# Patient Record
Sex: MALE | Race: BLACK OR AFRICAN AMERICAN | Employment: OTHER | URBAN - METROPOLITAN AREA
[De-identification: names, ages, dates, MRNs, and addresses within clinical notes are randomized per-mention and may not be internally consistent; named-entity substitution may affect disease eponyms.]

---

## 2017-10-30 PROBLEM — R33.9 URINARY RETENTION: Status: ACTIVE | Noted: 2017-10-30

## 2017-12-03 ENCOUNTER — HOSPITAL ENCOUNTER (EMERGENCY)
Age: 57
Discharge: OTHER INSTITUTION | End: 2017-12-03
Attending: EMERGENCY MEDICINE
Payer: MEDICAID

## 2017-12-03 VITALS
HEIGHT: 72 IN | HEART RATE: 60 BPM | SYSTOLIC BLOOD PRESSURE: 136 MMHG | BODY MASS INDEX: 19.37 KG/M2 | DIASTOLIC BLOOD PRESSURE: 72 MMHG | RESPIRATION RATE: 16 BRPM | OXYGEN SATURATION: 100 % | TEMPERATURE: 98.3 F | WEIGHT: 143 LBS

## 2017-12-03 DIAGNOSIS — R33.9 URINARY RETENTION: Primary | ICD-10-CM

## 2017-12-03 LAB
ANION GAP BLD CALC-SCNC: 14 MMOL/L (ref 10–20)
BUN BLD-MCNC: 35 MG/DL (ref 7–18)
CA-I BLD-MCNC: 1.23 MMOL/L (ref 1.12–1.32)
CHLORIDE BLD-SCNC: 105 MMOL/L (ref 100–108)
CO2 BLD-SCNC: 26 MMOL/L (ref 19–24)
CREAT UR-MCNC: 1 MG/DL (ref 0.6–1.3)
GLUCOSE BLD STRIP.AUTO-MCNC: 284 MG/DL (ref 74–106)
HCT VFR BLD CALC: 29 % (ref 36–49)
HGB BLD-MCNC: 9.9 G/DL (ref 12–16)
POTASSIUM BLD-SCNC: 4.2 MMOL/L (ref 3.5–5.5)
SODIUM BLD-SCNC: 140 MMOL/L (ref 136–145)

## 2017-12-03 PROCEDURE — 87077 CULTURE AEROBIC IDENTIFY: CPT | Performed by: EMERGENCY MEDICINE

## 2017-12-03 PROCEDURE — 80047 BASIC METABLC PNL IONIZED CA: CPT

## 2017-12-03 PROCEDURE — 99285 EMERGENCY DEPT VISIT HI MDM: CPT

## 2017-12-03 PROCEDURE — 77030005530 HC CATH URETH FOL40 BARD -B

## 2017-12-03 PROCEDURE — 87086 URINE CULTURE/COLONY COUNT: CPT | Performed by: EMERGENCY MEDICINE

## 2017-12-03 PROCEDURE — 51702 INSERT TEMP BLADDER CATH: CPT

## 2017-12-03 PROCEDURE — 87186 SC STD MICRODIL/AGAR DIL: CPT | Performed by: EMERGENCY MEDICINE

## 2017-12-03 NOTE — ED PROVIDER NOTES
HPI Comments: Pt presents with c/o urinary retention since this morning. Only put out <50cc since this morning. Last week his Willis was replaced improperly. Today, has had abd distension secondary to >1000 retention per nursing home facility. Denies fever, flank pain. Followed by [facility]. Smokes; drinks; in methadone clinic. Patient is a 59 y.o. male presenting with inability to urinate. The history is provided by the patient. Urinary Retention    This is a recurrent problem. The current episode started 6 to 12 hours ago. The problem occurs every urination. The problem has been gradually worsening. The quality of the pain is described as aching. The pain is at a severity of 6/10. The pain is moderate. There has been no fever. Pertinent negatives include no frequency, no hematuria and no flank pain. The patient is not pregnant. His past medical history is significant for urinary catheter problem. Past Medical History:   Diagnosis Date    Chronic drug abuse 1974    Diabetes ([facility] 01/1990    Hypertension     Renal cell carcinoma of left kidney ([facility] 12/17/13    Pathological Stage [id number] cc RCC FG3 s/p left open partial nephrectomy in 12/13      Renal mass        Past Surgical History:   Procedure Laterality Date    HX CIRCUMCISION  12/17/13    Dr. Tari Carpio, [facility]    HX NEPHRECTOMY Left 12/17/13    Open Partial, Dr. Tari Carpio, [facility]    HX OTHER SURGICAL Right 2/27/2016    removed right ft  2nd meta-tarsal         Family History:   Problem Relation Age of Onset    Diabetes Mother     Sickle Cell Anemia Father     Diabetes Sister     Hypertension Sister        Social History     Social History    Marital status:      Spouse name: N/A    Number of children: N/A    Years of education: N/A     Occupational History    Not on file.      Social History Main Topics    Smoking status: Current Every Day Smoker     Packs/day: 0.50     Years: 30.00     Types: Cigarettes    Smokeless tobacco: Never Used    Alcohol use 8.4 oz/week     14 Cans of beer per week      Comment: watching sports    Drug use: No      Comment: methadone    Sexual activity: Not on file     Other Topics Concern    Not on file     Social History Narrative     since 2012;  for 12 yrs; [employer]; [address] [employer]  just recently furloughed; No  service         ALLERGIES: Iodine    Review of Systems   Constitutional: Negative. HENT: Negative. Eyes: Negative. Respiratory: Negative. Negative for shortness of breath. Cardiovascular: Negative for chest pain and palpitations. Gastrointestinal: Positive for abdominal distention. Negative for rectal pain. Endocrine: Negative. Genitourinary: Negative. Negative for discharge, flank pain, frequency, hematuria and testicular pain. Musculoskeletal: Negative. Skin: Negative. Negative for wound. Allergic/Immunologic: Negative. Neurological: Negative. Hematological: Negative. Psychiatric/Behavioral: Negative. All other systems reviewed and are negative. Vitals:    12/03/17 1702   BP: 141/88   Pulse: 60   Resp: 16   Temp: 98.3 °F (36.8 °C)   SpO2: 100%   Weight: 64.9 kg (143 lb)   Height: 6' (1.829 m)            Physical Exam   Constitutional: Vital signs are normal. He appears well-developed and well-nourished. He is active. Non-toxic appearance. He does not appear ill. No distress. HENT:   Head: Normocephalic and atraumatic. Neck: Normal range of motion. Neck supple. Carotid bruit is not present. No tracheal deviation present. No thyromegaly present. Cardiovascular: Normal rate, regular rhythm and normal heart sounds. Exam reveals no gallop and no friction rub. No murmur heard. Pulmonary/Chest: Effort normal and breath sounds normal. No stridor. No respiratory distress. He has no wheezes. He has no rales. He exhibits no tenderness. Abdominal: Soft. He exhibits distension. He exhibits no mass. There is tenderness. There is no rebound, no guarding and no CVA tenderness. Diffuse lower abd distention and TTP. Musculoskeletal: Normal range of motion. Neurological: He is alert. Skin: Skin is warm, dry and intact. He is not diaphoretic. No pallor. Psychiatric: He has a normal mood and affect. His speech is normal and behavior is normal. Judgment and thought content normal.   Nursing note and vitals reviewed. MDM  Number of Diagnoses or Management Options  Urinary retention:   Diagnosis management comments: Differential: UTI; retention; prostatitis    Urine sent for cultre. Pending chemistry results, pt to be d/c'd home. Amount and/or Complexity of Data Reviewed  Clinical lab tests: ordered      ED Course       Procedures    6:33 PM  Diagnosis:   1. Urinary retention          Disposition: TBD;care turned over to oncoming provider at end of shift. Follow-up Information     Follow up With Details Comments Contact Info    Rose Mary Rm MD Schedule an appointment as soon as possible for a visit in 1 day  [place] [phone] [facility]  If symptoms worsen return immediately [address] [place]  [phone]          Patient's Medications   Start Taking    No medications on file   Continue Taking    ACETAMINOPHEN (TYLENOL) 500 MG TABLET    1,000 mg. ALBUTEROL-IPRATROPIUM (DUO-NEB) 2.5 MG-0.5 MG/3 ML NEBU    3 mL. AMLODIPINE (NORVASC) 10 MG TABLET    10 mg. ATENOLOL (TENORMIN) 25 MG TABLET    25 mg. ATORVASTATIN (LIPITOR) 40 MG TABLET    40 mg. BISACODYL (DULCOLAX) 5 MG EC TABLET    10 mg. GABAPENTIN (NEURONTIN) 100 MG CAPSULE    100 mg. HYDROCODONE-ACETAMINOPHEN (NORCO) 5-325 MG PER TABLET    Take 1 Tab by mouth every six (6) hours as needed. Max Daily Amount: 4 Tabs. HYDROMORPHONE (DILAUDID) 4 MG TABLET    4 mg. IBUPROFEN (MOTRIN) 400 MG TABLET    400 mg.     INSULIN GLARGINE (LANTUS SOLOSTAR) 100 UNIT/ML (3 ML) PEN    50 Units by SubCUTAneous route daily. INSULIN GLARGINE (LANTUS) 100 UNIT/ML INJECTION    4 Units. INSULIN LISPRO (HUMALOG KWIKPEN) 100 UNIT/ML KWIKPEN    Blood Sugar <150 =0 units; 150 -199 =2 units; 200 -249 =4 units; 250 -299 =6 units; 300 -349 =8 units; 350 and above =10 units. INSULIN LISPRO (HUMALOG) 100 UNIT/ML INJECTION    2-10 Units. LISINOPRIL (PRINIVIL, ZESTRIL) 40 MG TABLET    Take 1 Tab by mouth daily. MENTHOL-ZINC OXIDE (CALMOSEPTINE) 0.44-20.6 % OINT    Use 1 Application to affected area. MULTIVITAMIN, TX-IRON-CA-MIN (THERAPY M) 9 MG IRON-400 MCG TAB TABLET    Take 1 Tab by Mouth Once a Day. NICOTINE (NICODERM CQ) 14 MG/24 HR PATCH    1 Patch. POLYETHYLENE GLYCOL (MIRALAX) 17 GRAM PACKET    17 g. PRAVASTATIN (PRAVACHOL) 20 MG TABLET    Take 1 Tab by mouth nightly. RIFAMPIN (RIFADIN) 300 MG CAPSULE    600 mg. SILVER SULFADIAZINE (SILVADENE) 1 % TOPICAL CREAM    Use 1 Application to affected area Twice Daily. knees    ZOLPIDEM 10 MG SUBL    10 mg.    These Medications have changed    No medications on file   Stop Taking    No medications on file

## 2017-12-03 NOTE — DISCHARGE INSTRUCTIONS
Urinary Retention: Care Instructions  Your Care Instructions    Urinary retention means that you aren't able to urinate. In men, it is often caused by a blockage of the urinary tract from an enlarged prostate gland. In men and women, it can also be caused by an infection or nerve damage. Or it may be a side effect of a medicine. The doctor may have drained the urine from your bladder. If you still have problems passing urine, you may need to use a catheter at home. This is used to empty your bladder until the problem can be fixed. Your doctor may put a catheter in your bladder before you go home. If so, he or she will tell you when to come back to have the catheter removed. The doctor has checked you closely. But problems can develop later. If you notice any problems or new symptoms, get medical treatment right away. Follow-up care is a key part of your treatment and safety. Be sure to make and go to all appointments, and call your doctor if you are having problems. It's also a good idea to know your test results and keep a list of the medicines you take. How can you care for yourself at home? · Take your medicines exactly as prescribed. Call your doctor if you think you are having a problem with your medicine. You will get more details on the specific medicines your doctor prescribes. · Check with your doctor before you use any over-the-counter medicines. Many cold and allergy medicines, for example, can make this problem worse. Make sure your doctor knows all of the medicines, vitamins, supplements, and herbal remedies you take. · Spread out through the day the amount of fluid you drink. Do not drink a lot at bedtime. · Avoid alcohol and caffeine. · If you have been given a catheter, or if one is already in place, follow the instructions you were given. Always wash your hands before and after you handle the catheter. When should you call for help?   Call your doctor now or seek immediate medical care if:  ? · You cannot urinate at all, or it is getting harder to urinate. ? · You have symptoms of a urinary tract infection. These may include:  ¨ Pain or burning when you urinate. ¨ A frequent need to urinate without being able to pass much urine. ¨ Pain in the flank, which is just below the rib cage and above the waist on either side of the back. ¨ Blood in your urine. ¨ A fever. ? Watch closely for changes in your health, and be sure to contact your doctor if:  ? · You have any problems with your catheter. ? · You do not get better as expected. Where can you learn more? Go to http://gabo-curly.info/. Enter M244 in the search box to learn more about \"Urinary Retention: Care Instructions. \"  Current as of: May 12, 2017  Content Version: 11.4  © 9447-6673 [employer]. Care instructions adapted under license by [employer] (which disclaims liability or warranty for this information). If you have questions about a medical condition or this instruction, always ask your healthcare professional. [address] any warranty or liability for your use of this information.

## 2017-12-04 NOTE — ED TRIAGE NOTES
Pt brought to ED via EMS from nursing home with c/o abdominal pain and distention. Pt reports that he has a varner permanently that is not draining. EMS report that staff did a bladder scan showing 1 L of fluid. Pt arrives with varner in place.

## 2017-12-04 NOTE — ED NOTES
TRANSFER - OUT REPORT:    Attempted to give a verbal report on Cinthia Tripathi  being transferred to [facility]) for routine progression of care   Opportunity for questions and clarification was provided.       Patient transported with medical transportation

## 2017-12-04 NOTE — ED NOTES
Bedside shift change report given to Noy Booker RN (oncoming nurse) by Katie Montalvo RN (offgoing nurse). Report included the following information SBAR, ED Summary, Intake/Output, MAR and Recent Results.

## 2017-12-04 NOTE — ED NOTES
0600 PM :Pt care assumed from Kusum Yanez, ED provider. Pt complaint(s), current treatment plan, progression and available diagnostic results have been discussed thoroughly. Rounding occurred: no  Intended Disposition: TBD   Pending diagnostic reports and/or labs (please list): pending POC chem 8 and urine culture. Then discharge. Remedios Montenegro PA-C       4695 PM:  Unremarkable POC Chem 8. Will DC home with PCP and urology follow-up.  Remedios Montenegro PA-C

## 2017-12-05 LAB
BACTERIA SPEC CULT: ABNORMAL
SERVICE CMNT-IMP: ABNORMAL

## 2017-12-06 RX ORDER — SULFAMETHOXAZOLE AND TRIMETHOPRIM 800; 160 MG/1; MG/1
1 TABLET ORAL 2 TIMES DAILY
Qty: 6 TAB | Refills: 0 | Status: SHIPPED | OUTPATIENT
Start: 2017-12-06 | End: 2017-12-09

## 2017-12-06 NOTE — PROGRESS NOTES
Spoke with nursing facility -- pt released to another home and don't know where or name. Spoke with urologist.  Will still defer ABX for asymptomatic pt.  - angelia, nicole

## 2017-12-07 NOTE — PROGRESS NOTES
Pt prescribed bactrim at the time of the ED visit which is susceptible.  April FLY Levin PA-C 5:44 PM

## 2017-12-08 PROBLEM — G82.50 QUADRIPARESIS (HCC): Status: ACTIVE | Noted: 2017-05-31

## 2017-12-08 PROBLEM — I10 ESSENTIAL HYPERTENSION: Status: ACTIVE | Noted: 2017-04-28

## 2017-12-08 PROBLEM — R50.9 FEVER: Status: ACTIVE | Noted: 2017-05-20

## 2017-12-08 PROBLEM — J96.01 ACUTE RESPIRATORY FAILURE WITH HYPOXIA (HCC): Status: ACTIVE | Noted: 2017-05-31

## 2017-12-08 PROBLEM — R19.7 DIARRHEA: Status: ACTIVE | Noted: 2017-05-31

## 2017-12-08 PROBLEM — E87.1 HYPONATREMIA: Status: ACTIVE | Noted: 2017-04-28

## 2017-12-08 PROBLEM — B18.2 CHRONIC HEPATITIS C WITHOUT HEPATIC COMA (HCC): Status: ACTIVE | Noted: 2017-05-31

## 2017-12-08 PROBLEM — D63.8 ANEMIA OF CHRONIC DISEASE: Status: ACTIVE | Noted: 2017-05-20

## 2017-12-08 PROBLEM — F19.10 IV DRUG ABUSE (HCC): Status: ACTIVE | Noted: 2017-04-28

## 2017-12-08 PROBLEM — Z85.528 HISTORY OF RENAL CELL CANCER: Status: ACTIVE | Noted: 2017-05-31

## 2017-12-08 PROBLEM — R78.81 MRSA BACTEREMIA: Status: ACTIVE | Noted: 2017-05-31

## 2017-12-08 PROBLEM — B95.62 MRSA BACTEREMIA: Status: ACTIVE | Noted: 2017-05-31

## 2018-03-20 ENCOUNTER — HOSPITAL ENCOUNTER (EMERGENCY)
Age: 58
Discharge: NURSING, REHAB OR LONG-TERM CARE | End: 2018-03-20
Attending: EMERGENCY MEDICINE
Payer: MEDICAID

## 2018-03-20 VITALS
OXYGEN SATURATION: 99 % | DIASTOLIC BLOOD PRESSURE: 66 MMHG | BODY MASS INDEX: 22.72 KG/M2 | WEIGHT: 167.55 LBS | RESPIRATION RATE: 11 BRPM | TEMPERATURE: 98.2 F | SYSTOLIC BLOOD PRESSURE: 115 MMHG | HEART RATE: 53 BPM

## 2018-03-20 DIAGNOSIS — Z91.89: Primary | ICD-10-CM

## 2018-03-20 PROCEDURE — 99284 EMERGENCY DEPT VISIT MOD MDM: CPT

## 2018-03-20 NOTE — ED NOTES
Pt did not want to be transported, as he has no complaints.  He states Yobani Bourgeois can never get my  Temperature in my mouth\"

## 2018-03-20 NOTE — ED TRIAGE NOTES
From [facility] for hypothermia per family request just finished tx for flu, current treatment for cdiff. . Had low oral temp  At nursing home of 95, had temporal reading of 97.2. Pt has no complaints.

## 2018-03-20 NOTE — ED PROVIDER NOTES
EMERGENCY DEPARTMENT HISTORY AND PHYSICAL EXAM    7:11 PM      Date: 3/20/2018  Patient Name: Odalys Vanessa    History of Presenting Illness     Chief Complaint   Patient presents with    Other     seny by family for low temp reading          History Provided By: Patient    Chief Complaint: low temperature reading  Duration:  Minutes  Timing:  Acute  Location: n/a  Quality: n/a  Severity: N/A  Modifying Factors: Denies any modifying factor. Associated Symptoms: denies any other associated signs or symptoms      Additional History (Context): Odalys Vanessa is a 59 y.o. male with renal cell carcinoma, diabetes, chronic drug abuse, HTN, who presents from a nursing home after he got a low oral temperature reading at his nursing home. The pt recently had C. Diff., and influenza, and was concerned when the nursing home got an oral temperature of 95 degrees farenheit. The pt has no complaints, and feels well. Denies any modifying factors. No further complaints at this time. PCP: Georgia Marinelli MD    Current Outpatient Prescriptions   Medication Sig Dispense Refill    NUT. TX.GLUCOSE INTOLERANCE,SOY (GLUCERNA PO) Take  by mouth.  magnesium hydroxide (NUR MILK OF MAGNESIA) 400 mg/5 mL suspension Take 30 mL by mouth daily as needed for Constipation.  LACTOBACILLUS RHAMNOSUS GG (CULTURELLE PO) Take  by mouth.  doxycycline (MONODOX) 100 mg capsule Take 100 mg by mouth two (2) times a day.  mineral oil-hydrophil petrolat (AQUAPHOR) ointment Apply  to affected area as needed for Dry Skin.  mineral oil-hydrophil petrolat (AQUAPHOR) ointment Apply  to affected area as needed for Dry Skin.  PREPARATION H, WITCH HAZEL, EX by Apply Externally route.  linagliptin (TRADJENTA) 5 mg tablet Take 5 mg by mouth daily.  tamsulosin (FLOMAX) 0.4 mg capsule Take 0.4 mg by mouth daily.  acetaminophen (TYLENOL) 500 mg tablet 1,000 mg.      bisacodyl (DULCOLAX) 5 mg EC tablet 10 mg.  gabapentin (NEURONTIN) 100 mg capsule 100 mg.      atorvastatin (LIPITOR) 40 mg tablet 40 mg.      amLODIPine (NORVASC) 10 mg tablet 10 mg.      atenolol (TENORMIN) 25 mg tablet 25 mg.      ibuprofen (MOTRIN) 400 mg tablet 400 mg.      HYDROmorphone (DILAUDID) 4 mg tablet 4 mg.  albuterol-ipratropium (DUO-NEB) 2.5 mg-0.5 mg/3 ml nebu 3 mL.  Menthol-Zinc Oxide (CALMOSEPTINE) 0.44-20.6 % oint Use 1 Application to affected area.  nicotine (NICODERM CQ) 14 mg/24 hr patch 1 Patch.  rifAMPin (RIFADIN) 300 mg capsule 600 mg.  polyethylene glycol (MIRALAX) 17 gram packet 17 g.      silver sulfADIAZINE (SILVADENE) 1 % topical cream Use 1 Application to affected area Twice Daily. knees      multivitamin, tx-iron-ca-min (THERAPY M) 9 mg iron-400 mcg tab tablet Take 1 Tab by Mouth Once a Day.  Zolpidem 10 mg subl 10 mg.      insulin glargine (LANTUS) 100 unit/mL injection 4 Units.  insulin lispro (HUMALOG) 100 unit/mL injection 2-10 Units.  lisinopril (PRINIVIL, ZESTRIL) 40 mg tablet Take 1 Tab by mouth daily. 30 Tab 2    pravastatin (PRAVACHOL) 20 mg tablet Take 1 Tab by mouth nightly. 30 Tab 2    insulin lispro (HUMALOG KWIKPEN) 100 unit/mL kwikpen Blood Sugar <150 =0 units; 150 -199 =2 units; 200 -249 =4 units; 250 -299 =6 units; 300 -349 =8 units; 350 and above =10 units. 1 Package 2    insulin glargine (LANTUS SOLOSTAR) 100 unit/mL (3 mL) pen 50 Units by SubCUTAneous route daily.  1 Each 2       Past History     Past Medical History:  Past Medical History:   Diagnosis Date    Chronic drug abuse 1974    Diabetes ([facility] [address] 01/1990    Hypertension     Renal cell carcinoma of left kidney ([facility] [address] 12/17/13    Pathological Stage [id number] cc RCC FG3 s/p left open partial nephrectomy in 12/13      Renal mass        Past Surgical History:  Past Surgical History:   Procedure Laterality Date    HX CIRCUMCISION  12/17/13    Dr. Mesfin Kwok, [facility]    HX NEPHRECTOMY Left 12/17/13 Open Partial, [facility] Jade [facility], [facility]    HX OTHER SURGICAL Right 2/27/2016    removed right ft  2nd meta-tarsal       Family History:  Family History   Problem Relation Age of Onset    Diabetes Mother     Sickle Cell Anemia Father     Diabetes Sister     Hypertension Sister        Social History:  Social History   Substance Use Topics    Smoking status: Former Smoker     Packs/day: 0.50     Years: 30.00     Types: Cigarettes    Smokeless tobacco: Never Used    Alcohol use 8.4 oz/week     14 Cans of beer per week      Comment: watching sports       Allergies: Allergies   Allergen Reactions    Iodine Hives         Review of Systems       Review of Systems   Constitutional: Negative for chills, fatigue and fever. HENT: Negative. Negative for sore throat. Eyes: Negative. Respiratory: Negative for cough and shortness of breath. Cardiovascular: Negative for chest pain and palpitations. Gastrointestinal: Negative for abdominal pain, nausea and vomiting. Genitourinary: Negative for dysuria. Musculoskeletal: Negative. Skin: Negative. Neurological: Negative for dizziness, weakness, light-headedness and headaches. Psychiatric/Behavioral: Negative. All other systems reviewed and are negative. Physical Exam     Visit Vitals    /70 (BP 1 Location: Left arm, BP Patient Position: At rest)    Pulse (!) 55    Temp 98.2 °F (36.8 °C)    Resp 12    Wt 76 kg (167 lb 8.8 oz)    SpO2 98%    BMI 22.72 kg/m2         Physical Exam   Constitutional: He appears well-developed and well-nourished. Non-toxic appearance. He does not have a sickly appearance. He does not appear ill. No distress. HENT:   Head: Normocephalic and atraumatic. Mouth/Throat: Oropharynx is clear and moist. No oropharyngeal exudate. Eyes: Conjunctivae and EOM are normal. Pupils are equal, round, and reactive to light. No scleral icterus. Neck: Trachea normal and normal range of motion. Neck supple.  No hepatojugular reflux and no JVD present. No tracheal deviation present. No thyromegaly present. Cardiovascular: Regular rhythm, S1 normal, S2 normal, normal heart sounds, intact distal pulses and normal pulses. Bradycardia present. Exam reveals no gallop, no S3 and no S4. No murmur heard. Pulses:       Radial pulses are 2+ on the right side, and 2+ on the left side. Dorsalis pedis pulses are 2+ on the right side, and 2+ on the left side. Pulmonary/Chest: Effort normal and breath sounds normal. No accessory muscle usage. No tachypnea. No respiratory distress. He has no decreased breath sounds. He has no wheezes. He has no rhonchi. He has no rales. Abdominal: Soft. Normal appearance and bowel sounds are normal. He exhibits no distension and no mass. There is no hepatosplenomegaly. There is no tenderness. There is no rigidity, no rebound, no guarding, no CVA tenderness, no tenderness at McBurney's point and negative Carty's sign. Musculoskeletal: Normal range of motion. Upper extremities 1/5 throughout   Lower extremities 4/5 throughout    Lymphadenopathy:        Head (right side): No submental, no submandibular, no preauricular and no occipital adenopathy present. Head (left side): No submental, no submandibular, no preauricular and no occipital adenopathy present. He has no cervical adenopathy. Right: No supraclavicular adenopathy present. Left: No supraclavicular adenopathy present. Neurological: He is alert. He has normal strength and normal reflexes. He is not disoriented. No cranial nerve deficit or sensory deficit. Coordination and gait normal. GCS eye subscore is 4. GCS verbal subscore is 5. GCS motor subscore is 6. Grossly intact    Skin: Skin is warm, dry and intact. No rash noted. He is not diaphoretic. Psychiatric: He has a normal mood and affect.  His speech is normal and behavior is normal. Judgment and thought content normal. Cognition and memory are normal.   Nursing note and vitals reviewed. Medical Decision Making   I am the first provider for this patient. I reviewed the vital signs, available nursing notes, past medical history, past surgical history, family history and social history. Vital Signs-Reviewed the patient's vital signs. Records Reviewed: Nursing Notes and Old Medical Records (Time of Review: 7:11 PM)    ED Course: Progress Notes, Reevaluation, and Consults:    Provider Notes (Medical Decision Making):  MDM  Number of Diagnoses or Management Options  At high risk for hypothermia:   Diagnosis management comments: Hypothermia       Diagnosis       I have reassessed the patient. Patient is feeling well. The pt's temperature was normal. I feel he is ok to be discharged home. Patient was discharged in stable condition. Patient is to return to emergency department if any new or worsening condition. Clinical Impression:  Possible hypothermia, now resolved. Disposition: Discharged home     Follow-up Information     Follow up With Details Comments [address], [place] In 2 days For follow up [facility] [place] [address] [facility]      [facility] EMERGENCY DEPT  As needed, If symptoms worsen [address] [place]  [phone]           _______________________________    Attestations:  Scribe [address] acting as a scribe for and in the presence of Maranda Holman DO      March 20, 2018 at 7:11 PM       Provider Attestation:      I personally performed the services described in the documentation, reviewed the documentation, as recorded by the scribe in my presence, and it accurately and completely records my words and actions.  March 20, 2018 at 7:11 PM - Maranda Holman DO    _______________________________

## 2018-03-21 NOTE — ED NOTES
I have reviewed discharge instructions with the patient. The patient verbalized understanding. Pt is AxOx4, NAD. Pt taken out of ED by wheelchair, accompanied by family members, to take him back to [address] [facility].

## 2018-03-31 ENCOUNTER — APPOINTMENT (OUTPATIENT)
Dept: CT IMAGING | Age: 58
DRG: 460 | End: 2018-03-31
Attending: FAMILY MEDICINE
Payer: MEDICAID

## 2018-03-31 ENCOUNTER — HOSPITAL ENCOUNTER (INPATIENT)
Age: 58
LOS: 10 days | Discharge: NURSING, REHAB OR LONG-TERM CARE | DRG: 460 | End: 2018-04-10
Attending: EMERGENCY MEDICINE | Admitting: FAMILY MEDICINE
Payer: MEDICAID

## 2018-03-31 DIAGNOSIS — C64.2 CANCER OF LEFT KIDNEY (HCC): ICD-10-CM

## 2018-03-31 DIAGNOSIS — N18.30 CHRONIC KIDNEY DISEASE, STAGE III (MODERATE) (HCC): ICD-10-CM

## 2018-03-31 DIAGNOSIS — D64.9 ANEMIA, UNSPECIFIED TYPE: ICD-10-CM

## 2018-03-31 DIAGNOSIS — M54.9 CHRONIC BACK PAIN GREATER THAN 3 MONTHS DURATION: ICD-10-CM

## 2018-03-31 DIAGNOSIS — D75.89 LIGHT CHAIN DEPOSITION DISEASE: ICD-10-CM

## 2018-03-31 DIAGNOSIS — R60.9 PERIPHERAL EDEMA: Primary | ICD-10-CM

## 2018-03-31 DIAGNOSIS — G89.29 CHRONIC BACK PAIN GREATER THAN 3 MONTHS DURATION: ICD-10-CM

## 2018-03-31 DIAGNOSIS — N28.9 RENAL INSUFFICIENCY: ICD-10-CM

## 2018-03-31 DIAGNOSIS — R00.1 BRADYCARDIA: ICD-10-CM

## 2018-03-31 PROBLEM — Z86.19 H/O CLOSTRIDIUM DIFFICILE INFECTION: Status: ACTIVE | Noted: 2018-03-31

## 2018-03-31 LAB
ALBUMIN SERPL-MCNC: 3.4 G/DL (ref 3.4–5)
ALBUMIN/GLOB SERPL: 0.8 {RATIO} (ref 0.8–1.7)
ALP SERPL-CCNC: 131 U/L (ref 45–117)
ALT SERPL-CCNC: 94 U/L (ref 16–61)
ANION GAP SERPL CALC-SCNC: 8 MMOL/L (ref 3–18)
AST SERPL-CCNC: 66 U/L (ref 15–37)
BASOPHILS # BLD: 0 K/UL (ref 0–0.06)
BASOPHILS NFR BLD: 0 % (ref 0–3)
BILIRUB SERPL-MCNC: 0.7 MG/DL (ref 0.2–1)
BNP SERPL-MCNC: 562 PG/ML (ref 0–900)
BUN SERPL-MCNC: 68 MG/DL (ref 7–18)
BUN/CREAT SERPL: 38 (ref 12–20)
CALCIUM SERPL-MCNC: 9.4 MG/DL (ref 8.5–10.1)
CHLORIDE SERPL-SCNC: 114 MMOL/L (ref 100–108)
CK MB CFR SERPL CALC: 5.2 % (ref 0–4)
CK MB SERPL-MCNC: 6.1 NG/ML (ref 5–25)
CK SERPL-CCNC: 117 U/L (ref 39–308)
CO2 SERPL-SCNC: 20 MMOL/L (ref 21–32)
CREAT SERPL-MCNC: 1.78 MG/DL (ref 0.6–1.3)
CREAT UR-MCNC: 102 MG/DL (ref 30–125)
DIFFERENTIAL METHOD BLD: ABNORMAL
EOSINOPHIL # BLD: 0.2 K/UL (ref 0–0.4)
EOSINOPHIL NFR BLD: 3 % (ref 0–5)
ERYTHROCYTE [DISTWIDTH] IN BLOOD BY AUTOMATED COUNT: 15.8 % (ref 11.6–14.5)
GLOBULIN SER CALC-MCNC: 4.4 G/DL (ref 2–4)
GLUCOSE BLD STRIP.AUTO-MCNC: 168 MG/DL (ref 70–110)
GLUCOSE SERPL-MCNC: 171 MG/DL (ref 74–99)
HCT VFR BLD AUTO: 22.9 % (ref 36–48)
HGB BLD-MCNC: 7.7 G/DL (ref 13–16)
LYMPHOCYTES # BLD: 1.9 K/UL (ref 0.8–3.5)
LYMPHOCYTES NFR BLD: 33 % (ref 20–51)
MCH RBC QN AUTO: 29.5 PG (ref 24–34)
MCHC RBC AUTO-ENTMCNC: 33.6 G/DL (ref 31–37)
MCV RBC AUTO: 87.7 FL (ref 74–97)
MICROALBUMIN UR-MCNC: 5.54 MG/DL (ref 0–3)
MICROALBUMIN/CREAT UR-RTO: 54 MG/G (ref 0–30)
MONOCYTES # BLD: 0.6 K/UL (ref 0–1)
MONOCYTES NFR BLD: 10 % (ref 2–9)
NEUTS SEG # BLD: 3 K/UL (ref 1.8–8)
NEUTS SEG NFR BLD: 54 % (ref 42–75)
PLATELET # BLD AUTO: 51 K/UL (ref 135–420)
PLATELET COMMENTS,PCOM: ABNORMAL
POTASSIUM SERPL-SCNC: 5.4 MMOL/L (ref 3.5–5.5)
PROT SERPL-MCNC: 7.8 G/DL (ref 6.4–8.2)
RBC # BLD AUTO: 2.61 M/UL (ref 4.7–5.5)
RBC MORPH BLD: ABNORMAL
RBC MORPH BLD: ABNORMAL
SODIUM SERPL-SCNC: 142 MMOL/L (ref 136–145)
TROPONIN I SERPL-MCNC: 0.07 NG/ML (ref 0–0.04)
WBC # BLD AUTO: 5.7 K/UL (ref 4.6–13.2)

## 2018-03-31 PROCEDURE — 82043 UR ALBUMIN QUANTITATIVE: CPT | Performed by: FAMILY MEDICINE

## 2018-03-31 PROCEDURE — 74011636637 HC RX REV CODE- 636/637: Performed by: FAMILY MEDICINE

## 2018-03-31 PROCEDURE — 80053 COMPREHEN METABOLIC PANEL: CPT | Performed by: EMERGENCY MEDICINE

## 2018-03-31 PROCEDURE — 82962 GLUCOSE BLOOD TEST: CPT

## 2018-03-31 PROCEDURE — 65660000004 HC RM CVT STEPDOWN

## 2018-03-31 PROCEDURE — 82550 ASSAY OF CK (CPK): CPT | Performed by: FAMILY MEDICINE

## 2018-03-31 PROCEDURE — 83880 ASSAY OF NATRIURETIC PEPTIDE: CPT | Performed by: EMERGENCY MEDICINE

## 2018-03-31 PROCEDURE — 85025 COMPLETE CBC W/AUTO DIFF WBC: CPT | Performed by: EMERGENCY MEDICINE

## 2018-03-31 PROCEDURE — 87522 HEPATITIS C REVRS TRNSCRPJ: CPT | Performed by: FAMILY MEDICINE

## 2018-03-31 PROCEDURE — 87902 NFCT AGT GNTYP ALYS HEP C: CPT | Performed by: FAMILY MEDICINE

## 2018-03-31 PROCEDURE — 74176 CT ABD & PELVIS W/O CONTRAST: CPT

## 2018-03-31 PROCEDURE — 99284 EMERGENCY DEPT VISIT MOD MDM: CPT

## 2018-03-31 PROCEDURE — 93005 ELECTROCARDIOGRAM TRACING: CPT

## 2018-03-31 PROCEDURE — 74011250636 HC RX REV CODE- 250/636: Performed by: EMERGENCY MEDICINE

## 2018-03-31 PROCEDURE — 74011250637 HC RX REV CODE- 250/637: Performed by: FAMILY MEDICINE

## 2018-03-31 PROCEDURE — 74011250636 HC RX REV CODE- 250/636: Performed by: FAMILY MEDICINE

## 2018-03-31 RX ORDER — DEXTROSE 50 % IN WATER (D50W) INTRAVENOUS SYRINGE
25-50 AS NEEDED
Status: DISCONTINUED | OUTPATIENT
Start: 2018-03-31 | End: 2018-04-10 | Stop reason: HOSPADM

## 2018-03-31 RX ORDER — MAGNESIUM SULFATE 100 %
4 CRYSTALS MISCELLANEOUS AS NEEDED
Status: DISCONTINUED | OUTPATIENT
Start: 2018-03-31 | End: 2018-04-10 | Stop reason: HOSPADM

## 2018-03-31 RX ORDER — PANTOPRAZOLE SODIUM 40 MG/1
40 TABLET, DELAYED RELEASE ORAL
Status: DISCONTINUED | OUTPATIENT
Start: 2018-04-01 | End: 2018-04-10 | Stop reason: HOSPADM

## 2018-03-31 RX ORDER — SODIUM CHLORIDE 9 MG/ML
75 INJECTION, SOLUTION INTRAVENOUS CONTINUOUS
Status: DISPENSED | OUTPATIENT
Start: 2018-03-31 | End: 2018-04-01

## 2018-03-31 RX ORDER — HEPARIN SODIUM 5000 [USP'U]/ML
5000 INJECTION, SOLUTION INTRAVENOUS; SUBCUTANEOUS EVERY 8 HOURS
Status: DISCONTINUED | OUTPATIENT
Start: 2018-03-31 | End: 2018-03-31

## 2018-03-31 RX ORDER — SODIUM CHLORIDE 9 MG/ML
1000 INJECTION, SOLUTION INTRAVENOUS ONCE
Status: COMPLETED | OUTPATIENT
Start: 2018-03-31 | End: 2018-03-31

## 2018-03-31 RX ORDER — INSULIN LISPRO 100 [IU]/ML
INJECTION, SOLUTION INTRAVENOUS; SUBCUTANEOUS
Status: DISCONTINUED | OUTPATIENT
Start: 2018-03-31 | End: 2018-04-10 | Stop reason: HOSPADM

## 2018-03-31 RX ORDER — UREA 10 %
2 LOTION (ML) TOPICAL 2 TIMES DAILY
Status: DISCONTINUED | OUTPATIENT
Start: 2018-03-31 | End: 2018-04-10 | Stop reason: HOSPADM

## 2018-03-31 RX ORDER — HYDRALAZINE HYDROCHLORIDE 25 MG/1
25 TABLET, FILM COATED ORAL
Status: DISCONTINUED | OUTPATIENT
Start: 2018-03-31 | End: 2018-04-10 | Stop reason: HOSPADM

## 2018-03-31 RX ADMIN — SODIUM CHLORIDE 75 ML/HR: 900 INJECTION, SOLUTION INTRAVENOUS at 20:35

## 2018-03-31 RX ADMIN — SODIUM CHLORIDE 1000 ML: 900 INJECTION, SOLUTION INTRAVENOUS at 16:09

## 2018-03-31 RX ADMIN — LACTOBACILLUS TAB 2 TABLET: TAB at 20:07

## 2018-03-31 RX ADMIN — INSULIN LISPRO 2 UNITS: 100 INJECTION, SOLUTION INTRAVENOUS; SUBCUTANEOUS at 21:35

## 2018-03-31 NOTE — ED NOTES
Patient ambulatory to the restroom to obtain urine specimen patient denies pain urine sent to the lab at this time.

## 2018-03-31 NOTE — H&P
History & Physical    Patient: Waldo Tinoco MRN: [id number]  CSN: [id number]    YOB: 1960  Age: 59 y.o. Sex: male      DOA: 3/31/2018    Chief Complaint: swelling lower extremities  . HPI:     Waldo Tinoco is a 59 y.o.  male who   Has  history of renal mass, renal cell carcinoma of left kidney, DM, chronic drug abuse, and HTN who presented with  c/o bilateral lower extremity swelling onset 3 days ago. He states that he is from [address] and has been in the nursing home for the past 1 year. Heddie Katy He notedf that his ankles first started sweling first.  He states that he had an ultra sound of his leg yesterday. He notes that he usually is able to ambulate without a walker but with increase swelling can not ambulate like before . Patient denies history of CHF, chest pain, abdominal pain, and any othe rassociated symptoms or complaints. Pt has H/o hepatitis c and s/p partial nephrectomy Lt kidney due to renal cell carcinoma by dr Ira Main urology    Pt has h/o cervical disc disease s/p surgery and fusion .  Pt had  H/o diskitis and osteomyelitis c 3-7 and quadriplegia after surgery     Pt was treated last week for c- diff and and flu     Past Medical History:   Diagnosis Date    Chronic drug abuse 1974    Diabetes ([facility] [address] 01/1990    Hypertension     Renal cell carcinoma of left kidney ([facility] [address] 12/17/13    Pathological Stage [id number] cc RCC FG3 s/p left open partial nephrectomy in 12/13      Renal mass        Past Surgical History:   Procedure Laterality Date    HX CIRCUMCISION  12/17/13    Dr. Ricardo Quinones, [facility]    HX NEPHRECTOMY Left 12/17/13    Open Partial, Dr. Ricardo Quinones, [facility]    HX OTHER SURGICAL Right 2/27/2016    removed right ft  2nd meta-tarsal       Family History   Problem Relation Age of Onset    Diabetes Mother     Sickle Cell Anemia Father     Diabetes Sister     Hypertension Sister        Social History     Social History    Marital status:      Spouse name: N/A    Number of children: N/A    Years of education: N/A     Social History Main Topics    Smoking status: Former Smoker     Packs/day: 0.50     Years: 30.00     Types: Cigarettes    Smokeless tobacco: Never Used    Alcohol use 8.4 oz/week     14 Cans of beer per week      Comment: watching sports    Drug use: No      Comment: methadone    Sexual activity: Not on file     Other Topics Concern    Not on file     Social History Narrative     since 2012;  for 12 yrs; 2K; [address] [place]  just recently furloughed; No  service       Prior to Admission medications    Medication Sig Start Date End Date Taking? Authorizing Provider   NUT. TX.GLUCOSE INTOLERANCE,SOY (GLUCERNA PO) Take  by mouth. Historical Provider   magnesium hydroxide (NUR MILK OF MAGNESIA) 400 mg/5 mL suspension Take 30 mL by mouth daily as needed for Constipation. Historical Provider   LACTOBACILLUS RHAMNOSUS GG (CULTURELLE PO) Take  by mouth. Historical Provider   doxycycline (MONODOX) 100 mg capsule Take 100 mg by mouth two (2) times a day. Historical Provider   mineral oil-hydrophil petrolat (AQUAPHOR) ointment Apply  to affected area as needed for Dry Skin. Historical Provider   mineral oil-hydrophil petrolat (AQUAPHOR) ointment Apply  to affected area as needed for Dry Skin. Historical Provider   PREPARATION H, WITCH HAZEL, EX by Apply Externally route. Historical Provider   linagliptin (TRADJENTA) 5 mg tablet Take 5 mg by mouth daily. Historical Provider   tamsulosin (FLOMAX) 0.4 mg capsule Take 0.4 mg by mouth daily. Historical Provider   acetaminophen (TYLENOL) 500 mg tablet 1,000 mg. 6/7/17   Historical Provider   bisacodyl (DULCOLAX) 5 mg EC tablet 10 mg. 6/7/17   Historical Provider   gabapentin (NEURONTIN) 100 mg capsule 100 mg.     Historical Provider   atorvastatin (LIPITOR) 40 mg tablet 40 mg. 6/7/17   Historical Provider   amLODIPine (NORVASC) 10 mg tablet 10 mg. 5/28/15   Historical Provider   atenolol (TENORMIN) 25 mg tablet 25 mg. 5/28/15   Historical Provider   ibuprofen (MOTRIN) 400 mg tablet 400 mg. 6/7/17   Historical Provider   HYDROmorphone (DILAUDID) 4 mg tablet 4 mg. 6/7/17   Historical Provider   albuterol-ipratropium (DUO-NEB) 2.5 mg-0.5 mg/3 ml nebu 3 mL. 6/7/17   Historical Provider   Menthol-Zinc Oxide (CALMOSEPTINE) 0.44-20.6 % oint Use 1 Application to affected area. Historical Provider   nicotine (NICODERM CQ) 14 mg/24 hr patch 1 Patch. 6/7/17   Historical Provider   rifAMPin (RIFADIN) 300 mg capsule 600 mg. 6/7/17   Historical Provider   polyethylene glycol (MIRALAX) 17 gram packet 17 g. Historical Provider   silver sulfADIAZINE (SILVADENE) 1 % topical cream Use 1 Application to affected area Twice Daily. knees 6/7/17   Historical Provider   multivitamin, tx-iron-ca-min (THERAPY M) 9 mg iron-400 mcg tab tablet Take 1 Tab by Mouth Once a Day. 6/7/17   Historical Provider   Zolpidem 10 mg subl 10 mg. Historical Provider   insulin glargine (LANTUS) 100 unit/mL injection 4 Units. 6/7/17   Historical Provider   insulin lispro (HUMALOG) 100 unit/mL injection 2-10 Units. 6/7/17   Historical Provider   lisinopril (PRINIVIL, ZESTRIL) 40 mg tablet Take 1 Tab by mouth daily. 3/2/16   Moreno Perez MD   pravastatin (PRAVACHOL) 20 mg tablet Take 1 Tab by mouth nightly. 2/29/16   Annelise Quinones MD   insulin lispro (HUMALOG KWIKPEN) 100 unit/mL kwikpen Blood Sugar <150 =0 units; 150 -199 =2 units; 200 -249 =4 units; 250 -299 =6 units; 300 -349 =8 units; 350 and above =10 units. 2/29/16   Annelise Quinones MD   insulin glargine (LANTUS SOLOSTAR) 100 unit/mL (3 mL) pen 50 Units by SubCUTAneous route daily. 2/29/16   Annelise Quinones MD       Allergies   Allergen Reactions    Iodine Hives       Review of Systems  GENERAL: Patient alert, awake and oriented times 3, able to communicate full sentences and not in distress.    HEENT: No change in vision, no earache, tinnitus, sore throat or sinus congestion. NECK: No pain or stiffness. CARDIOVASCULAR: No chest pain or pressure. No palpitations. PULMONARY: No shortness of breath, cough or wheeze. GASTROINTESTINAL: No abdominal pain, nausea, vomiting or diarrhea, melena or       bright red blood per rectum. Positive constipation  GENITOURINARY: No urinary frequency, urgency, hesitancy or dysuria. MUSCULOSKELETAL: No joint or muscle pain, no back pain, no recent trauma. DERMATOLOGIC: No rash, no itching, no lesions. ENDOCRINE: No polyuria, polydipsia, no heat or cold intolerance. No recent change in    weight. HEMATOLOGICAL: No anemia or easy bruising or bleeding. NEUROLOGIC: No headache, seizures, numbness, tingling . Positive weakness upper extremities more than lower extremities  PSYCHIATRIC: No depression, anxiety, mood disorder, no loss of interest in normal       activity or change in sleep pattern. Physical Exam:     Physical Exam:  Visit Vitals    /71    Pulse (!) 44    Temp 97.8 °F (36.6 °C)    Resp 20    Ht 6' (1.829 m)    Wt 75.8 kg (167 lb)    SpO2 100%    BMI 22.65 kg/m2           Temp (24hrs), Av.8 °F (36.6 °C), Min:97.8 °F (36.6 °C), Max:97.8 °F (36.6 °C)             General:  Alert, cooperative, no distress, appears stated age. Head:  Normocephalic, without obvious abnormality, atraumatic. Eyes:  Conjunctivae/corneas clear. PERRL, EOMs intact. Nose: Nares normal. No drainage or sinus tenderness. Throat: Lips, mucosa, and tongue normal.poor dentition    Neck: Supple, symmetrical, trachea midline, no adenopathy, thyroid: no enlargement/tenderness/nodules, no carotid bruit and no JVD. Back:   ROM normal. No CVA tenderness. Lungs:   Clear to auscultation bilaterally. Chest wall:  No tenderness or deformity. Heart:  Regular rate and rhythm, S1, S2 normal, no murmur, click, rub or gallop. Abdomen: Soft, non-tender.  Bowel sounds normal. No masses,  No organomegaly. Extremities: Extremities normal, atraumatic, no cyanosis or edema. Pulses: 2+ and symmetric lower extremities ! + upper extremities   Skin: Skin color, texture, turgor normal. No rashes or lesions   Neurologic: CNII-XII intact. No  New focal motor or sensory deficit. functional quadriplegia       Labs Reviewed: All lab results for the last 24 hours reviewed.   EKG    Procedures/imaging: see electronic medical records for all procedures/Xrays and details which were not copied into this note but were reviewed prior to creation of Plan        Assessment/Plan     Active Problems:    Renal cell cancer ([facility] [address] (11/5/2014)      Type II diabetes mellitus, uncontrolled ([facility] [address] (12/16/2015)      Urinary retention (10/30/2017)      Cancer of left kidney ([facility] [address] (12/23/2013)      Quadriparesis ([facility] [address] (5/31/2017)      Bradycardia (3/31/2018)      Acute renal insufficiency (3/31/2018)      H/O Clostridium difficile infection (3/31/2018)         Plan  Acute Renal failure/ H/O Lt renal cell carcinoma S/p Lt nephrectomy/ swelling lower extremities   Pt received IV fluid in the ER  Will get venous duplex ultrasound B/L leg  Ct scan abdomenand pelvis no contrast  Kristyn hydration  Urine micro albumin  Nephrology consult  Check echo, cardiac enzymes  Hold lisnopril  Hydralazine prn SBp above 160    Diabetes Mellitus   Check HG A1c, lipid profile  Sliding scale coverage    H/O c-diff / h/o flu  Treated with flagyl at nursing home  Finished course of Tamiflu    Thrombocytopenia  Continue to monitor CBC      H/o hepatitis c  Check viral load and genotype  Pt has been f/u GI and had ultrasound liver recently waiting for treatment  Monitor liver function      Bradycardia  Hold atenolol  Pt asymptomatic      DVT/GI Prophylaxis: SCD's and H2B/PPI      Oli Meadows MD  3/31/2018  5:13 PM

## 2018-03-31 NOTE — IP AVS SNAPSHOT
[address] Patient: Rebecca Otoole MRN: [id number] :1960 A check rene indicates which time of day the medication should be taken. My Medications START taking these medications Instructions Each Dose to Equal  
 Morning Noon Evening Bedtime  
 amoxicillin-clavulanate 875-125 mg per tablet Commonly known as:  AUGMENTIN Your last dose was: Your next dose is: Take 1 Tab by mouth two (2) times a day for 1 day. 1 Tab  
    
   
   
   
  
 furosemide 40 mg tablet Commonly known as:  LASIX Your last dose was: Your next dose is: Take 1 Tab by mouth daily. 40 mg  
    
   
   
   
  
 hydrALAZINE 25 mg tablet Commonly known as:  APRESOLINE Your last dose was: Your next dose is: Take 1 Tab by mouth three (3) times daily as needed for Other (SBP above 160). 25 mg Lactobacillus Acidoph & Bulgar 1 million cell Tab tablet Commonly known as:  Rigo Booty Your last dose was: Your next dose is: Take 2 Tabs by mouth two (2) times a day. 2 Tab  
    
   
   
   
  
 levothyroxine 50 mcg tablet Commonly known as:  SYNTHROID Start taking on:  2018 Your last dose was: Your next dose is: Take 1 Tab by mouth Daily (before breakfast). 50 mcg  
    
   
   
   
  
 naloxone 2 mg/actuation Spry Your last dose was: Your next dose is:    
   
   
 Use 1 spray intranasally into 1 nostril. Use a new Narcan nasal spray for subsequent doses and administer into alternating nostrils. May repeat every 2 to 3 minutes as needed. pantoprazole 40 mg tablet Commonly known as:  PROTONIX Start taking on:  2018 Your last dose was: Your next dose is: Take 1 Tab by mouth Daily (before breakfast). 40 mg  
    
   
   
   
  
 sodium bicarbonate 650 mg tablet Your last dose was: Your next dose is: Take 1 Tab by mouth three (3) times daily. 650 mg CHANGE how you take these medications Instructions Each Dose to Equal  
 Morning Noon Evening Bedtime  
 amLODIPine 10 mg tablet Commonly known as:  Teodoro Hernandez What changed:   
- how to take this - when to take this Your last dose was: Your next dose is: Take 1 Tab by mouth daily. 10 mg HYDROmorphone 4 mg tablet Commonly known as:  DILAUDID What changed:   
- how to take this - when to take this 
- reasons to take this Your last dose was: Your next dose is: Take 1 Tab by mouth every six (6) hours as needed. Max Daily Amount: 16 mg.  
 4 mg  
    
   
   
   
  
 insulin lispro 100 unit/mL injection Commonly known as:  HUMALOG What changed:   
- how much to take 
- additional instructions - Another medication with the same name was removed. Continue taking this medication, and follow the directions you see here. Your last dose was: Your next dose is:    
   
   
 Normal Insulin Sensitivity  For Blood Sugar (mg/dL) of:    Less than 150 =   0 units          150 -199 =   2 units 200 -249 =   4 units 250 -299 =   6 units 300 -349 =   8 units 350 and above =   10 units If 2 glucose readings are above 200 mg/dL within a 24 hr period, proceed to \"Very Insul  
     
   
   
   
  
 linagliptin 5 mg tablet Commonly known as:  Loren Mendez Start taking on:  4/11/2018 What changed:  when to take this Your last dose was: Your next dose is: Take 1 Tab by mouth Daily (before breakfast). 5 mg  
    
   
   
   
  
 mineral oil-hydrophil petrolat ointment Commonly known as:  AQUAPHOR  
 What changed:  Another medication with the same name was removed. Continue taking this medication, and follow the directions you see here. Your last dose was: Your next dose is:    
   
   
 Apply  to affected area as needed for Dry Skin. CONTINUE taking these medications Instructions Each Dose to Equal  
 Morning Noon Evening Bedtime  
 acetaminophen 500 mg tablet Commonly known as:  TYLENOL Your last dose was: Your next dose is:    
   
   
 1,000 mg.  
 1000 mg  
    
   
   
   
  
 albuterol-ipratropium 2.5 mg-0.5 mg/3 ml Nebu Commonly known as:  Donzella Rude Your last dose was: Your next dose is:    
   
   
 3 mL. 3 mL  
    
   
   
   
  
 atorvastatin 40 mg tablet Commonly known as:  LIPITOR Your last dose was: Your next dose is:    
   
   
 40 mg.  
 40 mg  
    
   
   
   
  
 bisacodyl 5 mg EC tablet Commonly known as:  DULCOLAX Your last dose was: Your next dose is:    
   
   
 10 mg.  
 10 mg  
    
   
   
   
  
 nicotine 14 mg/24 hr patch Commonly known as:  Zahar Pinta Your last dose was: Your next dose is:    
   
   
 1 Patch. 1 Patch NUR MILK OF MAGNESIA 400 mg/5 mL suspension Generic drug:  magnesium hydroxide Your last dose was: Your next dose is: Take 30 mL by mouth daily as needed for Constipation. 30 mL  
    
   
   
   
  
 polyethylene glycol 17 gram packet Commonly known as:  Lai Arceo Your last dose was: Your next dose is:    
   
   
 17 g.  
 17 g  
    
   
   
   
  
 tamsulosin 0.4 mg capsule Commonly known as:  FLOMAX Your last dose was: Your next dose is: Take 1 Cap by mouth daily. 0.4 mg Therapy M 9 mg iron-400 mcg Tab tablet Generic drug:  multivitamin, tx-iron-ca-min Your last dose was: Your next dose is: Take 1 Tab by Mouth Once a Day. STOP taking these medications   
 atenolol 25 mg tablet Commonly known as:  TENORMIN  
   
  
 CALMOSEPTINE 0.44-20.6 % Oint Generic drug:  Menthol-Zinc Oxide CULTURELLE PO  
   
  
 doxycycline 100 mg capsule Commonly known as:  MONODOX  
   
  
 gabapentin 100 mg capsule Commonly known as:  NEURONTIN  
   
  
 GLUCERNA PO  
   
  
 ibuprofen 400 mg tablet Commonly known as:  MOTRIN  
   
  
 insulin glargine 100 unit/mL (3 mL) Inpn Commonly known as:  LANTUS SOLOSTAR U-100 INSULIN  
   
  
 insulin glargine 100 unit/mL injection Commonly known as:  LANTUS  
   
  
 lisinopril 40 mg tablet Commonly known as:  PRINIVIL, ZESTRIL  
   
  
 pravastatin 20 mg tablet Commonly known as:  PRAVACHOL  
   
  
 PREPARATION H (WITCH HAZEL) EX  
   
  
 rifAMPin 300 mg capsule Commonly known as:  RIFADIN  
   
  
 silver sulfADIAZINE 1 % topical cream  
Commonly known as:  SILVADENE Zolpidem 10 mg Subl Where to Get Your Medications Information on where to get these meds will be given to you by the nurse or doctor. ! Ask your nurse or doctor about these medications  
  amLODIPine 10 mg tablet  
 amoxicillin-clavulanate 875-125 mg per tablet  
 furosemide 40 mg tablet  
 hydrALAZINE 25 mg tablet HYDROmorphone 4 mg tablet  
 insulin lispro 100 unit/mL injection Lactobacillus Acidoph & Bulgar 1 million cell Tab tablet  
 levothyroxine 50 mcg tablet  
 linagliptin 5 mg tablet  
 naloxone 2 mg/actuation Spry  
 pantoprazole 40 mg tablet  
 sodium bicarbonate 650 mg tablet  
 tamsulosin 0.4 mg capsule

## 2018-03-31 NOTE — ED PROVIDER NOTES
EMERGENCY DEPARTMENT HISTORY AND PHYSICAL EXAM    2:03 PM      Date: 3/31/2018  Patient Name: Rolf Falcon    History of Presenting Illness     No chief complaint on file. History Provided By: Patient    Chief Complaint: Edema  Duration: 3 Days  Timing:  Progressive and Worsening  Location: Bilateral lower extremities  Associated Symptoms: Patient denies history of CHF, chest pain, abdominal pain, and any othe rassociated symptoms ror complaints. Additional History (Context): Rolf Falcon is a 59 y.o. male with a past medical history of renal mass, renal cell carcinoma of left kidney, DM, chronic drug abuse, and HTN who presents with  c/o bilateral lower extremity swelling onset 3 days ago. He states that he is from a [address] [place] [address] and has been in the nursing home for the past 1 year. Sarai Peaks He notes that his ankles first started sweling first.  He states that he had an ultra sound of his leg yesterday. He notes that he usually is able to ambulate without a walker. Patient denies history of CHF, chest pain, abdominal pain, and any othe rassociated symptoms or complaints. PCP: Cece Lopez MD    Current Facility-Administered Medications   Medication Dose Route Frequency Provider Last Rate Last Dose    0.9% sodium chloride infusion 1,000 mL  1,000 mL IntraVENous ONCE Scott Monique,          Current Outpatient Prescriptions   Medication Sig Dispense Refill    NUT. TX.GLUCOSE INTOLERANCE,SOY (GLUCERNA PO) Take  by mouth.  magnesium hydroxide (NUR MILK OF MAGNESIA) 400 mg/5 mL suspension Take 30 mL by mouth daily as needed for Constipation.  LACTOBACILLUS RHAMNOSUS GG (CULTURELLE PO) Take  by mouth.  doxycycline (MONODOX) 100 mg capsule Take 100 mg by mouth two (2) times a day.  mineral oil-hydrophil petrolat (AQUAPHOR) ointment Apply  to affected area as needed for Dry Skin.       mineral oil-hydrophil petrolat (AQUAPHOR) ointment Apply  to affected area as needed for Dry Skin.  PREPARATION H, WITCH HAZEL, DANIEL by Apply Externally route.  linagliptin (TRADJENTA) 5 mg tablet Take 5 mg by mouth daily.  tamsulosin (FLOMAX) 0.4 mg capsule Take 0.4 mg by mouth daily.  acetaminophen (TYLENOL) 500 mg tablet 1,000 mg.      bisacodyl (DULCOLAX) 5 mg EC tablet 10 mg.      gabapentin (NEURONTIN) 100 mg capsule 100 mg.      atorvastatin (LIPITOR) 40 mg tablet 40 mg.      amLODIPine (NORVASC) 10 mg tablet 10 mg.      atenolol (TENORMIN) 25 mg tablet 25 mg.      ibuprofen (MOTRIN) 400 mg tablet 400 mg.      HYDROmorphone (DILAUDID) 4 mg tablet 4 mg.  albuterol-ipratropium (DUO-NEB) 2.5 mg-0.5 mg/3 ml nebu 3 mL.  Menthol-Zinc Oxide (CALMOSEPTINE) 0.44-20.6 % oint Use 1 Application to affected area.  nicotine (NICODERM CQ) 14 mg/24 hr patch 1 Patch.  rifAMPin (RIFADIN) 300 mg capsule 600 mg.  polyethylene glycol (MIRALAX) 17 gram packet 17 g.      silver sulfADIAZINE (SILVADENE) 1 % topical cream Use 1 Application to affected area Twice Daily. knees      multivitamin, tx-iron-ca-min (THERAPY M) 9 mg iron-400 mcg tab tablet Take 1 Tab by Mouth Once a Day.  Zolpidem 10 mg subl 10 mg.      insulin glargine (LANTUS) 100 unit/mL injection 4 Units.  insulin lispro (HUMALOG) 100 unit/mL injection 2-10 Units.  lisinopril (PRINIVIL, ZESTRIL) 40 mg tablet Take 1 Tab by mouth daily. 30 Tab 2    pravastatin (PRAVACHOL) 20 mg tablet Take 1 Tab by mouth nightly. 30 Tab 2    insulin lispro (HUMALOG KWIKPEN) 100 unit/mL kwikpen Blood Sugar <150 =0 units; 150 -199 =2 units; 200 -249 =4 units; 250 -299 =6 units; 300 -349 =8 units; 350 and above =10 units. 1 Package 2    insulin glargine (LANTUS SOLOSTAR) 100 unit/mL (3 mL) pen 50 Units by SubCUTAneous route daily.  1 Each 2       Past History     Past Medical History:  Past Medical History:   Diagnosis Date    Chronic drug abuse 1974    Diabetes ([facility] [address] 01/1990    Hypertension     Renal cell carcinoma of left kidney (HCC) 12/17/13    Pathological Stage [id number] cc RCC FG3 s/p left open partial nephrectomy in 12/13      Renal mass        Past Surgical History:  Past Surgical History:   Procedure Laterality Date    HX CIRCUMCISION  12/17/13    Dr. Cassy Gonzalez, [facility]    HX NEPHRECTOMY Left 12/17/13    Open Partial, Dr. Cassy Gonzalez, [facility]    HX OTHER SURGICAL Right 2/27/2016    removed right ft  2nd meta-tarsal       Family History:  Family History   Problem Relation Age of Onset    Diabetes Mother     Sickle Cell Anemia Father     Diabetes Sister     Hypertension Sister        Social History:  Social History   Substance Use Topics    Smoking status: Former Smoker     Packs/day: 0.50     Years: 30.00     Types: Cigarettes    Smokeless tobacco: Never Used    Alcohol use 8.4 oz/week     14 Cans of beer per week      Comment: watching sports       Allergies: Allergies   Allergen Reactions    Iodine Hives         Review of Systems       Review of Systems   Cardiovascular: Negative for chest pain. Gastrointestinal: Negative for abdominal pain. Musculoskeletal:        Bilateral lower extremity swelling. All other systems reviewed and are negative. Physical Exam     Visit Vitals    /71    Pulse (!) 44    Temp 97.8 °F (36.6 °C)    Resp 20    Ht 6' (1.829 m)    Wt 75.8 kg (167 lb)    SpO2 100%    BMI 22.65 kg/m2         Physical Exam   Constitutional: He is oriented to person, place, and time. He appears well-developed and well-nourished. HENT:   Head: Normocephalic and atraumatic. Neck: Neck supple. No JVD present. Cardiovascular: Normal rate and regular rhythm. Pulmonary/Chest: Effort normal and breath sounds normal. No respiratory distress. Abdominal: Soft. He exhibits no distension. There is no tenderness. There is no rebound and no guarding. Musculoskeletal: He exhibits edema. He exhibits no tenderness. 2+ lower extremity edema. No calf tenderness. Neurological: He is alert and oriented to person, place, and time. Skin: Skin is warm and dry. No erythema. Psychiatric: Judgment normal.         Diagnostic Study Results     Labs -  Recent Results (from the past 12 hour(s))   CBC WITH AUTOMATED DIFF    Collection Time: 03/31/18  2:09 PM   Result Value Ref Range    WBC 5.7 4.6 - 13.2 K/uL    RBC 2.61 (L) 4.70 - 5.50 M/uL    HGB 7.7 (L) 13.0 - 16.0 g/dL    HCT 22.9 (L) 36.0 - 48.0 %    MCV 87.7 74.0 - 97.0 FL    MCH 29.5 24.0 - 34.0 PG    MCHC 33.6 31.0 - 37.0 g/dL    RDW 15.8 (H) 11.6 - 14.5 %    PLATELET 51 (L) 676 - 420 K/uL    NEUTROPHILS 54 42 - 75 %    LYMPHOCYTES 33 20 - 51 %    MONOCYTES 10 (H) 2 - 9 %    EOSINOPHILS 3 0 - 5 %    BASOPHILS 0 0 - 3 %    ABS. NEUTROPHILS 3.0 1.8 - 8.0 K/UL    ABS. LYMPHOCYTES 1.9 0.8 - 3.5 K/UL    ABS. MONOCYTES 0.6 0 - 1.0 K/UL    ABS. EOSINOPHILS 0.2 0.0 - 0.4 K/UL    ABS. BASOPHILS 0.0 0.0 - 0.06 K/UL    DF MANUAL      PLATELET COMMENTS DECREASED PLATELETS      RBC COMMENTS ANISOCYTOSIS  1+        RBC COMMENTS ISAURO CELLS  1+       METABOLIC PANEL, COMPREHENSIVE    Collection Time: 03/31/18  2:09 PM   Result Value Ref Range    Sodium 142 136 - 145 mmol/L    Potassium 5.4 3.5 - 5.5 mmol/L    Chloride 114 (H) 100 - 108 mmol/L    CO2 20 (L) 21 - 32 mmol/L    Anion gap 8 3.0 - 18 mmol/L    Glucose 171 (H) 74 - 99 mg/dL    BUN 68 (H) 7.0 - 18 MG/DL    Creatinine 1.78 (H) 0.6 - 1.3 MG/DL    BUN/Creatinine ratio 38 (H) 12 - 20      GFR est AA 48 (L) >60 ml/min/1.73m2    GFR est non-AA 39 (L) >60 ml/min/1.73m2    Calcium 9.4 8.5 - 10.1 MG/DL    Bilirubin, total 0.7 0.2 - 1.0 MG/DL    ALT (SGPT) 94 (H) 16 - 61 U/L    AST (SGOT) 66 (H) 15 - 37 U/L    Alk.  phosphatase 131 (H) 45 - 117 U/L    Protein, total 7.8 6.4 - 8.2 g/dL    Albumin 3.4 3.4 - 5.0 g/dL    Globulin 4.4 (H) 2.0 - 4.0 g/dL    A-G Ratio 0.8 0.8 - 1.7     NT-PRO BNP    Collection Time: 03/31/18  2:09 PM   Result Value Ref Range    NT pro- 0 - 900 PG/ML Radiologic Studies -   No orders to display   No results found. Medical Decision Making   I am the first provider for this patient. I reviewed the vital signs, available nursing notes, past medical history, past surgical history, family history and social history. Vital Signs-Reviewed the patient's vital signs. Pulse Oximetry Analysis -  100% on room air (normal)    Records Reviewed: Nursing Notes (Time of Review: 2:03 PM)    EK, rate 46, nromal axis, sinus melecio with 1st degree AV block, no ST changes    ED Course: Progress Notes, Reevaluation, and Consults:  3:34 PM Consult: I discussed care with  Dr. Johan Osorio (hospitalist). It was a standard discussion including patient history, chief complaint, available diagnostic results, and predicted treatment course. Dr. Johan Osorio accepts patient for admission. Provider Notes (Medical Decision Making): 58 y/o male presents from NH with edema. Per pt had duplex yesterday, edema symetric, doubt need for repeat. No hx of chf, check basic labs. No hypoxia or crackles on exam.     hgb 7.7 today, was 9.3 on 12/4  Cr 1.78, was 0.8 on 12/4  Mild LFT elevation, stable from prior  Mildly elevated BNP    Suspect dehydration, will give NS bolus. Diagnosis     Clinical Impression:   1. Peripheral edema    2. Anemia, unspecified type    3. Renal insufficiency        Disposition: admitted      Attestations:  [email or web address] Daniel Hutton acting as a scribe for and in the presence of Lucille Jacobo DO      2018 at 2:03 PM       Provider Attestation:      I personally performed the services described in the documentation, reviewed the documentation, as recorded by the scribe in my presence, and it accurately and completely records my words and actions.  2018 at 2:03 PM - Lucille Jacobo DO    _______________________________

## 2018-03-31 NOTE — ED NOTES
Patient arrived via medic with complaint of generalized edema +1. Patient had a HR of 40s asymptomatic. Patient denies pain. Patient has no additional complaints. Other VSS.

## 2018-03-31 NOTE — ED NOTES
TRANSFER - OUT REPORT:    Verbal report given to [facility] (name) on Reanna Briggs  being transferred to 2309(unit) for routine progression of care       Report consisted of patients Situation, Background, Assessment and   Recommendations(SBAR). Information from the following report(s) ED Summary and Recent Results was reviewed with the receiving nurse. Lines:       Opportunity for questions and clarification was provided.       Patient transported with:   Registered Nurse

## 2018-03-31 NOTE — IP AVS SNAPSHOT
Mayi Vasquez 
 
 
 920 [facility] 45 [address] aRmos Patient: Preston Edwards MRN: [id number] :1960 About your hospitalization You were admitted on:  2018 You last received care in the:  [address] [place] [address] You were discharged on:  April 10, 2018 Why you were hospitalized Your primary diagnosis was:  Bradycardia Your diagnoses also included:  Acute Renal Insufficiency, Renal Cell Cancer (Hcc), Urinary Retention, Type Ii Diabetes Mellitus, Uncontrolled (Hcc), Cancer Of Left Kidney (Hcc), Quadriparesis (Hcc), H/O Clostridium Difficile Infection, Light Chain Deposition Disease, Metabolic Acidosis, Hyperkalemia, Hypercalcemia, Chronic Kidney Disease, Stage Iii (Moderate), Hepatitis C Antibody Positive In Blood, Acute Kidney Injury (Hcc), Thrombocytopenia (Hcc) Follow-up Information Follow up With Details Comments Contact Info Nick Fam MD On 2018 @1:30pm, arrival @1:00pm, bring discharge papers, id, and meds list.  [address] [facility]. [facility] [address] 
[phone] Ildefonso Andrade MD On 2018 @8:50am [address] [facility] [address] 200 [facility] 
[phone] Jo Mchugh MD On 2018 @0940am, w/ [address] 200 [facility] 
[phone] Ange Escamilla MD On 2018 @2:00pm 95 [facility] [address] 
[phone] Shayla Calhoun MD On 5/3/2018 @2:00pm, bring id, insurance card, list of meds, and meds. [facility] [address] [facility] [address] 
[phone] Rico Leyden, MD On 2018 @1000am, arrival 30 mins. early, bring list of meds, and insurance card. 23[address] [facility] [address] [facility] 
[phone] Rebecca Leong MD On 2018 @9:45am,bring id, insurance card, meds, and bottle meds. [address] [place], [place] [address] [address] [phone] Your Scheduled Appointments Wednesday April 25, 2018  8:50 AM EDT  
ESTABLISHED PATIENT with PADMAJA [facility] ([facility]) [address] [facility], [facility] 300 2201 [facility] [address] [phone] Monday April 30, 2018 10:00 AM EDT  
POST HOSPITAL with Amna Ward NP [facility] ([facility] [place] [address] [address] [address] [phone] Thursday May 03, 2018  2:00 PM EDT HOSPITAL FOLLOW-UP with Rose Mary Grubbs MD  
[facility] ([facility]) [address] [facility] [address] [address] [phone] Wednesday May 09, 2018  9:45 AM EDT Follow Up with Cynthia Salcedo MD  
[address] ([facility]) 235 [facility], [address] [address] [phone] Discharge Orders None A check rene indicates which time of day the medication should be taken. My Medications START taking these medications Instructions Each Dose to Equal  
 Morning Noon Evening Bedtime  
 amoxicillin-clavulanate 875-125 mg per tablet Commonly known as:  AUGMENTIN Your last dose was: Your next dose is: Take 1 Tab by mouth two (2) times a day for 1 day. 1 Tab  
    
   
   
   
  
 furosemide 40 mg tablet Commonly known as:  LASIX Your last dose was: Your next dose is: Take 1 Tab by mouth daily. 40 mg  
    
   
   
   
  
 hydrALAZINE 25 mg tablet Commonly known as:  APRESOLINE Your last dose was: Your next dose is: Take 1 Tab by mouth three (3) times daily as needed for Other (SBP above 160). 25 mg Lactobacillus Acidoph & Bulgar 1 million cell Tab tablet Commonly known as:  Trice Trujillo Your last dose was: Your next dose is: Take 2 Tabs by mouth two (2) times a day. 2 Tab  
    
   
   
   
  
 levothyroxine 50 mcg tablet Commonly known as:  SYNTHROID Start taking on:  4/11/2018 Your last dose was: Your next dose is: Take 1 Tab by mouth Daily (before breakfast). 50 mcg  
    
   
   
   
  
 naloxone 2 mg/actuation Spry Your last dose was: Your next dose is:    
   
   
 Use 1 spray intranasally into 1 nostril. Use a new Narcan nasal spray for subsequent doses and administer into alternating nostrils. May repeat every 2 to 3 minutes as needed. pantoprazole 40 mg tablet Commonly known as:  PROTONIX Start taking on:  4/11/2018 Your last dose was: Your next dose is: Take 1 Tab by mouth Daily (before breakfast). 40 mg  
    
   
   
   
  
 sodium bicarbonate 650 mg tablet Your last dose was: Your next dose is: Take 1 Tab by mouth three (3) times daily. 650 mg CHANGE how you take these medications Instructions Each Dose to Equal  
 Morning Noon Evening Bedtime  
 amLODIPine 10 mg tablet Commonly known as:  Rosario Ivy What changed:   
- how to take this - when to take this Your last dose was: Your next dose is: Take 1 Tab by mouth daily. 10 mg HYDROmorphone 4 mg tablet Commonly known as:  DILAUDID What changed:   
- how to take this - when to take this 
- reasons to take this Your last dose was: Your next dose is: Take 1 Tab by mouth every six (6) hours as needed. Max Daily Amount: 16 mg.  
 4 mg  
    
   
   
   
  
 insulin lispro 100 unit/mL injection Commonly known as:  HUMALOG What changed:   
- how much to take 
- additional instructions - Another medication with the same name was removed.  Continue taking this medication, and follow the directions you see here. Your last dose was: Your next dose is:    
   
   
 Normal Insulin Sensitivity  For Blood Sugar (mg/dL) of:    Less than 150 =   0 units          150 -199 =   2 units 200 -249 =   4 units 250 -299 =   6 units 300 -349 =   8 units 350 and above =   10 units If 2 glucose readings are above 200 mg/dL within a 24 hr period, proceed to \"Very Insul  
     
   
   
   
  
 linagliptin 5 mg tablet Commonly known as:  Luly Alfaro Start taking on:  4/11/2018 What changed:  when to take this Your last dose was: Your next dose is: Take 1 Tab by mouth Daily (before breakfast). 5 mg  
    
   
   
   
  
 mineral oil-hydrophil petrolat ointment Commonly known as:  AQUAPHOR What changed:  Another medication with the same name was removed. Continue taking this medication, and follow the directions you see here. Your last dose was: Your next dose is:    
   
   
 Apply  to affected area as needed for Dry Skin. CONTINUE taking these medications Instructions Each Dose to Equal  
 Morning Noon Evening Bedtime  
 acetaminophen 500 mg tablet Commonly known as:  TYLENOL Your last dose was: Your next dose is:    
   
   
 1,000 mg.  
 1000 mg  
    
   
   
   
  
 albuterol-ipratropium 2.5 mg-0.5 mg/3 ml Nebu Commonly known as:  Mariee Balding Your last dose was: Your next dose is:    
   
   
 3 mL. 3 mL  
    
   
   
   
  
 atorvastatin 40 mg tablet Commonly known as:  LIPITOR Your last dose was: Your next dose is:    
   
   
 40 mg.  
 40 mg  
    
   
   
   
  
 bisacodyl 5 mg EC tablet Commonly known as:  DULCOLAX Your last dose was: Your next dose is:    
   
   
 10 mg.  
 10 mg  
    
   
   
   
  
 nicotine 14 mg/24 hr patch Commonly known as:  Clovis Salter Your last dose was: Your next dose is: 1 Patch. 1 Patch NUR MILK OF MAGNESIA 400 mg/5 mL suspension Generic drug:  magnesium hydroxide Your last dose was: Your next dose is: Take 30 mL by mouth daily as needed for Constipation. 30 mL  
    
   
   
   
  
 polyethylene glycol 17 gram packet Commonly known as:  Jabier [place] Your last dose was: Your next dose is:    
   
   
 17 g.  
 17 g  
    
   
   
   
  
 tamsulosin 0.4 mg capsule Commonly known as:  FLOMAX Your last dose was: Your next dose is: Take 1 Cap by mouth daily. 0.4 mg Therapy M 9 mg iron-400 mcg Tab tablet Generic drug:  multivitamin, tx-iron-ca-min Your last dose was: Your next dose is: Take 1 Tab by Mouth Once a Day. STOP taking these medications   
 atenolol 25 mg tablet Commonly known as:  TENORMIN  
   
  
 CALMOSEPTINE 0.44-20.6 % Oint Generic drug:  Menthol-Zinc Oxide CULTURELLE PO  
   
  
 doxycycline 100 mg capsule Commonly known as:  MONODOX  
   
  
 gabapentin 100 mg capsule Commonly known as:  NEURONTIN  
   
  
 GLUCERNA PO  
   
  
 ibuprofen 400 mg tablet Commonly known as:  MOTRIN  
   
  
 insulin glargine 100 unit/mL (3 mL) Inpn Commonly known as:  LANTUS SOLOSTAR U-100 INSULIN  
   
  
 insulin glargine 100 unit/mL injection Commonly known as:  LANTUS  
   
  
 lisinopril 40 mg tablet Commonly known as:  PRINIVIL, ZESTRIL  
   
  
 pravastatin 20 mg tablet Commonly known as:  PRAVACHOL  
   
  
 PREPARATION H (WITCH HAZEL) EX  
   
  
 rifAMPin 300 mg capsule Commonly known as:  RIFADIN  
   
  
 silver sulfADIAZINE 1 % topical cream  
Commonly known as:  SILVADENE Zolpidem 10 mg Subl Where to Get Your Medications Information on where to get these meds will be given to you by the nurse or doctor. ! Ask your nurse or doctor about these medications  
  amLODIPine 10 mg tablet  
 amoxicillin-clavulanate 875-125 mg per tablet  
 furosemide 40 mg tablet  
 hydrALAZINE 25 mg tablet HYDROmorphone 4 mg tablet  
 insulin lispro 100 unit/mL injection Lactobacillus Acidoph & Bulgar 1 million cell Tab tablet  
 levothyroxine 50 mcg tablet  
 linagliptin 5 mg tablet  
 naloxone 2 mg/actuation Spry  
 pantoprazole 40 mg tablet  
 sodium bicarbonate 650 mg tablet  
 tamsulosin 0.4 mg capsule Opioid Education Prescription Opioids: What You Need to Know: 
 
 
After general anesthesia or intravenous sedation, for 24 hours or while taking prescription Narcotics: · Limit your activities · Do not drive and operate hazardous machinery · Do not make important personal or business decisions · Do  not drink alcoholic beverages · If you have not urinated within 8 hours after discharge, please contact your surgeon on call. Report the following to your surgeon: 
· Excessive pain, swelling, redness or odor of or around the surgical area · Temperature over 100.5 · Nausea and vomiting lasting longer than 4 hours or if unable to take medications · Any signs of decreased circulation or nerve impairment to extremity: change in color, persistent  numbness, tingling, coldness or increase pain · Any questions What to do at Home: 
Recommended activity: Activity as tolerated, If you experience any of the following symptoms bleeding, trouble urinating, chest pain, or fever greater than 100.4, please follow up with primary care provider. *  Please give a list of your current medications to your Primary Care Provider.  
 
*  Please update this list whenever your medications are discontinued, doses are 
    changed, or new medications (including over-the-counter products) are added. *  Please carry medication information at all times in case of emergency situations. These are general instructions for a healthy lifestyle: No smoking/ No tobacco products/ Avoid exposure to second hand smoke Surgeon General's Warning:  Quitting smoking now greatly reduces serious risk to your health. Obesity, smoking, and sedentary lifestyle greatly increases your risk for illness A healthy diet, regular physical exercise & weight monitoring are important for maintaining a healthy lifestyle You may be retaining fluid if you have a history of heart failure or if you experience any of the following symptoms:  Weight gain of 3 pounds or more overnight or 5 pounds in a week, increased swelling in our hands or feet or shortness of breath while lying flat in bed. Please call your doctor as soon as you notice any of these symptoms; do not wait until your next office visit. Recognize signs and symptoms of STROKE: 
 
F-face looks uneven A-arms unable to move or move unevenly S-speech slurred or non-existent T-time-call 911 as soon as signs and symptoms begin-DO NOT go Back to bed or wait to see if you get better-TIME IS BRAIN. Warning Signs of HEART ATTACK Call 911 if you have these symptoms: 
? Chest discomfort. Most heart attacks involve discomfort in the center of the chest that lasts more than a few minutes, or that goes away and comes back. It can feel like uncomfortable pressure, squeezing, fullness, or pain. ? Discomfort in other areas of the upper body. Symptoms can include pain or discomfort in one or both arms, the back, neck, jaw, or stomach. ? Shortness of breath with or without chest discomfort. ? Other signs may include breaking out in a cold sweat, nausea, or lightheadedness. Don't wait more than five minutes to call [address]!  Fast action can save your life. Calling 911 is almost always the fastest way to get lifesaving treatment. Emergency Medical Services staff can begin treatment when they arrive  up to an hour sooner than if someone gets to the hospital by car. The discharge information has been reviewed with the patient. The patient verbalized understanding. Discharge medications reviewed with the patient and appropriate educational materials and side effects teaching were provided. ___________________________________________________________________________________________________________________________________ Patient Discharge Instructions Annalise Lefty / [id number] : 1960 Admitted 3/31/2018 Discharged: 4/10/2018 · It is important that you take the medication exactly as they are prescribed. · Keep your medication in the bottles provided by the pharmacist and keep a list of the medication names, dosages, and times to be taken with you at all times. · Do not take other medications without consulting your doctor. Recommended Diet: Diabetic Diet and Renal Diet Recommended Activity: PT/OT Eval and Treat If you experience any of the following symptoms shortness of breath, chest pain, fever, please follow up with ER. Signed By: Marlin Rodriguez MD   
 April 10, 2018 Oree Activation Thank you for requesting access to Oree. Please follow the instructions below to securely access and download your online medical record. Oree allows you to send messages to your doctor, view your test results, renew your prescriptions, schedule appointments, and more. How Do I Sign Up? 1. In your internet browser, go to [email or web address] 
2. Click on the First Time User? Click Here link in the Sign In box. You will be redirect to the New Member Sign Up page. 3. Enter your Oree Access Code exactly as it appears below.  You will not need to use this code after youve completed the sign-up process. If you do not sign up before the expiration date, you must request a new code. Spredfast Access Code: [email or web address] Expires: 2018 10:12 AM (This is the date your Spredfast access code will ) 4. Enter the last four digits of your Social Security Number (xxxx) and Date of Birth (mm/dd/yyyy) as indicated and click Submit. You will be taken to the next sign-up page. 5. Create a Spredfast ID. This will be your Spredfast login ID and cannot be changed, so think of one that is secure and easy to remember. 6. Create a Spredfast password. You can change your password at any time. 7. Enter your Password Reset Question and Answer. This can be used at a later time if you forget your password. 8. Enter your e-mail address. You will receive e-mail notification when new information is available in [address] 9. Click Sign Up. You can now view and download portions of your medical record. 10. Click the Download Summary menu link to download a portable copy of your medical information. Additional Information If you have questions, please visit the Frequently Asked Questions section of the Spredfast website at [email or web address] Remember, Spredfast is NOT to be used for urgent needs. For medical emergencies, dial 911. Patient armband removed and shredded Spredfast Announcement We are excited to announce that we are making your provider's discharge notes available to you in Spredfast. You will see these notes when they are completed and signed by the physician that discharged you from your recent hospital stay. If you have any questions or concerns about any information you see in Spredfast, please call the Health Information Department where you were seen or reach out to your Primary Care Provider for more information about your plan of care. Introducing [facility]! [address] [place] [address] introduces [employer] patient portal. Now you can access parts of your medical record, email your doctor's office, and request medication refills online. 1. In your internet browser, go to [email or web address] 2. Click on the First Time User? Click Here link in the Sign In box. You will see the New Member Sign Up page. 3. Enter your [employer] Access Code exactly as it appears below. You will not need to use this code after youve completed the sign-up process. If you do not sign up before the expiration date, you must request a new code. · [employer] Access Code: [email or web address] Expires: 5/1/2018 10:12 AM 
 
4. Enter the last four digits of your Social Security Number (xxxx) and Date of Birth (mm/dd/yyyy) as indicated and click Submit. You will be taken to the next sign-up page. 5. Create a [employer] ID. This will be your [employer] login ID and cannot be changed, so think of one that is secure and easy to remember. 6. Create a [employer] password. You can change your password at any time. 7. Enter your Password Reset Question and Answer. This can be used at a later time if you forget your password. 8. Enter your e-mail address. You will receive e-mail notification when new information is available in [address] 9. Click Sign Up. You can now view and download portions of your medical record. 10. Click the Download Summary menu link to download a portable copy of your medical information. If you have questions, please visit the Frequently Asked Questions section of the [employer] website. Remember, [employer] is NOT to be used for urgent needs. For medical emergencies, dial 911. Now available from your iPhone and Android! Introducing Massimo Car As a [address] [place] [address] patient, I wanted to make you aware of our electronic visit tool called Massimo Car. [address] [place] [address] 24/7 allows you to connect within minutes with a medical provider 24 hours a day, seven days a week via a mobile device or tablet or logging into a secure website from your computer. You can access NextDocs from anywhere in the [place]. A virtual visit might be right for you when you have a simple condition and feel like you just dont want to get out of bed, or cant get away from work for an appointment, when your regular [facility] provider is not available (evenings, weekends or holidays), or when youre out of town and need minor care. Electronic visits cost only $49 and if the PierreGoYoDeo 24/Dynamics Expert provider determines a prescription is needed to treat your condition, one can be electronically transmitted to a nearby pharmacy*. Please take a moment to enroll today if you have not already done so. The enrollment process is free and takes just a few minutes. To enroll, please download the Zyga shana to your tablet or phone, or visit [email or web address] to enroll on your computer. And, as an [address] [place] [address] patient with a Homejoy account, the results of your visits will be scanned into your electronic medical record and your primary care provider will be able to view the scanned results. We urge you to continue to see your regular [facility] provider for your ongoing medical care. And while your primary care provider may not be the one available when you seek a Massimo Car virtual visit, the peace of mind you get from getting a real diagnosis real time can be priceless. For more information on Massimo Hornet Networksmeghanafin, view our Frequently Asked Questions (FAQs) at [email or web address]. Sincerely, 
 
Jewel Gutierrez MD 
Chief Medical Officer [employer] *:  certain medications cannot be prescribed via NextDocs Unresulted Labs-Please follow up with your PCP about these lab tests Order Current Status AFP, TUMOR MARKER In process BETA-2 MICROGLOBULIN In process PAGE FIBROSURE In process SPEP AND ANITHA, SERUM In process Providers Seen During Your Hospitalization Provider Specialty Primary office phone Scott Monique DO Emergency Medicine [phone] Cece Lopez MD Family Practice [phone] Your Primary Care Physician (PCP) Primary Care Physician Office Phone Office Fax [address] [facility] [address] [phone] You are allergic to the following Allergen Reactions Iodine Hives Recent Documentation Height Weight BMI Smoking Status 1.829 m 70 kg 20.93 kg/m2 Former Smoker Emergency Contacts Name Discharge Info Relation Home Work Mobile CecilioAnn Marie DISCHARGE CAREGIVER [3] Sister [23] [phone] Patient Belongings The following personal items are in your possession at time of discharge: 
  Dental Appliances: None  Visual Aid: None      Home Medications: None   Jewelry: None  Clothing: At bedside, Footwear, Pants, Shirt, Socks, Undergarments    Other Valuables: [employer] Please provide this summary of care documentation to your next provider. Signatures-by signing, you are acknowledging that this After Visit Summary has been reviewed with you and you have received a copy. Patient Signature:  ____________________________________________________________ Date:  ____________________________________________________________  
  
[facility] Provider Signature:  ____________________________________________________________ Date:  ____________________________________________________________

## 2018-04-01 LAB
ALBUMIN SERPL-MCNC: 3 G/DL (ref 3.4–5)
ALBUMIN/GLOB SERPL: 0.8 {RATIO} (ref 0.8–1.7)
ALP SERPL-CCNC: 116 U/L (ref 45–117)
ALT SERPL-CCNC: 80 U/L (ref 16–61)
AMORPH CRY URNS QL MICRO: ABNORMAL
ANION GAP SERPL CALC-SCNC: 7 MMOL/L (ref 3–18)
APPEARANCE UR: ABNORMAL
AST SERPL-CCNC: 51 U/L (ref 15–37)
ATRIAL RATE: 46 BPM
BACTERIA URNS QL MICRO: ABNORMAL /HPF
BASOPHILS # BLD: 0 K/UL (ref 0–0.1)
BASOPHILS NFR BLD: 0 % (ref 0–2)
BILIRUB SERPL-MCNC: 0.5 MG/DL (ref 0.2–1)
BILIRUB UR QL: NEGATIVE
BUN SERPL-MCNC: 70 MG/DL (ref 7–18)
BUN/CREAT SERPL: 38 (ref 12–20)
CALCIUM SERPL-MCNC: 8.5 MG/DL (ref 8.5–10.1)
CALCULATED P AXIS, ECG09: 40 DEGREES
CALCULATED R AXIS, ECG10: -6 DEGREES
CALCULATED T AXIS, ECG11: 20 DEGREES
CHLORIDE SERPL-SCNC: 114 MMOL/L (ref 100–108)
CHOLEST SERPL-MCNC: 68 MG/DL
CK MB CFR SERPL CALC: 5.9 % (ref 0–4)
CK MB CFR SERPL CALC: 5.9 % (ref 0–4)
CK MB SERPL-MCNC: 5.5 NG/ML (ref 5–25)
CK MB SERPL-MCNC: 5.7 NG/ML (ref 5–25)
CK SERPL-CCNC: 93 U/L (ref 39–308)
CK SERPL-CCNC: 97 U/L (ref 39–308)
CO2 SERPL-SCNC: 20 MMOL/L (ref 21–32)
COLOR UR: YELLOW
CREAT SERPL-MCNC: 1.82 MG/DL (ref 0.6–1.3)
CREAT UR-MCNC: 92.9 MG/DL (ref 30–125)
DIAGNOSIS, 93000: NORMAL
DIFFERENTIAL METHOD BLD: ABNORMAL
EOSINOPHIL # BLD: 0.1 K/UL (ref 0–0.4)
EOSINOPHIL NFR BLD: 2 % (ref 0–5)
EPITH CASTS URNS QL MICRO: ABNORMAL /LPF (ref 0–5)
ERYTHROCYTE [DISTWIDTH] IN BLOOD BY AUTOMATED COUNT: 15.9 % (ref 11.6–14.5)
GLOBULIN SER CALC-MCNC: 3.8 G/DL (ref 2–4)
GLUCOSE BLD STRIP.AUTO-MCNC: 106 MG/DL (ref 70–110)
GLUCOSE BLD STRIP.AUTO-MCNC: 128 MG/DL (ref 70–110)
GLUCOSE BLD STRIP.AUTO-MCNC: 150 MG/DL (ref 70–110)
GLUCOSE BLD STRIP.AUTO-MCNC: 87 MG/DL (ref 70–110)
GLUCOSE SERPL-MCNC: 166 MG/DL (ref 74–99)
GLUCOSE UR STRIP.AUTO-MCNC: NEGATIVE MG/DL
HCT VFR BLD AUTO: 22.1 % (ref 36–48)
HDLC SERPL-MCNC: 46 MG/DL (ref 40–60)
HDLC SERPL: 1.5 {RATIO} (ref 0–5)
HGB BLD-MCNC: 7.1 G/DL (ref 13–16)
HGB UR QL STRIP: NEGATIVE
KETONES UR QL STRIP.AUTO: NEGATIVE MG/DL
LDLC SERPL CALC-MCNC: 12.2 MG/DL (ref 0–100)
LEUKOCYTE ESTERASE UR QL STRIP.AUTO: ABNORMAL
LIPID PROFILE,FLP: NORMAL
LYMPHOCYTES # BLD: 1.5 K/UL (ref 0.9–3.6)
LYMPHOCYTES NFR BLD: 33 % (ref 21–52)
MAGNESIUM SERPL-MCNC: 2.5 MG/DL (ref 1.6–2.6)
MCH RBC QN AUTO: 28.6 PG (ref 24–34)
MCHC RBC AUTO-ENTMCNC: 32.1 G/DL (ref 31–37)
MCV RBC AUTO: 89.1 FL (ref 74–97)
MONOCYTES # BLD: 0.4 K/UL (ref 0.05–1.2)
MONOCYTES NFR BLD: 8 % (ref 3–10)
NEUTS SEG # BLD: 2.6 K/UL (ref 1.8–8)
NEUTS SEG NFR BLD: 57 % (ref 40–73)
NITRITE UR QL STRIP.AUTO: NEGATIVE
P-R INTERVAL, ECG05: 216 MS
PH UR STRIP: 5 [PH] (ref 5–8)
PLATELET # BLD AUTO: 40 K/UL (ref 135–420)
PLATELET COMMENTS,PCOM: ABNORMAL
POTASSIUM SERPL-SCNC: 5.2 MMOL/L (ref 3.5–5.5)
POTASSIUM UR-SCNC: 23 MMOL/L (ref 12–62)
PROT SERPL-MCNC: 6.8 G/DL (ref 6.4–8.2)
PROT UR STRIP-MCNC: NEGATIVE MG/DL
Q-T INTERVAL, ECG07: 452 MS
QRS DURATION, ECG06: 90 MS
QTC CALCULATION (BEZET), ECG08: 395 MS
RBC # BLD AUTO: 2.48 M/UL (ref 4.7–5.5)
RBC #/AREA URNS HPF: ABNORMAL /HPF (ref 0–5)
RBC MORPH BLD: ABNORMAL
SODIUM SERPL-SCNC: 141 MMOL/L (ref 136–145)
SODIUM UR-SCNC: 30 MMOL/L (ref 20–110)
SP GR UR REFRACTOMETRY: 1.02 (ref 1–1.03)
TRIGL SERPL-MCNC: 49 MG/DL (ref ?–150)
TROPONIN I SERPL-MCNC: 0.06 NG/ML (ref 0–0.04)
TROPONIN I SERPL-MCNC: 0.07 NG/ML (ref 0–0.04)
TSH SERPL DL<=0.05 MIU/L-ACNC: 5.42 UIU/ML (ref 0.36–3.74)
UROBILINOGEN UR QL STRIP.AUTO: 0.2 EU/DL (ref 0.2–1)
VENTRICULAR RATE, ECG03: 46 BPM
VLDLC SERPL CALC-MCNC: 9.8 MG/DL
WBC # BLD AUTO: 4.6 K/UL (ref 4.6–13.2)
WBC URNS QL MICRO: ABNORMAL /HPF (ref 0–4)

## 2018-04-01 PROCEDURE — 82550 ASSAY OF CK (CPK): CPT | Performed by: FAMILY MEDICINE

## 2018-04-01 PROCEDURE — 84443 ASSAY THYROID STIM HORMONE: CPT | Performed by: FAMILY MEDICINE

## 2018-04-01 PROCEDURE — 80061 LIPID PANEL: CPT | Performed by: FAMILY MEDICINE

## 2018-04-01 PROCEDURE — 87086 URINE CULTURE/COLONY COUNT: CPT | Performed by: FAMILY MEDICINE

## 2018-04-01 PROCEDURE — 84300 ASSAY OF URINE SODIUM: CPT | Performed by: INTERNAL MEDICINE

## 2018-04-01 PROCEDURE — 83935 ASSAY OF URINE OSMOLALITY: CPT | Performed by: INTERNAL MEDICINE

## 2018-04-01 PROCEDURE — 82570 ASSAY OF URINE CREATININE: CPT | Performed by: INTERNAL MEDICINE

## 2018-04-01 PROCEDURE — 80053 COMPREHEN METABOLIC PANEL: CPT | Performed by: FAMILY MEDICINE

## 2018-04-01 PROCEDURE — 74011250637 HC RX REV CODE- 250/637: Performed by: FAMILY MEDICINE

## 2018-04-01 PROCEDURE — 65660000004 HC RM CVT STEPDOWN

## 2018-04-01 PROCEDURE — 74011636637 HC RX REV CODE- 636/637: Performed by: FAMILY MEDICINE

## 2018-04-01 PROCEDURE — 82962 GLUCOSE BLOOD TEST: CPT

## 2018-04-01 PROCEDURE — 74011250636 HC RX REV CODE- 250/636: Performed by: FAMILY MEDICINE

## 2018-04-01 PROCEDURE — 84133 ASSAY OF URINE POTASSIUM: CPT | Performed by: INTERNAL MEDICINE

## 2018-04-01 PROCEDURE — 36415 COLL VENOUS BLD VENIPUNCTURE: CPT | Performed by: FAMILY MEDICINE

## 2018-04-01 PROCEDURE — 83735 ASSAY OF MAGNESIUM: CPT | Performed by: FAMILY MEDICINE

## 2018-04-01 PROCEDURE — 81001 URINALYSIS AUTO W/SCOPE: CPT | Performed by: FAMILY MEDICINE

## 2018-04-01 PROCEDURE — 85025 COMPLETE CBC W/AUTO DIFF WBC: CPT | Performed by: FAMILY MEDICINE

## 2018-04-01 RX ORDER — LEVOTHYROXINE SODIUM 50 UG/1
50 TABLET ORAL
Status: DISCONTINUED | OUTPATIENT
Start: 2018-04-01 | End: 2018-04-10 | Stop reason: HOSPADM

## 2018-04-01 RX ADMIN — LACTOBACILLUS TAB 2 TABLET: TAB at 17:21

## 2018-04-01 RX ADMIN — PANTOPRAZOLE SODIUM 40 MG: 40 TABLET, DELAYED RELEASE ORAL at 08:05

## 2018-04-01 RX ADMIN — INSULIN LISPRO 2 UNITS: 100 INJECTION, SOLUTION INTRAVENOUS; SUBCUTANEOUS at 17:19

## 2018-04-01 RX ADMIN — SODIUM CHLORIDE 75 ML/HR: 900 INJECTION, SOLUTION INTRAVENOUS at 08:11

## 2018-04-01 RX ADMIN — LEVOTHYROXINE SODIUM 50 MCG: 50 TABLET ORAL at 09:35

## 2018-04-01 RX ADMIN — LINAGLIPTIN 5 MG: 5 TABLET, FILM COATED ORAL at 08:05

## 2018-04-01 RX ADMIN — LACTOBACILLUS TAB 2 TABLET: TAB at 08:05

## 2018-04-01 NOTE — CONSULTS
[facility] - Consult Note    Consultation request by Scout Lott MD for advice/opinion related to evaluating Acute renal insufficiency;Bradycardia    Date of  Admission: 3/31/2018  1:37 PM   Primary Care Physician:  Scout Lott MD     Assessment:     Patient Active Problem List   Diagnosis Code    Renal cell cancer ([facility] C64.9    Type II diabetes mellitus, uncontrolled ([address] E11.65    Urinary retention R33.9    Acute respiratory failure with hypoxia ([address] J96.01    Anemia of chronic disease D63.8    Cancer of left kidney ([address] C64.2    Cellulitis of foot L03.119    Chronic hepatitis C without hepatic coma ([address] B18.2    Diabetic foot ulcer ([address] E11.621, L97.509    Essential hypertension I10    Diarrhea R19.7    Fever R50.9    History of renal cell cancer Z85.528    IV drug abuse F19.10    MRSA bacteremia R78.81    Quadriparesis ([address] G82.50    Renal mass N28.89    Renal mass, left C38.28    Umbilical hernia H54.1    Hyponatremia E87.1    Bradycardia R00.1    Acute renal insufficiency N28.9    H/O Clostridium difficile infection Z86.19     > Lower extremity edema -new since treatment for c. diff    > Marked sinus bradycardia on Tenormin      Plan:     1. Hold Tenormin  2. Check echocardiogram.  3. Check BNP which may be difficult to access if high in view of renal failure issues, but will be helpful if low. 4. CXR in AM. Seated AP     History of Present Illness: This is a 59 y.o. -American male with past medical history remarkable for a renal mass, renal cell carcinoma of the left kidney, stage III chronic kidney disease, hepatitis C, diabetes mellitus, hypertension, and chronic drug abuse who was admitted to the hospital for bilateral lower extremity edema developing over the 3 days prior to admission is seen in consultation due to some persistent bradycardia noted since admission.   The patient lives in a nursing home and reportedly has been on Tenormin 25 mg daily presumably for his blood pressure and since admission to the hospital he has had a persistent sinus bradycardia running from the mid 30s to the high 40s in the setting of low normal blood pressure. It should be noted that this gentleman has had history of cervical disc disease and status post surgery and fusion with development of discitis and osteomyelitis after surgery from C3 through C7 and subsequent quadriplegia. He also has history of partial nephrectomy of the left kidney due to renal cell carcinoma in the past treated should be noted that within the last week he was treated for C. difficile and influenza.          Cardiac risk factors: diabetes mellitus, sedentary life style, male gender, hypertension      Review of Symptoms:  Except as stated above include:  Constitutional:  negative  Respiratory:  negative  Cardiovascular:  negative  Gastrointestinal: negative  Genitourinary:  negative  Musculoskeletal:  Negative  Neurological:  Negative, except per functional quadriplegia  Dermatological:  Negative  Endocrinological: Negative  Psychological:  Negative         Past Medical History:     Past Medical History:   Diagnosis Date    Chronic drug abuse 1974    Diabetes ([facility] 01/1990    Hypertension     Renal cell carcinoma of left kidney ([facility] 12/17/13    Pathological Stage [id number] cc RCC FG3 s/p left open partial nephrectomy in 12/13      Renal mass          Social History:     Social History     Social History    Marital status:      Spouse name: N/A    Number of children: N/A    Years of education: N/A     Social History Main Topics    Smoking status: Former Smoker     Packs/day: 0.50     Years: 30.00     Types: Cigarettes    Smokeless tobacco: Never Used    Alcohol use 8.4 oz/week     14 Cans of beer per week      Comment: watching sports    Drug use: No      Comment: methadone    Sexual activity: Not on file     Other Topics Concern    Not on file     Social History Narrative     since 2012;  for 12 yrs; [employer]; [address] [place]  just recently furloughed; No  service        Family History:     Family History   Problem Relation Age of Onset    Diabetes Mother     Sickle Cell Anemia Father     Diabetes Sister     Hypertension Sister         Medications:      Allergies   Allergen Reactions    Iodine Hives        Current Facility-Administered Medications   Medication Dose Route Frequency    levothyroxine (SYNTHROID) tablet 50 mcg  50 mcg Oral ACB    glucose chewable tablet 16 g  4 Tab Oral PRN    glucagon (GLUCAGEN) injection 1 mg  1 mg IntraMUSCular PRN    dextrose (D50W) injection syrg 12.5-25 g  25-50 mL IntraVENous PRN    insulin lispro (HUMALOG) injection   SubCUTAneous AC&HS    hydrALAZINE (APRESOLINE) tablet 25 mg  25 mg Oral TID PRN    Lactobacillus Acidoph & Bulgar (FLORANEX) tablet 2 Tab  2 Tab Oral BID    linagliptin (TRADJENTA) tablet 5 mg  5 mg Oral ACB    0.9% sodium chloride infusion  75 mL/hr IntraVENous CONTINUOUS    pantoprazole (PROTONIX) tablet 40 mg  40 mg Oral ACB         Physical Exam:     Visit Vitals    /66 (BP 1 Location: Left arm, BP Patient Position: At rest)    Pulse (!) 42    Temp 95 °F (35 °C)    Resp 16    Ht 6' (1.829 m)    Wt 81 kg (178 lb 9.2 oz)    SpO2 96%    BMI 24.22 kg/m2     BP Readings from Last 3 Encounters:   04/01/18 113/66   03/20/18 115/66   12/08/17 116/58     Pulse Readings from Last 3 Encounters:   04/01/18 (!) 42   03/20/18 (!) 53   12/03/17 60     Wt Readings from Last 3 Encounters:   03/31/18 81 kg (178 lb 9.2 oz)   03/20/18 76 kg (167 lb 8.8 oz)   12/08/17 64.9 kg (143 lb)       General:  alert, cooperative, no distress, appears stated age  Neck:  no JVD  Lungs:  clear to auscultation bilaterally  Heart:  regular rate and rhythm, S1, S2 normal, no murmur, click, rub or gallop  Abdomen:  abdomen is soft without significant tenderness, masses, organomegaly or guarding  Extremities:  extremities normal, atraumatic, no cyanosis or edema  Skin: Warm and dry.  no hyperpigmentation, vitiligo, or suspicious lesions  Neuro: alert, oriented x3, affect appropriate, functional quadriplegia    Psych: non focal     Data Review:     Recent Labs      04/01/18   0055 03/31/18   1409   WBC  4.6  5.7   HGB  7.1*  7.7*   HCT  22.1*  22.9*   PLT  40*  51*     Recent Labs      04/01/18 0055 03/31/18   1409   NA  141  142   K  5.2  5.4   CL  114*  114*   CO2  20*  20*   GLU  166*  171*   BUN  70*  68*   CREA  1.82*  1.78*   CA  8.5  9.4   MG  2.5   --    ALB  3.0*  3.4   SGOT  51*  66*   ALT  80*  94*       Results for orders placed or performed during the hospital encounter of 03/31/18   EKG, 12 LEAD, INITIAL   Result Value Ref Range    Ventricular Rate 46 BPM    Atrial Rate 46 BPM    P-R Interval 216 ms    QRS Duration 90 ms    Q-T Interval 452 ms    QTC Calculation (Bezet) 395 ms    Calculated P Axis 40 degrees    Calculated R Axis -6 degrees    Calculated T Axis 20 degrees    Diagnosis       Sinus bradycardia with 1st degree AV block  Otherwise normal ECG  No previous ECGs available         All Cardiac Markers in the last 24 hours:    Lab Results   Component Value Date/Time    CPK 93 04/01/2018 09:48 AM    CPK 97 04/01/2018 12:55 AM    CKMB 5.5 (H) 04/01/2018 09:48 AM    CKMB 5.7 (H) 04/01/2018 12:55 AM    CKND1 5.9 (H) 04/01/2018 09:48 AM    CKND1 5.9 (H) 04/01/2018 12:55 AM    TROIQ 0.07 (H) 04/01/2018 09:48 AM    TROIQ 0.06 (H) 04/01/2018 12:55 AM       Last Lipid:    Lab Results   Component Value Date/Time    Cholesterol, total 68 04/01/2018 12:55 AM    HDL Cholesterol 46 04/01/2018 12:55 AM    LDL, calculated 12.2 04/01/2018 12:55 AM    Triglyceride 49 04/01/2018 12:55 AM    CHOL/HDL Ratio 1.5 04/01/2018 12:55 AM       Signed By: Octavia Locke DO     April 1, 2018

## 2018-04-01 NOTE — ROUTINE PROCESS
Unable to void after 2 attempts. MD ordered varner catheter insertion dt bladder distention. Varner inserted per protocol. Procedure tolerated well.  1000ml of clear yellow urine residual noted. MD aware of low H&H and bradycardia below 40. States he will order cardiology consult. Patient alert and stable at this time. No cardiac sxs noted.     1116 Lab notified to add culture to specimen submitted per new order

## 2018-04-01 NOTE — PROGRESS NOTES
Progress Note    Patient: Jerman Mccray MRN: [id number]  CSN: [id number]    YOB: 1960  Age: 59 y.o. Sex: male    DOA: 3/31/2018 LOS:  LOS: 1 day                    Subjective:   Pt still has swelling lower extremities and Lt hand  Pt had ct scan abdomen and pelvis no contrast showed  1. Moderate to large right and small left pleural effusions.     2. Distended bladder with mild wall thickening, possibly due to chronic outlet  obstruction. Mildly enlarged heterogeneous prostate. Recommend correlation with urinalysis. -No hydronephrosis. -Bilobed right renal cyst similar to prior.  -No suspicious renal mass identified on this nonenhanced CT.     3. Low-attenuation of blood in keeping with anemia.     -Atherosclerosis. Osseous degenerative changes as above. Small fat-containing  inguinal hernias. Left gluteus minimus heterotopic ossification. Slightly prominent left periaortic nodes, none clearly abnormal by size criteria.     -Evaluation limited as above.          HPI  Jerman Mccray is a 59 y.o.  male who   Has  history of renal mass, renal cell carcinoma of left kidney, DM, chronic drug abuse, and HTN who presented with  c/o bilateral lower extremity swelling onset 3 days ago. He states that he is from a [address] [place] [address] and has been in the nursing home for the past 1 year. Artist Izabela He noted that his ankles first started sweling first. Klaudia Gallardo states that he had an ultrasound of his leg yesterday. He notes that he usually is able to ambulate without a walker but with increase swelling can not ambulate like he used to  . Patient denies history of CHF, chest pain, abdominal pain, and any othe rassociated symptoms or complaints.      Pt has H/o hepatitis c and seen by GI as out patinet  and  Pt also s/p partial nephrectomy Lt kidney due to renal cell carcinoma by dr Mala Singh urology     Pt has h/o cervical disc disease s/p surgery and fusion .  Pt had  H/o diskitis and osteomyelitis c 3-7 and quadriplegia after surgery      Pt was treated last week for c- diff and and flu last week has no upper respiratory sx or diarrhea at this time     Chief Complaint:   Chief Complaint   Patient presents with    Swelling    Slow Heart Rate       Review of systems    GENERAL: Patient alert, awake and oriented times 3, able to communicate full sentences and not in distress. HEENT: No change in vision, no earache, tinnitus, sore throat or sinus congestion. NECK: No pain or stiffness. CARDIOVASCULAR: No chest pain or pressure. No palpitations. PULMONARY: No shortness of breath, cough or wheeze. GASTROINTESTINAL: No abdominal pain, nausea, vomiting or diarrhea, melena or       bright red blood per rectum. Positive constipation h/o c-diff was treated  GENITOURINARY: No urinary frequency, urgency, hesitancy or dysuria. MUSCULOSKELETAL:  weakness upper extremities more than lower extremities   DERMATOLOGIC: No rash, no itching, no lesions. ENDOCRINE: No polyuria, polydipsia, no heat or cold intolerance. No recent change in    weight. HEMATOLOGICAL: H/o anemia or easy bruising or bleeding. NEUROLOGIC: No headache, seizures, numbness, tingling . Positive weakness upper extremities more than lower extremities  PSYCHIATRIC: No depression, anxiety, mood disorder, no loss of interest in normal       activity or change in sleep pattern.         Objective:     Physical Exam:  Visit Vitals    BP 99/61 (BP 1 Location: Left arm, BP Patient Position: At rest)    Pulse (!) 35    Temp 95 °F (35 °C)    Resp 12    Ht 6' (1.829 m)    Wt 81 kg (178 lb 9.2 oz)    SpO2 96%    BMI 24.22 kg/m2      General:  Alert, cooperative, no distress, appears stated age. Head:  Normocephalic, without obvious abnormality, atraumatic. Eyes:  Conjunctivae/corneas clear. PERRL, EOMs intact. Nose: Nares normal. No drainage or sinus tenderness.    Throat: Lips, mucosa, and tongue normal.poor dentition    Neck: Supple, symmetrical, trachea midline, no adenopathy, thyroid: no enlargement/tenderness/nodules, no carotid bruit and no JVD. Back:   ROM normal. No CVA tenderness. Lungs:   Clear to auscultation bilaterally. Chest wall:  No tenderness or deformity. Heart:  Regular rate and rhythm, S1, S2 normal, systolic murmer murmur, click, rub or gallop. Abdomen: Soft, non-tender. Bowel sounds normal. No masses,  No organomegaly. Extremities: Extremities normal, atraumatic, positive lower limb edema   Pulses: 2+ and symmetric lower extremities    Skin: Skin color, texture, turgor normal. No rashes or lesions   Neurologic: CNII-XII intact. No  New focal motor or sensory deficit. functional quadriplegia          Intake and Output:  Current Shift:  04/01 0701 - 04/01 1900  In: 400 [P.O.:400]  Out: 1000 [Urine:1000]  Last three shifts:  03/30 1901 - 04/01 0700  In: 1021.3 [P.O.:400; I.V.:621.3]  Out: -     Labs: Results:       Chemistry Recent Labs      04/01/18 0055 03/31/18   1409   GLU  166*  171*   NA  141  142   K  5.2  5.4   CL  114*  114*   CO2  20*  20*   BUN  70*  68*   CREA  1.82*  1.78*   CA  8.5  9.4   AGAP  7  8   BUCR  38*  38*   AP  116  131*   TP  6.8  7.8   ALB  3.0*  3.4   GLOB  3.8  4.4*   AGRAT  0.8  0.8      CBC w/Diff Recent Labs      04/01/18 0055 03/31/18   1409   WBC  4.6  5.7   RBC  2.48*  2.61*   HGB  7.1*  7.7*   HCT  22.1*  22.9*   PLT  40*  51*   GRANS  57  54   LYMPH  33  33   EOS  2  3      Cardiac Enzymes Recent Labs      04/01/18 0055 03/31/18   1409   CPK  97  117   CKND1  5.9*  5.2*      Coagulation No results for input(s): PTP, INR, APTT in the last 72 hours.     No lab exists for component: INREXT    Lipid Panel Lab Results   Component Value Date/Time    Cholesterol, total 68 04/01/2018 12:55 AM    HDL Cholesterol 46 04/01/2018 12:55 AM    LDL, calculated 12.2 04/01/2018 12:55 AM    VLDL, calculated 9.8 04/01/2018 12:55 AM    Triglyceride 49 04/01/2018 12:55 AM    CHOL/HDL Ratio 1.5 04/01/2018 12:55 AM      BNP No results for input(s): BNPP in the last 72 hours.    Liver Enzymes Recent Labs      04/01/18   0055   TP  6.8   ALB  3.0*   AP  116   SGOT  51*      Thyroid Studies Lab Results   Component Value Date/Time    TSH 5.42 (H) 04/01/2018 12:55 AM          Procedures/imaging: see electronic medical records for all procedures/Xrays and details which were not copied into this note but were reviewed prior to creation of Plan    Medications:   Current Facility-Administered Medications   Medication Dose Route Frequency    levothyroxine (SYNTHROID) tablet 50 mcg  50 mcg Oral ACB    glucose chewable tablet 16 g  4 Tab Oral PRN    glucagon (GLUCAGEN) injection 1 mg  1 mg IntraMUSCular PRN    dextrose (D50W) injection syrg 12.5-25 g  25-50 mL IntraVENous PRN    insulin lispro (HUMALOG) injection   SubCUTAneous AC&HS    hydrALAZINE (APRESOLINE) tablet 25 mg  25 mg Oral TID PRN    Lactobacillus Acidoph & Bulgar (FLORANEX) tablet 2 Tab  2 Tab Oral BID    linagliptin (TRADJENTA) tablet 5 mg  5 mg Oral ACB    0.9% sodium chloride infusion  75 mL/hr IntraVENous CONTINUOUS    pantoprazole (PROTONIX) tablet 40 mg  40 mg Oral ACB       Assessment/Plan     Active Problems:    Renal cell cancer ([facility] (11/5/2014)      Type II diabetes mellitus, uncontrolled ([facility] (12/16/2015)      Urinary retention (10/30/2017)      Cancer of left kidney ([facility] [address] (12/23/2013)      Quadriparesis ([facility] [address] (5/31/2017)      Bradycardia (3/31/2018)      Acute renal insufficiency (3/31/2018)      H/O Clostridium difficile infection (3/31/2018)    Acute Renal failure/ H/O Lt renal cell carcinoma S/p Lt nephrectomy/ swelling lower extremities   Pt received IV fluid in the ER  Will get venous duplex ultrasound B/L leg  Ct scan abdomen and pelvis no contrast  showed  Distended bladder possible neck obstruction will insert varner's catheter   Kristyn hydration cr today 1.82  Urine micro albumin  Nephrology consult discussed with  Carleen  Check echo, cardiac enzymes  Hold lisnopril hold atenolol  Hydralazine prn SBp above 160    Hypothyroid  Will start synthroid 50 mcg daily  Check TSH  and free T4  Monitor bradycardia    Bradycardia   Hold atenolol  Troponin I slightly up   Echo pending will get cardiology consult    Diabetes Mellitus   Check HG A1c, lipid profile  Sliding scale coverage     H/O c-diff / h/o flu  Treated with flagyl at nursing home  Finished course of Tamiflu     Thrombocytopenia  PLt is down to 40   Will get hematology consult Dr Nimisha Padilla  H/o hepatitis c  Check viral load and genotype  Pt has been f/u GI and had ultrasound liver recently waiting for treatment  Monitor liver function     Pleural effusion    Check CXR  F/u echo result       DVT/GI Prophylaxis: SCD's and H2B/PPI       Oli Meadows MD  4/1/2018 10:30 AM

## 2018-04-01 NOTE — ROUTINE PROCESS
Bedside and Verbal shift change report given to YEIMY rn (oncoming nurse) by Davis Hays RN (offgoing nurse). Report included the following information SBAR, Recent Results and Cardiac Rhythm Bradycardia with 1st degree block.

## 2018-04-01 NOTE — PROGRESS NOTES
ARF with ankle  & Arm swelling, Proteinuria, Low platelets ,H/O Hep C  , recent use of NSAIDS. Has extensive D/ including Microangiopathic Hemolytic anemia/TTP/HUS etc . Needs work up, work up outlined . But my feeling is that it is  NSAID induced problem then any thing else. Will follow .

## 2018-04-02 ENCOUNTER — APPOINTMENT (OUTPATIENT)
Dept: GENERAL RADIOLOGY | Age: 58
DRG: 460 | End: 2018-04-02
Attending: FAMILY MEDICINE
Payer: MEDICAID

## 2018-04-02 ENCOUNTER — APPOINTMENT (OUTPATIENT)
Dept: ULTRASOUND IMAGING | Age: 58
DRG: 460 | End: 2018-04-02
Attending: INTERNAL MEDICINE
Payer: MEDICAID

## 2018-04-02 PROBLEM — D75.89 LIGHT CHAIN DEPOSITION DISEASE: Status: ACTIVE | Noted: 2018-04-02

## 2018-04-02 PROBLEM — E87.20 METABOLIC ACIDOSIS: Status: ACTIVE | Noted: 2018-04-02

## 2018-04-02 PROBLEM — E83.52 HYPERCALCEMIA: Status: ACTIVE | Noted: 2018-04-02

## 2018-04-02 PROBLEM — E87.5 HYPERKALEMIA: Status: ACTIVE | Noted: 2018-04-02

## 2018-04-02 LAB
ALBUMIN SERPL-MCNC: 3.2 G/DL (ref 3.4–5)
ALBUMIN/GLOB SERPL: 0.8 {RATIO} (ref 0.8–1.7)
ALP SERPL-CCNC: 102 U/L (ref 45–117)
ALT SERPL-CCNC: 73 U/L (ref 16–61)
ANION GAP SERPL CALC-SCNC: 7 MMOL/L (ref 3–18)
APTT PPP: 41.2 SEC (ref 23–36.4)
AST SERPL-CCNC: 36 U/L (ref 15–37)
BACTERIA SPEC CULT: NORMAL
BASOPHILS # BLD: 0 K/UL (ref 0–0.1)
BASOPHILS NFR BLD: 0 % (ref 0–2)
BILIRUB SERPL-MCNC: 0.7 MG/DL (ref 0.2–1)
BNP SERPL-MCNC: 577 PG/ML (ref 0–900)
BUN SERPL-MCNC: 66 MG/DL (ref 7–18)
BUN/CREAT SERPL: 42 (ref 12–20)
CALCIUM SERPL-MCNC: 8.8 MG/DL (ref 8.5–10.1)
CHLORIDE SERPL-SCNC: 114 MMOL/L (ref 100–108)
CO2 SERPL-SCNC: 20 MMOL/L (ref 21–32)
CREAT SERPL-MCNC: 1.56 MG/DL (ref 0.6–1.3)
DIFFERENTIAL METHOD BLD: ABNORMAL
EOSINOPHIL # BLD: 0.1 K/UL (ref 0–0.4)
EOSINOPHIL #/AREA URNS HPF: NORMAL /[HPF]
EOSINOPHIL NFR BLD: 1 % (ref 0–5)
ERYTHROCYTE [DISTWIDTH] IN BLOOD BY AUTOMATED COUNT: 15.9 % (ref 11.6–14.5)
FERRITIN SERPL-MCNC: 117 NG/ML (ref 8–388)
FOLATE SERPL-MCNC: >20 NG/ML (ref 3.1–17.5)
GLOBULIN SER CALC-MCNC: 3.9 G/DL (ref 2–4)
GLUCOSE BLD STRIP.AUTO-MCNC: 117 MG/DL (ref 70–110)
GLUCOSE BLD STRIP.AUTO-MCNC: 131 MG/DL (ref 70–110)
GLUCOSE BLD STRIP.AUTO-MCNC: 145 MG/DL (ref 70–110)
GLUCOSE BLD STRIP.AUTO-MCNC: 162 MG/DL (ref 70–110)
GLUCOSE BLD STRIP.AUTO-MCNC: 62 MG/DL (ref 70–110)
GLUCOSE SERPL-MCNC: 58 MG/DL (ref 74–99)
HCT VFR BLD AUTO: 23.7 % (ref 36–48)
HGB BLD-MCNC: 7.8 G/DL (ref 13–16)
INR PPP: 1.1 (ref 0.8–1.2)
IRON SATN MFR SERPL: 50 %
IRON SERPL-MCNC: 53 UG/DL (ref 50–175)
LDH SERPL L TO P-CCNC: 189 U/L (ref 81–234)
LYMPHOCYTES # BLD: 1.1 K/UL (ref 0.9–3.6)
LYMPHOCYTES NFR BLD: 17 % (ref 21–52)
MAGNESIUM SERPL-MCNC: 2.5 MG/DL (ref 1.6–2.6)
MCH RBC QN AUTO: 29.1 PG (ref 24–34)
MCHC RBC AUTO-ENTMCNC: 32.9 G/DL (ref 31–37)
MCV RBC AUTO: 88.4 FL (ref 74–97)
MONOCYTES # BLD: 0.4 K/UL (ref 0.05–1.2)
MONOCYTES NFR BLD: 6 % (ref 3–10)
NEUTS SEG # BLD: 4.9 K/UL (ref 1.8–8)
NEUTS SEG NFR BLD: 76 % (ref 40–73)
OSMOLALITY UR: 431 MOSM/KG H2O (ref 300–900)
PERIPHERAL SMEAR,PSM: NORMAL
PHOSPHATE SERPL-MCNC: 4.4 MG/DL (ref 2.5–4.9)
PLATELET # BLD AUTO: 47 K/UL (ref 135–420)
POTASSIUM SERPL-SCNC: 5.6 MMOL/L (ref 3.5–5.5)
PROT SERPL-MCNC: 7.1 G/DL (ref 6.4–8.2)
PROTHROMBIN TIME: 13.9 SEC (ref 11.5–15.2)
RBC # BLD AUTO: 2.68 M/UL (ref 4.7–5.5)
RETICS/RBC NFR AUTO: 3 % (ref 0.5–2.3)
SERVICE CMNT-IMP: NORMAL
SODIUM SERPL-SCNC: 141 MMOL/L (ref 136–145)
T4 FREE SERPL-MCNC: 1.2 NG/DL (ref 0.7–1.5)
TIBC SERPL-MCNC: 290 UG/DL (ref 250–450)
TSH SERPL DL<=0.05 MIU/L-ACNC: 4.02 UIU/ML (ref 0.36–3.74)
UUN UR-MCNC: 785 MG/DL
VIT B12 SERPL-MCNC: 561 PG/ML (ref 211–911)
WBC # BLD AUTO: 6.5 K/UL (ref 4.6–13.2)

## 2018-04-02 PROCEDURE — 83880 ASSAY OF NATRIURETIC PEPTIDE: CPT | Performed by: FAMILY MEDICINE

## 2018-04-02 PROCEDURE — 87205 SMEAR GRAM STAIN: CPT | Performed by: FAMILY MEDICINE

## 2018-04-02 PROCEDURE — 86022 PLATELET ANTIBODIES: CPT | Performed by: FAMILY MEDICINE

## 2018-04-02 PROCEDURE — 85397 CLOTTING FUNCT ACTIVITY: CPT | Performed by: FAMILY MEDICINE

## 2018-04-02 PROCEDURE — 74011250637 HC RX REV CODE- 250/637: Performed by: INTERNAL MEDICINE

## 2018-04-02 PROCEDURE — 93970 EXTREMITY STUDY: CPT

## 2018-04-02 PROCEDURE — 83540 ASSAY OF IRON: CPT | Performed by: FAMILY MEDICINE

## 2018-04-02 PROCEDURE — 86706 HEP B SURFACE ANTIBODY: CPT | Performed by: INTERNAL MEDICINE

## 2018-04-02 PROCEDURE — 83615 LACTATE (LD) (LDH) ENZYME: CPT | Performed by: FAMILY MEDICINE

## 2018-04-02 PROCEDURE — 82607 VITAMIN B-12: CPT | Performed by: FAMILY MEDICINE

## 2018-04-02 PROCEDURE — 86160 COMPLEMENT ANTIGEN: CPT | Performed by: FAMILY MEDICINE

## 2018-04-02 PROCEDURE — 82728 ASSAY OF FERRITIN: CPT | Performed by: FAMILY MEDICINE

## 2018-04-02 PROCEDURE — 83735 ASSAY OF MAGNESIUM: CPT | Performed by: FAMILY MEDICINE

## 2018-04-02 PROCEDURE — 85025 COMPLETE CBC W/AUTO DIFF WBC: CPT | Performed by: FAMILY MEDICINE

## 2018-04-02 PROCEDURE — 74011250637 HC RX REV CODE- 250/637: Performed by: FAMILY MEDICINE

## 2018-04-02 PROCEDURE — 83970 ASSAY OF PARATHORMONE: CPT | Performed by: INTERNAL MEDICINE

## 2018-04-02 PROCEDURE — 82668 ASSAY OF ERYTHROPOIETIN: CPT | Performed by: FAMILY MEDICINE

## 2018-04-02 PROCEDURE — 76770 US EXAM ABDO BACK WALL COMP: CPT

## 2018-04-02 PROCEDURE — 77030010545

## 2018-04-02 PROCEDURE — 65660000004 HC RM CVT STEPDOWN

## 2018-04-02 PROCEDURE — 36415 COLL VENOUS BLD VENIPUNCTURE: CPT | Performed by: FAMILY MEDICINE

## 2018-04-02 PROCEDURE — 84439 ASSAY OF FREE THYROXINE: CPT | Performed by: FAMILY MEDICINE

## 2018-04-02 PROCEDURE — 86803 HEPATITIS C AB TEST: CPT | Performed by: INTERNAL MEDICINE

## 2018-04-02 PROCEDURE — 71045 X-RAY EXAM CHEST 1 VIEW: CPT

## 2018-04-02 PROCEDURE — 82962 GLUCOSE BLOOD TEST: CPT

## 2018-04-02 PROCEDURE — 93306 TTE W/DOPPLER COMPLETE: CPT

## 2018-04-02 PROCEDURE — 85045 AUTOMATED RETICULOCYTE COUNT: CPT | Performed by: FAMILY MEDICINE

## 2018-04-02 PROCEDURE — 86038 ANTINUCLEAR ANTIBODIES: CPT | Performed by: FAMILY MEDICINE

## 2018-04-02 PROCEDURE — 84100 ASSAY OF PHOSPHORUS: CPT | Performed by: FAMILY MEDICINE

## 2018-04-02 PROCEDURE — 82784 ASSAY IGA/IGD/IGG/IGM EACH: CPT | Performed by: FAMILY MEDICINE

## 2018-04-02 PROCEDURE — 84540 ASSAY OF URINE/UREA-N: CPT | Performed by: INTERNAL MEDICINE

## 2018-04-02 PROCEDURE — 85610 PROTHROMBIN TIME: CPT | Performed by: FAMILY MEDICINE

## 2018-04-02 PROCEDURE — 85730 THROMBOPLASTIN TIME PARTIAL: CPT | Performed by: FAMILY MEDICINE

## 2018-04-02 PROCEDURE — 84443 ASSAY THYROID STIM HORMONE: CPT | Performed by: FAMILY MEDICINE

## 2018-04-02 PROCEDURE — 87340 HEPATITIS B SURFACE AG IA: CPT | Performed by: INTERNAL MEDICINE

## 2018-04-02 PROCEDURE — 80053 COMPREHEN METABOLIC PANEL: CPT | Performed by: FAMILY MEDICINE

## 2018-04-02 PROCEDURE — 74011636637 HC RX REV CODE- 636/637: Performed by: FAMILY MEDICINE

## 2018-04-02 PROCEDURE — 85335 FACTOR INHIBITOR TEST: CPT | Performed by: FAMILY MEDICINE

## 2018-04-02 RX ORDER — SODIUM BICARBONATE 650 MG/1
650 TABLET ORAL 3 TIMES DAILY
Status: DISCONTINUED | OUTPATIENT
Start: 2018-04-02 | End: 2018-04-10 | Stop reason: HOSPADM

## 2018-04-02 RX ORDER — FUROSEMIDE 40 MG/1
40 TABLET ORAL DAILY
Status: DISCONTINUED | OUTPATIENT
Start: 2018-04-03 | End: 2018-04-10 | Stop reason: HOSPADM

## 2018-04-02 RX ORDER — SODIUM POLYSTYRENE SULFONATE 15 G/60ML
45 SUSPENSION ORAL; RECTAL
Status: COMPLETED | OUTPATIENT
Start: 2018-04-02 | End: 2018-04-02

## 2018-04-02 RX ORDER — HYDROMORPHONE HYDROCHLORIDE 2 MG/1
4 TABLET ORAL
Status: DISCONTINUED | OUTPATIENT
Start: 2018-04-02 | End: 2018-04-10 | Stop reason: HOSPADM

## 2018-04-02 RX ORDER — DOXYCYCLINE 100 MG/1
100 CAPSULE ORAL DAILY
Status: DISCONTINUED | OUTPATIENT
Start: 2018-04-02 | End: 2018-04-04

## 2018-04-02 RX ADMIN — SODIUM POLYSTYRENE SULFONATE 45 G: 15 SUSPENSION ORAL; RECTAL at 13:49

## 2018-04-02 RX ADMIN — LEVOTHYROXINE SODIUM 50 MCG: 50 TABLET ORAL at 08:09

## 2018-04-02 RX ADMIN — LINAGLIPTIN 5 MG: 5 TABLET, FILM COATED ORAL at 08:09

## 2018-04-02 RX ADMIN — HYDROMORPHONE HYDROCHLORIDE 4 MG: 2 TABLET ORAL at 05:24

## 2018-04-02 RX ADMIN — SODIUM BICARBONATE 650 MG TABLET 650 MG: at 22:28

## 2018-04-02 RX ADMIN — DOXYCYCLINE HYCLATE 100 MG: 100 CAPSULE ORAL at 13:51

## 2018-04-02 RX ADMIN — HYDROMORPHONE HYDROCHLORIDE 4 MG: 2 TABLET ORAL at 18:22

## 2018-04-02 RX ADMIN — LACTOBACILLUS TAB 2 TABLET: TAB at 18:19

## 2018-04-02 RX ADMIN — SODIUM BICARBONATE 650 MG TABLET 650 MG: at 18:19

## 2018-04-02 RX ADMIN — INSULIN LISPRO 2 UNITS: 100 INJECTION, SOLUTION INTRAVENOUS; SUBCUTANEOUS at 18:22

## 2018-04-02 RX ADMIN — PANTOPRAZOLE SODIUM 40 MG: 40 TABLET, DELAYED RELEASE ORAL at 08:09

## 2018-04-02 NOTE — PROGRESS NOTES
conducted an initial consultation and Spiritual Assessment for Cristina Castorena, who is a 59 y.o.,male. Patients Primary Language is: [place]. According to the patients EMR Anabaptism Affiliation is: Perico Edward. 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    Metabolic acidosis 69/53/8430    Hyperkalemia 04/02/2018    Hypercalcemia 04/02/2018    Bradycardia 03/31/2018    Acute renal insufficiency 03/31/2018    H/O Clostridium difficile infection 03/31/2018    Urinary retention 10/30/2017    Acute respiratory failure with hypoxia (HCC) 05/31/2017    Chronic hepatitis C without hepatic coma ([facility] 05/31/2017    Diarrhea 05/31/2017    History of renal cell cancer 05/31/2017    MRSA bacteremia 05/31/2017    Quadriparesis ([facility] 05/31/2017    Anemia of chronic disease 05/20/2017    Fever 05/20/2017    Essential hypertension 04/28/2017    IV drug abuse 04/28/2017    Hyponatremia 04/28/2017    Diabetic foot ulcer ([facility] 12/30/2016    Type II diabetes mellitus, uncontrolled ([facility] 12/16/2015    Cellulitis of foot 05/01/2015    Renal cell cancer ([facility] 11/05/2014    Cancer of left kidney ([facility] 12/23/2013    Renal mass, left 12/17/2013    Renal mass 88/86/5724    Umbilical hernia 52/73/8463        The  provided the following Interventions:  Initiated a relationship of care and support. Explored issues of sunil, belief, spirituality and Anglican/ritual needs while hospitalized. Listened empathically to patient share stories of his life, challenges with trying to get his disability, his excitement at being able to move and use his arms and legs and he new health challenges. Provided information about Spiritual Care Services. Scripture readings, Proverbs 4, 20-24, offered prayer and assurance of continued prayers on patient's behalf.      The following outcomes where achieved:  Patient shared limited information about both their medical narrative and spiritual journey/beliefs.  confirmed Patient's Mandaeism Affiliation: Farheen Cortes. Patient processed feeling about current hospitalization. Patient expressed gratitude for 's visit. Assessment:  Patient does not have any Restoration/cultural needs that will affect patients preferences in health care. There are no spiritual or Restoration issues which require intervention at this time. Plan:  Chaplains will continue to follow and will provide pastoral care on an as needed/requested basis.  recommends bedside caregivers page  on duty if patient shows signs of acute spiritual or emotional distress.       Pretty Sarkar, [address] [place] [address]  Spiritual Care  [phone]

## 2018-04-02 NOTE — DIABETES MGMT
NUTRITIONAL ASSESSMENT GLYCEMIC CONTROL/ PLAN OF CARE     Annalise Olea           59 y.o.           3/31/2018                 1. Peripheral edema    2. Anemia, unspecified type    3. Renal insufficiency    4. Bradycardia       INTERVENTIONS/PLAN:   Consider discontinuing Linagliptin while pt hospitalized   Continue inpatient monitoring and intervention  ASSESSMENT:   Pt is a 59year old male with a past medical history significant for renal cell carcinoma, diabetes, chronic drug abuse, and hypertension. Pt resides at [facility]. Pt is receiving a cardiac diabetic diet with fluctuating intake. Pt had episode of hypoglycemia this morning. Pt receives correctional Lispro insulin and Linagliptin. Diabetes Management:   Recent blood glucose:   4/1/2018 16:13 4/1/2018 22:23 4/2/2018 07:46 4/2/2018 08:15 4/2/2018 11:18   150 (H) 106 62 (L) 117 (H) 145 (H)     Within target range (non-ICU: <140; ICU<180): [x] Yes   []  No    Current Insulin regimen:  Correctional Lispro insulin 4 times daily ACHS (normal insulin sensitivity)  Linagliptin 5 mg daily before breakfast   Home medication/insulin regimen:   Lantus insulin 8 units daily. Humalog sliding scale insulin. Also noted Tradjenta 5 mg daily.   HbA1c: 12.6% (estimated average glucose of 315 mg/dL)  Adequate glycemic control PTA:  [] Yes  [x] No     SUBJECTIVE/OBJECTIVE:   Diet: Cardiac Consistent Carbohydrate 1800 Kcal    Patient Vitals for the past 100 hrs:   % Diet Eaten   04/02/18 0811 50 %   04/01/18 1700 10 %   04/01/18 1200 20 %   04/01/18 0926 75 %     Medications: [x]  Reviewed     Most Recent POC Glucose:   Recent Labs      04/02/18   0525  04/01/18   0055  03/31/18   1409   GLU  58*  166*  171*      Labs:   Lab Results   Component Value Date/Time    Hemoglobin A1c 12.6 (H) 02/26/2016 06:15 AM     Lab Results   Component Value Date/Time    Sodium 141 04/02/2018 05:25 AM    Potassium 5.6 (H) 04/02/2018 05:25 AM    Chloride 114 (H) 04/02/2018 05:25 AM    CO2 20 (L) 04/02/2018 05:25 AM    Anion gap 7 04/02/2018 05:25 AM    Glucose 58 (L) 04/02/2018 05:25 AM    BUN 66 (H) 04/02/2018 05:25 AM    Creatinine 1.56 (H) 04/02/2018 05:25 AM    Calcium 8.8 04/02/2018 05:25 AM    Calcium 9.0 04/02/2018 05:25 AM    Magnesium 2.5 04/02/2018 05:25 AM    Phosphorus 4.4 04/02/2018 05:25 AM    Albumin 3.2 (L) 04/02/2018 05:25 AM     Anthropometrics:  BMI (calculated): 24.6  Wt Readings from Last 1 Encounters:   04/02/18 82.3 kg (181 lb 7 oz)      Ht Readings from Last 1 Encounters:   03/31/18 6' (1.829 m)     Estimated Nutrition Needs: 4673-3089 Kcal/day, 66-82 grams protein/day  Based on:   [x] Actual BW    []   IBW   [] Adjusted BW      Nutrition Diagnoses:    Altered nutrition related lab value related to diabetes as evidenced by Hemoglobin A1c of 12.6%  Nutrition Interventions: coordination of care   Goal: Blood glucose will be within target range of  mg/dL by 4/05/18    Nutrition Monitoring and Evaluation    []     Monitor po intake on meal rounds  [x]     Continue inpatient monitoring and intervention  []     Other:    Lakisha Turpin RD, CDE  pgr [phone]

## 2018-04-02 NOTE — DIABETES MGMT
GLYCEMIC CONTROL:    Noted BG of 62 this morning at 7:46 AM. [facility] patient was given orange juice and then ate his breakfast and then taken for testing. Follow-up BG of 117 at 8:15 AM upon patient's return to room. Noted patient on correctional lispro insulin ACHS, but it is not the approved HR order set. Changed correctional lispro insulin to HR order set per insulin protocol.     Sravan Dean RN CCM

## 2018-04-02 NOTE — PROGRESS NOTES
OT orders received and chart reviewed. Pt not seen for skilled OT due to:  []  Pending duplex review    Will f/u later as patients' schedule allows. Thank you.   Dimas Brown OTR/L

## 2018-04-02 NOTE — PROGRESS NOTES
Progress Note    Patient: Preston Edwards MRN: [id number]  CSN: [id number]    YOB: 1960  Age: 59 y.o. Sex: male    DOA: 3/31/2018 LOS:  LOS: 2 days                    Subjective:   Patient feels well this am, denies cough, sob, congestion. No diarrhea. Pt still has swelling lower extremities and Lt hand  Pt had ct scan abdomen and pelvis no contrast showed  1. Moderate to large right and small left pleural effusions.     2. Distended bladder with mild wall thickening, possibly due to chronic outlet  obstruction. Mildly enlarged heterogeneous prostate. Recommend correlation with urinalysis. -No hydronephrosis. -Bilobed right renal cyst similar to prior.  -No suspicious renal mass identified on this nonenhanced CT.     3. Low-attenuation of blood in keeping with anemia.     -Atherosclerosis. Osseous degenerative changes as above. Small fat-containing  inguinal hernias. Left gluteus minimus heterotopic ossification. Slightly prominent left periaortic nodes, none clearly abnormal by size criteria.     -Evaluation limited as above.          HPI  Preston Edwards is a 59 y.o.  male who   Has  history of renal mass, renal cell carcinoma of left kidney, DM, chronic drug abuse, and HTN who presented with  c/o bilateral lower extremity swelling onset 3 days ago. He states that he is from a [address] [place] [address] and has been in the nursing home for the past 1 year. Adelita Brooks He noted that his ankles first started sweling first. Mala Blankenship states that he had an ultrasound of his leg yesterday. He notes that he usually is able to ambulate without a walker but with increase swelling can not ambulate like he used to  .  Patient denies history of CHF, chest pain, abdominal pain, and any othe rassociated symptoms or complaints.      Pt has H/o hepatitis c and seen by GI as out patinet  and  Pt also s/p partial nephrectomy Lt kidney due to renal cell carcinoma by dr Geraldine Hernandez urology     Pt has h/o cervical disc disease s/p surgery and fusion . Pt had  H/o diskitis and osteomyelitis c 3-7 and quadriplegia after surgery      Pt was treated last week for c- diff and and flu last week has no upper respiratory sx or diarrhea at this time     Chief Complaint:   Chief Complaint   Patient presents with    Swelling    Slow Heart Rate       Review of systems    GENERAL: Patient alert, awake and oriented times 3, able to communicate full sentences and not in distress. HEENT: No change in vision, no earache, tinnitus, sore throat or sinus congestion. NECK: No pain or stiffness. CARDIOVASCULAR: No chest pain or pressure. No palpitations. PULMONARY: No shortness of breath, cough or wheeze. GASTROINTESTINAL: No abdominal pain, nausea, vomiting or diarrhea, melena or       bright red blood per rectum. Positive constipation h/o c-diff was treated  GENITOURINARY: No urinary frequency, urgency, hesitancy or dysuria. MUSCULOSKELETAL:  weakness upper extremities more than lower extremities   DERMATOLOGIC: No rash, no itching, no lesions. ENDOCRINE: No polyuria, polydipsia, no heat or cold intolerance. No recent change in    weight. HEMATOLOGICAL: H/o anemia or easy bruising or bleeding. NEUROLOGIC: No headache, seizures, numbness, tingling . Positive weakness upper extremities more than lower extremities  PSYCHIATRIC: No depression, anxiety, mood disorder, no loss of interest in normal       activity or change in sleep pattern.         Objective:     Physical Exam:  Visit Vitals    /70 (BP 1 Location: Left arm, BP Patient Position: At rest)    Pulse (!) 56    Temp 97.1 °F (36.2 °C)    Resp 18    Ht 6' (1.829 m)    Wt 82.3 kg (181 lb 7 oz)    SpO2 94%    BMI 24.61 kg/m2      General:  Alert, cooperative, no distress, appears stated age. Head:  Normocephalic, without obvious abnormality, atraumatic. Eyes:  Conjunctivae/corneas clear. PERRL, EOMs intact. Nose: Nares normal. No drainage or sinus tenderness. Throat: Lips, mucosa, and tongue normal.poor dentition    Neck: Supple, symmetrical, trachea midline, no adenopathy, thyroid: no enlargement/tenderness/nodules, no carotid bruit and no JVD. Back:   ROM normal. No CVA tenderness. Lungs:   Clear to auscultation bilaterally. Poor inspiratory effort. Chest wall:  No tenderness or deformity. Heart:  Regular rate and rhythm, S1, S2 normal, systolic murmer murmur, click, rub or gallop. Abdomen: Soft, non-tender. Bowel sounds normal. No masses,  No organomegaly. Extremities: Extremities normal, atraumatic, positive 1+ bilateral lower limb edema to mid calf, no calf tenderness. Pulses: 2+ and symmetric lower extremities    Skin: Skin color, texture, turgor normal. No rashes or lesions   Neurologic: CNII-XII intact. No  New focal motor or sensory deficit. - patient functional quadriplegia, baseline able to ambulate, unable ot move arms aside from some movement in hands          Intake and Output:  Current Shift:  04/02 0701 - 04/02 1900  In: 240 [P.O.:240]  Out: -   Last three shifts:  03/31 1901 - 04/02 0700  In: 2673.8 [P.O.:1160;  I.V.:1513.8]  Out: 1925 [[id number]]    Labs: Results:       Chemistry Recent Labs      04/02/18   0525 04/01/18 0055 03/31/18   1409   GLU  58*  166*  171*   NA  141  141  142   K  5.6*  5.2  5.4   CL  114*  114*  114*   CO2  20*  20*  20*   BUN  66*  70*  68*   CREA  1.56*  1.82*  1.78*   CA  9.0  8.8  8.5  9.4   AGAP  7  7  8   BUCR  42*  38*  38*   AP  102  116  131*   TP  7.1  6.8  7.8   ALB  3.2*  3.0*  3.4   GLOB  3.9  3.8  4.4*   AGRAT  0.8  0.8  0.8      CBC w/Diff Recent Labs      04/02/18 0525 04/01/18 0055  03/31/18   1409   WBC  6.5  4.6  5.7   RBC  2.68*  2.48*  2.61*   HGB  7.8*  7.1*  7.7*   HCT  23.7*  22.1*  22.9*   PLT  47*  40*  51*   GRANS  76*  57  54   LYMPH  17*  33  33   EOS  1  2  3      Cardiac Enzymes Recent Labs      04/01/18   0948  04/01/18   0055   CPK  93  97   CKND1  5.9*  5.9* Coagulation Recent Labs      04/02/18   0525   PTP  13.9   INR  1.1   APTT  41.2*       Lipid Panel Lab Results   Component Value Date/Time    Cholesterol, total 68 04/01/2018 12:55 AM    HDL Cholesterol 46 04/01/2018 12:55 AM    LDL, calculated 12.2 04/01/2018 12:55 AM    VLDL, calculated 9.8 04/01/2018 12:55 AM    Triglyceride 49 04/01/2018 12:55 AM    CHOL/HDL Ratio 1.5 04/01/2018 12:55 AM      BNP No results for input(s): BNPP in the last 72 hours.    Liver Enzymes Recent Labs      04/02/18   0525   TP  7.1   ALB  3.2*   AP  102   SGOT  36      Thyroid Studies Lab Results   Component Value Date/Time    TSH 4.02 (H) 04/02/2018 05:25 AM          Procedures/imaging: see electronic medical records for all procedures/Xrays and details which were not copied into this note but were reviewed prior to creation of Plan    Medications:   Current Facility-Administered Medications   Medication Dose Route Frequency    HYDROmorphone (DILAUDID) tablet 4 mg  4 mg Oral Q6H PRN    levothyroxine (SYNTHROID) tablet 50 mcg  50 mcg Oral ACB    glucose chewable tablet 16 g  4 Tab Oral PRN    glucagon (GLUCAGEN) injection 1 mg  1 mg IntraMUSCular PRN    dextrose (D50W) injection syrg 12.5-25 g  25-50 mL IntraVENous PRN    insulin lispro (HUMALOG) injection   SubCUTAneous AC&HS    hydrALAZINE (APRESOLINE) tablet 25 mg  25 mg Oral TID PRN    Lactobacillus Acidoph & Bulgar (FLORANEX) tablet 2 Tab  2 Tab Oral BID    linagliptin (TRADJENTA) tablet 5 mg  5 mg Oral ACB    pantoprazole (PROTONIX) tablet 40 mg  40 mg Oral ACB       Assessment/Plan     Active Problems:    [facility] (11/5/2014)      Type II diabetes mellitus, uncontrolled ([facility] (12/16/2015)      Urinary retention (10/30/2017)      Cancer of left kidney ([facility] (12/23/2013)      Quadriparesis ([facility] (5/31/2017)      Bradycardia (3/31/2018)      Acute renal insufficiency (3/31/2018)      H/O Clostridium difficile infection (3/31/2018)    Acute Renal failure/ H/O Lt renal cell carcinoma S/p Lt nephrectomy/ swelling lower extremities   Pt received IV fluid in the ER  Will get venous duplex ultrasound B/L leg - pending  Ct scan abdomen and pelvis no contrast  showed  Distended bladder possible neck obstruction pt now s/p varner's catheter   Kristyn hydration cr improving  Urine micro albumin  Nephrology consult, Dr Thi Aguilera following  Hyperkalemia starting kayexalate, lasix, bicarb  Follow up echo  cardiac enzymes minimally elevated will follow up cardiology recs. Hold lisnopril hold atenolol  Hydralazine prn SBp above 160    Hypothyroid  Will start synthroid 50 mcg daily  TSH mildly elevated, normal free t4  Monitor bradycardia    Bradycardia   Hold atenolol, blood pressure within goal  Troponin I slightly up   Echo pending will get cardiology consult    Diabetes Mellitus   Check HG A1c, lipid profile  Sliding scale coverage     H/O c-diff / h/o flu/history of PNA and UTI  Treated with flagyl at nursing home  Finished course of Tamiflu  Finished levaquin at nursing home for PNA (3/13/-3/19) and UTI, Urine culture sensitive     Thrombocytopenia  PLt is up slightly this am  Follow up hematology consult Dr Melita Paget  H/o hepatitis c  Check viral load and genotype  Pt has been f/u GI and had ultrasound liver recently waiting for treatment  Monitor liver function     Pleural effusion  CXR with:  Bilateral mixed airspace opacities, right greater than left. Suggestive of multifocal infiltrate, i.e. infectious process, vs. combination of pleural effusion and atelectasis. Patient without fever, white count, cough, does have pleural effusions on CT abdomen, recently completed levaquin for PNA. F/u echo result    History of C3-7 discitis with osteomyelitis, L4-5 phlegmon 4/2017 s/p C3-7 laminectomy with posteriorlateral fusion and hardware on 4/29/17 for rapid onset quadriplegia from discitis/osteomyelitis, hsitory of MRSA baceremia and abscess cultures from spine.     Followed as outpatient by Dr. Eneida Luna infectious disease, has recommended continue doxycycline 100mg daily for MRSA history/prophylaxis        DVT/GI Prophylaxis: SCD's and H2B/PPI       Lora Rojas MD  4/2/2018 10:02 AM

## 2018-04-02 NOTE — PROGRESS NOTES
Progress Note    Lucy Fordt  59 y.o. Admit Date: 3/31/2018  Active Problems:    Renal cell cancer ([facility] (11/5/2014) POA: Yes      Type II diabetes mellitus, uncontrolled ([facility] (12/16/2015) POA: Yes      Urinary retention (10/30/2017) POA: Yes      Cancer of left kidney ([facility] (12/23/2013) POA: Yes      Quadriparesis ([facility] (5/31/2017) POA: Yes      Bradycardia (3/31/2018) POA: Yes      Acute renal insufficiency (3/31/2018) POA: Yes      H/O Clostridium difficile infection (3/31/2018) POA: Yes      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 A comprehensive review of systems was negative except for that written in the History of Present Illness.     Objective:     Visit Vitals    /66 (BP 1 Location: Left arm, BP Patient Position: At rest)    Pulse (!) 56    Temp 97.2 °F (36.2 °C)    Resp 20    Ht 6' (1.829 m)    Wt 82.3 kg (181 lb 7 oz)    SpO2 96%    BMI 24.61 kg/m2         Intake/Output Summary (Last 24 hours) at 04/02/18 1240  Last data filed at 04/02/18 1229   Gross per 24 hour   Intake           1372.5 ml   Output             1425 ml   Net            -52.5 ml       Current Facility-Administered Medications   Medication Dose Route Frequency Provider Last Rate Last Dose    HYDROmorphone (DILAUDID) tablet 4 mg  4 mg Oral Q6H PRN Scout Lott MD   4 mg at 04/02/18 0524    doxycycline (VIBRAMYCIN) capsule 100 mg  100 mg Oral DAILY Mila Aquino MD        levothyroxine (SYNTHROID) tablet 50 mcg  50 mcg Oral ACB Scout Lott MD   50 mcg at 04/02/18 0809    glucose chewable tablet 16 g  4 Tab Oral PRN Scout Lott MD        glucagon (GLUCAGEN) injection 1 mg  1 mg IntraMUSCular ANDRÉS Lott MD       [facility] dextrose (D50W) injection syrg 12.5-25 g  25-50 mL IntraVENous PRN Mitch Courtney MD        insulin lispro (HUMALOG) injection   SubCUTAneous AC&HS Levon Koyanagi, MD   Stopped at 04/01/18 2200    hydrALAZINE (APRESOLINE) tablet 25 mg  25 mg Oral TID PRN Mitch Courtney MD        Lactobacillus Acidoph & Bulgar [facility]) tablet 2 Tab  2 Tab Oral BID Mitch Courtney MD   Stopped at 04/02/18 0900    linagliptin (TRADJENTA) tablet 5 mg  5 mg Oral ACB Mitch Courtney MD   5 mg at 04/02/18 0809    pantoprazole (PROTONIX) tablet 40 mg  40 mg Oral DANIE Courtney MD   40 mg at 04/02/18 0809        Physical Exam:     Physical Exam:   General:  Alert, cooperative, no distress, appears stated age. Lungs:   Clear to auscultation bilaterally. Heart:  Regular rate and rhythm, S1, S2 normal, no murmur, click, rub or gallop. Abdomen:   Soft, non-tender. Bowel sounds normal. No masses,  No organomegaly.    Extremities: Extremities normal, atraumatic, no cyanosis , has edema   Skin: Skin color, texture, turgor normal. No rashes or lesions         Data Review:    CBC w/Diff    Recent Labs      04/02/18 0525 04/01/18 0055 03/31/18   1409   WBC  6.5  4.6  5.7   RBC  2.68*  2.48*  2.61*   HGB  7.8*  7.1*  7.7*   HCT  23.7*  22.1*  22.9*   MCV  88.4  89.1  87.7   MCH  29.1  28.6  29.5   MCHC  32.9  32.1  33.6   RDW  15.9*  15.9*  15.8*    Recent Labs      04/02/18   0525 04/01/18 0055 03/31/18   1409   MONOS  6  8  10*   EOS  1  2  3   BASOS  0  0  0   RDW  15.9*  15.9*  15.8*        Comprehensive Metabolic Profile    Recent Labs      04/02/18   0525 04/01/18 0055 03/31/18   1409   NA  141  141  142   K  5.6*  5.2  5.4   CL  114*  114*  114*   CO2  20*  20*  20*   BUN  66*  70*  68*   CREA  1.56*  1.82*  1.78*    Recent Labs      04/02/18   0525  04/01/18   0055  03/31/18   1409   CA  9.0  8.8  8.5  9.4   PHOS  4.4   --    --    ALB  3.2*  3.0*  3.4   TP  7.1  6.8  7.8   SGOT  36 46*  77*   TBILI  0.7  0.5  0.7                        Impression:       Active Hospital Problems    Diagnosis Date Noted    Light chain deposition disease 02/23/1384    Metabolic acidosis 02/90/8918    Hyperkalemia 04/02/2018    Hypercalcemia 04/02/2018    Bradycardia 03/31/2018    Acute renal insufficiency 03/31/2018    H/O Clostridium difficile infection 03/31/2018    Urinary retention 10/30/2017    Quadriparesis ([facility] 05/31/2017    Type II diabetes mellitus, uncontrolled ([facility] 12/16/2015    Renal cell cancer ([facility] 11/05/2014    Cancer of left kidney ([facility] 12/23/2013      Renal work up in progress,high suspicion of Light chain deposition disease.       Plan:     Add Lasix, Bicarb, kayexalate, check Ionized calcium       Roseline Ray MD

## 2018-04-02 NOTE — PROGRESS NOTES
Continuing to await results of LE Duplex. Will follow up as patient schedule allows. Thank you for this referral. Maria C Mann, PT, DPT.

## 2018-04-02 NOTE — PROGRESS NOTES
PT eval order received and chart reviewed. Patient is awaiting results of LE Duplex study. Will follow up as results available and patient schedule allows. Thank you for this referral. William Tinsley, PT, DPT.

## 2018-04-02 NOTE — PROGRESS NOTES
[facility] - Progress Note  Admit Date: 3/31/2018    Assessment:     Hospital Problems  Date Reviewed: 3/31/2018          Codes Class Noted POA    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  ICD-9-CM: 583.9  4/2/2018 Clinically Undetermined        Metabolic acidosis SHF-26-QH: E87.2  ICD-9-CM: 276.2  4/2/2018 Unknown        Hyperkalemia ICD-10-CM: E87.5  ICD-9-CM: 276.7  4/2/2018 Unknown        Hypercalcemia ICD-10-CM: E83.52  ICD-9-CM: 275.42  4/2/2018 Unknown        Bradycardia ICD-10-CM: R00.1  ICD-9-CM: 427.89  3/31/2018 Yes        Acute renal insufficiency ICD-10-CM: N28.9  ICD-9-CM: 593.9  3/31/2018 Yes        H/O Clostridium difficile infection ICD-10-CM: Z86.19  ICD-9-CM: V12.09  3/31/2018 Yes        Urinary retention ICD-10-CM: R33.9  ICD-9-CM: 788.20  10/30/2017 Yes        Quadriparesis ([facility] ICD-10-CM: G82.50  ICD-9-CM: 344.00  5/31/2017 Yes        Type II diabetes mellitus, uncontrolled ([facility] ICD-10-CM: E11.65  ICD-9-CM: 250.02  12/16/2015 Yes        Renal cell cancer ([facility] ICD-10-CM: C64.9  ICD-9-CM: 189.0  11/5/2014 Yes        Cancer of left kidney ([facility] ICD-10-CM: C64.2  ICD-9-CM: 189.0  12/23/2013 Yes              Plan:     Ltanya Ba improved, echo reviewed with normal EF. Continue to monitor on telemetry. Subjective:     No new complaints.      Objective:      Patient Vitals for the past 8 hrs:   Temp Pulse Resp BP SpO2   04/02/18 1613 97.4 °F (36.3 °C) (!) 56 22 124/56 94 %   04/02/18 1119 97.2 °F (36.2 °C) (!) 56 20 111/66 96 %         Patient Vitals for the past 96 hrs:   Weight   04/02/18 0627 82.3 kg (181 lb 7 oz)   03/31/18 2051 81 kg (178 lb 9.2 oz)   03/31/18 2049 80.1 kg (176 lb 9.4 oz)   03/31/18 1420 75.8 kg (167 lb)                    Intake/Output Summary (Last 24 hours) at 04/02/18 1647  Last data filed at 04/02/18 1537   Gross per 24 hour   Intake              480 ml   Output             1475 ml   Net             -995 ml       Physical Exam:  General:  alert, cooperative, no distress, appears stated age  Neck:  nontender  Lungs:  clear to auscultation bilaterally  Heart:  regular rate and rhythm, S1, S2 normal, no murmur, click, rub or gallop  Abdomen:  abdomen is soft without significant tenderness, masses, organomegaly or guarding  Extremities:  extremities normal, atraumatic, no cyanosis or edema    Data Review:     Labs: Results:       Chemistry Recent Labs      04/02/18   0525  04/01/18 0055  03/31/18   1409   GLU  58*  166*  171*   NA  141  141  142   K  5.6*  5.2  5.4   CL  114*  114*  114*   CO2  20*  20*  20*   BUN  66*  70*  68*   CREA  1.56*  1.82*  1.78*   CA  9.0  8.8  8.5  9.4   MG  2.5  2.5   --    PHOS  4.4   --    --    AGAP  7  7  8   BUCR  42*  38*  38*   AP  102  116  131*   TP  7.1  6.8  7.8   ALB  3.2*  3.0*  3.4   GLOB  3.9  3.8  4.4*   AGRAT  0.8  0.8  0.8      CBC w/Diff Recent Labs      04/02/18   0525  04/01/18 0055  03/31/18   1409   WBC  6.5  4.6  5.7   RBC  2.68*  2.48*  2.61*   HGB  7.8*  7.1*  7.7*   HCT  23.7*  22.1*  22.9*   PLT  47*  40*  51*   GRANS  76*  57  54   LYMPH  17*  33  33   EOS  1  2  3      Cardiac Enzymes No results found for: CPK, CK, CKMMB, CKMB, RCK3, CKMBT, CKNDX, CKND1, LINDA, TROPT, TROIQ, MIGUEL, TROPT, TNIPOC, BNP, BNPP   Coagulation Recent Labs      04/02/18   0525   PTP  13.9   INR  1.1   APTT  41.2*       Lipid Panel Lab Results   Component Value Date/Time    Cholesterol, total 68 04/01/2018 12:55 AM    HDL Cholesterol 46 04/01/2018 12:55 AM    LDL, calculated 12.2 04/01/2018 12:55 AM    VLDL, calculated 9.8 04/01/2018 12:55 AM    Triglyceride 49 04/01/2018 12:55 AM    CHOL/HDL Ratio 1.5 04/01/2018 12:55 AM      BNP No results found for: BNP, BNPP, XBNPT   Liver Enzymes Recent Labs      04/02/18   0525   TP  7.1   ALB  3.2*   AP  102   SGOT  36      Digoxin    Thyroid Studies Lab Results   Component Value Date/Time    TSH 4.02 (H) 04/02/2018 05:25 AM          Signed By: Remedios Higuera MD     April 2, 2018

## 2018-04-02 NOTE — PROGRESS NOTES
Hematology/Medical Oncology Progress Note      NAME: Reanna Briggs   :  1960  MRM:  [id number]    Date/Time: 2018  7:40 AM         Subjective:     Mr Michaela Bowie is a 59 y.o. Man with renal failure and progressive thrombocytopenia. Currently he reports that he is feeling better. There are no new complaints. Past Medical History reviewed and unchanged from Admission History and Physical    Review of Systems   Constitutional: Negative for chills, fatigue, fever and unexpected weight change. HENT: Negative for ear discharge, ear pain, facial swelling, mouth sores, nosebleeds, sneezing, sore throat and tinnitus. Eyes: Negative for photophobia, pain, discharge, redness, itching and visual disturbance. Respiratory: Negative for apnea, cough, choking, chest tightness, shortness of breath, wheezing and stridor. Cardiovascular: Negative for chest pain, palpitations and leg swelling. Gastrointestinal: Negative for abdominal distention, abdominal pain, anal bleeding, blood in stool, constipation, diarrhea, nausea, rectal pain and vomiting. Genitourinary: Negative for decreased urine volume, difficulty urinating, discharge, dysuria, enuresis, flank pain, frequency, genital sores, hematuria, penile pain, penile swelling, scrotal swelling, testicular pain and urgency. Musculoskeletal: Negative for arthralgias, back pain, gait problem, joint swelling, myalgias, neck pain and neck stiffness. Skin: Negative for color change, pallor and rash. Neurological: Negative for dizziness, tremors, seizures, syncope, facial asymmetry, speech difficulty, weakness, light-headedness, numbness and headaches. Hematological: Negative for adenopathy. Does not bruise/bleed easily. Psychiatric/Behavioral: Negative for agitation, behavioral problems, confusion, decreased concentration, dysphoric mood, hallucinations, self-injury, sleep disturbance and suicidal ideas.  The patient is not nervous/anxious and is not hyperactive. Objective:       Vitals:      Last 24hrs VS reviewed since prior progress note. Most recent are:    Visit Vitals    /66    Pulse (!) 50    Temp 95 °F (35 °C)    Resp 18    Ht 6' (1.829 m)    Wt 82.3 kg (181 lb 7 oz)    SpO2 94%    BMI 24.61 kg/m2     SpO2 Readings from Last 6 Encounters:   04/02/18 94%   03/20/18 99%   12/03/17 100%   03/02/16 99%   02/29/16 95%   02/23/16 96%          Intake/Output Summary (Last 24 hours) at 04/02/18 0740  Last data filed at 04/02/18 5819   Gross per 24 hour   Intake           1652.5 ml   Output             1925 ml   Net           -272.5 ml          Exam:      Physical Exam   Nursing note and vitals reviewed. Constitutional: He is oriented to person, place, and time. He appears well-developed and well-nourished. No distress. HENT:   Head: Normocephalic and atraumatic. Mouth/Throat: No oropharyngeal exudate. Eyes: Conjunctivae and EOM are normal. Pupils are equal, round, and reactive to light. Right eye exhibits no discharge. Left eye exhibits no discharge. No scleral icterus. Neck: Normal range of motion. Neck supple. No tracheal deviation present. No thyromegaly present. Cardiovascular: Normal rate and regular rhythm. Exam reveals no gallop and no friction rub. No murmur heard. Pulmonary/Chest: Effort normal and breath sounds normal. No apnea. No respiratory distress. He has no wheezes. He has no rales. Chest wall is not dull to percussion. He exhibits no mass, no tenderness, no bony tenderness, no laceration, no crepitus, no edema, no deformity, no swelling and no retraction. Right breast exhibits no inverted nipple, no mass, no nipple discharge, no skin change and no tenderness. Left breast exhibits no inverted nipple, no mass, no nipple discharge, no skin change and no tenderness. Breasts are symmetrical.   Abdominal: Soft. Bowel sounds are normal. He exhibits no distension. There is no tenderness.  There is no rebound and no guarding. Musculoskeletal: Normal range of motion. He exhibits no edema or tenderness. Lymphadenopathy:     He has no cervical adenopathy. Neurological: He is alert and oriented to person, place, and time. Coordination normal.   Skin: Skin is warm and dry. No rash noted. He is not diaphoretic. No erythema. No pallor. Psychiatric: He has a normal mood and affect.  His behavior is normal. Thought content normal.       Telemetry reviewed:   normal sinus rhythm    Lab Data Reviewed: (see below)      Medications:  Current Facility-Administered Medications   Medication Dose Route Frequency    HYDROmorphone (DILAUDID) tablet 4 mg  4 mg Oral Q6H PRN    levothyroxine (SYNTHROID) tablet 50 mcg  50 mcg Oral ACB    glucose chewable tablet 16 g  4 Tab Oral PRN    glucagon (GLUCAGEN) injection 1 mg  1 mg IntraMUSCular PRN    dextrose (D50W) injection syrg 12.5-25 g  25-50 mL IntraVENous PRN    insulin lispro (HUMALOG) injection   SubCUTAneous AC&HS    hydrALAZINE (APRESOLINE) tablet 25 mg  25 mg Oral TID PRN    Lactobacillus Acidoph & Bulgar (FLORANEX) tablet 2 Tab  2 Tab Oral BID    linagliptin (TRADJENTA) tablet 5 mg  5 mg Oral ACB    pantoprazole (PROTONIX) tablet 40 mg  40 mg Oral ACB       ______________________________________________________________________      Lab Review:     Recent Labs      04/02/18   0525  04/01/18   0055  03/31/18   1409   WBC  6.5  4.6  5.7   HGB  7.8*  7.1*  7.7*   HCT  23.7*  22.1*  22.9*   PLT  47*  40*  51*     Recent Labs      04/02/18   0525  04/01/18   0055  03/31/18   1409   NA  141  141  142   K  5.6*  5.2  5.4   CL  114*  114*  114*   CO2  20*  20*  20*   GLU  58*  166*  171*   BUN  66*  70*  68*   CREA  1.56*  1.82*  1.78*   CA  9.0  8.8  8.5  9.4   MG  2.5  2.5   --    PHOS  4.4   --    --    ALB  3.2*  3.0*  3.4   SGOT  36  51*  66*   ALT  73*  80*  94*   INR  1.1   --    --      No components found for: GLPOC  No results for input(s): PH, PCO2, PO2, HCO3, FIO2 in the last 72 hours. Recent Labs      04/02/18   0525   INR  1.1       Other pertinent lab:          Assessment:     Active Problems:    Renal cell cancer ([facility] [address] (11/5/2014)      Type II diabetes mellitus, uncontrolled ([facility] [address] (12/16/2015)      Urinary retention (10/30/2017)      Cancer of left kidney ([facility] [address] (12/23/2013)      Quadriparesis ([facility] [address] (5/31/2017)      Bradycardia (3/31/2018)      Acute renal insufficiency (3/31/2018)      H/O Clostridium difficile infection (3/31/2018)           Plan:     Risk of deterioration: medium             1. Thrombocytopenia: I have explained to the patient that his platelets are now increasing. No therapeutic intervention is necessary at this point. The likely causes of his thrombocytopenia are iatrogenic or infection related. The peripheral reveals no schistocytes to suggest TTP/HUS. The LDH and SPEP are pending. 2. Anemia in CKD: pending results or iron studies the patient may benefit from procrit.          Total time spent with patient:25 minutes                  Care Plan discussed with: Patient, Nursing Staff and Consultant/Specialist    Discussed:  Care Plan    Prophylaxis:  SCD's    Disposition:  Home w/Family           ___________________________________________________    Attending Physician: Pepper Seymour MD

## 2018-04-02 NOTE — CONSULTS
[facility]    Name:Yann GUAJARDO  MR#: [id number]  : 1960  ACCOUNT #: [de-identified]   DATE OF SERVICE: 2018    REFERRING PHYSICIAN:  Dr. Jazmin Mccallum. REASON FOR CONSULTATION:  Bilateral leg swelling with abnormal renal function test.     HISTORY OF PRESENT ILLNESS:  This is a pleasant 29-year-old -American man, a nursing home resident, who was brought to the hospital as this patient developed bilateral lower extremity swelling three to four days back. He lives in the [address] [place] [address] and has been in the nursing home for last one year or so. As per the chart, he noted that his ankles had started swelling. He had an ultrasound of the left leg yesterday, which did not show any deep vein thrombosis. He notes that he was able to ambulate without a walker but with increasing swelling could not ambulate like before. The patient, at this time denies any chest pain, any shortness of breath, or any other acute problem. He has a Willis catheter through which he makes urine. By history, he has a history of hepatitis C. When it was diagnosed and if and when it has been treated is not clear. Also, he is status post partial nephrectomy of the left kidney due to renal cell carcinoma by Dr. Roland Fontaine of Urology in the past.  Also, he has a history of cervical disk disease status post surgery and fusion and the patient also has a history of diskitis and osteomyelitis of C3-C7, and quadriplegia after surgery. Also, the patient was treated for C. diff and the flu in the past, but his list of medicine found that the patient was taking Motrin before this admission. On admission, it was noted at this time, that his serum creatinine is 1.78 and BUN is 68  compared to his normal BUN of 13 and a creatinine of 0.90. Today, the BUN and creatinine has increased up to 70 and 1.82 and the potassium is on the high side and is also mildly acidotic with a glucose of 166.   The patient in the past also had some workup and had some protein in the urine, but nonsignificant, it looks like. PAST MEDICAL HISTORY:  Including history of chronic drug abuse, diabetes, hypertension, status post partial left nephrectomy of the left kidney from renal cell carcinoma, stage [id number] and it was left open partial nephrectomy in December. PAST SURGICAL HISTORY:  Circumcision, open partial nephrectomy at [facility] in year 2013, and also has a history of removal of the second metatarsal bone on the right foot. FAMILY HISTORY:  Significant for diabetes in mother, sickle cell anemia in father, diabetes in sister, hypertension in sister. SOCIAL HISTORY:  . Former smoker, half pack per day for 30 years. Alcohol use 8.4 grams per week, fourteen cans of beer per week. Drug abuse, no. MEDICATIONS:  Before the admissions are as follows:  Tylenol, DuoNeb nebulizer, Norvasc, Tenormin, Lipitor, Dulcolax, doxycycline, gabapentin, hydromorphone, ibuprofen 400 mg 1 tablet by mouth every six hours as needed for pain, insulin glargine, Lantus, Lactobacillus, magnesium hydroxide, menthol-zinc ointment, multivitamin, Nicoderm patch, Preparation H, rifampin 300 mg capsule, Ambien 10 mg. PHYSICAL EXAMINATION  VITAL SIGNS:  Temperature 95.5, T-max is 95.8, heart rate varying between 42 and 48 and sometimes 35 also, blood pressure 113/66,  Heart rate  is 52, respiration of 16, oxygen saturation of 96%. Weight is 81 kilograms. On admission weight reading is 75.8 kilograms. HEENT:  Oral mucosa pale. Sclerae are nonicteric. NECK:  No jugular venous distention. LUNGS:  Good air entry. HEART:  S1, S2, without gallop or murmur. ABDOMEN:  Soft. No palpable mass. EXTREMITIES:  1-2+ edema. Also, there is swelling of the right arm. No history of any blood loss in the rectum area and no blood in the stool.      LABORATORY DATA:  Limited labs as of today, WBC count 4.6, hemoglobin of 7.1, hematocrit of 22.1, platelets of only 76,598, RDW 15.9. On admission, WBC 5.7 with hemoglobin of 7.7, hematocrit 22.9, platelets of only 39,203, compared to platelets of 892,102 in 2016. We do not have any labs from 2016, until this admission, in this computer. Differential today, neutrophils 57, lymphocytes 33, monocytes 8. Urinalysis done today looks cloudy, specific gravity of 1.016, pH of 5, glucose negative, ketones negative, bilirubin negative, epithelial cells, few and WBC 1-4, RBC 0-3, and bacteria 1+, amorphous crystals 1. Urine chemistry, microalbumin creatinine ratio is 54, which is slightly on the higher side. TSH is 5.42. Chemistry on admission:  Sodium 142, potassium 5.4, chloride 114, CO2 20, anion gap of 8. Glucose 171, blood urea nitrogen of 68 with a creatinine of 1.78, compared to the creatinine of 0.90 in 2016. Calcium of 9.4, total protein of 7.8, albumin of 3.4, globulin of 4.4. Today, sodium 141, potassium 5.2, chloride 114, CO2 of 20, anion gap of 7, glucose 166, blood urea nitrogen of 70 with a creatinine of 1.82, calcium 8.5, magnesium 2.5, albumin 3.0.  CPK 97 and 93. CT of the abdomen and pelvis without contrast was done on admission day, which shows moderate to large right and small left pleural effusion. Distended bladder with mild wall thickening, possibly due to chronic outlet obstruction, mildly enlarged heterogeneous prostate, the common collection with urinalysis. No hydronephrosis. Bilobed right renal cyst similar to the prior examination. No suspicious renal mass identified on the nonenhanced CT, low attenuation of blood in keeping with anemia, atherosclerosis osseous degenerative changes as well. Small fat containing inguinal hernia. EKG that was done yesterday, sinus bradycardia with first degree AV block, otherwise normal EKG. ASSESSMENT AND PLAN  1.   Acute renal failure with this extensive history as I mentioned, has numerous differential diagnoses. How much workup should be done, this is the question, but I have already outlined  the workup for that given the history of his NSAID use, mild high potassium, mild acidosis, renal failure, leg swelling, proteinuria. These all could be explained by use of NSAIDS, which can cause acute renal failure by affecting or constricting the efferent arterial.  But given the history of anemia, thrombocytopenia and acute renal failure, we must not miss any microangiopathic hemolytic anemia or any TTP or any hemolytic uremic syndrome given the history of cancer also. 2.  Anemia, low albumin, mild protein in the urine with the renal failure, mild back pain, high normal calcium. We must not miss multiple myeloma or any light chain deposition disease that needs to be worked up. Also, given the history of hepatitis C and thrombocytopenia, we do not like to miss any glomerulonephritis particularly MPGN type 1 glomerulonephritis and also given this history of NSAIDS, along with the other medications, and the urine findings, this is very much suggestive of interstitial tubular damage that also needs to workup. Last, but not the least, we must rule out obstruction and also will  have a better picture of the  of the kidney. by renal Us.       Bianca Gagnon MD       [facility] [place] / [place]  D: 04/01/2018 19:45     T: 04/02/2018 06:29  JOB #: [id number]

## 2018-04-02 NOTE — DIABETES MGMT
Diabetes Patient/Family Education Record    Factors That  May Influence Patients Ability  to Learn or  Comply with Recommendations   []   Language barrier    []   Cultural needs   []   Motivation    []   Cognitive limitation    []   Physical   [x]   Education    []   Physiological factors   []   Hearing/vision/speaking impairment   []   Anglican beliefs    []   Financial factors   [x]  Other: h/o quadriplegia after cervical surgery. []  No factors identified at this time. Person Instructed:   [x]   Patient   []   Family   []  Other     Preference for Learning:   [x]   Verbal   []   Written   []  Demonstration     Level of Comprehension & Competence:    [x]  Good                                      [] Fair                                     []  Poor                             []  Needs Reinforcement   [x]  Teachback completed    Education Component:   [x]  Medication management, including how to administer insulin (if appropriate) and potential medication interactions: Patient stated that he is a nursing home resident and on the following diabetes meds for diabetes:  Lantus insulin 8 units daily. Humalog sliding scale insulin. Also noted Tradjenta 5 mg daily. Patient stated that nursing home staff is administering his meds including insulin. [x]  Nutritional management: Patient reported that he is a nursing home resident [facility]) and getting 3 meals daily. He knows about the importance of limiting intake of concentrated sweets. []  Exercise:   [x]  Signs, symptoms, and treatment of hyperglycemia and hypoglycemia   [] Prevention, recognition and treatment of hyperglycemia and hypoglycemia   [x]  Importance of blood glucose monitoring and how to obtain a blood glucose meter: Patient stated that his blood sugar is checked by the nursing home staff because he is on sliding scale insulin before meals (breakfast, lunch, dinner).     []  Instruction on use of the blood glucose meter   []  Discuss the importance of HbA1C monitoring    []  Sick day guidelines   []  Proper use and disposal of lancets, needles, syringes or insulin pens (if appropriate)   []  Potential long-term complications (retinopathy, kidney disease, neuropathy, foot care)   [] Information about whom to contact in case of emergency or for more information    [x]  Goal:  Patient/family will demonstrate understanding of Diabetes Self Management Skills by: 04/09/2018  Plan for post-discharge education or self-management support:    [] Outpatient class schedule provided            [] Patient Declined    [] Scheduled for outpatient classes (date) _______    Patient is a nursing home resident, [facility] nursing home.      Calvin Chew, RN  pgr [phone]

## 2018-04-02 NOTE — CONSULTS
[facility]    Name:Klaudia GUAJARDO  MR#: [id number]  : 1960  ACCOUNT #: [de-identified]   DATE OF SERVICE: 2018    REASON FOR CONSULTATION:  Progressive thrombocytopenia in a patient who was admitted with acute on chronic renal failure, leg edema, and progressive difficulty in ambulating. HISTORY OF PRESENT ILLNESS:  The patient is a 57-year-old -American man who has myriad medical problems including a history of stage III chronic kidney disease, hepatitis C, diabetes mellitus, chronic drug abuse, hypertension, and he has a history of renal carcinoma of the left kidney. In addition, the patient has undergone surgery for cervical disk disease in the past and has undergone cervical fusion. He also has a history of diskitis and osteomyelitis following his surgery from a C3 through C7 surgical procedure for this disease. The patient more recently has been treated for C. difficile and influenza. He came in with complaints of progressive bilateral lower extremity edema, which he stated that it was developing over a 3 day period prior to his admission. The patient also was found to have bradycardia and what appeared to be progressive renal failure on admission. His most recent creatinine was 1.82 today, and on the day of admission it was 1.78. His a.m. BUN was 70. More importantly, the patient has progressive thrombocytopenia. On 2018, his platelet count was 01,522 and this a.m. the platelet count was 80,815. He does have chronic kidney disease and his hemoglobin was 7.1 with hematocrit of 22.1, consistent with anemia associated with chronic kidney disease. I was asked to see the patient primarily because of his slowly progressive thrombocytopenia. On reviewing lab data from 2016, he had a platelet count of 642,861. ALLERGIES:  THE PATIENT IS ALLERGIC TO IODINE.     MEDICATIONS:  At the time of admission, the patient had been taking multiple medications including doxycycline twice daily, linagliptin 5 mg daily, Flomax 0.4 mg daily, Tylenol 500 mg 2 tablets every 4 hours as needed, Dulcolax 5 mg tablets p.r.n., Neurontin 100 mg 3 times daily, Lipitor 40 mg daily, Norvasc 10 mg daily, Motrin 400 mg 3 times daily, Dilaudid 4 mg tablets every four to six hours as needed, albuterol inhaler 2 puffs p.r.n., multivitamin supplements, Zolpidem 10 mg sublingual, he uses Lantus insulin and Humalog insulin as needed per the treatment schedule for diabetes, Zestril one tablet daily, Pravachol 20 mg nightly. PAST MEDICAL HISTORY:  History of hepatitis C, renal cell carcinoma of the left kidney, renal mass, hypertension, diabetes mellitus, chronic drug abuse, stage III chronic kidney disease, and bradycardia on admission. PAST SURGICAL HISTORY:  Partial nephrectomy on the left in 2013, circumcision, and surgical removal of the right foot 2nd metatarsal.    FAMILY HISTORY:  His mother had diabetes, father sickle cell anemia. A sister has diabetes, and another sister has hypertension. SOCIAL HISTORY:  The patient is a . He is a former tobacco user, smoking at least 1/2 pack of cigarettes per day for 30 years. He never used smokeless tobacco.  There is a history of alcohol use and history of prior drug use. REVIEW OF SYSTEMS:  The primary items on the multiorgan system review are outlined in the above history. Otherwise, the patient denied all other complaints on the multiple organ system review. PHYSICAL EXAMINATION:  GENERAL:  The patient is lying quietly in bed and does not appear to be in any acute distress. VITAL SIGNS:  His most recent vital signs showed a blood pressure of 95/60, pulse 45, respiratory rate 16, temperature 94.3 degrees Fahrenheit. SKIN:  Examination of the skin shows no bruise or rash. HEENT:  Head is normocephalic and atraumatic. Conjunctivae and sclerae are clear. Pupils are reactive to light and accommodation.   EOMs are intact. ENT reveals no oral mucosal lesions or ulceration. NECK:  Supple. There is no lymphadenopathy or thyromegaly. LUNGS:  There is symmetric chest wall expansion. The lungs are without wheeze or rhonchi. HEART:  S1 and S2 heart sounds are normal.  There are no murmurs or gallops. ABDOMEN:  Bowel sounds are present and normal.  There is no guarding, tenderness or hepatosplenomegaly. GENITOURINARY:  Deferred. RECTAL:  Deferred. EXTREMITIES:  There is no clubbing or cyanosis. He does have 1+ edema in the lower extremities. NEUROLOGIC:  Cranial nerves are grossly intact. The patient is a functional quadriplegic. LABORATORY DATA:  The CBC from today shows WBC count of 4.6, hemoglobin 7.1, hematocrit 22.1, and the platelet count was 20,129. From 03/31/2018, the WBC was 5.7, hemoglobin 7.7, hematocrit 22.9, and the platelet count was 99,623. Comprehensive metabolic panel from 65/33/3398 showed sodium of 141, potassium 5.2, chloride 114, CO2 20, BUN 70, creatinine 1.82, calcium 8.5, magnesium 2.5, total bilirubin 0.85, total protein 6.8, albumin 3, globulin 3.8, ALT 80, AST 51, alkaline phosphatase 116. A general diagnostic chest x-ray was not done on this admission. He did have a CT scan of the abdomen and pelvis without contrast on 03/31/2018, and this does show moderate to large right and small left pleural effusions. He has a distended bladder with mild wall thickening, possibly due to chronic outlet obstruction, mildly enlarged heterogeneous prostate. There was no hydronephrosis. There is a bilobed right renal cyst and no suspicious renal mass identified on the nonenhanced CT. He did have low attenuation of blood. This is in keeping with his anemia. ASSESSMENT AND PLAN:  The patient is a 64-year-old man who has chronic kidney disease who presented with what he thought was progressive edema in the lower extremities.   He was found to have progressive thrombocytopenia and anemia in the setting of his renal failure. I have explained to the patient that additional tests are now being ordered to help define a possible etiology for his thrombocytopenia. I suspect that it is multifactorial and in part iatrogenic and potentially related to his chronic kidney disease and recent infections. Nonetheless, the peripheral smear did not show any abnormal cell types. There were no schistocytes on the peripheral smear. We will recheck the peripheral smear in the a.m., and we will order an LDH level in addition to the already ordered SPEP and free light chain assays. Additionally, because of his chronic kidney disease and thrombocytopenia we will order an [id number] level, and the LDH level will be ordered as well. Platelets will be reserved for platelets below 99,381. The patient has chronic kidney disease and may benefit from Procrit dosing pending the results of his iron studies, which will be ordered as well, and these will include an iron profile and ferritin level. Thank you, Dr. Kalli Jade, for requesting my assessment in the management of this patient. MD COY Lee / Edel Marte  D: 04/01/2018 22:08     T: 04/02/2018 07:54  JOB #: [id number]

## 2018-04-03 ENCOUNTER — APPOINTMENT (OUTPATIENT)
Dept: GENERAL RADIOLOGY | Age: 58
DRG: 460 | End: 2018-04-03
Attending: FAMILY MEDICINE
Payer: MEDICAID

## 2018-04-03 LAB
ALBUMIN SERPL-MCNC: 3.1 G/DL (ref 3.4–5)
ALBUMIN/GLOB SERPL: 0.7 {RATIO} (ref 0.8–1.7)
ALP SERPL-CCNC: 101 U/L (ref 45–117)
ALT SERPL-CCNC: 60 U/L (ref 16–61)
ANA SER QL: POSITIVE
ANION GAP SERPL CALC-SCNC: 5 MMOL/L (ref 3–18)
AST SERPL-CCNC: 28 U/L (ref 15–37)
BASOPHILS # BLD: 0 K/UL (ref 0–0.1)
BASOPHILS # BLD: 0 K/UL (ref 0–0.1)
BASOPHILS NFR BLD: 0 % (ref 0–2)
BASOPHILS NFR BLD: 0 % (ref 0–2)
BILIRUB SERPL-MCNC: 0.8 MG/DL (ref 0.2–1)
BUN SERPL-MCNC: 55 MG/DL (ref 7–18)
BUN/CREAT SERPL: 37 (ref 12–20)
C3 SERPL-MCNC: 97 MG/DL (ref 82–167)
C4 SERPL-MCNC: 18 MG/DL (ref 14–44)
CA-I SERPL-SCNC: 1.26 MMOL/L (ref 1.12–1.32)
CALCIUM SERPL-MCNC: 8.9 MG/DL (ref 8.5–10.1)
CALCIUM SERPL-MCNC: 9 MG/DL (ref 8.5–10.1)
CHLORIDE SERPL-SCNC: 118 MMOL/L (ref 100–108)
CO2 SERPL-SCNC: 21 MMOL/L (ref 21–32)
CREAT SERPL-MCNC: 1.5 MG/DL (ref 0.6–1.3)
DIFFERENTIAL METHOD BLD: ABNORMAL
DIFFERENTIAL METHOD BLD: ABNORMAL
EOSINOPHIL # BLD: 0.1 K/UL (ref 0–0.4)
EOSINOPHIL # BLD: 0.1 K/UL (ref 0–0.4)
EOSINOPHIL NFR BLD: 2 % (ref 0–5)
EOSINOPHIL NFR BLD: 2 % (ref 0–5)
EPO SERPL-ACNC: 34.2 MIU/ML (ref 2.6–18.5)
ERYTHROCYTE [DISTWIDTH] IN BLOOD BY AUTOMATED COUNT: 16.2 % (ref 11.6–14.5)
ERYTHROCYTE [DISTWIDTH] IN BLOOD BY AUTOMATED COUNT: 16.3 % (ref 11.6–14.5)
EST. AVERAGE GLUCOSE BLD GHB EST-MCNC: 140 MG/DL
GLOBULIN SER CALC-MCNC: 4.3 G/DL (ref 2–4)
GLUCOSE BLD STRIP.AUTO-MCNC: 102 MG/DL (ref 70–110)
GLUCOSE BLD STRIP.AUTO-MCNC: 117 MG/DL (ref 70–110)
GLUCOSE BLD STRIP.AUTO-MCNC: 121 MG/DL (ref 70–110)
GLUCOSE BLD STRIP.AUTO-MCNC: 94 MG/DL (ref 70–110)
GLUCOSE SERPL-MCNC: 110 MG/DL (ref 74–99)
HBA1C MFR BLD: 6.5 % (ref 4.2–5.6)
HCT VFR BLD AUTO: 23.7 % (ref 36–48)
HCT VFR BLD AUTO: 24.3 % (ref 36–48)
HGB BLD-MCNC: 7.6 G/DL (ref 13–16)
HGB BLD-MCNC: 7.9 G/DL (ref 13–16)
LYMPHOCYTES # BLD: 1 K/UL (ref 0.9–3.6)
LYMPHOCYTES # BLD: 1.3 K/UL (ref 0.9–3.6)
LYMPHOCYTES NFR BLD: 18 % (ref 21–52)
LYMPHOCYTES NFR BLD: 19 % (ref 21–52)
MCH RBC QN AUTO: 28.5 PG (ref 24–34)
MCH RBC QN AUTO: 28.8 PG (ref 24–34)
MCHC RBC AUTO-ENTMCNC: 32.1 G/DL (ref 31–37)
MCHC RBC AUTO-ENTMCNC: 32.5 G/DL (ref 31–37)
MCV RBC AUTO: 88.7 FL (ref 74–97)
MCV RBC AUTO: 88.8 FL (ref 74–97)
MONOCYTES # BLD: 0.4 K/UL (ref 0.05–1.2)
MONOCYTES # BLD: 0.6 K/UL (ref 0.05–1.2)
MONOCYTES NFR BLD: 8 % (ref 3–10)
MONOCYTES NFR BLD: 8 % (ref 3–10)
NEUTS SEG # BLD: 3.8 K/UL (ref 1.8–8)
NEUTS SEG # BLD: 4.9 K/UL (ref 1.8–8)
NEUTS SEG NFR BLD: 71 % (ref 40–73)
NEUTS SEG NFR BLD: 72 % (ref 40–73)
PF4 HEPARIN CMPLX AB SER-ACNC: 0.33 OD (ref 0–0.4)
PLATELET # BLD AUTO: 49 K/UL (ref 135–420)
PLATELET # BLD AUTO: 59 K/UL (ref 135–420)
POTASSIUM SERPL-SCNC: 5.1 MMOL/L (ref 3.5–5.5)
PROT SERPL-MCNC: 7.4 G/DL (ref 6.4–8.2)
PTH-INTACT SERPL-MCNC: 94.6 PG/ML (ref 18.4–88)
RBC # BLD AUTO: 2.67 M/UL (ref 4.7–5.5)
RBC # BLD AUTO: 2.74 M/UL (ref 4.7–5.5)
SODIUM SERPL-SCNC: 144 MMOL/L (ref 136–145)
WBC # BLD AUTO: 5.3 K/UL (ref 4.6–13.2)
WBC # BLD AUTO: 6.8 K/UL (ref 4.6–13.2)

## 2018-04-03 PROCEDURE — 74011250637 HC RX REV CODE- 250/637: Performed by: FAMILY MEDICINE

## 2018-04-03 PROCEDURE — 97530 THERAPEUTIC ACTIVITIES: CPT

## 2018-04-03 PROCEDURE — 85025 COMPLETE CBC W/AUTO DIFF WBC: CPT | Performed by: FAMILY MEDICINE

## 2018-04-03 PROCEDURE — 82962 GLUCOSE BLOOD TEST: CPT

## 2018-04-03 PROCEDURE — 82330 ASSAY OF CALCIUM: CPT | Performed by: INTERNAL MEDICINE

## 2018-04-03 PROCEDURE — 83883 ASSAY NEPHELOMETRY NOT SPEC: CPT | Performed by: INTERNAL MEDICINE

## 2018-04-03 PROCEDURE — 92610 EVALUATE SWALLOWING FUNCTION: CPT

## 2018-04-03 PROCEDURE — 80053 COMPREHEN METABOLIC PANEL: CPT | Performed by: FAMILY MEDICINE

## 2018-04-03 PROCEDURE — 97161 PT EVAL LOW COMPLEX 20 MIN: CPT

## 2018-04-03 PROCEDURE — 36415 COLL VENOUS BLD VENIPUNCTURE: CPT | Performed by: FAMILY MEDICINE

## 2018-04-03 PROCEDURE — 71046 X-RAY EXAM CHEST 2 VIEWS: CPT

## 2018-04-03 PROCEDURE — 74011250637 HC RX REV CODE- 250/637: Performed by: INTERNAL MEDICINE

## 2018-04-03 PROCEDURE — 65660000000 HC RM CCU STEPDOWN

## 2018-04-03 PROCEDURE — 83036 HEMOGLOBIN GLYCOSYLATED A1C: CPT | Performed by: FAMILY MEDICINE

## 2018-04-03 PROCEDURE — 74011250636 HC RX REV CODE- 250/636: Performed by: INTERNAL MEDICINE

## 2018-04-03 RX ADMIN — HYDROMORPHONE HYDROCHLORIDE 4 MG: 2 TABLET ORAL at 21:48

## 2018-04-03 RX ADMIN — LACTOBACILLUS TAB 2 TABLET: TAB at 09:17

## 2018-04-03 RX ADMIN — DOXYCYCLINE HYCLATE 100 MG: 100 CAPSULE ORAL at 09:18

## 2018-04-03 RX ADMIN — SODIUM BICARBONATE 650 MG TABLET 650 MG: at 21:48

## 2018-04-03 RX ADMIN — LACTOBACILLUS TAB 2 TABLET: TAB at 17:32

## 2018-04-03 RX ADMIN — LINAGLIPTIN 5 MG: 5 TABLET, FILM COATED ORAL at 09:18

## 2018-04-03 RX ADMIN — FUROSEMIDE 40 MG: 40 TABLET ORAL at 09:18

## 2018-04-03 RX ADMIN — SODIUM BICARBONATE 650 MG TABLET 650 MG: at 09:00

## 2018-04-03 RX ADMIN — HYDROMORPHONE HYDROCHLORIDE 4 MG: 2 TABLET ORAL at 00:50

## 2018-04-03 RX ADMIN — SODIUM BICARBONATE 650 MG TABLET 650 MG: at 17:32

## 2018-04-03 RX ADMIN — HYDROMORPHONE HYDROCHLORIDE 4 MG: 2 TABLET ORAL at 09:32

## 2018-04-03 RX ADMIN — ERYTHROPOIETIN 40000 UNITS: 40000 INJECTION, SOLUTION INTRAVENOUS; SUBCUTANEOUS at 17:37

## 2018-04-03 RX ADMIN — LEVOTHYROXINE SODIUM 50 MCG: 50 TABLET ORAL at 09:18

## 2018-04-03 RX ADMIN — PANTOPRAZOLE SODIUM 40 MG: 40 TABLET, DELAYED RELEASE ORAL at 09:14

## 2018-04-03 NOTE — PROGRESS NOTES
Problem: Dysphagia (Adult)  Goal: *Acute Goals and Plan of Care (Insert Text)  Patient will:  1. Tolerate PO trials with 0 s/s overt distress in 4/5 trials-met    Recommend:   Regular diet with thin liquids  Meds as tolerated   Assist with meals  General safe swallow precautions-Sit upright with HOB >45 degrees during all intake and for at least 30 min after po  Outcome: Resolved/Met Date Met: 04/03/18  Speech LAnguage Pathology bedside swallow evaluation AND DISCHARGE    Patient: Reanna Briggs (34 y.o. male)  Date: 4/3/2018  Primary Diagnosis: Acute renal insufficiency  Bradycardia  Precautions: Aspiration       ASSESSMENT :  Clinical beside swallow eval completed per MD orders. Pt A&Ox4, denying dysphagia. Speech/voice within functional limits. Oral mech examination revealed structures functional for speech and deglutition. Pt observed with thin liquids +/- straw via single sips and successive swallows with timely swallow initiation, adequate laryngeal elevation to palpation and no overt s/sx aspiration. Pt demo positive rotary chew and thorough oral clearance with regular solids with no overt s/sx aspiration. Pt safe for regular diet with thin liquids, meds as tolerated with general safe swallow precautions. Pt educated with regard to s/sx aspiration, aspiration risk, diet recs and role of SLP. Pt able to verbalize understanding. If silent aspiration a concern, please order MBS to rule out. Will sign off. Please re-consult as indicated. D/w RN. PLAN :  Recommendations and Planned Interventions:  No formal ST needs ID'd for dysphagia. Eval only. Discharge Recommendations: Do not anticipate further SLP needs upon discharge      SUBJECTIVE:   Patient stated Teodoro Falcon just have to feed me but I'm fine.     OBJECTIVE:     Past Medical History:   Diagnosis Date    Chronic drug abuse 1974    Diabetes ([facility] [address] 01/1990    Hypertension     Renal cell carcinoma of left kidney ([facility] [address] 12/17/13    Pathological Stage [id number] cc RCC FG3 s/p left open partial nephrectomy in 12/13      Renal mass      Past Surgical History:   Procedure Laterality Date    HX CIRCUMCISION  12/17/13    Dr. Latoya Vincent, [facility]    HX NEPHRECTOMY Left 12/17/13    Open Partial, Dr. Latoya Vincent, [facility]    HX OTHER SURGICAL Right 2/27/2016    removed right ft  2nd meta-tarsal     Prior Level of Function/Home Situation:  Home Situation  Home Environment: [address] [place] [address] Road Name: billy [facility]  # Steps to Enter: 3  One/Two Story Residence: One story  Living Alone: No  Support Systems: Assisted living  Patient Expects to be Discharged to[de-identified] Assisted living  Current DME Used/Available at Home: Adaptive bathing aides  Diet prior to admission: Regular, thin liquids   Current Diet:  Regular, thin liquids    Cognitive and Communication Status:  Neurologic State: Alert  Orientation Level: Oriented X4  Cognition: Follows commands  Oral Assessment:  Oral Assessment  Labial: No impairment  Dentition: Natural  Oral Hygiene: Good  Lingual: No impairment  Velum: No impairment  Mandible: No impairment  P.O. Trials:  Patient Position: [facility] at [facility]  Vocal quality prior to P.O.: No impairment  Consistency Presented: Thin liquid; Solid  How Presented: SLP-fed/presented;Straw;Successive swallows  Bolus Acceptance: No impairment  Bolus Formation/Control: No impairment  Propulsion: No impairment  Oral Residue: None  Initiation of Swallow: No impairment  Laryngeal Elevation: Functional  Aspiration Signs/Symptoms: None  Pharyngeal Phase Characteristics: No impairment, issues, or problems   Oral Phase Severity: No impairment  Pharyngeal Phase Severity : No impairment    GCODESwallowing:  Swallow Current Status CH= 0%   Swallow Goal Status CH= 0%   Swallow D/C Status CH= 0%    The severity rating is based on the following outcomes:    Clinical judgment    Pain:  Pt reports 0/10 pain or discomfort prior to eval.   Pt reports 0/10 pain or discomfort post eval. After treatment:   []            Patient left in no apparent distress sitting up in chair  [x]            Patient left in no apparent distress in bed  [x]            Call bell left within reach  [x]            Nursing notified  []            Caregiver present  []            Bed alarm activated    COMMUNICATION/EDUCATION:   [x]            SLP educated pt with regard to diet recs, safe swallow precautions, role of SLP, s/sx aspiration and to alert MD/RN if symptoms arise. Pt able to verbalize understanding.       Thank you for this referral.  Erin Rubalcava M.S., [address] [facility]  Speech-Language Pathologist

## 2018-04-03 NOTE — PROGRESS NOTES
Hematology/Medical Oncology Progress Note      NAME: Dajuan Jefferson   :  1960  MRM:  [id number]    Date/Time: 4/3/2018  8:49 AM         Subjective:     Mr Eder Laek is a 59 y.o. Man with renal failure and progressive thrombocytopenia. Currently he reports that he is feeling better. There are no new complaints. Past Medical History reviewed and unchanged from Admission History and Physical    Review of Systems   Constitutional: Negative for chills, fatigue, fever and unexpected weight change. HENT: Negative for ear discharge, ear pain, facial swelling, mouth sores, nosebleeds, sneezing, sore throat and tinnitus. Eyes: Negative for photophobia, pain, discharge, redness, itching and visual disturbance. Respiratory: Negative for apnea, cough, choking, chest tightness, shortness of breath, wheezing and stridor. Cardiovascular: Negative for chest pain, palpitations and leg swelling. Gastrointestinal: Negative for abdominal distention, abdominal pain, anal bleeding, blood in stool, constipation, diarrhea, nausea, rectal pain and vomiting. Genitourinary: Negative for decreased urine volume, difficulty urinating, discharge, dysuria, enuresis, flank pain, frequency, genital sores, hematuria, penile pain, penile swelling, scrotal swelling, testicular pain and urgency. Musculoskeletal: Negative for arthralgias, back pain, gait problem, joint swelling, myalgias, neck pain and neck stiffness. Skin: Negative for color change, pallor and rash. Neurological: Negative for dizziness, tremors, seizures, syncope, facial asymmetry, speech difficulty, weakness, light-headedness, numbness and headaches. Hematological: Negative for adenopathy. Does not bruise/bleed easily. Psychiatric/Behavioral: Negative for agitation, behavioral problems, confusion, decreased concentration, dysphoric mood, hallucinations, self-injury, sleep disturbance and suicidal ideas.  The patient is not nervous/anxious and is not hyperactive. Objective:       Vitals:      Last 24hrs VS reviewed since prior progress note. Most recent are:    Visit Vitals    /77 (BP 1 Location: Left arm, BP Patient Position: At rest)    Pulse 61    Temp 97.7 °F (36.5 °C)    Resp 18    Ht 6' (1.829 m)    Wt 82.3 kg (181 lb 7 oz)    SpO2 90%    BMI 24.61 kg/m2     SpO2 Readings from Last 6 Encounters:   04/03/18 90%   03/20/18 99%   12/03/17 100%   03/02/16 99%   02/29/16 95%   02/23/16 96%            Intake/Output Summary (Last 24 hours) at 04/03/18 1317  Last data filed at 04/03/18 1252   Gross per 24 hour   Intake              860 ml   Output             1925 ml   Net            -1065 ml          Exam:      Physical Exam   Nursing note and vitals reviewed. Constitutional: He is oriented to person, place, and time. He appears well-developed and well-nourished. No distress. HENT:   Head: Normocephalic and atraumatic. Mouth/Throat: No oropharyngeal exudate. Eyes: Conjunctivae and EOM are normal. Pupils are equal, round, and reactive to light. Right eye exhibits no discharge. Left eye exhibits no discharge. No scleral icterus. Neck: Normal range of motion. Neck supple. No tracheal deviation present. No thyromegaly present. Cardiovascular: Normal rate and regular rhythm. Exam reveals no gallop and no friction rub. No murmur heard. Pulmonary/Chest: Effort normal and breath sounds normal. No apnea. No respiratory distress. He has no wheezes. He has no rales. Chest wall is not dull to percussion. He exhibits no mass, no tenderness, no bony tenderness, no laceration, no crepitus, no edema, no deformity, no swelling and no retraction. Right breast exhibits no inverted nipple, no mass, no nipple discharge, no skin change and no tenderness. Left breast exhibits no inverted nipple, no mass, no nipple discharge, no skin change and no tenderness. Breasts are symmetrical.   Abdominal: Soft.  Bowel sounds are normal. He exhibits no distension. There is no tenderness. There is no rebound and no guarding. Musculoskeletal: Normal range of motion. He exhibits no edema or tenderness. Lymphadenopathy:     He has no cervical adenopathy. Neurological: He is alert and oriented to person, place, and time. Coordination normal.   Skin: Skin is warm and dry. No rash noted. He is not diaphoretic. No erythema. No pallor. Psychiatric: He has a normal mood and affect.  His behavior is normal. Thought content normal.       Telemetry reviewed:   normal sinus rhythm    Lab Data Reviewed: (see below)      Medications:  Current Facility-Administered Medications   Medication Dose Route Frequency    epoetin cristy (EPOGEN;PROCRIT) injection 40,000 Units  40,000 Units SubCUTAneous ONCE    HYDROmorphone (DILAUDID) tablet 4 mg  4 mg Oral Q6H PRN    doxycycline (VIBRAMYCIN) capsule 100 mg  100 mg Oral DAILY    furosemide (LASIX) tablet 40 mg  40 mg Oral DAILY    sodium bicarbonate tablet 650 mg  650 mg Oral TID    levothyroxine (SYNTHROID) tablet 50 mcg  50 mcg Oral ACB    glucose chewable tablet 16 g  4 Tab Oral PRN    glucagon (GLUCAGEN) injection 1 mg  1 mg IntraMUSCular PRN    dextrose (D50W) injection syrg 12.5-25 g  25-50 mL IntraVENous PRN    insulin lispro (HUMALOG) injection   SubCUTAneous AC&HS    hydrALAZINE (APRESOLINE) tablet 25 mg  25 mg Oral TID PRN    Lactobacillus Acidoph & Bulgar (FLORANEX) tablet 2 Tab  2 Tab Oral BID    linagliptin (TRADJENTA) tablet 5 mg  5 mg Oral ACB    pantoprazole (PROTONIX) tablet 40 mg  40 mg Oral ACB       ______________________________________________________________________      Lab Review:     Recent Labs      04/03/18 0333 04/02/18 0525  04/01/18   0055   WBC  5.3  6.5  4.6   HGB  7.6*  7.8*  7.1*   HCT  23.7*  23.7*  22.1*   PLT  49*  47*  40*     Recent Labs      04/03/18 0333 04/02/18   0525  04/01/18   0055   NA  144  141  141   K  5.1  5.6*  5.2   CL  118*  114*  114*   CO2  21  20*  20* GLU  110*  58*  166*   BUN  55*  66*  70*   CREA  1.50*  1.56*  1.82*   CA  8.9  9.0  8.8  8.5   MG   --   2.5  2.5   PHOS   --   4.4   --    ALB  3.1*  3.2*  3.0*   SGOT  28  36  51*   ALT  60  73*  80*   INR   --   1.1   --      No components found for: GLPOC  No results for input(s): PH, PCO2, PO2, HCO3, FIO2 in the last 72 hours. 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 Thrombocytopenia: I have explained to the patient that his platelets are now increasing. The a.m. CBC shows that his platelet count is now 49,000 with a hemoglobin of 7.6 g/dL hematocrit of 23.7%. No therapeutic intervention is necessary at this point. The likely causes of his thrombocytopenia are iatrogenic or infection related. The peripheral reveals no schistocytes to suggest TTP/HUS. The LDH level is normal at 189 U/L and the SPEP is still pending. 2. Anemia in CKD: pending results or iron studies the patient may benefit from procrit.          Total time spent with patient:25 minutes                  Care Plan discussed with: Patient, Nursing Staff and Consultant/Specialist    Discussed:  Care Plan    Prophylaxis:  SCD's    Disposition:  Home w/Family           ___________________________________________________    Attending Physician: Renee Villeda MD

## 2018-04-03 NOTE — PROGRESS NOTES
Problem: Mobility Impaired (Adult and Pediatric)  Goal: *Acute Goals and Plan of Care (Insert Text)  Physical Therapy Goals  Initiated 4/3/2018 and to be accomplished within 7 day(s)  1. Patient will move from supine to sit and sit to supine, scoot up and down and roll side to side in bed with minimal assistance/contact guard assist.     2.  Patient will transfer from bed to chair and chair to bed with minimal assistance/contact guard assist using the least restrictive device. 3.  Patient will perform sit to stand with supervision/set-up. 4.  Patient will ambulate with minimal assistance/contact guard assist for 25 feet with the bilateral platform walker. 5.  Patient will negotiate 150ft in wheelchair using BLE's to propel with supervision assist.  physical Therapy EVALUATION    Patient: Jeremy Mcarthur (79 y.o. male)  Date: 4/3/2018  Primary Diagnosis: Acute renal insufficiency  Bradycardia        Precautions: Fall     ASSESSMENT :  Patient is 61yo M admitted to hospital for ARF and presents today alert and agreeable to therapy. PMH includes C3-C7 fusion with \"functional quadriplegia\"; patient reports he regained enough LE strength to ambulate though continues to demonstrate BUE weakness. Patient uses B platform walker for short distance ambulation and WC for longer distance mobility with BLE propulsion. Patient transferred to EOB with ModA and scooted with Paula to EOB where he performed objective assessment. Patient then transferred to standing for 30sec with deep breathing training. Patient then took several side steps towards [facility] and endorsed being able to continue standing. Patient then ambulated several steps forward and back to bed. Patient performed deep breathing training as patient minimally SOB with mobility. Patient transferred back to sitting and performed TE (see below) with rest breaks for SOB. Patient then transferred to standing to ambulate additional 5ft to stretcher.  Patient then transferred back to supine with ModA and was left with transport on stretcher for CXR; handoff performed at this time. Patient demonstrates decreased strength, mobility, and endurance and will benefit from skilled intervention to address the above impairments. Patients rehabilitation potential is considered to be Good  Factors which may influence rehabilitation potential include:   []         None noted  []         Mental ability/status  [x]         Medical condition  [x]         Home/family situation and support systems  [x]         Safety awareness  [x]         Pain tolerance/management  []         Other:      PLAN :  Recommendations and Planned Interventions:  [x]           Bed Mobility Training             [x]    Neuromuscular Re-Education  [x]           Transfer Training                   []    Orthotic/Prosthetic Training  [x]           Gait Training                          []    Modalities  [x]           Therapeutic Exercises          []    Edema Management/Control  [x]           Therapeutic Activities            [x]    Patient and Family Training/Education  []           Other (comment):    Frequency/Duration: Patient will be followed by physical therapy 1-2 times per day/4-7 days per week to address goals. Discharge Recommendations: Fabián Hart  Further Equipment Recommendations for Discharge: N/A     G-CODES     Mobility  Current  CK= 40-59%   Goal  CI= 1-19%.   The severity rating is based on the Level of Assistance required for Functional Mobility and ADLs.        G-CODES     Eval Complexity: History: MEDIUM  Complexity : 1-2 comorbidities / personal factors will impact the outcome/ POC Exam:LOW Complexity : 1-2 Standardized tests and measures addressing body structure, function, activity limitation and / or participation in recreation  Presentation: LOW Complexity : Stable, uncomplicated  Clinical Decision Making:Low Complexity   Overall Complexity:LOW     SUBJECTIVE:   Patient stated I normally move all around during the day.     OBJECTIVE DATA SUMMARY:     Past Medical History:   Diagnosis Date    Chronic drug abuse 1974    Diabetes ([facility] 01/1990    Hypertension     Renal cell carcinoma of left kidney ([facility] 12/17/13    Pathological Stage [id number] cc RCC FG3 s/p left open partial nephrectomy in 12/13      Renal mass      Past Surgical History:   Procedure Laterality Date    HX CIRCUMCISION  12/17/13    Juliana Jean [address]    HX NEPHRECTOMY Left 12/17/13    Open Partial, Juliana Jean 92    HX OTHER SURGICAL Right 2/27/2016    removed right ft  2nd meta-tarsal     Barriers to Learning/Limitations: None  Compensate with: N/A  Prior Level of Function/Home Situation: Patient has been at [facility] for past year and reports he was in process of looking for a place to live. Patient reports that he was ambulating with bilateral platform walker for short distances with assist and was able to propel himself in [address] with BLE's for longer distances. Patient required assist to get up to EOB and reports that he was able to stand without assist.   Home Situation  [facility] [address] [facility]  # Steps to Enter: 3  One/Two Story Residence: One story  Living Alone: No  Support Systems: Assisted living  Patient Expects to be Discharged to[de-identified] Assisted living  Current DME Used/Available at Home: Adaptive bathing aides  Critical Behavior:  Neurologic State: Alert  Orientation Level: Oriented X4  Cognition: Follows commands   Strength:    Strength: Generally decreased, functional (BLE 4/5)   Tone & Sensation:   Tone: Normal (BLE)   Sensation: Intact (BLE)   Range Of Motion:  AROM: Within functional limits (BLE)   Functional Mobility:  Bed Mobility:   Supine to Sit: Moderate assistance  Sit to Supine:  Moderate assistance  Scooting: Minimum assistance  Transfers:  Sit to Stand: Contact guard assistance;Minimum assistance (x2 transfers)  Stand to Sit: Contact guard assistance Balance:   Sitting: Impaired; With support  Sitting - Static: Good (unsupported)  Sitting - Dynamic: Fair (occasional)  Standing: Impaired; With support  Standing - Static: Fair  Standing - Dynamic : Fair  Ambulation/Gait Training:  Distance (ft): 10 Feet (ft)  Assistive Device:  (HHA on Left)  Ambulation - Level of Assistance: Contact guard assistance   Base of Support: Narrowed  Speed/Stacia: Slow  Therapeutic Exercises:   Seated:  AP's x20, LAQ 2x10, glute sets x5 (with 3 sec hold)  Pain:  Pt reports 0/10 pain or discomfort prior to treatment.    Pt reports 0/10 pain or discomfort post treatment. Activity Tolerance:   Patient tolerated activity well and was motivated to participate in therapy. Please refer to the flowsheet for vital signs taken during this treatment. After treatment:   []         Patient left in no apparent distress sitting up in chair  []         Patient left in no apparent distress in bed  [x]         Call bell left within reach  []         Nursing notified  []         Caregiver present  []         Bed alarm activated  []         SCDs in place to B LE     COMMUNICATION/EDUCATION:   [x]         Fall prevention education was provided and the patient/caregiver indicated understanding. [x]         Patient/family have participated as able in goal setting and plan of care. [x]         Patient/family agree to work toward stated goals and plan of care. []         Patient understands intent and goals of therapy, but is neutral about his/her participation. []         Patient is unable to participate in goal setting and plan of care.     Thank you for this referral.  Teena Madsen, PT   Time Calculation: 25 mins

## 2018-04-03 NOTE — PROGRESS NOTES
Progress Note    Patient: Jerman Mccray MRN: [id number]  CSN: [id number]    YOB: 1960  Age: 59 y.o. Sex: male    DOA: 3/31/2018 LOS:  LOS: 3 days                    Subjective:   Patient feels well this am, denies cough, sob, congestion. No diarrhea. Was placed on 1L NC O2 last night while asleep. Pt still has swelling lower extremities and Lt hand    Venous Duplex US 4/2/18:  INTERPRETATION/FINDINGS  Duplex images were obtained using 2-D gray scale, color flow, and  spectral Doppler analysis. Right leg :  1. Deep veins visualized include the common femoral, femoral,  popliteal, posterior tibial and peroneal veins. 2. No evidence of deep venous thrombosis detected in the veins  visualized. 3. Superficial veins visualized include the great saphenous vein. 4. No evidence of superficial thrombosis detected. 5. Normal multiphasic flow in the posterior tibial artery. Left leg :  1. Deep veins visualized include the common femoral, femoral,  popliteal, posterior tibial and peroneal veins. 2. No evidence of deep venous thrombosis detected in the veins  visualized. 3. Superficial veins visualized include the great saphenous vein. 4. No evidence of superficial thrombosis detected. 5. Normal multiphasic flow in the posterior tibial artery. Renal US 4/2/18  IMPRESSION:  1. Normal-sized kidneys without hydronephrosis. 2. Right renal cyst present since 2013 and appear relatively unchanged. 3. Bladder is empty with Willis catheter. Transthoracic Echo 4/2/18:  SUMMARY:  Left ventricle: Systolic function was normal by EF (biplane method of disks).  Ejection fraction was estimated to be 60 %. No obvious wall motion abnormalities identified in the views obtained. Wall thickness was at the upper limits of normal.    Right ventricle: The size was at the upper limits of normal. Systolic pressure was mildly increased. Estimated peak pressure was at least 42 mmHg.     Left atrium: The atrium was severely dilated. Mitral valve: There was mild regurgitation. Tricuspid valve: There was mild regurgitation. ct scan abdomen and pelvis no contrast 3/31/18  1. Moderate to large right and small left pleural effusions.     2. Distended bladder with mild wall thickening, possibly due to chronic outlet  obstruction. Mildly enlarged heterogeneous prostate. Recommend correlation with urinalysis. -No hydronephrosis. -Bilobed right renal cyst similar to prior.  -No suspicious renal mass identified on this nonenhanced CT.     3. Low-attenuation of blood in keeping with anemia.     -Atherosclerosis. Osseous degenerative changes as above. Small fat-containing  inguinal hernias. Left gluteus minimus heterotopic ossification. Slightly prominent left periaortic nodes, none clearly abnormal by size criteria.     -Evaluation limited as above.          LUIS ENRIQUE Mccray is a 59 y.o.  male who   Has  history of renal mass, renal cell carcinoma of left kidney, DM, chronic drug abuse, and HTN who presented with  c/o bilateral lower extremity swelling onset 3 days ago. He states that he is from a [address] [place] [address] and has been in the nursing home for the past 1 year. Artist Izabela He noted that his ankles first started sweling first. Klaudia Gallardo states that he had an ultrasound of his leg yesterday. He notes that he usually is able to ambulate without a walker but with increase swelling can not ambulate like he used to  . Patient denies history of CHF, chest pain, abdominal pain, and any othe rassociated symptoms or complaints.      Pt has H/o hepatitis c and seen by GI as out patinet  and  Pt also s/p partial nephrectomy Lt kidney due to renal cell carcinoma by dr Mala Singh urology     Pt has h/o cervical disc disease s/p surgery and fusion .  Pt had  H/o diskitis and osteomyelitis c 3-7 and quadriplegia after surgery      Pt was treated last week for c- diff and and flu last week has no upper respiratory sx or diarrhea at this time     Chief Complaint:   Chief Complaint   Patient presents with    Swelling    Slow Heart Rate       Review of systems    GENERAL: Patient alert, awake and oriented times 3, able to communicate full sentences and not in distress. HEENT: No change in vision, no earache, tinnitus, sore throat or sinus congestion. NECK: No pain or stiffness. CARDIOVASCULAR: No chest pain or pressure. No palpitations. PULMONARY: No shortness of breath, cough or wheeze. GASTROINTESTINAL: No abdominal pain, nausea, vomiting or diarrhea, melena or       bright red blood per rectum. Positive constipation h/o c-diff was treated  GENITOURINARY: No urinary frequency, urgency, hesitancy or dysuria. MUSCULOSKELETAL:  weakness upper extremities more than lower extremities   DERMATOLOGIC: No rash, no itching, no lesions. ENDOCRINE: No polyuria, polydipsia, no heat or cold intolerance. No recent change in    weight. HEMATOLOGICAL: H/o anemia or easy bruising or bleeding. NEUROLOGIC: No headache, seizures, numbness, tingling . Positive weakness upper extremities more than lower extremities  PSYCHIATRIC: No depression, anxiety, mood disorder, no loss of interest in normal       activity or change in sleep pattern.         Objective:     Physical Exam:  Visit Vitals    /75 (BP 1 Location: Left arm, BP Patient Position: At rest)    Pulse 61    Temp 97.5 °F (36.4 °C)    Resp 18    Ht 6' (1.829 m)    Wt 82.3 kg (181 lb 7 oz)    SpO2 93%    BMI 24.61 kg/m2      General:  Alert, cooperative, no distress, appears stated age. Head:  Normocephalic, without obvious abnormality, atraumatic. Eyes:  Conjunctivae/corneas clear. PERRL, EOMs intact. Nose: Nares normal. No drainage or sinus tenderness. Throat: Lips, mucosa, and tongue normal.poor dentition    Neck: Supple, symmetrical, trachea midline, no adenopathy, thyroid: no enlargement/tenderness/nodules, no carotid bruit and no JVD.    Back:   ROM normal. No CVA tenderness. Lungs:   Clear to auscultation bilaterally. Poor inspiratory effort. Chest wall:  No tenderness or deformity. Heart:  Regular rate and rhythm, S1, S2 normal, systolic murmer murmur, click, rub or gallop. Abdomen: Soft, non-tender. Bowel sounds normal. No masses,  No organomegaly. Extremities: Extremities normal, atraumatic, positive 1+ bilateral lower limb edema to mid calf slightly improved today, no calf tenderness. Pulses: 2+ and symmetric lower extremities    Skin: Skin color, texture, turgor normal. No rashes or lesions   Neurologic: CNII-XII intact. No  New focal motor or sensory deficit. - patient functional quadriplegia, baseline able to ambulate, unable ot move arms aside from some movement in hands.   Left 3-5th fingers flexion contracture          Intake and Output:  Current Shift:     Last three shifts:  04/01 1901 - 04/03 0700  In: 600 [P.O.:600]  Out: 2525 [Urine:2525]    Labs: Results:       Chemistry Recent Labs      04/03/18 0333 04/02/18   0525  04/01/18   0055   GLU  110*  58*  166*   NA  144  141  141   K  5.1  5.6*  5.2   CL  118*  114*  114*   CO2  21  20*  20*   BUN  55*  66*  70*   CREA  1.50*  1.56*  1.82*   CA  8.9  9.0  8.8  8.5   AGAP  5  7  7   BUCR  37*  42*  38*   AP  101  102  116   TP  7.4  7.1  6.8   ALB  3.1*  3.2*  3.0*   GLOB  4.3*  3.9  3.8   AGRAT  0.7*  0.8  0.8      CBC w/Diff Recent Labs      04/03/18   0333  04/02/18   0525  04/01/18   0055   WBC  5.3  6.5  4.6   RBC  2.67*  2.68*  2.48*   HGB  7.6*  7.8*  7.1*   HCT  23.7*  23.7*  22.1*   PLT  49*  47*  40*   GRANS  72  76*  57   LYMPH  18*  17*  33   EOS  2  1  2      Cardiac Enzymes Recent Labs      04/01/18   0948  04/01/18   0055   CPK  93  97   CKND1  5.9*  5.9*      Coagulation Recent Labs      04/02/18   0525   PTP  13.9   INR  1.1   APTT  41.2*       Lipid Panel Lab Results   Component Value Date/Time    Cholesterol, total 68 04/01/2018 12:55 AM    HDL Cholesterol 46 04/01/2018 12:55 AM    LDL, calculated 12.2 04/01/2018 12:55 AM    VLDL, calculated 9.8 04/01/2018 12:55 AM    Triglyceride 49 04/01/2018 12:55 AM    CHOL/HDL Ratio 1.5 04/01/2018 12:55 AM      BNP No results for input(s): BNPP in the last 72 hours.    Liver Enzymes Recent Labs      04/03/18   0333   TP  7.4   ALB  3.1*   AP  101   SGOT  28      Thyroid Studies Lab Results   Component Value Date/Time    TSH 4.02 (H) 04/02/2018 05:25 AM          Procedures/imaging: see electronic medical records for all procedures/Xrays and details which were not copied into this note but were reviewed prior to creation of Plan    Medications:   Current Facility-Administered Medications   Medication Dose Route Frequency    HYDROmorphone (DILAUDID) tablet 4 mg  4 mg Oral Q6H PRN    doxycycline (VIBRAMYCIN) capsule 100 mg  100 mg Oral DAILY    furosemide (LASIX) tablet 40 mg  40 mg Oral DAILY    sodium bicarbonate tablet 650 mg  650 mg Oral TID    levothyroxine (SYNTHROID) tablet 50 mcg  50 mcg Oral ACB    glucose chewable tablet 16 g  4 Tab Oral PRN    glucagon (GLUCAGEN) injection 1 mg  1 mg IntraMUSCular PRN    dextrose (D50W) injection syrg 12.5-25 g  25-50 mL IntraVENous PRN    insulin lispro (HUMALOG) injection   SubCUTAneous AC&HS    hydrALAZINE (APRESOLINE) tablet 25 mg  25 mg Oral TID PRN    Lactobacillus Acidoph & Bulgar (FLORANEX) tablet 2 Tab  2 Tab Oral BID    linagliptin (TRADJENTA) tablet 5 mg  5 mg Oral ACB    pantoprazole (PROTONIX) tablet 40 mg  40 mg Oral ACB       Assessment/Plan     Active Problems:    [facility] (11/5/2014)      Type II diabetes mellitus, uncontrolled ([facility] (12/16/2015)      Urinary retention (10/30/2017)      Cancer of left kidney ([facility] (12/23/2013)      Quadriparesis ([facility] (5/31/2017)      Bradycardia (3/31/2018)      Acute renal insufficiency (3/31/2018)      H/O Clostridium difficile infection (3/31/2018)      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)      Metabolic acidosis (4/2/2018)      Hyperkalemia (4/2/2018)      Hypercalcemia (4/2/2018)    Acute Renal failure/ H/O Lt renal cell carcinoma S/p Partial Lt nephrectomy/ now with swelling lower extremities   Pt received IV fluid in the ER  Will get venous duplex ultrasound B/L leg - pending  Ct scan abdomen and pelvis no contrast  showed  Distended bladder possible neck obstruction pt now s/p varner's catheter   Kristyn hydration cr improving  Urine micro albumin  Nephrology consult, Dr Kal Quiñonez following  Hyperkalemia resolved, monitor      Urinary retention requiring varner catheter  patient strongly desires to discontinue varner catheter, had been voiding independently post catheter removal until recent illness  will consult urology. Dilated cardiomyopathy with preserved EF 60%; Bradycardia  Cardiac enzymes minimally elevated  Follow up cardiology recs. Hold lisnopril hold atenolol, blood pressure has been appropriate range  Hydralazine prn SBp above 160    Hypothyroid  continue synthroid 50 mcg daily  TSH mildly elevated, normal free t4, recheck TFTs in 4 weeks  Monitor bradycardia    Diabetes Mellitus   Check HG A1c  4/1/18 lipids: TC 68 HDL 46 LDL 12.2 TG 49  Sliding scale coverage, tradjenta 5mg daily; poc glucose has been at goal continue to monitor     H/O c-diff / h/o flu/history of PNA and UTI  Treated with flagyl at nursing home  Finished course of Tamiflu  Finished levaquin at nursing home for PNA (3/13/-3/19) and UTI, Urine culture sensitive  CXR yesterday with multifocal infiltrate vers combination of pleural effusion and atelectasis and CT abd/pelvis showed pleural effusions. No cough, sob, fever, or elevated WBC count. Patient s/p lasix yesterday with >1200ml net negative. Will recheck CXR.   ST consult to evaluate swallowing/aspiration risk.       Thrombocytopenia  PLt is up slightly this am  Follow up hematology consult Dr Markell Norman     H/o hepatitis c  Check viral load and genotype  Pt has been f/u GI and had ultrasound liver recently waiting for treatment  Monitor liver function stable       History of C3-7 discitis with osteomyelitis, L4-5 phlegmon 4/2017 s/p C3-7 laminectomy with posteriorlateral fusion and hardware on 4/29/17 for rapid onset quadriplegia from discitis/osteomyelitis, hsitory of MRSA baceremia and abscess cultures from spine.     Followed as outpatient by Dr. Shalom Urena infectious disease, has recommended continue doxycycline 100mg daily for MRSA history/prophylaxis      DVT/GI Prophylaxis: SCD's and H2B/PPI       Momo MD Adriel  4/3/2018 0916 AM

## 2018-04-03 NOTE — PROGRESS NOTES
JERMAINE NOTE: SW met with the pt to confirm information. Pt reported being in a \"nursing home\" for almost a year, following his surgery the end of April 2017. Pt has been at [facility] for the last 4 months and intends to return. SW will provide updates to the facility. Pt's sister, Mila Zamora is his emergency contact [phone]. Based on the pt's level of ability/mobility at discharge may provide discharge transportation. Care Management Interventions  PCP Verified by CM: Yes Juliana Caputo MD provider at the facility)  Palliative Care Criteria Met (RRAT>21 & CHF Dx)?: No  Mode of Transport at Discharge:  (TBD)  Transition of Care Consult (CM Consult): SNF  Partner SNF: No  Reason Why Partner SNF Not Chosen: Positive previous encounter (Pt is already a pt at [facility])  Physical Therapy Consult: Yes  Occupational Therapy Consult: Yes  Speech Therapy Consult: Yes  Current Support Network: Nursing Facility  Confirm Follow Up Transport:  (TBD based on pt's level of function/strength)  Discharge Location  Discharge Placement: Skilled nursing facility    SW will monitor and assist with d/c planning.     BLANE Michel LS  [phone] (pager)

## 2018-04-03 NOTE — PROGRESS NOTES
[facility]  -  Progress Note      Patient: Jeremy Mcarthur MRN: [id number]  SSN: [id number]    YOB: 1960  Age: 59 y.o. Sex: male      Admit Date: 3/31/2018    Hospital Problem List:     Hospital Problems  Date Reviewed: 3/31/2018          Codes Class Noted POA    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  ICD-9-CM: 583.9  4/2/2018 Clinically Undetermined        Metabolic acidosis G-39-GQ: E87.2  ICD-9-CM: 276.2  4/2/2018 Unknown        Hyperkalemia ICD-10-CM: E87.5  ICD-9-CM: 276.7  4/2/2018 Unknown        Hypercalcemia ICD-10-CM: E83.52  ICD-9-CM: 275.42  4/2/2018 Unknown        Bradycardia ICD-10-CM: R00.1  ICD-9-CM: 427.89  3/31/2018 Yes        Acute renal insufficiency ICD-10-CM: N28.9  ICD-9-CM: 593.9  3/31/2018 Yes        H/O Clostridium difficile infection ICD-10-CM: Z86.19  ICD-9-CM: V12.09  3/31/2018 Yes        Urinary retention ICD-10-CM: R33.9  ICD-9-CM: 788.20  10/30/2017 Yes        Quadriparesis ([facility] ICD-10-CM: G82.50  ICD-9-CM: 344.00  5/31/2017 Yes        Type II diabetes mellitus, uncontrolled ([facility] ICD-10-CM: E11.65  ICD-9-CM: 250.02  12/16/2015 Yes        Renal cell cancer ([facility] ICD-10-CM: C64.9  ICD-9-CM: 189.0  11/5/2014 Yes        Cancer of left kidney ([facility] ICD-10-CM: C64.2  ICD-9-CM: 189.0  12/23/2013 Yes            > Lower extremity edema -new since treatment for c. Diff   > Marked sinus bradycardia on Tenormin      Assessment & Plan:     -HR stable in 40-50bpm range with no significant pauses. Off BB. Would not resume. -BP stable off lisinopril. Renal function slowly improving. Subjective:     No complaints.      Objective:      Patient Vitals for the past 8 hrs:   Temp Pulse Resp BP SpO2   04/03/18 0742 97.5 °F (36.4 °C) 61 18 134/75 93 %   04/03/18 0414 97.4 °F (36.3 °C) (!) 59 19 123/71 94 %         Patient Vitals for the past 96 hrs:   Weight   04/02/18 0627 82.3 kg (181 lb 7 oz)   03/31/18 2051 81 kg (178 lb 9.2 oz)   03/31/18 2049 80.1 kg (176 lb 9.4 oz)   03/31/18 1420 75.8 kg (167 lb)         Intake/Output Summary (Last 24 hours) at 04/03/18 1143  Last data filed at 04/03/18 0640   Gross per 24 hour   Intake              360 ml   Output             1550 ml   Net            -1190 ml       Physical Exam:  General:  Awake, alert, oriented x 3  Neck:  Supple, no jvd  Lungs:  Clear to auscultation bilaterally   Heart:  Reg rate and rhythm  Abdomen:  Soft and without tenderness  Extremities: no LE edema, atraumatic    Data Review:     Labs: Results:       Chemistry Recent Labs      04/03/18   0333  04/02/18   0525  04/01/18   0055   GLU  110*  58*  166*   NA  144  141  141   K  5.1  5.6*  5.2   CL  118*  114*  114*   CO2  21  20*  20*   BUN  55*  66*  70*   CREA  1.50*  1.56*  1.82*   CA  8.9  9.0  8.8  8.5   MG   --   2.5  2.5   PHOS   --   4.4   --    AGAP  5  7  7   BUCR  37*  42*  38*   AP  101  102  116   TP  7.4  7.1  6.8   ALB  3.1*  3.2*  3.0*   GLOB  4.3*  3.9  3.8   AGRAT  0.7*  0.8  0.8      CBC w/Diff Recent Labs      04/03/18 0333 04/02/18   0525  04/01/18   0055   WBC  5.3  6.5  4.6   RBC  2.67*  2.68*  2.48*   HGB  7.6*  7.8*  7.1*   HCT  23.7*  23.7*  22.1*   PLT  49*  47*  40*   GRANS  72  76*  57   LYMPH  18*  17*  33   EOS  2  1  2      Cardiac Enzymes No results found for: CPK, CK, CKMMB, CKMB, RCK3, CKMBT, CKNDX, CKND1, LINDA, TROPT, TROIQ, MIGUEL, TROPT, TNIPOC, BNP, BNPP   Coagulation Recent Labs      04/02/18   0525   PTP  13.9   INR  1.1   APTT  41.2*       Lipid Panel Lab Results   Component Value Date/Time    Cholesterol, total 68 04/01/2018 12:55 AM    HDL Cholesterol 46 04/01/2018 12:55 AM    LDL, calculated 12.2 04/01/2018 12:55 AM    VLDL, calculated 9.8 04/01/2018 12:55 AM    Triglyceride 49 04/01/2018 12:55 AM    CHOL/HDL Ratio 1.5 04/01/2018 12:55 AM      BNP No results found for: BNP, BNPP, XBNPT   Liver Enzymes Recent Labs      04/03/18   0333   TP  7.4   ALB  3.1*   AP  101   SGOT  28      Digoxin    Thyroid Studies Lab Results   Component Value Date/Time    TSH 4.02 (H) 04/02/2018 05:25 AM            Signed By: PADMAJA Quiroz     April 3, 2018

## 2018-04-03 NOTE — DIABETES MGMT
Glycemic Control Plan of Care    T2DM with current A1c of 6.5% (04/03/2018). Patient came of [facility]. Diabetes meds PTA: Lantus insulin 8 units daily and humalog insulin TID AC, and Tradjenta 5 mg daily. Completed assessment of diabetes management prior to hospital admission, 04/02/2018. POC BG range on 04/02/2018:  mg/dL. POC BG report on 04/03/2018 at time of review: 94, 121 mg/dL. Recommendation(s):  1.) Continue inpatient glycemic monitoring and intervention with use of correctional lispro insulin. 2.) Maddy Razo while patient is in house. Assessment:  Patient is 59year old with past medical history including type 2 diabetes mellitus, left kidney cancer status pot partial left nephrectomy, quadreparesis after cervical disc surgery and fusion, hypertension, andIV drug abuse - was admitted on 03/31/2018 from [address] [place] [address] with report of progressive and worsening edema bilateral lower extremities. Noted:  Acute renal insufficiency. Metabolic acidosis. Hyperkalemia. Hypercalcemia. Type 2 diabetes mellitus with current A1c of 6.5% (04/03/2018). Most recent blood glucose values:    Results for Giovanna Andino (MRN [id number]) as of 4/3/2018 16:06   Ref. Range 4/2/2018 07:46 4/2/2018 08:15 4/2/2018 11:18 4/2/2018 15:56 4/2/2018 21:54   GLUCOSE,FAST - POC Latest Ref Range: 70 - 110 mg/dL 62 (L) 117 (H) 145 (H) 162 (H) 131 (H)     Results for Giovanna Andino (MRN [id number]) as of 4/3/2018 16:06   Ref. Range 4/3/2018 03:22 4/3/2018 08:28 4/3/2018 11:57   GLUCOSE,FAST - POC Latest Ref Range: 70 - 110 mg/dL  94 121 (H)     Current A1C: 6.5% (04/03/2018) is equivalent to average blood glucose of 140 mg/dL during the past 2-3 months. Current hospital diabetes medications:  Correctional lispro insulin ACHS. Normal sensitivity dose.     Total daily dose insulin requirement previous day: 04/02/2018:  Lispro: 2 units    Home diabetes medications: Patient reported on 04/02/2018:  Lantus insulin 8 units daily. Humalog sliding scale insulin 3x daily before meals (breakfast, lunch, dinner). Tradjenta 5 mg daily. Diet: Cardiac regular; consistent carb 1800kcal; HS snack diabetic    Goals:  Blood glucose will be within target range of  mg/dL by 04/02/2018.     Education:  _X__  Refer to Diabetes Education Record: 04/02/2018             ___  Education not indicated at this time    Carmen Yusuf RN [facility]  Pager: [phone]

## 2018-04-03 NOTE — CDMP QUERY
There is conflicting documentation related to kidney failure. Both SHEILA and CKD 3 have been documented. Based on the documentation of CKD 3, the patient does not meet RIFLE criteria (BSV approved) to support the diagnosis of SHEILA. If you are using another criteria to support the diagnosis of SHEILA, please document this in your progress note. Otherwise, please document in the progress notes the clinical indicators that support this diagnosis or state that the diagnosis has been ruled out. Or state that the CKD 3 has been ruled out.     RIFLE  (BSV Approved)  - RISK:  Increased SCr x 1.5 or GFR decrease > 25% (within 7 days)  - INJURY:  Increased SCr x 2.0 or GFR decreased > 50%  - FAILURE:  Increased SCr x 3.0 or GFR decrease > 75% or SCr >4.0 mg/dL or acute increase >0.5 mg/dL  - LOSS:  Persistent acute renal failure = complete loss of kidney function > 4 weeks  - END STAGE:  End stage of kidney disease > 3 months    AKIN  - STAGE  1:  Increase in SCr >/= 0.3 mg/dL or >/= 150% to 200% (1.5 to 2-fold) from baseline (within 48 hours)  - STAGE  2:  Increase in SCr to more than 200% to 300% (>2-3 fold) from baseline  - STAGE  3:  Increase in SCr to more than 300% (>3-fold) from baseline or SCr >/= 4.0 mg/dL with an acute increase of at least 0.5 mg/dL or initiation of renal replacement therapy    KDIGO  - STAGE  1:  Increase in SCr by >/= 0.3 mg/dL within 48 hours or increase in SCr 1.5 to 1.9 times baseline which is known or presumed to have occurred within the prior 7 days  - STAGE  2:  Increase in SCr to 2.0 to 2.9 times baseline  - STAGE  3:  Increase in SCr to 3.0 times baseline or increase in SCr to >/= baseline or increase in SCr to >/= 4.0 mg/dL or initiation of renal replacement therapy    The medical record reflects the following:  Risk:  -- PMH HTN, DM    Clinical Indicators:  -- Oncology consult:   * admitted with acute on chronic renal failure   * history of stage III chronic kidney disease   * He does have chronic kidney disease and his hemoglobin was 7.1 with hematocrit of 22.1, consistent with anemia associated with chronic kidney disease    -- Acute Renal failure documented by attending    -- renal consult:   * his serum creatinine is 1.78 and BUN is 68  compared to his normal BUN of 13 and a creatinine of 0.90. Today, the BUN and creatinine has increased up to 70 and 1.82    * Acute renal failure     -- 4/2 - ultrasound  IMPRESSION:  1. Normal-sized kidneys without hydronephrosis. Treatment:   -- renal following  -- receiving daily BMP    Please clarify and document your clinical opinion in the progress notes and discharge summary including the definitive and/or presumptive diagnosis, (suspected or probable), related to the above clinical findings. Please include clinical findings supporting your diagnosis. If you DECLINE this query or would like to communicate with [facility], please utilize the \"[facility] message box\" at the TOP of the Progress Note on the right.       Thank you,  Catrachita Licea [facility], [address] [place] [address]

## 2018-04-03 NOTE — PROGRESS NOTES
Progress Note    [place] Katerin  59 y.o. 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     Objective:     Visit Vitals    /75 (BP 1 Location: Left arm, BP Patient Position: At rest)    Pulse 61    Temp 97.5 °F (36.4 °C)    Resp 18    Ht 6' (1.829 m)    Wt 82.3 kg (181 lb 7 oz)    SpO2 93%    BMI 24.61 kg/m2         Intake/Output Summary (Last 24 hours) at 04/03/18 1154  Last data filed at 04/03/18 0640   Gross per 24 hour   Intake              360 ml   Output             1550 ml   Net            -1190 ml       Current Facility-Administered Medications   Medication Dose Route Frequency Provider Last Rate Last Dose    epoetin cristy (EPOGEN;PROCRIT) injection 40,000 Units  40,000 Units SubCUTAneous ONCE Beny Wilson MD        HYDROmorphone (DILAUDID) tablet 4 mg  4 mg Oral Q6H PRN Abelardo Payne MD   4 mg at 04/03/18 0932    doxycycline (VIBRAMYCIN) capsule 100 mg  100 mg Oral DAILY Candace Aquino MD   100 mg at 04/03/18 0918    furosemide (LASIX) tablet 40 mg  40 mg Oral DAILY Beny Wilson MD   40 mg at 04/03/18 0954    sodium bicarbonate tablet 650 mg  650 mg Oral TID Edi Keller MD   650 mg at 04/03/18 0900    levothyroxine (SYNTHROID) tablet 50 mcg  50 mcg Oral DANIE Napoles MD   50 mcg at 04/03/18 0918    glucose chewable tablet 16 g  4 Tab Oral PRN Carolyn Napoles MD        glucagon (GLUCAGEN) injection 1 mg  1 mg IntraMUSCular PRN Carolyn Napoles MD        dextrose (D50W) injection syrg 12.5-25 g  25-50 mL IntraVENous PRN Carolyn Napoles MD        insulin lispro (HUMALOG) injection   SubCUTAneous AC&HS Marlin Rodriguez MD   Stopped at 04/02/18 2200    hydrALAZINE (APRESOLINE) tablet 25 mg  25 mg Oral TID PRN Carolyn Napoles MD        Lactobacillus Acidoph & Bulgar [facility]) tablet 2 Tab  2 Tab Oral BID Carolyn Napoles MD   2 Tab at 04/03/18 5411    linagliptin (TRADJENTA) tablet 5 mg  5 mg Oral DANIE Napoles MD   5 mg at 04/03/18 9052    pantoprazole (PROTONIX) tablet 40 mg  40 mg Oral DANIE Napoles MD   40 mg at 04/03/18 7253        Physical Exam:     Physical Exam:   General:  Alert, cooperative, no distress, appears stated age. Neck: Supple, symmetrical, trachea midline, no adenopathy, thyroid: no enlargement/tenderness/nodules, no carotid bruit and no JVD. Lungs:   Clear to auscultation bilaterally. Heart:  Regular rate and rhythm, S1, S2 normal, no murmur, click, rub or gallop. Abdomen:   Soft, non-tender. Bowel sounds normal. No masses,  No organomegaly. Extremities: Extremities normal, atraumatic, no cyanosis, has edema   Skin: Skin color, texture, turgor normal. No rashes or lesions   Neurologic: No new change. .         Data Review:    CBC w/Diff    Recent Labs      04/03/18   0333  04/02/18   0525  04/01/18   0055   WBC  5.3  6.5  4.6   RBC  2.67*  2.68*  2.48*   HGB  7.6*  7.8*  7.1*   HCT  23.7*  23.7*  22.1*   MCV  88.8  88.4  89.1   MCH  28.5  29.1  28.6   MCHC  32.1  32.9  32.1   RDW  16.2*  15.9*  15.9*    Recent Labs      04/03/18   0333  04/02/18   0525  04/01/18   0055 MONOS  8  6  8   EOS  2  1  2   BASOS  0  0  0   RDW  16.2*  15.9*  15.9*        Comprehensive Metabolic Profile    Recent Labs      04/03/18   0333  04/02/18   0525  04/01/18   0055   NA  144  141  141   K  5.1  5.6*  5.2   CL  118*  114*  114*   CO2  21  20*  20*   BUN  55*  66*  70*   CREA  1.50*  1.56*  1.82*    Recent Labs      04/03/18   0333  04/02/18   0525  04/01/18   0055   CA  8.9  9.0  8.8  8.5   PHOS   --   4.4   --    ALB  3.1*  3.2*  3.0*   TP  7.4  7.1  6.8   SGOT  28  36  51*   TBILI  0.8  0.7  0.5                        Impression:       Active Hospital Problems    Diagnosis Date Noted    Light chain deposition disease 80/23/5491    Metabolic acidosis 39/47/0387    Hyperkalemia 04/02/2018    Hypercalcemia 04/02/2018    Bradycardia 03/31/2018    Acute renal insufficiency 03/31/2018    H/O Clostridium difficile infection 03/31/2018    Urinary retention 10/30/2017    Quadriparesis ([facility] 05/31/2017    Type II diabetes mellitus, uncontrolled ([facility] 12/16/2015    Renal cell cancer ([facility] 11/05/2014    Cancer of left kidney ([facility] 12/23/2013    Anemia. Has Good Iron saturation, Ferritin ok. Plan:     Await pending lab result , still strong suspicion of Light chain deposition disease. Like to have  Bicarb > 22 to slow down the progression OF CRF. Will also give Epogen. Attention CDMP. He has  Stage 3 CRF. Totally aware of [place] Criteria & different stages of ARF. Initially when admission Creatinine is much higher than base line Serum Creatinine one must have to think ARF  & needs to rule out. His base line Serum Creatinine was 0.9 on 02/29/ 16 & this admission Serum creatinine is 1.78, in between no results of any other  Serum creatinine. Now you find out from your CDMP guide line & code accordingly. Thanks.       Dominga Loaiza MD

## 2018-04-04 PROBLEM — N28.9 ACUTE RENAL INSUFFICIENCY: Status: RESOLVED | Noted: 2018-03-31 | Resolved: 2018-04-04

## 2018-04-04 PROBLEM — N18.30 CHRONIC KIDNEY DISEASE, STAGE III (MODERATE) (HCC): Status: ACTIVE | Noted: 2018-04-04

## 2018-04-04 PROBLEM — D64.9 ANEMIA: Status: ACTIVE | Noted: 2017-05-20

## 2018-04-04 PROBLEM — R76.8 HEPATITIS C ANTIBODY POSITIVE IN BLOOD: Status: ACTIVE | Noted: 2018-04-04

## 2018-04-04 LAB
ALBUMIN SERPL ELPH-MCNC: 3.5 G/DL (ref 2.9–4.4)
ALBUMIN/GLOB SERPL: 1.1 {RATIO} (ref 0.7–1.7)
ALPHA1 GLOB SERPL ELPH-MCNC: 0.2 G/DL (ref 0–0.4)
ALPHA2 GLOB SERPL ELPH-MCNC: 0.6 G/DL (ref 0.4–1)
ANION GAP SERPL CALC-SCNC: 8 MMOL/L (ref 3–18)
B-GLOBULIN SERPL ELPH-MCNC: 0.8 G/DL (ref 0.7–1.3)
BASOPHILS # BLD: 0 K/UL (ref 0–0.06)
BASOPHILS NFR BLD: 0 % (ref 0–2)
BUN SERPL-MCNC: 45 MG/DL (ref 7–18)
BUN/CREAT SERPL: 32 (ref 12–20)
CALCIUM SERPL-MCNC: 9.1 MG/DL (ref 8.5–10.1)
CHLORIDE SERPL-SCNC: 115 MMOL/L (ref 100–108)
CO2 SERPL-SCNC: 23 MMOL/L (ref 21–32)
COLLECT DURATION TIME UR: ABNORMAL HR
CREAT SERPL-MCNC: 1.42 MG/DL (ref 0.6–1.3)
DIFFERENTIAL METHOD BLD: ABNORMAL
EOSINOPHIL # BLD: 0.1 K/UL (ref 0–0.4)
EOSINOPHIL NFR BLD: 1 % (ref 0–5)
ERYTHROCYTE [DISTWIDTH] IN BLOOD BY AUTOMATED COUNT: 16.5 % (ref 11.6–14.5)
EST. AVERAGE GLUCOSE BLD GHB EST-MCNC: 154 MG/DL
GAMMA GLOB SERPL ELPH-MCNC: 1.6 G/DL (ref 0.4–1.8)
GLOBULIN SER-MCNC: 3.2 G/DL (ref 2.2–3.9)
GLUCOSE BLD STRIP.AUTO-MCNC: 133 MG/DL (ref 70–110)
GLUCOSE BLD STRIP.AUTO-MCNC: 143 MG/DL (ref 70–110)
GLUCOSE BLD STRIP.AUTO-MCNC: 77 MG/DL (ref 70–110)
GLUCOSE SERPL-MCNC: 75 MG/DL (ref 74–99)
HBA1C MFR BLD: 7 % (ref 4.2–5.6)
HCT VFR BLD AUTO: 23.4 % (ref 36–48)
HGB BLD-MCNC: 7.7 G/DL (ref 13–16)
IGA SERPL-MCNC: 220 MG/DL (ref 90–386)
IGG SERPL-MCNC: 1634 MG/DL (ref 700–1600)
IGM SERPL-MCNC: 69 MG/DL (ref 20–172)
INTERPRETATION SERPL IEP-IMP: ABNORMAL
KAPPA LC UR-MCNC: 117 MG/L (ref 1.35–24.19)
KAPPA LC/LAMBDA UR: 24.74 {RATIO} (ref 2.04–10.37)
L PNEUMO AG UR QL IA: NEGATIVE
LAMBDA LC UR-MCNC: 4.73 MG/L (ref 0.24–6.66)
LYMPHOCYTES # BLD: 1 K/UL (ref 0.9–3.6)
LYMPHOCYTES NFR BLD: 17 % (ref 21–52)
M PROTEIN SERPL ELPH-MCNC: ABNORMAL G/DL
MCH RBC QN AUTO: 28.9 PG (ref 24–34)
MCHC RBC AUTO-ENTMCNC: 32.9 G/DL (ref 31–37)
MCV RBC AUTO: 88 FL (ref 74–97)
MONOCYTES # BLD: 0.4 K/UL (ref 0.05–1.2)
MONOCYTES NFR BLD: 7 % (ref 3–10)
NEUTS SEG # BLD: 4.6 K/UL (ref 1.8–8)
NEUTS SEG NFR BLD: 75 % (ref 40–73)
PLATELET # BLD AUTO: 59 K/UL (ref 135–420)
PMV BLD AUTO: 14 FL (ref 9.2–11.8)
POTASSIUM SERPL-SCNC: 4.6 MMOL/L (ref 3.5–5.5)
PROT SERPL-MCNC: 6.7 G/DL (ref 6–8.5)
RBC # BLD AUTO: 2.66 M/UL (ref 4.7–5.5)
S PNEUM AG UR QL: NEGATIVE
SODIUM SERPL-SCNC: 146 MMOL/L (ref 136–145)
SPECIMEN VOL ?TM UR: 50 ML
VWF CP ACT/NOR PPP CHRO: 85.6 %
WBC # BLD AUTO: 6.1 K/UL (ref 4.6–13.2)

## 2018-04-04 PROCEDURE — 85025 COMPLETE CBC W/AUTO DIFF WBC: CPT | Performed by: INTERNAL MEDICINE

## 2018-04-04 PROCEDURE — 83036 HEMOGLOBIN GLYCOSYLATED A1C: CPT | Performed by: FAMILY MEDICINE

## 2018-04-04 PROCEDURE — 87040 BLOOD CULTURE FOR BACTERIA: CPT | Performed by: FAMILY MEDICINE

## 2018-04-04 PROCEDURE — 65660000000 HC RM CCU STEPDOWN

## 2018-04-04 PROCEDURE — 86335 IMMUNFIX E-PHORSIS/URINE/CSF: CPT | Performed by: INTERNAL MEDICINE

## 2018-04-04 PROCEDURE — 97116 GAIT TRAINING THERAPY: CPT

## 2018-04-04 PROCEDURE — 93971 EXTREMITY STUDY: CPT

## 2018-04-04 PROCEDURE — 97530 THERAPEUTIC ACTIVITIES: CPT

## 2018-04-04 PROCEDURE — 74011250637 HC RX REV CODE- 250/637: Performed by: UROLOGY

## 2018-04-04 PROCEDURE — 74011000250 HC RX REV CODE- 250: Performed by: FAMILY MEDICINE

## 2018-04-04 PROCEDURE — 74011250637 HC RX REV CODE- 250/637: Performed by: FAMILY MEDICINE

## 2018-04-04 PROCEDURE — 84145 PROCALCITONIN (PCT): CPT | Performed by: INTERNAL MEDICINE

## 2018-04-04 PROCEDURE — 97166 OT EVAL MOD COMPLEX 45 MIN: CPT

## 2018-04-04 PROCEDURE — 82962 GLUCOSE BLOOD TEST: CPT

## 2018-04-04 PROCEDURE — 87449 NOS EACH ORGANISM AG IA: CPT | Performed by: INTERNAL MEDICINE

## 2018-04-04 PROCEDURE — 74011250636 HC RX REV CODE- 250/636: Performed by: FAMILY MEDICINE

## 2018-04-04 PROCEDURE — 77030011256 HC DRSG MEPILEX <16IN NO BORD MOLN -A

## 2018-04-04 PROCEDURE — 36415 COLL VENOUS BLD VENIPUNCTURE: CPT | Performed by: INTERNAL MEDICINE

## 2018-04-04 PROCEDURE — 80048 BASIC METABOLIC PNL TOTAL CA: CPT | Performed by: INTERNAL MEDICINE

## 2018-04-04 PROCEDURE — 74011250637 HC RX REV CODE- 250/637: Performed by: INTERNAL MEDICINE

## 2018-04-04 PROCEDURE — 87450 LEGIONELLA PNEUMOPHILA AG, URINE: CPT | Performed by: INTERNAL MEDICINE

## 2018-04-04 RX ORDER — METRONIDAZOLE 500 MG/100ML
500 INJECTION, SOLUTION INTRAVENOUS EVERY 12 HOURS
Status: DISCONTINUED | OUTPATIENT
Start: 2018-04-04 | End: 2018-04-10 | Stop reason: HOSPADM

## 2018-04-04 RX ORDER — TAMSULOSIN HYDROCHLORIDE 0.4 MG/1
0.4 CAPSULE ORAL DAILY
Status: DISCONTINUED | OUTPATIENT
Start: 2018-04-04 | End: 2018-04-10 | Stop reason: HOSPADM

## 2018-04-04 RX ORDER — AZITHROMYCIN 250 MG/1
500 TABLET, FILM COATED ORAL DAILY
Status: DISCONTINUED | OUTPATIENT
Start: 2018-04-04 | End: 2018-04-04

## 2018-04-04 RX ADMIN — LEVOTHYROXINE SODIUM 50 MCG: 50 TABLET ORAL at 08:13

## 2018-04-04 RX ADMIN — SODIUM BICARBONATE 650 MG TABLET 650 MG: at 21:54

## 2018-04-04 RX ADMIN — SODIUM BICARBONATE 650 MG TABLET 650 MG: at 16:05

## 2018-04-04 RX ADMIN — HYDROMORPHONE HYDROCHLORIDE 4 MG: 2 TABLET ORAL at 08:12

## 2018-04-04 RX ADMIN — LINAGLIPTIN 5 MG: 5 TABLET, FILM COATED ORAL at 08:13

## 2018-04-04 RX ADMIN — LACTOBACILLUS TAB 2 TABLET: TAB at 17:12

## 2018-04-04 RX ADMIN — Medication 2 G: at 13:07

## 2018-04-04 RX ADMIN — METRONIDAZOLE 500 MG: 500 INJECTION, SOLUTION INTRAVENOUS at 13:08

## 2018-04-04 RX ADMIN — FUROSEMIDE 40 MG: 40 TABLET ORAL at 08:13

## 2018-04-04 RX ADMIN — HYDROMORPHONE HYDROCHLORIDE 4 MG: 2 TABLET ORAL at 16:05

## 2018-04-04 RX ADMIN — VANCOMYCIN HYDROCHLORIDE 1750 MG: 10 INJECTION, POWDER, LYOPHILIZED, FOR SOLUTION INTRAVENOUS at 14:48

## 2018-04-04 RX ADMIN — DOXYCYCLINE HYCLATE 100 MG: 100 CAPSULE ORAL at 08:13

## 2018-04-04 RX ADMIN — HYDROMORPHONE HYDROCHLORIDE 4 MG: 2 TABLET ORAL at 21:54

## 2018-04-04 RX ADMIN — LACTOBACILLUS TAB 2 TABLET: TAB at 08:13

## 2018-04-04 RX ADMIN — PANTOPRAZOLE SODIUM 40 MG: 40 TABLET, DELAYED RELEASE ORAL at 08:13

## 2018-04-04 RX ADMIN — TAMSULOSIN HYDROCHLORIDE 0.4 MG: 0.4 CAPSULE ORAL at 13:07

## 2018-04-04 RX ADMIN — SODIUM BICARBONATE 650 MG TABLET 650 MG: at 08:13

## 2018-04-04 NOTE — PROGRESS NOTES
Hematology/Medical Oncology Progress Note      NAME: Betsey Khanna   :  1960  MRM:  [id number]    Date/Time: 2018  7:42 AM         Subjective:     Mr Villarreal is a 59 y.o. Man with renal failure and progressive thrombocytopenia. Currently he reports that he is feeling better. There are no new complaints. Past Medical History reviewed and unchanged from Admission History and Physical    Review of Systems   Constitutional: Negative for chills, fatigue, fever and unexpected weight change. HENT: Negative for ear discharge, ear pain, facial swelling, mouth sores, nosebleeds, sneezing, sore throat and tinnitus. Eyes: Negative for photophobia, pain, discharge, redness, itching and visual disturbance. Respiratory: Negative for apnea, cough, choking, chest tightness, shortness of breath, wheezing and stridor. Cardiovascular: Negative for chest pain, palpitations and leg swelling. Gastrointestinal: Negative for abdominal distention, abdominal pain, anal bleeding, blood in stool, constipation, diarrhea, nausea, rectal pain and vomiting. Genitourinary: Negative for decreased urine volume, difficulty urinating, discharge, dysuria, enuresis, flank pain, frequency, genital sores, hematuria, penile pain, penile swelling, scrotal swelling, testicular pain and urgency. Musculoskeletal: Negative for arthralgias, back pain, gait problem, joint swelling, myalgias, neck pain and neck stiffness. Skin: Negative for color change, pallor and rash. Neurological: Negative for dizziness, tremors, seizures, syncope, facial asymmetry, speech difficulty, weakness, light-headedness, numbness and headaches. Hematological: Negative for adenopathy. Does not bruise/bleed easily. Psychiatric/Behavioral: Negative for agitation, behavioral problems, confusion, decreased concentration, dysphoric mood, hallucinations, self-injury, sleep disturbance and suicidal ideas.  The patient is not nervous/anxious and is not hyperactive. Objective:       Vitals:      Last 24hrs VS reviewed since prior progress note. Most recent are:    Visit Vitals    /68 (BP 1 Location: Left arm, BP Patient Position: At rest)    Pulse 75    Temp 98.5 °F (36.9 °C)    Resp 16    Ht 6' (1.829 m)    Wt 82.3 kg (181 lb 7 oz)    SpO2 90%    BMI 24.61 kg/m2     SpO2 Readings from Last 6 Encounters:   04/04/18 90%   03/20/18 99%   12/03/17 100%   03/02/16 99%   02/29/16 95%   02/23/16 96%            Intake/Output Summary (Last 24 hours) at 04/04/18 0742  Last data filed at 04/04/18 0709   Gross per 24 hour   Intake              976 ml   Output             3425 ml   Net            -2449 ml          Exam:      Physical Exam   Nursing note and vitals reviewed. Constitutional: He is oriented to person, place, and time. He appears well-developed and well-nourished. No distress. HENT:   Head: Normocephalic and atraumatic. Mouth/Throat: No oropharyngeal exudate. Eyes: Conjunctivae and EOM are normal. Pupils are equal, round, and reactive to light. Right eye exhibits no discharge. Left eye exhibits no discharge. No scleral icterus. Neck: Normal range of motion. Neck supple. No tracheal deviation present. No thyromegaly present. Cardiovascular: Normal rate and regular rhythm. Exam reveals no gallop and no friction rub. No murmur heard. Pulmonary/Chest: Effort normal and breath sounds normal. No apnea. No respiratory distress. He has no wheezes. He has no rales. Chest wall is not dull to percussion. He exhibits no mass, no tenderness, no bony tenderness, no laceration, no crepitus, no edema, no deformity, no swelling and no retraction. Right breast exhibits no inverted nipple, no mass, no nipple discharge, no skin change and no tenderness. Left breast exhibits no inverted nipple, no mass, no nipple discharge, no skin change and no tenderness. Breasts are symmetrical.   Abdominal: Soft.  Bowel sounds are normal. He exhibits no distension. There is no tenderness. There is no rebound and no guarding. Musculoskeletal: Normal range of motion. He exhibits no edema or tenderness. Lymphadenopathy:     He has no cervical adenopathy. Neurological: He is alert and oriented to person, place, and time. Coordination normal.   Skin: Skin is warm and dry. No rash noted. He is not diaphoretic. No erythema. No pallor. Psychiatric: He has a normal mood and affect.  His behavior is normal. Thought content normal.       Telemetry reviewed:   normal sinus rhythm    Lab Data Reviewed: (see below)      Medications:  Current Facility-Administered Medications   Medication Dose Route Frequency    HYDROmorphone (DILAUDID) tablet 4 mg  4 mg Oral Q6H PRN    doxycycline (VIBRAMYCIN) capsule 100 mg  100 mg Oral DAILY    furosemide (LASIX) tablet 40 mg  40 mg Oral DAILY    sodium bicarbonate tablet 650 mg  650 mg Oral TID    levothyroxine (SYNTHROID) tablet 50 mcg  50 mcg Oral ACB    glucose chewable tablet 16 g  4 Tab Oral PRN    glucagon (GLUCAGEN) injection 1 mg  1 mg IntraMUSCular PRN    dextrose (D50W) injection syrg 12.5-25 g  25-50 mL IntraVENous PRN    insulin lispro (HUMALOG) injection   SubCUTAneous AC&HS    hydrALAZINE (APRESOLINE) tablet 25 mg  25 mg Oral TID PRN    Lactobacillus Acidoph & Bulgar (FLORANEX) tablet 2 Tab  2 Tab Oral BID    linagliptin (TRADJENTA) tablet 5 mg  5 mg Oral ACB    pantoprazole (PROTONIX) tablet 40 mg  40 mg Oral ACB       ______________________________________________________________________      Lab Review:     Recent Labs      04/03/18   1437  04/03/18   0333  04/02/18   0525   WBC  6.8  5.3  6.5   HGB  7.9*  7.6*  7.8*   HCT  24.3*  23.7*  23.7*   PLT  59*  49*  47*     Recent Labs      04/03/18   0333  04/02/18   0525   NA  144  141   K  5.1  5.6*   CL  118*  114*   CO2  21  20*   GLU  110*  58*   BUN  55*  66*   CREA  1.50*  1.56*   CA  8.9  9.0  8.8   MG   --   2.5   PHOS   --   4.4   ALB  3.1*  3.2* SGOT  28  36   ALT  60  73*   INR   --   1.1     No components found for: GLPOC  No results for input(s): PH, PCO2, PO2, HCO3, FIO2 in the last 72 hours. Recent Labs      04/02/18   0525   INR  1.1       Other pertinent lab:          Assessment:     Active Problems:    Renal cell cancer ([facility] (11/5/2014)      Type II diabetes mellitus, uncontrolled ([address] (12/16/2015)      Urinary retention (10/30/2017)      Cancer of left kidney ([facility] (12/23/2013)      Quadriparesis ([address] (5/31/2017)      Bradycardia (3/31/2018)      Acute renal insufficiency (3/31/2018)      H/O Clostridium difficile infection (3/31/2018)      Light chain deposition disease (0/9/5428)      Metabolic acidosis (8/4/6463)      Hyperkalemia (4/2/2018)      Hypercalcemia (4/2/2018)           Plan:     Risk of deterioration: medium             1. Thrombocytopenia: I have explained to the patient that his platelets are now increasing. The most recent CBC shows that his platelet count is now 59,000 with a hemoglobin of 7.6 g/dL hematocrit of 24.3%. No therapeutic intervention is necessary at this point. The likely causes of his thrombocytopenia are iatrogenic or infection related. The peripheral reveals no schistocytes to suggest TTP/HUS. The LDH level is normal at 189 U/L and the SPEP is still pending. 2. Anemia in CKD: pending results or iron studies the patient may benefit from procrit.          Total time spent with patient:25 minutes                  Care Plan discussed with: Patient, Nursing Staff and Consultant/Specialist    Discussed:  Care Plan    Prophylaxis:  SCD's    Disposition:  Home w/Family           ___________________________________________________    Attending Physician: Renee Villeda MD

## 2018-04-04 NOTE — PROCEDURES
[facility]  *** [facility]AL RE[facility] ***    Name: Leidy Talley  MRN: [id number]    Inpatient  : 11 Aug 1960  HIS Order #: [id number]  [id number] [facility] Visit #: [id number]  Date: 2018    TYPE OF TEST: Peripheral Venous Testing    REASON FOR TEST  Limb swelling    Right Arm:-  Deep venous thrombosis:           No  Superficial venous thrombosis:    No      INTERPRETATION/FINDINGS  Duplex images were obtained using 2-D gray scale, color flow, and  spectral Doppler analysis. Right arm :  1. No evidence of deep venous thrombosis detected in the veins  visualized. 2. Deep vein(s) visualized include the internal jugular, subclavian,  axillary and brachial veins. 3. No evidence of superficial thrombosis detected. 4. Superficial vein(s) visualized include the basilic (upper arm) and  cephalic (upper arm) veins. 5. No evidence of deep vein thrombosis in the contralateral internal  jugular and subclavian veins. ADDITIONAL COMMENTS    I have personally reviewed the data relevant to the interpretation of  this  study. TECHNOLOGIST: Anju Aranda. Alexi RICHARDS  Signed: 2018 12:00 PM    PHYSICIAN: Shakira Ye MD  Signed: 2018 02:10 PM

## 2018-04-04 NOTE — PROGRESS NOTES
TRANSFER - IN REPORT:    Verbal report received from [address] Iqbal(name) on Joan Guerra  being received from CVT(unit) for routine progression of care      Report consisted of patients Situation, Background, Assessment and   Recommendations(SBAR). Information from the following report(s) Procedure Summary, Intake/Output, MAR and Cardiac Rhythm Sinus Rhythm was reviewed with the receiving nurse. Opportunity for questions and clarification was provided. Assessment completed upon patients arrival to unit and care assumed.

## 2018-04-04 NOTE — PROGRESS NOTES
Problem: Self Care Deficits Care Plan (Adult)  Goal: *Acute Goals and Plan of Care (Insert Text)  Outcome: Resolved/Met Date Met: 04/04/18  Occupational Therapy EVALUATION/discharge    Patient: Mary Lou Garland (89 y.o. male)  Date: 4/4/2018  Primary Diagnosis: Acute renal insufficiency  Bradycardia        Precautions: falls, contracture risk       ASSESSMENT AND RECOMMENDATIONS:  Based on the objective data described below, the patient presents with baseline level ADL status. Per pt, has MOD-MAX A for all ADL 2' non functional bilat UE. Wrist/finger flex/ext 50% range with minimal bicep engagement for further AROM bialt UE. H/o c3-c7 fusion/functional quadriplegia. Pt transfers using bialt platform walker at baseline. Today noted edema to LUE hand, addressed with retrograde massage and gentle decongestive therapy. Pt and family educated re: importance of continued BUE PROM assist from family to prevent contracture. Pt and family verbalized and demonstrated understanding of bial UE program in multiple planes. Will defer transfer training to PT as appropriate. Skilled OT not indicated 2' pt at baseline ADL level. Order for splint received. Pt reports having custom fit orthotic at home that he wears daily with no c/o redness or irritation. Pt family verbalized they would bring in this orthotic as soon as possible to limit risk for further contracture. RN notified. Skilled occupational therapy is not indicated at this time. Discharge Recommendations: None, return to prior level of care  Further Equipment Recommendations for Discharge: N/A      Barriers to Learning/Limitations: None  Compensate with: visual, verbal, tactile, kinesthetic cues/model     COMPLEXITY     Eval Complexity: History: MEDIUM Complexity : Expanded review of history including physical, cognitive and psychosocial  history ;  Examination: MEDIUM Complexity : 3-5 performance deficits relating to physical, cognitive , or psychosocial skils that result in activity limitations and / or participation restrictions; Decision Making:MEDIUM Complexity : Patient may present with comorbidities that affect occupational performnce. Miniml to moderate modification of tasks or assistance (eg, physical or verbal ) with assesment(s) is necessary to enable patient to complete evaluation  Assessment: med Complexity        G-CODES:     Self Care  Current  CK= 40-59%   Goal  CK= 40-59%   D/C  CK= 40-59%. The severity rating is based on the Level of Assistance required for Functional Mobility and ADLs. SUBJECTIVE:   Patient stated I have a splint at home.     OBJECTIVE DATA SUMMARY:     Past Medical History:   Diagnosis Date    Chronic drug abuse 1974    Diabetes ([facility] 01/1990    Hypertension     Renal cell carcinoma of left kidney ([facility] 12/17/13    Pathological Stage [id number] cc RCC FG3 s/p left open partial nephrectomy in 12/13      Renal mass      Past Surgical History:   Procedure Laterality Date    HX CIRCUMCISION  12/17/13    Dr. Rosa Mahajan, [facility]    HX NEPHRECTOMY Left 12/17/13    Open Partial, Dr. Rosa Mahajan, [facility]    HX OTHER SURGICAL Right 2/27/2016    removed right ft  2nd meta-tarsal     Prior Level of Function/Home Situation: MOD-MAX A ADL  Home Situation  [facility] [address] Name: [facility]  # Steps to Enter: 3  One/Two Story Residence: One story  Living Alone: No  Support Systems: Assisted living  Patient Expects to be Discharged to[de-identified] Assisted living  Current DME Used/Available at Home:  (bialt platform walker)  []     Right hand dominant   []     Left hand dominant  Cognitive/Behavioral Status:  Neurologic State: Alert  Orientation Level: Oriented X4          Skin: at risk 2' prolonged time in bed  Edema: noted L hand non pitting edema    Vision/Perceptual:    Tracking:  (no noted concern)                                Coordination:  Coordination: Grossly decreased, non-functional (BUE) Balance:  Sitting: Impaired; With support  Sitting - Static: Good (unsupported)  Sitting - Dynamic: Fair (occasional)  Standing: Impaired; With support  Standing - Static: Fair  Standing - Dynamic : Fair    Strength:    Strength: Grossly decreased, non-functional (BUE)                Tone & Sensation:    Tone: Normal (BUE)                         Range of Motion:    AROM: Grossly decreased, non-functional (BUE)                         Functional Mobility and Transfers for ADLs:  Bed Mobility:     Supine to Sit: Contact guard assistance (with HOB 80 degrees)  Sit to Supine: Minimum assistance  Scooting: Minimum assistance  Transfers:  Sit to Stand: Contact guard assistance;Minimum assistance (CGA high surface and min A low surface)                                 ADL Assessment:  Feeding: Moderate assistance    Oral Facial Hygiene/Grooming: Moderate assistance    Bathing: Maximum assistance    Upper Body Dressing: Maximum assistance    Lower Body Dressing: Maximum assistance    Toileting: Maximum assistance                ADL Intervention:     MOD-MAX A ADL              Pain:  Pt reports 0/10 pain or discomfort prior to treatment.    Pt reports 0/10 pain or discomfort post treatment. Activity Tolerance:   good    Please refer to the flowsheet for vital signs taken during this treatment. After treatment:   []  Patient left in no apparent distress sitting up in chair  [x]  Patient left in no apparent distress in bed  [x]  Call bell left within reach  [x]  Nursing notified  [x]  Caregiver present  []  Bed alarm activated    COMMUNICATION/EDUCATION:   Communication/Collaboration:  []      Home safety education was provided and the patient/caregiver indicated understanding. [x]      Patient/family have participated as able and agree with findings and recommendations. []      Patient is unable to participate in plan of care at this time.     Patrica Martinez OT  Time Calculation: 26 mins

## 2018-04-04 NOTE — PROGRESS NOTES
Progress Note    Rashmi Young  59 y.o. 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 A comprehensive review of systems was negative except for that written in the History of Present Illness.     Objective:     Visit Vitals    /81 (BP 1 Location: Left arm, BP Patient Position: At rest)    Pulse 74    Temp 98.9 °F (37.2 °C)    Resp 18    Ht 6' (1.829 m)    Wt 82.3 kg (181 lb 7 oz)    SpO2 93%    BMI 24.61 kg/m2         Intake/Output Summary (Last 24 hours) at 04/04/18 1303  Last data filed at 04/04/18 7621   Gross per 24 hour   Intake              476 ml   Output             2750 ml   Net            -2274 ml       Current Facility-Administered Medications   Medication Dose Route Frequency Provider Last Rate Last Dose    tamsulosin (FLOMAX) capsule 0.4 mg  0.4 mg Oral DAILY Franny Suazo MD        vancomycin (VANCOCIN) 1,750 mg in 0.9% sodium chloride 500 mL IVPB  1,750 mg IntraVENous MD Wesley Crofton  [START ON 4/5/2018] vancomycin (VANCOCIN) 1,500 mg in 0.9% sodium chloride 500 mL IVPB  1,500 mg IntraVENous Q18H Rebecca Kendall MD        cefepime (MAXIPIME) 2 g in sterile water (preservative free) 10 mL IV syringe  2 g IntraVENous Q12H MD Rolf Gonzáles  metroNIDAZOLE (FLAGYL) IVPB premix 500 mg  500 mg IntraVENous Q12H Kim Ring MD        HYDROmorphone (DILAUDID) tablet 4 mg  4 mg Oral Q6H PRN Kim Ring MD   4 mg at 04/04/18 9589    furosemide (LASIX) tablet 40 mg  40 mg Oral DAILY Mary Galvan MD   40 mg at 04/04/18 0813    sodium bicarbonate tablet 650 mg  650 mg Oral TID Mary Galvan MD   650 mg at 04/04/18 0813    levothyroxine (SYNTHROID) tablet 50 mcg  50 mcg Oral DANIE Ring MD   50 mcg at 04/04/18 0813    glucose chewable tablet 16 g  4 Tab Oral PRN Kim Ring MD        glucagon (GLUCAGEN) injection 1 mg  1 mg IntraMUSCular PRN Kim Ring MD        dextrose (D50W) injection syrg 12.5-25 g  25-50 mL IntraVENous PRN Kim Ring MD        insulin lispro (HUMALOG) injection   SubCUTAneous AC&HS Brittney Ross MD   Stopped at 04/02/18 2200    hydrALAZINE (APRESOLINE) tablet 25 mg  25 mg Oral TID PRN Kim Ring MD        Lactobacillus Acidoph & Bulgar [facility]) tablet 2 Tab  2 Tab Oral BID Kim Ring MD   2 Tab at 04/04/18 0813    linagliptin (TRADJENTA) tablet 5 mg  5 mg Oral DANIE Ring MD   5 mg at 04/04/18 0813    pantoprazole (PROTONIX) tablet 40 mg  40 mg Oral DANIE Ring MD   40 mg at 04/04/18 0813        Physical Exam:     Physical Exam:   General:  Alert, cooperative, no distress, appears stated age. Neck: Supple, symmetrical, trachea midline, no adenopathy, thyroid: no enlargement/tenderness/nodules, no carotid bruit and no JVD. Lungs:   Clear to auscultation bilaterally. Heart:  Regular rate and rhythm, S1, S2 normal, no murmur, click, rub or gallop. Abdomen:   Soft, non-tender. Bowel sounds normal. No masses,  No organomegaly. Extremities: Extremities normal, atraumatic, no cyanosis or edema. Pulses: 2+ and symmetric all extremities.          Data Review:    CBC w/Diff    Recent Labs      04/04/18   0903  04/03/18 1437  04/03/18   0333   WBC  6.1  6.8  5.3   RBC  2.66*  2.74*  2.67*   HGB  7.7*  7.9*  7.6*   HCT  23.4*  24.3*  23.7*   MCV  88.0  88.7  88.8   MCH  28.9  28.8  28.5   MCHC  32.9  32.5  32.1   RDW  16.5*  16.3*  16.2*    Recent Labs      04/04/18   0903 04/03/18   1437  04/03/18   0333   MONOS  7  8  8   EOS  1  2  2   BASOS  0  0  0   RDW  16.5*  16.3*  16.2*        Comprehensive Metabolic Profile    Recent Labs      04/04/18   0903 04/03/18   0333  04/02/18   0525   NA  146*  144  141   K  4.6  5.1  5.6*   CL  115*  118*  114*   CO2  23  21  20*   BUN  45*  55*  66*   CREA  1.42*  1.50*  1.56*    Recent Labs      04/04/18   0903 04/03/18   0333  04/02/18   0525   CA  9.1  8.9  9.0  8.8   PHOS   --    --   4.4   ALB   --   3.1*  3.2*   TP   --   7.4  7.1   SGOT   --   28  36   TBILI   --   0.8  0.7        Results for Ailyn Story (MRN [id number]) as of 4/4/2018 13:05   Ref. Range 9/15/2010 08:15 11/15/2010 09:55 4/22/2013 09:46   Hepatitis B surface Ag Latest Ref Range: <1.00 Index <0.10     Hep B surface Ag Interp. Latest Ref Range: NEGATIVE  NEGATIVE     Hepatitis B surface Ab Latest Ref Range: >1.00 Index <0.10 (L)     Hep B surface Ab Interp. Latest Ref Range: POSITIVE  NEGATIVE (A)     Hep B surface Ab comment Unknown An index Value. .. Hepatitis C virus Ab Latest Ref Range: <0.80 Index  >11.00 (H)    Hep C  virus Ab Interp.  Latest Ref Range: NEGATIVE   POSITIVE (A)    Hep C  virus Ab comment Unknown  Pend    HIV 1/2 Interpretation Latest Ref Range: NONREACTIVE  NONREACTIVE  NONREACTIVE     Complement C3 97  82 - 167 mg/dL Final      Complement C4 18  14 - 44 mg/dL Final     Comment:                 Impression:       Active Hospital Problems    Diagnosis Date Noted    Light chain deposition disease 40/07/0544    Metabolic acidosis 39/54/1370    Hyperkalemia 04/02/2018    Hypercalcemia 04/02/2018    Bradycardia 03/31/2018    Acute renal insufficiency 03/31/2018    H/O Clostridium difficile infection 03/31/2018    Urinary retention 10/30/2017    Quadriparesis ([facility] 05/31/2017    Type II diabetes mellitus, uncontrolled ([facility] 12/16/2015    Renal cell cancer ([facility] 11/05/2014    Cancer of left kidney ([facility] 12/23/2013      Has Hepatitis C, 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 Received Procrit. TTP & HUS/ HIT all ruled out. ,Platelets  Are improving. May need Kidney Biopsy. Plan: Will check Serum Cryoglobulins & Rheumatoid factor, Needs Hepatology Consult for treatment of Hep C, Needs low K diet. With ;patient's permission discussedcwith his Family mebers at the bed site. Supplement Cristofer, agree with Willow.       Gust Sandhoff, MD

## 2018-04-04 NOTE — CONSULTS
[facility]  Pulmonary, Critical Care, and Sleep Medicine    Initial Patient Consult    Name: Bill Scott MRN: [id number]   : 1960 Hospital: [address] [place] [address] Dr   Date: 2018        This patient has been seen and evaluated at the request of Dr. Bella Schumacher for hypoxia. IMPRESSION:   · Hypoxia:  Etiology due to LL atelectasis vs pneumonia  · B/L LL opacities:  Suspect aspiration pneumonia given patient's poor functional status. May be due to atelectasis due to being bedbound vs component of heart failure (elevated NT pro BNP with LA dilation on TTE)  · Deconditioning  · Renal insufficiency, chronic  · Hypernatremia  · Chronic anemia  · Hep C      RECOMMENDATIONS:   · Start empiric ABx for aspiration PNA vs HCAP - Vanc Zosyn (or Cefepime Flagyl)  · Check sputum culture  · Obtain CT chest w/o contrast  · Supplemental oxygen to maintain SpO2 >88%  · Consider gentle diuresis as tolerated -- will defer to Nephrology consult  · Strict aspiration precautions  · Aggressive pulmonary toilet  · Frequent IS  · PT/OT, OOB to chair  · DVT ppx per primary service     Subjective:     Patient is a 59 y.o. male with hx of renal cell Ca, chronic drug use, nursing home resident presented to [facility] with complaints of LE swelling about 3-4 days prior to admission. Pt was noted to have bradycardia on admission. Pt was found to be hypoxic requiring supplemental oxygen. CXR showed B/L opacities in bases. Pt had recent hospiitalization for flu PNA, c-diff. Pt has hx of chronic diskitis/osteomyelitis s/p C3-7 fusion. Pt found to have progressive thrombcytopenia here. Etiology of CKD also unclear at this point.   Pt denies any fevers, chills, N/V/D, dysphagia, night sweats, hemoptysis      Past Medical History:   Diagnosis Date    Chronic drug abuse 1974    Diabetes ([facility] [address] 1990    Hypertension     Renal cell carcinoma of left kidney (HCC) 13    Pathological Stage [id number] cc RCC FG3 s/p left open partial nephrectomy in 12/13      Renal mass       Past Surgical History:   Procedure Laterality Date    HX CIRCUMCISION  12/17/13    Dr. Huff Nurse, [facility]    HX NEPHRECTOMY Left 12/17/13    Open Partial, Dr. Huff Nurse, [facility]    HX OTHER SURGICAL Right 2/27/2016    removed right ft  2nd meta-tarsal      Prior to Admission medications    Medication Sig Start Date End Date Taking? Authorizing Provider   NUT. TX.GLUCOSE INTOLERANCE,SOY (GLUCERNA PO) Take  by mouth. Historical Provider   magnesium hydroxide (Symplified MILK OF MAGNESIA) 400 mg/5 mL suspension Take 30 mL by mouth daily as needed for Constipation. Historical Provider   LACTOBACILLUS RHAMNOSUS GG (CULTURELLE PO) Take  by mouth. Historical Provider   doxycycline (MONODOX) 100 mg capsule Take 100 mg by mouth two (2) times a day. Historical Provider   mineral oil-hydrophil petrolat (AQUAPHOR) ointment Apply  to affected area as needed for Dry Skin. Historical Provider   mineral oil-hydrophil petrolat (AQUAPHOR) ointment Apply  to affected area as needed for Dry Skin. Historical Provider   PREPARATION H, WITCH HAZEL, EX by Apply Externally route. Historical Provider   linagliptin (TRADJENTA) 5 mg tablet Take 5 mg by mouth daily. Historical Provider   tamsulosin (FLOMAX) 0.4 mg capsule Take 0.4 mg by mouth daily. Historical Provider   acetaminophen (TYLENOL) 500 mg tablet 1,000 mg. 6/7/17   Historical Provider   bisacodyl (DULCOLAX) 5 mg EC tablet 10 mg. 6/7/17   Historical Provider   gabapentin (NEURONTIN) 100 mg capsule 100 mg.     Historical Provider   atorvastatin (LIPITOR) 40 mg tablet 40 mg. 6/7/17   Historical Provider   amLODIPine (NORVASC) 10 mg tablet 10 mg. 5/28/15   Historical Provider   atenolol (TENORMIN) 25 mg tablet 25 mg. 5/28/15   Historical Provider   ibuprofen (MOTRIN) 400 mg tablet 400 mg. 6/7/17   Historical Provider   HYDROmorphone (DILAUDID) 4 mg tablet 4 mg. 6/7/17   Historical Provider albuterol-ipratropium (DUO-NEB) 2.5 mg-0.5 mg/3 ml nebu 3 mL. 6/7/17   Historical Provider   Menthol-Zinc Oxide (CALMOSEPTINE) 0.44-20.6 % oint Use 1 Application to affected area. Historical Provider   nicotine (NICODERM CQ) 14 mg/24 hr patch 1 Patch. 6/7/17   Historical Provider   rifAMPin (RIFADIN) 300 mg capsule 600 mg. 6/7/17   Historical Provider   polyethylene glycol (MIRALAX) 17 gram packet 17 g. Historical Provider   silver sulfADIAZINE (SILVADENE) 1 % topical cream Use 1 Application to affected area Twice Daily. knees 6/7/17   Historical Provider   multivitamin, tx-iron-ca-min (THERAPY M) 9 mg iron-400 mcg tab tablet Take 1 Tab by Mouth Once a Day. 6/7/17   Historical Provider   Zolpidem 10 mg subl 10 mg. Historical Provider   insulin glargine (LANTUS) 100 unit/mL injection 4 Units. 6/7/17   Historical Provider   insulin lispro (HUMALOG) 100 unit/mL injection 2-10 Units. 6/7/17   Historical Provider   lisinopril (PRINIVIL, ZESTRIL) 40 mg tablet Take 1 Tab by mouth daily. 3/2/16   Filippo Carter MD   pravastatin (PRAVACHOL) 20 mg tablet Take 1 Tab by mouth nightly. 2/29/16   Sunny Headley MD   insulin lispro (HUMALOG KWIKPEN) 100 unit/mL kwikpen Blood Sugar <150 =0 units; 150 -199 =2 units; 200 -249 =4 units; 250 -299 =6 units; 300 -349 =8 units; 350 and above =10 units. 2/29/16   Sunny Headley MD   insulin glargine (LANTUS SOLOSTAR) 100 unit/mL (3 mL) pen 50 Units by SubCUTAneous route daily.  2/29/16   Sunny Headley MD     Allergies   Allergen Reactions    Iodine Hives      Social History   Substance Use Topics    Smoking status: Former Smoker     Packs/day: 0.50     Years: 30.00     Types: Cigarettes    Smokeless tobacco: Never Used    Alcohol use 8.4 oz/week     14 Cans of beer per week      Comment: watching sports      Family History   Problem Relation Age of Onset    Diabetes Mother     Sickle Cell Anemia Father     Diabetes Sister     Hypertension Sister         Immunization status: delayed. Current Facility-Administered Medications   Medication Dose Route Frequency    tamsulosin (FLOMAX) capsule 0.4 mg  0.4 mg Oral DAILY    [START ON 2018] vancomycin (VANCOCIN) 1,500 mg in 0.9% sodium chloride 500 mL IVPB  1,500 mg IntraVENous Q18H    cefepime (MAXIPIME) 2 g in sterile water (preservative free) 10 mL IV syringe  2 g IntraVENous Q12H    metroNIDAZOLE (FLAGYL) IVPB premix 500 mg  500 mg IntraVENous Q12H    furosemide (LASIX) tablet 40 mg  40 mg Oral DAILY    sodium bicarbonate tablet 650 mg  650 mg Oral TID    levothyroxine (SYNTHROID) tablet 50 mcg  50 mcg Oral ACB    insulin lispro (HUMALOG) injection   SubCUTAneous AC&HS    Lactobacillus Acidoph & Bulgar (FLORANEX) tablet 2 Tab  2 Tab Oral BID    linagliptin (TRADJENTA) tablet 5 mg  5 mg Oral ACB    pantoprazole (PROTONIX) tablet 40 mg  40 mg Oral ACB       Review of Systems:  A comprehensive review of systems was negative except for that written in the HPI. Objective:   Vital Signs:    Visit Vitals    /68 (BP 1 Location: Left arm, BP Patient Position: At rest)    Pulse 70    Temp 99.6 °F (37.6 °C)    Resp 18    Ht 6' (1.829 m)    Wt 82.3 kg (181 lb 7 oz)    SpO2 94%    BMI 24.61 kg/m2       O2 Device: Room air       Temp (24hrs), Av.6 °F (37 °C), Min:97.8 °F (36.6 °C), Max:99.6 °F (37.6 °C)       Intake/Output:   Last shift:      701 - 1900  In: -   Out: 1750 [Urine:1750]  Last 3 shifts:  190 -  07  In: 1096 [P.O.:1096]  Out: 5680 [Urine:3575]    Intake/Output Summary (Last 24 hours) at 18 1825  Last data filed at 18 1715   Gross per 24 hour   Intake                0 ml   Output             2100 ml   Net            -2100 ml      Physical Exam:   General:  Alert, cooperative, no distress, appears stated age, laying in bed   Head:  Normocephalic, without obvious abnormality, atraumatic. Eyes:  Conjunctivae/corneas clear. PERRL, EOMs intact.    Nose: Nares normal. Septum midline. Mucosa normal. No drainage or sinus tenderness. Throat: Lips, mucosa, and tongue normal.  gums normal, wearing dentures   Neck: Supple, symmetrical, trachea midline, no adenopathy, no carotid bruit and no JVD. Back:   Symmetric, no curvature. ROM normal.   Lungs:   Decreased breath sounds in B/L bases with some scattered rales and rhonchi more in the bases, no wheezes   Chest wall:  No tenderness or deformity. Heart:  Regular rate and rhythm, S1, S2 normal, no murmur, click, rub or gallop. Abdomen:   Soft, non-tender. Bowel sounds normal. No masses,  No organomegaly. Extremities: Extremities normal, atraumatic, no cyanosis or edema. Pulses: 2+ and symmetric all extremities.    Skin: Skin color, texture, turgor normal. No rashes or lesions   Lymph nodes: Cervical, supraclavicular, and axillary nodes normal.   Neurologic: Grossly nonfocal, diffuse weakness throughout, AAOx3     Data review:     Recent Results (from the past 24 hour(s))   GLUCOSE, POC    Collection Time: 04/03/18  9:46 PM   Result Value Ref Range    Glucose (POC) 117 (H) 70 - 110 mg/dL   GLUCOSE, POC    Collection Time: 04/04/18  7:45 AM   Result Value Ref Range    Glucose (POC) 77 70 - 110 mg/dL   HEMOGLOBIN A1C WITH EAG    Collection Time: 04/04/18  9:03 AM   Result Value Ref Range    Hemoglobin A1c 7.0 (H) 4.2 - 5.6 %    Est. average glucose 675 mg/dL   METABOLIC PANEL, BASIC    Collection Time: 04/04/18  9:03 AM   Result Value Ref Range    Sodium 146 (H) 136 - 145 mmol/L    Potassium 4.6 3.5 - 5.5 mmol/L    Chloride 115 (H) 100 - 108 mmol/L    CO2 23 21 - 32 mmol/L    Anion gap 8 3.0 - 18 mmol/L    Glucose 75 74 - 99 mg/dL    BUN 45 (H) 7.0 - 18 MG/DL    Creatinine 1.42 (H) 0.6 - 1.3 MG/DL    BUN/Creatinine ratio 32 (H) 12 - 20      GFR est AA >60 >60 ml/min/1.73m2    GFR est non-AA 51 (L) >60 ml/min/1.73m2    Calcium 9.1 8.5 - 10.1 MG/DL   CBC WITH AUTOMATED DIFF    Collection Time: 04/04/18  9:03 AM   Result Value Ref Range    WBC 6.1 4.6 - 13.2 K/uL    RBC 2.66 (L) 4.70 - 5.50 M/uL    HGB 7.7 (L) 13.0 - 16.0 g/dL    HCT 23.4 (L) 36.0 - 48.0 %    MCV 88.0 74.0 - 97.0 FL    MCH 28.9 24.0 - 34.0 PG    MCHC 32.9 31.0 - 37.0 g/dL    RDW 16.5 (H) 11.6 - 14.5 %    PLATELET 59 (L) 049 - 420 K/uL    MPV 14.0 (H) 9.2 - 11.8 FL    NEUTROPHILS 75 (H) 40 - 73 %    LYMPHOCYTES 17 (L) 21 - 52 %    MONOCYTES 7 3 - 10 %    EOSINOPHILS 1 0 - 5 %    BASOPHILS 0 0 - 2 %    ABS. NEUTROPHILS 4.6 1.8 - 8.0 K/UL    ABS. LYMPHOCYTES 1.0 0.9 - 3.6 K/UL    ABS. MONOCYTES 0.4 0.05 - 1.2 K/UL    ABS. EOSINOPHILS 0.1 0.0 - 0.4 K/UL    ABS. BASOPHILS 0.0 0.0 - 0.06 K/UL    DF AUTOMATED     GLUCOSE, POC    Collection Time: 04/04/18  4:00 PM   Result Value Ref Range    Glucose (POC) 143 (H) 70 - 110 mg/dL   LEGIONELLA PNEUMOPHILA AG, URINE    Collection Time: 04/04/18  5:21 PM   Result Value Ref Range    Legionella Ag, urine NEGATIVE  NEG     STREP PNEUMO AG, URINE    Collection Time: 04/04/18  5:21 PM   Result Value Ref Range    Strep pneumo Ag, urine NEGATIVE  NEG         Imaging:  I have personally reviewed the patients radiographs and have reviewed the reports:  XR Results (most recent):    Results from Hospital Encounter encounter on 03/31/18   XR CHEST PA LAT   Narrative Chest    Indication: follow up atelectasis/pleural effusions     Comparison: April 2, 2018    Findings: Two views. No pneumothorax. No pleural effusion. Bilateral lower lung  zone atelectasis with patchy bilateral medial lower lung zone opacities as well  as moderate pulmonary vascular congestion. Cardiomediastinal silhouette and  osseous structures grossly unchanged.          Impression Impression: No significant change as detailed                   Nathen Richards MD/MPH     Pulmonary, Critical Care Medicine  [facility]

## 2018-04-04 NOTE — PROGRESS NOTES
Problem: Mobility Impaired (Adult and Pediatric)  Goal: *Acute Goals and Plan of Care (Insert Text)  Physical Therapy Goals  Initiated 4/3/2018 and to be accomplished within 7 day(s)  1. Patient will move from supine to sit and sit to supine, scoot up and down and roll side to side in bed with minimal assistance/contact guard assist.     2.  Patient will transfer from bed to chair and chair to bed with minimal assistance/contact guard assist using the least restrictive device. 3.  Patient will perform sit to stand with supervision/set-up. 4.  Patient will ambulate with minimal assistance/contact guard assist for 25 feet with the bilateral platform walker. 5.  Patient will negotiate 150ft in wheelchair using BLE's to propel with supervision assist.   Outcome: Progressing Towards Goal  physical Therapy TREATMENT    Patient: Kathleen Eddy (23 y.o. male)  Date: 4/4/2018  Diagnosis: Acute renal insufficiency  Bradycardia <principal problem not specified>       Precautions:   universal  Chart, physical therapy assessment, plan of care and goals were reviewed. ASSESSMENT:  Patient is cleared by nursing for PT, and patient consents to therapy. Pt performed supine to sit with HOB raised to 80 degrees with CGA. Sitting EOB balance fair. Scooting min A. Sit to stands from higher bed surface CGA and from lower chair min A with rocking technique for increased momentum. Ambulated in room 20 feet with CGA. Pt has narrow MARY BETH and decreased step length. Pt would benefit from using his B platform walker to increase gait distance and decrease gait abnormalities. Pt initially sitting in chair and transport needed pt for a study. Assisted pt back to bed. Sit to supine min A. Pt ended therapy supine in bed with transport getting pt ready to leave for a test. Pt continues to have significant weakness of B UE. Reviewed and educated pt on ankle pumps, LAQ, and marching.  Also educated pt on OOB activities with nursing 3-5 times a day especially for the bathroom and sitting up in chair. Progression toward goals:  [x]      Improving appropriately and progressing toward goals  []      Improving slowly and progressing toward goals  []      Not making progress toward goals and plan of care will be adjusted     PLAN:  Patient continues to benefit from skilled intervention to address the above impairments to increase functional independence. Continue treatment per established plan of care. Discharge Recommendations:  Skilled Nursing Facility  Further Equipment Recommendations for Discharge:  N/A     SUBJECTIVE:   Patient stated I can walk.     OBJECTIVE DATA SUMMARY:   Critical Behavior:  Neurologic State: Alert  Orientation Level: Oriented X4  Cognition: Appropriate for age attention/concentration     Functional Mobility Training:  Bed Mobility:  Supine to Sit: Contact guard assistance (with HOB 80 degrees)  Sit to Supine: Minimum assistance  Scooting: Minimum assistance  Transfers:  Sit to Stand: Contact guard assistance;Minimum assistance (CGA high surface and min A low surface)  Stand to Sit: Contact guard assistance  Balance:  Sitting: Impaired; With support  Sitting - Static: Good (unsupported)  Sitting - Dynamic: Fair (occasional)  Standing: Impaired; With support  Standing - Static: Fair  Standing - Dynamic : Fair  Ambulation/Gait Training:  Distance (ft): 20 Feet (ft)  Assistive Device:  (CGA, pt will benefit from his B platform walker)  Ambulation - Level of Assistance: Contact guard assistance  Gait Abnormalities: Decreased step clearance; Path deviations  Base of Support: Narrowed  Speed/Stacia: Slow  Step Length: Right shortened;Left shortened  Therapeutic Exercises:   Educated on ankle pumps, LAQ, and marching. Pain:  Pre: 0/10    Post: 0/10    Activity Tolerance:   fair  Please refer to the flowsheet for vital signs taken during this treatment.   After treatment:   [] Patient left in no apparent distress sitting up in chair  [x] Patient left in no apparent distress in bed  [x] Call bell left within reach  [x] Nursing notified Erma Gill  [] Caregiver present  [] Bed alarm activated  [x] Personal items in reach      Rob Mortimer, PT, DPT   Time Calculation: 24 mins    Mobility  Current  CJ= 20-39%   Goal  CI= 1-19%. The severity rating is based on the Level of Assistance required for Functional Mobility and ADLs.

## 2018-04-04 NOTE — ROUTINE PROCESS
TRANSFER - OUT REPORT:    Verbal report given to Eliseo Riley RN on Gissell Stout  being transferred to [facility] room 205 for  Transfer of care. Report consisted of patients Situation, Background, Assessment and   Recommendations(SBAR). Information from the following report(s) SBAR, labs, plan of care was reviewed with the receiving nurse. Lines:   Peripheral IV 03/31/18 (Active)   Site Assessment Clean, dry, & intact 4/3/2018  7:36 PM   Phlebitis Assessment 0 4/3/2018  7:36 PM   Infiltration Assessment 0 4/3/2018  7:36 PM   Dressing Status Clean, dry, & intact 4/3/2018  7:36 PM   Dressing Type Tape;Transparent 4/3/2018  4:00 PM   Hub Color/Line Status Pink;Flushed 4/3/2018  4:00 PM   Alcohol Cap Used Yes 4/3/2018  4:00 PM        Opportunity for questions and clarification was provided.       Patient transported with:  24 hr urine collection, personal belongings, going by bed

## 2018-04-04 NOTE — ROUTINE PROCESS
Bedside and Verbal shift change report given to [address] [place] [address] (oncoming nurse) by Chioma Elizalde   (offgoing nurse). Report included the following information SBAR, MAR and Cardiac Rhythm Sinus Rhythm.

## 2018-04-04 NOTE — PROGRESS NOTES
Urology Progress Note        Assessment/Plan:     Patient Active Problem List   Diagnosis Code    Renal cell cancer ([facility] [address] C64.9    Type II diabetes mellitus, uncontrolled ([facility] [address] E11.65    Urinary retention R33.9    Acute respiratory failure with hypoxia (HCC) J96.01    Anemia of chronic disease D63.8    Cancer of left kidney (HCC) C64.2    Cellulitis of foot L03.119    Chronic hepatitis C without hepatic coma (HCC) B18.2    Diabetic foot ulcer (HCC) E11.621, L97.509    Essential hypertension I10    Diarrhea R19.7    Fever R50.9    History of renal cell cancer Z85.528    IV drug abuse F19.10    MRSA bacteremia R78.81    Quadriparesis ([facility] [address] G82.50    Renal mass N28.89    Renal mass, left I86.56    Umbilical hernia I39.8    Hyponatremia E87.1    Bradycardia R00.1    Acute renal insufficiency N28.9    H/O Clostridium difficile infection Z86.19    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 He reports pain is absent. He has complaints of  poor urine flow. He is tolerating a solid diet and ambulating with assistance. Indwelling catheter is draining well.     Objective:     Visit Vitals    /73 (BP 1 Location: Left arm, BP Patient Position: At rest)    Pulse 78    Temp 99 °F (37.2 °C)    Resp 18    Ht 6' (1.829 m)    Wt 181 lb 7 oz (82.3 kg)    SpO2 (!) 88%    BMI 24.61 kg/m2        Temp (24hrs), Av.9 °F (36.6 °C), Min:96.7 °F (35.9 °C), Max:99 °F (37.2 °C)      Intake and Output:   1901 -  0700  In: 4048 [P.O.:1096]  Out: 5749 [Urine:3575]  04/701 -  1900  In: -   Out: 750 [Urine:750]    PHYSICAL EXAMINATION:   Visit Vitals    /73 (BP 1 Location: Left arm, BP Patient Position: At rest)    Pulse 78    Temp 99 °F (37.2 °C)    Resp 18    Ht 6' (1.829 m)    Wt 181 lb 7 oz (82.3 kg)    SpO2 (!) 88%    BMI 24.61 kg/m2     Constitutional: Well developed, well nourished. No acute distress. HEENT: Normocephalic, Atraumatic, EOM's intact   CV:  RRR  Respiratory: No respiratory distress or difficulties breathing   Abdomen:  Soft, non-tender, non-distended   Male:   CVA tenderness: none            PENIS: meatus is open ventrally for1  fm  Willis: clear  Skin: No evidence of jaundice. Normal color  Neuro/Psych:  Alert and oriented. Affect appropriate. Lab/Data Review: All lab results for the last 24 hours reviewed.     Labs:     Labs: Results:   Chemistry    Recent Labs      04/03/18   0333  04/02/18   0525   GLU  110*  58*   NA  144  141   K  5.1  5.6*   CL  118*  114*   CO2  21  20*   BUN  55*  66*   CREA  1.50*  1.56*   CA  8.9  9.0  8.8   AGAP  5  7   BUCR  37*  42*   AP  101  102   TP  7.4  7.1   ALB  3.1*  3.2*   GLOB  4.3*  3.9   AGRAT  0.7*  0.8      CBC w/Diff Recent Labs      04/03/18   1437  04/03/18   0333  04/02/18   0525   WBC  6.8  5.3  6.5   RBC  2.74*  2.67*  2.68*   HGB  7.9*  7.6*  7.8*   HCT  24.3*  23.7*  23.7*   PLT  59*  49*  47*   GRANS  71  72  76*   LYMPH  19*  18*  17*   EOS  2  2  1      Cultures Recent Labs      04/01/18   0945   CULT  NO GROWTH 1 DAY     All Micro Results     Procedure Component Value Units Date/Time    CULTURE, URINE [[id number]] Collected:  04/01/18 0945    Order Status:  Completed Specimen:  Clean catch Updated:  04/02/18 1053     Special Requests: NO SPECIAL REQUESTS        Culture result: NO GROWTH 1 DAY               Urinalysis Color   Date Value Ref Range Status   04/01/2018 YELLOW   Final     Appearance   Date Value Ref Range Status   04/01/2018 CLOUDY   Final     Specific gravity   Date Value Ref Range Status   04/01/2018 1.016 1.005 - 1.030   Final     pH (UA)   Date Value Ref Range Status 04/01/2018 5.0 5.0 - 8.0   Final     Protein   Date Value Ref Range Status   04/01/2018 NEGATIVE  NEG mg/dL Final     Ketone   Date Value Ref Range Status   04/01/2018 NEGATIVE  NEG mg/dL Final     Bilirubin   Date Value Ref Range Status   04/01/2018 NEGATIVE  NEG   Final     Blood   Date Value Ref Range Status   04/01/2018 NEGATIVE  NEG   Final     Urobilinogen   Date Value Ref Range Status   04/01/2018 0.2 0.2 - 1.0 EU/dL Final     Nitrites   Date Value Ref Range Status   04/01/2018 NEGATIVE  NEG   Final     Leukocyte Esterase   Date Value Ref Range Status   04/01/2018 SMALL (A) NEG   Final     Potassium   Date Value Ref Range Status   04/03/2018 5.1 3.5 - 5.5 mmol/L Final     Creatinine   Date Value Ref Range Status   04/03/2018 1.50 (H) 0.6 - 1.3 MG/DL Final     BUN   Date Value Ref Range Status   04/03/2018 55 (H) 7.0 - 18 MG/DL Final      PSA No results for input(s): PSA in the last 72 hours.    Coagulation Lab Results   Component Value Date/Time    Prothrombin time 13.9 04/02/2018 05:25 AM    INR 1.1 04/02/2018 05:25 AM    aPTT 41.2 (H) 04/02/2018 05:25 AM

## 2018-04-05 ENCOUNTER — APPOINTMENT (OUTPATIENT)
Dept: CT IMAGING | Age: 58
DRG: 460 | End: 2018-04-05
Attending: INTERNAL MEDICINE
Payer: MEDICAID

## 2018-04-05 LAB
ADAMTS13 AB, A13ALT: 4 U/ML
ANION GAP SERPL CALC-SCNC: 6 MMOL/L (ref 3–18)
BASOPHILS # BLD: 0 K/UL (ref 0–0.1)
BASOPHILS NFR BLD: 0 % (ref 0–2)
BUN SERPL-MCNC: 35 MG/DL (ref 7–18)
BUN/CREAT SERPL: 26 (ref 12–20)
CALCIUM SERPL-MCNC: 8.8 MG/DL (ref 8.5–10.1)
CHLORIDE SERPL-SCNC: 116 MMOL/L (ref 100–108)
CO2 SERPL-SCNC: 23 MMOL/L (ref 21–32)
CREAT SERPL-MCNC: 1.35 MG/DL (ref 0.6–1.3)
DIFFERENTIAL METHOD BLD: ABNORMAL
EOSINOPHIL # BLD: 0.1 K/UL (ref 0–0.4)
EOSINOPHIL NFR BLD: 1 % (ref 0–5)
ERYTHROCYTE [DISTWIDTH] IN BLOOD BY AUTOMATED COUNT: 16.3 % (ref 11.6–14.5)
GLUCOSE BLD STRIP.AUTO-MCNC: 107 MG/DL (ref 70–110)
GLUCOSE BLD STRIP.AUTO-MCNC: 122 MG/DL (ref 70–110)
GLUCOSE BLD STRIP.AUTO-MCNC: 156 MG/DL (ref 70–110)
GLUCOSE BLD STRIP.AUTO-MCNC: 169 MG/DL (ref 70–110)
GLUCOSE BLD STRIP.AUTO-MCNC: 178 MG/DL (ref 70–110)
GLUCOSE SERPL-MCNC: 111 MG/DL (ref 74–99)
HBV SURFACE AB SER QL IA: NEGATIVE
HBV SURFACE AB SERPL IA-ACNC: <3.1 MIU/ML
HBV SURFACE AG SER QL: <0.1 INDEX
HBV SURFACE AG SER QL: NEGATIVE
HCT VFR BLD AUTO: 22.4 % (ref 36–48)
HCV AB SER IA-ACNC: >11 INDEX
HCV AB SERPL QL IA: POSITIVE
HCV COMMENT,HCGAC: ABNORMAL
HCV GENOTYPE: NORMAL
HCV GENTYP SERPL NAA+PROBE: NORMAL
HCV GENTYP SERPL NAA+PROBE: NORMAL
HCV RNA SERPL NAA+PROBE-ACNC: NORMAL IU/ML
HCV RNA SERPL NAA+PROBE-LOG IU: 6.18 LOG10 IU/ML
HEP BS AB COMMENT,HBSAC: ABNORMAL
HGB BLD-MCNC: 7.2 G/DL (ref 13–16)
LYMPHOCYTES # BLD: 1.7 K/UL (ref 0.9–3.6)
LYMPHOCYTES NFR BLD: 27 % (ref 21–52)
MCH RBC QN AUTO: 28.8 PG (ref 24–34)
MCHC RBC AUTO-ENTMCNC: 32.1 G/DL (ref 31–37)
MCV RBC AUTO: 89.6 FL (ref 74–97)
MONOCYTES # BLD: 0.8 K/UL (ref 0.05–1.2)
MONOCYTES NFR BLD: 13 % (ref 3–10)
NEUTS SEG # BLD: 3.7 K/UL (ref 1.8–8)
NEUTS SEG NFR BLD: 59 % (ref 40–73)
PLATELET # BLD AUTO: 63 K/UL (ref 135–420)
PLEASE NOTE, 550474: NORMAL
PLEASE NOTE, 550474: NORMAL
PMV BLD AUTO: 13.4 FL (ref 9.2–11.8)
POTASSIUM SERPL-SCNC: 4.4 MMOL/L (ref 3.5–5.5)
RBC # BLD AUTO: 2.5 M/UL (ref 4.7–5.5)
SODIUM SERPL-SCNC: 145 MMOL/L (ref 136–145)
TEST INFORMATION, 550045: NORMAL
WBC # BLD AUTO: 6.2 K/UL (ref 4.6–13.2)

## 2018-04-05 PROCEDURE — 82962 GLUCOSE BLOOD TEST: CPT

## 2018-04-05 PROCEDURE — 97530 THERAPEUTIC ACTIVITIES: CPT

## 2018-04-05 PROCEDURE — 85025 COMPLETE CBC W/AUTO DIFF WBC: CPT | Performed by: INTERNAL MEDICINE

## 2018-04-05 PROCEDURE — 86431 RHEUMATOID FACTOR QUANT: CPT | Performed by: INTERNAL MEDICINE

## 2018-04-05 PROCEDURE — 74011000250 HC RX REV CODE- 250: Performed by: FAMILY MEDICINE

## 2018-04-05 PROCEDURE — 82595 ASSAY OF CRYOGLOBULIN: CPT | Performed by: INTERNAL MEDICINE

## 2018-04-05 PROCEDURE — 36415 COLL VENOUS BLD VENIPUNCTURE: CPT | Performed by: INTERNAL MEDICINE

## 2018-04-05 PROCEDURE — 74011636637 HC RX REV CODE- 636/637: Performed by: FAMILY MEDICINE

## 2018-04-05 PROCEDURE — 74011250637 HC RX REV CODE- 250/637: Performed by: UROLOGY

## 2018-04-05 PROCEDURE — 80048 BASIC METABOLIC PNL TOTAL CA: CPT | Performed by: INTERNAL MEDICINE

## 2018-04-05 PROCEDURE — 97110 THERAPEUTIC EXERCISES: CPT

## 2018-04-05 PROCEDURE — 71250 CT THORAX DX C-: CPT

## 2018-04-05 PROCEDURE — 77030010545

## 2018-04-05 PROCEDURE — 74011250637 HC RX REV CODE- 250/637: Performed by: FAMILY MEDICINE

## 2018-04-05 PROCEDURE — 97116 GAIT TRAINING THERAPY: CPT

## 2018-04-05 PROCEDURE — 65660000000 HC RM CCU STEPDOWN

## 2018-04-05 PROCEDURE — 74011250637 HC RX REV CODE- 250/637: Performed by: INTERNAL MEDICINE

## 2018-04-05 PROCEDURE — 74011250636 HC RX REV CODE- 250/636: Performed by: FAMILY MEDICINE

## 2018-04-05 RX ADMIN — INSULIN LISPRO 2 UNITS: 100 INJECTION, SOLUTION INTRAVENOUS; SUBCUTANEOUS at 22:40

## 2018-04-05 RX ADMIN — SODIUM BICARBONATE 650 MG TABLET 650 MG: at 22:39

## 2018-04-05 RX ADMIN — LEVOTHYROXINE SODIUM 50 MCG: 50 TABLET ORAL at 09:50

## 2018-04-05 RX ADMIN — SODIUM BICARBONATE 650 MG TABLET 650 MG: at 18:18

## 2018-04-05 RX ADMIN — PANTOPRAZOLE SODIUM 40 MG: 40 TABLET, DELAYED RELEASE ORAL at 09:50

## 2018-04-05 RX ADMIN — LACTOBACILLUS TAB 2 TABLET: TAB at 18:18

## 2018-04-05 RX ADMIN — FUROSEMIDE 40 MG: 40 TABLET ORAL at 09:49

## 2018-04-05 RX ADMIN — VANCOMYCIN HYDROCHLORIDE 1500 MG: 10 INJECTION, POWDER, LYOPHILIZED, FOR SOLUTION INTRAVENOUS at 06:58

## 2018-04-05 RX ADMIN — HYDROMORPHONE HYDROCHLORIDE 4 MG: 2 TABLET ORAL at 03:11

## 2018-04-05 RX ADMIN — LINAGLIPTIN 5 MG: 5 TABLET, FILM COATED ORAL at 09:49

## 2018-04-05 RX ADMIN — METRONIDAZOLE 500 MG: 500 INJECTION, SOLUTION INTRAVENOUS at 00:38

## 2018-04-05 RX ADMIN — Medication 2 G: at 12:13

## 2018-04-05 RX ADMIN — Medication 2 G: at 00:38

## 2018-04-05 RX ADMIN — METRONIDAZOLE 500 MG: 500 INJECTION, SOLUTION INTRAVENOUS at 12:13

## 2018-04-05 RX ADMIN — TAMSULOSIN HYDROCHLORIDE 0.4 MG: 0.4 CAPSULE ORAL at 09:50

## 2018-04-05 RX ADMIN — LACTOBACILLUS TAB 2 TABLET: TAB at 09:49

## 2018-04-05 RX ADMIN — SODIUM BICARBONATE 650 MG TABLET 650 MG: at 09:49

## 2018-04-05 RX ADMIN — HYDROMORPHONE HYDROCHLORIDE 4 MG: 2 TABLET ORAL at 09:50

## 2018-04-05 NOTE — PROGRESS NOTES
Urology Progress Note        Assessment/Plan:     Patient Active Problem List   Diagnosis Code    Renal cell cancer ([facility] C64.9    Type II diabetes mellitus, uncontrolled ([facility] E11.65    Urinary retention R33.9    Acute respiratory failure with hypoxia (HCC) J96.01    Anemia D64.9    Cancer of left kidney (HCC) C64.2    Cellulitis of foot L03.119    Chronic hepatitis C without hepatic coma (HCC) B18.2    Diabetic foot ulcer (HCC) E11.621, L97.509    Essential hypertension I10    Diarrhea R19.7    Fever R50.9    History of renal cell cancer Z85.528    IV drug abuse F19.10    MRSA bacteremia R78.81    Quadriparesis ([facility] G82.50    Renal mass N28.89    Renal mass, left F32.06    Umbilical hernia T99.3    Hyponatremia E87.1    Bradycardia R00.1    H/O Clostridium difficile infection Z86.19    Light chain deposition disease X75.64    Metabolic acidosis W21.4    Hyperkalemia E87.5    Hypercalcemia E83.52    Chronic kidney disease, stage III (moderate) N18.3    Hepatitis C antibody positive in blood R76.8         Plan:  will d/c  varner for  VT. ..  to check pvr    Ildefonso Andrade MD    [phone]      Subjective:     Daily Progress Note: 2018 7:54 AM    Preston Edwards is doing good. He reports pain is well controlled. He has no complaints. He is tolerating a solid diet and ambulating with assistance. Indwelling catheter is draining well.     Objective:     Visit Vitals    /73 (BP 1 Location: Left arm, BP Patient Position: At rest)    Pulse 60    Temp 98.4 °F (36.9 °C)    Resp 18    Ht 6' (1.829 m)    Wt 181 lb 7 oz (82.3 kg)    SpO2 94%    BMI 24.61 kg/m2        Temp (24hrs), Av.9 °F (37.2 °C), Min:97.9 °F (36.6 °C), Max:99.6 °F (37.6 °C)      Intake and Output:   1901 -  0700  In: -   Out: 4373 [Urine:3275]       PHYSICAL EXAMINATION:   Visit Vitals    /73 (BP 1 Location: Left arm, BP Patient Position: At rest)    Pulse 60    Temp 98.4 °F (36.9 °C)    Resp 18    Ht 6' (1.829 m)    Wt 181 lb 7 oz (82.3 kg)    SpO2 94%    BMI 24.61 kg/m2     Constitutional: Well developed, well nourished. No acute distress. HEENT: Normocephalic, Atraumatic, EOM's intact   CV:  RRR  Respiratory: No respiratory distress or difficulties breathing   Abdomen:  Soft, non-tender, non-distended   Male:   CVA tenderness: nl          Prostate: nl         SCROTUM:  nl         PENIS: nl  Willis: nl  Skin: No evidence of jaundice. Normal color  Neuro/Psych:  Alert and oriented. Affect appropriate. InLab/Data Review: All lab results for the last 24 hours reviewed.     Labs:     Labs: Results:   Chemistry    Recent Labs      04/05/18   0234  04/04/18   0903  04/03/18   0333   GLU  111*  75  110*   NA  145  146*  144   K  4.4  4.6  5.1   CL  116*  115*  118*   CO2  23  23  21   BUN  35*  45*  55*   CREA  1.35*  1.42*  1.50*   CA  8.8  9.1  8.9   AGAP  6  8  5   BUCR  26*  32*  37*   AP   --    --   101   TP   --    --   7.4   ALB   --    --   3.1*   GLOB   --    --   4.3*   AGRAT   --    --   0.7*      CBC w/Diff Recent Labs      04/05/18   0234  04/04/18   0903  04/03/18   1437   WBC  6.2  6.1  6.8   RBC  2.50*  2.66*  2.74*   HGB  7.2*  7.7*  7.9*   HCT  22.4*  23.4*  24.3*   PLT  63*  59*  59*   GRANS  59  75*  71   LYMPH  27  17*  19*   EOS  1  1  2      Cultures Recent Labs      04/04/18   1330   CULT  NO GROWTH AFTER 17 HOURS     All Micro Results     Procedure Component Value Units Date/Time    CULTURE, BLOOD [[id number]] Collected:  04/04/18 1330    Order Status:  Completed Specimen:  Whole Blood from Blood Updated:  04/05/18 0703     Special Requests: NO SPECIAL REQUESTS        Culture result: NO GROWTH AFTER 17 HOURS       LEGIONELLA PNEUMOPHILA AG, URINE [[id number]] Collected:  04/04/18 1721    Order Status:  Completed Specimen:  Urine from Urine, random Updated:  04/04/18 1802     Legionella Ag, urine NEGATIVE        STREP PNEUMO AG, URINE [[id number]] Collected:  04/04/18 1721    Order Status:  Completed Specimen:  Urine, random Updated:  04/04/18 1802     Strep pneumo Ag, urine NEGATIVE        CULTURE, RESPIRATORY/SPUTUM/BRONCH Eric Peruvian STAIN [[id number]]     Order Status:  Sent Specimen:  Sputum from Sputum     CULTURE, URINE [[id number]] Collected:  04/01/18 0945    Order Status:  Completed Specimen:  Clean catch Updated:  04/02/18 1053     Special Requests: NO SPECIAL REQUESTS        Culture result: NO GROWTH 1 DAY               Urinalysis Color   Date Value Ref Range Status   04/01/2018 YELLOW   Final     Appearance   Date Value Ref Range Status   04/01/2018 CLOUDY   Final     Specific gravity   Date Value Ref Range Status   04/01/2018 1.016 1.005 - 1.030   Final     pH (UA)   Date Value Ref Range Status   04/01/2018 5.0 5.0 - 8.0   Final     Protein   Date Value Ref Range Status   04/01/2018 NEGATIVE  NEG mg/dL Final     Ketone   Date Value Ref Range Status   04/01/2018 NEGATIVE  NEG mg/dL Final     Bilirubin   Date Value Ref Range Status   04/01/2018 NEGATIVE  NEG   Final     Blood   Date Value Ref Range Status   04/01/2018 NEGATIVE  NEG   Final     Urobilinogen   Date Value Ref Range Status   04/01/2018 0.2 0.2 - 1.0 EU/dL Final     Nitrites   Date Value Ref Range Status   04/01/2018 NEGATIVE  NEG   Final     Leukocyte Esterase   Date Value Ref Range Status   04/01/2018 SMALL (A) NEG   Final     Potassium   Date Value Ref Range Status   04/05/2018 4.4 3.5 - 5.5 mmol/L Final     Creatinine   Date Value Ref Range Status   04/05/2018 1.35 (H) 0.6 - 1.3 MG/DL Final     BUN   Date Value Ref Range Status   04/05/2018 35 (H) 7.0 - 18 MG/DL Final      PSA No results for input(s): PSA in the last 72 hours.    Coagulation Lab Results   Component Value Date/Time    Prothrombin time 13.9 04/02/2018 05:25 AM    INR 1.1 04/02/2018 05:25 AM    aPTT 41.2 (H) 04/02/2018 05:25 AM

## 2018-04-05 NOTE — PROGRESS NOTES
Progress Note    Yordan Kuhn  59 y.o. Admit Date: 3/31/2018  Active Problems:    Renal cell cancer ([facility] (11/5/2014) POA: Yes      Type II diabetes mellitus, uncontrolled ([facility] (12/16/2015) POA: Yes      Urinary retention (10/30/2017) POA: Yes      Cancer of left kidney ([facility] (12/23/2013) POA: Yes      Quadriparesis ([facility] (5/31/2017) POA: Yes      Bradycardia (3/31/2018) POA: Yes      H/O Clostridium difficile infection (3/31/2018) POA: Yes      Light chain deposition disease (4/2/2018) POA: Clinically Undetermined      Metabolic acidosis (1/1/5214) POA: Unknown      Hyperkalemia (4/2/2018) POA: Unknown      Hypercalcemia (4/2/2018) POA: Unknown      Chronic kidney disease, stage III (moderate) (4/4/2018) POA: Clinically Undetermined      Hepatitis C antibody positive in blood (4/4/2018) POA: Clinically Undetermined            Subjective:     Patient is out of bed to chair. A comprehensive review of systems was negative except for that written in the History of Present Illness.     Objective:     Visit Vitals    /79 (BP 1 Location: Left arm, BP Patient Position: Sitting)    Pulse 61    Temp 97.4 °F (36.3 °C)    Resp 20    Ht 6' (1.829 m)    Wt 82.3 kg (181 lb 7 oz)    SpO2 95%    BMI 24.61 kg/m2         Intake/Output Summary (Last 24 hours) at 04/05/18 1435  Last data filed at 04/05/18 0947   Gross per 24 hour   Intake              480 ml   Output             2175 ml   Net            -1695 ml       Current Facility-Administered Medications   Medication Dose Route Frequency Provider Last Rate Last Dose    [START ON 4/6/2018] Vancomycin Trough Level Due 4/6/18 @ 1730  1 Each Other DAILY Naomi Swift MD        tamsulosin (FLOMAX) capsule 0.4 mg  0.4 mg Oral DAILY Renetta Yee MD   0.4 mg at 04/05/18 0950    vancomycin (VANCOCIN) 1,500 mg in 0.9% sodium chloride 500 mL IVPB  1,500 mg IntraVENous Q18H Ivin Engman, .3 mL/hr at 04/05/18 0658 1,500 mg at 04/05/18 0658    cefepime (MAXIPIME) 2 g in sterile water (preservative free) 10 mL IV syringe  2 g IntraVENous Q12H Td Trujillo MD   2 g at 04/05/18 1213    metroNIDAZOLE (FLAGYL) IVPB premix 500 mg  500 mg IntraVENous Q12H Td Trujillo  mL/hr at 04/05/18 1213 500 mg at 04/05/18 1213    HYDROmorphone (DILAUDID) tablet 4 mg  4 mg Oral Q6H PRN Td Trujillo MD   4 mg at 04/05/18 0950    furosemide (LASIX) tablet 40 mg  40 mg Oral DAILY Javy Herring MD   40 mg at 04/05/18 3068    sodium bicarbonate tablet 650 mg  650 mg Oral TID Javy Herring MD   650 mg at 04/05/18 0949    levothyroxine (SYNTHROID) tablet 50 mcg  50 mcg Oral PARKERB Td Trujillo MD   50 mcg at 04/05/18 0950    glucose chewable tablet 16 g  4 Tab Oral PRN Td Trujillo MD        glucagon (GLUCAGEN) injection 1 mg  1 mg IntraMUSCular PRN Td Trujillo MD        dextrose (D50W) injection syrg 12.5-25 g  25-50 mL IntraVENous PRN Td Trujillo MD        insulin lispro (HUMALOG) injection   SubCUTAneous AC&HS Lisa Flores MD   Stopped at 04/02/18 2200    hydrALAZINE (APRESOLINE) tablet 25 mg  25 mg Oral TID PRN Td Trujillo MD        Lactobacillus Acidoph & Bulgar [facility]) tablet 2 Tab  2 Tab Oral BID Td Trujillo MD   2 Tab at 04/05/18 0949    linagliptin (TRADJENTA) tablet 5 mg  5 mg Oral PARKERB Td Trujillo MD   5 mg at 04/05/18 0949    pantoprazole (PROTONIX) tablet 40 mg  40 mg Oral DANIE Trujillo MD   40 mg at 04/05/18 5201        Physical Exam:     Physical Exam:   General:  Alert, cooperative, no distress, appears stated age. Neck: Supple, symmetrical, trachea midline, no adenopathy, thyroid: no enlargement/tenderness/nodules, no carotid bruit and no JVD. Lungs:   Clear to auscultation bilaterally. Heart:  Regular rate and rhythm, S1, S2 normal, no murmur, click, rub or gallop. Abdomen:   Soft, non-tender. Bowel sounds normal. No masses,  No organomegaly. Extremities: Extremities normal, atraumatic, no cyanosis or edema. Data Review:    CBC w/Diff    Recent Labs      04/05/18 0234 04/04/18   0903 04/03/18   1437   WBC  6.2  6.1  6.8   RBC  2.50*  2.66*  2.74*   HGB  7.2*  7.7*  7.9*   HCT  22.4*  23.4*  24.3*   MCV  89.6  88.0  88.7   MCH  28.8  28.9  28.8   MCHC  32.1  32.9  32.5   RDW  16.3*  16.5*  16.3*    Recent Labs      04/05/18 0234 04/04/18   0903  04/03/18   1437   MONOS  13*  7  8   EOS  1  1  2   BASOS  0  0  0   RDW  16.3*  16.5*  16.3*        Comprehensive Metabolic Profile    Recent Labs      04/05/18 0234 04/04/18   0903 04/03/18   0333   NA  145  146*  144   K  4.4  4.6  5.1   CL  116*  115*  118*   CO2  23  23  21   BUN  35*  45*  55*   CREA  1.35*  1.42*  1.50*    Recent Labs      04/05/18 0234 04/04/18   0903 04/03/18   0333   CA  8.8  9.1  8.9   ALB   --    --   3.1*   TP   --    --   7.4   SGOT   --    --   28   TBILI   --    --   0.8        Results for Ruth Hernandez (MRN [id number]) as of 4/5/2018 14:37   Ref. Range 4/2/2018 05:25   Hepatitis B surface Ag Latest Ref Range: <1.00 Index <0.10   Hep B surface Ag Interp. Latest Ref Range: NEG   NEGATIVE   Hepatitis B surface Ab Latest Ref Range: >10.0 mIU/mL <3.10 (L)   Hep B surface Ab Interp. Latest Ref Range: POS   NEGATIVE (A)   Hep B surface Ab comment Latest Units:   Samples with a  v... Hepatitis C virus Ab Latest Ref Range: <0.80 Index >11.00 (H)   Hep C  virus Ab Interp.  Latest Ref Range: NEG   POSITIVE (A)   Hep C  virus Ab comment Latest Units:   Pend   Abnormal SPEP, High Free lamda/Kappa Light cjhain ratio                Impression:       Active Hospital Problems    Diagnosis Date Noted    Chronic kidney disease, stage III (moderate) 04/04/2018    Hepatitis C antibody positive in blood 04/04/2018    Light chain deposition disease 67/32/8753    Metabolic acidosis 09/02/5010    Hyperkalemia 04/02/2018    Hypercalcemia 04/02/2018    Bradycardia 03/31/2018    H/O Clostridium difficile infection 03/31/2018    Urinary retention 10/30/2017    Quadriparesis ([facility] 05/31/2017    Type II diabetes mellitus, uncontrolled ([facility] 12/16/2015    Renal cell cancer ([facility] 11/05/2014    Cancer of left kidney ([facility] 12/23/2013            Plan:     Needs Bone marrow & Kidney Biopsy.  Will Schedule with  Interventional Radiologist.      Serafin Freeman MD

## 2018-04-05 NOTE — PROGRESS NOTES
Hematology/Medical Oncology Progress Note      NAME: Jeff Conklin   :  1960  MRM:  [id number]    Date/Time: 2018 8:42 AM         Subjective:     Mr Vishal Arias is a 59 y.o. Man with renal failure and progressive thrombocytopenia. Currently he reports that he is feeling better. There are no new complaints. Past Medical History reviewed and unchanged from Admission History and Physical    Review of Systems   Constitutional: Negative for chills, fatigue, fever and unexpected weight change. HENT: Negative for ear discharge, ear pain, facial swelling, mouth sores, nosebleeds, sneezing, sore throat and tinnitus. Eyes: Negative for photophobia, pain, discharge, redness, itching and visual disturbance. Respiratory: Negative for apnea, cough, choking, chest tightness, shortness of breath, wheezing and stridor. Cardiovascular: Negative for chest pain, palpitations and leg swelling. Gastrointestinal: Negative for abdominal distention, abdominal pain, anal bleeding, blood in stool, constipation, diarrhea, nausea, rectal pain and vomiting. Genitourinary: Negative for decreased urine volume, difficulty urinating, discharge, dysuria, enuresis, flank pain, frequency, genital sores, hematuria, penile pain, penile swelling, scrotal swelling, testicular pain and urgency. Musculoskeletal: Negative for arthralgias, back pain, gait problem, joint swelling, myalgias, neck pain and neck stiffness. Skin: Negative for color change, pallor and rash. Neurological: Negative for dizziness, tremors, seizures, syncope, facial asymmetry, speech difficulty, weakness, light-headedness, numbness and headaches. Hematological: Negative for adenopathy. Does not bruise/bleed easily. Psychiatric/Behavioral: Negative for agitation, behavioral problems, confusion, decreased concentration, dysphoric mood, hallucinations, self-injury, sleep disturbance and suicidal ideas.  The patient is not nervous/anxious and is not hyperactive. Objective:       Vitals:      Last 24hrs VS reviewed since prior progress note. Most recent are:    Visit Vitals    /73 (BP 1 Location: Left arm, BP Patient Position: At rest)    Pulse 60    Temp 98.4 °F (36.9 °C)    Resp 18    Ht 6' (1.829 m)    Wt 82.3 kg (181 lb 7 oz)    SpO2 94%    BMI 24.61 kg/m2     SpO2 Readings from Last 6 Encounters:   04/05/18 94%   03/20/18 99%   12/03/17 100%   03/02/16 99%   02/29/16 95%   02/23/16 96%    O2 Flow Rate (L/min): 2 l/min       Intake/Output Summary (Last 24 hours) at 04/05/18 0842  Last data filed at 04/05/18 0656   Gross per 24 hour   Intake                0 ml   Output             2175 ml   Net            -2175 ml          Exam:      Physical Exam   Nursing note and vitals reviewed. Constitutional: He is oriented to person, place, and time. He appears well-developed and well-nourished. No distress. HENT:   Head: Normocephalic and atraumatic. Mouth/Throat: No oropharyngeal exudate. Eyes: Conjunctivae and EOM are normal. Pupils are equal, round, and reactive to light. Right eye exhibits no discharge. Left eye exhibits no discharge. No scleral icterus. Neck: Normal range of motion. Neck supple. No tracheal deviation present. No thyromegaly present. Cardiovascular: Normal rate and regular rhythm. Exam reveals no gallop and no friction rub. No murmur heard. Pulmonary/Chest: Effort normal and breath sounds normal. No apnea. No respiratory distress. He has no wheezes. He has no rales. Chest wall is not dull to percussion. He exhibits no mass, no tenderness, no bony tenderness, no laceration, no crepitus, no edema, no deformity, no swelling and no retraction. Right breast exhibits no inverted nipple, no mass, no nipple discharge, no skin change and no tenderness. Left breast exhibits no inverted nipple, no mass, no nipple discharge, no skin change and no tenderness. Breasts are symmetrical.   Abdominal: Soft.  Bowel sounds are normal. He exhibits no distension. There is no tenderness. There is no rebound and no guarding. Musculoskeletal: Normal range of motion. He exhibits no edema or tenderness. Lymphadenopathy:     He has no cervical adenopathy. Neurological: He is alert and oriented to person, place, and time. Coordination normal.   Skin: Skin is warm and dry. No rash noted. He is not diaphoretic. No erythema. No pallor. Psychiatric: He has a normal mood and affect.  His behavior is normal. Thought content normal.       Telemetry reviewed:   normal sinus rhythm    Lab Data Reviewed: (see below)      Medications:  Current Facility-Administered Medications   Medication Dose Route Frequency    tamsulosin (FLOMAX) capsule 0.4 mg  0.4 mg Oral DAILY    vancomycin (VANCOCIN) 1,500 mg in 0.9% sodium chloride 500 mL IVPB  1,500 mg IntraVENous Q18H    cefepime (MAXIPIME) 2 g in sterile water (preservative free) 10 mL IV syringe  2 g IntraVENous Q12H    metroNIDAZOLE (FLAGYL) IVPB premix 500 mg  500 mg IntraVENous Q12H    HYDROmorphone (DILAUDID) tablet 4 mg  4 mg Oral Q6H PRN    furosemide (LASIX) tablet 40 mg  40 mg Oral DAILY    sodium bicarbonate tablet 650 mg  650 mg Oral TID    levothyroxine (SYNTHROID) tablet 50 mcg  50 mcg Oral ACB    glucose chewable tablet 16 g  4 Tab Oral PRN    glucagon (GLUCAGEN) injection 1 mg  1 mg IntraMUSCular PRN    dextrose (D50W) injection syrg 12.5-25 g  25-50 mL IntraVENous PRN    insulin lispro (HUMALOG) injection   SubCUTAneous AC&HS    hydrALAZINE (APRESOLINE) tablet 25 mg  25 mg Oral TID PRN    Lactobacillus Acidoph & Bulgar (FLORANEX) tablet 2 Tab  2 Tab Oral BID    linagliptin (TRADJENTA) tablet 5 mg  5 mg Oral ACB    pantoprazole (PROTONIX) tablet 40 mg  40 mg Oral ACB       ______________________________________________________________________      Lab Review:     Recent Labs      04/05/18   0234  04/04/18   0903  04/03/18   1437   WBC  6.2  6.1  6.8   HGB  7.2*  7.7*  7.9* HCT  22.4*  23.4*  24.3*   PLT  63*  59*  59*     Recent Labs      04/05/18   0234  04/04/18   0903  04/03/18   0333   NA  145  146*  144   K  4.4  4.6  5.1   CL  116*  115*  118*   CO2  23  23  21   GLU  111*  75  110*   BUN  35*  45*  55*   CREA  1.35*  1.42*  1.50*   CA  8.8  9.1  8.9   ALB   --    --   3.1*   SGOT   --    --   28   ALT   --    --   60     No components found for: GLPOC  No results for input(s): PH, PCO2, PO2, HCO3, FIO2 in the last 72 hours. No results for input(s): INR in the last 72 hours. 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 Thrombocytopenia: I have explained to the patient that his platelets are now increasing. The most recent CBC shows that his platelet count is now 63,000 with a hemoglobin of 7.2g/dL hematocrit of 22.4%. No therapeutic intervention is necessary at this point. The likely causes of his thrombocytopenia are iatrogenic or infection related. The peripheral reveals no schistocytes to suggest TTP/HUS. The LDH level is normal at 189 U/L and the SPEP is still pending. 2. Anemia in CKD: pending results or iron studies the patient may benefit from procrit.          Total time spent with patient:25 minutes                  Care Plan discussed with: Patient, Nursing Staff and Consultant/Specialist    Discussed:  Care Plan    Prophylaxis:  SCD's    Disposition:  Home w/Family           ___________________________________________________    Attending Physician: Karen Simmons MD

## 2018-04-05 NOTE — PROGRESS NOTES
Problem: Mobility Impaired (Adult and Pediatric)  Goal: *Acute Goals and Plan of Care (Insert Text)  Physical Therapy Goals  Initiated 4/3/2018 and to be accomplished within 7 day(s)  1. Patient will move from supine to sit and sit to supine, scoot up and down and roll side to side in bed with minimal assistance/contact guard assist.     2.  Patient will transfer from bed to chair and chair to bed with minimal assistance/contact guard assist using the least restrictive device. 3.  Patient will perform sit to stand with supervision/set-up. 4.  Patient will ambulate with minimal assistance/contact guard assist for 25 feet with the bilateral platform walker. 5.  Patient will negotiate 150ft in wheelchair using BLE's to propel with supervision assist.   physical Therapy TREATMENT    Patient: Wero Ty (77 y.o. male)  Date: 4/5/2018  Diagnosis: Acute renal insufficiency  Bradycardia <principal problem not specified>       Precautions:     Chart, physical therapy assessment, plan of care and goals were reviewed. ASSESSMENT:  Pt found sitting up in bedside chair. Pt pleasant and agreed to participate in therapy. Pt performed sit to stand with min-a to RW. Once standing pt incontinent of bladder. PTA cleaned floor and perform pericare for pt, before progressing with therapy. Pt then performed second sit to stand to RW with min-a. Pt then amb in room with CGA for 30 ft before returning to seated in bedside chair. Pt left with all needs met and call bell in reach. Progression toward goals:  []      Improving appropriately and progressing toward goals  [x]      Improving slowly and progressing toward goals  []      Not making progress toward goals and plan of care will be adjusted     PLAN:  Patient continues to benefit from skilled intervention to address the above impairments. Continue treatment per established plan of care.   Discharge Recommendations:  [address] Recommendations for Discharge:  N/A     SUBJECTIVE:   Patient stated I can stand up ok, I just don't have a lot of strength in my arms, especially my Left.     OBJECTIVE DATA SUMMARY:   Critical Behavior:  Neurologic State: Alert  Orientation Level: Oriented X4  Cognition: Follows commands  Functional Mobility Training:  Bed Mobility:  Transfers:  Sit to Stand: Contact guard assistance;Minimum assistance  Stand to Sit: Contact guard assistance;Minimum assistance  Balance:  Sitting: Intact; With support  Sitting - Static: Good (unsupported)  Sitting - Dynamic: Fair (occasional)  Standing: Intact; With support  Standing - Static: Fair;Poor  Standing - Dynamic : Fair  Ambulation/Gait Training:  Distance (ft): 30 Feet (ft)  Assistive Device: Walker, rolling  Ambulation - Level of Assistance: Contact guard assistance  Gait Description (WDL): Exceptions to WDL  Gait Abnormalities: Decreased step clearance; Path deviations   Base of Support: Narrowed  Speed/Stacia: Slow  Step Length: Left shortened;Right shortened   Distance (ft): 30 Feet (ft)Pain:  Pain Scale 1: Numeric (0 - 10)  Pain Intensity 1: 7  Pain Location 1: Hand;Neck  Pain Orientation 1: Anterior;Posterior  Pain Description 1: Aching  Pain Intervention(s) 1: Medication (see MAR); Repositioned  Activity Tolerance:   Fair+  Please refer to the flowsheet for vital signs taken during this treatment. After treatment:   [x] Patient left in no apparent distress sitting up in chair  [] Patient left in no apparent distress in bed  [x] Call bell left within reach  [x] Nursing notified  [] Caregiver present  [] Bed alarm activated      Ashly Gutierrez   Time Calculation: 23 mins    Mobility  Current  CJ= 20-39%   Goal  CI= 1-19%  D/C  CJ= 20-39%. The severity rating is based on the Level of Assistance required for Functional Mobility and ADLs.

## 2018-04-05 NOTE — PROGRESS NOTES
[facility]  Pulmonary, Critical Care, and Sleep Medicine    Patient Consult    Name: Mickey Mason MRN: [id number]   : 1960 Hospital: [address] [place] [address] Dr   Date: 2018        This patient has been seen and evaluated at the request of Dr. Brooke Tapia for hypoxia. IMPRESSION:   · Hypoxia:  Etiology due to LL atelectasis vs pneumonia  · B/L LL opacities:  Suspect aspiration pneumonia given patient's poor functional status. May be due to atelectasis due to being bedbound vs component of heart failure (elevated NT pro BNP with LA dilation on TTE)  · Deconditioning  · Renal insufficiency, chronic  · Hypernatremia  · Chronic anemia  · Hep C      RECOMMENDATIONS:   · CT chest done today reviewed with Dr Nathanael Fink- continue antibiotic  · Await sputum culture  · Continue O2/NC, titrate to keep goal SpO2 >88%  · Continue gentle diuresis as tolerated -- will defer to Nephrology consult  · Strict aspiration precautions, HOB elevated  · Aggressive pulmonary toilet  · Frequent IS  · PT/OT, OOB to chair  · DVT ppx per primary service     HPI:  Patient is a 59 y.o. male with hx of renal cell Ca, chronic drug use, nursing home resident presented to [facility] with complaints of LE swelling about 3-4 days prior to admission. Pt was noted to have bradycardia on admission. Pt was found to be hypoxic requiring supplemental oxygen. CXR showed B/L opacities in bases. Pt had recent hospiitalization for flu PNA, c-diff. Pt has hx of chronic diskitis/osteomyelitis s/p C3-7 fusion. Pt found to have progressive thrombcytopenia here. Etiology of CKD also unclear at this point. Pt denies any fevers, chills, N/V/D, dysphagia, night sweats, hemoptysis     Subjective:     Pt up in the chair, PT in- report pt able to stand up with assistance/sit up in the chair without problem.   He report breathing is okay, on O2/NC @ 2 Lpm 95%, ranges 88-95%, he denies cough with mucus or hemoptysis, no chest pain or any other complaint verbalized. Pt currently afebrile, VS stable. Past Medical History:   Diagnosis Date    Chronic drug abuse 1974    Diabetes ([facility] [address] 01/1990    Hypertension     Renal cell carcinoma of left kidney ([facility] [address] 12/17/13    Pathological Stage [id number] cc RCC FG3 s/p left open partial nephrectomy in 12/13      Renal mass       Past Surgical History:   Procedure Laterality Date    HX CIRCUMCISION  12/17/13    Dr. Catalino Balbuena, [facility] HX NEPHRECTOMY Left 12/17/13    Open Partial, Dr. Catalino Balbuena, [facility]    HX OTHER SURGICAL Right 2/27/2016    removed right ft  2nd meta-tarsal      Prior to Admission medications    Medication Sig Start Date End Date Taking? Authorizing Provider   NUT. TX.GLUCOSE INTOLERANCE,SOY (GLUCERNA PO) Take  by mouth. Historical Provider   magnesium hydroxide ([employer] MILK OF MAGNESIA) 400 mg/5 mL suspension Take 30 mL by mouth daily as needed for Constipation. Historical Provider   LACTOBACILLUS RHAMNOSUS GG (CULTURELLE PO) Take  by mouth. Historical Provider   doxycycline (MONODOX) 100 mg capsule Take 100 mg by mouth two (2) times a day. Historical Provider   mineral oil-hydrophil petrolat (AQUAPHOR) ointment Apply  to affected area as needed for Dry Skin. Historical Provider   mineral oil-hydrophil petrolat (AQUAPHOR) ointment Apply  to affected area as needed for Dry Skin. Historical Provider   PREPARATION H, WITCH HAZEL, EX by Apply Externally route. Historical Provider   linagliptin (TRADJENTA) 5 mg tablet Take 5 mg by mouth daily. Historical Provider   tamsulosin (FLOMAX) 0.4 mg capsule Take 0.4 mg by mouth daily. Historical Provider   acetaminophen (TYLENOL) 500 mg tablet 1,000 mg. 6/7/17   Historical Provider   bisacodyl (DULCOLAX) 5 mg EC tablet 10 mg. 6/7/17   Historical Provider   gabapentin (NEURONTIN) 100 mg capsule 100 mg.     Historical Provider   atorvastatin (LIPITOR) 40 mg tablet 40 mg. 6/7/17   Historical Provider   amLODIPine (NORVASC) 10 mg tablet 10 mg. 5/28/15   Historical Provider   atenolol (TENORMIN) 25 mg tablet 25 mg. 5/28/15   Historical Provider   ibuprofen (MOTRIN) 400 mg tablet 400 mg. 6/7/17   Historical Provider   HYDROmorphone (DILAUDID) 4 mg tablet 4 mg. 6/7/17   Historical Provider   albuterol-ipratropium (DUO-NEB) 2.5 mg-0.5 mg/3 ml nebu 3 mL. 6/7/17   Historical Provider   Menthol-Zinc Oxide (CALMOSEPTINE) 0.44-20.6 % oint Use 1 Application to affected area. Historical Provider   nicotine (NICODERM CQ) 14 mg/24 hr patch 1 Patch. 6/7/17   Historical Provider   rifAMPin (RIFADIN) 300 mg capsule 600 mg. 6/7/17   Historical Provider   polyethylene glycol (MIRALAX) 17 gram packet 17 g. Historical Provider   silver sulfADIAZINE (SILVADENE) 1 % topical cream Use 1 Application to affected area Twice Daily. knees 6/7/17   Historical Provider   multivitamin, tx-iron-ca-min (THERAPY M) 9 mg iron-400 mcg tab tablet Take 1 Tab by Mouth Once a Day. 6/7/17   Historical Provider   Zolpidem 10 mg subl 10 mg. Historical Provider   insulin glargine (LANTUS) 100 unit/mL injection 4 Units. 6/7/17   Historical Provider   insulin lispro (HUMALOG) 100 unit/mL injection 2-10 Units. 6/7/17   Historical Provider   lisinopril (PRINIVIL, ZESTRIL) 40 mg tablet Take 1 Tab by mouth daily. 3/2/16   Jose Joyner MD   pravastatin (PRAVACHOL) 20 mg tablet Take 1 Tab by mouth nightly. 2/29/16   Akihl Perez MD   insulin lispro (HUMALOG KWIKPEN) 100 unit/mL kwikpen Blood Sugar <150 =0 units; 150 -199 =2 units; 200 -249 =4 units; 250 -299 =6 units; 300 -349 =8 units; 350 and above =10 units. 2/29/16   Akhil Perez MD   insulin glargine (LANTUS SOLOSTAR) 100 unit/mL (3 mL) pen 50 Units by SubCUTAneous route daily.  2/29/16   Akhil Perez MD     Allergies   Allergen Reactions    Iodine Hives      Social History   Substance Use Topics    Smoking status: Former Smoker     Packs/day: 0.50     Years: 30.00     Types: Cigarettes    Smokeless tobacco: Never Used    Alcohol use 8.4 oz/week     14 Cans of beer per week      Comment: watching sports      Family History   Problem Relation Age of Onset    Diabetes Mother     Sickle Cell Anemia Father     Diabetes Sister     Hypertension Sister         Immunization status: delayed. Current Facility-Administered Medications   Medication Dose Route Frequency    [START ON 2018] Vancomycin Trough Level Due 18 @ 1730  1 Each Other DAILY    tamsulosin (FLOMAX) capsule 0.4 mg  0.4 mg Oral DAILY    vancomycin (VANCOCIN) 1,500 mg in 0.9% sodium chloride 500 mL IVPB  1,500 mg IntraVENous Q18H    cefepime (MAXIPIME) 2 g in sterile water (preservative free) 10 mL IV syringe  2 g IntraVENous Q12H    metroNIDAZOLE (FLAGYL) IVPB premix 500 mg  500 mg IntraVENous Q12H    furosemide (LASIX) tablet 40 mg  40 mg Oral DAILY    sodium bicarbonate tablet 650 mg  650 mg Oral TID    levothyroxine (SYNTHROID) tablet 50 mcg  50 mcg Oral ACB    insulin lispro (HUMALOG) injection   SubCUTAneous AC&HS    Lactobacillus Acidoph & Bulgar (FLORANEX) tablet 2 Tab  2 Tab Oral BID    linagliptin (TRADJENTA) tablet 5 mg  5 mg Oral ACB    pantoprazole (PROTONIX) tablet 40 mg  40 mg Oral ACB       Review of Systems:  A comprehensive review of systems was negative except for that written in the HPI.     Objective:   Vital Signs:    Visit Vitals    /79 (BP 1 Location: Left arm, BP Patient Position: Sitting)    Pulse 61    Temp 97.4 °F (36.3 °C)    Resp 20    Ht 6' (1.829 m)    Wt 82.3 kg (181 lb 7 oz)    SpO2 95%    BMI 24.61 kg/m2       O2 Device: Nasal cannula   O2 Flow Rate (L/min): 2 l/min   Temp (24hrs), Av.7 °F (37.1 °C), Min:97.4 °F (36.3 °C), Max:99.6 °F (37.6 °C)       Intake/Output:   Last shift:      701 - 1900  In: 480 [P.O.:480]  Out: -   Last 3 shifts:  1901 -  0700  In: -   Out: 1593 [Urine:3275]    Intake/Output Summary (Last 24 hours) at 18 1406  Last data filed at 18 7699   Gross per 24 hour   Intake              480 ml   Output             2175 ml   Net            -1695 ml      Physical Exam:   General:  Alert, cooperative, no distress, appears stated age. Head:  Normocephalic, without obvious abnormality, atraumatic. Eyes:  Conjunctivae/corneas clear. PERRL, EOMs intact. Nose: Nares normal. Septum midline. Mucosa normal. No drainage or sinus tenderness. Throat: Lips, mucosa, and tongue normal.  gums normal, wearing dentures   Neck: Supple, symmetrical, trachea midline, no adenopathy, no carotid bruit and no JVD. Back:   Symmetric, no curvature. ROM normal.   Lungs:   Decreased breath sounds in B/L bases with some scattered rales and rhonchi more in the bases, no wheezes   Chest wall:  No tenderness or deformity. Heart:  Regular rate and rhythm, S1, S2 normal, no murmur, click, rub or gallop. Abdomen:   Soft, non-tender. Bowel sounds normal. No masses,  No organomegaly. Extremities: Extremities normal, atraumatic, no cyanosis or edema. Pulses: 2+ and symmetric all extremities.    Skin: Skin color, texture, turgor normal. No rashes or lesions   Lymph nodes: Cervical, supraclavicular, and axillary nodes normal.   Neurologic: Grossly nonfocal, diffuse weakness throughout, AAOx3     Data review:     Recent Results (from the past 24 hour(s))   GLUCOSE, POC    Collection Time: 04/04/18  4:00 PM   Result Value Ref Range    Glucose (POC) 143 (H) 70 - 110 mg/dL   LEGIONELLA PNEUMOPHILA AG, URINE    Collection Time: 04/04/18  5:21 PM   Result Value Ref Range    Legionella Ag, urine NEGATIVE  NEG     STREP PNEUMO AG, URINE    Collection Time: 04/04/18  5:21 PM   Result Value Ref Range    Strep pneumo Ag, urine NEGATIVE  NEG     GLUCOSE, POC    Collection Time: 04/04/18  9:51 PM   Result Value Ref Range    Glucose (POC) 133 (H) 70 - 110 mg/dL   CBC WITH AUTOMATED DIFF    Collection Time: 04/05/18  2:34 AM   Result Value Ref Range    WBC 6.2 4.6 - 13.2 K/uL    RBC 2.50 (L) 4.70 - 5.50 M/uL    HGB 7.2 (L) 13.0 - 16.0 g/dL    HCT 22.4 (L) 36.0 - 48.0 %    MCV 89.6 74.0 - 97.0 FL    MCH 28.8 24.0 - 34.0 PG    MCHC 32.1 31.0 - 37.0 g/dL    RDW 16.3 (H) 11.6 - 14.5 %    PLATELET 63 (L) 628 - 420 K/uL    MPV 13.4 (H) 9.2 - 11.8 FL    NEUTROPHILS 59 40 - 73 %    LYMPHOCYTES 27 21 - 52 %    MONOCYTES 13 (H) 3 - 10 %    EOSINOPHILS 1 0 - 5 %    BASOPHILS 0 0 - 2 %    ABS. NEUTROPHILS 3.7 1.8 - 8.0 K/UL    ABS. LYMPHOCYTES 1.7 0.9 - 3.6 K/UL    ABS. MONOCYTES 0.8 0.05 - 1.2 K/UL    ABS. EOSINOPHILS 0.1 0.0 - 0.4 K/UL    ABS. BASOPHILS 0.0 0.0 - 0.1 K/UL    DF AUTOMATED     METABOLIC PANEL, BASIC    Collection Time: 04/05/18  2:34 AM   Result Value Ref Range    Sodium 145 136 - 145 mmol/L    Potassium 4.4 3.5 - 5.5 mmol/L    Chloride 116 (H) 100 - 108 mmol/L    CO2 23 21 - 32 mmol/L    Anion gap 6 3.0 - 18 mmol/L    Glucose 111 (H) 74 - 99 mg/dL    BUN 35 (H) 7.0 - 18 MG/DL    Creatinine 1.35 (H) 0.6 - 1.3 MG/DL    BUN/Creatinine ratio 26 (H) 12 - 20      GFR est AA >60 >60 ml/min/1.73m2    GFR est non-AA 54 (L) >60 ml/min/1.73m2    Calcium 8.8 8.5 - 10.1 MG/DL   GLUCOSE, POC    Collection Time: 04/05/18  3:16 AM   Result Value Ref Range    Glucose (POC) 122 (H) 70 - 110 mg/dL   GLUCOSE, POC    Collection Time: 04/05/18  8:22 AM   Result Value Ref Range    Glucose (POC) 107 70 - 110 mg/dL   GLUCOSE, POC    Collection Time: 04/05/18 11:19 AM   Result Value Ref Range    Glucose (POC) 169 (H) 70 - 110 mg/dL       Imaging:  I have personally reviewed the patients radiographs and have reviewed the reports:  XR Results (most recent):    Results from Hospital Encounter encounter on 03/31/18   XR CHEST PA LAT   Narrative Chest    Indication: follow up atelectasis/pleural effusions     Comparison: April 2, 2018    Findings: Two views. No pneumothorax. No pleural effusion.  Bilateral lower lung  zone atelectasis with patchy bilateral medial lower lung zone opacities as well  as moderate pulmonary vascular congestion. Cardiomediastinal silhouette and  osseous structures grossly unchanged.          Impression Impression: No significant change as detailed      Luis Elmore NP    Pulmonary, [facility]

## 2018-04-05 NOTE — ROUTINE PROCESS
Bedside and Verbal shift change report given to [address] [place] [address] (oncoming nurse) by Nella Solis   (offgoing nurse). Report included the following information SBAR, MAR and Cardiac Rhythm Sinus Rhythm.

## 2018-04-05 NOTE — PROGRESS NOTES
Progress Note    Patient: Mickey Mason MRN: [id number]  CSN: [id number]    YOB: 1960  Age: 59 y.o. Sex: male    DOA: 3/31/2018 LOS:  LOS: 5 days                    Subjective:   Patient feels well this am, denies cough, congestion or SOB. Urology planning on voiding trial today, will follow    Venous Duplex US 4/2/18:  INTERPRETATION/FINDINGS  Duplex images were obtained using 2-D gray scale, color flow, and  spectral Doppler analysis. Right leg :  1. Deep veins visualized include the common femoral, femoral,  popliteal, posterior tibial and peroneal veins. 2. No evidence of deep venous thrombosis detected in the veins  visualized. 3. Superficial veins visualized include the great saphenous vein. 4. No evidence of superficial thrombosis detected. 5. Normal multiphasic flow in the posterior tibial artery. Left leg :  1. Deep veins visualized include the common femoral, femoral,  popliteal, posterior tibial and peroneal veins. 2. No evidence of deep venous thrombosis detected in the veins  visualized. 3. Superficial veins visualized include the great saphenous vein. 4. No evidence of superficial thrombosis detected. 5. Normal multiphasic flow in the posterior tibial artery. Renal US 4/2/18  IMPRESSION:  1. Normal-sized kidneys without hydronephrosis. 2. Right renal cyst present since 2013 and appear relatively unchanged. 3. Bladder is empty with Willis catheter. Transthoracic Echo 4/2/18:  SUMMARY:  Left ventricle: Systolic function was normal by EF (biplane method of disks).  Ejection fraction was estimated to be 60 %. No obvious wall motion abnormalities identified in the views obtained. Wall thickness was at the upper limits of normal.    Right ventricle: The size was at the upper limits of normal. Systolic pressure was mildly increased. Estimated peak pressure was at least 42 mmHg. Left atrium: The atrium was severely dilated. Mitral valve:  There was mild regurgitation. Tricuspid valve: There was mild regurgitation. ct scan abdomen and pelvis no contrast 3/31/18  1. Moderate to large right and small left pleural effusions.     2. Distended bladder with mild wall thickening, possibly due to chronic outlet  obstruction. Mildly enlarged heterogeneous prostate. Recommend correlation with urinalysis. -No hydronephrosis. -Bilobed right renal cyst similar to prior.  -No suspicious renal mass identified on this nonenhanced CT.     3. Low-attenuation of blood in keeping with anemia.     -Atherosclerosis. Osseous degenerative changes as above. Small fat-containing  inguinal hernias. Left gluteus minimus heterotopic ossification. Slightly prominent left periaortic nodes, none clearly abnormal by size criteria.     -Evaluation limited as above.          RUE Duplex venous US 4/4/18  INTERPRETATION/FINDINGS  Duplex images were obtained using 2-D gray scale, color flow, and  spectral Doppler analysis.     Right arm :  1. No evidence of deep venous thrombosis detected in the veins  visualized. 2. Deep vein(s) visualized include the internal jugular, subclavian,  axillary and brachial veins. 3. No evidence of superficial thrombosis detected. 4. Superficial vein(s) visualized include the basilic (upper arm) and  cephalic (upper arm) veins. 5. No evidence of deep vein thrombosis in the contralateral internal  jugular and subclavian veins. LUIS ENRIQUE Briggs is a 59 y.o.  male who   Has  history of renal mass, renal cell carcinoma of left kidney, DM, chronic drug abuse, and HTN who presented with  c/o bilateral lower extremity swelling onset 3 days ago. He states that he is from a [address] [place] [address] and has been in the nursing home for the past 1 year. Demetrius Tafoya He noted that his ankles first started sweling first. Isabel Nelson states that he had an ultrasound of his leg yesterday.  He notes that he usually is able to ambulate without a walker but with increase swelling can not ambulate like he used to  . Patient denies history of CHF, chest pain, abdominal pain, and any othe rassociated symptoms or complaints.      Pt has H/o hepatitis c and seen by GI as out patinet  and  Pt also s/p partial nephrectomy Lt kidney due to renal cell carcinoma by dr Sara Agee urology     Pt has h/o cervical disc disease s/p surgery and fusion . Pt had  H/o diskitis and osteomyelitis c 3-7 and quadriplegia after surgery      Pt was treated last week for c- diff and and flu last week has no upper respiratory sx or diarrhea at this time     Chief Complaint:   Chief Complaint   Patient presents with    Swelling    Slow Heart Rate       Review of systems    GENERAL: Patient alert, awake and oriented times 3, able to communicate full sentences and not in distress. HEENT: No change in vision, no earache, tinnitus, sore throat or sinus congestion. NECK: No pain or stiffness. CARDIOVASCULAR: No chest pain or pressure. No palpitations. PULMONARY: +intermittent shortness of breath now resolved. No cough or wheeze. GASTROINTESTINAL: No abdominal pain, nausea, vomiting or diarrhea, melena or       bright red blood per rectum. Positive constipation h/o c-diff was treated  GENITOURINARY: No urinary frequency, urgency, hesitancy or dysuria. MUSCULOSKELETAL:  weakness upper extremities more than lower extremities   DERMATOLOGIC: No rash, no itching, no lesions. ENDOCRINE: No polyuria, polydipsia, no heat or cold intolerance. No recent change in    weight. HEMATOLOGICAL: H/o anemia or easy bruising or bleeding. NEUROLOGIC: No headache, seizures, numbness, tingling .  Positive weakness upper extremities more than lower extremities  PSYCHIATRIC: No depression, anxiety, mood disorder, no loss of interest in normal       activity or change in sleep pattern.         Objective:     Physical Exam:  Visit Vitals    /73 (BP 1 Location: Left arm, BP Patient Position: At rest)    Pulse 60    Temp 98.4 °F (36.9 °C)    Resp 18    Ht 6' (1.829 m)    Wt 82.3 kg (181 lb 7 oz)    SpO2 94%    BMI 24.61 kg/m2      General:  Alert, cooperative, no distress, appears stated age. Head:  Normocephalic, without obvious abnormality, atraumatic. Eyes:  Conjunctivae/corneas clear. PERRL, EOMs intact. Nose: Nares normal. No drainage or sinus tenderness. Throat: Lips, mucosa, and tongue normal.poor dentition    Neck: Supple, symmetrical, trachea midline, no adenopathy. Lungs:   Clear to auscultation bilaterally. Poor inspiratory effort. Chest wall:  No tenderness or deformity. Heart:  Regular rate and rhythm, S1, S2 normal, systolic murmer      Abdomen: Soft, non-tender. Bowel sounds normal. No masses,  No organomegaly. Extremities: Extremities normal, atraumatic, RUE non-pitting edema, resolution of bilateral LE edema     Pulses: 2+ and symmetric lower extremities    Skin: Skin color, texture, turgor normal. No rashes or lesions   Neurologic: CNII-XII intact. No  New focal motor or sensory deficit. - patient functional quadriplegia, baseline able to ambulate, unable ot move arms aside from some movement in hands.   Left 3-5th fingers flexion contracture          Intake and Output:  Current Shift:     Last three shifts:  04/03 1901 - 04/05 0700  In: -   Out: 1614 [Urine:3275]    Labs: Results:       Chemistry Recent Labs      04/05/18   0234  04/04/18   0903  04/03/18   0333   GLU  111*  75  110*   NA  145  146*  144   K  4.4  4.6  5.1   CL  116*  115*  118*   CO2  23  23  21   BUN  35*  45*  55*   CREA  1.35*  1.42*  1.50*   CA  8.8  9.1  8.9   AGAP  6  8  5   BUCR  26*  32*  37*   AP   --    --   101   TP   --    --   7.4   ALB   --    --   3.1*   GLOB   --    --   4.3*   AGRAT   --    --   0.7*      CBC w/Diff Recent Labs      04/05/18   0234  04/04/18   0903  04/03/18   1437   WBC  6.2  6.1  6.8   RBC  2.50*  2.66*  2.74*   HGB  7.2*  7.7*  7.9*   HCT  22.4*  23.4*  24.3*   PLT  63*  59*  59* GRANS  59  75*  71   LYMPH  27  17*  19*   EOS  1  1  2      Cardiac Enzymes No results for input(s): CPK, CKND1, LINDA in the last 72 hours. No lab exists for component: CKRMB, TROIP   Coagulation No results for input(s): PTP, INR, APTT in the last 72 hours. No lab exists for component: INREXT, INREXT    Lipid Panel Lab Results   Component Value Date/Time    Cholesterol, total 68 04/01/2018 12:55 AM    HDL Cholesterol 46 04/01/2018 12:55 AM    LDL, calculated 12.2 04/01/2018 12:55 AM    VLDL, calculated 9.8 04/01/2018 12:55 AM    Triglyceride 49 04/01/2018 12:55 AM    CHOL/HDL Ratio 1.5 04/01/2018 12:55 AM      BNP No results for input(s): BNPP in the last 72 hours.    Liver Enzymes Recent Labs      04/03/18   0333   TP  7.4   ALB  3.1*   AP  101   SGOT  28      Thyroid Studies Lab Results   Component Value Date/Time    TSH 4.02 (H) 04/02/2018 05:25 AM          Procedures/imaging: see electronic medical records for all procedures/Xrays and details which were not copied into this note but were reviewed prior to creation of Plan    Medications:   Current Facility-Administered Medications   Medication Dose Route Frequency    tamsulosin (FLOMAX) capsule 0.4 mg  0.4 mg Oral DAILY    vancomycin (VANCOCIN) 1,500 mg in 0.9% sodium chloride 500 mL IVPB  1,500 mg IntraVENous Q18H    cefepime (MAXIPIME) 2 g in sterile water (preservative free) 10 mL IV syringe  2 g IntraVENous Q12H    metroNIDAZOLE (FLAGYL) IVPB premix 500 mg  500 mg IntraVENous Q12H    HYDROmorphone (DILAUDID) tablet 4 mg  4 mg Oral Q6H PRN    furosemide (LASIX) tablet 40 mg  40 mg Oral DAILY    sodium bicarbonate tablet 650 mg  650 mg Oral TID    levothyroxine (SYNTHROID) tablet 50 mcg  50 mcg Oral ACB    glucose chewable tablet 16 g  4 Tab Oral PRN    glucagon (GLUCAGEN) injection 1 mg  1 mg IntraMUSCular PRN    dextrose (D50W) injection syrg 12.5-25 g  25-50 mL IntraVENous PRN    insulin lispro (HUMALOG) injection   SubCUTAneous AC&HS    hydrALAZINE (APRESOLINE) tablet 25 mg  25 mg Oral TID PRN    Lactobacillus Acidoph & [facility]) tablet 2 Tab  2 Tab Oral BID    linagliptin (TRADJENTA) tablet 5 mg  5 mg Oral ACB    pantoprazole (PROTONIX) tablet 40 mg  40 mg Oral ACB       Assessment/Plan     Active Problems:    Renal cell cancer ([facility] [address] (11/5/2014)      Type II diabetes mellitus, uncontrolled ([facility] [address] (12/16/2015)      Urinary retention (10/30/2017)      Cancer of left kidney ([facility] [address] (12/23/2013)      Quadriparesis ([facility] [address] (5/31/2017)      Bradycardia (3/31/2018)      H/O Clostridium difficile infection (3/31/2018)      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)      Metabolic acidosis (0/2/3861)      Hyperkalemia (4/2/2018)      Hypercalcemia (4/2/2018)      Chronic kidney disease, stage III (moderate) (4/4/2018)      Hepatitis C antibody positive in blood (4/4/2018)    Acute Renal failure/ H/O Lt renal cell carcinoma S/p Partial Lt nephrectomy/ now with swelling lower extremities, concern for Light Chain deposition disease - Kappa lt chains elevated, DONTE positive  Pt received IV fluid in the ER  Will get venous duplex ultrasound B/L leg - neg for DVT  RUE venous duplex ultrasound - Neg for DVT  Ct scan abdomen and pelvis no contrast  showed  Distended bladder possible neck obstruction pt now s/p varner's catheter   Kristyn hydration cr improving  Urine micro albumin  Nephrology consult, Dr Emma Moreno following  Hyperkalemia resolved, monitor  Follow up serum cryoglobulins, rheumatoid factor      Urinary retention requiring varner catheter  patient strongly desires to discontinue vraner catheter, had been voiding independently post catheter removal until recent illness  will consult urology. Seen by Dr. Jameson Marx, recommends flomax and voiding trial today    Dilated cardiomyopathy with preserved EF 60%; Bradycardia  Cardiac enzymes minimally elevated  Follow up cardiology recs.   Hold lisnopril hold atenolol, blood pressure slightly increased today 140 systolic, monitor  Hydralazine prn SBp above 160  Avoid betablockers    Hypothyroid  continue synthroid 50 mcg daily  TSH mildly elevated, normal free t4, recheck TFTs in 4 weeks  Monitor bradycardia    Diabetes Mellitus   Check HG A1c  4/1/18 lipids: TC 68 HDL 46 LDL 12.2 TG 49  Sliding scale coverage, tradjenta 5mg daily; poc glucose has been at goal continue to monitor     H/O c-diff / h/o flu/history of PNA and UTI - recurrent versus incompletely treated healthcare associated PNA  Treated with flagyl at nursing home  Finished course of Tamiflu  Finished levaquin at nursing home for PNA (3/13/-3/19) and UTI, Urine culture sensitive  CXR with bibasilar opacities, now patient with intermittent sob and hypoxia. Will start on empiric treatment for healthcare associated PNA  ST consult to evaluate swallowing/aspiration risk. Cleared for regular solids thin liquids. Discussed with pulmonology, Dr. Javon Hernandez, CT chest pending  Continue on empiric antibiotics covering for MRSA as patient high risk; legionella neg, strep pneumo neg  Blood cultures 4/4/ no growth to date  Pulmonary toileting, Frequent IS, RT consulted to assist     Thrombocytopenia  PLt continue to trend up, thought to be possible iatrogenic or infection related, SPEP positive elevated IGG  Follow up hematology consult Dr Norman Kelly     H/o hepatitis c  Check viral load and genotype  Pt has been f/u GI and had ultrasound liver recently waiting for treatment  Monitor liver function stable       History of C3-7 discitis with osteomyelitis, L4-5 phlegmon 4/2017 s/p C3-7 laminectomy with posteriorlateral fusion and hardware on 4/29/17 for rapid onset quadriplegia from discitis/osteomyelitis, hsitory of MRSA baceremia and abscess cultures from spine.     Followed as outpatient by Dr. Edita Barclay infectious disease, has recommended continue doxycycline 100mg daily for MRSA history/prophylaxis  - hold doxycyline while on vancomycin, resume prophylactic dosing when off vanc      DVT/GI Prophylaxis: SCD's and H2B/PPI       Meera Mckinney MD  4/5/2018 1145 AM

## 2018-04-05 NOTE — PROGRESS NOTES
Progress Note    Patient: Turner Eisenmenger MRN: [id number]  CSN: [id number]    YOB: 1960  Age: 59 y.o. Sex: male    DOA: 3/31/2018 LOS:  LOS: 4 days                    Subjective:   Patient feels well this am, denies cough, congestion. Reports some sob overnight, resolved this am.  No diarrhea. RN reporting O2 sat 88% on RA this am, on 1L NC currently. Swelling RUE. Venous Duplex US 4/2/18:  INTERPRETATION/FINDINGS  Duplex images were obtained using 2-D gray scale, color flow, and  spectral Doppler analysis. Right leg :  1. Deep veins visualized include the common femoral, femoral,  popliteal, posterior tibial and peroneal veins. 2. No evidence of deep venous thrombosis detected in the veins  visualized. 3. Superficial veins visualized include the great saphenous vein. 4. No evidence of superficial thrombosis detected. 5. Normal multiphasic flow in the posterior tibial artery. Left leg :  1. Deep veins visualized include the common femoral, femoral,  popliteal, posterior tibial and peroneal veins. 2. No evidence of deep venous thrombosis detected in the veins  visualized. 3. Superficial veins visualized include the great saphenous vein. 4. No evidence of superficial thrombosis detected. 5. Normal multiphasic flow in the posterior tibial artery. Renal US 4/2/18  IMPRESSION:  1. Normal-sized kidneys without hydronephrosis. 2. Right renal cyst present since 2013 and appear relatively unchanged. 3. Bladder is empty with Willis catheter. Transthoracic Echo 4/2/18:  SUMMARY:  Left ventricle: Systolic function was normal by EF (biplane method of disks).  Ejection fraction was estimated to be 60 %. No obvious wall motion abnormalities identified in the views obtained. Wall thickness was at the upper limits of normal.    Right ventricle: The size was at the upper limits of normal. Systolic pressure was mildly increased. Estimated peak pressure was at least 42 mmHg.     Left atrium: The atrium was severely dilated. Mitral valve: There was mild regurgitation. Tricuspid valve: There was mild regurgitation. ct scan abdomen and pelvis no contrast 3/31/18  1. Moderate to large right and small left pleural effusions.     2. Distended bladder with mild wall thickening, possibly due to chronic outlet  obstruction. Mildly enlarged heterogeneous prostate. Recommend correlation with urinalysis. -No hydronephrosis. -Bilobed right renal cyst similar to prior.  -No suspicious renal mass identified on this nonenhanced CT.     3. Low-attenuation of blood in keeping with anemia.     -Atherosclerosis. Osseous degenerative changes as above. Small fat-containing  inguinal hernias. Left gluteus minimus heterotopic ossification. Slightly prominent left periaortic nodes, none clearly abnormal by size criteria.     -Evaluation limited as above.          LUIS ENRIQUE Briggs is a 59 y.o.  male who   Has  history of renal mass, renal cell carcinoma of left kidney, DM, chronic drug abuse, and HTN who presented with  c/o bilateral lower extremity swelling onset 3 days ago. He states that he is from a [address] [place] [address] and has been in the nursing home for the past 1 year. Eura Lottie He noted that his ankles first started sweling first. [facility] states that he had an ultrasound of his leg yesterday. He notes that he usually is able to ambulate without a walker but with increase swelling can not ambulate like he used to  . Patient denies history of CHF, chest pain, abdominal pain, and any othe rassociated symptoms or complaints.      Pt has H/o hepatitis c and seen by GI as out patinet  and  Pt also s/p partial nephrectomy Lt kidney due to renal cell carcinoma by dr Gage Gardner urology     Pt has h/o cervical disc disease s/p surgery and fusion .  Pt had  H/o diskitis and osteomyelitis c 3-7 and quadriplegia after surgery      Pt was treated last week for c- diff and and flu last week has no upper respiratory sx or diarrhea at this time     Chief Complaint:   Chief Complaint   Patient presents with    Swelling    Slow Heart Rate       Review of systems    GENERAL: Patient alert, awake and oriented times 3, able to communicate full sentences and not in distress. HEENT: No change in vision, no earache, tinnitus, sore throat or sinus congestion. NECK: No pain or stiffness. CARDIOVASCULAR: No chest pain or pressure. No palpitations. PULMONARY: +intermittent shortness of breath now resolved. No cough or wheeze. GASTROINTESTINAL: No abdominal pain, nausea, vomiting or diarrhea, melena or       bright red blood per rectum. Positive constipation h/o c-diff was treated  GENITOURINARY: No urinary frequency, urgency, hesitancy or dysuria. MUSCULOSKELETAL:  weakness upper extremities more than lower extremities   DERMATOLOGIC: No rash, no itching, no lesions. ENDOCRINE: No polyuria, polydipsia, no heat or cold intolerance. No recent change in    weight. HEMATOLOGICAL: H/o anemia or easy bruising or bleeding. NEUROLOGIC: No headache, seizures, numbness, tingling . Positive weakness upper extremities more than lower extremities  PSYCHIATRIC: No depression, anxiety, mood disorder, no loss of interest in normal       activity or change in sleep pattern.         Objective:     Physical Exam:  Visit Vitals    /74 (BP 1 Location: Left arm, BP Patient Position: At rest)    Pulse 75    Temp 99.4 °F (37.4 °C)    Resp 18    Ht 6' (1.829 m)    Wt 82.3 kg (181 lb 7 oz)    SpO2 95%    BMI 24.61 kg/m2      General:  Alert, cooperative, no distress, appears stated age. Head:  Normocephalic, without obvious abnormality, atraumatic. Eyes:  Conjunctivae/corneas clear. PERRL, EOMs intact. Nose: Nares normal. No drainage or sinus tenderness. Throat: Lips, mucosa, and tongue normal.poor dentition    Neck: Supple, symmetrical, trachea midline, no adenopathy. Lungs:   Clear to auscultation bilaterally.   Poor inspiratory effort. Chest wall:  No tenderness or deformity. Heart:  Regular rate and rhythm, S1, S2 normal, systolic murmer      Abdomen: Soft, non-tender. Bowel sounds normal. No masses,  No organomegaly. Extremities: Extremities normal, atraumatic, RUE non-pitting edema, resolution of bilateral LE edema     Pulses: 2+ and symmetric lower extremities    Skin: Skin color, texture, turgor normal. No rashes or lesions   Neurologic: CNII-XII intact. No  New focal motor or sensory deficit. - patient functional quadriplegia, baseline able to ambulate, unable ot move arms aside from some movement in hands. Left 3-5th fingers flexion contracture          Intake and Output:  Current Shift:  04/04 1901 - 04/05 0700  In: -   Out: 175 [Urine:175]  Last three shifts:  04/03 0701 - 04/04 1900  In: 976 [P.O.:976]  Out: 4425 [Urine:4425]    Labs: Results:       Chemistry Recent Labs      04/04/18   0903  04/03/18   0333  04/02/18   0525   GLU  75  110*  58*   NA  146*  144  141   K  4.6  5.1  5.6*   CL  115*  118*  114*   CO2  23  21  20*   BUN  45*  55*  66*   CREA  1.42*  1.50*  1.56*   CA  9.1  8.9  9.0  8.8   AGAP  8  5  7   BUCR  32*  37*  42*   AP   --   101  102   TP   --   7.4  6.7  7.1   ALB   --   3.1*  3.2*   GLOB   --   4.3*  3.9   AGRAT   --   0.7*  0.8      CBC w/Diff Recent Labs      04/04/18   0903 04/03/18   1437  04/03/18   0333   WBC  6.1  6.8  5.3   RBC  2.66*  2.74*  2.67*   HGB  7.7*  7.9*  7.6*   HCT  23.4*  24.3*  23.7*   PLT  59*  59*  49*   GRANS  75*  71  72   LYMPH  17*  19*  18*   EOS  1  2  2      Cardiac Enzymes No results for input(s): CPK, CKND1, LINDA in the last 72 hours.     No lab exists for component: CKRMB, TROIP   Coagulation Recent Labs      04/02/18   0525   PTP  13.9   INR  1.1   APTT  41.2*       Lipid Panel Lab Results   Component Value Date/Time    Cholesterol, total 68 04/01/2018 12:55 AM    HDL Cholesterol 46 04/01/2018 12:55 AM    LDL, calculated 12.2 04/01/2018 12:55 AM VLDL, calculated 9.8 04/01/2018 12:55 AM    Triglyceride 49 04/01/2018 12:55 AM    CHOL/HDL Ratio 1.5 04/01/2018 12:55 AM      BNP No results for input(s): BNPP in the last 72 hours.    Liver Enzymes Recent Labs      04/03/18   0333   TP  7.4   ALB  3.1*   AP  101   SGOT  28      Thyroid Studies Lab Results   Component Value Date/Time    TSH 4.02 (H) 04/02/2018 05:25 AM          Procedures/imaging: see electronic medical records for all procedures/Xrays and details which were not copied into this note but were reviewed prior to creation of Plan    Medications:   Current Facility-Administered Medications   Medication Dose Route Frequency    tamsulosin (FLOMAX) capsule 0.4 mg  0.4 mg Oral DAILY    [START ON 4/5/2018] vancomycin (VANCOCIN) 1,500 mg in 0.9% sodium chloride 500 mL IVPB  1,500 mg IntraVENous Q18H    cefepime (MAXIPIME) 2 g in sterile water (preservative free) 10 mL IV syringe  2 g IntraVENous Q12H    metroNIDAZOLE (FLAGYL) IVPB premix 500 mg  500 mg IntraVENous Q12H    HYDROmorphone (DILAUDID) tablet 4 mg  4 mg Oral Q6H PRN    furosemide (LASIX) tablet 40 mg  40 mg Oral DAILY    sodium bicarbonate tablet 650 mg  650 mg Oral TID    levothyroxine (SYNTHROID) tablet 50 mcg  50 mcg Oral ACB    glucose chewable tablet 16 g  4 Tab Oral PRN    glucagon (GLUCAGEN) injection 1 mg  1 mg IntraMUSCular PRN    dextrose (D50W) injection syrg 12.5-25 g  25-50 mL IntraVENous PRN    insulin lispro (HUMALOG) injection   SubCUTAneous AC&HS    hydrALAZINE (APRESOLINE) tablet 25 mg  25 mg Oral TID PRN    Lactobacillus Acidoph & Bulgar (FLORANEX) tablet 2 Tab  2 Tab Oral BID    linagliptin (TRADJENTA) tablet 5 mg  5 mg Oral ACB    pantoprazole (PROTONIX) tablet 40 mg  40 mg Oral ACB       Assessment/Plan     Active Problems:    Renal cell cancer (HCC) (11/5/2014)      Type II diabetes mellitus, uncontrolled ([facility] (12/16/2015)      Urinary retention (10/30/2017)      Cancer of [facility] kidney ([facility] (12/23/2013) Quadriparesis ([facility] (5/31/2017)      Bradycardia (3/31/2018)      H/O Clostridium difficile infection (3/31/2018)      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)      Metabolic acidosis (2/4/3284)      Hyperkalemia (4/2/2018)      Hypercalcemia (4/2/2018)      Chronic kidney disease, stage III (moderate) (4/4/2018)      Hepatitis C antibody positive in blood (4/4/2018)    Acute Renal failure/ H/O Lt renal cell carcinoma S/p Partial Lt nephrectomy/ now with swelling lower extremities, concern for Light Chain deposition disease  Pt received IV fluid in the ER  Will get venous duplex ultrasound B/L leg - neg for DVT  RUE venous duplex ultrasound - Neg for DVT  Ct scan abdomen and pelvis no contrast  showed  Distended bladder possible neck obstruction pt now s/p varner's catheter   Kristyn hydration cr improving  Urine micro albumin  Nephrology consult, Dr Kristin Moreno following  Hyperkalemia resolved, monitor  Follow up serum cryoglobulins, rheumatoid factor      Urinary retention requiring varner catheter  patient strongly desires to discontinue varner catheter, had been voiding independently post catheter removal until recent illness  will consult urology. Seen by Dr. Corrie Rodrigez, brandonds flomax and voiding trial in 3 days. Dilated cardiomyopathy with preserved EF 60%; Bradycardia  Cardiac enzymes minimally elevated  Follow up cardiology recs.   Hold lisnopril hold atenolol, blood pressure has been appropriate range  Hydralazine prn SBp above 160  Avoid betablockers    Hypothyroid  continue synthroid 50 mcg daily  TSH mildly elevated, normal free t4, recheck TFTs in 4 weeks  Monitor bradycardia    Diabetes Mellitus   Check HG A1c  4/1/18 lipids: TC 68 HDL 46 LDL 12.2 TG 49  Sliding scale coverage, tradjenta 5mg daily; poc glucose has been at goal continue to monitor     H/O c-diff / h/o flu/history of PNA and UTI - recurrent versus incompletely treated healthcare associated PNA  Treated with flagyl at [facility] course of Tamiflu  Finished levaquin at nursing home for PNA (3/13/-3/19) and UTI, Urine culture sensitive  CXR with bibasilar opacities, now patient with intermittent sob and hypoxia. Will start on empiric treatment for healthcare associated PNA  ST consult to evaluate swallowing/aspiration risk. Cleared for regular solids thin liquids. Discussed with pulmonology, Dr. Jae Tejeda, will check CT chest, start empiric antibiotics cover for MRSA as patient high risk,   Pulmonary toileting, Frequent IS     Thrombocytopenia  PLt is up slightly this am, thought to be possible iatrogenic or infection related, SPEP pending  Follow up hematology consult Dr Andrez Logan     H/o hepatitis c  Check viral load and genotype  Pt has been f/u GI and had ultrasound liver recently waiting for treatment  Monitor liver function stable       History of C3-7 discitis with osteomyelitis, L4-5 phlegmon 4/2017 s/p C3-7 laminectomy with posteriorlateral fusion and hardware on 4/29/17 for rapid onset quadriplegia from discitis/osteomyelitis, hsitory of MRSA baceremia and abscess cultures from spine.     Followed as outpatient by Dr. Cecilio Vidal infectious disease, has recommended continue doxycycline 100mg daily for MRSA history/prophylaxis  - hold doxycyline while on vancomycin, resume prophylactic dosing when off vanc      DVT/GI Prophylaxis: SCD's and H2B/PPI       Reshma Clemons MD  4/4/2018 0916 AM

## 2018-04-06 ENCOUNTER — APPOINTMENT (OUTPATIENT)
Dept: INTERVENTIONAL RADIOLOGY/VASCULAR | Age: 58
DRG: 460 | End: 2018-04-06
Attending: PHYSICIAN ASSISTANT
Payer: MEDICAID

## 2018-04-06 LAB
ALBUMIN 24H MFR UR ELPH: 55 %
ALBUMIN SERPL-MCNC: 3 G/DL (ref 3.4–5)
ALBUMIN/GLOB SERPL: 0.7 {RATIO} (ref 0.8–1.7)
ALP SERPL-CCNC: 87 U/L (ref 45–117)
ALPHA1 GLOB 24H MFR UR ELPH: 1.4 %
ALPHA2 GLOB 24H MFR UR ELPH: 6.2 %
ALT SERPL-CCNC: 39 U/L (ref 16–61)
ANION GAP SERPL CALC-SCNC: 6 MMOL/L (ref 3–18)
APTT PPP: 43.4 SEC (ref 23–36.4)
AST SERPL-CCNC: 21 U/L (ref 15–37)
B-GLOBULIN MFR UR ELPH: 11.7 %
BASOPHILS # BLD: 0 K/UL (ref 0–0.1)
BASOPHILS NFR BLD: 0 % (ref 0–2)
BILIRUB SERPL-MCNC: 1.1 MG/DL (ref 0.2–1)
BUN SERPL-MCNC: 25 MG/DL (ref 7–18)
BUN/CREAT SERPL: 24 (ref 12–20)
CALCIUM SERPL-MCNC: 9.3 MG/DL (ref 8.5–10.1)
CHLORIDE SERPL-SCNC: 115 MMOL/L (ref 100–108)
CO2 SERPL-SCNC: 25 MMOL/L (ref 21–32)
COLLECT DURATION TIME UR: 24 HR
CREAT SERPL-MCNC: 1.05 MG/DL (ref 0.6–1.3)
DATE LAST DOSE: ABNORMAL
DIFFERENTIAL METHOD BLD: ABNORMAL
EOSINOPHIL # BLD: 0.1 K/UL (ref 0–0.4)
EOSINOPHIL NFR BLD: 2 % (ref 0–5)
ERYTHROCYTE [DISTWIDTH] IN BLOOD BY AUTOMATED COUNT: 15.9 % (ref 11.6–14.5)
GAMMA GLOB 24H MFR UR ELPH: 25.8 %
GLOBULIN SER CALC-MCNC: 4.5 G/DL (ref 2–4)
GLUCOSE BLD STRIP.AUTO-MCNC: 103 MG/DL (ref 70–110)
GLUCOSE BLD STRIP.AUTO-MCNC: 122 MG/DL (ref 70–110)
GLUCOSE BLD STRIP.AUTO-MCNC: 142 MG/DL (ref 70–110)
GLUCOSE SERPL-MCNC: 78 MG/DL (ref 74–99)
HCT VFR BLD AUTO: 25.2 % (ref 36–48)
HGB BLD-MCNC: 8 G/DL (ref 13–16)
INR PPP: 1.3 (ref 0.8–1.2)
INTERPRETATION UR IFE-IMP: ABNORMAL
LYMPHOCYTES # BLD: 1.1 K/UL (ref 0.9–3.6)
LYMPHOCYTES NFR BLD: 22 % (ref 21–52)
M PROTEIN 24H MFR UR ELPH: ABNORMAL %
MCH RBC QN AUTO: 28.4 PG (ref 24–34)
MCHC RBC AUTO-ENTMCNC: 31.7 G/DL (ref 31–37)
MCV RBC AUTO: 89.4 FL (ref 74–97)
MONOCYTES # BLD: 0.7 K/UL (ref 0.05–1.2)
MONOCYTES NFR BLD: 14 % (ref 3–10)
NEUTS SEG # BLD: 3 K/UL (ref 1.8–8)
NEUTS SEG NFR BLD: 62 % (ref 40–73)
NOTE, 149533: ABNORMAL
PHOSPHATE SERPL-MCNC: 2.6 MG/DL (ref 2.5–4.9)
PLATELET # BLD AUTO: 73 K/UL (ref 135–420)
PMV BLD AUTO: 13.1 FL (ref 9.2–11.8)
POTASSIUM SERPL-SCNC: 4.2 MMOL/L (ref 3.5–5.5)
PROCALCITONIN SERPL-MCNC: 0.28 NG/ML (ref 0–0.08)
PROT 24H UR-MRATE: 819 MG/24 HR (ref 30–150)
PROT SERPL-MCNC: 7.5 G/DL (ref 6.4–8.2)
PROT UR-MCNC: 30.9 MG/DL
PROTHROMBIN TIME: 15.6 SEC (ref 11.5–15.2)
RBC # BLD AUTO: 2.82 M/UL (ref 4.7–5.5)
REPORTED DOSE,DOSE: ABNORMAL UNITS
REPORTED DOSE/TIME,TMG: 0
RHEUMATOID FACT SERPL-ACNC: 28.2 IU/ML (ref 0–13.9)
SODIUM SERPL-SCNC: 146 MMOL/L (ref 136–145)
SPECIMEN VOL ?TM UR: 2650 ML
VANCOMYCIN TROUGH SERPL-MCNC: 23.6 UG/ML (ref 10–20)
WBC # BLD AUTO: 4.9 K/UL (ref 4.6–13.2)

## 2018-04-06 PROCEDURE — 88264 CHROMOSOME ANALYSIS 20-25: CPT | Performed by: RADIOLOGY

## 2018-04-06 PROCEDURE — 74011250636 HC RX REV CODE- 250/636: Performed by: FAMILY MEDICINE

## 2018-04-06 PROCEDURE — 65660000000 HC RM CCU STEPDOWN

## 2018-04-06 PROCEDURE — 88237 TISSUE CULTURE BONE MARROW: CPT | Performed by: RADIOLOGY

## 2018-04-06 PROCEDURE — 85730 THROMBOPLASTIN TIME PARTIAL: CPT | Performed by: INTERNAL MEDICINE

## 2018-04-06 PROCEDURE — 88305 TISSUE EXAM BY PATHOLOGIST: CPT | Performed by: RADIOLOGY

## 2018-04-06 PROCEDURE — 80053 COMPREHEN METABOLIC PANEL: CPT | Performed by: INTERNAL MEDICINE

## 2018-04-06 PROCEDURE — 80202 ASSAY OF VANCOMYCIN: CPT | Performed by: FAMILY MEDICINE

## 2018-04-06 PROCEDURE — 74011250636 HC RX REV CODE- 250/636: Performed by: RADIOLOGY

## 2018-04-06 PROCEDURE — 77030010545

## 2018-04-06 PROCEDURE — 74011000250 HC RX REV CODE- 250: Performed by: RADIOLOGY

## 2018-04-06 PROCEDURE — 74011250637 HC RX REV CODE- 250/637: Performed by: UROLOGY

## 2018-04-06 PROCEDURE — 85025 COMPLETE CBC W/AUTO DIFF WBC: CPT | Performed by: INTERNAL MEDICINE

## 2018-04-06 PROCEDURE — 77010033678 HC OXYGEN DAILY

## 2018-04-06 PROCEDURE — 88313 SPECIAL STAINS GROUP 2: CPT | Performed by: RADIOLOGY

## 2018-04-06 PROCEDURE — 74011250637 HC RX REV CODE- 250/637: Performed by: INTERNAL MEDICINE

## 2018-04-06 PROCEDURE — 85610 PROTHROMBIN TIME: CPT | Performed by: INTERNAL MEDICINE

## 2018-04-06 PROCEDURE — 97110 THERAPEUTIC EXERCISES: CPT

## 2018-04-06 PROCEDURE — 88184 FLOWCYTOMETRY/ TC 1 MARKER: CPT | Performed by: RADIOLOGY

## 2018-04-06 PROCEDURE — 82962 GLUCOSE BLOOD TEST: CPT

## 2018-04-06 PROCEDURE — 74011000250 HC RX REV CODE- 250: Performed by: FAMILY MEDICINE

## 2018-04-06 PROCEDURE — 77030028872 HC BN BIOP NDL ON CNTRL TY TELE -C

## 2018-04-06 PROCEDURE — 97116 GAIT TRAINING THERAPY: CPT

## 2018-04-06 PROCEDURE — 77002 NEEDLE LOCALIZATION BY XRAY: CPT

## 2018-04-06 PROCEDURE — 36415 COLL VENOUS BLD VENIPUNCTURE: CPT | Performed by: INTERNAL MEDICINE

## 2018-04-06 PROCEDURE — 88311 DECALCIFY TISSUE: CPT | Performed by: RADIOLOGY

## 2018-04-06 PROCEDURE — 74011250637 HC RX REV CODE- 250/637: Performed by: FAMILY MEDICINE

## 2018-04-06 PROCEDURE — 84100 ASSAY OF PHOSPHORUS: CPT | Performed by: INTERNAL MEDICINE

## 2018-04-06 PROCEDURE — 77030022017 HC DRSG HEMO QCLOT ZMED -A

## 2018-04-06 PROCEDURE — 77030003666 HC NDL SPINAL BD -A

## 2018-04-06 PROCEDURE — 88185 FLOWCYTOMETRY/TC ADD-ON: CPT | Performed by: RADIOLOGY

## 2018-04-06 PROCEDURE — 07DR3ZX EXTRACTION OF ILIAC BONE MARROW, PERCUTANEOUS APPROACH, DIAGNOSTIC: ICD-10-PCS | Performed by: RADIOLOGY

## 2018-04-06 RX ORDER — FENTANYL CITRATE 50 UG/ML
50 INJECTION, SOLUTION INTRAMUSCULAR; INTRAVENOUS
Status: DISCONTINUED | OUTPATIENT
Start: 2018-04-06 | End: 2018-04-06 | Stop reason: ALTCHOICE

## 2018-04-06 RX ORDER — LIDOCAINE HYDROCHLORIDE 10 MG/ML
30 INJECTION, SOLUTION EPIDURAL; INFILTRATION; INTRACAUDAL; PERINEURAL ONCE
Status: COMPLETED | OUTPATIENT
Start: 2018-04-06 | End: 2018-04-06

## 2018-04-06 RX ORDER — MIDAZOLAM HYDROCHLORIDE 1 MG/ML
1 INJECTION, SOLUTION INTRAMUSCULAR; INTRAVENOUS
Status: DISCONTINUED | OUTPATIENT
Start: 2018-04-06 | End: 2018-04-06 | Stop reason: ALTCHOICE

## 2018-04-06 RX ORDER — AMLODIPINE BESYLATE 5 MG/1
5 TABLET ORAL DAILY
Status: DISCONTINUED | OUTPATIENT
Start: 2018-04-06 | End: 2018-04-09

## 2018-04-06 RX ADMIN — LEVOTHYROXINE SODIUM 50 MCG: 50 TABLET ORAL at 07:57

## 2018-04-06 RX ADMIN — FENTANYL CITRATE 50 MCG: 50 INJECTION INTRAMUSCULAR; INTRAVENOUS at 08:45

## 2018-04-06 RX ADMIN — TAMSULOSIN HYDROCHLORIDE 0.4 MG: 0.4 CAPSULE ORAL at 09:55

## 2018-04-06 RX ADMIN — PANTOPRAZOLE SODIUM 40 MG: 40 TABLET, DELAYED RELEASE ORAL at 07:57

## 2018-04-06 RX ADMIN — SODIUM BICARBONATE 650 MG TABLET 650 MG: at 17:44

## 2018-04-06 RX ADMIN — Medication 2 G: at 09:55

## 2018-04-06 RX ADMIN — MIDAZOLAM HYDROCHLORIDE 1 MG: 1 INJECTION, SOLUTION INTRAMUSCULAR; INTRAVENOUS at 08:40

## 2018-04-06 RX ADMIN — AMLODIPINE BESYLATE 5 MG: 5 TABLET ORAL at 09:55

## 2018-04-06 RX ADMIN — METRONIDAZOLE 500 MG: 500 INJECTION, SOLUTION INTRAVENOUS at 03:21

## 2018-04-06 RX ADMIN — FENTANYL CITRATE 50 MCG: 50 INJECTION INTRAMUSCULAR; INTRAVENOUS at 08:40

## 2018-04-06 RX ADMIN — LINAGLIPTIN 5 MG: 5 TABLET, FILM COATED ORAL at 07:57

## 2018-04-06 RX ADMIN — HYDROMORPHONE HYDROCHLORIDE 4 MG: 2 TABLET ORAL at 21:54

## 2018-04-06 RX ADMIN — SODIUM BICARBONATE 650 MG TABLET 650 MG: at 21:54

## 2018-04-06 RX ADMIN — Medication 2 G: at 00:34

## 2018-04-06 RX ADMIN — LACTOBACILLUS TAB 2 TABLET: TAB at 09:55

## 2018-04-06 RX ADMIN — VANCOMYCIN HYDROCHLORIDE 1500 MG: 10 INJECTION, POWDER, LYOPHILIZED, FOR SOLUTION INTRAVENOUS at 00:35

## 2018-04-06 RX ADMIN — LIDOCAINE HYDROCHLORIDE 30 ML: 10 INJECTION, SOLUTION EPIDURAL; INFILTRATION; INTRACAUDAL; PERINEURAL at 08:40

## 2018-04-06 RX ADMIN — FUROSEMIDE 40 MG: 40 TABLET ORAL at 09:55

## 2018-04-06 RX ADMIN — HYDROMORPHONE HYDROCHLORIDE 4 MG: 2 TABLET ORAL at 07:57

## 2018-04-06 RX ADMIN — LACTOBACILLUS TAB 2 TABLET: TAB at 17:43

## 2018-04-06 RX ADMIN — METRONIDAZOLE 500 MG: 500 INJECTION, SOLUTION INTRAVENOUS at 13:10

## 2018-04-06 RX ADMIN — MIDAZOLAM HYDROCHLORIDE 1 MG: 1 INJECTION, SOLUTION INTRAMUSCULAR; INTRAVENOUS at 08:45

## 2018-04-06 RX ADMIN — SODIUM BICARBONATE 650 MG TABLET 650 MG: at 09:55

## 2018-04-06 RX ADMIN — Medication 2 G: at 17:43

## 2018-04-06 NOTE — PROGRESS NOTES
Preprocedure Assessment      Today 4/6/2018     Indication/Symptoms:   Mary Lou Garland is a 59 y.o. male with a history of thrombocytopenia, abnormal labs and renal failure who presents to IR for an image-guided bone marrow biopsy with moderate sedation. Medications and labs reviewed. Patient has been NPO since midnight. Blood thinners held. The H & P and/or progress notes and any available imaging were reviewed. The risks, indications and possible alternatives to the procedure, including doing nothing, were discussed and informed consent was obtained. Physical Exam:      Heart:  Regular rate   Lungs:  Normal respiratory effort    The patient is an appropriate candidate to undergo the planned procedure and sedation.     Lokesh Valerio

## 2018-04-06 NOTE — PROGRESS NOTES
Progress Note    Bill Scott  59 y.o. Admit Date: 3/31/2018  Active Problems:    Renal cell cancer ([facility] (11/5/2014) POA: Yes      Type II diabetes mellitus, uncontrolled ([facility] (12/16/2015) POA: Yes      Urinary retention (10/30/2017) POA: Yes      Cancer of left kidney ([facility] (12/23/2013) POA: Yes      Quadriparesis ([facility] (5/31/2017) POA: Yes      Bradycardia (3/31/2018) POA: Yes      H/O Clostridium difficile infection (3/31/2018) POA: Yes      Light chain deposition disease (4/2/2018) POA: Clinically Undetermined      Metabolic acidosis (2/9/6549) POA: Unknown      Hyperkalemia (4/2/2018) POA: Unknown      Hypercalcemia (4/2/2018) POA: Unknown      Chronic kidney disease, stage III (moderate) (4/4/2018) POA: Clinically Undetermined      Hepatitis C antibody positive in blood (4/4/2018) POA: Clinically Undetermined            Subjective:     Patient feels ok, sleepy from sedation, Back from Bone Marrow Biopsy. A comprehensive review of systems was negative except for that written in the History of Present Illness.     Objective:     Visit Vitals    /73 (BP 1 Location: Left arm, BP Patient Position: At rest)    Pulse 66    Temp 96.7 °F (35.9 °C)    Resp 16    Ht 6' (1.829 m)    Wt 76.2 kg (168 lb 1.6 oz)    SpO2 96%    BMI 22.8 kg/m2         Intake/Output Summary (Last 24 hours) at 04/06/18 1245  Last data filed at 04/06/18 0815   Gross per 24 hour   Intake              660 ml   Output             1000 ml   Net             -340 ml       Current Facility-Administered Medications   Medication Dose Route Frequency Provider Last Rate Last Dose    amLODIPine (NORVASC) tablet 5 mg  5 mg Oral DAILY Mila Aquino MD   5 mg at 04/06/18 0955    cefepime (MAXIPIME) 2 g in sterile water (preservative free) 10 mL IV syringe  2 g IntraVENous Jose Lane MD   2 g at 04/06/18 0955    Vancomycin Trough Level Due 4/6/18 @ 1730  1 Each Other MD Sina Galvan tamsulosin (FLOMAX) capsule 0.4 mg  0.4 mg Oral DAILY Ila Schwarz MD   0.4 mg at 04/06/18 0955    vancomycin (VANCOCIN) 1,500 mg in 0.9% sodium chloride 500 mL IVPB  1,500 mg IntraVENous Q18H Scout Lott .3 mL/hr at 04/06/18 0035 1,500 mg at 04/06/18 0035    metroNIDAZOLE (FLAGYL) IVPB premix 500 mg  500 mg IntraVENous Q12H Scout Lott  mL/hr at 04/06/18 0321 500 mg at 04/06/18 0321    HYDROmorphone (DILAUDID) tablet 4 mg  4 mg Oral Q6H PRN Scout Lott MD   4 mg at 04/06/18 0757    furosemide (LASIX) tablet 40 mg  40 mg Oral DAILY Hope Epperson MD   40 mg at 04/06/18 0955    sodium bicarbonate tablet 650 mg  650 mg Oral TID Hope Epperson MD   650 mg at 04/06/18 0955    levothyroxine (SYNTHROID) tablet 50 mcg  50 mcg Oral DANIE Lott MD   50 mcg at 04/06/18 0757    glucose chewable tablet 16 g  4 Tab Oral PRN Scout Lott MD        glucagon (GLUCAGEN) injection 1 mg  1 mg IntraMUSCular PRN Scout Lott MD        dextrose (D50W) injection syrg 12.5-25 g  25-50 mL IntraVENous PRN Scout Lott MD        insulin lispro (HUMALOG) injection   SubCUTAneous AC&HS Shanda Campos MD   Stopped at 04/06/18 0730    hydrALAZINE (APRESOLINE) tablet 25 mg  25 mg Oral TID PRN Scout Lott MD        Lactobacillus Acidoph & Bulgar [facility]) tablet 2 Tab  2 Tab Oral BID Scout Lott MD   2 Tab at 04/06/18 0955    linagliptin (TRADJENTA) tablet 5 mg  5 mg Oral DANIE Lott MD   5 mg at 04/06/18 0757    pantoprazole (PROTONIX) tablet 40 mg  40 mg Oral DANIE Lott MD   40 mg at 04/06/18 0757        Physical Exam:     Physical Exam:   General:  Alert, cooperative, no distress, appears stated age. Neck: Supple, symmetrical, trachea midline, no adenopathy, thyroid: no enlargement/tenderness/nodules, no carotid bruit and no JVD. Lungs:   Clear to auscultation bilaterally.    Heart:  Regular rate and rhythm, S1, S2 normal, no murmur, click, rub or gallop. Abdomen:   Soft, non-tender. Bowel sounds normal. No masses,  No organomegaly. Extremities: Extremities normal, atraumatic, no cyanosis or edema. Skin: Skin color, texture, turgor normal. No rashes or lesions         Data Review:    CBC w/Diff    Recent Labs      04/06/18   0355  04/05/18   0234  04/04/18   0903   WBC  4.9  6.2  6.1   RBC  2.82*  2.50*  2.66*   HGB  8.0*  7.2*  7.7*   HCT  25.2*  22.4*  23.4*   MCV  89.4  89.6  88.0   MCH  28.4  28.8  28.9   MCHC  31.7  32.1  32.9   RDW  15.9*  16.3*  16.5*    Recent Labs      04/06/18   0355  04/05/18   0234  04/04/18   0903   MONOS  14*  13*  7   EOS  2  1  1   BASOS  0  0  0   RDW  15.9*  16.3*  16.5*        Comprehensive Metabolic Profile    Recent Labs      04/06/18   0355  04/05/18   0234  04/04/18   0903   NA  146*  145  146*   K  4.2  4.4  4.6   CL  115*  116*  115*   CO2  25  23  23   BUN  25*  35*  45*   CREA  1.05  1.35*  1.42*    Recent Labs      04/06/18   0355  04/05/18   0234  04/04/18   0903   CA  9.3  8.8  9.1   PHOS  2.6   --    --    ALB  3.0*   --    --    TP  7.5   --    --    SGOT  21   --    --    TBILI  1.1*   --    --                         Impression:       Active Hospital Problems    Diagnosis Date Noted    Chronic kidney disease, stage III (moderate) 04/04/2018    Hepatitis C antibody positive in blood 04/04/2018    Light chain deposition disease 19/89/7688    Metabolic acidosis 70/46/1902    Hyperkalemia 04/02/2018    Hypercalcemia 04/02/2018    Bradycardia 03/31/2018    H/O Clostridium difficile infection 03/31/2018    Urinary retention 10/30/2017    Quadriparesis ([facility] 05/31/2017    Type II diabetes mellitus, uncontrolled ([facility] 12/16/2015    Renal cell cancer ([facility] 11/05/2014    Cancer of left kidney ([facility] 12/23/2013        Renal function has improved, high suspicion of Multiple myeloma with light chain deposition dsease, Hep C , possible Type MPGN.   Spontaneous improvement of Platelets. Plan:     Await pending Serum cryoglobulin, Bone Marrow result, possble Kidney Biopsy on 04/09/18.  Discussed with Dr. Declan Oakes MD

## 2018-04-06 NOTE — PROGRESS NOTES
Progress Note    Patient: Kathleen Eddy MRN: [id number]  CSN: [id number]    YOB: 1960  Age: 59 y.o. Sex: male    DOA: 3/31/2018 LOS:  LOS: 6 days                    Subjective:   Patient underwent bone marrow biopsy this am due to positive SPEP. Has kidney biopsy pending for Monday due to kappa light chain positive and DONTE positive. He is still sleepy from sedation. He reports having an important appointment with a physician who he believes may be able to help him regain some arm function on Tuesday 4/10/18 and he strongly desires discharge to make his 1300 appointment. No other complaints today. Venous Duplex US 4/2/18:  INTERPRETATION/FINDINGS  Duplex images were obtained using 2-D gray scale, color flow, and  spectral Doppler analysis. Right leg :  1. Deep veins visualized include the common femoral, femoral,  popliteal, posterior tibial and peroneal veins. 2. No evidence of deep venous thrombosis detected in the veins  visualized. 3. Superficial veins visualized include the great saphenous vein. 4. No evidence of superficial thrombosis detected. 5. Normal multiphasic flow in the posterior tibial artery. Left leg :  1. Deep veins visualized include the common femoral, femoral,  popliteal, posterior tibial and peroneal veins. 2. No evidence of deep venous thrombosis detected in the veins  visualized. 3. Superficial veins visualized include the great saphenous vein. 4. No evidence of superficial thrombosis detected. 5. Normal multiphasic flow in the posterior tibial artery. Renal US 4/2/18  IMPRESSION:  1. Normal-sized kidneys without hydronephrosis. 2. Right renal cyst present since 2013 and appear relatively unchanged. 3. Bladder is empty with Willis catheter. Transthoracic Echo 4/2/18:  SUMMARY:  Left ventricle: Systolic function was normal by EF (biplane method of disks).  Ejection fraction was estimated to be 60 %.  No obvious wall motion abnormalities identified in the views obtained. Wall thickness was at the upper limits of normal.    Right ventricle: The size was at the upper limits of normal. Systolic pressure was mildly increased. Estimated peak pressure was at least 42 mmHg. Left atrium: The atrium was severely dilated. Mitral valve: There was mild regurgitation. Tricuspid valve: There was mild regurgitation. ct scan abdomen and pelvis no contrast 3/31/18  1. Moderate to large right and small left pleural effusions.     2. Distended bladder with mild wall thickening, possibly due to chronic outlet  obstruction. Mildly enlarged heterogeneous prostate. Recommend correlation with urinalysis. -No hydronephrosis. -Bilobed right renal cyst similar to prior.  -No suspicious renal mass identified on this nonenhanced CT.     3. Low-attenuation of blood in keeping with anemia.     -Atherosclerosis. Osseous degenerative changes as above. Small fat-containing  inguinal hernias. Left gluteus minimus heterotopic ossification. Slightly prominent left periaortic nodes, none clearly abnormal by size criteria.     -Evaluation limited as above.          RUE Duplex venous US 4/4/18  INTERPRETATION/FINDINGS  Duplex images were obtained using 2-D gray scale, color flow, and  spectral Doppler analysis.     Right arm :  1. No evidence of deep venous thrombosis detected in the veins  visualized. 2. Deep vein(s) visualized include the internal jugular, subclavian,  axillary and brachial veins. 3. No evidence of superficial thrombosis detected. 4. Superficial vein(s) visualized include the basilic (upper arm) and  cephalic (upper arm) veins. 5. No evidence of deep vein thrombosis in the contralateral internal  jugular and subclavian veins. LUIS ENRIQUE Otoole is a 59 y.o.  male who   Has  history of renal mass, renal cell carcinoma of left kidney, DM, chronic drug abuse, and HTN who presented with  c/o bilateral lower extremity swelling onset 3 days ago. He states that he is from a [address] [place] [address] and has been in the nursing home for the past 1 year. Eura Lottie He noted that his ankles first started sweling first. [facility] states that he had an ultrasound of his leg yesterday. He notes that he usually is able to ambulate without a walker but with increase swelling can not ambulate like he used to  . Patient denies history of CHF, chest pain, abdominal pain, and any othe rassociated symptoms or complaints.      Pt has H/o hepatitis c and seen by GI as out patinet  and  Pt also s/p partial nephrectomy Lt kidney due to renal cell carcinoma by dr Gage Gardner urology     Pt has h/o cervical disc disease s/p surgery and fusion . Pt had  H/o diskitis and osteomyelitis c 3-7 and quadriplegia after surgery      Pt was treated last week for c- diff and and flu last week has no upper respiratory sx or diarrhea at this time     Chief Complaint:   Chief Complaint   Patient presents with    Swelling    Slow Heart Rate       Review of systems    GENERAL: Patient sleepy, awake and oriented times 3, able to communicate full sentences and not in distress. HEENT: No change in vision, no earache, tinnitus, sore throat or sinus congestion. NECK: No pain or stiffness. CARDIOVASCULAR: No chest pain or pressure. No palpitations. PULMONARY: +intermittent shortness of breath now resolved. No cough or wheeze. GASTROINTESTINAL: No abdominal pain, nausea, vomiting or diarrhea, melena or       bright red blood per rectum. Positive constipation h/o c-diff was treated  GENITOURINARY: No urinary frequency, urgency, hesitancy or dysuria. MUSCULOSKELETAL:  weakness upper extremities more than lower extremities   DERMATOLOGIC: No rash, no itching, no lesions. ENDOCRINE: No polyuria, polydipsia, no heat or cold intolerance. No recent change in    weight. HEMATOLOGICAL: H/o anemia or easy bruising or bleeding. NEUROLOGIC: No headache, seizures, numbness, tingling .  Positive weakness upper extremities more than lower extremities  PSYCHIATRIC: No depression, anxiety, mood disorder, no loss of interest in normal       activity or change in sleep pattern.         Objective:     Physical Exam:  Visit Vitals    /75 (BP 1 Location: Left arm, BP Patient Position: At rest)    Pulse 94    Temp 97.2 °F (36.2 °C)    Resp 18    Ht 6' (1.829 m)    Wt 76.2 kg (168 lb 1.6 oz)    SpO2 95%    BMI 22.8 kg/m2      General:  sleepy, cooperative, no distress, appears stated age. Head:  Normocephalic, without obvious abnormality, atraumatic. Eyes:  Conjunctivae/corneas clear. PERRL, EOMs intact. Nose: Nares normal. No drainage or sinus tenderness. Throat: Lips, mucosa, and tongue normal.poor dentition    Neck: Supple, symmetrical, trachea midline, no adenopathy. Lungs:   Clear to auscultation bilaterally. Poor inspiratory effort. Chest wall:  No tenderness or deformity. Heart:  Regular rate and rhythm, S1, S2 normal, systolic murmer      Abdomen: Soft, non-tender. Bowel sounds normal. No masses,  No organomegaly. Extremities: Extremities normal, atraumatic, RUE non-pitting edema, resolution of bilateral LE edema     Pulses: 2+ and symmetric lower extremities    Skin: Skin color, texture, turgor normal. No rashes or lesions   Neurologic: CNII-XII intact. No  New focal motor or sensory deficit. - patient functional quadriplegia, baseline able to ambulate, unable ot move arms aside from some movement in hands.   Left 3-5th fingers flexion contracture          Intake and Output:  Current Shift:  04/06 0701 - 04/06 1900  In: 0   Out: 350 [Urine:350]  Last three shifts:  04/04 1901 - 04/06 0700  In: 1140 [P.O.:1140]  Out: 0367 [Urine:2375]    Labs: Results:       Chemistry Recent Labs      04/06/18   0355  04/05/18   0234  04/04/18   0903   GLU  78  111*  75   NA  146*  145  146*   K  4.2  4.4  4.6   CL  115*  116*  115*   CO2  25  23  23   BUN  25*  35*  45*   CREA  1.05  1.35*  1.42*   CA  9.3 8.8  9.1   AGAP  6  6  8   BUCR  24*  26*  32*   AP  87   --    --    TP  7.5   --    --    ALB  3.0*   --    --    GLOB  4.5*   --    --    AGRAT  0.7*   --    --       CBC w/Diff Recent Labs      04/06/18   0355  04/05/18   0234  04/04/18   0903   WBC  4.9  6.2  6.1   RBC  2.82*  2.50*  2.66*   HGB  8.0*  7.2*  7.7*   HCT  25.2*  22.4*  23.4*   PLT  73*  63*  59*   GRANS  62  59  75*   LYMPH  22  27  17*   EOS  2  1  1      Cardiac Enzymes No results for input(s): CPK, CKND1, LINDA in the last 72 hours. No lab exists for component: CKRMB, TROIP   Coagulation Recent Labs      04/06/18   0355   PTP  15.6*   INR  1.3*   APTT  43.4*       Lipid Panel Lab Results   Component Value Date/Time    Cholesterol, total 68 04/01/2018 12:55 AM    HDL Cholesterol 46 04/01/2018 12:55 AM    LDL, calculated 12.2 04/01/2018 12:55 AM    VLDL, calculated 9.8 04/01/2018 12:55 AM    Triglyceride 49 04/01/2018 12:55 AM    CHOL/HDL Ratio 1.5 04/01/2018 12:55 AM      BNP No results for input(s): BNPP in the last 72 hours.    Liver Enzymes Recent Labs      04/06/18   0355   TP  7.5   ALB  3.0*   AP  87   SGOT  21      Thyroid Studies Lab Results   Component Value Date/Time    TSH 4.02 (H) 04/02/2018 05:25 AM          Procedures/imaging: see electronic medical records for all procedures/Xrays and details which were not copied into this note but were reviewed prior to creation of Plan    Medications:   Current Facility-Administered Medications   Medication Dose Route Frequency    amLODIPine (NORVASC) tablet 5 mg  5 mg Oral DAILY    cefepime (MAXIPIME) 2 g in sterile water (preservative free) 10 mL IV syringe  2 g IntraVENous Q8H    VANCOMYCIN INFORMATION NOTE   Other Rx Dosing/Monitoring    tamsulosin (FLOMAX) capsule 0.4 mg  0.4 mg Oral DAILY    metroNIDAZOLE (FLAGYL) IVPB premix 500 mg  500 mg IntraVENous Q12H    HYDROmorphone (DILAUDID) tablet 4 mg  4 mg Oral Q6H PRN    furosemide (LASIX) tablet 40 mg  40 mg Oral DAILY    sodium bicarbonate tablet 650 mg  650 mg Oral TID    levothyroxine (SYNTHROID) tablet 50 mcg  50 mcg Oral ACB    glucose chewable tablet 16 g  4 Tab Oral PRN    glucagon (GLUCAGEN) injection 1 mg  1 mg IntraMUSCular PRN    dextrose (D50W) injection syrg 12.5-25 g  25-50 mL IntraVENous PRN    insulin lispro (HUMALOG) injection   SubCUTAneous AC&HS    hydrALAZINE (APRESOLINE) tablet 25 mg  25 mg Oral TID PRN    Lactobacillus Acidoph & Bulgar (FLORANEX) tablet 2 Tab  2 Tab Oral BID    linagliptin (TRADJENTA) tablet 5 mg  5 mg Oral ACB    pantoprazole (PROTONIX) tablet 40 mg  40 mg Oral ACB       Assessment/Plan     Active Problems:    Renal cell cancer ([facility] (11/5/2014)      Type II diabetes mellitus, uncontrolled ([facility] (12/16/2015)      Urinary retention (10/30/2017)      Cancer of left kidney ([facility] (12/23/2013)      Quadriparesis ([facility] (5/31/2017)      Bradycardia (3/31/2018)      H/O Clostridium difficile infection (3/31/2018)      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)      Metabolic acidosis (3/3/6873)      Hyperkalemia (4/2/2018)      Hypercalcemia (4/2/2018)      Chronic kidney disease, stage III (moderate) (4/4/2018)      Hepatitis C antibody positive in blood (4/4/2018)    Acute Renal failure/ H/O Lt renal cell carcinoma S/p Partial Lt nephrectomy/ now with swelling lower extremities, concern for Light Chain deposition disease - Kappa lt chains elevated, DONTE positive  Pt received IV fluid in the ER  Will get venous duplex ultrasound B/L leg - neg for DVT  RUE venous duplex ultrasound - Neg for DVT  Ct scan abdomen and pelvis no contrast  showed  Distended bladder possible neck obstruction pt now s/p varner's catheter   Kristyn hydration cr improving  Urine micro albumin  Nephrology consult, Dr Thi Aguilera following  Hyperkalemia resolved, monitor  Follow up serum cryoglobulins, rheumatoid factor  Renal biopsy tentatively for monday      Urinary retention requiring varner catheter  Seen by Dr. Brianne Hale, recommends flomax, voiding trial performed yesterday    Dilated cardiomyopathy with preserved EF 60%; Bradycardia  Cardiac enzymes minimally elevated  Follow up cardiology recs. Hold lisnopril hold atenolol, blood pressure elevated, start norvasc 5mg  Hydralazine prn SBp above 160  Avoid betablockers    Hypothyroid  continue synthroid 50 mcg daily  TSH mildly elevated, normal free t4, recheck TFTs in 4 weeks  Monitor bradycardia    Diabetes Mellitus   Check HG A1c - 7.0 on 4/4/18 4/1/18 lipids: TC 68 HDL 46 LDL 12.2 TG 49  Sliding scale coverage, tradjenta 5mg daily; poc glucose has been at goal continue to monitor     H/O c-diff / h/o flu/history of PNA and UTI - recurrent versus incompletely treated healthcare associated PNA  Treated with flagyl at nursing home  Finished course of Tamiflu  Finished levaquin at nursing home for PNA (3/13/-3/19) and UTI, Urine culture sensitive  CXR with bibasilar opacities, now patient with intermittent sob and hypoxia. Will start on empiric treatment for healthcare associated PNA  ST consult to evaluate swallowing/aspiration risk. Cleared for regular solids thin liquids. Discussed with pulmonology, Dr. Db Harvey, CT chest pending  Continue on empiric antibiotics covering for MRSA as patient high risk; legionella neg, strep pneumo neg  Blood cultures 4/4/ no growth to date  Pulmonary toileting, Frequent IS, RT consulted to assist  Awaiting respiratory cultures  Follow up pulm recs for antibiotic duration     Thrombocytopenia  PLt continue to trend up, thought to be possible iatrogenic or infection related, SPEP positive elevated IGG  Follow up hematology consult Dr Markell Norman; patient had BM biopsy today     H/o hepatitis c 1a  Viral load 1,500,000 on 3/31/18  Pt has been f/u GI as outpatient and had ultrasound liver recently waiting to start treatment, last seen by PADMAJA Aguirre on 3/27/18.     Needs follow up as outpatient with [facility] when discharged   Monitor liver function stable       History of C3-7 discitis with osteomyelitis, L4-5 phlegmon 4/2017 s/p C3-7 laminectomy with posteriorlateral fusion and hardware on 4/29/17 for rapid onset quadriplegia from discitis/osteomyelitis, hsitory of MRSA baceremia and abscess cultures from spine. Followed as outpatient by Dr. Abi Oakes infectious disease, has recommended continue doxycycline 100mg daily for MRSA history/prophylaxis  - hold doxycyline while on vancomycin, resume prophylactic dosing when off vanc  - patient reports has appointment Tuesday for follow up on treatment of his upper extremity paralysis, recommended he re-schedule this as unlikely to be discharged at that time, he is worried that it will take a long time to get back in as it took 3months for this appointment. Discussed will depend on clinical course but strongly recommended he try to reschedule.        DVT/GI Prophylaxis: SCD's and H2B/PPI       Darrius Aguilera MD  4/6/2018 18:29pm

## 2018-04-06 NOTE — PROCEDURES
RADIOLOGY POST PROCEDURE NOTE     April 6, 2018       8:51 AM     Preoperative Diagnosis: Thrombocytopenia. Postoperative Diagnosis:  Same. :  Dr. Fuad Mayfield    Assistant:  None. Type of Anesthesia: 1% plain lidocaine and IV moderate sedation with Versed and Fentanyl    Procedure/Description:  Image guided BM Bx. Findings:   No bleeding. Estimated blood Loss:  Minimal    Specimen Removed:   yes    Blood transfusions:  None. Implants:  None.     Complications: None    Condition: Stable    Discharge Plan:  continue present therapy    Jens Schneider MD

## 2018-04-06 NOTE — CONSULTS
Consult Note    Patient: Rebecca Otoole               Sex: male          DOA: 3/31/2018         YOB: 1960      Age:  59 y.o.        LOS:  LOS: 6 days              HPI:     Rebecca Otoole is a 59 y.o. male who has been seen for bone marrow biopsy and renal biopsy at the request of Dr. Mel Friedman. Patient has a history of left kidney cancer s/p partial nephrectomy, chronic kidney disease and hepatitis C who is being managed for progressive thrombocytopenia and renal failure. Labs were significant for abnormal SPEP, +DONTE and high free lambda/kappa light chains. Past Medical History:   Diagnosis Date    Chronic drug abuse 1974    Diabetes ([facility] 01/1990    Hypertension     Renal cell carcinoma of left kidney ([facility] 12/17/13    Pathological Stage [id number] cc RCC FG3 s/p left open partial nephrectomy in 12/13      Renal mass        Past Surgical History:   Procedure Laterality Date    HX CIRCUMCISION  12/17/13    Dr. Juan Austin, [facility]    HX NEPHRECTOMY Left 12/17/13    Open Partial, Dr. Juan Austin, [facility]    HX OTHER SURGICAL Right 2/27/2016    removed right ft  2nd meta-tarsal       Family History   Problem Relation Age of Onset    Diabetes Mother     Sickle Cell Anemia Father     Diabetes Sister     Hypertension Sister        Social History     Social History    Marital status:      Spouse name: N/A    Number of children: N/A    Years of education: N/A     Social History Main Topics    Smoking status: Former Smoker     Packs/day: 0.50     Years: 30.00     Types: Cigarettes    Smokeless tobacco: Never Used    Alcohol use 8.4 oz/week     14 Cans of beer per week      Comment: watching sports    Drug use: No      Comment: methadone    Sexual activity: Not on file     Other Topics Concern    Not on file     Social History Narrative     since 2012;  for 12 yrs; [employer]; [address] [employer]  just recently furloughed;  No  service       Prior to Admission medications    Medication Sig Start Date End Date Taking? Authorizing Provider   NUT. TX.GLUCOSE INTOLERANCE,SOY (GLUCERNA PO) Take  by mouth. Historical Provider   magnesium hydroxide (NUR MILK OF MAGNESIA) 400 mg/5 mL suspension Take 30 mL by mouth daily as needed for Constipation. Historical Provider   LACTOBACILLUS RHAMNOSUS GG (CULTURELLE PO) Take  by mouth. Historical Provider   doxycycline (MONODOX) 100 mg capsule Take 100 mg by mouth two (2) times a day. Historical Provider   mineral oil-hydrophil petrolat (AQUAPHOR) ointment Apply  to affected area as needed for Dry Skin. Historical Provider   mineral oil-hydrophil petrolat (AQUAPHOR) ointment Apply  to affected area as needed for Dry Skin. Historical Provider   PREPARATION H, WITCH HAZEL, EX by Apply Externally route. Historical Provider   linagliptin (TRADJENTA) 5 mg tablet Take 5 mg by mouth daily. Historical Provider   tamsulosin (FLOMAX) 0.4 mg capsule Take 0.4 mg by mouth daily. Historical Provider   acetaminophen (TYLENOL) 500 mg tablet 1,000 mg. 6/7/17   Historical Provider   bisacodyl (DULCOLAX) 5 mg EC tablet 10 mg. 6/7/17   Historical Provider   gabapentin (NEURONTIN) 100 mg capsule 100 mg. Historical Provider   atorvastatin (LIPITOR) 40 mg tablet 40 mg. 6/7/17   Historical Provider   amLODIPine (NORVASC) 10 mg tablet 10 mg. 5/28/15   Historical Provider   atenolol (TENORMIN) 25 mg tablet 25 mg. 5/28/15   Historical Provider   ibuprofen (MOTRIN) 400 mg tablet 400 mg. 6/7/17   Historical Provider   HYDROmorphone (DILAUDID) 4 mg tablet 4 mg. 6/7/17   Historical Provider   albuterol-ipratropium (DUO-NEB) 2.5 mg-0.5 mg/3 ml nebu 3 mL. 6/7/17   Historical Provider   Menthol-Zinc Oxide (CALMOSEPTINE) 0.44-20.6 % oint Use 1 Application to affected area. Historical Provider   nicotine (NICODERM CQ) 14 mg/24 hr patch 1 Patch.  6/7/17   Historical Provider   rifAMPin (RIFADIN) 300 mg capsule 600 mg. 6/7/17   Historical Provider   polyethylene glycol (MIRALAX) 17 gram packet 17 g. Historical Provider   silver sulfADIAZINE (SILVADENE) 1 % topical cream Use 1 Application to affected area Twice Daily. knees 6/7/17   Historical Provider   multivitamin, tx-iron-ca-min (THERAPY M) 9 mg iron-400 mcg tab tablet Take 1 Tab by Mouth Once a Day. 6/7/17   Historical Provider   Zolpidem 10 mg subl 10 mg. Historical Provider   insulin glargine (LANTUS) 100 unit/mL injection 4 Units. 6/7/17   Historical Provider   insulin lispro (HUMALOG) 100 unit/mL injection 2-10 Units. 6/7/17   Historical Provider   lisinopril (PRINIVIL, ZESTRIL) 40 mg tablet Take 1 Tab by mouth daily. 3/2/16   Sivan Baptiste MD   pravastatin (PRAVACHOL) 20 mg tablet Take 1 Tab by mouth nightly. 2/29/16   Sudhakar Knox MD   insulin lispro (HUMALOG KWIKPEN) 100 unit/mL kwikpen Blood Sugar <150 =0 units; 150 -199 =2 units; 200 -249 =4 units; 250 -299 =6 units; 300 -349 =8 units; 350 and above =10 units. 2/29/16   Sudhakar Knox MD   insulin glargine (LANTUS SOLOSTAR) 100 unit/mL (3 mL) pen 50 Units by SubCUTAneous route daily. 2/29/16   Sudhakar Knox MD       Allergies   Allergen Reactions    Iodine Hives       Review of Systems  Pertinent items are noted in the History of Present Illness. Physical Exam:      Visit Vitals    /82 (BP 1 Location: Left arm, BP Patient Position: At rest)    Pulse 74    Temp 97.7 °F (36.5 °C)    Resp 20    Ht 6' (1.829 m)    Wt 76.2 kg (168 lb 1.6 oz)    SpO2 95%    BMI 22.8 kg/m2     Physical Exam:  Constitutional: Ill-appearing. NAD. A&Ox4. Respiratory: Normal respiratory effort  Cardiovascular: Regular rate.   Gastrointestinal: Soft, NT, ND    Labs Reviewed:  CMP:   Lab Results   Component Value Date/Time     (H) 04/06/2018 03:55 AM    K 4.2 04/06/2018 03:55 AM     (H) 04/06/2018 03:55 AM    CO2 25 04/06/2018 03:55 AM    AGAP 6 04/06/2018 03:55 AM    GLU 78 04/06/2018 03:55 AM    BUN 25 (H) 04/06/2018 03:55 AM    CREA 1.05 04/06/2018 03:55 AM    GFRAA >60 04/06/2018 03:55 AM    GFRNA >60 04/06/2018 03:55 AM    CA 9.3 04/06/2018 03:55 AM    PHOS 2.6 04/06/2018 03:55 AM    ALB 3.0 (L) 04/06/2018 03:55 AM    TP 7.5 04/06/2018 03:55 AM    GLOB 4.5 (H) 04/06/2018 03:55 AM    AGRAT 0.7 (L) 04/06/2018 03:55 AM    SGOT 21 04/06/2018 03:55 AM    ALT 39 04/06/2018 03:55 AM     CBC:   Lab Results   Component Value Date/Time    WBC 4.9 04/06/2018 03:55 AM    HGB 8.0 (L) 04/06/2018 03:55 AM    HCT 25.2 (L) 04/06/2018 03:55 AM    PLT 73 (L) 04/06/2018 03:55 AM     COAGS:   Lab Results   Component Value Date/Time    APTT 43.4 (H) 04/06/2018 03:55 AM    PTP 15.6 (H) 04/06/2018 03:55 AM    INR 1.3 (H) 04/06/2018 03:55 AM       Assessment/Plan     Active Problems:    Renal cell cancer ([facility] (11/5/2014)      Type II diabetes mellitus, uncontrolled ([facility] (12/16/2015)      Urinary retention (10/30/2017)      Cancer of left kidney ([facility] (12/23/2013)      Quadriparesis ([facility] (5/31/2017)      Bradycardia (3/31/2018)      H/O Clostridium difficile infection (3/31/2018)      Light chain deposition disease (5/5/0649)      Metabolic acidosis (3/4/1360)      Hyperkalemia (4/2/2018)      Hypercalcemia (4/2/2018)      Chronic kidney disease, stage III (moderate) (4/4/2018)      Hepatitis C antibody positive in blood (4/4/2018)      Case discussed with Dr. Lang Felix. Will plan for bone marrow biopsy on Friday, 4/6 and renal biopsy on Monday 4/9 as IR schedule allows. Patient to be kept NPO after midnight prior to both procedures with any blood thinners held. Consent to be obtained prior as well.

## 2018-04-06 NOTE — ROUTINE PROCESS
Bedside and Verbal shift change report given to Donnell Ordaz (oncoming nurse) by Anish Bravo   (offgoing nurse). Report included the following information SBAR, MAR, Recent Results and Cardiac Rhythm Sinus Rhythm.

## 2018-04-06 NOTE — PROGRESS NOTES
Hematology/Medical Oncology Progress Note      NAME: Cristina Castorena   :  1960  MRM:  [id number]    Date/Time: 2018 8:16 AM         Subjective:     Mr Alla Elaine is a 59 y.o. Man with renal failure and progressive thrombocytopenia. Currently he reports that he is feeling better. There are no new complaints. Past Medical History reviewed and unchanged from Admission History and Physical    Review of Systems   Constitutional: Negative for chills, fatigue, fever and unexpected weight change. HENT: Negative for ear discharge, ear pain, facial swelling, mouth sores, nosebleeds, sneezing, sore throat and tinnitus. Eyes: Negative for photophobia, pain, discharge, redness, itching and visual disturbance. Respiratory: Negative for apnea, cough, choking, chest tightness, shortness of breath, wheezing and stridor. Cardiovascular: Negative for chest pain, palpitations and leg swelling. Gastrointestinal: Negative for abdominal distention, abdominal pain, anal bleeding, blood in stool, constipation, diarrhea, nausea, rectal pain and vomiting. Genitourinary: Negative for decreased urine volume, difficulty urinating, discharge, dysuria, enuresis, flank pain, frequency, genital sores, hematuria, penile pain, penile swelling, scrotal swelling, testicular pain and urgency. Musculoskeletal: Negative for arthralgias, back pain, gait problem, joint swelling, myalgias, neck pain and neck stiffness. Skin: Negative for color change, pallor and rash. Neurological: Negative for dizziness, tremors, seizures, syncope, facial asymmetry, speech difficulty, weakness, light-headedness, numbness and headaches. Hematological: Negative for adenopathy. Does not bruise/bleed easily. Psychiatric/Behavioral: Negative for agitation, behavioral problems, confusion, decreased concentration, dysphoric mood, hallucinations, self-injury, sleep disturbance and suicidal ideas.  The patient is not nervous/anxious and is not hyperactive. Objective:       Vitals:      Last 24hrs VS reviewed since prior progress note. Most recent are:    Visit Vitals    /82 (BP 1 Location: Left arm, BP Patient Position: At rest)    Pulse 74    Temp 97.7 °F (36.5 °C)    Resp 20    Ht 6' (1.829 m)    Wt 76.2 kg (168 lb 1.6 oz)    SpO2 95%    BMI 22.8 kg/m2     SpO2 Readings from Last 6 Encounters:   04/06/18 95%   03/20/18 99%   12/03/17 100%   03/02/16 99%   02/29/16 95%   02/23/16 96%    O2 Flow Rate (L/min): 3 l/min       Intake/Output Summary (Last 24 hours) at 04/06/18 0816  Last data filed at 04/06/18 0358   Gross per 24 hour   Intake             1140 ml   Output             1000 ml   Net              140 ml          Exam:      Physical Exam   Nursing note and vitals reviewed. Constitutional: He is oriented to person, place, and time. He appears well-developed and well-nourished. No distress. HENT:   Head: Normocephalic and atraumatic. Mouth/Throat: No oropharyngeal exudate. Eyes: Conjunctivae and EOM are normal. Pupils are equal, round, and reactive to light. Right eye exhibits no discharge. Left eye exhibits no discharge. No scleral icterus. Neck: Normal range of motion. Neck supple. No tracheal deviation present. No thyromegaly present. Cardiovascular: Normal rate and regular rhythm. Exam reveals no gallop and no friction rub. No murmur heard. Pulmonary/Chest: Effort normal and breath sounds normal. No apnea. No respiratory distress. He has no wheezes. He has no rales. Chest wall is not dull to percussion. He exhibits no mass, no tenderness, no bony tenderness, no laceration, no crepitus, no edema, no deformity, no swelling and no retraction. Right breast exhibits no inverted nipple, no mass, no nipple discharge, no skin change and no tenderness. Left breast exhibits no inverted nipple, no mass, no nipple discharge, no skin change and no tenderness. Breasts are symmetrical.   Abdominal: Soft.  Bowel sounds are normal. He exhibits no distension. There is no tenderness. There is no rebound and no guarding. Musculoskeletal: Normal range of motion. He exhibits no edema or tenderness. Lymphadenopathy:     He has no cervical adenopathy. Neurological: He is alert and oriented to person, place, and time. Coordination normal.   Skin: Skin is warm and dry. No rash noted. He is not diaphoretic. No erythema. No pallor. Psychiatric: He has a normal mood and affect.  His behavior is normal. Thought content normal.       Telemetry reviewed:   normal sinus rhythm    Lab Data Reviewed: (see below)      Medications:  Current Facility-Administered Medications   Medication Dose Route Frequency    fentaNYL citrate (PF) injection 50 mcg  50 mcg IntraVENous Multiple    midazolam (VERSED) injection 1 mg  1 mg IntraVENous Multiple    Vancomycin Trough Level Due 4/6/18 @ 1730  1 Each Other DAILY    tamsulosin (FLOMAX) capsule 0.4 mg  0.4 mg Oral DAILY    vancomycin (VANCOCIN) 1,500 mg in 0.9% sodium chloride 500 mL IVPB  1,500 mg IntraVENous Q18H    cefepime (MAXIPIME) 2 g in sterile water (preservative free) 10 mL IV syringe  2 g IntraVENous Q12H    metroNIDAZOLE (FLAGYL) IVPB premix 500 mg  500 mg IntraVENous Q12H    HYDROmorphone (DILAUDID) tablet 4 mg  4 mg Oral Q6H PRN    furosemide (LASIX) tablet 40 mg  40 mg Oral DAILY    sodium bicarbonate tablet 650 mg  650 mg Oral TID    levothyroxine (SYNTHROID) tablet 50 mcg  50 mcg Oral ACB    glucose chewable tablet 16 g  4 Tab Oral PRN    glucagon (GLUCAGEN) injection 1 mg  1 mg IntraMUSCular PRN    dextrose (D50W) injection syrg 12.5-25 g  25-50 mL IntraVENous PRN    insulin lispro (HUMALOG) injection   SubCUTAneous AC&HS    hydrALAZINE (APRESOLINE) tablet 25 mg  25 mg Oral TID PRN    Lactobacillus Acidoph & Bulgar (FLORANEX) tablet 2 Tab  2 Tab Oral BID    linagliptin (TRADJENTA) tablet 5 mg  5 mg Oral ACB    pantoprazole (PROTONIX) tablet 40 mg  40 mg Oral ACB ______________________________________________________________________      Lab Review:     Recent Labs      04/06/18 0355 04/05/18 0234 04/04/18   0903   WBC  4.9  6.2  6.1   HGB  8.0*  7.2*  7.7*   HCT  25.2*  22.4*  23.4*   PLT  73*  63*  59*     Recent Labs      04/06/18 0355 04/05/18 0234 04/04/18   0903   NA  146*  145  146*   K  4.2  4.4  4.6   CL  115*  116*  115*   CO2  25  23  23   GLU  78  111*  75   BUN  25*  35*  45*   CREA  1.05  1.35*  1.42*   CA  9.3  8.8  9.1   PHOS  2.6   --    --    ALB  3.0*   --    --    SGOT  21   --    --    ALT  39   --    --    INR  1.3*   --    --      No components found for: GLPOC  No results for input(s): PH, PCO2, PO2, HCO3, FIO2 in the last 72 hours. Recent Labs      04/06/18 0355   INR  1.3*       Other pertinent lab:          Assessment:     Active Problems:    Renal cell cancer ([facility] (11/5/2014)      Type II diabetes mellitus, uncontrolled ([facility] (12/16/2015)      Urinary retention (10/30/2017)      Cancer of left kidney ([facility] (12/23/2013)      Quadriparesis ([facility] (5/31/2017)      Bradycardia (3/31/2018)      H/O Clostridium difficile infection (3/31/2018)      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 Thrombocytopenia: I have explained to the patient that his platelets are now increasing. The most recent CBC shows that his platelet count is now 73,000 with a hemoglobin of 8 g/dL hematocrit of 25.2%. No therapeutic intervention is necessary at this point. The likely causes of his thrombocytopenia are iatrogenic or infection related. The peripheral reveals no schistocytes to suggest TTP/HUS. The LDH level is normal at 189 U/L and the SPEP is still pending. Free kappa light chains are increased at 117 mg/l.  2. Anemia in CKD: pending results or iron studies the patient may benefit from procrit.          Total time spent with patient:25 minutes                  Care Plan discussed with: Patient, Nursing Staff and Consultant/Specialist    Discussed:  Care Plan    Prophylaxis:  SCD's    Disposition:  Home w/Family           ___________________________________________________    Attending Physician: Jesus Resendiz MD

## 2018-04-06 NOTE — ROUTINE PROCESS
Bedside and Verbal shift change report given to Sara Pablo (oncoming nurse) by Juan Antonio Marcus   (offgoing nurse). Report included the following information SBAR, MAR, Recent Results and Cardiac Rhythm Sinus Rhythm.

## 2018-04-06 NOTE — PROGRESS NOTES
Problem: Falls - Risk of  Goal: *Absence of Falls  Document Tomas Fall Risk and appropriate interventions in the flowsheet.    Outcome: Progressing Towards Goal  Fall Risk Interventions:  Mobility Interventions: Bed/chair exit alarm         Medication Interventions: Bed/chair exit alarm    Elimination Interventions: Bed/chair exit alarm

## 2018-04-06 NOTE — PROGRESS NOTES
[facility]  Pulmonary, Critical Care, and Sleep Medicine    Patient Consult    Name: Gissell Stout MRN: [id number]   : 1960 Hospital: [address] [place] [address] Dr   Date: 2018        This patient has been seen and evaluated at the request of Dr. Chauncey Roper for hypoxia. IMPRESSION:   · Hypoxia:  Etiology due to LL atelectasis vs pneumonia  · B/L LL opacities:  Suspect aspiration pneumonia given patient's poor functional status. May be due to atelectasis due to being bedbound vs component of heart failure (elevated NT pro BNP with LA dilation on TTE)  · Deconditioning  · Renal insufficiency, chronic  · Hypernatremia  · Chronic anemia  · Hep C      RECOMMENDATIONS:   · Wean off O2/NC, goal keep SpO2>88, pt stable and asymptomatic, monitor for hypoxia  · S/P BM and renal Bx today-tolerated well, monitor hemodynamics. · Continue antibiotic, correctly afebrile, no leukocytosis  · Await sputum culture-not done yet, nsg miscellaneous order. · Continue O2/NC, titrate to keep goal SpO2 >88%  · Continue gentle diuresis as tolerated -- will defer to Nephrology consult  · Strict aspiration precautions, HOB elevated  · Aggressive pulmonary toilet  · Instructed and encouraged IS  · PT/OT, OOB to chair  · DVT ppx per primary service     HPI:  Patient is a 59 y.o. male with hx of renal cell Ca, chronic drug use, nursing home resident presented to [facility] with complaints of LE swelling about 3-4 days prior to admission. Pt was noted to have bradycardia on admission. Pt was found to be hypoxic requiring supplemental oxygen. CXR showed B/L opacities in bases. Pt had recent hospiitalization for flu PNA, c-diff. Pt has hx of chronic diskitis/osteomyelitis s/p C3-7 fusion. Pt found to have progressive thrombcytopenia here. Etiology of CKD also unclear at this point.   Pt denies any fevers, chills, N/V/D, dysphagia, night sweats, hemoptysis     Subjective:     Pt in the bed appear sleepy,  Report had bone marrow biopsy earlier, just resting. He report breathing is fine  RN (Amy Hankins in the room), SpO2 check 97% on room air, decrease to 1 Lpm SpO2 stable 94%, instructed RN trial off O2/NC, monitor SpO2 and s/s of hypoxia and document in the progress note,  titrate to keep SpO2>88%. Pt  denies cough with mucus or hemoptysis, no chest pain or any other complaint verbalized. Pt currently afebrile, bradycardic low 50's early am, currently in the low 60'F, BP still fluctuating. Past Medical History:   Diagnosis Date    Chronic drug abuse 1974    Diabetes ([facility] 01/1990    Hypertension     Renal cell carcinoma of left kidney ([facility] 12/17/13    Pathological Stage [id number] cc RCC FG3 s/p left open partial nephrectomy in 12/13      Renal mass       Past Surgical History:   Procedure Laterality Date    HX CIRCUMCISION  12/17/13    Dr. Maryana Martinez, [address] [place] [address] HX NEPHRECTOMY Left 12/17/13    Open Partial, Dr. Maryana Martinez, [facility]    HX OTHER SURGICAL Right 2/27/2016    removed right ft  2nd meta-tarsal      Prior to Admission medications    Medication Sig Start Date End Date Taking? Authorizing Provider   NUT. TX.GLUCOSE INTOLERANCE,SOY (GLUCERNA PO) Take  by mouth. Historical Provider   magnesium hydroxide ([employer] MILK OF MAGNESIA) 400 mg/5 mL suspension Take 30 mL by mouth daily as needed for Constipation. Historical Provider   LACTOBACILLUS RHAMNOSUS GG (CULTURELLE PO) Take  by mouth. Historical Provider   doxycycline (MONODOX) 100 mg capsule Take 100 mg by mouth two (2) times a day. Historical Provider   mineral oil-hydrophil petrolat (AQUAPHOR) ointment Apply  to affected area as needed for Dry Skin. Historical Provider   mineral oil-hydrophil petrolat (AQUAPHOR) ointment Apply  to affected area as needed for Dry Skin. Historical Provider   PREPARATION H, WITCH HAZEL, EX by Apply Externally route. Historical Provider   linagliptin (TRADJENTA) 5 mg tablet Take 5 mg by mouth daily.     Historical Provider   tamsulosin (FLOMAX) 0.4 mg capsule Take 0.4 mg by mouth daily. Historical Provider   acetaminophen (TYLENOL) 500 mg tablet 1,000 mg. 6/7/17   Historical Provider   bisacodyl (DULCOLAX) 5 mg EC tablet 10 mg. 6/7/17   Historical Provider   gabapentin (NEURONTIN) 100 mg capsule 100 mg. Historical Provider   atorvastatin (LIPITOR) 40 mg tablet 40 mg. 6/7/17   Historical Provider   amLODIPine (NORVASC) 10 mg tablet 10 mg. 5/28/15   Historical Provider   atenolol (TENORMIN) 25 mg tablet 25 mg. 5/28/15   Historical Provider   ibuprofen (MOTRIN) 400 mg tablet 400 mg. 6/7/17   Historical Provider   HYDROmorphone (DILAUDID) 4 mg tablet 4 mg. 6/7/17   Historical Provider   albuterol-ipratropium (DUO-NEB) 2.5 mg-0.5 mg/3 ml nebu 3 mL. 6/7/17   Historical Provider   Menthol-Zinc Oxide (CALMOSEPTINE) 0.44-20.6 % oint Use 1 Application to affected area. Historical Provider   nicotine (NICODERM CQ) 14 mg/24 hr patch 1 Patch. 6/7/17   Historical Provider   rifAMPin (RIFADIN) 300 mg capsule 600 mg. 6/7/17   Historical Provider   polyethylene glycol (MIRALAX) 17 gram packet 17 g. Historical Provider   silver sulfADIAZINE (SILVADENE) 1 % topical cream Use 1 Application to affected area Twice Daily. knees 6/7/17   Historical Provider   multivitamin, tx-iron-ca-min (THERAPY M) 9 mg iron-400 mcg tab tablet Take 1 Tab by Mouth Once a Day. 6/7/17   Historical Provider   Zolpidem 10 mg subl 10 mg. Historical Provider   insulin glargine (LANTUS) 100 unit/mL injection 4 Units. 6/7/17   Historical Provider   insulin lispro (HUMALOG) 100 unit/mL injection 2-10 Units. 6/7/17   Historical Provider   lisinopril (PRINIVIL, ZESTRIL) 40 mg tablet Take 1 Tab by mouth daily. 3/2/16   Ariane Reyes MD   pravastatin (PRAVACHOL) 20 mg tablet Take 1 Tab by mouth nightly.  2/29/16   Adarsh Ty MD   insulin lispro (HUMALOG KWIKPEN) 100 unit/mL kwikpen Blood Sugar <150 =0 units; 150 -199 =2 units; 200 -249 =4 units; 250 -299 =6 units; 300 -349 =8 units; 350 and above =10 units. 2/29/16   Jonathan Yanez MD   insulin glargine (LANTUS SOLOSTAR) 100 unit/mL (3 mL) pen 50 Units by SubCUTAneous route daily. 2/29/16   Jonathan Yanez MD     Allergies   Allergen Reactions    Iodine Hives      Social History   Substance Use Topics    Smoking status: Former Smoker     Packs/day: 0.50     Years: 30.00     Types: Cigarettes    Smokeless tobacco: Never Used    Alcohol use 8.4 oz/week     14 Cans of beer per week      Comment: watching sports      Family History   Problem Relation Age of Onset    Diabetes Mother     Sickle Cell Anemia Father     Diabetes Sister     Hypertension Sister         Immunization status: delayed. Current Facility-Administered Medications   Medication Dose Route Frequency    amLODIPine (NORVASC) tablet 5 mg  5 mg Oral DAILY    cefepime (MAXIPIME) 2 g in sterile water (preservative free) 10 mL IV syringe  2 g IntraVENous Q8H    Vancomycin Trough Level Due 4/6/18 @ 1730  1 Each Other DAILY    tamsulosin (FLOMAX) capsule 0.4 mg  0.4 mg Oral DAILY    vancomycin (VANCOCIN) 1,500 mg in 0.9% sodium chloride 500 mL IVPB  1,500 mg IntraVENous Q18H    metroNIDAZOLE (FLAGYL) IVPB premix 500 mg  500 mg IntraVENous Q12H    furosemide (LASIX) tablet 40 mg  40 mg Oral DAILY    sodium bicarbonate tablet 650 mg  650 mg Oral TID    levothyroxine (SYNTHROID) tablet 50 mcg  50 mcg Oral ACB    insulin lispro (HUMALOG) injection   SubCUTAneous AC&HS    Lactobacillus Acidoph & Bulgar (FLORANEX) tablet 2 Tab  2 Tab Oral BID    linagliptin (TRADJENTA) tablet 5 mg  5 mg Oral ACB    pantoprazole (PROTONIX) tablet 40 mg  40 mg Oral ACB       Review of Systems:  A comprehensive review of systems was negative except for that written in the HPI.     Objective:   Vital Signs:    Visit Vitals    /73 (BP 1 Location: Left arm, BP Patient Position: At rest)    Pulse 66    Temp 96.7 °F (35.9 °C)    Resp 16    Ht 6' (1.829 m)    [place] 76.2 kg (168 lb 1.6 oz)    SpO2 96%    BMI 22.8 kg/m2       O2 Device: Nasal cannula   O2 Flow Rate (L/min): 1 l/min   Temp (24hrs), Av.4 °F (36.3 °C), Min:96.7 °F (35.9 °C), Max:97.7 °F (36.5 °C)       Intake/Output:   Last shift:         Last 3 shifts:  1901 -  0700  In: 1140 [P.O.:1140]  Out: 7927 [Urine:2375]    Intake/Output Summary (Last 24 hours) at 18 1043  Last data filed at 18 0815   Gross per 24 hour   Intake              660 ml   Output             1000 ml   Net             -340 ml      Physical Exam:   General:  Alert, cooperative, no distress, appears stated age. Head:  Normocephalic, without obvious abnormality, atraumatic. Eyes:  Conjunctivae/corneas clear. PERRL, EOMs intact. Nose: Nares normal. Septum midline. Mucosa normal. No drainage or sinus tenderness. Throat: Lips, mucosa, and tongue normal.  gums normal, wearing dentures   Neck: Supple, symmetrical, trachea midline, no adenopathy, no carotid bruit and no JVD. Back:   Symmetric, no curvature. ROM normal.   Lungs:   Decreased breath sounds in B/L bases with some scattered rales and rhonchi more in the bases, no wheezes   Chest wall:  No tenderness or deformity. Heart:  Regular rate and rhythm, S1, S2 normal, no murmur, click, rub or gallop. Abdomen:   Soft, non-tender. Bowel sounds normal. No masses,  No organomegaly. Extremities: Extremities normal, atraumatic, no cyanosis or edema. Pulses: 2+ and symmetric all extremities.    Skin: Skin color, texture, turgor normal. No rashes or lesions   Lymph nodes: Cervical, supraclavicular, and axillary nodes normal.   Neurologic: Grossly nonfocal, diffuse weakness throughout, AAOx3     Data review:     Recent Results (from the past 24 hour(s))   GLUCOSE, POC    Collection Time: 18 11:19 AM   Result Value Ref Range    Glucose (POC) 169 (H) 70 - 110 mg/dL   GLUCOSE, POC    Collection Time: 04/05/18  3:25 PM   Result Value Ref Range    Glucose (POC) 178 (H) 70 - 110 mg/dL   GLUCOSE, POC    Collection Time: 04/05/18 10:38 PM   Result Value Ref Range    Glucose (POC) 156 (H) 70 - 110 mg/dL   CBC WITH AUTOMATED DIFF    Collection Time: 04/06/18  3:55 AM   Result Value Ref Range    WBC 4.9 4.6 - 13.2 K/uL    RBC 2.82 (L) 4.70 - 5.50 M/uL    HGB 8.0 (L) 13.0 - 16.0 g/dL    HCT 25.2 (L) 36.0 - 48.0 %    MCV 89.4 74.0 - 97.0 FL    MCH 28.4 24.0 - 34.0 PG    MCHC 31.7 31.0 - 37.0 g/dL    RDW 15.9 (H) 11.6 - 14.5 %    PLATELET 73 (L) 079 - 420 K/uL    MPV 13.1 (H) 9.2 - 11.8 FL    NEUTROPHILS 62 40 - 73 %    LYMPHOCYTES 22 21 - 52 %    MONOCYTES 14 (H) 3 - 10 %    EOSINOPHILS 2 0 - 5 %    BASOPHILS 0 0 - 2 %    ABS. NEUTROPHILS 3.0 1.8 - 8.0 K/UL    ABS. LYMPHOCYTES 1.1 0.9 - 3.6 K/UL    ABS. MONOCYTES 0.7 0.05 - 1.2 K/UL    ABS. EOSINOPHILS 0.1 0.0 - 0.4 K/UL    ABS. BASOPHILS 0.0 0.0 - 0.1 K/UL    DF AUTOMATED     METABOLIC PANEL, COMPREHENSIVE    Collection Time: 04/06/18  3:55 AM   Result Value Ref Range    Sodium 146 (H) 136 - 145 mmol/L    Potassium 4.2 3.5 - 5.5 mmol/L    Chloride 115 (H) 100 - 108 mmol/L    CO2 25 21 - 32 mmol/L    Anion gap 6 3.0 - 18 mmol/L    Glucose 78 74 - 99 mg/dL    BUN 25 (H) 7.0 - 18 MG/DL    Creatinine 1.05 0.6 - 1.3 MG/DL    BUN/Creatinine ratio 24 (H) 12 - 20      GFR est AA >60 >60 ml/min/1.73m2    GFR est non-AA >60 >60 ml/min/1.73m2    Calcium 9.3 8.5 - 10.1 MG/DL    Bilirubin, total 1.1 (H) 0.2 - 1.0 MG/DL    ALT (SGPT) 39 16 - 61 U/L    AST (SGOT) 21 15 - 37 U/L    Alk.  phosphatase 87 45 - 117 U/L    Protein, total 7.5 6.4 - 8.2 g/dL    Albumin 3.0 (L) 3.4 - 5.0 g/dL    Globulin 4.5 (H) 2.0 - 4.0 g/dL    A-G Ratio 0.7 (L) 0.8 - 1.7     PHOSPHORUS    Collection Time: 04/06/18  3:55 AM   Result Value Ref Range    Phosphorus 2.6 2.5 - 4.9 MG/DL   PROTHROMBIN TIME + INR    Collection Time: 04/06/18  3:55 AM   Result Value Ref Range    Prothrombin time 15.6 (H) 11.5 - 15.2 sec    INR 1.3 (H) 0.8 - 1.2     PTT    Collection Time: 04/06/18 3:55 AM   Result Value Ref Range    aPTT 43.4 (H) 23.0 - 36.4 SEC   GLUCOSE, POC    Collection Time: 04/06/18  7:28 AM   Result Value Ref Range    Glucose (POC) 103 70 - 110 mg/dL       Imaging:  I have personally reviewed the patients radiographs and have reviewed the reports:  XR Results (most recent):    Results from Hospital Encounter encounter on 03/31/18   XR CHEST PA LAT   Narrative Chest    Indication: follow up atelectasis/pleural effusions     Comparison: April 2, 2018    Findings: Two views. No pneumothorax. No pleural effusion. Bilateral lower lung  zone atelectasis with patchy bilateral medial lower lung zone opacities as well  as moderate pulmonary vascular congestion. Cardiomediastinal silhouette and  osseous structures grossly unchanged.          Impression Impression: No significant change as detailed      Selam Olivares NP    Pulmonary, [address] [place] [address],[facility]

## 2018-04-06 NOTE — PROGRESS NOTES
TRANSFER - OUT REPORT:    Verbal report given to Vicky Cervantes RN (name) on Waldo Tinoco  being transferred to [address] [place] [address] ([facility]) for routine post - op       Report consisted of patients Situation, Background, Assessment and   Recommendations(SBAR). Information from the following report(s) SBAR, Kardex, Procedure Summary and MAR was reviewed with the receiving nurse. Lines:   Peripheral IV 03/31/18 (Active)   Site Assessment Clean, dry, & intact 4/5/2018  8:02 PM   Phlebitis Assessment 0 4/5/2018  8:02 PM   Infiltration Assessment 0 4/5/2018  8:02 PM   Dressing Status Clean, dry, & intact 4/5/2018  8:02 PM   Dressing Type Transparent 4/5/2018  8:02 PM   Hub Color/Line Status Pink;Capped 4/5/2018  8:02 PM   Action Taken Open ports on tubing capped 4/4/2018  8:00 AM   Alcohol Cap Used No 4/5/2018  8:02 PM        Opportunity for questions and clarification was provided.       Patient transported with:   O2 @ 3 liters  Tech

## 2018-04-06 NOTE — PROGRESS NOTES
Problem: Mobility Impaired (Adult and Pediatric)  Goal: *Acute Goals and Plan of Care (Insert Text)  Physical Therapy Goals  Initiated 4/3/2018 and to be accomplished within 7 day(s)  1. Patient will move from supine to sit and sit to supine, scoot up and down and roll side to side in bed with minimal assistance/contact guard assist.     2.  Patient will transfer from bed to chair and chair to bed with minimal assistance/contact guard assist using the least restrictive device. 3.  Patient will perform sit to stand with supervision/set-up. 4.  Patient will ambulate with minimal assistance/contact guard assist for 25 feet with the bilateral platform walker. 5.  Patient will negotiate 150ft in wheelchair using BLE's to propel with supervision assist.     1332 - Pt being cleaned up with nursing at this time. Will f/u.

## 2018-04-07 LAB
ALBUMIN SERPL-MCNC: 2.9 G/DL (ref 3.4–5)
ALBUMIN/GLOB SERPL: 0.7 {RATIO} (ref 0.8–1.7)
ALP SERPL-CCNC: 87 U/L (ref 45–117)
ALT SERPL-CCNC: 37 U/L (ref 16–61)
ANION GAP SERPL CALC-SCNC: 7 MMOL/L (ref 3–18)
AST SERPL-CCNC: 22 U/L (ref 15–37)
BASOPHILS # BLD: 0 K/UL (ref 0–0.1)
BASOPHILS NFR BLD: 0 % (ref 0–2)
BILIRUB SERPL-MCNC: 1.1 MG/DL (ref 0.2–1)
BUN SERPL-MCNC: 17 MG/DL (ref 7–18)
BUN/CREAT SERPL: 18 (ref 12–20)
CALCIUM SERPL-MCNC: 8.9 MG/DL (ref 8.5–10.1)
CHLORIDE SERPL-SCNC: 112 MMOL/L (ref 100–108)
CO2 SERPL-SCNC: 26 MMOL/L (ref 21–32)
CREAT SERPL-MCNC: 0.96 MG/DL (ref 0.6–1.3)
DIFFERENTIAL METHOD BLD: ABNORMAL
EOSINOPHIL # BLD: 0.2 K/UL (ref 0–0.4)
EOSINOPHIL NFR BLD: 3 % (ref 0–5)
ERYTHROCYTE [DISTWIDTH] IN BLOOD BY AUTOMATED COUNT: 15.9 % (ref 11.6–14.5)
GLOBULIN SER CALC-MCNC: 4.2 G/DL (ref 2–4)
GLUCOSE BLD STRIP.AUTO-MCNC: 119 MG/DL (ref 70–110)
GLUCOSE BLD STRIP.AUTO-MCNC: 131 MG/DL (ref 70–110)
GLUCOSE BLD STRIP.AUTO-MCNC: 141 MG/DL (ref 70–110)
GLUCOSE BLD STRIP.AUTO-MCNC: 144 MG/DL (ref 70–110)
GLUCOSE SERPL-MCNC: 127 MG/DL (ref 74–99)
HCT VFR BLD AUTO: 23.7 % (ref 36–48)
HGB BLD-MCNC: 7.6 G/DL (ref 13–16)
LYMPHOCYTES # BLD: 1.1 K/UL (ref 0.9–3.6)
LYMPHOCYTES NFR BLD: 21 % (ref 21–52)
MCH RBC QN AUTO: 28.6 PG (ref 24–34)
MCHC RBC AUTO-ENTMCNC: 32.1 G/DL (ref 31–37)
MCV RBC AUTO: 89.1 FL (ref 74–97)
MONOCYTES # BLD: 0.7 K/UL (ref 0.05–1.2)
MONOCYTES NFR BLD: 13 % (ref 3–10)
NEUTS SEG # BLD: 3.4 K/UL (ref 1.8–8)
NEUTS SEG NFR BLD: 63 % (ref 40–73)
PLATELET # BLD AUTO: 81 K/UL (ref 135–420)
PMV BLD AUTO: 12.7 FL (ref 9.2–11.8)
POTASSIUM SERPL-SCNC: 4 MMOL/L (ref 3.5–5.5)
PROT SERPL-MCNC: 7.1 G/DL (ref 6.4–8.2)
RBC # BLD AUTO: 2.66 M/UL (ref 4.7–5.5)
SODIUM SERPL-SCNC: 145 MMOL/L (ref 136–145)
VANCOMYCIN SERPL-MCNC: 18.9 UG/ML (ref 5–40)
WBC # BLD AUTO: 5.3 K/UL (ref 4.6–13.2)

## 2018-04-07 PROCEDURE — 82962 GLUCOSE BLOOD TEST: CPT

## 2018-04-07 PROCEDURE — 85025 COMPLETE CBC W/AUTO DIFF WBC: CPT | Performed by: FAMILY MEDICINE

## 2018-04-07 PROCEDURE — 80202 ASSAY OF VANCOMYCIN: CPT | Performed by: FAMILY MEDICINE

## 2018-04-07 PROCEDURE — 74011000250 HC RX REV CODE- 250: Performed by: FAMILY MEDICINE

## 2018-04-07 PROCEDURE — 74011250637 HC RX REV CODE- 250/637: Performed by: INTERNAL MEDICINE

## 2018-04-07 PROCEDURE — 74011250637 HC RX REV CODE- 250/637: Performed by: UROLOGY

## 2018-04-07 PROCEDURE — 74011250636 HC RX REV CODE- 250/636: Performed by: FAMILY MEDICINE

## 2018-04-07 PROCEDURE — 36415 COLL VENOUS BLD VENIPUNCTURE: CPT | Performed by: FAMILY MEDICINE

## 2018-04-07 PROCEDURE — 65660000000 HC RM CCU STEPDOWN

## 2018-04-07 PROCEDURE — 74011250637 HC RX REV CODE- 250/637: Performed by: FAMILY MEDICINE

## 2018-04-07 PROCEDURE — 80053 COMPREHEN METABOLIC PANEL: CPT | Performed by: FAMILY MEDICINE

## 2018-04-07 PROCEDURE — 97110 THERAPEUTIC EXERCISES: CPT

## 2018-04-07 RX ADMIN — METRONIDAZOLE 500 MG: 500 INJECTION, SOLUTION INTRAVENOUS at 03:33

## 2018-04-07 RX ADMIN — PANTOPRAZOLE SODIUM 40 MG: 40 TABLET, DELAYED RELEASE ORAL at 09:26

## 2018-04-07 RX ADMIN — Medication 2 G: at 03:29

## 2018-04-07 RX ADMIN — METRONIDAZOLE 500 MG: 500 INJECTION, SOLUTION INTRAVENOUS at 14:04

## 2018-04-07 RX ADMIN — HYDROMORPHONE HYDROCHLORIDE 4 MG: 2 TABLET ORAL at 16:06

## 2018-04-07 RX ADMIN — LINAGLIPTIN 5 MG: 5 TABLET, FILM COATED ORAL at 09:26

## 2018-04-07 RX ADMIN — HYDROMORPHONE HYDROCHLORIDE 4 MG: 2 TABLET ORAL at 22:15

## 2018-04-07 RX ADMIN — SODIUM BICARBONATE 650 MG TABLET 650 MG: at 09:26

## 2018-04-07 RX ADMIN — SODIUM BICARBONATE 650 MG TABLET 650 MG: at 15:54

## 2018-04-07 RX ADMIN — Medication 2 G: at 09:32

## 2018-04-07 RX ADMIN — LACTOBACILLUS TAB 2 TABLET: TAB at 18:21

## 2018-04-07 RX ADMIN — Medication 2 G: at 18:24

## 2018-04-07 RX ADMIN — AMLODIPINE BESYLATE 5 MG: 5 TABLET ORAL at 09:26

## 2018-04-07 RX ADMIN — LEVOTHYROXINE SODIUM 50 MCG: 50 TABLET ORAL at 09:26

## 2018-04-07 RX ADMIN — LACTOBACILLUS TAB 2 TABLET: TAB at 09:26

## 2018-04-07 RX ADMIN — FUROSEMIDE 40 MG: 40 TABLET ORAL at 09:26

## 2018-04-07 RX ADMIN — TAMSULOSIN HYDROCHLORIDE 0.4 MG: 0.4 CAPSULE ORAL at 09:27

## 2018-04-07 RX ADMIN — SODIUM BICARBONATE 650 MG TABLET 650 MG: at 22:15

## 2018-04-07 RX ADMIN — VANCOMYCIN HYDROCHLORIDE 1500 MG: 10 INJECTION, POWDER, LYOPHILIZED, FOR SOLUTION INTRAVENOUS at 15:58

## 2018-04-07 NOTE — PROGRESS NOTES
[facility]  Pulmonary, Critical Care, and Sleep Medicine    Patient Consult    Name: Mary Lou Garland MRN: [id number]   : 1960 Hospital: [facility]   Date: 2018        This patient has been seen and evaluated at the request of Dr. Merline Combe for hypoxia. IMPRESSION:   · Hypoxia:  Etiology due to LL atelectasis vs pneumonia. Much improved  · B/L LL opacities:  Suspect aspiration pneumonia given patient's poor functional status. May be due to atelectasis due to being bedbound vs component of heart failure (elevated NT pro BNP with LA dilation on TTE)  · Deconditioning  · Renal insufficiency, chronic and stable  · Hypernatremia  · Chronic anemia  · Hep C      RECOMMENDATIONS:   · Weaned off O2, saturations stable on RA  · S/P BM and renal Bx    · Complete  Antibiotic course, correctly afebrile, no leukocytosis  · Continue O2/NC, titrate to keep goal SpO2 >88%  · Continue gentle diuresis as needed -- will defer to Nephrology consult  · Strict aspiration precautions, HOB elevated  · Aggressive pulmonary toilet  · Instructed and encouraged IS  · PT/OT, OOB to chair  · DVT ppx per primary service  · No further PCCM recommendations will S/o for now but arrange for outpatient follow up     HPI:  Patient is a 59 y.o. male with hx of renal cell Ca, chronic drug use, nursing home resident presented to [facility] with complaints of LE swelling about 3-4 days prior to admission. Pt was noted to have bradycardia on admission. Pt was found to be hypoxic requiring supplemental oxygen. CXR showed B/L opacities in bases. Pt had recent hospiitalization for flu PNA, c-diff. Pt has hx of chronic diskitis/osteomyelitis s/p C3-7 fusion. Pt found to have progressive thrombcytopenia here. Etiology of CKD also unclear at this point. Pt denies any fevers, chills, N/V/D, dysphagia, night sweats, hemoptysis     Subjective:     Pt in the bed appear sleepy.        Past Medical History:   Diagnosis Date    Chronic drug abuse 1974    Diabetes ([facility] 01/1990    Hypertension     Renal cell carcinoma of left kidney ([facility] 12/17/13    Pathological Stage [id number] cc RCC FG3 s/p left open partial nephrectomy in 12/13      Renal mass       Past Surgical History:   Procedure Laterality Date    HX CIRCUMCISION  12/17/13    Dr. Twin Marcum, [facility] HX NEPHRECTOMY Left 12/17/13    Open Partial, Dr. Twin Marcum, [place] [id number]    HX OTHER SURGICAL Right 2/27/2016    removed right ft  2nd meta-tarsal      Prior to Admission medications    Medication Sig Start Date End Date Taking? Authorizing Provider   NUT. TX.GLUCOSE INTOLERANCE,SOY (GLUCERNA PO) Take  by mouth. Historical Provider   magnesium hydroxide (NUR MILK OF MAGNESIA) 400 mg/5 mL suspension Take 30 mL by mouth daily as needed for Constipation. Historical Provider   LACTOBACILLUS RHAMNOSUS GG (CULTURELLE PO) Take  by mouth. Historical Provider   doxycycline (MONODOX) 100 mg capsule Take 100 mg by mouth two (2) times a day. Historical Provider   mineral oil-hydrophil petrolat (AQUAPHOR) ointment Apply  to affected area as needed for Dry Skin. Historical Provider   mineral oil-hydrophil petrolat (AQUAPHOR) ointment Apply  to affected area as needed for Dry Skin. Historical Provider   PREPARATION H, WITCH HAZEL, EX by Apply Externally route. Historical Provider   linagliptin (TRADJENTA) 5 mg tablet Take 5 mg by mouth daily. Historical Provider   tamsulosin (FLOMAX) 0.4 mg capsule Take 0.4 mg by mouth daily. Historical Provider   acetaminophen (TYLENOL) 500 mg tablet 1,000 mg. 6/7/17   Historical Provider   bisacodyl (DULCOLAX) 5 mg EC tablet 10 mg. 6/7/17   Historical Provider   gabapentin (NEURONTIN) 100 mg capsule 100 mg.     Historical Provider   atorvastatin (LIPITOR) 40 mg tablet 40 mg. 6/7/17   Historical Provider   amLODIPine (NORVASC) 10 mg tablet 10 mg. 5/28/15   Historical Provider   atenolol (TENORMIN) 25 mg tablet 25 mg. 5/28/15 Historical Provider   ibuprofen (MOTRIN) 400 mg tablet 400 mg. 6/7/17   Historical Provider   HYDROmorphone (DILAUDID) 4 mg tablet 4 mg. 6/7/17   Historical Provider   albuterol-ipratropium (DUO-NEB) 2.5 mg-0.5 mg/3 ml nebu 3 mL. 6/7/17   Historical Provider   Menthol-Zinc Oxide (CALMOSEPTINE) 0.44-20.6 % oint Use 1 Application to affected area. Historical Provider   nicotine (NICODERM CQ) 14 mg/24 hr patch 1 Patch. 6/7/17   Historical Provider   rifAMPin (RIFADIN) 300 mg capsule 600 mg. 6/7/17   Historical Provider   polyethylene glycol (MIRALAX) 17 gram packet 17 g. Historical Provider   silver sulfADIAZINE (SILVADENE) 1 % topical cream Use 1 Application to affected area Twice Daily. knees 6/7/17   Historical Provider   multivitamin, tx-iron-ca-min (THERAPY M) 9 mg iron-400 mcg tab tablet Take 1 Tab by Mouth Once a Day. 6/7/17   Historical Provider   Zolpidem 10 mg subl 10 mg. Historical Provider   insulin glargine (LANTUS) 100 unit/mL injection 4 Units. 6/7/17   Historical Provider   insulin lispro (HUMALOG) 100 unit/mL injection 2-10 Units. 6/7/17   Historical Provider   lisinopril (PRINIVIL, ZESTRIL) 40 mg tablet Take 1 Tab by mouth daily. 3/2/16   Keya Ovalle MD   pravastatin (PRAVACHOL) 20 mg tablet Take 1 Tab by mouth nightly. 2/29/16   Odetta Brittle, MD   insulin lispro (HUMALOG KWIKPEN) 100 unit/mL kwikpen Blood Sugar <150 =0 units; 150 -199 =2 units; 200 -249 =4 units; 250 -299 =6 units; 300 -349 =8 units; 350 and above =10 units. 2/29/16   Odetta Brittle, MD   insulin glargine (LANTUS SOLOSTAR) 100 unit/mL (3 mL) pen 50 Units by SubCUTAneous route daily.  2/29/16   Odetta Brittle, MD     Allergies   Allergen Reactions    Iodine Hives      Social History   Substance Use Topics    Smoking status: Former Smoker     Packs/day: 0.50     Years: 30.00     Types: Cigarettes    Smokeless tobacco: Never Used    Alcohol use 8.4 oz/week     14 Cans of beer per week      Comment: watching sports Family History   Problem Relation Age of Onset    Diabetes Mother     Sickle Cell Anemia Father     Diabetes Sister     Hypertension Sister         Immunization status: delayed. Current Facility-Administered Medications   Medication Dose Route Frequency    amLODIPine (NORVASC) tablet 5 mg  5 mg Oral DAILY    cefepime (MAXIPIME) 2 g in sterile water (preservative free) 10 mL IV syringe  2 g IntraVENous Q8H    VANCOMYCIN INFORMATION NOTE   Other Rx Dosing/Monitoring    tamsulosin (FLOMAX) capsule 0.4 mg  0.4 mg Oral DAILY    metroNIDAZOLE (FLAGYL) IVPB premix 500 mg  500 mg IntraVENous Q12H    furosemide (LASIX) tablet 40 mg  40 mg Oral DAILY    sodium bicarbonate tablet 650 mg  650 mg Oral TID    levothyroxine (SYNTHROID) tablet 50 mcg  50 mcg Oral ACB    insulin lispro (HUMALOG) injection   SubCUTAneous AC&HS    Lactobacillus Acidoph & Bulgar (FLORANEX) tablet 2 Tab  2 Tab Oral BID    linagliptin (TRADJENTA) tablet 5 mg  5 mg Oral ACB    pantoprazole (PROTONIX) tablet 40 mg  40 mg Oral ACB       Review of Systems:  A comprehensive review of systems was negative except for that written in the HPI. Objective:   Vital Signs:    Visit Vitals    /82 (BP 1 Location: Left arm, BP Patient Position: At rest)    Pulse 72    Temp 98.1 °F (36.7 °C)    Resp 18    Ht 6' (1.829 m)    Wt 75.3 kg (165 lb 14.4 oz)    SpO2 94%    BMI 22.5 kg/m2       O2 Device: Room air   O2 Flow Rate (L/min): 1 l/min   Temp (24hrs), Av.5 °F (36.4 °C), Min:96.7 °F (35.9 °C), Max:98.1 °F (36.7 °C)       Intake/Output:   Last shift:         Last 3 shifts:  190 -  0700  In: 0   Out:  [Urine:1650]    Intake/Output Summary (Last 24 hours) at 18 0843  Last data filed at 18 0553   Gross per 24 hour   Intake                0 ml   Output             1500 ml   Net            -1500 ml      Physical Exam:   General:  Alert, cooperative, no distress, appears stated age.    Head:  Normocephalic, without obvious abnormality, atraumatic. Eyes:  Conjunctivae/corneas clear. PERRL, EOMs intact. Nose: Nares normal. Septum midline. Mucosa normal. No drainage or sinus tenderness. Throat: Lips, mucosa, and tongue normal.  gums normal, wearing dentures   Neck: Supple, symmetrical, trachea midline, no adenopathy, no carotid bruit and no JVD. Back:   Symmetric, no curvature. ROM normal.   Lungs:   Decreased breath sounds in B/L bases, faint left basilar crackles, no wheezes, no wheezes   Chest wall:  No tenderness or deformity. Heart:  Regular rate and rhythm, S1, S2 normal, no murmur, click, rub or gallop. Abdomen:   Soft, non-tender. Bowel sounds normal. No masses,  No organomegaly. Extremities: Extremities normal, atraumatic, no cyanosis, bilateral manual edema   Pulses: 2+ and symmetric all extremities.    Skin: Skin color, texture, turgor normal. No rashes or lesions   Lymph nodes: Cervical, supraclavicular, and axillary nodes normal.   Neurologic: Grossly nonfocal, diffuse weakness throughout, AAOx3     Data review:     Recent Results (from the past 24 hour(s))   GLUCOSE, POC    Collection Time: 04/06/18 12:46 PM   Result Value Ref Range    Glucose (POC) 122 (H) 70 - 110 mg/dL   VANCOMYCIN, TROUGH    Collection Time: 04/06/18  4:51 PM   Result Value Ref Range    Vancomycin,trough 23.6 (HH) 10.0 - 20.0 ug/mL    Reported dose date: 20180406      Reported dose time: 0      Reported dose: 1500MG UNITS   GLUCOSE, POC    Collection Time: 04/06/18  9:58 PM   Result Value Ref Range    Glucose (POC) 142 (H) 70 - 110 mg/dL   CBC WITH AUTOMATED DIFF    Collection Time: 04/07/18  2:03 AM   Result Value Ref Range    WBC 5.3 4.6 - 13.2 K/uL    RBC 2.66 (L) 4.70 - 5.50 M/uL    HGB 7.6 (L) 13.0 - 16.0 g/dL    HCT 23.7 (L) 36.0 - 48.0 %    MCV 89.1 74.0 - 97.0 FL    MCH 28.6 24.0 - 34.0 PG    MCHC 32.1 31.0 - 37.0 g/dL    RDW 15.9 (H) 11.6 - 14.5 %    PLATELET 81 (L) 073 - 420 K/uL    MPV 12.7 (H) 9.2 - 11.8 FL NEUTROPHILS 63 40 - 73 %    LYMPHOCYTES 21 21 - 52 %    MONOCYTES 13 (H) 3 - 10 %    EOSINOPHILS 3 0 - 5 %    BASOPHILS 0 0 - 2 %    ABS. NEUTROPHILS 3.4 1.8 - 8.0 K/UL    ABS. LYMPHOCYTES 1.1 0.9 - 3.6 K/UL    ABS. MONOCYTES 0.7 0.05 - 1.2 K/UL    ABS. EOSINOPHILS 0.2 0.0 - 0.4 K/UL    ABS. BASOPHILS 0.0 0.0 - 0.1 K/UL    DF AUTOMATED     VANCOMYCIN, RANDOM    Collection Time: 04/07/18  2:03 AM   Result Value Ref Range    Vancomycin, random 18.9 5.0 - 84.4 UG/ML   METABOLIC PANEL, COMPREHENSIVE    Collection Time: 04/07/18  2:03 AM   Result Value Ref Range    Sodium 145 136 - 145 mmol/L    Potassium 4.0 3.5 - 5.5 mmol/L    Chloride 112 (H) 100 - 108 mmol/L    CO2 26 21 - 32 mmol/L    Anion gap 7 3.0 - 18 mmol/L    Glucose 127 (H) 74 - 99 mg/dL    BUN 17 7.0 - 18 MG/DL    Creatinine 0.96 0.6 - 1.3 MG/DL    BUN/Creatinine ratio 18 12 - 20      GFR est AA >60 >60 ml/min/1.73m2    GFR est non-AA >60 >60 ml/min/1.73m2    Calcium 8.9 8.5 - 10.1 MG/DL    Bilirubin, total 1.1 (H) 0.2 - 1.0 MG/DL    ALT (SGPT) 37 16 - 61 U/L    AST (SGOT) 22 15 - 37 U/L    Alk. phosphatase 87 45 - 117 U/L    Protein, total 7.1 6.4 - 8.2 g/dL    Albumin 2.9 (L) 3.4 - 5.0 g/dL    Globulin 4.2 (H) 2.0 - 4.0 g/dL    A-G Ratio 0.7 (L) 0.8 - 1.7     GLUCOSE, POC    Collection Time: 04/07/18  7:24 AM   Result Value Ref Range    Glucose (POC) 141 (H) 70 - 110 mg/dL       Imaging:  I have personally reviewed the patients radiographs and have reviewed the reports:  XR Results (most recent):    Results from Hospital Encounter encounter on 03/31/18   XR CHEST PA LAT   Narrative Chest    Indication: follow up atelectasis/pleural effusions     Comparison: April 2, 2018    Findings: Two views. No pneumothorax. No pleural effusion. Bilateral lower lung  zone atelectasis with patchy bilateral medial lower lung zone opacities as well  as moderate pulmonary vascular congestion. Cardiomediastinal silhouette and  osseous structures grossly unchanged. Impression Impression: No significant change as detailed      Jesus Roy MD     Pulmonary, [address] [facility]

## 2018-04-07 NOTE — PROGRESS NOTES
Hematology/Medical Oncology Progress Note             Name: Waldo Tinoco   : 1960   MRN: [id number]   Date: 2018 6:45 AM     [x]I have reviewed the flowsheet and previous days notes. Events overnight reviewed and discussed with nursing staff. Vital signs and records reviewed. Mr Kailee Jean is a 59 y.o. Man with renal failure and progressive thrombocytopenia. The patient states that he feels better today. No new c/o. However, the patient expresses great concern about missing an appointment that he scheduled X 3 mos ago and feels that it may be another 3 mos to get another appoint if this one is missed. ROS:  Constitutional:  Negative for fever, chills, diaphoresis, activity change, appetite change and unexpected weight change. HENT: Negative for nosebleeds, congestion, facial swelling, mouth sores, trouble swallowing, neck pain, neck stiffness, voice change and postnasal drip. Eyes: Negative for photophobia, pain, discharge and itching. Respiratory: Negative for apnea, cough, choking, chest tightness, wheezing and stridor. Cardiovascular: Negative for chest pain, palpitations and leg swelling. Gastrointestinal: Negative for abdominal pain. Negative for nausea, diarrhea, constipation, blood in stool and rectal pain. Genitourinary: Negative for dysuria, urgency, hematuria, flank pain and difficulty urinating. Musculoskeletal: Negative for back pain, joint swelling, arthralgias and gait problem. Skin: Negative for color change, pallor, rash and wound. Neurological: Positive for weakness. Negative for dizziness, facial asymmetry, speech difficulty, light-headedness and headaches. Hematological: Negative for adenopathy. Does not bruise/bleed easily. Psychiatric/Behavioral: Negative for hallucinations, confusion, disturbed wake/sleep cycle and agitation.        Vital Signs:    Visit Vitals    /74 (BP 1 Location: Left arm, BP Patient Position: At rest)    Pulse 71    Temp 97.3 °F (36.3 °C)    Resp 18    Ht 6' (1.829 m)    Wt 75.3 kg (165 lb 14.4 oz)    SpO2 95%    BMI 22.5 kg/m2       O2 Device: Room air   O2 Flow Rate (L/min): 1 l/min   Temp (24hrs), Av.4 °F (36.3 °C), Min:96.7 °F (35.9 °C), Max:97.7 °F (36.5 °C)       Intake/Output:   Last shift:      1901 - 700  In: -   Out: 1150 [Urine:800]  Last 3 shifts: 701 - 1900  In: 6062 [P.O.:1140]  Out: 1566 [Urine:1550]    Intake/Output Summary (Last 24 hours) at 18 06  Last data filed at 18 0553   Gross per 24 hour   Intake                0 ml   Output             1500 ml   Net            -1500 ml       Physical Exam:    Constitutional:Appears comfortable, NAD. HENT: Head: Normocephalic and atraumatic. Mouth/Throat: No oropharyngeal exudate. Eyes: Conjunctivae and EOM are normal. Pupils are equal, round, and reactive to light. No scleral icterus. Neck: Normal range of motion. Neck supple. No tracheal deviation present. No thyromegaly present. Cardiovascular: Normal rate and regular rhythm. Exam reveals no gallop and no friction rub. No murmur heard. Pulmonary/Chest:Symmetrical chest expansion, no accessory muscle use; good airway entry; no rales/ wheezing/ rhonchi noted  Abdominal: Soft. Bowel sounds are normal. No distension. No tenderness. There is no rebound and no guarding. Musculoskeletal: Extremities normal, atraumatic, RUE non-pitting edema. LROM at all ext's. Lymphadenopathy:   No cervical adenopathy. Neurological:Alert and oriented to person, place, and time. Skin: Skin is warm and dry. No rash noted. No diaphoresis. No erythema. No pallor. Psychiatric: Normal mood and affect. Normal behavior.  Judgment and thought content normal.         DATA:   Current Facility-Administered Medications   Medication Dose Route Frequency    amLODIPine (NORVASC) tablet 5 mg  5 mg Oral DAILY    cefepime (MAXIPIME) 2 g in sterile water (preservative free) 10 mL IV syringe 2 g IntraVENous Q8H    VANCOMYCIN INFORMATION NOTE   Other Rx Dosing/Monitoring    tamsulosin (FLOMAX) capsule 0.4 mg  0.4 mg Oral DAILY    metroNIDAZOLE (FLAGYL) IVPB premix 500 mg  500 mg IntraVENous Q12H    HYDROmorphone (DILAUDID) tablet 4 mg  4 mg Oral Q6H PRN    furosemide (LASIX) tablet 40 mg  40 mg Oral DAILY    sodium bicarbonate tablet 650 mg  650 mg Oral TID    levothyroxine (SYNTHROID) tablet 50 mcg  50 mcg Oral ACB    glucose chewable tablet 16 g  4 Tab Oral PRN    glucagon (GLUCAGEN) injection 1 mg  1 mg IntraMUSCular PRN    dextrose (D50W) injection syrg 12.5-25 g  25-50 mL IntraVENous PRN    insulin lispro (HUMALOG) injection   SubCUTAneous AC&HS    hydrALAZINE (APRESOLINE) tablet 25 mg  25 mg Oral TID PRN    Lactobacillus Acidoph & Bulgar (FLORANEX) tablet 2 Tab  2 Tab Oral BID    linagliptin (TRADJENTA) tablet 5 mg  5 mg Oral ACB    pantoprazole (PROTONIX) tablet 40 mg  40 mg Oral ACB                    Labs:  Recent Labs      04/07/18   0203  04/06/18   0355  04/05/18   0234   WBC  5.3  4.9  6.2   HGB  7.6*  8.0*  7.2*   HCT  23.7*  25.2*  22.4*   PLT  81*  73*  63*     Recent Labs      04/07/18   0203  04/06/18   0355  04/05/18   0234   NA  145  146*  145   K  4.0  4.2  4.4   CL  112*  115*  116*   CO2  26  25  23   GLU  127*  78  111*   BUN  17  25*  35*   CREA  0.96  1.05  1.35*   CA  8.9  9.3  8.8   PHOS   --   2.6   --    ALB  2.9*  3.0*   --    SGOT  22  21   --    ALT  37  39   --    INR   --   1.3*   --      No results for input(s): PH, PCO2, PO2, HCO3, FIO2 in the last 72 hours.      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 Thrombocytopenia: Dr. William Colvin has explained to the patient that his platelets are now increasing. The most recent CBC shows that his platelet count is now  81,000 with a hemoglobin of 7.6 g/dL hematocrit of 23.7%. No therapeutic intervention is necessary at this point. The likely causes of his thrombocytopenia are iatrogenic or infection related. The peripheral reveals no schistocytes to suggest TTP/HUS. The LDH level is normal at 189 U/L and the SPEP resulted a serum ANITHA revealing the presence of monoclonal free lambda light  chains. Suggest urine ANITHA for Bence Ledesma Protein. 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 2. Anemia in CKD: Iron is 53 and the patients ferritin is 117. Creatinine is   0.96 today. Following CBC.   3. Bone Marrow Biopsy done on 4/6 results are pending ·          My assessment/plan was discussed with:  [x]nursing []PT/OT    []respiratory therapy [x]Dr.Lloyd Tushar Moreno MD      []family []       Pat Mustafa NP

## 2018-04-07 NOTE — PROGRESS NOTES
Progress Note    Patient: Rebecca Otoole MRN: [id number]  SSN: [id number]    YOB: 1960  Age: 59 y.o. Sex: male      Admit Date: 3/31/2018    LOS: 7 days     Subjective:     Patient with quadreparesis from c-spine diskitis admitted with bradycardia and diabetes in poor control. Continuing IV antibiotics. Now baseline alert. Objective:     Vitals:    04/07/18 0405 04/07/18 0426 04/07/18 0725 04/07/18 1109   BP: 145/74  146/82 159/67   Pulse: 71  72 67   Resp: 18 18 18   Temp: 97.3 °F (36.3 °C)  98.1 °F (36.7 °C) 98.1 °F (36.7 °C)   SpO2: 95%  94% 94%   Weight:  75.3 kg (165 lb 14.4 oz)     Height:            Intake and Output:  Current Shift: 04/07 0701 - 04/07 1900  In: -   Out: 250 [Urine:250]  Last three shifts: 04/05 1901 - 04/07 0700  In: 0   Out: 2000 [Urine:1650]    Physical Exam:   GENERAL: alert, cooperative, no distress, appears older than stated age  LUNG: clear to auscultation bilaterally  HEART: regular rate and rhythm    Lab/Data Review: All lab results for the last 24 hours reviewed.     hgb 7.6  Glucose 144  creationine 0.96    Assessment:     Active Problems:    Renal cell cancer ([facility] (11/5/2014)      Type II diabetes mellitus, uncontrolled ([facility] (12/16/2015)      Urinary retention (10/30/2017)      Cancer of left kidney ([facility] (12/23/2013)      Quadriparesis ([facility] (5/31/2017)      Bradycardia (3/31/2018)      H/O Clostridium difficile infection (3/31/2018)      Light chain deposition disease (4/2/6938)      Metabolic acidosis (8/2/8345)      Hyperkalemia (4/2/2018)      Hypercalcemia (4/2/2018)      Chronic kidney disease, stage III (moderate) (4/4/2018)      Hepatitis C antibody positive in blood (4/4/2018)        Plan:     Continue present management.     Signed By: Miles Devries MD     April 7, 2018

## 2018-04-07 NOTE — PROGRESS NOTES
Progress Note    Katherine Reyna  59 y.o. 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 A comprehensive review of systems was negative except for that written in the History of Present Illness.     Objective:     Visit Vitals    /67 (BP 1 Location: Left arm, BP Patient Position: At rest)    Pulse 67    Temp 98.1 °F (36.7 °C)    Resp 18    Ht 6' (1.829 m)    Wt 75.3 kg (165 lb 14.4 oz)    SpO2 94%    BMI 22.5 kg/m2         Intake/Output Summary (Last 24 hours) at 04/07/18 1205  Last data filed at 04/07/18 0911   Gross per 24 hour   Intake                0 ml   Output             1750 ml   Net            -1750 ml       Current Facility-Administered Medications   Medication Dose Route Frequency Provider Last Rate Last Dose    vancomycin (VANCOCIN) 1,500 mg in 0.9% sodium chloride 500 mL IVPB  1,500 mg IntraVENous ONCE Mila Aquino MD        amLODIPine (NORVASC) tablet 5 mg  5 mg Oral DAILY Mila Aquino MD   5 mg at 04/07/18 0926    cefepime (MAXIPIME) 2 g in sterile water (preservative free) 10 mL IV syringe  2 g IntraVENous Q8H Chalo Renner MD   2 g at 04/07/18 0932    VANCOMYCIN INFORMATION NOTE   Other Rx Dosing/Monitoring Shanda Campos MD        tamsulosin (FLOMAX) capsule 0.4 mg  0.4 mg Oral DAILY Ila Schwarz MD   0.4 mg at 04/07/18 5244    metroNIDAZOLE (FLAGYL) IVPB premix 500 mg  500 mg IntraVENous Q12H Scout Lott  mL/hr at 04/07/18 0333 500 mg at 04/07/18 0333    HYDROmorphone (DILAUDID) tablet 4 mg  4 mg Oral Q6H PRN Scout Lott MD   4 mg at 04/06/18 2154    furosemide (LASIX) tablet 40 mg  40 mg Oral DAILY Hope Epperson MD   40 mg at 04/07/18 8180    sodium bicarbonate tablet 650 mg  650 mg Oral TID Hope Epperson MD   650 mg at 04/07/18 8284    levothyroxine (SYNTHROID) tablet 50 mcg  50 mcg Oral DANIE Lott MD   50 mcg at 04/07/18 0926    glucose chewable tablet 16 g  4 Tab Oral PRN Scout Lott MD        glucagon (GLUCAGEN) injection 1 mg  1 mg IntraMUSCular PRN Scout Lott MD        dextrose (D50W) injection syrg 12.5-25 g  25-50 mL IntraVENous PRN Scout Lott MD        insulin lispro (HUMALOG) injection   SubCUTAneous AC&HS Shanda Campos MD   Stopped at 04/06/18 0730    hydrALAZINE (APRESOLINE) tablet 25 mg  25 mg Oral TID PRN Scout Lott MD        Lactobacillus Acidoph & Bulgar [facility]) tablet 2 Tab  2 Tab Oral BID Scout Lott MD   2 Tab at 04/07/18 0926    linagliptin (TRADJENTA) tablet 5 mg  5 mg Oral DANIE Lott MD   5 mg at 04/07/18 0926    pantoprazole (PROTONIX) tablet 40 mg  40 mg Oral DANIE Lott MD   40 mg at 04/07/18 2620        Physical Exam:     Physical Exam:   Mouth/Throat: Lips, mucosa, and tongue normal. Teeth and gums normal.   Neck: Supple, symmetrical, trachea midline, no adenopathy, thyroid: no enlargement/tenderness/nodules, no carotid bruit and no JVD. Lungs:   Clear to auscultation bilaterally. Heart:  Regular rate and rhythm, S1, S2 normal, no murmur, click, rub or gallop. Abdomen:   Soft, non-tender. Bowel sounds normal. No masses,  No organomegaly. Extremities: Extremities normal, atraumatic, no cyanosis or edema   Skin: Skin color, texture, turgor normal. No rashes or lesions         Data Review:    CBC w/Diff    Recent Labs      04/07/18 0203 04/06/18   0355  04/05/18   0234   WBC  5.3  4.9  6.2   RBC  2.66*  2.82*  2.50*   HGB  7.6*  8.0*  7.2*   HCT  23.7*  25.2*  22.4*   MCV  89.1  89.4  89.6   MCH  28.6  28.4  28.8   MCHC  32.1  31.7  32.1   RDW  15.9*  15.9*  16.3*    Recent Labs      04/07/18 0203 04/06/18   0355  04/05/18   0234   MONOS  13*  14*  13*   EOS  3  2  1   BASOS  0  0  0   RDW  15.9*  15.9*  16.3*        Comprehensive Metabolic Profile    Recent Labs      04/07/18 0203 04/06/18   0355  04/05/18   0234   NA  145  146*  145   K  4.0  4.2  4.4   CL  112*  115*  116*   CO2  26  25  23   BUN  17  25*  35*   CREA  0.96  1.05  1.35*    Recent Labs      04/07/18 0203 04/06/18 0355  04/05/18   0234   CA  8.9  9.3  8.8   PHOS   --   2.6   --    ALB  2.9*  3.0*   --    TP  7.1  7.5   --    SGOT  22  21   --    TBILI  1.1*  1.1*   --                         Impression:       Active Hospital Problems    Diagnosis Date Noted    Chronic kidney disease, stage III (moderate) 04/04/2018    Hepatitis C antibody positive in blood 04/04/2018    Light chain deposition disease 72/60/7813    Metabolic acidosis 24/62/3397    Hyperkalemia 04/02/2018    Hypercalcemia 04/02/2018    Bradycardia 03/31/2018    H/O Clostridium difficile infection 03/31/2018    Urinary retention 10/30/2017    [facility]rip[facility] ([facility] 05/31/2017    Type II diabetes mellitus, uncontrolled ([facility] 12/16/2015    Ren[facility] cancer ([facility] 11/05/2014    Cancer of left kidney ([facility] 12/23/2013    Possible Multiple myeloma. , MPGN, type 1  With Positive rheumatoid factor with suspicion of Cryoglobinemia.  Renal function normalized, seems he had acute Renal failure, Platelets are improving. Plan:   awaait bone marrow biopsy report, possible Kidney biopsy on 04/09/18, GI will see him, once kidney biopsy is done he can be dischraed from renal point  & he can see his neuro surgeon on 04/10/18 as scheduled.     Serafin Freeman MD

## 2018-04-07 NOTE — PROGRESS NOTES
Problem: Falls - Risk of  Goal: *Absence of Falls  Document Tomas Fall Risk and appropriate interventions in the flowsheet.    Outcome: Progressing Towards Goal  Fall Risk Interventions:  Mobility Interventions: Assess mobility with egress test, Bed/chair exit alarm, Communicate number of staff needed for ambulation/transfer, PT Consult for mobility concerns, PT Consult for assist device competence, OT consult for ADLs, Strengthening exercises (ROM-active/passive)         Medication Interventions: Bed/chair exit alarm, Patient to call before getting OOB, Assess postural VS orthostatic hypotension    Elimination Interventions: Bed/chair exit alarm, Call light in reach, Patient to call for help with toileting needs, Toilet paper/wipes in reach

## 2018-04-07 NOTE — ROUTINE PROCESS
Bedside and Verbal shift change report given to [employer] (oncoming nurse) by Andreas Bruns RN (offgoing nurse). Report included the following information SBAR, Kardex, MAR and Recent Results.     SITUATION:    Code Status: Full Code   Reason for Admission: Acute renal insufficiency   Bradycardia    [facility] day: 7   Problem List:       Hospital Problems  Date Reviewed: 4/4/2018          Codes Class Noted POA    Chronic kidney disease, stage III (moderate) ICD-10-CM: N18.3  ICD-9-CM: 585.3  4/4/2018 Clinically Undetermined        Hepatitis C antibody positive in blood ICD-10-CM: R76.8  ICD-9-CM: 795.79  4/4/2018 Clinically Undetermined        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  ICD-9-CM: 583.9  4/2/2018 Clinically Undetermined        Metabolic acidosis EAM-82-QA: E87.2  ICD-9-CM: 276.2  4/2/2018 Unknown        Hyperkalemia ICD-10-CM: E87.5  ICD-9-CM: 276.7  4/2/2018 Unknown        Hypercalcemia ICD-10-CM: E83.52  ICD-9-CM: 275.42  4/2/2018 Unknown        Bradycardia ICD-10-CM: R00.1  ICD-9-CM: 427.89  3/31/2018 Yes        H/O Clostridium difficile infection ICD-10-CM: Z86.19  ICD-9-CM: V12.09  3/31/2018 Yes        Urinary retention ICD-10-CM: R33.9  ICD-9-CM: 788.20  10/30/2017 Yes        Quadriparesis ([facility] ICD-10-CM: G82.50  ICD-9-CM: 344.00  5/31/2017 Yes        Type II diabetes mellitus, uncontrolled ([facility] ICD-10-CM: E11.65  ICD-9-CM: 250.02  12/16/2015 Yes        Renal cell cancer ([facility] ICD-10-CM: C64.9  ICD-9-CM: 189.0  11/5/2014 Yes        Cancer of left kidney ([facility] ICD-10-CM: C64.2  ICD-9-CM: 189.0  12/23/2013 Yes              BACKGROUND:    Past Medical History:   Past Medical History:   Diagnosis Date    Chronic drug abuse 1974    Diabetes ([facility] 01/1990    Hypertension     Renal cell carcinoma of left kidney ([facility] 12/17/13    Pathological Stage [id number] cc RCC FG3 s/p left open partial nephrectomy in 12/13      Renal mass          Patient taking anticoagulants no     ASSESSMENT:  Changes in Assessment Throughout Shift: no   Patient has Central Line: no Reasons    Patient has Willis Cath: no Reasons       Last Vitals:     Vitals:    04/06/18 2352 04/07/18 0405 04/07/18 0426 04/07/18 0725   BP: 156/80 145/74  146/82   Pulse: 61 71  72   Resp: 20 18 18   Temp: 97.7 °F (36.5 °C) 97.3 °F (36.3 °C)  98.1 °F (36.7 °C)   SpO2: 96% 95%  94%   Weight:   75.3 kg (165 lb 14.4 oz)    Height:            IV and DRAINS (will only show if present)   Peripheral IV 03/31/18-Site Assessment: Clean, dry, & intact     WOUND (if present)   Wound Type:  none   Dressing present Dressing Present : Yes   Wound Concerns/Notes:  none     PAIN    Pain Assessment    Pain Intensity 1: 0 (04/07/18 0400)    Pain Location 1: Hand    Pain Intervention(s) 1: Medication (see MAR)    Patient Stated Pain Goal: 0  o Interventions for Pain:  yes  o Intervention effective: yes  o Time of last intervention: see chart   o Reassessment Completed: yes      Last 3 Weights:  Last 3 Recorded Weights in this Encounter    04/02/18 0627 04/06/18 0420 04/07/18 0426   Weight: 82.3 kg (181 lb 7 oz) 76.2 kg (168 lb 1.6 oz) 75.3 kg (165 lb 14.4 oz)     Weight change: -0.998 kg (-2 lb 3.2 oz)     INTAKE/OUPUT    Current Shift:      Last three shifts: 04/05 1901 - 04/07 0700  In: 0   Out: 2000 [Urine:1650]     LAB RESULTS     Recent Labs      04/07/18   0203  04/06/18   0355  04/05/18   0234   WBC  5.3  4.9  6.2   HGB  7.6*  8.0*  7.2*   HCT  23.7*  25.2*  22.4*   PLT  81*  73*  63*        Recent Labs      04/07/18   0203  04/06/18   0355  04/05/18   0234   NA  145  146*  145   K  4.0  4.2  4.4   GLU  127*  78  111*   BUN  17  25*  35*   CREA  0.96  1.05  1.35*   CA  8.9  9.3  8.8   INR   --   1.3*   --        RECOMMENDATIONS AND DISCHARGE PLANNING     1. Pending tests/procedures/ Plan of Care or Other Needs: renal bx Mon     2. Discharge plan for patient and Needs/Barriers: unknown    3.  Estimated Discharge Date: 2-3d Posted on Whiteboard in [facility]: yes      4. The patient's care plan was reviewed with the oncoming nurse. \"HEALS\" SAFETY CHECK      Fall Risk    Total Score: 3    Safety Measures: Safety Measures: Bed/Chair alarm on, Bed/Chair-Wheels locked, Bed in low position, Call light within reach, Fall prevention (comment), Gripper socks, Side rails X 3    A safety check occurred in the patient's room between off going nurse and oncoming nurse listed above. The safety check included the below items  Area Items   H  High Alert Medications - Verify all high alert medication drips (heparin, PCA, etc.)   E  Equipment - Suction is set up for ALL patients (with magy)  - Red plugs utilized for all equipment (IV pumps, etc.)  - WOWs wiped down at end of shift.  - Room stocked with oxygen, suction, and other unit-specific supplies   A  Alarms - Bed alarm is set for fall risk patients  - Ensure chair alarm is in place and activated if patient is up in a chair   L  Lines - Check IV for any infiltration  - Willis bag is empty if patient has a Willis   - Tubing and IV bags are labeled   S  Safety   - Room is clean, patient is clean, and equipment is clean. - Hallways are clear from equipment besides carts. - Fall bracelet on for fall risk patients  - Ensure room is clear and free of clutter  - Suction is set up for ALL patients (with magy)  - Hallways are clear from equipment besides carts.    - Isolation precautions followed, supplies available outside room, sign posted     Ramona Huang RN

## 2018-04-07 NOTE — PROGRESS NOTES
Problem: Mobility Impaired (Adult and Pediatric)  Goal: *Acute Goals and Plan of Care (Insert Text)  Physical Therapy Goals  Initiated 4/3/2018 and to be accomplished within 7 day(s)  1. Patient will move from supine to sit and sit to supine, scoot up and down and roll side to side in bed with minimal assistance/contact guard assist.     2.  Patient will transfer from bed to chair and chair to bed with minimal assistance/contact guard assist using the least restrictive device. 3.  Patient will perform sit to stand with supervision/set-up. 4.  Patient will ambulate with minimal assistance/contact guard assist for 25 feet with the bilateral platform walker. 5.  Patient will negotiate 150ft in wheelchair using BLE's to propel with supervision assist.   Outcome: Progressing Towards Goal  physical Therapy TREATMENT    Patient: Bill Scott (89 y.o. male)  Date: 4/7/2018  Diagnosis: Acute renal insufficiency  Bradycardia <principal problem not specified>  Precautions:  Chart, physical therapy assessment, plan of care and goals were reviewed. OBJECTIVE/ ASSESSMENT:  Pt found supine in bed willing to work with PT. Pt declines ambulation stating that it is too early in the day. Pt sat at EOB and seated therex / stretching was performed (see therex section.) Pt is very motivated to decrease contractures in bilateral UEs. Pt left sitting at EOB with needs in reach and nursing in room. Education: transfers  Progression toward goals:  []      Improving appropriately and progressing toward goals  [x]      Improving slowly and progressing toward goals  []      Not making progress toward goals and plan of care will be adjusted     PLAN:  Patient continues to benefit from skilled intervention to address the above impairments. Continue treatment per established plan of care.   Discharge Recommendations:  Home Health  Further Equipment Recommendations for Discharge:  rolling walker     SUBJECTIVE:   Patient stated It's too early to walk.     OBJECTIVE DATA SUMMARY:   Critical Behavior:  Neurologic State: Alert  Orientation Level: Oriented X4  Cognition: Appropriate for age attention/concentration, Appropriate decision making, Appropriate safety awareness  Functional Mobility Training:  Bed Mobility:  Supine to Sit: Minimum assistance  Balance:  Sitting: Intact  Sitting - Static: Good (unsupported)  Sitting - Dynamic: Fair (occasional)  Therapeutic Exercises:   shld flexion x 10 bilaterally with AAROM. 30 second stretch to promote digit flexion on right hand x3. 30 second stretch to promote extension of digit on left hand x 3. 30 second elbow extension stretch x 2 bilaterally. LAQ x 10 bilaterally. Pain:  Pre tx pain: 0  Post tx pain: 0  Activity Tolerance:   Fair  Please refer to the flowsheet for vital signs taken during this treatment. After treatment:   [] Patient left in no apparent distress sitting up in chair  [x] Patient left in no apparent distress in bed  [x] Call bell left within reach  [] Nursing notified  [] Caregiver present  [] Bed alarm activated   [] SCDs applied  [] Ice applied      Jose Bobo PTA   Time Calculation: 27 mins    Mobility [id number] Current  CJ= 20-39%. The severity rating is based on the Level of Assistance required for Functional Mobility and ADLs. Mobility   Goal  CI= 1-19%. The severity rating is based on the Level of Assistance required for Functional Mobility and ADLs.

## 2018-04-08 LAB
ANION GAP SERPL CALC-SCNC: 8 MMOL/L (ref 3–18)
BASOPHILS # BLD: 0 K/UL (ref 0–0.1)
BASOPHILS NFR BLD: 0 % (ref 0–2)
BUN SERPL-MCNC: 15 MG/DL (ref 7–18)
BUN/CREAT SERPL: 15 (ref 12–20)
CALCIUM SERPL-MCNC: 8.9 MG/DL (ref 8.5–10.1)
CHLORIDE SERPL-SCNC: 110 MMOL/L (ref 100–108)
CO2 SERPL-SCNC: 27 MMOL/L (ref 21–32)
CREAT SERPL-MCNC: 1 MG/DL (ref 0.6–1.3)
DIFFERENTIAL METHOD BLD: ABNORMAL
EOSINOPHIL # BLD: 0.2 K/UL (ref 0–0.4)
EOSINOPHIL NFR BLD: 4 % (ref 0–5)
ERYTHROCYTE [DISTWIDTH] IN BLOOD BY AUTOMATED COUNT: 15.8 % (ref 11.6–14.5)
GLUCOSE BLD STRIP.AUTO-MCNC: 123 MG/DL (ref 70–110)
GLUCOSE BLD STRIP.AUTO-MCNC: 140 MG/DL (ref 70–110)
GLUCOSE BLD STRIP.AUTO-MCNC: 91 MG/DL (ref 70–110)
GLUCOSE SERPL-MCNC: 108 MG/DL (ref 74–99)
HCT VFR BLD AUTO: 24.5 % (ref 36–48)
HGB BLD-MCNC: 7.8 G/DL (ref 13–16)
LYMPHOCYTES # BLD: 1 K/UL (ref 0.9–3.6)
LYMPHOCYTES NFR BLD: 22 % (ref 21–52)
MCH RBC QN AUTO: 28.3 PG (ref 24–34)
MCHC RBC AUTO-ENTMCNC: 31.8 G/DL (ref 31–37)
MCV RBC AUTO: 88.8 FL (ref 74–97)
MONOCYTES # BLD: 0.6 K/UL (ref 0.05–1.2)
MONOCYTES NFR BLD: 14 % (ref 3–10)
NEUTS SEG # BLD: 2.8 K/UL (ref 1.8–8)
NEUTS SEG NFR BLD: 60 % (ref 40–73)
PLATELET # BLD AUTO: 103 K/UL (ref 135–420)
PMV BLD AUTO: 12.4 FL (ref 9.2–11.8)
POTASSIUM SERPL-SCNC: 3.6 MMOL/L (ref 3.5–5.5)
RBC # BLD AUTO: 2.76 M/UL (ref 4.7–5.5)
SODIUM SERPL-SCNC: 145 MMOL/L (ref 136–145)
VANCOMYCIN SERPL-MCNC: 19 UG/ML (ref 5–40)
VANCOMYCIN SERPL-MCNC: 25.4 UG/ML (ref 5–40)
WBC # BLD AUTO: 4.6 K/UL (ref 4.6–13.2)

## 2018-04-08 PROCEDURE — 74011250636 HC RX REV CODE- 250/636: Performed by: FAMILY MEDICINE

## 2018-04-08 PROCEDURE — 82105 ALPHA-FETOPROTEIN SERUM: CPT | Performed by: INTERNAL MEDICINE

## 2018-04-08 PROCEDURE — 65660000000 HC RM CCU STEPDOWN

## 2018-04-08 PROCEDURE — 80202 ASSAY OF VANCOMYCIN: CPT | Performed by: FAMILY MEDICINE

## 2018-04-08 PROCEDURE — 74011250637 HC RX REV CODE- 250/637: Performed by: FAMILY MEDICINE

## 2018-04-08 PROCEDURE — 36415 COLL VENOUS BLD VENIPUNCTURE: CPT | Performed by: INTERNAL MEDICINE

## 2018-04-08 PROCEDURE — 80048 BASIC METABOLIC PNL TOTAL CA: CPT | Performed by: INTERNAL MEDICINE

## 2018-04-08 PROCEDURE — 74011000250 HC RX REV CODE- 250: Performed by: FAMILY MEDICINE

## 2018-04-08 PROCEDURE — 74011250637 HC RX REV CODE- 250/637: Performed by: INTERNAL MEDICINE

## 2018-04-08 PROCEDURE — 74011250637 HC RX REV CODE- 250/637: Performed by: UROLOGY

## 2018-04-08 PROCEDURE — 82962 GLUCOSE BLOOD TEST: CPT

## 2018-04-08 PROCEDURE — 85025 COMPLETE CBC W/AUTO DIFF WBC: CPT | Performed by: INTERNAL MEDICINE

## 2018-04-08 RX ADMIN — METRONIDAZOLE 500 MG: 500 INJECTION, SOLUTION INTRAVENOUS at 01:50

## 2018-04-08 RX ADMIN — SODIUM BICARBONATE 650 MG TABLET 650 MG: at 21:11

## 2018-04-08 RX ADMIN — LACTOBACILLUS TAB 2 TABLET: TAB at 19:14

## 2018-04-08 RX ADMIN — HYDROMORPHONE HYDROCHLORIDE 4 MG: 2 TABLET ORAL at 09:42

## 2018-04-08 RX ADMIN — LACTOBACILLUS TAB 2 TABLET: TAB at 08:42

## 2018-04-08 RX ADMIN — AMLODIPINE BESYLATE 5 MG: 5 TABLET ORAL at 08:42

## 2018-04-08 RX ADMIN — VANCOMYCIN HYDROCHLORIDE 750 MG: 10 INJECTION, POWDER, LYOPHILIZED, FOR SOLUTION INTRAVENOUS at 21:05

## 2018-04-08 RX ADMIN — TAMSULOSIN HYDROCHLORIDE 0.4 MG: 0.4 CAPSULE ORAL at 08:42

## 2018-04-08 RX ADMIN — Medication 2 G: at 01:50

## 2018-04-08 RX ADMIN — Medication 2 G: at 09:42

## 2018-04-08 RX ADMIN — SODIUM BICARBONATE 650 MG TABLET 650 MG: at 18:00

## 2018-04-08 RX ADMIN — Medication 2 G: at 17:59

## 2018-04-08 RX ADMIN — PANTOPRAZOLE SODIUM 40 MG: 40 TABLET, DELAYED RELEASE ORAL at 08:42

## 2018-04-08 RX ADMIN — METRONIDAZOLE 500 MG: 500 INJECTION, SOLUTION INTRAVENOUS at 15:24

## 2018-04-08 RX ADMIN — FUROSEMIDE 40 MG: 40 TABLET ORAL at 08:43

## 2018-04-08 RX ADMIN — HYDROMORPHONE HYDROCHLORIDE 4 MG: 2 TABLET ORAL at 21:11

## 2018-04-08 RX ADMIN — LEVOTHYROXINE SODIUM 50 MCG: 50 TABLET ORAL at 08:42

## 2018-04-08 RX ADMIN — SODIUM BICARBONATE 650 MG TABLET 650 MG: at 08:43

## 2018-04-08 RX ADMIN — LINAGLIPTIN 5 MG: 5 TABLET, FILM COATED ORAL at 08:43

## 2018-04-08 NOTE — ROUTINE PROCESS
Bedside and Verbal shift change report given to [address] [place] [address] (oncoming nurse) by Aruna Snyder (offgoing nurse). Report included the following information SBAR, Kardex, MAR and Recent Results.     SITUATION:    Code Status: Full Code  Reason for Admission: Acute renal insufficiency   Bradycardia    [address] [facility] day: 7   Problem List:       Hospital Problems  Date Reviewed: 4/4/2018          Codes Class Noted POA    Chronic kidney disease, stage III (moderate) ICD-10-CM: N18.3  ICD-9-CM: 585.3  4/4/2018 Clinically Undetermined        Hepatitis C antibody positive in blood ICD-10-CM: R76.8  ICD-9-CM: 795.79  4/4/2018 Clinically Undetermined        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  ICD-9-CM: 583.9  4/2/2018 Clinically Undetermined        Metabolic acidosis HDJ-04-UW: E87.2  ICD-9-CM: 276.2  4/2/2018 Unknown        Hyperkalemia ICD-10-CM: E87.5  ICD-9-CM: 276.7  4/2/2018 Unknown        Hypercalcemia ICD-10-CM: E83.52  ICD-9-CM: 275.42  4/2/2018 Unknown        Bradycardia ICD-10-CM: R00.1  ICD-9-CM: 427.89  3/31/2018 Yes        H/O Clostridium difficile infection ICD-10-CM: Z86.19  ICD-9-CM: V12.09  3/31/2018 Yes        Urinary retention ICD-10-CM: R33.9  ICD-9-CM: 788.20  10/30/2017 Yes        Quadriparesis ([facility] ICD-10-CM: G82.50  ICD-9-CM: 344.00  5/31/2017 Yes        Type II diabetes mellitus, uncontrolled ([facility] ICD-10-CM: E11.65  ICD-9-CM: 250.02  12/16/2015 Yes        Renal cell cancer ([facility] ICD-10-CM: C64.9  ICD-9-CM: 189.0  11/5/2014 Yes        Cancer of left kidney ([facility] ICD-10-CM: C64.2  ICD-9-CM: 189.0  12/23/2013 Yes              BACKGROUND:    Past Medical History:   Past Medical History:   Diagnosis Date    Chronic drug abuse 1974    Diabetes ([facility] 01/1990    Hypertension     Renal cell carcinoma of left kidney ([facility] 12/17/13    Pathological Stage [id number] cc RCC FG3 s/p left open partial nephrectomy in 12/13      Renal mass          Patient taking anticoagulants no ASSESSMENT:    Changes in Assessment Throughout Shift: no   Patient has Central Line: no Reasons    Patient has Willis Cath: no Reasons       Last Vitals:     Vitals:    04/07/18 0426 04/07/18 0725 04/07/18 1109 04/07/18 1535   BP:  146/82 159/67 145/83   Pulse:  72 67 61   Resp:  18 18 18   Temp:  98.1 °F (36.7 °C) 98.1 °F (36.7 °C) 97.4 °F (36.3 °C)   SpO2:  94% 94% 98%   Weight: 75.3 kg (165 lb 14.4 oz)      Height:            IV and DRAINS (will only show if present)   Peripheral IV 03/31/18-Site Assessment: Clean, dry, & intact     WOUND (if present)   Wound Type:  none   Dressing present Dressing Present : Yes   Wound Concerns/Notes:  none     PAIN    Pain Assessment    Pain Intensity 1: 0 (04/07/18 1700)    Pain Location 1: Hand    Pain Intervention(s) 1: Medication (see MAR)    Patient Stated Pain Goal: 0  o Interventions for Pain:  yes  o Intervention effective: yes  o Time of last intervention: see chart   o Reassessment Completed: yes      Last 3 Weights:  Last 3 Recorded Weights in this Encounter    04/02/18 0627 04/06/18 0420 04/07/18 0426   Weight: 82.3 kg (181 lb 7 oz) 76.2 kg (168 lb 1.6 oz) 75.3 kg (165 lb 14.4 oz)     Weight change: -0.998 kg (-2 lb 3.2 oz)     INTAKE/OUPUT    Current Shift:      Last three shifts: 04/06 0701 - 04/07 1900  In: 0   Out: 2650 [Urine:2300]     LAB RESULTS     Recent Labs      04/07/18   0203  04/06/18   0355  04/05/18   0234   WBC  5.3  4.9  6.2   HGB  7.6*  8.0*  7.2*   HCT  23.7*  25.2*  22.4*   PLT  81*  73*  63*        Recent Labs      04/07/18   0203  04/06/18   0355  04/05/18   0234   NA  145  146*  145   K  4.0  4.2  4.4   GLU  127*  78  111*   BUN  17  25*  35*   CREA  0.96  1.05  1.35*   CA  8.9  9.3  8.8   INR   --   1.3*   --        RECOMMENDATIONS AND DISCHARGE PLANNING     1. Pending tests/procedures/ Plan of Care or Other Needs: renal bx Mon     2. Discharge plan for patient and Needs/Barriers: unknown    3.  Estimated Discharge Date: 2-3d Posted on Whiteboard in [address] [place] [address] Room: yes      4. The patient's care plan was reviewed with the oncoming nurse. \"HEALS\" SAFETY CHECK      Fall Risk    Total Score: 3    Safety Measures: Safety Measures: Bed/Chair-Wheels locked, Bed in low position, Call light within reach, Side rails X 3    A safety check occurred in the patient's room between off going nurse and oncoming nurse listed above. The safety check included the below items  Area Items   H  High Alert Medications - Verify all high alert medication drips (heparin, PCA, etc.)   E  Equipment - Suction is set up for ALL patients (with magy)  - Red plugs utilized for all equipment (IV pumps, etc.)  - WOWs wiped down at end of shift.  - Room stocked with oxygen, suction, and other unit-specific supplies   A  Alarms - Bed alarm is set for fall risk patients  - Ensure chair alarm is in place and activated if patient is up in a chair   L  Lines - Check IV for any infiltration  - Willis bag is empty if patient has a Willis   - Tubing and IV bags are labeled   S  Safety   - Room is clean, patient is clean, and equipment is clean. - Hallways are clear from equipment besides carts. - Fall bracelet on for fall risk patients  - Ensure room is clear and free of clutter  - Suction is set up for ALL patients (with magy)  - Hallways are clear from equipment besides carts.    - Isolation precautions followed, supplies available outside room, sign posted     Leopold Providence

## 2018-04-08 NOTE — CONSULTS
[facility]   [email or web address]      Impression:   1.quadriplegia post cervical osteomyelitis  DVT of LE  Hep C- r/o secondary cryoglobulinemia  Proteinuria   IDDM  Renal mass ,carcinoma      Plan:     1. Hep C RNA quant, and Hep C genotype ordered  Check AFP  US w/ elastography  Will see as OP and begin oral antivirals (probably Zepatier)      Chief Complaint: swelling of LEs, Hep C      HPI:  Mickey Mason is a 59 y.o. male who is being seen on consult for the above. This pt was seen in Jan 2018 and w/u begun. Lab above is pending. No ETOH or IV drugs. Pt has proteinuria and concern is raised over possible cryoglobulinemia due to chronic Hep C.   PMH:   Past Medical History:   Diagnosis Date    Chronic drug abuse 1974    Diabetes ([facility] 01/1990    Hypertension     Renal cell carcinoma of left kidney ([facility] 12/17/13    Pathological Stage [id number] cc RCC FG3 s/p left open partial nephrectomy in 12/13      Renal mass        PSH:   Past Surgical History:   Procedure Laterality Date    HX CIRCUMCISION  12/17/13    [facility]ill, [facility]    HX NEPHRECTOMY Left 12/17/13    Open Partial, Dr. Jethro Dill, [facility]    HX OTHER SURGICAL Right 2/27/2016    removed right ft  2nd meta-tarsal       Social HX:   Social History     Social History    Marital status:      Spouse name: N/A    Number of children: N/A    Years of education: N/A     Occupational History    Not on file. Social History Main Topics    Smoking status: Former Smoker     Packs/day: 0.50     Years: 30.00     Types: Cigarettes    Smokeless tobacco: Never Used    Alcohol use 8.4 oz/week     14 Cans of beer per week      Comment: watching sports    Drug use: No      Comment: methadone    Sexual activity: Not on file     Other Topics Concern    Not on file     Social History Narrative     since 2012;  for 12 yrs; 2K; [address] [employer] worker just recently furloughed;  No [employer] service       [address]:   Family History   Problem Relation Age of Onset    Diabetes Mother     Sickle Cell Anemia Father     Diabetes Sister     Hypertension Sister        Allergy:   Allergies   Allergen Reactions    Iodine Hives       Home Medications:     Prescriptions Prior to Admission   Medication Sig    NUT. TX.GLUCOSE INTOLERANCE,SOY (GLUCERNA PO) Take  by mouth.  magnesium hydroxide (NUR MILK OF MAGNESIA) 400 mg/5 mL suspension Take 30 mL by mouth daily as needed for Constipation.  LACTOBACILLUS RHAMNOSUS GG (CULTURELLE PO) Take  by mouth.  doxycycline (MONODOX) 100 mg capsule Take 100 mg by mouth two (2) times a day.  mineral oil-hydrophil petrolat (AQUAPHOR) ointment Apply  to affected area as needed for Dry Skin.  mineral oil-hydrophil petrolat (AQUAPHOR) ointment Apply  to affected area as needed for Dry Skin.  PREPARATION H, WITCH HAZEL, EX by Apply Externally route.  linagliptin (TRADJENTA) 5 mg tablet Take 5 mg by mouth daily.  tamsulosin (FLOMAX) 0.4 mg capsule Take 0.4 mg by mouth daily.  acetaminophen (TYLENOL) 500 mg tablet 1,000 mg.    bisacodyl (DULCOLAX) 5 mg EC tablet 10 mg.    gabapentin (NEURONTIN) 100 mg capsule 100 mg.    atorvastatin (LIPITOR) 40 mg tablet 40 mg.    amLODIPine (NORVASC) 10 mg tablet 10 mg.    atenolol (TENORMIN) 25 mg tablet 25 mg.    ibuprofen (MOTRIN) 400 mg tablet 400 mg.    HYDROmorphone (DILAUDID) 4 mg tablet 4 mg.  albuterol-ipratropium (DUO-NEB) 2.5 mg-0.5 mg/3 ml nebu 3 mL.  Menthol-Zinc Oxide (CALMOSEPTINE) 0.44-20.6 % oint Use 1 Application to affected area.  nicotine (NICODERM CQ) 14 mg/24 hr patch 1 Patch.  rifAMPin (RIFADIN) 300 mg capsule 600 mg.  polyethylene glycol (MIRALAX) 17 gram packet 17 g.    silver sulfADIAZINE (SILVADENE) 1 % topical cream Use 1 Application to affected area Twice Daily.  knees    multivitamin, tx-iron-ca-min (THERAPY M) 9 mg iron-400 mcg tab tablet Take 1 Tab by Mouth Once a Day.  Zolpidem 10 mg subl 10 mg.    insulin glargine (LANTUS) 100 unit/mL injection 4 Units.  insulin lispro (HUMALOG) 100 unit/mL injection 2-10 Units.  lisinopril (PRINIVIL, ZESTRIL) 40 mg tablet Take 1 Tab by mouth daily.  pravastatin (PRAVACHOL) 20 mg tablet Take 1 Tab by mouth nightly.  insulin lispro (HUMALOG KWIKPEN) 100 unit/mL kwikpen Blood Sugar <150 =0 units; 150 -199 =2 units; 200 -249 =4 units; 250 -299 =6 units; 300 -349 =8 units; 350 and above =10 units.  insulin glargine (LANTUS SOLOSTAR) 100 unit/mL (3 mL) pen 50 Units by SubCUTAneous route daily. Review of Systems:     Constitutional: No fevers, chills, weight loss, fatigue. Skin: No rashes, pruritis, jaundice, ulcerations, erythema. HENT: No headaches, nosebleeds, sinus pressure, rhinorrhea, sore throat. Eyes: No visual changes, blurred vision, eye pain, photophobia, jaundice. Cardiovascular: No chest pain, heart palpitations. Respiratory: No cough, SOB, wheezing, chest discomfort, orthopnea. Gastrointestinal: neg   Genitourinary: No dysuria, bleeding, discharge, pyuria. Musculoskeletal: Swelling of LEs    Endo: No sweats. Heme: No bruising, easy bleeding. Allergies: As noted. Neurological: Cranial nerves intact. Alert and oriented. Gait not assessed. Psychiatric:  No anxiety, depression, hallucinations. Visit Vitals    /82 (BP 1 Location: Left arm, BP Patient Position: Lying right side)    Pulse 75    Temp 97 °F (36.1 °C)    Resp 18    Ht 6' (1.829 m)    Wt 73.7 kg (162 lb 6.4 oz)    SpO2 96%    BMI 22.03 kg/m2       Physical Assessment:     constitutional: appearance: well developed, well nourished, normal habitus, no deformities, in no acute distress. skin: inspection: no rashes, ulcers, icterus or other lesions; no clubbing or telangiectasias. palpation: no induration or subcutaneos nodules.    eyes: inspection: normal conjunctivae and lids; no jaundice pupils: symmetrical, normoreactive to light, normal accommodation and size. ENMT: mouth: normal oral mucosa,lips and gums; good dentition. oropharynx: normal tongue, hard and soft palate; posterior pharynx without erithema, exudate or lesions. neck: thyroid: normal size, consistency and position; no masses or tenderness. respiratory: effort: normal chest excursion; no intercostal retraction or accessory muscle use. cardiovascular: abdominal aorta: normal size and position; no bruits. palpation: PMI of normal size and position; normal rhythm; no thrill or murmurs. abdominal: abdomen: normal consistency; no tenderness or masses. hernias: no hernias appreciated. liver: normal size and consistency. spleen: not palpable. rectal: hemoccult/guaiac: not performed. musculoskeletal: digits and nails: no clubbing, cyanosis, petechiae or other inflammatory conditions. gait: normal gait and station head and neck: normal range of motion; no pain, crepitation or contracture. spine/ribs/pelvis: normal range of motion; no pain, deformity or contracture. lymphatic: axilae: not palpable. groin: not palpable. neck: within normal limits. other: not palpable. neurologic: cranial nerves: II-XII normal.  Poor muscle strength in upper ext  psychiatric: judgement/insight: within normal limits. memory: within normal limits for recent and remote events. mood and affect: no evidence of depression, anxiety or agitation. orientation: oriented to time, space and person. Basic Metabolic Profile   Recent Labs      04/08/18   0108   04/06/18   0355   NA  145   < >  146*   K  3.6   < >  4.2   CL  110*   < >  115*   CO2  27   < >  25   BUN  15   < >  25*   GLU  108*   < >  78   CA  8.9   < >  9.3   PHOS   --    --   2.6    < > = values in this interval not displayed.          CBC w/Diff    Recent Labs      04/08/18   0108   WBC  4.6   RBC  2.76*   HGB  7.8*   HCT  24.5*   MCV  88.8   MCH  28.3   MCHC  31.8   RDW  15.8*   PLT 103*    Recent Labs      04/08/18   0108   GRANS  60   LYMPH  22   EOS  4        Hepatic Function   Recent Labs      04/07/18   0203   ALB  2.9*   TP  7.1   TBILI  1.1*   SGOT  22   AP  87          Michelle Ac MD, M.D. [facility], [address] [place] [address]  [email or web address]

## 2018-04-08 NOTE — PROGRESS NOTES
Progress Note    Lisa Mcginnis  59 y.o. 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 A comprehensive review of systems was negative except for that written in the History of Present Illness.     Objective:     Visit Vitals    /82 (BP 1 Location: Left arm, BP Patient Position: Lying right side)    Pulse 75    Temp 97 °F (36.1 °C)    Resp 18    Ht 6' (1.829 m)    Wt 73.7 kg (162 lb 6.4 oz)    SpO2 96%    BMI 22.03 kg/m2         Intake/Output Summary (Last 24 hours) at 04/08/18 1050  Last data filed at 04/08/18 0610   Gross per 24 hour   Intake                0 ml   Output             1101 ml   Net            -1101 ml       Current Facility-Administered Medications   Medication Dose Route Frequency Provider Last Rate Last Dose    amLODIPine (NORVASC) tablet 5 mg  5 mg Oral DAILY Mila Aquino MD   5 mg at 04/08/18 0842    cefepime (MAXIPIME) 2 g in sterile water (preservative free) 10 mL IV syringe  2 g IntraVENous Q8H Henrry Gaitan MD   2 g at 04/08/18 7761    VANCOMYCIN INFORMATION NOTE   Other Rx Dosing/Monitoring Sedonia Abbe Aquino MD        tamsulosin (FLOMAX) capsule 0.4 mg  0.4 mg Oral DAILY Terry Ramsey MD   0.4 mg at 04/08/18 0931    metroNIDAZOLE (FLAGYL) IVPB premix 500 mg  500 mg IntraVENous Q12H Mireya Borrego  mL/hr at 04/08/18 0150 500 mg at 04/08/18 0150    HYDROmorphone (DILAUDID) tablet 4 mg  4 mg Oral Q6H PRN Mireya Borrego MD   4 mg at 04/08/18 0942    furosemide (LASIX) tablet 40 mg  40 mg Oral DAILY Santos Masterson MD   40 mg at 04/08/18 0843    sodium bicarbonate tablet 650 mg  650 mg Oral TID Santos Masterson MD   650 mg at 04/08/18 0843    levothyroxine (SYNTHROID) tablet 50 mcg  50 mcg Oral DANIE Borrego MD   50 mcg at 04/08/18 8226    glucose chewable tablet 16 g  4 Tab Oral PRN Mireya Borrego MD        glucagon (GLUCAGEN) injection 1 mg  1 mg IntraMUSCular ROSIBELN Mireya Borrego MD        dextrose (D50W) injection syrg 12.5-25 g  25-50 mL IntraVENous PRN Mireya Borrego MD        insulin lispro (HUMALOG) injection   SubCUTAneous AC&HS Reshma Clemons MD   Stopped at 04/06/18 0730    hydrALAZINE (APRESOLINE) tablet 25 mg  25 mg Oral TID PRN Mireya Borrego MD        Lactobacillus Acidoph & Bulgar [facility]) tablet 2 Tab  2 Tab Oral BID Mireya Borrego MD   2 Tab at 04/08/18 0842    linagliptin (TRADJENTA) tablet 5 mg  5 mg Oral DANIE Borrego MD   5 mg at 04/08/18 0843    pantoprazole (PROTONIX) tablet 40 mg  40 mg Oral DANIE Borrego MD   40 mg at 04/08/18 7524        Physical Exam:     Physical Exam:   General:  Alert, cooperative, no distress, appears stated age. Neck: Supple, symmetrical, trachea midline, no adenopathy, thyroid: no enlargement/tenderness/nodules, no carotid bruit and no JVD. Lungs:   Clear to auscultation bilaterally. Heart:  Regular rate and rhythm, S1, S2 normal, no murmur, click, rub or gallop. Abdomen:   Soft, non-tender. Bowel sounds normal. No masses,  No organomegaly. Extremities: Extremities normal, atraumatic, no cyanosis or edema.          Data Review:    CBC w/Diff    Recent Labs      04/08/18 0108 04/07/18   0203  04/06/18   0355   WBC  4.6  5.3  4.9   RBC  2.76*  2.66*  2.82*   HGB  7.8*  7.6*  8.0*   HCT  24.5*  23.7*  25.2*   MCV  88.8  89.1  89.4   MCH  28.3  28.6  28.4   MCHC  31.8  32.1  31.7   RDW  15.8*  15.9*  15.9*    Recent Labs      04/08/18 0108 04/07/18 0203 04/06/18   0355   MONOS  14*  13*  14*   EOS  4  3  2   BASOS  0  0  0   RDW  15.8*  15.9*  15.9*        Comprehensive Metabolic Profile    Recent Labs      04/08/18 0108 04/07/18 0203 04/06/18   0355   NA  145  145  146*   K  3.6  4.0  4.2   CL  110*  112*  115*   CO2  27  26  25   BUN  15  17  25*   CREA  1.00  0.96  1.05    Recent Labs      04/08/18 0108 04/07/18 0203 04/06/18   0355   CA  8.9  8.9  9.3   PHOS   --    --   2.6   ALB   --   2.9*  3.0*   TP   --   7.1  7.5   SGOT   --   22  21   TBILI   --   1.1*  1.1*                        Impression:       Active Hospital Problems    Diagnosis Date Noted    Chronic kidney disease, stage III (moderate) 04/04/2018    Hepatitis C antibody positive in blood 04/04/2018    Light chain deposition disease 08/47/9002    Metabolic acidosis 54/57/9016    Hyperkalemia 04/02/2018    Hypercalcemia 04/02/2018    Bradycardia 03/31/2018    H/O Clostridium difficile infection 03/31/2018    Urinary retention 10/30/2017    Quadriparesis ([facility] 05/31/2017    Type II diabetes mellitus, uncontrolled ([facility] 12/16/2015    Renal cell cancer ([facility] 11/05/2014    Cancer of left kidney ([facility] 12/23/2013      Stable Renal function , Spontaneous improvement of Platelets. 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       Hilda Eldridge MD

## 2018-04-08 NOTE — ROUTINE PROCESS
Bedside and Verbal shift change report given to [employer] (oncoming nurse) by Wing Charlton RN (offgoing nurse). Report included the following information SBAR, Kardex, MAR and Recent Results.     SITUATION:    Code Status: Full Code  Reason for Admission: Acute renal insufficiency   Bradycardia    [facility] day: 8   Problem List:       Hospital Problems  Date Reviewed: 4/4/2018          Codes Class Noted POA    Chronic kidney disease, stage III (moderate) ICD-10-CM: N18.3  ICD-9-CM: 585.3  4/4/2018 Clinically Undetermined        Hepatitis C antibody positive in blood ICD-10-CM: R76.8  ICD-9-CM: 795.79  4/4/2018 Clinically Undetermined        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  ICD-9-CM: 583.9  4/2/2018 Clinically Undetermined        Metabolic acidosis [id number]: E87.2  ICD-9-CM: 276.2  4/2/2018 Unknown        Hyperkalemia ICD-10-CM: E87.5  ICD-9-CM: 276.7  4/2/2018 Unknown        Hypercalcemia ICD-10-CM: E83.52  ICD-9-CM: 275.42  4/2/2018 Unknown        Bradycardia ICD-10-CM: R00.1  ICD-9-CM: 427.89  3/31/2018 Yes        H/O Clostridium difficile infection ICD-10-CM: Z86.19  ICD-9-CM: V12.09  3/31/2018 Yes        Urinary retention ICD-10-CM: R33.9  ICD-9-CM: 788.20  10/30/2017 Yes        Quadriparesis ([facility] ICD-10-CM: G82.50  ICD-9-CM: 344.00  5/31/2017 Yes        Type II diabetes mellitus, uncontrolled ([facility] ICD-10-CM: E11.65  ICD-9-CM: 250.02  12/16/2015 Yes        [facility] ICD-10-CM: C64.9  ICD-9-CM: 189.0  11/5/2014 Yes        Cancer of left kidney ([facility] ICD-10-CM: C64.2  ICD-9-CM: 189.0  12/23/2013 Yes              BACKGROUND:    Past Medical History:   Past Medical History:   Diagnosis Date    Chronic drug abuse 1974    Diabetes ([facility] 01/1990    Hypertension     Renal cell carcinoma of left kidney ([facility] 12/17/13    Pathological Stage [id number] cc RCC FG3 s/p left open partial nephrectomy in 12/13      Renal mass          Patient taking anticoagulants no     ASSESSMENT:  Changes in Assessment Throughout Shift: no   Patient has Central Line: no Reasons    Patient has Willis Cath: no Reasons       Last Vitals:     Vitals:    04/07/18 1535 04/07/18 2054 04/08/18 0020 04/08/18 0355   BP: 145/83 161/80 150/77 158/85   Pulse: 61 (!) 55 66 74   Resp: 18 18 18 18   Temp: 97.4 °F (36.3 °C)  97.7 °F (36.5 °C) 98 °F (36.7 °C)   SpO2: 98% 97% 95% 95%   Weight:    73.7 kg (162 lb 6.4 oz)   Height:            IV and DRAINS (will only show if present)   Peripheral IV 03/31/18-Site Assessment: Clean, dry, & intact     WOUND (if present)   Wound Type:  none   Dressing present Dressing Present : Yes   Wound Concerns/Notes:  none     PAIN    Pain Assessment    Pain Intensity 1: 0 (04/08/18 0433)    Pain Location 1: Neck, Hand    Pain Intervention(s) 1: Medication (see MAR)    Patient Stated Pain Goal: 0  o Interventions for Pain:  yes  o Intervention effective: yes  o Time of last intervention: see chart   o Reassessment Completed: yes      Last 3 Weights:  Last 3 Recorded Weights in this Encounter    04/06/18 0420 04/07/18 0426 04/08/18 0355   Weight: 76.2 kg (168 lb 1.6 oz) 75.3 kg (165 lb 14.4 oz) 73.7 kg (162 lb 6.4 oz)     Weight change: -1.588 kg (-3 lb 8 oz)     INTAKE/OUPUT    Current Shift:      Last three shifts: 04/06 1901 - 04/08 0700  In: -   Out: 2501 [Urine:2150]     LAB RESULTS     Recent Labs      04/08/18   0108  04/07/18   0203  04/06/18   0355   WBC  4.6  5.3  4.9   HGB  7.8*  7.6*  8.0*   HCT  24.5*  23.7*  25.2*   PLT  103*  81*  73*        Recent Labs      04/08/18   0108  04/07/18   0203  04/06/18   0355   NA  145  145  146*   K  3.6  4.0  4.2   GLU  108*  127*  78   BUN  15  17  25*   CREA  1.00  0.96  1.05   CA  8.9  8.9  9.3   INR   --    --   1.3*       RECOMMENDATIONS AND DISCHARGE PLANNING     1. Pending tests/procedures/ Plan of Care or Other Needs: renal bx Mon     2. Discharge plan for patient and Needs/Barriers: unknown    3.  Estimated Discharge Date: 2-3d Posted on Whiteboard in [address] [place] [address] Room: yes      4. The patient's care plan was reviewed with the oncoming nurse. \"HEALS\" SAFETY CHECK      Fall Risk    Total Score: 3    Safety Measures: Safety Measures: Bed/Chair-Wheels locked, Bed in low position, Call light within reach, Side rails X 3    A safety check occurred in the patient's room between off going nurse and oncoming nurse listed above. The safety check included the below items  Area Items   H  High Alert Medications - Verify all high alert medication drips (heparin, PCA, etc.)   E  Equipment - Suction is set up for ALL patients (with magy)  - Red plugs utilized for all equipment (IV pumps, etc.)  - WOWs wiped down at end of shift.  - Room stocked with oxygen, suction, and other unit-specific supplies   A  Alarms - Bed alarm is set for fall risk patients  - Ensure chair alarm is in place and activated if patient is up in a chair   L  Lines - Check IV for any infiltration  - Willis bag is empty if patient has a Willis   - Tubing and IV bags are labeled   S  Safety   - Room is clean, patient is clean, and equipment is clean. - Hallways are clear from equipment besides carts. - Fall bracelet on for fall risk patients  - Ensure room is clear and free of clutter  - Suction is set up for ALL patients (with magy)  - Hallways are clear from equipment besides carts.    - Isolation precautions followed, supplies available outside room, sign posted     Katherin Vizcarra RN

## 2018-04-08 NOTE — PROGRESS NOTES
Progress Note    Patient: Jeff Conklin MRN: [id number]  SSN: [id number]    YOB: 1960  Age: 59 y.o. Sex: male      Admit Date: 3/31/2018    LOS: 8 days     Subjective:     Patient with quadripareses with diabtese. Having diarrhea last night and today. Objective:     Vitals:    04/08/18 0020 04/08/18 0355 04/08/18 0749 04/08/18 1121   BP: 150/77 158/85 154/82 160/78   Pulse: 66 74 75 68   Resp: 18 18 18 18   Temp: 97.7 °F (36.5 °C) 98 °F (36.7 °C) 97 °F (36.1 °C) 96.8 °F (36 °C)   SpO2: 95% 95% 96% 100%   Weight:  73.7 kg (162 lb 6.4 oz)     Height:            Intake and Output:  Current Shift:    Last three shifts: 04/06 1901 - 04/08 0700  In: -   Out: 2501 [Urine:2150]    Physical Exam:   GENERAL: alert, cooperative, no distress, appears older than stated age  LUNG: clear to auscultation bilaterally  HEART: regular rate and rhythm, S1, S2 normal, no murmur, click, rub or gallop  ABDOMEN: soft, non-tender. Bowel sounds normal. No masses,  no organomegaly    Lab/Data Review: All lab results for the last 24 hours reviewed.      Glucose 123    Assessment:     Active Problems:    [facility] (11/5/2014)      Type II diabetes mellitus, uncontrolled ([facility] (12/16/2015)      Urinary retention (10/30/2017)      Cancer of left kidney ([facility] (12/23/2013)      Quadriparesis ([facility] (5/31/2017)      Bradycardia (3/31/2018)      H/O Clostridium difficile infection (3/31/2018)      Light chain deposition disease (6/1/4252)      Metabolic acidosis (2/8/0494)      Hyperkalemia (4/2/2018)      Hypercalcemia (4/2/2018)      Chronic kidney disease, stage III (moderate) (4/4/2018)      Hepatitis C antibody positive in blood (4/4/2018)        Plan:     Add probiotics    Signed By: Gene Corona MD     April 8, 2018

## 2018-04-09 ENCOUNTER — APPOINTMENT (OUTPATIENT)
Dept: CT IMAGING | Age: 58
DRG: 460 | End: 2018-04-09
Attending: PHYSICIAN ASSISTANT
Payer: MEDICAID

## 2018-04-09 LAB
ANION GAP SERPL CALC-SCNC: 7 MMOL/L (ref 3–18)
APTT PPP: 36.9 SEC (ref 23–36.4)
BASOPHILS # BLD: 0 K/UL (ref 0–0.1)
BASOPHILS NFR BLD: 0 % (ref 0–2)
BUN SERPL-MCNC: 13 MG/DL (ref 7–18)
BUN/CREAT SERPL: 11 (ref 12–20)
CALCIUM SERPL-MCNC: 8.8 MG/DL (ref 8.5–10.1)
CHLORIDE SERPL-SCNC: 109 MMOL/L (ref 100–108)
CO2 SERPL-SCNC: 28 MMOL/L (ref 21–32)
CREAT SERPL-MCNC: 1.18 MG/DL (ref 0.6–1.3)
CRYOGLOB SER QL 1D COLD INC: NORMAL
DIFFERENTIAL METHOD BLD: ABNORMAL
EOSINOPHIL # BLD: 0.2 K/UL (ref 0–0.4)
EOSINOPHIL NFR BLD: 3 % (ref 0–5)
ERYTHROCYTE [DISTWIDTH] IN BLOOD BY AUTOMATED COUNT: 15.9 % (ref 11.6–14.5)
GLUCOSE BLD STRIP.AUTO-MCNC: 149 MG/DL (ref 70–110)
GLUCOSE BLD STRIP.AUTO-MCNC: 90 MG/DL (ref 70–110)
GLUCOSE BLD STRIP.AUTO-MCNC: 98 MG/DL (ref 70–110)
GLUCOSE BLD STRIP.AUTO-MCNC: 99 MG/DL (ref 70–110)
GLUCOSE SERPL-MCNC: 108 MG/DL (ref 74–99)
HCT VFR BLD AUTO: 24.2 % (ref 36–48)
HCT VFR BLD AUTO: 25.8 % (ref 36–48)
HGB BLD-MCNC: 7.8 G/DL (ref 13–16)
HGB BLD-MCNC: 8.5 G/DL (ref 13–16)
INR PPP: 1.3 (ref 0.8–1.2)
LYMPHOCYTES # BLD: 1.5 K/UL (ref 0.9–3.6)
LYMPHOCYTES NFR BLD: 27 % (ref 21–52)
MCH RBC QN AUTO: 28.5 PG (ref 24–34)
MCHC RBC AUTO-ENTMCNC: 32.2 G/DL (ref 31–37)
MCV RBC AUTO: 88.3 FL (ref 74–97)
MONOCYTES # BLD: 0.8 K/UL (ref 0.05–1.2)
MONOCYTES NFR BLD: 14 % (ref 3–10)
NEUTS SEG # BLD: 3 K/UL (ref 1.8–8)
NEUTS SEG NFR BLD: 56 % (ref 40–73)
PLATELET # BLD AUTO: 117 K/UL (ref 135–420)
PMV BLD AUTO: 12.3 FL (ref 9.2–11.8)
POTASSIUM SERPL-SCNC: 3.5 MMOL/L (ref 3.5–5.5)
PROTHROMBIN TIME: 15.3 SEC (ref 11.5–15.2)
RBC # BLD AUTO: 2.74 M/UL (ref 4.7–5.5)
SODIUM SERPL-SCNC: 144 MMOL/L (ref 136–145)
WBC # BLD AUTO: 5.4 K/UL (ref 4.6–13.2)

## 2018-04-09 PROCEDURE — 85025 COMPLETE CBC W/AUTO DIFF WBC: CPT | Performed by: INTERNAL MEDICINE

## 2018-04-09 PROCEDURE — 74011250636 HC RX REV CODE- 250/636: Performed by: FAMILY MEDICINE

## 2018-04-09 PROCEDURE — 88348 ELECTRON MICROSCOPY DX: CPT | Performed by: RADIOLOGY

## 2018-04-09 PROCEDURE — 74011250637 HC RX REV CODE- 250/637: Performed by: INTERNAL MEDICINE

## 2018-04-09 PROCEDURE — 85610 PROTHROMBIN TIME: CPT | Performed by: INTERNAL MEDICINE

## 2018-04-09 PROCEDURE — 74011000272 HC RX REV CODE- 272

## 2018-04-09 PROCEDURE — 74011250636 HC RX REV CODE- 250/636

## 2018-04-09 PROCEDURE — 85018 HEMOGLOBIN: CPT | Performed by: RADIOLOGY

## 2018-04-09 PROCEDURE — 80048 BASIC METABOLIC PNL TOTAL CA: CPT | Performed by: INTERNAL MEDICINE

## 2018-04-09 PROCEDURE — 74011000250 HC RX REV CODE- 250: Performed by: FAMILY MEDICINE

## 2018-04-09 PROCEDURE — 99153 MOD SED SAME PHYS/QHP EA: CPT

## 2018-04-09 PROCEDURE — 74011250637 HC RX REV CODE- 250/637: Performed by: FAMILY MEDICINE

## 2018-04-09 PROCEDURE — 65660000000 HC RM CCU STEPDOWN

## 2018-04-09 PROCEDURE — 88346 IMFLUOR 1ST 1ANTB STAIN PX: CPT | Performed by: RADIOLOGY

## 2018-04-09 PROCEDURE — 0TB13ZX EXCISION OF LEFT KIDNEY, PERCUTANEOUS APPROACH, DIAGNOSTIC: ICD-10-PCS | Performed by: RADIOLOGY

## 2018-04-09 PROCEDURE — 85730 THROMBOPLASTIN TIME PARTIAL: CPT | Performed by: INTERNAL MEDICINE

## 2018-04-09 PROCEDURE — 82962 GLUCOSE BLOOD TEST: CPT

## 2018-04-09 PROCEDURE — 36415 COLL VENOUS BLD VENIPUNCTURE: CPT | Performed by: INTERNAL MEDICINE

## 2018-04-09 PROCEDURE — 74011250637 HC RX REV CODE- 250/637: Performed by: UROLOGY

## 2018-04-09 PROCEDURE — 88305 TISSUE EXAM BY PATHOLOGIST: CPT | Performed by: RADIOLOGY

## 2018-04-09 PROCEDURE — 84450 TRANSFERASE (AST) (SGOT): CPT | Performed by: INTERNAL MEDICINE

## 2018-04-09 PROCEDURE — 88313 SPECIAL STAINS GROUP 2: CPT | Performed by: RADIOLOGY

## 2018-04-09 PROCEDURE — 88350 IMFLUOR EA ADDL 1ANTB STN PX: CPT | Performed by: RADIOLOGY

## 2018-04-09 RX ORDER — SODIUM CHLORIDE 0.9 % (FLUSH) 0.9 %
5-10 SYRINGE (ML) INJECTION AS NEEDED
Status: DISCONTINUED | OUTPATIENT
Start: 2018-04-09 | End: 2018-04-10 | Stop reason: HOSPADM

## 2018-04-09 RX ORDER — MIDAZOLAM HYDROCHLORIDE 1 MG/ML
INJECTION, SOLUTION INTRAMUSCULAR; INTRAVENOUS
Status: COMPLETED
Start: 2018-04-09 | End: 2018-04-09

## 2018-04-09 RX ORDER — FENTANYL CITRATE 50 UG/ML
50 INJECTION, SOLUTION INTRAMUSCULAR; INTRAVENOUS
Status: DISCONTINUED | OUTPATIENT
Start: 2018-04-09 | End: 2018-04-09

## 2018-04-09 RX ORDER — MIDAZOLAM HYDROCHLORIDE 1 MG/ML
1 INJECTION, SOLUTION INTRAMUSCULAR; INTRAVENOUS
Status: DISCONTINUED | OUTPATIENT
Start: 2018-04-09 | End: 2018-04-09

## 2018-04-09 RX ORDER — AMLODIPINE BESYLATE 10 MG/1
10 TABLET ORAL DAILY
Status: DISCONTINUED | OUTPATIENT
Start: 2018-04-10 | End: 2018-04-10 | Stop reason: HOSPADM

## 2018-04-09 RX ORDER — SODIUM CHLORIDE 0.9 % (FLUSH) 0.9 %
5-10 SYRINGE (ML) INJECTION EVERY 8 HOURS
Status: DISCONTINUED | OUTPATIENT
Start: 2018-04-09 | End: 2018-04-10 | Stop reason: HOSPADM

## 2018-04-09 RX ORDER — FLUMAZENIL 0.1 MG/ML
0.2 INJECTION INTRAVENOUS
Status: DISCONTINUED | OUTPATIENT
Start: 2018-04-09 | End: 2018-04-10 | Stop reason: HOSPADM

## 2018-04-09 RX ORDER — FENTANYL CITRATE 50 UG/ML
INJECTION, SOLUTION INTRAMUSCULAR; INTRAVENOUS
Status: COMPLETED
Start: 2018-04-09 | End: 2018-04-09

## 2018-04-09 RX ORDER — NALOXONE HYDROCHLORIDE 0.4 MG/ML
0.1 INJECTION, SOLUTION INTRAMUSCULAR; INTRAVENOUS; SUBCUTANEOUS
Status: DISCONTINUED | OUTPATIENT
Start: 2018-04-09 | End: 2018-04-10 | Stop reason: HOSPADM

## 2018-04-09 RX ADMIN — MIDAZOLAM HYDROCHLORIDE 1 MG: 1 INJECTION, SOLUTION INTRAMUSCULAR; INTRAVENOUS at 15:16

## 2018-04-09 RX ADMIN — FENTANYL CITRATE 50 MCG: 50 INJECTION, SOLUTION INTRAMUSCULAR; INTRAVENOUS at 15:01

## 2018-04-09 RX ADMIN — Medication 10 ML: at 16:42

## 2018-04-09 RX ADMIN — MIDAZOLAM HYDROCHLORIDE 1 MG: 1 INJECTION, SOLUTION INTRAMUSCULAR; INTRAVENOUS at 15:01

## 2018-04-09 RX ADMIN — MIDAZOLAM HYDROCHLORIDE 1 MG: 1 INJECTION, SOLUTION INTRAMUSCULAR; INTRAVENOUS at 15:06

## 2018-04-09 RX ADMIN — SODIUM BICARBONATE 650 MG TABLET 650 MG: at 16:40

## 2018-04-09 RX ADMIN — SODIUM BICARBONATE 650 MG TABLET 650 MG: at 21:45

## 2018-04-09 RX ADMIN — Medication 10 ML: at 21:45

## 2018-04-09 RX ADMIN — Medication 2 G: at 18:04

## 2018-04-09 RX ADMIN — Medication 2 G: at 11:49

## 2018-04-09 RX ADMIN — FENTANYL CITRATE 50 MCG: 50 INJECTION, SOLUTION INTRAMUSCULAR; INTRAVENOUS at 15:06

## 2018-04-09 RX ADMIN — LEVOTHYROXINE SODIUM 50 MCG: 50 TABLET ORAL at 16:40

## 2018-04-09 RX ADMIN — TAMSULOSIN HYDROCHLORIDE 0.4 MG: 0.4 CAPSULE ORAL at 11:50

## 2018-04-09 RX ADMIN — GELATIN ABSORBABLE SPONGE 12-7 MM 1 EACH: 12-7 MISC at 15:23

## 2018-04-09 RX ADMIN — METRONIDAZOLE 500 MG: 500 INJECTION, SOLUTION INTRAVENOUS at 16:41

## 2018-04-09 RX ADMIN — LACTOBACILLUS TAB 2 TABLET: TAB at 11:50

## 2018-04-09 RX ADMIN — METRONIDAZOLE 500 MG: 500 INJECTION, SOLUTION INTRAVENOUS at 02:09

## 2018-04-09 RX ADMIN — HYDROMORPHONE HYDROCHLORIDE 4 MG: 2 TABLET ORAL at 22:35

## 2018-04-09 RX ADMIN — FENTANYL CITRATE 50 MCG: 50 INJECTION INTRAMUSCULAR; INTRAVENOUS at 15:01

## 2018-04-09 RX ADMIN — LACTOBACILLUS TAB 2 TABLET: TAB at 16:40

## 2018-04-09 RX ADMIN — SODIUM BICARBONATE 650 MG TABLET 650 MG: at 11:50

## 2018-04-09 RX ADMIN — FUROSEMIDE 40 MG: 40 TABLET ORAL at 11:50

## 2018-04-09 RX ADMIN — Medication 2 G: at 02:09

## 2018-04-09 RX ADMIN — MIDAZOLAM HYDROCHLORIDE 1 MG: 1 INJECTION, SOLUTION INTRAMUSCULAR; INTRAVENOUS at 15:11

## 2018-04-09 RX ADMIN — PANTOPRAZOLE SODIUM 40 MG: 40 TABLET, DELAYED RELEASE ORAL at 16:41

## 2018-04-09 RX ADMIN — FENTANYL CITRATE 50 MCG: 50 INJECTION, SOLUTION INTRAMUSCULAR; INTRAVENOUS at 15:11

## 2018-04-09 RX ADMIN — FENTANYL CITRATE 50 MCG: 50 INJECTION, SOLUTION INTRAMUSCULAR; INTRAVENOUS at 15:16

## 2018-04-09 NOTE — PROGRESS NOTES
PT orders received, chart reviewed. Pt unable to participate with PT due to:  []  Nausea/vomiting  []  Eating  []  Pain  []  Pt lethargic  [x]  Off Unit  Will f/u later as patient's schedule allows. Thank you.   Devora Ramos PT, DPT

## 2018-04-09 NOTE — PROGRESS NOTES
Hematology/Medical Oncology Progress Note      NAME: Preston Edwards   :  1960  MRM:  [id number]    Date/Time: 2018 8:55 AM         Subjective:     Mr Gwen Duff is a 59 y.o. Man with renal failure and progressive thrombocytopenia. Currently he reports that he is feeling better. There are no new complaints. Past Medical History reviewed and unchanged from Admission History and Physical    Review of Systems   Constitutional: Negative for chills, fatigue, fever and unexpected weight change. HENT: Negative for ear discharge, ear pain, facial swelling, mouth sores, nosebleeds, sneezing, sore throat and tinnitus. Eyes: Negative for photophobia, pain, discharge, redness, itching and visual disturbance. Respiratory: Negative for apnea, cough, choking, chest tightness, shortness of breath, wheezing and stridor. Cardiovascular: Negative for chest pain, palpitations and leg swelling. Gastrointestinal: Negative for abdominal distention, abdominal pain, anal bleeding, blood in stool, constipation, diarrhea, nausea, rectal pain and vomiting. Genitourinary: Negative for decreased urine volume, difficulty urinating, discharge, dysuria, enuresis, flank pain, frequency, genital sores, hematuria, penile pain, penile swelling, scrotal swelling, testicular pain and urgency. Musculoskeletal: Negative for arthralgias, back pain, gait problem, joint swelling, myalgias, neck pain and neck stiffness. Skin: Negative for color change, pallor and rash. Neurological: Negative for dizziness, tremors, seizures, syncope, facial asymmetry, speech difficulty, weakness, light-headedness, numbness and headaches. Hematological: Negative for adenopathy. Does not bruise/bleed easily. Psychiatric/Behavioral: Negative for agitation, behavioral problems, confusion, decreased concentration, dysphoric mood, hallucinations, self-injury, sleep disturbance and suicidal ideas.  The patient is not nervous/anxious and is not hyperactive. Objective:       Vitals:      Last 24hrs VS reviewed since prior progress note. Most recent are:    Visit Vitals    /74 (BP 1 Location: Left arm, BP Patient Position: At rest)    Pulse 76    Temp 98.3 °F (36.8 °C)    Resp 18    Ht 6' (1.829 m)    Wt 71.3 kg (157 lb 3.2 oz)    SpO2 92%    BMI 21.32 kg/m2     SpO2 Readings from Last 6 Encounters:   04/09/18 92%   03/20/18 99%   12/03/17 100%   03/02/16 99%   02/29/16 95%   02/23/16 96%    O2 Flow Rate (L/min): 1 l/min       Intake/Output Summary (Last 24 hours) at 04/09/18 0855  Last data filed at 04/09/18 0829   Gross per 24 hour   Intake                0 ml   Output              500 ml   Net             -500 ml          Exam:      Physical Exam   Nursing note and vitals reviewed. Constitutional: He is oriented to person, place, and time. He appears well-developed and well-nourished. No distress. HENT:   Head: Normocephalic and atraumatic. Mouth/Throat: No oropharyngeal exudate. Eyes: Conjunctivae and EOM are normal. Pupils are equal, round, and reactive to light. Right eye exhibits no discharge. Left eye exhibits no discharge. No scleral icterus. Neck: Normal range of motion. Neck supple. No tracheal deviation present. No thyromegaly present. Cardiovascular: Normal rate and regular rhythm. Exam reveals no gallop and no friction rub. No murmur heard. Pulmonary/Chest: Effort normal and breath sounds normal. No apnea. No respiratory distress. He has no wheezes. He has no rales. Chest wall is not dull to percussion. He exhibits no mass, no tenderness, no bony tenderness, no laceration, no crepitus, no edema, no deformity, no swelling and no retraction. Right breast exhibits no inverted nipple, no mass, no nipple discharge, no skin change and no tenderness. Left breast exhibits no inverted nipple, no mass, no nipple discharge, no skin change and no tenderness. Breasts are symmetrical.   Abdominal: Soft.  Bowel sounds are normal. He exhibits no distension. There is no tenderness. There is no rebound and no guarding. Musculoskeletal: Normal range of motion. He exhibits no edema or tenderness. Lymphadenopathy:     He has no cervical adenopathy. Neurological: He is alert and oriented to person, place, and time. Coordination normal.   Skin: Skin is warm and dry. No rash noted. He is not diaphoretic. No erythema. No pallor. Psychiatric: He has a normal mood and affect.  His behavior is normal. Thought content normal.       Telemetry reviewed:   normal sinus rhythm    Lab Data Reviewed: (see below)      Medications:  Current Facility-Administered Medications   Medication Dose Route Frequency    amLODIPine (NORVASC) tablet 5 mg  5 mg Oral DAILY    cefepime (MAXIPIME) 2 g in sterile water (preservative free) 10 mL IV syringe  2 g IntraVENous Q8H    VANCOMYCIN INFORMATION NOTE   Other Rx Dosing/Monitoring    tamsulosin (FLOMAX) capsule 0.4 mg  0.4 mg Oral DAILY    metroNIDAZOLE (FLAGYL) IVPB premix 500 mg  500 mg IntraVENous Q12H    HYDROmorphone (DILAUDID) tablet 4 mg  4 mg Oral Q6H PRN    furosemide (LASIX) tablet 40 mg  40 mg Oral DAILY    sodium bicarbonate tablet 650 mg  650 mg Oral TID    levothyroxine (SYNTHROID) tablet 50 mcg  50 mcg Oral ACB    glucose chewable tablet 16 g  4 Tab Oral PRN    glucagon (GLUCAGEN) injection 1 mg  1 mg IntraMUSCular PRN    dextrose (D50W) injection syrg 12.5-25 g  25-50 mL IntraVENous PRN    insulin lispro (HUMALOG) injection   SubCUTAneous AC&HS    hydrALAZINE (APRESOLINE) tablet 25 mg  25 mg Oral TID PRN    Lactobacillus Acidoph & Bulgar (FLORANEX) tablet 2 Tab  2 Tab Oral BID    linagliptin (TRADJENTA) tablet 5 mg  5 mg Oral ACB    pantoprazole (PROTONIX) tablet 40 mg  40 mg Oral ACB       ______________________________________________________________________      Lab Review:     Recent Labs      04/09/18   0301  04/08/18   0108  04/07/18   0203   WBC  5.4  4.6  5.3   HGB 7. 8*  7.8*  7.6*   HCT  24.2*  24.5*  23.7*   PLT  117*  103*  81*     Recent Labs      04/09/18   0301  04/08/18   0108  04/07/18   0203   NA  144  145  145   K  3.5  3.6  4.0   CL  109*  110*  112*   CO2  28  27  26   GLU  108*  108*  127*   BUN  13  15  17   CREA  1.18  1.00  0.96   CA  8.8  8.9  8.9   ALB   --    --   2.9*   SGOT   --    --   22   ALT   --    --   37   INR  1.3*   --    --      No components found for: GLPOC  No results for input(s): PH, PCO2, PO2, HCO3, FIO2 in the last 72 hours. 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 Thrombocytopenia: I have explained to the patient that his platelets are now increasing. The most recent CBC shows that his platelet count is now 117,000 with a hemoglobin of 7.8 g/dL hematocrit of 24.2%. No therapeutic intervention is necessary at this point. The likely causes of his thrombocytopenia are iatrogenic or infection related. The peripheral reveals no schistocytes to suggest TTP/HUS. The LDH level is normal at 189 U/L and the SPEP is still pending. Free kappa light chains are increased at 117 mg/l. The bone marrow biopsy is pending. 2. Anemia in CKD: pending results or iron studies the patient may benefit from procrit. The am h/h are 7.8 and 24.2 respectfully.   3. Plasma cell dyscrasia: the bone marrow biopsy is currently pending.            Total time spent with patient:25 minutes                  Care Plan discussed with: Patient, Nursing Staff and Consultant/Specialist    Discussed:  Care Plan    Prophylaxis:  SCD's    Disposition:  Home w/Family           ___________________________________________________    Attending Physician: Jesus Resendiz MD

## 2018-04-09 NOTE — PROGRESS NOTES
Problem: Falls - Risk of  Goal: *Absence of Falls  Document Tomas Fall Risk and appropriate interventions in the flowsheet. Outcome: Progressing Towards Goal  Fall Risk Interventions:  Mobility Interventions: Bed/chair exit alarm    Mentation Interventions:  Toileting rounds    Medication Interventions: Bed/chair exit alarm    Elimination Interventions: Bed/chair exit alarm

## 2018-04-09 NOTE — PROGRESS NOTES
TRANSFER - OUT REPORT:    Verbal report given to Valdo Riley RN(name) on Rashmi Young  being transferred to (unit) for routine post - op       Report consisted of patients Situation, Background, Assessment and   Recommendations(SBAR). Information from the following report(s) SBAR, Kardex and MAR was reviewed with the receiving nurse. Lines:   Peripheral IV 03/31/18 (Active)   Site Assessment Clean, dry, & intact 4/8/2018  7:30 PM   Phlebitis Assessment 0 4/8/2018  7:30 PM   Infiltration Assessment 0 4/8/2018  7:30 PM   Dressing Status Clean, dry, & intact 4/8/2018  7:30 PM   Dressing Type Transparent 4/8/2018  7:30 PM   Hub Color/Line Status Pink 4/8/2018  7:30 PM   Action Taken Open ports on tubing capped 4/8/2018  8:15 AM   Alcohol Cap Used Yes 4/8/2018  7:30 PM       Peripheral IV 04/08/18 Right;Posterior Forearm (Active)   Site Assessment Clean, dry, & intact 4/8/2018  7:30 PM   Phlebitis Assessment 0 4/8/2018  7:30 PM   Infiltration Assessment 0 4/8/2018  7:30 PM   Dressing Status Clean, dry, & intact 4/8/2018  7:30 PM   Dressing Type Transparent 4/8/2018  7:30 PM   Hub Color/Line Status Blue 4/8/2018  7:30 PM   Action Taken Open ports on tubing capped 4/8/2018  8:15 AM   Alcohol Cap Used Yes 4/8/2018  7:30 PM        Opportunity for questions and clarification was provided.       Patient transported with:   [employer]

## 2018-04-09 NOTE — ROUTINE PROCESS
Bedside and Verbal shift change report given to Mikala Frey (oncoming nurse) by Jero Maldonado   (offgoing nurse). Report included the following information SBAR, Intake/Output, MAR and Cardiac Rhythm Sinus Rhythm.

## 2018-04-09 NOTE — ROUTINE PROCESS
Bedside and Verbal shift change report given to Yordan Adhikari RN (oncoming nurse) by Anjali Henriquez (offgoing nurse). Report included the following information SBAR, Kardex, MAR and Recent Results.     SITUATION:    Code Status: Full Code  Reason for Admission: Acute renal insufficiency   Brady[place]    [facility]   Problem List:       Hospital Problems  Date Reviewed: 4/4/2018          Codes Class Noted POA    Chronic kidney disease, stage III (moderate) ICD-10-CM: N18.3  ICD-9-CM: 585.3  4/4/2018 Clinically Undetermined        Hepatitis C antibody positive in blood ICD-10-CM: R76.8  ICD-9-CM: 795.79  4/4/2018 Clinically Undetermined        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  ICD-9-CM: 583.9  4/2/2018 Clinically Undetermined        Metabolic acidosis VFV-[id number]: E87.2  ICD-9-CM: 276.2  4/2/2018 Unknown        Hyperkalemia ICD-10-CM: E87.5  ICD-9-CM: 276.7  4/2/2018 Unknown        Hypercalcemia ICD-10-CM: E83.52  ICD-9-CM: 275.42  4/2/2018 Unknown        Bradycardia ICD-10-CM: R00.1  ICD-9-CM: 427.89  3/31/2018 Yes        H/O Clostridium difficile infection ICD-10-CM: Z86.19  ICD-9-CM: V12.09  3/31/2018 Yes        Urinary retention ICD-10-CM: R33.9  ICD-9-CM: 788.20  10/30/2017 Yes        Quadriparesis ([facility] ICD-10-CM: G82.50  ICD-9-CM: 344.00  5/31/2017 Yes        Type II diabetes mellitus, uncontrolled ([facility] ICD-10-CM: E11.65  ICD-9-CM: 250.02  12/16/2015 Yes        Renal cell cancer ([facility] ICD-10-CM: C64.9  ICD-9-CM: 189.0  11/5/2014 Yes        Cancer of left kidney ([facility] ICD-10-CM: C64.2  ICD-9-CM: 189.0  12/23/2013 Yes              BACKGROUND:    Past Medical History:   Past Medical History:   Diagnosis Date    Chronic drug abuse 1974    Diabetes ([facility] 01/1990    Hypertension     Renal cell carcinoma of left kidney ([facility] 12/17/13    Pathological Stage [id number] cc RCC FG3 s/p left open partial nephrectomy in 12/13      Renal mass          Patient taking anticoagulants no ASSESSMENT:    Changes in Assessment Throughout Shift: no   Patient has Central Line: no Reasons    Patient has Willis Cath: no Reasons       Last Vitals:     Vitals:    04/08/18 0355 04/08/18 0749 04/08/18 1121 04/08/18 1658   BP: 158/85 154/82 160/78    Pulse: 74 75 68 67   Resp: 18 18 18    Temp: 98 °F (36.7 °C) 97 °F (36.1 °C) 96.8 °F (36 °C) 97.2 °F (36.2 °C)   SpO2: 95% 96% 100%    Weight: 73.7 kg (162 lb 6.4 oz)      Height:            IV and DRAINS (will only show if present)   Peripheral IV 03/31/18-Site Assessment: Clean, dry, & intact  Peripheral IV 04/08/18 Right;Posterior Forearm-Site Assessment: Clean, dry, & intact     WOUND (if present)   Wound Type:  none   Dressing present Dressing Present : Yes   Wound Concerns/Notes:  none     PAIN    Pain Assessment    Pain Intensity 1: 0 (04/08/18 1600)    Pain Location 1: Neck, Hand    Pain Intervention(s) 1: Medication (see MAR)    Patient Stated Pain Goal: 0  o Interventions for Pain:  yes  o Intervention effective: yes  o Time of last intervention: see chart   o Reassessment Completed: yes      Last 3 Weights:  Last 3 Recorded Weights in this Encounter    04/06/18 0420 04/07/18 0426 04/08/18 0355   Weight: 76.2 kg (168 lb 1.6 oz) 75.3 kg (165 lb 14.4 oz) 73.7 kg (162 lb 6.4 oz)     Weight change: -1.588 kg (-3 lb 8 oz)     INTAKE/OUPUT    Current Shift:      Last three shifts: 04/07 0701 - 04/08 1900  In: -   Out: 0993 [Urine:1350]     LAB RESULTS     Recent Labs      04/08/18   0108  04/07/18   0203  04/06/18   0355   WBC  4.6  5.3  4.9   HGB  7.8*  7.6*  8.0*   HCT  24.5*  23.7*  25.2*   PLT  103*  81*  73*        Recent Labs      04/08/18   0108  04/07/18   0203  04/06/18   0355   NA  145  145  146*   K  3.6  4.0  4.2   GLU  108*  127*  78   BUN  15  17  25*   CREA  1.00  0.96  1.05   CA  8.9  8.9  9.3   INR   --    --   1.3*       RECOMMENDATIONS AND DISCHARGE PLANNING     1.  Pending tests/procedures/ Plan of Care or Other Needs: renal bx Mon     2. Discharge plan for patient and Needs/Barriers: unknown    3. Estimated Discharge Date: 2-3d Posted on Whiteboard in [facility]: yes      4. The patient's care plan was reviewed with the oncoming nurse. \"HEALS\" SAFETY CHECK      Fall Risk    Total Score: 3    Safety Measures: Safety Measures: Bed/Chair-Wheels locked, Bed in low position, Call light within reach, Side rails X 3, Gripper socks    A safety check occurred in the patient's room between off going nurse and oncoming nurse listed above. The safety check included the below items  Area Items   H  High Alert Medications - Verify all high alert medication drips (heparin, PCA, etc.)   E  Equipment - Suction is set up for ALL patients (with magy)  - Red plugs utilized for all equipment (IV pumps, etc.)  - WOWs wiped down at end of shift.  - Room stocked with oxygen, suction, and other unit-specific supplies   A  Alarms - Bed alarm is set for fall risk patients  - Ensure chair alarm is in place and activated if patient is up in a chair   L  Lines - Check IV for any infiltration  - Willis bag is empty if patient has a Willis   - Tubing and IV bags are labeled   S  Safety   - Room is clean, patient is clean, and equipment is clean. - Hallways are clear from equipment besides carts. - Fall bracelet on for fall risk patients  - Ensure room is clear and free of clutter  - Suction is set up for ALL patients (with magy)  - Hallways are clear from equipment besides carts.    - Isolation precautions followed, supplies available outside room, sign posted     Charls Fell

## 2018-04-09 NOTE — PROGRESS NOTES
Progress Note    Patient: Cristina Castorena MRN: [id number]  CSN: [id number]    YOB: 1960  Age: 59 y.o. Sex: male    DOA: 3/31/2018 LOS:  LOS: 9 days                    Subjective:   Patient underwent bone marrow biopsy thi due to positive SPEP. Pt still waiting for   kidney biopsy pending for Monday due to kappa light chain positive and DONTE positive. Pt is feeling better less swelling Cr back to his base line  He reports having an important appointment with a physician who he believes may be able to help him regain some arm function on Tuesday 4/10/18 and he strongly desires discharge to make his 1300 appointment. No other complaints today. Discussed with nursing staff pt will need observation after biopsy will discuss with case malikyoana if he can get transport from  The hospital to his APPT before going back to nursing home    Venous Duplex US 4/2/18:  INTERPRETATION/FINDINGS  Duplex images were obtained using 2-D gray scale, color flow, and  spectral Doppler analysis. Right leg :  1. Deep veins visualized include the common femoral, femoral,  popliteal, posterior tibial and peroneal veins. 2. No evidence of deep venous thrombosis detected in the veins  visualized. 3. Superficial veins visualized include the great saphenous vein. 4. No evidence of superficial thrombosis detected. 5. Normal multiphasic flow in the posterior tibial artery. Left leg :  1. Deep veins visualized include the common femoral, femoral,  popliteal, posterior tibial and peroneal veins. 2. No evidence of deep venous thrombosis detected in the veins  visualized. 3. Superficial veins visualized include the great saphenous vein. 4. No evidence of superficial thrombosis detected. 5. Normal multiphasic flow in the posterior tibial artery. Renal US 4/2/18  IMPRESSION:  1. Normal-sized kidneys without hydronephrosis. 2. Right renal cyst present since 2013 and appear relatively unchanged.   3. Bladder is empty with Willis catheter. Transthoracic Echo 4/2/18:  SUMMARY:  Left ventricle: Systolic function was normal by EF (biplane method of disks).  Ejection fraction was estimated to be 60 %. No obvious wall motion abnormalities identified in the views obtained. Wall thickness was at the upper limits of normal.    Right ventricle: The size was at the upper limits of normal. Systolic pressure was mildly increased. Estimated peak pressure was at least 42 mmHg. Left atrium: The atrium was severely dilated. Mitral valve: There was mild regurgitation. Tricuspid valve: There was mild regurgitation. ct scan abdomen and pelvis no contrast 3/31/18  1. Moderate to large right and small left pleural effusions.     2. Distended bladder with mild wall thickening, possibly due to chronic outlet  obstruction. Mildly enlarged heterogeneous prostate. Recommend correlation with urinalysis. -No hydronephrosis. -Bilobed right renal cyst similar to prior.  -No suspicious renal mass identified on this nonenhanced CT.     3. Low-attenuation of blood in keeping with anemia.     -Atherosclerosis. Osseous degenerative changes as above. Small fat-containing  inguinal hernias. Left gluteus minimus heterotopic ossification. Slightly prominent left periaortic nodes, none clearly abnormal by size criteria.     -Evaluation limited as above.          RUE Duplex venous US 4/4/18  INTERPRETATION/FINDINGS  Duplex images were obtained using 2-D gray scale, color flow, and  spectral Doppler analysis.     Right arm :  1. No evidence of deep venous thrombosis detected in the veins  visualized. 2. Deep vein(s) visualized include the internal jugular, subclavian,  axillary and brachial veins. 3. No evidence of superficial thrombosis detected. 4. Superficial vein(s) visualized include the basilic (upper arm) and  cephalic (upper arm) veins.   5. No evidence of deep vein thrombosis in the contralateral internal  jugular and subclavian veins.      HPI  Katherine Reyna is a 59 y.o.  male who   Has  history of renal mass, renal cell carcinoma of left kidney, DM, chronic drug abuse, and HTN who presented with  c/o bilateral lower extremity swelling onset 3 days ago. He states that he is from a [address] [place] [address] and has been in the nursing home for the past 1 year. Annamary Ripa He noted that his ankles first started sweling first. [facility] states that he had an ultrasound of his leg yesterday. He notes that he usually is able to ambulate without a walker but with increase swelling can not ambulate like he used to  . Patient denies history of CHF, chest pain, abdominal pain, and any othe rassociated symptoms or complaints.      Pt has H/o hepatitis c and seen by GI as out patinet  and  Pt also s/p partial nephrectomy Lt kidney due to renal cell carcinoma by dr Traci Vergara urology     Pt has h/o cervical disc disease s/p surgery and fusion . Pt had  H/o diskitis and osteomyelitis c 3-7 and quadriplegia after surgery      Pt was treated last week for c- diff and and flu last week has no upper respiratory sx or diarrhea at this time     Chief Complaint:   Chief Complaint   Patient presents with    Swelling    Slow Heart Rate       Review of systems    GENERAL: Patient sleepy, awake and oriented times 3, able to communicate full sentences and not in distress. HEENT: No change in vision, no earache, tinnitus, sore throat or sinus congestion. NECK: No pain or stiffness. CARDIOVASCULAR: No chest pain or pressure. No palpitations. PULMONARY: +intermittent shortness of breath now resolved. No cough or wheeze. GASTROINTESTINAL: No abdominal pain, nausea, vomiting or diarrhea, melena or       bright red blood per rectum. Positive constipation h/o c-diff was treated  GENITOURINARY: No urinary frequency, urgency, hesitancy or dysuria.    MUSCULOSKELETAL:  weakness upper extremities more than lower extremities   DERMATOLOGIC: No rash, no itching, no lesions. ENDOCRINE: No polyuria, polydipsia, no heat or cold intolerance. No recent change in    weight. HEMATOLOGICAL: H/o anemia or easy bruising or bleeding. NEUROLOGIC: No headache, seizures, numbness, tingling . Positive weakness upper extremities more than lower extremities  PSYCHIATRIC: No depression, anxiety, mood disorder, no loss of interest in normal       activity or change in sleep pattern.         Objective:     Physical Exam:  Visit Vitals    /79 (BP 1 Location: Left arm, BP Patient Position: At rest)    Pulse 77    Temp 98.6 °F (37 °C)    Resp 20    Ht 6' (1.829 m)    Wt 71.3 kg (157 lb 3.2 oz)    SpO2 95%    BMI 21.32 kg/m2      General:  sleepy, cooperative, no distress, appears stated age. Head:  Normocephalic, without obvious abnormality, atraumatic. Eyes:  Conjunctivae/corneas clear. PERRL, EOMs intact. Nose: Nares normal. No drainage or sinus tenderness. Throat: Lips, mucosa, and tongue normal.poor dentition    Neck: Supple, symmetrical, trachea midline, no adenopathy. Lungs:   Clear to auscultation bilaterally. Poor inspiratory effort. Chest wall:  No tenderness or deformity. Heart:  Regular rate and rhythm, S1, S2 normal, systolic murmer      Abdomen: Soft, non-tender. Bowel sounds normal. No masses,  No organomegaly. Extremities: Extremities normal, atraumatic, RUE non-pitting edema, resolution of bilateral LE edema     Pulses: 2+ and symmetric lower extremities    Skin: Skin color, texture, turgor normal. No rashes or lesions   Neurologic: CNII-XII intact. No  New focal motor or sensory deficit. - patient functional quadriplegia, baseline able to ambulate, unable ot move arms aside from some movement in hands.   Left 3-5th fingers flexion contracture          Intake and Output:  Current Shift:  04/09 0701 - 04/09 1900  In: 0   Out: 375 [Urine:375]  Last three shifts:  04/07 1901 - 04/09 0700  In: -   Out: 701 [Urine:700]    Labs: Results:       Chemistry Recent Labs      04/09/18   0301 04/08/18 0108 04/07/18   0203   GLU  108*  108*  127*   NA  144  145  145   K  3.5  3.6  4.0   CL  109*  110*  112*   CO2  28  27  26   BUN  13  15  17   CREA  1.18  1.00  0.96   CA  8.8  8.9  8.9   AGAP  7  8  7   BUCR  11*  15  18   AP   --    --   87   TP   --    --   7.1   ALB   --    --   2.9*   GLOB   --    --   4.2*   AGRAT   --    --   0.7*      CBC w/Diff Recent Labs      04/09/18   0301 04/08/18 0108 04/07/18   0203   WBC  5.4  4.6  5.3   RBC  2.74*  2.76*  2.66*   HGB  7.8*  7.8*  7.6*   HCT  24.2*  24.5*  23.7*   PLT  117*  103*  81*   GRANS  56  60  63   LYMPH  27  22  21   EOS  3  4  3      Cardiac Enzymes No results for input(s): CPK, CKND1, LINDA in the last 72 hours. No lab exists for component: CKRMB, TROIP   Coagulation Recent Labs      04/09/18   0301   PTP  15.3*   INR  1.3*   APTT  36.9*       Lipid Panel Lab Results   Component Value Date/Time    Cholesterol, total 68 04/01/2018 12:55 AM    HDL Cholesterol 46 04/01/2018 12:55 AM    LDL, calculated 12.2 04/01/2018 12:55 AM    VLDL, calculated 9.8 04/01/2018 12:55 AM    Triglyceride 49 04/01/2018 12:55 AM    CHOL/HDL Ratio 1.5 04/01/2018 12:55 AM      BNP No results for input(s): BNPP in the last 72 hours.    Liver Enzymes Recent Labs      04/07/18   0203   TP  7.1   ALB  2.9*   AP  87   SGOT  22      Thyroid Studies Lab Results   Component Value Date/Time    TSH 4.02 (H) 04/02/2018 05:25 AM          Procedures/imaging: see electronic medical records for all procedures/Xrays and details which were not copied into this note but were reviewed prior to creation of Plan    Medications:   Current Facility-Administered Medications   Medication Dose Route Frequency    amLODIPine (NORVASC) tablet 5 mg  5 mg Oral DAILY    cefepime (MAXIPIME) 2 g in sterile water (preservative free) 10 mL IV syringe  2 g IntraVENous Q8H    VANCOMYCIN INFORMATION NOTE   Other Rx Dosing/Monitoring    tamsulosin (FLOMAX) capsule 0.4 mg  0.4 mg Oral DAILY    metroNIDAZOLE (FLAGYL) IVPB premix 500 mg  500 mg IntraVENous Q12H    HYDROmorphone (DILAUDID) tablet 4 mg  4 mg Oral Q6H PRN    furosemide (LASIX) tablet 40 mg  40 mg Oral DAILY    sodium bicarbonate tablet 650 mg  650 mg Oral TID    levothyroxine (SYNTHROID) tablet 50 mcg  50 mcg Oral ACB    glucose chewable tablet 16 g  4 Tab Oral PRN    glucagon (GLUCAGEN) injection 1 mg  1 mg IntraMUSCular PRN    dextrose (D50W) injection syrg 12.5-25 g  25-50 mL IntraVENous PRN    insulin lispro (HUMALOG) injection   SubCUTAneous AC&HS    hydrALAZINE (APRESOLINE) tablet 25 mg  25 mg Oral TID PRN    Lactobacillus Acidoph & Bulgar (FLORANEX) tablet 2 Tab  2 Tab Oral BID    linagliptin (TRADJENTA) tablet 5 mg  5 mg Oral ACB    pantoprazole (PROTONIX) tablet 40 mg  40 mg Oral ACB       Assessment/Plan     Active Problems:    Renal cell cancer (HCC) (11/5/2014)      Type II diabetes mellitus, uncontrolled ([facility] (12/16/2015)      Urinary retention (10/30/2017)      Cancer of left kidney ([facility] (12/23/2013)      Quadriparesis ([facility] (5/31/2017)      Bradycardia (3/31/2018)      H/O Clostridium difficile infection (3/31/2018)      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)      Metabolic acidosis (4/8/3140)      Hyperkalemia (4/2/2018)      Hypercalcemia (4/2/2018)      Chronic kidney disease, stage III (moderate) (4/4/2018)      Hepatitis C antibody positive in blood (4/4/2018)    Acute Renal failure/ H/O Lt renal cell carcinoma S/p Partial Lt nephrectomy/ now with swelling lower extremities, concern for Light Chain deposition disease - Kappa lt chains elevated, DONTE positive  Pt received IV fluid in the ER  venous duplex ultrasound B/L leg - neg for DVT  RUE venous duplex ultrasound - Neg for DVT  Ct scan abdomen and pelvis no contrast  showed  Distended bladder possible neck obstruction pt now s/p varner's catheter   Kristyn hydration cr improving  Urine micro albumin  Nephrology consult,  Mariposa Self following  Hyperkalemia resolved, monitor  Follow up serum cryoglobulins, rheumatoid factor  Bence roberts protein plosive will need f/u hematology seen by Dr Lizz Crocker   Renal biopsy tentatively for monday      Urinary retention requiring varner catheter  Seen by Dr. Elder Hutton, recommends flomax, voiding trial performed pt has condom cathter    Dilated cardiomyopathy with preserved EF 60%; Bradycardia  Cardiac enzymes minimally elevated  Follow up cardiology recs. Hold lisnopril hold atenolol, blood pressure elevated,   Increase norvasc 5mg daily hold for SBP less than 110  Hydralazine prn SBp above 160  Avoid betablockers    Hypothyroid  continue synthroid 50 mcg daily  TSH mildly elevated, normal free t4, recheck TFTs in 4 weeks  Monitor bradycardia    Diabetes Mellitus   Check HG A1c - 7.0 on 4/4/18 4/1/18 lipids: TC 68 HDL 46 LDL 12.2 TG 49  Sliding scale coverage, tradjenta 5mg daily; poc glucose has been at goal continue to monitor     H/O c-diff / h/o flu/history of PNA and UTI - recurrent versus incompletely treated healthcare associated PNA  Treated with flagyl at nursing home  Finished course of Tamiflu  Finished levaquin at nursing home for PNA (3/13/-3/19) and UTI, Urine culture sensitive  CXR with bibasilar opacities, now patient with intermittent sob and hypoxia. Will start on empiric treatment for healthcare associated PNA  ST consult to evaluate swallowing/aspiration risk. Cleared for regular solids thin liquids.   Discussed with pulmonology, Dr. Donny Roberson, CT chest pending  Continue on empiric antibiotics covering for MRSA as patient high risk; legionella neg, strep pneumo neg  Blood cultures 4/4/ no growth to date  Pulmonary toileting, Frequent IS, RT consulted to assist  Awaiting respiratory cultures  Follow up pulm recs for antibiotic duration     Thrombocytopenia  PLt continue to trend up, thought to be possible iatrogenic or infection related, SPEP positive elevated IGG  Follow up hematology consult Dr Carissa London; patient had BM biopsy today     H/o hepatitis c 1a  Viral load 1,500,000 on 3/31/18  Pt has been f/u GI as outpatient and had ultrasound liver recently waiting to start treatment, last seen by APDMAJA Diaz on 3/27/18. Needs follow up as outpatient with [facility] when discharged   Monitor liver function stable       History of C3-7 discitis with osteomyelitis, L4-5 phlegmon 4/2017 s/p C3-7 laminectomy with posteriorlateral fusion and hardware on 4/29/17 for rapid onset quadriplegia from discitis/osteomyelitis, hsitory of MRSA baceremia and abscess cultures from spine.     Followed as outpatient by Dr. Iliana Watt infectious disease, has recommended continue doxycycline 100mg daily for MRSA history/prophylaxis  - hold doxycyline while on vancomycin, resume prophylactic dosing when off vanc    DVT/GI Prophylaxis: SCD's and H2B/PPI       Yolanda Guajardo MD  4/9/2018 11:48 AM

## 2018-04-09 NOTE — ROUTINE PROCESS
Bedside shift change report given to Helio Gilliam (oncoming nurse) by Lonnie Hernandez RN (offgoing nurse). Report included the following information SBAR, Kardex, Intake/Output, MAR, Recent Results and Cardiac Rhythm NSR.

## 2018-04-09 NOTE — PROGRESS NOTES
Preprocedure Assessment      Today 4/9/2018     Indication/Symptoms:   Lisa Mcginnis is a 59 y.o. male with a history of thrombocytopenia and renal failure who presents to IR for an image-guided kidney biopsy with moderate sedation. Medications and labs reviewed. Patient has been NPO since midnight. Blood thinners held. The H & P and/or progress notes and any available imaging were reviewed. The risks, indications and possible alternatives to the procedure, including doing nothing, were discussed and informed consent was obtained. Physical Exam:      Heart:  Regular rate   Lungs:  Normal respiratory effort    The patient is an appropriate candidate to undergo the planned procedure and sedation.     Lokesh Prajapati

## 2018-04-09 NOTE — PROGRESS NOTES
Progress Note    Wero Ty  59 y.o. Admit Date: 3/31/2018  Active Problems:    Renal cell cancer ([facility] (11/5/2014) POA: Yes      Type II diabetes mellitus, uncontrolled ([facility] (12/16/2015) POA: Yes      Urinary retention (10/30/2017) POA: Yes      Cancer of left kidney ([facility] (12/23/2013) POA: Yes      Quadriparesis ([facility] (5/31/2017) POA: Yes      Bradycardia (3/31/2018) POA: Yes      H/O Clostridium difficile infection (3/31/2018) POA: Yes      Light chain deposition disease (4/2/2018) POA: Clinically Undetermined      Metabolic acidosis (1/5/5220) POA: Unknown      Hyperkalemia (4/2/2018) POA: Unknown      Hypercalcemia (4/2/2018) POA: Unknown      Chronic kidney disease, stage III (moderate) (4/4/2018) POA: Clinically Undetermined      Hepatitis C antibody positive in blood (4/4/2018) POA: Clinically Undetermined            Subjective:     Patient feels good, no SOB, waiting for kidney Biopsy,      A comprehensive review of systems was negative except for that written in the History of Present Illness.     Objective:     Visit Vitals    /79 (BP 1 Location: Left arm, BP Patient Position: At rest)    Pulse 77    Temp 98.6 °F (37 °C)    Resp 20    Ht 6' (1.829 m)    Wt 71.3 kg (157 lb 3.2 oz)    SpO2 95%    BMI 21.32 kg/m2         Intake/Output Summary (Last 24 hours) at 04/09/18 1323  Last data filed at 04/09/18 1127   Gross per 24 hour   Intake                0 ml   Output              875 ml   Net             -875 ml       Current Facility-Administered Medications   Medication Dose Route Frequency Provider Last Rate Last Dose    [START ON 4/10/2018] amLODIPine (NORVASC) tablet 10 mg  10 mg Oral DAILY Francesca Hall MD        cefepime (MAXIPIME) 2 g in sterile water (preservative free) 10 mL IV syringe  2 g IntraVENous Q8H Francesca Hall MD   2 g at 04/09/18 1149    tamsulosin (FLOMAX) capsule 0.4 mg  0.4 mg Oral DAILY Celesta Jeanne, MD   0.4 mg at 04/09/18 0326    metroNIDAZOLE (FLAGYL) IVPB premix 500 mg  500 mg IntraVENous Q12H Kayode Rios  mL/hr at 04/09/18 0209 500 mg at 04/09/18 0209    HYDROmorphone (DILAUDID) tablet 4 mg  4 mg Oral Q6H PRN Kayode Rios MD   4 mg at 04/08/18 2111    furosemide (LASIX) tablet 40 mg  40 mg Oral DAILY Brianna Cruz MD   40 mg at 04/09/18 1150    sodium bicarbonate tablet 650 mg  650 mg Oral TID Brianna Cruz MD   650 mg at 04/09/18 1150    levothyroxine (SYNTHROID) tablet 50 mcg  50 mcg Oral DANIE Rios MD   50 mcg at 04/08/18 0842    glucose chewable tablet 16 g  4 Tab Oral PRN Kayode Rios MD        glucagon (GLUCAGEN) injection 1 mg  1 mg IntraMUSCular PRN Kayode Rios MD        dextrose (D50W) injection syrg 12.5-25 g  25-50 mL IntraVENous PRN Kayode Rios MD        insulin lispro (HUMALOG) injection   SubCUTAneous AC&HS Tyree Zavala MD   Stopped at 04/06/18 0730    hydrALAZINE (APRESOLINE) tablet 25 mg  25 mg Oral TID PRN Kayode Rios MD        Lactobacillus Acidoph & Bulgar [facility]) tablet 2 Tab  2 Tab Oral BID Kayode Rios MD   2 Tab at 04/09/18 1150    linagliptin (TRADJENTA) tablet 5 mg  5 mg Oral DANIE Rios MD   5 mg at 04/08/18 0843    pantoprazole (PROTONIX) tablet 40 mg  40 mg Oral DANIE Rios MD   40 mg at 04/08/18 9944        Physical Exam:     Physical Exam:   General:  Alert, cooperative, no distress, appears stated age. Neck: Supple, symmetrical, trachea midline, no adenopathy, thyroid: no enlargement/tenderness/nodules, no carotid bruit and no JVD. Lungs:   Clear to auscultation bilaterally. Heart:  Regular rate and rhythm, S1, S2 normal, no murmur, click, rub or gallop. Abdomen:   Soft, non-tender. Bowel sounds normal. No masses,  No organomegaly. Extremities: Extremities normal, atraumatic, no cyanosis or edema.    Skin: Skin color, texture, turgor normal. No rashes or lesions         Data Review:    CBC w/Diff    Recent Labs      04/09/18   0301  04/08/18   0108  04/07/18   0203   WBC  5.4  4.6  5.3   RBC  2.74*  2.76*  2.66*   HGB  7.8*  7.8*  7.6*   HCT  24.2*  24.5*  23.7*   MCV  88.3  88.8  89.1   MCH  28.5  28.3  28.6   MCHC  32.2  31.8  32.1   RDW  15.9*  15.8*  15.9*    Recent Labs      04/09/18   0301  04/08/18   0108  04/07/18   0203   MONOS  14*  14*  13*   EOS  3  4  3   BASOS  0  0  0   RDW  15.9*  15.8*  15.9*        Comprehensive Metabolic Profile    Recent Labs      04/09/18   0301 04/08/18   0108  04/07/18   0203   NA  144  145  145   K  3.5  3.6  4.0   CL  109*  110*  112*   CO2  28  27  26   BUN  13  15  17   CREA  1.18  1.00  0.96    Recent Labs      04/09/18   0301  04/08/18   0108  04/07/18   0203   CA  8.8  8.9  8.9   ALB   --    --   2.9*   TP   --    --   7.1   SGOT   --    --   22   TBILI   --    --   1.1*          Bone marrow : no myeloma. Impression:       Active Hospital Problems    Diagnosis Date Noted    Chronic kidney disease, stage III (moderate) 04/04/2018    Hepatitis C antibody positive in blood 04/04/2018    Light chain deposition disease 31/79/5465    Metabolic acidosis 62/85/3772    Hyperkalemia 04/02/2018    Hypercalcemia 04/02/2018    Bradycardia 03/31/2018    H/O Clostridium difficile infection 03/31/2018    Urinary retention 10/30/2017    Quadriparesis ([facility] 05/31/2017    Type II diabetes mellitus, uncontrolled ([facility] 12/16/2015    Renal cell cancer ([facility] 11/05/2014    Cancer of left kidney ([facility] 12/23/2013            Plan:     Kidney biopsy today, ok to DC  From renal point once Biopsy & post  Kidney Biopsy observation  Is done.     Astrid Zeng MD

## 2018-04-09 NOTE — PROCEDURES
RADIOLOGY POST PROCEDURE NOTE     April 9, 2018       6:39 PM     Preoperative Diagnosis:   SHEILA and thrombocytopenia. Postoperative Diagnosis:  Same. :  Dr. Zahida Vo    Assistant:  None. Type of Anesthesia: 1% plain lidocaine and IV moderate sedation with Versed and Fentanyl    Procedure/Description:  Ct guided random left lower renal pole core needle Bx. Findings:   No bleeding. Estimated blood Loss:  Minimal    Specimen Removed:   yes    Blood transfusions:  None. Implants:  None.     Complications: None    Condition: Stable    Discharge Plan:  continue present therapy    Rowan Conley MD

## 2018-04-10 VITALS
WEIGHT: 154.3 LBS | HEART RATE: 74 BPM | OXYGEN SATURATION: 96 % | HEIGHT: 72 IN | BODY MASS INDEX: 20.9 KG/M2 | TEMPERATURE: 99.7 F | RESPIRATION RATE: 19 BRPM | DIASTOLIC BLOOD PRESSURE: 76 MMHG | SYSTOLIC BLOOD PRESSURE: 155 MMHG

## 2018-04-10 PROBLEM — N17.9 ACUTE KIDNEY INJURY (HCC): Status: ACTIVE | Noted: 2018-04-10

## 2018-04-10 PROBLEM — D69.6 THROMBOCYTOPENIA (HCC): Status: ACTIVE | Noted: 2018-04-10

## 2018-04-10 LAB
AFP-TM SERPL-MCNC: 1.1 NG/ML (ref 0–8.3)
ALBUMIN SERPL-MCNC: 2.8 G/DL (ref 3.4–5)
ALBUMIN/GLOB SERPL: 0.7 {RATIO} (ref 0.8–1.7)
ALP SERPL-CCNC: 82 U/L (ref 45–117)
ALT SERPL-CCNC: 30 U/L (ref 16–61)
ANION GAP SERPL CALC-SCNC: 7 MMOL/L (ref 3–18)
AST SERPL-CCNC: 29 U/L (ref 15–37)
BACTERIA SPEC CULT: NORMAL
BASOPHILS # BLD: 0 K/UL (ref 0–0.1)
BASOPHILS NFR BLD: 1 % (ref 0–2)
BILIRUB SERPL-MCNC: 0.7 MG/DL (ref 0.2–1)
BUN SERPL-MCNC: 13 MG/DL (ref 7–18)
BUN/CREAT SERPL: 10 (ref 12–20)
CALCIUM SERPL-MCNC: 8.8 MG/DL (ref 8.5–10.1)
CHLORIDE SERPL-SCNC: 106 MMOL/L (ref 100–108)
CO2 SERPL-SCNC: 29 MMOL/L (ref 21–32)
CREAT SERPL-MCNC: 1.36 MG/DL (ref 0.6–1.3)
DIFFERENTIAL METHOD BLD: ABNORMAL
EOSINOPHIL # BLD: 0.1 K/UL (ref 0–0.4)
EOSINOPHIL NFR BLD: 3 % (ref 0–5)
ERYTHROCYTE [DISTWIDTH] IN BLOOD BY AUTOMATED COUNT: 16.2 % (ref 11.6–14.5)
GLOBULIN SER CALC-MCNC: 4.3 G/DL (ref 2–4)
GLUCOSE BLD STRIP.AUTO-MCNC: 119 MG/DL (ref 70–110)
GLUCOSE BLD STRIP.AUTO-MCNC: 127 MG/DL (ref 70–110)
GLUCOSE SERPL-MCNC: 162 MG/DL (ref 74–99)
HCT VFR BLD AUTO: 25.6 % (ref 36–48)
HGB BLD-MCNC: 8.2 G/DL (ref 13–16)
LYMPHOCYTES # BLD: 1.2 K/UL (ref 0.9–3.6)
LYMPHOCYTES NFR BLD: 24 % (ref 21–52)
MAGNESIUM SERPL-MCNC: 1.8 MG/DL (ref 1.6–2.6)
MCH RBC QN AUTO: 28.4 PG (ref 24–34)
MCHC RBC AUTO-ENTMCNC: 32 G/DL (ref 31–37)
MCV RBC AUTO: 88.6 FL (ref 74–97)
MONOCYTES # BLD: 0.6 K/UL (ref 0.05–1.2)
MONOCYTES NFR BLD: 13 % (ref 3–10)
NEUTS SEG # BLD: 2.9 K/UL (ref 1.8–8)
NEUTS SEG NFR BLD: 59 % (ref 40–73)
PLATELET # BLD AUTO: 123 K/UL (ref 135–420)
PMV BLD AUTO: 11.4 FL (ref 9.2–11.8)
POTASSIUM SERPL-SCNC: 3.3 MMOL/L (ref 3.5–5.5)
PROT SERPL-MCNC: 7.1 G/DL (ref 6.4–8.2)
RBC # BLD AUTO: 2.89 M/UL (ref 4.7–5.5)
SERVICE CMNT-IMP: NORMAL
SODIUM SERPL-SCNC: 142 MMOL/L (ref 136–145)
WBC # BLD AUTO: 4.9 K/UL (ref 4.6–13.2)

## 2018-04-10 PROCEDURE — 85025 COMPLETE CBC W/AUTO DIFF WBC: CPT | Performed by: FAMILY MEDICINE

## 2018-04-10 PROCEDURE — 83735 ASSAY OF MAGNESIUM: CPT | Performed by: FAMILY MEDICINE

## 2018-04-10 PROCEDURE — 80053 COMPREHEN METABOLIC PANEL: CPT | Performed by: FAMILY MEDICINE

## 2018-04-10 PROCEDURE — 82962 GLUCOSE BLOOD TEST: CPT

## 2018-04-10 PROCEDURE — 74011250637 HC RX REV CODE- 250/637: Performed by: INTERNAL MEDICINE

## 2018-04-10 PROCEDURE — 74011000250 HC RX REV CODE- 250: Performed by: FAMILY MEDICINE

## 2018-04-10 PROCEDURE — 74011250637 HC RX REV CODE- 250/637: Performed by: FAMILY MEDICINE

## 2018-04-10 PROCEDURE — 74011250636 HC RX REV CODE- 250/636: Performed by: FAMILY MEDICINE

## 2018-04-10 PROCEDURE — 82784 ASSAY IGA/IGD/IGG/IGM EACH: CPT | Performed by: INTERNAL MEDICINE

## 2018-04-10 PROCEDURE — 74011250637 HC RX REV CODE- 250/637: Performed by: UROLOGY

## 2018-04-10 PROCEDURE — 82232 ASSAY OF BETA-2 PROTEIN: CPT | Performed by: INTERNAL MEDICINE

## 2018-04-10 PROCEDURE — 36415 COLL VENOUS BLD VENIPUNCTURE: CPT | Performed by: FAMILY MEDICINE

## 2018-04-10 RX ORDER — SODIUM BICARBONATE 650 MG/1
650 TABLET ORAL 3 TIMES DAILY
Qty: 30 TAB | Refills: 0 | Status: SHIPPED
Start: 2018-04-10 | End: 2018-05-18

## 2018-04-10 RX ORDER — UREA 10 %
2 LOTION (ML) TOPICAL 2 TIMES DAILY
Qty: 30 TAB | Refills: 0 | Status: SHIPPED
Start: 2018-04-10 | End: 2018-05-11

## 2018-04-10 RX ORDER — HYDROMORPHONE HYDROCHLORIDE 4 MG/1
4 TABLET ORAL
Qty: 40 TAB | Refills: 0 | Status: SHIPPED | OUTPATIENT
Start: 2018-04-10 | End: 2018-10-02

## 2018-04-10 RX ORDER — POTASSIUM CHLORIDE 20 MEQ/1
40 TABLET, EXTENDED RELEASE ORAL
Status: COMPLETED | OUTPATIENT
Start: 2018-04-10 | End: 2018-04-10

## 2018-04-10 RX ORDER — TAMSULOSIN HYDROCHLORIDE 0.4 MG/1
0.4 CAPSULE ORAL DAILY
Qty: 30 CAP | Refills: 0 | Status: SHIPPED
Start: 2018-04-10 | End: 2020-02-25

## 2018-04-10 RX ORDER — LEVOTHYROXINE SODIUM 50 UG/1
50 TABLET ORAL
Qty: 30 TAB | Refills: 0 | Status: ON HOLD
Start: 2018-04-11 | End: 2019-03-28

## 2018-04-10 RX ORDER — PANTOPRAZOLE SODIUM 40 MG/1
40 TABLET, DELAYED RELEASE ORAL
Qty: 30 TAB | Refills: 0 | Status: SHIPPED
Start: 2018-04-11 | End: 2018-05-11

## 2018-04-10 RX ORDER — AMLODIPINE BESYLATE 10 MG/1
10 TABLET ORAL DAILY
Qty: 30 TAB | Refills: 0 | Status: SHIPPED
Start: 2018-04-10 | End: 2018-05-11 | Stop reason: SDUPTHER

## 2018-04-10 RX ORDER — HYDRALAZINE HYDROCHLORIDE 25 MG/1
25 TABLET, FILM COATED ORAL
Qty: 30 TAB | Refills: 0 | Status: SHIPPED
Start: 2018-04-10 | End: 2018-10-02

## 2018-04-10 RX ORDER — AMOXICILLIN AND CLAVULANATE POTASSIUM 875; 125 MG/1; MG/1
1 TABLET, FILM COATED ORAL 2 TIMES DAILY
Qty: 2 TAB | Refills: 0 | Status: SHIPPED
Start: 2018-04-10 | End: 2018-04-11

## 2018-04-10 RX ORDER — FUROSEMIDE 40 MG/1
40 TABLET ORAL DAILY
Qty: 30 TAB | Refills: 0 | Status: SHIPPED
Start: 2018-04-10 | End: 2018-10-02

## 2018-04-10 RX ORDER — INSULIN LISPRO 100 [IU]/ML
INJECTION, SOLUTION INTRAVENOUS; SUBCUTANEOUS
Qty: 1 VIAL | Refills: 0 | Status: SHIPPED
Start: 2018-04-10 | End: 2020-02-25

## 2018-04-10 RX ADMIN — Medication 2 G: at 01:04

## 2018-04-10 RX ADMIN — FUROSEMIDE 40 MG: 40 TABLET ORAL at 10:15

## 2018-04-10 RX ADMIN — POTASSIUM CHLORIDE 40 MEQ: 20 TABLET, EXTENDED RELEASE ORAL at 10:15

## 2018-04-10 RX ADMIN — PANTOPRAZOLE SODIUM 40 MG: 40 TABLET, DELAYED RELEASE ORAL at 07:46

## 2018-04-10 RX ADMIN — SODIUM BICARBONATE 650 MG TABLET 650 MG: at 10:15

## 2018-04-10 RX ADMIN — AMLODIPINE BESYLATE 10 MG: 10 TABLET ORAL at 10:15

## 2018-04-10 RX ADMIN — LINAGLIPTIN 5 MG: 5 TABLET, FILM COATED ORAL at 07:46

## 2018-04-10 RX ADMIN — LEVOTHYROXINE SODIUM 50 MCG: 50 TABLET ORAL at 07:46

## 2018-04-10 RX ADMIN — METRONIDAZOLE 500 MG: 500 INJECTION, SOLUTION INTRAVENOUS at 01:01

## 2018-04-10 RX ADMIN — LACTOBACILLUS TAB 2 TABLET: TAB at 10:15

## 2018-04-10 RX ADMIN — Medication 10 ML: at 07:49

## 2018-04-10 RX ADMIN — TAMSULOSIN HYDROCHLORIDE 0.4 MG: 0.4 CAPSULE ORAL at 10:15

## 2018-04-10 RX ADMIN — Medication 2 G: at 10:15

## 2018-04-10 RX ADMIN — HYDROMORPHONE HYDROCHLORIDE 4 MG: 2 TABLET ORAL at 12:06

## 2018-04-10 NOTE — PROGRESS NOTES
Progress Note      Patient: Mattie Encarnacion               Sex: male          DOA: 3/31/2018         YOB: 1960      Age:  59 y.o.        LOS:  LOS: 10 days               Subjective:     Clinically, patient doing well since biopsy. All labs stable with no complaints post-biopsy. Seen by Nephrologist and Hospitalist who cleared him for discharge. Objective:      Visit Vitals    /77 (BP 1 Location: Right arm, BP Patient Position: At rest)    Pulse 66    Temp 98.7 °F (37.1 °C)    Resp 18    Ht 6' (1.829 m)    Wt 70 kg (154 lb 4.8 oz)    SpO2 95%    BMI 20.93 kg/m2       Physical Exam:  Unable to perform due to patient factors.      Intake and Output:  Current Shift:     Last three shifts:  04/08 1901 - 04/10 0700  In: 0   Out: 1875 [Urine:1875]    Lab/Data Reviewed:  CMP:   Lab Results   Component Value Date/Time     04/10/2018 04:06 AM    K 3.3 (L) 04/10/2018 04:06 AM     04/10/2018 04:06 AM    CO2 29 04/10/2018 04:06 AM    AGAP 7 04/10/2018 04:06 AM     (H) 04/10/2018 04:06 AM    BUN 13 04/10/2018 04:06 AM    CREA 1.36 (H) 04/10/2018 04:06 AM    GFRAA >60 04/10/2018 04:06 AM    GFRNA 54 (L) 04/10/2018 04:06 AM    CA 8.8 04/10/2018 04:06 AM    MG 1.8 04/10/2018 04:06 AM    ALB 2.8 (L) 04/10/2018 04:06 AM    TP 7.1 04/10/2018 04:06 AM    GLOB 4.3 (H) 04/10/2018 04:06 AM    AGRAT 0.7 (L) 04/10/2018 04:06 AM    SGOT 29 04/10/2018 04:06 AM    ALT 30 04/10/2018 04:06 AM     CBC:   Lab Results   Component Value Date/Time    WBC 4.9 04/10/2018 04:06 AM    HGB 8.2 (L) 04/10/2018 04:06 AM    HCT 25.6 (L) 04/10/2018 04:06 AM     (L) 04/10/2018 04:06 AM     Medications Reviewed    Assessment/Plan     Principal Problem:    Bradycardia (3/31/2018)    Active Problems:    Renal cell cancer ([facility] (11/5/2014)      Type II diabetes mellitus, uncontrolled ([facility] (12/16/2015)      Urinary retention (10/30/2017)      Cancer of left kidney ([facility] (12/23/2013)      Quadriparesis ([facility] (5/31/2017)      H/O Clostridium difficile infection (3/31/2018)      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 Ashley Valle. H/H stable. No clinical evidence of post-procedural bleeding after biopsy. From an IR standpoint, patient doing well without any apparent complications. Okay for discharge.  Follow-up per nephrology/hematology team.     Dawood Puga 91.

## 2018-04-10 NOTE — PROGRESS NOTES
Progress Note    Jeremy Mcarthur  59 y.o. Admit Date: 3/31/2018  Principal Problem:    Bradycardia (3/31/2018) POA: Yes    Active Problems:    Renal cell cancer ([facility] (11/5/2014) POA: Yes      Type II diabetes mellitus, uncontrolled ([facility] (12/16/2015) POA: Yes      Urinary retention (10/30/2017) POA: Yes      Cancer of left kidney ([facility] (12/23/2013) POA: Yes      Quadriparesis ([facility] (5/31/2017) POA: Yes      H/O Clostridium difficile infection (3/31/2018) POA: Yes      Light chain deposition disease (4/2/2018) POA: Clinically Undetermined      Metabolic acidosis (0/8/7675) POA: Unknown      Hyperkalemia (4/2/2018) POA: Unknown      Hypercalcemia (4/2/2018) POA: Unknown      Chronic kidney disease, stage III (moderate) (4/4/2018) POA: Clinically Undetermined      Hepatitis C antibody positive in blood (4/4/2018) POA: Clinically Undetermined      Acute kidney injury ([facility] (4/10/2018) POA: Yes      Thrombocytopenia ([facility] (4/10/2018) POA: Yes            Subjective:     Patient feels good, no SOB, undergone (L) kidney Biopsy. A comprehensive review of systems was negative except for that written in the History of Present Illness.     Objective:     Visit Vitals    /76 (BP 1 Location: Left arm)    Pulse 74    Temp 99.7 °F (37.6 °C)    Resp 19    Ht 6' (1.829 m)    Wt 70 kg (154 lb 4.8 oz)    SpO2 96%    BMI 20.93 kg/m2         Intake/Output Summary (Last 24 hours) at 04/10/18 1205  Last data filed at 04/09/18 1858   Gross per 24 hour   Intake                0 ml   Output             1000 ml   Net            -1000 ml       Current Facility-Administered Medications   Medication Dose Route Frequency Provider Last Rate Last Dose    amLODIPine (NORVASC) tablet 10 mg  10 mg Oral DAILY Urbano Valdivia MD   10 mg at 04/10/18 1015    sodium chloride (NS) flush 5-10 mL  5-10 mL IntraVENous Thi Nieto MD   10 mL at 04/10/18 0749    sodium chloride (NS) flush 5-10 mL  5-10 mL IntraVENous PRN Michelle Bone MD        flumazenil (ROMAZICON) 0.1 mg/mL injection 0.2 mg  0.2 mg IntraVENous Multiple Michelle Bone MD        naloxone (NARCAN) injection 0.1 mg  0.1 mg IntraVENous Multiple Michelle Bone MD        gelatin adsorbable (GELFOAM) 12-7 mm sponge 1 Each  1 Each Topical PRN Michelle Bone MD   1 Each at 04/09/18 1523    cefepime (MAXIPIME) 2 g in sterile water (preservative free) 10 mL IV syringe  2 g IntraVENous Mora Forrester MD   2 g at 04/10/18 1015    tamsulosin (FLOMAX) capsule 0.4 mg  0.4 mg Oral DAILY Zay Barnes MD   0.4 mg at 04/10/18 1015    metroNIDAZOLE (FLAGYL) IVPB premix 500 mg  500 mg IntraVENous Q12H Alec Schreiber  mL/hr at 04/10/18 0101 500 mg at 04/10/18 0101    HYDROmorphone (DILAUDID) tablet 4 mg  4 mg Oral Q6H PRN Alec Schreiber MD   4 mg at 04/09/18 2235    furosemide (LASIX) tablet 40 mg  40 mg Oral DAILY Steffany Gerardo MD   40 mg at 04/10/18 1015    sodium bicarbonate tablet 650 mg  650 mg Oral TID Steffany Gerardo MD   650 mg at 04/10/18 1015    levothyroxine (SYNTHROID) tablet 50 mcg  50 mcg Oral ACB Alec Schreiber MD   50 mcg at 04/10/18 0746    glucose chewable tablet 16 g  4 Tab Oral PRN Alec Schreiber MD        glucagon (GLUCAGEN) injection 1 mg  1 mg IntraMUSCular PRN Alec Schreiber MD        dextrose (D50W) injection syrg 12.5-25 g  25-50 mL IntraVENous PRN Alec Schreiber MD        insulin lispro (HUMALOG) injection   SubCUTAneous AC&HS Franci Mayfield MD   Stopped at 04/06/18 0730    hydrALAZINE (APRESOLINE) tablet 25 mg  25 mg Oral TID PRN Alec Schreiber MD        Lactobacillus Acidoph & Bulgar CRESTWOOD [facility]) tablet 2 Tab  2 Tab Oral BID Alec Schreiber MD   2 Tab at 04/10/18 1015    linagliptin (TRADJENTA) tablet 5 mg  5 mg Oral DANIE Schreiber MD   5 mg at 04/10/18 0746    pantoprazole (PROTONIX) tablet 40 mg  40 mg Oral DANIE Schreiber MD   40 mg at 04/10/18 7373 Physical Exam:     Physical Exam:   General:  Alert, cooperative, no distress, appears stated age. Neck: Supple, symmetrical, trachea midline, no adenopathy, thyroid: no enlargement/tenderness/nodules, no carotid bruit and no JVD. Lungs:   Clear to auscultation bilaterally. Heart:  Regular rate and rhythm, S1, S2 normal, no murmur, click, rub or gallop. Abdomen:   Soft, non-tender. Bowel sounds normal. No masses,  No organomegaly. Extremities: Extremities normal, atraumatic, no cyanosis or edema.          Data Review:    CBC w/Diff    Recent Labs      04/10/18   0406  04/09/18 2005 04/09/18   0301 04/08/18   0108   WBC  4.9   --   5.4  4.6   RBC  2.89*   --   2.74*  2.76*   HGB  8.2*  8.5*  7.8*  7.8*   HCT  25.6*  25.8*  24.2*  24.5*   MCV  88.6   --   88.3  88.8   MCH  28.4   --   28.5  28.3   MCHC  32.0   --   32.2  31.8   RDW  16.2*   --   15.9*  15.8*    Recent Labs      04/10/18   0406 04/09/18   0301  04/08/18   0108   MONOS  13*  14*  14*   EOS  3  3  4   BASOS  1  0  0   RDW  16.2*  15.9*  15.8*        Comprehensive Metabolic Profile    Recent Labs      04/10/18   0406 04/09/18   0301  04/08/18   0108   NA  142  144  145   K  3.3*  3.5  3.6   CL  106  109*  110*   CO2  29  28  27   BUN  13  13  15   CREA  1.36*  1.18  1.00    Recent Labs      04/10/18   0406 04/09/18   0301  04/08/18   0108   CA  8.8  8.8  8.9   ALB  2.8*   --    --    TP  7.1   --    --    SGOT  29   --    --    TBILI  0.7   --    --                         Impression:       Active Hospital Problems    Diagnosis Date Noted    Acute kidney injury ([facility] 04/10/2018    Thrombocytopenia ([facility] 04/10/2018    Chronic kidney disease, stage III (moderate) 04/04/2018    Hepatitis C antibody positive in blood 04/04/2018    Light chain deposition disease 72/83/2411    Metabolic acidosis 35/53/0384    Hyperkalemia 04/02/2018    Hypercalcemia 04/02/2018    Bradycardia 03/31/2018    H/O Clostridium difficile infection 03/31/2018    Urinary retention 10/30/2017    Quadriparesis ([facility] 05/31/2017    Type II diabetes mellitus, uncontrolled ([facility] 12/16/2015    Renal cell cancer ([facility] 11/05/2014    Cancer of left kidney ([facility] 12/23/2013            Plan:     DC today, follow up with me in 2 to 3 weeks. In my office.       Rowan Pfeiffer MD

## 2018-04-10 NOTE — DISCHARGE INSTRUCTIONS
DISCHARGE SUMMARY from Nurse    PATIENT INSTRUCTIONS:    After general anesthesia or intravenous sedation, for 24 hours or while taking prescription Narcotics:  · Limit your activities  · Do not drive and operate hazardous machinery  · Do not make important personal or business decisions  · Do  not drink alcoholic beverages  · If you have not urinated within 8 hours after discharge, please contact your surgeon on call. Report the following to your surgeon:  · Excessive pain, swelling, redness or odor of or around the surgical area  · Temperature over 100.5  · Nausea and vomiting lasting longer than 4 hours or if unable to take medications  · Any signs of decreased circulation or nerve impairment to extremity: change in color, persistent  numbness, tingling, coldness or increase pain  · Any questions  What to do at Home:  Recommended activity: Activity as tolerated,     If you experience any of the following symptoms bleeding, trouble urinating, chest pain, or fever greater than 100.4, please follow up with primary care provider. *  Please give a list of your current medications to your Primary Care Provider. *  Please update this list whenever your medications are discontinued, doses are      changed, or new medications (including over-the-counter products) are added. *  Please carry medication information at all times in case of emergency situations. These are general instructions for a healthy lifestyle:    No smoking/ No tobacco products/ Avoid exposure to second hand smoke  Surgeon General's Warning:  Quitting smoking now greatly reduces serious risk to your health.     Obesity, smoking, and sedentary lifestyle greatly increases your risk for illness    A healthy diet, regular physical exercise & weight monitoring are important for maintaining a healthy lifestyle    You may be retaining fluid if you have a history of heart failure or if you experience any of the following symptoms:  Weight gain of 3 pounds or more overnight or 5 pounds in a week, increased swelling in our hands or feet or shortness of breath while lying flat in bed. Please call your doctor as soon as you notice any of these symptoms; do not wait until your next office visit. Recognize signs and symptoms of STROKE:    F-face looks uneven    A-arms unable to move or move unevenly    S-speech slurred or non-existent    T-time-call 911 as soon as signs and symptoms begin-DO NOT go       Back to bed or wait to see if you get better-TIME IS BRAIN. Warning Signs of HEART ATTACK     Call 911 if you have these symptoms:   Chest discomfort. Most heart attacks involve discomfort in the center of the chest that lasts more than a few minutes, or that goes away and comes back. It can feel like uncomfortable pressure, squeezing, fullness, or pain.  Discomfort in other areas of the upper body. Symptoms can include pain or discomfort in one or both arms, the back, neck, jaw, or stomach.  Shortness of breath with or without chest discomfort.  Other signs may include breaking out in a cold sweat, nausea, or lightheadedness. Don't wait more than five minutes to call 911 - MINUTES MATTER! Fast action can save your life. Calling 911 is almost always the fastest way to get lifesaving treatment. Emergency Medical Services staff can begin treatment when they arrive -- up to an hour sooner than if someone gets to the hospital by car. The discharge information has been reviewed with the patient. The patient verbalized understanding. Discharge medications reviewed with the patient and appropriate educational materials and side effects teaching were provided.   ___________________________________________________________________________________________________________________________________  Patient Discharge Instructions    Rashmi Young / [id number] : 1960    Admitted 3/31/2018 Discharged: 4/10/2018     · It is important that you take the medication exactly as they are prescribed. · Keep your medication in the bottles provided by the pharmacist and keep a list of the medication names, dosages, and times to be taken with you at all times. · Do not take other medications without consulting your doctor. Recommended Diet: Diabetic Diet and Renal Diet    Recommended Activity: PT/OT Eval and Treat    If you experience any of the following symptoms shortness of breath, chest pain, fever, please follow up with ER. Signed By: Jaden Hand MD     April 10, 2018       [employer] Activation    Thank you for requesting access to [employer]. Please follow the instructions below to securely access and download your online medical record. [employer] allows you to send messages to your doctor, view your test results, renew your prescriptions, schedule appointments, and more. How Do I Sign Up? 1. In your internet browser, go to [email or web address]  2. Click on the First Time User? Click Here link in the Sign In box. You will be redirect to the New Member Sign Up page. 3. Enter your [employer] Access Code exactly as it appears below. You will not need to use this code after youve completed the sign-up process. If you do not sign up before the expiration date, you must request a new code. [employer] Access Code: [email or web address]  Expires: 2018 10:12 AM (This is the date your [employer] access code will )    4. Enter the last four digits of your Social Security Number (xxxx) and Date of Birth (mm/dd/yyyy) as indicated and click Submit. You will be taken to the next sign-up page. 5. Create a [employer] ID. This will be your [employer] login ID and cannot be changed, so think of one that is secure and easy to remember. 6. Create a [employer] password. You can change your password at any time. 7. Enter your Password Reset Question and Answer. This can be used at a later time if you forget your password. 8. Enter your e-mail address.  You will receive e-mail notification when new information is available in [address] 9. Click Sign Up. You can now view and download portions of your medical record. 10. Click the Download Summary menu link to download a portable copy of your medical information. Additional Information    If you have questions, please visit the Frequently Asked Questions section of the [employer] website at [email or web address] Remember, [employer] is NOT to be used for urgent needs. For medical emergencies, dial 911.     Patient armband removed and shredded

## 2018-04-10 NOTE — PROGRESS NOTES
This pt has been accepted back to [facility], pt's sister will transport this pt at 12:30 p.m. To his pain appointment and to facility.

## 2018-04-10 NOTE — DISCHARGE SUMMARY
Discharge Summary        Patient ID:        Ginny Payton        [id number]        6 y.o.        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 Worsening bibasilar airspace opacities with worsened right moderate size and  left small pleural effusions without pneumothorax. Venous Duplex US 4/2/18:  INTERPRETATION/FINDINGS  Duplex images were obtained using 2-D gray scale, color flow, and  spectral Doppler analysis. Right leg :  1. Deep veins visualized include the common femoral, femoral,  popliteal, posterior tibial and peroneal veins. 2. No evidence of deep venous thrombosis detected in the veins  visualized. 3. Superficial veins visualized include the great saphenous vein. 4. No evidence of superficial thrombosis detected. 5. Normal multiphasic flow in the posterior tibial artery. Left leg :  1. Deep veins visualized include the common femoral, femoral,  popliteal, posterior tibial and peroneal veins. 2. No evidence of deep venous thrombosis detected in the veins  visualized. 3. Superficial veins visualized include the great saphenous vein.   4. No evidence of superficial thrombosis detected. 5. Normal multiphasic flow in the posterior tibial artery.     Renal US 4/2/18  IMPRESSION:  1. Normal-sized kidneys without hydronephrosis. 2. Right renal cyst present since 2013 and appear relatively unchanged. 3. Bladder is empty with Willis catheter.     Transthoracic Echo 4/2/18:  SUMMARY:  Left ventricle: Systolic function was normal by EF (biplane method of disks).  Ejection fraction was estimated to be 60 %. No obvious wall motion abnormalities identified in the views obtained. Wall thickness was at the upper limits of normal.    Right ventricle: The size was at the upper limits of normal. Systolic pressure was mildly increased. Estimated peak pressure was at least 42 mmHg. Left atrium: The atrium was severely dilated. Mitral valve: There was mild regurgitation. Tricuspid valve: There was mild regurgitation.        ct scan abdomen and pelvis no contrast 3/31/18  1. Moderate to large right and small left pleural effusions.      2. Distended bladder with mild wall thickening, possibly due to chronic outlet  obstruction. Mildly enlarged heterogeneous prostate. Recommend correlation with urinalysis. -No hydronephrosis. -Bilobed right renal cyst similar to prior.  -No suspicious renal mass identified on this nonenhanced CT.      3. Low-attenuation of blood in keeping with anemia.      -Atherosclerosis. Osseous degenerative changes as above. Small fat-containing  inguinal hernias. Left gluteus minimus heterotopic ossification. Slightly prominent left periaortic nodes, none clearly abnormal by size criteria.      -Evaluation limited as above.            RUE Duplex venous US 4/4/18  INTERPRETATION/FINDINGS  Duplex images were obtained using 2-D gray scale, color flow, and  spectral Doppler analysis.     Right arm :  1. No evidence of deep venous thrombosis detected in the veins  visualized.   2. Deep vein(s) visualized include the internal jugular, subclavian,  axillary and brachial veins. 3. No evidence of superficial thrombosis detected. 4. Superficial vein(s) visualized include the basilic (upper arm) and  cephalic (upper arm) veins. 5. No evidence of deep vein thrombosis in the contralateral internal  jugular and subclavian veins. Consults:Cardiology, Pulmonary/Intensive care, GI, Nephrology, Hematology/Oncology and Urology    Procedures:             Final diagnoses (primary and secondary): Bradycardia                                            Principal Problem:    Bradycardia (3/31/2018)    Active Problems:    Renal cell cancer ([address] (11/5/2014)      Type II diabetes mellitus, uncontrolled ([address] (12/16/2015)      Urinary retention (10/30/2017)      Cancer of left kidney ([address] (12/23/2013)      Quadriparesis ([address] (5/31/2017)      H/O Clostridium difficile infection (3/31/2018)      Light chain deposition disease (0/5/8587)      Metabolic acidosis (0/1/0821)      Hyperkalemia (4/2/2018)      Hypercalcemia (4/2/2018)      Chronic kidney disease, stage III (moderate) (4/4/2018)      Hepatitis C antibody positive in blood (4/4/2018)      Acute kidney injury ([address] (4/10/2018)      Thrombocytopenia ([address] (4/10/2018)            Brief hospital course  HPI  \"Law Hernandes is a 59 y.o.  male who   Has  history of renal mass, renal cell carcinoma of left kidney, DM, chronic drug abuse, and HTN who presented with  c/o bilateral lower extremity swelling onset 3 days ago. He states that he is from [address] and has been in the nursing home for the past 1 year. Donte Pineda He noted that his ankles first started sweling first. [facility] states that he had an ultrasound of his leg yesterday. He notes that he usually is able to ambulate without a walker but with increase swelling can not ambulate like he used to  .  Patient denies history of CHF, chest pain, abdominal pain, and any othe rassociated symptoms or complaints.       Pt has H/o hepatitis c and seen by GI as out patinet  and  Pt also s/p partial nephrectomy Lt kidney due to renal cell carcinoma by dr Jackqulyn Riedel urology      Pt has h/o cervical disc disease s/p surgery and fusion . Pt had  H/o diskitis and osteomyelitis c 3-7 and quadriplegia after surgery       Pt was treated last week for c- diff and and flu last week has no upper respiratory sx or diarrhea at this time\"    Acute Renal failure/ H/O Lt renal cell carcinoma S/p Partial Lt nephrectomy concern for Light Chain deposition disease - Kappa lt chains elevated, DONTE positive, Rheumatoid Factor Positive. Patient received IV fluid in the ER. Due to swelling had venous duplex ultrasound B/L leg - neg for DVT and RUE venous duplex ultrasound - Neg for DVT. Ct scan abdomen and pelvis no contrast  showed  distended bladder possible neck obstruction, patient had varner catheter placed on admission. Renal function improved with gentle hydration. Nephrology consulted, Dr Malu Mayfield, initial thought that SHEILA due to NSAID use however with anemia, thrombocytopenia further workup pursued. 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 Also with positive rheumatoid factor suspicion of cryoglobinemia. Patient underwent Renal biopsy 4/09/18 with results pending at discharge. He will need follow up with Nephrology to review. Thrombocytopenia. Spontaneously resolved. HIT panel negative. Heme/Onc consulted, Dr. Stefan Faith. Initially thought to be possible iatrogenic or infection related. SPEP returned elevated with elevated IGG and UPEP was positive for Bence Ledesma Protein, Lambda type. AFP tumor marker results pending. Patient underwent bone marrow biopsy 4/6/18.   Patient needs follow up with Hematology/Oncology within 7-10 days after discharge.         Urinary retention requiring varner catheter on admission. Seen by Dr. Ashley Moreno, recommended resume flomax, voiding trial performed and was successful.       Dilated cardiomyopathy with preserved EF 60%; Bradycardia. Cardiac enzymes minimally elevated on admission. Betablocker discontinued. Lisinopril held due to SHEILA. Bradycardia resolved, had hypertension, added norvasc for BP control.       Hypothyroid. Synthroid increased to 50 mcg daily due to TSH mildly elevated, normal free t4, recheck TFTs in 4 weeks (5/1/18)     Diabetes Mellitus. HG A1c - 7.0 on 4/4/18.  4/1/18 lipids: TC 68 HDL 46 LDL 12.2 TG 49. Patient on lantus with hypoglycemia, discontinued. Blood sugars controlled with tradjenta 5mg daily, sliding scale coverage and diabetic diet.        H/O c-diff / h/o flu/history of PNA and UTI - recurrent versus incompletely treated healthcare associated PNA. Patient recently Treated with flagyl, tamiflu, levaquin at nursing home completing therapy 3/13/-3/19/18. During hospitalization patient had CXR with bibasilar opacities, intermittent sob and hypoxia requiring supplemental oxygen. Started on empiric treatment for healthcare associated PNA. ST consult to evaluate swallowing/aspiration risk had cleared for regular solids thin liquids. CT Chest showed worsening bibasilar opacities and pleural effusions. Pulmonology consulted, Dr. Felisa Cheadle, agreed with initiation of antibiotics. legionella neg, strep pneumo neg. Blood cultures 4/4/ no growth to date. Respiratory cultures requested never sent. Pulmonology thought that symptoms may be due to lower lobe atelectasis versus pneumonia. Patient weaned off oxygen, remained afebrile without leukocytosis. Completed vancomycin dosing, will transition to Augmentin for 1 more day to complete antibiotic therapy upon discharge. Needs pulmonology follow up as outpatient.      H/o hepatitis c 1a. Viral load 1,500,000 on 3/31/18.  Pt has been f/u GI as outpatient and had ultrasound liver recently waiting to start treatment, last seen by PADMAJA Roy on 3/27/18. Seen by GI consult, Dr. Elder Hutton, recommended checking AFP and Ultrasound with elastography. AFP pending at discharge. Will need to schedule ultrasound with elastography. Needs follow up as outpatient with [facility] upon discharge.       History of C3-7 discitis with osteomyelitis, L4-5 phlegmon 4/2017 s/p C3-7 laminectomy with posteriorlateral fusion and hardware on 4/29/17 for rapid onset quadriplegia from discitis/osteomyelitis, hsitory of MRSA baceremia and abscess cultures from spine. Followed as outpatient by Dr. Vamshi Mcrae infectious disease, has recommended continue doxycycline 100mg daily for MRSA history/prophylaxis. Doxycycline held while patient was on vancomycin, resumed prophylactic dosing upon discharge.                       Discharge Exam:   Visit Vitals    /77 (BP 1 Location: Right arm, BP Patient Position: At rest)    Pulse 66    Temp 98.7 °F (37.1 °C)    Resp 18    Ht 6' (1.829 m)    Wt 70 kg (154 lb 4.8 oz)    SpO2 95%    BMI 20.93 kg/m2     General:  Alert, cooperative, no distress, appears stated age. Head:  Normocephalic, without obvious abnormality, atraumatic. Eyes:  Conjunctivae/corneas clear. PERRL, EOMs intact. Nose: Nares normal. Septum midline. Mucosa normal. No drainage or sinus tenderness. Throat: Lips, mucosa, and tongue normal. Teeth and gums normal.   Neck: Supple, symmetrical, trachea midline, no adenopathy, no carotid bruit and no JVD. Back:   No CVA tenderness. Lungs:   Clear to auscultation bilaterally. Chest wall:  No tenderness or deformity. Heart:  Regular rate and rhythm, S1, S2 normal, no murmur, click, rub or gallop. Abdomen:   Soft, non-tender. Bowel sounds normal. No masses,  No organomegaly. Extremities: Extremities normal, atraumatic, no cyanosis or edema. Pulses: 2+ and symmetric all extremities.    Skin: Skin color, texture, turgor normal. No rashes or lesions   Lymph nodes: Cervical, supraclavicular, and axillary nodes normal.   Neurologic: CNII-XII intact. No  New focal motor or sensory deficit. - patient functional quadriplegia, baseline able to ambulate, unable to move arms aside from some movement in hands. Left 3-5th fingers flexion contracture            Postdischarge care/patient self-management          Medications at discharge  including reasons for change and indications for new ones:   Current Discharge Medication List      START taking these medications    Details   furosemide (LASIX) 40 mg tablet Take 1 Tab by mouth daily. Qty: 30 Tab, Refills: 0      hydrALAZINE (APRESOLINE) 25 mg tablet Take 1 Tab by mouth three (3) times daily as needed for Other (SBP above 160). Qty: 30 Tab, Refills: 0      Lactobacillus Acidoph & Bulgar (FLORANEX) 1 million cell tab tablet Take 2 Tabs by mouth two (2) times a day. Qty: 30 Tab, Refills: 0      levothyroxine (SYNTHROID) 50 mcg tablet Take 1 Tab by mouth Daily (before breakfast). Qty: 30 Tab, Refills: 0      pantoprazole (PROTONIX) 40 mg tablet Take 1 Tab by mouth Daily (before breakfast). Qty: 30 Tab, Refills: 0      sodium bicarbonate 650 mg tablet Take 1 Tab by mouth three (3) times daily. Qty: 30 Tab, Refills: 0      amoxicillin-clavulanate (AUGMENTIN) 875-125 mg per tablet Take 1 Tab by mouth two (2) times a day for 1 day. Qty: 2 Tab, Refills: 0      naloxone 2 mg/actuation spry Use 1 spray intranasally into 1 nostril. Use a new Narcan nasal spray for subsequent doses and administer into alternating nostrils. May repeat every 2 to 3 minutes as needed. Qty: 1 Packet, Refills: 0         CONTINUE these medications which have CHANGED    Details   amLODIPine (NORVASC) 10 mg tablet Take 1 Tab by mouth daily. Qty: 30 Tab, Refills: 0      HYDROmorphone (DILAUDID) 4 mg tablet Take 1 Tab by mouth every six (6) hours as needed.  Max Daily Amount: 16 mg.  Qty: 40 Tab, Refills: 0 Associated Diagnoses: Chronic back pain greater than 3 months duration      insulin lispro (HUMALOG) 100 unit/mL injection Normal Insulin Sensitivity   For Blood Sugar (mg/dL) of:     Less than 150 =   0 units           150 -199 =   2 units  200 -249 =   4 units  250 -299 =   6 units  300 -349 =   8 units  350 and above =   10 units  If 2 glucose readings are above 200 mg/dL within a 24 hr period, proceed to \"Very Insul  Qty: 1 Vial, Refills: 0      linagliptin (TRADJENTA) 5 mg tablet Take 1 Tab by mouth Daily (before breakfast). Qty: 30 Tab, Refills: 0      tamsulosin (FLOMAX) 0.4 mg capsule Take 1 Cap by mouth daily. Qty: 30 Cap, Refills: 0         CONTINUE these medications which have NOT CHANGED    Details   magnesium hydroxide (NUR MILK OF MAGNESIA) 400 mg/5 mL suspension Take 30 mL by mouth daily as needed for Constipation. mineral oil-hydrophil petrolat (AQUAPHOR) ointment Apply  to affected area as needed for Dry Skin. acetaminophen (TYLENOL) 500 mg tablet 1,000 mg.      bisacodyl (DULCOLAX) 5 mg EC tablet 10 mg.      atorvastatin (LIPITOR) 40 mg tablet 40 mg.      albuterol-ipratropium (DUO-NEB) 2.5 mg-0.5 mg/3 ml nebu 3 mL.      nicotine (NICODERM CQ) 14 mg/24 hr patch 1 Patch.      polyethylene glycol (MIRALAX) 17 gram packet 17 g.      multivitamin, tx-iron-ca-min (THERAPY M) 9 mg iron-400 mcg tab tablet Take 1 Tab by Mouth Once a Day. STOP taking these medications       NUT. TX.GLUCOSE INTOLERANCE,SOY (GLUCERNA PO) Comments:   Reason for Stopping:         LACTOBACILLUS RHAMNOSUS GG (CULTURELLE PO) Comments:   Reason for Stopping:         doxycycline (MONODOX) 100 mg capsule Comments:   Reason for Stopping:         PREPARATION H, WITCH HAZEL, EX Comments:   Reason for Stopping:         gabapentin (NEURONTIN) 100 mg capsule Comments:   Reason for Stopping:         atenolol (TENORMIN) 25 mg tablet Comments:   Reason for Stopping:         ibuprofen (MOTRIN) 400 mg tablet Comments: Reason for Stopping:         Menthol-Zinc Oxide (CALMOSEPTINE) 0.44-20.6 % oint Comments:   Reason for Stopping:         rifAMPin (RIFADIN) 300 mg capsule Comments:   Reason for Stopping:         silver sulfADIAZINE (SILVADENE) 1 % topical cream Comments:   Reason for Stopping:         Zolpidem 10 mg subl Comments:   Reason for Stopping:         insulin glargine (LANTUS) 100 unit/mL injection Comments:   Reason for Stopping:         lisinopril (PRINIVIL, ZESTRIL) 40 mg tablet Comments:   Reason for Stopping:         pravastatin (PRAVACHOL) 20 mg tablet Comments:   Reason for Stopping:         insulin lispro (HUMALOG KWIKPEN) 100 unit/mL kwikpen Comments:   Reason for Stopping:         insulin glargine (LANTUS SOLOSTAR) 100 unit/mL (3 mL) pen Comments:   Reason for Stopping:                   Pending laboratory work and tests:  Renal Biopsy, Bone Marrow Biopsy, AFP tumor marker, PAGE Fibrosure,          Condition at discharge and functional status: improved and stable        Diet at discharge Diabetic Diet and Renal Diet        Activities at discharge: PT/OT eval and treat        Discharge destination: SNF      Advanced care plan    Full Code    Contact information/plan for follow up care     Follow-up with Dr. Jordan Lipscomb or Dr. Rica Diamond in SNF within 1-2 days; Dr. Kaity Vasquez within 7-10 days; Dr. Beckie Diana Nephrology within 1-2 weeks; Pulmonology within 2-3 weeks; [facility], Dr. Maritza Chen, within 1-2 weeks; [facility], Dr. Maritza Chen, within 1-2 weeks; Cardiology, Dr. Gallo Gil, within 2-3 weeks.   Follow-up tests/labs:  CBC, CMP, Mg, CXR in 4 weeks; TSH and Free T4 after 5/1/18; monitor glucose control

## 2018-04-10 NOTE — PROGRESS NOTES
Hematology/Medical Oncology Progress Note      NAME: Joan Guerra   :  1960  MRM:  [id number]    Date/Time: 4/10/2018 8:45 AM         Subjective:     Mr Kristen Henderson is a 59 y.o. Man with renal failure and progressive thrombocytopenia. Currently he reports that he is feeling better. There are no new complaints. Past Medical History reviewed and unchanged from Admission History and Physical    Review of Systems   Constitutional: Negative for chills, fatigue, fever and unexpected weight change. HENT: Negative for ear discharge, ear pain, facial swelling, mouth sores, nosebleeds, sneezing, sore throat and tinnitus. Eyes: Negative for photophobia, pain, discharge, redness, itching and visual disturbance. Respiratory: Negative for apnea, cough, choking, chest tightness, shortness of breath, wheezing and stridor. Cardiovascular: Negative for chest pain, palpitations and leg swelling. Gastrointestinal: Negative for abdominal distention, abdominal pain, anal bleeding, blood in stool, constipation, diarrhea, nausea, rectal pain and vomiting. Genitourinary: Negative for decreased urine volume, difficulty urinating, discharge, dysuria, enuresis, flank pain, frequency, genital sores, hematuria, penile pain, penile swelling, scrotal swelling, testicular pain and urgency. Musculoskeletal: Negative for arthralgias, back pain, gait problem, joint swelling, myalgias, neck pain and neck stiffness. Skin: Negative for color change, pallor and rash. Neurological: Negative for dizziness, tremors, seizures, syncope, facial asymmetry, speech difficulty, weakness, light-headedness, numbness and headaches. Hematological: Negative for adenopathy. Does not bruise/bleed easily. Psychiatric/Behavioral: Negative for agitation, behavioral problems, confusion, decreased concentration, dysphoric mood, hallucinations, self-injury, sleep disturbance and suicidal ideas.  The patient is not nervous/anxious and is not hyperactive. Objective:       Vitals:      Last 24hrs VS reviewed since prior progress note. Most recent are:    Visit Vitals    /77 (BP 1 Location: Right arm, BP Patient Position: At rest)    Pulse 66    Temp 98.7 °F (37.1 °C)    Resp 18    Ht 6' (1.829 m)    Wt 70 kg (154 lb 4.8 oz)    SpO2 95%    BMI 20.93 kg/m2     SpO2 Readings from Last 6 Encounters:   04/10/18 95%   03/20/18 99%   12/03/17 100%   03/02/16 99%   02/29/16 95%   02/23/16 96%    O2 Flow Rate (L/min): 2 l/min       Intake/Output Summary (Last 24 hours) at 04/10/18 0845  Last data filed at 04/09/18 1858   Gross per 24 hour   Intake                0 ml   Output             1375 ml   Net            -1375 ml          Exam:      Physical Exam   Nursing note and vitals reviewed. Constitutional: He is oriented to person, place, and time. He appears well-developed and well-nourished. No distress. HENT:   Head: Normocephalic and atraumatic. Mouth/Throat: No oropharyngeal exudate. Eyes: Conjunctivae and EOM are normal. Pupils are equal, round, and reactive to light. Right eye exhibits no discharge. Left eye exhibits no discharge. No scleral icterus. Neck: Normal range of motion. Neck supple. No tracheal deviation present. No thyromegaly present. Cardiovascular: Normal rate and regular rhythm. Exam reveals no gallop and no friction rub. No murmur heard. Pulmonary/Chest: Effort normal and breath sounds normal. No apnea. No respiratory distress. He has no wheezes. He has no rales. Chest wall is not dull to percussion. He exhibits no mass, no tenderness, no bony tenderness, no laceration, no crepitus, no edema, no deformity, no swelling and no retraction. Right breast exhibits no inverted nipple, no mass, no nipple discharge, no skin change and no tenderness. Left breast exhibits no inverted nipple, no mass, no nipple discharge, no skin change and no tenderness. Breasts are symmetrical.   Abdominal: Soft.  Bowel sounds are normal. He exhibits no distension. There is no tenderness. There is no rebound and no guarding. Musculoskeletal: Normal range of motion. He exhibits no edema or tenderness. Lymphadenopathy:     He has no cervical adenopathy. Neurological: He is alert and oriented to person, place, and time. Coordination normal.   Skin: Skin is warm and dry. No rash noted. He is not diaphoretic. No erythema. No pallor. Psychiatric: He has a normal mood and affect.  His behavior is normal. Thought content normal.       Telemetry reviewed:   normal sinus rhythm    Lab Data Reviewed: (see below)      Medications:  Current Facility-Administered Medications   Medication Dose Route Frequency    potassium chloride (K-DUR, KLOR-CON) SR tablet 40 mEq  40 mEq Oral NOW    amLODIPine (NORVASC) tablet 10 mg  10 mg Oral DAILY    sodium chloride (NS) flush 5-10 mL  5-10 mL IntraVENous Q8H    sodium chloride (NS) flush 5-10 mL  5-10 mL IntraVENous PRN    flumazenil (ROMAZICON) 0.1 mg/mL injection 0.2 mg  0.2 mg IntraVENous Multiple    naloxone (NARCAN) injection 0.1 mg  0.1 mg IntraVENous Multiple    gelatin adsorbable (GELFOAM) 12-7 mm sponge 1 Each  1 Each Topical PRN    cefepime (MAXIPIME) 2 g in sterile water (preservative free) 10 mL IV syringe  2 g IntraVENous Q8H    tamsulosin (FLOMAX) capsule 0.4 mg  0.4 mg Oral DAILY    metroNIDAZOLE (FLAGYL) IVPB premix 500 mg  500 mg IntraVENous Q12H    HYDROmorphone (DILAUDID) tablet 4 mg  4 mg Oral Q6H PRN    furosemide (LASIX) tablet 40 mg  40 mg Oral DAILY    sodium bicarbonate tablet 650 mg  650 mg Oral TID    levothyroxine (SYNTHROID) tablet 50 mcg  50 mcg Oral ACB    glucose chewable tablet 16 g  4 Tab Oral PRN    glucagon (GLUCAGEN) injection 1 mg  1 mg IntraMUSCular PRN    dextrose (D50W) injection syrg 12.5-25 g  25-50 mL IntraVENous PRN    insulin lispro (HUMALOG) injection   SubCUTAneous AC&HS    hydrALAZINE (APRESOLINE) tablet 25 mg  25 mg Oral TID PRN    Lactobacillus Acidoph & Barbara COLUNGA [facility]) tablet 2 Tab  2 Tab Oral BID    linagliptin (TRADJENTA) tablet 5 mg  5 mg Oral ACB    pantoprazole (PROTONIX) tablet 40 mg  40 mg Oral ACB       ______________________________________________________________________      Lab Review:     Recent Labs      04/10/18   0406  04/09/18   2005  04/09/18   0301  04/08/18   0108   WBC  4.9   --   5.4  4.6   HGB  8.2*  8.5*  7.8*  7.8*   HCT  25.6*  25.8*  24.2*  24.5*   PLT  123*   --   117*  103*     Recent Labs      04/10/18   0406  04/09/18   0301  04/08/18   0108   NA  142  144  145   K  3.3*  3.5  3.6   CL  106  109*  110*   CO2  29  28  27   GLU  162*  108*  108*   BUN  13  13  15   CREA  1.36*  1.18  1.00   CA  8.8  8.8  8.9   MG  1.8   --    --    ALB  2.8*   --    --    SGOT  29   --    --    ALT  30   --    --    INR   --   1.3*   --      No components found for: GLPOC  No results for input(s): PH, PCO2, PO2, HCO3, FIO2 in the last 72 hours. 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 Thrombocytopenia: I have explained to the patient that his platelets are now increasing.   The most recent CBC shows that his platelet count is now 123,000 with a hemoglobin of 8.2 g/dL hematocrit of 25.6%. No therapeutic intervention is necessary at this point. The likely causes of his thrombocytopenia are iatrogenic or infection related. The peripheral reveals no schistocytes to suggest TTP/HUS. The LDH level is normal at 189 U/L and the SPEP is still pending. Free kappa light chains are increased at 117 mg/l. The bone marrow biopsy is pending. 2. Anemia in CKD: pending results or iron studies the patient may benefit from procrit. The am h/h are 7.8 and 24.2 respectfully. 3. Plasma cell dyscrasia: the bone marrow biopsy shows no monoclonal plasma cells. The UPEP was positive for bence roberts protein. A kidney biopsy was completed yesterday.            Total time spent with patient:25 minutes                  Care Plan discussed with: Patient, Nursing Staff and Consultant/Specialist    Discussed:  Care Plan    Prophylaxis:  SCD's    Disposition:  Home w/Family           ___________________________________________________    Attending Physician: Godwin Sanchez MD

## 2018-04-10 NOTE — ROUTINE PROCESS
Bedside and Verbal shift change report given to Nguyen Duckworth (oncoming nurse) by Emory Heimlich   (offgoing nurse). Report included the following information SBAR, Procedure Summary, MAR, Recent Results and Cardiac Rhythm Sinus Rhythm.

## 2018-04-11 LAB
A2 MACROGLOB SERPL-MCNC: 258 MG/DL (ref 110–276)
ALT (SGPT) P5P, 001547: 31 IU/L (ref 0–55)
APO A-I SERPL-MCNC: 130 MG/DL (ref 101–178)
AST SERPL W P-5'-P-CCNC: 30 IU/L (ref 0–40)
B2 MICROGLOB SERPL-MCNC: 5.4 MG/L (ref 0.6–2.4)
BILIRUB SERPL-MCNC: 0.5 MG/DL (ref 0–1.2)
CHOLEST SERPL-MCNC: 115 MG/DL (ref 100–199)
COMMENT:: ABNORMAL
FIBROSIS SCORING:, 550107: ABNORMAL
FIBROSIS STAGE SERPL QL: ABNORMAL
GGT SERPL-CCNC: 43 IU/L (ref 0–65)
GLUCOSE SERPL-MCNC: 80 MG/DL (ref 65–99)
HAPTOGLOB SERPL-MCNC: 32 MG/DL (ref 34–200)
INTERPRETATIONS:, 550143: ABNORMAL
LIVER FIBR SCORE SERPL CALC.FIBROSURE: 0.67 (ref 0–0.21)
NASH SCORING, 550144: ABNORMAL
NECROINFLAMMATORY ACT GRADE SERPL QL: ABNORMAL
NECROINFLAMMATORY ACT SCORE SERPL: 0.25
SERVICE CMNT-IMP: ABNORMAL
STEATOSIS GRADE, 550153: ABNORMAL
STEATOSIS GRADING, 550189: ABNORMAL
STEATOSIS SCORE, 550149: 0.2 (ref 0–0.3)
TRIGL SERPL-MCNC: 105 MG/DL (ref 0–149)

## 2018-04-12 LAB
ALBUMIN SERPL ELPH-MCNC: 3.7 G/DL (ref 2.9–4.4)
ALBUMIN/GLOB SERPL: 1.1 {RATIO} (ref 0.7–1.7)
ALPHA1 GLOB SERPL ELPH-MCNC: 0.3 G/DL (ref 0–0.4)
ALPHA2 GLOB SERPL ELPH-MCNC: 0.7 G/DL (ref 0.4–1)
B-GLOBULIN SERPL ELPH-MCNC: 0.9 G/DL (ref 0.7–1.3)
GAMMA GLOB SERPL ELPH-MCNC: 1.7 G/DL (ref 0.4–1.8)
GLOBULIN SER-MCNC: 3.7 G/DL (ref 2.2–3.9)
IGA SERPL-MCNC: 253 MG/DL (ref 90–386)
IGG SERPL-MCNC: 1836 MG/DL (ref 700–1600)
IGM SERPL-MCNC: 78 MG/DL (ref 20–172)
INTERPRETATION SERPL IEP-IMP: ABNORMAL
M PROTEIN SERPL ELPH-MCNC: ABNORMAL G/DL
PROT SERPL-MCNC: 7.4 G/DL (ref 6–8.5)

## 2018-04-26 ENCOUNTER — HOSPITAL ENCOUNTER (OUTPATIENT)
Dept: LAB | Age: 58
Discharge: HOME | End: 2018-04-26

## 2018-04-26 ENCOUNTER — HOSPITAL ENCOUNTER (OUTPATIENT)
Dept: ULTRASOUND IMAGING | Age: 58
Discharge: HOME | End: 2018-04-26
Attending: PHYSICIAN ASSISTANT
Payer: MEDICAID

## 2018-04-26 DIAGNOSIS — B18.2 CHRONIC VIRAL HEPATITIS C (HCC): ICD-10-CM

## 2018-04-26 LAB — SENTARA SPECIMEN COL,SENBCF: NORMAL

## 2018-04-26 PROCEDURE — 76705 ECHO EXAM OF ABDOMEN: CPT

## 2018-04-26 PROCEDURE — 99001 SPECIMEN HANDLING PT-LAB: CPT | Performed by: PHYSICIAN ASSISTANT

## 2018-05-09 ENCOUNTER — OFFICE VISIT (OUTPATIENT)
Dept: PULMONOLOGY | Age: 58
End: 2018-05-09

## 2018-05-09 VITALS
BODY MASS INDEX: 21.26 KG/M2 | HEART RATE: 65 BPM | WEIGHT: 157 LBS | RESPIRATION RATE: 16 BRPM | HEIGHT: 72 IN | SYSTOLIC BLOOD PRESSURE: 122 MMHG | OXYGEN SATURATION: 99 % | DIASTOLIC BLOOD PRESSURE: 66 MMHG | TEMPERATURE: 97.6 F

## 2018-05-09 DIAGNOSIS — G70.9 NEUROMUSCULAR RESPIRATORY WEAKNESS (HCC): ICD-10-CM

## 2018-05-09 DIAGNOSIS — J99 NEUROMUSCULAR RESPIRATORY WEAKNESS (HCC): ICD-10-CM

## 2018-05-09 DIAGNOSIS — R53.81 PHYSICAL DECONDITIONING: ICD-10-CM

## 2018-05-09 DIAGNOSIS — R09.02 HYPOXIA: ICD-10-CM

## 2018-05-09 DIAGNOSIS — R06.09 DYSPNEA ON EXERTION: ICD-10-CM

## 2018-05-09 DIAGNOSIS — J18.9 PNEUMONIA OF BOTH LUNGS DUE TO INFECTIOUS ORGANISM, UNSPECIFIED PART OF LUNG: Primary | ICD-10-CM

## 2018-05-09 PROBLEM — E11.21 TYPE 2 DIABETES WITH NEPHROPATHY (HCC): Status: ACTIVE | Noted: 2018-05-09

## 2018-05-09 RX ORDER — DOXYCYCLINE 100 MG/1
100 TABLET ORAL DAILY
COMMUNITY
End: 2020-02-25

## 2018-05-09 RX ORDER — PHENOL/SODIUM PHENOLATE
20 AEROSOL, SPRAY (ML) MUCOUS MEMBRANE DAILY
COMMUNITY
End: 2018-10-02

## 2018-05-09 RX ORDER — POTASSIUM CHLORIDE 20 MEQ/1
20 TABLET, EXTENDED RELEASE ORAL DAILY
Status: ON HOLD | COMMUNITY
End: 2019-03-28

## 2018-05-09 NOTE — LETTER
5/10/2018 5:11 PM 
 
Patient:  Margo Garcia YOB: 1960 Date of Visit: 5/9/2018 Dear Juan Vogel MD 
[address] [place] [id number] VIA Facsimile: [phone] 
 : Thank you for referring Mr. Sandy Delgado to me for evaluation/treatment. Below are the relevant portions of my assessment and plan of care. If you have questions, please do not hesitate to call me. I look forward to following Mr. Harlan Barragan along with you. Sincerely, Kay Parkinson MD/MPH Pulmonary, Critical Care Medicine [facility]

## 2018-05-09 NOTE — MR AVS SNAPSHOT
[address], [address] [address] 
[phone] Patient: Yordan Kuhn MRN: [id number] :1960 Visit Information Date & Time Provider Department Dept. Phone Encounter #  
 2018  9:45 AM Gianna White MD [facility] Veda Comer [id number] Follow-up Instructions Return in about 2 months (around 2018). Your Appointments 2018 10:30 AM  
ESTABLISHED PATIENT with PADMAJA Guerrier [facility] ([address]) Appt Note: EP- Carmelo, 2 wk hospital f/u-[facility], notes/labs in [facility]; scheduled/confirmed appt date/time w/hospital staff 4/10/18 l[address], [address] [facility] [address]  
  
   
 [address] [facility], [address] [place] [id number] 2018 10:30 AM  
POST HOSPITAL with Cristina Jones NP [facility] ([address]) Appt Note: per 2 [place] @ [facility]  -- 2-3 week PH; unable to confirm appt on 18. Due to N/A -alb; patient rescheduled due to transport taking him to the wrong place . .. offered a 1:30pm for today (due to cancellation) . . they denied [facility] [place] [id number] [phone] [address] [place] [address] [address]  [place] [phone]  
  
    
 2018  2:30 [address] with Pepper Seymour MD  
[facility] ([address]) Appt Note: [facility] d/c 4/10/18 renal cell ca/records in [facility]; [facility] d/c 4/10/18 renal cell ca/records [address] [address]  
[address]  
  
   
 [address]  [address] [facility]  
  
    
 2018 11:30 AM  
ESTABLISHED PATIENT with King Melissa MD  
[facility] [address]) Appt Note: 6mo F/U, Rev Imaging- to be done at [facility] [address] [facility], [place] [address] [facility] [address]  
  
   
 [address] [facility], [address] [facility] [address] Upcoming Health Maintenance Date Due  
 EYE EXAM RETINAL OR DILATED Q1 8/11/1968 Pneumococcal 19-64 Highest Risk (1 of 3 - PCV13) 8/11/1977 DTaP/Tdap/Td series (1 - Tdap) 8/11/1979 FOBT Q 1 YEAR AGE 50-75 8/11/2008 FOOT EXAM Q1 2/26/2017 Influenza Age 5 to Adult 8/1/2018 HEMOGLOBIN A1C Q6M 10/4/2018 MICROALBUMIN Q1 3/31/2019 LIPID PANEL Q1 4/1/2019 Allergies as of 5/9/2018  Review Complete On: 5/9/2018 By: Whit Hart LPN Severity Noted Reaction Type Reactions Iodine High 09/28/2013    Hives Current Immunizations  Never Reviewed Name Date Influenza Vaccine 12/20/2013 12:00 AM  
  
 Not reviewed this visit You Were Diagnosed With   
  
 Codes Comments Pneumonia of both lungs due to infectious organism, unspecified part of lung    -  Primary ICD-10-CM: J18.9 ICD-9-CM: 483.8 Hypoxia     ICD-10-CM: R09.02 
ICD-9-CM: 799.02 Dyspnea on exertion     ICD-10-CM: R06.09 
ICD-9-CM: 786.09 Neuromuscular respiratory weakness     ICD-10-CM: J98.8 ICD-9-CM: 519.8 Physical deconditioning     ICD-10-CM: R53.81 ICD-9-CM: 799.3 Vitals BP Pulse Temp Resp Height(growth percentile) Weight(growth percentile) 122/66 (BP 1 Location: Right arm, BP Patient Position: Sitting) 65 97.6 °F (36.4 °C) (Oral) 16 6' (1.829 m) 157 lb (71.2 kg) SpO2 BMI Smoking Status 99% 21.29 kg/m2 Former Smoker Vitals History BMI and BSA Data Body Mass Index Body Surface Area  
 21.29 kg/m 2 1.9 m 2 Preferred Pharmacy Pharmacy Name Phone [address] [facility], [address] [facility] [phone] Your Updated Medication List  
  
   
This list is accurate as of 5/9/18 10:05 AM.  Always use your most recent med list.  
  
  
  
  
 acetaminophen 500 mg tablet Commonly known as:  TYLENOL  
1,000 mg.  
  
 albuterol-ipratropium 2.5 mg-0.5 mg/3 ml Nebu Commonly known as:  DUO-NEB  
3 mL. amLODIPine 10 mg tablet Commonly known as:  Arely Yo Take 1 Tab by mouth daily. atorvastatin 40 mg tablet Commonly known as:  LIPITOR 40 mg.  
  
 bisacodyl 5 mg EC tablet Commonly known as:  DULCOLAX 10 mg.  
  
 doxycycline 100 mg tablet Commonly known as:  ADOXA Take 100 mg by mouth daily. furosemide 40 mg tablet Commonly known as:  LASIX Take 1 Tab by mouth daily. hydrALAZINE 25 mg tablet Commonly known as:  APRESOLINE Take 1 Tab by mouth three (3) times daily as needed for Other (SBP above 160). HYDROmorphone 4 mg tablet Commonly known as:  DILAUDID Take 1 Tab by mouth every six (6) hours as needed. Max Daily Amount: 16 mg.  
  
 insulin lispro 100 unit/mL injection Commonly known as:  HUMALOG Normal Insulin Sensitivity  For Blood Sugar (mg/dL) of:    Less than 150 =   0 units          150 -199 =   2 units 200 -249 =   4 units 250 -299 =   6 units 300 -349 =   8 units 350 and above =   10 units If 2 glucose readings are above 200 mg/dL within a 24 hr period, proceed to [facility] JANUVIA 100 mg tablet Generic drug:  SITagliptin Take 100 mg by mouth daily. Lactobacillus Acidoph & Bulgar 1 million cell Tab tablet Commonly known as:  Rudine Heart Take 2 Tabs by mouth two (2) times a day. levothyroxine 50 mcg tablet Commonly known as:  SYNTHROID Take 1 Tab by mouth Daily (before breakfast). linagliptin 5 mg tablet Commonly known as:  Vista Rom Take 1 Tab by mouth Daily (before breakfast). mineral oil-hydrophil petrolat ointment Commonly known as:  AQUAPHOR Apply  to affected area as needed for Dry Skin. naloxone 2 mg/actuation Spry Use 1 spray intranasally into 1 nostril.  Use a new Narcan nasal spray for subsequent doses and administer into alternating nostrils. May repeat every 2 to 3 minutes as needed. nicotine 14 mg/24 hr patch Commonly known as:  NICODERM CQ  
1 Patch. Omeprazole delayed release 20 mg tablet Commonly known as:  PRILOSEC D/R Take 20 mg by mouth daily. pantoprazole 40 mg tablet Commonly known as:  PROTONIX Take 1 Tab by mouth Daily (before breakfast). NUR MILK OF MAGNESIA 400 mg/5 mL suspension Generic drug:  magnesium hydroxide Take 30 mL by mouth daily as needed for Constipation. polyethylene glycol 17 gram packet Commonly known as:  MIRALAX  
17 g.  
  
 potassium chloride 20 mEq tablet Commonly known as:  K-DUR, KLOR-CON Take 20 mEq by mouth daily. sodium bicarbonate 650 mg tablet Take 1 Tab by mouth three (3) times daily. tamsulosin 0.4 mg capsule Commonly known as:  FLOMAX Take 1 Cap by mouth daily. Therapy M 9 mg iron-400 mcg Tab tablet Generic drug:  multivitamin, tx-iron-ca-min Take 1 Tab by Mouth Once a Day. Follow-up Instructions Return in about 2 months (around 7/9/2018). To-Do List   
 05/10/2018 Sleep Center:  SPLIT CPAP/PSG   
  
 06/05/2018 Imaging:  CT CHEST WO CONT Introducing [facility]! [facility] introduces LiveTop patient portal. Now you can access parts of your medical record, email your doctor's office, and request medication refills online. 1. In your internet browser, go to [email or web address] 2. Click on the First Time User? Click Here link in the Sign In box. You will see the New Member Sign Up page. 3. Enter your LiveTop Access Code exactly as it appears below. You will not need to use this code after youve completed the sign-up process. If you do not sign up before the expiration date, you must request a new code. · LiveTop Access Code: [email or web address] Expires: 8/7/2018  9:29 AM 
 
 4. Enter the last four digits of your Social Security Number (xxxx) and Date of Birth (mm/dd/yyyy) as indicated and click Submit. You will be taken to the next sign-up page. 5. Create a [employer] ID. This will be your [employer] login ID and cannot be changed, so think of one that is secure and easy to remember. 6. Create a [employer] password. You can change your password at any time. 7. Enter your Password Reset Question and Answer. This can be used at a later time if you forget your password. 8. Enter your e-mail address. You will receive e-mail notification when new information is available in [address] 9. Click Sign Up. You can now view and download portions of your medical record. 10. Click the Download Summary menu link to download a portable copy of your medical information. If you have questions, please visit the Frequently Asked Questions section of the [employer] website. Remember, [employer] is NOT to be used for urgent needs. For medical emergencies, dial 911. Now available from your iPhone and Android! Please provide this summary of care documentation to your next provider. Your primary care clinician is listed as Nato Rihcmond. If you have any questions after today's visit, please call [phone].

## 2018-05-09 NOTE — PROGRESS NOTES
[address] [place] [address], [address] [facility]  Pulmonary, Critical Care, and Sleep Medicine    Pulmonary Office Followup  Name: Efrain Akins 59 y.o. male  MRN: [id number]  : 1960  Service Date: 18  Chief Complaint:   Chief Complaint   Patient presents with   [facility] Follow Up       History of Present Illness:  Efrain Akins is a 59 y.o. male, who presents to Pulmonary clinic referred for hospital discharge for hypoxia and pneumonia. Pt was recently admitted to [facility] from 3/31-4/10/18. Pt was discharged on ABx. In the interval, pt reports no issues with dyspnea, denies fevers, chills, N/V/D. Reports he still is at the nursing home but is considering transitioning to a home with assistance. Pt reports that he has regained some the strength from his quadriplegia. Pt denies any dysphagia. Reports no sputum, cough has improved, no hemoptysis. Past Medical Hx: I have personally reviewed medical hx  Past Medical History:   Diagnosis Date    Bradycardia, unspecified     Chronic drug abuse 1974    Chronic kidney disease     Diabetes ([facility] 1990    Diabetes mellitus ([facility]     Drug abuse     Hypertension     Renal cell carcinoma of left kidney ([facility] 13    Pathological Stage [id number] cc RCC FG3 s/p left open partial nephrectomy in       Renal mass     Viral hepatitis C        Past Surgical Hx: I have personally reviewed surgical hx  Past Surgical History:   Procedure Laterality Date    HX CIRCUMCISION  13    Dr. Marilia [facility]heng, [facility]    HX NEPHRECTOMY Left 13    Open Partial, Dr. Marilia Zheng, [facility]    HX OTHER SURGICAL Right 2016    removed right ft  2nd meta-tarsal       Family Hx: I have personally reviewed the family hx. No family hx of cancer or hereditary lung disease with immediate family.   Family History   Problem Relation Age of Onset    Diabetes Mother     Sickle Cell Anemia Father     Diabetes Sister     Hypertension Sister        Social Hx: I have personally reviewed the social hx. Social History     Social History    Marital status:      Spouse name: N/A    Number of children: N/A    Years of education: N/A     Occupational History    Not on file. Social History Main Topics    Smoking status: Former Smoker     Packs/day: 0.50     Years: 30.00     Types: Cigarettes     Quit date: 4/29/2017    Smokeless tobacco: Never Used    Alcohol use 8.4 oz/week     14 Cans of beer per week      Comment: watching sports    Drug use: No      Comment: methadone    Sexual activity: Not on file     Other Topics Concern    Not on file     Social History Narrative     since 2012;  for 12 yrs; [employer]K; [address] [employer]  just recently furloughed; No  service       Allergies: I have reviewed the allergy hx  Allergies   Allergen Reactions    Iodine Hives       Medications:  I have reviewed the patient's medications  Prior to Admission medications    Medication Sig Start Date End Date Taking? Authorizing Provider   Omeprazole delayed release (PRILOSEC D/R) 20 mg tablet Take 20 mg by mouth daily. Yes Historical Provider   SITagliptin (JANUVIA) 100 mg tablet Take 100 mg by mouth daily. Yes Historical Provider   potassium chloride (K-DUR, KLOR-CON) 20 mEq tablet Take 20 mEq by mouth daily. Yes Historical Provider   doxycycline (ADOXA) 100 mg tablet Take 100 mg by mouth daily. Yes Historical Provider   amLODIPine (NORVASC) 10 mg tablet Take 1 Tab by mouth daily. 4/10/18  Yes Neeru Medley MD   furosemide (LASIX) 40 mg tablet Take 1 Tab by mouth daily. 4/10/18  Yes Neeru Medley MD   hydrALAZINE (APRESOLINE) 25 mg tablet Take 1 Tab by mouth three (3) times daily as needed for Other (SBP above 160). 4/10/18  Yes Neeru Medley MD   HYDROmorphone (DILAUDID) 4 mg tablet Take 1 Tab by mouth every six (6) hours as needed. Max Daily Amount: 16 mg. 4/10/18  Yes Hussein Dale MD   insulin lispro (HUMALOG) 100 unit/mL injection Normal Insulin Sensitivity   For Blood Sugar (mg/dL) of:     Less than 150 =   0 units           150 -199 =   2 units  200 -249 =   4 units  250 -299 =   6 units  300 -349 =   8 units  350 and above =   10 units  If 2 glucose readings are above 200 mg/dL within a 24 hr period, proceed to \"Very Insul 4/10/18  Yes Hussein Dale MD   Lactobacillus Acidoph & Bulgar (FLORANEX) 1 million cell tab tablet Take 2 Tabs by mouth two (2) times a day. 4/10/18  Yes Hussein Dale MD   levothyroxine (SYNTHROID) 50 mcg tablet Take 1 Tab by mouth Daily (before breakfast). 4/11/18  Yes Hussein Dale MD   sodium bicarbonate 650 mg tablet Take 1 Tab by mouth three (3) times daily. 4/10/18  Yes Hussein Dale MD   tamsulosin (FLOMAX) 0.4 mg capsule Take 1 Cap by mouth daily. 4/10/18  Yes Hussein Dale MD   naloxone 2 mg/actuation spry Use 1 spray intranasally into 1 nostril. Use a new Narcan nasal spray for subsequent doses and administer into alternating nostrils. May repeat every 2 to 3 minutes as needed. 4/10/18  Yes Mila Aquino MD   magnesium hydroxide (UNR MILK OF MAGNESIA) 400 mg/5 mL suspension Take 30 mL by mouth daily as needed for Constipation. Yes Historical Provider   acetaminophen (TYLENOL) 500 mg tablet 1,000 mg. 6/7/17  Yes Historical Provider   bisacodyl (DULCOLAX) 5 mg EC tablet 10 mg. 6/7/17  Yes Historical Provider   atorvastatin (LIPITOR) 40 mg tablet 40 mg. 6/7/17  Yes Historical Provider   albuterol-ipratropium (DUO-NEB) 2.5 mg-0.5 mg/3 ml nebu 3 mL. 6/7/17  Yes Historical Provider   nicotine (NICODERM CQ) 14 mg/24 hr patch 1 Patch. 6/7/17  Yes Historical Provider   polyethylene glycol (MIRALAX) 17 gram packet 17 g. Yes Historical Provider   multivitamin, tx-iron-ca-min (THERAPY M) 9 mg iron-400 mcg tab tablet Take 1 Tab by Mouth Once a Day.  6/7/17  Yes Historical Provider   linagliptin (TRADJENTA) 5 mg tablet Take 1 Tab by mouth Daily (before breakfast). 4/11/18   Tori Aquino MD   pantoprazole (PROTONIX) 40 mg tablet Take 1 Tab by mouth Daily (before breakfast). 4/11/18   Tori Aquino MD   mineral oil-hydrophil petrolat (AQUAPHOR) ointment Apply  to affected area as needed for Dry Skin. Historical Provider       Immunizations:  I have reviewed the patient's immunizations  Immunization History   Administered Date(s) Administered    Influenza Vaccine 12/20/2013       Review of Systems:  A complete review of systems was performed as stated in the HPI, all others are negative. Objective:    Physical Exam:  /66 (BP 1 Location: Right arm, BP Patient Position: Sitting)  Pulse 65  Temp 97.6 °F (36.4 °C) (Oral)   Resp 16  Ht 6' (1.829 m)  Wt 71.2 kg (157 lb)  SpO2 99%  BMI 21.29 kg/m2  Vitals were personally reviewed  Gen: no acute distress, pleasant and cooperative, sitting up in wheelchair, unable to climb to exam table, frail  HEENT: normocephalic/atraumatic, PERRLA, EOMI, no scleral icterus, no oral lesions, poor dentition, Mallampati III  Neck: supple, trachea midline, no JVD, no cervical and supraclavicular adenopathy  Chest: no lesions, with even rise and fall, no pectus excavatum or flail chest  CVS: regular rate rhythm, S1/S2, no murmurs/rubs/gallops  Lungs: good air entry B/L, no wheezes/rales/rhonchi  Back: no kyphosis or scoliosis  Abdomen: soft, nontender, bowel sounds present, no hepatosplenomegaly  Ext: no pitting edema B/L, no peripheral cyanosis or clubbing  Neuro: poor muscle mass, AAOx3, weakness throughout but able to move all extremities, unable to walk, but able to move legs in wheelchair to get around  Skin: dry skin over forehead, no other rashes, erythema, lesions  Psych: normal memory, thought content, and processing    Labs:   I have reviewed the patient's available labs  Lab Results   Component Value Date/Time    WBC 4.9 04/10/2018 04:06 AM    Hemoglobin, POC 9.9 (L) 12/03/2017 07:02 PM    HGB 8.2 (L) 04/10/2018 04:06 AM    Hematocrit, POC 29 (L) 12/03/2017 07:02 PM    HCT 25.6 (L) 04/10/2018 04:06 AM    PLATELET 656 (L) 14/17/0695 04:06 AM    MCV 88.6 04/10/2018 04:06 AM     Imaging:  I have personally reviewed patient's imaging - CT chest from 4/5/18 shows B/L infiltrates in lower lobes and small effusion, no masses. Official Read per Radiology  HISTORY: Bilateral basilar infiltrates follow-up.     EXAM: CT thorax. .     TECHNIQUE: Spiral images of the chest were performed according to routine  protocol without intravenous contrast. Coronal and sagittal reconstructions were  also provided for evaluation. Correlated with 3/31/2018 exam.     All CT scans at this facility are performed using dose optimization technique as  appropriate to a performed exam, to include automated exposure control,  adjustment of the mA and/or kV according to patient size (including appropriate  matching for site-specific examinations), or use of iterative reconstruction  technique.      FINDINGS:      Limited evaluation of the pulmonary parenchyma and mediastinum given lack of  intravenous contrast.     Worsened bibasilar opacities as well as moderate size right pleural effusion and  small left pleural effusions are demonstrated.     No pneumothorax.     Heart is enlarged without change.     No pericardial effusions.     Trachea bronchial trees unremarkable.     No large mass lesion or large pathologic adenopathy is seen.     No lytic or blastic lesions.         IMPRESSION  IMPRESSION:     1.  Worsening bibasilar airspace opacities with worsened right moderate size and  left small pleural effusions without pneumothorax.         PFTs:  None available    TTE:  I have reviewed the patient's TTE results  Results for orders placed or performed during the hospital encounter of 03/31/18   2D ECHO COMPLETE ADULT (TTE) W OR WO CONTR Status: None    [facility]  [facility], [address]  [phone]    Transthoracic Echocardiogram    Patient: Antolin Garcia  MRN: [id number]  ACCT #: [de-identified]  : 1960  Age: 59 years  Gender: Male  Height: 72 in  Weight: 180.6 lb  BSA: 2.04 m-sq  BP: 112 / 66 mmHg  Study date: 2018  Status: Routine  Location: [facility] DMS [address] #: [id number]    Allergies: IODINE    Referring_Ordering Physician:  [facility] Hospitalist  Interpreting Group:  [facility]  Interpreting Physician:  Rony Colón. Abiodun Jacobs MD  Technologist:  Jin Burr, RUST, [facility]    SUMMARY:  Left ventricle: Systolic function was normal by EF (biplane method of disks). Ejection fraction was estimated to be 60  %. No obvious wall motion abnormalities identified in the views obtained. Wall thickness was at the upper limits of  normal.    Right ventricle: The size was at the upper limits of normal. Systolic   pressure was mildly increased. Estimated peak  pressure was at least 42 mmHg. Left atrium: The atrium was severely dilated. Mitral valve: There was mild regurgitation. Tricuspid valve: There was mild regurgitation. INDICATIONS: Congestive heart failure. HISTORY: Prior history: Risk factors: hypertension. PROCEDURE: This was a routine study. The study included complete 2D imaging,   M-mode, complete spectral Doppler, and  color Doppler. The heart rate was 55 bpm, at the start of the study. Systolic   blood pressure was 112 mmHg, at the start  of the study. Diastolic blood pressure was 66 mmHg, at the start of the   study. Image quality was good. LEFT VENTRICLE: Size was normal. Systolic function was normal by EF (biplane   method of disks). Ejection fraction was  estimated to be 60 %. No obvious wall motion abnormalities identified in the   views obtained. Wall thickness was at the  upper limits of normal. DOPPLER: Indeterminate diastolic function.     RIGHT VENTRICLE: The size was at the upper limits of normal. Systolic   function was normal. DOPPLER: Systolic pressure  was mildly increased. Estimated peak pressure was at least 42 mmHg. LEFT ATRIUM: The atrium was severely dilated. LA volume index was 51 ml/m-sq. RIGHT ATRIUM: Size was at the upper limits of normal.    MITRAL VALVE: Normal valve structure. There was normal leaflet separation. DOPPLER: There was no evidence for stenosis. There was mild regurgitation. AORTIC VALVE: The valve was trileaflet. Leaflets exhibited normal thickness   and normal cuspal separation. DOPPLER:  Transaortic velocity was within the normal range. There was no stenosis. There was no significant regurgitation. TRICUSPID VALVE: Normal valve structure. There was normal leaflet separation. DOPPLER: There was no evidence for  tricuspid stenosis. There was mild regurgitation. Tricuspid regurgitation   peak velocity: 3.1 m/sec. Right atrial  pressure estimate: 3 mmHg. PULMONIC VALVE: Not well visualized, but normal Doppler findings. DOPPLER:   There was trivial regurgitation. AORTA: The root exhibited upper limit of normal size. SYSTEMIC VEINS: IVC: The inferior vena cava was normal in size. The   respirophasic change in diameter was more than 50%. PERICARDIUM: There was no pericardial effusion.     SYSTEM MEASUREMENT TABLES    2D  Ao Diam: 3.46 cm  IVSd: 1.24 cm  LVIDd: 4.77 cm  LVIDs: 3.76 cm  LVPWd: 1.21 cm  LAAs A2C: 28.07 cm2  LAAs A4C: 25.69 cm2  LAESV A-L A2C: 109.96 ml  LAESV A-L A4C: 95.03 ml  LAESV Index (A-L): 50.9 ml/m2  LAESV MOD A2C: 103.41 ml  LAESV MOD A4C: 86.32 ml  LAESV(A-L): 103.83 ml  LALs A2C: 6.08 cm  LALs A4C: 5.9 cm  LVOT Diam: 1.87 cm  RA Area: 20.26 cm2  RVIDd: 4.04 cm    CW  TR Vmax: 3.13 m/s  TR maxP.16 mmHG    PW  LVOT VTI: 26.09 cm  LVOT Vmax: 1.01 m/s  LVOT Vmean: 0.64 m/s  MV A Michael: 0.64 m/s  MV Dec Cobb: 4.03 m/s2  MV DecT: 214.71 ms  MV E Michael: 0.87 m/s  MV E/A Ratio: 1.35  E': 0.06 m/s  E/E': 14.05  LVOT maxP.12 mmHG  LVOT meanP.93 mmHG  LVSI Dopp: 35.19 ml/m2  LVSV Dopp: 71.79 ml  Lateral E': 0.09 m/s  Lateral E/E': 9.68  P Vein A: 0.24 m/s  P Vein A Dur: 108.42 ms    Prepared and E-signed by    Dannielle Carrasquillo. Whitley Cantu MD  Signed 2018 69:44:59           Assessment and Plan:  59 y.o. male with:    Impression:  1. Aspiration pneumonia:  Appears to have resolved - pt admitted to [facility] from 3/31-4/10/18  2. Hypoxia:  Resolved, etiology due to pneumonia along with atelectasis  3. Deconditioning:  Etiology due to neuromuscular weakness and being bedbound  4. Neuromuscular weakness  5. CHFpEF:  Normal LVEF on last TTE, but has severely enlarged LA and elevated NTproBNP    Plan:  -Repeat CT chest in 1 month  -F/U with Cardiology with regards to cardiomyopathy  -Diagnostic PSG to evaluate for possible Bipap vs AVAPs  -Advised patient to remain active  -Continue rehab  -Frequent incentive spirometry      RTC: Follow-up Disposition:  Return in about 2 months (around 2018).       Orders Placed This Encounter    CT CHEST WO CONT    SPLIT CPAP/PSG       Lary Copeland MD/MPH     Pulmonary, Critical Care Medicine  [facility]

## 2018-05-18 ENCOUNTER — OFFICE VISIT (OUTPATIENT)
Dept: CARDIOLOGY CLINIC | Age: 58
End: 2018-05-18

## 2018-05-18 VITALS
OXYGEN SATURATION: 99 % | SYSTOLIC BLOOD PRESSURE: 128 MMHG | DIASTOLIC BLOOD PRESSURE: 72 MMHG | BODY MASS INDEX: 20.72 KG/M2 | HEART RATE: 56 BPM | WEIGHT: 153 LBS | HEIGHT: 72 IN

## 2018-05-18 DIAGNOSIS — I10 ESSENTIAL HYPERTENSION: Primary | ICD-10-CM

## 2018-05-18 DIAGNOSIS — N18.30 CHRONIC KIDNEY DISEASE, STAGE III (MODERATE) (HCC): ICD-10-CM

## 2018-05-18 DIAGNOSIS — E11.21 TYPE 2 DIABETES WITH NEPHROPATHY (HCC): ICD-10-CM

## 2018-05-18 DIAGNOSIS — J96.01 ACUTE RESPIRATORY FAILURE WITH HYPOXIA (HCC): ICD-10-CM

## 2018-05-18 DIAGNOSIS — R00.1 BRADYCARDIA: ICD-10-CM

## 2018-05-18 PROBLEM — Z89.421 STATUS POST AMPUTATION OF TOE OF RIGHT FOOT (HCC): Status: ACTIVE | Noted: 2018-05-18

## 2018-05-18 RX ORDER — POLYETHYLENE GLYCOL 3350 17 G/17G
17 POWDER, FOR SOLUTION ORAL DAILY
COMMUNITY
End: 2018-10-02

## 2018-05-18 RX ORDER — AMLODIPINE BESYLATE 10 MG/1
TABLET ORAL DAILY
COMMUNITY
End: 2018-10-02

## 2018-05-18 RX ORDER — KETOCONAZOLE 20 MG/G
CREAM TOPICAL DAILY
COMMUNITY
End: 2018-06-12

## 2018-05-18 RX ORDER — SODIUM BICARBONATE 325 MG/1
325 TABLET ORAL 4 TIMES DAILY
COMMUNITY
End: 2018-10-02

## 2018-05-18 NOTE — MR AVS SNAPSHOT
[address] [address] [address] [address] [phone] Patient: Ginny Payton MRN: [id number] :1960 Visit Information Date & Time Provider Department Dept. Phone Encounter #  
 2018 10:30 AM Joe Huertas NP [facility] [address] [id number] Your Appointments 2018  2:30 PM  
HOSPITAL FOLLOW-UP with Arnold Lucia MD  
[facility] ([facility]) Appt Note: [facility] d/c 4/10/18 renal cell ca/records in [facility]; [facility] d/c 4/10/18 renal cell ca/records in [address] [address]  
[address]  
  
   
 [address] [facility] [address] [facility]  
  
    
 2018 11:30 AM  
ESTABLISHED PATIENT with Vesna Mejia MD  
[facility]) Appt Note: 6mo F/U, Rev Imaging- to be done at [facility]  
 [address], [facility] [address] [place] [facility] [address] [facility]  
  
   
 [address], [facility] [address]  
  
    
 2018 10:45 AM  
ESTABLISHED PATIENT with Elise Lawler MD  
[facility]. [facility] ([facility]) Appt Note: Return for Appt with recon for urethral reconstruction due to erosion from catheter. [address] [place] [facility] [address]  
[phone]  
  
   
 [place] [id number] Upcoming Health Maintenance Date Due  
 EYE EXAM RETINAL OR DILATED Q1 1968 Pneumococcal 19-64 Highest Risk (1 of 3 - PCV13) 1977 DTaP/Tdap/Td series (1 - Tdap) 1979 FOBT Q 1 YEAR AGE 50-75 2008 FOOT EXAM Q1 2017 Influenza Age 5 to Adult 2018 HEMOGLOBIN A1C Q6M 10/4/2018 MICROALBUMIN Q1 3/31/2019 LIPID PANEL Q1 2019 Allergies as of 2018  Review Complete On: 2018 By: PADMAJA Connell Severity Noted Reaction Type Reactions Iodine High 09/28/2013    Hives Current Immunizations  Never Reviewed Name Date Influenza Vaccine 12/20/2013 12:00 AM  
  
 Not reviewed this visit You Were Diagnosed With   
  
 Codes Comments Essential hypertension    -  Primary ICD-10-CM: I10 
ICD-9-CM: 401.9 Type 2 diabetes with nephropathy (HCC)     ICD-10-CM: E11.21 
ICD-9-CM: 250.40, 583.81 Chronic kidney disease, stage III (moderate)     ICD-10-CM: N18.3 ICD-9-CM: 962. 3 Bradycardia     ICD-10-CM: R00.1 ICD-9-CM: 427.89 Acute respiratory failure with hypoxia (HCC)     ICD-10-CM: J96.01 
ICD-9-CM: 518.81 Vitals BP Pulse Height(growth percentile) Weight(growth percentile) SpO2 BMI  
 128/72 (!) 56 6' (1.829 m) 153 lb (69.4 kg) 99% 20.75 kg/m2 Smoking Status Former Smoker BMI and BSA Data Body Mass Index Body Surface Area 20.75 kg/m 2 1.88 m 2 Preferred Pharmacy Pharmacy Name Phone [address] [place] [phone] Your Updated Medication List  
  
   
This list is accurate as of 5/18/18 11:57 AM.  Always use your most recent med list.  
  
  
  
  
 acetaminophen 500 mg tablet Commonly known as:  TYLENOL  
1,000 mg.  
  
 albuterol-ipratropium 2.5 mg-0.5 mg/3 ml Nebu Commonly known as:  DUO-NEB  
3 mL. amLODIPine 10 mg tablet Commonly known as:  Samina Gris Take  by mouth daily. atorvastatin 40 mg tablet Commonly known as:  LIPITOR 40 mg.  
  
 bisacodyl 5 mg EC tablet Commonly known as:  DULCOLAX 10 mg.  
  
 CULTURELLE 10 billion cell capsule Generic drug:  lactobacillus rhamnosus gg 10 billion cell Take 1 Cap by mouth daily. doxycycline 100 mg tablet Commonly known as:  ADOXA Take 100 mg by mouth daily. furosemide 40 mg tablet Commonly known as:  LASIX Take 1 Tab by mouth daily. GAVILAX 17 gram packet Generic drug:  polyethylene glycol Take 17 g by mouth daily. hydrALAZINE 25 mg tablet Commonly known as:  APRESOLINE Take 1 Tab by mouth three (3) times daily as needed for Other (SBP above 160). HYDROmorphone 4 mg tablet Commonly known as:  DILAUDID Take 1 Tab by mouth every six (6) hours as needed. Max Daily Amount: 16 mg.  
  
 insulin lispro 100 unit/mL injection Commonly known as:  HUMALOG Normal Insulin Sensitivity  For Blood Sugar (mg/dL) of:    Less than 150 =   0 units          150 -199 =   2 units 200 -249 =   4 units 250 -299 =   6 units 300 -349 =   8 units 350 and above =   10 units If 2 glucose readings are above 200 mg/dL within a 24 hr period, proceed to [facility] JANUVIA 100 mg tablet Generic drug:  SITagliptin Take 100 mg by mouth daily. ketoconazole 2 % topical cream  
Commonly known as:  NIZORAL Apply  to affected area daily. levothyroxine 50 mcg tablet Commonly known as:  SYNTHROID Take 1 Tab by mouth Daily (before breakfast). naloxone 2 mg/actuation Spry Use 1 spray intranasally into 1 nostril. Use a new Narcan nasal spray for subsequent doses and administer into alternating nostrils. May repeat every 2 to 3 minutes as needed. nicotine 14 mg/24 hr patch Commonly known as:  NICODERM CQ  
1 Patch. Omeprazole delayed release 20 mg tablet Commonly known as:  PRILOSEC D/R Take 20 mg by mouth daily. NUR MILK OF MAGNESIA 400 mg/5 mL suspension Generic drug:  magnesium hydroxide Take 30 mL by mouth daily as needed for Constipation. potassium chloride 20 mEq tablet Commonly known as:  K-DUR, KLOR-CON Take 20 mEq by mouth daily. sodium bicarbonate 325 mg tablet Take 325 mg by mouth four (4) times daily. tamsulosin 0.4 mg capsule Commonly known as:  FLOMAX Take 1 Cap by mouth daily. Therapy M 9 mg iron-400 mcg Tab tablet Generic drug:  multivitamin, tx-iron-ca-min Take 1 Tab by Mouth Once a Day. We Performed the Following AMB POC EKG ROUTINE W/ 12 LEADS, INTER & REP [25866 CPT(R)] To-Do List   
 06/05/2018 9:30 AM  
  Appointment with [facility] CT RM 2 at [facility] RAD [address] ([phone]) GENERAL INSTRUCTIONS  This study does not require you to drink contrast prior to your study. RELATED STUDY INFORMATION  Bring any films, CDs, and reports related with you on the day of your exam.  This only includes studies done outside of [address] [place] [address], [facility], [facility], and [facility]. QUESTIONS  Notify the CT Department if you have any questions concerning your study. [facility] - [phone] [facility] [place] - [phone] Patient Instructions Continue present medication regimen Follow-up with Dr. Austen Austin due in September, appointment reminder in chart. Once calendar opens, our office will call you and schedule appointment Introducing [facility] [place] [facility]! Peewee Alvarado introduces [employer] patient portal. Now you can access parts of your medical record, email your doctor's office, and request medication refills online. 1. In your internet browser, go to [email or web address]. [email or web address] 2. Click on the First Time User? Click Here link in the Sign In box. You will see the New Member Sign Up page. 3. Enter your [employer] Access Code exactly as it appears below. You will not need to use this code after youve completed the sign-up process. If you do not sign up before the expiration date, you must request a new code. · [employer] Access Code: [email or web address] Expires: 8/7/2018  9:29 AM 
 
4. Enter the last four digits of your Social Security Number (xxxx) and Date of Birth (mm/dd/yyyy) as indicated and click Submit. You will be taken to the next sign-up page. 5. Create a [employer] ID. This will be your [employer] login ID and cannot be changed, so think of one that is secure and easy to remember. 6. Create a [employer] password. You can change your password at any time. 7. Enter your Password Reset Question and Answer. This can be used at a later time if you forget your password. 8. Enter your e-mail address. You will receive e-mail notification when new information is available in [address] 9. Click Sign Up. You can now view and download portions of your medical record. 10. Click the Download Summary menu link to download a portable copy of your medical information. If you have questions, please visit the Frequently Asked Questions section of the [employer] website. Remember, [employer] is NOT to be used for urgent needs. For medical emergencies, dial 911. Now available from your iPhone and Android! Please provide this summary of care documentation to your next provider. Your primary care clinician is listed as Waylon Prince. If you have any questions after today's visit, please call [phone].

## 2018-05-18 NOTE — PROGRESS NOTES
1. Have you been to the ER, urgent care clinic since your last visit? Hospitalized since your last visit? Yes, 3/31/18-4/10/18 for bradycardia    2. Have you seen or consulted any other health care providers outside of the [address] [place] [address] since your last visit? Include any pap smears or colon screening.  No

## 2018-05-18 NOTE — PATIENT INSTRUCTIONS
Continue present medication regimen  Follow-up with Dr. Ramon Smith due in September, appointment reminder in chart.   Once calendar opens, our office will call you and schedule appointment

## 2018-05-18 NOTE — PROGRESS NOTES
Jerman Mccray resents today for post hospital follow-up. He presented to the hospital on 3/31/18, with complaints of progressive and worsening lower extremity edema. A week prior to being admitted, he was treated for C. difficile. He is a resident at a skilled nursing facility ([facility] on [employer]) and states that he had reported the increasing edema to the nursing staff. Upon arrival to the emergency room, his NT proBNP was only noted to be 562 which was within the normal limits. His hemoglobin was 7.7 and hematocrit 22.9, BUN and creatinine were elevated at 68 and 1.78, respectively. It was suspected that he was dehydrated and was given IV fluids and his renal function did improve. During his hospital stay, he was also noted to be bradycardic and his beta-blocker was discontinued. With discontinuation of the beta-blocker, his blood pressure came elevated and amlodipine was added for blood pressure control. His ACE inhibitor was held due to acute kidney injury. We were consulted for that reason. He underwent an echocardiogram and it showed ejection fraction of 60% and no obvious wall motion abnormalities. His RVSP was noted to be 42 mmHg. He had noninvasive vascular studies done to rule out DVT in his lower and upper extremities and both were negative. Due to the acute kidney injury, Dr. Pat Ruiz (nephrologist) was consulted and it was first believed that the kidney injury was due to NSAID use however, because of the anemia and thrombocytopenia; further workup was ordered. 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 There was also some concern for multiple myeloma with light chain induced cast nephropathy. 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 He underwent renal biopsy on April 9, 2018 (nephrology to follow).   His thrombocytopenia spontaneously resolved and was hit panel negative. He underwent bone marrow biopsy on April 6, 2018 (hematology/oncology to follow). Upon discharge from the hospital, he was sent home on Lasix 40 mg daily, hydralazine 25 mg 3 times a day, his Synthroid was increased to 50 mcg daily due to a slightly elevated TSH, amlodipine 10 mg daily. He is a 53-year-old -American male with history of a renal mass, renal cell carcinoma of the left kidney, diabetes, chronic drug abuse, and hypertension. He is status post partial left nephrectomy. He also has history of C3 through C7 discitis with osteomyelitis, L4-5 phlegmon in April 2017 status post C3 through 77 laminectomy with posterior lateral fusion and hardware on April 29, 2017 for rapid onset quadriplegia from discitis/osteomyelitis and history of MRSA bacteremia and abscess cultures from the spine. Since being discharged back to the skilled nursing facility, he states that he is feeling much better. He denies any cardiac complaints. Denies chest pain, tightness, heaviness, and palpitations. Denies shortness of breath at rest, dyspnea on exertion, orthopnea and PND. Denies abdominal bloating. Denies lightheadedness, dizziness, and syncope. Denies lower extremity edema and claudication. Denies nausea, vomiting, diarrhea, melena, hematochezia. Denies hematuria, urgency, frequency. Denies fever, chills.       HPI:      PMH:  Past Medical History:   Diagnosis Date    Bradycardia, unspecified 2018    Chronic drug abuse 1974    Chronic kidney disease     Diabetes ([facility] [address] 01/1990    Diabetes mellitus ([facility] [address]     Drug abuse     Encephalopathy     GERD (gastroesophageal reflux disease)     Hypertension     Metabolic disorder     Muscle weakness     Renal cell carcinoma of left kidney ([facility] [address] 12/17/13    Pathological Stage [id number] cc RCC FG3 s/p left open partial nephrectomy in 12/13      Renal mass     Sepsis due to methicillin resistant Staphylococcus aureus ([facility] [address]     Viral hepatitis C     Xerosis cutis        PSH:  Past Surgical History:   Procedure Laterality Date    HX CIRCUMCISION  12/17/13    Dr. Elizabeth Adames, [facility]    HX NEPHRECTOMY Left 12/17/13    Open Partial, Dr. Elizabeth Adames, [facility]    HX OTHER SURGICAL Right 2/27/2016    removed right ft  2nd meta-tarsal       MEDS:  Current Outpatient Prescriptions   Medication Sig    ketoconazole (NIZORAL) 2 % topical cream Apply  to affected area daily.  sodium bicarbonate 325 mg tablet Take 325 mg by mouth four (4) times daily.  lactobacillus rhamnosus gg 10 billion cell (CULTURELLE) 10 billion cell capsule Take 1 Cap by mouth daily.  amLODIPine (NORVASC) 10 mg tablet Take  by mouth daily.  polyethylene glycol (GAVILAX) 17 gram packet Take 17 g by mouth daily.  Omeprazole delayed release (PRILOSEC D/R) 20 mg tablet Take 20 mg by mouth daily.  SITagliptin (JANUVIA) 100 mg tablet Take 100 mg by mouth daily.  potassium chloride (K-DUR, KLOR-CON) 20 mEq tablet Take 20 mEq by mouth daily.  doxycycline (ADOXA) 100 mg tablet Take 100 mg by mouth daily.  furosemide (LASIX) 40 mg tablet Take 1 Tab by mouth daily.  hydrALAZINE (APRESOLINE) 25 mg tablet Take 1 Tab by mouth three (3) times daily as needed for Other (SBP above 160).  HYDROmorphone (DILAUDID) 4 mg tablet Take 1 Tab by mouth every six (6) hours as needed. Max Daily Amount: 16 mg.    insulin lispro (HUMALOG) 100 unit/mL injection Normal Insulin Sensitivity   For Blood Sugar (mg/dL) of:     Less than 150 =   0 units           150 -199 =   2 units  200 -249 =   4 units  250 -299 =   6 units  300 -349 =   8 units  350 and above =   10 units  If 2 glucose readings are above 200 mg/dL within a 24 hr period, proceed to \"Very Insul    levothyroxine (SYNTHROID) 50 mcg tablet Take 1 Tab by mouth Daily (before breakfast).  tamsulosin (FLOMAX) 0.4 mg capsule Take 1 Cap by mouth daily.     naloxone 2 mg/actuation spry Use 1 spray intranasally into 1 nostril. Use a new Narcan nasal spray for subsequent doses and administer into alternating nostrils. May repeat every 2 to 3 minutes as needed.  magnesium hydroxide (NUR MILK OF MAGNESIA) 400 mg/5 mL suspension Take 30 mL by mouth daily as needed for Constipation.  acetaminophen (TYLENOL) 500 mg tablet 1,000 mg.    bisacodyl (DULCOLAX) 5 mg EC tablet 10 mg.    atorvastatin (LIPITOR) 40 mg tablet 40 mg.    albuterol-ipratropium (DUO-NEB) 2.5 mg-0.5 mg/3 ml nebu 3 mL.  nicotine (NICODERM CQ) 14 mg/24 hr patch 1 Patch.  multivitamin, tx-iron-ca-min (THERAPY M) 9 mg iron-400 mcg tab tablet Take 1 Tab by Mouth Once a Day. No current facility-administered medications for this visit. Allergies and Sensitivities:  Allergies   Allergen Reactions    Iodine Hives       Family History:  Family History   Problem Relation Age of Onset    Diabetes Mother     Sickle Cell Anemia Father     Diabetes Sister     Hypertension Sister        Social History:  He  reports that he quit smoking about 12 months ago. His smoking use included Cigarettes. He quit after 30.00 years of use. He has never used smokeless tobacco.  He  reports that he does not drink alcohol. Physical:  Visit Vitals    /72    Pulse (!) 56    Ht 6' (1.829 m)    Wt 69.4 kg (153 lb)    SpO2 99%    BMI 20.75 kg/m2         Exam:  Neck:  Supple, no JVD, no carotid bruits  CV:  Normal S1 and  S2, no murmurs, rubs, or gallops noted  Lungs:  Clear to ausculation throughout, no wheezes or rales  Abd:  Soft, non-tender, non-distended with good bowel sounds.   No hepatosplenomegaly  Extremities:  Trace lower extremity edema      Data:  EKG:   Read by Mega Brown MD - Sinus bradycardia.  Nonspecific repolarization abnormality      LABS:  Lab Results   Component Value Date/Time    Sodium 142 04/10/2018 04:06 AM    Potassium 3.3 (L) 04/10/2018 04:06 AM    Chloride 106 04/10/2018 04:06 AM    CO2 29 04/10/2018 04:06 AM    Glucose 162 (H) 04/10/2018 04:06 AM    BUN 13 04/10/2018 04:06 AM    Creatinine 1.36 (H) 04/10/2018 04:06 AM     Lab Results   Component Value Date/Time    Cholesterol, total 115 04/09/2018 11:30 AM    HDL Cholesterol 46 04/01/2018 12:55 AM    LDL, calculated 12.2 04/01/2018 12:55 AM    Triglyceride 105 04/09/2018 11:30 AM    CHOL/HDL Ratio 1.5 04/01/2018 12:55 AM     Lab Results   Component Value Date/Time    ALT (SGPT) 30 04/10/2018 04:06 AM         Impression/Plan:  1. Asymptomatic bradycardia, no longer on beta blocker  2. Hypertension, blood pressure controlled  3. Chronic kidney disease, stage III  4. Anemia  5. History of drug abuse  6. Hepatitis C  7. History of cervical disc disease  8. History of renal mass and left renal cell carcinoma, status post partial left nephrectomy  9. Diabetes mellitus, recommend Hgb A1c less than 7% from cardiac standpoint    Mr. Marylen Mulberry was seen today for post hospital follow-up. He was admitted for complaints of increasing generalized edema. He was not found to be in heart failure as his NT proBNP was 562 and well within the normal range. During his hospital stay we were asked to follow him due to bradycardia. His beta-blocker was discontinued during his hospital stay. An echocardiogram was done and it showed ejection fraction of 60%, no wall motion abnormalities, and RVSP of 42 mmHg. He was noted to have acute kidney injury as well as anemia and was followed by nephrology. Noninvasive vascular studies were done of the lower and upper extremities due to the edema and he was negative for DVT. Upon discharge from the hospital, he was sent home on Lasix 40 mg daily. His amlodipine was increased during his hospital stay due to an elevated blood pressure after his atenolol was discontinued. He states that he has been feeling much better and has not noticed too much edema and his lower or upper extremities.   Physical exam shows trace lower extremity edema. His blood pressure was stable and his EKG showed sinus bradycardia and he was asymptomatic. No changes were made to his medication regimen at this time. He will be scheduled to follow-up with Dr. Haydee Aguilar in about 4 months. He was advised to call our office if he has any cardiac problems or concerns. Camilo Valdivia MSN, FNP-BC    Please note:  Portions of this chart were created with Dragon medical speech to text program.  Unrecognized errors may be present.

## 2018-05-23 ENCOUNTER — HOSPITAL ENCOUNTER (OUTPATIENT)
Dept: ONCOLOGY | Age: 58
Discharge: HOME | End: 2018-05-23

## 2018-05-23 ENCOUNTER — OFFICE VISIT (OUTPATIENT)
Dept: ONCOLOGY | Age: 58
End: 2018-05-23

## 2018-05-23 VITALS
DIASTOLIC BLOOD PRESSURE: 77 MMHG | HEART RATE: 57 BPM | SYSTOLIC BLOOD PRESSURE: 119 MMHG | WEIGHT: 153 LBS | BODY MASS INDEX: 20.75 KG/M2 | TEMPERATURE: 96.5 F

## 2018-05-23 DIAGNOSIS — D69.6 THROMBOCYTOPENIA (HCC): Primary | ICD-10-CM

## 2018-05-23 DIAGNOSIS — D64.9 CHRONIC ANEMIA: ICD-10-CM

## 2018-05-23 DIAGNOSIS — D69.6 THROMBOCYTOPENIA (HCC): ICD-10-CM

## 2018-05-23 DIAGNOSIS — C64.2 RENAL CELL CARCINOMA OF LEFT KIDNEY (HCC): ICD-10-CM

## 2018-05-23 LAB
BASO+EOS+MONOS # BLD AUTO: 0.3 K/UL (ref 0–2.3)
BASO+EOS+MONOS # BLD AUTO: 5 % (ref 0.1–17)
DIFFERENTIAL METHOD BLD: ABNORMAL
ERYTHROCYTE [DISTWIDTH] IN BLOOD BY AUTOMATED COUNT: 15.3 % (ref 11.5–14.5)
HCT VFR BLD AUTO: 31.5 % (ref 36–48)
HGB BLD-MCNC: 10.4 G/DL (ref 12–16)
LYMPHOCYTES # BLD: 2 K/UL (ref 1.1–5.9)
LYMPHOCYTES NFR BLD: 32 % (ref 14–44)
MCH RBC QN AUTO: 28.6 PG (ref 25–35)
MCHC RBC AUTO-ENTMCNC: 33 G/DL (ref 31–37)
MCV RBC AUTO: 86.5 FL (ref 78–102)
NEUTS SEG # BLD: 3.8 K/UL (ref 1.8–9.5)
NEUTS SEG NFR BLD: 63 % (ref 40–70)
PLATELET # BLD AUTO: 91 K/UL (ref 140–440)
RBC # BLD AUTO: 3.64 M/UL (ref 4.1–5.1)
WBC # BLD AUTO: 6.1 K/UL (ref 4.5–13)

## 2018-05-23 NOTE — PROGRESS NOTES
Hematology/Oncology  Progress Note    Name: Turner Eisenmenger  Date: 2018  : 1960    PCP: Sandip Ramirez MD     Mr. Ro Mantilla is a 59 y.o.  male who was seen for follow-up of his thrombocytopenia. He was seen during his recent hospitalization. Current therapy: None    Subjective:     Mr. Ro Mantilla is a 47-year-old -American man who was seen for thrombocytopenia during [facility]. His platelet counts began to recover prior to his discharge. However a bone marrow biopsy was completed and we will discuss these findings here with him today. He is tentatively scheduled to begin a more intense physical therapy program now that he is out of the hospital.  Additionally the patient underwent a kidney biopsy to rule out any evidence of light chain deposition disease or myeloma. He remains essentially wheelchair confined at this time however he is able to walk short distances without assistance except for the use of a walker or wheelchair for ambulation support. Today he denies having any pain or discomfort. Past medical history, family history, and social history: these were reviewed and remains unchanged.     Past Medical History:   Diagnosis Date    Bradycardia, unspecified     Chronic drug abuse 1974    Chronic kidney disease     Diabetes ([facility] [address] 1990    Diabetes mellitus ([address]     Drug abuse     Encephalopathy     GERD (gastroesophageal reflux disease)     Hypertension     Metabolic disorder     Muscle weakness     Renal cell carcinoma of left kidney ([facility] [address] 13    Pathological Stage [id number] cc RCC FG3 s/p left open partial nephrectomy in       Renal mass     Sepsis due to methicillin resistant Staphylococcus aureus (HCC)     Viral hepatitis C     Xerosis cutis      Past Surgical History:   Procedure Laterality Date    HX CIRCUMCISION  13    [facility] Catal[facility], [facility]    HX NEPHRECTOMY Left 13    Open Partial,  [facility] HX OTHER SURGICAL Right 2/27/2016    removed right ft  2nd meta-tarsal     Social History     Social History    Marital status:      Spouse name: N/A    Number of children: N/A    Years of education: N/A     Occupational History    Not on file. Social History Main Topics    Smoking status: Former Smoker     Years: 30.00     Types: Cigarettes     Quit date: 4/29/2017    Smokeless tobacco: Never Used    Alcohol use No      Comment: not since April 2017    Drug use: No      Comment: methadone    Sexual activity: Not on file     Other Topics Concern    Not on file     Social History Narrative     since 2012;  for 12 yrs; [employer]; [employer] [address] [employer]  just recently furloughed; No  service     Family History   Problem Relation Age of Onset    Diabetes Mother     Sickle Cell Anemia Father     Diabetes Sister     Hypertension Sister      Current Outpatient Prescriptions   Medication Sig Dispense Refill    ketoconazole (NIZORAL) 2 % topical cream Apply  to affected area daily.  sodium bicarbonate 325 mg tablet Take 325 mg by mouth four (4) times daily.  lactobacillus rhamnosus gg 10 billion cell (CULTURELLE) 10 billion cell capsule Take 1 Cap by mouth daily.  amLODIPine (NORVASC) 10 mg tablet Take  by mouth daily.  polyethylene glycol (GAVILAX) 17 gram packet Take 17 g by mouth daily.  Omeprazole delayed release (PRILOSEC D/R) 20 mg tablet Take 20 mg by mouth daily.  SITagliptin (JANUVIA) 100 mg tablet Take 100 mg by mouth daily.  potassium chloride (K-DUR, KLOR-CON) 20 mEq tablet Take 20 mEq by mouth daily.  doxycycline (ADOXA) 100 mg tablet Take 100 mg by mouth daily.  furosemide (LASIX) 40 mg tablet Take 1 Tab by mouth daily. 30 Tab 0    hydrALAZINE (APRESOLINE) 25 mg tablet Take 1 Tab by mouth three (3) times daily as needed for Other (SBP above 160).  30 Tab 0    HYDROmorphone (DILAUDID) 4 mg tablet Take 1 Tab by mouth every six (6) hours as needed. Max Daily Amount: 16 mg. 40 Tab 0    insulin lispro (HUMALOG) 100 unit/mL injection Normal Insulin Sensitivity   For Blood Sugar (mg/dL) of:     Less than 150 =   0 units           150 -199 =   2 units  200 -249 =   4 units  250 -299 =   6 units  300 -349 =   8 units  350 and above =   10 units  If 2 glucose readings are above 200 mg/dL within a 24 hr period, proceed to \"Very Insul 1 Vial 0    levothyroxine (SYNTHROID) 50 mcg tablet Take 1 Tab by mouth Daily (before breakfast). 30 Tab 0    tamsulosin (FLOMAX) 0.4 mg capsule Take 1 Cap by mouth daily. 30 Cap 0    naloxone 2 mg/actuation spry Use 1 spray intranasally into 1 nostril. Use a new Narcan nasal spray for subsequent doses and administer into alternating nostrils. May repeat every 2 to 3 minutes as needed. 1 Packet 0    magnesium hydroxide (NUR MILK OF MAGNESIA) 400 mg/5 mL suspension Take 30 mL by mouth daily as needed for Constipation.  acetaminophen (TYLENOL) 500 mg tablet 1,000 mg.      bisacodyl (DULCOLAX) 5 mg EC tablet 10 mg.      atorvastatin (LIPITOR) 40 mg tablet 40 mg.      albuterol-ipratropium (DUO-NEB) 2.5 mg-0.5 mg/3 ml nebu 3 mL.  nicotine (NICODERM CQ) 14 mg/24 hr patch 1 Patch.  multivitamin, tx-iron-ca-min (THERAPY M) 9 mg iron-400 mcg tab tablet Take 1 Tab by Mouth Once a Day. Review of Systems  Constitutional: The patient has no acute distress or discomfort. HEENT: The patient denies recent head trauma, eye pain, blurred vision,  hearing deficit, oropharyngeal mucosal pain or lesions, and the patient denies throat pain or discomfort. Lymphatics: The patient denies palpable peripheral lymphadenopathy. Hematologic: The patient denies having bruising, bleeding, or progressive fatigue. Respiratory: Patient denies having shortness of breath, cough, sputum production, fever, or dyspnea on exertion. Cardiovascular:  The patient denies having leg pain, leg swelling, heart palpitations, chest permit, chest pain, or lightheadedness. The patient denies having dyspnea on exertion. Gastrointestinal: The patient denies having nausea, emesis, or diarrhea. The patient denies having any hematemesis or blood in the stool. Genitourinary: Patient denies having urinary urgency, frequency, or dysuria. The patient denies having blood in the urine. Psychological: The patient denies having symptoms of nervousness, anxiety, depression, or thoughts of harming self. Skin: Patient denies having skin rashes, skin, ulcerations, or unexplained itching or pruritus. Musculoskeletal: The patient denies having pain in the joints or bones. Objective:     Visit Vitals    /77    Pulse (!) 57    Temp 96.5 °F (35.8 °C) (Oral)    Wt 69.4 kg (153 lb)    BMI 20.75 kg/m2     ECOG PS=0  Physical Exam:   Gen. Appearance: The patient is in no acute distress. Skin: There is no bruise or rash. HEENT: The exam is unremarkable. Neck: Supple without lymphadenopathy or thyromegaly. Lungs: Clear to auscultation and percussion; there are no wheezes or rhonchi. Heart: Regular rate and rhythm; there are no murmurs, gallops, or rubs. Abdomen: Bowel sounds are present and normal.  There is no guarding, tenderness, or hepatosplenomegaly. Extremities: There is no clubbing, cyanosis, or edema. Neurologic: There are no focal neurologic deficits. Lymphatics: There is no palpable peripheral lymphadenopathy. Musculoskeletal: The patient has been in his arms and hands. With the use of either a wheelchair for balance or a walker. There is no evidence of joint deformity or effusions. There is no focal joint tenderness. Psychological/psychiatric: There is no clinical evidence of anxiety, depression, or melancholy.     Lab data:      Results for orders placed or performed during the hospital encounter of 05/23/18   CBC WITH 3 PART DIFF     Status: Abnormal   Result Value Ref Range Status    WBC 6.1 4.5 - 13.0 K/uL Final    RBC 3.64 (L) 4.10 - 5.10 M/uL Final    HGB 10.4 (L) 12.0 - 16.0 g/dL Final    HCT 31.5 (L) 36 - 48 % Final    MCV 86.5 78 - 102 FL Final    MCH 28.6 25.0 - 35.0 PG Final    MCHC 33.0 31 - 37 g/dL Final    RDW 15.3 (H) 11.5 - 14.5 % Final    PLATELET 91 (L) 139 - 440 K/uL Final    NEUTROPHILS 63 40 - 70 % Final    MIXED CELLS 5 0.1 - 17 % Final    LYMPHOCYTES 32 14 - 44 % Final    ABS. NEUTROPHILS 3.8 1.8 - 9.5 K/UL Final    ABS. MIXED CELLS 0.3 0.0 - 2.3 K/uL Final    ABS. LYMPHOCYTES 2.0 1.1 - 5.9 K/UL Final     Comment: Test performed at [address] Results Reviewed by Medical Director. DF AUTOMATED   Final           Assessment:     1. Thrombocytopenia ([address]    2. Renal cell carcinoma of left kidney (HCC)    3. Chronic anemia        Plan: Thrombocytopenia I have explained to the patient that his bone marrow biopsy that was done on 4/6/2018 showed that he had a normocytic anemia and thrombocytopenia with a few schistocytes. The bone marrow cellularity was normal with mild erythroid hypoplasia. Iron in the bone marrow was normal.  On the day of his bone marrow biopsy his platelet count was 37,398, hemoglobin was 8 g/dL, hematocrit was 25.2%. CBC from today shows that his platelet count is now 91,000. Iron deficiency anemia: I have advised the patient to continue taking Slow Fe 1 tablet daily. History of renal cell carcinoma, left kidney: The patient had some biopsies done of his kidney and this test showed diabetic glomerulosclerosis in association with moderate arterionephrosclerosis and severe tubulointerstitial scarring.   Orders Placed This Encounter    COMPLETE CBC & AUTO DIFF WBC    InHouse CBC ([employer])     Standing Status:   Future     Number of Occurrences:   1     Standing Expiration Date:   5/71/0519    METABOLIC PANEL, COMPREHENSIVE     Standing Status:   Future     Number of Occurrences:   1     Standing Expiration Date:   5/24/2019 Jesus Resendiz MD  5/23/2018      Please note: This document has been produced using voice recognition software. Unrecognized errors in transcription may be present.

## 2018-05-23 NOTE — MR AVS SNAPSHOT
[address] [place] [address] [address] [facility] [address] 
[phone] Patient: Dajuan Jefferson MRN: [id number] :[id number] Visit Information Date & Time Provider Department Dept. Phone Encounter #  
 2018  2:30 PM Yazmin Boone MD [facility] [phone] [id number] Follow-up Instructions Return in about 6 weeks (around 2018). Your Appointments 2018 11:30 AM  
ESTABLISHED PATIENT with Norman Simental MD  
[facility]rology of [facility] [address] [facility]) Appt Note: 6mo F/U, Rev Imaging- to be done at [facility]  
 [address], [facility] 300 2201 [facility] [address]  
  
   
 [address], [facility] [address]  
  
    
 2018 10:45 AM  
ESTABLISHED PATIENT with Nilton Hobbs MD  
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 [address] 2201 [facility] 2 [facility]  
  
   
 [facility] [address]  
  
    
 2018 11:15 AM  
Office Visit with Yazmin Boone MD  
[facility] 77 ([address] [facility]) Appt Note: 6 WEEK CHECK/PT LIVES IN [facility] [address] [place] [address] [address] [address]  
[phone]  
  
   
 [facility] [address] [place] [address] Upcoming Health Maintenance Date Due  
 EYE EXAM RETINAL OR DILATED Q1 1968 Pneumococcal 19-64 Highest Risk (1 of 3 - PCV13) 1977 DTaP/Tdap/Td series (1 - Tdap) 1979 FOBT Q 1 YEAR AGE 50-75 2008 FOOT EXAM Q1 2017 Influenza Age 5 to Adult 2018 HEMOGLOBIN A1C Q6M 10/4/2018 MICROALBUMIN Q1 3/31/2019 LIPID PANEL Q1 2019 Allergies as of 2018  Review Complete On: 2018 By: Windy Martin NP Severity Noted Reaction Type Reactions Iodine High 09/28/2013    Hives Current Immunizations  Never Reviewed Name Date Influenza Vaccine 12/20/2013 12:00 AM  
  
 Not reviewed this visit You Were Diagnosed With   
  
 Codes Comments Thrombocytopenia ([facility] [address]    -  Primary ICD-10-CM: D69.6 ICD-9-CM: 287.5 Vitals BP Pulse Temp Weight(growth percentile) BMI Smoking Status 119/77 (!) 57 96.5 °F (35.8 °C) (Oral) 153 lb (69.4 kg) 20.75 kg/m2 Former Smoker BMI and BSA Data Body Mass Index Body Surface Area 20.75 kg/m 2 1.88 m 2 Preferred Pharmacy Pharmacy Name [facility] [address] [place] [address] [phone] Your Updated Medication List  
  
   
This list is accurate as of 5/23/18  3:52 PM.  Always use your most recent med list.  
  
  
  
  
 acetaminophen 500 mg tablet Commonly known as:  TYLENOL  
1,000 mg.  
  
 albuterol-ipratropium 2.5 mg-0.5 mg/3 ml Nebu Commonly known as:  DUO-NEB  
3 mL. amLODIPine 10 mg tablet Commonly known as:  Doc Holley Take  by mouth daily. atorvastatin 40 mg tablet Commonly known as:  LIPITOR 40 mg.  
  
 bisacodyl 5 mg EC tablet Commonly known as:  DULCOLAX 10 mg.  
  
 CULTURELLE 10 billion cell capsule Generic drug:  lactobacillus rhamnosus gg 10 billion cell Take 1 Cap by mouth daily. doxycycline 100 mg tablet Commonly known as:  ADOXA Take 100 mg by mouth daily. furosemide 40 mg tablet Commonly known as:  LASIX Take 1 Tab by mouth daily. GAVILAX 17 gram packet Generic drug:  polyethylene glycol Take 17 g by mouth daily. hydrALAZINE 25 mg tablet Commonly known as:  APRESOLINE Take 1 Tab by mouth three (3) times daily as needed for Other (SBP above 160). HYDROmorphone 4 mg tablet Commonly known as:  DILAUDID Take 1 Tab by mouth every six (6) hours as needed. Max Daily Amount: 16 mg.  
  
 insulin lispro 100 unit/mL injection Commonly known as:  HUMALOG Normal Insulin Sensitivity  For Blood Sugar (mg/dL) of:    Less than 150 =   0 units          150 -199 =   2 units 200 -249 =   4 units 250 -299 =   6 units 300 -349 =   8 units 350 and above =   10 units If 2 glucose readings are above 200 mg/dL within a 24 hr period, proceed to [facility] JANUVIA 100 mg tablet Generic drug:  SITagliptin Take 100 mg by mouth daily. ketoconazole 2 % topical cream  
Commonly known as:  NIZORAL Apply  to affected area daily. levothyroxine 50 mcg tablet Commonly known as:  SYNTHROID Take 1 Tab by mouth Daily (before breakfast). naloxone 2 mg/actuation Spry Use 1 spray intranasally into 1 nostril. Use a new Narcan nasal spray for subsequent doses and administer into alternating nostrils. May repeat every 2 to 3 minutes as needed. nicotine 14 mg/24 hr patch Commonly known as:  NICODERM CQ  
1 Patch. Omeprazole delayed release 20 mg tablet Commonly known as:  PRILOSEC D/R Take 20 mg by mouth daily. NUR MILK OF MAGNESIA 400 mg/5 mL suspension Generic drug:  magnesium hydroxide Take 30 mL by mouth daily as needed for Constipation. potassium chloride 20 mEq tablet Commonly known as:  K-DUR, KLOR-CON Take 20 mEq by mouth daily. sodium bicarbonate 325 mg tablet Take 325 mg by mouth four (4) times daily. tamsulosin 0.4 mg capsule Commonly known as:  FLOMAX Take 1 Cap by mouth daily. Therapy M 9 mg iron-400 mcg Tab tablet Generic drug:  multivitamin, tx-iron-ca-min Take 1 Tab by Mouth Once a Day. We Performed the Following COMPLETE CBC & AUTO DIFF WBC [88114 CPT(R)] Follow-up Instructions Return in about 6 weeks (around 7/4/2018). To-Do List   
 05/23/2018 Lab:  CBC WITH 3 PART DIFF   
  
 05/23/2018   Lab:  METABOLIC PANEL, COMPREHENSIVE   
  
 06/05/2018 9:30 AM  
 Appointment with [facility] RM 2 at [facility] RAD CT ([phone]) GENERAL INSTRUCTIONS  This study does not require you to drink contrast prior to your study. RELATED STUDY INFORMATION  Bring any films, CDs, and reports related with you on the day of your exam.  This only includes studies done outside of [address] [place] [address], [address], [place], and [place]. QUESTIONS  Notify the CT Department if you have any questions concerning your study. [place] - [phone] [facility] [place] - [phone] Introducing [facility] & HEALTH SERVICES! Effie Huffman introduces [employer] patient portal. Now you can access parts of your medical record, email your doctor's office, and request medication refills online. 1. In your internet browser, go to [email or web address] 2. Click on the First Time User? Click Here link in the Sign In box. You will see the New Member Sign Up page. 3. Enter your [employer] Access Code exactly as it appears below. You will not need to use this code after youve completed the sign-up process. If you do not sign up before the expiration date, you must request a new code. · [employer] Access Code: [email or web address] Expires: 8/7/2018  9:29 AM 
 
4. Enter the last four digits of your Social Security Number (xxxx) and Date of Birth (mm/dd/yyyy) as indicated and click Submit. You will be taken to the next sign-up page. 5. Create a Huango.cnt ID. This will be your [employer] login ID and cannot be changed, so think of one that is secure and easy to remember. 6. Create a [employer] password. You can change your password at any time. 7. Enter your Password Reset Question and Answer. This can be used at a later time if you forget your password. 8. Enter your e-mail address. You will receive e-mail notification when new information is available in [address] 9. Click Sign Up. You can now view and download portions of your medical record. 10. Click the Download Summary menu link to download a portable copy of your medical information. If you have questions, please visit the Frequently Asked Questions section of the [employer] website. Remember, [employer] is NOT to be used for urgent needs. For medical emergencies, dial 911. Now available from your iPhone and Android! Please provide this summary of care documentation to your next provider. Your primary care clinician is listed as Kirti Castro. If you have any questions after today's visit, please call [phone].

## 2018-06-05 ENCOUNTER — HOSPITAL ENCOUNTER (OUTPATIENT)
Dept: CT IMAGING | Age: 58
Discharge: HOME | End: 2018-06-05
Attending: INTERNAL MEDICINE
Payer: MEDICAID

## 2018-06-05 DIAGNOSIS — J99 NEUROMUSCULAR RESPIRATORY WEAKNESS (HCC): ICD-10-CM

## 2018-06-05 DIAGNOSIS — G70.9 NEUROMUSCULAR RESPIRATORY WEAKNESS (HCC): ICD-10-CM

## 2018-06-05 DIAGNOSIS — R06.09 DYSPNEA ON EXERTION: ICD-10-CM

## 2018-06-05 DIAGNOSIS — R53.81 PHYSICAL DECONDITIONING: ICD-10-CM

## 2018-06-05 DIAGNOSIS — J18.9 PNEUMONIA OF BOTH LUNGS DUE TO INFECTIOUS ORGANISM, UNSPECIFIED PART OF LUNG: ICD-10-CM

## 2018-06-05 DIAGNOSIS — R09.02 HYPOXIA: ICD-10-CM

## 2018-06-05 PROCEDURE — 71250 CT THORAX DX C-: CPT

## 2018-06-12 PROBLEM — R00.1 BRADYCARDIA, UNSPECIFIED: Status: ACTIVE | Noted: 2018-01-01

## 2018-08-17 ENCOUNTER — HOSPITAL ENCOUNTER (OUTPATIENT)
Dept: GENERAL RADIOLOGY | Age: 58
Discharge: HOME | End: 2018-08-17
Payer: MEDICAID

## 2018-08-17 DIAGNOSIS — G89.29 CHRONIC NECK PAIN: ICD-10-CM

## 2018-08-17 DIAGNOSIS — M54.2 CHRONIC NECK PAIN: ICD-10-CM

## 2018-08-17 PROCEDURE — 72040 X-RAY EXAM NECK SPINE 2-3 VW: CPT

## 2018-08-21 ENCOUNTER — HOSPITAL ENCOUNTER (OUTPATIENT)
Dept: ONCOLOGY | Age: 58
Discharge: HOME | End: 2018-08-21

## 2018-08-21 ENCOUNTER — OFFICE VISIT (OUTPATIENT)
Dept: ONCOLOGY | Age: 58
End: 2018-08-21

## 2018-08-21 VITALS
SYSTOLIC BLOOD PRESSURE: 122 MMHG | RESPIRATION RATE: 16 BRPM | WEIGHT: 146 LBS | BODY MASS INDEX: 19.8 KG/M2 | TEMPERATURE: 97.7 F | DIASTOLIC BLOOD PRESSURE: 81 MMHG | HEART RATE: 105 BPM

## 2018-08-21 DIAGNOSIS — C64.2 RENAL CELL CARCINOMA OF LEFT KIDNEY (HCC): ICD-10-CM

## 2018-08-21 DIAGNOSIS — D69.6 THROMBOCYTOPENIA (HCC): ICD-10-CM

## 2018-08-21 DIAGNOSIS — N18.30 CHRONIC KIDNEY DISEASE, STAGE III (MODERATE) (HCC): ICD-10-CM

## 2018-08-21 DIAGNOSIS — D69.6 THROMBOCYTOPENIA (HCC): Primary | ICD-10-CM

## 2018-08-21 DIAGNOSIS — D64.9 CHRONIC ANEMIA: ICD-10-CM

## 2018-08-21 LAB
BASO+EOS+MONOS # BLD AUTO: 0.5 K/UL (ref 0–2.3)
BASO+EOS+MONOS # BLD AUTO: 8 % (ref 0.1–17)
DIFFERENTIAL METHOD BLD: ABNORMAL
ERYTHROCYTE [DISTWIDTH] IN BLOOD BY AUTOMATED COUNT: 18.4 % (ref 11.5–14.5)
HCT VFR BLD AUTO: 28.4 % (ref 36–48)
HGB BLD-MCNC: 9.4 G/DL (ref 12–16)
LYMPHOCYTES # BLD: 1.8 K/UL (ref 1.1–5.9)
LYMPHOCYTES NFR BLD: 31 % (ref 14–44)
MCH RBC QN AUTO: 28 PG (ref 25–35)
MCHC RBC AUTO-ENTMCNC: 33.1 G/DL (ref 31–37)
MCV RBC AUTO: 84.5 FL (ref 78–102)
NEUTS SEG # BLD: 3.6 K/UL (ref 1.8–9.5)
NEUTS SEG NFR BLD: 61 % (ref 40–70)
PLATELET # BLD AUTO: 146 K/UL (ref 140–440)
RBC # BLD AUTO: 3.36 M/UL (ref 4.1–5.1)
WBC # BLD AUTO: 5.9 K/UL (ref 4.5–13)

## 2018-08-21 NOTE — MR AVS SNAPSHOT
[address] [address] [address] [facility] [address] 
[phone] Patient: Bella Corey MRN: [id number] :[id number] Visit Information Date & Time Provider Department Dept. Phone Encounter #  
 2018  2:15 PM Ursula Shannon MD [address]  [phone] [id number] Follow-up Instructions Return in about 6 weeks (around 10/2/2018). Your Appointments 2018 10:30 AM  
New Patient with Obinna Amin MD  
[facility] [address]) Appt Note: second opinion for supra pubic catheter [address] [place] [address] [facility] [address]  
[phone] [address] [address]  
  
    
 2018  9:45 AM  
ESTABLISHED PATIENT with Lindy Paulino MD  
[facility] of [facility] ([address]) Appt Note: 6mo F/U, Rev Imaging/Labs  
 [address], [place] [address] 2201 [facility] [address] [place] [address] Rd  
  
   
 [address], [facility] [address] Upcoming Health Maintenance Date Due  
 EYE EXAM RETINAL OR DILATED Q1 1968 Pneumococcal 19-64 Highest Risk (1 of 3 - PCV13) 1977 DTaP/Tdap/Td series (1 - Tdap) 1979 FOBT Q 1 YEAR AGE 50-75 2008 FOOT EXAM Q1 2017 ZOSTER VACCINE AGE 60> 2018 Influenza Age 5 to Adult 2018 HEMOGLOBIN A1C Q6M 10/4/2018 MICROALBUMIN Q1 3/31/2019 LIPID PANEL Q1 2019 Allergies as of 2018  Review Complete On: 2018 By: Ursula Shannon MD  
  
 Severity Noted Reaction Type Reactions Iodine High 2013    Hives Current Immunizations  Never Reviewed Name Date Influenza Vaccine 2013 12:00 AM  
  
 Not reviewed this visit You Were Diagnosed With   
  
 Codes Comments Thrombocytopenia ([facility] [address]    -  Primary ICD-10-CM: D69.6 ICD-9-CM: 287.5 Renal cell carcinoma of left kidney (HCC)     ICD-10-CM: C64.2 ICD-9-CM: 189. 0 Chronic anemia     ICD-10-CM: D64.9 ICD-9-CM: 885. 9 Chronic kidney disease, stage III (moderate)     ICD-10-CM: N18.3 ICD-9-CM: 523. 3 Vitals BP Pulse Temp Resp Weight(growth percentile) BMI  
 122/81 (BP 1 Location: Left arm, BP Patient Position: Sitting) (!) 105 97.7 °F (36.5 °C) (Oral) 16 146 lb (66.2 kg) 19.8 kg/m2 Smoking Status Former Smoker BMI and BSA Data Body Mass Index Body Surface Area  
 19.8 kg/m 2 1.83 m 2 Preferred Pharmacy Pharmacy Name Phone [address], [address] [place] [address] [phone] Your Updated Medication List  
  
   
This list is accurate as of 8/21/18  3:03 PM.  Always use your most recent med list.  
  
  
  
  
 acetaminophen 500 mg tablet Commonly known as:  TYLENOL  
1,000 mg.  
  
 albuterol-ipratropium 2.5 mg-0.5 mg/3 ml Nebu Commonly known as:  DUO-NEB  
3 mL. amLODIPine 10 mg tablet Commonly known as:  Penny Pace Take  by mouth daily. atorvastatin 40 mg tablet Commonly known as:  LIPITOR 40 mg.  
  
 bisacodyl 5 mg EC tablet Commonly known as:  DULCOLAX 10 mg.  
  
 CULTURELLE 10 billion cell capsule Generic drug:  lactobacillus rhamnosus gg 10 billion cell Take 1 Cap by mouth daily. doxycycline 100 mg tablet Commonly known as:  ADOXA Take 100 mg by mouth daily. fluconazole 100 mg tablet Commonly known as:  DIFLUCAN Take 2 tabs day 1, then 1 tab every day after for 7 days. Pt needs to start today, please contact patient when ready for , thank you. furosemide 40 mg tablet Commonly known as:  LASIX Take 1 Tab by mouth daily. GAVILAX 17 gram packet Generic drug:  polyethylene glycol Take 17 g by mouth daily. glucagon 1 mg injection Commonly known as:  GLUCAGEN  
1 mg by IntraVENous route once. hydrALAZINE 25 mg tablet Commonly known as:  APRESOLINE Take 1 Tab by mouth three (3) times daily as needed for Other (SBP above 160). HYDROmorphone 4 mg tablet Commonly known as:  DILAUDID Take 1 Tab by mouth every six (6) hours as needed. Max Daily Amount: 16 mg.  
  
 insulin lispro 100 unit/mL injection Commonly known as:  HUMALOG Normal Insulin Sensitivity  For Blood Sugar (mg/dL) of:    Less than 150 =   0 units          150 -199 =   2 units 200 -249 =   4 units 250 -299 =   6 units 300 -349 =   8 units 350 and above =   10 units If 2 glucose readings are above 200 mg/dL within a 24 hr period, proceed to [facility] JANUVIA 100 mg tablet Generic drug:  SITagliptin Take 100 mg by mouth daily. levothyroxine 50 mcg tablet Commonly known as:  SYNTHROID Take 1 Tab by mouth Daily (before breakfast). naloxone 2 mg/actuation Spry Use 1 spray intranasally into 1 nostril. Use a new Narcan nasal spray for subsequent doses and administer into alternating nostrils. May repeat every 2 to 3 minutes as needed. Omeprazole delayed release 20 mg tablet Commonly known as:  PRILOSEC D/R Take 20 mg by mouth daily. oxybutynin chloride XL 10 mg CR tablet Commonly known as:  DITROPAN XL Take 1 Tab by mouth two (2) times a day. potassium chloride 20 mEq tablet Commonly known as:  K-DUR, KLOR-CON Take 20 mEq by mouth daily. sodium bicarbonate 325 mg tablet Take 325 mg by mouth four (4) times daily. tamsulosin 0.4 mg capsule Commonly known as:  FLOMAX Take 1 Cap by mouth daily. Therapy M 9 mg iron-400 mcg Tab tablet Generic drug:  multivitamin, tx-iron-ca-min Take 1 Tab by Mouth Once a Day. We Performed the Following COMPLETE CBC & AUTO DIFF WBC [25415 CPT(R)] FERRITIN [77977 CPT(R)] IRON PROFILE [id number] CPT(R)] METABOLIC PANEL, COMPREHENSIVE [74345 CPT(R)] Follow-up Instructions Return in about 6 weeks (around 10/2/2018). To-Do List   
 08/21/2018 Lab:  CBC WITH 3 PART DIFF   
  
 08/21/2018 Lab:  FERRITIN   
  
 08/21/2018 Lab:  IRON PROFILE   
  
 08/21/2018 Lab:  METABOLIC PANEL, COMPREHENSIVE Introducing \[facility]\"" & HEALTH SERVICES! Timothy Aguilarchepeyoana introduces [employer] patient portal. Now you can access parts of your medical record, email your doctor's office, and request medication refills online. 1. In your internet browser, go to [email or web address] 2. Click on the First Time User? Click Here link in the Sign In box. You will see the New Member Sign Up page. 3. Enter your [employer] Access Code exactly as it appears below. You will not need to use this code after youve completed the sign-up process. If you do not sign up before the expiration date, you must request a new code. · [employer] Access Code: [email or web address] Expires: 11/19/2018  3:03 PM 
 
4. Enter the last four digits of your Social Security Number (xxxx) and Date of Birth (mm/dd/yyyy) as indicated and click Submit. You will be taken to the next sign-up page. 5. Create a [employer] ID. This will be your [employer] login ID and cannot be changed, so think of one that is secure and easy to remember. 6. Create a [employer] password. You can change your password at any time. 7. Enter your Password Reset Question and Answer. This can be used at a later time if you forget your password. 8. Enter your e-mail address. You will receive e-mail notification when new information is available in [address] 9. Click Sign Up. You can now view and download portions of your medical record. 10. Click the Download Summary menu link to download a portable copy of your medical information. If you have questions, please visit the Frequently Asked Questions section of the [employer] website. Remember, [employer] is NOT to be used for urgent needs. For medical emergencies, dial 911. Now available from your iPhone and Android! Please provide this summary of care documentation to your next provider. Your primary care clinician is listed as Lynda Brooks. If you have any questions after today's visit, please call [phone].

## 2018-08-21 NOTE — PROGRESS NOTES
Hematology/Oncology  Progress Note    Name: Vale Romo  Date: 2018  : 1960    PCP: Jonathan Katz MD     Mr. eJff Dean is a 59 y.o.  male who was seen for follow-up of his thrombocytopenia. He was seen during his recent hospitalization. Current therapy: None    Subjective:     Mr. Jeff Dean is a 68-year-old -American man who was seen for thrombocytopenia during his [facility]. His platelet counts began to recover prior to his discharge. There are no new issues. Since his last clinic visit he has not experienced any unexplained bruising or bleeding. Past medical history, family history, and social history: these were reviewed and remains unchanged. Past Medical History:   Diagnosis Date    Bradycardia, unspecified     Cancer of left kidney (HCC)     Chronic drug abuse 1974    Chronic kidney disease     Diabetes ([facility] 1990    Diabetes mellitus ([facility]     Drug abuse     Encephalopathy     GERD (gastroesophageal reflux disease)     Hypertension     Metabolic disorder     Muscle weakness     Renal cell carcinoma of left kidney (HCC) 13    Pathological Stage [id number] cc RCC FG3 s/p left open partial nephrectomy in       Renal mass     Sepsis due to methicillin resistant Staphylococcus aureus (HCC)     Urethral erosion     Viral hepatitis C     Xerosis cutis      Past Surgical History:   Procedure Laterality Date    HX CIRCUMCISION  13    Dr. Ron Huddleston, [facility]    HX NEPHRECTOMY Left 13    Open Partial, Dr. Ron Huddleston, [facility]    HX OTHER SURGICAL Right 2016    removed right ft  2nd meta-tarsal     Social History     Social History    Marital status:      Spouse name: N/A    Number of children: N/A    Years of education: N/A     Occupational History    Not on file.      Social History Main Topics    Smoking status: Former Smoker     Years: 30.00     Types: Cigarettes     Quit date: 2017    Smokeless tobacco: Never Used    Alcohol use No      Comment: not since April 2017    Drug use: No      Comment: methadone    Sexual activity: Not on file     Other Topics Concern    Not on file     Social History Narrative     since 2012;  for 12 yrs; 2K; [address] [employer]  just recently furloughed; No  service     Family History   Problem Relation Age of Onset    Diabetes Mother     Sickle Cell Anemia Father     Diabetes Sister     Hypertension Sister      Current Outpatient Prescriptions   Medication Sig Dispense Refill    fluconazole (DIFLUCAN) 100 mg tablet Take 2 tabs day 1, then 1 tab every day after for 7 days. Pt needs to start today, please contact patient when ready for , thank you. 8 Tab 0    oxybutynin chloride XL (DITROPAN XL) 10 mg CR tablet Take 1 Tab by mouth two (2) times a day. 60 Tab 5    glucagon (GLUCAGEN) 1 mg injection 1 mg by IntraVENous route once.  sodium bicarbonate 325 mg tablet Take 325 mg by mouth four (4) times daily.  lactobacillus rhamnosus gg 10 billion cell (CULTURELLE) 10 billion cell capsule Take 1 Cap by mouth daily.  amLODIPine (NORVASC) 10 mg tablet Take  by mouth daily.  polyethylene glycol (GAVILAX) 17 gram packet Take 17 g by mouth daily.  Omeprazole delayed release (PRILOSEC D/R) 20 mg tablet Take 20 mg by mouth daily.  SITagliptin (JANUVIA) 100 mg tablet Take 100 mg by mouth daily.  potassium chloride (K-DUR, KLOR-CON) 20 mEq tablet Take 20 mEq by mouth daily.  doxycycline (ADOXA) 100 mg tablet Take 100 mg by mouth daily.  furosemide (LASIX) 40 mg tablet Take 1 Tab by mouth daily. 30 Tab 0    hydrALAZINE (APRESOLINE) 25 mg tablet Take 1 Tab by mouth three (3) times daily as needed for Other (SBP above 160). 30 Tab 0    HYDROmorphone (DILAUDID) 4 mg tablet Take 1 Tab by mouth every six (6) hours as needed.  Max Daily Amount: 16 mg. 40 Tab 0    insulin lispro (HUMALOG) 100 unit/mL injection Normal Insulin Sensitivity   For Blood Sugar (mg/dL) of:     Less than 150 =   0 units           150 -199 =   2 units  200 -249 =   4 units  250 -299 =   6 units  300 -349 =   8 units  350 and above =   10 units  If 2 glucose readings are above 200 mg/dL within a 24 hr period, proceed to \"Very Insul 1 Vial 0    levothyroxine (SYNTHROID) 50 mcg tablet Take 1 Tab by mouth Daily (before breakfast). 30 Tab 0    tamsulosin (FLOMAX) 0.4 mg capsule Take 1 Cap by mouth daily. 30 Cap 0    naloxone 2 mg/actuation spry Use 1 spray intranasally into 1 nostril. Use a new Narcan nasal spray for subsequent doses and administer into alternating nostrils. May repeat every 2 to 3 minutes as needed. 1 Packet 0    acetaminophen (TYLENOL) 500 mg tablet 1,000 mg.      bisacodyl (DULCOLAX) 5 mg EC tablet 10 mg.      atorvastatin (LIPITOR) 40 mg tablet 40 mg.      albuterol-ipratropium (DUO-NEB) 2.5 mg-0.5 mg/3 ml nebu 3 mL.  multivitamin, tx-iron-ca-min (THERAPY M) 9 mg iron-400 mcg tab tablet Take 1 Tab by Mouth Once a Day. Review of Systems  Constitutional: The patient has no acute distress or discomfort. HEENT: The patient denies recent head trauma, eye pain, blurred vision,  hearing deficit, oropharyngeal mucosal pain or lesions, and the patient denies throat pain or discomfort. Lymphatics: The patient denies palpable peripheral lymphadenopathy. Hematologic: The patient denies having bruising, bleeding, or progressive fatigue. Respiratory: Patient denies having shortness of breath, cough, sputum production, fever, or dyspnea on exertion. Cardiovascular: The patient denies having leg pain, leg swelling, heart palpitations, chest permit, chest pain, or lightheadedness. The patient denies having dyspnea on exertion. Gastrointestinal: The patient denies having nausea, emesis, or diarrhea. The patient denies having any hematemesis or blood in the stool.   Genitourinary: Patient denies having urinary urgency, frequency, or dysuria. The patient denies having blood in the urine. Psychological: The patient denies having symptoms of nervousness, anxiety, depression, or thoughts of harming self. Skin: Patient denies having skin rashes, skin, ulcerations, or unexplained itching or pruritus. Musculoskeletal: The patient denies having pain in the joints or bones. Objective:     Visit Vitals    /81 (BP 1 Location: Left arm, BP Patient Position: Sitting)    Pulse (!) 105    Temp 97.7 °F (36.5 °C) (Oral)    Resp 16    Wt 66.2 kg (146 lb)    BMI 19.8 kg/m2     ECOG PS=0  Physical Exam:   Gen. Appearance: The patient is in no acute distress. Skin: There is no bruise or rash. HEENT: The exam is unremarkable. Neck: Supple without lymphadenopathy or thyromegaly. Lungs: Clear to auscultation and percussion; there are no wheezes or rhonchi. Heart: Regular rate and rhythm; there are no murmurs, gallops, or rubs. Abdomen: Bowel sounds are present and normal.  There is no guarding, tenderness, or hepatosplenomegaly. Extremities: There is no clubbing, cyanosis, or edema. Neurologic: There are no focal neurologic deficits. Lymphatics: There is no palpable peripheral lymphadenopathy. Musculoskeletal: The patient has been in his arms and hands. With the use of either a wheelchair for balance or a walker. There is no evidence of joint deformity or effusions. There is no focal joint tenderness. Psychological/psychiatric: There is no clinical evidence of anxiety, depression, or melancholy.     Lab data:      Results for orders placed or performed during the hospital encounter of 08/21/18   CBC WITH 3 PART DIFF     Status: Abnormal   Result Value Ref Range Status    WBC 5.9 4.5 - 13.0 K/uL Final    RBC 3.36 (L) 4.10 - 5.10 M/uL Final    HGB 9.4 (L) 12.0 - 16.0 g/dL Final    HCT 28.4 (L) 36 - 48 % Final    MCV 84.5 78 - 102 FL Final    MCH 28.0 25.0 - 35.0 PG Final    MCHC 33.1 31 - 37 g/dL Final RDW 18.4 (H) 11.5 - 14.5 % Final    PLATELET 880 875 - 790 K/uL Final    NEUTROPHILS 61 40 - 70 % Final    MIXED CELLS 8 0.1 - 17 % Final    LYMPHOCYTES 31 14 - 44 % Final    ABS. NEUTROPHILS 3.6 1.8 - 9.5 K/UL Final    ABS. MIXED CELLS 0.5 0.0 - 2.3 K/uL Final    ABS. LYMPHOCYTES 1.8 1.1 - 5.9 K/UL Final     Comment: Test performed at [place] [address] Location. Results Reviewed by Medical Director. DF AUTOMATED   Final           Assessment:     1. Thrombocytopenia ([address]    2. Renal cell carcinoma of left kidney (HCC)    3. Chronic anemia    4. Chronic kidney disease, stage III (moderate)        Plan: Thrombocytopenia I have previously explained to the patient that his bone marrow biopsy that was done on 4/6/2018 showed that he had a normocytic anemia and thrombocytopenia with a few schistocytes. The bone marrow cellularity was normal with mild erythroid hypoplasia. Iron in the bone marrow was normal.  On the day of his bone marrow biopsy his platelet count was 73,795, hemoglobin was 8 g/dL, hematocrit was 25.2%. The CBC from today shows that his platelet counts have now completely normalized and is currently 146,000. We will recheck his blood counts again in about 6 weeks. Iron deficiency anemia and anemia due to chronic kidney disease: I have advised the patient to continue taking Slow Fe 1 tablet daily. History of renal cell carcinoma, left kidney: The patient had some biopsies done of his kidney and this test showed diabetic glomerulosclerosis in association with moderate arterionephrosclerosis and severe tubulointerstitial scarring.     Follow-up in 6 weeks  Orders Placed This Encounter    COMPLETE CBC & AUTO DIFF WBC    InHouse CBC ([employer])     Standing Status:   Future     Number of Occurrences:   1     Standing Expiration Date:   3/67/3173    METABOLIC PANEL, COMPREHENSIVE     Standing Status:   Future     Number of Occurrences:   1     Standing Expiration Date:   8/22/2019   [address] [place] [address] FERRITIN     Standing Status:   Future     Number of Occurrences:   1     Standing Expiration Date:   8/22/2019    IRON PROFILE     Standing Status:   Future     Number of Occurrences:   1     Standing Expiration Date:   8/22/2019       Jacqueline Corley MD  8/21/2018      Please note: This document has been produced using voice recognition software. Unrecognized errors in transcription may be present.

## 2018-08-21 NOTE — PATIENT INSTRUCTIONS
Thrombocytopenia: Care Instructions  Your Care Instructions    Thrombocytopenia is a low number of platelets in the blood. Platelets are the cells that help blood clot. If you don't have enough of them, your blood cannot clot well. So it is harder to stop bleeding. You may have low platelets because your bone marrow does not make them. Or your body's defenses (immune system) may destroy them. Having an enlarged spleen can also reduce the number of platelets in your blood. This is because they can get trapped in the enlarged spleen. Some diseases or medicines may also cause low platelets. But platelets may go back to normal levels if the disease is treated or the medicine is stopped. You may not need treatment if your problem is mild. If you do need treatment, you may have platelets added to your blood. Or you may get medicine to stop the loss of platelets or help your body make them. Follow-up care is a key part of your treatment and safety. Be sure to make and go to all appointments, and call your doctor if you are having problems. It's also a good idea to know your test results and keep a list of the medicines you take. How can you care for yourself at home? · Be safe with medicines. Take your medicines exactly as prescribed. Call your doctor if you think you are having a problem with your medicine. · Do not take aspirin or anti-inflammatory medicines unless your doctor says it is okay. Examples are ibuprofen (Advil, Motrin) and naproxen (Aleve). They may increase the risk of bleeding. · Avoid contact sports or activities that could cause you to fall. When should you call for help? Call 911 anytime you think you may need emergency care. For example, call if:    · You passed out (lost consciousness).     · You have signs of severe bleeding, which includes:  ¨ You have a severe headache that is different from past headaches. ¨ You vomit blood or what looks like coffee grounds.   ¨ Your stools are maroon or very bloody.    Call your doctor now or seek immediate medical care if:    · You are dizzy or lightheaded, or you feel like you may faint.     · You have abnormal bleeding, such as:  ¨ Your stools are black and look like tar, or they have streaks of blood. ¨ You have blood in your urine. ¨ You have joint pain. ¨ You have bruises or blood spots under your skin.    Watch closely for changes in your health, and be sure to contact your doctor if:    · You do not get better as expected. Where can you learn more? Go to http://gabo-curly.info/. Enter V316 in the search box to learn more about \"Thrombocytopenia: Care Instructions. \"  Current as of: October 9, 2017  Content Version: 11.7  © 6460-3755 [employer]. Care instructions adapted under license by [employer] (which disclaims liability or warranty for this information). If you have questions about a medical condition or this instruction, always ask your healthcare professional. [address] any warranty or liability for your use of this information.

## 2018-08-22 LAB
A-G RATIO,AGRAT: 1.1 RATIO (ref 1.1–2.6)
ALBUMIN SERPL-MCNC: 4 G/DL (ref 3.5–5)
ALP SERPL-CCNC: 103 U/L (ref 40–125)
ALT SERPL-CCNC: 37 U/L (ref 5–40)
ANION GAP SERPL CALC-SCNC: 20 MMOL/L
AST SERPL W P-5'-P-CCNC: 47 U/L (ref 10–37)
BILIRUB SERPL-MCNC: 0.8 MG/DL (ref 0.2–1.2)
BUN SERPL-MCNC: 26 MG/DL (ref 6–22)
CALCIUM SERPL-MCNC: 9.1 MG/DL (ref 8.4–10.4)
CHLORIDE SERPL-SCNC: 99 MMOL/L (ref 98–110)
CO2 SERPL-SCNC: 19 MMOL/L (ref 20–32)
CREAT SERPL-MCNC: 1.5 MG/DL (ref 0.8–1.6)
FE % SATURATION,PSAT: 35 % (ref 20–50)
FERRITIN SERPL-MCNC: 161 NG/ML (ref 22–322)
GFRAA, 66117: 56.6
GFRNA, 66118: 46.7
GLOBULIN,GLOB: 3.6 G/DL (ref 2–4)
GLUCOSE SERPL-MCNC: 219 MG/DL (ref 70–99)
IRON,IRN: 107 MCG/DL (ref 45–160)
POTASSIUM SERPL-SCNC: 4.5 MMOL/L (ref 3.5–5.5)
PROT SERPL-MCNC: 7.6 G/DL (ref 6.2–8.1)
SODIUM SERPL-SCNC: 138 MMOL/L (ref 133–145)
TIBC,TIBC: 305 MCG/DL (ref 228–428)
UIBC SERPL-MCNC: 198 MCG/DL (ref 110–370)

## 2018-08-27 ENCOUNTER — TELEPHONE (OUTPATIENT)
Dept: ONCOLOGY | Age: 58
End: 2018-08-27

## 2018-08-27 NOTE — TELEPHONE ENCOUNTER
Patient very confused, asking when his infusion appointment is to get his shots. Pt has no infusion appointments. He said he was told at his last visit he would be scheduled for shots. Pt very impatient and spoke over me, could not get more info.   Pls check if he needs any shots or not, he is at [phone]

## 2018-09-06 ENCOUNTER — OFFICE VISIT (OUTPATIENT)
Dept: UROLOGY | Age: 58
End: 2018-09-06

## 2018-09-06 VITALS
TEMPERATURE: 97.7 F | HEART RATE: 78 BPM | OXYGEN SATURATION: 98 % | SYSTOLIC BLOOD PRESSURE: 120 MMHG | DIASTOLIC BLOOD PRESSURE: 70 MMHG | WEIGHT: 142 LBS | HEIGHT: 72 IN | BODY MASS INDEX: 19.23 KG/M2

## 2018-09-06 DIAGNOSIS — N31.8 NONNEUROGENIC NEUROGENIC BLADDER DYSFUNCTION: Primary | ICD-10-CM

## 2018-09-06 DIAGNOSIS — N36.8 URETHRAL EROSION: ICD-10-CM

## 2018-09-06 NOTE — PROGRESS NOTES
Mr. Baumann Naseem has a reminder for a \"due or due soon\" health maintenance. I have asked that he contact his primary care provider for follow-up on this health maintenance.

## 2018-09-06 NOTE — PATIENT INSTRUCTIONS
Suprapubic Catheter Care: Care Instructions  Your Care Instructions  A suprapubic catheter is a thin tube placed into your bladder just above the pubic bone. The tube allows urine to drain out of your bladder. The urine collects in a bag attached to the tube. The bag is usually attached to your leg. Sometimes the catheter tube has a valve that lets you drain the urine into the toilet or other container. You may need a suprapubic catheter if you have nerve damage, a problem with your urinary tract, or a disease that weakens your muscles. Having a catheter for a long time increases the risk of getting a urinary tract infection. So catheter care focuses on preventing infection. Follow-up care is a key part of your treatment and safety. Be sure to make and go to all appointments, and call your doctor if you are having problems. It's also a good idea to know your test results and keep a list of the medicines you take. How can you care for yourself at home? · Wash your hands before you handle the catheter. · Clean the area around the catheter with soap and water at least one time every day. Wash the area with soap and water after every bowel movement. · Keep the drainage bag lower than your bladder to keep urine from backing up. · Clean the bag every day after removing it from the catheter. Use another container while you clean the bag. To clean the bag, fill it with 2 parts vinegar to 3 parts water and let it stand for 20 minutes. Then empty it out, and let it air dry. · Empty the drainage bag when it is full or at least every 8 hours. When should you call for help? Call your doctor now or seek immediate medical care if:    · Your catheter becomes blocked and urine does not collect in the drainage bag.     · Your catheter leaks.     · You have blood or pus in your urine.     · You have pain in your back just below your rib cage.  This is called flank pain.     · You have a fever, chills, or body aches.     · You have groin or belly pain.     · Your urine is cloudy or smells bad.     · You have pain, increasing redness, or bleeding around the catheter.     · You have swelling around the catheter or in your belly.    Watch closely for changes in your health, and be sure to contact your doctor if you have any problems. Where can you learn more? Go to http://gabo-curly.info/. Enter W040 in the search box to learn more about \"Suprapubic Catheter Care: Care Instructions. \"  Current as of: May 12, 2017  Content Version: 11.7  © 3584-0742 [employer]. Care instructions adapted under license by [employer] (which disclaims liability or warranty for this information). If you have questions about a medical condition or this instruction, always ask your healthcare professional. [address] any warranty or liability for your use of this information.

## 2018-09-06 NOTE — PROGRESS NOTES
Vale Romo 59 y.o. male     Mr. 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 2017-cervical spine abscess-resultant quadriplegia improving gradually to paraplegia and now patient is able to ambulate with 4 posted aluminum walker but has limited use of fine motor function of the hands-indwelling Willis catheter since 2017 recent urodynamic evaluation by [facility] flaccid neurogenic bladder dysfunction also has mild erosion of the ventral urethra--suprapubic catheter placement recommended    Medical history significant also for renal cell carcinoma status post partial left nephrectomy 2014-Dr. Ron Huddleston  Chronic renal insufficiency secondary to diabetes-Dr. Mckenzie    PSA 2.8 in July 2018    Creatinine 1.5 in August 2018    Review of Systems:   CNS: Cervical spine lesion 2017 subsequent quadriplegia transiently improving to paraplegia and now having residual function of upper extremities impairment of fine motor  Respiratory: No wheezing shortness of breath chest pain or coughing  Cardiovascular: No angina no palpitations/hypertension  Intestinal: No dyspepsia diarrhea or constipation/history of hepatitis  Urinary: Chronic renal insufficiency secondary to diabet nephropathy/ic partial left nephrectomy for renal cell carcinoma 2014  Skeletal: Upper extremity apraxia  Endocrine: Diabetes mellitus  Other:    Allergies: Allergies   Allergen Reactions    Iodine Hives      Medications:    Current Outpatient Prescriptions   Medication Sig Dispense Refill    fluconazole (DIFLUCAN) 100 mg tablet Take 2 tabs day 1, then 1 tab every day after for 7 days. Pt needs to start today, please contact patient when ready for , thank you. 8 Tab 0    sodium bicarbonate 325 mg tablet Take 325 mg by mouth four (4) times daily.       lactobacillus rhamnosus gg 10 billion cell (CULTURELLE) 10 billion cell capsule Take 1 Cap by mouth daily.  amLODIPine (NORVASC) 10 mg tablet Take  by mouth daily.  polyethylene glycol (GAVILAX) 17 gram packet Take 17 g by mouth daily.  SITagliptin (JANUVIA) 100 mg tablet Take 100 mg by mouth daily.  potassium chloride (K-DUR, KLOR-CON) 20 mEq tablet Take 20 mEq by mouth daily.  doxycycline (ADOXA) 100 mg tablet Take 100 mg by mouth daily.  furosemide (LASIX) 40 mg tablet Take 1 Tab by mouth daily. 30 Tab 0    hydrALAZINE (APRESOLINE) 25 mg tablet Take 1 Tab by mouth three (3) times daily as needed for Other (SBP above 160). 30 Tab 0    HYDROmorphone (DILAUDID) 4 mg tablet Take 1 Tab by mouth every six (6) hours as needed. Max Daily Amount: 16 mg. 40 Tab 0    insulin lispro (HUMALOG) 100 unit/mL injection Normal Insulin Sensitivity   For Blood Sugar (mg/dL) of:     Less than 150 =   0 units           150 -199 =   2 units  200 -249 =   4 units  250 -299 =   6 units  300 -349 =   8 units  350 and above =   10 units  If 2 glucose readings are above 200 mg/dL within a 24 hr period, proceed to \"Very Insul 1 Vial 0    levothyroxine (SYNTHROID) 50 mcg tablet Take 1 Tab by mouth Daily (before breakfast). 30 Tab 0    tamsulosin (FLOMAX) 0.4 mg capsule Take 1 Cap by mouth daily. 30 Cap 0    naloxone 2 mg/actuation spry Use 1 spray intranasally into 1 nostril. Use a new Narcan nasal spray for subsequent doses and administer into alternating nostrils. May repeat every 2 to 3 minutes as needed. 1 Packet 0    atorvastatin (LIPITOR) 40 mg tablet 40 mg.      multivitamin, tx-iron-ca-min (THERAPY M) 9 mg iron-400 mcg tab tablet Take 1 Tab by Mouth Once a Day.  oxybutynin chloride XL (DITROPAN XL) 10 mg CR tablet Take 1 Tab by mouth two (2) times a day. 60 Tab 5    glucagon (GLUCAGEN) 1 mg injection 1 mg by IntraVENous route once.  Omeprazole delayed release (PRILOSEC D/R) 20 mg tablet Take 20 mg by mouth daily.       acetaminophen (TYLENOL) 500 mg tablet 1,000 mg.      bisacodyl (DULCOLAX) 5 mg EC tablet 10 mg.      albuterol-ipratropium (DUO-NEB) 2.5 mg-0.5 mg/3 ml nebu 3 mL. Past Medical History:   Diagnosis Date    Bradycardia, unspecified 2018    Cancer of left kidney (HCC)     Chronic drug abuse 1974    Chronic kidney disease     Diabetes ([facility] 01/1990    Diabetes mellitus ([facility]     Drug abuse     Encephalopathy     GERD (gastroesophageal reflux disease)     Hypertension     Metabolic disorder     Muscle weakness     Renal cell carcinoma of left kidney (HCC) 12/17/13    Pathological Stage [id number] cc RCC FG3 s/p left open partial nephrectomy in 12/13      Renal mass     Sepsis due to methicillin resistant Staphylococcus aureus (HCC)     Urethral erosion     Viral hepatitis C     Xerosis cutis       Past Surgical History:   Procedure Laterality Date    HX CIRCUMCISION  12/17/13    Dr. Leeann Meier, [facility]    HX NEPHRECTOMY Left 12/17/13    Open Partial, Dr. Leeann Meier, [facility]    HX OTHER SURGICAL Right 2/27/2016    removed right ft  2nd meta-tarsal     Social History     Social History    Marital status:      Spouse name: N/A    Number of children: N/A    Years of education: N/A     Occupational History    Not on file. Social History Main Topics    Smoking status: Former Smoker     Years: 30.00     Types: Cigarettes     Quit date: 4/29/2017    Smokeless tobacco: Never Used    Alcohol use No      Comment: not since April 2017    Drug use: No      Comment: methadone    Sexual activity: Not on file     Other Topics Concern    Not on file     Social History Narrative     since 2012;  for 12 yrs; 2K; [address] [employer] worker just recently furloughed;  No  service      Family History   Problem Relation Age of Onset    Diabetes Mother     Sickle Cell Anemia Father     Diabetes Sister     Hypertension Sister         Physical Examination: Well-nourished thin and trim appearing adult male in no apparent distress-upper extremity apraxia-Willis catheter indwelling draining stiven clear urine into his leg bag    Abdomen is nontender no palpable masses no organomegaly  Back-no percussion CVA tenderness on either side  No inguinal hernia or adenopathy  Penis is normal-mild ventral meatal erosion from indwelling Willis catheter  Testes are normal in size shape and consistency bilaterally-no epididymal induration or tenderness on either side  Spermatic cords are palpably normal bilaterally  Scrotum is normal  Prostate by MIGUEL ÁNGEL is rounded, smooth, benign in consistency and nontender-no induration no nodularity  No rectal masses tenderness or induration      Urinalysis: No specimen    Impression: 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 Unrecognized errors in transcription may be present.

## 2018-09-06 NOTE — MR AVS SNAPSHOT
[address] [facility] [place] [address] [place] [place] [address] 
[phone] Patient: Domenica Bustillos MRN: [id number] :[id number] Visit Information Date & Time Provider Department Dept. Phone Encounter #  
 2018 10:30 AM [address] [facility] [facility] [phone] Your Appointments 10/1/2018  3:00 PM  
Office Visit with Titi Rivera MD  
[facility]  [facility]) Appt Note: Follow up 6 weeks; Follow up 6 weeks; Follow up 6 weeks [facility] [address], [place] [address] Jose Angel Lopez Toni Mary Ann  
  
    
 2018  9:45 AM  
ESTABLISHED PATIENT with Duran Quiñonse MD  
[facility] ([facility]) Appt Note: 6mo F/U, [facility]  
 [address] [facility], [place] [address] [facility] [address]  
  
   
 [address] [facility], [address] [place] [address] Upcoming Health Maintenance Date Due  
 EYE EXAM RETINAL OR DILATED Q1 1968 Pneumococcal 19-64 Highest Risk (1 of 3 - PCV13) 1977 DTaP/Tdap/Td series (1 - Tdap) 1979 FOBT Q 1 YEAR AGE 50-75 2008 FOOT EXAM Q1 2017 ZOSTER VACCINE AGE 60> 2018 Influenza Age 5 to Adult 2018 HEMOGLOBIN A1C Q6M 10/4/2018 MICROALBUMIN Q1 3/31/2019 LIPID PANEL Q1 2019 Allergies as of 2018  Review Complete On: 2018 By: Ofelia Barfield Severity Noted Reaction Type Reactions Iodine High 2013    Hives Current Immunizations  Never Reviewed Name Date Influenza Vaccine 2013 12:00 AM  
  
 Not reviewed this visit Vitals  BP Pulse Temp Height(growth percentile) Weight(growth percentile) SpO2  
 120/70 (BP 1 Location: Left arm, BP Patient Position: Sitting) 78 97.7 °F (36.5 °C) (Oral) 6' (1.829 m) 142 lb (64.4 kg) 98% BMI Smoking Status 19.26 kg/m2 Former Smoker BMI and BSA Data Body Mass Index Body Surface Area  
 19.26 kg/m 2 1.81 m 2 Preferred Pharmacy Pharmacy Name Phone [address] [place] [address], [address] [place] [phone] Your Updated Medication List  
  
   
This list is accurate as of 9/6/18 11:43 AM.  Always use your most recent med list.  
  
  
  
  
 acetaminophen 500 mg tablet Commonly known as:  TYLENOL  
1,000 mg.  
  
 albuterol-ipratropium 2.5 mg-0.5 mg/3 ml Nebu Commonly known as:  DUO-NEB  
3 mL. amLODIPine 10 mg tablet Commonly known as:  Penny Pace Take  by mouth daily. atorvastatin 40 mg tablet Commonly known as:  LIPITOR 40 mg.  
  
 bisacodyl 5 mg EC tablet Commonly known as:  DULCOLAX 10 mg.  
  
 CULTURELLE 10 billion cell capsule Generic drug:  lactobacillus rhamnosus gg 10 billion cell Take 1 Cap by mouth daily. doxycycline 100 mg tablet Commonly known as:  ADOXA Take 100 mg by mouth daily. fluconazole 100 mg tablet Commonly known as:  DIFLUCAN Take 2 tabs day 1, then 1 tab every day after for 7 days. Pt needs to start today, please contact patient when ready for , thank you. furosemide 40 mg tablet Commonly known as:  LASIX Take 1 Tab by mouth daily. GAVILAX 17 gram packet Generic drug:  polyethylene glycol Take 17 g by mouth daily. glucagon 1 mg injection Commonly known as:  GLUCAGEN  
1 mg by IntraVENous route once. hydrALAZINE 25 mg tablet Commonly known as:  APRESOLINE Take 1 Tab by mouth three (3) times daily as needed for Other (SBP above 160). HYDROmorphone 4 mg tablet Commonly known as:  DILAUDID Take 1 Tab by mouth every six (6) hours as needed. Max Daily Amount: 16 mg.  
  
 insulin lispro 100 unit/mL injection Commonly known as:  HUMALOG Normal Insulin Sensitivity  For Blood Sugar (mg/dL) of:    Less than 150 =   0 units          150 -199 =   2 units 200 -249 =   4 units 250 -299 =   6 units 300 -349 =   8 units 350 and above =   10 units If 2 glucose readings are above 200 mg/dL within a 24 hr period, proceed to [facility] JANUVIA 100 mg tablet Generic drug:  SITagliptin Take 100 mg by mouth daily. levothyroxine 50 mcg tablet Commonly known as:  SYNTHROID Take 1 Tab by mouth Daily (before breakfast). naloxone 2 mg/actuation Spry Use 1 spray intranasally into 1 nostril. Use a new Narcan nasal spray for subsequent doses and administer into alternating nostrils. May repeat every 2 to 3 minutes as needed. Omeprazole delayed release 20 mg tablet Commonly known as:  PRILOSEC D/R Take 20 mg by mouth daily. oxybutynin chloride XL 10 mg CR tablet Commonly known as:  DITROPAN XL Take 1 Tab by mouth two (2) times a day. potassium chloride 20 mEq tablet Commonly known as:  K-DUR, KLOR-CON Take 20 mEq by mouth daily. sodium bicarbonate 325 mg tablet Take 325 mg by mouth four (4) times daily. tamsulosin 0.4 mg capsule Commonly known as:  FLOMAX Take 1 Cap by mouth daily. Therapy M 9 mg iron-400 mcg Tab tablet Generic drug:  multivitamin, tx-iron-ca-min Take 1 Tab by Mouth Once a Day. Patient Instructions Suprapubic Catheter Care: Care Instructions Your Care Instructions A suprapubic catheter is a thin tube placed into your bladder just above the pubic bone. The tube allows urine to drain out of your bladder. The urine collects in a bag attached to the tube. The bag is usually attached to your leg. Sometimes the catheter tube has a valve that lets you drain the urine into the toilet or other container. You may need a suprapubic catheter if you have nerve damage, a problem with your urinary tract, or a disease that weakens your muscles. Having a catheter for a long time increases the risk of getting a urinary tract infection. So catheter care focuses on preventing infection. Follow-up care is a key part of your treatment and safety. Be sure to make and go to all appointments, and call your doctor if you are having problems. It's also a good idea to know your test results and keep a list of the medicines you take. How can you care for yourself at home? · Wash your hands before you handle the catheter. · Clean the area around the catheter with soap and water at least one time every day. Wash the area with soap and water after every bowel movement. · Keep the drainage bag lower than your bladder to keep urine from backing up. · Clean the bag every day after removing it from the catheter. Use another container while you clean the bag. To clean the bag, fill it with 2 parts vinegar to 3 parts water and let it stand for 20 minutes. Then empty it out, and let it air dry. · Empty the drainage bag when it is full or at least every 8 hours. When should you call for help? Call your doctor now or seek immediate medical care if: 
  · Your catheter becomes blocked and urine does not collect in the drainage bag.  
  · Your catheter leaks.  
  · You have blood or pus in your urine.  
  · You have pain in your back just below your rib cage. This is called flank pain.  
  · You have a fever, chills, or body aches.  
  · You have groin or belly pain.  
  · Your urine is cloudy or smells bad.  
  · You have pain, increasing redness, or bleeding around the catheter.  
  · You have swelling around the catheter or in your belly.  
 Watch closely for changes in your health, and be sure to contact your doctor if you have any problems. Where can you learn more? Go to http://gabo-curly.info/. Enter W553 in the search box to learn more about \"Suprapubic Catheter Care: Care Instructions. \" Current as of: May 12, 2017 Content Version: 11.7 © 7698-8340 [employer]. Care instructions adapted under license by [employer] (which disclaims liability or warranty for this information). If you have questions about a medical condition or this instruction, always ask your healthcare professional. [address] any warranty or liability for your use of this information. Introducing [address]! Timothy Wei introduces [employer] patient portal. Now you can access parts of your medical record, email your doctor's office, and request medication refills online. 1. In your internet browser, go to [email or web address] 2. Click on the First Time User? Click Here link in the Sign In box. You will see the New Member Sign Up page. 3. Enter your [employer] Access Code exactly as it appears below. You will not need to use this code after youve completed the sign-up process. If you do not sign up before the expiration date, you must request a new code. · [employer] Access Code: [email or web address] Expires: 11/19/2018  3:03 PM 
 
4. Enter the last four digits of your Social Security Number (xxxx) and Date of Birth (mm/dd/yyyy) as indicated and click Submit. You will be taken to the next sign-up page. 5. Create a [employer]ia ID. This will be your [employer] login ID and cannot be changed, so think of one that is secure and easy to remember. 6. Create a [employer] password. You can change your password at any time. 7. Enter your Password Reset Question and Answer. This can be used at a later time if you forget your password. 8. Enter your e-mail address. You will receive e-mail notification when new information is available in [address] 9. Click Sign Up. You can now view and download portions of your medical record. 10. Click the Download Summary menu link to download a portable copy of your medical information.  
 
If you have questions, please visit the Frequently Asked Questions section of the [employer]. Remember, [employer] is NOT to be used for urgent needs. For medical emergencies, dial 911. Now available from your iPhone and Android! Please provide this summary of care documentation to your next provider. Your primary care clinician is listed as Dinh Officer. If you have any questions after today's visit, please call [phone].

## 2018-10-01 ENCOUNTER — HOSPITAL ENCOUNTER (OUTPATIENT)
Dept: ONCOLOGY | Age: 58
Discharge: HOME | End: 2018-10-01

## 2018-10-01 ENCOUNTER — OFFICE VISIT (OUTPATIENT)
Dept: ONCOLOGY | Age: 58
End: 2018-10-01

## 2018-10-01 VITALS
SYSTOLIC BLOOD PRESSURE: 123 MMHG | RESPIRATION RATE: 16 BRPM | HEART RATE: 60 BPM | BODY MASS INDEX: 19.23 KG/M2 | DIASTOLIC BLOOD PRESSURE: 67 MMHG | HEIGHT: 72 IN | TEMPERATURE: 97.9 F | WEIGHT: 142 LBS

## 2018-10-01 DIAGNOSIS — C64.2 RENAL CELL CARCINOMA OF LEFT KIDNEY (HCC): ICD-10-CM

## 2018-10-01 DIAGNOSIS — D69.6 THROMBOCYTOPENIA (HCC): ICD-10-CM

## 2018-10-01 DIAGNOSIS — D64.9 CHRONIC ANEMIA: ICD-10-CM

## 2018-10-01 DIAGNOSIS — D69.6 THROMBOCYTOPENIA (HCC): Primary | ICD-10-CM

## 2018-10-01 LAB
BASO+EOS+MONOS # BLD AUTO: 0.5 K/UL (ref 0–2.3)
BASO+EOS+MONOS # BLD AUTO: 8 % (ref 0.1–17)
DIFFERENTIAL METHOD BLD: ABNORMAL
ERYTHROCYTE [DISTWIDTH] IN BLOOD BY AUTOMATED COUNT: 15.4 % (ref 11.5–14.5)
HCT VFR BLD AUTO: 27.1 % (ref 36–48)
HGB BLD-MCNC: 8.8 G/DL (ref 12–16)
LYMPHOCYTES # BLD: 1.4 K/UL (ref 1.1–5.9)
LYMPHOCYTES NFR BLD: 24 % (ref 14–44)
MCH RBC QN AUTO: 29 PG (ref 25–35)
MCHC RBC AUTO-ENTMCNC: 32.5 G/DL (ref 31–37)
MCV RBC AUTO: 89.4 FL (ref 78–102)
NEUTS SEG # BLD: 3.8 K/UL (ref 1.8–9.5)
NEUTS SEG NFR BLD: 67 % (ref 40–70)
PLATELET # BLD AUTO: 94 K/UL (ref 140–440)
RBC # BLD AUTO: 3.03 M/UL (ref 4.1–5.1)
WBC # BLD AUTO: 5.7 K/UL (ref 4.5–13)

## 2018-10-01 NOTE — MR AVS SNAPSHOT
[facility] 
 
 
 [facility], [address] 164 200 [facility] 
[phone] Patient: Martínez Vang MRN: [id number] :[id number] Visit Information Date & Time Provider Department Dept. Phone Encounter #  
 10/1/2018  3:00 PM Steven Bhatti MD [address] [phone] [id number] Your Appointments 10/3/2018  2:00 PM  
Nurse Visit with Jordan MAYA POD4 NURSE [facility]. [address] ([facility]) Appt Note: Pre-Op C&S; Percutaneous SPT Placement 10/12/18 [address]  
 [address] [place] [address] [facility] [facility]  
  
   
 [facility] [id number]  
  
    
 10/3/2018  3:00 PM  
Follow Up with Katy Macias MD  
[address] ([facility]) Appt Note: from 2018 [address] [place] [address], [address] [facility] [address]  
[phone]  
  
   
 [address] [place] [address], [address] [place] [address],[address] [place] [address]  
  
    
 10/8/2018  3:40 PM  
SURGERY CLEARANCE with Mahamed Small DO [facility] ([facility]) Appt Note: established patient needing surgical clearance for upcoming procedure for Oct 12th, upper endoscopy- see referred Dr. Willy Schultz will be resent, approve to schedule [address] [place] [id number] [phone] [facility] [address]  
  
    
 2018  3:15 PM  
POST OP with Janice Schmidt MD  
[facility]. [address] [facility]) Appt Note: 4wk PO; Percutaneous SPT Placement 10/12/18 [address]  
 [address] [facility] [id number]  
  
    
 2018 11:45 AM  
Office Visit with Steven Bhatti MD  
[address]  [facility]) Appt Note: [address] [facility] [address] [place] [address]  
[phone]  
  
   
 [facility] 9938 [facility] [address]  
  
    
 12/13/2018  9:45 AM  
ESTABLISHED PATIENT with Saritha Colon MD  
[facility]) Appt Note: 6mo F/U, [facility]  
 [address], [place] [address]  
  
   
 [address], [address] [facility] [address] Upcoming Health Maintenance Date Due  
 EYE EXAM RETINAL OR DILATED Q1 8/11/1968 Pneumococcal 19-64 Highest Risk (1 of 3 - PCV13) 8/11/1977 DTaP/Tdap/Td series (1 - Tdap) 8/11/1979 Shingrix Vaccine Age 50> (1 of 2) 8/11/2008 FOBT Q 1 YEAR AGE 50-75 8/11/2008 FOOT EXAM Q1 2/26/2017 Influenza Age 5 to Adult 8/1/2018 HEMOGLOBIN A1C Q6M 10/4/2018 MICROALBUMIN Q1 3/31/2019 LIPID PANEL Q1 4/1/2019 Allergies as of 10/1/2018  Review Complete On: 10/1/2018 By: Yanna Guerra MD  
  
 Severity Noted Reaction Type Reactions Iodine High 09/28/2013    Hives Current Immunizations  Never Reviewed Name Date Influenza Vaccine 12/20/2013 12:00 AM  
  
 Not reviewed this visit You Were Diagnosed With   
  
 Codes Comments Thrombocytopenia ([facility] [address]    -  Primary ICD-10-CM: D69.6 ICD-9-CM: 287.5 Renal cell carcinoma of left kidney (HCC)     ICD-10-CM: C64.2 ICD-9-CM: 189. 0 Chronic anemia     ICD-10-CM: D64.9 ICD-9-CM: 709. 9 Vitals BP Pulse Temp Resp Height(growth percentile) Weight(growth percentile) 123/67 60 97.9 °F (36.6 °C) (Oral) 16 6' (1.829 m) 142 lb (64.4 kg) BMI Smoking Status 19.26 kg/m2 Former Smoker BMI and BSA Data Body Mass Index Body Surface Area  
 19.26 kg/m 2 1.81 m 2 Preferred Pharmacy Pharmacy Name Phone [address], [address] [place] [address] [phone] Your Updated Medication List  
  
   
 This list is accurate as of 10/1/18  4:27 PM.  Always use your most recent med list.  
  
  
  
  
 acetaminophen 500 mg tablet Commonly known as:  TYLENOL  
1,000 mg.  
  
 albuterol-ipratropium 2.5 mg-0.5 mg/3 ml Nebu Commonly known as:  DUO-NEB  
3 mL. amLODIPine 10 mg tablet Commonly known as:  Vo Pee Take  by mouth daily. atorvastatin 40 mg tablet Commonly known as:  LIPITOR 40 mg.  
  
 bisacodyl 5 mg EC tablet Commonly known as:  DULCOLAX 10 mg.  
  
 CULTURELLE 10 billion cell capsule Generic drug:  lactobacillus rhamnosus gg 10 billion cell Take 1 Cap by mouth daily. doxycycline 100 mg tablet Commonly known as:  ADOXA Take 100 mg by mouth daily. fluconazole 100 mg tablet Commonly known as:  DIFLUCAN Take 2 tabs day 1, then 1 tab every day after for 7 days. Pt needs to start today, please contact patient when ready for , thank you. furosemide 40 mg tablet Commonly known as:  LASIX Take 1 Tab by mouth daily. GAVILAX 17 gram packet Generic drug:  polyethylene glycol Take 17 g by mouth daily. glucagon 1 mg injection Commonly known as:  GLUCAGEN  
1 mg by IntraVENous route once. hydrALAZINE 25 mg tablet Commonly known as:  APRESOLINE Take 1 Tab by mouth three (3) times daily as needed for Other (SBP above 160). HYDROmorphone 4 mg tablet Commonly known as:  DILAUDID Take 1 Tab by mouth every six (6) hours as needed. Max Daily Amount: 16 mg.  
  
 insulin lispro 100 unit/mL injection Commonly known as:  HUMALOG Normal Insulin Sensitivity  For Blood Sugar (mg/dL) of:    Less than 150 =   0 units          150 -199 =   2 units 200 -249 =   4 units 250 -299 =   6 units 300 -349 =   8 units 350 and above =   10 units If 2 glucose readings are above 200 mg/dL within a 24 hr period, proceed to [facility] JANUVIA 100 mg tablet Generic drug:  SITagliptin Take 100 mg by mouth daily. levothyroxine 50 mcg tablet Commonly known as:  SYNTHROID Take 1 Tab by mouth Daily (before breakfast). naloxone 2 mg/actuation Spry Use 1 spray intranasally into 1 nostril. Use a new Narcan nasal spray for subsequent doses and administer into alternating nostrils. May repeat every 2 to 3 minutes as needed. Omeprazole delayed release 20 mg tablet Commonly known as:  PRILOSEC D/R Take 20 mg by mouth daily. oxybutynin chloride XL 10 mg CR tablet Commonly known as:  DITROPAN XL Take 1 Tab by mouth two (2) times a day. potassium chloride 20 mEq tablet Commonly known as:  K-DUR, KLOR-CON Take 20 mEq by mouth daily. sodium bicarbonate 325 mg tablet Take 325 mg by mouth four (4) times daily. tamsulosin 0.4 mg capsule Commonly known as:  FLOMAX Take 1 Cap by mouth daily. Therapy M 9 mg iron-400 mcg Tab tablet Generic drug:  multivitamin, tx-iron-ca-min Take 1 Tab by Mouth Once a Day. We Performed the Following COMPLETE CBC & AUTO DIFF WBC [08071 CPT(R)] To-Do List   
 10/01/2018 Lab:  CBC WITH 3 PART DIFF   
  
 10/01/2018 Lab:  FERRITIN   
  
 10/01/2018 Lab:  IRON PROFILE   
  
 10/01/2018 Lab:  METABOLIC PANEL, COMPREHENSIVE Patient Instructions Anemia: Care Instructions Your Care Instructions Anemia is a low level of red blood cells, which carry oxygen throughout your body. Many things can cause anemia. Lack of iron is one of the most common causes. Your body needs iron to make hemoglobin, a substance in red blood cells that carries oxygen from the lungs to your body's cells. Without enough iron, the body produces fewer and smaller red blood cells. As a result, your body's cells do not get enough oxygen, and you feel tired and weak. And you may have trouble concentrating. Bleeding is the most common cause of a lack of iron.  You may have heavy menstrual bleeding or bleeding caused by conditions such as ulcers, hemorrhoids, or cancer. Regular use of aspirin or other anti-inflammatory medicines (such as ibuprofen) also can cause bleeding in some people. A lack of iron in your diet also can cause anemia, especially at times when the body needs more iron, such as during pregnancy, infancy, and the teen years. Your doctor may have prescribed iron pills. It may take several months of treatment for your iron levels to return to normal. Your doctor also may suggest that you eat foods that are rich in iron, such as meat and beans. There are many other causes of anemia. It is not always due to a lack of iron. Finding the specific cause of your anemia will help your doctor find the right treatment for you. Follow-up care is a key part of your treatment and safety. Be sure to make and go to all appointments, and call your doctor if you are having problems. It's also a good idea to know your test results and keep a list of the medicines you take. How can you care for yourself at home? · Take your medicines exactly as prescribed. Call your doctor if you think you are having a problem with your medicine. · If your doctor recommends iron pills, take them as directed: ¨ Try to take the pills on an empty stomach about 1 hour before or 2 hours after meals. But you may need to take iron with food to avoid an upset stomach. ¨ Do not take antacids or drink milk or caffeine drinks (such as coffee, tea, or cola) at the same time or within 2 hours of the time that you take your iron. They can make it hard for your body to absorb the iron. ¨ Vitamin C (from food or supplements) helps your body absorb iron. Try taking iron pills with a glass of orange juice or some other food that is high in vitamin C, such as citrus fruits. ¨ Iron pills may cause stomach problems, such as heartburn, nausea, diarrhea, constipation, and cramps.  Be sure to drink plenty of fluids, and include fruits, vegetables, and fiber in your diet each day. Iron pills often make your bowel movements dark or green. ¨ If you forget to take an iron pill, do not take a double dose of iron the next time you take a pill. ¨ Keep iron pills out of the reach of small children. An overdose of iron can be very dangerous. · Follow your doctor's advice about eating iron-rich foods. These include red meat, shellfish, poultry, eggs, beans, raisins, whole-grain bread, and leafy green vegetables. · Steam vegetables to help them keep their iron content. When should you call for help? Call 911 anytime you think you may need emergency care. For example, call if: 
  · You have symptoms of a heart attack. These may include: ¨ Chest pain or pressure, or a strange feeling in the chest. 
¨ Sweating. ¨ Shortness of breath. ¨ Nausea or vomiting. ¨ Pain, pressure, or a strange feeling in the back, neck, jaw, or upper belly or in one or both shoulders or arms. ¨ Lightheadedness or sudden weakness. ¨ A fast or irregular heartbeat. After you call 911, the  may tell you to chew 1 adult-strength or 2 to 4 low-dose aspirin. Wait for an ambulance. Do not try to drive yourself.  
  · You passed out (lost consciousness).  
 Call your doctor now or seek immediate medical care if: 
  · You have new or increased shortness of breath.  
  · You are dizzy or lightheaded, or you feel like you may faint.  
  · Your fatigue and weakness continue or get worse.  
  · You have any abnormal bleeding, such as: 
¨ Nosebleeds. ¨ Vaginal bleeding that is different (heavier, more frequent, at a different time of the month) than what you are used to. ¨ Bloody or black stools, or rectal bleeding. ¨ Bloody or pink urine.  
 Watch closely for changes in your health, and be sure to contact your doctor if: 
  · You do not get better as expected. Where can you learn more? Go to http://nadine.info/. Enter R301 in the search box to learn more about \"Anemia: Care Instructions. \" Current as of: October 9, 2017 Content Version: 11.7 © 1463-0276 [employer]. Care instructions adapted under license by [employer] (which disclaims liability or warranty for this information). If you have questions about a medical condition or this instruction, always ask your healthcare professional. [address] any warranty or liability for your use of this information. Thrombocytopenia: Care Instructions Your Care Instructions Thrombocytopenia is a low number of platelets in the blood. Platelets are the cells that help blood clot. If you don't have enough of them, your blood cannot clot well. So it is harder to stop bleeding. You may have low platelets because your bone marrow does not make them. Or your body's defenses (immune system) may destroy them. Having an enlarged spleen can also reduce the number of platelets in your blood. This is because they can get trapped in the enlarged spleen. Some diseases or medicines may also cause low platelets. But platelets may go back to normal levels if the disease is treated or the medicine is stopped. You may not need treatment if your problem is mild. If you do need treatment, you may have platelets added to your blood. Or you may get medicine to stop the loss of platelets or help your body make them. Follow-up care is a key part of your treatment and safety. Be sure to make and go to all appointments, and call your doctor if you are having problems. It's also a good idea to know your test results and keep a list of the medicines you take. How can you care for yourself at home? · Be safe with medicines. Take your medicines exactly as prescribed. Call your doctor if you think you are having a problem with your medicine.  
· Do not take aspirin or anti-inflammatory medicines unless your doctor says it is okay. Examples are ibuprofen (Advil, Motrin) and naproxen (Aleve). They may increase the risk of bleeding. · Avoid contact sports or activities that could cause you to fall. When should you call for help? Call 911 anytime you think you may need emergency care. For example, call if: 
  · You passed out (lost consciousness).  
  · You have signs of severe bleeding, which includes: 
¨ You have a severe headache that is different from past headaches. ¨ You vomit blood or what looks like coffee grounds. ¨ Your stools are maroon or very bloody.  
 Call your doctor now or seek immediate medical care if: 
  · You are dizzy or lightheaded, or you feel like you may faint.  
  · You have abnormal bleeding, such as: 
¨ Your stools are black and look like tar, or they have streaks of blood. ¨ You have blood in your urine. ¨ You have joint pain. ¨ You have bruises or blood spots under your skin.  
 Watch closely for changes in your health, and be sure to contact your doctor if: 
  · You do not get better as expected. Where can you learn more? Go to http://gabo-curly.info/. Enter Z245 in the search box to learn more about \"Thrombocytopenia: Care Instructions. \" Current as of: October 9, 2017 Content Version: 11.7 © 7743-4713 [employer]eter[employer]. Care instructions adapted under license by [employer] (which disclaims liability or warranty for this information). If you have questions about a medical condition or this instruction, always ask your healthcare professional. [place] [address] any warranty or liability for your use of this information. Introducing [facility] [place] [facility]! [facility] introduces [employer] patient portal. Now you can access parts of your medical record, email your doctor's office, and request medication refills online. 1. In your internet browser, go to [email or web address] 2. Click on the First Time User? Click Here link in the Sign In box. You will see the New Member Sign Up page. 3. Enter your [facility] Access Code exactly as it appears below. You will not need to use this code after youve completed the sign-up process. If you do not sign up before the expiration date, you must request a new code. · [employer] [facility] [employer] Access Code: [email or web address] Expires: 11/19/2018  3:03 PM 
 
4. Enter the last four digits of your Social Security Number (xxxx) and Date of Birth (mm/dd/yyyy) as indicated and click Submit. You will be taken to the next sign-up page. 5. Create a [facility] ID. This will be your [employer] [facility] [employer] login ID and cannot be changed, so think of one that is secure and easy to remember. 6. Create a [facility] password. You can change your password at any time. 7. Enter your Password Reset Question and Answer. This can be used at a later time if you forget your password. 8. Enter your e-mail address. You will receive e-mail notification when new information is available in [address] 9. Click Sign Up. You can now view and download portions of your medical record. 10. Click the Download Summary menu link to download a portable copy of your medical information. If you have questions, please visit the Frequently Asked Questions section of the [employer] website. Remember, [employer] [facility] [employer] is NOT to be used for urgent needs. For medical emergencies, dial 911. Now available from your iPhone and Android! Please provide this summary of care documentation to your next provider. Your primary care clinician is listed as Rey Aly. If you have any questions after today's visit, please call [phone].

## 2018-10-01 NOTE — PROGRESS NOTES
Hematology/Oncology  Progress Note    Name: Dallas Velez  Date: 10/1/2018  : 1960    PCP: Marquise Martin MD     Mr. Rolo Hurley is a 59 y.o.  male who was seen for follow-up of his thrombocytopenia. He was seen during his recent hospitalization. Current therapy: iron supplement once a day     Subjective:     Mr. Rolo Hurley is a 23-year-old -American man who was seen for thrombocytopenia. He is in the office today for 6 weeks follow up. There are no new issues. Since his last clinic visit he has not experienced any unexplained bruising or bleeding. He uses a walker for support and mobility. Past medical history, family history, and social history: these were reviewed and remains unchanged. Past Medical History:   Diagnosis Date    Bradycardia, unspecified 2018    Cancer of left kidney (HCC)     Chronic drug abuse 1974    Chronic kidney disease     Diabetes ([facility] 1990    Diabetes mellitus ([facility]     Drug abuse     Encephalopathy     GERD (gastroesophageal reflux disease)     Hypertension     Metabolic disorder     Muscle weakness     Renal cell carcinoma of left kidney (HCC) 13    Pathological Stage [id number] cc RCC FG3 s/p left open partial nephrectomy in       Renal mass     Sepsis due to methicillin resistant Staphylococcus aureus (HCC)     Urethral erosion     Viral hepatitis C     Xerosis cutis      Past Surgical History:   Procedure Laterality Date    HX CIRCUMCISION  13    Dr. Michele Mujica, [facility]    HX NEPHRECTOMY Left 13    Open Partial, Dr. Michele Mujica, [facility]    HX OTHER SURGICAL Right 2016    removed right ft  2nd meta-tarsal     Social History     Social History    Marital status:      Spouse name: N/A    Number of children: N/A    Years of education: N/A     Occupational History    Not on file.      Social History Main Topics    Smoking status: Former Smoker     Years: 30.00     Types: Cigarettes     Quit date: 2017    Smokeless tobacco: Never Used    Alcohol use No      Comment: not since April 2017    Drug use: No      Comment: methadone    Sexual activity: Not on file     Other Topics Concern    Not on file     Social History Narrative     since 2012;  for 12 yrs; [employer]K; [address] [employer]  just recently furloughed; No  service     Family History   Problem Relation Age of Onset    Diabetes Mother     Sickle Cell Anemia Father     Diabetes Sister     Hypertension Sister      Current Outpatient Prescriptions   Medication Sig Dispense Refill    fluconazole (DIFLUCAN) 100 mg tablet Take 2 tabs day 1, then 1 tab every day after for 7 days. Pt needs to start today, please contact patient when ready for , thank you. 8 Tab 0    oxybutynin chloride XL (DITROPAN XL) 10 mg CR tablet Take 1 Tab by mouth two (2) times a day. 60 Tab 5    glucagon (GLUCAGEN) 1 mg injection 1 mg by IntraVENous route once.  sodium bicarbonate 325 mg tablet Take 325 mg by mouth four (4) times daily.  lactobacillus rhamnosus gg 10 billion cell (CULTURELLE) 10 billion cell capsule Take 1 Cap by mouth daily.  amLODIPine (NORVASC) 10 mg tablet Take  by mouth daily.  polyethylene glycol (GAVILAX) 17 gram packet Take 17 g by mouth daily.  Omeprazole delayed release (PRILOSEC D/R) 20 mg tablet Take 20 mg by mouth daily.  SITagliptin (JANUVIA) 100 mg tablet Take 100 mg by mouth daily.  potassium chloride (K-DUR, KLOR-CON) 20 mEq tablet Take 20 mEq by mouth daily.  doxycycline (ADOXA) 100 mg tablet Take 100 mg by mouth daily.  furosemide (LASIX) 40 mg tablet Take 1 Tab by mouth daily. 30 Tab 0    hydrALAZINE (APRESOLINE) 25 mg tablet Take 1 Tab by mouth three (3) times daily as needed for Other (SBP above 160). 30 Tab 0    HYDROmorphone (DILAUDID) 4 mg tablet Take 1 Tab by mouth every six (6) hours as needed.  Max Daily Amount: 16 mg. 40 Tab 0    insulin lispro (HUMALOG) 100 unit/mL injection Normal Insulin Sensitivity   For Blood Sugar (mg/dL) of:     Less than 150 =   0 units           150 -199 =   2 units  200 -249 =   4 units  250 -299 =   6 units  300 -349 =   8 units  350 and above =   10 units  If 2 glucose readings are above 200 mg/dL within a 24 hr period, proceed to \"Very Insul 1 Vial 0    levothyroxine (SYNTHROID) 50 mcg tablet Take 1 Tab by mouth Daily (before breakfast). 30 Tab 0    tamsulosin (FLOMAX) 0.4 mg capsule Take 1 Cap by mouth daily. 30 Cap 0    naloxone 2 mg/actuation spry Use 1 spray intranasally into 1 nostril. Use a new Narcan nasal spray for subsequent doses and administer into alternating nostrils. May repeat every 2 to 3 minutes as needed. 1 Packet 0    acetaminophen (TYLENOL) 500 mg tablet 1,000 mg.      bisacodyl (DULCOLAX) 5 mg EC tablet 10 mg.      atorvastatin (LIPITOR) 40 mg tablet 40 mg.      albuterol-ipratropium (DUO-NEB) 2.5 mg-0.5 mg/3 ml nebu 3 mL.  multivitamin, tx-iron-ca-min (THERAPY M) 9 mg iron-400 mcg tab tablet Take 1 Tab by Mouth Once a Day. Review of Systems  Constitutional: The patient has no acute distress or discomfort. HEENT: The patient denies recent head trauma, eye pain, blurred vision,  hearing deficit, oropharyngeal mucosal pain or lesions, and the patient denies throat pain or discomfort. Lymphatics: The patient denies palpable peripheral lymphadenopathy. Hematologic: The patient denies having bruising, bleeding, or progressive fatigue. Respiratory: Patient denies having shortness of breath, cough, sputum production, fever, or dyspnea on exertion. Cardiovascular: The patient denies having leg pain, leg swelling, heart palpitations, chest permit, chest pain, or lightheadedness. The patient denies having dyspnea on exertion. Gastrointestinal: The patient denies having nausea, emesis, or diarrhea. The patient denies having any hematemesis or blood in the stool.   Genitourinary: Patient denies having urinary urgency, frequency, or dysuria. The patient denies having blood in the urine. Psychological: The patient denies having symptoms of nervousness, anxiety, depression, or thoughts of harming self. Skin: Patient denies having skin rashes, skin, ulcerations, or unexplained itching or pruritus. Musculoskeletal: The patient denies having pain in the joints or bones. Objective:     Visit Vitals    /67    Pulse 60    Temp 97.9 °F (36.6 °C) (Oral)    Resp 16    Ht 6' (1.829 m)    Wt 64.4 kg (142 lb)    BMI 19.26 kg/m2     ECOG PS=0  Physical Exam:   Gen. Appearance: The patient is in no acute distress. Skin: There is no bruise or rash. HEENT: The exam is unremarkable. Neck: Supple without lymphadenopathy or thyromegaly. Lungs: Clear to auscultation and percussion; there are no wheezes or rhonchi. Heart: Regular rate and rhythm; there are no murmurs, gallops, or rubs. Abdomen: Bowel sounds are present and normal.  There is no guarding, tenderness, or hepatosplenomegaly. Extremities: There is no clubbing, cyanosis, or edema. Neurologic: There are no focal neurologic deficits. Lymphatics: There is no palpable peripheral lymphadenopathy. Musculoskeletal: The patient has been in his arms and hands. With the use of either a wheelchair for balance or a walker. There is no evidence of joint deformity or effusions. There is no focal joint tenderness. Psychological/psychiatric: There is no clinical evidence of anxiety, depression, or melancholy.     Lab data:      Results for orders placed or performed during the hospital encounter of 10/01/18   CBC WITH 3 PART DIFF     Status: Abnormal   Result Value Ref Range Status    WBC 5.7 4.5 - 13.0 K/uL Final    RBC 3.03 (L) 4.10 - 5.10 M/uL Final    HGB 8.8 (L) 12.0 - 16 g/dL Final    HCT 27.1 (L) 36 - 48 % Final    MCV 89.4 78 - 102 FL Final    MCH 29.0 25.0 - 35.0 PG Final    MCHC 32.5 31 - 37 g/dL Final    RDW 15.4 (H) 11.5 - 14.5 % Final PLATELET 94 (L) 358 - 440 K/uL Final    NEUTROPHILS 67 40 - 70 % Final    MIXED CELLS 8 0.1 - 17 % Final    LYMPHOCYTES 24 14 - 44 % Final    ABS. NEUTROPHILS 3.8 1.8 - 9.5 K/UL Final    ABS. MIXED CELLS 0.5 0.0 - 2.3 K/uL Final    ABS. LYMPHOCYTES 1.4 1.1 - 5.9 K/UL Final     Comment: Test performed at [place] [id number] Location. Results Reviewed by Medical Director. DF AUTOMATED   Final           Assessment:     1. Thrombocytopenia ([address]    2. Renal cell carcinoma of left kidney (HCC)    3. Chronic anemia        Plan: Thrombocytopenia  I have explained to patient that his CBC from today shows that his platelet counts is stable at 94  We will recheck his blood counts again in about 8 weeks. No therapeutic intervention is warranted at this time. Iron deficiency anemia and anemia due to chronic kidney disease: I have inform patient that he should start taking iron supplement like Slow Fe 1 tablet twice or once a day. He was previously  Inform to start the iron supplement however he has not done so. Should this continue to decline we might start him on procrit 60,000 unit every 4 weeks. Follow-up in 8 weeks  Orders Placed This Encounter    COMPLETE CBC & AUTO DIFF WBC    InHouse CBC ([facility])     Standing Status:   Future     Number of Occurrences:   1     Standing Expiration Date:   10/8/2018    IRON PROFILE     Standing Status:   Future     Number of Occurrences:   1     Standing Expiration Date:   10/2/2019    FERRITIN     Standing Status:   Future     Number of Occurrences:   1     Standing Expiration Date:   40/0/4115    METABOLIC PANEL, COMPREHENSIVE     Standing Status:   Future     Number of Occurrences:   1     Standing Expiration Date:   10/2/2019       Charity Elizalde NP  10/1/2018       I have assessed the patient independently and  agree with the full assessment as outlined. Ambrocio Olguin MD, FACP      Please note:  This document has been produced using voice recognition software. Unrecognized errors in transcription may be present.

## 2018-10-01 NOTE — PATIENT INSTRUCTIONS
Anemia: Care Instructions  Your Care Instructions    Anemia is a low level of red blood cells, which carry oxygen throughout your body. Many things can cause anemia. Lack of iron is one of the most common causes. Your body needs iron to make hemoglobin, a substance in red blood cells that carries oxygen from the lungs to your body's cells. Without enough iron, the body produces fewer and smaller red blood cells. As a result, your body's cells do not get enough oxygen, and you feel tired and weak. And you may have trouble concentrating. Bleeding is the most common cause of a lack of iron. You may have heavy menstrual bleeding or bleeding caused by conditions such as ulcers, hemorrhoids, or cancer. Regular use of aspirin or other anti-inflammatory medicines (such as ibuprofen) also can cause bleeding in some people. A lack of iron in your diet also can cause anemia, especially at times when the body needs more iron, such as during pregnancy, infancy, and the teen years. Your doctor may have prescribed iron pills. It may take several months of treatment for your iron levels to return to normal. Your doctor also may suggest that you eat foods that are rich in iron, such as meat and beans. There are many other causes of anemia. It is not always due to a lack of iron. Finding the specific cause of your anemia will help your doctor find the right treatment for you. Follow-up care is a key part of your treatment and safety. Be sure to make and go to all appointments, and call your doctor if you are having problems. It's also a good idea to know your test results and keep a list of the medicines you take. How can you care for yourself at home? · Take your medicines exactly as prescribed. Call your doctor if you think you are having a problem with your medicine. · If your doctor recommends iron pills, take them as directed:  ¨ Try to take the pills on an empty stomach about 1 hour before or 2 hours after meals. But you may need to take iron with food to avoid an upset stomach. ¨ Do not take antacids or drink milk or caffeine drinks (such as coffee, tea, or cola) at the same time or within 2 hours of the time that you take your iron. They can make it hard for your body to absorb the iron. ¨ Vitamin C (from food or supplements) helps your body absorb iron. Try taking iron pills with a glass of orange juice or some other food that is high in vitamin C, such as citrus fruits. ¨ Iron pills may cause stomach problems, such as heartburn, nausea, diarrhea, constipation, and cramps. Be sure to drink plenty of fluids, and include fruits, vegetables, and fiber in your diet each day. Iron pills often make your bowel movements dark or green. ¨ If you forget to take an iron pill, do not take a double dose of iron the next time you take a pill. ¨ Keep iron pills out of the reach of small children. An overdose of iron can be very dangerous. · Follow your doctor's advice about eating iron-rich foods. These include red meat, shellfish, poultry, eggs, beans, raisins, whole-grain bread, and leafy green vegetables. · Steam vegetables to help them keep their iron content. When should you call for help? Call 911 anytime you think you may need emergency care. For example, call if:    · You have symptoms of a heart attack. These may include:  ¨ Chest pain or pressure, or a strange feeling in the chest.  ¨ Sweating. ¨ Shortness of breath. ¨ Nausea or vomiting. ¨ Pain, pressure, or a strange feeling in the back, neck, jaw, or upper belly or in one or both shoulders or arms. ¨ Lightheadedness or sudden weakness. ¨ A fast or irregular heartbeat. After you call 911, the  may tell you to chew 1 adult-strength or 2 to 4 low-dose aspirin. Wait for an ambulance.  Do not try to drive yourself.     · You passed out (lost consciousness).    Call your doctor now or seek immediate medical care if:    · You have new or increased shortness of breath.     · You are dizzy or lightheaded, or you feel like you may faint.     · Your fatigue and weakness continue or get worse.     · You have any abnormal bleeding, such as:  ¨ Nosebleeds. ¨ Vaginal bleeding that is different (heavier, more frequent, at a different time of the month) than what you are used to. ¨ Bloody or black stools, or rectal bleeding. ¨ Bloody or pink urine.    Watch closely for changes in your health, and be sure to contact your doctor if:    · You do not get better as expected. Where can you learn more? Go to http://gabo-curly.info/. Enter R301 in the search box to learn more about \"Anemia: Care Instructions. \"  Current as of: October 9, 2017  Content Version: 11.7  © 4474-2287 [employer]. Care instructions adapted under license by [employer] (which disclaims liability or warranty for this information). If you have questions about a medical condition or this instruction, always ask your healthcare professional. [place] [address] any warranty or liability for your use of this information. Thrombocytopenia: Care Instructions  Your Care Instructions    Thrombocytopenia is a low number of platelets in the blood. Platelets are the cells that help blood clot. If you don't have enough of them, your blood cannot clot well. So it is harder to stop bleeding. You may have low platelets because your bone marrow does not make them. Or your body's defenses (immune system) may destroy them. Having an enlarged spleen can also reduce the number of platelets in your blood. This is because they can get trapped in the enlarged spleen. Some diseases or medicines may also cause low platelets. But platelets may go back to normal levels if the disease is treated or the medicine is stopped. You may not need treatment if your problem is mild. If you do need treatment, you may have platelets added to your blood.  Or you may get medicine to stop the loss of platelets or help your body make them. Follow-up care is a key part of your treatment and safety. Be sure to make and go to all appointments, and call your doctor if you are having problems. It's also a good idea to know your test results and keep a list of the medicines you take. How can you care for yourself at home? · Be safe with medicines. Take your medicines exactly as prescribed. Call your doctor if you think you are having a problem with your medicine. · Do not take aspirin or anti-inflammatory medicines unless your doctor says it is okay. Examples are ibuprofen (Advil, Motrin) and naproxen (Aleve). They may increase the risk of bleeding. · Avoid contact sports or activities that could cause you to fall. When should you call for help? Call 911 anytime you think you may need emergency care. For example, call if:    · You passed out (lost consciousness).     · You have signs of severe bleeding, which includes:  ¨ You have a severe headache that is different from past headaches. ¨ You vomit blood or what looks like coffee grounds. ¨ Your stools are maroon or very bloody.    Call your doctor now or seek immediate medical care if:    · You are dizzy or lightheaded, or you feel like you may faint.     · You have abnormal bleeding, such as:  ¨ Your stools are black and look like tar, or they have streaks of blood. ¨ You have blood in your urine. ¨ You have joint pain. ¨ You have bruises or blood spots under your skin.    Watch closely for changes in your health, and be sure to contact your doctor if:    · You do not get better as expected. Where can you learn more? Go to http://gabo-curly.info/. Enter J992 in the search box to learn more about \"Thrombocytopenia: Care Instructions. \"  Current as of: October 9, 2017  Content Version: 11.7  © 7562-2819 [employer]  Care instructions adapted under license by [employer] (which disclaims liability or warranty for this information). If you have questions about a medical condition or this instruction, always ask your healthcare professional. [address] any warranty or liability for your use of this information.

## 2018-10-02 LAB
A-G RATIO,AGRAT: 1.2 RATIO (ref 1.1–2.6)
ALBUMIN SERPL-MCNC: 4.2 G/DL (ref 3.5–5)
ALP SERPL-CCNC: 111 U/L (ref 40–125)
ALT SERPL-CCNC: 12 U/L (ref 5–40)
ANION GAP SERPL CALC-SCNC: 16 MMOL/L
AST SERPL W P-5'-P-CCNC: 15 U/L (ref 10–37)
BILIRUB SERPL-MCNC: 1 MG/DL (ref 0.2–1.2)
BUN SERPL-MCNC: 42 MG/DL (ref 6–22)
CALCIUM SERPL-MCNC: 9.7 MG/DL (ref 8.4–10.4)
CHLORIDE SERPL-SCNC: 105 MMOL/L (ref 98–110)
CO2 SERPL-SCNC: 24 MMOL/L (ref 20–32)
CREAT SERPL-MCNC: 2.2 MG/DL (ref 0.8–1.6)
FE % SATURATION,PSAT: 45 % (ref 20–50)
FERRITIN SERPL-MCNC: 142 NG/ML (ref 22–322)
GFRAA, 66117: 37
GFRNA, 66118: 30.5
GLOBULIN,GLOB: 3.5 G/DL (ref 2–4)
GLUCOSE SERPL-MCNC: 100 MG/DL (ref 70–99)
IRON,IRN: 164 MCG/DL (ref 45–160)
POTASSIUM SERPL-SCNC: 6.2 MMOL/L (ref 3.5–5.5)
PROT SERPL-MCNC: 7.7 G/DL (ref 6.2–8.1)
SODIUM SERPL-SCNC: 145 MMOL/L (ref 133–145)
TIBC,TIBC: 368 MCG/DL (ref 228–428)
UIBC SERPL-MCNC: 204 MCG/DL (ref 110–370)

## 2018-10-02 RX ORDER — HYDRALAZINE HYDROCHLORIDE 25 MG/1
25 TABLET, FILM COATED ORAL 3 TIMES DAILY
COMMUNITY
End: 2018-11-05

## 2018-10-02 RX ORDER — AMLODIPINE BESYLATE 10 MG/1
TABLET ORAL DAILY
COMMUNITY
End: 2019-01-31

## 2018-10-02 RX ORDER — GLECAPREVIR AND PIBRENTASVIR 40; 100 MG/1; MG/1
TABLET, FILM COATED ORAL
COMMUNITY
End: 2019-01-31

## 2018-10-05 ENCOUNTER — TELEPHONE (OUTPATIENT)
Dept: ONCOLOGY | Age: 58
End: 2018-10-05

## 2018-10-08 ENCOUNTER — OFFICE VISIT (OUTPATIENT)
Dept: CARDIOLOGY CLINIC | Age: 58
End: 2018-10-08

## 2018-10-08 ENCOUNTER — TELEPHONE (OUTPATIENT)
Dept: CARDIOLOGY CLINIC | Age: 58
End: 2018-10-08

## 2018-10-08 VITALS
DIASTOLIC BLOOD PRESSURE: 78 MMHG | HEIGHT: 72 IN | HEART RATE: 57 BPM | SYSTOLIC BLOOD PRESSURE: 116 MMHG | OXYGEN SATURATION: 99 % | BODY MASS INDEX: 18.83 KG/M2 | WEIGHT: 139 LBS

## 2018-10-08 DIAGNOSIS — B95.62 MRSA BACTEREMIA: ICD-10-CM

## 2018-10-08 DIAGNOSIS — N18.30 STAGE III CHRONIC KIDNEY DISEASE (HCC): ICD-10-CM

## 2018-10-08 DIAGNOSIS — E08.21 DIABETIC NEPHROPATHY ASSOCIATED WITH DIABETES MELLITUS DUE TO UNDERLYING CONDITION (HCC): ICD-10-CM

## 2018-10-08 DIAGNOSIS — R00.1 BRADYCARDIA: Primary | ICD-10-CM

## 2018-10-08 DIAGNOSIS — C64.2 RENAL CELL CARCINOMA OF LEFT KIDNEY (HCC): ICD-10-CM

## 2018-10-08 DIAGNOSIS — D69.6 THROMBOCYTOPENIA (HCC): ICD-10-CM

## 2018-10-08 DIAGNOSIS — R78.81 MRSA BACTEREMIA: ICD-10-CM

## 2018-10-08 DIAGNOSIS — Z89.421 STATUS POST AMPUTATION OF TOE OF RIGHT FOOT (HCC): ICD-10-CM

## 2018-10-08 DIAGNOSIS — I73.9 PERIPHERAL VASCULAR DISEASE (HCC): ICD-10-CM

## 2018-10-08 NOTE — PROGRESS NOTES
HPI: I saw Jose Stein in my office today in cardiovascular evaluation regarding his past problems with bradycardia prior to an endoscopy procedure by Dr. Breanna Hinson to be completed on October 11, 2018 at DR[facility] and a suprapubic catheter placement by Dr. Shahid Delcid to be completed on October 12, 2018 over at [facility].    Mr. Rose Dwyer is a pleasant 68-year-old -American male who has had a rather complicated past medical history. His past medical history includes a renal mass with renal cell carcinoma of the left kidney with partial left nephrectomy, diabetes, hypertension, and chronic drug abuse. He was admitted to the hospital in April 2017 with history of C3 through C7 discitis with osteomyelitis with an L4-L5 phlegmon and is status post a C3 through C7 laminectomy with posterior lateral fusion and hardware on April 29, 2017 for rapid onset of quadriplegia from the discitis and osteomyelitis in the setting of MRSA bacteremia and abscess cultures from the spine. He had a very prolonged recovery and is still somewhat limited in his activity getting around on a walker and is on chronic doxycycline due to his neck hardware. He more recently was admitted from a skilled nursing facility that he was in in March 2018 due to increasing peripheral edema, but it was actually found to have no signs of heart failure with a normal NT proBNP and completely normal left ventricular systolic function, but he was found to be anemic and dehydrated with acute kidney injury. During that hospitalization he was very bradycardic secondary to beta-blocker therapy which was discontinued for his blood pressure and amlodipine was started with reasonably good control.    He also during that hospitalization was worked up for anemia and thrombocytopenia including a bone marrow biopsy on April 6, 2018, but his thrombocytopenia apparently spontaneously resolved and he had a platelet count of 864,886 on August 21, 2018 and on October 1, 2018 his platelet count was down to 94,000, however this was not felt to be severe enough to require any treatment by Dr. Debra Weiner. He comes into the office today and relates that he is really doing reasonably well and at this time denies any cardiovascular complaints except for little peripheral edema in his left ankle from time to time but specifically has had no problems with chest pain, shortness of breath, palpitations, or dizziness. Encounter Diagnoses   Name Primary?  Bradycardia, asymptomatic possibly reflecting early sick sinus syndrome Yes    Peripheral vascular disease ([address] status post     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)     Thrombocytopenia ([address] mild currently     Renal cell carcinoma of left kidney ([address] status post partial left nephrectomy     MRSA bacteremia     Diabetic nephropathy associated with diabetes mellitus due to underlying condition ([address]     Stage III chronic kidney disease ([address]        Discussion: This patient has had some problems with bradycardia worsened by beta-blocker therapy in the past, but although he still somewhat bradycardic off AV blocking drugs he is completely asymptomatic. He had completely normal left ventricular function on an echocardiogram completed back in April of this year and is giving no symptoms to suggest the development of coronary ischemia, so although I think that his bradycardia may potentially be from early sick sinus syndrome I do not think there is any contraindication to either his endoscopy or planned suprapubic catheter placement. He does have some mild thrombocytopenia currently, but this does not warrant any therapy at this time and is not severe enough to be a problem at the time of his planned procedures. His renal function has worsened as checked by a BMP about a week ago and is potassium was somewhat elevated at 6.2 at that time.   This actually was repeated in the emergency room and was found to be normal and his potassium supplementation was stopped. He continues to be anemic, but this problem is chronic and appears to be stable. Since he does have bradycardia issues we will follow him up every several months and certainly if he develops any symptoms of dizziness or lightheadedness he should let us know we can do further more aggressive workup. PCP: Theresa Gay DO      Past Medical History:   Diagnosis Date    Bradycardia, unspecified 2018    [facility]     Chronic drug abuse ([facility] 1974    Chronic kidney disease     Diabetes ([facility] 01/1990    Diabetes mellitus ([facility]     Drug abuse ([facility]     Encephalopathy     GERD (gastroesophageal reflux disease)     Hypertension     Metabolic disorder     Muscle weakness     Renal cell carcinoma of left kidney ([facility] 12/17/13    Pathological Stage [id number] cc RCC FG3 s/p left open partial nephrectomy in 12/13      Renal mass     Sepsis due to methicillin resistant Staphylococcus aureus (HCC)     Urethral erosion     Viral hepatitis C     Xerosis cutis        Past Surgical History:   Procedure Laterality Date    HX CIRCUMCISION  12/17/13    [facility], [facility]    HX NEPHRECTOMY Left 12/17/13    Open Partial, Dr. Sindi Pereira, [facility]    HX OTHER SURGICAL Right 2/27/2016    removed right ft  2nd meta-tarsal       Current Outpatient Prescriptions   Medication Sig    trimethoprim-sulfamethoxazole (BACTRIM DS, SEPTRA DS) 160-800 mg per tablet Take 1 Tab by mouth two (2) times a day for 10 days.  amLODIPine (NORVASC) 10 mg tablet Take  by mouth daily.  glecaprevir-pibrentasvir (MAVYRET) 100-40 mg tab Take  by mouth.  SITagliptin (JANUVIA) 100 mg tablet Take 100 mg by mouth daily.  potassium chloride (K-DUR, KLOR-CON) 20 mEq tablet Take 20 mEq by mouth daily.  doxycycline (ADOXA) 100 mg tablet Take 100 mg by mouth daily.     insulin lispro (HUMALOG) 100 unit/mL injection Normal Insulin Sensitivity   For Blood Sugar (mg/dL) of:     Less than 150 =   0 units           150 -199 =   2 units  200 -249 =   4 units  250 -299 =   6 units  300 -349 =   8 units  350 and above =   10 units  If 2 glucose readings are above 200 mg/dL within a 24 hr period, proceed to \"Very Insul    tamsulosin (FLOMAX) 0.4 mg capsule Take 1 Cap by mouth daily.  atorvastatin (LIPITOR) 40 mg tablet 40 mg daily.  hydrALAZINE (APRESOLINE) 25 mg tablet Take 25 mg by mouth three (3) times daily.  fluconazole (DIFLUCAN) 100 mg tablet Take 2 tabs day 1, then 1 tab every day after for 7 days. Pt needs to start today, please contact patient when ready for , thank you.  levothyroxine (SYNTHROID) 50 mcg tablet Take 1 Tab by mouth Daily (before breakfast).  albuterol-ipratropium (DUO-NEB) 2.5 mg-0.5 mg/3 ml nebu 3 mL. No current facility-administered medications for this visit. Allergies   Allergen Reactions    Iodine Hives       Social History :  Social History   Substance Use Topics    Smoking status: Current Every Day Smoker     Packs/day: 0.50     Years: 30.00     Types: Cigarettes    Smokeless tobacco: Never Used    Alcohol use Yes      Comment: beer occasionally        Family History: family history includes Diabetes in his mother and sister; Hypertension in his sister; Sickle Cell Anemia in his father. Review of Systems:  Constitutional: Negative for chills, fever, malaise/fatigue and weight loss. Respiratory: Negative for cough, hemoptysis, shortness of breath and wheezing. Cardiovascular: Positive for leg swelling. Negative for chest pain, palpitations and orthopnea. Gastrointestinal: Negative for abdominal pain, blood in stool, constipation, diarrhea, heartburn, melena, nausea and vomiting. Musculoskeletal: Negative for falls, joint pain and myalgias. Neurological: Negative for dizziness.    Positive for past cervical discitis status post surgery with limitation of mobility using a walker      Physical Exam:    The patient is a cooperative, alert, well developed, well nourished 59 y.o.  male who is in no acute distress at the time of the examination. Visit Vitals    /78    Pulse (!) 57    Ht 6' (1.829 m)    Wt 63 kg (139 lb)    SpO2 99%    BMI 18.85 kg/m2       HEENT: Conjuctiva white, mucosa moist, no pallor or cyanosis. NECK: Supple without masses, tenderness or thyromegaly. There was no jugular venous distention. Carotid are full bilaterally without bruits. CHEST: Symmetrical with good excursion. LUNGS: Clear to auscultation in all fields. HEART: The apex is not displaced. There were no lifts, thrills or heaves. There is a normal S1 and S2.  Grade 2-6 apical systolic murmur with poor radiation without appreciable systolic murmurs, rubs, clicks, or gallops auscultated. ABDOMEN: Soft without masses, tenderness or organomegaly. EXTREMITIES: Full peripheral pulses without peripheral edema. INTEGUMENT: Warm and dry   NEUROLOGICAL: The patient is oriented x 3 with motor function grossly intact. Review of Data: See PMH and Cardiology and Imaging sections for cardiac testing  Lab Results   Component Value Date/Time    Cholesterol, total 115 04/09/2018 11:30 AM    HDL Cholesterol 46 04/01/2018 12:55 AM    LDL, calculated 12.2 04/01/2018 12:55 AM    Triglyceride 105 04/09/2018 11:30 AM    CHOL/HDL Ratio 1.5 04/01/2018 12:55 AM       Results for orders placed or performed in visit on 10/08/18   AMB POC EKG ROUTINE W/ 12 LEADS, INTER & REP     Status: None    Narrative    Sinus bradycardia, rate 57. First-degree AV block. This EKG is otherwise within normal limits and similar to the EKG of May 18, 2018. Karol Diego D.O., F.A.C.C. [facility]  [facility]  [address].   [address]      PLEASE NOTE:  This document has been produced using voice recognition software. Unrecognized errors in transcription may be present.

## 2018-10-08 NOTE — MR AVS SNAPSHOT
[address] [address] [address] [address] [phone] Patient: Peewee Santana MRN: [id number] :1960 Visit Information Date & Time Provider Department Dept. Phone Encounter #  
 10/8/2018  3:40 PM Mireya Huerta, [address] [facility] [id number] Your Appointments 10/25/2018  3:00 PM  
Follow Up with Humberto Jimenez MD  
[address] [place] [address] Ct ([facility]) Appt Note: from 2018; PT RS FR 10/3/18  
 [address] [place] [address], [address] [address]  
[phone]  
  
   
 [address] [place] [address], [address] [place] [address] Drive [address]  
  
    
 2018  3:15 PM  
POST OP with Penny Fajardo MD  
[facility] [address]) Appt Note: 4wk PO; Percutaneous SPT Placement 10/12/18 [facility]  
 [address] [phone]  
  
    
 2018 11:45 AM  
Office Visit with Yanna Guerra MD  
[address]  [facility]) Appt Note: [address] [facility] [address] [facility] [address]  
[phone]  
  
   
 [facility] [address] [place] [address]  
  
    
 2018  9:45 AM  
ESTABLISHED PATIENT with Saritha Colon MD  
[facility]) Appt Note: 6mo F/U, Rev Imaging/Labs  
 [facility] [address] [facility] [address]  
  
   
 301 [facility] [address] [address]  
  
    
 4/15/2019  3:20 PM  
Follow Up with Mireya Huerta DO [facility] ([facility]) Appt Note: 6 month follow up [address] [address] [address] [address]  
[phone] [address] [place] [address] [address] Upcoming Health Maintenance  Date Due  
 EYE EXAM RETINAL OR DILATED Q1 8/11/1968 Pneumococcal 19-64 Highest Risk (1 of 3 - PCV13) 8/11/1977 DTaP/Tdap/Td series (1 - Tdap) 8/11/1979 Shingrix Vaccine Age 50> (1 of 2) 8/11/2008 FOBT Q 1 YEAR AGE 50-75 8/11/2008 FOOT EXAM Q1 2/26/2017 Influenza Age 5 to Adult 8/1/2018 HEMOGLOBIN A1C Q6M 10/4/2018 MICROALBUMIN Q1 3/31/2019 LIPID PANEL Q1 4/1/2019 Allergies as of 10/8/2018  Review Complete On: 10/8/2018 By: Nazanin Barcenas DO Severity Noted Reaction Type Reactions Iodine High 09/28/2013    Hives Current Immunizations  Never Reviewed Name Date Influenza Vaccine 12/20/2013 12:00 AM  
  
 Not reviewed this visit You Were Diagnosed With   
  
 Codes Comments Bradycardia    -  Primary ICD-10-CM: R00.1 ICD-9-CM: 427.89 Bradycardia, unspecified     ICD-10-CM: R00.1 ICD-9-CM: 427.89 Vitals BP Pulse Height(growth percentile) Weight(growth percentile) SpO2 BMI  
 116/78 (!) 57 6' (1.829 m) 139 lb (63 kg) 99% 18.85 kg/m2 Smoking Status Current Every Day Smoker Vitals History BMI and BSA Data Body Mass Index Body Surface Area  
 18.85 kg/m 2 1.79 m 2 Preferred Pharmacy Pharmacy Name Phone [address] [phone] Your Updated Medication List  
  
   
This list is accurate as of 10/8/18  5:12 PM.  Always use your most recent med list.  
  
  
  
  
 albuterol-ipratropium 2.5 mg-0.5 mg/3 ml Nebu Commonly known as:  DUO-NEB  
3 mL. atorvastatin 40 mg tablet Commonly known as:  LIPITOR 40 mg daily. doxycycline 100 mg tablet Commonly known as:  ADOXA Take 100 mg by mouth daily. fluconazole 100 mg tablet Commonly known as:  DIFLUCAN Take 2 tabs day 1, then 1 tab every day after for 7 days. Pt needs to start today, please contact patient when ready for , thank you.  
  
 hydrALAZINE 25 mg tablet Commonly known as:  APRESOLINE Take 25 mg by mouth three (3) times daily. insulin lispro 100 unit/mL injection Commonly known as:  HUMALOG Normal Insulin Sensitivity  For Blood Sugar (mg/dL) of:    Less than 150 =   0 units          150 -199 =   2 units 200 -249 =   4 units 250 -299 =   6 units 300 -349 =   8 units 350 and above =   10 units If 2 glucose readings are above 200 mg/dL within a 24 hr period, proceed to [facility] JANUVIA 100 mg tablet Generic drug:  SITagliptin Take 100 mg by mouth daily. levothyroxine 50 mcg tablet Commonly known as:  SYNTHROID Take 1 Tab by mouth Daily (before breakfast). MAVYRET 100-40 mg Tab Generic drug:  glecaprevir-pibrentasvir Take  by mouth. NORVASC 10 mg tablet Generic drug:  amLODIPine Take  by mouth daily. potassium chloride 20 mEq tablet Commonly known as:  K-DUR, KLOR-CON Take 20 mEq by mouth daily. tamsulosin 0.4 mg capsule Commonly known as:  FLOMAX Take 1 Cap by mouth daily. trimethoprim-sulfamethoxazole 160-800 mg per tablet Commonly known as:  BACTRIM DS, SEPTRA DS Take 1 Tab by mouth two (2) times a day for 10 days. We Performed the Following AMB POC EKG ROUTINE W/ 12 LEADS, INTER & REP [91726 CPT(R)] Introducing [facility] SERVICES! [facility] introduces [employer] patient portal. Now you can access parts of your medical record, email your doctor's office, and request medication refills online. 1. In your internet browser, go to [email or web address] 2. Click on the First Time User? Click Here link in the Sign In box. You will see the New Member Sign Up page. 3. Enter your [employer] Access Code exactly as it appears below. You will not need to use this code after youve completed the sign-up process. If you do not sign up before the expiration date, you must request a new code. · [employer] Access Code: [email or web address] Expires: 11/19/2018  3:03 PM 
 
4. Enter the last four digits of your Social Security Number (xxxx) and Date of Birth (mm/dd/yyyy) as indicated and click Submit. You will be taken to the next sign-up page. 5. Create a [employer] ID. This will be your [employer] login ID and cannot be changed, so think of one that is secure and easy to remember. 6. Create a [employer] password. You can change your password at any time. 7. Enter your Password Reset Question and Answer. This can be used at a later time if you forget your password. 8. Enter your e-mail address. You will receive e-mail notification when new information is available in [address] 9. Click Sign Up. You can now view and download portions of your medical record. 10. Click the Download Summary menu link to download a portable copy of your medical information. If you have questions, please visit the Frequently Asked Questions section of the [employer] website. Remember, [employer] is NOT to be used for urgent needs. For medical emergencies, dial 911. Now available from your iPhone and Android! Please provide this summary of care documentation to your next provider. Your primary care clinician is listed as Gerry Berrios. If you have any questions after today's visit, please call [phone].

## 2018-10-08 NOTE — PROGRESS NOTES
Huylluvia Vernon presents today for   Chief Complaint   Patient presents with    Surgical Clearance       Huy Vernon preferred language for health care discussion is english/other. Is someone accompanying this pt? No    Is the patient using any DME equipment during OV? Yes    Depression Screening:  PHQ over the last two weeks 11/18/2015   Little interest or pleasure in doing things Not at all   Feeling down, depressed, irritable, or hopeless Not at all   Total Score PHQ 2 0       Learning Assessment:  Learning Assessment 5/18/2018   PRIMARY LEARNER Patient   PRIMARY LANGUAGE ENGLISH   LEARNER PREFERENCE PRIMARY DEMONSTRATION   ANSWERED BY patient   RELATIONSHIP SELF       Pt currently taking Anticoagulant therapy? yes    Coordination of Care:  1. Have you been to the ER, urgent care clinic since your last visit? Hospitalized since your last visit? Yes    2. Have you seen or consulted any other health care providers outside of the [address] [place] [address] since your last visit? Include any pap smears or colon screening.  No    Medications verbally verified and corrected with PT.

## 2018-10-10 ENCOUNTER — ANESTHESIA EVENT (OUTPATIENT)
Dept: ENDOSCOPY | Age: 58
End: 2018-10-10
Payer: MEDICAID

## 2018-10-11 ENCOUNTER — HOSPITAL ENCOUNTER (OUTPATIENT)
Age: 58
Setting detail: OUTPATIENT SURGERY
Discharge: HOME | End: 2018-10-11
Attending: INTERNAL MEDICINE | Admitting: INTERNAL MEDICINE
Payer: MEDICAID

## 2018-10-11 ENCOUNTER — ANESTHESIA (OUTPATIENT)
Dept: ENDOSCOPY | Age: 58
End: 2018-10-11
Payer: MEDICAID

## 2018-10-11 VITALS
HEIGHT: 72 IN | SYSTOLIC BLOOD PRESSURE: 134 MMHG | DIASTOLIC BLOOD PRESSURE: 70 MMHG | TEMPERATURE: 97.3 F | HEART RATE: 52 BPM | RESPIRATION RATE: 18 BRPM | BODY MASS INDEX: 18.73 KG/M2 | OXYGEN SATURATION: 100 % | WEIGHT: 138.25 LBS

## 2018-10-11 LAB
AMPHET UR QL SCN: NEGATIVE
BARBITURATES UR QL SCN: NEGATIVE
BENZODIAZ UR QL: NEGATIVE
BUN BLD-MCNC: 30 MG/DL (ref 7–18)
CANNABINOIDS UR QL SCN: NEGATIVE
CHLORIDE BLD-SCNC: 107 MMOL/L (ref 100–108)
COCAINE UR QL SCN: NEGATIVE
GLUCOSE BLD STRIP.AUTO-MCNC: 100 MG/DL (ref 70–110)
GLUCOSE BLD STRIP.AUTO-MCNC: 77 MG/DL (ref 74–106)
HCT VFR BLD CALC: 24 % (ref 36–49)
HDSCOM,HDSCOM: ABNORMAL
HGB BLD-MCNC: 8.2 G/DL (ref 12–16)
METHADONE UR QL: NEGATIVE
OPIATES UR QL: POSITIVE
PCP UR QL: NEGATIVE
POTASSIUM BLD-SCNC: 4.3 MMOL/L (ref 3.5–5.5)
SODIUM BLD-SCNC: 142 MMOL/L (ref 136–145)

## 2018-10-11 PROCEDURE — 76040000019: Performed by: INTERNAL MEDICINE

## 2018-10-11 PROCEDURE — 74011250636 HC RX REV CODE- 250/636

## 2018-10-11 PROCEDURE — 77030019988 HC FCPS ENDOSC DISP BSC -B: Performed by: INTERNAL MEDICINE

## 2018-10-11 PROCEDURE — 74011250636 HC RX REV CODE- 250/636: Performed by: NURSE ANESTHETIST, CERTIFIED REGISTERED

## 2018-10-11 PROCEDURE — 76060000031 HC ANESTHESIA FIRST 0.5 HR: Performed by: INTERNAL MEDICINE

## 2018-10-11 PROCEDURE — 82962 GLUCOSE BLOOD TEST: CPT

## 2018-10-11 PROCEDURE — 77030018846 HC SOL IRR STRL H20 ICUM -A: Performed by: INTERNAL MEDICINE

## 2018-10-11 PROCEDURE — 80307 DRUG TEST PRSMV CHEM ANLYZR: CPT | Performed by: INTERNAL MEDICINE

## 2018-10-11 PROCEDURE — 82947 ASSAY GLUCOSE BLOOD QUANT: CPT

## 2018-10-11 PROCEDURE — 74011000258 HC RX REV CODE- 258

## 2018-10-11 PROCEDURE — 77030008565 HC TBNG SUC IRR ERBE -B: Performed by: INTERNAL MEDICINE

## 2018-10-11 PROCEDURE — 74011000258 HC RX REV CODE- 258: Performed by: NURSE ANESTHETIST, CERTIFIED REGISTERED

## 2018-10-11 PROCEDURE — 74011000250 HC RX REV CODE- 250: Performed by: NURSE ANESTHETIST, CERTIFIED REGISTERED

## 2018-10-11 RX ORDER — DEXTROSE MONOHYDRATE AND SODIUM CHLORIDE 5; .225 G/100ML; G/100ML
25 INJECTION, SOLUTION INTRAVENOUS CONTINUOUS
Status: DISCONTINUED | OUTPATIENT
Start: 2018-10-11 | End: 2018-10-11 | Stop reason: HOSPADM

## 2018-10-11 RX ORDER — LIDOCAINE HYDROCHLORIDE 20 MG/ML
INJECTION, SOLUTION EPIDURAL; INFILTRATION; INTRACAUDAL; PERINEURAL AS NEEDED
Status: DISCONTINUED | OUTPATIENT
Start: 2018-10-11 | End: 2018-10-11 | Stop reason: HOSPADM

## 2018-10-11 RX ORDER — INSULIN LISPRO 100 [IU]/ML
INJECTION, SOLUTION INTRAVENOUS; SUBCUTANEOUS ONCE
Status: DISCONTINUED | OUTPATIENT
Start: 2018-10-11 | End: 2018-10-11 | Stop reason: HOSPADM

## 2018-10-11 RX ORDER — ONDANSETRON 2 MG/ML
4 INJECTION INTRAMUSCULAR; INTRAVENOUS AS NEEDED
Status: DISCONTINUED | OUTPATIENT
Start: 2018-10-11 | End: 2018-10-11 | Stop reason: HOSPADM

## 2018-10-11 RX ORDER — DEXTROSE 50 % IN WATER (D50W) INTRAVENOUS SYRINGE
25-50 AS NEEDED
Status: DISCONTINUED | OUTPATIENT
Start: 2018-10-11 | End: 2018-10-11 | Stop reason: HOSPADM

## 2018-10-11 RX ORDER — MAGNESIUM SULFATE 100 %
4 CRYSTALS MISCELLANEOUS AS NEEDED
Status: DISCONTINUED | OUTPATIENT
Start: 2018-10-11 | End: 2018-10-11 | Stop reason: HOSPADM

## 2018-10-11 RX ORDER — SODIUM CHLORIDE, SODIUM LACTATE, POTASSIUM CHLORIDE, CALCIUM CHLORIDE 600; 310; 30; 20 MG/100ML; MG/100ML; MG/100ML; MG/100ML
75 INJECTION, SOLUTION INTRAVENOUS CONTINUOUS
Status: DISCONTINUED | OUTPATIENT
Start: 2018-10-11 | End: 2018-10-11

## 2018-10-11 RX ORDER — FENTANYL CITRATE 50 UG/ML
INJECTION, SOLUTION INTRAMUSCULAR; INTRAVENOUS AS NEEDED
Status: DISCONTINUED | OUTPATIENT
Start: 2018-10-11 | End: 2018-10-11 | Stop reason: HOSPADM

## 2018-10-11 RX ORDER — SODIUM CHLORIDE 0.9 % (FLUSH) 0.9 %
5-10 SYRINGE (ML) INJECTION AS NEEDED
Status: DISCONTINUED | OUTPATIENT
Start: 2018-10-11 | End: 2018-10-11 | Stop reason: HOSPADM

## 2018-10-11 RX ORDER — DEXTROSE 40 %
15 GEL (GRAM) ORAL AS NEEDED
Status: DISCONTINUED | OUTPATIENT
Start: 2018-10-11 | End: 2018-10-11 | Stop reason: HOSPADM

## 2018-10-11 RX ORDER — PROPOFOL 10 MG/ML
INJECTION, EMULSION INTRAVENOUS AS NEEDED
Status: DISCONTINUED | OUTPATIENT
Start: 2018-10-11 | End: 2018-10-11 | Stop reason: HOSPADM

## 2018-10-11 RX ORDER — SODIUM CHLORIDE 0.9 % (FLUSH) 0.9 %
5-10 SYRINGE (ML) INJECTION EVERY 8 HOURS
Status: DISCONTINUED | OUTPATIENT
Start: 2018-10-11 | End: 2018-10-11 | Stop reason: HOSPADM

## 2018-10-11 RX ORDER — DEXTROSE MONOHYDRATE AND SODIUM CHLORIDE 5; .225 G/100ML; G/100ML
10 INJECTION, SOLUTION INTRAVENOUS CONTINUOUS
Status: DISCONTINUED | OUTPATIENT
Start: 2018-10-11 | End: 2018-10-11 | Stop reason: HOSPADM

## 2018-10-11 RX ADMIN — Medication 10 ML: at 09:34

## 2018-10-11 RX ADMIN — DEXTROSE MONOHYDRATE AND SODIUM CHLORIDE 25 ML/HR: 5; .225 INJECTION, SOLUTION INTRAVENOUS at 09:33

## 2018-10-11 RX ADMIN — FENTANYL CITRATE 25 MCG: 50 INJECTION, SOLUTION INTRAMUSCULAR; INTRAVENOUS at 09:50

## 2018-10-11 RX ADMIN — FAMOTIDINE 20 MG: 10 INJECTION INTRAVENOUS at 09:34

## 2018-10-11 RX ADMIN — PROPOFOL 30 MG: 10 INJECTION, EMULSION INTRAVENOUS at 09:50

## 2018-10-11 RX ADMIN — LIDOCAINE HYDROCHLORIDE 40 MG: 20 INJECTION, SOLUTION EPIDURAL; INFILTRATION; INTRACAUDAL; PERINEURAL at 09:50

## 2018-10-11 NOTE — ANESTHESIA POSTPROCEDURE EVALUATION
Post-Anesthesia Evaluation and Assessment    Patient: Harish Olivares MRN: [id number]  SSN: [id number]    YOB: 1960  Age: 59 y.o. Sex: male      Data from PACU flowsheet    Cardiovascular Function/Vital Signs  Visit Vitals    /70    Pulse (!) 52    Temp 36.3 °C (97.3 °F)    Resp 18    Ht 6' (1.829 m)    Wt 62.7 kg (138 lb 4 oz)    SpO2 100%    BMI 18.75 kg/m2       Patient is status post MAC anesthesia for Procedure(s):  UPPER ENDOSCOPY. Nausea/Vomiting: controlled    Postoperative hydration reviewed and adequate. Pain:  Pain Scale 1: Numeric (0 - 10) (10/11/18 1047)  Pain Intensity 1: 0 (10/11/18 1047)   Managed      Mental Status and Level of Consciousness: Alert and oriented     Pulmonary Status:   O2 Device: Room air (10/11/18 1000)   Adequate oxygenation and airway patent    Complications related to anesthesia: None    Post-anesthesia assessment completed.  No concerns    Signed By: Lesley Melgar MD     October 11, 2018

## 2018-10-11 NOTE — H&P
[facility]    [email or web address]      Impression:   1.cirrhosis       Plan:     1. egd banding mac all risks benefits and alt discussed       Chief Complaint: r/o varices       HPI:  Josué Chang is a 59 y.o. male who is being seen on consult forr/o varices    PMH:   Past Medical History:   Diagnosis Date    Bradycardia, unspecified 2018    [facility]     Chronic drug abuse ([facility] 1974    Chronic kidney disease     Diabetes ([facility] 01/1990    Diabetes mellitus ([facility]     Drug abuse ([facility]     Encephalopathy     GERD (gastroesophageal reflux disease)     Hypertension     Metabolic disorder     Muscle weakness     Renal cell carcinoma of left kidney ([facility] 12/17/13    Pathological Stage [id number] cc RCC FG3 s/p left open partial nephrectomy in 12/13      Renal mass     Sepsis due to methicillin resistant Staphylococcus aureus (HCC)     Urethral erosion     Viral hepatitis C     Xerosis cutis        PSH:   Past Surgical History:   Procedure Laterality Date    HX CIRCUMCISION  12/17/13    Dr. José Bryant, [facility]    HX NEPHRECTOMY Left 12/17/13    Open Partial, Dr. José Bryant, [facility]    HX OTHER SURGICAL Right 2/27/2016    removed right ft  2nd meta-tarsal       Social HX:   Social History     Social History    Marital status:      Spouse name: N/A    Number of children: N/A    Years of education: N/A     Occupational History    Not on file. Social History Main Topics    Smoking status: Current Every Day Smoker     Packs/day: 0.50     Years: 30.00     Types: Cigarettes    Smokeless tobacco: Never Used    Alcohol use Yes      Comment: beer occasionally    Drug use: No    Sexual activity: Not on file     Other Topics Concern    Not on file     Social History Narrative     since 2012;  for 12 yrs; [employer]; [address] [employer] worker just recently furloughed;  No  service       FHX:   Family History Problem Relation Age of Onset    Diabetes Mother     Sickle Cell Anemia Father     Diabetes Sister     Hypertension Sister        Allergy:   Allergies   Allergen Reactions    Iodine Hives       Home Medications:     No prescriptions prior to admission. Review of Systems:     Constitutional: No fevers, chills, weight loss, fatigue. Skin: No rashes, pruritis, jaundice, ulcerations, erythema. HENT: No headaches, nosebleeds, sinus pressure, rhinorrhea, sore throat. Eyes: No visual changes, blurred vision, eye pain, photophobia, jaundice. Cardiovascular: No chest pain, heart palpitations. Respiratory: No cough, SOB, wheezing, chest discomfort, orthopnea. Gastrointestinal:    Genitourinary: No dysuria, bleeding, discharge, pyuria. Musculoskeletal: No weakness, arthralgias, wasting. Endo: No sweats. Heme: No bruising, easy bleeding. Allergies: As noted. Neurological: Cranial nerves intact. Alert and oriented. Gait not assessed. Psychiatric:  No anxiety, depression, hallucinations. Visit Vitals    Ht 6' (1.829 m)    Wt 64.9 kg (143 lb)    BMI 19.39 kg/m2       Physical Assessment:     constitutional: appearance: well developed, well nourished, normal habitus, no deformities, in no acute distress. skin: inspection: no rashes, ulcers, icterus or other lesions; no clubbing or telangiectasias. palpation: no induration or subcutaneos nodules. eyes: inspection: normal conjunctivae and lids; no jaundice pupils: symmetrical, normoreactive to light, normal accommodation and size. ENMT: mouth: normal oral mucosa,lips and gums; good dentition. oropharynx: normal tongue, hard and soft palate; posterior pharynx without erythema, exudate or lesions. neck: no masses organomegaly or tenderness. respiratory: effort: normal chest excursion; no intercostal retraction or accessory muscle use. cardiovascular: abdominal aorta: normal size and position; no bruits.  palpation: PMI of normal size and position; normal rhythm; no thrill or murmurs. abdominal: abdomen: normal consistency; no tenderness or masses. hernias: no hernias appreciated. liver: normal size and consistency. spleen: not palpable. rectal: hemoccult/guaiac: not performed. musculoskeletal: no deformities or muscle wasting   lymphatic: axilae: not palpable. groin: not palpable. neck: within normal limits. other: not palpable. neurologic: cranial nerves: II-XII normal.   psychiatric: judgement/insight: within normal limits. memory: within normal limits for recent and remote events. mood and affect: no evidence of depression, anxiety or agitation. orientation: oriented to time, space and person. Basic Metabolic Profile   No results for input(s): NA, K, CL, CO2, BUN, GLU, CA, MG, PHOS in the last 72 hours. No lab exists for component: CREAT      CBC w/Diff    No results for input(s): WBC, RBC, HGB, HCT, MCV, MCH, MCHC, RDW, PLT, HGBEXT, HCTEXT, PLTEXT in the last 72 hours. No lab exists for component: MPV No results for input(s): GRANS, LYMPH, EOS, PRO, MYELO, METAS, BLAST in the last 72 hours. No lab exists for component: MONO, BASO     Hepatic Function   No results for input(s): ALB, TP, TBILI, GPT, SGOT, AP, AML, LPSE in the last 72 hours. No lab exists for component: Marli Serra MD, M.D. [facility], [address] [place] [address]  [email or web address]

## 2018-10-11 NOTE — ANESTHESIA PREPROCEDURE EVALUATION
Anesthetic History   No history of anesthetic complications            Review of Systems / Medical History  Patient summary reviewed, nursing notes reviewed and pertinent labs reviewed    Pulmonary  Within defined limits                 Neuro/Psych   Within defined limits           Cardiovascular    Hypertension: well controlled              Exercise tolerance: >4 METS     GI/Hepatic/Renal  Within defined limits              Endo/Other    Diabetes: well controlled, type 2         Other Findings              Physical Exam    Airway  Mallampati: II  TM Distance: 4 - 6 cm  Neck ROM: normal range of motion   Mouth opening: Normal     Cardiovascular  Regular rate and rhythm,  S1 and S2 normal,  no murmur, click, rub, or gallop             Dental    Dentition: Poor dentition     Pulmonary  Breath sounds clear to auscultation               Abdominal  GI exam deferred       Other Findings            Anesthetic Plan    ASA: 3  Anesthesia type: MAC          Induction: Intravenous  Anesthetic plan and risks discussed with: Patient

## 2018-10-11 NOTE — IP AVS SNAPSHOT
[facility] 
 
 
 [address] Patient: Marco A Garcia MRN: [id number] :[id number] About your hospitalization You were admitted on:  2018 You last received care in the:  [facility] 2 RECOVERY You were discharged on:  2018 Why you were hospitalized Your primary diagnosis was:  Not on File Follow-up Information Follow up With Details Comments Contact Info Ibrahima Satish Gonzales[address] [address] 
[phone] Modesto Whitney MD  Follow up as instructed [address] [place] [address] [address] [facility] 
[phone] Your Scheduled Appointments   3:00 PM EDT Follow Up with Kandi Romero MD  
[address] ([facility]) [address] [place] [address], [address] [facility] [address]  
[phone] 2018  3:15 PM EST  
POST OP with Jacqueline Collet, MD  
[facility]. [address] ([facility]) [address] [address]  
[phone] 2018 11:45 AM EST Office Visit with Brennon Brooke MD  
[facility] Doctors [address] [facility] [id number] [phone] Discharge Orders None A check rene indicates which time of day the medication should be taken. My Medications CONTINUE taking these medications Instructions Each Dose to Equal  
 Morning Noon Evening Bedtime  
 albuterol-ipratropium 2.5 mg-0.5 mg/3 ml Nebu Commonly known as:  Orpah Case Your last dose was: Your next dose is:    
   
   
 3 mL. 3 mL  
    
   
   
   
  
 atorvastatin 40 mg tablet Commonly known as:  LIPITOR Your last dose was: Your next dose is:    
   
   
 40 mg daily. 40 mg  
    
   
   
   
  
 doxycycline 100 mg tablet Commonly known as:  ADOXA Your last dose was: Your next dose is: Take 100 mg by mouth daily. 100 mg  
    
   
   
   
  
 fluconazole 100 mg tablet Commonly known as:  DIFLUCAN Your last dose was: Your next dose is: Take 2 tabs day 1, then 1 tab every day after for 7 days. Pt needs to start today, please contact patient when ready for , thank you.  
     
   
   
   
  
 hydrALAZINE 25 mg tablet Commonly known as:  APRESOLINE Your last dose was: Your next dose is: Take 25 mg by mouth three (3) times daily. 25 mg  
    
   
   
   
  
 insulin lispro 100 unit/mL injection Commonly known as:  HUMALOG Your last dose was: Your next dose is:    
   
   
 Normal Insulin Sensitivity  For Blood Sugar (mg/dL) of:    Less than 150 =   0 units          150 -199 =   2 units 200 -249 =   4 units 250 -299 =   6 units 300 -349 =   8 units 350 and above =   10 units If 2 glucose readings are above 200 mg/dL within a 24 hr period, proceed to [facility] JANUVIA 100 mg tablet Generic drug:  SITagliptin Your last dose was: Your next dose is: Take 100 mg by mouth daily. 100 mg  
    
   
   
   
  
 levothyroxine 50 mcg tablet Commonly known as:  SYNTHROID Your last dose was: Your next dose is: Take 1 Tab by mouth Daily (before breakfast). 50 mcg MAVYRET 100-40 mg Tab Generic drug:  glecaprevir-pibrentasvir Your last dose was: Your next dose is: Take  by mouth. NORVASC 10 mg tablet Generic drug:  amLODIPine Your last dose was: Your next dose is: Take  by mouth daily. potassium chloride 20 mEq tablet Commonly known as:  K-RICARDO HARRISON-CON  
   
 Your last dose was: Your next dose is: Take 20 mEq by mouth daily. 20 mEq  
    
   
   
   
  
 tamsulosin 0.4 mg capsule Commonly known as:  FLOMAX Your last dose was: Your next dose is: Take 1 Cap by mouth daily. 0.4 mg  
    
   
   
   
  
 trimethoprim-sulfamethoxazole 160-800 mg per tablet Commonly known as:  BACTRIM DS, SEPTRA DS Your last dose was: Your next dose is: Take 1 Tab by mouth two (2) times a day for 10 days. 1 Tab Discharge Instructions Upper GI Endoscopy: What to Expect at [facility] Your Recovery After you have an endoscopy, you will stay at the hospital or clinic for 1 to 2 hours. This will allow the medicine to wear off. You will be able to go home after your doctor or nurse checks to make sure you are not having any problems. You may have to stay overnight if you had treatment during the test. You may have a sore throat for a day or two after the test. 
This care sheet gives you a general idea about what to expect after the test. 
How can you care for yourself at home? Activity · Rest as much as you need to after you go home. · You should be able to go back to your usual activities the day after the test. 
Diet · Follow your doctor's directions for eating after the test. 
· Drink plenty of fluids (unless your doctor has told you not to). Medications · If you have a sore throat the day after the test, use an over-the-counter spray to numb your throat. Follow-up care is a key part of your treatment and safety. Be sure to make and go to all appointments, and call your doctor if you are having problems. It's also a good idea to know your test results and keep a list of the medicines you take. When should you call for help? Call 911 anytime you think you may need emergency care. For example, call if: 
  · You passed out (loses consciousness).   · You have trouble breathing.  
  · You pass maroon or bloody stools.  
 Call your doctor now or seek immediate medical care if: 
  · You have pain that does not get better after your take pain medicine.  
  · You have new or worse belly pain.  
  · You have blood in your stools.  
  · You are sick to your stomach and cannot keep fluids down.  
  · You have a fever.  
  · You cannot pass stools or gas.  
 Watch closely for changes in your health, and be sure to contact your doctor if: 
  · Your throat still hurts after a day or two.  
  · You do not get better as expected. Where can you learn more? Go to http://gabo-curly.info/. Enter [phone] [phone] in the search box to learn more about \"Upper GI Endoscopy: What to Expect at Home. \" Current as of: March 28, 2018 Content Version: 11.8 © 4065-6174 [employer]. Care instructions adapted under license by [employer] (which disclaims liability or warranty for this information). If you have questions about a medical condition or this instruction, always ask your healthcare professional. [address] any warranty or liability for your use of this information. Gastroparesis: Care Instructions Your Care Instructions When you have gastroparesis, your stomach takes a lot longer to empty. This delay can cause belly pain, bloating, and belching. It also can cause hiccups, heartburn, nausea or vomiting. You may not feel like eating. These symptoms may come and go. They most often occur during and after meals. You may feel full after only a few bites of food. This condition occurs when the nerves to the stomach don't work properly. Diabetes is the most common cause of this nerve damage. Gastroparesis can make it harder to control your blood sugar levels. But keeping your blood sugar levels under control may help with your symptoms.  Parkinson's disease, stroke, and some medicines can also cause this condition. Home treatment can often help. Follow-up care is a key part of your treatment and safety. Be sure to make and go to all appointments, and call your doctor if you are having problems. It's also a good idea to know your test results and keep a list of the medicines you take. How can you care for yourself at home? · Eat several small meals each day rather than three large meals. · Eat foods that are low in fiber and fat. · If your doctor suggests it, take medicines that help the stomach empty more quickly. These are called motility agents. When should you call for help? Call your doctor now or seek immediate medical care if: 
  · You are vomiting.  
  · You have new or worse belly pain.  
  · You have a fever.  
  · You cannot pass stools or gas.  
 Watch closely for changes in your health, and be sure to contact your doctor if you have any problems. Where can you learn more? Go to http://gabo-curly.info/. Enter M106 in the search box to learn more about \"Gastroparesis: Care Instructions. \" Current as of: March 28, 2018 Content Version: 11.8 © 1519-7963 [employer]. Care instructions adapted under license by [employer] (which disclaims liability or warranty for this information). If you have questions about a medical condition or this instruction, always ask your healthcare professional. [place] [address] any warranty or liability for your use of this information. DISCHARGE SUMMARY from Nurse PATIENT INSTRUCTIONS: 
 
After general anesthesia or intravenous sedation, for 24 hours or while taking prescription Narcotics: · Limit your activities · Do not drive and operate hazardous machinery · Do not make important personal or business decisions · Do  not drink alcoholic beverages · If you have not urinated within 8 hours after discharge, please contact your surgeon on call. Report the following to your surgeon: 
· Excessive pain, swelling, redness or odor of or around the surgical area · Temperature over 100.5 · Nausea and vomiting lasting longer than 4 hours or if unable to take medications · Any signs of decreased circulation or nerve impairment to extremity: change in color, persistent  numbness, tingling, coldness or increase pain · Any questions What to do at Home: 
Recommended activity: Activity as tolerated and no driving for today These are general instructions for a healthy lifestyle: No smoking/ No tobacco products/ Avoid exposure to second hand smoke Surgeon General's Warning:  Quitting smoking now greatly reduces serious risk to your health. Obesity, smoking, and sedentary lifestyle greatly increases your risk for illness A healthy diet, regular physical exercise & weight monitoring are important for maintaining a healthy lifestyle You may be retaining fluid if you have a history of heart failure or if you experience any of the following symptoms:  Weight gain of 3 pounds or more overnight or 5 pounds in a week, increased swelling in our hands or feet or shortness of breath while lying flat in bed. Please call your doctor as soon as you notice any of these symptoms; do not wait until your next office visit. Recognize signs and symptoms of STROKE: 
 
F-face looks uneven A-arms unable to move or move unevenly S-speech slurred or non-existent T-time-call 911 as soon as signs and symptoms begin-DO NOT go Back to bed or wait to see if you get better-TIME IS BRAIN. Warning Signs of HEART ATTACK Call 911 if you have these symptoms: 
? Chest discomfort. Most heart attacks involve discomfort in the center of the chest that lasts more than a few minutes, or that goes away and comes back. It can feel like uncomfortable pressure, squeezing, fullness, or pain. ? Discomfort in other areas of the upper body. Symptoms can include pain or discomfort in one or both arms, the back, neck, jaw, or stomach. ? Shortness of breath with or without chest discomfort. ? Other signs may include breaking out in a cold sweat, nausea, or lightheadedness. Don't wait more than five minutes to call [address]! Fast action can save your life. Calling 911 is almost always the fastest way to get lifesaving treatment. Emergency Medical Services staff can begin treatment when they arrive  up to an hour sooner than if someone gets to the hospital by car. The discharge information has been reviewed with the patient. The patient verbalized understanding. Discharge medications reviewed with the patient and appropriate educational materials and side effects teaching were provided. ___________________________________________________________________________________________________________________________________ Introducing [facility]! [facility] introduces [employer] patient portal. Now you can access parts of your medical record, email your doctor's office, and request medication refills online. 1. In your internet browser, go to [email or web address] 2. Click on the First Time User? Click Here link in the Sign In box. You will see the New Member Sign Up page. 3. Enter your [employer] Access Code exactly as it appears below. You will not need to use this code after youve completed the sign-up process. If you do not sign up before the expiration date, you must request a new code. · [employer] Access Code: [email or web address] Expires: 11/19/2018  3:03 PM 
 
4. Enter the last four digits of your Social Security Number (xxxx) and Date of Birth (mm/dd/yyyy) as indicated and click Submit. You will be taken to the next sign-up page. 5. Create a MyChart ID.  This will be your [employer] login ID and cannot be changed, so think of one that is secure and easy to remember. 6. Create a Meta[employer]CS password. You can change your password at any time. 7. Enter your Password Reset Question and Answer. This can be used at a later time if you forget your password. 8. Enter your e-mail address. You will receive e-mail notification when new information is available in [facility] 9. Click Sign Up. You can now view and download portions of your medical record. 10. Click the Download Summary menu link to download a portable copy of your medical information. If you have questions, please visit the Frequently Asked Questions section of the [employer] website. Remember, [employer] is NOT to be used for urgent needs. For medical emergencies, dial 911. Now available from your iPhone and Android! Introducing [employer] As a [facility] patient, I wanted to make you aware of our electronic visit tool called [employer]. [facility] 24/7 allows you to connect within minutes with a medical provider 24 hours a day, seven days a week via a mobile device or tablet or logging into a secure website from your computer. You can access [employer] from anywhere in the [place]. A virtual visit might be right for you when you have a simple condition and feel like you just dont want to get out of bed, or cant get away from work for an appointment, when your regular [facility] provider is not available (evenings, weekends or holidays), or when youre out of town and need minor care. Electronic visits cost only $49 and if the [facility] 24/7 provider determines a prescription is needed to treat your condition, one can be electronically transmitted to a nearby pharmacy*. Please take a moment to enroll today if you have not already done so. The enrollment process is free and takes just a few minutes.   To enroll, please download the Melon #usemelon/[employer] shana to your tablet or phone, or visit [email or web address] to enroll on your computer. And, as an [address] [place] [address] patient with a [employer] account, the results of your visits will be scanned into your electronic medical record and your primary care provider will be able to view the scanned results. We urge you to continue to see your regular [facility] provider for your ongoing medical care. And while your primary care provider may not be the one available when you seek a [employer] virtual visit, the peace of mind you get from getting a real diagnosis real time can be priceless. For more information on [employer], view our Frequently Asked Questions (FAQs) at [email or web address]. Sincerely, 
 
Yumiko Horn MD 
Chief Medical Officer [facility] *:  certain medications cannot be prescribed via [employer] Providers Seen During Your Hospitalization Provider Specialty Primary office phone Sofie Whitfield MD Gastroenterology [phone] Your Primary Care Physician (PCP) Primary Care Physician Office Phone Office Fax Rose Cox [phone] You are allergic to the following Allergen Reactions Iodine Hives Recent Documentation Height Weight BMI Smoking Status 1.829 m 62.7 kg 18.75 kg/m2 Current Every Day Smoker Emergency Contacts Name Discharge Info Relation Home Work Mobile Ann Marie Hernandes DISCHARGE CAREGIVER [3] Sister [23] [phone] Moses Bar CAREGIVER [3] Friend [5] [phone] StephanieJayden DISCHARGE CAREGIVER [3] Child [2] [phone] Patient Belongings The following personal items are in your possession at time of discharge: 
  Dental Appliances: Uppers (in belonging bag)  Visual Aid: None Please provide this summary of care documentation to your next provider. Signatures-by signing, you are acknowledging that this After Visit Summary has been reviewed with you and you have received a copy. Patient Signature:  ____________________________________________________________ Date:  ____________________________________________________________  
  
Tayler Roof Provider Signature:  ____________________________________________________________ Date:  ____________________________________________________________

## 2018-10-11 NOTE — DISCHARGE INSTRUCTIONS
Upper GI Endoscopy: What to Expect at [address] [place] [address]  After you have an endoscopy, you will stay at the hospital or clinic for 1 to 2 hours. This will allow the medicine to wear off. You will be able to go home after your doctor or nurse checks to make sure you are not having any problems. You may have to stay overnight if you had treatment during the test. You may have a sore throat for a day or two after the test.  This care sheet gives you a general idea about what to expect after the test.  How can you care for yourself at home? Activity  · Rest as much as you need to after you go home. · You should be able to go back to your usual activities the day after the test.  Diet  · Follow your doctor's directions for eating after the test.  · Drink plenty of fluids (unless your doctor has told you not to). Medications  · If you have a sore throat the day after the test, use an over-the-counter spray to numb your throat. Follow-up care is a key part of your treatment and safety. Be sure to make and go to all appointments, and call your doctor if you are having problems. It's also a good idea to know your test results and keep a list of the medicines you take. When should you call for help? Call 911 anytime you think you may need emergency care. For example, call if:    · You passed out (loses consciousness).     · You have trouble breathing.     · You pass maroon or bloody stools.    Call your doctor now or seek immediate medical care if:    · You have pain that does not get better after your take pain medicine.     · You have new or worse belly pain.     · You have blood in your stools.     · You are sick to your stomach and cannot keep fluids down.     · You have a fever.     · You cannot pass stools or gas.    Watch closely for changes in your health, and be sure to contact your doctor if:    · Your throat still hurts after a day or two.     · You do not get better as expected.    Where can you learn more?  Go to http://gbao-curly.info/. Enter [phone] in the search box to learn more about \"Upper GI Endoscopy: What to Expect at Home. \"  Current as of: March 28, 2018  Content Version: 11.8  © 9733-8572 [employer]. Care instructions adapted under license by [employer] (which disclaims liability or warranty for this information). If you have questions about a medical condition or this instruction, always ask your healthcare professional. [place] [address] any warranty or liability for your use of this information. Gastroparesis: Care Instructions  Your Care Instructions    When you have gastroparesis, your stomach takes a lot longer to empty. This delay can cause belly pain, bloating, and belching. It also can cause hiccups, heartburn, nausea or vomiting. You may not feel like eating. These symptoms may come and go. They most often occur during and after meals. You may feel full after only a few bites of food. This condition occurs when the nerves to the stomach don't work properly. Diabetes is the most common cause of this nerve damage. Gastroparesis can make it harder to control your blood sugar levels. But keeping your blood sugar levels under control may help with your symptoms. Parkinson's disease, stroke, and some medicines can also cause this condition. Home treatment can often help. Follow-up care is a key part of your treatment and safety. Be sure to make and go to all appointments, and call your doctor if you are having problems. It's also a good idea to know your test results and keep a list of the medicines you take. How can you care for yourself at home? · Eat several small meals each day rather than three large meals. · Eat foods that are low in fiber and fat. · If your doctor suggests it, take medicines that help the stomach empty more quickly. These are called motility agents. When should you call for help?   Call your doctor now or seek immediate medical care if:    · You are vomiting.     · You have new or worse belly pain.     · You have a fever.     · You cannot pass stools or gas.    Watch closely for changes in your health, and be sure to contact your doctor if you have any problems. Where can you learn more? Go to http://gabo-curly.info/. Enter M106 in the search box to learn more about \"Gastroparesis: Care Instructions. \"  Current as of: March 28, 2018  Content Version: 11.8  © 1378-4702 [employer]. Care instructions adapted under license by [employer] (which disclaims liability or warranty for this information). If you have questions about a medical condition or this instruction, always ask your healthcare professional. [address] any warranty or liability for your use of this information. DISCHARGE SUMMARY from Nurse    PATIENT INSTRUCTIONS:    After general anesthesia or intravenous sedation, for 24 hours or while taking prescription Narcotics:  · Limit your activities  · Do not drive and operate hazardous machinery  · Do not make important personal or business decisions  · Do  not drink alcoholic beverages  · If you have not urinated within 8 hours after discharge, please contact your surgeon on call.     Report the following to your surgeon:  · Excessive pain, swelling, redness or odor of or around the surgical area  · Temperature over 100.5  · Nausea and vomiting lasting longer than 4 hours or if unable to take medications  · Any signs of decreased circulation or nerve impairment to extremity: change in color, persistent  numbness, tingling, coldness or increase pain  · Any questions    What to do at Home:  Recommended activity: Activity as tolerated and no driving for today      These are general instructions for a healthy lifestyle:    No smoking/ No tobacco products/ Avoid exposure to second hand smoke  Surgeon General's Warning:  Quitting smoking now greatly reduces serious risk to your health. Obesity, smoking, and sedentary lifestyle greatly increases your risk for illness    A healthy diet, regular physical exercise & weight monitoring are important for maintaining a healthy lifestyle    You may be retaining fluid if you have a history of heart failure or if you experience any of the following symptoms:  Weight gain of 3 pounds or more overnight or 5 pounds in a week, increased swelling in our hands or feet or shortness of breath while lying flat in bed. Please call your doctor as soon as you notice any of these symptoms; do not wait until your next office visit. Recognize signs and symptoms of STROKE:    F-face looks uneven    A-arms unable to move or move unevenly    S-speech slurred or non-existent    T-time-call 911 as soon as signs and symptoms begin-DO NOT go       Back to bed or wait to see if you get better-TIME IS BRAIN. Warning Signs of HEART ATTACK     Call 911 if you have these symptoms:   Chest discomfort. Most heart attacks involve discomfort in the center of the chest that lasts more than a few minutes, or that goes away and comes back. It can feel like uncomfortable pressure, squeezing, fullness, or pain.  Discomfort in other areas of the upper body. Symptoms can include pain or discomfort in one or both arms, the back, neck, jaw, or stomach.  Shortness of breath with or without chest discomfort.  Other signs may include breaking out in a cold sweat, nausea, or lightheadedness. Don't wait more than five minutes to call 911 - MINUTES MATTER! Fast action can save your life. Calling 911 is almost always the fastest way to get lifesaving treatment. Emergency Medical Services staff can begin treatment when they arrive -- up to an hour sooner than if someone gets to the hospital by car. The discharge information has been reviewed with the patient. The patient verbalized understanding.   Discharge medications reviewed with the patient and appropriate educational materials and side effects teaching were provided.   ___________________________________________________________________________________________________________________________________

## 2018-10-25 ENCOUNTER — OFFICE VISIT (OUTPATIENT)
Dept: PULMONOLOGY | Age: 58
End: 2018-10-25

## 2018-10-25 VITALS
DIASTOLIC BLOOD PRESSURE: 58 MMHG | RESPIRATION RATE: 18 BRPM | HEART RATE: 56 BPM | SYSTOLIC BLOOD PRESSURE: 118 MMHG | BODY MASS INDEX: 19.23 KG/M2 | HEIGHT: 72 IN | WEIGHT: 142 LBS | OXYGEN SATURATION: 99 %

## 2018-10-25 DIAGNOSIS — F17.210 CIGARETTE NICOTINE DEPENDENCE WITHOUT COMPLICATION: ICD-10-CM

## 2018-10-25 DIAGNOSIS — J69.0 ASPIRATION PNEUMONIA OF RIGHT LOWER LOBE DUE TO GASTRIC SECRETIONS (HCC): Primary | ICD-10-CM

## 2018-10-25 DIAGNOSIS — R91.1 PULMONARY NODULE: ICD-10-CM

## 2018-10-25 DIAGNOSIS — G70.9 NEUROMUSCULAR WEAKNESS (HCC): ICD-10-CM

## 2018-10-25 DIAGNOSIS — I50.32 CHRONIC DIASTOLIC CONGESTIVE HEART FAILURE (HCC): ICD-10-CM

## 2018-10-25 NOTE — PROGRESS NOTES
[address] [place] [address], [facility] [address] [address] [facility]  Pulmonary, Critical Care, and Sleep Medicine    Pulmonary Office Followup  Name: Iris Servin 59 y.o. male  MRN: [id number]  : 1960  Service Date: 10/25/18  Chief Complaint:   Chief Complaint   Patient presents with    Pneumonia     CT done        History of Present Illness:  Iris Servin is a 59 y.o. male, who presents to Pulmonary clinic for followup if aspiration pneumonia. Pt was last seen in our clinic on 18. In the interval,  Pt reports he has gotten a bit stronger. Pt reports he had a suprapubic catheter placed. Denies any cough, hemoptysis, sputum, dysphagia  Pt smoking 0.25-0.5PPD. Denies any issues with dyspnea on mild exertion - reports only with strenuous exertion. Pt reports he was not contacted by sleep lab regarding PSG.     Past Medical Hx: I have personally reviewed medical hx  Past Medical History:   Diagnosis Date    Bradycardia, unspecified     Cancer of left kidney ([address]     Chronic drug abuse ([address] 1974    Chronic kidney disease     Diabetes ([address] 1990    Diabetes mellitus ([address]     Drug abuse ([address]     Encephalopathy     GERD (gastroesophageal reflux disease)     Hypertension     Metabolic disorder     Muscle weakness     Renal cell carcinoma of left kidney ([address] 13    Pathological Stage [id number] cc RCC FG3 s/p left open partial nephrectomy in       Renal mass     Sepsis due to methicillin resistant Staphylococcus aureus (HCC)     Urethral erosion     Viral hepatitis C     Xerosis cutis        Past Surgical Hx: I have personally reviewed surgical hx  Past Surgical History:   Procedure Laterality Date    HX CIRCUMCISION  13    Dr. Lennox Doom, [facility]    HX NEPHRECTOMY Left 13    Open Partial, Dr. Lennox Doom, [facility]    HX OTHER SURGICAL Right 2016    removed right ft  2nd meta-tarsal       Family Hx: I have personally reviewed the family hx  Family History   Problem Relation Age of Onset    Diabetes Mother     Sickle Cell Anemia Father     Diabetes Sister     Hypertension Sister        Social Hx: I have personally reviewed the social hx. Social History     Socioeconomic History    Marital status:      Spouse name: Not on file    Number of children: Not on file    Years of education: Not on file    Highest education level: Not on file   Social Needs    Financial resource strain: Not on file    Food insecurity - worry: Not on file    Food insecurity - inability: Not on file    Transportation needs - medical: Not on file   [place] needs - non-medical: Not on file   Occupational History    Not on file   Tobacco Use    Smoking status: Current Every Day Smoker     Packs/day: 0.50     Years: 30.00     Pack years: 15.00     Types: Cigarettes    Smokeless tobacco: Never Used   Substance and Sexual Activity    Alcohol use: Yes     Comment: beer occasionally    Drug use: No    Sexual activity: Not on file   Other Topics Concern    Not on file   Social History Narrative     since 2012;  for 12 yrs; [employer]; [address] [employer]  just recently furloughed; No  service       Allergies: I have reviewed the allergy hx  Allergies   Allergen Reactions    Iodine Hives       Medications:  I have reviewed the patient's medications  Prior to Admission medications    Medication Sig Start Date End Date Taking? Authorizing Provider   oxybutynin (DITROPAN) 5 mg tablet Take 1 Tab by mouth three (3) times daily. 10/19/18   Judi Oconnor MD   amLODIPine (NORVASC) 10 mg tablet Take  by mouth daily. Provider, Historical   hydrALAZINE (APRESOLINE) 25 mg tablet Take 25 mg by mouth three (3) times daily. Provider, Historical   glecaprevir-pibrentasvir (MAVYRET) 100-40 mg tab Take  by mouth.     Provider, Historical   fluconazole (DIFLUCAN) 100 mg tablet Take 2 tabs day 1, then 1 tab every day after for 7 days. Pt needs to start today, please contact patient when ready for , thank you. 7/27/18   Zina Quintana MD   SITagliptin (JANUVIA) 100 mg tablet Take 100 mg by mouth daily. Provider, Historical   potassium chloride (K-DUR, KLOR-CON) 20 mEq tablet Take 20 mEq by mouth daily. Provider, Historical   doxycycline (ADOXA) 100 mg tablet Take 100 mg by mouth daily. Provider, Historical   insulin lispro (HUMALOG) 100 unit/mL injection Normal Insulin Sensitivity   For Blood Sugar (mg/dL) of:     Less than 150 =   0 units           150 -199 =   2 units  200 -249 =   4 units  250 -299 =   6 units  300 -349 =   8 units  350 and above =   10 units  If 2 glucose readings are above 200 mg/dL within a 24 hr period, proceed to \"Very Insul 4/10/18   Radha Aquino MD   levothyroxine (SYNTHROID) 50 mcg tablet Take 1 Tab by mouth Daily (before breakfast). 4/11/18   Radha Aquino MD   tamsulosin (FLOMAX) 0.4 mg capsule Take 1 Cap by mouth daily. 4/10/18   Radha Aquino MD   atorvastatin (LIPITOR) 40 mg tablet 40 mg daily. 6/7/17   Provider, Historical   albuterol-ipratropium (DUO-NEB) 2.5 mg-0.5 mg/3 ml nebu 3 mL. 6/7/17   Provider, Historical       Immunizations:  I have reviewed the patient's immunizations  Immunization History   Administered Date(s) Administered    Influenza Vaccine 12/20/2013       Review of Systems:  A complete review of systems was performed as stated in the HPI, all others are negative.       Objective:    Physical Exam:  /58 (BP 1 Location: Left arm, BP Patient Position: Sitting)   Pulse (!) 56   Resp 18   Ht 6' (1.829 m)   Wt 64.4 kg (142 lb)   SpO2 99%   BMI 19.26 kg/m²   Vitals were personally reviewed  Gen: no acute distress, pleasant and cooperative, sitting up in chair, looks stronger than previous visit  HEENT: normocephalic/atraumatic, PERRLA, EOMI, no scleral icterus, no oral lesions, poor dentition, Mallampati III  Neck: supple, trachea midline, no JVD, no cervical and supraclavicular adenopathy  Chest: no lesions, with even rise and fall, no pectus excavatum or flail chest  CVS: regular rate rhythm, S1/S2, no murmurs/rubs/gallops  Lungs: good air entry B/L, no wheezes/rales/rhonchi  Back: no kyphosis or scoliosis  Abdomen: soft, nontender, bowel sounds present, no hepatosplenomegaly  Ext: no pitting edema B/L, no peripheral cyanosis or clubbing  Neuro: poor muscle mass, AAOx3, only mild weakness throughout but able to move all extremities, able to walk on his own  Skin: no rashes, erythema, lesions  Psych: normal memory, thought content, and processing    Labs: I have reviewed the patient's available labs  Lab Results   Component Value Date/Time    WBC 5.7 10/01/2018 03:47 PM    Hemoglobin, POC 8.2 (L) 10/11/2018 09:34 AM    HGB 8.8 (L) 10/01/2018 03:47 PM    Hematocrit, POC 24 (L) 10/11/2018 09:34 AM    HCT 27.1 (L) 10/01/2018 03:47 PM    PLATELET 94 (L) 77/66/9488 03:47 PM    MCV 89.4 10/01/2018 03:47 PM     Lab Results   Component Value Date/Time    Sodium 145 10/01/2018 03:47 PM    Potassium 6.2 (HH) 10/01/2018 03:47 PM    Chloride 105 10/01/2018 03:47 PM    CO2 24 10/01/2018 03:47 PM    Anion gap 16.0 10/01/2018 03:47 PM    Glucose 100 (H) 10/01/2018 03:47 PM    BUN 42 (H) 10/01/2018 03:47 PM    Creatinine 2.2 (H) 10/01/2018 03:47 PM    BUN/Creatinine ratio 10 (L) 04/10/2018 04:06 AM    GFR est AA >60 04/10/2018 04:06 AM    GFR est non-AA 54 (L) 04/10/2018 04:06 AM    Calcium 9.7 10/01/2018 03:47 PM    Bilirubin, total 1.0 10/01/2018 03:47 PM    AST (SGOT) 15 10/01/2018 03:47 PM    Alk.  phosphatase 111 10/01/2018 03:47 PM    Protein, total 7.7 10/01/2018 03:47 PM    Albumin 4.2 10/01/2018 03:47 PM    Globulin 3.5 10/01/2018 03:47 PM    A-G Ratio 1.2 10/01/2018 03:47 PM    ALT (SGPT) 12 10/01/2018 03:47 PM       Imaging:  I have personally reviewed patient's imaging - CT chest from 6/5/18 shows resolution of B/L LL infiltrates from April, and just some scattered subcentimeter nodules B/L, no masses, adenopathy, effusions  Official read per Radiology:  CT Results (most recent):  Results from [place] encounter on 06/05/18   CT CHEST WO CONT    Narrative PROCEDURE:  CT Chest without Contrast.             INDICATION:  Pulmonary infiltrate serial surveillance. Pneumonia on prior scan. History of smoking. Dyspnea on exertion. COMPARISON:  4/5/18           TECHNIQUE:  Helically scanned volumetric axial sections of the chest are  obtained without IV contrast administration. Coronal and sagittal multiplanar  reconstruction images are generated for improved anatomic delineation.    - Radiation Dose:   mGy-cm.  - Note:  Radiation dose optimization techniques are utilized as appropriate to  the exam, with combination of automated exposure control, adjustment of the mA  and/or kV according to patient's size (Including appropriate matching for  site-specific examinations), or use of iterative reconstruction technique. FINDINGS:     Lungs:    - 3 mm nodule in the right upper lobe (axial #17), stable compared to 4/5/18.  - 2 mm nodule in the left upper lobe (axial #30), stable compared to 4/5/18.  - 4 mm focal fissural thickening along the left major fissure (axial #42)  - Clear lungs otherwise. No airspace opacities. Pleura:  No significant pleural effusion is detected bilaterally. Mediastinum, [place]:  No adenopathy. Base of Neck, Axillae:  No adenopathy. Esophagus:  Unremarkable. Abdomen:  No acute findings. Bones:  S/p posterior cervical interbody fusion. No acute findings in the  thoracic spine. Impression IMPRESSION:              1.  Stable small nodules. 2.  Interval resolution of the air space opacities. No significant residual  effusion.        PFTs:  None available    TTE:  I have reviewed the patient's TTE results  Results for orders placed or performed during the hospital encounter of 18   2D ECHO COMPLETE ADULT (TTE) W OR WO CONTR     Status: None    [facility]  [facility], [phone]    Transthoracic Echocardiogram    Patient: Rakan Holloway  MRN: [id number]  ACCT #: [de-identified]  : 1960  Age: 59 years  Gender: Male  Height: 72 in  Weight: 180.6 lb  BSA: 2.04 m-sq  BP: 112 / 66 mmHg  Study date: 2018  Status: Routine  Location: [facility] DMS [address] #: [id number]    Allergies: IODINE    Referring_Ordering Physician:  [facility] Hospitalist  Interpreting Group:  [address] [place] [address]  Interpreting Physician:  Zeny Treviño. Maida Swan MD  Technologist:  Sharri Wadsworth RD, [facility]    SUMMARY:  Left ventricle: Systolic function was normal by EF (biplane method of disks). Ejection fraction was estimated to be 60  %. No obvious wall motion abnormalities identified in the views obtained. Wall thickness was at the upper limits of  normal.    Right ventricle: The size was at the upper limits of normal. Systolic   pressure was mildly increased. Estimated peak  pressure was at least 42 mmHg. Left atrium: The atrium was severely dilated. Mitral valve: There was mild regurgitation. Tricuspid valve: There was mild regurgitation. INDICATIONS: Congestive heart failure. HISTORY: Prior history: Risk factors: hypertension. PROCEDURE: This was a routine study. The study included complete 2D imaging,   M-mode, complete spectral Doppler, and  color Doppler. The heart rate was 55 bpm, at the start of the study. Systolic   blood pressure was 112 mmHg, at the start  of the study. Diastolic blood pressure was 66 mmHg, at the start of the   study. Image quality was good. LEFT VENTRICLE: Size was normal. Systolic function was normal by EF (biplane   method of disks). Ejection fraction was  estimated to be 60 %. No obvious wall motion abnormalities identified in the   views obtained.  Wall thickness was at the  upper limits of normal. DOPPLER: Indeterminate diastolic function. RIGHT VENTRICLE: The size was at the upper limits of normal. Systolic   function was normal. DOPPLER: Systolic pressure  was mildly increased. Estimated peak pressure was at least 42 mmHg. LEFT ATRIUM: The atrium was severely dilated. LA volume index was 51 ml/m-sq. RIGHT ATRIUM: Size was at the upper limits of normal.    MITRAL VALVE: Normal valve structure. There was normal leaflet separation. DOPPLER: There was no evidence for stenosis. There was mild regurgitation. AORTIC VALVE: The valve was trileaflet. Leaflets exhibited normal thickness   and normal cuspal separation. DOPPLER:  Transaortic velocity was within the normal range. There was no stenosis. There was no significant regurgitation. TRICUSPID VALVE: Normal valve structure. There was normal leaflet separation. DOPPLER: There was no evidence for  tricuspid stenosis. There was mild regurgitation. Tricuspid regurgitation   peak velocity: 3.1 m/sec. Right atrial  pressure estimate: 3 mmHg. PULMONIC VALVE: Not well visualized, but normal Doppler findings. DOPPLER:   There was trivial regurgitation. AORTA: The root exhibited upper limit of normal size. SYSTEMIC VEINS: IVC: The inferior vena cava was normal in size. The   respirophasic change in diameter was more than 50%. PERICARDIUM: There was no pericardial effusion.     SYSTEM MEASUREMENT TABLES    2D  Ao Diam: 3.46 cm  IVSd: 1.24 cm  LVIDd: 4.77 cm  LVIDs: 3.76 cm  LVPWd: 1.21 cm  LAAs A2C: 28.07 cm2  LAAs A4C: 25.69 cm2  LAESV A-L A2C: 109.96 ml  LAESV A-L A4C: 95.03 ml  LAESV Index (A-L): 50.9 ml/m2  LAESV MOD A2C: 103.41 ml  LAESV MOD A4C: 86.32 ml  LAESV(A-L): 103.83 ml  LALs A2C: 6.08 cm  LALs A4C: 5.9 cm  LVOT Diam: 1.87 cm  RA Area: 20.26 cm2  RVIDd: 4.04 cm    CW  TR Vmax: 3.13 m/s  TR maxP.16 mmHG    PW  LVOT VTI: 26.09 cm  LVOT Vmax: 1.01 m/s  LVOT Vmean: 0.64 m/s  MV A Michael: 0.64 m/s  MV Dec Bertie: 4.03 m/s2  MV DecT: 214.71 ms  MV E Michael: 0.87 m/s  MV E/A Ratio: 1.35  E': 0.06 m/s  E/E': 14.05  LVOT maxP.12 mmHG  LVOT meanP.93 mmHG  LVSI Dopp: 35.19 ml/m2  LVSV Dopp: 71.79 ml  Lateral E': 0.09 m/s  Lateral E/E': 9.68  P Vein A: 0.24 m/s  P Vein A Dur: 108.42 ms    Prepared and E-signed by    Armen Shelton. Hillary Umana MD  Signed 2018 13:49:90           Assessment and Plan:  59 y.o. male with:    Impression:  1. Aspiration pneumonia:  Resolved- pt admitted to [facility] from 3/31-4/10/18 - CT chest from  shows resolution of infiltrates  2. Deconditioning with neuromuscular weakness: improving  3. B/L pulm nodules:  Subcentimeter, largest is 6mm, pt is high risk so will repeat in 1 year  4. Tobacco dependence:  Pt smoking 0.25PPD  5. CHFpEF:  No evidence of decompensation, NYHA I.  Normal LVEF on last TTE, but has severely enlarged LA and elevated NTproBNP    Plan:  -Repeat CT chest in 2019  -Full PFTs with MIP/MEP  -Will contact sleep lab and have patient undergo diagnostic PSG to evaluate for possible Bipap vs AVAPs  -Advised patient to remain active  -Immunizations reviewed: flu shot last week  -Aspiration precautions/lifestyle modifications  -I spent 4 minutes with patient regarding cessation of smoking cigarettes. I reviewed health risks of tobacco use including increased risk of MI, stroke, cancer, etc.  We reviewed various approaches to cessation including pills, patches, inhaler, gum, weaning self, \"cold turkey\", and smoking cessation classes. Pt declined cessation assistance at this time      RTC: Follow-up Disposition:  Return in about 6 months (around 2019).     Orders Placed This Encounter    CT CHEST WO CONT    PFT MIP MIF/NEP NIF       Adelfo Randolph MD/MPH     Pulmonary, Critical Care Medicine  [facility]

## 2018-10-25 NOTE — PROGRESS NOTES
Chief Complaint   Patient presents with    Pneumonia     CT done 6/5     1. Have you been to the ER, urgent care clinic since your last visit? Hospitalized since your last visit? Yes Where: Kiana Hailey, [facility]     2. Have you seen or consulted any other health care providers outside of the [address] [place] [address] since your last visit? Include any pap smears or colon screening.  Yes Where: Dr Alfredo Hart PCP

## 2018-10-25 NOTE — LETTER
10/28/2018 8:28 PM 
 
Patient:  Marco A Garcia YOB: 1960 Date of Visit: 10/25/2018 Dear Lawanda Hernandez DO 
[address] [place] [id number] VIA Facsimile: [phone] 
 : Thank you for referring Mr. Maria Del Carmen Danielson to me for evaluation/treatment. Below are the relevant portions of my assessment and plan of care. If you have questions, please do not hesitate to call me. I look forward to following Mr. Isiah Pfeiffer along with you. Sincerely, Kandi Romero MD/MPH Pulmonary, Critical Care Medicine [facility]

## 2018-12-20 PROBLEM — R19.7 DIARRHEA: Status: RESOLVED | Noted: 2017-05-31 | Resolved: 2018-12-20

## 2018-12-20 PROBLEM — N40.0 BPH (BENIGN PROSTATIC HYPERPLASIA): Status: ACTIVE | Noted: 2018-12-20

## 2018-12-20 PROBLEM — R78.81 MRSA BACTEREMIA: Status: RESOLVED | Noted: 2017-05-31 | Resolved: 2018-12-20

## 2018-12-20 PROBLEM — E87.20 METABOLIC ACIDOSIS: Status: RESOLVED | Noted: 2018-04-02 | Resolved: 2018-12-20

## 2018-12-20 PROBLEM — E11.21 TYPE 2 DIABETES WITH NEPHROPATHY (HCC): Status: RESOLVED | Noted: 2018-05-09 | Resolved: 2018-12-20

## 2018-12-20 PROBLEM — D64.9 ANEMIA: Status: RESOLVED | Noted: 2017-05-20 | Resolved: 2018-12-20

## 2018-12-20 PROBLEM — R00.1 BRADYCARDIA, UNSPECIFIED: Status: RESOLVED | Noted: 2018-01-01 | Resolved: 2018-12-20

## 2018-12-20 PROBLEM — B95.62 MRSA BACTEREMIA: Status: RESOLVED | Noted: 2017-05-31 | Resolved: 2018-12-20

## 2018-12-20 PROBLEM — J96.01 ACUTE RESPIRATORY FAILURE WITH HYPOXIA (HCC): Status: RESOLVED | Noted: 2017-05-31 | Resolved: 2018-12-20

## 2018-12-20 PROBLEM — N17.9 ACUTE KIDNEY INJURY (HCC): Status: RESOLVED | Noted: 2018-04-10 | Resolved: 2018-12-20

## 2018-12-20 PROBLEM — Z86.19 H/O CLOSTRIDIUM DIFFICILE INFECTION: Status: RESOLVED | Noted: 2018-03-31 | Resolved: 2018-12-20

## 2018-12-20 PROBLEM — E87.1 HYPONATREMIA: Status: RESOLVED | Noted: 2017-04-28 | Resolved: 2018-12-20

## 2018-12-20 PROBLEM — Z85.528 HISTORY OF RENAL CELL CANCER: Status: RESOLVED | Noted: 2017-05-31 | Resolved: 2018-12-20

## 2018-12-20 PROBLEM — R50.9 FEVER: Status: RESOLVED | Noted: 2017-05-20 | Resolved: 2018-12-20

## 2018-12-20 PROBLEM — R76.8 HEPATITIS C ANTIBODY POSITIVE IN BLOOD: Status: RESOLVED | Noted: 2018-04-04 | Resolved: 2018-12-20

## 2019-01-03 ENCOUNTER — HOSPITAL ENCOUNTER (OUTPATIENT)
Dept: ULTRASOUND IMAGING | Age: 59
Discharge: HOME | End: 2019-01-03
Attending: INTERNAL MEDICINE
Payer: MEDICAID

## 2019-01-03 DIAGNOSIS — K74.60 CIRRHOSIS (HCC): ICD-10-CM

## 2019-01-03 DIAGNOSIS — R76.8 FALSE POSITIVE SEROLOGICAL TEST FOR SYPHILIS: ICD-10-CM

## 2019-01-03 DIAGNOSIS — B18.2 CHRONIC HEPATITIS C WITH HEPATIC COMA (HCC): ICD-10-CM

## 2019-01-03 DIAGNOSIS — I10 ESSENTIAL HYPERTENSION, MALIGNANT: ICD-10-CM

## 2019-01-03 PROCEDURE — 76705 ECHO EXAM OF ABDOMEN: CPT

## 2019-01-08 ENCOUNTER — HOSPITAL ENCOUNTER (OUTPATIENT)
Dept: CT IMAGING | Age: 59
Discharge: HOME | End: 2019-01-08
Attending: UROLOGY

## 2019-01-08 DIAGNOSIS — N28.1 RENAL CYST, RIGHT: ICD-10-CM

## 2019-01-10 ENCOUNTER — HOSPITAL ENCOUNTER (OUTPATIENT)
Dept: CT IMAGING | Age: 59
Discharge: HOME | End: 2019-01-10
Attending: UROLOGY
Payer: MEDICAID

## 2019-01-10 DIAGNOSIS — C64.2 RENAL CELL CARCINOMA OF LEFT KIDNEY (HCC): ICD-10-CM

## 2019-01-10 PROCEDURE — 74176 CT ABD & PELVIS W/O CONTRAST: CPT

## 2019-01-31 PROBLEM — M54.2 CHRONIC NECK PAIN: Status: ACTIVE | Noted: 2019-01-31

## 2019-01-31 PROBLEM — I31.39 PERICARDIAL EFFUSION: Status: ACTIVE | Noted: 2019-01-31

## 2019-01-31 PROBLEM — E78.5 HYPERLIPIDEMIA: Status: ACTIVE | Noted: 2019-01-31

## 2019-01-31 PROBLEM — N31.2 BLADDER ATONY: Status: ACTIVE | Noted: 2019-01-31

## 2019-01-31 PROBLEM — R31.9 HEMATURIA: Status: ACTIVE | Noted: 2018-10-14

## 2019-01-31 PROBLEM — Z86.39 HISTORY OF DIABETES MELLITUS: Status: ACTIVE | Noted: 2019-01-31

## 2019-01-31 PROBLEM — Z01.818 PREOPERATIVE CLEARANCE: Status: ACTIVE | Noted: 2019-01-31

## 2019-01-31 PROBLEM — L85.3 DRY SKIN DERMATITIS: Status: ACTIVE | Noted: 2019-01-31

## 2019-01-31 PROBLEM — R97.20 ELEVATED PSA: Status: ACTIVE | Noted: 2019-01-31

## 2019-01-31 PROBLEM — Z87.09 HISTORY OF HAY FEVER: Status: ACTIVE | Noted: 2019-01-31

## 2019-01-31 PROBLEM — N42.9 PROSTATE DISORDER: Status: ACTIVE | Noted: 2019-01-31

## 2019-01-31 PROBLEM — R53.1 GENERALIZED WEAKNESS: Status: ACTIVE | Noted: 2018-07-03

## 2019-01-31 PROBLEM — K74.60 CIRRHOSIS OF LIVER (HCC): Status: ACTIVE | Noted: 2019-01-31

## 2019-01-31 PROBLEM — N39.0 RECURRENT UTI: Status: ACTIVE | Noted: 2019-01-31

## 2019-01-31 PROBLEM — Z79.899 MEDICATION MANAGEMENT: Status: ACTIVE | Noted: 2019-01-31

## 2019-01-31 PROBLEM — R91.8 LUNG NODULES: Status: ACTIVE | Noted: 2019-01-31

## 2019-01-31 PROBLEM — E03.9 HYPOTHYROID: Status: ACTIVE | Noted: 2019-01-31

## 2019-01-31 PROBLEM — H61.20 CERUMEN IMPACTION: Status: ACTIVE | Noted: 2019-01-31

## 2019-01-31 PROBLEM — J44.9 COPD, MODERATE (HCC): Status: ACTIVE | Noted: 2019-01-31

## 2019-01-31 PROBLEM — G89.29 CHRONIC NECK PAIN: Status: ACTIVE | Noted: 2019-01-31

## 2019-01-31 PROBLEM — Z86.39 HISTORY OF ELEVATED LIPIDS: Status: ACTIVE | Noted: 2019-01-31

## 2019-01-31 PROBLEM — R22.1 NECK MASS: Status: ACTIVE | Noted: 2019-01-31

## 2019-01-31 PROBLEM — E55.9 VITAMIN D DEFICIENCY: Status: ACTIVE | Noted: 2019-01-31

## 2019-02-26 ENCOUNTER — OFFICE VISIT (OUTPATIENT)
Dept: SURGERY | Age: 59
End: 2019-02-26

## 2019-02-26 VITALS
WEIGHT: 142 LBS | RESPIRATION RATE: 16 BRPM | HEIGHT: 72 IN | HEART RATE: 61 BPM | DIASTOLIC BLOOD PRESSURE: 72 MMHG | BODY MASS INDEX: 19.23 KG/M2 | SYSTOLIC BLOOD PRESSURE: 142 MMHG

## 2019-02-26 NOTE — PROGRESS NOTES
Patient thought that he was sent here to have an ultrasound of his neck. He stated that he had already had surgery on his neck at [facility].  He thought that the surgery performed for the cyst on his neck at [facility] that led to him being temporarily paralyzed.

## 2019-02-26 NOTE — PROGRESS NOTES
Review of Systems   Constitutional: Negative for chills, diaphoresis, fever, malaise/fatigue and weight loss. HENT: Positive for hearing loss. Negative for congestion, ear discharge, ear pain, nosebleeds, sinus pain, sore throat and tinnitus. Eyes: Negative. Respiratory: Negative. Negative for stridor. Cardiovascular: Negative. Gastrointestinal: Negative. Genitourinary: Negative. Musculoskeletal: Positive for neck pain. Negative for back pain, falls, joint pain and myalgias. Skin: Negative. Neurological: Positive for weakness. Endo/Heme/Allergies: Negative. Psychiatric/Behavioral: Negative.

## 2019-03-23 PROCEDURE — 77030037878 HC DRSG MEPILEX >48IN BORD MOLN -B

## 2019-03-27 ENCOUNTER — HOSPITAL ENCOUNTER (INPATIENT)
Age: 59
LOS: 9 days | Discharge: HOME HEALTH | DRG: 720 | End: 2019-04-06
Attending: EMERGENCY MEDICINE | Admitting: FAMILY MEDICINE
Payer: MEDICAID

## 2019-03-27 DIAGNOSIS — N17.9 ACUTE RENAL FAILURE, UNSPECIFIED ACUTE RENAL FAILURE TYPE (HCC): ICD-10-CM

## 2019-03-27 DIAGNOSIS — R34 OLIGURIA: Primary | ICD-10-CM

## 2019-03-27 LAB
ALBUMIN SERPL-MCNC: 2.8 G/DL (ref 3.4–5)
ALBUMIN/GLOB SERPL: 0.6 {RATIO} (ref 0.8–1.7)
ALP SERPL-CCNC: 98 U/L (ref 45–117)
ALT SERPL-CCNC: 16 U/L (ref 16–61)
ANION GAP SERPL CALC-SCNC: 7 MMOL/L (ref 3–18)
APPEARANCE UR: ABNORMAL
AST SERPL-CCNC: 20 U/L (ref 15–37)
BACTERIA URNS QL MICRO: ABNORMAL /HPF
BASOPHILS # BLD: 0 K/UL (ref 0–0.1)
BASOPHILS NFR BLD: 1 % (ref 0–2)
BILIRUB SERPL-MCNC: 0.3 MG/DL (ref 0.2–1)
BILIRUB UR QL: NEGATIVE
BUN SERPL-MCNC: 22 MG/DL (ref 7–18)
BUN/CREAT SERPL: 7 (ref 12–20)
CALCIUM SERPL-MCNC: 8.9 MG/DL (ref 8.5–10.1)
CHLORIDE SERPL-SCNC: 114 MMOL/L (ref 100–108)
CO2 SERPL-SCNC: 22 MMOL/L (ref 21–32)
COLOR UR: ABNORMAL
CREAT SERPL-MCNC: 3.25 MG/DL (ref 0.6–1.3)
DIFFERENTIAL METHOD BLD: ABNORMAL
EOSINOPHIL # BLD: 0 K/UL (ref 0–0.4)
EOSINOPHIL NFR BLD: 0 % (ref 0–5)
EPITH CASTS URNS QL MICRO: ABNORMAL /LPF (ref 0–5)
ERYTHROCYTE [DISTWIDTH] IN BLOOD BY AUTOMATED COUNT: 18.5 % (ref 11.6–14.5)
GLOBULIN SER CALC-MCNC: 4.5 G/DL (ref 2–4)
GLUCOSE BLD STRIP.AUTO-MCNC: 121 MG/DL (ref 70–110)
GLUCOSE BLD STRIP.AUTO-MCNC: 66 MG/DL (ref 70–110)
GLUCOSE SERPL-MCNC: 63 MG/DL (ref 74–99)
GLUCOSE UR STRIP.AUTO-MCNC: NEGATIVE MG/DL
HCT VFR BLD AUTO: 27.2 % (ref 36–48)
HGB BLD-MCNC: 8.8 G/DL (ref 13–16)
HGB UR QL STRIP: ABNORMAL
KETONES UR QL STRIP.AUTO: ABNORMAL MG/DL
LEUKOCYTE ESTERASE UR QL STRIP.AUTO: ABNORMAL
LYMPHOCYTES # BLD: 0.9 K/UL (ref 0.9–3.6)
LYMPHOCYTES NFR BLD: 26 % (ref 21–52)
MCH RBC QN AUTO: 28.1 PG (ref 24–34)
MCHC RBC AUTO-ENTMCNC: 32.4 G/DL (ref 31–37)
MCV RBC AUTO: 86.9 FL (ref 74–97)
MONOCYTES # BLD: 0.2 K/UL (ref 0.05–1.2)
MONOCYTES NFR BLD: 7 % (ref 3–10)
NEUTS SEG # BLD: 2.2 K/UL (ref 1.8–8)
NEUTS SEG NFR BLD: 66 % (ref 40–73)
NITRITE UR QL STRIP.AUTO: NEGATIVE
PH UR STRIP: 5 [PH] (ref 5–8)
PLATELET # BLD AUTO: 92 K/UL (ref 135–420)
PMV BLD AUTO: 10 FL (ref 9.2–11.8)
POTASSIUM SERPL-SCNC: 4.8 MMOL/L (ref 3.5–5.5)
PROT SERPL-MCNC: 7.3 G/DL (ref 6.4–8.2)
PROT UR STRIP-MCNC: 300 MG/DL
RBC # BLD AUTO: 3.13 M/UL (ref 4.7–5.5)
RBC #/AREA URNS HPF: ABNORMAL /HPF (ref 0–5)
SODIUM SERPL-SCNC: 143 MMOL/L (ref 136–145)
SP GR UR REFRACTOMETRY: 1.02 (ref 1–1.03)
UROBILINOGEN UR QL STRIP.AUTO: 0.2 EU/DL (ref 0.2–1)
WBC # BLD AUTO: 3.4 K/UL (ref 4.6–13.2)
WBC URNS QL MICRO: ABNORMAL /HPF (ref 0–4)
YEAST URNS QL MICRO: ABNORMAL

## 2019-03-27 PROCEDURE — 85025 COMPLETE CBC W/AUTO DIFF WBC: CPT

## 2019-03-27 PROCEDURE — 96361 HYDRATE IV INFUSION ADD-ON: CPT

## 2019-03-27 PROCEDURE — 87186 SC STD MICRODIL/AGAR DIL: CPT

## 2019-03-27 PROCEDURE — 81001 URINALYSIS AUTO W/SCOPE: CPT

## 2019-03-27 PROCEDURE — 83036 HEMOGLOBIN GLYCOSYLATED A1C: CPT

## 2019-03-27 PROCEDURE — 87106 FUNGI IDENTIFICATION YEAST: CPT

## 2019-03-27 PROCEDURE — 87077 CULTURE AEROBIC IDENTIFY: CPT

## 2019-03-27 PROCEDURE — 80053 COMPREHEN METABOLIC PANEL: CPT

## 2019-03-27 PROCEDURE — 99285 EMERGENCY DEPT VISIT HI MDM: CPT

## 2019-03-27 PROCEDURE — 82962 GLUCOSE BLOOD TEST: CPT

## 2019-03-27 PROCEDURE — 87086 URINE CULTURE/COLONY COUNT: CPT

## 2019-03-27 PROCEDURE — 74011250636 HC RX REV CODE- 250/636: Performed by: EMERGENCY MEDICINE

## 2019-03-27 RX ADMIN — SODIUM CHLORIDE 1000 ML: 900 INJECTION, SOLUTION INTRAVENOUS at 22:24

## 2019-03-28 ENCOUNTER — APPOINTMENT (OUTPATIENT)
Dept: MRI IMAGING | Age: 59
DRG: 720 | End: 2019-03-28
Attending: STUDENT IN AN ORGANIZED HEALTH CARE EDUCATION/TRAINING PROGRAM
Payer: MEDICAID

## 2019-03-28 ENCOUNTER — APPOINTMENT (OUTPATIENT)
Dept: GENERAL RADIOLOGY | Age: 59
DRG: 720 | End: 2019-03-28
Attending: FAMILY MEDICINE
Payer: MEDICAID

## 2019-03-28 ENCOUNTER — APPOINTMENT (OUTPATIENT)
Dept: CT IMAGING | Age: 59
DRG: 720 | End: 2019-03-28
Attending: EMERGENCY MEDICINE
Payer: MEDICAID

## 2019-03-28 ENCOUNTER — HOSPITAL ENCOUNTER (INPATIENT)
Dept: ULTRASOUND IMAGING | Age: 59
Discharge: HOME | DRG: 720 | End: 2019-03-28
Attending: FAMILY MEDICINE
Payer: MEDICAID

## 2019-03-28 ENCOUNTER — APPOINTMENT (OUTPATIENT)
Dept: CT IMAGING | Age: 59
DRG: 720 | End: 2019-03-28
Attending: FAMILY MEDICINE
Payer: MEDICAID

## 2019-03-28 PROBLEM — R34 OLIGURIA: Status: ACTIVE | Noted: 2019-03-28

## 2019-03-28 PROBLEM — Z93.59 SUPRAPUBIC CATHETER (HCC): Status: ACTIVE | Noted: 2019-03-28

## 2019-03-28 PROBLEM — N39.0 ACUTE UTI: Status: ACTIVE | Noted: 2019-03-28

## 2019-03-28 PROBLEM — S37.009A RENAL INJURY: Status: ACTIVE | Noted: 2019-03-28

## 2019-03-28 PROBLEM — E11.649 TYPE 2 DIABETES MELLITUS WITH HYPOGLYCEMIA (HCC): Status: ACTIVE | Noted: 2019-03-28

## 2019-03-28 PROBLEM — N17.9 ACUTE RENAL FAILURE (ARF) (HCC): Status: ACTIVE | Noted: 2019-03-28

## 2019-03-28 PROBLEM — R00.1 BRADYCARDIA, SINUS: Status: ACTIVE | Noted: 2019-03-28

## 2019-03-28 LAB
AMMONIA PLAS-SCNC: 22 UMOL/L (ref 11–32)
AMPHET UR QL SCN: NEGATIVE
ANION GAP SERPL CALC-SCNC: 4 MMOL/L (ref 3–18)
ANION GAP SERPL CALC-SCNC: 5 MMOL/L (ref 3–18)
APPEARANCE UR: ABNORMAL
ATRIAL RATE: 40 BPM
BACTERIA URNS QL MICRO: ABNORMAL /HPF
BARBITURATES UR QL SCN: NEGATIVE
BASOPHILS # BLD: 0 K/UL (ref 0–0.06)
BASOPHILS # BLD: 0 K/UL (ref 0–0.1)
BASOPHILS NFR BLD: 0 % (ref 0–2)
BASOPHILS NFR BLD: 0 % (ref 0–3)
BENZODIAZ UR QL: NEGATIVE
BILIRUB UR QL: NEGATIVE
BNP SERPL-MCNC: 1113 PG/ML (ref 0–900)
BUN SERPL-MCNC: 23 MG/DL (ref 7–18)
BUN SERPL-MCNC: 24 MG/DL (ref 7–18)
BUN/CREAT SERPL: 7 (ref 12–20)
BUN/CREAT SERPL: 7 (ref 12–20)
CALCIUM SERPL-MCNC: 7.9 MG/DL (ref 8.5–10.1)
CALCIUM SERPL-MCNC: 8.2 MG/DL (ref 8.5–10.1)
CALCULATED P AXIS, ECG09: 28 DEGREES
CALCULATED R AXIS, ECG10: 2 DEGREES
CALCULATED T AXIS, ECG11: 29 DEGREES
CANNABINOIDS UR QL SCN: NEGATIVE
CHLORIDE SERPL-SCNC: 114 MMOL/L (ref 100–108)
CHLORIDE SERPL-SCNC: 115 MMOL/L (ref 100–108)
CK MB CFR SERPL CALC: 5.4 % (ref 0–4)
CK MB CFR SERPL CALC: 6 % (ref 0–4)
CK MB CFR SERPL CALC: 7.2 % (ref 0–4)
CK MB SERPL-MCNC: 6.5 NG/ML (ref 5–25)
CK MB SERPL-MCNC: 7.7 NG/ML (ref 5–25)
CK MB SERPL-MCNC: 8.6 NG/ML (ref 5–25)
CK SERPL-CCNC: 119 U/L (ref 39–308)
CK SERPL-CCNC: 120 U/L (ref 39–308)
CK SERPL-CCNC: 129 U/L (ref 39–308)
CO2 SERPL-SCNC: 20 MMOL/L (ref 21–32)
CO2 SERPL-SCNC: 21 MMOL/L (ref 21–32)
COCAINE UR QL SCN: NEGATIVE
COLOR UR: ABNORMAL
CREAT SERPL-MCNC: 3.34 MG/DL (ref 0.6–1.3)
CREAT SERPL-MCNC: 3.5 MG/DL (ref 0.6–1.3)
CREAT UR-MCNC: 190 MG/DL (ref 30–125)
CREAT UR-MCNC: 190 MG/DL (ref 30–125)
DIAGNOSIS, 93000: NORMAL
DIFFERENTIAL METHOD BLD: ABNORMAL
DIFFERENTIAL METHOD BLD: ABNORMAL
EOSINOPHIL # BLD: 0 K/UL (ref 0–0.4)
EOSINOPHIL # BLD: 0 K/UL (ref 0–0.4)
EOSINOPHIL NFR BLD: 0 % (ref 0–5)
EOSINOPHIL NFR BLD: 0 % (ref 0–5)
EPITH CASTS URNS QL MICRO: ABNORMAL /LPF (ref 0–5)
ERYTHROCYTE [DISTWIDTH] IN BLOOD BY AUTOMATED COUNT: 18.6 % (ref 11.6–14.5)
ERYTHROCYTE [DISTWIDTH] IN BLOOD BY AUTOMATED COUNT: 18.7 % (ref 11.6–14.5)
EST. AVERAGE GLUCOSE BLD GHB EST-MCNC: 105 MG/DL
FERRITIN SERPL-MCNC: 173 NG/ML (ref 8–388)
GLUCOSE BLD STRIP.AUTO-MCNC: 102 MG/DL (ref 70–110)
GLUCOSE BLD STRIP.AUTO-MCNC: 103 MG/DL (ref 70–110)
GLUCOSE BLD STRIP.AUTO-MCNC: 123 MG/DL (ref 70–110)
GLUCOSE BLD STRIP.AUTO-MCNC: 140 MG/DL (ref 70–110)
GLUCOSE BLD STRIP.AUTO-MCNC: 173 MG/DL (ref 70–110)
GLUCOSE BLD STRIP.AUTO-MCNC: 56 MG/DL (ref 70–110)
GLUCOSE BLD STRIP.AUTO-MCNC: 57 MG/DL (ref 70–110)
GLUCOSE BLD STRIP.AUTO-MCNC: 62 MG/DL (ref 70–110)
GLUCOSE BLD STRIP.AUTO-MCNC: 63 MG/DL (ref 70–110)
GLUCOSE BLD STRIP.AUTO-MCNC: 65 MG/DL (ref 70–110)
GLUCOSE SERPL-MCNC: 64 MG/DL (ref 74–99)
GLUCOSE SERPL-MCNC: 71 MG/DL (ref 74–99)
GLUCOSE UR STRIP.AUTO-MCNC: NEGATIVE MG/DL
HAV IGM SER QL: NEGATIVE
HBA1C MFR BLD: 5.3 % (ref 4.2–5.6)
HBV CORE IGM SER QL: NEGATIVE
HBV SURFACE AG SER QL: 0.28 INDEX
HBV SURFACE AG SER QL: NEGATIVE
HCT VFR BLD AUTO: 24.1 % (ref 36–48)
HCT VFR BLD AUTO: 25.4 % (ref 36–48)
HCV AB SER IA-ACNC: >11 INDEX
HCV AB SERPL QL IA: POSITIVE
HCV COMMENT,HCGAC: ABNORMAL
HDSCOM,HDSCOM: ABNORMAL
HGB BLD-MCNC: 8 G/DL (ref 13–16)
HGB BLD-MCNC: 8.3 G/DL (ref 13–16)
HGB UR QL STRIP: ABNORMAL
INR PPP: 1 (ref 0.8–1.2)
IRON SATN MFR SERPL: 48 %
IRON SERPL-MCNC: 135 UG/DL (ref 50–175)
KETONES UR QL STRIP.AUTO: NEGATIVE MG/DL
LACTATE BLD-SCNC: <0.3 MMOL/L (ref 0.4–2)
LACTATE SERPL-SCNC: 0.5 MMOL/L (ref 0.4–2)
LEUKOCYTE ESTERASE UR QL STRIP.AUTO: ABNORMAL
LIPASE SERPL-CCNC: 57 U/L (ref 73–393)
LYMPHOCYTES # BLD: 0.6 K/UL (ref 0.9–3.6)
LYMPHOCYTES # BLD: 1 K/UL (ref 0.8–3.5)
LYMPHOCYTES NFR BLD: 13 % (ref 21–52)
LYMPHOCYTES NFR BLD: 25 % (ref 20–51)
MAGNESIUM SERPL-MCNC: 2.4 MG/DL (ref 1.6–2.6)
MCH RBC QN AUTO: 28.2 PG (ref 24–34)
MCH RBC QN AUTO: 28.9 PG (ref 24–34)
MCHC RBC AUTO-ENTMCNC: 32.7 G/DL (ref 31–37)
MCHC RBC AUTO-ENTMCNC: 33.2 G/DL (ref 31–37)
MCV RBC AUTO: 86.4 FL (ref 74–97)
MCV RBC AUTO: 87 FL (ref 74–97)
METHADONE UR QL: NEGATIVE
MONOCYTES # BLD: 0.4 K/UL (ref 0.05–1.2)
MONOCYTES # BLD: 0.4 K/UL (ref 0–1)
MONOCYTES NFR BLD: 11 % (ref 2–9)
MONOCYTES NFR BLD: 9 % (ref 3–10)
NEUTS SEG # BLD: 2.5 K/UL (ref 1.8–8)
NEUTS SEG # BLD: 3.8 K/UL (ref 1.8–8)
NEUTS SEG NFR BLD: 64 % (ref 42–75)
NEUTS SEG NFR BLD: 78 % (ref 40–73)
NITRITE UR QL STRIP.AUTO: NEGATIVE
NRBC BLD-RTO: 1 PER 100 WBC
OPIATES UR QL: POSITIVE
OSMOLALITY UR: 299 MOSM/KG H2O (ref 300–900)
P-R INTERVAL, ECG05: 208 MS
PCP UR QL: NEGATIVE
PH UR STRIP: 5 [PH] (ref 5–8)
PHOSPHATE SERPL-MCNC: 5.3 MG/DL (ref 2.5–4.9)
PLATELET # BLD AUTO: 88 K/UL (ref 135–420)
PLATELET # BLD AUTO: 94 K/UL (ref 135–420)
PLATELET COMMENTS,PCOM: ABNORMAL
PMV BLD AUTO: 11 FL (ref 9.2–11.8)
PMV BLD AUTO: 11.1 FL (ref 9.2–11.8)
POTASSIUM SERPL-SCNC: 4.6 MMOL/L (ref 3.5–5.5)
POTASSIUM SERPL-SCNC: 4.9 MMOL/L (ref 3.5–5.5)
PROT UR STRIP-MCNC: 300 MG/DL
PROT UR-MCNC: 525 MG/DL
PROT UR-MCNC: 525 MG/DL
PROT/CREAT UR-RTO: 2.8
PROTHROMBIN TIME: 12.8 SEC (ref 11.5–15.2)
Q-T INTERVAL, ECG07: 550 MS
QRS DURATION, ECG06: 94 MS
QTC CALCULATION (BEZET), ECG08: 448 MS
RBC # BLD AUTO: 2.77 M/UL (ref 4.7–5.5)
RBC # BLD AUTO: 2.94 M/UL (ref 4.7–5.5)
RBC #/AREA URNS HPF: PRESENT /HPF (ref 0–5)
RBC MORPH BLD: ABNORMAL
SODIUM SERPL-SCNC: 139 MMOL/L (ref 136–145)
SODIUM SERPL-SCNC: 140 MMOL/L (ref 136–145)
SODIUM UR-SCNC: 40 MMOL/L (ref 20–110)
SP GR UR REFRACTOMETRY: 1.02 (ref 1–1.03)
SP1: ABNORMAL
SP2: ABNORMAL
SP3: ABNORMAL
T4 FREE SERPL-MCNC: 1.2 NG/DL (ref 0.7–1.5)
TIBC SERPL-MCNC: 281 UG/DL (ref 250–450)
TROPONIN I SERPL-MCNC: 0.05 NG/ML (ref 0–0.04)
TROPONIN I SERPL-MCNC: 0.06 NG/ML (ref 0–0.04)
TROPONIN I SERPL-MCNC: 0.06 NG/ML (ref 0–0.04)
TSH SERPL DL<=0.05 MIU/L-ACNC: 2.65 UIU/ML (ref 0.36–3.74)
UROBILINOGEN UR QL STRIP.AUTO: 0.2 EU/DL (ref 0.2–1)
VENTRICULAR RATE, ECG03: 40 BPM
WBC # BLD AUTO: 3.9 K/UL (ref 4.6–13.2)
WBC # BLD AUTO: 4.8 K/UL (ref 4.6–13.2)
WBC URNS QL MICRO: ABNORMAL /HPF (ref 0–4)

## 2019-03-28 PROCEDURE — 80074 ACUTE HEPATITIS PANEL: CPT

## 2019-03-28 PROCEDURE — 74011000250 HC RX REV CODE- 250: Performed by: FAMILY MEDICINE

## 2019-03-28 PROCEDURE — 80307 DRUG TEST PRSMV CHEM ANLYZR: CPT

## 2019-03-28 PROCEDURE — 77030018846 HC SOL IRR STRL H20 ICUM -A

## 2019-03-28 PROCEDURE — 82570 ASSAY OF URINE CREATININE: CPT

## 2019-03-28 PROCEDURE — 83874 ASSAY OF MYOGLOBIN: CPT

## 2019-03-28 PROCEDURE — C9113 INJ PANTOPRAZOLE SODIUM, VIA: HCPCS | Performed by: FAMILY MEDICINE

## 2019-03-28 PROCEDURE — 83880 ASSAY OF NATRIURETIC PEPTIDE: CPT

## 2019-03-28 PROCEDURE — 84156 ASSAY OF PROTEIN URINE: CPT

## 2019-03-28 PROCEDURE — 77030020186 HC BOOT HL PROTCT SAGE -B

## 2019-03-28 PROCEDURE — 82140 ASSAY OF AMMONIA: CPT

## 2019-03-28 PROCEDURE — 84300 ASSAY OF URINE SODIUM: CPT

## 2019-03-28 PROCEDURE — 71045 X-RAY EXAM CHEST 1 VIEW: CPT

## 2019-03-28 PROCEDURE — 83935 ASSAY OF URINE OSMOLALITY: CPT

## 2019-03-28 PROCEDURE — 87389 HIV-1 AG W/HIV-1&-2 AB AG IA: CPT

## 2019-03-28 PROCEDURE — 80048 BASIC METABOLIC PNL TOTAL CA: CPT

## 2019-03-28 PROCEDURE — 87522 HEPATITIS C REVRS TRNSCRPJ: CPT

## 2019-03-28 PROCEDURE — 83735 ASSAY OF MAGNESIUM: CPT

## 2019-03-28 PROCEDURE — 85025 COMPLETE CBC W/AUTO DIFF WBC: CPT

## 2019-03-28 PROCEDURE — 87040 BLOOD CULTURE FOR BACTERIA: CPT

## 2019-03-28 PROCEDURE — 83605 ASSAY OF LACTIC ACID: CPT

## 2019-03-28 PROCEDURE — 83690 ASSAY OF LIPASE: CPT

## 2019-03-28 PROCEDURE — 84439 ASSAY OF FREE THYROXINE: CPT

## 2019-03-28 PROCEDURE — 84443 ASSAY THYROID STIM HORMONE: CPT

## 2019-03-28 PROCEDURE — 77030037875 HC DRSG MEPILEX <16IN BORD MOLN -A

## 2019-03-28 PROCEDURE — 81002 URINALYSIS NONAUTO W/O SCOPE: CPT

## 2019-03-28 PROCEDURE — 76705 ECHO EXAM OF ABDOMEN: CPT

## 2019-03-28 PROCEDURE — 74011250636 HC RX REV CODE- 250/636: Performed by: INTERNAL MEDICINE

## 2019-03-28 PROCEDURE — 36415 COLL VENOUS BLD VENIPUNCTURE: CPT

## 2019-03-28 PROCEDURE — 84100 ASSAY OF PHOSPHORUS: CPT

## 2019-03-28 PROCEDURE — 65610000006 HC RM INTENSIVE CARE

## 2019-03-28 PROCEDURE — 82550 ASSAY OF CK (CPK): CPT

## 2019-03-28 PROCEDURE — 93005 ELECTROCARDIOGRAM TRACING: CPT

## 2019-03-28 PROCEDURE — 81001 URINALYSIS AUTO W/SCOPE: CPT

## 2019-03-28 PROCEDURE — 74011000250 HC RX REV CODE- 250: Performed by: INTERNAL MEDICINE

## 2019-03-28 PROCEDURE — 83540 ASSAY OF IRON: CPT

## 2019-03-28 PROCEDURE — 65660000000 HC RM CCU STEPDOWN

## 2019-03-28 PROCEDURE — 77030010545

## 2019-03-28 PROCEDURE — 74011250636 HC RX REV CODE- 250/636: Performed by: FAMILY MEDICINE

## 2019-03-28 PROCEDURE — 96374 THER/PROPH/DIAG INJ IV PUSH: CPT

## 2019-03-28 PROCEDURE — 82728 ASSAY OF FERRITIN: CPT

## 2019-03-28 PROCEDURE — 85610 PROTHROMBIN TIME: CPT

## 2019-03-28 PROCEDURE — 74011000258 HC RX REV CODE- 258: Performed by: INTERNAL MEDICINE

## 2019-03-28 PROCEDURE — 74176 CT ABD & PELVIS W/O CONTRAST: CPT

## 2019-03-28 PROCEDURE — 82962 GLUCOSE BLOOD TEST: CPT

## 2019-03-28 PROCEDURE — 70450 CT HEAD/BRAIN W/O DYE: CPT

## 2019-03-28 RX ORDER — LEVOTHYROXINE SODIUM 100 UG/1
100 TABLET ORAL
Status: DISCONTINUED | OUTPATIENT
Start: 2019-03-29 | End: 2019-04-06 | Stop reason: HOSPADM

## 2019-03-28 RX ORDER — TROSPIUM CHLORIDE 20 MG/1
20 TABLET, FILM COATED ORAL DAILY
COMMUNITY
End: 2019-04-06

## 2019-03-28 RX ORDER — DOCUSATE SODIUM 100 MG/1
100 CAPSULE, LIQUID FILLED ORAL
Status: DISCONTINUED | OUTPATIENT
Start: 2019-03-28 | End: 2019-04-06 | Stop reason: HOSPADM

## 2019-03-28 RX ORDER — NALOXONE HYDROCHLORIDE 0.4 MG/ML
0.4 INJECTION, SOLUTION INTRAMUSCULAR; INTRAVENOUS; SUBCUTANEOUS AS NEEDED
Status: DISCONTINUED | OUTPATIENT
Start: 2019-03-28 | End: 2019-04-06 | Stop reason: HOSPADM

## 2019-03-28 RX ORDER — DOXYCYCLINE 100 MG/1
100 CAPSULE ORAL DAILY
Status: DISCONTINUED | OUTPATIENT
Start: 2019-03-29 | End: 2019-03-28

## 2019-03-28 RX ORDER — SUCRALFATE 1 G/1
1 TABLET ORAL 4 TIMES DAILY
COMMUNITY
End: 2019-07-08

## 2019-03-28 RX ORDER — TRAMADOL HYDROCHLORIDE 50 MG/1
50 TABLET ORAL
COMMUNITY
End: 2019-04-06

## 2019-03-28 RX ORDER — ATROPINE SULFATE 1 MG/ML
1 INJECTION, SOLUTION INTRAVENOUS
Status: COMPLETED | OUTPATIENT
Start: 2019-03-28 | End: 2019-03-28

## 2019-03-28 RX ORDER — MAGNESIUM SULFATE 100 %
4 CRYSTALS MISCELLANEOUS AS NEEDED
Status: DISCONTINUED | OUTPATIENT
Start: 2019-03-28 | End: 2019-04-06 | Stop reason: HOSPADM

## 2019-03-28 RX ORDER — ONDANSETRON 2 MG/ML
4 INJECTION INTRAMUSCULAR; INTRAVENOUS
Status: DISCONTINUED | OUTPATIENT
Start: 2019-03-28 | End: 2019-04-06 | Stop reason: HOSPADM

## 2019-03-28 RX ORDER — DEXTROSE 50 % IN WATER (D50W) INTRAVENOUS SYRINGE
25-50 AS NEEDED
Status: DISCONTINUED | OUTPATIENT
Start: 2019-03-28 | End: 2019-04-06 | Stop reason: HOSPADM

## 2019-03-28 RX ORDER — LEVOTHYROXINE SODIUM 50 UG/1
50 TABLET ORAL
Status: DISCONTINUED | OUTPATIENT
Start: 2019-03-28 | End: 2019-03-28

## 2019-03-28 RX ORDER — DEXTROSE MONOHYDRATE AND SODIUM CHLORIDE 5; .9 G/100ML; G/100ML
125 INJECTION, SOLUTION INTRAVENOUS CONTINUOUS
Status: DISCONTINUED | OUTPATIENT
Start: 2019-03-28 | End: 2019-03-28

## 2019-03-28 RX ORDER — SODIUM BICARBONATE 650 MG/1
650 TABLET ORAL 3 TIMES DAILY
COMMUNITY
End: 2019-04-15 | Stop reason: ALTCHOICE

## 2019-03-28 RX ORDER — OXYCODONE AND ACETAMINOPHEN 5; 325 MG/1; MG/1
1 TABLET ORAL
Status: DISCONTINUED | OUTPATIENT
Start: 2019-03-28 | End: 2019-04-06 | Stop reason: HOSPADM

## 2019-03-28 RX ORDER — OMEPRAZOLE 20 MG/1
20 CAPSULE, DELAYED RELEASE ORAL 2 TIMES DAILY
COMMUNITY
End: 2019-07-08

## 2019-03-28 RX ORDER — TAMSULOSIN HYDROCHLORIDE 0.4 MG/1
0.4 CAPSULE ORAL DAILY
Status: DISCONTINUED | OUTPATIENT
Start: 2019-03-28 | End: 2019-04-06 | Stop reason: HOSPADM

## 2019-03-28 RX ORDER — LEVOTHYROXINE SODIUM 100 UG/1
100 TABLET ORAL
COMMUNITY
End: 2019-04-06

## 2019-03-28 RX ORDER — ACETAMINOPHEN 325 MG/1
650 TABLET ORAL
Status: DISCONTINUED | OUTPATIENT
Start: 2019-03-28 | End: 2019-04-06 | Stop reason: HOSPADM

## 2019-03-28 RX ORDER — HEPARIN SODIUM 5000 [USP'U]/ML
5000 INJECTION, SOLUTION INTRAVENOUS; SUBCUTANEOUS EVERY 8 HOURS
Status: DISCONTINUED | OUTPATIENT
Start: 2019-03-28 | End: 2019-04-01

## 2019-03-28 RX ORDER — PANTOPRAZOLE SODIUM 40 MG/10ML
40 INJECTION, POWDER, LYOPHILIZED, FOR SOLUTION INTRAVENOUS EVERY 12 HOURS
Status: DISCONTINUED | OUTPATIENT
Start: 2019-03-28 | End: 2019-04-01

## 2019-03-28 RX ORDER — INSULIN LISPRO 100 [IU]/ML
INJECTION, SOLUTION INTRAVENOUS; SUBCUTANEOUS EVERY 6 HOURS
Status: DISCONTINUED | OUTPATIENT
Start: 2019-03-28 | End: 2019-04-02

## 2019-03-28 RX ORDER — SODIUM CHLORIDE 0.9 % (FLUSH) 0.9 %
5-10 SYRINGE (ML) INJECTION AS NEEDED
Status: DISCONTINUED | OUTPATIENT
Start: 2019-03-28 | End: 2019-04-06 | Stop reason: HOSPADM

## 2019-03-28 RX ORDER — THERA TABS 400 MCG
1 TAB ORAL DAILY
COMMUNITY
End: 2019-04-06

## 2019-03-28 RX ORDER — FUROSEMIDE 10 MG/ML
40 INJECTION INTRAMUSCULAR; INTRAVENOUS 2 TIMES DAILY
Status: DISCONTINUED | OUTPATIENT
Start: 2019-03-28 | End: 2019-03-29

## 2019-03-28 RX ORDER — AMLODIPINE BESYLATE 10 MG/1
10 TABLET ORAL DAILY
COMMUNITY
End: 2019-09-05

## 2019-03-28 RX ORDER — VANCOMYCIN/0.9 % SOD CHLORIDE 1.5G/250ML
1500 PLASTIC BAG, INJECTION (ML) INTRAVENOUS ONCE
Status: COMPLETED | OUTPATIENT
Start: 2019-03-28 | End: 2019-03-29

## 2019-03-28 RX ADMIN — DEXTROSE MONOHYDRATE 25 G: 500 INJECTION PARENTERAL at 23:30

## 2019-03-28 RX ADMIN — CEFEPIME 2 G: 2 INJECTION, POWDER, FOR SOLUTION INTRAVENOUS at 18:42

## 2019-03-28 RX ADMIN — FUROSEMIDE 40 MG: 10 INJECTION, SOLUTION INTRAMUSCULAR; INTRAVENOUS at 18:43

## 2019-03-28 RX ADMIN — HEPARIN SODIUM 5000 UNITS: 5000 INJECTION INTRAVENOUS; SUBCUTANEOUS at 23:30

## 2019-03-28 RX ADMIN — DEXTROSE MONOHYDRATE 25 G: 500 INJECTION PARENTERAL at 13:52

## 2019-03-28 RX ADMIN — CEFEPIME 2 G: 2 INJECTION, POWDER, FOR SOLUTION INTRAVENOUS at 03:10

## 2019-03-28 RX ADMIN — VANCOMYCIN HYDROCHLORIDE 1500 MG: 10 INJECTION, POWDER, LYOPHILIZED, FOR SOLUTION INTRAVENOUS at 19:47

## 2019-03-28 RX ADMIN — DEXTROSE MONOHYDRATE 25 G: 500 INJECTION PARENTERAL at 16:49

## 2019-03-28 RX ADMIN — CEFTRIAXONE SODIUM 1 G: 1 INJECTION, POWDER, FOR SOLUTION INTRAMUSCULAR; INTRAVENOUS at 08:34

## 2019-03-28 RX ADMIN — PANTOPRAZOLE SODIUM 40 MG: 40 INJECTION, POWDER, FOR SOLUTION INTRAVENOUS at 20:04

## 2019-03-28 RX ADMIN — HEPARIN SODIUM 5000 UNITS: 5000 INJECTION INTRAVENOUS; SUBCUTANEOUS at 18:41

## 2019-03-28 RX ADMIN — ATROPINE SULFATE 1 MG: 1 INJECTION, SOLUTION INTRAMUSCULAR; INTRAVENOUS; SUBCUTANEOUS at 08:28

## 2019-03-28 RX ADMIN — PANTOPRAZOLE SODIUM 40 MG: 40 INJECTION, POWDER, FOR SOLUTION INTRAVENOUS at 12:32

## 2019-03-28 RX ADMIN — DEXTROSE MONOHYDRATE AND SODIUM CHLORIDE 125 ML/HR: 5; .9 INJECTION, SOLUTION INTRAVENOUS at 03:58

## 2019-03-28 RX ADMIN — FUROSEMIDE 40 MG: 10 INJECTION, SOLUTION INTRAMUSCULAR; INTRAVENOUS at 12:31

## 2019-03-28 NOTE — ED NOTES
TRANSFER - OUT REPORT:    Verbal report given to [facility]) on Theresa Garcia  being transferred to (unit) for routine progression of care       Report consisted of patients Situation, Background, Assessment and   Recommendations(SBAR). Information from the following report(s) SBAR, ED Summary and MAR was reviewed with the receiving nurse. Lines:   Peripheral IV 03/27/19 Left;Upper Arm (Active)   Site Assessment Clean, dry, & intact 3/27/2019  9:38 PM   Phlebitis Assessment 0 3/27/2019  9:38 PM   Infiltration Assessment 0 3/27/2019  9:38 PM   Dressing Status Clean, dry, & intact 3/27/2019  9:38 PM   Dressing Type Transparent 3/27/2019  9:38 PM   Hub Color/Line Status Flushed;Patent 3/27/2019  9:38 PM   Action Taken Blood drawn 3/27/2019  9:38 PM   Alcohol Cap Used No 3/27/2019  9:38 PM        Opportunity for questions and clarification was provided.       Patient transported with:  [employer]

## 2019-03-28 NOTE — PROGRESS NOTES
responded to Code S for  [employer], who is a 59 y.o.,male,     The  provided the following Interventions:   Responded to RR initially but ten minutes later turned into Code S. Offered silent prayers while medical staff worked on patient after he was brought to CT scan. No family members present. Chart reviewed. The following outcomes were achieved:      Assessment:  Unable to spiritually assess patient at this time. There are no spiritual or Spiritism issues which require intervention at this time. Plan:  Chaplains will continue to follow and will provide pastoral care on an as needed/requested basis.  recommends bedside caregivers page  on duty if patient shows signs of acute spiritual or emotional distress.        Chaplain Resident [address]   [phone]

## 2019-03-28 NOTE — H&P
History & Physical    Patient: Mag Dockery MRN: [id number]  CSN: [id number]    YOB: 1960  Age: 59 y.o. Sex: male      DOA: 3/27/2019    Chief Complaint:   Chief Complaint   Patient presents with    Urinary Catheter Problem    Abdominal Pain          HPI:     Mag Dockery is a 59 y.o.  male who has significant history for chronic urinary retention with suprapubic catheter placement, history of Renal Cell carcinoma post-left partial nephrectomy 12/2013 followed by Dr. Mar Cruz urology, chronic kidney disease baseline Cr 1.6-2.2, history of c-spine laminectomy with post op paralysis, Heroin Drug Abuse, ongoing TOB use, DM2, anemia of chronic disease, thrombocytopenia, hepatitis C with cirrhosis followed by GI Dr. Jean Marie Harrison, [facility], Hypothyroidism. Patient has had mutiple hospitalizations over past month with decline in functional status but declining SNF placement. He was hospitalized at [facility] 3/7/19-3/15/19 for upper GI bleed requiring transfusion PRBCs and Platelets, had EGD 1/1/12 without obvious beeding source found, started prilosec BID, Acute Renal Failure Cr 3.0, Pneumonia completed 7 days ABT prior to discharge, candida UTI given fluconazole, acute respiratory failure requiring short intubation, acute on chronic diastolic CHF responded to lasix diuresis was off lasix prior to discharge. Within few days of return to home poor PO intake, drowsiness, EMS called and patient brought to [facility] where he was hospitalized for UTI with Sepsis admitted 3/17/19-3/20/19. He has had poor urine output, sister did call Dr. Tosha Kirk, Urology, 3/25/19 discussed poor urine output despite irrigation and was advised to go to ER, did go to [facility], was given a dose of lasix. Followed up with Urology next day 3/26/19 where he had bladder irrigation performed and cath was changed.   He was seen yesterday 3/27/19 in office with Dr. Tenisha Varghese for hospital follow up visit. Caregiver reporting that has still not had great PO intake, has been pushing PO fluids but still having decreased urine output. Reported only one ounce fluid in bag within 24hrs. Patient called back in afternoon, still no urine output was instructed to come to ER. Started on cefepime for possible UTI, given 1L IVF with minimal UOP. Patient came to ER last night, accidentally admitted to hospitalist service, called this am to assume care of patient. Cardiology, Urology and Nephrology have been consulted.       Past Medical History:   Diagnosis Date    Anemia     Bradycardia, unspecified 2018    Cervical discitis     MRSA    Chronic drug abuse ([address] 1974    Chronic kidney disease     stage III    Diabetes ([address] 01/1990    Diabetes mellitus ([address]     Drug abuse ([facility]     Encephalopathy     GERD (gastroesophageal reflux disease)     Hypertension     Muscle weakness     Renal cell carcinoma of left kidney (HCC) 12/17/13    Pathological Stage [id number] cc RCC FG3 s/p left open partial nephrectomy in 12/13      Sepsis due to methicillin resistant Staphylococcus aureus (HCC)     Thrombocytopenia (HCC)     Urethral erosion     Viral hepatitis C     Xerosis cutis        Past Surgical History:   Procedure Laterality Date    HX CIRCUMCISION  12/17/13    Dr. Mercedes Stanton, [facility]    HX NEPHRECTOMY Left 12/17/13    Open Partial, Dr. Mercedes Stanton, [facility]    HX OTHER SURGICAL Right 2/27/2016    removed right ft  2nd meta-tarsal    HX OTHER SURGICAL  04/2017    abscess removed from back of neck     CT REMOVAL WITH INSERTION OF SUPRAPUBIC CATHETER  2018       Family History   Problem Relation Age of Onset    Diabetes Mother     Sickle Cell Anemia Father     Diabetes Sister     Hypertension Sister        Social History     Socioeconomic History    Marital status:      Spouse name: Not on file    Number of children: Not on file    Years of education: Not on file    Highest education level: Not on file   Tobacco Use    Smoking status: Current Every Day Smoker     Packs/day: 0.50     Years: 30.00     Pack years: 15.00     Types: Cigarettes    Smokeless tobacco: Never Used   Substance and Sexual Activity    Alcohol use: Yes     Comment: beer occasionally    Drug use: No    Sexual activity: Never   Social History Narrative     since 2012;  for 12 yrs; [employer]; [address] [employer]  just recently furloughed; No  service       Prior to Admission medications    Medication Sig Start Date End Date Taking? Authorizing Provider   amLODIPine (NORVASC) 10 mg tablet Take 10 mg by mouth daily. Yes Provider, Historical   levothyroxine (SYNTHROID) 100 mcg tablet Take 100 mcg by mouth Daily (before breakfast). Yes Provider, Historical   omeprazole (PRILOSEC) 20 mg capsule Take 20 mg by mouth two (2) times a day. Yes Provider, Historical   sodium bicarbonate 650 mg tablet Take 650 mg by mouth three (3) times daily. Yes Provider, Historical   sucralfate (CARAFATE) 1 gram tablet Take 1 g by mouth four (4) times daily. Yes Provider, Historical   therapeutic multivitamin (THERAGRAN) tablet Take 1 Tab by mouth daily. Yes Provider, Historical   traMADol (ULTRAM) 50 mg tablet Take 50 mg by mouth every six (6) hours as needed for Pain. Yes Provider, Historical   trospium (SANCTURA) 20 mg tablet Take 20 mg by mouth daily. Yes Provider, Historical   pioglitazone (ACTOS) 15 mg tablet Take 1 Tab by mouth. 1/22/19  Yes Provider, Historical   Syringe, Disposable, (BD SYRINGE CATHETER TIP) 60 mL syrg Use 1 syringe daily with irrigations 11/7/18  Yes Saul Alonzo MD   docusate sodium (COLACE) 100 mg capsule 100 mg two (2) times daily as needed. 10/12/18  Yes Provider, Historical   ergocalciferol (DRISDOL) 50,000 unit capsule Take 50,000 Units by mouth every seven (7) days. 8/8/18  Yes Provider, Historical   SITagliptin (JANUVIA) 100 mg tablet Take 50 mg by mouth daily.    Yes Provider, Historical   insulin lispro (HUMALOG) 100 unit/mL injection Normal Insulin Sensitivity   For Blood Sugar (mg/dL) of:     Less than 150 =   0 units           150 -199 =   2 units  200 -249 =   4 units  250 -299 =   6 units  300 -349 =   8 units  350 and above =   10 units  If 2 glucose readings are above 200 mg/dL within a 24 hr period, proceed to \"Very Insul 4/10/18  Yes Germán Aquino MD   tamsulosin (FLOMAX) 0.4 mg capsule Take 1 Cap by mouth daily. 4/10/18  Yes Germán Aquino MD   doxycycline (ADOXA) 100 mg tablet Take 100 mg by mouth daily. Provider, Historical       Allergies   Allergen Reactions    Iodine Hives       Review of Systems  GENERAL: Patient sleepy, arousable to verbal for a short time falls back asleep. HEENT: No change in vision  NECK: chronic pain  CARDIOVASCULAR: No chest pain or pressure. No palpitations. PULMONARY: No shortness of breath, cough or wheeze. GASTROINTESTINAL: did have abdominal pain last night, denies currently, history recent GIB  GENITOURINARY: chronic suprapubic cath, poor outpuut  MUSCULOSKELETAL: chronic back pain   DERMATOLOGIC: No rash, no itching, no lesions. HEMATOLOGICAL: chronic anemia, thrombocytopenia  NEUROLOGIC: chronic bilateral UE weakness   PSYCHIATRIC: No depression, anxiety, mood disorder, no loss of interest in normal       activity or change in sleep pattern. Physical Exam:     Physical Exam:  Visit Vitals  /72 (BP 1 Location: Right arm, BP Patient Position: At rest)   Pulse (!) 54   Temp 97.3 °F (36.3 °C)   Resp 16   Ht 6' (1.829 m)   Wt 75.1 kg (165 lb 9.1 oz)   SpO2 95%   BMI 22.45 kg/m²      O2 Device: Room air    Temp (24hrs), Av.2 °F (35.7 °C), Min:95 °F (35 °C), Max:97.3 °F (36.3 °C)    No intake/output data recorded. No intake/output data recorded. General:  Sleepy, arousable to verbal, answers name but no other questions, gives some yes no responses.   Sister at bedside offers some history Head:  Normocephalic, without obvious abnormality, atraumatic. Eyes:  Conjunctivae/corneas clear. Nose: Nares normal.    Throat: Lips, mucosa, and tongue normal.    Neck: Supple, symmetrical, trachea midline, no adenopathy,    Lungs:   Clear to auscultation bilaterally. Heart:  bradycardia. Abdomen: Soft, no apparent tenderness. Bowel sounds normal. No obvious edema in abdominal wall. SPC in place, no drainage or erythema at insertion site. Cath bag with yellow urine, minimal.   Extremities: LE edema to hips bilaterally, no apparent calf tenderness   Pulses: 2+ and symmetric all extremities. Skin: No rashes or lesions   Neurologic: CNII-XII intact. Chronic BL UE weakness. Sensation intact.        Labs Reviewed:    CMP:   Lab Results   Component Value Date/Time     03/28/2019 11:15 AM    K 4.6 03/28/2019 11:15 AM     (H) 03/28/2019 11:15 AM    CO2 20 (L) 03/28/2019 11:15 AM    AGAP 4 03/28/2019 11:15 AM    GLU 64 (L) 03/28/2019 11:15 AM    BUN 23 (H) 03/28/2019 11:15 AM    CREA 3.34 (H) 03/28/2019 11:15 AM    GFRAA 23 (L) 03/28/2019 11:15 AM    GFRNA 19 (L) 03/28/2019 11:15 AM    CA 8.2 (L) 03/28/2019 11:15 AM    ALB 2.8 (L) 03/27/2019 09:35 PM    TP 7.3 03/27/2019 09:35 PM    GLOB 4.5 (H) 03/27/2019 09:35 PM    AGRAT 0.6 (L) 03/27/2019 09:35 PM    SGOT 20 03/27/2019 09:35 PM    ALT 16 03/27/2019 09:35 PM     CBC:   Lab Results   Component Value Date/Time    WBC 3.9 (L) 03/28/2019 11:15 AM    HGB 8.0 (L) 03/28/2019 11:15 AM    HCT 24.1 (L) 03/28/2019 11:15 AM    PLT 88 (L) 03/28/2019 11:15 AM     Recent Glucose Results:   Lab Results   Component Value Date/Time    GLU 64 (L) 03/28/2019 11:15 AM    GLU 63 (L) 03/27/2019 09:35 PM     Liver Panel:   Lab Results   Component Value Date/Time    ALB 2.8 (L) 03/27/2019 09:35 PM    TP 7.3 03/27/2019 09:35 PM    GLOB 4.5 (H) 03/27/2019 09:35 PM    AGRAT 0.6 (L) 03/27/2019 09:35 PM    SGOT 20 03/27/2019 09:35 PM    ALT 16 03/27/2019 09:35 PM    AP 98 03/27/2019 09:35 PM     CT Abd/Pelv WO 3/28/19:  FINDINGS:     Evaluation of soft tissues and vessels limited without vascular enhancement. Streak artifact from arms at side further limits evaluation.     Lower thorax: Small bilateral pleural effusions with probable underlying  compressive atelectasis. Right anterior middle lobe mild subpleural  reticulation.     Hepatobiliary: Liver with evidence of periportal edema. No suspicious hepatic  lesions. Gallbladder with wall thickening and/or pericholecystic fluid  suspected.     Pancreas: Unremarkable.     Spleen: Unremarkable.     Adrenal glands: Unremarkable.     Genitourinary: Right kidney with a probable 2.9 x 2.6 cm lobulated cyst in the  mid kidney. Left kidney unremarkable without hydronephrosis. Bladder  nondistended with suprapubic catheter and suggestion of mild wall thickening. Prostate unremarkable.     Gastrointestinal: Stomach unremarkable. Small bowel loops are nondilated. The  colon is nondilated. Appendix normal.     Mesentery/vessels/nodes: No free air. Small amount of pelvic free fluid. No  adenopathy by size criteria.     Miscellaneous: Extensive superficial body wall edema.     Bones: Advanced lower lumbar degenerative changes. Chronic appearing heterotopic  ossification in the region of the left gluteus medius.     IMPRESSION  IMPRESSION:     Bladder with suprapubic catheter and mild wall thickening. Correlate clinically  for UTI/cystitis. -Gallbladder with wall slightly thickened and/or pericholecystic fluid. Consider  sonogram if warranted. -Probable right renal cyst as above.     Liver with perhaps mild periportal edema, which may be seen with hepatocellular  pathology or volume overload.  -Small amount of pelvic free fluid. Small bilateral pleural effusions. Superficial body wall edema.     EKG 3/28/19  Marked sinus bradycardia   Abnormal ECG   When compared with ECG of 31-MAR-2018 16:04,   No significant change was found     Echo 3/18/19 at 1400 [facility] FUNCTION WITH AN EJECTION FRACTION OF 55 %. DIASTOLIC DYSFUNCTION PRESENT. PULMONARY ARTERY PRESSURE NOT ASSESSED ON THIS LIMITED STUDY. MILDLY DILATED LEFT ATRIUM. MILD TRICUSPID REGURGITATION. TRACE MITRAL AND PULMONIC REGURGITATION. NO EVIDENCE OF AORTIC REGURGITATION OR STENOSIS. NO OBVIOUS MASSES, SHUNTS OR THROMBI SEEN. COMPARED TO PREVIOUS REPORT DATED 03/07/2019, THE RIGHT HEART CHAMBERS NO LONGER APPEAR   DILATED, AND THE EJECTION FRACTION HAS IMPROVED SLIGHTLY. Procedures/imaging: see electronic medical records for all procedures/Xrays and details which were not copied into this note but were reviewed prior to creation of Plan        Assessment/Plan     Active Problems:    Oliguria (3/28/2019)      Renal injury (3/28/2019)      Suprapubic catheter ([facility] (3/28/2019)      Bradycardia, sinus (3/28/2019)      Acute UTI (3/28/2019)      Type 2 diabetes mellitus with hypoglycemia ([facility] (3/28/2019)      Acute renal failure (ARF) ([facility] (3/28/2019)       SHEILA on CKD stage 2-3, likely from acute on chronic diastolic CHF  - nephrology consulted, discussed with pankaj Perkins to give 40mg IV lasix, will dc IVF patient started on overnight  - Cardiology consulted, Dr. Raghu Grier, discussed with PADMAJA Ponce and Dr. Raghu Girer, believe hypervolemia is related to progressive renal disease, do not think this is from CHF, defer diuretics to nephrology.   - CT Abd with pelvic fluid, pleural effusion, check CXR and BNP  - check cardiac markers, monitor on telemetry  - check BMP this afternoon to monitor    HTN, HLD, bradycardia chronic HR 40-50s   - holding amlodipine for now  - check am lipids      Possible UTI sepsis given hypothermia and low WBC  - lactic acid normal  - continues cefepime  - f/u blood cultures 3/28/19, urine culture 3/27/19  - follow up Urology consulted, discussed with NP Silvia Bautista today will evaluate SPC  - hx of aspiration, recent hospitalization with PNA, ST consult for swallow eval    Metabolic possible Infectious Encephalopathy in setting of SHEILA, Sepsis  - as above, consider narcan if worsens due to hx drug abuse    Hx of recent GIB  - IV protonix until mentation improves, then may transition to po    Abdominal Pain, CT abdomen Gallbladder with wall slightly thickened and/or pericholecystic fluid  Abdominal pain currently resolved however pt altered, will follow up with RUQ US    Hep C, Cirrhosis  Discussed with pt sister, he was to treated for Hep C and reports completed treatment  check Hep C Quant to confirm  Check INR    DM2  Hypoglycemic on admission, A1c 5.3, will hold home medications  - SSI    Hypothyroidism  - pt was recently increased on synthroid to 100mcg at prior hospitalization  - check TFTs    Anemia chronic disease, Recent Acute blood loss anemia from UGIB EGD neg for source, chronic thrombocytopenia  - monitor, transfuse to keep hgb >7 if needed, monitor plt count, followed by Dr. Fritz Ramirez as outpatient    History of Drug Abuse, Reported heroin use during recent hospitalization this month, ongoing Tob use, Etoh Use  - discussed with sister, she is unaware of his drug use habits, did confirm he had admitted to prior provider in last hospitalization to heroin use, does not know if he uses IV drugs or not. - check UDS, acute hepatitis panel, HIV  - discussed risks, recommended to quit    Poor compliance with follow up appointments, Increasingly worsening functional status with mutiple recent hospitalizations, lives with sister feels cannot provide necessary level of care despite he does have home care aid. - pt would benefit from SNF, has declined in past will continue to address       CODE:  Full. Discussed with sister who reports she is MPOA for patient. Pt currently unable to make his own decisions although baseline is AOx3 and able to be decision maker.     DVT/GI Prophylaxis: Hep SQ, SCD's and H2B/PPI      Time spent reviewing prior records, evaluation of patient, counseling family members, discussion with nursing, discussed with cardiology, discussed with nephrology, discussed with urology,  and documentation >120min      Adamaris Cabrera MD  3/28/2019  12:21pm

## 2019-03-28 NOTE — PROGRESS NOTES
conducted an initial consultation and Spiritual Assessment for Bella Corey, who is a 59 y.o.,male. Patients Primary Language is: [place]. According to the patients EMR Baptism Affiliation is: [place].      The reason the Patient came to the hospital is:   Patient Active Problem List    Diagnosis Date Noted    Oliguria 03/28/2019    Renal injury 03/28/2019    Suprapubic catheter ([address] 03/28/2019    Bradycardia, sinus 03/28/2019    Acute UTI 03/28/2019    Type 2 diabetes mellitus with hypoglycemia ([address] 03/28/2019    Acute renal failure (ARF) (HCC) 03/28/2019    Bladder atony 01/31/2019    Cerumen impaction 01/31/2019    Chronic neck pain 01/31/2019    Cirrhosis of liver ([address] 01/31/2019    COPD, moderate (HCC) 01/31/2019    Dry skin dermatitis 01/31/2019    Elevated PSA 01/31/2019    History of diabetes mellitus 01/31/2019    History of elevated lipids 01/31/2019    History of hay fever 01/31/2019    Hyperlipidemia 01/31/2019    Hypothyroid 01/31/2019    Lung nodules 01/31/2019    Medication management 01/31/2019    Neck mass 01/31/2019    Pericardial effusion 01/31/2019    Preoperative clearance 01/31/2019    Prostate disorder 01/31/2019    Recurrent UTI 01/31/2019    Vitamin D deficiency 01/31/2019    BPH (benign prostatic hyperplasia) 12/20/2018    Hematuria 10/14/2018    Generalized weakness 07/03/2018    Hypertension     Urethral erosion     Chronic anemia 05/23/2018    Status post amputation of toe of right foot ([address] 05/18/2018    Thrombocytopenia ([address] 04/10/2018    Chronic kidney disease, stage III (moderate) (HCC) 04/04/2018    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    Hyperkalemia 04/02/2018    Hypercalcemia 04/02/2018    Bradycardia 03/31/2018    Urinary retention 10/30/2017    Chronic hepatitis C without hepatic coma ([address] 05/31/2017    Quadriparesis ([address] 05/31/2017    Essential hypertension 04/28/2017    IV drug abuse ([address] 04/28/2017  Type II diabetes mellitus, uncontrolled ([facility] 12/16/2015    Renal cell carcinoma of left kidney ([facility] 65/56/4068    Umbilical hernia 51/00/9148    Chronic drug abuse ([place] [address].) 01/01/1974        The  provided the following Interventions:  Initiated a relationship of care and support. Provided information about Spiritual Care Services. Chart reviewed. Assessment:  Patient was sleepy and not up for a long visit. No family at bedside. Plan:  Chaplains will continue to follow and will provide pastoral care on an as needed/requested basis.     [address]  [phone])

## 2019-03-28 NOTE — ROUTINE PROCESS
When pt transferred at 1400 to 316, pt not answering questions when asked, however, able to ask for water. At 1600 reassessment pt unable to form words. Pt trying to speak but only making noises. Pt admitted with confusion, however,  notifying MD.    1630--Spoke with Dr. Renate Hodges who ordered a stat CT of head and asked for a Code S to be called. 1645--Called a Rapid Response per protocol to have pt evaluated for Code S.  Dr. Sid Bean, intensivist and PADMAJA Claire evaluated pt and ordered for a Code S to be called. Pt then taken to CT.    1715--Pt evaluated via Neurotele by neurologist.  MRI and labs ordered. No tPA. 1800--MRI screening done via phone by pt's sister XIMENA. Spoke with both sisters over the phone for 25 min explaining pt's status and answering any questions.

## 2019-03-28 NOTE — PROGRESS NOTES
Admitted with  Acute on CRF with change of Mental status. Earlier received some fluid, now Lasix, Suprapubic catheter may be Clogged. Currently in way to transfer to ICU for Bradycardia, Hypoglycemia, Hypothermia, altered mental Status. Needs Urine study, make sure Suprapubic catheter is changed if clogged. Needs Chest x-ray. Needs to access volume status carefully, ordered Urine study & urine chemistry. Will wait results before deciding about any further fluid or not. .Will follow.

## 2019-03-28 NOTE — CONSULTS
[address], [address] [facility]                                                      Phone: [phone]  Urology Consult / Admit Note             1. Oliguria    2. Acute renal failure, unspecified acute renal failure type [facility])          Assessment & Plan   Assessment    Sepsis- Hypothermic, Confused, Being transferred to ICU    SHEILA - creat 3.34<3.25 (1.5 in 8/2018)    UTI - UA with Yeast. Last Ucx 3/17/19 + Yeast, Ucx 3/7/19 >100k Ecoli sensitive to Rocephin    Pathological Stage [id number] cc RCC FG3 s/p 12/13 Left Open Partial Nephrectomy   Current Disease Status: JULIAN  Current Treatment Plan: Surveillance  Managed by Dr. Oscar Moreno as outpt. 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 Esthela Kemp    Hx of:  DMII, Hep C, HTN    Plan:  Patient upgraded to ICU Status  Ucx and Bcx pending. Abx per primary currently on Rocephin  Urine with Yeast would recommend adding Fluconazole or other antifungal- defer to primary  Maintain SPT. SPT flushes and aspirates without difficulty and was last changed yesterday 3/27/19  Nursing to flush bladder with  cc's sterile saline every shift 2/2 issues with clogging  CT Reviewed- no evidence of obstructive uropathy as cause of patient's SHEILA  [facility]  Nephrology consulted, waiting recs  May need to consider guide wire dilators in office to upsize SPT If continues to clog vs replacing varner catheter  Follow Up arranged: NO, missed his appointment with Dr. Esthela Kemp today, will need to reschedule. Following    Zina Richardson [facility]  [facility]  Pager # [phone]    Available M-F 7:30- 5pm .  After 5pm and weekends please call answering service at [phone]         I saw and independently examined the patient. I agree with the assessment and plan as listed above. I saw and independently examined the patient. I agree with the assessment and plan as listed above and have made any changes to reflect my direct input. Candance Dotter, MD      Chief Complaint   Patient presents with    Urinary Catheter Problem    Abdominal Pain         HISTORY OF PRESENT ILLNESS:    Diana Vasquez is a 59 y.o. male who is seen in consultation as referred by Dr. Manda Polk  for UTI and decreased UOP from SPT. He has been seen by a urologist and is receiving any ongoing urologic care with Dr. Verlin Koyanagi and Dr John Monique. Previous urologic history is significant for Pathological Stage [id number] cc RCC FG3 s/p 12/13 Left Open Partial Nephrectomy. Current Disease Status: JULIAN. Current Treatment Plan: Surveillance. S/p C-spine Laminectomy with postOp Paralysis and Urinary Retention. Varner placed placed 4/29/17 after surgery due to impaired mobility. 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 John Monique. Bosniak 2 Right Mid Renal Cyst, measuring 3.8 x 2.7 cm on 5/17 CT CAP. no significant change on recent CT abd/pelvis 1/2019. Had f/up with Dr. John Monique today however missed due to hospitalization    HPI: Obtained from Chart review only. Patient confused. Patient was recently admitted at [facility] and discharted 8 days ago for UTI/ SHEILA and SPT clogging. SPT has been changed twice since admission last 3/27/19. He has noticed decreased UOP and increased BLE swelling, was last seen in our office yesterday for tech visit SPT exchange. No improvement in UOP and was told to go to ER. He is now admitted for Oliguria, SHEILA, UTI. Labs showed Leukopenia, Creat 3.34, and UA concerning for infection. He is hypothermic and confused.  CT showed no hydro and decompressed bladder. Location Unknown  Duration days   Associated signs/symptoms as above  Modifying factors as above  Severity Severe        AUA Symptom Score 11/5/2014   Over the past month how often have you had the sensation that your bladder was not completely empty after you finished urinating? 2   Over the past month, how often have had to urinate again less than 2 hours after you last finished urinating? 2   Over the past month, how often have you found you stopped and started again several times when you urinated? 1   Over the past month, how often have you found it difficult to postpone urination? 0   Over the past month, how often have you had a weak urinary stream? 1   Over the past month, how often have you had to push or strain to begin urinating? 0   Over the past month, how many times did you most typically get up to urinate from the time you went to bed at night until the time you got up in the morning?  1   AUA Score 7         Past Medical History:   Diagnosis Date    Anemia     Bradycardia, unspecified 2018    Cervical discitis     MRSA    Chronic drug abuse ([facility] 1974    Chronic kidney disease     stage III    Diabetes ([facility] [address] 01/1990    Diabetes mellitus ([address]     Drug abuse ([facility] [address] [facility]     Encephalopathy     GERD (gastroesophageal reflux disease)     Hypertension     Muscle weakness     Renal cell carcinoma of left kidney ([facility] [address] 12/17/13    Pathological Stage [id number] cc RCC FG3 s/p left open partial nephrectomy in 12/13      Sepsis due to methicillin resistant Staphylococcus aureus (HCC)     Thrombocytopenia (HCC)     Urethral erosion     Viral hepatitis C     Xerosis cutis        Past Surgical History:   Procedure Laterality Date    HX CIRCUMCISION  12/17/13    [facility], [facility]    HX NEPHRECTOMY Left 12/17/13    Open Partial, Dr. Billie Davis, [facility]    HX OTHER SURGICAL Right 2/27/2016    removed right ft  2nd meta-tarsal    HX OTHER SURGICAL  04/2017    abscess removed from back of neck     OR REMOVAL WITH INSERTION OF SUPRAPUBIC CATHETER  2018       Social History     Tobacco Use    Smoking status: Current Every Day Smoker     Packs/day: 0.50     Years: 30.00     Pack years: 15.00     Types: Cigarettes    Smokeless tobacco: Never Used   Substance Use Topics    Alcohol use: Yes     Comment: beer occasionally    Drug use: No       Allergies   Allergen Reactions    Iodine Hives       Family History   Problem Relation Age of Onset    Diabetes Mother     Sickle Cell Anemia Father     Diabetes Sister     Hypertension Sister        Current Facility-Administered Medications   Medication Dose Route Frequency Provider Last Rate Last Dose    sodium chloride (NS) flush 5-10 mL  5-10 mL IntraVENous PRN Josiah Bull MD        insulin lispro (HUMALOG) injection   SubCUTAneous Q6H Mikaela Calvillo MD        glucose chewable tablet 16 g  4 Tab Oral PRN Josiah Bull MD        glucagon (GLUCAGEN) injection 1 mg  1 mg IntraMUSCular PRN Josiah Bull MD        dextrose (D50W) injection syrg 12.5-25 g  25-50 mL IntraVENous PRN Josiah Bull MD        tamsulosin (FLOMAX) capsule 0.4 mg  0.4 mg Oral DAILY Familia NephewMikaela MD        acetaminophen (TYLENOL) tablet 650 mg  650 mg Oral Q6H PRN Josiah Bull MD        oxyCODONE-acetaminophen (PERCOCET) 5-325 mg per tablet 1 Tab  1 Tab Oral Q6H PRN Josiah Bull MD        naloxone (NARCAN) injection 0.4 mg  0.4 mg IntraVENous PRN Josiah Bull MD        ondansetron (ZOFRAN) injection 4 mg  4 mg IntraVENous Q6H PRN Josiah Bull MD        docusate sodium (COLACE) capsule 100 mg  100 mg Oral BID PRN Josiah Bull MD        heparin (porcine) injection 5,000 Units  5,000 Units SubCUTAneous Q8H Mikaela Calvillo MD        cefTRIAXone (ROCEPHIN) 1 g in sterile water (preservative free) 10 mL IV syringe  1 g IntraVENous Q24H Josiah Bull MD   1 g at 03/28/19 0834    [START ON 3/29/2019] levothyroxine (SYNTHROID) tablet 100 mcg  100 mcg Oral 6am Natalya Aquino MD        furosemide (LASIX) injection 40 mg  40 mg IntraVENous BID Natalya Aquino MD        pantoprazole (PROTONIX) injection 40 mg  40 mg IntraVENous Q12H Natalya Aquino MD           Review of Systems  ROS is:     Not obtainable due to patient factors: Confusion      PHYSICAL EXAMINATION:   Visit Vitals  /60   Pulse (!) 47   Temp 95 °F (35 °C)   Resp 16   Ht 6' (1.829 m)   Wt 165 lb 9.1 oz (75.1 kg)   SpO2 97%   BMI 22.45 kg/m²       Constitutional: Well developed, well nourished male. No acute distress. HEENT: Normocephalic, Atraumatic  CV:  Bradycardic  Pulm: No respiratory distress or difficulties breathing   GI:  No abdominal masses or tenderness. SPT site w/o drainage  :  No CVA tenderness. SPT draining clear yellow urine  SCROTUM:  No scrotal rash or lesions noticed. Normal bilateral testes and epididymis. PENIS: Urethral meatus normal in location and size. No urethral discharge. Circumcised   Skin: No evidence of jaundice. Normal color  Neuro/Psych:  Alert and Confused  Lymphatic:   No inguinal lymphadenopathy   MSK: Limited ROM      REVIEW OF LABS AND IMAGING:      CT abd/ pelvis w/o contrast 3/28/19    IMPRESSION:     Bladder with suprapubic catheter and mild wall thickening. Correlate clinically  for UTI/cystitis. -Gallbladder with wall slightly thickened and/or pericholecystic fluid. Consider  sonogram if warranted. -Probable right renal cyst as above.     Liver with perhaps mild periportal edema, which may be seen with hepatocellular  pathology or volume overload.  -Small amount of pelvic free fluid. Small bilateral pleural effusions. Superficial body wall edema.         Labs: Results:   Chemistry    Recent Labs     03/27/19  2135   GLU 63*      K 4.8   *   CO2 22   BUN 22*   CREA 3.25*   CA 8.9   AGAP 7   BUCR 7*   AP 98   TP 7.3   ALB 2.8*   GLOB 4.5* AGRAT 0.6*      CBC w/Diff Recent Labs     03/27/19 2135   WBC 3.4*   RBC 3.13*   HGB 8.8*   HCT 27.2*   PLT 92*   GRANS 66   LYMPH 26   EOS 0      Cultures No results for input(s): CULT in the last 72 hours.   All Micro Results     Procedure Component Value Units Date/Time    CULTURE, BLOOD [[id number]] Collected:  03/28/19 0315    Order Status:  Completed Specimen:  Blood Updated:  03/28/19 0738    CULTURE, BLOOD [[id number]] Collected:  03/28/19 0300    Order Status:  Completed Specimen:  Blood Updated:  03/28/19 0737    CULTURE, URINE [[id number]] Collected:  03/27/19 2345    Order Status:  Canceled Specimen:  Urine from [place] Specimen     CULTURE, URINE [[id number]] Collected:  03/27/19 2142    Order Status:  Completed Specimen:  Cath Urine Updated:  03/27/19 2153            Urinalysis Color   Date Value Ref Range Status   03/27/2019 DARK YELLOW   Final     Appearance   Date Value Ref Range Status   03/27/2019 TURBID   Final     Specific gravity   Date Value Ref Range Status   03/27/2019 1.019 1.005 - 1.030   Final     pH (UA)   Date Value Ref Range Status   03/27/2019 5.0 5.0 - 8.0   Final     Protein   Date Value Ref Range Status   03/27/2019 300 (A) NEG mg/dL Final     Ketone   Date Value Ref Range Status   03/27/2019 TRACE (A) NEG mg/dL Final     Bilirubin   Date Value Ref Range Status   03/27/2019 NEGATIVE  NEG   Final     Blood   Date Value Ref Range Status   03/27/2019 LARGE (A) NEG   Final     Urobilinogen   Date Value Ref Range Status   03/27/2019 0.2 0.2 - 1.0 EU/dL Final     Nitrites   Date Value Ref Range Status   03/27/2019 NEGATIVE  NEG   Final     Leukocyte Esterase   Date Value Ref Range Status   03/27/2019 MODERATE (A) NEG   Final     Potassium   Date Value Ref Range Status   03/27/2019 4.8 3.5 - 5.5 mmol/L Final     Creatinine   Date Value Ref Range Status   03/27/2019 3.25 (H) 0.6 - 1.3 MG/DL Final     BUN   Date Value Ref Range Status   03/27/2019 22 (H) 7.0 - 18 MG/DL Final      PSA No results for input(s): PSA in the last 72 hours.    Coagulation Lab Results   Component Value Date/Time    Prothrombin time 15.3 (H) 04/09/2018 03:01 AM    Prothrombin time 15.6 (H) 04/06/2018 03:55 AM    INR 1.3 (H) 04/09/2018 03:01 AM    INR 1.3 (H) 04/06/2018 03:55 AM    aPTT 36.9 (H) 04/09/2018 03:01 AM    aPTT 43.4 (H) 04/06/2018 03:55 AM

## 2019-03-28 NOTE — PROGRESS NOTES
Resident Rapid Response Note  [facility]    Patient: Cinthia Tripathi 59 y.o. male  [id number]  1960      Admit Date: 3/27/2019   Chief Complaint: oliguria     RAPID RESPONSE     [facility] was responding to another rapid response on the 2nd floor when the overhead announced rapid response to ICU. After patient on 2nd floor was stabilized, PFM proceeded to ICU where a code stroke had already been called. Rapid Response Called was called due to rapid decline in speech capabilities (became garbled, not responding to questions) and overall weakness. Mr. Jadon Hdz walks with a walker at home at baseline. He now has exaggerated weakness in his upper and lower extremities; arms and legs fall limp to side after being dropped from elevation. Mr. Jadon Hdz has had several episodes of hypoglycemia (57 @ [phone]); corrected to 173 @ 1655. Patient gradually became more interactive throughout course of code stroke. Would not answer questions or participate in exam previous to CT scan. [facility] patient could tell writer his name and answer simple, direct questions and would cooperate with exam.  Smiled and chuckled during neuro exam; no facial asymmetry. Medications Reviewed. Patient has not had pain medication given today. Naloxone prn ordered but has not needed.      OBJECTIVE     Patient Vitals for the past 24 hrs:   Temp Pulse Resp BP SpO2   03/28/19 1600 -- (!) 58 9 134/73 97 %   03/28/19 1500 -- (!) 58 13 129/71 98 %   03/28/19 1400 97.5 °F (36.4 °C) 60 9 123/64 98 %   03/28/19 1211 97.3 °F (36.3 °C) (!) 54 16 127/72 95 %   03/28/19 0908 95 °F (35 °C) (!) 47 16 116/60 97 %   03/28/19 0837 -- (!) 48 -- -- --   03/28/19 0830 -- (!) 40 -- -- --   03/28/19 0810 95.6 °F (35.3 °C) (!) 45 18 105/68 96 %   03/28/19 0531 95.8 °F (35.4 °C) (!) 45 18 116/69 100 %   03/28/19 3075 -- (!) 46 -- 122/69 99 %   03/28/19 0430 -- (!) 39 -- 105/61 99 %   03/28/19 4395 -- (!) 41 -- 102/62 97 %   03/28/19 0315 -- (!) 41 -- 106/63 98 %   03/28/19 0300 -- (!) 45 -- 118/75 98 %   03/28/19 0245 97.2 °F (36.2 °C) (!) 43 16 108/62 98 %   03/28/19 0200 -- (!) 45 -- 110/64 99 %   03/28/19 0145 -- (!) 44 -- 104/59 98 %   03/28/19 0130 -- (!) 43 -- 110/69 100 %   03/28/19 0115 -- (!) 43 -- 113/66 100 %   03/28/19 0100 -- (!) 46 -- 119/63 100 %   03/28/19 0045 -- (!) 44 -- 126/59 97 %   03/28/19 0015 -- (!) 43 -- 102/61 97 %   03/27/19 2345 -- (!) 43 -- 105/64 99 %   03/27/19 2230 -- (!) 46 -- 101/64 99 %   03/27/19 2215 -- (!) 44 -- 102/61 99 %   03/27/19 2200 -- (!) 44 -- 113/62 99 %   03/27/19 2145 -- (!) 48 -- 107/60 100 %   03/27/19 2118 -- (!) 48 16 113/73 100 %   03/27/19 2115 -- -- -- 113/57 99 %   03/1960 -- -- -- -- 100 %   03/27/19 1945 -- (!) 46 20 120/62 100 %       No intake or output data in the 24 hours ending 03/28/19 1801    PHYSICAL:  General:  Waxing and waning ability to converse. HEENT: Conjunctiva pink, sclera anicteric. Pharynx moist, nonerythematous. Moist mucous membranes. No other gross abnormalities present. CV:  RRR, no murmurs, rubs, or gallops. RESP:  Unlabored breathing. Lungs clear to auscultation. no wheeze, rales, or rhonchi. Equal expansion bilaterally. ABD:  Soft, nontender, nondistended. Normoactive bowel sounds. MS:  No joint deformity or instability. No atrophy. Neuro:  AOx1. Unable to do bilateral hand ; cannot lift arms up for pronator exam. Will not move lower extremities. Ext:  No edema. ASSESSMENT, PLAN & DISPOSITION   Rapid Response called for Sunny Deleon who is a 59y.o. year old male admitted for oliguria. Rapid response called for garbled speech.      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) Z89.421    Chronic anemia D64.9    Chronic drug abuse (HCC) F19.10    Hypertension I10    Renal cell carcinoma of left kidney (HCC) C64.2    Urethral erosion N36.8    BPH (benign prostatic hyperplasia) N40.0    Bladder atony N31.2    Cerumen impaction H61.20    Chronic neck pain M54.2, G89.29    Cirrhosis of liver (HCC) K74.60    COPD, moderate (HCC) J44.9    Dry skin dermatitis L85.3    Elevated PSA R97.20    Generalized weakness R53.1    Hematuria R31.9    History of diabetes mellitus Z86.39    History of elevated lipids Z86.39    History of hay fever Z87.09    Hyperlipidemia E78.5    Hypothyroid E03.9    Lung nodules R91.8    Medication management Z79.899    Neck mass R22.1    Pericardial effusion I31.3    Preoperative clearance Z01.818    Prostate disorder N42.9    Recurrent UTI N39.0    Vitamin D deficiency E55.9    Oliguria R34    Renal injury S37.009A    Suprapubic catheter (HCC) Z93.59    Bradycardia, sinus R00.1    Acute UTI N39.0    Type 2 diabetes mellitus with hypoglycemia (HCC) E11.649    Acute renal failure (ARF) ([facility] [address] N17.9     Chelsea Amin DO  03/28/19  6:08 PM

## 2019-03-28 NOTE — CONSULTS
[facility] - Consult Note    Consultation request by Adamaris Cabrera MD for advice/opinion related to evaluating Oliguria [R34]  Renal injury [S37.009A]  Acute renal failure (ARF) ([facility] [address] [N17.9]    Date of  Admission: 3/27/2019  7:41 PM   Primary Care Physician:  Margarita Mendez DO     Assessment:     Patient Active Problem List   Diagnosis Code    Type II diabetes mellitus, uncontrolled ([facility] [address] E11.65    Urinary retention R33.9    Chronic hepatitis C without hepatic coma ([facility] [address] B18.2    Essential hypertension I10    IV drug abuse ([facility] [address] F19.10    Quadriparesis ([facility] [address] L13.04    Umbilical hernia I78.7    Bradycardia R00.1    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    Hyperkalemia E87.5    Hypercalcemia E83.52    Chronic kidney disease, stage III (moderate) (HCC) N18.3    Thrombocytopenia (HCC) D69.6    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) Z89.421    Chronic anemia D64.9    Chronic drug abuse ([facility] [address] F19.10    Hypertension I10    Renal cell carcinoma of left kidney (HCC) C64.2    Urethral erosion N36.8    BPH (benign prostatic hyperplasia) N40.0    Bladder atony N31.2    Cerumen impaction H61.20    Chronic neck pain M54.2, G89.29    Cirrhosis of liver (HCC) K74.60    COPD, moderate (HCC) J44.9    Dry skin dermatitis L85.3    Elevated PSA R97.20    Generalized weakness R53.1    Hematuria R31.9    History of diabetes mellitus Z86.39    History of elevated lipids Z86.39    History of hay fever Z87.09    Hyperlipidemia E78.5    Hypothyroid E03.9    Lung nodules R91.8    Medication management Z79.899    Neck mass R22.1    Pericardial effusion I31.3    Preoperative clearance Z01.818    Prostate disorder N42.9    Recurrent UTI N39.0    Vitamin D deficiency E55.9    Oliguria R34    Renal injury S37.009A    Suprapubic catheter (HCC) Z93.59    Bradycardia, sinus R00.1    Acute UTI N39.0    Type 2 diabetes mellitus with hypoglycemia (HCC) E11.649    Acute renal failure (ARF) ([facility]) N17.9     - Patient presented to ED with complications concerning his suprapubic catheter and reduced urine output. Found to by bradycardic with HR of 40 bpm on ECG. Also with some hypothermia   - Recent hospitalization at [facility] with upper GIB, ARF, PNA, candida UTI, acute resp. failure and HFpEF on 3/7/19 - 3/15/19 and again at [place] [address] for UTI with sepsis from 3/17/19 - 3/20/19  - Acute renal failure Cr 3.25 this admission. This appears to be his primary problem.  - Hx/o RCC s/p left partial nephrectomy 2013,   - S/p suprapubic catheter placement 11/12/18 at [facility]  - Hx/o asymptomatic bradycardia worsened by use of BB. Heart rate has been between 40 and 60 this admission with stable blood pressure. - Chronic HFpEF, no medications listed. CT pelvis with small bilateral pleural effusions noted, likely in setting of ARF. He does not appear to be in extremis. He has clear lungs and is laying flat. - Echo from 3/18/19 at [place] [address]: EF of 50%, with diastolic dysfunction, mildly dilated left atria, mild TV regurg, trace MV and PV regurg.   - Hypertension. Currently well controlled. - Diabetes, Hgb A1c 5.3  - Hx/o spinal osteomyelitis with quadriplegia, s/p C3-C7 laminectomy in April 2017  - Chronic anemia, Hgb 8.8 this admission   - Chronic thrombocytopenia, 92K this admission  - Chronic hepatitis C, followed by Dr. Ameya Contreras with GI   - Hypothyroidism, synthroid   - Chronic drug abuse  - Ongoing tobacco abuse    -Hypothermia. This often contributes to bradycardia. Unclear if patient is severely hypothyroid or not. Primary cardiologist: Dr. Mason Solis:     I do not feel strongly that the patient is currently in decompensated heart failure. I will defer diuretics to the nephrology team.  Would avoid all rate slowing agents given his resting bradycardia. We will defer workup of his hypothermia to the primary team.     History of Present Illness:      This is a 59 y.o. male admitted for Oliguria [R34]  Renal injury [S37.009A]  Acute renal failure (ARF) ([facility] [address] [N17.9]. Patient complains of:  Suprapubic catheter dysfunction with poor UOP. Patient was found to be bradycardic with HR of 40 in ED. He does have a history of the same. He was drowsy on exam but his sister denies any new complaints of dizziness and also denies any syncopal episodes. He was recently discharged from [facility] on 3/20/19 for UTI with sepsis and prior to that from [facility] for GIB, acute HFpEF, PNA.      He has a pmhx of RCC, HFpEF, bradycardia, Hep C, HTN, DM II, chronic anemia and thrombocytopenia, drug use, tobacco use, osteomyelitis, hypothyroidism      Cardiac risk factors: smoking/ tobacco exposure, diabetes mellitus, male gender, hypertension      Review of Symptoms:    Unable to obtain as patient extremely drowsy        Past Medical History:     Past Medical History:   Diagnosis Date    Anemia     Bradycardia, unspecified 2018    Cervical discitis     MRSA    Chronic drug abuse ([facility] [address] 1974    Chronic kidney disease     stage III    Diabetes ([facility] [address] 01/1990    Diabetes mellitus ([address]     Drug abuse ([facility] [address]     Encephalopathy     GERD (gastroesophageal reflux disease)     Hypertension     Muscle weakness     Renal cell carcinoma of left kidney ([address] 12/17/13    Pathological Stage [id number] cc RCC FG3 s/p left open partial nephrectomy in 12/13      Sepsis due to methicillin resistant Staphylococcus aureus (HCC)     Thrombocytopenia (HCC)     Urethral erosion     Viral hepatitis C     Xerosis cutis          Social History:     Social History     Socioeconomic History    Marital status:      Spouse name: Not on file    Number of children: Not on file    Years of education: Not on file    Highest education level: Not on file   Tobacco Use    Smoking status: Current Every Day Smoker     Packs/day: 0.50     Years: 30.00     Pack years: 15.00     Types: Cigarettes    Smokeless tobacco: Never Used   Substance and Sexual Activity    Alcohol use: Yes     Comment: beer occasionally    Drug use: No    Sexual activity: Never   Social History Narrative     since 2012;  for 12 yrs; 2K; [address] [employer]  just recently furloughed; No  service        Family History:     Family History   Problem Relation Age of Onset    Diabetes Mother     Sickle Cell Anemia Father     Diabetes Sister     Hypertension Sister         Medications:      Allergies   Allergen Reactions    Iodine Hives        Current Facility-Administered Medications   Medication Dose Route Frequency    sodium chloride (NS) flush 5-10 mL  5-10 mL IntraVENous PRN    insulin lispro (HUMALOG) injection   SubCUTAneous Q6H    glucose chewable tablet 16 g  4 Tab Oral PRN    glucagon (GLUCAGEN) injection 1 mg  1 mg IntraMUSCular PRN    dextrose (D50W) injection syrg 12.5-25 g  25-50 mL IntraVENous PRN    tamsulosin (FLOMAX) capsule 0.4 mg  0.4 mg Oral DAILY    acetaminophen (TYLENOL) tablet 650 mg  650 mg Oral Q6H PRN    oxyCODONE-acetaminophen (PERCOCET) 5-325 mg per tablet 1 Tab  1 Tab Oral Q6H PRN    naloxone (NARCAN) injection 0.4 mg  0.4 mg IntraVENous PRN    ondansetron (ZOFRAN) injection 4 mg  4 mg IntraVENous Q6H PRN    docusate sodium (COLACE) capsule 100 mg  100 mg Oral BID PRN    heparin (porcine) injection 5,000 Units  5,000 Units SubCUTAneous Q8H    cefTRIAXone (ROCEPHIN) 1 g in sterile water (preservative free) 10 mL IV syringe  1 g IntraVENous Q24H    [START ON 3/29/2019] levothyroxine (SYNTHROID) tablet 100 mcg  100 mcg Oral 6am    furosemide (LASIX) injection 40 mg  40 mg IntraVENous BID         Physical Exam:     Visit Vitals  /60   Pulse (!) 47   Temp 95 °F (35 °C)   Resp 16   Ht 6' (1.829 m)   Wt 75.1 kg (165 lb 9.1 oz)   SpO2 97%   BMI 22.45 kg/m²     BP Readings from Last 3 Encounters:   03/28/19 116/60   02/26/19 142/72   01/31/19 102/60     Pulse Readings from Last 3 Encounters:   03/28/19 (!) 47   02/26/19 61   10/25/18 (!) 56     Wt Readings from Last 3 Encounters:   03/28/19 75.1 kg (165 lb 9.1 oz)   03/26/19 63.5 kg (140 lb)   02/26/19 64.4 kg (142 lb)       General:  fatigued, no distress, appears older than stated age  Neck:  nontender, no masses, no stridor, no carotid bruit  Lungs:  Coarse breath sounds bilaterally, limited due to patient mental status  Heart:  regular rhythm, bradycardic, S1, S2 normal, no murmur, click, rub or gallop  Abdomen:  Suprapubic cath in place  Extremities:  Trace lower extremity edema note   Skin: no significant lesions noted   Neuro: difficult to assess as patient extremely drowsy   Psych: difficult to assess as patient extremely drowsy     Data Review:     Recent Labs     03/27/19 2135   WBC 3.4*   HGB 8.8*   HCT 27.2*   PLT 92*     Recent Labs     03/27/19 2135      K 4.8   *   CO2 22   GLU 63*   BUN 22*   CREA 3.25*   CA 8.9   ALB 2.8*   SGOT 20   ALT 16       Results for orders placed or performed during the hospital encounter of 03/27/19   EKG, 12 LEAD, INITIAL   Result Value Ref Range    Ventricular Rate 40 BPM    Atrial Rate 40 BPM    P-R Interval 208 ms    QRS Duration 94 ms    Q-T Interval 550 ms    QTC Calculation (Bezet) 448 ms    Calculated P Axis 28 degrees    Calculated R Axis 2 degrees    Calculated T Axis 29 degrees    Diagnosis       Marked sinus bradycardia  Abnormal ECG  When compared with ECG of 31-MAR-2018 16:04,  No significant change was found     Results for orders placed or performed in visit on 10/08/18   AMB POC EKG ROUTINE W/ 12 LEADS, INTER & REP    Narrative    Sinus bradycardia, rate 57. First-degree AV block. This EKG is otherwise within normal limits and similar to the EKG of May 18, 2018.        All Cardiac Markers in the last 24 hours:  No results found for: CPK, CK, CKMMB, CKMB, RCK3, CKMBT, CKNDX, CKND1, LINDA, TROPT, TROIQ, MIGUEL, TROPT, TNIPOC, BNP, BNPP    Last Lipid:    Lab Results Component Value Date/Time    Cholesterol, total 115 04/09/2018 11:30 AM    HDL Cholesterol 46 04/01/2018 12:55 AM    LDL, calculated 12.2 04/01/2018 12:55 AM    Triglyceride 105 04/09/2018 11:30 AM    CHOL/HDL Ratio 1.5 04/01/2018 12:55 AM       Signed By: PADMAJA Eddy     March 28, 2019      Lena Lynn MD

## 2019-03-28 NOTE — PROGRESS NOTES
Speech Therapy:     Unable to complete evaluation as pt is currently NPO for abdominal ultrasound. Will attempt at a later time/date or as medical condition permits.      Thank you for this referral.    Inderjit Santiago M.S. CCC-SLP/L  Speech-Language Pathologist

## 2019-03-28 NOTE — PROGRESS NOTES
Problem: Pressure Injury - Risk of  Goal: *Prevention of pressure injury  Description  Document Blake Scale and appropriate interventions in the flowsheet. Outcome: Progressing Towards Goal  Note:   Pressure Injury Interventions:  Sensory Interventions: Assess changes in LOC, Assess need for specialty bed, Keep linens dry and wrinkle-free, Maintain/enhance activity level, Minimize linen layers, Turn and reposition approx. every two hours (pillows and wedges if needed)    Moisture Interventions: Absorbent underpads, Internal/External urinary devices, Minimize layers    Activity Interventions: Pressure redistribution bed/mattress(bed type), PT/OT evaluation    Mobility Interventions: Pressure redistribution bed/mattress (bed type), Turn and reposition approx.  every two hours(pillow and wedges)    Nutrition Interventions: Document food/fluid/supplement intake, Discuss nutritional consult with provider    Friction and Shear Interventions: Lift team/patient mobility team, Minimize layers, Foam dressings/transparent film/skin sealants

## 2019-03-28 NOTE — ED NOTES
Bladder scan post fluids 0ml. Pt catheter flushed with normal saline. Flushed easy no resistance, pulled back well.

## 2019-03-28 NOTE — PROGRESS NOTES
Bedside shift change report given to [facility] (oncoming nurse) by Yenni Downey (offgoing nurse). Report included the following information SBAR, Kardex and MAR   0800   rcvd pt in room resting. Fluids running at 125. NAD  Pt is sinus melecio this am on tele. Will give atropine per md orders. Warming blanket applied  0930  Pt to be kept NPO for abd ultrasound this am.   HR remains <50  Temp axillary 95    Poor output   Spoke to md and supervisor about moving patient off [address]  This pt needs frequent monitoring and is unstable at this time  Called report to Susan in ICU  Afternoon meds giiven and urine collected

## 2019-03-28 NOTE — ED TRIAGE NOTES
Patient arrived via EMS from home c/o catheter issues. Patient went to the doctors office today due to retention issues and they flushed the catheter. When they called later to check up on him he was advised to come to the ER due to retention still. Patient advised that he had a suprapubic catheter placed in Nov 2018 and has a hx of Diabetes. Per EMS, his BS was 64.

## 2019-03-28 NOTE — ROUTINE PROCESS
TRANSFER - IN REPORT:    Verbal report received from [place] [id number] [facility] (name) on Delvin Hearn  being received from [place] [facility] (unit) for change in patient condition(bradycardia, hypothermia)      Report consisted of patients Situation, Background, Assessment and   Recommendations(SBAR). Information from the following report(s) SBAR, Kardex, Intake/Output, MAR and Cardiac Rhythm Sinus Ashley Nyhan was reviewed with the receiving nurse. Opportunity for questions and clarification was provided. Assessment completed upon patients arrival to unit and care assumed.

## 2019-03-28 NOTE — PROGRESS NOTES
PCCM/ Initial Rapid Response Call    Rapid response was called for altered mental status in ICU for patient who is a stepdown overflow. Asked by nursing supervisor to evaluate patient given that residents were at another RRT and that hospitalist not available to respond. Primary team paged by RN who recommended to work-up for stroke. Per RN patient had a blood glucose of 57 - and currently being given D50. Patient was not as interactive upon arrival to ICU as stepdown overflow earlier during the day however his mental status had further decline. Upon examination, he is slow to respond and to follow commands, and appeared to have mild slurring of speech. Intact EOMs  Unable to do bilateral hand  nor lift arms up for pronator exam  Unable to move bilateral lower extremities on command however did so voluntarily after    Repeat blood glucose 173. Plan:  · Call Code Stroke overhead  · Stat CT head  · Primary team needs to evaluate patient as soon as able to. · Defer management to RRT team who has come up to evaluate patient.     January-Jill Lizzette Vanegas PA-C  5:11 PM

## 2019-03-28 NOTE — ROUTINE PROCESS
Bedside and Verbal shift change report given to Te Boucher RN (oncoming nurse) by Ashley Whitfield (offgoing nurse). Report included the following information SBAR, Kardex, MAR and Recent Results.     SITUATION:    Code Status: Full Code   Reason for Admission: Oliguria [R34]   Renal injury [S37.009A]   Acute renal failure (ARF) ([facility] [address] [N17.9]    [facility] day: 0   Problem List:       Hospital Problems  Date Reviewed: 2/26/2019          Codes Class Noted POA    Oliguria ICD-10-CM: R34  ICD-9-CM: 788.5  3/28/2019 Unknown        Renal injury ICD-10-CM: S37.009A  ICD-9-CM: 866.00  3/28/2019 Unknown        Suprapubic catheter ([facility] [address] ICD-10-CM: Z93.59  ICD-9-CM: V44.59  3/28/2019 Unknown        Bradycardia, sinus ICD-10-CM: R00.1  ICD-9-CM: 427.89  3/28/2019 Unknown        Acute UTI ICD-10-CM: N39.0  ICD-9-CM: 599.0  3/28/2019 Unknown        Type 2 diabetes mellitus with hypoglycemia ([facility] [address] ICD-10-CM: E11.649  ICD-9-CM: 250.80  3/28/2019 Unknown        Acute renal failure (ARF) (HCC) ICD-10-CM: N17.9  ICD-9-CM: 584.9  3/28/2019 Unknown              BACKGROUND:    Past Medical History:   Past Medical History:   Diagnosis Date    Anemia     Bradycardia, unspecified 2018    Cervical discitis     MRSA    Chronic drug abuse ([address] 1974    Chronic kidney disease     stage III    Diabetes ([address] 01/1990    Diabetes mellitus ([address]     Drug abuse ([facility] [address]     Encephalopathy     GERD (gastroesophageal reflux disease)     Hypertension     Muscle weakness     Renal cell carcinoma of left kidney ([facility] [address] 12/17/13    Pathological Stage [id number] cc RCC FG3 s/p left open partial nephrectomy in 12/13      Sepsis due to methicillin resistant Staphylococcus aureus (HCC)     Thrombocytopenia (HCC)     Urethral erosion     Viral hepatitis C     Xerosis cutis          Patient taking anticoagulants yes     ASSESSMENT:    Changes in Assessment Throughout Shift: decreased mental status, Code S initiated      Patient has Central Line: no Reasons if yes: n/a   Patient has Willis Cath: yes, suprapubic cath Reasons if yes: chronic retention     Last Vitals:     Vitals:    03/28/19 1500 03/28/19 1600 03/28/19 1800 03/28/19 1900   BP: 129/71 134/73 125/69 132/67   Pulse: (!) 58 (!) 58 (!) 57 60   Resp: 13 9 10 10   Temp:  97.7 °F (36.5 °C)     SpO2: 98% 97% 98% 97%   Weight:       Height:            IV and DRAINS (will only show if present)   Peripheral IV 03/27/19 Left;Upper Arm-Site Assessment: Clean, dry, & intact     WOUND (if present)   Wound Type:  Friction related wound on sacrum   Dressing present Dressing Present : Yes   Wound Concerns/Notes:  none     PAIN    Pain Assessment    Pain Intensity 1: 0 (03/28/19 1600)    Pain Location 1: Neck    Pain Intervention(s) 1: Repositioned    Patient Stated Pain Goal: 0  o Interventions for Pain:  none  o Intervention effective: n/a  o Time of last intervention: n/a   o Reassessment Completed: yes      Last 3 Weights:  Last 3 Recorded Weights in this Encounter    03/28/19 0659   Weight: 75.1 kg (165 lb 9.1 oz)     Weight change:      INTAKE/OUPUT    Current Shift: No intake/output data recorded. Last three shifts: 03/27 0701 - 03/28 1900  In: -   Out: 250 [Urine:250]     LAB RESULTS     Recent Labs     03/28/19  1856 03/28/19  1115 03/27/19  2135   WBC 4.8 3.9* 3.4*   HGB 8.3* 8.0* 8.8*   HCT 25.4* 24.1* 27.2*   PLT 94* 88* 92*        Recent Labs     03/28/19  1856 03/28/19  1529 03/28/19  1115 03/27/19  2135   NA  --  140 139 143   K  --  4.9 4.6 4.8   GLU  --  71* 64* 63*   BUN  --  24* 23* 22*   CREA  --  3.50* 3.34* 3.25*   CA  --  7.9* 8.2* 8.9   MG 2.4  --   --   --    INR  --   --  1.0  --        RECOMMENDATIONS AND DISCHARGE PLANNING     1. Pending tests/procedures/ Plan of Care or Other Needs: MRI     2. Discharge plan for patient and Needs/Barriers: TBD    3. Estimated Discharge Date: TBD Posted on Whiteboard in Patients Room: no      4.  The patient's care plan was reviewed with the oncoming nurse. \"HEALS\" SAFETY CHECK      Fall Risk    Total Score: 3    Safety Measures: Safety Measures: Bed/Chair-Wheels locked, Bed in low position, Call light within reach    A safety check occurred in the patient's room between off going nurse and oncoming nurse listed above. The safety check included the below items  Area Items   H  High Alert Medications - Verify all high alert medication drips (heparin, PCA, etc.)   E  Equipment - Suction is set up for ALL patients (with magy)  - Red plugs utilized for all equipment (IV pumps, etc.)  - WOWs wiped down at end of shift.  - Room stocked with oxygen, suction, and other unit-specific supplies   A  Alarms - Bed alarm is set for fall risk patients  - Ensure chair alarm is in place and activated if patient is up in a chair   L  Lines - Check IV for any infiltration  - Willis bag is empty if patient has a Willis   - Tubing and IV bags are labeled   S  Safety   - Room is clean, patient is clean, and equipment is clean. - Hallways are clear from equipment besides carts. - Fall bracelet on for fall risk patients  - Ensure room is clear and free of clutter  - Suction is set up for ALL patients (with magy)  - Hallways are clear from equipment besides carts.    - Isolation precautions followed, supplies available outside room, sign posted     Julia Galan

## 2019-03-28 NOTE — PROGRESS NOTES
[facility]- Initial Progress Note    Pharmacy Consult ordered by Dr. Celso Ruiz     Indication: HAP    Patient clinical status and labs ordered/reviewed. Pt Weight Weight: 75.1 kg (165 lb 9.1 oz)   Serum Creatinine Lab Results   Component Value Date/Time    Creatinine 3.50 (H) 03/28/2019 03:29 PM    Creatinine, POC 1.0 12/03/2017 07:02 PM         Creatinine Clearance Estimated Creatinine Clearance: 23.8 mL/min (A) (based on SCr of 3.5 mg/dL (H)). BUN Lab Results   Component Value Date/Time    BUN 24 (H) 03/28/2019 03:29 PM    BUN, POC 30 (H) 10/11/2018 09:34 AM         WBC Lab Results   Component Value Date/Time    WBC 3.9 (L) 03/28/2019 11:15 AM        Temperature Temp: 97.5 °F (36.4 °C)   HR Pulse (Heart Rate): (!) 58       BP BP: 134/73             Drug Levels:   Vancomycin    No results for input(s): VANCP, VANCT, VANCR, VANRA in the last 72 hours. Gentamicin   No results for input(s): GENP, GENT in the last 72 hours. No lab exists for component:  GENR   Tobramycin   No results for input(s): TOBP, TOBT, TOBR in the last 72 hours. Amikacin   No results for input(s): [place] in the last 72 hours.     No lab exists for component:  VINOD Ramos DAMIKR     Dose for naïve patient was initiated at: Vancomycin 1500mg IV x1, further dosing pending labs/levels     Continue to monitor    Sign: [facility] [place]  Date: 3/28/2019  Time: 4:43 PM

## 2019-03-28 NOTE — ROUTINE PROCESS
TRANSFER - IN REPORT:    Verbal report received from PRINCE Hooker(name) on Nitin Baez  being received from ER(unit) for routine progression of care      Report consisted of patients Situation, Background, Assessment and   Recommendations(SBAR). Information from the following report(s) SBAR, Kardex, ED Summary, Intake/Output, MAR, Recent Results and Cardiac Rhythm SB was reviewed with the receiving nurse. Opportunity for questions and clarification was provided. Assessment completed upon patients arrival to unit and care assumed. Primary Nurse Felicita Nazario RN and Sangeeta Jackson RN performed a dual skin assessment on this patient Impairment noted- see wound doc flow sheet  Blake score is 16    Bedside and Verbal shift change report given to Arianna Carlos (oncoming nurse) by [employer] nurse).  Report included the following information SBAR, Kardex, ED Summary, Intake/Output, MAR, Recent Results and Cardiac Rhythm SB.

## 2019-03-28 NOTE — DIABETES MGMT
Glycemic Control: Noted pt with episodes of hypoglycemia overnight. Pt is currently on a renal regular diet. Blood glucose monitoring ordered 4 times daily ACHS. Recommend treatment of hypoglycemia per hypoglycemia protocol. Continue inpatient monitoring and intervention.      William Mims RD, CDE

## 2019-03-28 NOTE — ED PROVIDER NOTES
EMERGENCY DEPARTMENT HISTORY AND PHYSICAL EXAM    8:47 PM      Date: 3/27/2019  Patient Name: Kwadwo Spain    History of Presenting Illness     Chief Complaint   Patient presents with    Urinary Catheter Problem    Abdominal Pain         History Provided By: Patient and Patient's Sister    Additional History (Context): Kwadwo Spain is a 59 y.o. male with history of chronic kidney disease, previous renal cell carcinoma, now with chronic suprapubic catheter, who presents with decreased urine production into the catheter bag, sent to the emergency department for further evaluation. Patient was seen at the [facility] office yesterday for a change of his suprapubic catheter, but he notes feeling of fullness and decreased urine output through his catheter today. He does have about 40-50 cc of dark yellow urine noted in the leg bag, which was last drained this morning. He denies any fever, flank pain, chest pain or other associated symptoms. PCP: Martha Dowd DO    Current Facility-Administered Medications   Medication Dose Route Frequency Provider Last Rate Last Dose    sodium chloride (NS) flush 5-10 mL  5-10 mL IntraVENous PRN Matheus Singleton MD         Current Outpatient Medications   Medication Sig Dispense Refill    pioglitazone (ACTOS) 15 mg tablet Take 1 Tab by mouth.  Syringe, Disposable, (BD SYRINGE CATHETER TIP) 60 mL syrg Use 1 syringe daily with irrigations 30 Syringe 11    docusate sodium (COLACE) 100 mg capsule 100 mg.  ergocalciferol (DRISDOL) 50,000 unit capsule Take  by mouth.  SITagliptin (JANUVIA) 100 mg tablet Take 100 mg by mouth daily.  potassium chloride (K-DUR, KLOR-CON) 20 mEq tablet Take 20 mEq by mouth daily.  doxycycline (ADOXA) 100 mg tablet Take 100 mg by mouth daily.       insulin lispro (HUMALOG) 100 unit/mL injection Normal Insulin Sensitivity   For Blood Sugar (mg/dL) of:     Less than 150 =   0 units           150 -199 =   2 units  200 -249 =   4 units  250 -299 =   6 units  300 -349 =   8 units  350 and above =   10 units  If 2 glucose readings are above 200 mg/dL within a 24 hr period, proceed to \"Very Insul 1 Vial 0    levothyroxine (SYNTHROID) 50 mcg tablet Take 1 Tab by mouth Daily (before breakfast). 30 Tab 0    tamsulosin (FLOMAX) 0.4 mg capsule Take 1 Cap by mouth daily.  30 Cap 0       Past History     Past Medical History:  Past Medical History:   Diagnosis Date    Anemia     Bradycardia, unspecified 2018    Cervical discitis     MRSA    Chronic drug abuse ([facility] [address] 1974    Chronic kidney disease     stage III    Diabetes ([facility] [address] 01/1990    Diabetes mellitus ([facility] [address]     Drug abuse ([facility] [address]     Encephalopathy     GERD (gastroesophageal reflux disease)     Hypertension     Muscle weakness     Renal cell carcinoma of left kidney (HCC) 12/17/13    Pathological Stage [id number] cc RCC FG3 s/p left open partial nephrectomy in 12/13      Sepsis due to methicillin resistant Staphylococcus aureus (HCC)     Thrombocytopenia (HCC)     Urethral erosion     Viral hepatitis C     Xerosis cutis        Past Surgical History:  Past Surgical History:   Procedure Laterality Date    HX CIRCUMCISION  12/17/13    Dr. Melodie Harrington, [facility]    HX NEPHRECTOMY Left 12/17/13    Open Partial, Dr. Melodie Harrington, [facility]    HX OTHER SURGICAL Right 2/27/2016    removed right ft  2nd meta-tarsal    HX OTHER SURGICAL  04/2017    abscess removed from back of neck     PA REMOVAL WITH INSERTION OF SUPRAPUBIC CATHETER  2018       Family History:  Family History   Problem Relation Age of Onset    Diabetes Mother     Sickle Cell Anemia Father     Diabetes Sister     Hypertension Sister        Social History:  Social History     Tobacco Use    Smoking status: Current Every Day Smoker     Packs/day: 0.50     Years: 30.00     Pack years: 15.00     Types: Cigarettes    Smokeless tobacco: Never Used   Substance Use Topics    Alcohol use: Yes     Comment: beer occasionally    Drug use: No       Allergies: Allergies   Allergen Reactions    Iodine Hives         Review of Systems       Review of Systems   Constitutional: Negative for activity change and appetite change. HENT: Negative for congestion. Eyes: Negative for visual disturbance. Respiratory: Negative for cough and shortness of breath. Cardiovascular: Negative for chest pain. Gastrointestinal: Negative for abdominal pain, diarrhea, nausea and vomiting. Genitourinary:        As noted in HPI   Musculoskeletal: Negative for arthralgias and myalgias. Skin: Negative for rash. Neurological: Negative for weakness and numbness. Physical Exam     Visit Vitals  /62   Pulse (!) 43   Temp 97.2 °F (36.2 °C)   Resp 16   SpO2 98%         Physical Exam   Constitutional: He is oriented to person, place, and time. He appears well-developed and well-nourished. HENT:   Head: Normocephalic and atraumatic. Mouth/Throat: Oropharynx is clear and moist.   Eyes: Conjunctivae are normal.   Neck: Normal range of motion. Neck supple. No JVD present. Cardiovascular: Normal rate, regular rhythm, normal heart sounds and intact distal pulses. No murmur heard. Pulmonary/Chest: Effort normal and breath sounds normal.   Abdominal: Soft. Bowel sounds are normal. He exhibits no distension. There is no tenderness. I do not note suprapubic distention or abdominal tenderness. 40-50 cc of dark yellow urine noted in the leg bag. Musculoskeletal: Normal range of motion. He exhibits no deformity. Lymphadenopathy:     He has no cervical adenopathy. Neurological: He is alert and oriented to person, place, and time. Coordination normal.   Skin: Skin is warm and dry. No rash noted. Psychiatric: He has a normal mood and affect. Nursing note and vitals reviewed.         Diagnostic Study Results     Labs -  Recent Results (from the past 12 hour(s))   GLUCOSE, POC    Collection Time: 03/27/19  9:09 PM   Result Value Ref Range Glucose (POC) 66 (L) 70 - 110 mg/dL   CBC WITH AUTOMATED DIFF    Collection Time: 03/27/19  9:35 PM   Result Value Ref Range    WBC 3.4 (L) 4.6 - 13.2 K/uL    RBC 3.13 (L) 4.70 - 5.50 M/uL    HGB 8.8 (L) 13.0 - 16.0 g/dL    HCT 27.2 (L) 36.0 - 48.0 %    MCV 86.9 74.0 - 97.0 FL    MCH 28.1 24.0 - 34.0 PG    MCHC 32.4 31.0 - 37.0 g/dL    RDW 18.5 (H) 11.6 - 14.5 %    PLATELET 92 (L) 351 - 420 K/uL    MPV 10.0 9.2 - 11.8 FL    NEUTROPHILS 66 40 - 73 %    LYMPHOCYTES 26 21 - 52 %    MONOCYTES 7 3 - 10 %    EOSINOPHILS 0 0 - 5 %    BASOPHILS 1 0 - 2 %    ABS. NEUTROPHILS 2.2 1.8 - 8.0 K/UL    ABS. LYMPHOCYTES 0.9 0.9 - 3.6 K/UL    ABS. MONOCYTES 0.2 0.05 - 1.2 K/UL    ABS. EOSINOPHILS 0.0 0.0 - 0.4 K/UL    ABS. BASOPHILS 0.0 0.0 - 0.1 K/UL    DF AUTOMATED     METABOLIC PANEL, COMPREHENSIVE    Collection Time: 03/27/19  9:35 PM   Result Value Ref Range    Sodium 143 136 - 145 mmol/L    Potassium 4.8 3.5 - 5.5 mmol/L    Chloride 114 (H) 100 - 108 mmol/L    CO2 22 21 - 32 mmol/L    Anion gap 7 3.0 - 18 mmol/L    Glucose 63 (L) 74 - 99 mg/dL    BUN 22 (H) 7.0 - 18 MG/DL    Creatinine 3.25 (H) 0.6 - 1.3 MG/DL    BUN/Creatinine ratio 7 (L) 12 - 20      GFR est AA 24 (L) >60 ml/min/1.73m2    GFR est non-AA 20 (L) >60 ml/min/1.73m2    Calcium 8.9 8.5 - 10.1 MG/DL    Bilirubin, total 0.3 0.2 - 1.0 MG/DL    ALT (SGPT) 16 16 - 61 U/L    AST (SGOT) 20 15 - 37 U/L    Alk.  phosphatase 98 45 - 117 U/L    Protein, total 7.3 6.4 - 8.2 g/dL    Albumin 2.8 (L) 3.4 - 5.0 g/dL    Globulin 4.5 (H) 2.0 - 4.0 g/dL    A-G Ratio 0.6 (L) 0.8 - 1.7     URINALYSIS W/ RFLX MICROSCOPIC    Collection Time: 03/27/19  9:42 PM   Result Value Ref Range    Color DARK YELLOW      Appearance TURBID      Specific gravity 1.019 1.005 - 1.030      pH (UA) 5.0 5.0 - 8.0      Protein 300 (A) NEG mg/dL    Glucose NEGATIVE  NEG mg/dL    Ketone TRACE (A) NEG mg/dL    Bilirubin NEGATIVE  NEG      Blood LARGE (A) NEG      Urobilinogen 0.2 0.2 - 1.0 EU/dL    Nitrites NEGATIVE  NEG      Leukocyte Esterase MODERATE (A) NEG     URINE MICROSCOPIC ONLY    Collection Time: 03/27/19  9:42 PM   Result Value Ref Range    WBC TOO NUMEROUS TO COUNT 0 - 4 /hpf    RBC 21 to 35 0 - 5 /hpf    Epithelial cells 1+ 0 - 5 /lpf    Bacteria 2+ (A) NEG /hpf    Yeast 4+ (A) NEG   GLUCOSE, POC    Collection Time: 03/27/19 10:23 PM   Result Value Ref Range    Glucose (POC) 121 (H) 70 - 110 mg/dL   EKG, 12 LEAD, INITIAL    Collection Time: 03/28/19  3:09 AM   Result Value Ref Range    Ventricular Rate 40 BPM    Atrial Rate 40 BPM    P-R Interval 208 ms    QRS Duration 94 ms    Q-T Interval 550 ms    QTC Calculation (Bezet) 448 ms    Calculated P Axis 28 degrees    Calculated R Axis 2 degrees    Calculated T Axis 29 degrees    Diagnosis       Marked sinus bradycardia  Abnormal ECG  When compared with ECG of 31-MAR-2018 16:04,  No significant change was found         Radiologic Studies -   CT ABD PELV WO CONT    (Results Pending)         Medical Decision Making   I am the first provider for this patient. I reviewed the vital signs, available nursing notes, past medical history, past surgical history, family history and social history. Vital Signs-Reviewed the patient's vital signs. Sinus bradycardia, rate 40,  (1st degree AV block), QTc 448. No acute ST or T wave changes, no STEMI. Records Reviewed: Nursing Notes and Old Medical Records (Time of Review: 8:47 PM)      Provider Notes (Medical Decision Making):   Juan Rain is a 59 y.o. male with history of chronic kidney disease, previous renal cell carcinoma, now with chronic suprapubic catheter, who presents with decreased urine production into the catheter bag, sent to the emergency department for further evaluation. Patient was seen at the [facility] office yesterday for a change of his suprapubic catheter, but he notes feeling of fullness and decreased urine output through his catheter today.   He does have about 40-50 cc of dark yellow urine noted in the leg bag, which was last drained this morning. Patient appears well, has dark yellow urine noted in the leg bag. No suprapubic tenderness or CVA tenderness. Differential Diagnosis: Possible urinary catheter obstruction, versus infection, versus decreased urine production due to renal insufficiency. Testing: CBC, CMP, urinalysis, urine culture, bladder scan  Treatments: Pending evaluation    Re-evaluations:  Patient's creatinine is elevated above his previous baseline. Urinalysis appears abnormal, though it is unclear if this is an acute infection or chronic colonization. He has no leukocytosis, no fever, no hypotension. Urine culture will be sent, and we will treat with cefepime pending urine culture, though I do not suspect sepsis at this time. I suspect as he states he has been on Lasix for his leg swelling, that he has renal insufficiency which is caused his oliguria. 1 L IV fluid given, still with little urine output. Abdominal CT ordered. I discussed the case over the phone with his urologist who will see him tomorrow. Patient be admitted to the hospitalist service for further management. Diagnosis     Clinical Impression:   1. Oliguria    2. Acute renal failure, unspecified acute renal failure type ([facility] [address]        Disposition: Admit    Follow-up Information    None          Patient's Medications   Start Taking    No medications on file   Continue Taking    DOCUSATE SODIUM (COLACE) 100 MG CAPSULE    100 mg. DOXYCYCLINE (ADOXA) 100 MG TABLET    Take 100 mg by mouth daily. ERGOCALCIFEROL (DRISDOL) 50,000 UNIT CAPSULE    Take  by mouth.     INSULIN LISPRO (HUMALOG) 100 UNIT/ML INJECTION    Normal Insulin Sensitivity   For Blood Sugar (mg/dL) of:     Less than 150 =   0 units           150 -199 =   2 units  200 -249 =   4 units  250 -299 =   6 units  300 -349 =   8 units  350 and above =   10 units  If 2 glucose readings are above 200 mg/dL within a 24 hr period, proceed to \"Very Insul    LEVOTHYROXINE (SYNTHROID) 50 MCG TABLET    Take 1 Tab by mouth Daily (before breakfast). PIOGLITAZONE (ACTOS) 15 MG TABLET    Take 1 Tab by mouth. POTASSIUM CHLORIDE (K-DUR, KLOR-CON) 20 MEQ TABLET    Take 20 mEq by mouth daily. SITAGLIPTIN (JANUVIA) 100 MG TABLET    Take 100 mg by mouth daily. SYRINGE, DISPOSABLE, (BD SYRINGE CATHETER TIP) 60 ML SYRG    Use 1 syringe daily with irrigations    TAMSULOSIN (FLOMAX) 0.4 MG CAPSULE    Take 1 Cap by mouth daily. These Medications have changed    No medications on file   Stop Taking    No medications on file     _______________________________    Attestations:  Stephanie Summers MD acting as a scribe for and in the presence of Alex Cohen MD      March 28, 2019 at 3:22 AM       Provider Attestation:      I personally performed the services described in the documentation, reviewed the documentation, as recorded by the scribe in my presence, and it accurately and completely records my words and actions.  March 28, 2019 at 3:22 AM - Alex Cohen MD    _______________________________

## 2019-03-28 NOTE — PROGRESS NOTES
Reason for Admission:   Oliguria [R34]  Renal injury [S37.009A]  Acute renal failure (ARF) ([facility] [address] [N17.9]               RRAT Score:   29             Resources/supports as identified by patient/family:   Patient has family supports and he is active with personal care aides. Top Challenges facing patient (as identified by patient/family and CM): Finances/Medication cost?    [employer]      Family will transport if he is able to walk and get in her car, otherwise he will be a stretcher transport. Support system or lack thereof? Very involved and supportive family. Living arrangements? Lives with his sister Fatuma Webster     Self-care/ADLs/Cognition? Alert but no oriented or responding. Current Advanced Directive/Advance Care Plan:   no                          Plan for utilizing home health:  Patient has no orders for home health at this time. He is active with a personal care aide agency ( [address] [place] [address]). This writer will continue to closely monitor for home health needs and discharge planning. Likelihood of readmission:   HIGH    Transition of Care Plan:      Patient's plan is to return home with help from his family and restart personal care aide services. However, he may need a SNF stay prior to returning home with his family. Patient will be transported by his sister or stretcher if sister cannot transport. This writer will monitor. Initial assessment completed with patient's sister Fatuma Shell). Cognitive status of patient: Alert but not responsive. Face sheet information confirmed:  yes. Patient's sisters Fatuma Shell) and Italia Shipley) will participate in his discharge plan and to receive any needed information. This patient lives in a house with his sister Fatuma Shell). Patient is able to navigate steps as needed, per sister Fatuma Shell). Prior to hospitalization, patient was considered to be independent with ADLs/IADLS : no .  If not independent,  patient needs assist with : dressing, bathing and grooming. Patient has a current ACP document on file: no     Patient's sister Oswaldo Kim will be available to transport patient home upon discharge. If sister cannot transport, patient will need a stretcher. The patient already has a rollator, a raised toilet seat and shower chair available in the home. Patient is not currently active with home health; however, he is active with a personal care aide agency ([address] [place] [address]). Patient has stayed in a skilled nursing facility or rehab ([address] [facility]). Was  stay within last 60 days : no. Freedom of choice signed: yes, for [facility]. Currently, the discharge plan is to return home with help from his family and restarting his personal care aide services. Patient's family will assist with care, as needed, post discharge. Patient's sister Oswaldo Kim will transport home if he is able to get in her car. If not, he will need stretcher. Patient's sister is Jose Enrique Cavazos, #[phone]). Patient's other sister is Viral Fitzpatrick, #[phone]). Patient's PCP is Dr. Dg Cain. Patient is insured through [employer]. The patient states that he can obtain his medications from the pharmacy, and take his medications as directed. This writer will continue to closely monitor for discharge planning to ensure a safe discharge from [facility]. Care Management Interventions  PCP Verified by CM: Yes(Shanita Jaimes)  Palliative Care Criteria Met (RRAT>21 & CHF Dx)?: No  Mode of Transport at Discharge:  Other (see comment)(Family will transport home; however, if he cannot walk stretcher will need to be arranged.)  Transition of Care Consult (CM Consult): Discharge Planning  Current Support Network: Relative's Home, Family Lives [place], Has Personal Caregivers(Is active with a personal care aide agency ([address] [place] [address]))  Confirm Follow Up Transport: Family  Plan discussed with Pt/Family/Caregiver: Yes  Freedom of Choice Offered: Yes  Discharge Location  Discharge Placement: Other:(Home with personal care aides restarted and family assistance.)        Ernesto Bedoya MSW  Care Manager  Pager#: [phone]

## 2019-03-28 NOTE — ED NOTES
Received report from Walter Ring, [address] [place] [address]. Completed VS at this time, checked patient orders.

## 2019-03-29 ENCOUNTER — APPOINTMENT (OUTPATIENT)
Dept: MRI IMAGING | Age: 59
DRG: 720 | End: 2019-03-29
Attending: STUDENT IN AN ORGANIZED HEALTH CARE EDUCATION/TRAINING PROGRAM
Payer: MEDICAID

## 2019-03-29 ENCOUNTER — APPOINTMENT (OUTPATIENT)
Dept: GENERAL RADIOLOGY | Age: 59
DRG: 720 | End: 2019-03-29
Attending: PHYSICIAN ASSISTANT
Payer: MEDICAID

## 2019-03-29 LAB
ALBUMIN SERPL-MCNC: 2.2 G/DL (ref 3.4–5)
ALBUMIN/GLOB SERPL: 0.6 {RATIO} (ref 0.8–1.7)
ALP SERPL-CCNC: 75 U/L (ref 45–117)
ALT SERPL-CCNC: 11 U/L (ref 16–61)
ANION GAP SERPL CALC-SCNC: 5 MMOL/L (ref 3–18)
AST SERPL-CCNC: 18 U/L (ref 15–37)
BASOPHILS # BLD: 0 K/UL (ref 0–0.06)
BASOPHILS NFR BLD: 0 % (ref 0–3)
BILIRUB SERPL-MCNC: 0.3 MG/DL (ref 0.2–1)
BUN SERPL-MCNC: 26 MG/DL (ref 7–18)
BUN/CREAT SERPL: 7 (ref 12–20)
CALCIUM SERPL-MCNC: 8 MG/DL (ref 8.5–10.1)
CHLORIDE SERPL-SCNC: 116 MMOL/L (ref 100–108)
CHOLEST SERPL-MCNC: 142 MG/DL
CHOLEST SERPL-MCNC: 160 MG/DL
CK SERPL-CCNC: 108 U/L (ref 39–308)
CO2 SERPL-SCNC: 20 MMOL/L (ref 21–32)
CREAT SERPL-MCNC: 3.66 MG/DL (ref 0.6–1.3)
DIFFERENTIAL METHOD BLD: ABNORMAL
EOSINOPHIL # BLD: 0 K/UL (ref 0–0.4)
EOSINOPHIL NFR BLD: 0 % (ref 0–5)
ERYTHROCYTE [DISTWIDTH] IN BLOOD BY AUTOMATED COUNT: 18.7 % (ref 11.6–14.5)
FOLATE SERPL-MCNC: 18.8 NG/ML (ref 3.1–17.5)
GLOBULIN SER CALC-MCNC: 3.8 G/DL (ref 2–4)
GLUCOSE BLD STRIP.AUTO-MCNC: 103 MG/DL (ref 70–110)
GLUCOSE BLD STRIP.AUTO-MCNC: 106 MG/DL (ref 70–110)
GLUCOSE BLD STRIP.AUTO-MCNC: 133 MG/DL (ref 70–110)
GLUCOSE BLD STRIP.AUTO-MCNC: 145 MG/DL (ref 70–110)
GLUCOSE BLD STRIP.AUTO-MCNC: 62 MG/DL (ref 70–110)
GLUCOSE BLD STRIP.AUTO-MCNC: 67 MG/DL (ref 70–110)
GLUCOSE BLD STRIP.AUTO-MCNC: 74 MG/DL (ref 70–110)
GLUCOSE BLD STRIP.AUTO-MCNC: 76 MG/DL (ref 70–110)
GLUCOSE BLD STRIP.AUTO-MCNC: 76 MG/DL (ref 70–110)
GLUCOSE BLD STRIP.AUTO-MCNC: 88 MG/DL (ref 70–110)
GLUCOSE BLD STRIP.AUTO-MCNC: 88 MG/DL (ref 70–110)
GLUCOSE BLD STRIP.AUTO-MCNC: 94 MG/DL (ref 70–110)
GLUCOSE SERPL-MCNC: 69 MG/DL (ref 74–99)
HCT VFR BLD AUTO: 24.4 % (ref 36–48)
HCV RNA SERPL NAA+PROBE-LOG IU: NOT DETECTED HCV LOG 10IU/ML
HCV RNA SERPL PROBE AMP-ACNC: NOT DETECTED HCVIU/ML
HDLC SERPL-MCNC: 58 MG/DL (ref 40–60)
HDLC SERPL-MCNC: 58 MG/DL (ref 40–60)
HDLC SERPL: 2.4 {RATIO} (ref 0–5)
HDLC SERPL: 2.8 {RATIO} (ref 0–5)
HGB BLD-MCNC: 8 G/DL (ref 13–16)
HIV 1+2 AB+HIV1 P24 AG SERPL QL IA: NONREACTIVE
HIV12 RESULT COMMENT, HHIVC: NORMAL
LDLC SERPL CALC-MCNC: 70.2 MG/DL (ref 0–100)
LDLC SERPL CALC-MCNC: 88.2 MG/DL (ref 0–100)
LIPID PROFILE,FLP: NORMAL
LIPID PROFILE,FLP: NORMAL
LYMPHOCYTES # BLD: 0.9 K/UL (ref 0.8–3.5)
LYMPHOCYTES NFR BLD: 17 % (ref 20–51)
MAGNESIUM SERPL-MCNC: 2.2 MG/DL (ref 1.6–2.6)
MCH RBC QN AUTO: 28.5 PG (ref 24–34)
MCHC RBC AUTO-ENTMCNC: 32.8 G/DL (ref 31–37)
MCV RBC AUTO: 86.8 FL (ref 74–97)
MONOCYTES # BLD: 0.3 K/UL (ref 0–1)
MONOCYTES NFR BLD: 6 % (ref 2–9)
NEUTS SEG # BLD: 4 K/UL (ref 1.8–8)
NEUTS SEG NFR BLD: 77 % (ref 42–75)
NRBC BLD-RTO: 2 PER 100 WBC
PHOSPHATE SERPL-MCNC: 5 MG/DL (ref 2.5–4.9)
PLATELET # BLD AUTO: 92 K/UL (ref 135–420)
PLATELET COMMENTS,PCOM: ABNORMAL
PMV BLD AUTO: 12.2 FL (ref 9.2–11.8)
POTASSIUM SERPL-SCNC: 4.6 MMOL/L (ref 3.5–5.5)
PROT SERPL-MCNC: 6 G/DL (ref 6.4–8.2)
RBC # BLD AUTO: 2.81 M/UL (ref 4.7–5.5)
RBC MORPH BLD: ABNORMAL
SODIUM SERPL-SCNC: 141 MMOL/L (ref 136–145)
TRIGL SERPL-MCNC: 69 MG/DL (ref ?–150)
TRIGL SERPL-MCNC: 69 MG/DL (ref ?–150)
URATE SERPL-MCNC: 7.3 MG/DL (ref 2.6–7.2)
VANCOMYCIN SERPL-MCNC: 27.3 UG/ML (ref 5–40)
VIT B12 SERPL-MCNC: 746 PG/ML (ref 211–911)
VLDLC SERPL CALC-MCNC: 13.8 MG/DL
VLDLC SERPL CALC-MCNC: 13.8 MG/DL
WBC # BLD AUTO: 5.2 K/UL (ref 4.6–13.2)

## 2019-03-29 PROCEDURE — 82550 ASSAY OF CK (CPK): CPT

## 2019-03-29 PROCEDURE — 82607 VITAMIN B-12: CPT

## 2019-03-29 PROCEDURE — 80061 LIPID PANEL: CPT

## 2019-03-29 PROCEDURE — 74011250636 HC RX REV CODE- 250/636: Performed by: INTERNAL MEDICINE

## 2019-03-29 PROCEDURE — 95816 EEG AWAKE AND DROWSY: CPT | Performed by: PSYCHIATRY & NEUROLOGY

## 2019-03-29 PROCEDURE — 84100 ASSAY OF PHOSPHORUS: CPT

## 2019-03-29 PROCEDURE — 80202 ASSAY OF VANCOMYCIN: CPT

## 2019-03-29 PROCEDURE — 84550 ASSAY OF BLOOD/URIC ACID: CPT

## 2019-03-29 PROCEDURE — 74011250636 HC RX REV CODE- 250/636: Performed by: PHYSICIAN ASSISTANT

## 2019-03-29 PROCEDURE — 70551 MRI BRAIN STEM W/O DYE: CPT

## 2019-03-29 PROCEDURE — 74011000258 HC RX REV CODE- 258: Performed by: INTERNAL MEDICINE

## 2019-03-29 PROCEDURE — 70544 MR ANGIOGRAPHY HEAD W/O DYE: CPT

## 2019-03-29 PROCEDURE — 80053 COMPREHEN METABOLIC PANEL: CPT

## 2019-03-29 PROCEDURE — 74011250636 HC RX REV CODE- 250/636: Performed by: NURSE PRACTITIONER

## 2019-03-29 PROCEDURE — 74011000250 HC RX REV CODE- 250: Performed by: FAMILY MEDICINE

## 2019-03-29 PROCEDURE — 74011000250 HC RX REV CODE- 250: Performed by: INTERNAL MEDICINE

## 2019-03-29 PROCEDURE — 36415 COLL VENOUS BLD VENIPUNCTURE: CPT

## 2019-03-29 PROCEDURE — 71045 X-RAY EXAM CHEST 1 VIEW: CPT

## 2019-03-29 PROCEDURE — 74011000250 HC RX REV CODE- 250: Performed by: PHYSICIAN ASSISTANT

## 2019-03-29 PROCEDURE — 85025 COMPLETE CBC W/AUTO DIFF WBC: CPT

## 2019-03-29 PROCEDURE — 94762 N-INVAS EAR/PLS OXIMTRY CONT: CPT

## 2019-03-29 PROCEDURE — 65610000006 HC RM INTENSIVE CARE

## 2019-03-29 PROCEDURE — 82962 GLUCOSE BLOOD TEST: CPT

## 2019-03-29 PROCEDURE — 77010033678 HC OXYGEN DAILY

## 2019-03-29 PROCEDURE — 74011000258 HC RX REV CODE- 258: Performed by: PHYSICIAN ASSISTANT

## 2019-03-29 PROCEDURE — 74011250636 HC RX REV CODE- 250/636: Performed by: FAMILY MEDICINE

## 2019-03-29 PROCEDURE — 83735 ASSAY OF MAGNESIUM: CPT

## 2019-03-29 PROCEDURE — C9113 INJ PANTOPRAZOLE SODIUM, VIA: HCPCS | Performed by: FAMILY MEDICINE

## 2019-03-29 PROCEDURE — 77030020186 HC BOOT HL PROTCT SAGE -B

## 2019-03-29 RX ORDER — FUROSEMIDE 10 MG/ML
40 INJECTION INTRAMUSCULAR; INTRAVENOUS DAILY
Status: DISCONTINUED | OUTPATIENT
Start: 2019-03-29 | End: 2019-03-29

## 2019-03-29 RX ORDER — DEXTROSE MONOHYDRATE 100 MG/ML
75 INJECTION, SOLUTION INTRAVENOUS CONTINUOUS
Status: DISCONTINUED | OUTPATIENT
Start: 2019-03-29 | End: 2019-03-30

## 2019-03-29 RX ORDER — PHENYLEPHRINE HCL IN 0.9% NACL 1 MG/10 ML
SYRINGE (ML) INTRAVENOUS
Status: DISPENSED
Start: 2019-03-29 | End: 2019-03-29

## 2019-03-29 RX ORDER — METRONIDAZOLE 500 MG/100ML
500 INJECTION, SOLUTION INTRAVENOUS EVERY 12 HOURS
Status: DISCONTINUED | OUTPATIENT
Start: 2019-03-29 | End: 2019-03-29

## 2019-03-29 RX ORDER — FOLIC ACID 5 MG/ML
1 INJECTION, SOLUTION INTRAMUSCULAR; INTRAVENOUS; SUBCUTANEOUS DAILY
Status: DISCONTINUED | OUTPATIENT
Start: 2019-03-29 | End: 2019-03-29

## 2019-03-29 RX ORDER — LORAZEPAM 2 MG/ML
0.5 INJECTION INTRAMUSCULAR AS NEEDED
Status: DISPENSED | OUTPATIENT
Start: 2019-03-29 | End: 2019-03-29

## 2019-03-29 RX ADMIN — SODIUM CHLORIDE 1500 MG: 900 INJECTION, SOLUTION INTRAVENOUS at 14:13

## 2019-03-29 RX ADMIN — DEXTROSE MONOHYDRATE 25 G: 500 INJECTION PARENTERAL at 04:44

## 2019-03-29 RX ADMIN — DEXTROSE MONOHYDRATE 25 ML/HR: 10 INJECTION, SOLUTION INTRAVENOUS at 11:07

## 2019-03-29 RX ADMIN — CEFEPIME 2 G: 2 INJECTION, POWDER, FOR SOLUTION INTRAVENOUS at 17:37

## 2019-03-29 RX ADMIN — FOLIC ACID: 5 INJECTION, SOLUTION INTRAMUSCULAR; INTRAVENOUS; SUBCUTANEOUS at 11:55

## 2019-03-29 RX ADMIN — DEXTROSE MONOHYDRATE 25 G: 500 INJECTION PARENTERAL at 11:04

## 2019-03-29 RX ADMIN — HEPARIN SODIUM 5000 UNITS: 5000 INJECTION INTRAVENOUS; SUBCUTANEOUS at 23:13

## 2019-03-29 RX ADMIN — ONDANSETRON 4 MG: 2 INJECTION INTRAMUSCULAR; INTRAVENOUS at 07:26

## 2019-03-29 RX ADMIN — PANTOPRAZOLE SODIUM 40 MG: 40 INJECTION, POWDER, FOR SOLUTION INTRAVENOUS at 20:18

## 2019-03-29 RX ADMIN — LORAZEPAM 0.5 MG: 2 INJECTION INTRAMUSCULAR; INTRAVENOUS at 21:46

## 2019-03-29 RX ADMIN — METRONIDAZOLE 500 MG: 500 INJECTION, SOLUTION INTRAVENOUS at 11:53

## 2019-03-29 RX ADMIN — HEPARIN SODIUM 5000 UNITS: 5000 INJECTION INTRAVENOUS; SUBCUTANEOUS at 06:12

## 2019-03-29 RX ADMIN — PANTOPRAZOLE SODIUM 40 MG: 40 INJECTION, POWDER, FOR SOLUTION INTRAVENOUS at 11:07

## 2019-03-29 RX ADMIN — HEPARIN SODIUM 5000 UNITS: 5000 INJECTION INTRAVENOUS; SUBCUTANEOUS at 15:45

## 2019-03-29 RX ADMIN — THIAMINE HYDROCHLORIDE 200 MG: 100 INJECTION, SOLUTION INTRAMUSCULAR; INTRAVENOUS at 23:13

## 2019-03-29 RX ADMIN — THIAMINE HYDROCHLORIDE 200 MG: 100 INJECTION, SOLUTION INTRAMUSCULAR; INTRAVENOUS at 13:45

## 2019-03-29 NOTE — PROGRESS NOTES
Speech Pathology Note      1103:  Chart reviewed, spoke with nursing. Pt vomiting this morning and unable to arouse. Requested hold off on swallow eval this date.   Will continue to follow to complete swallow eval.    Thank you for this referral,  Darshan Olivares MS, CCC/SLP  Speech- Language Pathologist

## 2019-03-29 NOTE — ROUTINE PROCESS
1925:  Rec'd Memory Radha  from Jennifer Green RN. SBAR reviewed patient-side with two-nurse check-offs done of meds, dressings, wounds, LDA's & revelant assessment findings including neuro status, vent settings, rhythm & pulses, diet. Bed in lowest position with noted SR up, wheels locked and exit alarm on. Tonight:  Rec'd report, pt with frequent low BG. Transported to MRI.    1950: Informed by MRI staff that Mr. Lynette Baron would need sedation to complete test, none currently ordered. Called hospital page  for , transferred to answering service. After severall minutes of explaining that I was trying to page the physician, the page was placed. 2107:  MRI personnel remove Mr. Lynette Baron from scanner, Dr. Carlitos Salinas returns page: \"Re-evaluate in the morning,\" returned to room without incident. 2330:  Midnight assessment:  Temp low: Forced air warming started  [facility] VIKKI @ 43),  FSBG low -> D50 as ordered. Repeat higher. Tried to give Mr. Lynette Baron morning synthroid with pudding but uncertain swallowing so PO were deferred. Leaving Shaniqua Hugger on at low setting -> T in 97's. Needed repeat D50.      0730:  Verbal Patient-side shift change report given to B.> Dk Cat RN, (oncoming nurse) by Sharifa Galvan RN  (offgoing nurse). Report included the following information SBAR, Kardex, ED Summary, Procedure Summary, Intake/Output, MAR, Recent Results and Med Rec Status. Included:  Intro, hx, two-RN eval of relevant assessment findings, LDAs, skin, diagnostics and infusions.

## 2019-03-29 NOTE — CONSULTS
[address] [place] [address] [facility]  Pulmonary, Critical Care, and Sleep Medicine    Name: Chelsey Price MRN: [id number]   : 1960 Hospital: [facility]   Date: 3/29/2019        Critical Care Consult      IMPRESSION:   · Acute encephalopathy, possibly metabolic in setting of persistent hypoglycemia, need to r/o seizures, stroke -MRI pending   · Hypoglycemia  · Yeast by urine culture, likely colonization -with recent treatment for UTI yeast with fluconazole (March   · Acute on chronic CKD, Stage 3  · Sinus bradycardia     Patient Active Problem List   Diagnosis Code    Type II diabetes mellitus, uncontrolled ([address] E11.65    Urinary retention R33.9    Chronic hepatitis C without hepatic coma (HCC) B18.2    Essential hypertension I10    IV drug abuse ([facility] F19.10    Quadriparesis ([address] S50.11    Umbilical hernia W39.9    Bradycardia R00.1    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    Hyperkalemia E87.5    Hypercalcemia E83.52    Chronic kidney disease, stage III (moderate) (HCC) N18.3    Thrombocytopenia (HCC) D69.6    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) Z89.421    Chronic anemia D64.9    Chronic drug abuse ([facility] F19.10    Hypertension I10    Renal cell carcinoma of left kidney (HCC) C64.2    Urethral erosion N36.8    BPH (benign prostatic hyperplasia) N40.0    Bladder atony 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.2    Cerumen impaction H61.20    Chronic neck pain M54.2, G89.29    Cirrhosis of liver (HCC) K74.60    COPD, moderate (HCC) J44.9    Dry skin dermatitis L85.3    Elevated PSA R97.20    Generalized weakness R53.1    Hematuria R31.9    History of diabetes mellitus Z86.39    History of elevated lipids Z86.39    History of hay fever Z87.09    Hyperlipidemia E78.5    Hypothyroid E03.9    Lung nodules R91.8    Medication management Z79.899    Neck mass R22.1    Pericardial effusion I31.3    Preoperative clearance Z01.818    Prostate disorder N42.9    Recurrent UTI N39.0    Vitamin D deficiency E55.9    Oliguria R34    Renal injury S37.009A    Suprapubic catheter ([facility]) Z93.59    Bradycardia, sinus R00.1    Acute UTI N39.0    Type 2 diabetes mellitus with hypoglycemia ([facility]) E11.649    Acute renal failure (ARF) ([facility]) N17.9        RECOMMENDATIONS:   · Resp: may continue oxygen supplementation with NC. HOB > 30 degrees. Strict aspiration precautions. Obtain CXR    · I/D: follow blood and urine c/s. Continue with vancomycin, cefepime. Add metronidazole IV. · Hem/Onc: stable H/H, platelet. Normal coags  · CVS: hemodynamically stable. No suspicion for fluid overload. D/c lasix  · Metabolic: trend electrolytes and replace cautiously given CKD  · Renal: change suprapubic cath. Trend renal indices. Nephrology consulted by primary  · Endocrine: avoid hypoglycemia -start D10. Serial glucose monitoring and adjust as needed to prevent further hypoglycemia  · GI: NPO  · Musc/Skin: stable, no concerns  · Neuro: CT head negative for acute process. MRI brain pending. Neurology consulted by primary. · Code Status: Full code       Best practice:  · Sepsis Bundle per Hospital Protocol  · Glycemic control  · IHI ICU Bundles:  ·  Varner Bundle Followed    · Mech Vent patients/ Pulmonary pts:   · VAP bundle, Aim to keep peak plateau pressure 81-70NF H2O  · Daily sedation holiday as indicated  · SBT as tolerated/appropriate  · Stress ulcer prophylaxis. protonix BID   · DVT prophylaxis. Heparin SQ  · Need for Lines, varner assessed. Subjective/History: This patient has been seen and evaluated at the request of Dr. Lieutenant Hdz for medical management. 03/29/19    Patient is a 59 y.o. male with complex medical hx significant for CKD 3, Dm 2, CHF, discitis with quadriplegia (2017), autonomic dysfunction, prior MRSA bacteremia with hx of IVDA, hypothyroidism, recent GIB, chronic suprapubic catheter, nursing home patient who was brought to the ED on 3/27 for decreased urine output.      Patient was previously admitted to [facility] from 3/7-3/15 for GIB for which he was transfused with multiple blood products. Patient was again admitted to [facility] from 3/17-3/20 for bradycardia and hypothermia. He was treated for sepsis from UTI and pneumonia and was treated with fluconazole and cefepime IV. History is mainly taken from review of chart. Patient's sister called the urology office on 3/25 for persistent decreased urine output from suprapubic catheter. They were advised to go to [facility] where they were given lasix. The patient was then seen in the Urology office on 3/26 where he underwent bladder irrigation and change in suprapubic cathter. Patient was then sent home. The following day, pt continued to have decreased urine output and decreased PO intake, hence the visit to the ED. In the ED, patient was worked up for sepsis and started on cefepime for suspicion of UTI. Pt was given 1L NS IVF; CT abd/plevis done which showed mild wall thickening of bladder; probable renal cyst. Patient was admitted to ICU as stepdown overflow on 3/28 where he was found to be hypoglycemic and given D50. He also was found to have decline in mental status requiring RRT and Code Stroke to be called (see prior note). Teleneurology was consulted and worked up for metabolic cause of encephalopathy; CT head negative; MRI ordered and pending. Overnight patient continued to be hypoglycemic requiring 2 amps of D50 however had not been started on any glucose-containing fluid. Patient also had an episode of vomiting overnight. Today 3/29 patient continued to be hypoglycemic requiring additional D50. ICU was consulted by primary for further medical management of hypoglycemia and altered mental status; no hemodynamic instability noted. The patient is critically ill and can not provide additional history due to altered mental status.        Past Medical History:   Diagnosis Date    Anemia     Bradycardia, unspecified 2018    Cervical discitis     MRSA    Chronic drug abuse ([facility] 1974    Chronic kidney disease     stage III    Diabetes ([facility] 01/1990    Diabetes mellitus ([facility]     Drug abuse ([facility]     Encephalopathy     GERD (gastroesophageal reflux disease)     Hypertension     Muscle weakness     Renal cell carcinoma of left kidney ([facility] 12/17/13    Pathological Stage [id number] cc RCC FG3 s/p left open partial nephrectomy in 12/13      Sepsis due to methicillin resistant Staphylococcus aureus (HCC)     Thrombocytopenia (HCC)     Urethral erosion     Viral hepatitis C     Xerosis cutis         Past Surgical History:   Procedure Laterality Date    HX CIRCUMCISION  12/17/13    Dr. Kwaku Celis, [facility]    HX NEPHRECTOMY Left 12/17/13    Open Partial, Dr. Kwaku Celis, [facility]    HX OTHER SURGICAL Right 2/27/2016    removed right ft  2nd meta-tarsal    HX OTHER SURGICAL  04/2017    abscess removed from back of neck     AL REMOVAL WITH INSERTION OF SUPRAPUBIC CATHETER  2018        Prior to Admission medications    Medication Sig Start Date End Date Taking? Authorizing Provider   amLODIPine (NORVASC) 10 mg tablet Take 10 mg by mouth daily. Yes Provider, Historical   levothyroxine (SYNTHROID) 100 mcg tablet Take 100 mcg by mouth Daily (before breakfast). Yes Provider, Historical   omeprazole (PRILOSEC) 20 mg capsule Take 20 mg by mouth two (2) times a day. Yes Provider, Historical   sodium bicarbonate 650 mg tablet Take 650 mg by mouth three (3) times daily. Yes Provider, Historical   sucralfate (CARAFATE) 1 gram tablet Take 1 g by mouth four (4) times daily. Yes Provider, Historical   therapeutic multivitamin (THERAGRAN) tablet Take 1 Tab by mouth daily. Yes Provider, Historical   traMADol (ULTRAM) 50 mg tablet Take 50 mg by mouth every six (6) hours as needed for Pain. Yes Provider, Historical   trospium (SANCTURA) 20 mg tablet Take 20 mg by mouth daily.    Yes Provider, Historical   pioglitazone (ACTOS) 15 mg tablet Take 1 Tab by mouth. 1/22/19  Yes Provider, Historical   Syringe, Disposable, (BD SYRINGE CATHETER TIP) 60 mL syrg Use 1 syringe daily with irrigations 11/7/18  Yes Larisa Stokes MD   docusate sodium (COLACE) 100 mg capsule 100 mg two (2) times daily as needed. 10/12/18  Yes Provider, Historical   ergocalciferol (DRISDOL) 50,000 unit capsule Take 50,000 Units by mouth every seven (7) days. 8/8/18  Yes Provider, Historical   SITagliptin (JANUVIA) 100 mg tablet Take 50 mg by mouth daily. Yes Provider, Historical   insulin lispro (HUMALOG) 100 unit/mL injection Normal Insulin Sensitivity   For Blood Sugar (mg/dL) of:     Less than 150 =   0 units           150 -199 =   2 units  200 -249 =   4 units  250 -299 =   6 units  300 -349 =   8 units  350 and above =   10 units  If 2 glucose readings are above 200 mg/dL within a 24 hr period, proceed to \"Very Insul 4/10/18  Yes Jacquie Aquion MD   tamsulosin (FLOMAX) 0.4 mg capsule Take 1 Cap by mouth daily. 4/10/18  Yes Jacquie Aquino MD   doxycycline (ADOXA) 100 mg tablet Take 100 mg by mouth daily.     Provider, Historical       Current Facility-Administered Medications   Medication Dose Route Frequency    PHENYLephrine (ANGELITA-SYNEPHRINE) 1 mg/10 mL (100 mcg/mL) 100 mcg/mL in NS syringe        metroNIDAZOLE (FLAGYL) IVPB premix 500 mg  500 mg IntraVENous Q12H    thiamine (B-1) 200 mg in 0.9% sodium chloride 50 mL IVPB  200 mg IntraVENous T0P    folic acid (FOLVITE) 1 mg in 0.9% sodium chloride 50 mL ivpb   IntraVENous Q24H    dextrose 10% infusion  25 mL/hr IntraVENous CONTINUOUS    insulin lispro (HUMALOG) injection   SubCUTAneous Q6H    tamsulosin (FLOMAX) capsule 0.4 mg  0.4 mg Oral DAILY    heparin (porcine) injection 5,000 Units  5,000 Units SubCUTAneous Q8H    levothyroxine (SYNTHROID) tablet 100 mcg  100 mcg Oral 6am    pantoprazole (PROTONIX) injection 40 mg  40 mg IntraVENous Q12H    cefepime (MAXIPIME) 2 g in sterile water (preservative free) 10 mL IV syringe  2 g IntraVENous Q24H    VANCOMYCIN INFORMATION NOTE   Other Rx Dosing/Monitoring       Allergies   Allergen Reactions    Iodine Hives        Social History     Tobacco Use    Smoking status: Current Every Day Smoker     Packs/day: 0.50     Years: 30.00     Pack years: 15.00     Types: Cigarettes    Smokeless tobacco: Never Used   Substance Use Topics    Alcohol use: Yes     Comment: beer occasionally        Family History   Problem Relation Age of Onset    Diabetes Mother     Sickle Cell Anemia Father     Diabetes Sister     Hypertension Sister           Review of Systems:  Review of systems not obtained due to patient factors. Objective:   Vital Signs:    Visit Vitals  /58 (BP 1 Location: Right arm, BP Patient Position: At rest)   Pulse (!) 56   Temp 98 °F (36.7 °C)   Resp 9   Ht 6' (1.829 m)   Wt 80.2 kg (176 lb 12.9 oz)   SpO2 100%   BMI 23.98 kg/m²       O2 Device: Nasal cannula   O2 Flow Rate (L/min): 3 l/min   Temp (24hrs), Av.3 °F (36.3 °C), Min:94.5 °F (34.7 °C), Max:98 °F (36.7 °C)       Intake/Output:   Last shift:       07 -  1900  In: 172.1 [I.V.:172.1]  Out: -   Last 3 shifts:  190 -  0700  In: 600 [I.V.:600]  Out: 775 [Urine:775]    Intake/Output Summary (Last 24 hours) at 3/29/2019 1331  Last data filed at 3/29/2019 1200  Gross per 24 hour   Intake 772.08 ml   Output 775 ml   Net -2.92 ml       Physical Exam:     General:  Awakened by name calling; NAD   Head:  Normocephalic, without obvious abnormality, atraumatic. Eyes:  Pink palpebral conjunctivae, anicteric sclerae; intact EOMs   Nose: Nares normal. No drainage    Throat: Lips, mucosa, and tongue dry   Neck: Supple, symmetrical, trachea midline, no adenopathy   Lungs:   Symmetrical chest rise; good AE bilat; CTAB; no wheezes/rhonchi/rales noted. Heart:  RRR, S1, S2 normal, no m/r/g   Abdomen:   Soft, non-tender.  Bowel sounds normal.    Extremities: Extremities normal, atraumatic, no cyanosis; trace bipedal edema. Pulses: 2+ and symmetric all extremities.    Skin: Skin color, texture, turgor normal.    Neurologic: Awakens to name calling; attempts to follow simple commands such as sticking tongue out however unable to  hands; also noted intermittent arm twitching         Data:     Recent Results (from the past 24 hour(s))   GLUCOSE, POC    Collection Time: 03/28/19  1:44 PM   Result Value Ref Range    Glucose (POC) 63 (L) 70 - 110 mg/dL   GLUCOSE, POC    Collection Time: 03/28/19  2:15 PM   Result Value Ref Range    Glucose (POC) 123 (H) 70 - 110 mg/dL   CARDIAC PANEL,(CK, CKMB & TROPONIN)    Collection Time: 03/28/19  3:29 PM   Result Value Ref Range     39 - 308 U/L    CK - MB 7.7 (H) <3.6 ng/ml    CK-MB Index 6.0 (H) 0.0 - 4.0 %    Troponin-I, QT 0.06 (H) 0.0 - 8.215 NG/ML   METABOLIC PANEL, BASIC    Collection Time: 03/28/19  3:29 PM   Result Value Ref Range    Sodium 140 136 - 145 mmol/L    Potassium 4.9 3.5 - 5.5 mmol/L    Chloride 114 (H) 100 - 108 mmol/L    CO2 21 21 - 32 mmol/L    Anion gap 5 3.0 - 18 mmol/L    Glucose 71 (L) 74 - 99 mg/dL    BUN 24 (H) 7.0 - 18 MG/DL    Creatinine 3.50 (H) 0.6 - 1.3 MG/DL    BUN/Creatinine ratio 7 (L) 12 - 20      GFR est AA 22 (L) >60 ml/min/1.73m2    GFR est non-AA 18 (L) >60 ml/min/1.73m2    Calcium 7.9 (L) 8.5 - 10.1 MG/DL   NT-PRO BNP    Collection Time: 03/28/19  3:29 PM   Result Value Ref Range    NT pro-BNP 1,113 (H) 0 - 900 PG/ML   GLUCOSE, POC    Collection Time: 03/28/19  4:44 PM   Result Value Ref Range    Glucose (POC) 57 (L) 70 - 110 mg/dL   GLUCOSE, POC    Collection Time: 03/28/19  4:55 PM   Result Value Ref Range    Glucose (POC) 173 (H) 70 - 110 mg/dL   GLUCOSE, POC    Collection Time: 03/28/19  5:17 PM   Result Value Ref Range    Glucose (POC) 103 70 - 110 mg/dL   AMMONIA    Collection Time: 03/28/19  6:56 PM   Result Value Ref Range    Ammonia 22 11 - 32 UMOL/L   LACTIC ACID    Collection Time: 03/28/19  6:56 PM   Result Value Ref Range    Lactic acid 0.5 0.4 - 2.0 MMOL/L   MAGNESIUM    Collection Time: 03/28/19  6:56 PM   Result Value Ref Range    Magnesium 2.4 1.6 - 2.6 mg/dL   PHOSPHORUS    Collection Time: 03/28/19  6:56 PM   Result Value Ref Range    Phosphorus 5.3 (H) 2.5 - 4.9 MG/DL   CBC WITH AUTOMATED DIFF    Collection Time: 03/28/19  6:56 PM   Result Value Ref Range    WBC 4.8 4.6 - 13.2 K/uL    RBC 2.94 (L) 4.70 - 5.50 M/uL    HGB 8.3 (L) 13.0 - 16.0 g/dL    HCT 25.4 (L) 36.0 - 48.0 %    MCV 86.4 74.0 - 97.0 FL    MCH 28.2 24.0 - 34.0 PG    MCHC 32.7 31.0 - 37.0 g/dL    RDW 18.6 (H) 11.6 - 14.5 %    PLATELET 94 (L) 074 - 420 K/uL    MPV 11.1 9.2 - 11.8 FL    NEUTROPHILS 78 (H) 40 - 73 %    LYMPHOCYTES 13 (L) 21 - 52 %    MONOCYTES 9 3 - 10 %    EOSINOPHILS 0 0 - 5 %    BASOPHILS 0 0 - 2 %    ABS. NEUTROPHILS 3.8 1.8 - 8.0 K/UL    ABS. LYMPHOCYTES 0.6 (L) 0.9 - 3.6 K/UL    ABS. MONOCYTES 0.4 0.05 - 1.2 K/UL    ABS. EOSINOPHILS 0.0 0.0 - 0.4 K/UL    ABS.  BASOPHILS 0.0 0.0 - 0.1 K/UL    DF AUTOMATED     CARDIAC PANEL,(CK, CKMB & TROPONIN)    Collection Time: 03/28/19 10:05 PM   Result Value Ref Range     39 - 308 U/L    CK - MB 6.5 (H) <3.6 ng/ml    CK-MB Index 5.4 (H) 0.0 - 4.0 %    Troponin-I, QT 0.05 (H) 0.0 - 0.045 NG/ML   GLUCOSE, POC    Collection Time: 03/28/19 11:23 PM   Result Value Ref Range    Glucose (POC) 56 (L) 70 - 110 mg/dL   GLUCOSE, POC    Collection Time: 03/28/19 11:46 PM   Result Value Ref Range    Glucose (POC) 140 (H) 70 - 110 mg/dL   GLUCOSE, POC    Collection Time: 03/29/19 12:27 AM   Result Value Ref Range    Glucose (POC) 106 70 - 110 mg/dL   MAGNESIUM    Collection Time: 03/29/19  2:28 AM   Result Value Ref Range    Magnesium 2.2 1.6 - 2.6 mg/dL   CBC WITH AUTOMATED DIFF    Collection Time: 03/29/19  2:28 AM   Result Value Ref Range    WBC 5.2 4.6 - 13.2 K/uL    RBC 2.81 (L) 4.70 - 5.50 M/uL    HGB 8.0 (L) 13.0 - 16.0 g/dL    HCT 24.4 (L) 36.0 - 48.0 %    MCV 86.8 74.0 - 97.0 FL    MCH 28.5 24.0 - 34.0 PG    MCHC 32.8 31.0 - 37.0 g/dL    RDW 18.7 (H) 11.6 - 14.5 %    PLATELET 92 (L) 192 - 420 K/uL    MPV 12.2 (H) 9.2 - 11.8 FL    NEUTROPHILS 77 (H) 42 - 75 %    LYMPHOCYTES 17 (L) 20 - 51 %    MONOCYTES 6 2 - 9 %    EOSINOPHILS 0 0 - 5 %    BASOPHILS 0 0 - 3 %    NRBC 2.0 (H) 0  WBC    ABS. NEUTROPHILS 4.0 1.8 - 8.0 K/UL    ABS. LYMPHOCYTES 0.9 0.8 - 3.5 K/UL    ABS. MONOCYTES 0.3 0 - 1.0 K/UL    ABS. EOSINOPHILS 0.0 0.0 - 0.4 K/UL    ABS. BASOPHILS 0.0 0.0 - 0.06 K/UL    DF MANUAL      PLATELET COMMENTS DECREASED PLATELETS      RBC COMMENTS ANISOCYTOSIS  1+        RBC COMMENTS POLYCHROMASIA  1+        RBC COMMENTS HYPOCHROMIA  1+        RBC COMMENTS TARGET CELLS  1+       LIPID PANEL    Collection Time: 03/29/19  2:28 AM   Result Value Ref Range    LIPID PROFILE          Cholesterol, total 160 <200 MG/DL    Triglyceride 69 <150 MG/DL    HDL Cholesterol 58 40 - 60 MG/DL    LDL, calculated 88.2 0 - 100 MG/DL    VLDL, calculated 13.8 MG/DL    CHOL/HDL Ratio 2.8 0 - 5.0     METABOLIC PANEL, COMPREHENSIVE    Collection Time: 03/29/19  2:28 AM   Result Value Ref Range    Sodium 141 136 - 145 mmol/L    Potassium 4.6 3.5 - 5.5 mmol/L    Chloride 116 (H) 100 - 108 mmol/L    CO2 20 (L) 21 - 32 mmol/L    Anion gap 5 3.0 - 18 mmol/L    Glucose 69 (L) 74 - 99 mg/dL    BUN 26 (H) 7.0 - 18 MG/DL    Creatinine 3.66 (H) 0.6 - 1.3 MG/DL    BUN/Creatinine ratio 7 (L) 12 - 20      GFR est AA 21 (L) >60 ml/min/1.73m2    GFR est non-AA 17 (L) >60 ml/min/1.73m2    Calcium 8.0 (L) 8.5 - 10.1 MG/DL    Bilirubin, total 0.3 0.2 - 1.0 MG/DL    ALT (SGPT) 11 (L) 16 - 61 U/L    AST (SGOT) 18 15 - 37 U/L    Alk.  phosphatase 75 45 - 117 U/L    Protein, total 6.0 (L) 6.4 - 8.2 g/dL    Albumin 2.2 (L) 3.4 - 5.0 g/dL    Globulin 3.8 2.0 - 4.0 g/dL    A-G Ratio 0.6 (L) 0.8 - 1.7     PHOSPHORUS    Collection Time: 03/29/19  2:28 AM   Result Value Ref Range    Phosphorus 5.0 (H) 2.5 - 4.9 MG/DL   URIC ACID Collection Time: 03/29/19  2:28 AM   Result Value Ref Range    Uric acid 7.3 (H) 2.6 - 7.2 MG/DL   CK    Collection Time: 03/29/19  2:28 AM   Result Value Ref Range     39 - 308 U/L   VANCOMYCIN, RANDOM    Collection Time: 03/29/19  2:28 AM   Result Value Ref Range    Vancomycin, random 27.3 5.0 - 40.0 UG/ML   VITAMIN B12 & FOLATE    Collection Time: 03/29/19  2:28 AM   Result Value Ref Range    Vitamin B12 746 211 - 911 pg/mL    Folate 18.8 (H) 3.10 - 17.50 ng/mL   LIPID PANEL    Collection Time: 03/29/19  2:28 AM   Result Value Ref Range    LIPID PROFILE          Cholesterol, total 142 <200 MG/DL    Triglyceride 69 <150 MG/DL    HDL Cholesterol 58 40 - 60 MG/DL    LDL, calculated 70.2 0 - 100 MG/DL    VLDL, calculated 13.8 MG/DL    CHOL/HDL Ratio 2.4 0 - 5.0     GLUCOSE, POC    Collection Time: 03/29/19  2:42 AM   Result Value Ref Range    Glucose (POC) 76 70 - 110 mg/dL   GLUCOSE, POC    Collection Time: 03/29/19  4:42 AM   Result Value Ref Range    Glucose (POC) 62 (L) 70 - 110 mg/dL   GLUCOSE, POC    Collection Time: 03/29/19  5:00 AM   Result Value Ref Range    Glucose (POC) 145 (H) 70 - 110 mg/dL   GLUCOSE, POC    Collection Time: 03/29/19  5:48 AM   Result Value Ref Range    Glucose (POC) 103 70 - 110 mg/dL   GLUCOSE, POC    Collection Time: 03/29/19  8:13 AM   Result Value Ref Range    Glucose (POC) 74 70 - 110 mg/dL   GLUCOSE, POC    Collection Time: 03/29/19 10:31 AM   Result Value Ref Range    Glucose (POC) 67 (L) 70 - 110 mg/dL   GLUCOSE, POC    Collection Time: 03/29/19 11:51 AM   Result Value Ref Range    Glucose (POC) 133 (H) 70 - 110 mg/dL           No results for input(s): FIO2I, IFO2, HCO3I, IHCO3, HCOPOC, PCO2I, PCOPOC, IPHI, PHI, PHPOC, PO2I, PO2POC in the last 72 hours.     No lab exists for component: IPOC2    Telemetry:normal sinus rhythm    Imaging:  I have personally reviewed the patients radiographs and have reviewed the reports:    CXR [date]:  CXR Results  (Last 48 hours) 03/29/19 1128  XR CHEST PORT Final result    Impression:  Impression:     Similar interstitial edema and bilateral pleural effusions with overlying   atelectasis. Superimposed alveolar edema/infiltrate not excluded. Narrative:  AP CHEST, PORTABLE       INDICATION: Vomiting, sepsis       COMPARISON: Prior chest x-rays, most recent 3/28/2019       FINDINGS:  EKG leads overlie the patient. Cardiac silhouette is stable. Similar bilateral interstitial edema. Similar   bibasilar hazy opacities with obscuration of the costophrenic sulci. No   pneumothorax. No acute osseous abnormalities are identified. Cervical spine   hardware noted. 03/28/19 1354  XR CHEST PORT Final result    Impression:  IMPRESSION: Moderate vascular congestion with interstitial edema,   infiltrate/atelectasis in the lung bases and small bilateral pleural effusion. Narrative:  Portable CXR:       Comparison April 3, 2018       HISTORY: Pleural effusion and CHF. Mild cardiomegaly with moderate vascular congestion. Patchy density in the lung   bases with interstitial edema. Small bilateral pleural effusion suspected. Likely skinfold along the left lateral lower chest.                   CT HEAD/CHEST/ABD/PELVIS [date]:  CT Results  (Last 48 hours)               03/28/19 1707  CT HEAD WO CONT Final result    Impression:  IMPRESSION:   No CT evidence of acute intracranial process. Please note that MRI is more   sensitive for ischemia in the first 24 to 48 hours. Findings were discussed with   Dr. John Becerril on 3/28/19 at 17:19 hours. Narrative:  EXAM: CT HEAD WO CONT       INDICATION: 59 years Male. Confusion/delirium, altered LOC, unexplained. ADDITIONAL HISTORY: None,       TECHNIQUE: CT of the head from the vertex to the skull base performed. No IV   contrast administered.        All CT scans at this facility are performed using dose optimization technique as   appropriate to a performed exam, to include automated exposure control,   adjustment of the mA and/or kV according to patient size (including appropriate   matching for site specific examination) or use of iterative reconstruction   technique. COMPARISON: CT head without contrast 7/31/2011       FINDINGS:        Assessment of the posterior fossa and skull base is mild to moderately degraded   due to motion. No evidence of acute intra-axial or extra-axial hemorrhage. Parenchymal volume is within normal limits for age. The ventricles and sulci are   concordant with the parenchymal volume. There is no midline shift or mass   effect. The gray-white junction is preserved. Mild right sphenoid sinus and   scattered ethmoid air cell mucosal thickening. The visualized mastoid air cells   are unremarkable. The surrounding soft tissues are unremarkable. 03/28/19 0351  CT ABD PELV WO CONT Final result    Impression:  IMPRESSION:       Bladder with suprapubic catheter and mild wall thickening. Correlate clinically   for UTI/cystitis. -Gallbladder with wall slightly thickened and/or pericholecystic fluid. Consider   sonogram if warranted. -Probable right renal cyst as above. Liver with perhaps mild periportal edema, which may be seen with hepatocellular   pathology or volume overload.   -Small amount of pelvic free fluid. Small bilateral pleural effusions. Superficial body wall edema. Narrative:  EXAMINATION: CT abdomen/pelvis without contrast       INDICATION: Hydronephrosis, oliguria       COMPARISON: 1/10/2019       TECHNIQUE: CT of the abdomen and pelvis performed without contrast, with   multiplanar reformations obtained.  All CT scans at this facility are performed   using dose optimization technique as appropriate to a performed exam, to include   automated exposure control, adjustment of the mA and/or kV according to patient   size (including appropriate matching first site specific examinations), or use   of iterative reconstruction technique. FINDINGS:       Evaluation of soft tissues and vessels limited without vascular enhancement. Streak artifact from arms at side further limits evaluation. Lower thorax: Small bilateral pleural effusions with probable underlying   compressive atelectasis. Right anterior middle lobe mild subpleural   reticulation. Hepatobiliary: Liver with evidence of periportal edema. No suspicious hepatic   lesions. Gallbladder with wall thickening and/or pericholecystic fluid   suspected. Pancreas: Unremarkable. Spleen: Unremarkable. Adrenal glands: Unremarkable. Genitourinary: Right kidney with a probable 2.9 x 2.6 cm lobulated cyst in the   mid kidney. Left kidney unremarkable without hydronephrosis. Bladder   nondistended with suprapubic catheter and suggestion of mild wall thickening. Prostate unremarkable. Gastrointestinal: Stomach unremarkable. Small bowel loops are nondilated. The   colon is nondilated. Appendix normal.       Mesentery/vessels/nodes: No free air. Small amount of pelvic free fluid. No   adenopathy by size criteria. Miscellaneous: Extensive superficial body wall edema. Bones: Advanced lower lumbar degenerative changes. Chronic appearing heterotopic   ossification in the region of the left gluteus medius. Total of     min critical care time spent at bedside during the course of care providing evaluation,management and care decisions and ordering appropriate treatment related to critical care problems exclusive of procedures. The reason for providing this level of medical care for this critically ill patient was due a critical illness that impaired one or more vital organ systems such that there was a high probability of imminent or life threatening deterioration in the patients condition.  This care involved high complexity decision making to assess, manipulate, and support vital system functions, to treat this degree vital organ system failure and to prevent further life threatening deterioration of the patients condition.     Shila Jenkins PA-C

## 2019-03-29 NOTE — PROGRESS NOTES
Progress Note    Patricia Hawthorne  59 y.o. Admit Date: 3/27/2019  Active Problems:    Oliguria (3/28/2019) POA: Unknown      Renal injury (3/28/2019) POA: Unknown      Suprapubic catheter ([facility] [address] (3/28/2019) POA: Unknown      Bradycardia, sinus (3/28/2019) POA: Unknown      Acute UTI (3/28/2019) POA: Unknown      Type 2 diabetes mellitus with hypoglycemia ([address] (3/28/2019) POA: Unknown      Acute renal failure (ARF) ([address] (3/28/2019) POA: Unknown            Subjective:     Patient is tired looking , responds to command, Hypothermic,hypglycemic, now Upgraded to ICU for Hypoglycemia. Making Urine without Lasix, Chest x-ray mostly pulmonary edema, still may have Pneumonia. Urine culture has Gm negative rods & Yeast.No Sob, on Bear Hugger for Hypothermia,had vomiting this morning. A comprehensive review of systems was negative except for that written in the History of Present Illness.     Objective:     Visit Vitals  /58 (BP 1 Location: Right arm, BP Patient Position: At rest)   Pulse (!) 56   Temp 98 °F (36.7 °C)   Resp 9   Ht 6' (1.829 m)   Wt 80.2 kg (176 lb 12.9 oz)   SpO2 100%   BMI 23.98 kg/m²         Intake/Output Summary (Last 24 hours) at 3/29/2019 1244  Last data filed at 3/29/2019 1200  Gross per 24 hour   Intake 772.08 ml   Output 775 ml   Net -2.92 ml       Current Facility-Administered Medications   Medication Dose Route Frequency Provider Last Rate Last Dose    PHENYLephrine (ANGELITA-SYNEPHRINE) 1 mg/10 mL (100 mcg/mL) 100 mcg/mL in NS syringe             metroNIDAZOLE (FLAGYL) IVPB premix 500 mg  500 mg IntraVENous Q12H Linda Ramirez PA-C 100 mL/hr at 03/29/19 1153 500 mg at 03/29/19 1153    thiamine (B-1) 200 mg in 0.9% sodium chloride 50 mL IVPB  200 mg IntraVENous Q8H Linda Ramirez PA-C        folic acid (FOLVITE) 1 mg in 0.9% sodium chloride 50 mL ivpb   IntraVENous Q24H Linda Ramirez PA-C        dextrose 10% infusion  25 mL/hr IntraVENous CONTINUOUS Tee Rios MD 25 mL/hr at 03/29/19 1107 25 mL/hr at 03/29/19 1107    sodium chloride (NS) flush 5-10 mL  5-10 mL IntraVENous ANDRÉS Mccabe MD        insulin lispro (HUMALOG) injection   SubCUTAneous Q6H Keenan Mccabe MD   Stopped at 03/28/19 1200    glucose chewable tablet 16 g  4 Tab Oral PRN Keenan Mccabe MD        glucagon (GLUCAGEN) injection 1 mg  1 mg IntraMUSCular PRTRAVIS Mccabe MD        dextrose (D50W) injection syrg 12.5-25 g  25-50 mL IntraVENous PRTRAVIS Mccabe MD   25 g at 03/29/19 1104    tamsulosin (FLOMAX) capsule 0.4 mg  0.4 mg Oral DAILY Keenan Mccabe MD   Stopped at 03/28/19 0900    acetaminophen (TYLENOL) tablet 650 mg  650 mg Oral Q6H PRTRAVIS Mccabe MD        oxyCODONE-acetaminophen (PERCOCET) 5-325 mg per tablet 1 Tab  1 Tab Oral Q6H PRTRAVIS Mccabe MD        [facility]) injection 0.4 mg  0.4 mg IntraVENous PRTRAVIS Mccabe MD        ondansetron [facility]) injection 4 mg  4 mg IntraVENous Q6H ANDRÉS Mccabe MD   4 mg at 03/29/19 1671    docusate sodium (COLACE) capsule 100 mg  100 mg Oral BID PRN Keenan Mccabe MD        heparin (porcine) injection 5,000 Units  5,000 Units SubCUTAneous Sidra Lr MD   5,000 Units at 03/29/19 0612    levothyroxine (SYNTHROID) tablet 100 mcg  100 mcg Oral 6am Elizabeth Aquino MD        pantoprazole (PROTONIX) injection 40 mg  40 mg IntraVENous Q12H Mila Aquino MD   40 mg at 03/29/19 1107    cefepime (MAXIPIME) 2 g in sterile water (preservative free) 10 mL IV syringe  2 g IntraVENous Q24H Elizabeth Aquino MD   2 g at 03/28/19 1842    VANCOMYCIN INFORMATION NOTE   Other Rx Dosing/Monitoring Elizabeth Aquino MD            Physical Exam:     Physical Exam:   General:  Alert, cooperative, no distress, appears stated age. , slow to respond.    Mouth/Throat: Lips, mucosa, and tongue normal. Teeth and gums normal.   Neck: Supple, symmetrical, trachea midline, no adenopathy, thyroid: no enlargement/tenderness/nodules, no carotid bruit and no JVD. Lungs:   Clear to auscultation bilaterally. Heart:  Regular rate and rhythm, S1, S2 normal, no murmur, click, rub or gallop. Abdomen:   Soft, non-tender. Bowel sounds normal. No masses,  No organomegaly. Extremities: Extremities normal, atraumatic, no cyanosis ,has edema. Skin: Skin color, texture, turgor normal. No rashes or lesions         Data Review:    CBC w/Diff    Recent Labs     03/29/19 0228 03/28/19 1856 03/28/19  1115   WBC 5.2 4.8 3.9*   RBC 2.81* 2.94* 2.77*   HGB 8.0* 8.3* 8.0*   HCT 24.4* 25.4* 24.1*   MCV 86.8 86.4 87.0   MCH 28.5 28.2 28.9   MCHC 32.8 32.7 33.2   RDW 18.7* 18.6* 18.7*    Recent Labs     03/29/19 0228 03/28/19 1856 03/28/19  1115   MONOS 6 9 11*   EOS 0 0 0   BASOS 0 0 0   RDW 18.7* 18.6* 18.7*        Comprehensive Metabolic Profile    Recent Labs     03/29/19 0228 03/28/19  1529 03/28/19  1115    140 139   K 4.6 4.9 4.6   * 114* 115*   CO2 20* 21 20*   BUN 26* 24* 23*   CREA 3.66* 3.50* 3.34*    Recent Labs     03/29/19 0228 03/28/19 1856 03/28/19  1529 03/28/19  1115 03/27/19  2135   CA 8.0*  --  7.9* 8.2* 8.9   PHOS 5.0* 5.3*  --   --   --    ALB 2.2*  --   --   --  2.8*   TP 6.0*  --   --   --  7.3   SGOT 18  --   --   --  20   TBILI 0.3  --   --   --  0.3        INDICATION: Vomiting, sepsis     COMPARISON: Prior chest x-rays, most recent 3/28/2019     FINDINGS:  EKG leads overlie the patient.     Cardiac silhouette is stable. Similar bilateral interstitial edema. Similar  bibasilar hazy opacities with obscuration of the costophrenic sulci. No  pneumothorax. No acute osseous abnormalities are identified. Cervical spine  hardware noted.     IMPRESSION  Impression:    Similar interstitial edema and bilateral pleural effusions with overlying  atelectasis. Superimposed alveolar edema/infiltrate not excluded.               Impression:       C/Marianela Chaves 1669 Problems    Diagnosis Date Noted    Oliguria 03/28/2019    Renal injury 03/28/2019    Suprapubic catheter ([facility] [address] 03/28/2019    Bradycardia, sinus 03/28/2019    Acute UTI 03/28/2019    Type 2 diabetes mellitus with hypoglycemia ([facility] [address] 03/28/2019    Acute renal failure (ARF) ([facility] [address] 03/28/2019    Hypoglycemia. Anemia. Plan:   continue D!0 at 25 cc/hour,check C-Peptide, hold Lasix, making Urine, Change Suprapubic cathete, Real US ,R/O any Hydronephrosis,Septic looking Picture, can not R/O ATN, his Urine chemistry goes with CRF, Proteinuria from DM.  Needs Anemia work up.renata Morillo MD

## 2019-03-29 NOTE — PROGRESS NOTES
Progress Note    Patient: Delvin Hearn MRN: [id number]  CSN: [id number]    YOB: 1960  Age: 59 y.o. Sex: male    DOA: 3/27/2019 LOS:  LOS: 1 day                    Subjective:     Chief Complaint:   Chief Complaint   Patient presents with    Urinary Catheter Problem    Abdominal Pain     Patient transferred to ICU on stepdown status due to persistent hypothermia and bradycardia, altered mentation from baseline concern for sepsis. Patient with chronic bradycardia at baseline HR 50-60s. Questionable autonomic dysfunction, patient has reported history of low temp since history of c5/6 discitis/Osteomyelitis with resulting quadriplegia 4/2017. Patient had worsened mental status overnight, became unresponsive, rapid response called, concern for CVA patient had CT head neg for CVA. Hypoglycemia during episode. Teleneurology was consulted recommended MRI/MRA, patient taken to scanner testing not completed due to patient inability to tolerate. Mental status improved with increased glucose level. Patient has had improved UOP with lasix however Cr increasing. Nephrology is evaluating urine studies, will follow up regarding recommendation for diuresis vs IVF. Recommend eval Suprapubic cath to ensure not blocked. Urology evaluated patient for cath, functioning appropriately, no evidence for obstruction. With pt hx of candida UTI previously recommended coverage with fluconazole. Cardiology report likely SHEILA due to renal disease, defer decision for diuretic to nephrology. CXR with questionable infiltrate vs atelectasis, pt with hx MRSA osteomyelitis previously, on chronic doxycycline suppressive therapy, ABT spectrum broadened to include MRSA coverage. Review of systems limited by mental status  General: No fevers or chills. Cardiovascular: No chest pain or pressure. No palpitations.    Pulmonary: No sob, pt is noted to be coughing intermittently during exam  Gastrointestinal: No abdominal pain today. Denies nausea, vomiting or diarrhea. Genitourinary: suprapubic cath   Musculoskeletal: chronic back pain  Neurologic: confused. Chronic bilateral upper extremity weakness due to hx of C5/6 discitis/osteomyelitis 2017    Objective:     Physical Exam:  Visit Vitals  /76   Pulse (!) 54   Temp 96.4 °F (35.8 °C)   Resp 8   Ht 6' (1.829 m)   Wt 80.2 kg (176 lb 12.9 oz)   SpO2 100%   BMI 23.98 kg/m²        General:         Alert, no acute distress, intermittently participates with exam  HEENT: NC, Atraumatic. PERRLA, anicteric sclerae. Lungs: CTA Bilaterally however poor effort, no appreciable Wheezing/Rhonchi/Rales. Heart:  Regular  rhythm,  No murmur, No Rubs, No Gallops  Abdomen: Soft, Non distended, Non tender.  +Bowel sounds, no HSM  Extremities: Patient has pitting edema to level of the thighs bilaterally  Psych:   Confused, able to participate with exam intermittently, falls asleep partially through exam  Neurologic:  CN 2-12 grossly intact, Alert and oriented X 1 will say his name, when asked if he knows where he is says yes but does not say anymore. CHronic upper extremity weakness, not acute. Does move both lower extremites to instruction. Reports sensation to light touch intact. Intake and Output:  Current Shift:  No intake/output data recorded.   Last three shifts:  03/27 1901 - 03/29 0700  In: 550 [I.V.:550]  Out: [address] [Urine:775]    Labs: Results:       Chemistry Recent Labs     03/29/19  0228 03/28/19  1529 03/28/19  1115 03/27/19  2135   GLU 69* 71* 64* 63*    140 139 143   K 4.6 4.9 4.6 4.8   * 114* 115* 114*   CO2 20* 21 20* 22   BUN 26* 24* 23* 22*   CREA 3.66* 3.50* 3.34* 3.25*   CA 8.0* 7.9* 8.2* 8.9   AGAP 5 5 4 7   BUCR 7* 7* 7* 7*   AP 75  --   --  98   TP 6.0*  --   --  7.3   ALB 2.2*  --   --  2.8*   GLOB 3.8  --   --  4.5*   AGRAT 0.6*  --   --  0.6*      CBC w/Diff Recent Labs     03/29/19  0228 03/28/19  1856 03/28/19  1115   WBC 5.2 4.8 3.9* RBC 2.81* 2.94* 2.77*   HGB 8.0* 8.3* 8.0*   HCT 24.4* 25.4* 24.1*   PLT 92* 94* 88*   GRANS 77* 78* 64   LYMPH 17* 13* 25   EOS 0 0 0      Cardiac Enzymes Recent Labs     03/29/19 0228 03/28/19  2205 03/28/19  1529    120 129   CKND1  --  5.4* 6.0*      Coagulation Recent Labs     03/28/19  1115   PTP 12.8   INR 1.0       Lipid Panel Lab Results   Component Value Date/Time    Cholesterol, total 160 03/29/2019 02:28 AM    Cholesterol, total 142 03/29/2019 02:28 AM    HDL Cholesterol 58 03/29/2019 02:28 AM    HDL Cholesterol 58 03/29/2019 02:28 AM    LDL, calculated 88.2 03/29/2019 02:28 AM    LDL, calculated 70.2 03/29/2019 02:28 AM    VLDL, calculated 13.8 03/29/2019 02:28 AM    VLDL, calculated 13.8 03/29/2019 02:28 AM    Triglyceride 69 03/29/2019 02:28 AM    Triglyceride 69 03/29/2019 02:28 AM    CHOL/HDL Ratio 2.8 03/29/2019 02:28 AM    CHOL/HDL Ratio 2.4 03/29/2019 02:28 AM      BNP No results for input(s): BNPP in the last 72 hours.    Liver Enzymes Recent Labs     03/29/19 0228   TP 6.0*   ALB 2.2*   AP 75   SGOT 18      Thyroid Studies Lab Results   Component Value Date/Time    TSH 2.65 03/28/2019 06:57 AM          Procedures/imaging: see electronic medical records for all procedures/Xrays and details which were not copied into this note but were reviewed prior to creation of Plan    Medications:   Current Facility-Administered Medications   Medication Dose Route Frequency    PHENYLephrine (ANGELITA-SYNEPHRINE) 1 mg/10 mL (100 mcg/mL) 100 mcg/mL in NS syringe        sodium chloride (NS) flush 5-10 mL  5-10 mL IntraVENous PRN    insulin lispro (HUMALOG) injection   SubCUTAneous Q6H    glucose chewable tablet 16 g  4 Tab Oral PRN    glucagon (GLUCAGEN) injection 1 mg  1 mg IntraMUSCular PRN    dextrose (D50W) injection syrg 12.5-25 g  25-50 mL IntraVENous PRN    tamsulosin (FLOMAX) capsule 0.4 mg  0.4 mg Oral DAILY    acetaminophen (TYLENOL) tablet 650 mg  650 mg Oral Q6H PRN    oxyCODONE-acetaminophen (PERCOCET) 5-325 mg per tablet 1 Tab  1 Tab Oral Q6H PRN    naloxone (NARCAN) injection 0.4 mg  0.4 mg IntraVENous PRN    ondansetron (ZOFRAN) injection 4 mg  4 mg IntraVENous Q6H PRN    docusate sodium (COLACE) capsule 100 mg  100 mg Oral BID PRN    heparin (porcine) injection 5,000 Units  5,000 Units SubCUTAneous Q8H    levothyroxine (SYNTHROID) tablet 100 mcg  100 mcg Oral 6am    pantoprazole (PROTONIX) injection 40 mg  40 mg IntraVENous Q12H    cefepime (MAXIPIME) 2 g in sterile water (preservative free) 10 mL IV syringe  2 g IntraVENous Q24H    VANCOMYCIN INFORMATION NOTE   Other Rx Dosing/Monitoring       Assessment/Plan     Active Problems:    Oliguria (3/28/2019)      Renal injury (3/28/2019)      Suprapubic catheter ([address] (3/28/2019)      Bradycardia, sinus (3/28/2019)      Acute UTI (3/28/2019)      Type 2 diabetes mellitus with hypoglycemia ([address] (3/28/2019)      Acute renal failure (ARF) ([address] (3/28/2019)    SHEILA on CKD stage 2-3, likely from acute on chronic diastolic CHF; possibly due to progressive CKD  - Cardiology consulted, Dr. Eb Kennedy, discussed with PADMAJA Kemp and Dr. Eb Kennedy, believe hypervolemia is related to progressive renal disease, do not think this is from CHF, defer diuretics to nephrology.   - nephrology consulted, discussed with Dr. Nic Mccormick, recommends lasix 40mg IV daily  - CT Abd with pelvic fluid, pleural effusion  - CXR cardiomegaly and pulm edema, questionable infiltrate vs atelectasis  - BNP elevated from baseline  - cardiac markers flat/indeterminant, monitoring on telemetry  - monitor BMP     HTN, HLD, bradycardia chronic HR 40-50s   - holding amlodipine for now  -  HDL 58 LDL 88 TG 69      Possible UTI sepsis given hypothermia and low WBC; possible autonomic dysfunction hx discitis pt has reported chronic low temps in past  - lactic acid normal x2   - continues cefepime, vancomycin added patient has history of MRSA bacteremia/discitis on chronic suppressive therapy with doxycycline as outpatient  - f/u blood cultures 3/28/19  - f/u urine culture 3/27/19  - follow up Urology consulted, Suprapubic cath functioning appropriately, no sign of obstruction, recommended to consider addition of fluconazole due to history of candida UTI recently  - hx of aspiration, recent hospitalization with PNA, ST consult for swallow eval  - Will consult ID given patient complex history and multiple ABT use recently, discussed with Dr. Burnetta Goltz, will see patient and provide recs     Metabolic possible Infectious Encephalopathy in setting of SHEILA, Sepsis, hypoglycemia  - Pt had RRT last night, evaluated for CVA, mentation improved with increased glucose. Teleneurolgoy recommended MRI/MRA, pt did go for scan however unable to tolerate was not completed.   - spoke with Dr. Parminder Quintanilla today, will evaluate patinet and give recs  - consider narcan if worsens due to hx drug abuse     Hx of recent GIB  - IV protonix until mentation improves, then may transition to po     Abdominal Pain, CT abdomen Gallbladder with wall slightly thickened and/or pericholecystic fluid  Abdominal pain resolved this am, RUQ US 3/28/19 no acute cholecystitis  Pt with n/v per nursing staff this am, add zofran     Hep C, Cirrhosis  Discussed with pt sister, he was to treated for Hep C and reports completed treatment  check Hep C Quant to confirm  Check INR     DM2  Hypoglycemic on admission, A1c 5.3, will hold home medications  - monitor glucose, d50 prn, consider low dose IVF with D5 however given volume overload will discuss with nephrology     Hypothyroidism  - pt was recently increased on synthroid to 100mcg at prior hospitalization  - TFTs within normal limits currently     Anemia chronic disease, Recent Acute blood loss anemia from UGIB EGD neg for source, chronic thrombocytopenia  - monitor, transfuse to keep hgb >7 if needed, monitor plt count, followed by Dr. Srinivas Patel as outpatient     History of Drug Abuse, Reported heroin use during recent hospitalization this month, ongoing Tob use, Etoh Use  - discussed with sister, she is unaware of his drug use habits, did confirm he had admitted to prior provider in last hospitalization to heroin use, does not know if he uses IV drugs currently or not. Has past history of IVDA. - UDS + for opiods  - acute hepatitis panel neg for Hep A&B, +hep c( known)  - HIV pending  - discussed risks, recommended to quit     Poor compliance with follow up appointments, Increasingly worsening functional status with mutiple recent hospitalizations, lives with sister feels cannot provide necessary level of care despite he does have home care aid. - pt would benefit from SNF, has declined in past will continue to address         CODE:  Full. Discussed with sister who reports she is MPOA for patient. Pt currently unable to make his own decisions although baseline is AOx3 and able to be decision maker.     DVT/GI Prophylaxis: Hep SQ, SCD's and H2B/PPI     Patient is critically ill, currently on stepdown status. Discussed with Dr. Mita Ramsey ICU will evaluate if patient needs to upgrade to ICU status.     Juan Carlos Krueger MD  3/29/2019 [id number]

## 2019-03-29 NOTE — CONSULTS
Rosalina Palumbo is a 59 y.o., unknown handed male, with an established history of urinary retention with a suprapubic catheter, renal Cell carcinoma, status post left partial nephrectomy, chronic kidney disease, status post cervical laminectomy, history of heroin abuse, diabetes type 2, hepatitis C, who was admitted to the hospital because of a decline in his functional status. Apparently he was thought to possibly be having a septic situation. He was therefore admitted. He was noted to have poor urine output in spite of being given Lasix. Always here in the hospital he was noted to have profound hypoglycemia and changes in mentation. In spite of his blood sugars being brought back up he still had changes in mentation and I have been asked to see him for this. Social History; patient is . Lives at the nursing home until recently now staying with his sister. Smokes half a pack of cigarettes per day. Denies any alcohol. Prior drug abuse. Works at [employer]. Family History; mother has diabetes father had sickle disease. His sister is with high blood pressure and diabetes.     Current Facility-Administered Medications   Medication Dose Route Frequency Provider Last Rate Last Dose    PHENYLephrine (ANGELITA-SYNEPHRINE) 1 mg/10 mL (100 mcg/mL) 100 mcg/mL in NS syringe             metroNIDAZOLE (FLAGYL) IVPB premix 500 mg  500 mg IntraVENous Q12H Linda Ramirez PA-C 100 mL/hr at 03/29/19 1153 500 mg at 03/29/19 1153    thiamine (B-1) 200 mg in 0.9% sodium chloride 50 mL IVPB  200 mg IntraVENous Q8H Linda Ramirez PA-C        folic acid (FOLVITE) 1 mg in 0.9% sodium chloride 50 mL ivpb   IntraVENous Q24H Linda Ramirez PA-C        dextrose 10% infusion  25 mL/hr IntraVENous CONTINUOUS Vijay Dunn MD 25 mL/hr at 03/29/19 1107 25 mL/hr at 03/29/19 1107    sodium chloride (NS) flush 5-10 mL  5-10 mL IntraVENous ANDRÉS Hester MD        insulin lispro (HUMALOG) injection   SubCUTAneous Jean Paul Hester MD   Stopped at 03/28/19 1200    glucose chewable tablet 16 g  4 Tab Oral PRN Jennifer Valle MD        glucagon (GLUCAGEN) injection 1 mg  1 mg IntraMUSCular PRN Jennifer Valle MD        dextrose (D50W) injection syrg 12.5-25 g  25-50 mL IntraVENous PRN Jennifer Valle MD   25 g at 03/29/19 1104    tamsulosin (FLOMAX) capsule 0.4 mg  0.4 mg Oral DAILY Jennifer Valle MD   Stopped at 03/28/19 0900    acetaminophen (TYLENOL) tablet 650 mg  650 mg Oral Q6H PRN Jennifer Valle MD        oxyCODONE-acetaminophen (PERCOCET) 5-325 mg per tablet 1 Tab  1 Tab Oral Q6H PRN Jennifer Valle MD        [facility]) injection 0.4 mg  0.4 mg IntraVENous PRN Jennifer Valle MD        ondansetron [facility]) injection 4 mg  4 mg IntraVENous Q6H PRN Jennifer Valle MD   4 mg at 03/29/19 1940    docusate sodium (COLACE) capsule 100 mg  100 mg Oral BID PRN Jennifer Valle MD        heparin (porcine) injection 5,000 Units  5,000 Units SubCUTAneous Jean Paul Hester MD   5,000 Units at 03/29/19 0612    levothyroxine (SYNTHROID) tablet 100 mcg  100 mcg Oral 6am Chun Aquino Mc, MD        pantoprazole (PROTONIX) injection 40 mg  40 mg IntraVENous Q12H Mila Aquino MD   40 mg at 03/29/19 1107    cefepime (MAXIPIME) 2 g in sterile water (preservative free) 10 mL IV syringe  2 g IntraVENous Q24H Chun Aquino Mc, MD   2 g at 03/28/19 1842    VANCOMYCIN INFORMATION NOTE   Other Rx Dosing/Monitoring Chun Aquino Mc, MD           Past Medical History:   Diagnosis Date    Anemia     Bradycardia, unspecified 2018    Cervical discitis     MRSA    Chronic drug abuse ([facility] 1974    Chronic kidney disease     stage III    Diabetes ([facility] 01/1990    Diabetes mellitus ([facility]     Drug abuse ([facility]     Encephalopathy     GERD (gastroesophageal reflux disease)     Hypertension     Muscle weakness     [facility]13    Pathological Stage [id number] cc RCC FG3 s/p left open partial nephrectomy in 12/13      Sepsis due to methicillin resistant Staphylococcus aureus (HCC)     Thrombocytopenia (HCC)     Urethral erosion     Viral hepatitis C     Xerosis cutis        Past Surgical History:   Procedure Laterality Date    HX CIRCUMCISION  12/17/13    Dr. Payton Dandy, [facility]    HX NEPHRECTOMY Left 12/17/13    Open Partial, Dr. Payton Dandy, [facility]    HX OTHER SURGICAL Right 2/27/2016    removed right ft  2nd meta-tarsal    HX OTHER SURGICAL  04/2017    abscess removed from back of neck     MD REMOVAL WITH INSERTION OF SUPRAPUBIC CATHETER  2018       Allergies   Allergen Reactions    Iodine Hives       Patient Active Problem List   Diagnosis Code    Type II diabetes mellitus, uncontrolled ([facility] E11.65    Urinary retention R33.9    Chronic hepatitis C without hepatic coma (HCC) B18.2    Essential hypertension I10    IV drug abuse (HCC) F19.10    Quadriparesis ([facility] H93.29    Umbilical hernia P65.7    Bradycardia R00.1    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    Hyperkalemia E87.5    Hypercalcemia E83.52    Chronic kidney disease, stage III (moderate) (HCC) N18.3    Thrombocytopenia (HCC) D69.6    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) Z89.421    Chronic anemia D64.9    Chronic drug abuse ([facility] F19.10    Hypertension I10    Renal cell carcinoma of left kidney (HCC) C64.2    Urethral erosion N36.8    BPH (benign prostatic hyperplasia) N40.0    Bladder atony N31.2    Cerumen impaction H61.20    Chronic neck pain M54.2, G89.29    Cirrhosis of liver (HCC) K74.60    COPD, moderate (HCC) J44.9    Dry skin dermatitis L85.3    Elevated PSA R97.20    Generalized weakness R53.1    Hematuria R31.9    History of diabetes mellitus Z86.39    History of elevated lipids Z86.39    History of hay fever Z87.09    Hyperlipidemia E78.5    Hypothyroid E03.9    Lung nodules R91.8    Medication management Z79.899    Neck mass R22.1    Pericardial effusion I31.3    Preoperative clearance Z01.818    Prostate disorder N42.9    Recurrent UTI N39.0    Vitamin D deficiency E55.9    Oliguria R34    Renal injury S37.009A    Suprapubic catheter ([facility]) Z93.59    Bradycardia, sinus R00.1    Acute UTI N39.0    Type 2 diabetes mellitus with hypoglycemia (HCC) E11.649    Acute renal failure (ARF) ([facility]) N17.9         Review of Systems:   Could not be obtained from the patient because of his  PHYSICAL EXAMINATION:      VITAL SIGNS:    Visit Vitals  /58 (BP 1 Location: Right arm, BP Patient Position: At rest)   Pulse (!) 56   Temp 98 °F (36.7 °C)   Resp 9   Ht 6' (1.829 m)   Wt 80.2 kg (176 lb 12.9 oz)   SpO2 100%   BMI 23.98 kg/m²       GENERAL: The patient is well developed, well nourished, and in no apparent distress. EXTREMITIES: No clubbing, cyanosis, or edema is identified. Pulses 2+ and symmetrical.  Muscle tone is normal.  HEAD:   Ear, nose, and throat appear to be without trauma. The patient is normocephalic. NEUROLOGIC EXAMINATION    MENTAL STATUS: The patient is awake, alert, and oriented x 2. His mentation is very slow and he has very slow and halting speech. He is somewhat inattentive to the examiner. As noted during the examination was almost continuous twitching of the right side of his face as well as his right shoulder. CRANIAL NERVES: II - Visual fields are full to threat. Funduscopic examination reveals flat disks bilaterally. Pupils are both 3 mm and briskly reactive to light. III, IV, VI - Extraocular movements are intact and there is no nystagmus. V - Facial sensation is intact to pinprick and light touch. VII - Face is symmetrical.   VIII - Hearing is present. IX, X, XII- Palate rises symmetrically. Gag is present. Tongue is in the midline. XI - Shoulder shrugging and head turning intact  MOTOR:  The patient is seen small all 4 limbs fairly well.   He has equal strength in extremities. Reflexes are trace and symmetrical plantars are bilaterally mute. I could not asked him to ambulate at this time. No seizure activity was identified in his extremities except for his right shoulder. .       Final result (Exam End: 3/28/2019 17:07) Provider Status: Open   Study Result     EXAM: CT HEAD WO CONT     INDICATION: 59 years Male. Confusion/delirium, altered LOC, unexplained. ADDITIONAL HISTORY: None,     TECHNIQUE: CT of the head from the vertex to the skull base performed. No IV  contrast administered.     All CT scans at this facility are performed using dose optimization technique as  appropriate to a performed exam, to include automated exposure control,  adjustment of the mA and/or kV according to patient size (including appropriate  matching for site specific examination) or use of iterative reconstruction  technique. COMPARISON: CT head without contrast 7/31/2011     FINDINGS:      Assessment of the posterior fossa and skull base is mild to moderately degraded  due to motion. No evidence of acute intra-axial or extra-axial hemorrhage. Parenchymal volume is within normal limits for age. The ventricles and sulci are  concordant with the parenchymal volume. There is no midline shift or mass  effect. The gray-white junction is preserved. Mild right sphenoid sinus and  scattered ethmoid air cell mucosal thickening. The visualized mastoid air cells  are unremarkable. The surrounding soft tissues are unremarkable.     IMPRESSION  IMPRESSION:  No CT evidence of acute intracranial process. Please note that MRI is more  sensitive for ischemia in the first 24 to 48 hours. Findings were discussed with  Dr. Norberto June on 3/28/19 at 17:19 hours. I have reviewed the above imagines myself.        CBC:   Lab Results   Component Value Date/Time    WBC 5.2 03/29/2019 02:28 AM    RBC 2.81 (L) 03/29/2019 02:28 AM    HGB 8.0 (L) 03/29/2019 02:28 AM    HCT 24.4 (L) 03/29/2019 02:28 AM    PLATELET 92 (L) 63/85/5783 02:28 AM     BMP:   Lab Results   Component Value Date/Time    Glucose 69 (L) 03/29/2019 02:28 AM    Sodium 141 03/29/2019 02:28 AM    Potassium 4.6 03/29/2019 02:28 AM    Chloride 116 (H) 03/29/2019 02:28 AM    CO2 20 (L) 03/29/2019 02:28 AM    BUN 26 (H) 03/29/2019 02:28 AM    Creatinine 3.66 (H) 03/29/2019 02:28 AM    Calcium 8.0 (L) 03/29/2019 02:28 AM     CMP:   Lab Results   Component Value Date/Time    Glucose 69 (L) 03/29/2019 02:28 AM    Sodium 141 03/29/2019 02:28 AM    Potassium 4.6 03/29/2019 02:28 AM    Chloride 116 (H) 03/29/2019 02:28 AM    CO2 20 (L) 03/29/2019 02:28 AM    BUN 26 (H) 03/29/2019 02:28 AM    Creatinine 3.66 (H) 03/29/2019 02:28 AM    Calcium 8.0 (L) 03/29/2019 02:28 AM    Anion gap 5 03/29/2019 02:28 AM    BUN/Creatinine ratio 7 (L) 03/29/2019 02:28 AM    Alk. phosphatase 75 03/29/2019 02:28 AM    Protein, total 6.0 (L) 03/29/2019 02:28 AM    Albumin 2.2 (L) 03/29/2019 02:28 AM    Globulin 3.8 03/29/2019 02:28 AM    A-G Ratio 0.6 (L) 03/29/2019 02:28 AM     Coagulation:   Lab Results   Component Value Date/Time    Prothrombin time 12.8 03/28/2019 11:15 AM    INR 1.0 03/28/2019 11:15 AM    aPTT 36.9 (H) 04/09/2018 03:01 AM     Cardiac markers:   Lab Results   Component Value Date/Time     03/29/2019 02:28 AM    CK-MB Index 5.4 (H) 03/28/2019 10:05 PM          Impression: Persistent mentation changes in spite of having his sugar corrected in this man who has risk factors including found hypoglycemia multiple medical problems as listed above. It is quite possible that he has persistent mentation changes because of his prolonged hypoglycemia. Given his poor protoplasm and numerous medical problems it would not surprise me if it takes him a little bit to recover from his encephalopathy. Concerned because of the twitching he is having along with his mentation changes that he could be having focal seizures. Plan: Stat EEG will be arranged.   Agree with placing him on Keppra for now. MRI scan of the brain is appropriate to obtain. PLEASE NOTE:   This document has been produced using voice recognition software. Unrecognized errors in transcription may be present.

## 2019-03-29 NOTE — CONSULTS
Infectious Disease Consultation Note    Requested by: Dr. Bonnie Cervantes [place] Solid    Reason: sepsis, hypothermia, hypoglycemia, UTI    Current abx Prior abx   Cefepime, metronidazole, vancomycin since 3/28      Lines:       Assessment :    59 y.o.  male who has significant history for chronic urinary retention with suprapubic catheter placement, history of Renal Cell carcinoma post-left partial nephrectomy 12/2013 followed by Dr. Claudean Arabian urology, chronic kidney disease baseline Cr 1.6-2.2, history of c-spine laminectomy with post op paralysis, Heroin Drug Abuse, ongoing TOB use, DM2, anemia of chronic disease, thrombocytopenia, hepatitis C with cirrhosis followed by GI Dr. Yanelis Victor, [facility], Hypothyroidism admitted to [facility] on 3/28/19 with altered mental status. hospitalization at [facility] 3/7/19-3/15/19 for gi bleed, hypothermia, hypoglycemia, sepsis, pneumonia, uti     hospitalization [facility] 3/17/19-3/20/19 for hypoglycemia, hypothermia, uti    Now with acute renal failure, persistent hypoglycemia, altered mentation, seizures,hypothermia      After obtaining detailed history and exam; clinical probability of sepsis seems low. Patient has had recurrent hospitalization since 3/7/19 for hypoglycemia, hypothermia and I am concerned that he has undiagnosed hormonal deficiency causing this. Yeast in urine culture likely colonizer. Suprapubic cath exchanged 3/26. Urine cultures 3/7 had e.coli. Current urine culture 3/27 reveal 40,000 gram negative rods, >100,000 yeast.   Will continue empiric antibiotics for partially treated gram negative cystitis till further info obtained. Recommendations:    1. D/c metronidazole, vancomycin  2. Continue cefepime  3. Recommend endocrinology consult for hypothermia, hypoglycemia  4. F/u neurology recommendations  5. F/u id of gnr in urine cultures  6.  F/u clinically    Advance Care planning: full code: discussed  with patient/surrogate decision maker:  Diane Siaalvin: [phone]    Thank you for consultation request. Above plan was discussed in details with Dr. Fernando Lopez and dr Conrado Alberto. Please call me if any further questions or concerns. Will continue to participate in the care of this patient. HPI:    59 y.o.  male who has significant history for chronic urinary retention with suprapubic catheter placement, history of Renal Cell carcinoma post-left partial nephrectomy 12/2013 followed by Dr. Bunny Wright urology, chronic kidney disease baseline Cr 1.6-2.2, history of c-spine laminectomy with post op paralysis, Heroin Drug Abuse, ongoing TOB use, DM2 (last hgbA1C 5.3 on 3/27/19), anemia of chronic disease, thrombocytopenia, hepatitis C with cirrhosis followed by GI Dr. Giovanna Amador, [facility], Hypothyroidism admitted to [facility] on 3/28/19 with altered mental status. Patient has had mutiple hospitalizations over past month with decline in functional status but declining SNF placement. He was hospitalized at [facility] 3/7/19-3/15/19 for upper GI bleed requiring transfusion PRBCs and Platelets, had EGD 1/7/48 without obvious beeding source found, started prilosec BID, Acute Renal Failure Cr 3.0, Pneumonia completed 7 days ABT prior to discharge, acute respiratory failure requiring short intubation, acute on chronic diastolic CHF responded to lasix diuresis was off lasix prior to discharge. Within few days of return to home poor PO intake, drowsiness, EMS called and patient brought to [facility] where he was hospitalized for UTI with Sepsis admitted 3/17/19-3/20/19. He has had poor urine output, sister did call Dr. Tess Brooks, Urology, 3/25/19 discussed poor urine output despite irrigation and was advised to go to ER, did go to [facility], was given a dose of lasix. Followed up with Urology next day 3/26/19 where he had bladder irrigation performed and cath was changed.   He was seen yesterday 3/27/19 in office with Dr. Kuldeep Shelby for hospital follow up visit. Caregiver reporting that has still not had great PO intake, has been pushing PO fluids but still having decreased urine output. Reported only one ounce fluid in bag within 24hrs. Patient called back in afternoon, still no urine output was instructed to come to ER. Started on cefepime for possible UTI, given 1L IVF with minimal UOP.      Patient came to ER on 3/28/19. Was noted to have hypoglycemia. Concern for sepsis - started on broad spectrum abx.    CT abdomen 3/28 revealed Bladder with suprapubic catheter and mild wall thickening. Patient had seizures despite correction of hypoglycemia. Neurology consulted. I have been consulted for further recommendations. Patient had constant jerking of his right UE, twitching of his face when I was in the room. Non communicative. Detailed ros not feasible.        Past Medical History:   Diagnosis Date    Anemia     Bradycardia, unspecified 2018    Cervical discitis     MRSA    Chronic drug abuse ([facility] 1974    Chronic kidney disease     stage III    Diabetes ([facility] 01/1990    Diabetes mellitus ([address]     Drug abuse ([facility]     Encephalopathy     GERD (gastroesophageal reflux disease)     Hypertension     Muscle weakness     Renal cell carcinoma of left kidney ([facility] 12/17/13    Pathological Stage [id number] cc RCC FG3 s/p left open partial nephrectomy in 12/13      Sepsis due to methicillin resistant Staphylococcus aureus (HCC)     Thrombocytopenia (HCC)     Urethral erosion     Viral hepatitis C     Xerosis cutis        Past Surgical History:   Procedure Laterality Date    HX CIRCUMCISION  12/17/13    Dr. Dede Melvin, [facility]    HX NEPHRECTOMY Left 12/17/13    Open Partial, Dr. Dede Melvin, [facility]    HX OTHER SURGICAL Right 2/27/2016    removed right ft  2nd meta-tarsal    HX OTHER SURGICAL  04/2017    abscess removed from back of neck     MI REMOVAL WITH INSERTION OF SUPRAPUBIC CATHETER  2018       home Medication List    Details amLODIPine (NORVASC) 10 mg tablet Take 10 mg by mouth daily. levothyroxine (SYNTHROID) 100 mcg tablet Take 100 mcg by mouth Daily (before breakfast). omeprazole (PRILOSEC) 20 mg capsule Take 20 mg by mouth two (2) times a day. sodium bicarbonate 650 mg tablet Take 650 mg by mouth three (3) times daily. sucralfate (CARAFATE) 1 gram tablet Take 1 g by mouth four (4) times daily. therapeutic multivitamin (THERAGRAN) tablet Take 1 Tab by mouth daily. traMADol (ULTRAM) 50 mg tablet Take 50 mg by mouth every six (6) hours as needed for Pain.      trospium (SANCTURA) 20 mg tablet Take 20 mg by mouth daily. pioglitazone (ACTOS) 15 mg tablet Take 1 Tab by mouth. Syringe, Disposable, (BD SYRINGE CATHETER TIP) 60 mL syrg Use 1 syringe daily with irrigations  Qty: 30 Syringe, Refills: 11      docusate sodium (COLACE) 100 mg capsule 100 mg two (2) times daily as needed. ergocalciferol (DRISDOL) 50,000 unit capsule Take 50,000 Units by mouth every seven (7) days. SITagliptin (JANUVIA) 100 mg tablet Take 50 mg by mouth daily. Associated Diagnoses: Pneumonia of both lungs due to infectious organism, unspecified part of lung; Hypoxia; Dyspnea on exertion; Neuromuscular respiratory weakness; Physical deconditioning      insulin lispro (HUMALOG) 100 unit/mL injection Normal Insulin Sensitivity   For Blood Sugar (mg/dL) of:     Less than 150 =   0 units           150 -199 =   2 units  200 -249 =   4 units  250 -299 =   6 units  300 -349 =   8 units  350 and above =   10 units  If 2 glucose readings are above 200 mg/dL within a 24 hr period, proceed to \"Very Insul  Qty: 1 Vial, Refills: 0      tamsulosin (FLOMAX) 0.4 mg capsule Take 1 Cap by mouth daily. Qty: 30 Cap, Refills: 0      doxycycline (ADOXA) 100 mg tablet Take 100 mg by mouth daily. Associated Diagnoses: Pneumonia of both lungs due to infectious organism, unspecified part of lung; Hypoxia;  Dyspnea on exertion; Neuromuscular respiratory weakness; Physical deconditioning             Current Facility-Administered Medications   Medication Dose Route Frequency    PHENYLephrine (ANGELITA-SYNEPHRINE) 1 mg/10 mL (100 mcg/mL) 100 mcg/mL in NS syringe        metroNIDAZOLE (FLAGYL) IVPB premix 500 mg  500 mg IntraVENous Q12H    thiamine (B-1) 200 mg in 0.9% sodium chloride 50 mL IVPB  200 mg IntraVENous X6D    folic acid (FOLVITE) 1 mg in 0.9% sodium chloride 50 mL ivpb   IntraVENous Q24H    dextrose 10% infusion  25 mL/hr IntraVENous CONTINUOUS    levETIRAcetam (KEPPRA) 1,500 mg in 0.9% sodium chloride 100 mL IVPB  1,500 mg IntraVENous ONCE    [START ON 3/30/2019] levETIRAcetam (KEPPRA) 1,000 mg in 0.9% sodium chloride 100 mL IVPB  1,000 mg IntraVENous Q12H    sodium chloride (NS) flush 5-10 mL  5-10 mL IntraVENous PRN    insulin lispro (HUMALOG) injection   SubCUTAneous Q6H    glucose chewable tablet 16 g  4 Tab Oral PRN    glucagon (GLUCAGEN) injection 1 mg  1 mg IntraMUSCular PRN    dextrose (D50W) injection syrg 12.5-25 g  25-50 mL IntraVENous PRN    tamsulosin (FLOMAX) capsule 0.4 mg  0.4 mg Oral DAILY    acetaminophen (TYLENOL) tablet 650 mg  650 mg Oral Q6H PRN    oxyCODONE-acetaminophen (PERCOCET) 5-325 mg per tablet 1 Tab  1 Tab Oral Q6H PRN    naloxone (NARCAN) injection 0.4 mg  0.4 mg IntraVENous PRN    ondansetron (ZOFRAN) injection 4 mg  4 mg IntraVENous Q6H PRN    docusate sodium (COLACE) capsule 100 mg  100 mg Oral BID PRN    heparin (porcine) injection 5,000 Units  5,000 Units SubCUTAneous Q8H    levothyroxine (SYNTHROID) tablet 100 mcg  100 mcg Oral 6am    pantoprazole (PROTONIX) injection 40 mg  40 mg IntraVENous Q12H    cefepime (MAXIPIME) 2 g in sterile water (preservative free) 10 mL IV syringe  2 g IntraVENous Q24H    VANCOMYCIN INFORMATION NOTE   Other Rx Dosing/Monitoring       Allergies: Iodine    Family History   Problem Relation Age of Onset    Diabetes Mother     Sickle Cell Anemia Father     Diabetes Sister     Hypertension Sister      Social History     Socioeconomic History    Marital status:      Spouse name: Not on file    Number of children: Not on file    Years of education: Not on file    Highest education level: Not on file   Occupational History    Not on file   Social Needs    Financial resource strain: Not on file    Food insecurity:     Worry: Not on file     Inability: Not on file    Transportation needs:     Medical: Not on file     Non-medical: Not on file   Tobacco Use    Smoking status: Current Every Day Smoker     Packs/day: 0.50     Years: 30.00     Pack years: 15.00     Types: Cigarettes    Smokeless tobacco: Never Used   Substance and Sexual Activity    Alcohol use: Yes     Comment: beer occasionally    Drug use: No    Sexual activity: Never   Lifestyle    Physical activity:     Days per week: Not on file     Minutes per session: Not on file    Stress: Not on file   Relationships    Social connections:     Talks on phone: Not on file     Gets together: Not on file     Attends Rastafarian service: Not on file     Active member of club or organization: Not on file     Attends meetings of clubs or organizations: Not on file     Relationship status: Not on file    Intimate partner violence:     Fear of current or ex partner: Not on file     Emotionally abused: Not on file     Physically abused: Not on file     Forced sexual activity: Not on file   Other Topics Concern    Not on file   Social History Narrative     since ;  for 12 yrs; [employer]; [address] [employer]  just recently furloughed;  No  service     Social History     Tobacco Use   Smoking Status Current Every Day Smoker    Packs/day: 0.50    Years: 30.00    Pack years: 15.00    Types: Cigarettes   Smokeless Tobacco Never Used        Temp (24hrs), Av.3 °F (36.3 °C), Min:94.5 °F (34.7 °C), Max:98 °F (36.7 °C)    Visit Vitals  /56   Pulse (!) 57   Temp 98 °F (36.7 °C)   Resp 10   Ht 6' (1.829 m)   Wt 80.2 kg (176 lb 12.9 oz)   SpO2 100%   BMI 23.98 kg/m²       ROS: 12 point ROS obtained in details. Pertinent positives as mentioned in HPI,   otherwise negative    Physical Exam:    General:  Sleepy, arousable to verbal, twitching of face, staring gaze, was seizing when I saw him   Head:  Normocephalic, without obvious abnormality, atraumatic. Eyes:  Conjunctivae/corneas clear. Nose: Nares normal.    Throat: Not examined    Neck: Supple, symmetrical, trachea midline, no adenopathy,    Lungs:   Clear to auscultation bilaterally. Heart:  bradycardia. Abdomen: Soft, no apparent tenderness. Bowel sounds normal. No obvious edema in abdominal wall. SPC in place, no drainage or erythema at insertion site. Cath bag with yellow urine, minimal.   Extremities: LE edema to hips bilaterally, no apparent calf tenderness   Pulses: 2+ and symmetric all extremities. Skin: No rashes or lesions   Neurologic:  doesn't follow commands. Detailed neuro eval not feasible.  .              Labs: Results:   Chemistry Recent Labs     03/29/19 0228 03/28/19  1529 03/28/19  1115 03/27/19  2135   GLU 69* 71* 64* 63*    140 139 143   K 4.6 4.9 4.6 4.8   * 114* 115* 114*   CO2 20* 21 20* 22   BUN 26* 24* 23* 22*   CREA 3.66* 3.50* 3.34* 3.25*   CA 8.0* 7.9* 8.2* 8.9   AGAP 5 5 4 7   BUCR 7* 7* 7* 7*   AP 75  --   --  98   TP 6.0*  --   --  7.3   ALB 2.2*  --   --  2.8*   GLOB 3.8  --   --  4.5*   AGRAT 0.6*  --   --  0.6*      CBC w/Diff Recent Labs     03/29/19 0228 03/28/19  1856 03/28/19  1115   WBC 5.2 4.8 3.9*   RBC 2.81* 2.94* 2.77*   HGB 8.0* 8.3* 8.0*   HCT 24.4* 25.4* 24.1*   PLT 92* 94* 88*   GRANS 77* 78* 64   LYMPH 17* 13* 25   EOS 0 0 0      Microbiology Recent Labs     03/28/19  0315 03/28/19  0300 03/27/19  2142   CULT NO GROWTH 1 DAY NO GROWTH 1 DAY 63163 COLONIES/mL GRAM NEGATIVE RODS*  >100,000 COLONIES/mL YEAST*          RADIOLOGY:    All available imaging studies/reports in [facility] for this admission were reviewed    Dr. Catarina Oliveira, Infectious Disease Specialist  [phone]  March 29, 2019  1:54 PM

## 2019-03-29 NOTE — PROGRESS NOTES
[facility] - Progress Note  Admit Date: 3/27/2019    Assessment:     Hospital Problems  Date Reviewed: 2/26/2019          Codes Class Noted POA    Oliguria ICD-10-CM: R34  ICD-9-CM: 788.5  3/28/2019 Unknown        Renal injury ICD-10-CM: S37.009A  ICD-9-CM: 866.00  3/28/2019 Unknown        Suprapubic catheter ([facility] [address] ICD-10-CM: Z93.59  ICD-9-CM: V44.59  3/28/2019 Unknown        Bradycardia, sinus ICD-10-CM: R00.1  ICD-9-CM: 427.89  3/28/2019 Unknown        Acute UTI ICD-10-CM: N39.0  ICD-9-CM: 599.0  3/28/2019 Unknown        Type 2 diabetes mellitus with hypoglycemia ([facility] [address] ICD-10-CM: E11.649  ICD-9-CM: 250.80  3/28/2019 Unknown        Acute renal failure (ARF) ([facility]) ICD-10-CM: N17.9  ICD-9-CM: 584.9  3/28/2019 Unknown            - Patient presented to ED with complications concerning his suprapubic catheter and reduced urine output. Found to by bradycardic with HR of 40 bpm on ECG. Also with some hypothermia   - Recent hospitalization at [facility] with upper GIB, ARF, PNA, candida UTI, acute resp. failure and HFpEF on 3/7/19 - 3/15/19 and again at [facility] for UTI with sepsis from 3/17/19 - 3/20/19  - Acute renal failure Cr 3.25 this admission. This appears to be his primary problem.  - Hx/o RCC s/p left partial nephrectomy 2013,   - S/p suprapubic catheter placement 11/12/18 at [facility]  - Hx/o asymptomatic bradycardia worsened by use of BB. Heart rate has been between 40 and 60 this admission with stable blood pressure. - Chronic HFpEF, no medications listed. CT pelvis with small bilateral pleural effusions noted, likely in setting of ARF. He does not appear to be in extremis. He has clear lungs and is laying flat. - Echo from 3/18/19 at [facility]: EF of 15%, with diastolic dysfunction, mildly dilated left atria, mild TV regurg, trace MV and PV regurg.   - Hypertension. Currently well controlled.   - Diabetes, Hgb A1c 5.3  - Hx/o spinal osteomyelitis with quadriplegia, s/p C3-C7 laminectomy in April 2017  - Chronic anemia, Hgb 8.8 this admission   - Chronic thrombocytopenia, 92K this admission  - Chronic hepatitis C, followed by Dr. Meseret Gómez with GI   - Hypothyroidism, synthroid   - Chronic drug abuse  - Ongoing tobacco abuse    -Hypothermia. This often contributes to bradycardia. Unclear if patient is severely hypothyroid or not.     Primary cardiologist: Dr. Jon Valdez:     - Concern for CVA overnight following rapid response; mentation improved hypoglycemic correction  - CT head negative; MRI incomplete  - HR stable; hold all AV lydia blocking agents   - Diuretics per nephrology  - Hypothermia per primary team  - No further CV recommendations at this time    Subjective:     Patient denies any acute distress, denies chest pain, or dyspnea    Objective:      Patient Vitals for the past 8 hrs:   Temp Pulse Resp BP SpO2   03/29/19 0810 -- 60 12 129/66 97 %   03/29/19 0800 96.4 °F (35.8 °C) 60 15 129/66 97 %   03/29/19 0700 -- (!) 54 8 114/76 100 %   03/29/19 0600 -- 63 16 128/65 98 %   03/29/19 0500 97.7 °F (36.5 °C) (!) 59 16 125/71 100 %   03/29/19 0400 97.6 °F (36.4 °C) 62 14 108/83 100 %         Patient Vitals for the past 96 hrs:   Weight   03/29/19 0700 176 lb 12.9 oz (80.2 kg)   03/28/19 0659 165 lb 9.1 oz (75.1 kg)                    Intake/Output Summary (Last 24 hours) at 3/29/2019 1108  Last data filed at 3/29/2019 0600  Gross per 24 hour   Intake 600 ml   Output 775 ml   Net -175 ml       Physical Exam:  General:  alert, cooperative, no distress, appears stated age  Neck:  nontender  Lungs:  clear to auscultation bilaterally  Heart:  regular rate and rhythm, S1, S2 normal, no murmur, click, rub or gallop  Abdomen:  abdomen is soft without significant tenderness, masses, organomegaly or guarding  Extremities:  extremities atraumatic, no cyanosis or edema    Data Review:     Labs: Results:       Chemistry Recent Labs     03/29/19  0228 03/28/19  1856 03/28/19  1529 03/28/19  1115 03/27/19  2135   GLU 69*  --  71* 64* 63*     --  140 139 143   K 4.6  --  4.9 4.6 4.8   *  --  114* 115* 114*   CO2 20*  --  21 20* 22   BUN 26*  --  24* 23* 22*   CREA 3.66*  --  3.50* 3.34* 3.25*   CA 8.0*  --  7.9* 8.2* 8.9   MG 2.2 2.4  --   --   --    PHOS 5.0* 5.3*  --   --   --    AGAP 5  --  5 4 7   BUCR 7*  --  7* 7* 7*   AP 75  --   --   --  98   TP 6.0*  --   --   --  7.3   ALB 2.2*  --   --   --  2.8*   GLOB 3.8  --   --   --  4.5*   AGRAT 0.6*  --   --   --  0.6*      CBC w/Diff Recent Labs     03/29/19  0228 03/28/19  1856 03/28/19  1115   WBC 5.2 4.8 3.9*   RBC 2.81* 2.94* 2.77*   HGB 8.0* 8.3* 8.0*   HCT 24.4* 25.4* 24.1*   PLT 92* 94* 88*   GRANS 77* 78* 64   LYMPH 17* 13* 25   EOS 0 0 0      Cardiac Enzymes Lab Results   Component Value Date/Time     03/29/2019 02:28 AM     03/28/2019 10:05 PM     03/28/2019 03:29 PM     03/28/2019 11:15 AM    CKMB 6.5 (H) 03/28/2019 10:05 PM    CKMB 7.7 (H) 03/28/2019 03:29 PM    CKMB 8.6 (H) 03/28/2019 11:15 AM    CKND1 5.4 (H) 03/28/2019 10:05 PM    CKND1 6.0 (H) 03/28/2019 03:29 PM    CKND1 7.2 (H) 03/28/2019 11:15 AM    TROIQ 0.05 (H) 03/28/2019 10:05 PM    TROIQ 0.06 (H) 03/28/2019 03:29 PM    TROIQ 0.06 (H) 03/28/2019 11:15 AM      Coagulation Recent Labs     03/28/19  1115   PTP 12.8   INR 1.0       Lipid Panel Lab Results   Component Value Date/Time    Cholesterol, total 160 03/29/2019 02:28 AM    Cholesterol, total 142 03/29/2019 02:28 AM    HDL Cholesterol 58 03/29/2019 02:28 AM    HDL Cholesterol 58 03/29/2019 02:28 AM    LDL, calculated 88.2 03/29/2019 02:28 AM    LDL, calculated 70.2 03/29/2019 02:28 AM    VLDL, calculated 13.8 03/29/2019 02:28 AM    VLDL, calculated 13.8 03/29/2019 02:28 AM    Triglyceride 69 03/29/2019 02:28 AM    Triglyceride 69 03/29/2019 02:28 AM    CHOL/HDL Ratio 2.8 03/29/2019 02:28 AM    CHOL/HDL Ratio 2.4 03/29/2019 02:28 AM      BNP No results found for: BNP, BNPP, XBNPT   Liver Enzymes Recent Labs 03/29/19  0228   TP 6.0*   ALB 2.2*   AP 75   SGOT 18      Digoxin    Thyroid Studies Lab Results   Component Value Date/Time    TSH 2.65 03/28/2019 06:57 AM          Signed By: Nai De La Rosa., NP     March 29, 2019

## 2019-03-29 NOTE — PROGRESS NOTES
1350-patient crawling out of bed, and appears to go in and out of consciousness with twitching , Dr. Israel Perez aware. Umer ordered and neurology aware and ordered stat EEG.

## 2019-03-29 NOTE — PROGRESS NOTES
attended the interdisciplinary rounds for Esthela Winston, who is a 59 y.o.,male. Patients Primary Language is: [place]. According to the patients EMR Denominational Affiliation is: [email or web address].      The reason the Patient came to the hospital is:   Patient Active Problem List    Diagnosis Date Noted    Oliguria 03/28/2019    Renal injury 03/28/2019    Suprapubic catheter ([address] 03/28/2019    Bradycardia, sinus 03/28/2019    Acute UTI 03/28/2019    Type 2 diabetes mellitus with hypoglycemia ([address] 03/28/2019    Acute renal failure (ARF) (HCC) 03/28/2019    Bladder atony 01/31/2019    Cerumen impaction 01/31/2019    Chronic neck pain 01/31/2019    Cirrhosis of liver ([address] 01/31/2019    COPD, moderate (HCC) 01/31/2019    Dry skin dermatitis 01/31/2019    Elevated PSA 01/31/2019    History of diabetes mellitus 01/31/2019    History of elevated lipids 01/31/2019    History of hay fever 01/31/2019    Hyperlipidemia 01/31/2019    Hypothyroid 01/31/2019    Lung nodules 01/31/2019    Medication management 01/31/2019    Neck mass 01/31/2019    Pericardial effusion 01/31/2019    Preoperative clearance 01/31/2019    Prostate disorder 01/31/2019    Recurrent UTI 01/31/2019    Vitamin D deficiency 01/31/2019    BPH (benign prostatic hyperplasia) 12/20/2018    Hematuria 10/14/2018    Generalized weakness 07/03/2018    Hypertension     Urethral erosion     Chronic anemia 05/23/2018    Status post amputation of toe of right foot ([address] 05/18/2018    Thrombocytopenia ([address] 04/10/2018    Chronic kidney disease, stage III (moderate) (HCC) 04/04/2018    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    Hyperkalemia 04/02/2018    Hypercalcemia 04/02/2018    Bradycardia 03/31/2018    Urinary retention 10/30/2017    Chronic hepatitis C without hepatic coma ([address] 05/31/2017    Quadriparesis ([address] 05/31/2017    Essential hypertension 04/28/2017    IV drug abuse ([address] 04/28/2017    Type II diabetes mellitus, uncontrolled ([facility] 12/16/2015    Renal cell carcinoma of left kidney ([facility] 64/47/5470    Umbilical hernia 46/75/1379    Chronic drug abuse ([facility] 01/01/1974        Plan:  Martin Yund will continue to follow and will provide pastoral care on an as needed/requested basis.  recommends bedside caregivers page  on duty if patient shows signs of acute spiritual or emotional distress.     Jessica Forde  Board Certified [facility]   [phone]

## 2019-03-30 ENCOUNTER — APPOINTMENT (OUTPATIENT)
Dept: GENERAL RADIOLOGY | Age: 59
DRG: 720 | End: 2019-03-30
Attending: INTERNAL MEDICINE
Payer: MEDICAID

## 2019-03-30 ENCOUNTER — APPOINTMENT (OUTPATIENT)
Dept: ULTRASOUND IMAGING | Age: 59
DRG: 720 | End: 2019-03-30
Attending: INTERNAL MEDICINE
Payer: MEDICAID

## 2019-03-30 LAB
ALBUMIN SERPL-MCNC: 2.1 G/DL (ref 3.4–5)
ALBUMIN/GLOB SERPL: 0.6 {RATIO} (ref 0.8–1.7)
ALP SERPL-CCNC: 73 U/L (ref 45–117)
ALT SERPL-CCNC: 12 U/L (ref 16–61)
ANION GAP SERPL CALC-SCNC: 11 MMOL/L (ref 3–18)
AST SERPL-CCNC: 19 U/L (ref 15–37)
BASOPHILS # BLD: 0 K/UL (ref 0–0.1)
BASOPHILS NFR BLD: 0 % (ref 0–2)
BILIRUB SERPL-MCNC: 0.3 MG/DL (ref 0.2–1)
BUN SERPL-MCNC: 26 MG/DL (ref 7–18)
BUN/CREAT SERPL: 7 (ref 12–20)
CALCIUM SERPL-MCNC: 7.8 MG/DL (ref 8.5–10.1)
CALCIUM SERPL-MCNC: 7.8 MG/DL (ref 8.5–10.1)
CHLORIDE SERPL-SCNC: 116 MMOL/L (ref 100–108)
CO2 SERPL-SCNC: 19 MMOL/L (ref 21–32)
CORTIS 1H P CHAL SERPL-MCNC: 26.7 UG/DL
CORTIS 1H P CHAL SERPL-MCNC: 33.8 UG/DL
CORTIS 30M P CHAL SERPL-MCNC: 24.7 UG/DL
CORTIS 30M P CHAL SERPL-MCNC: 29.7 UG/DL
CORTIS BS SERPL-MCNC: 14 UG/DL
CORTIS BS SERPL-MCNC: 25.7 UG/DL
CORTIS SERPL-MCNC: 14.9 UG/DL (ref 3.09–22.4)
CREAT SERPL-MCNC: 3.94 MG/DL (ref 0.6–1.3)
DIFFERENTIAL METHOD BLD: ABNORMAL
EOSINOPHIL # BLD: 0 K/UL (ref 0–0.4)
EOSINOPHIL NFR BLD: 1 % (ref 0–5)
ERYTHROCYTE [DISTWIDTH] IN BLOOD BY AUTOMATED COUNT: 19 % (ref 11.6–14.5)
FERRITIN SERPL-MCNC: 196 NG/ML (ref 8–388)
GLOBULIN SER CALC-MCNC: 3.5 G/DL (ref 2–4)
GLUCOSE BLD STRIP.AUTO-MCNC: 101 MG/DL (ref 70–110)
GLUCOSE BLD STRIP.AUTO-MCNC: 103 MG/DL (ref 70–110)
GLUCOSE BLD STRIP.AUTO-MCNC: 136 MG/DL (ref 70–110)
GLUCOSE BLD STRIP.AUTO-MCNC: 150 MG/DL (ref 70–110)
GLUCOSE BLD STRIP.AUTO-MCNC: 200 MG/DL (ref 70–110)
GLUCOSE BLD STRIP.AUTO-MCNC: 86 MG/DL (ref 70–110)
GLUCOSE BLD STRIP.AUTO-MCNC: 91 MG/DL (ref 70–110)
GLUCOSE SERPL-MCNC: 83 MG/DL (ref 74–99)
HCT VFR BLD AUTO: 23.1 % (ref 36–48)
HEMOCCULT STL QL: POSITIVE
HGB BLD-MCNC: 7.5 G/DL (ref 13–16)
IRON SATN MFR SERPL: 26 %
IRON SERPL-MCNC: 64 UG/DL (ref 50–175)
LYMPHOCYTES # BLD: 0.9 K/UL (ref 0.9–3.6)
LYMPHOCYTES NFR BLD: 26 % (ref 21–52)
MAGNESIUM SERPL-MCNC: 2.3 MG/DL (ref 1.6–2.6)
MCH RBC QN AUTO: 28.1 PG (ref 24–34)
MCHC RBC AUTO-ENTMCNC: 32.5 G/DL (ref 31–37)
MCV RBC AUTO: 86.5 FL (ref 74–97)
MONOCYTES # BLD: 0.6 K/UL (ref 0.05–1.2)
MONOCYTES NFR BLD: 16 % (ref 3–10)
NEUTS SEG # BLD: 1.9 K/UL (ref 1.8–8)
NEUTS SEG NFR BLD: 57 % (ref 40–73)
PHOSPHATE SERPL-MCNC: 5 MG/DL (ref 2.5–4.9)
PLATELET # BLD AUTO: 64 K/UL (ref 135–420)
POTASSIUM SERPL-SCNC: 4.5 MMOL/L (ref 3.5–5.5)
PROT SERPL-MCNC: 5.6 G/DL (ref 6.4–8.2)
PTH-INTACT SERPL-MCNC: 162.7 PG/ML (ref 18.4–88)
RBC # BLD AUTO: 2.67 M/UL (ref 4.7–5.5)
SODIUM SERPL-SCNC: 146 MMOL/L (ref 136–145)
T3FREE SERPL-MCNC: 1.4 PG/ML (ref 2.18–3.98)
T4 FREE SERPL-MCNC: 1.1 NG/DL (ref 0.7–1.5)
TIBC SERPL-MCNC: 250 UG/DL (ref 250–450)
TSH SERPL DL<=0.05 MIU/L-ACNC: 0.74 UIU/ML (ref 0.36–3.74)
VANCOMYCIN SERPL-MCNC: 20 UG/ML (ref 5–40)
WBC # BLD AUTO: 3.4 K/UL (ref 4.6–13.2)

## 2019-03-30 PROCEDURE — 74011000258 HC RX REV CODE- 258: Performed by: PHYSICIAN ASSISTANT

## 2019-03-30 PROCEDURE — 84481 FREE ASSAY (FT-3): CPT

## 2019-03-30 PROCEDURE — 82533 TOTAL CORTISOL: CPT

## 2019-03-30 PROCEDURE — 77030011256 HC DRSG MEPILEX <16IN NO BORD MOLN -A

## 2019-03-30 PROCEDURE — 84443 ASSAY THYROID STIM HORMONE: CPT

## 2019-03-30 PROCEDURE — 83970 ASSAY OF PARATHORMONE: CPT

## 2019-03-30 PROCEDURE — 84681 ASSAY OF C-PEPTIDE: CPT

## 2019-03-30 PROCEDURE — 84100 ASSAY OF PHOSPHORUS: CPT

## 2019-03-30 PROCEDURE — 77030013079 HC BLNKT BAIR HGGR 3M -A

## 2019-03-30 PROCEDURE — 84439 ASSAY OF FREE THYROXINE: CPT

## 2019-03-30 PROCEDURE — 77030008771 HC TU NG SALEM SUMP -A

## 2019-03-30 PROCEDURE — 80202 ASSAY OF VANCOMYCIN: CPT

## 2019-03-30 PROCEDURE — 74011000258 HC RX REV CODE- 258: Performed by: INTERNAL MEDICINE

## 2019-03-30 PROCEDURE — 74011250636 HC RX REV CODE- 250/636: Performed by: PHYSICIAN ASSISTANT

## 2019-03-30 PROCEDURE — 77030037878 HC DRSG MEPILEX >48IN BORD MOLN -B

## 2019-03-30 PROCEDURE — 83540 ASSAY OF IRON: CPT

## 2019-03-30 PROCEDURE — 74011250636 HC RX REV CODE- 250/636: Performed by: INTERNAL MEDICINE

## 2019-03-30 PROCEDURE — 82962 GLUCOSE BLOOD TEST: CPT

## 2019-03-30 PROCEDURE — 82728 ASSAY OF FERRITIN: CPT

## 2019-03-30 PROCEDURE — 74011250636 HC RX REV CODE- 250/636: Performed by: FAMILY MEDICINE

## 2019-03-30 PROCEDURE — 74018 RADEX ABDOMEN 1 VIEW: CPT

## 2019-03-30 PROCEDURE — 80053 COMPREHEN METABOLIC PANEL: CPT

## 2019-03-30 PROCEDURE — C9113 INJ PANTOPRAZOLE SODIUM, VIA: HCPCS | Performed by: FAMILY MEDICINE

## 2019-03-30 PROCEDURE — 36415 COLL VENOUS BLD VENIPUNCTURE: CPT

## 2019-03-30 PROCEDURE — 74011250636 HC RX REV CODE- 250/636: Performed by: NURSE PRACTITIONER

## 2019-03-30 PROCEDURE — 74011250637 HC RX REV CODE- 250/637: Performed by: PHYSICIAN ASSISTANT

## 2019-03-30 PROCEDURE — 65610000006 HC RM INTENSIVE CARE

## 2019-03-30 PROCEDURE — 74011000250 HC RX REV CODE- 250: Performed by: PHYSICIAN ASSISTANT

## 2019-03-30 PROCEDURE — 74011000258 HC RX REV CODE- 258: Performed by: NURSE PRACTITIONER

## 2019-03-30 PROCEDURE — 77030037870 HC GLD SHT PREVALON SAGE -B

## 2019-03-30 PROCEDURE — 77030037875 HC DRSG MEPILEX <16IN BORD MOLN -A

## 2019-03-30 PROCEDURE — 82272 OCCULT BLD FECES 1-3 TESTS: CPT

## 2019-03-30 PROCEDURE — 76770 US EXAM ABDO BACK WALL COMP: CPT

## 2019-03-30 PROCEDURE — 85025 COMPLETE CBC W/AUTO DIFF WBC: CPT

## 2019-03-30 PROCEDURE — 83735 ASSAY OF MAGNESIUM: CPT

## 2019-03-30 PROCEDURE — 74011000250 HC RX REV CODE- 250: Performed by: FAMILY MEDICINE

## 2019-03-30 RX ORDER — HYDROCORTISONE SODIUM SUCCINATE 100 MG/2ML
50 INJECTION, POWDER, FOR SOLUTION INTRAMUSCULAR; INTRAVENOUS ONCE
Status: COMPLETED | OUTPATIENT
Start: 2019-03-30 | End: 2019-03-30

## 2019-03-30 RX ORDER — COSYNTROPIN 0.25 MG/ML
0.25 INJECTION, POWDER, FOR SOLUTION INTRAMUSCULAR; INTRAVENOUS ONCE
Status: COMPLETED | OUTPATIENT
Start: 2019-03-30 | End: 2019-03-30

## 2019-03-30 RX ORDER — DEXTROSE MONOHYDRATE 50 MG/ML
50 INJECTION, SOLUTION INTRAVENOUS CONTINUOUS
Status: DISCONTINUED | OUTPATIENT
Start: 2019-03-30 | End: 2019-03-30

## 2019-03-30 RX ORDER — HYDROCORTISONE SODIUM SUCCINATE 100 MG/2ML
50 INJECTION, POWDER, FOR SOLUTION INTRAMUSCULAR; INTRAVENOUS EVERY 6 HOURS
Status: DISCONTINUED | OUTPATIENT
Start: 2019-03-30 | End: 2019-04-01

## 2019-03-30 RX ADMIN — LEVETIRACETAM 500 MG: 100 INJECTION, SOLUTION INTRAVENOUS at 14:32

## 2019-03-30 RX ADMIN — THIAMINE HYDROCHLORIDE 200 MG: 100 INJECTION, SOLUTION INTRAMUSCULAR; INTRAVENOUS at 14:35

## 2019-03-30 RX ADMIN — FOLIC ACID: 5 INJECTION, SOLUTION INTRAMUSCULAR; INTRAVENOUS; SUBCUTANEOUS at 11:34

## 2019-03-30 RX ADMIN — HYDROCORTISONE SODIUM SUCCINATE 50 MG: 100 INJECTION, POWDER, FOR SOLUTION INTRAMUSCULAR; INTRAVENOUS at 14:35

## 2019-03-30 RX ADMIN — LACTULOSE 10 G: 20 SOLUTION ORAL at 18:37

## 2019-03-30 RX ADMIN — PANTOPRAZOLE SODIUM 40 MG: 40 INJECTION, POWDER, FOR SOLUTION INTRAVENOUS at 10:11

## 2019-03-30 RX ADMIN — HYDROCORTISONE SODIUM SUCCINATE 50 MG: 100 INJECTION, POWDER, FOR SOLUTION INTRAMUSCULAR; INTRAVENOUS at 18:36

## 2019-03-30 RX ADMIN — COSYNTROPIN 0.25 MG: 0.25 INJECTION, POWDER, LYOPHILIZED, FOR SOLUTION INTRAMUSCULAR; INTRAVENOUS at 03:00

## 2019-03-30 RX ADMIN — DEXTROSE MONOHYDRATE 50 ML/HR: 5 INJECTION, SOLUTION INTRAVENOUS at 05:46

## 2019-03-30 RX ADMIN — THIAMINE HYDROCHLORIDE 200 MG: 100 INJECTION, SOLUTION INTRAMUSCULAR; INTRAVENOUS at 22:30

## 2019-03-30 RX ADMIN — PANTOPRAZOLE SODIUM 40 MG: 40 INJECTION, POWDER, FOR SOLUTION INTRAVENOUS at 20:26

## 2019-03-30 RX ADMIN — COSYNTROPIN 0.25 MG: 0.25 INJECTION, POWDER, LYOPHILIZED, FOR SOLUTION INTRAMUSCULAR; INTRAVENOUS at 10:55

## 2019-03-30 RX ADMIN — CEFEPIME 2 G: 2 INJECTION, POWDER, FOR SOLUTION INTRAVENOUS at 18:36

## 2019-03-30 RX ADMIN — THIAMINE HYDROCHLORIDE 200 MG: 100 INJECTION, SOLUTION INTRAMUSCULAR; INTRAVENOUS at 05:46

## 2019-03-30 RX ADMIN — HEPARIN SODIUM 5000 UNITS: 5000 INJECTION INTRAVENOUS; SUBCUTANEOUS at 14:32

## 2019-03-30 RX ADMIN — DEXTROSE MONOHYDRATE 75 ML/HR: 10 INJECTION, SOLUTION INTRAVENOUS at 08:37

## 2019-03-30 RX ADMIN — HYDROCORTISONE SODIUM SUCCINATE 50 MG: 100 INJECTION, POWDER, FOR SOLUTION INTRAMUSCULAR; INTRAVENOUS at 12:26

## 2019-03-30 RX ADMIN — LEVETIRACETAM 500 MG: 100 INJECTION, SOLUTION INTRAVENOUS at 02:15

## 2019-03-30 NOTE — PROGRESS NOTES
Progress Note    Hoda Wu  59 y.o. Admit Date: 3/27/2019  Active Problems:    Oliguria (3/28/2019) POA: Unknown      Renal injury (3/28/2019) POA: Unknown      Suprapubic catheter ([facility] [address] (3/28/2019) POA: Unknown      Bradycardia, sinus (3/28/2019) POA: Unknown      Acute UTI (3/28/2019) POA: Unknown      Type 2 diabetes mellitus with hypoglycemia ([facility] [address] (3/28/2019) POA: Unknown      Acute renal failure (ARF) ([facility] [address] (3/28/2019) POA: Unknown            Subjective:     Patient  Is sleeping, comfortable, no SOB, on IVF D10 NS at 75 cc/ hour, making Spontaneous dilute Urine through Suprapbuic catheter. Remained Hypothermic, did not receive any Diuretics in last 24  Hours. Remained Bradycardic . A comprehensive review of systems was negative except for that written in the History of Present Illness.     Objective:     Visit Vitals  /77   Pulse (!) 53   Temp (!) 91.2 °F (32.9 °C)   Resp 15   Ht 6' (1.829 m)   Wt 79.1 kg (174 lb 6.1 oz)   SpO2 100%   BMI 23.65 kg/m²         Intake/Output Summary (Last 24 hours) at 3/30/2019 1159  Last data filed at 3/30/2019 1100  Gross per 24 hour   Intake 898.75 ml   Output 1501 ml   Net -602.25 ml       Current Facility-Administered Medications   Medication Dose Route Frequency Provider Last Rate Last Dose    lactulose (CHRONULAC) solution 10 g  10 g Oral BID Ogoy, January-Yahaira ELDER PA-C        thiamine (B-1) 200 mg in 0.9% sodium chloride 50 mL IVPB  200 mg IntraVENous Q8H Linda Ramirez PA-C   200 mg at 87/39/68 5532    folic acid (FOLVITE) 1 mg in 0.9% sodium chloride 50 mL ivpb   IntraVENous Q24H Linda Ramirez PA-C        dextrose 10% infusion  75 mL/hr IntraVENous CONTINUOUS Eduard ALEMAN NP 75 mL/hr at 03/30/19 0837 75 mL/hr at 03/30/19 0837    levETIRAcetam (KEPPRA) 500 mg in 0.9% sodium chloride (MBP/ADV) 100 mL MBP  500 mg IntraVENous Q12H Gala Wood MD   500 mg at 03/30/19 0215    sodium chloride (NS) flush 5-10 mL  5-10 mL IntraVENous PRN Shailesh Loaiza MD        insulin lispro (HUMALOG) injection   SubCUTAneous Q6H Shailesh Loaiza MD   Stopped at 03/28/19 1200    glucose chewable tablet 16 g  4 Tab Oral PRN Shailesh Loaiza MD        glucagon (GLUCAGEN) injection 1 mg  1 mg IntraMUSCular PRN Shailesh Loaiza MD        dextrose (D50W) injection syrg 12.5-25 g  25-50 mL IntraVENous PRN Shailesh Loaiza MD   25 g at 03/29/19 1104    tamsulosin (FLOMAX) capsule 0.4 mg  0.4 mg Oral DAILY Shailesh Loaiza MD   Stopped at 03/28/19 0900    acetaminophen (TYLENOL) tablet 650 mg  650 mg Oral Q6H PRN Shailesh Loaiza MD        oxyCODONE-acetaminophen (PERCOCET) 5-325 mg per tablet 1 Tab  1 Tab Oral Q6H PRN Shailesh Loaiza MD        naloxone [facility]) injection 0.4 mg  0.4 mg IntraVENous PRN Shailesh Loaiza MD        [facility]etron [facility]) injection 4 mg  4 mg IntraVENous Q6H PRN Shailesh Loaiza MD   4 mg at 03/29/19 0726    docusate sodium (COLACE) capsule 100 mg  100 mg Oral BID PRN Shailesh Loaiza MD        heparin (porcine) injection 5,000 Units  5,000 Units SubCUTAneous Gamaliel Jean MD   5,000 Units at 03/29/19 2313    levothyroxine (SYNTHROID) tablet 100 mcg  100 mcg Oral 6am Gabby Aquino MD        pantoprazole (PROTONIX) injection 40 mg  40 mg IntraVENous Q12H Gabby Aquino MD   40 mg at 03/30/19 1011    cefepime (MAXIPIME) 2 g in sterile water (preservative free) 10 mL IV syringe  2 g IntraVENous Q24H Gabby Aquino MD   2 g at 03/29/19 1737        Physical Exam:     Physical Exam:   General:   no distress, appears stated age. Neck: Supple, symmetrical, trachea midline, no adenopathy, thyroid: no enlargement/tenderness/nodules, no carotid bruit and no JVD. Lungs:   Clear to auscultation bilaterally. Heart:  Regular rate and rhythm, S1, S2 normal, no murmur, click, rub or gallop. Abdomen:   Soft, non-tender. Bowel sounds normal. No masses,  No organomegaly. Extremities: Extremities normal, atraumatic, no cyanosis or edema. Data Review:    CBC w/Diff    Recent Labs     03/30/19 0146 03/29/19 0228 03/28/19  1856   WBC 3.4* 5.2 4.8   RBC 2.67* 2.81* 2.94*   HGB 7.5* 8.0* 8.3*   HCT 23.1* 24.4* 25.4*   MCV 86.5 86.8 86.4   MCH 28.1 28.5 28.2   MCHC 32.5 32.8 32.7   RDW 19.0* 18.7* 18.6*    Recent Labs     03/30/19 0146 03/29/19 0228 03/28/19  1856   MONOS 16* 6 9   EOS 1 0 0   BASOS 0 0 0   RDW 19.0* 18.7* 18.6*        Comprehensive Metabolic Profile    Recent Labs     03/30/19 0146 03/29/19 0228 03/28/19  1529   * 141 140   K 4.5 4.6 4.9   * 116* 114*   CO2 19* 20* 21   BUN 26* 26* 24*   CREA 3.94* 3.66* 3.50*    Recent Labs     03/30/19 0146 03/29/19 0228 03/28/19  1856 03/28/19  1529  03/27/19  2135   CA 7.8*  7.8* 8.0*  --  7.9*   < > 8.9   PHOS 5.0* 5.0* 5.3*  --   --   --    ALB 2.1* 2.2*  --   --   --  2.8*   TP 5.6* 6.0*  --   --   --  7.3   SGOT 19 18  --   --   --  20   TBILI 0.3 0.3  --   --   --  0.3    < > = values in this interval not displayed. Results for Manasa Dominguez (MRN [id number]) as of 3/30/2019 12:04   Ref. Range 3/28/2019 13:00   Color Latest Units:   DARK YELLOW   Appearance Latest Units:   TURBID   Specific gravity Latest Ref Range: 1.005 - 1.030   1.018   pH (UA) Latest Ref Range: 5.0 - 8.0   5.0   Protein Latest Ref Range: NEG mg/dL 300 (A)   Glucose Latest Ref Range: NEG mg/dL NEGATIVE   Ketone Latest Ref Range: NEG mg/dL NEGATIVE   Blood Latest Ref Range: NEG   LARGE (A)   Bilirubin Latest Ref Range: NEG   NEGATIVE   Urobilinogen Latest Ref Range: 0.2 - 1.0 EU/dL 0.2   Nitrites Latest Ref Range: NEG   NEGATIVE   Leukocyte Esterase Latest Ref Range: NEG   MODERATE (A)   Epithelial cells Latest Ref Range: 0 - 5 /lpf 1+   WBC Latest Ref Range: 0 - 4 /hpf TOO NUMEROUS TO C. ..    RBC Latest Ref Range: 0 - 5 /hpf PRESENT   Bacteria Latest Ref Range: NEG /hpf 4+ (A)   Myoglobin,urine screen Latest Ref Range: NEG POSITIVE (A)       Results for Devin Hernandez (MRN [id number]) as of 3/30/2019 12:04   Ref. Range 3/28/2019 13:00   Creatinine, urine Latest Ref Range: 30 - 125 mg/dL 190.00 (H)   Protein, urine random Latest Ref Range: <11.9 mg/dL 525 (H)   Sodium,urine random Latest Ref Range: 20 - 110 MMOL/L 40   Osmolality,urine Latest Ref Range: 300 - 900 MOSM/kg H2O 299 (L)               Impression:       Active Hospital Problems    Diagnosis Date Noted    Oliguria 03/28/2019    Renal injury 03/28/2019    Suprapubic catheter ([facility] [address] 03/28/2019    Bradycardia, sinus 03/28/2019    Acute UTI 03/28/2019    Type 2 diabetes mellitus with hypoglycemia ([facility] [address] 03/28/2019    Acute renal failure (ARF) ([facility] [address] 03/28/2019      Patient Urine study is consistent with CRF  with  Acute component , not Oliguric any more, responded with 1 dose of Lasix. No need for any Diuretics now. Now now non oliguric, may have component Interstitial damage. Plan:     Continue Hydration, watch UOP,follow renal function.        Angelica Barrett MD

## 2019-03-30 NOTE — PROGRESS NOTES
On-call SLP acknowledging eval & tx orders. Will be in this afternoon to address.       Thank you for this referral,  Liya Barth, [address]

## 2019-03-30 NOTE — CONSULTS
[address] [facility]    Name:  Cori Halsted  MR#:   [id number]  :  1960  ACCOUNT #:  [de-identified]  DATE OF SERVICE:  2019    RENAL CONSULTATION    Consult was requested by Dr. Sagar Jackson. REASON FOR CONSULTATION:  Renal failure. HISTORY OF PRESENT ILLNESS:  This is a 59-year-old [facility] American male, a nursing home resident, who has long-term history of chronic urinary retention, being treated with suprapubic catheter, followed with Urology. He has history of renal cell carcinoma status post left partial nephrectomy in 2013 and also has chronic kidney disease along with autonomic dysfunction and his serum creatinine ranges between 1.6 to 2.22. Also has history of C-spine laminectomy with postop paralysis. Has history of drug abuse in the distant past; tobacco use; type 2 diabetes mellitus, last hemoglobin A1c is 5.3 on 2019; anemia of chronic disease; thrombocytopenia; hepatitis C with cirrhosis, followed by Dr. Karis Monroy; also has history of hypothyroidism and also being admitted on  with altered mental status. When I came to see this patient, he definitely was not communicating, encephalopathic. IV Lasix was ongoing as the patient was not making any urine output. The patient was hypothermic, hypoglycemic and about to transfer to the ICU. During that time, I ordered some initial lab for evaluation of the renal failure, then subsequently I am seeing this patient. There is also concern whether the suprapubic catheter was clogged or not but in fact it was changed on . The patient started making urine spontaneously with the Lasix and subsequently being transferred to the ICU for appropriate care. Still, the patient remains noncommunicating, very slow to respond and other consultants also have seen. The patient, looks like, had been admitted to different hospitals with hypothermia, hypoglycemia and altered mental status for the last few weeks.   Also, he had some infection with UTI E.coli on 03/07, partially being treated, and now again also having yeast in the urine and shows colonization from prolonged suprapubic catheter placement. PAST MEDICAL HISTORY:  As I mentioned,  1. Anemia. 2.  Bradycardia. 3.  MRSA. 4.  Chronic lung disease. 5.  Diabetes. 6.  Encephalopathy. 7.  GERD. 8.  Hypertension. 9.  Renal cell carcinoma of the left kidney. 10.  Thrombocytopenia. 11.  Urethral erosion. 12.  Hepatitis C. PAST SURGICAL HISTORY:  Circumcision, partial nephrectomy, removal of the right foot second metatarsal, abscess removal of the back and neck, suprapubic insertion of the catheter in 2018. MEDICATIONS:  List of medications before admission:  1. Norvasc. 2.  Synthroid. 3.  Prilosec. 4.  Sodium bicarbonate. 5.  Carafate. 6.  Tramadol. 7.  Actos. 8.  Catheter tip. 9.  Colace. 10.  Drisdol. 11.  Januvia. 12.  Insulin sliding scale. 13.  Doxycycline. 14.  Flomax. In hospital, currently, the patient is getting Flagyl, thiamine, folic acid, Keppra, W13 with normal saline at 25 mL per hour, Tylenol, Synthroid, and vancomycin started. Flagyl being discontinued by this time. PHYSICAL EXAMINATION:  VITAL SIGNS:  Temperature 97.2; has bradycardia throughout, heart rate varying between 59 to 66, has autonomic dysfunction from cervical problems; blood pressure 126/62, 120/62; oxygen saturation 100% with nasal cannula 3 L; weight is 80.2 kilograms. HEENT:  Pupils reacting to light. LUNGS:  Decreased breath sounds. HEART:  S1 and S2.  ABDOMEN:  Soft. No palpable mass. Bladder not distended. EXTREMITIES:  Ankle, no edema. LABORATORY DATA:  Relevant lab data is as follows: On 03/28, WBC of 4.8, hemoglobin of 8.3, hematocrit 25.4, platelets of 50,995. Today, WBC of 5.2 with hemoglobin of 8.0, hematocrit 24.4, platelets 66,460 with neutrophils 77%, lymphocytes 17%.   Chemistry on 03/28, sodium 140, potassium 4.9, chloride 114, CO2 of 21, glucose of 24, creatinine of 3.50. Calcium of 7.9, eGFR around 20 to 60 per minute. Phosphorus today is 5.3 with magnesium of 2.4. Lab again today, sodium 141, potassium 4.6, chloride 106, CO2 of 20, blood urea nitrogen of 26, creatinine is 3.66, glucose 69, calcium 8, albumin of 2.2, lactic acid was 0.5, uric acid 7.3. Triglycerides 69. Ammonia level of 22. Urinalysis on admission, specific gravity of 1.019, protein 300, glucose negative, ketone trace, blood large, leukocyte esterase moderate, nitrite negative, rbc 20 to 35, yeast 4+, bacteria 2+. On admission, again the myoglobin in the urine is positive. Urine chemistry, protein-creatinine ratio is 2.8, urine sodium is 40, urine osmolality is 299. Drug screen of the urine positive for opiates. CT of the head without contrast done on 03/28, no CT evidence of any acute intracranial process. Chest x-ray was done today, shows interstitial edema and bilateral pleural effusions with overlying atelectasis, superimposed alveolar edema, infiltrate not excluded. Last echocardiogram was done in 04/2018, had ejection fraction around 60% at that time. IMPRESSION AND PLAN:  1. Acute on chronic renal failure, definitely multifactorial.  The patient was not making urine with the suprapubic catheter. With Lasix, started making urine but again now the patient is making spontaneous urine. The chest x-ray shows some infiltrate versus edema. Clinically, looks like on the volume depletion side. Not eating, also had vomiting this morning. Carefully observe. Without Lasix, hopefully, he will continue to maintain good urine output and with careful hydration, renal function might improve also. 2.  Hypoglycemia, as recorded, cause not clear, but getting also different antidiabetic medications. We need to check C-peptide to evaluate the hypoglycemia.   Also, the yeast in the urine must be colonization but by E. coli in the urine, it looks like that he has cystitis, this needs to be treated. 3.  Cause of hypothermia is not clear, whether from autonomic dysfunction with cervical injury in the past or not is not clear. We have to be careful about giving medication, all the medications should be adjusted as per the renal function. Follow the urine culture further, avoid nephrotoxics also. Time to time, needs to change the suprapubic catheter. Further recommendations will be given based on the hospital course.         Maico Gauthier MD      [id number]  D:  03/29/2019 18:59  T:  03/29/2019 21:59  JOB #:  [id number]

## 2019-03-30 NOTE — PROGRESS NOTES
On call SLP acknowledging eval and tx orders. Currently, patient is:     ( ) Tolerating current PO diet - regular/thin (per RN report)  ( ) Tolerating current po diet; however, poor po intake (per RN report)   ( ) Receiving nutrition via tube feeding  (X) Lethargic, solomnent, or difficult to arouse for safe po trials     ( ) Unable to manage secretions  ( ) Receiving intervention for respiratory distress  ( ) <6 hours s/p extubation         SLP will follow up next day or as medically indicated.      Thank you for this referral.     Judith Kim CCC-SLP

## 2019-03-30 NOTE — PROGRESS NOTES
NUTRITION    Nutrition Consult: Management of Tube Feeding     RECOMMENDATIONS / PLAN:     - Modify tube feeding regimen to Jevity 1.5 at 10 mL/hr and advance by 10 mL/hr q 6 hours to goal of 55 mL/hr with 150 mL q 4 hour water flushes.    - Discontinue IVF when tube feeding at goal or as medically appropriate. - Evaluate ability to start po diet as mentation improves. - Continue RD inpatient monitoring and evaluation. Enteral Nutrition Regimen: Jevity 1.5 at 55 mL/hr + 150 mL q 4 hour water flushes to provide: 1980 kcal, 84 gm protein, 66 gm fat, 285 gm CHO, 29 gm fiber, 1003 mL free water, 1903 mL total water, 100% RDIs     NUTRITION INTERVENTIONS & DIAGNOSIS:     [x] Meals/snacks: NPO, re-evaluate as mentation improves  [x] Enteral Nutrition: modify  [x] IV Fluids: D10 at 75 mL/hr (180 gm dextrose, 612 kcal)    Nutrition Diagnosis: Inadequate oral intake related to mentation as evidenced by pt to confused to safely consume po as this time. ASSESSMENT:     PT with AMS, transferred to the ICU. Reoccurring episodes of hypoglycemia. SLP consulted, however per nurse, patient too confused. NGT in placed and verified. Average po intake adequate to meet patients estimated nutritional needs:   [] Yes     [x] No   [] Unable to determine at this time    Diet: DIET NPO  DIET TUBE FEEDING      Food Allergies: none known   Current Appetite:   [] Good     [] Fair     [] Poor     [x] Other: NPO  Appetite/meal intake prior to admission:   [] Good     [] Fair     [] Poor     [x] Other: unknown  Feeding Limitations:  [] Swallowing difficulty    [] Chewing difficulty    [x] Other: mentation  Current Meal Intake: No data found.     BM: 3/27  Skin Integrity: abrasion to right eye, abrasion to right cheek, pressure injury to right coccyx- stage unknown  Edema:   [] No     [] Yes   Pertinent Medications: Reviewed    Recent Labs     03/30/19  0146 03/29/19  0228 03/28/19  1856 03/28/19  1529  03/27/19  2135   * 141 --  140   < > 143   K 4.5 4.6  --  4.9   < > 4.8   * 116*  --  114*   < > 114*   CO2 19* 20*  --  21   < > 22   GLU 83 69*  --  71*   < > 63*   BUN 26* 26*  --  24*   < > 22*   CREA 3.94* 3.66*  --  3.50*   < > 3.25*   CA 7.8*  7.8* 8.0*  --  7.9*   < > 8.9   MG 2.3 2.2 2.4  --   --   --    PHOS 5.0* 5.0* 5.3*  --   --   --    ALB 2.1* 2.2*  --   --   --  2.8*   SGOT 19 18  --   --   --  20   ALT 12* 11*  --   --   --  16    < > = values in this interval not displayed. Intake/Output Summary (Last 24 hours) at 3/30/2019 1308  Last data filed at 3/30/2019 1200  Gross per 24 hour   Intake 726.67 ml   Output 1487 ml   Net -760.33 ml       Anthropometrics:  Ht Readings from Last 1 Encounters:   03/28/19 6' (1.829 m)     Last 3 Recorded Weights in this Encounter    03/28/19 0659 03/29/19 0700 03/30/19 1000   Weight: 75.1 kg (165 lb 9.1 oz) 80.2 kg (176 lb 12.9 oz) 79.1 kg (174 lb 6.1 oz)     Body mass index is 23.65 kg/m².     Weight History:   Weight Metrics 3/30/2019 3/27/2019 3/26/2019 2/26/2019 2/14/2019 1/31/2019 11/5/2018   Weight 174 lb 6.1 oz - 140 lb 142 lb 142 lb 142 lb 145 lb   BMI - 23.65 kg/m2 18.99 kg/m2 19.26 kg/m2 19.26 kg/m2 19.26 kg/m2 19.67 kg/m2        Admitting Diagnosis: Oliguria [R34]  Renal injury [S37.009A]  Acute renal failure (ARF) (HCC) [N17.9]  Pertinent PMHx: renal cancer, HTN, quadriparesis, CKD stage III, cirrhosis, COPD     Education Needs:        [x] None identified  [] Identified - Not appropriate at this time  []  Identified and addressed - refer to education log  Learning Limitations:   [] None identified  [x] Identified: AMS    Cultural, Christianity & ethnic food preferences:  [x] None identified    [] Identified and addressed     ESTIMATED NUTRITION NEEDS:     Calories: 3026-0667 kcal (MSJx1.2-1.3) based on  [x] Actual BW 79 kg     [] IBW   Protein: 63-95 gm (0.8-1.2 gm/kg) based on  [x] Actual BW      [] IBW   Fluid: 1 mL/kcal     MONITORING & EVALUATION:     Nutrition Goal(s):   1. Patient will tolerate enteral nutrition formula and regimen without difficulty within the next 7 days. Outcome:  [] Met/Ongoing    [] Progressing towards goal    [] Not progressing towards goal    [x] New/Initial goal   2. Patient will meet their estimated nutritional needs through adequate enteral nutrition within the next 7 days.  Outcome:  [] Met/Ongoing    [] Progressing towards goal    [] Not progressing towards goal    [x] New/Initial goal      Monitoring:   [] Food and beverage intake   [x] Diet order   [x] Nutrition-focused physical findings   [x] Treatment/therapy   [] Weight   [x] Enteral nutrition intake        Previous Recommendations (for follow-up assessments only):     []   Implemented       []   Not Implemented (RD to address)      [] No Longer Appropriate     [] No Recommendation Made     Discharge Planning: pending mentation  [x] Participated in care planning, discharge planning, & interdisciplinary rounds as appropriate        Lindsay Yang RD, [address]   Pager: [phone]

## 2019-03-30 NOTE — PROGRESS NOTES
Bedside and Verbal shift change report given to Katlin Upton RN (oncoming nurse) by Baldemar Michaud RN (offgoing nurse).  Report included the following information SBAR, Kardex, Procedure Summary, Intake/Output, MAR, Accordion, Recent Results, Med Rec Status and Cardiac Rhythm SB.

## 2019-03-30 NOTE — CONSULTS
Endocrinology Consultation Note    NAME:  Olayinka Corey   :   1960   MRN:   [id number]     ATTENDING: Tae Pickett  PCP:  Charles Funez DO    Date/Time:  3/30/2019 1:25 PM  Subjective:   REQUESTING PHYSICIAN:  REASON FOR CONSULT:      Homa Moses is a 59 y.o.  male who I was asked to see for concerns of hypothyroidism and adrenal insufficiency. Pt was initially admitted for issues of urinary retention after change of his suprapubic catheter. While admitted here, he was found to be bradycardic and hypothermic as well as persistently hypoglycemic. He has a medical history significant for hypothyroidism, currently on LT4 100mcg po daily (recent increase from 50 mcg done on last hospital admission ,3/17/2019, at [place] [address] when FT4 was found to be 0.1), CKD III, HCV with cirrhosis, RCC s/p L partial nephrectomy (). He also has had multiple hospital admission over the last few months, with 3 admissions in . Pt does have a history, per PCP, of chronic bradycardia and lower body temperatures, but while in hospital, both have been much lower than his baseline with HR in the 40s and temp as low as 91F. Pt was able to respond to simple questions during interview and states that he is feeling better than he was on admission, that he is having some nausea but no vomiting and has had significant weight loss of ~ 20 lbs, but could state in what time period. Of note, pt did receive hydrocortisone 50 mg IV and per PA, pt's HR has improved and states that previously pt was not conversant.     Past Medical History:   Diagnosis Date    Anemia     Bradycardia, unspecified     Cervical discitis     MRSA    Chronic drug abuse ([address] 1974    Chronic kidney disease     stage III    Diabetes ([facility] 1990    Diabetes mellitus ([facility] [facility]     Drug abuse ([facility] [address]     Encephalopathy     GERD (gastroesophageal reflux disease)     Hypertension     Muscle weakness     Renal cell carcinoma of left kidney ([facility] [address] 12/17/13    Pathological Stage [id number] cc RCC FG3 s/p left open partial nephrectomy in 12/13      Sepsis due to methicillin resistant Staphylococcus aureus (HCC)     Thrombocytopenia (HCC)     Urethral erosion     Viral hepatitis C     Xerosis cutis         Past Surgical History:   Procedure Laterality Date    HX CIRCUMCISION  12/17/13    Dr. Kathi Robison, [facility]    HX NEPHRECTOMY Left 12/17/13    Open Partial, Dr. Kathi Robison, [facility]    HX OTHER SURGICAL Right 2/27/2016    removed right ft  2nd meta-tarsal    HX OTHER SURGICAL  04/2017    abscess removed from back of neck     AL REMOVAL WITH INSERTION OF SUPRAPUBIC CATHETER  2018       Social History     Tobacco Use    Smoking status: Current Every Day Smoker     Packs/day: 0.50     Years: 30.00     Pack years: 15.00     Types: Cigarettes    Smokeless tobacco: Never Used   Substance Use Topics    Alcohol use: Yes     Comment: beer occasionally        Family History   Problem Relation Age of Onset    Diabetes Mother     Sickle Cell Anemia Father     Diabetes Sister     Hypertension Sister         Allergies   Allergen Reactions    Iodine Hives        Prior to Admission medications    Medication Sig Start Date End Date Taking? Authorizing Provider   amLODIPine (NORVASC) 10 mg tablet Take 10 mg by mouth daily. Yes Provider, Historical   levothyroxine (SYNTHROID) 100 mcg tablet Take 100 mcg by mouth Daily (before breakfast). Yes Provider, Historical   omeprazole (PRILOSEC) 20 mg capsule Take 20 mg by mouth two (2) times a day. Yes Provider, Historical   sodium bicarbonate 650 mg tablet Take 650 mg by mouth three (3) times daily. Yes Provider, Historical   sucralfate (CARAFATE) 1 gram tablet Take 1 g by mouth four (4) times daily. Yes Provider, Historical   therapeutic multivitamin (THERAGRAN) tablet Take 1 Tab by mouth daily.    Yes Provider, Historical   traMADol (ULTRAM) 50 mg tablet Take 50 mg by mouth every six (6) hours as needed for Pain. Yes Provider, Historical   trospium (SANCTURA) 20 mg tablet Take 20 mg by mouth daily. Yes Provider, Historical   pioglitazone (ACTOS) 15 mg tablet Take 1 Tab by mouth. 19  Yes Provider, Historical   Syringe, Disposable, (BD SYRINGE CATHETER TIP) 60 mL syrg Use 1 syringe daily with irrigations 18  Yes Diana Jefferson MD   docusate sodium (COLACE) 100 mg capsule 100 mg two (2) times daily as needed. 10/12/18  Yes Provider, Historical   ergocalciferol (DRISDOL) 50,000 unit capsule Take 50,000 Units by mouth every seven (7) days. 18  Yes Provider, Historical   SITagliptin (JANUVIA) 100 mg tablet Take 50 mg by mouth daily. Yes Provider, Historical   insulin lispro (HUMALOG) 100 unit/mL injection Normal Insulin Sensitivity   For Blood Sugar (mg/dL) of:     Less than 150 =   0 units           150 -199 =   2 units  200 -249 =   4 units  250 -299 =   6 units  300 -349 =   8 units  350 and above =   10 units  If 2 glucose readings are above 200 mg/dL within a 24 hr period, proceed to \"Very Insul 4/10/18  Yes Bozena Aquino MD   tamsulosin (FLOMAX) 0.4 mg capsule Take 1 Cap by mouth daily. 4/10/18  Yes Bozena Aquino MD   doxycycline (ADOXA) 100 mg tablet Take 100 mg by mouth daily.     Provider, Historical       REVIEW OF SYSTEMS:     [x]as per HPI, otherwise limited due to pt's ability to answer questions    Objective:   VITALS:    Visit Vitals  /73   Pulse (!) 49   Temp (!) 91.9 °F (33.3 °C)   Resp 12   Ht 6' (1.829 m)   Wt 79.1 kg (174 lb 6.1 oz)   SpO2 100%   BMI 23.65 kg/m²     Temp (24hrs), Av.2 °F (34.6 °C), Min:90.7 °F (32.6 °C), Max:98.7 °F (37.1 °C)      PHYSICAL EXAM:   General: A&Ox3 NAD  HEENT: NC/AT EOMI (-) LAD  CV: Regan RR , no murmurs appreciated  Lungs: CTA b/l  Abd: + BS NT ND  Ext: decreasedpower and strength in all 4 ext  Skin: no rashes  Neuro: unable to elicit reflexes  Psych: tired mood flat affect    LAB DATA REVIEWED:      Recent Labs 03/30/19  0146 03/29/19  0228 03/28/19  1856 03/28/19  1529  03/27/19  2135   * 141  --  140   < > 143   K 4.5 4.6  --  4.9   < > 4.8   * 116*  --  114*   < > 114*   CO2 19* 20*  --  21   < > 22   BUN 26* 26*  --  24*   < > 22*   CREA 3.94* 3.66*  --  3.50*   < > 3.25*   GLU 83 69*  --  71*   < > 63*   PHOS 5.0* 5.0* 5.3*  --   --   --    CA 7.8*  7.8* 8.0*  --  7.9*   < > 8.9   ALB 2.1* 2.2*  --   --   --  2.8*   WBC 3.4* 5.2 4.8  --    < > 3.4*   HGB 7.5* 8.0* 8.3*  --    < > 8.8*   HCT 23.1* 24.4* 25.4*  --    < > 27.2*   PLT 64* 92* 94*  --    < > 92*    < > = values in this interval not displayed. Results for Bharath Singleton (MRN [id number]) as of 3/30/2019 14:13   Ref. Range 3/29/2019 02:28 3/30/2019 01:46 3/30/2019 10:20   Cortisol, baseline Latest Units: ug/dL  14.0    Cortisol, 30 min. Latest Units: ug/dL  24.7    Cortisol, 60 min. Latest Units: ug/dL  26.7    T4, Free Latest Ref Range: 0.7 - 1.5 NG/DL   1.1   Triiodothyronine (T3), free Latest Ref Range: 2.18 - 3.98 PG/ML   1.4 (L)   TSH Latest Ref Range: 0.36 - 3.74 uIU/mL   0.74     Lab Results   Component Value Date/Time    Glucose 83 03/30/2019 01:46 AM    Glucose (POC) 150 (H) 03/30/2019 12:00 PM    Glucose, POC 77 10/11/2018 09:34 AM       Results for Bharath Singleton (MRN [id number]) as of 3/30/2019 14:13   Ref.  Range 3/29/2019 11:51 3/29/2019 14:17 3/29/2019 15:43 3/29/2019 17:45 3/29/2019 20:14 3/30/2019 02:02 3/30/2019 03:56 3/30/2019 05:54 3/30/2019 08:34 3/30/2019 10:23 3/30/2019 12:00   GLUCOSE,FAST - POC Latest Ref Range: 70 - 110 mg/dL 133 (H) 88 76 94 88 86 91 101 103 136 (H) 150 (H)       Recommendations/Plan:      Active Problems:    Oliguria (3/28/2019)      Renal injury (3/28/2019)      Suprapubic catheter ([address] (3/28/2019)      Bradycardia, sinus (3/28/2019)      Acute UTI (3/28/2019)      Type 2 diabetes mellitus with hypoglycemia ([address] (3/28/2019)      Acute renal failure (ARF) ([address] (3/28/2019) ___________________________________________________  RECOMMENDATIONS:  This is a 58 yo AAM admitted initially for urinary retention, but decompensated in hospital, requiring ICU level care. Pt has been hypoglycemic with blood sugars in the 60s, hypothermic, with temperatures in last 24 hours 90.7 - 98.7 F, and bradycardic with HR in last 24 hours  49-61 (baseline 50-60s). Pt does have a history of DM2 with last A1c 5.3% on 3/27/2019 and as outpt on Actos 15mg po daily and Januvia 100mg po daily. He also has a history of hypothyroidism, on levothyroxine 100mcg po daily since last hospital admission at [facility] (3/17-20/2019) with most current labs done today (3/30/2019) TSH 0.74, FT4 1.1. DM2 and  hypoglycemia: the most common cause is usually an excess in medication, either due to over treatment or decrease clearance. He has had urinary retention and SHEILA on this admission, which could cause a stacking of any medications and well as endogenous insulin, resulting in hypoglycemia. Also decrease PO intake and malnutrition can result in insufficient fuel and depletion of hepatic stores of glucose. His last blood sugar <70 was on 3/29;however he has been on D10W IVF, which m/l is helping to maintain his blood sugars. Unexplained hypoglycemia can be seen in adrenal insufficiency. Pt had Cosyntropin Stimulation test this morning and the result demonstrate an appropriate response in cortisol, so HPA axis is working properly, though due to his prolonged ill health, he may not be having an adequate response    -would hold home oral medications for DM and on discharge would discontinue Actos, given that pt has hx of HFpEF since Actos can worsen HF. Would also change Januvia to Lei Estill since pt has GFR of <30 and Januvia is CI in pt with CrCl <30. Tradjenta 5 mg po daily has no CI and is appropriate for pts with CKD.  Pt however my not require any medications since last A1c is 5.3% and this would indicate that he no longer needs aggressive treatment. Goal A1c for this pt, given his multiple medical issues would be 7-8%. Tight A1c control (<6%) has shown to result increased mortality and morbidity. -would have pt receive adequate nutrition, either with PO intake or if unable to tolerate PO intake, then start enteral feeds.    -could trial stress dose hydrocortisone 50 mg IV q 6h to see if there is any improvement in symptoms since he seems to be having some improvement with HR and mentation. Hypothermia/Bradycardia: from an endocrine standpoint, severe hypothyroidism/myxedema, adrenal insufficiency, autonomic dysfunction from DM and hypoglycemia/malnutrition can cause hypothermia. Hypothermia can result in bradycardia. Pt does seem to have some baseline issues with bradycardia with baseline, per PCP,of 50-60s as well as some baseline lower temperatures since C5/6 osteomyelitis in 4/2017. Pt is currently euthyroid on his LT4 100mcg po daily. During hospital stay 3/17-20 pt had a  TSH of 3.47 with a FT4 of 0.1, so responding well to increased dose. While elevation of TSH is not directly proportional to level of severity of hypothyroidism, in this euthyroid patient, symptoms are not related to thyroid function. He had Cosyntropin Stimulation test this morning and HPA axis is working appropriately with both 30 min and 60 min levels > 20. Question could be, given his prolonged illness, could be it be that he is not having an adequate response even though HPA is working.    -would make sure pt is receiving adequate nutrition, either through PO intake or enteral feeds.    -could continue trial of stress dose hydrocortisone 50 mg IV q 6h to see if there is any improvement in symptoms since he seems to be having some improvement with HR and mentation.     Thank you for the consult, will continue to follow patient with you while admitted to the  hospital.      Discussed Code Status:    [x]Full Code      []DNR ___________________________________________________    Care Plan discussed with:    [x]Patient   []Family    []Care Manager  []Nursing   [x]Attending    Total Time :  90    minutes    []Total Critical Care Time:     ___________________________________________________  Endocrinologist: Nhi Ybarra MD

## 2019-03-30 NOTE — PROGRESS NOTES
[facility]. Pulmonary, Critical Care, and Sleep Medicine    Name: Chelsey Price MRN: [id number]   : 1960 Hospital: [facility]   Date: 3/30/2019  Admission Date: 3/27/2019     Chart and notes reviewed. Data reviewed. I have evaluated all findings. [x]I have reviewed the flowsheet and previous days notes. []The patient is unable to give any meaningful history or review of systems because the patient is:  []Intubated []Sedated   []Unresponsive      []The patient is critically ill on      []Mechanical ventilation []Pressors   []BiPAP []       19  No significant changes overnight, this AM patient able to answer simple questions, overnight bradycardic and hypothermic, continued on D10              ROS:Review of systems not obtained due to patient factors. Events and notes from last 24 hours reviewed. Care plan discussed on multidisciplinary rounds.   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    Hyperkalemia E87.5    Hypercalcemia E83.52    Chronic kidney disease, stage III (moderate) (HCC) N18.3    Thrombocytopenia (HCC) D69.6    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) Z89.421    Chronic anemia D64.9    Chronic drug abuse (HCC) F19.10    Hypertension I10    Renal cell carcinoma of left kidney (HCC) C64.2    Urethral erosion N36.8    BPH (benign prostatic hyperplasia) N40.0    Bladder atony N31.2    Cerumen impaction H61.20    Chronic neck pain M54.2, G89.29    Cirrhosis of liver (HCC) K74.60    COPD, moderate (HCC) J44.9    Dry skin dermatitis L85.3    Elevated PSA R97.20    Generalized weakness R53.1    Hematuria R31.9    History of diabetes mellitus Z86.39    History of elevated lipids Z86.39    History of hay fever Z87.09    Hyperlipidemia E78.5    Hypothyroid E03.9    Lung nodules R91.8    Medication management Z79.899    Neck mass R22.1    Pericardial effusion I31.3    Preoperative clearance Z01.818    Prostate disorder N42.9    Recurrent UTI N39.0    Vitamin D deficiency E55.9    Oliguria R34    Renal injury S37.009A    Suprapubic catheter ([facility]) Z93.59    Bradycardia, sinus R00.1    Acute UTI N39.0    Type 2 diabetes mellitus with hypoglycemia ([facility]) E11.649    Acute renal failure (ARF) ([facility]) N17.9       Vital Signs:  Visit Vitals  /73   Pulse (!) 52   Temp (!) 93.7 °F (34.3 °C)   Resp 14   Ht 6' (1.829 m)   Wt 79.1 kg (174 lb 6.1 oz)   SpO2 100%   BMI 23.65 kg/m²       O2 Device: Nasal cannula   O2 Flow Rate (L/min): 2 l/min   Temp (24hrs), Av °F (34.4 °C), Min:90.7 °F (32.6 °C), Max:98.7 °F (37.1 °C)       Intake/Output:   Last shift:       0701 -  1900  In: -   Out: 750 [Urine:750]  Last 3 shifts:  1901 -  0700  In: 1498.8 [I.V.:1498.8]  Out: 5936 [Urine:1854]    Intake/Output Summary (Last 24 hours) at 3/30/2019 1507  Last data filed at 3/30/2019 1431  Gross per 24 hour   Intake 601.67 ml   Output 1650 ml   Net -1048.33 ml        Ventilator Settings:                Current Facility-Administered Medications   Medication Dose Route Frequency    lactulose (CHRONULAC) solution 10 g  10 g Oral BID    hydrocortisone Sod Succ (PF) (SOLU-CORTEF) injection 50 mg  50 mg IntraVENous Q6H    thiamine (B-1) 200 mg in 0.9% sodium chloride 50 mL IVPB  200 mg IntraVENous K7W    folic acid (FOLVITE) 1 mg in 0.9% sodium chloride 50 mL ivpb   IntraVENous Q24H    dextrose 10% infusion  75 mL/hr IntraVENous CONTINUOUS    levETIRAcetam (KEPPRA) 500 mg in 0.9% sodium chloride (MBP/ADV) 100 mL MBP  500 mg IntraVENous Q12H    insulin lispro (HUMALOG) injection   SubCUTAneous Q6H    tamsulosin (FLOMAX) capsule 0.4 mg  0.4 mg Oral DAILY    heparin (porcine) injection 5,000 Units  5,000 Units SubCUTAneous Q8H    levothyroxine (SYNTHROID) tablet 100 mcg  100 mcg Oral 6am    pantoprazole (PROTONIX) injection 40 mg  40 mg IntraVENous Q12H    cefepime (MAXIPIME) 2 g in sterile water (preservative free) 10 mL IV syringe  2 g IntraVENous Q24H       Telemetry:     Physical Exam:      General:  Awakened by voice, NAD   Head:  Normocephalic, without obvious abnormality, atraumatic. Eyes:  Pink palpebral conjunctivae, anicteric sclerae; intact EOMs   Throat: Lips, mucosa, and tongue dry   Neck: Supple, symmetrical, trachea midline, no adenopathy   Lungs:   Symmetrical chest rise; good AE bilat; CTAB; no wheezes/rhonchi/rales noted. Heart:  RR, S1, S2 normal   Abdomen:   Soft, non-tender. Bowel sounds normal.    Extremities: Extremities normal, atraumatic, no cyanosis; trace bipedal edema. Pulses: 2+ and symmetric all extremities.    Skin: Skin color, texture, turgor normal.    Neurologic: Awakens to name calling; attempts to follow simple commands       DATA:  MAR reviewed and pertinent medications noted or modified as needed    Labs:  Recent Labs     03/30/19  0146 03/29/19 0228 03/28/19  1856   WBC 3.4* 5.2 4.8   HGB 7.5* 8.0* 8.3*   HCT 23.1* 24.4* 25.4*   PLT 64* 92* 94*     Recent Labs     03/30/19  0146 03/29/19  0228 03/28/19  1856 03/28/19  1529 03/28/19  1115 03/27/19  2135   * 141  --  140 139 143   K 4.5 4.6  --  4.9 4.6 4.8   * 116*  --  114* 115* 114*   CO2 19* 20*  --  21 20* 22   GLU 83 69*  --  71* 64* 63*   BUN 26* 26*  --  24* 23* 22*   CREA 3.94* 3.66*  --  3.50* 3.34* 3.25*   CA 7.8*  7.8* 8.0*  --  7.9* 8.2* 8.9   MG 2.3 2.2 2.4  --   --   --    PHOS 5.0* 5.0* 5.3*  --   --   --    ALB 2.1* 2.2*  --   --   --  2.8*   SGOT 19 18  --   --   --  20   ALT 12* 11*  --   --   --  16   INR  --   --   --   --  1.0  --      No results for input(s): PH, PCO2, PO2, HCO3, FIO2 in the last 72 hours. No results for input(s): FIO2I, IFO2, HCO3I, IHCO3, HCOPOC, PCO2I, PCOPOC, IPHI, PHI, PHPOC, PO2I, PO2POC in the last 72 hours. No lab exists for component: IPOC2    Imaging:  [x]   I have personally reviewed the patients radiographs and reports  XR Results (most recent):  Results from Hospital Encounter encounter on 03/27/19   XR ABD (KUB)    Narrative EXAM: Abdominal X-ray    INDICATION: NG tube placement    TECHNIQUE: Single AP view of the abdomen obtained. COMPARISON: None    FINDINGS:   An enteric tube has it's tip overlying the stomach. The bowel gas pattern is  nonobstructive. No dilated small bowel loops appreciated. No abnormal  calcifications appreciated. Degenerative changes of the thoracic lumbar spine  are noted. Impression IMPRESSION:  1. An enteric tube has its tip overlying the stomach. CT Results (most recent):  Results from Hospital Encounter encounter on 03/27/19   CT HEAD WO CONT    Narrative EXAM: CT HEAD WO CONT    INDICATION: 59 years Male. Confusion/delirium, altered LOC, unexplained. ADDITIONAL HISTORY: None,    TECHNIQUE: CT of the head from the vertex to the skull base performed. No IV  contrast administered. All CT scans at this facility are performed using dose optimization technique as  appropriate to a performed exam, to include automated exposure control,  adjustment of the mA and/or kV according to patient size (including appropriate  matching for site specific examination) or use of iterative reconstruction  technique. COMPARISON: CT head without contrast 7/31/2011    FINDINGS:     Assessment of the posterior fossa and skull base is mild to moderately degraded  due to motion. No evidence of acute intra-axial or extra-axial hemorrhage. Parenchymal volume is within normal limits for age. The ventricles and sulci are  concordant with the parenchymal volume. There is no midline shift or mass  effect.  The gray-white junction is preserved. Mild right sphenoid sinus and  scattered ethmoid air cell mucosal thickening. The visualized mastoid air cells  are unremarkable. The surrounding soft tissues are unremarkable. Impression IMPRESSION:  No CT evidence of acute intracranial process. Please note that MRI is more  sensitive for ischemia in the first 24 to 48 hours. Findings were discussed with  Dr. George Landry on 3/28/19 at 17:19 hours. 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 Monitor neurological changes, discussed with patient's sister at beside. Questions answered.       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    Hyperkalemia E87.5    Hypercalcemia E83.52    Chronic kidney disease, stage III (moderate) (HCC) N18.3    Thrombocytopenia (HCC) D69.6    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) Z89.421    Chronic anemia D64.9    Chronic drug abuse (HCC) F19.10    Hypertension I10    Renal cell carcinoma of left kidney (HCC) C64.2    Urethral erosion N36.8    BPH (benign prostatic hyperplasia) N40.0    Bladder atony 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.2    Cerumen impaction H61.20    Chronic neck pain M54.2, G89.29    Cirrhosis of liver (HCC) K74.60    COPD, moderate (HCC) J44.9    Dry skin dermatitis L85.3    Elevated PSA R97.20    Generalized weakness R53.1    Hematuria R31.9    History of diabetes mellitus Z86.39    History of elevated lipids Z86.39    History of hay fever Z87.09    Hyperlipidemia E78.5    Hypothyroid E03.9    Lung nodules R91.8    Medication management Z79.899    Neck mass R22.1    Pericardial effusion I31.3    Preoperative clearance Z01.818    Prostate disorder N42.9    Recurrent UTI N39.0    Vitamin D deficiency E55.9    Oliguria R34    Renal injury S37.009A    Suprapubic catheter ([facility]) Z93.59    Bradycardia, sinus R00.1    Acute UTI N39.0    Type 2 diabetes mellitus with hypoglycemia ([facility]) E11.649    Acute renal failure (ARF) ([facility]) N17.9        RECOMMENDATIONS:   · Neuro: CT head negative for acute process. MRI brain pending. Neurology consulted by primary. · Resp: may continue oxygen supplementation with NC. HOB > 30 degrees. Strict aspiration precautions. Obtain CXR    · I/D: follow blood and urine c/s. Continue with vancomycin, cefepime. Add metronidazole IV. · Hem/Onc: stable H/H, platelet. Normal coags  · CVS: hemodynamically stable. No suspicion for fluid overload. D/c lasix  · Metabolic: trend electrolytes and replace cautiously given CKD  · Renal: change suprapubic cath. Trend renal indices. Nephrology consulted by primary  · Endocrine: avoid hypoglycemia -start D10.  Serial glucose monitoring and adjust as needed to prevent further hypoglycemia, stim test today, start stress dose hydrocortisone, start levothyroxine  · GI: NPO  · Musc/Skin: stable, no concerns  · Code Status: Full code     Best practice :    Glycemic control    High complexity decision making was performed during this consultation and evaluation. [x]       Pt is at high risk for further organ failure and dysfunction. Critical care time spent: 40 minutes with patient exclusive of procedures.     Guera Brambila MD

## 2019-03-30 NOTE — PROGRESS NOTES
Progress Note    Patient: Elizabeth Rider MRN: [id number]  CSN: [id number]    YOB: 1960  Age: 59 y.o. Sex: male    DOA: 3/27/2019 LOS:  LOS: 2 days                    Subjective:   Pt sleepy this morning discussed with nursing staff pt awake early was able to answer questions  And recognized his name     Pt transferred  to ICU yesterday   MRI was done last night  Pt started on Keppra per neurology  ID recommended endocrinology consult started on Cefepime 2 gm IV  Cortisol level was ordered and cosyntropin 0.25  Pt hypothermic , hypoglycemic on D10 75 cc/h  Cr I sup H &H is low pt was admitted to [facility] in the past for GI bleeding    HPI  Elizabeth Rider is a 59 y.o.  male who has significant history for chronic urinary retention with suprapubic catheter placement, history of Renal Cell carcinoma post-left partial nephrectomy 12/2013 followed by Dr. Jose Gudino urology, chronic kidney disease baseline Cr 1.6-2.2, history of c-spine laminectomy with post op paralysis, Heroin Drug Abuse, ongoing TOB use, DM2, anemia of chronic disease, thrombocytopenia, hepatitis C with cirrhosis followed by GI Dr. Radha Castillo, [facility], Hypothyroidism. Patient has had mutiple hospitalizations over past month with decline in functional status but declining SNF placement. He was hospitalized at [facility] 3/7/19-3/15/19 for upper GI bleed requiring transfusion PRBCs and Platelets, had EGD 0/8/24 without obvious beeding source found, started prilosec BID, Acute Renal Failure Cr 3.0, Pneumonia completed 7 days ABT prior to discharge, candida UTI given fluconazole, acute respiratory failure requiring short intubation, acute on chronic diastolic CHF responded to lasix diuresis was off lasix prior to discharge. Within few days of return to home poor PO intake, drowsiness, EMS called and patient brought to [facility] where he was hospitalized for UTI with Sepsis admitted 3/17/19-3/20/19.   He has had poor urine output, sister did call Dr. Tosha Kirk, Urology, 3/25/19 discussed poor urine output despite irrigation and was advised to go to ER, did go to [facility], was given a dose of lasix. Followed up with Urology next day 3/26/19 where he had bladder irrigation performed and cath was changed. He was seen 3/27/19 in office with Dr. Tenisha Varghese for hospital follow up visit. Caregiver reporting that has still not had great PO intake, has been pushing PO fluids but still having decreased urine output. Reported only one ounce fluid in bag within 24hrs. Patient called back in afternoon, still no urine output was instructed to come to ER. Started on cefepime for possible UTI, given 1L IVF with minimal UOP.      Patient  Was snet to ER from our office accidentally admitted to hospitalist service, consult was called  To   Cardiology, Urology and Nephrology have been consulted.     3/29    Patient transferred to ICU on stepdown status due to persistent hypothermia and bradycardia, altered mentation from baseline concern for sepsis. Patient with chronic bradycardia at baseline HR 50-60s. Questionable autonomic dysfunction, patient has reported history of low temp since history of c5/6 discitis/Osteomyelitis with resulting quadriplegia 4/2017. Patient had worsened mental status overnight, became unresponsive, rapid response called, concern for CVA patient had CT head neg for CVA. Hypoglycemia during episode. Teleneurology was consulted recommended MRI/MRA, patient taken to scanner testing not completed due to patient inability to tolerate. Mental status improved with increased glucose level.       Patient has had improved UOP with lasix however Cr increasing. Nephrology is evaluating urine studies,     Urology evaluated patient for cath, functioning appropriately, no evidence for obstruction. With pt hx of candida UTI previously recommended coverage with fluconazole.   Cardiology report likely SHEILA due to renal disease, defer decision for diuretic to nephrology. CXR with questionable infiltrate vs atelectasis, pt with hx MRSA osteomyelitis previously, on chronic doxycycline suppressive therapy, ABT spectrum broadened to include MRSA coverage. ID f/u             Chief Complaint:   Chief Complaint   Patient presents with    Urinary Catheter Problem    Abdominal Pain       Review of systems  Unobtainable pt sleepy lethargic     Objective:     Physical Exam:  Visit Vitals  /69   Pulse (!) 45   Temp (!) 91 °F (32.8 °C)   Resp 9   Ht 6' (1.829 m)   Wt 80.2 kg (176 lb 12.9 oz)   SpO2 100%   BMI 23.98 kg/m²        General:         Alert, cooperative, no acute distress    HEENT: NC, Atraumatic. PERRLA, anicteric sclerae. Lungs: CTA Bilaterally. No Wheezing/Rhonchi/Rales. Heart:  Regular  rhythm,  No murmur, No Rubs, No Gallops  Abdomen: Soft, Non distended, Non tender.  +Bowel sounds, no HSM  Extremities: No c/c/e  Psych:   Good insight. Not anxious or agitated. Neurologic:  CN 2-12 grossly intact, Alert and oriented X 3. No acute neurological                                 Deficits,     Intake and Output:  Current Shift:  No intake/output data recorded. Last three shifts:  03/28 1901 - 03/30 0700  In: 1498.8 [I.V.:1498.8]  Out: 1754 [Urine:1754]    Labs: Results:       Chemistry Recent Labs     03/30/19  0146 03/29/19  0228 03/28/19  1529  03/27/19  2135   GLU 83 69* 71*   < > 63*   * 141 140   < > 143   K 4.5 4.6 4.9   < > 4.8   * 116* 114*   < > 114*   CO2 19* 20* 21   < > 22   BUN 26* 26* 24*   < > 22*   CREA 3.94* 3.66* 3.50*   < > 3.25*   CA 7.8*  7.8* 8.0* 7.9*   < > 8.9   AGAP 11 5 5   < > 7   BUCR 7* 7* 7*   < > 7*   AP 73 75  --   --  98   TP 5.6* 6.0*  --   --  7.3   ALB 2.1* 2.2*  --   --  2.8*   GLOB 3.5 3.8  --   --  4.5*   AGRAT 0.6* 0.6*  --   --  0.6*    < > = values in this interval not displayed.       CBC w/Diff Recent Labs     03/30/19  0146 03/29/19  0228 03/28/19  1853 WBC 3.4* 5.2 4.8   RBC 2.67* 2.81* 2.94*   HGB 7.5* 8.0* 8.3*   HCT 23.1* 24.4* 25.4*   PLT 64* 92* 94*   GRANS 57 77* 78*   LYMPH 26 17* 13*   EOS 1 0 0      Cardiac Enzymes Recent Labs     03/29/19  0228 03/28/19  2205 03/28/19  1529    120 129   CKND1  --  5.4* 6.0*      Coagulation Recent Labs     03/28/19  1115   PTP 12.8   INR 1.0       Lipid Panel Lab Results   Component Value Date/Time    Cholesterol, total 160 03/29/2019 02:28 AM    Cholesterol, total 142 03/29/2019 02:28 AM    HDL Cholesterol 58 03/29/2019 02:28 AM    HDL Cholesterol 58 03/29/2019 02:28 AM    LDL, calculated 88.2 03/29/2019 02:28 AM    LDL, calculated 70.2 03/29/2019 02:28 AM    VLDL, calculated 13.8 03/29/2019 02:28 AM    VLDL, calculated 13.8 03/29/2019 02:28 AM    Triglyceride 69 03/29/2019 02:28 AM    Triglyceride 69 03/29/2019 02:28 AM    CHOL/HDL Ratio 2.8 03/29/2019 02:28 AM    CHOL/HDL Ratio 2.4 03/29/2019 02:28 AM      BNP No results for input(s): BNPP in the last 72 hours.    Liver Enzymes Recent Labs     03/30/19  0146   TP 5.6*   ALB 2.1*   AP 73   SGOT 19      Thyroid Studies Lab Results   Component Value Date/Time    TSH 2.65 03/28/2019 06:57 AM          Procedures/imaging: see electronic medical records for all procedures/Xrays and details which were not copied into this note but were reviewed prior to creation of Plan    Medications:   Current Facility-Administered Medications   Medication Dose Route Frequency    thiamine (B-1) 200 mg in 0.9% sodium chloride 50 mL IVPB  200 mg IntraVENous V8I    folic acid (FOLVITE) 1 mg in 0.9% sodium chloride 50 mL ivpb   IntraVENous Q24H    dextrose 10% infusion  75 mL/hr IntraVENous CONTINUOUS    levETIRAcetam (KEPPRA) 500 mg in 0.9% sodium chloride (MBP/ADV) 100 mL MBP  500 mg IntraVENous Q12H    sodium chloride (NS) flush 5-10 mL  5-10 mL IntraVENous PRN    insulin lispro (HUMALOG) injection   SubCUTAneous Q6H    glucose chewable tablet 16 g  4 Tab Oral PRN    glucagon (GLUCAGEN) injection 1 mg  1 mg IntraMUSCular PRN    dextrose (D50W) injection syrg 12.5-25 g  25-50 mL IntraVENous PRN    tamsulosin (FLOMAX) capsule 0.4 mg  0.4 mg Oral DAILY    acetaminophen (TYLENOL) tablet 650 mg  650 mg Oral Q6H PRN    oxyCODONE-acetaminophen (PERCOCET) 5-325 mg per tablet 1 Tab  1 Tab Oral Q6H PRN    naloxone (NARCAN) injection 0.4 mg  0.4 mg IntraVENous PRN    ondansetron (ZOFRAN) injection 4 mg  4 mg IntraVENous Q6H PRN    docusate sodium (COLACE) capsule 100 mg  100 mg Oral BID PRN    heparin (porcine) injection 5,000 Units  5,000 Units SubCUTAneous Q8H    levothyroxine (SYNTHROID) tablet 100 mcg  100 mcg Oral 6am    pantoprazole (PROTONIX) injection 40 mg  40 mg IntraVENous Q12H    cefepime (MAXIPIME) 2 g in sterile water (preservative free) 10 mL IV syringe  2 g IntraVENous Q24H       Assessment/Plan     Active Problems:    Oliguria (3/28/2019)      Renal injury (3/28/2019)      Suprapubic catheter (HCC) (3/28/2019)      Bradycardia, sinus (3/28/2019)      Acute UTI (3/28/2019)      Type 2 diabetes mellitus with hypoglycemia ([facility] [address] (3/28/2019)      Acute renal failure (ARF) ([facility] [address] (3/28/2019)    plan    Hypoglycemia / Hypothermia/ hypocalcemia  F/u urine culture Blood culture  Shaniqua huger   Work up for adrenal insufficiency  Hepatitis c Ab positive  F/u quantitative hepatitis C and HIV  F/u nephrology Dr Olivia Galo for acute renal; failure  Electrolyte replacement per ICU protocol  Ca 7.8  Continue  to monitor lab f/u ICU       SHEILA on CKD stage 2-3, likely from acute on chronic diastolic CHF; possibly due to progressive CKD  - Cardiology consulted, Ezio Man, discussed with Maurisio Schofield, believe hypervolemia is related to progressive renal disease, do not think this is from CHF, defer diuretics to nephrology.   - nephrology consulted, discussed with Dr. Olivia Galo, recommends lasix 40mg IV daily  - CT Abd with pelvic fluid, pleural effusion  - CXR cardiomegaly and pulm edema, questionable infiltrate vs atelectasis  - BNP elevated from baseline  - cardiac markers flat/indeterminant, monitoring on telemetry  - monitor BMP     HTN, HLD, bradycardia chronic HR 40-50s   - holding amlodipine for now  -  HDL 58 LDL 88 TG 69      Possible UTI sepsis given hypothermia and low WBC; possible autonomic dysfunction hx discitis pt has reported chronic low temps in past  - lactic acid normal x2   - continues cefepime, vancomycin added patient has history of MRSA bacteremia/discitis on chronic suppressive therapy with doxycycline as outpatient  - f/u blood cultures 3/28/19  - f/u urine culture 3/27/19  - follow up Urology consulted, Suprapubic cath functioning appropriately, no sign of obstruction, recommended to consider addition of fluconazole due to history of candida UTI recently  - hx of aspiration, recent hospitalization with PNA, ST consult for swallow eval  -  F/u ID given patient complex history and multiple ABT use recently.     Metabolic possible Infectious Encephalopathy in setting of SHEILA, Sepsis, hypoglycemia  - Pt had RRT last night, evaluated for CVA, mentation improved with increased glucose.   Teleneurolgoy recommended MRI/MRA, pt did go for scan however unable to tolerate was not completed.  - Dr. Adolfo Barrera  recommedned started Keppra   -F/U EEG       Hx of recent GIB  - IV protonix until mentation improves, then may transition to po  - monitor H &H h/o GI bleeding     Abdominal Pain, CT abdomen Gallbladder with wall slightly thickened and/or pericholecystic fluid  Abdominal pain resolved this am, RUQ US 3/28/19 no acute cholecystitis  Pt with n/v per nursing staff this am, add zofran      Hep C, Cirrhosis  Discussed with pt sister, he was to treated for Hep C and reports completed treatment  check Hep C Quant to confirm  F/u  INR     DM2  Hypoglycemic on admission, A1c 5.3, will hold home medications  - D10  - work up for sepsis and adrenal insufficiency    Hypothyroidism  - pt was recently increased on synthroid to 100mcg at prior hospitalization  - TFTs within normal limits currently     Anemia chronic disease, Recent Acute blood loss anemia from UGIB EGD neg for source, chronic thrombocytopenia  - monitor, transfuse to keep hgb >7 if needed, monitor plt count, followed by Dr. Lacie Reynolds as outpatient     History of Drug Abuse, Reported heroin use during recent hospitalization this month, ongoing Tob use, Etoh Use  - H/O  heroin use, does not know if he uses IV drugs currently or not. Has past history of IVDA. - UDS + for opiods  - acute hepatitis panel neg for Hep A&B, +hep c( known)  - HIV  - discussed risks, recommended to quit     - pt has h/o poor compliant with multiple admissions    - pt would benefit from SNF, has declined in past will continue to [facility]        CODE:  Full. on admission  Sister  Reported  she is MPOA for patient.  Pt currently unable to make his own decisions although baseline is AOx3 and able to be decision maker.  He use walker with ambulation     DVT/GI Prophylaxis: Hep SQ, SCD's and H2B/PPI             Eneida Alves MD  3/30/2019 9:59 AM

## 2019-03-31 ENCOUNTER — APPOINTMENT (OUTPATIENT)
Dept: GENERAL RADIOLOGY | Age: 59
DRG: 720 | End: 2019-03-31
Attending: NURSE PRACTITIONER
Payer: MEDICAID

## 2019-03-31 PROBLEM — N17.9 RENAL FAILURE (ARF), ACUTE ON CHRONIC (HCC): Status: ACTIVE | Noted: 2019-03-31

## 2019-03-31 PROBLEM — N18.9 RENAL FAILURE (ARF), ACUTE ON CHRONIC (HCC): Status: ACTIVE | Noted: 2019-03-31

## 2019-03-31 LAB
ANION GAP SERPL CALC-SCNC: 4 MMOL/L (ref 3–18)
APPEARANCE UR: ABNORMAL
BASOPHILS # BLD: 0 K/UL (ref 0–0.1)
BASOPHILS NFR BLD: 0 % (ref 0–2)
BILIRUB UR QL: NEGATIVE
BUN SERPL-MCNC: 29 MG/DL (ref 7–18)
BUN/CREAT SERPL: 7 (ref 12–20)
C PEPTIDE SERPL-MCNC: 2.8 NG/ML (ref 1.1–4.4)
CALCIUM SERPL-MCNC: 8.4 MG/DL (ref 8.5–10.1)
CHLORIDE SERPL-SCNC: 114 MMOL/L (ref 100–108)
CO2 SERPL-SCNC: 20 MMOL/L (ref 21–32)
COLOR UR: YELLOW
CREAT SERPL-MCNC: 4.1 MG/DL (ref 0.6–1.3)
DIFFERENTIAL METHOD BLD: ABNORMAL
EOSINOPHIL # BLD: 0 K/UL (ref 0–0.4)
EOSINOPHIL NFR BLD: 0 % (ref 0–5)
EPITH CASTS URNS QL MICRO: ABNORMAL /LPF (ref 0–5)
ERYTHROCYTE [DISTWIDTH] IN BLOOD BY AUTOMATED COUNT: 18.1 % (ref 11.6–14.5)
GLUCOSE BLD STRIP.AUTO-MCNC: 146 MG/DL (ref 70–110)
GLUCOSE BLD STRIP.AUTO-MCNC: 157 MG/DL (ref 70–110)
GLUCOSE BLD STRIP.AUTO-MCNC: 177 MG/DL (ref 70–110)
GLUCOSE BLD STRIP.AUTO-MCNC: 199 MG/DL (ref 70–110)
GLUCOSE BLD STRIP.AUTO-MCNC: 230 MG/DL (ref 70–110)
GLUCOSE SERPL-MCNC: 170 MG/DL (ref 74–99)
GLUCOSE UR STRIP.AUTO-MCNC: NEGATIVE MG/DL
GRAN CASTS URNS QL MICRO: ABNORMAL /LPF
HCT VFR BLD AUTO: 24.3 % (ref 36–48)
HGB BLD-MCNC: 8.1 G/DL (ref 13–16)
HGB UR QL STRIP: ABNORMAL
KETONES UR QL STRIP.AUTO: NEGATIVE MG/DL
LEUKOCYTE ESTERASE UR QL STRIP.AUTO: ABNORMAL
LYMPHOCYTES # BLD: 0.3 K/UL (ref 0.9–3.6)
LYMPHOCYTES NFR BLD: 10 % (ref 21–52)
MCH RBC QN AUTO: 28.1 PG (ref 24–34)
MCHC RBC AUTO-ENTMCNC: 33.3 G/DL (ref 31–37)
MCV RBC AUTO: 84.4 FL (ref 74–97)
MONOCYTES # BLD: 0.1 K/UL (ref 0.05–1.2)
MONOCYTES NFR BLD: 4 % (ref 3–10)
NEUTS SEG # BLD: 2.6 K/UL (ref 1.8–8)
NEUTS SEG NFR BLD: 86 % (ref 40–73)
NITRITE UR QL STRIP.AUTO: NEGATIVE
PH UR STRIP: 5 [PH] (ref 5–8)
PLATELET # BLD AUTO: 70 K/UL (ref 135–420)
PMV BLD AUTO: 11.3 FL (ref 9.2–11.8)
POTASSIUM SERPL-SCNC: 4.3 MMOL/L (ref 3.5–5.5)
PROT UR STRIP-MCNC: 300 MG/DL
RBC # BLD AUTO: 2.88 M/UL (ref 4.7–5.5)
RBC #/AREA URNS HPF: ABNORMAL /HPF (ref 0–5)
SODIUM SERPL-SCNC: 138 MMOL/L (ref 136–145)
SP GR UR REFRACTOMETRY: 1.01 (ref 1–1.03)
UROBILINOGEN UR QL STRIP.AUTO: 0.2 EU/DL (ref 0.2–1)
WBC # BLD AUTO: 3 K/UL (ref 4.6–13.2)
WBC URNS QL MICRO: ABNORMAL /HPF (ref 0–5)
YEAST URNS QL MICRO: ABNORMAL

## 2019-03-31 PROCEDURE — 80048 BASIC METABOLIC PNL TOTAL CA: CPT

## 2019-03-31 PROCEDURE — 94762 N-INVAS EAR/PLS OXIMTRY CONT: CPT

## 2019-03-31 PROCEDURE — 74011636637 HC RX REV CODE- 636/637: Performed by: INTERNAL MEDICINE

## 2019-03-31 PROCEDURE — 74011250636 HC RX REV CODE- 250/636: Performed by: INTERNAL MEDICINE

## 2019-03-31 PROCEDURE — 74011250636 HC RX REV CODE- 250/636: Performed by: PHYSICIAN ASSISTANT

## 2019-03-31 PROCEDURE — 36415 COLL VENOUS BLD VENIPUNCTURE: CPT

## 2019-03-31 PROCEDURE — 82962 GLUCOSE BLOOD TEST: CPT

## 2019-03-31 PROCEDURE — 85025 COMPLETE CBC W/AUTO DIFF WBC: CPT

## 2019-03-31 PROCEDURE — 92526 ORAL FUNCTION THERAPY: CPT

## 2019-03-31 PROCEDURE — 74018 RADEX ABDOMEN 1 VIEW: CPT

## 2019-03-31 PROCEDURE — 74011250637 HC RX REV CODE- 250/637: Performed by: INTERNAL MEDICINE

## 2019-03-31 PROCEDURE — 74011000258 HC RX REV CODE- 258: Performed by: PHYSICIAN ASSISTANT

## 2019-03-31 PROCEDURE — C9113 INJ PANTOPRAZOLE SODIUM, VIA: HCPCS | Performed by: FAMILY MEDICINE

## 2019-03-31 PROCEDURE — 92610 EVALUATE SWALLOWING FUNCTION: CPT

## 2019-03-31 PROCEDURE — 65270000029 HC RM PRIVATE

## 2019-03-31 PROCEDURE — 74011000250 HC RX REV CODE- 250: Performed by: PHYSICIAN ASSISTANT

## 2019-03-31 PROCEDURE — 74011250636 HC RX REV CODE- 250/636: Performed by: FAMILY MEDICINE

## 2019-03-31 PROCEDURE — 81001 URINALYSIS AUTO W/SCOPE: CPT

## 2019-03-31 PROCEDURE — 77030008771 HC TU NG SALEM SUMP -A

## 2019-03-31 PROCEDURE — 74011000258 HC RX REV CODE- 258: Performed by: INTERNAL MEDICINE

## 2019-03-31 PROCEDURE — 74011250637 HC RX REV CODE- 250/637: Performed by: FAMILY MEDICINE

## 2019-03-31 RX ORDER — CALCITRIOL 0.25 UG/1
0.25 CAPSULE ORAL
Status: DISCONTINUED | OUTPATIENT
Start: 2019-04-01 | End: 2019-04-06 | Stop reason: HOSPADM

## 2019-03-31 RX ORDER — SODIUM BICARBONATE 650 MG/1
650 TABLET ORAL 3 TIMES DAILY
Status: DISCONTINUED | OUTPATIENT
Start: 2019-03-31 | End: 2019-04-06 | Stop reason: HOSPADM

## 2019-03-31 RX ADMIN — THIAMINE HYDROCHLORIDE 200 MG: 100 INJECTION, SOLUTION INTRAMUSCULAR; INTRAVENOUS at 07:00

## 2019-03-31 RX ADMIN — LEVETIRACETAM 500 MG: 100 INJECTION, SOLUTION INTRAVENOUS at 03:33

## 2019-03-31 RX ADMIN — INSULIN LISPRO 4 UNITS: 100 INJECTION, SOLUTION INTRAVENOUS; SUBCUTANEOUS at 23:04

## 2019-03-31 RX ADMIN — PIPERACILLIN AND TAZOBACTAM 2.25 G: 2; .25 INJECTION, POWDER, FOR SOLUTION INTRAVENOUS at 11:00

## 2019-03-31 RX ADMIN — HYDROCORTISONE SODIUM SUCCINATE 50 MG: 100 INJECTION, POWDER, FOR SOLUTION INTRAMUSCULAR; INTRAVENOUS at 11:17

## 2019-03-31 RX ADMIN — THIAMINE HYDROCHLORIDE 200 MG: 100 INJECTION, SOLUTION INTRAMUSCULAR; INTRAVENOUS at 13:52

## 2019-03-31 RX ADMIN — PANTOPRAZOLE SODIUM 40 MG: 40 INJECTION, POWDER, FOR SOLUTION INTRAVENOUS at 09:35

## 2019-03-31 RX ADMIN — LEVOTHYROXINE SODIUM 100 MCG: 100 TABLET ORAL at 05:34

## 2019-03-31 RX ADMIN — HYDROCORTISONE SODIUM SUCCINATE 50 MG: 100 INJECTION, POWDER, FOR SOLUTION INTRAMUSCULAR; INTRAVENOUS at 17:18

## 2019-03-31 RX ADMIN — HYDROCORTISONE SODIUM SUCCINATE 50 MG: 100 INJECTION, POWDER, FOR SOLUTION INTRAMUSCULAR; INTRAVENOUS at 05:34

## 2019-03-31 RX ADMIN — OXYCODONE AND ACETAMINOPHEN 1 TABLET: 5; 325 TABLET ORAL at 01:36

## 2019-03-31 RX ADMIN — SODIUM BICARBONATE 650 MG TABLET 650 MG: at 21:14

## 2019-03-31 RX ADMIN — THIAMINE HYDROCHLORIDE 200 MG: 100 INJECTION, SOLUTION INTRAMUSCULAR; INTRAVENOUS at 23:05

## 2019-03-31 RX ADMIN — HYDROCORTISONE SODIUM SUCCINATE 50 MG: 100 INJECTION, POWDER, FOR SOLUTION INTRAMUSCULAR; INTRAVENOUS at 00:19

## 2019-03-31 RX ADMIN — SODIUM CHLORIDE, SODIUM ACETATE ANHYDROUS, SODIUM GLUCONATE, POTASSIUM CHLORIDE, AND MAGNESIUM CHLORIDE 500 ML: 526; 222; 502; 37; 30 INJECTION, SOLUTION INTRAVENOUS at 09:43

## 2019-03-31 RX ADMIN — HEPARIN SODIUM 5000 UNITS: 5000 INJECTION INTRAVENOUS; SUBCUTANEOUS at 15:45

## 2019-03-31 RX ADMIN — INSULIN LISPRO 2 UNITS: 100 INJECTION, SOLUTION INTRAVENOUS; SUBCUTANEOUS at 17:25

## 2019-03-31 RX ADMIN — FOLIC ACID: 5 INJECTION, SOLUTION INTRAMUSCULAR; INTRAVENOUS; SUBCUTANEOUS at 11:00

## 2019-03-31 RX ADMIN — HYDROCORTISONE SODIUM SUCCINATE 50 MG: 100 INJECTION, POWDER, FOR SOLUTION INTRAMUSCULAR; INTRAVENOUS at 23:04

## 2019-03-31 RX ADMIN — LEVETIRACETAM 500 MG: 100 INJECTION, SOLUTION INTRAVENOUS at 15:45

## 2019-03-31 RX ADMIN — INSULIN LISPRO 2 UNITS: 100 INJECTION, SOLUTION INTRAVENOUS; SUBCUTANEOUS at 11:19

## 2019-03-31 RX ADMIN — HEPARIN SODIUM 5000 UNITS: 5000 INJECTION INTRAVENOUS; SUBCUTANEOUS at 09:35

## 2019-03-31 RX ADMIN — SODIUM BICARBONATE 650 MG TABLET 650 MG: at 15:46

## 2019-03-31 RX ADMIN — PANTOPRAZOLE SODIUM 40 MG: 40 INJECTION, POWDER, FOR SOLUTION INTRAVENOUS at 21:14

## 2019-03-31 RX ADMIN — PIPERACILLIN AND TAZOBACTAM 2.25 G: 2; .25 INJECTION, POWDER, FOR SOLUTION INTRAVENOUS at 21:14

## 2019-03-31 NOTE — PROGRESS NOTES
Progress Note    Sunny Deleon  59 y.o. Admit Date: 3/27/2019  Active Problems:    Oliguria (3/28/2019) POA: Unknown      Renal injury (3/28/2019) POA: Unknown      Suprapubic catheter ([address] (3/28/2019) POA: Unknown      Bradycardia, sinus (3/28/2019) POA: Unknown      Acute UTI (3/28/2019) POA: Unknown      Type 2 diabetes mellitus with hypoglycemia ([address] (3/28/2019) POA: Unknown      Acute renal failure (ARF) ([address] (3/28/2019) POA: Unknown      Renal failure (ARF), acute on chronic ([address] (3/31/2019) POA: Yes            Subjective:     Patient feels much better today, now he  is alert, awake, communicating properly, knows his name, place, can recognize me but can not tell my name. Np more Hypothermic, no more Bradycardic, no more Hypoglycemic, tolerating IVF without SOB, continues to make good UOP  through Suprapubic catheter without any Diuretics. A comprehensive review of systems was negative except for that written in the History of Present Illness.     Objective:     Visit Vitals  /74 (BP 1 Location: Right arm, BP Patient Position: At rest;Head of bed elevated (Comment degrees)) Comment (BP Patient Position): 30   Pulse 63   Temp 96.6 °F (35.9 °C)   Resp 10   Ht 6' (1.829 m)   Wt 79.1 kg (174 lb 6.1 oz)   SpO2 100%   BMI 23.65 kg/m²         Intake/Output Summary (Last 24 hours) at 3/31/2019 1257  Last data filed at 3/31/2019 1200  Gross per 24 hour   Intake 3526.25 ml   Output 965 ml   Net 2561.25 ml       Current Facility-Administered Medications   Medication Dose Route Frequency Provider Last Rate Last Dose    piperacillin-tazobactam (ZOSYN) 2.25 g in 0.9% sodium chloride (MBP/ADV) 50 mL MBP  2.25 g IntraVENous Q8H LISA'Linda Bai PA-C 100 mL/hr at 03/31/19 1100 2.25 g at 03/31/19 1100    lactulose (CHRONULAC) solution 10 g  10 g Oral BID Ruby, January-Yahaira ELDER PA-C   Stopped at 03/31/19 0900    hydrocortisone Sod Succ (PF) (SOLU-CORTEF) injection 50 mg  50 mg IntraVENous Q6H Ruby, January-Yahaira ELDER PA-C   50 mg at 03/31/19 1117    thiamine (B-1) 200 mg in 0.9% sodium chloride 50 mL IVPB  200 mg IntraVENous Q8H Linda Ramirez PA-C   200 mg at 60/13/75 5376    folic acid (FOLVITE) 1 mg in 0.9% sodium chloride 50 mL ivpb   IntraVENous Q24H Linda Ramirez PA-C        levETIRAcetam (KEPPRA) 500 mg in 0.9% sodium chloride (MBP/ADV) 100 mL MBP  500 mg IntraVENous Q12H Lilliam Garnett MD   500 mg at 03/31/19 0333    sodium chloride (NS) flush 5-10 mL  5-10 mL IntraVENous PRN Thomas Galaviz MD        insulin lispro (HUMALOG) injection   SubCUTAneous Q6H Thomas Galaviz MD   2 Units at 03/31/19 1119    glucose chewable tablet 16 g  4 Tab Oral PRN Thomas Galaviz MD        glucagon (GLUCAGEN) injection 1 mg  1 mg IntraMUSCular PRN Thomas Galaviz MD        dextrose (D50W) injection syrg 12.5-25 g  25-50 mL IntraVENous PRN Thomas Galaviz MD   25 g at 03/29/19 1104    tamsulosin (FLOMAX) capsule 0.4 mg  0.4 mg Oral DAILY Thomas Galaviz MD   Stopped at 03/28/19 0900    acetaminophen (TYLENOL) tablet 650 mg  650 mg Oral Q6H PRN Thomas Galaviz MD        oxyCODONE-acetaminophen (PERCOCET) 5-325 mg per tablet 1 Tab  1 Tab Oral Q6H PRN Thomas Galaviz MD   1 Tab at 03/31/19 0136    naloxone (NARCAN) injection 0.4 mg  0.4 mg IntraVENous PRN Thomas Galaviz MD        ondansetron [facility]) injection 4 mg  4 mg IntraVENous Q6H PRN Thomas Galaviz MD   4 mg at 03/29/19 0726    docusate sodium (COLACE) capsule 100 mg  100 mg Oral BID PRN Thomas Galaviz MD        heparin (porcine) injection 5,000 Units  5,000 Units SubCUTAneous Q8H Thomas Galaviz MD   5,000 Units at 03/31/19 0935    levothyroxine (SYNTHROID) tablet 100 mcg  100 mcg Oral 6am Mila Aquino MD   100 mcg at 03/31/19 0534    pantoprazole (PROTONIX) injection 40 mg  40 mg IntraVENous Q12H Jesus Aquino MD   40 mg at 03/31/19 0935        Physical Exam:     Physical Exam: General:  Alert, cooperative, no distress, appears stated age. Lungs:   Clear to auscultation bilaterally. Heart:  Regular rate and rhythm, S1, S2 normal, no murmur, click, rub or gallop. Abdomen:   Soft, non-tender. Bowel sounds normal. No masses,  No organomegaly. Extremities: Extremities normal, atraumatic, no cyanosis or edema, . Data Review:    CBC w/Diff    Recent Labs     03/31/19 0227 03/30/19 0146 03/29/19 0228   WBC 3.0* 3.4* 5.2   RBC 2.88* 2.67* 2.81*   HGB 8.1* 7.5* 8.0*   HCT 24.3* 23.1* 24.4*   MCV 84.4 86.5 86.8   MCH 28.1 28.1 28.5   MCHC 33.3 32.5 32.8   RDW 18.1* 19.0* 18.7*    Recent Labs     03/31/19 0227 03/30/19 0146 03/29/19 0228   MONOS 4 16* 6   EOS 0 1 0   BASOS 0 0 0   RDW 18.1* 19.0* 18.7*        Comprehensive Metabolic Profile    Recent Labs     03/31/19 0227 03/30/19 0146 03/29/19 0228    146* 141   K 4.3 4.5 4.6   * 116* 116*   CO2 20* 19* 20*   BUN 29* 26* 26*   CREA 4.10* 3.94* 3.66*    Recent Labs     03/31/19 0227 03/30/19 0146 03/29/19 0228 03/28/19  1856   CA 8.4* 7.8*  7.8* 8.0*  --    PHOS  --  5.0* 5.0* 5.3*   ALB  --  2.1* 2.2*  --    TP  --  5.6* 6.0*  --    SGOT  --  19 18  --    TBILI  --  0.3 0.3  --                     Impression:       Active Hospital Problems    Diagnosis Date Noted    Renal failure (ARF), acute on chronic ([facility] [address] 03/31/2019    Oliguria 03/28/2019    Renal injury 03/28/2019    [facility] 03/28/2019    Bradycardia, sinus 03/28/2019    Acute UTI 03/28/2019    Type 2 diabetes mellitus with hypoglycemia ([facility] 03/28/2019    Acute renal failure (ARF) ([facility] 03/28/2019      Continues to worsen renal function. Behaving as Non-oliguric ATN. Mildly Acidotic, not Hyperkalemic. Has anemia of CRF, Secondary Hyperparathyroidism. .  Now on Stress dose of Steroid, BUN might go up out of Proportion.       Plan:     No need for any diuretics, follow Renal function, no need for any Dialysis yet,   Check for any ATN or not, will give low dose of oral Bicarb, start weekly Retacrit for anemia of CRF. Start low dose of Rocaltrol, adjust Antibiotics & other medicine as per egfr.       Allyn Zamora MD

## 2019-03-31 NOTE — ROUTINE PROCESS
TRANSFER - OUT REPORT:    Verbal report given to Ernestine Ferris RN (name) on Hoda Wu  being transferred to T SD room # 7299 (unit) for routine progression of care       Report consisted of patients Situation, Background, Assessment and   Recommendations(SBAR). Information from the following report(s) SBAR, Kardex, Intake/Output, MAR, Recent Results and Cardiac Rhythm is SR with 1st degree AVB was reviewed with the receiving nurse. Lines:   Peripheral IV 03/27/19 Left;Upper Arm (Active)   Site Assessment Clean, dry, & intact 3/31/2019  4:00 PM   Phlebitis Assessment 0 3/31/2019  4:00 PM   Infiltration Assessment 0 3/31/2019  4:00 PM   Dressing Status Clean, dry, & intact 3/31/2019  4:00 PM   Dressing Type Transparent;Tape 3/31/2019  4:00 PM   Hub Color/Line Status Green;Flushed;Patent;Capped 3/31/2019  4:00 PM   Action Taken Open ports on tubing capped 3/31/2019  4:00 PM   Alcohol Cap Used Yes 3/31/2019  4:00 PM       Peripheral IV 03/29/19 Right Hand (Active)   Site Assessment Clean, dry, & intact 3/31/2019  4:00 PM   Phlebitis Assessment 0 3/31/2019  4:00 PM   Infiltration Assessment 0 3/31/2019  4:00 PM   Dressing Status Clean, dry, & intact 3/31/2019  4:00 PM   Dressing Type Transparent;Tape 3/31/2019  4:00 PM   Hub Color/Line Status Blue;Patent;Capped 3/31/2019  4:00 PM   Action Taken Open ports on tubing capped 3/31/2019  4:00 PM   Alcohol Cap Used Yes 3/31/2019  4:00 PM        Opportunity for questions and clarification was provided.       Patient transported with:   Monitor  Registered Nurse  Tech

## 2019-03-31 NOTE — PROGRESS NOTES
[facility]. Pulmonary, Critical Care, and Sleep Medicine    Name: Creig Simmonds MRN: [id number]   : 1960 Hospital: [facility]   Date: 3/31/2019  Admission Date: 3/27/2019     Chart and notes reviewed. Data reviewed. I have evaluated all findings. [x]I have reviewed the flowsheet and previous days notes. []The patient is unable to give any meaningful history or review of systems because the patient is:  []Intubated []Sedated   []Unresponsive      []The patient is critically ill on      []Mechanical ventilation []Pressors   []BiPAP []         Interval HPI:Patient is a 59 y.o. male with complex medical hx significant for CKD 3, Dm 2, CHF, autonomic dysfunction, prior MRSA bacteremia with hx of IVDA, hypothyroidism, recent GIB, partial nephrectomy, chronic suprapubic catheter admitted to ICU for sepsis, AMS,  and refractory hypoglycemia. Subjective 19  Hospital Day: 4  Overnight events: no significant overnight events. No seizure activity recurrence. Mentation/Activity: much improved. Awake, following commands, asking for food. Respiratory/ Secretions: protecting airway  Hemodynamics: VSS  Urine output, bowel: decreased overnight, fluid bolus given   Diet: TF   Need for procedures: n/a              ROS:Review of systems not obtained due to patient factors. Events and notes from last 24 hours reviewed. Care plan discussed on multidisciplinary rounds.   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    Hyperkalemia E87.5    Hypercalcemia E83.52    Chronic kidney disease, stage III (moderate) (HCC) N18.3    Thrombocytopenia (HCC) D69.6    Status post amputation of toe of right foot ([facility]) Z89.421    Chronic anemia D64.9    Chronic drug abuse ([facility] [address] F19.10    Hypertension I10    Renal cell carcinoma of left kidney (HCC) C64.2    Urethral erosion N36.8    BPH (benign prostatic hyperplasia) N40.0    Bladder atony N31.2    Cerumen impaction H61.20    Chronic neck pain M54.2, G89.29    Cirrhosis of liver ([facility]) K74.60    COPD, moderate (HCC) J44.9    Dry skin dermatitis L85.3    Elevated PSA R97.20    Generalized weakness R53.1    Hematuria R31.9    History of diabetes mellitus Z86.39    History of elevated lipids Z86.39    History of hay fever Z87.09    Hyperlipidemia E78.5    Hypothyroid E03.9    Lung nodules R91.8    Medication management Z79.899    Neck mass R22.1    Pericardial effusion I31.3    Preoperative clearance Z01.818    Prostate disorder N42.9    Recurrent UTI N39.0    Vitamin D deficiency E55.9    Oliguria R34    Renal injury S37.009A    Suprapubic catheter ([facility]) Z93.59    Bradycardia, sinus R00.1    Acute UTI N39.0    Type 2 diabetes mellitus with hypoglycemia ([facility]) E11.649    Acute renal failure (ARF) ([facility]) N17.9       Vital Signs:  Visit Vitals  /73   Pulse 64   Temp 97.9 °F (36.6 °C)   Resp 12   Ht 6' (1.829 m)   Wt 79.1 kg (174 lb 6.1 oz)   SpO2 100%   BMI 23.65 kg/m²       O2 Device: Room air   O2 Flow Rate (L/min): 2 l/min   Temp (24hrs), Av.3 °F (35.7 °C), Min:90.7 °F (32.6 °C), Max:98.2 °F (36.8 °C)       Intake/Output:   Last shift:      No intake/output data recorded.   Last 3 shifts: 1901 -  0700  In: 3309.6 [I.V.:9.6]  Out:  [Urine:]    Intake/Output Summary (Last 24 hours) at 3/31/2019 0934  Last data filed at 3/31/2019 0600  Gross per 24 hour   Intake 2336.25 ml   Output 1020 ml   Net 1316.25 ml            Current Facility-Administered Medications   Medication Dose Route Frequency    electrolyte-r (NORMOSOL R) bolus infusion 500 mL  500 mL IntraVENous ONCE    lactulose (CHRONULAC) solution 10 g  10 g Oral BID    hydrocortisone Sod Succ (PF) (SOLU-CORTEF) injection 50 mg  50 mg IntraVENous Q6H    thiamine (B-1) 200 mg in 0.9% sodium chloride 50 mL IVPB  200 mg IntraVENous P7O    folic acid (FOLVITE) 1 mg in 0.9% sodium chloride 50 mL ivpb   IntraVENous Q24H    levETIRAcetam (KEPPRA) 500 mg in 0.9% sodium chloride (MBP/ADV) 100 mL MBP  500 mg IntraVENous Q12H    insulin lispro (HUMALOG) injection   SubCUTAneous Q6H    tamsulosin (FLOMAX) capsule 0.4 mg  0.4 mg Oral DAILY    heparin (porcine) injection 5,000 Units  5,000 Units SubCUTAneous Q8H    levothyroxine (SYNTHROID) tablet 100 mcg  100 mcg Oral 6am    pantoprazole (PROTONIX) injection 40 mg  40 mg IntraVENous Q12H    cefepime (MAXIPIME) 2 g in sterile water (preservative free) 10 mL IV syringe  2 g IntraVENous Q24H         Telemetry: [x]Sinus []A-flutter []Paced    []A-fib []Multiple PVCs                  Physical Exam:      General: NAD, appears stated age. HEENT:  Anicteric sclerae; pink palpebral conjunctivae; mucosa moist. NGT  Resp:  Symmetrical chest expansion, no accessory muscle use; good airway entry; no rales/ wheezing/ rhonchi noted  CV:  S1, S2 present; regular rate and rhythm  GI:  Abdomen soft, non-tender; (+) active bowel sounds   Extremities:  +2 pulses on all extremities; no edema/ cyanosis/ clubbing noted +b/l soft wrist restraints   Skin:  Warm; no rashes/ lesions noted, normal turgor/cap refill   Neurologic:  Non-focal- not cooperative but moves all 4 extremities to command. Alert to self and place.    Devices:  NGT, suprapubic catheter       DATA:  MAR reviewed and pertinent medications noted or modified as needed    Labs:  Recent Labs     03/31/19 0227 03/30/19 0146 03/29/19 0228   WBC 3.0* 3.4* 5.2   HGB 8.1* 7.5* 8.0*   HCT 24.3* 23.1* 24.4*   PLT 70* 64* 92*     Recent Labs     03/31/19 0227 03/30/19  0146 03/29/19  0228 03/28/19  1856  03/28/19  1115    146* 141 --    < > 139   K 4.3 4.5 4.6  --    < > 4.6   * 116* 116*  --    < > 115*   CO2 20* 19* 20*  --    < > 20*   * 83 69*  --    < > 64*   BUN 29* 26* 26*  --    < > 23*   CREA 4.10* 3.94* 3.66*  --    < > 3.34*   CA 8.4* 7.8*  7.8* 8.0*  --    < > 8.2*   MG  --  2.3 2.2 2.4  --   --    PHOS  --  5.0* 5.0* 5.3*  --   --    ALB  --  2.1* 2.2*  --   --   --    SGOT  --  19 18  --   --   --    ALT  --  12* 11*  --   --   --    INR  --   --   --   --   --  1.0    < > = values in this interval not displayed. No results for input(s): PH, PCO2, PO2, HCO3, FIO2 in the last 72 hours. No results for input(s): FIO2I, IFO2, HCO3I, IHCO3, HCOPOC, PCO2I, PCOPOC, IPHI, PHI, PHPOC, PO2I, PO2POC in the last 72 hours. No lab exists for component: IPOC2    Imaging:  [x]   I have personally reviewed the patients radiographs and reports  XR Results (most recent):  Results from Hospital Encounter encounter on 03/27/19   XR ABD (KUB)    Narrative Abdomen - 1 View    CPT CODE: 75226    HISTORY: NG tube placement. COMPARISON: 3/30/2019. FINDINGS:     The tip of the feeding tube projects over the gastric fundus without change. Gas-filled upper colon is stable. Mid and lower abdomen is not imaged. Impression IMPRESSION:    The feeding tube tip projects over the gastric fundus, stable. CT Results (most recent):  Results from Hospital Encounter encounter on 03/27/19   CT HEAD WO CONT    Narrative EXAM: CT HEAD WO CONT    INDICATION: 59 years Male. Confusion/delirium, altered LOC, unexplained. ADDITIONAL HISTORY: None,    TECHNIQUE: CT of the head from the vertex to the skull base performed. No IV  contrast administered.     All CT scans at this facility are performed using dose optimization technique as  appropriate to a performed exam, to include automated exposure control,  adjustment of the mA and/or kV according to patient size (including appropriate  matching for site specific examination) or use of iterative reconstruction  technique. COMPARISON: CT head without contrast 7/31/2011    FINDINGS:     Assessment of the posterior fossa and skull base is mild to moderately degraded  due to motion. No evidence of acute intra-axial or extra-axial hemorrhage. Parenchymal volume is within normal limits for age. The ventricles and sulci are  concordant with the parenchymal volume. There is no midline shift or mass  effect. The gray-white junction is preserved. Mild right sphenoid sinus and  scattered ethmoid air cell mucosal thickening. The visualized mastoid air cells  are unremarkable. The surrounding soft tissues are unremarkable. Impression IMPRESSION:  No CT evidence of acute intracranial process. Please note that MRI is more  sensitive for ischemia in the first 24 to 48 hours. Findings were discussed with  Dr. Nuris Frey on 3/28/19 at 17:19 hours. IMPRESSION:   · Acute encephalopathy, possibly metabolic in setting of persistent hypoglycemia/sepsis, uremia, post ictal   · Sepsis vs adrenal insufficiency w/ hypothermia, hypoglycemia.  However no w/ leukopenia, UC growing bacteria  · UTI- UC (+) for yeast and achromobacter, dentrificans  · New onset seizures , EEG completed on 3/30  · Acute on chronic CKD, Stage 3, likely secondary to ATN  · Sinus bradycardia  · Hx of cirrhosis and hepatitis c  · Hx diastolic heart failure   · Chronic anemia w/ thrombocytopenia      Patient Active Problem List   Diagnosis Code    Type II diabetes mellitus, uncontrolled ([facility] [address] E11.65    Urinary retention R33.9    Chronic hepatitis C without hepatic coma (HCC) B18.2    Essential hypertension I10    IV drug abuse (HCC) F19.10    Quadriparesis ([facility] [address] L93.81    Umbilical hernia U28.7    Bradycardia R00.1    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    Hyperkalemia E87.5    Hypercalcemia E83.52    Chronic kidney disease, stage III (moderate) (HCC) N18.3    Thrombocytopenia (HCC) D69.6    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) Z89.421    Chronic anemia D64.9    Chronic drug abuse ([facility] [address] F19.10    Hypertension I10    Renal cell carcinoma of left kidney (HCC) C64.2    Urethral erosion N36.8    BPH (benign prostatic hyperplasia) N40.0    Bladder atony 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.2    Cerumen impaction H61.20    Chronic neck pain M54.2, G89.29    Cirrhosis of liver (HCC) K74.60    COPD, moderate (HCC) J44.9    Dry skin dermatitis L85.3    Elevated PSA R97.20    Generalized weakness R53.1    Hematuria R31.9    History of diabetes mellitus Z86.39    History of elevated lipids Z86.39    History of hay fever Z87.09    Hyperlipidemia E78.5    Hypothyroid E03.9    Lung nodules R91.8    Medication management Z79.899    Neck mass R22.1    Pericardial effusion I31.3    Preoperative clearance Z01.818    Prostate disorder N42.9    Recurrent UTI N39.0    Vitamin D deficiency E55.9    Oliguria R34    Renal injury S37.009A    Suprapubic catheter ([facility]) Z93.59    Bradycardia, sinus R00.1    Acute UTI N39.0    Type 2 diabetes mellitus with hypoglycemia ([facility]) E11.649    Acute renal failure (ARF) ([facility]) N17.9        RECOMMENDATIONS:   Neuro: CT & MRI head negative for acute process. . Neurology following. Cont' keppra. Resp: may continue oxygen supplementation with NC. HOB > 30 degrees. Strict aspiration precautions. I/D: follow blood and urine c/s. ID following- d/c'd flagyl, vanco. ICU team d/c'd cefepime and started zosyn given UC results. Hem/Onc: stable H/H, platelet. Normal coags. D/C SQH should platelet levels go below 50,000  CVS: hemodynamically stable. Metabolic: trend electrolytes and replace cautiously given SHEILA. Cont' folic acid, thiamine   Renal: suprapubic catheter was exchanged. Trend renal indices. Nephrology following. Fluid bolus for decreased UOP overnight. Endocrine: hypoglycemia improved, off D10 Solu-cortef started for possible adrenal insufficiency. Endocrine consulted.  ACTH stim test (-)  GI: NPO, TF via NGT. Cont' lactulose. Protonix  Musc/Skin: PT/OT  Full Code  Discussed in interdisciplinary rounds    Patient stable to transfer out of ICU. Will discuss w/ primary team.      Best practice :    Glycemic control  IHI ICU bundles: Varner Bundle Followed    Mech Vent patients- VAP bundle, aim to keep peak plateau pressure 98-49TH H2O  Sress ulcer prophylaxis. DVT prophylaxis. Need for Lines, varner assessed. Restraints need. Palliative care evaluation. High complexity decision making was performed during this consultation and evaluation. [x]       Pt is at high risk for further organ failure and dysfunction.        Brain NAHOMI Bearden  03/31/19  Pulmonary, Critical Care Medicine  [facility]

## 2019-03-31 NOTE — ROUTINE PROCESS
Bedside and Verbal shift change report given to Jessenia Arreola RN (oncoming nurse) by Nguyen Junior RN (offgoing nurse). Report included the following information SBAR, Kardex, MAR and Recent Results.     SITUATION:    Code Status: Full Code   Reason for Admission: Oliguria [R34]   Renal injury [S37.009A]   Acute renal failure (ARF) ([facility] [address] [N17.9]    [facility] day: 2   Problem List:       Hospital Problems  Date Reviewed: 2/26/2019          Codes Class Noted POA    Oliguria ICD-10-CM: R34  ICD-9-CM: 788.5  3/28/2019 Unknown        Renal injury ICD-10-CM: S37.009A  ICD-9-CM: 866.00  3/28/2019 Unknown        Suprapubic catheter ([facility] [address] ICD-10-CM: Z93.59  ICD-9-CM: V44.59  3/28/2019 Unknown        Bradycardia, sinus ICD-10-CM: R00.1  ICD-9-CM: 427.89  3/28/2019 Unknown        Acute UTI ICD-10-CM: N39.0  ICD-9-CM: 599.0  3/28/2019 Unknown        Type 2 diabetes mellitus with hypoglycemia ([facility] [address] ICD-10-CM: E11.649  ICD-9-CM: 250.80  3/28/2019 Unknown        Acute renal failure (ARF) (HCC) ICD-10-CM: N17.9  ICD-9-CM: 584.9  3/28/2019 Unknown              BACKGROUND:    Past Medical History:   Past Medical History:   Diagnosis Date    Anemia     Bradycardia, unspecified 2018    Cervical discitis     MRSA    Chronic drug abuse ([facility] [address] 1974    Chronic kidney disease     stage III    Diabetes ([facility] [address] 01/1990    Diabetes mellitus ([facility] [address]     Drug abuse ([facility] [address]     Encephalopathy     GERD (gastroesophageal reflux disease)     Hypertension     Muscle weakness     Renal cell carcinoma of left kidney ([facility] [address] 12/17/13    Pathological Stage [id number] cc RCC FG3 s/p left open partial nephrectomy in 12/13      Sepsis due to methicillin resistant Staphylococcus aureus (HCC)     Thrombocytopenia (HCC)     Urethral erosion     Viral hepatitis C     Xerosis cutis          Patient taking anticoagulants: Yes     ASSESSMENT:    Changes in Assessment Throughout Shift: Lethargic and hypothermic (90.7F rectally) this morning requiring application of Shaniqua hugger warming blanket. SB this morning with HR: 45-55 bpm. Endocrinologist consulted. Cosyntropin stimulation test performed and stress dose hydrocortisone 50 mg IV q 6 hr started. NGT inserted and enteral feeding initiated per order. By late evening, temperature increased to 97.5F rectally, HR: 68 bpm, and patient alert and conversing. No seizure activity. Dextrose 10% drip discontinued this evening for blood glucose level 200.      Patient has Central Line: No     Patient has Willis Cath: Suprapubic cath     Last Vitals:     Vitals:    03/30/19 1700 03/30/19 1800 03/30/19 1900 03/30/19 2000   BP: 147/75 144/74 137/63    Pulse: 61 61 68    Resp: 10 13 20    Temp: 96.4 °F (35.8 °C) 97.2 °F (36.2 °C) 97.5 °F (36.4 °C) 97.6 °F (36.4 °C)   SpO2: 100% 100% 100%    Weight:       Height:            IV and DRAINS (will only show if present)   Peripheral IV 03/27/19 Left;Upper Arm-Site Assessment: Clean, dry, & intact  Peripheral IV 03/29/19 Right Hand-Site Assessment: Clean, dry, & intact  Nasogastric Tube 03/30/19-Site Assessment: Clean, dry, & intact     WOUND (if present)   Wound Type:  none   Dressing present Dressing Present : Yes, Intact, not due to be changed   Wound Concerns/Notes:  none     PAIN    Pain Assessment    Pain Intensity 1: 0 (03/30/19 2000)    Pain Location 1: Neck    Pain Intervention(s) 1: Repositioned    Patient Stated Pain Goal: 0  o Interventions for Pain:  none  o Intervention effective: N/A       Last 3 Weights:  Last 3 Recorded Weights in this Encounter    03/28/19 0659 03/29/19 0700 03/30/19 1000   Weight: 75.1 kg (165 lb 9.1 oz) 80.2 kg (176 lb 12.9 oz) 79.1 kg (174 lb 6.1 oz)     Weight change:      INTAKE/OUPUT    Current Shift: 03/30 1901 - 03/31 0700  In: 320   Out: 50 [Urine:50]    Last three shifts: 03/29 0701 - 03/30 1900  In: 2638.3 [I.V.:2438.3]  Out: 2279 [Urine:2279]     LAB RESULTS     Recent Labs     03/30/19  0146 03/29/19  0221 03/28/19  1856   WBC 3.4* 5.2 4.8   HGB 7.5* 8.0* 8.3*   HCT 23.1* 24.4* 25.4*   PLT 64* 92* 94*        Recent Labs     03/30/19  0146 03/29/19  0228 03/28/19  1856 03/28/19  1529 03/28/19  1115   * 141  --  140 139   K 4.5 4.6  --  4.9 4.6   GLU 83 69*  --  71* 64*   BUN 26* 26*  --  24* 23*   CREA 3.94* 3.66*  --  3.50* 3.34*   CA 7.8*  7.8* 8.0*  --  7.9* 8.2*   MG 2.3 2.2 2.4  --   --    INR  --   --   --   --  1.0       RECOMMENDATIONS AND DISCHARGE PLANNING     1. Pending tests/procedures/ Plan of Care or Other Needs: Monitor mentation, temperature, HR, blood glucose    2. Discharge plan for patient and Needs/Barriers: TBD    3. Estimated Discharge Date: TBD     4. The patient's care plan was reviewed with the oncoming nurse. \"HEALS\" SAFETY CHECK      Fall Risk    Total Score: 3    Safety Measures: Safety Measures: Bed/Chair-Wheels locked, Bed in low position, Call light within reach, Emergency bedside equipment, Fall prevention (comment), Gripper socks, Side rails X 3    A safety check occurred in the patient's room between off going nurse and oncoming nurse listed above. The safety check included the below items  Area Items   H  High Alert Medications - Verify all high alert medication drips (heparin, PCA, etc.)   E  Equipment - Suction is set up for ALL patients (with yanker)  - Red plugs utilized for all equipment (IV pumps, etc.)  - WOWs wiped down at end of shift.  - Room stocked with oxygen, suction, and other unit-specific supplies   A  Alarms - Bed alarm is set for fall risk patients  - Ensure chair alarm is in place and activated if patient is up in a chair   L  Lines - Check IV for any infiltration  - Willis bag is empty if patient has a Willis   - Tubing and IV bags are labeled   S  Safety   - Room is clean, patient is clean, and equipment is clean. - Hallways are clear from equipment besides carts.    - Fall bracelet on for fall risk patients  - Ensure room is clear and free of clutter  - Suction is set up for ALL patients (with magy)  - Hallways are clear from equipment besides carts.    - Isolation precautions followed, supplies available outside room, sign posted     Lillian Holman, RN

## 2019-03-31 NOTE — PROGRESS NOTES
Endocrinology Progress Note    Patient: Sunny Deleon Age: 59 y.o. : 1960 MR#: [id number] SSN: [id number]  Date: 3/31/2019     Subjective:     Pt seen at bedside today. Pt no longer needing Shaniqua Hugger for maintaining core temperature. He is answering questions and asking questions appropriately. Temp, HR, and blood sugars have all improved in the last 24 hours. Assessment/Plan: This is a 60 yo AAM admitted initially for urinary retention, but decompensated in hospital, requiring ICU level care. Pt has been hypoglycemic with blood sugars in the 60s, hypothermic, with temperatures in last 24 hours 90.7 - 98.7 F, and bradycardic with HR 49-61 (baseline 50-60s), all have improved with addition of stress dose steroids as well as IV Abx treatment. Pt does have a history of DM2 with last A1c 5.3% on 3/27/2019 and as outpt on Actos 15mg po daily and Januvia 100mg po daily. He also has a history of hypothyroidism, on levothyroxine 100mcg po daily since last hospital admission at [facility] (3/17-) with most current labs done today (3/30/2019) TSH 0.74, FT4 1.1.      DM2 and  hypoglycemia: the most common cause is usually an excess in medication, either due to over treatment or decrease clearance. He has had urinary retention and SHEILA on this admission, which could cause a stacking of any medications and well as endogenous insulin, resulting in hypoglycemia. Also decrease PO intake and malnutrition can result in insufficient fuel and depletion of hepatic stores of glucose. Unexplained hypoglycemia can be seen in adrenal insufficiency. Pt had Cosyntropin Stimulation test  and the result demonstrated an appropriate response in cortisol, so HPA axis is working properly, though due to his prolonged ill health, he may  Not have an adequate response     -would hold home oral medications for DM and on discharge would discontinue Actos, given that pt has hx of HFpEF since Actos can worsen HF.  Would also change Garretmisaelia to Buzz Araiza since pt has GFR of <30 and Januvia is CI in pt with CrCl <30. Tradjenta 5 mg po daily has no CI and is appropriate for pts with CKD. Pt however my not require any medications since last A1c is 5.3% and this would indicate that he no longer needs aggressive treatment. Goal A1c for this pt, given his multiple medical issues would be 7-8%. Tight A1c control (<6%) has shown to result increased mortality and morbidity.     -would have pt receive adequate nutrition, either with PO intake or through enteral feeds.     -could continie trial ofstress dose hydrocortisone 50 mg IV q 6h to see if there continued improvement in symptoms since he seems to be having some improvement. Once pt is back to baseline, could then taper off steroids and repeat Cosyntropin Stim to see if pt needs continued steroids for AI.     Hypothermia/Bradycardia: from an endocrine standpoint, severe hypothyroidism/myxedema, adrenal insufficiency, autonomic dysfunction from DM and hypoglycemia/malnutrition can cause hypothermia. Hypothermia can result in bradycardia. Pt does seem to have some baseline issues with bradycardia with baseline, per PCP,of 50-60s as well as some baseline lower temperatures since C5/6 osteomyelitis in 4/2017. Pt is currently euthyroid on his LT4 100mcg po daily. During hospital stay 3/17-20 pt had a  TSH of 3.47 with a FT4 of 0.1, so responding well to increased dose. While elevation of TSH is not directly proportional to level of severity of hypothyroidism, in this euthyroid patient, symptoms are not related to thyroid function. He had Cosyntropin Stimulation test this morning and HPA axis is working appropriately with both 30 min and 60 min levels > 20.  Question could be, given his prolonged illness, could be it be that he is not having an adequate response even though HPA is working.     -would make sure pt is receiving adequate nutrition, either through PO intake or enteral feeds.     -could continue trial of stress dose hydrocortisone 50 mg IV q 6h to see if there is any improvement in symptoms since he seems to be having some improvement .     Thank you for the consult, will continue to follow patient with you while admitted to the  hospital.           Case discussed with:  [x]Patient  []Family  [x]Nursing  [x]Attending    Time Spent: 30 minutes    Objective:   VS:   Visit Vitals  /74   Pulse 61   Temp 97.5 °F (36.4 °C)   Resp 9   Ht 6' (1.829 m)   Wt 79.1 kg (174 lb 6.1 oz)   SpO2 99%   BMI 23.65 kg/m²      Tmax/24hrs: Temp (24hrs), Av.5 °F (35.8 °C), Min:91.2 °F (32.9 °C), Max:98.2 °F (36.8 °C)      Intake/Output Summary (Last 24 hours) at 3/31/2019 1013  Last data filed at 3/31/2019 0900  Gross per 24 hour   Intake 2336.25 ml   Output 1045 ml   Net 1291.25 ml       General: A&Ox3 NAD  HEENT: NC/AT EOMI + NGT in place  Skin: no rashes  Psych: good mood  slightly flat affect      Labs:    Recent Results (from the past 24 hour(s))   TSH 3RD GENERATION    Collection Time: 19 10:20 AM   Result Value Ref Range    TSH 0.74 0.36 - 3.74 uIU/mL   T4, FREE    Collection Time: 19 10:20 AM   Result Value Ref Range    T4, Free 1.1 0.7 - 1.5 NG/DL   T3, FREE    Collection Time: 19 10:20 AM   Result Value Ref Range    Triiodothyronine (T3), free 1.4 (L) 2.18 - 3.98 PG/ML   COSYNTROPIN ACTH STIMULATION TEST    Collection Time: 19 10:20 AM   Result Value Ref Range    Cortisol, baseline 25.7 ug/dL    Cortisol, 30 min.  29.7 ug/dL    Cortisol, 60 min. 33.8 ug/dL   GLUCOSE, POC    Collection Time: 19 10:23 AM   Result Value Ref Range    Glucose (POC) 136 (H) 70 - 110 mg/dL   GLUCOSE, POC    Collection Time: 19 12:00 PM   Result Value Ref Range    Glucose (POC) 150 (H) 70 - 110 mg/dL   GLUCOSE, POC    Collection Time: 19  6:40 PM   Result Value Ref Range    Glucose (POC) 200 (H) 70 - 110 mg/dL   GLUCOSE, POC    Collection Time: 19 12:03 AM   Result Value Ref Range Glucose (POC) 157 (H) 70 - 104 mg/dL   METABOLIC PANEL, BASIC    Collection Time: 03/31/19  2:27 AM   Result Value Ref Range    Sodium 138 136 - 145 mmol/L    Potassium 4.3 3.5 - 5.5 mmol/L    Chloride 114 (H) 100 - 108 mmol/L    CO2 20 (L) 21 - 32 mmol/L    Anion gap 4 3.0 - 18 mmol/L    Glucose 170 (H) 74 - 99 mg/dL    BUN 29 (H) 7.0 - 18 MG/DL    Creatinine 4.10 (H) 0.6 - 1.3 MG/DL    BUN/Creatinine ratio 7 (L) 12 - 20      GFR est AA 18 (L) >60 ml/min/1.73m2    GFR est non-AA 15 (L) >60 ml/min/1.73m2    Calcium 8.4 (L) 8.5 - 10.1 MG/DL   CBC WITH AUTOMATED DIFF    Collection Time: 03/31/19  2:27 AM   Result Value Ref Range    WBC 3.0 (L) 4.6 - 13.2 K/uL    RBC 2.88 (L) 4.70 - 5.50 M/uL    HGB 8.1 (L) 13.0 - 16.0 g/dL    HCT 24.3 (L) 36.0 - 48.0 %    MCV 84.4 74.0 - 97.0 FL    MCH 28.1 24.0 - 34.0 PG    MCHC 33.3 31.0 - 37.0 g/dL    RDW 18.1 (H) 11.6 - 14.5 %    PLATELET 70 (L) 321 - 420 K/uL    MPV 11.3 9.2 - 11.8 FL    NEUTROPHILS 86 (H) 40 - 73 %    LYMPHOCYTES 10 (L) 21 - 52 %    MONOCYTES 4 3 - 10 %    EOSINOPHILS 0 0 - 5 %    BASOPHILS 0 0 - 2 %    ABS. NEUTROPHILS 2.6 1.8 - 8.0 K/UL    ABS. LYMPHOCYTES 0.3 (L) 0.9 - 3.6 K/UL    ABS. MONOCYTES 0.1 0.05 - 1.2 K/UL    ABS. EOSINOPHILS 0.0 0.0 - 0.4 K/UL    ABS.  BASOPHILS 0.0 0.0 - 0.1 K/UL    DF AUTOMATED     GLUCOSE, POC    Collection Time: 03/31/19  5:48 AM   Result Value Ref Range    Glucose (POC) 146 (H) 70 - 110 mg/dL       Signed By: Alfred Medina MD     March 31, 2019 10:13 AM

## 2019-03-31 NOTE — PROGRESS NOTES
Progress Note    Patient: Padmini Mosley MRN: [id number]  CSN: [id number]    YOB: 1960  Age: 59 y.o. Sex: male    DOA: 3/27/2019 LOS:  LOS: 3 days                    Subjective:   Pt is feeling better alert responds to question denied pain no SOB started on NG tube feeding off IV fluid    Cosyntropin test is done pt f/u endo   Started on Hydrocortisone Iv with good response with significant improvement    Cr is continue to go up 4.1 f/u nephrology     MRI brain  No clearly acute findings.     Mild burden of periventricular and patchy white matter signal abnormality,  likely chronic microvascular ischemic change.     Paranasal sinus and right mastoid disease as above. MRA  No large vessel occlusion or significant stenosis identified. No aneurysm  Identified. Chief Complaint:   Chief Complaint   Patient presents with    Urinary Catheter Problem    Abdominal Pain       Review of systems  General: No fevers or chills. Cardiovascular: No chest pain or pressure. No palpitations. Pulmonary: No shortness of breath, cough or wheeze. Gastrointestinal: No abdominal pain, nausea, vomiting or diarrhea. Genitourinary: No  dysuria. Musculoskeletal:  Generalized weakness   Neurologic: No headache, question seizure started on keppra     Objective:     Physical Exam:  Visit Vitals  /85   Pulse 63   Temp 97.2 °F (36.2 °C)   Resp 11   Ht 6' (1.829 m)   Wt 79.1 kg (174 lb 6.1 oz)   SpO2 100%   BMI 23.65 kg/m²        General:         Alert,no acute distress    HEENT: NC, Atraumatic. PERRLA, anicteric sclerae. Lungs: CTA Bilaterally. No Wheezing/Rhonchi/Rales. Heart:  Regular  rhythm,  No murmur, No Rubs, No Gallops  Abdomen: Soft, Non distended, Non tender.  +Bowel sounds, no HSM  Extremities:  no lower limb edema   Psych:   . Not anxious or agitated.   Neurologic:  CN 2-12 grossly intact, Alert and oriented confused   Intake and Output:  Current Shift:  03/31 0701 - 03/31 1900  In: 500 [I.V.:500]  Out: 125 [Urine:125]  Last three shifts:  03/29 1901 - 03/31 0700  In: 3359.6 [I.V.:2139.6]  Out: 2025 [Urine:2025]    Labs: Results:       Chemistry Recent Labs     03/31/19 0227 03/30/19 0146 03/29/19 0228   * 83 69*    146* 141   K 4.3 4.5 4.6   * 116* 116*   CO2 20* 19* 20*   BUN 29* 26* 26*   CREA 4.10* 3.94* 3.66*   CA 8.4* 7.8*  7.8* 8.0*   AGAP 4 11 5   BUCR 7* 7* 7*   AP  --  73 75   TP  --  5.6* 6.0*   ALB  --  2.1* 2.2*   GLOB  --  3.5 3.8   AGRAT  --  0.6* 0.6*      CBC w/Diff Recent Labs     03/31/19 0227 03/30/19 0146 03/29/19 0228   WBC 3.0* 3.4* 5.2   RBC 2.88* 2.67* 2.81*   HGB 8.1* 7.5* 8.0*   HCT 24.3* 23.1* 24.4*   PLT 70* 64* 92*   GRANS 86* 57 77*   LYMPH 10* 26 17*   EOS 0 1 0      Cardiac Enzymes Recent Labs     03/29/19 0228 03/28/19  2205 03/28/19  1529    120 129   CKND1  --  5.4* 6.0*      Coagulation Recent Labs     03/28/19  1115   PTP 12.8   INR 1.0       Lipid Panel Lab Results   Component Value Date/Time    Cholesterol, total 160 03/29/2019 02:28 AM    Cholesterol, total 142 03/29/2019 02:28 AM    HDL Cholesterol 58 03/29/2019 02:28 AM    HDL Cholesterol 58 03/29/2019 02:28 AM    LDL, calculated 88.2 03/29/2019 02:28 AM    LDL, calculated 70.2 03/29/2019 02:28 AM    VLDL, calculated 13.8 03/29/2019 02:28 AM    VLDL, calculated 13.8 03/29/2019 02:28 AM    Triglyceride 69 03/29/2019 02:28 AM    Triglyceride 69 03/29/2019 02:28 AM    CHOL/HDL Ratio 2.8 03/29/2019 02:28 AM    CHOL/HDL Ratio 2.4 03/29/2019 02:28 AM      BNP No results for input(s): BNPP in the last 72 hours.    Liver Enzymes Recent Labs     03/30/19  0146   TP 5.6*   ALB 2.1*   AP 73   SGOT 19      Thyroid Studies Lab Results   Component Value Date/Time    TSH 0.74 03/30/2019 10:20 AM          Procedures/imaging: see electronic medical records for all procedures/Xrays and details which were not copied into this note but were reviewed prior to creation of Plan    Medications: Current Facility-Administered Medications   Medication Dose Route Frequency    piperacillin-tazobactam (ZOSYN) 2.25 g in 0.9% sodium chloride (MBP/ADV) 50 mL MBP  2.25 g IntraVENous Q8H    lactulose (CHRONULAC) solution 10 g  10 g Oral BID    hydrocortisone Sod Succ (PF) (SOLU-CORTEF) injection 50 mg  50 mg IntraVENous Q6H    thiamine (B-1) 200 mg in 0.9% sodium chloride 50 mL IVPB  200 mg IntraVENous R1W    folic acid (FOLVITE) 1 mg in 0.9% sodium chloride 50 mL ivpb   IntraVENous Q24H    levETIRAcetam (KEPPRA) 500 mg in 0.9% sodium chloride (MBP/ADV) 100 mL MBP  500 mg IntraVENous Q12H    sodium chloride (NS) flush 5-10 mL  5-10 mL IntraVENous PRN    insulin lispro (HUMALOG) injection   SubCUTAneous Q6H    glucose chewable tablet 16 g  4 Tab Oral PRN    glucagon (GLUCAGEN) injection 1 mg  1 mg IntraMUSCular PRN    dextrose (D50W) injection syrg 12.5-25 g  25-50 mL IntraVENous PRN    tamsulosin (FLOMAX) capsule 0.4 mg  0.4 mg Oral DAILY    acetaminophen (TYLENOL) tablet 650 mg  650 mg Oral Q6H PRN    oxyCODONE-acetaminophen (PERCOCET) 5-325 mg per tablet 1 Tab  1 Tab Oral Q6H PRN    naloxone (NARCAN) injection 0.4 mg  0.4 mg IntraVENous PRN    ondansetron (ZOFRAN) injection 4 mg  4 mg IntraVENous Q6H PRN    docusate sodium (COLACE) capsule 100 mg  100 mg Oral BID PRN    heparin (porcine) injection 5,000 Units  5,000 Units SubCUTAneous Q8H    levothyroxine (SYNTHROID) tablet 100 mcg  100 mcg Oral 6am    pantoprazole (PROTONIX) injection 40 mg  40 mg IntraVENous Q12H       Assessment/Plan     Active Problems:    Oliguria (3/28/2019)      Renal injury (3/28/2019)      Suprapubic catheter (HCC) (3/28/2019)      Bradycardia, sinus (3/28/2019)      Acute UTI (3/28/2019)      Type 2 diabetes mellitus with hypoglycemia ([facility] [address] (3/28/2019)      Acute renal failure (ARF) ([facility] [address] (3/28/2019)    plan    Hypoglycemia / Hypothermia/ hypocalcemia/ adrenal insufficiency   F/u urine culture Blood culture  F/u ID  Work up for adrenal insufficiency per endocrinology  Hepatitis c Ab positive  F/u quantitative hepatitis C  Negative and HIV none reactive   Continue hydrocortisone 50 mg IV q 6h, monitor glucose level  Zosyn  2.25 mg IV q 8h        SHEILA on CKD stage 2-3, likely from acute on chronic diastolic CHF; possibly due to progressive CKD  - Cardiology consulted, Sincere Garcia.  - nephrology  - CT Abd with pelvic fluid, pleural effusion  - CXR cardiomegaly and pulm edema, questionable infiltrate vs atelectasis  - BNP elevated from baseline  - cardiac markers flat/indeterminant, monitoring on telemetry  - monitor BMP     HTN, HLD, bradycardia chronic HR 40-50s   - continue to monitor use to be on norvasc  -  HDL 58 LDL 88 TG 69      Possible UTI sepsis given hypothermia and low WBC; possible autonomic dysfunction hx discitis pt has reported chronic low temps in past  - lactic acid normal x2   -  Continue zosyn IV f/u ID  - f/u blood cultures 3/28/19  - f/u urine culture 3/27/19  - follow up Urology consulted, Suprapubic cath functioning appropriately, no sign of obstruction, recommended to consider addition of fluconazole due to history of candida UTI recently  - hx of aspiration, recent hospitalization with PNA, ST  f/u       Metabolic possible Infectious Encephalopathy in setting of SHEILA, Sepsis, hypoglycemia  - MRI. MRA negative   - Dr. Carol Yepez  recommedned started Keppra   -F/U EEG        Hx of recent GIB/ Chronic anemia / pancytopenia  - IV protonix   - monitor H &H h/o GI bleeding  Might need hematology consult f/u Dr Hermann Corona as outpatinet     Abdominal Pain, CT abdomen Gallbladder with wall slightly thickened and/or pericholecystic fluid  Abdominal pain resolved   RUQ US 3/28/19 no acute cholecystitis  Pt with n/v per nursing staff this am, add zofran      Hep C, Cirrhosis  Discussed with pt sister, he was to treated for Hep C and reports completed treatment  Start Lactulose   check Hep C Quant to confirm  F/u INR     DM2  Hypoglycemic on admission, A1c 5.3, will hold home medications  - start NG tube feeding   - Humalog sliding scale if glucose level persistently  high   - work up for sepsis and adrenal insufficiency     Hypothyroidism  - pt was recently increased on synthroid to 100mcg at prior hospitalization  - TFTs within normal limits currently     Anemia chronic disease, Recent Acute blood loss anemia from UGIB EGD neg for source, chronic thrombocytopenia  - monitor, transfuse to keep hgb >7 if needed, monitor plt count, followed by Dr. Fritz Ramirez as outpatient     History of Drug Abuse, Reported heroin use during recent hospitalization this month, ongoing Tob use, Etoh Use  - H/O  heroin use, does not know if he uses IV drugs currently or not.  Has past history of IVDA. - UDS + for opiods  - acute hepatitis panel neg for Hep A&B, +hep c( known)  - HIV  - discussed risks, recommended to quit     - pt has h/o poor compliant with multiple admissions    - pt would benefit from SNF, has declined in past will continue to [facility]        CODE:  Full. on admission  Sister  Reported  she is MPOA for patient.  Pt currently unable to make his own decisions although baseline is AOx3 and able to be decision maker.  He use walker with ambulation     Cbc, cmp, mag in AM  Ammonia level within normal range  Ok tto transfer to step down  F/u endo , nephrology , ID  Monitor urine out put   F/u pt and ot and speach  Discussed with Dr Allen Mendez MD  3/31/2019 10:34 AM

## 2019-03-31 NOTE — PROGRESS NOTES
Bedside and Verbal shift change report given to Joelle Esqueda RN (oncoming nurse) by Nan Portillo RN (offgoing nurse).  Report included the following information SBAR, Kardex, Intake/Output, MAR, Accordion, Recent Results, Med Rec Status and Cardiac Rhythm SB.

## 2019-03-31 NOTE — PROGRESS NOTES
SPEECH LANGUAGE PATHOLOGY BEDSIDE SWALLOW EVALUATION    Patient: Ave Gaytan (13 y.o. male)  Date: 3/31/2019  Primary Diagnosis: Oliguria [R34]  Renal injury [S37.009A]  Acute renal failure (ARF) (HCC) [N17.9]        Precautions: Aspiration risk    ASSESSMENT :  Based on the objective data described below, the patient presents with moderate oral and mild pharyngeal dysphagia. Clinical beside swallow eval/tx orders acknowledged. Pt's niece (who is deaf) at bedside. Pt easily aroused and alert to participate in today's evaluation. Pt presented A/Ox2-3 loosely. Oral mech exam revealed structures were functional for speech and swallowing, with upper dentures. Pt's voice was hoarse, breathy, and low volume. SLp administered the following consistencies: ice chips, water +/- straw and in successive swallows, puree, and mech soft. Pt tolerated thin liquids and puree in all presentations. During Mech soft trials, pt demo'd prolonged and uncoordinated mastication, focusing mostly on the R side. Pt had poor bolus control/manipulation. Pt warranted tsps of puree x3 to clear x1 presentation of mech soft. Multiple swallows observed across all consistencies and easily fatigued. Laryngeal elevation was weak. No overt s/sx of aspiration/ penetration was observed. No change in VQ. Diagnosis, results, recommendations, comp strategies, and positioning were d/w pt, niece, pt's sister, and RN Love. Pt reported \" I don't like puree\". Pt re-educated with aspiration risk s/sx of aspiration , and role of SLP. He was encouraged to consume puree in order to remove NG tube. Pt was also encouraged with SLP explaining his diet will be advanced as his health improves. Pt verbalized understanding. ST will continue to follow. Pt is safe to initiate po with puree/ thin liquids and meds crushed in puree.      TREATMENT :  Skilled therapy initiated; Educated pt on aspiration precautions and importance of compensatory swallow techniques to decrease aspiration risk (decrease rate of intake & sip/bite size, upright @HOB for all po intake and ~30 minutes after po); verbalized comprehension. SLP to follow as indicated. Patient will benefit from skilled intervention to address the above impairments. Patient?s rehabilitation potential is considered to be Good    Factors which may influence rehabilitation potential include:   ? None noted  ? Mental ability/status  ? Medical condition  ? Home/family situation and support systems  ? Safety awareness  ? Pain tolerance/management  ? Other:      PLAN :  Recommendations and Planned Interventions:  Puree with thin liquids  Aspiration precautions  HOB >45 during po intake, remain >30 for 30-45 minutes after po   Small bites/sips; alternate liquid/solid with slow feeding rate   Oral care TID  Meds crushed in puree  MBS to further assess oropharyngeal swallow fxn      Frequency/Duration: Patient will be followed by speech-language pathology 1-2 times per day/4-7 days per week to address goals. Discharge Recommendations: Rehab, [facility] and To Be Determined     SUBJECTIVE:   Patient stated ? I want more than just ice? .    OBJECTIVE:     Past Medical History:   Diagnosis Date    Anemia     Bradycardia, unspecified 2018    Cervical discitis     MRSA    Chronic drug abuse ([address] 1974    Chronic kidney disease     stage III    Diabetes ([address] 01/1990    Diabetes mellitus ([address]     Drug abuse ([address]     Encephalopathy     GERD (gastroesophageal reflux disease)     Hypertension     Muscle weakness     Renal cell carcinoma of left kidney ([address] 12/17/13    Pathological Stage [id number] cc RCC FG3 s/p left open partial nephrectomy in 12/13      Sepsis due to methicillin resistant Staphylococcus aureus (HCC)     Thrombocytopenia (HCC)     Urethral erosion     Viral hepatitis C     Xerosis cutis      Past Surgical History:   Procedure Laterality Date    HX CIRCUMCISION  12/17/13    Dr. Cindy Meraz, [facility]    HX NEPHRECTOMY Left 12/17/13    Open Partial, Dr. Cindy Meraz, [facility]    HX OTHER SURGICAL Right 2/27/2016    removed right ft  2nd meta-tarsal    HX OTHER SURGICAL  04/2017    abscess removed from back of neck     PA REMOVAL WITH INSERTION OF SUPRAPUBIC CATHETER  2018     Prior Level of Function/Home Situation:   Home Situation  Home Environment: Other (comment)(condominium)  # Steps to Enter: 12  One/Two Story Residence: Other (Comment)(2 story, lives on the 2nd level)  # of Interior Steps: 0  Height of Each Step (in): 7 inches  Interior Rails: Both  Lift Chair Available: No  Living Alone: No  Support Systems: Family member(s)  Patient Expects to be Discharged to[de-identified] Other (comment)(condominium)  Current DME Used/Available at Home: Namon Panning, rollator, Glucometer, Shower chair, Raised toilet seat  Diet prior to admission: Regular with thin liquids  Current Diet:  Puree with thin liquids  Cognitive and Communication Status:  Neurologic State: Alert, Confused  Orientation Level: Oriented to person, Oriented to place  Cognition: Follows commands           Oral Assessment:     P.O. Trials:     Vocal quality prior to P.O.: Breathy; Fatigue;Hoarse;Low volume  Consistency Presented: Ice chips;Mechanical soft;Puree; Thin liquid  How Presented: SLP-fed/presented;Cup/sip;Straw;Successive swallows        Bolus Formation/Control: No impairment     Propulsion: Delayed (# of seconds)  Oral Residue: 10-50% of bolus  Initiation of Swallow: Delayed (# of seconds)  Laryngeal Elevation: Functional  Aspiration Signs/Symptoms: None  Pharyngeal Phase Characteristics: Easily fatigued ;Effortful swallow;Multiple swallows; Poor endurance  Effective Modifications:  Alternate liquids/solids;Small sips and bites  Cues for Modifications: Minimal-moderate       Oral Phase Severity: Moderate  Pharyngeal Phase Severity : Mild    The severity rating is based on the following outcomes:    Clinical Judgment    Pain:  Pt c/o 0/10 pain prior to evaluation. Pt c/o 0/10 pain post evaluation. After treatment:   ?            Patient left in no apparent distress sitting up in chair  ? Patient left in no apparent distress in bed  ? Call bell left within reach  ? Nursing notified  ? Caregiver present  ? Bed alarm activated    COMMUNICATION/EDUCATION:   ?            SLP educated pt with regard to compensatory swallow strategies and       aspiration/reflux precautions including: small bites/sips,       alternate liquids/solids, decrease feeding rate, HOB > 45 with all po, and  upright in bed at 30 degrees after po for at least 45 minutes. ?            Patient/family have participated as able in goal setting and plan of care. ?            Patient/family agree to work toward stated goals and plan of care. ?            Patient understands intent and goals of therapy; neutral about participation. ? Patient is unable to participate in goal setting and plan of care.     Thank you for this referral.  Logan Rebollar, SLP  MA, [address] [facility]  Speech-Language Pathologist

## 2019-03-31 NOTE — PROGRESS NOTES
This 24-year-old man had abrupt neurologic dysfunction thought to be due to neuroglycopenia. The patient was described as being non-communicating, slow to respond, with a profound delirium. There was concerns for potential focal seizure and Keppra was initiated. He was described on initial neurology consultation as having profound, prolonged hypoglycemia.     Temp: 96.6 °F (35.9 °C) (03/31/19 1300) Pulse (Heart Rate): 60 (03/31/19 1300) Resp Rate: 10 (03/31/19 1300) BP: 145/72 (03/31/19 1300) O2 Sat (%): 100 % (03/31/19 1300) Weight: 79.1 kg (174 lb 6.1 oz) (03/30/19 1000)     Patient today is awake alert and able to communicate has insight into where he is and recognizes the month. He is able to recognize love ones in the room. Theres no evidence of seizure activity. At this point I have no further recommendations for regimen change but continued support of care in in or rather at this time maintain Keppra current dosing.

## 2019-03-31 NOTE — PROGRESS NOTES
Bedside and Verbal shift change report given to Uriah Bean RN (oncoming nurse) by Erica Small RN  (offgoing nurse). Report included the following information SBAR, Kardex, Intake/Output, MAR, Recent Results and Med Rec Status.

## 2019-04-01 PROBLEM — N17.0 ATN (ACUTE TUBULAR NECROSIS) (HCC): Status: ACTIVE | Noted: 2019-04-01

## 2019-04-01 LAB
ALBUMIN SERPL-MCNC: 2.2 G/DL (ref 3.4–5)
ALBUMIN/GLOB SERPL: 0.5 {RATIO} (ref 0.8–1.7)
ALP SERPL-CCNC: 100 U/L (ref 45–117)
ALT SERPL-CCNC: 13 U/L (ref 16–61)
ANION GAP SERPL CALC-SCNC: 7 MMOL/L (ref 3–18)
AST SERPL-CCNC: 16 U/L (ref 15–37)
BACTERIA SPEC CULT: ABNORMAL
BACTERIA SPEC CULT: ABNORMAL
BASOPHILS # BLD: 0 K/UL (ref 0–0.06)
BASOPHILS NFR BLD: 0 % (ref 0–3)
BILIRUB SERPL-MCNC: 0.4 MG/DL (ref 0.2–1)
BUN SERPL-MCNC: 33 MG/DL (ref 7–18)
BUN/CREAT SERPL: 8 (ref 12–20)
CALCIUM SERPL-MCNC: 8.2 MG/DL (ref 8.5–10.1)
CHLORIDE SERPL-SCNC: 114 MMOL/L (ref 100–108)
CO2 SERPL-SCNC: 20 MMOL/L (ref 21–32)
CREAT SERPL-MCNC: 4.13 MG/DL (ref 0.6–1.3)
DIFFERENTIAL METHOD BLD: ABNORMAL
EOSINOPHIL # BLD: 0 K/UL (ref 0–0.4)
EOSINOPHIL NFR BLD: 0 % (ref 0–5)
ERYTHROCYTE [DISTWIDTH] IN BLOOD BY AUTOMATED COUNT: 18 % (ref 11.6–14.5)
GLOBULIN SER CALC-MCNC: 4.3 G/DL (ref 2–4)
GLUCOSE BLD STRIP.AUTO-MCNC: 143 MG/DL (ref 70–110)
GLUCOSE BLD STRIP.AUTO-MCNC: 156 MG/DL (ref 70–110)
GLUCOSE BLD STRIP.AUTO-MCNC: 199 MG/DL (ref 70–110)
GLUCOSE BLD STRIP.AUTO-MCNC: 207 MG/DL (ref 70–110)
GLUCOSE BLD STRIP.AUTO-MCNC: 241 MG/DL (ref 70–110)
GLUCOSE SERPL-MCNC: 192 MG/DL (ref 74–99)
HCT VFR BLD AUTO: 22.3 % (ref 36–48)
HGB BLD-MCNC: 7.6 G/DL (ref 13–16)
LYMPHOCYTES # BLD: 0.5 K/UL (ref 0.8–3.5)
LYMPHOCYTES NFR BLD: 8 % (ref 20–51)
MAGNESIUM SERPL-MCNC: 2.4 MG/DL (ref 1.6–2.6)
MCH RBC QN AUTO: 28.4 PG (ref 24–34)
MCHC RBC AUTO-ENTMCNC: 34.1 G/DL (ref 31–37)
MCV RBC AUTO: 83.2 FL (ref 74–97)
MONOCYTES # BLD: 0.2 K/UL (ref 0–1)
MONOCYTES NFR BLD: 4 % (ref 2–9)
MYELOCYTES NFR BLD MANUAL: 2 %
MYOGLOBIN UR QL: POSITIVE
MYOGLOBIN UR-MCNC: <2 NG/ML (ref 0–13)
NEUTS BAND NFR BLD MANUAL: 3 % (ref 0–5)
NEUTS SEG # BLD: 5.3 K/UL (ref 1.8–8)
NEUTS SEG NFR BLD: 83 % (ref 42–75)
PHOSPHATE SERPL-MCNC: 3.6 MG/DL (ref 2.5–4.9)
PLATELET # BLD AUTO: 69 K/UL (ref 135–420)
PLATELET COMMENTS,PCOM: ABNORMAL
PMV BLD AUTO: 11.6 FL (ref 9.2–11.8)
POTASSIUM SERPL-SCNC: 3.8 MMOL/L (ref 3.5–5.5)
PROT SERPL-MCNC: 6.5 G/DL (ref 6.4–8.2)
RBC # BLD AUTO: 2.68 M/UL (ref 4.7–5.5)
RBC MORPH BLD: ABNORMAL
SERVICE CMNT-IMP: ABNORMAL
SODIUM SERPL-SCNC: 141 MMOL/L (ref 136–145)
WBC # BLD AUTO: 6.2 K/UL (ref 4.6–13.2)

## 2019-04-01 PROCEDURE — 80053 COMPREHEN METABOLIC PANEL: CPT

## 2019-04-01 PROCEDURE — 92526 ORAL FUNCTION THERAPY: CPT

## 2019-04-01 PROCEDURE — 36415 COLL VENOUS BLD VENIPUNCTURE: CPT

## 2019-04-01 PROCEDURE — 74011250637 HC RX REV CODE- 250/637: Performed by: INTERNAL MEDICINE

## 2019-04-01 PROCEDURE — 74011000258 HC RX REV CODE- 258: Performed by: PHYSICIAN ASSISTANT

## 2019-04-01 PROCEDURE — 82962 GLUCOSE BLOOD TEST: CPT

## 2019-04-01 PROCEDURE — 74011250636 HC RX REV CODE- 250/636: Performed by: FAMILY MEDICINE

## 2019-04-01 PROCEDURE — 74011250637 HC RX REV CODE- 250/637: Performed by: FAMILY MEDICINE

## 2019-04-01 PROCEDURE — 84100 ASSAY OF PHOSPHORUS: CPT

## 2019-04-01 PROCEDURE — 97530 THERAPEUTIC ACTIVITIES: CPT

## 2019-04-01 PROCEDURE — 65270000029 HC RM PRIVATE

## 2019-04-01 PROCEDURE — 86022 PLATELET ANTIBODIES: CPT

## 2019-04-01 PROCEDURE — 74011636637 HC RX REV CODE- 636/637: Performed by: INTERNAL MEDICINE

## 2019-04-01 PROCEDURE — 85025 COMPLETE CBC W/AUTO DIFF WBC: CPT

## 2019-04-01 PROCEDURE — 74011250636 HC RX REV CODE- 250/636: Performed by: PHYSICIAN ASSISTANT

## 2019-04-01 PROCEDURE — 94762 N-INVAS EAR/PLS OXIMTRY CONT: CPT

## 2019-04-01 PROCEDURE — 83735 ASSAY OF MAGNESIUM: CPT

## 2019-04-01 PROCEDURE — 97166 OT EVAL MOD COMPLEX 45 MIN: CPT

## 2019-04-01 PROCEDURE — 74011250636 HC RX REV CODE- 250/636: Performed by: INTERNAL MEDICINE

## 2019-04-01 PROCEDURE — 74011636637 HC RX REV CODE- 636/637: Performed by: FAMILY MEDICINE

## 2019-04-01 PROCEDURE — 74011000258 HC RX REV CODE- 258: Performed by: INTERNAL MEDICINE

## 2019-04-01 PROCEDURE — 97162 PT EVAL MOD COMPLEX 30 MIN: CPT

## 2019-04-01 RX ORDER — PANTOPRAZOLE SODIUM 40 MG/1
40 GRANULE, DELAYED RELEASE ORAL 2 TIMES DAILY
Status: DISCONTINUED | OUTPATIENT
Start: 2019-04-01 | End: 2019-04-06 | Stop reason: HOSPADM

## 2019-04-01 RX ORDER — HYDRALAZINE HYDROCHLORIDE 10 MG/1
10 TABLET, FILM COATED ORAL
Status: DISCONTINUED | OUTPATIENT
Start: 2019-04-01 | End: 2019-04-02

## 2019-04-01 RX ORDER — HYDROCORTISONE SODIUM SUCCINATE 100 MG/2ML
50 INJECTION, POWDER, FOR SOLUTION INTRAMUSCULAR; INTRAVENOUS EVERY 8 HOURS
Status: DISCONTINUED | OUTPATIENT
Start: 2019-04-01 | End: 2019-04-02

## 2019-04-01 RX ADMIN — INSULIN LISPRO 6 UNITS: 100 INJECTION, SOLUTION INTRAVENOUS; SUBCUTANEOUS at 12:40

## 2019-04-01 RX ADMIN — HYDROCORTISONE SODIUM SUCCINATE 50 MG: 100 INJECTION, POWDER, FOR SOLUTION INTRAMUSCULAR; INTRAVENOUS at 06:00

## 2019-04-01 RX ADMIN — PIPERACILLIN AND TAZOBACTAM 2.25 G: 2; .25 INJECTION, POWDER, FOR SOLUTION INTRAVENOUS at 20:21

## 2019-04-01 RX ADMIN — TAMSULOSIN HYDROCHLORIDE 0.4 MG: 0.4 CAPSULE ORAL at 08:31

## 2019-04-01 RX ADMIN — OXYCODONE AND ACETAMINOPHEN 1 TABLET: 5; 325 TABLET ORAL at 06:01

## 2019-04-01 RX ADMIN — PIPERACILLIN AND TAZOBACTAM 2.25 G: 2; .25 INJECTION, POWDER, FOR SOLUTION INTRAVENOUS at 04:39

## 2019-04-01 RX ADMIN — INSULIN LISPRO 2 UNITS: 100 INJECTION, SOLUTION INTRAVENOUS; SUBCUTANEOUS at 06:10

## 2019-04-01 RX ADMIN — PIPERACILLIN AND TAZOBACTAM 2.25 G: 2; .25 INJECTION, POWDER, FOR SOLUTION INTRAVENOUS at 12:40

## 2019-04-01 RX ADMIN — THIAMINE HYDROCHLORIDE 200 MG: 100 INJECTION, SOLUTION INTRAMUSCULAR; INTRAVENOUS at 05:58

## 2019-04-01 RX ADMIN — LEVOTHYROXINE SODIUM 100 MCG: 100 TABLET ORAL at 06:01

## 2019-04-01 RX ADMIN — PANTOPRAZOLE SODIUM 40 MG: 40 GRANULE, DELAYED RELEASE ORAL at 08:31

## 2019-04-01 RX ADMIN — SODIUM BICARBONATE 650 MG TABLET 650 MG: at 17:14

## 2019-04-01 RX ADMIN — NEPHROCAP 1 CAPSULE: 1 CAP ORAL at 08:31

## 2019-04-01 RX ADMIN — SODIUM BICARBONATE 650 MG TABLET 650 MG: at 22:11

## 2019-04-01 RX ADMIN — INSULIN LISPRO 3 UNITS: 100 INJECTION, SOLUTION INTRAVENOUS; SUBCUTANEOUS at 17:14

## 2019-04-01 RX ADMIN — HYDRALAZINE HYDROCHLORIDE 10 MG: 10 TABLET, FILM COATED ORAL at 20:21

## 2019-04-01 RX ADMIN — CALCITRIOL CAPSULES 0.25 MCG 0.25 MCG: 0.25 CAPSULE ORAL at 22:11

## 2019-04-01 RX ADMIN — HYDROCORTISONE SODIUM SUCCINATE 50 MG: 100 INJECTION, POWDER, FOR SOLUTION INTRAMUSCULAR; INTRAVENOUS at 22:11

## 2019-04-01 RX ADMIN — PANTOPRAZOLE SODIUM 40 MG: 40 GRANULE, DELAYED RELEASE ORAL at 17:14

## 2019-04-01 RX ADMIN — HYDROCORTISONE SODIUM SUCCINATE 50 MG: 100 INJECTION, POWDER, FOR SOLUTION INTRAMUSCULAR; INTRAVENOUS at 14:39

## 2019-04-01 RX ADMIN — LEVETIRACETAM 500 MG: 100 INJECTION, SOLUTION INTRAVENOUS at 04:39

## 2019-04-01 RX ADMIN — OXYCODONE AND ACETAMINOPHEN 1 TABLET: 5; 325 TABLET ORAL at 20:29

## 2019-04-01 RX ADMIN — LEVETIRACETAM 500 MG: 100 INJECTION, SOLUTION INTRAVENOUS at 15:04

## 2019-04-01 RX ADMIN — SODIUM BICARBONATE 650 MG TABLET 650 MG: at 08:31

## 2019-04-01 NOTE — PROGRESS NOTES
Progress Note    Sara Bourgeois  [address] [facility] Admit Date: 3/27/2019  Active Problems:    Oliguria (3/28/2019) POA: Unknown      Renal injury (3/28/2019) POA: Unknown      Suprapubic catheter ([address] (3/28/2019) POA: Unknown      Bradycardia, sinus (3/28/2019) POA: Unknown      Acute UTI (3/28/2019) POA: Unknown      Type 2 diabetes mellitus with hypoglycemia ([address] (3/28/2019) POA: Unknown      Acute renal failure (ARF) ([address] (3/28/2019) POA: Unknown      Renal failure (ARF), acute on chronic ([address] (3/31/2019) POA: Yes      ATN (acute tubular necrosis) ([address] (4/1/2019) POA: Clinically Undetermined            Subjective:     Patient is out of ICU, feels better, on NG feeding, continues to make good volume of urine through Suprapubic catheter. No SOB. . On stress dose Solu cortef. A comprehensive review of systems was negative except for that written in the History of Present Illness.     Objective:     Visit Vitals  /86   Pulse (!) 56   Temp 97.6 °F (36.4 °C)   Resp 25   Ht 6' (1.829 m)   Wt 81.6 kg (180 lb)   SpO2 100%   BMI 24.41 kg/m²         Intake/Output Summary (Last 24 hours) at 4/1/2019 1227  Last data filed at 4/1/2019 1138  Gross per 24 hour   Intake 1145 ml   Output 785 ml   Net 360 ml       Current Facility-Administered Medications   Medication Dose Route Frequency Provider Last Rate Last Dose    pantoprazole (PROTONIX) granules for oral suspension 40 mg  40 mg Per NG tube BID Tonie Aquino MD   40 mg at 04/01/19 0831    hydrocortisone Sod Succ (PF) (SOLU-CORTEF) injection 50 mg  50 mg IntraVENous Q8H Chalo Renner MD        piperacillin-tazobactam (ZOSYN) 2.25 g in 0.9% sodium chloride (MBP/ADV) 50 mL MBP  2.25 g IntraVENous Q8H O'Linda Bai PA-C 100 mL/hr at 04/01/19 0439 2.25 g at 04/01/19 0439    epoetin cristy-epbx (RETACRIT) 12,000 Units combo injection  12,000 Units SubCUTAneous Q7D Luis Daniel Ham MD   Stopped at 03/31/19 2100    B complex-vitaminC-folic acid (NEPHROCAP) cap  1 Cap Oral DAILY Marceline Blizzard, MD   1 Cap at 04/01/19 0831    sodium bicarbonate tablet 650 mg  650 mg Oral TID Marceline Blizzard, MD   650 mg at 04/01/19 0831    calcitRIOL (ROCALTROL) capsule 0.25 mcg  0.25 mcg Oral Q MON, WED & Carla Waller MD        lactulose (CHRONULAC) solution 10 g  10 g Oral BID Joseoy JanuaryYvonne ELDER PA-C   Stopped at 03/31/19 0900    levETIRAcetam (KEPPRA) 500 mg in 0.9% sodium chloride (MBP/ADV) 100 mL MBP  500 mg IntraVENous Q12H Yina Dillard MD   500 mg at 04/01/19 0439    sodium chloride (NS) flush 5-10 mL  5-10 mL IntraVENous PRN Srinivas Crowe MD        insulin lispro (HUMALOG) injection   SubCUTAneous Q6H Srinivas Crowe MD   2 Units at 04/01/19 0610    glucose chewable tablet 16 g  4 Tab Oral PRN Srinivas Crowe MD        glucagon (GLUCAGEN) injection 1 mg  1 mg IntraMUSCular PRN Srinivas Crowe MD        dextrose (D50W) injection syrg 12.5-25 g  25-50 mL IntraVENous PRN Srinivas Crowe MD   25 g at 03/29/19 1104    tamsulosin (FLOMAX) capsule 0.4 mg  0.4 mg Oral DAILY Srinivas Crowe MD   0.4 mg at 04/01/19 0831    acetaminophen (TYLENOL) tablet 650 mg  650 mg Oral Q6H PRN Srinivas Crowe MD        oxyCODONE-acetaminophen (PERCOCET) 5-325 mg per tablet 1 Tab  1 Tab Oral Q6H PRN Srinivas Crowe MD   1 Tab at 04/01/19 0601    naloxone (NARCAN) injection 0.4 mg  0.4 mg IntraVENous PRN Srinivas Crowe MD        ondansetron [facility]) injection 4 mg  4 mg IntraVENous Q6H PRN Srinivas Crowe MD   4 mg at 03/29/19 9239    docusate sodium (COLACE) capsule 100 mg  100 mg Oral BID PRN Srinivas Crowe MD        levothyroxine (SYNTHROID) tablet 100 mcg  100 mcg Oral 6am Melissa Aquino, MD   100 mcg at 04/01/19 0601        Physical Exam:     Physical Exam:   General:  Alert, cooperative, no distress, appears stated age.    Neck: Supple, symmetrical, trachea midline, no adenopathy, thyroid: no enlargement/tenderness/nodules, no carotid bruit and no JVD. Lungs:   Clear to auscultation bilaterally. Heart:  Regular rate and rhythm, S1, S2 normal, no murmur, click, rub or gallop. Abdomen:   Soft, non-tender. Bowel sounds normal. No masses,  No organomegaly. Extremities: Extremities normal, atraumatic, no cyanosis or edema. Skin: Skin color, texture, turgor normal. No rashes or lesions         Data Review:    CBC w/Diff    Recent Labs     04/01/19 0547 03/31/19 0227 03/30/19 0146   WBC 6.2 3.0* 3.4*   RBC 2.68* 2.88* 2.67*   HGB 7.6* 8.1* 7.5*   HCT 22.3* 24.3* 23.1*   MCV 83.2 84.4 86.5   MCH 28.4 28.1 28.1   MCHC 34.1 33.3 32.5   RDW 18.0* 18.1* 19.0*    Recent Labs     04/01/19  0547 03/31/19 0227 03/30/19 0146   BANDS 3  --   --    MONOS 4 4 16*   EOS 0 0 1   BASOS 0 0 0   RDW 18.0* 18.1* 19.0*        Comprehensive Metabolic Profile    Recent Labs     04/01/19 0547 03/31/19 0227 03/30/19 0146    138 146*   K 3.8 4.3 4.5   * 114* 116*   CO2 20* 20* 19*   BUN 33* 29* 26*   CREA 4.13* 4.10* 3.94*    Recent Labs     04/01/19 0547 03/31/19 0227 03/30/19 0146   CA 8.2* 8.4* 7.8*  7.8*   PHOS 3.6  --  5.0*   ALB 2.2*  --  2.1*   TP 6.5  --  5.6*   SGOT 16  --  19   TBILI 0.4  --  0.3        Results for Bobby Rush (MRN [id number]) as of 4/1/2019 12:30   Ref.  Range 3/31/2019 14:00   Color Latest Units:   YELLOW   Appearance Latest Units:   TURBID   Specific gravity Latest Ref Range: 1.005 - 1.030   1.014   pH (UA) Latest Ref Range: 5.0 - 8.0   5.0   Protein Latest Ref Range: NEG mg/dL 300 (A)   Glucose Latest Ref Range: NEG mg/dL NEGATIVE   Ketone Latest Ref Range: NEG mg/dL NEGATIVE   Blood Latest Ref Range: NEG   SMALL (A)   Bilirubin Latest Ref Range: NEG   NEGATIVE   Urobilinogen Latest Ref Range: 0.2 - 1.0 EU/dL 0.2   Nitrites Latest Ref Range: NEG   NEGATIVE   Leukocyte Esterase Latest Ref Range: NEG   SMALL (A)   Epithelial cells Latest Ref Range: 0 - 5 /lpf 1+   WBC Latest Ref Range: 0 - 5 /hpf 10 to 20   RBC Latest Ref Range: 0 - 5 /hpf 1 to 3   Yeast Latest Ref Range: NEG  3+ (A)   Granular cast Latest Ref Range: NEG /lpf 1 to 3                   Impression:       Active Hospital Problems    Diagnosis Date Noted    ATN (acute tubular necrosis) (HCC) 04/01/2019    Renal failure (ARF), acute on chronic ([facility] 03/31/2019    Oliguria 03/28/2019    Renal injury 03/28/2019    Suprapubic catheter ([facility] 03/28/2019    Bradycardia, sinus 03/28/2019    Acute UTI 03/28/2019    Type 2 diabetes mellitus with hypoglycemia ([facility] 03/28/2019    Acute renal failure (ARF) ([facility] 03/28/2019        Has non-oliguric ATN on the top of CRF. Plan:     Continue Supportive care, adjust medicine as per eGFR. ,avoid Nephrotoxins, no need for any dialysis at this time.       Becca Robb MD

## 2019-04-01 NOTE — PROGRESS NOTES
Problem: Self Care Deficits Care Plan (Adult)  Goal: *Acute Goals and Plan of Care (Insert Text)  Description  Occupational Therapy Goals  Initiated 4/1/2019 within 7 day(s). 1.  Patient will simulate self-feeding with supervision/set-up   2. Patient will perform grooming with supervision/set-up. 3.  Patient will perform upper body dressing with minimal assistance/contact guard assist.  4.  Patient will perform lower body dressing with moderate assistance . 5. Patient will participate in upper extremity therapeutic exercise/activities with minimal assistance/contact guard assist for 8 minutes. 6.  Patient will utilize energy conservation techniques during functional activities with minimal verbal cues. Outcome: Progressing Towards Goal  OCCUPATIONAL THERAPY EVALUATION    Patient: Esthela Winston (46 y.o. male)  Date: 4/1/2019  Primary Diagnosis: Oliguria [R34]  Renal injury [S37.009A]  Acute renal failure (ARF) (HCC) [N17.9]        Precautions:  Aspiration, Seizure, Skin    ASSESSMENT :  Pt cleared to participate in OT evaluation by RN. Upon entering room, pt supine with HOB elevated, drowsy, but agreeable to therapy session. Based on the objective data described below, the patient presents with decreased strength, decreased endurance, decreased functional balance, and decreased functional mobility, affecting his performance in basic self-care/ADL tasks. Pt required max verbal cues to keep eyes opened throughout session. D/t the pt's drowsiness, the pt was unable to provide full background information on how much assistance he required for basic self-care. Pt presents BUEs AROM and strength are generally decreased, but functional. Noted L hand PIPs of digits 3, 4, 5 in flexed position along with hyperextension of DIPs in digits 4, 5, therefore provided PROM to digits. Educated pt on the role of OT, evaluation process, and goals for therapy with pt demonstrating good understanding.  The pt will benefit from further OT services, in order to maximize his ADL performance and decrease the risk for complications associated with decreased functional activity. Notified pts RN post-session. Patient will benefit from skilled intervention to address the above impairments. Patients rehabilitation potential is considered to be Good  Factors which may influence rehabilitation potential include:   ? None noted  ? Mental ability/status  ? Medical condition  ? Home/family situation and support systems  ? Safety awareness  ? Pain tolerance/management  ? Other:      PLAN :  Recommendations and Planned Interventions:  ?               Self Care Training                  ? Therapeutic Activities  ? Functional Mobility Training    ? Cognitive Retraining  ? Therapeutic Exercises           ? Endurance Activities  ? Balance Training                   ? Neuromuscular Re-Education  ? Visual/Perceptual Training     ? Home Safety Training  ? Patient Education                 ? Family Training/Education  ? Other (comment):    Frequency/Duration: Patient will be followed by occupational therapy 1-2 times per day/4-7 days per week to address goals. Discharge Recommendations: [facility] with 24/7 supervision pending pt's progress. Further Equipment Recommendations for Discharge: TBD     Barriers to Learning/Limitations: yes;  physical  Compensate with: visual, verbal, tactile, kinesthetic cues/model     PATIENT COMPLEXITY      Eval Complexity: History: MEDIUM Complexity : Expanded review of history including physical, cognitive and psychosocial  history ;  Examination: MEDIUM Complexity : 3-5 performance deficits relating to physical, cognitive , or psychosocial skils that result in activity limitations and / or participation restrictions; Decision Making:MEDIUM Complexity : Patient may present with comorbidities that affect occupational performnce. Miniml to moderate modification of tasks or assistance (eg, physical or verbal ) with assesment(s) is necessary to enable patient to complete evaluation  Assessment: Moderate Complexity     SUBJECTIVE:   Patient stated my sister\" when pt was asked if he lived with anyone at home. OBJECTIVE DATA SUMMARY:     Past Medical History:   Diagnosis Date    Anemia     Bradycardia, unspecified 2018    Cervical discitis     MRSA    Chronic drug abuse ([facility] 1974    Chronic kidney disease     stage III    Diabetes ([facility] 01/1990    Diabetes mellitus ([facility]     Drug abuse ([facility]     Encephalopathy     GERD (gastroesophageal reflux disease)     Hypertension     Muscle weakness     Renal cell carcinoma of left kidney (HCC) 12/17/13    Pathological Stage [id number] cc RCC FG3 s/p left open partial nephrectomy in 12/13      Sepsis due to methicillin resistant Staphylococcus aureus (HCC)     Thrombocytopenia (HCC)     Urethral erosion     Viral hepatitis C     Xerosis cutis      Past Surgical History:   Procedure Laterality Date    HX CIRCUMCISION  12/17/13    Dr. Cindy Meraz, [facility]    HX NEPHRECTOMY Left 12/17/13    Open Partial, Dr. Cindy Meraz, [facility]    HX OTHER SURGICAL Right 2/27/2016    removed right ft  2nd meta-tarsal    HX OTHER SURGICAL  04/2017    abscess removed from back of neck     ID REMOVAL WITH INSERTION OF SUPRAPUBIC CATHETER  2018     Prior Level of Function/Home Situation: Pt reports he lives with his sister in a [facility]. Pt was drowsy, and often needed verbal cues to keep eyes opened. He reports he was (I) with feeding and grooming, but did not provide how much assistance he required for bathing, dressing, and toileting PTA.   Home Situation  Home Environment: Private residence  # Steps to Enter: 12  Rails to Enter: Yes  Hand Rails : Right  One/Two Story Residence: One story  # of Interior Steps: 0  Height of Each Step (in): 7 inches  Interior Rails: Both  Lift Chair Available: No  Living Alone: No  Support Systems: Family member(s)  Patient Expects to be Discharged to[de-identified] Private residence  Current DME Used/Available at Home: Walker, rollator, Glucometer, Shower chair, Raised toilet seat  ? Right hand dominant   ? Left hand dominant  Cognitive/Behavioral Status:  Neurologic State: Drowsy; Eyes open to stimulus  Orientation Level: Oriented to person;Oriented to situation;Oriented to time  Cognition: Follows commands  Safety/Judgement: Fall prevention    Skin: Visible skin appeared intact    Edema: None noted    Coordination:  Coordination: Generally decreased, functional  Fine Motor Skills-Upper: Left Impaired;Right Impaired    Gross Motor Skills-Upper: Left Intact; Right Intact    Balance:  Sitting: Intact  Standing: Impaired; With support  Standing - Static: Fair  Standing - Dynamic : Fair    Strength:  Strength: Generally decreased, functional (BUEs)      Tone & Sensation:  Tone: Normal (BUEs)      Range of Motion:  AROM: Generally decreased, functional (BUEs)  PROM: Generally decreased, functional (BUEs; Noted PIP flexion of digits 3, 4, 5, and hyperextension of DIPs of digits 4, 5)      Functional Mobility and Transfers for ADLs:  Bed Mobility:  Rolling: Contact guard assistance  Supine to Sit: Minimum assistance  Sit to Supine: Minimum assistance    Transfers:  Sit to Stand: Moderate assistance      ADL Assessment:  Feeding: Minimum assistance (Pt currently has an NG tube, however has the GM coordination to perform task but decreased FM coordination of B hands to hold feeding utensils)    Oral Facial Hygiene/Grooming: Minimum assistance (Decreased FM coordination of B hands)    Bathing: Moderate assistance;Maximum assistance    Upper Body Dressing: Maximum assistance    Lower Body Dressing: Maximum assistance    Toileting:  Moderate assistance;Maximum assistance      ADL Intervention:  Cognitive Retraining  Safety/Judgement: Fall prevention    Pain:  Pt reports 0/10 pain or discomfort prior to treatment. Pt reports 0/10 pain or discomfort post treatment. Activity Tolerance:   Fair    Please refer to the flowsheet for vital signs taken during this treatment. After treatment:   ? Patient left in no apparent distress sitting up in chair  ? Patient left in no apparent distress in bed  ? Call bell left within reach  ? Nursing notified  ? Caregiver present  ? Bed alarm activated    COMMUNICATION/EDUCATION:   ? Home safety education was provided and the patient/caregiver indicated understanding. ? Patient/family have participated as able in goal setting and plan of care. ? Patient/family agree to work toward stated goals and plan of care. ? Patient understands intent and goals of therapy, but is neutral about his/her participation. ? Patient is unable to participate in goal setting and plan of care.     Thank you for this referral.  Danette Carver, OT  Time Calculation: 12 mins

## 2019-04-01 NOTE — PROGRESS NOTES
[facility]  -  Progress Note      Patient: Ros Garcia MRN: [id number]  SSN: [id number]    YOB: 1960  Age: 59 y.o. Sex: male      Admit Date: 3/27/2019    Assessment:     Hospital Problems  Date Reviewed: 3/31/2019          Codes Class Noted POA    Renal failure (ARF), acute on chronic (HCC) ICD-10-CM: N17.9, N18.9  ICD-9-CM: 584.9, 585.9  3/31/2019 Yes        Oliguria ICD-10-CM: R34  ICD-9-CM: 788.5  3/28/2019 Unknown        Renal injury ICD-10-CM: S37.009A  ICD-9-CM: 866.00  3/28/2019 Unknown        Suprapubic catheter ([facility] [address] ICD-10-CM: Z93.59  ICD-9-CM: V44.59  3/28/2019 Unknown        Bradycardia, sinus ICD-10-CM: R00.1  ICD-9-CM: 427.89  3/28/2019 Unknown        Acute UTI ICD-10-CM: N39.0  ICD-9-CM: 599.0  3/28/2019 Unknown        Type 2 diabetes mellitus with hypoglycemia ([facility] [address] ICD-10-CM: E11.649  ICD-9-CM: 250.80  3/28/2019 Unknown        Acute renal failure (ARF) ([facility] [address] ICD-10-CM: N17.9  ICD-9-CM: 584.9  3/28/2019 Unknown            - Patient presented to ED with complications concerning his suprapubic catheter and reduced urine output. Found to by bradycardic with HR of 40 bpm on ECG. Also with some hypothermia   - Recent hospitalization at [facility] with upper GIB, ARF, PNA, candida UTI, acute resp. failure and HFpEF on 3/7/19 - 3/15/19 and again at [facility] for UTI with sepsis from 3/17/19 - 3/20/19  - Acute renal failure Cr 3.25 this admission.  This appears to be his primary problem.  - Hx/o RCC s/p left partial nephrectomy 2013,   - S/p suprapubic catheter placement 11/12/18 at [facility]  - Hx/o asymptomatic bradycardia worsened by use of BB.  Heart rate has been between 40 and 60 this admission with stable blood pressure.  - Chronic HFpEF, no medications listed. CT pelvis with small bilateral pleural effusions noted, likely in setting of ARF.  He does not appear to be in extremis. [facility] has clear lungs and is laying flat.   - Echo from 3/18/19 at [facility]: EF of 55%, with diastolic dysfunction, mildly dilated left atria, mild TV regurg, trace MV and PV regurg.   - Hypertension.  Currently well controlled. - Diabetes, Hgb A1c 5.3  - Hx/o spinal osteomyelitis with quadriplegia, s/p C3-C7 laminectomy in April 2017  - Chronic anemia, Hgb 8.8 this admission   - Chronic thrombocytopenia, 92K this admission  - Chronic hepatitis C, followed by Dr. Faith Byrd with GI   - Hypothyroidism, synthroid   - Chronic drug abuse  - Ongoing tobacco abuse    -Hypothermia.  This often contributes to bradycardia.  Unclear if patient is severely hypothyroid or not.     Primary cardiologist: Dr. Mari Flores:     Stable and without symptoms. Patient with well documented EKG's that have similar rates. Would continue to avoid any rate agents  Continue with present care and regimen per primary team.  No new CV recommendations  Will sign off. Subjective:     No new complaints. Denies any chest pain or shortness of breath.   Telemetry with rates in the 50's    Objective:      Patient Vitals for the past 8 hrs:   Temp Pulse Resp BP SpO2   04/01/19 0800 -- (!) 55 11 168/86 98 %   04/01/19 0743 97.5 °F (36.4 °C) (!) 56 13 161/85 100 %   04/01/19 0700 -- (!) 57 15 158/88 98 %   04/01/19 0400 97.6 °F (36.4 °C) 62 14 172/83 100 %         Patient Vitals for the past 96 hrs:   Weight   04/01/19 0830 81.6 kg (180 lb)   03/30/19 1000 79.1 kg (174 lb 6.1 oz)   03/29/19 0700 80.2 kg (176 lb 12.9 oz)         Intake/Output Summary (Last 24 hours) at 4/1/2019 0949  Last data filed at 4/1/2019 8887  Gross per 24 hour   Intake 1515 ml   Output 905 ml   Net 610 ml       Physical Exam:  General:  alert, cooperative, no distress, appears stated age  Neck:  nontender, no JVD  Lungs:  clear to auscultation bilaterally  Heart:  Regular bradycardia rate and rhythm, S1, S2 normal, no murmur, click, rub or gallop  Extremities:  extremities normal, atraumatic, no cyanosis or edema    Data Review:     Labs: Results:       Chemistry Recent Labs     04/01/19  0547 03/31/19 0227 03/30/19  0146   * 170* 83    138 146*   K 3.8 4.3 4.5   * 114* 116*   CO2 20* 20* 19*   BUN 33* 29* 26*   CREA 4.13* 4.10* 3.94*   CA 8.2* 8.4* 7.8*  7.8*   MG 2.4  --  2.3   PHOS 3.6  --  5.0*   AGAP 7 4 11   BUCR 8* 7* 7*     --  73   TP 6.5  --  5.6*   ALB 2.2*  --  2.1*   GLOB 4.3*  --  3.5   AGRAT 0.5*  --  0.6*      CBC w/Diff Recent Labs     04/01/19  0547 03/31/19 0227 03/30/19 0146   WBC 6.2 3.0* 3.4*   RBC 2.68* 2.88* 2.67*   HGB 7.6* 8.1* 7.5*   HCT 22.3* 24.3* 23.1*   PLT 69* 70* 64*   GRANS 83* 86* 57   LYMPH 8* 10* 26   EOS 0 0 1      Cardiac Enzymes No results found for: CPK, CK, CKMMB, CKMB, RCK3, CKMBT, CKNDX, CKND1, LINDA, TROPT, TROIQ, MIGUEL, TROPT, TNIPOC, BNP, BNPP   Coagulation No results for input(s): PTP, INR, APTT in the last 72 hours.     No lab exists for component: INREXT    Lipid Panel Lab Results   Component Value Date/Time    Cholesterol, total 160 03/29/2019 02:28 AM    Cholesterol, total 142 03/29/2019 02:28 AM    HDL Cholesterol 58 03/29/2019 02:28 AM    HDL Cholesterol 58 03/29/2019 02:28 AM    LDL, calculated 88.2 03/29/2019 02:28 AM    LDL, calculated 70.2 03/29/2019 02:28 AM    VLDL, calculated 13.8 03/29/2019 02:28 AM    VLDL, calculated 13.8 03/29/2019 02:28 AM    Triglyceride 69 03/29/2019 02:28 AM    Triglyceride 69 03/29/2019 02:28 AM    CHOL/HDL Ratio 2.8 03/29/2019 02:28 AM    CHOL/HDL Ratio 2.4 03/29/2019 02:28 AM      BNP No results found for: BNP, BNPP, XBNPT   Liver Enzymes Recent Labs     04/01/19  0547   TP 6.5   ALB 2.2*      SGOT 16      Digoxin    Thyroid Studies Lab Results   Component Value Date/Time    TSH 0.74 03/30/2019 10:20 AM

## 2019-04-01 NOTE — PROGRESS NOTES
Problem: Dysphagia (Adult)  Goal: *Acute Goals and Plan of Care (Insert Text)  Description  Patient will:  1. Tolerate PO trials with 0 s/s overt distress in 4/5 trials  2. Utilize compensatory swallow strategies/maneuvers (decrease bite/sip, size/rate, alt. liq/sol) with min cues in 4/5 trials  3. Perform oral-motor/laryngeal exercises to increase oropharyngeal swallow function with min cues  4. Complete an objective swallow study (i.e., MBSS) to assess swallow integrity, r/o aspiration, and determine of safest LRD, min A    Rec:     Mech soft with nectar thick liquids   Aspiration precautions  Feeding assistance   HOB >45 during po intake, remain >30 for 30-45 minutes after po   Small bites/sips; alternate liquid/solid with slow feeding rate   Oral care TID  Meds crushed in puree      Outcome: Progressing Towards Goal    SPEECH LANGUAGE PATHOLOGY DYSPHAGIA TREATMENT    Patient: Elizabeth Rider (42 y.o. male)  Date: 4/1/2019  Diagnosis: Oliguria [R34]  Renal injury [S37.009A]  Acute renal failure (ARF) (HCC) [N17.9]   Precautions: Aspiration, Seizure, Skin    ASSESSMENT:  Pt seen for follow up dysphagia tx this am with NG in place. Pt reporting \"I can't eat that pureed stuff\". Pt demo immediate throat clearing with thin liquids, resolved with nectar thick liquids on 5/5 presentations. Pt demo mildly increased but thorough bolus manipulation with mech soft presentations and no overt s/sx aspiration. Recommend change diet to mech soft, nectar thick liquids with strict use of the above mentioned compensatory strategies/aspiration precautions. Results/recommendations discussed with pt, RN and MD.  If s/sx aspiration persist, recommend MBS to further assess oropharyngeal swallow integrity. Will continue to follow for further dysphagia management. Progression toward goals:  ?         Improving appropriately and progressing toward goals  ? Improving slowly and progressing toward goals  ?          Not making progress toward goals and plan of care will be adjusted     PLAN:  Recommendations and Planned Interventions:  Patient continues to benefit from skilled intervention to address the above impairments. Continue treatment per established plan of care. Discharge Recommendations: To Be Determined     SUBJECTIVE:   Patient stated ? I can't eat that pureed stuff? .    OBJECTIVE:   Cognitive and Communication Status:  Neurologic State: Drowsy, Eyes open to stimulus  Orientation Level: Oriented to person, Oriented to situation, Oriented to time  Cognition: Follows commands  Perception: Appears intact  Perseveration: No perseveration noted  Safety/Judgement: Fall prevention  Dysphagia Treatment:  P.O. Trials:   Vocal quality prior to P.O.: Breathy, Fatigue, Hoarse, Low volume   Consistency Presented: Thin liquid, Solid, Nectar thick liquid   How Presented: SLP-fed/presented, Straw, Successive swallows, Cup/sip   Bolus Acceptance: No impairment   Bolus Formation/Control: Impaired   Type of Impairment: Mastication   Propulsion: Delayed (# of seconds)   Oral Residue: None   Initiation of Swallow: Delayed (# of seconds)   Laryngeal Elevation: Decreased   Aspiration Signs/Symptoms: Clear throat   Pharyngeal Phase Characteristics: Poor endurance, Effortful swallow, Easily fatigued    Effective Modifications: Small sips and bites   Cues for Modifications: Moderate   Oral Phase Severity: Mild-moderate   Pharyngeal Phase Severity : Mild-moderate  PAIN:  No pain reported prior to or following tx     After treatment:   ?              Patient left in no apparent distress sitting up in chair  ? Patient left in no apparent distress in bed  ? Call bell left within reach  ? Nursing notified  ? Caregiver present  ?               Bed alarm activated      COMMUNICATION/EDUCATION:   ?              SLP educated pt with regard to compensatory swallow strategies and        aspiration/reflux precautions including: small bites/sips,        alternate liquids/solids, decrease feeding rate, HOB > 45 with all po, and                   upright in bed at 30 degrees after po for at least 45        minutes.      Guanakito Yap M.S., [id number] [facility]  Speech-Language Pathologist

## 2019-04-01 NOTE — CONSULTS
[email or web address]  [phone]    GASTROENTEROLOGY CONSULT      Impression:   1. Chronic Hep C- completed [place] in Dec 2018. 3/28-HCV Ab+. 2. Hx of GI Bleed-was admitted to [facility] 3/7/19 - 3/15/19 for GI Bleed. Denies any bleeding this admission. 3. Hx Renal Cell Cancer- suprapubic catheter placed 11/2018. 4. Chronic IV drug use. 5. Cirrhosis-EGD 10/11/19 (Dr. Mcarthur)=gastroparesis. No varicies. 3/30/19 US=trace ascites  6. Chronic anemia -7.6/22.3 today. Plan:     1. HCV Viral load, AFP levels, Iron Profile, Hemoccult. 2. Monitor H/H: Transfuse if Hgb <7.   3. To f/u OP for cirrhosis & gastroparesis in 2-4 weeks. 4. If any GI Bleeding can consider EGD to r/o varicies. 5. Continue current medical management. Chief Complaint: Hx GI Bleed      HPI:  Elizabeth Rider is a 59 y.o. male who I am being asked to see in consultation for an opinion regarding above. significant history for chronic urinary retention with suprapubic catheter placement, history of Renal Cell carcinoma post-left partial nephrectomy 12/2013 followed by Dr. Jose Gudino urology, chronic kidney disease baseline Cr 1.6-2.2, history of c-spine laminectomy with post op paralysis, Heroin Drug Abuse, ongoing TOB use, DM2, anemia of chronic disease, thrombocytopenia, hepatitis C with cirrhosis followed by GI Dr. Radha Castillo, [facility], Hypothyroidism admitted to [facility] on 3/28/19 with altered mental status.          PMH:   Past Medical History:   Diagnosis Date    Anemia     Bradycardia, unspecified 2018    Cervical discitis     MRSA    Chronic drug abuse ([address] 1974    Chronic kidney disease     stage III    Diabetes ([address] 01/1990    Diabetes mellitus ([address]     Drug abuse ([address]     Encephalopathy     GERD (gastroesophageal reflux disease)     Hypertension     Muscle weakness     Renal cell carcinoma of left kidney ([address] 12/17/13    Pathological Stage [id number] cc RCC FG3 s/p left open partial nephrectomy in 12/13      Sepsis due to methicillin resistant Staphylococcus aureus (HCC)     Thrombocytopenia (HCC)     Urethral erosion     Viral hepatitis C     Xerosis cutis        PSH:   Past Surgical History:   Procedure Laterality Date    HX CIRCUMCISION  12/17/13    Dr. Dede Melvin, [facility]    HX NEPHRECTOMY Left 12/17/13    Open Partial, Dr. Dede Melvin, [facility]    HX OTHER SURGICAL Right 2/27/2016    removed right ft  2nd meta-tarsal    HX OTHER SURGICAL  04/2017    abscess removed from back of neck     SC REMOVAL WITH INSERTION OF SUPRAPUBIC CATHETER  2018       Social HX:   Social History     Socioeconomic History    Marital status:      Spouse name: Not on file    Number of children: Not on file    Years of education: Not on file    Highest education level: Not on file   Occupational History    Not on file   Social Needs    Financial resource strain: Not on file    Food insecurity:     Worry: Not on file     Inability: Not on file    Transportation needs:     Medical: Not on file     Non-medical: Not on file   Tobacco Use    Smoking status: Current Every Day Smoker     Packs/day: 0.50     Years: 30.00     Pack years: 15.00     Types: Cigarettes    Smokeless tobacco: Never Used   Substance and Sexual Activity    Alcohol use: Yes     Comment: beer occasionally    Drug use: No    Sexual activity: Never   Lifestyle    Physical activity:     Days per week: Not on file     Minutes per session: Not on file    Stress: Not on file   Relationships    Social connections:     Talks on phone: Not on file     Gets together: Not on file     Attends Buddhist service: Not on file     Active member of club or organization: Not on file     Attends meetings of clubs or organizations: Not on file     Relationship status: Not on file    Intimate partner violence:     Fear of current or ex partner: Not on file     Emotionally abused: Not on file     Physically abused: Not on file     Forced sexual activity: Not on file   Other Topics Concern    Not on file   Social History Narrative     since 2012;  for 12 yrs; [employer]; [address] [email or web address]  just recently furloughed;  No  service       FHX:   Family History   Problem Relation Age of Onset    Diabetes Mother     Sickle Cell Anemia Father     Diabetes Sister     Hypertension Sister        Allergy:   Allergies   Allergen Reactions    Iodine Hives       Patient Active Problem List   Diagnosis Code    Type II diabetes mellitus, uncontrolled ([facility] [address] E11.65    Urinary retention R33.9    Chronic hepatitis C without hepatic coma (HCC) B18.2    Essential hypertension I10    IV drug abuse (HCC) F19.10    Quadriparesis ([facility] [address] N31.62    Umbilical hernia W99.9    Bradycardia R00.1    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    Hyperkalemia E87.5    Hypercalcemia E83.52    Chronic kidney disease, stage III (moderate) (HCC) N18.3    Thrombocytopenia (HCC) D69.6    Status post amputation of toe of right foot ([facility]) Z89.421    Chronic anemia D64.9    Chronic drug abuse (HCC) F19.10    Hypertension I10    Renal cell carcinoma of left kidney (HCC) C64.2    Urethral erosion N36.8    BPH (benign prostatic hyperplasia) N40.0    Bladder atony N31.2    Cerumen impaction H61.20    Chronic neck pain M54.2, G89.29    Cirrhosis of liver (HCC) K74.60    COPD, moderate (HCC) J44.9    Dry skin dermatitis L85.3    Elevated PSA R97.20    Generalized weakness R53.1    Hematuria R31.9    History of diabetes mellitus Z86.39    History of elevated lipids Z86.39    History of hay fever Z87.09    Hyperlipidemia E78.5    Hypothyroid E03.9    Lung nodules R91.8    Medication management Z79.899    Neck mass R22.1    Pericardial effusion I31.3    Preoperative clearance Z01.818    Prostate disorder N42.9    Recurrent UTI N39.0    Vitamin D deficiency E55.9    Oliguria R34    Renal injury S37.009A    Suprapubic catheter (HCC) Z93.59    Bradycardia, sinus R00.1    Acute UTI N39.0    Type 2 diabetes mellitus with hypoglycemia ([facility]) E11.649    Acute renal failure (ARF) (HCC) N17.9    Renal failure (ARF), acute on chronic (HCC) N17.9, N18.9       Home Medications:     Medications Prior to Admission   Medication Sig    amLODIPine (NORVASC) 10 mg tablet Take 10 mg by mouth daily.  levothyroxine (SYNTHROID) 100 mcg tablet Take 100 mcg by mouth Daily (before breakfast).  omeprazole (PRILOSEC) 20 mg capsule Take 20 mg by mouth two (2) times a day.  sodium bicarbonate 650 mg tablet Take 650 mg by mouth three (3) times daily.  sucralfate (CARAFATE) 1 gram tablet Take 1 g by mouth four (4) times daily.  therapeutic multivitamin (THERAGRAN) tablet Take 1 Tab by mouth daily.  traMADol (ULTRAM) 50 mg tablet Take 50 mg by mouth every six (6) hours as needed for Pain.  trospium (SANCTURA) 20 mg tablet Take 20 mg by mouth daily.  pioglitazone (ACTOS) 15 mg tablet Take 1 Tab by mouth.  Syringe, Disposable, (BD SYRINGE CATHETER TIP) 60 mL syrg Use 1 syringe daily with irrigations    docusate sodium (COLACE) 100 mg capsule 100 mg two (2) times daily as needed.  ergocalciferol (DRISDOL) 50,000 unit capsule Take 50,000 Units by mouth every seven (7) days.  SITagliptin (JANUVIA) 100 mg tablet Take 50 mg by mouth daily.  insulin lispro (HUMALOG) 100 unit/mL injection Normal Insulin Sensitivity   For Blood Sugar (mg/dL) of:     Less than 150 =   0 units           150 -199 =   2 units  200 -249 =   4 units  250 -299 =   6 units  300 -349 =   8 units  350 and above =   10 units  If 2 glucose readings are above 200 mg/dL within a 24 hr period, proceed to \"Very Insul    tamsulosin (FLOMAX) 0.4 mg capsule Take 1 Cap by mouth daily.  doxycycline (ADOXA) 100 mg tablet Take 100 mg by mouth daily. Review of Systems:     Constitutional: No fevers, chills, weight loss, fatigue. Skin: No rashes, pruritis, jaundice, ulcerations, erythema.    HENT: No headaches, nosebleeds, sinus pressure, rhinorrhea, sore throat. Eyes: No visual changes, blurred vision, eye pain, photophobia, jaundice. Cardiovascular: No chest pain, heart palpitations. Respiratory: No cough, SOB, wheezing, chest discomfort, orthopnea. Gastrointestinal: Abdomen soft; no tenderness; no masses noted. Genitourinary: No dysuria, bleeding, discharge, pyuria. Musculoskeletal: No weakness, arthralgias, wasting. Endo: No sweats. Heme: No bruising, easy bleeding. Allergies: As noted. Neurological: Cranial nerves intact. Alert and oriented. Gait not assessed. Psychiatric:  No anxiety, depression, hallucinations. Visit Vitals  /86   Pulse (!) 56   Temp 97.6 °F (36.4 °C)   Resp 25   Ht 6' (1.829 m)   Wt 81.6 kg (180 lb)   SpO2 100%   BMI 24.41 kg/m²       Physical Assessment:     constitutional: appearance: well developed, well nourished, normal habitus, no deformities, in no acute distress. skin: inspection: no rashes, ulcers, icterus or other lesions; no clubbing or telangiectasias. palpation: no induration or subcutaneos nodules. eyes: inspection: normal conjunctivae and lids; no jaundice pupils: normal  ENMT: mouth: normal oral mucosa,lips and gums; good dentition. oropharynx: normal tongue, hard and soft palate; posterior pharynx without erithema, exudate or lesions. neck: thyroid: normal size, consistency and position; no masses or tenderness. respiratory: effort: normal chest excursion; no intercostal retraction or accessory muscle use. cardiovascular: abdominal aorta: normal size and position; no bruits. palpation: PMI of normal size and position; normal rhythm; no thrill or murmurs. abdominal: abdomen: normal consistency; no tenderness or masses. hernias: no hernias appreciated. liver: normal size and consistency. spleen: not palpable. rectal: hemoccult/guaiac: not performed.    musculoskeletal: digits and nails: no clubbing, cyanosis, petechiae or other inflammatory conditions. gait: normal gait and station head and neck: normal range of motion; no pain, crepitation or contracture. spine/ribs/pelvis: normal range of motion; no pain, deformity or contracture. neurologic: cranial nerves: II-XII normal.   psychiatric: judgement/insight: within normal limits. memory: within normal limits for recent and remote events. mood and affect: no evidence of depression, anxiety or agitation. orientation: oriented to time, space and person. Basic Metabolic Profile   Recent Labs     04/01/19  0547      K 3.8   *   CO2 20*   BUN 33*   *   CA 8.2*   MG 2.4   PHOS 3.6         CBC w/Diff    Recent Labs     04/01/19  0547   WBC 6.2   RBC 2.68*   HGB 7.6*   HCT 22.3*   MCV 83.2   MCH 28.4   MCHC 34.1   RDW 18.0*   PLT 69*    Recent Labs     04/01/19  0547   GRANS 83*   LYMPH 8*   EOS 0   MYELO 2*        Hepatic Function   Recent Labs     04/01/19  0547   ALB 2.2*   TP 6.5   TBILI 0.4   SGOT 16           Coags   No results for input(s): PTP, INR, APTT in the last 72 hours. No lab exists for component: INREXT        Alessandra Russell NP. [facility] [address]  Cell: [phone]  [email or web address]

## 2019-04-01 NOTE — PROGRESS NOTES
Problem: Mobility Impaired (Adult and Pediatric)  Goal: *Acute Goals and Plan of Care (Insert Text)  Description  Physical Therapy Goals  Initiated 4/1/2019 and to be accomplished within 7 day(s)  1. Patient will move from supine to sit and sit to supine  and roll side to side in bed with modified independence. 2.  Patient will transfer from bed to chair and chair to bed with supervision/set-up using the least restrictive device. 3.  Patient will perform sit to stand with supervision/set-up. 4.  Patient will ambulate with supervision/set-up for 100 feet with the least restrictive device. 5.  Patient will ascend/descend 12 stairs with 1 handrail(s) with minimal assistance/contact guard assist.   Outcome: Progressing Towards Goal    PHYSICAL THERAPY EVALUATION    Patient: Ros Garcia (45 y.o. male)  Date: 4/1/2019  Primary Diagnosis: Oliguria [R34]  Renal injury [S37.009A]  Acute renal failure (ARF) (HCC) [N17.9]        Precautions:   Aspiration, Seizure, Skin    OBJECTIVE/ASSESSMENT :  Based on the objective data described below, the patient presents with impaired functional mobility including bed mobility, transfers, ambulation, and general activity tolerance following admission for acute renal failure. Patient presented today semi-reclined in bed, drowsy, but arousable and agreeable to physical therapy evaluation. Pt was cleared by nursing to participate. Pt's sister was in room with him. Patient rolled to R with min A. He transferred supine-sit with min A and displayed good sitting balance at edge of bed. Pt performed sit-stand with mod A and displayed fair static standing balance. Pt unable to grasp walker with L U.E.  He ambulated x 8 feet with RW with CGA toward head of bed and then sat with min A. Pt returned to supine with min A and was educated on exercises for bilateral L.E.s.   At conclusion of session, pt was left resting comfortably in bed with call bell in reach, needs met, and nurse notified. Education:   [x]         Bed mobility  [x]         Transfers  [x]         Ambulation  [x]         Assistive device management  []         Stairs  []         Body mechanics  [x]         Position change  [x]         Activity pacing/energy conservation  []         Other:    Patient will benefit from skilled intervention to address the above impairments. Patients rehabilitation potential is considered to be Good  Factors which may influence rehabilitation potential include:   ? None noted  ? Mental ability/status  ?x         Medical condition  ? Home/family situation and support systems  ? Safety awareness  ? Pain tolerance/management  ? Other:      PLAN :  Recommendations and Planned Interventions:  ?           Bed Mobility Training             ? Neuromuscular Re-Education  ? Transfer Training                   ? Orthotic/Prosthetic Training  ? Gait Training                          ? Modalities  ? Therapeutic Exercises          ? Edema Management/Control  ? Therapeutic Activities            ? Patient and Family Training/Education  ? Other (comment):    Frequency/Duration: Patient will be followed by physical therapy 1-2 times per day, 4-7 days per week to address goals. Discharge Recommendations: [facility] vs Home health depending on progress  Further Equipment Recommendations for Discharge: N/A     SUBJECTIVE:   Patient stated I don't like this tube in my nose.     OBJECTIVE DATA SUMMARY:     Past Medical History:   Diagnosis Date    Anemia     Bradycardia, unspecified 2018    Cervical discitis     MRSA    Chronic drug abuse ([facility] [address] 1974    Chronic kidney disease     stage III    Diabetes ([facility] [address] 01/1990    Diabetes mellitus ([facility] [address]     Drug abuse ([facility] [address]     Encephalopathy     GERD (gastroesophageal reflux disease)     Hypertension     Muscle weakness     Renal cell carcinoma of left kidney ([facility] [address] 12/17/13    Pathological Stage [id number] cc RCC FG3 s/p left open partial nephrectomy in 12/13      Sepsis due to methicillin resistant Staphylococcus aureus (HCC)     Thrombocytopenia (HCC)     Urethral erosion     Viral hepatitis C     Xerosis cutis      Past Surgical History:   Procedure Laterality Date    HX CIRCUMCISION  12/17/13    Dr. Ashely Ruiz, [facility] HX NEPHRECTOMY Left 12/17/13    Open Partial, Dr. Ashely Ruiz, [place] [address]    HX OTHER SURGICAL Right 2/27/2016    removed right ft  2nd meta-tarsal    HX OTHER SURGICAL  04/2017    abscess removed from back of neck     CO REMOVAL WITH INSERTION OF SUPRAPUBIC CATHETER  2018     Barriers to Learning/Limitations: None  Compensate with: N/A  Prior Level of Function/Home Situation: Pt lives with his sister in a 1 story apartment that requires 12 stairs to enter, 1 railing. Pt ambulated with a Rollator walker in the community but no AD within his apartment. Home Situation  Home Environment: Private residence  # Steps to Enter: 12  Rails to Enter: Yes  Hand Rails : Right  One/Two Story Residence: One story  # of Interior Steps: 0  Height of Each Step (in): 7 inches  Interior Rails: Both  Lift Chair Available: No  Living Alone: No  Support Systems: Family member(s)  Patient Expects to be Discharged to[de-identified] Private residence  Current DME Used/Available at Home: Walker, rollator, Glucometer, Shower chair, Raised toilet seat  Critical Behavior:  Neurologic State: Alert  Psychosocial  Patient Behaviors: Calm; Cooperative  Strength:    Strength: Within functional limits(B LEs)  Tone & Sensation:   Tone: Normal  Range Of Motion:  AROM: Within functional limits(B LEs)  Functional Mobility:  Bed Mobility:  Rolling: Contact guard assistance  Supine to Sit: Minimum assistance  Sit to Supine: Minimum assistance  Transfers:  Sit to Stand: Moderate assistance  Stand to Sit: Minimum assistance  Balance:   Sitting: Intact  Standing: Impaired; With support  Standing - Static: Fair  Standing - Dynamic : Fair  Ambulation/Gait Training:  Distance (ft): 8 Feet (ft)  Assistive Device: Walker, rolling  Ambulation - Level of Assistance: Contact guard assistance    Pain:  Pre session: 0  Post session: 0  Activity Tolerance:   Fair  Please refer to the flowsheet for vital signs taken during this treatment. After treatment:   X  Patient left in no apparent distress sitting up in chair  ? Patient left sitting on EOB  ? Patient left in no apparent distress in bed  ? Patient declined to be OOB at this time due to   X Call bell left within reach  X Nursing notified(Galilea Corcoran)  X Caregiver present  ? Bed alarm activated  X SCDs in place    COMMUNICATION/EDUCATION:   X         Fall prevention education was provided and the patient/caregiver indicated understanding. x        Patient/family have participated as able in goal setting and plan of care. x         Patient/family agree to work toward stated goals and plan of care. ?         Patient understands intent and goals of therapy, but is neutral about his/her participation. ? Patient is unable to participate in goal setting and plan of care.     Thank you for this referral.  Carolyn Del Castillo, PT   Time Calculation: 29 mins        Eval Complexity: History: MEDIUM  Complexity : 1-2 comorbidities / personal factors will impact the outcome/ POC Exam:MEDIUM Complexity : 3 Standardized tests and measures addressing body structure, function, activity limitation and / or participation in recreation  Presentation: MEDIUM Complexity : Evolving with changing characteristics  Clinical Decision Making:Medium Complexity   Overall Complexity:MEDIUM

## 2019-04-01 NOTE — MED STUDENT NOTES
Progress Note Patient: Elizabeth Rider MRN: [id number]  CSN: [id number] YOB: 1960  Age: 59 y.o. Sex: male DOA: 3/27/2019 LOS:  LOS: 4 days Subjective:  
Pt is laying in bed with family member at bedside. Pt is more alert today and A&Ox2-3 to location, month and year. Pt has no new complaints. Pt's family member notes that he has sat up on side of bed and has stood at bedside during PT session. Pt is on NG tube feeds but states that he would like to attempt eating today. Repeat swallow study with goal of increasing to full diet as tolerated Cosyntropin results discussed with Endocrinology and appear within normal limits. Glc elevated to 201. Platelets down trending with drop >20 from baseline. Hgb remains stable. GI consulted to monitor for possible UGI bleed. Cont zosyn, ID to f/u Cr at 4.13 today. F/u nephrology Chief Complaint:  
Chief Complaint Patient presents with  Urinary Catheter Problem  Abdominal Pain Review of systems General: No fevers or chills. Cardiovascular: No chest pain or pressure. No palpitations. Pulmonary: No shortness of breath, cough or wheeze. Gastrointestinal: No abdominal pain, nausea, vomiting or diarrhea. Genitourinary: Pt with suprapubic catheter in place, no complaints Musculoskeletal: No joint or muscle pain, no back pain. Neurologic: No headache, numbness, tingling or weakness. Objective:  
 
Physical Exam: 
Visit Vitals /86 Pulse (!) 55 Temp 97.5 °F (36.4 °C) Resp 11 Ht 6' (1.829 m) Wt 81.6 kg (180 lb) SpO2 98% BMI 24.41 kg/m² General:         Alert, no acute distress, attempts to respond to questions HEENT: NC, Atraumatic. PERRLA, anicteric sclerae. Lungs: CTA Bilaterally. No Wheezing/Rhonchi/Rales. Heart:  Regular  rhythm,  No murmur, No Rubs, No Gallops Abdomen: Soft, Non distended, Non tender.  +Bowel sounds, no HSM Extremities: No LE edema. SCDs in place. Psych:   Not anxious or agitated. Neurologic:  Alert and oriented x 2-3. Poor insight to condition Intake and Output: 
Current Shift:  04/01 0701 - 04/01 1900 In: 0 Out: 500 [Urine:500] Last three shifts:  03/30 1901 - 04/01 0700 In: 2821 [P.O.:50; I.V.:950] Out: 800 [Urine:800] Labs: Results:  
   
Chemistry Recent Labs 04/01/19 
6064 03/31/19 
0679 03/30/19 
5300 * 170* 83  138 146*  
K 3.8 4.3 4.5  
* 114* 116* CO2 20* 20* 19* BUN 33* 29* 26* CREA 4.13* 4.10* 3.94* CA 8.2* 8.4* 7.8*  7.8* AGAP 7 4 11 BUCR 8* 7* 7*   --  73  
TP 6.5  --  5.6* ALB 2.2*  --  2.1*  
GLOB 4.3*  --  3.5 AGRAT 0.5*  --  0.6* CBC w/Diff Recent Labs 04/01/19 
4814 03/31/19 
2547 03/30/19 
8043 WBC 6.2 3.0* 3.4*  
RBC 2.68* 2.88* 2.67* HGB 7.6* 8.1* 7.5* HCT 22.3* 24.3* 23.1*  
PLT 69* 70* 64* GRANS 83* 86* 57  
LYMPH 8* 10* 26 EOS 0 0 1 Cardiac Enzymes No results for input(s): CPK, CKND1, LINDA in the last 72 hours. No lab exists for component: Amandeep Callahan Coagulation No results for input(s): PTP, INR, APTT in the last 72 hours. No lab exists for component: INREXT Lipid Panel Lab Results Component Value Date/Time Cholesterol, total 160 03/29/2019 02:28 AM  
 Cholesterol, total 142 03/29/2019 02:28 AM  
 HDL Cholesterol 58 03/29/2019 02:28 AM  
 HDL Cholesterol 58 03/29/2019 02:28 AM  
 LDL, calculated 88.2 03/29/2019 02:28 AM  
 LDL, calculated 70.2 03/29/2019 02:28 AM  
 VLDL, calculated 13.8 03/29/2019 02:28 AM  
 VLDL, calculated 13.8 03/29/2019 02:28 AM  
 Triglyceride 69 03/29/2019 02:28 AM  
 Triglyceride 69 03/29/2019 02:28 AM  
 CHOL/HDL Ratio 2.8 03/29/2019 02:28 AM  
 CHOL/HDL Ratio 2.4 03/29/2019 02:28 AM  
  
BNP No results for input(s): BNPP in the last 72 hours. Liver Enzymes Recent Labs 04/01/19 
0547  
TP 6.5 ALB 2.2*  SGOT 16 Thyroid Studies Lab Results Component Value Date/Time TSH 0.74 03/30/2019 10:20 AM  
    
 
Procedures/imaging: see electronic medical records for all procedures/Xrays and details which were not copied into this note but were reviewed prior to creation of Plan Medications:  
Current Facility-Administered Medications Medication Dose Route Frequency  pantoprazole (PROTONIX) granules for oral suspension 40 mg  40 mg Per NG tube BID  piperacillin-tazobactam (ZOSYN) 2.25 g in 0.9% sodium chloride (MBP/ADV) 50 mL MBP  2.25 g IntraVENous Q8H  
 epoetin cristy-epbx (RETACRIT) 12,000 Units combo injection  12,000 Units SubCUTAneous Q7D  
 B complex-vitaminC-folic acid (NEPHROCAP) cap  1 Cap Oral DAILY  sodium bicarbonate tablet 650 mg  650 mg Oral TID  calcitRIOL (ROCALTROL) capsule 0.25 mcg  0.25 mcg Oral Q MON, WED & FRI  lactulose (CHRONULAC) solution 10 g  10 g Oral BID  hydrocortisone Sod Succ (PF) (SOLU-CORTEF) injection 50 mg  50 mg IntraVENous Q6H  
 levETIRAcetam (KEPPRA) 500 mg in 0.9% sodium chloride (MBP/ADV) 100 mL MBP  500 mg IntraVENous Q12H  
 sodium chloride (NS) flush 5-10 mL  5-10 mL IntraVENous PRN  
 insulin lispro (HUMALOG) injection   SubCUTAneous Q6H  
 glucose chewable tablet 16 g  4 Tab Oral PRN  
 glucagon (GLUCAGEN) injection 1 mg  1 mg IntraMUSCular PRN  
 dextrose (D50W) injection syrg 12.5-25 g  25-50 mL IntraVENous PRN  
 tamsulosin (FLOMAX) capsule 0.4 mg  0.4 mg Oral DAILY  acetaminophen (TYLENOL) tablet 650 mg  650 mg Oral Q6H PRN  
 oxyCODONE-acetaminophen (PERCOCET) 5-325 mg per tablet 1 Tab  1 Tab Oral Q6H PRN  
 naloxone (NARCAN) injection 0.4 mg  0.4 mg IntraVENous PRN  
 ondansetron (ZOFRAN) injection 4 mg  4 mg IntraVENous Q6H PRN  
 docusate sodium (COLACE) capsule 100 mg  100 mg Oral BID PRN  
 levothyroxine (SYNTHROID) tablet 100 mcg  100 mcg Oral 6am  
 
 
Assessment/Plan Active Problems: 
  Oliguria (3/28/2019) Renal injury (3/28/2019) Suprapubic catheter ([facility] [address] (3/28/2019) Bradycardia, sinus (3/28/2019) Acute UTI (3/28/2019) Type 2 diabetes mellitus with hypoglycemia ([facility] [address] (3/28/2019) Acute renal failure (ARF) ([facility] [address] (3/28/2019) Renal failure (ARF), acute on chronic (HCC) (3/31/2019) Plan: Hypoglycemia / Hypothermia/ hypocalcemia/ Adrenal insufficiency Urine Culture positive for achromobater and candida. Continue Zosyn 2.25 mg IV q 8h. Consider beginning fluconazole. Blood culture with no growth x 4 days. F/u ID Discussed normal Cosyntropin results with Endocrinology. Will slowly taper off IV Hydrocortisone, will decrease to 50mg q8hr. Continue monitoring glc level.  
 
  
SHEILA on CKD stage 2-3, likely from acute on chronic diastolic CHF; possibly due to progressive CKD  
- Nephrology following 
- CT 3/28: Abd with pelvic fluid, pleural effusion [facility] 3/28: cardiomegaly and pulm edema, questionable infiltrate vs atelectasis. BNP elevated from baseline. Cardiology consulted, pt stable and without cardiac symptoms. - cardiac markers flat/indeterminant, continue monitoring on telemetry 
- monitor BMP 
  
HTN, HLD, bradycardia chronic HR 40-50s  
- continue to monitor use to be on norvasc 
-3/29:  HDL 58 LDL 70.2 TG 69 
   
Possible UTI sepsis given hypothermia and low WBC; possible autonomic dysfunction hx discitis pt has reported chronic low temps in past 
- Lactic acid normal x2  
- Continue zosyn IV, f/u ID 
- f/u blood cultures 3/28/19 and f/u urine culture 3/27/19 - as stated above 
- follow up Urology consulted, Suprapubic cath functioning appropriately, no sign of obstruction, recommended to consider addition of fluconazole due to history of candida UTI recently. - Hx of aspiration, recent hospitalization with PNA. Will repeat speech therapy consult. Pt able to advance diet as able to tolerate safely. Continue NG tube feedings until able to meet caloric/ nutritional needs.   
   
 Metabolic possible Infectious Encephalopathy in setting of SHEILA, Sepsis, hypoglycemia - MRI. MRA negative  
- Dr. Ping Cohen started Ross Jac - EEG performed 3/29. Results pending 
  
Hx of recent GIB/ Chronic anemia / pancytopenia - IV protonix  
- Continue to monitor H&H due to h/o GI bleeding. EGD performed 3/8/19 revealed gastritis/ duodenitis. - Platelets down trending with significant drop from baseline. Will take off heparin. Will order HIT panel. F/u with heme consult pending results. - DVT prophylaxis with SCDs and PT/OT to increase mobility. Mobilize to chair as tolerated Abdominal Pain, CT abdomen Gallbladder with wall slightly thickened and/or pericholecystic fluid Abdominal pain resolved. RUQ US 3/28/19 no acute cholecystitis No N/V noted, Continue Zofran 4mg q6 hr PRN 
   
Hep C, Cirrhosis Hep B negative and HIV none reactive Hep C Dx confirmed with Positive Hep C ab and elevated Hep C virus ab quant. Discussed with pt sister, he was treated for Hep C and reports completed treatment. INR &PT within normal limits. Continue Lactulose 10g BID.  
  
DM2 Hypoglycemic on admission, A1c 5.3, previously held home medications. Pt now hyperglycemic with POC glc 207. - Continue Humalog sliding scale 
  
Hypothyroidism 
- pt was recently increased on synthroid to 100mcg at prior hospitalization. Continue levothyroxine 100mcg once daily. - TFTs remain within normal limits 
  
Anemia chronic disease, Recent Acute blood loss anemia from UGIB EGD neg for source, chronic thrombocytopenia 
- Continue to monitor with daily CBC, transfuse to keep hgb >7 if needed, monitor plt count. - Followed by Dr. Jazmyne Lenz as outpatient 
  
History of Drug Abuse, Reported heroin use during recent hospitalization this month, ongoing Tob use, Etoh Use 
- H/O  heroin use, does not know if he uses IV drugs currently or not.  Has past history of IVDA. - UDS + for opiods - acute hepatitis panel neg for Hep A&B, +hep c( known) 
- HIV 
- discussed risks, recommended to quit 
  
- pt has h/o poor compliant with multiple admissions   
- pt would benefit from SNF, has declined in past will continue to [facility] 
  
   
Cbc, cmp, mag in AM 
Ammonia level within normal range F/u endo, nephrology, ID and heme Monitor urine output F/u PT and OT and speech therapy CODE:  Full.  on admission  Sister  Reported  she is MPOA for patient.  Pt currently unable to make his own decisions although baseline is AOx3 and able to be decision maker. He use walker with ambulation Julio GIANG-Student 4/1/2019 10:37 AM

## 2019-04-01 NOTE — PROGRESS NOTES
Infectious Disease progress Note    Requested by: Dr. Kaila Tan    Reason: sepsis, hypothermia, hypoglycemia, UTI    Current abx Prior abx   Pip/tazo since 3/31/19 Cefepime 3/28-3/31; metronidazole, vancomycin 3/28     Lines:       Assessment :    59 y.o.  male who has significant history for chronic urinary retention with suprapubic catheter placement, history of Renal Cell carcinoma post-left partial nephrectomy 12/2013 followed by Dr. Linda To urology, chronic kidney disease baseline Cr 1.6-2.2, history of c-spine laminectomy with post op paralysis, Heroin Drug Abuse, ongoing TOB use, DM2, anemia of chronic disease, thrombocytopenia, hepatitis C with cirrhosis followed by GI Dr. Kei Francis, [facility], Hypothyroidism admitted to [facility] on 3/28/19 with altered mental status. hospitalization at [facility] 3/7/19-3/15/19 for gi bleed, hypothermia, hypoglycemia, sepsis, pneumonia, uti     hospitalization [facility] 3/17/19-3/20/19 for hypoglycemia, hypothermia, uti    Now with acute renal failure, persistent hypoglycemia, altered mentation, seizures,hypothermia      After obtaining detailed history and exam; clinical probability of sepsis seems low. Patient has had recurrent hospitalization since 3/7/19 for hypoglycemia, hypothermia and I am concerned that he has undiagnosed hormonal deficiency causing this. Yeast in urine culture likely colonizer. Suprapubic cath exchanged 3/26. Urine cultures 3/7 had e.coli. Current urine culture 3/27 reveal 40,000 achromobacter denitrificans, >100,000 candida glabrata. Candida in urine culture likely colonizer. Also, difficult to determine significance of achromobacter in urine culture - could represent colonization versus partially treated uti. Recent outpatient antibiotic use can mask the full clinical presentation. Unable to use trimeth/sulfa due to acute renal failure. Recommendations:    1. Continue pip/tazo (d2/7)  2.  F/u endocrinology recommendations for hypothermia, hypoglycemia  3. F/u neurology recommendations  4. Monitor renal function, clinically    Advance Care planning: full code: discussed  with patient/surrogate decision maker:  Dionte Hair: [phone]     Please call me if any further questions or concerns. Will continue to participate in the care of this patient. HPI:    Drowsy. Unable to communicate effectively. Detailed ros not feasible.         home Medication List    Details   amLODIPine (NORVASC) 10 mg tablet Take 10 mg by mouth daily. levothyroxine (SYNTHROID) 100 mcg tablet Take 100 mcg by mouth Daily (before breakfast). omeprazole (PRILOSEC) 20 mg capsule Take 20 mg by mouth two (2) times a day. sodium bicarbonate 650 mg tablet Take 650 mg by mouth three (3) times daily. sucralfate (CARAFATE) 1 gram tablet Take 1 g by mouth four (4) times daily. therapeutic multivitamin (THERAGRAN) tablet Take 1 Tab by mouth daily. traMADol (ULTRAM) 50 mg tablet Take 50 mg by mouth every six (6) hours as needed for Pain.      trospium (SANCTURA) 20 mg tablet Take 20 mg by mouth daily. pioglitazone (ACTOS) 15 mg tablet Take 1 Tab by mouth. Syringe, Disposable, (BD SYRINGE CATHETER TIP) 60 mL syrg Use 1 syringe daily with irrigations  Qty: 30 Syringe, Refills: 11      docusate sodium (COLACE) 100 mg capsule 100 mg two (2) times daily as needed. ergocalciferol (DRISDOL) 50,000 unit capsule Take 50,000 Units by mouth every seven (7) days. SITagliptin (JANUVIA) 100 mg tablet Take 50 mg by mouth daily. Associated Diagnoses: Pneumonia of both lungs due to infectious organism, unspecified part of lung; Hypoxia; Dyspnea on exertion;  Neuromuscular respiratory weakness; Physical deconditioning      insulin lispro (HUMALOG) 100 unit/mL injection Normal Insulin Sensitivity   For Blood Sugar (mg/dL) of:     Less than 150 =   0 units           150 -199 =   2 units  200 -249 =   4 units  250 -299 =   6 units  300 -349 =   8 units  350 and above =   10 units  If 2 glucose readings are above 200 mg/dL within a 24 hr period, proceed to \"Very Insul  Qty: 1 Vial, Refills: 0      tamsulosin (FLOMAX) 0.4 mg capsule Take 1 Cap by mouth daily. Qty: 30 Cap, Refills: 0      doxycycline (ADOXA) 100 mg tablet Take 100 mg by mouth daily. Associated Diagnoses: Pneumonia of both lungs due to infectious organism, unspecified part of lung; Hypoxia; Dyspnea on exertion;  Neuromuscular respiratory weakness; Physical deconditioning             Current Facility-Administered Medications   Medication Dose Route Frequency    pantoprazole (PROTONIX) granules for oral suspension 40 mg  40 mg Per NG tube BID    piperacillin-tazobactam (ZOSYN) 2.25 g in 0.9% sodium chloride (MBP/ADV) 50 mL MBP  2.25 g IntraVENous Q8H    epoetin cristy-epbx (RETACRIT) 12,000 Units combo injection  12,000 Units SubCUTAneous Q7D    B complex-vitaminC-folic acid (NEPHROCAP) cap  1 Cap Oral DAILY    sodium bicarbonate tablet 650 mg  650 mg Oral TID    calcitRIOL (ROCALTROL) capsule 0.25 mcg  0.25 mcg Oral Q MON, WED & FRI    lactulose (CHRONULAC) solution 10 g  10 g Oral BID    hydrocortisone Sod Succ (PF) (SOLU-CORTEF) injection 50 mg  50 mg IntraVENous Q6H    levETIRAcetam (KEPPRA) 500 mg in 0.9% sodium chloride (MBP/ADV) 100 mL MBP  500 mg IntraVENous Q12H    sodium chloride (NS) flush 5-10 mL  5-10 mL IntraVENous PRN    insulin lispro (HUMALOG) injection   SubCUTAneous Q6H    glucose chewable tablet 16 g  4 Tab Oral PRN    glucagon (GLUCAGEN) injection 1 mg  1 mg IntraMUSCular PRN    dextrose (D50W) injection syrg 12.5-25 g  25-50 mL IntraVENous PRN    tamsulosin (FLOMAX) capsule 0.4 mg  0.4 mg Oral DAILY    acetaminophen (TYLENOL) tablet 650 mg  650 mg Oral Q6H PRN    oxyCODONE-acetaminophen (PERCOCET) 5-325 mg per tablet 1 Tab  1 Tab Oral Q6H PRN    naloxone (NARCAN) injection 0.4 mg  0.4 mg IntraVENous PRN    ondansetron (ZOFRAN) injection 4 mg  4 mg IntraVENous Q6H PRN    docusate sodium (COLACE) capsule 100 mg  100 mg Oral BID PRN    heparin (porcine) injection 5,000 Units  5,000 Units SubCUTAneous Q8H    levothyroxine (SYNTHROID) tablet 100 mcg  100 mcg Oral 6am       Allergies: Iodine      Temp (24hrs), Av.3 °F (36.3 °C), Min:96.6 °F (35.9 °C), Max:97.8 °F (36.6 °C)    Visit Vitals  /86   Pulse (!) 55   Temp 97.5 °F (36.4 °C)   Resp 11   Ht 6' (1.829 m)   Wt 81.6 kg (180 lb)   SpO2 98%   BMI 24.41 kg/m²       ROS: 12 point ROS obtained in details. Pertinent positives as mentioned in HPI,   otherwise negative    Physical Exam:    General:  Sleepy, arousable to verbal stimuli. Head:  Normocephalic, without obvious abnormality, atraumatic. Eyes:  Conjunctivae/corneas clear. Nose: Nares normal.    Throat: Not examined    Neck: Supple, symmetrical, trachea midline, no adenopathy,    Lungs:   Clear to auscultation bilaterally. Heart:  bradycardia. Abdomen: Soft, no apparent tenderness. Bowel sounds normal. No obvious edema in abdominal wall. SPC in place, no drainage or erythema at insertion site. Cath bag with yellow urine, minimal.   Extremities: LE edema to hips bilaterally, no apparent calf tenderness   Pulses: 2+ and symmetric all extremities. Skin: No rashes or lesions   Neurologic:  doesn't follow commands. Detailed neuro eval not feasible.  .              Labs: Results:   Chemistry Recent Labs     19  0547 19  0227 19  0146   * 170* 83    138 146*   K 3.8 4.3 4.5   * 114* 116*   CO2 20* 20* 19*   BUN 33* 29* 26*   CREA 4.13* 4.10* 3.94*   CA 8.2* 8.4* 7.8*  7.8*   AGAP 7 4 11   BUCR 8* 7* 7*     --  73   TP 6.5  --  5.6*   ALB 2.2*  --  2.1*   GLOB 4.3*  --  3.5   AGRAT 0.5*  --  0.6*      CBC w/Diff Recent Labs     19  0547 19  0227 19  0146   WBC 6.2 3.0* 3.4*   RBC 2.68* 2.88* 2.67*   HGB 7.6* 8.1* 7.5*   HCT 22.3* 24.3* 23.1*   PLT 69* 70* 64*   GRANS 83* 86* 57   LYMPH 8* 10* 26   EOS 0 0 1      Microbiology No results for input(s): CULT in the last 72 hours.        RADIOLOGY:    All available imaging studies/reports in [facility] for this admission were reviewed    Dr. Brenna Jane, Infectious Disease Specialist  [phone]  April 1, 2019  1:54 PM

## 2019-04-01 NOTE — CONSULTS
Hematology / Oncology Consult Note      Reason for Consultation:  Annita Vasquez is a 59 y.o. male who I've been asked to consult for assistance with the patients anemia and thrombocytopenia. Subjective:     HPI: Mr. Catrachita Worthy is a 70-year-old -American man who has bee seen by Dr. Emilee Chun for his chronic anemia and thrombocytopenia. His therapy is daily Oral iron supplement. Mr. Catrachita Worthy has significant history for chronic urinary retention with suprapubic catheter placement, history of Renal Cell carcinoma post-left partial nephrectomy   Pathological Stage [id number] cc RCC FG3 s/p left open partial nephrectomy in 12/13     Chronic kidney disease,history of c-spine laminectomy with post op paralysis, Heroin Drug Abuse, ongoing TOB use, DM2,C with cirrhosis. He was admitted on 3/28/19 with Hypothyroidism and Altered mental status. The patient presented to the ED with chief c/o concerns about his suprapubic catheter and reduced urine output. He was subsequently found to be bradycardic with HR of 40 bpm on ECG. Also with some hypothermia. Admission WBC 3.9, Hgb 8.0. Hct 24.1, Platelet count 42,655, Iron 135, Ferritin 173, Sodium 140, Potassium 4.9, Cl 114, Glucose 71, BUN 24, Creatinine 3.50, , CK-MB 7.7, Troponin-I 0.06, NT proBNP 1,113    Echo from 3/18/19 at [facility] 35: EF of 45%, with diastolic dysfunction, mildly dilated left atria, mild TV regurg, trace MV and PV regurg.         CT Head 3/28/19  IMPRESSION:  No CT evidence of acute intracranial process. Please note that MRI is more  sensitive for ischemia in the first 24 to 48 hours. Findings were discussed with  Dr. Valeriano Andrea on 3/28/19 at 17:19 hours. US Abd 3/28/19  IMPRESSION: Limited study. No sonographic abnormalities in the gallbladder. If  cholecystitis is clinically suspected, nuclear study with hepatobiliary scan  recommended. Review of Systems:   General: No fevers or chills.   Cardiovascular: No chest pain or pressure. No palpitations. Pulmonary: No shortness of breath, cough or wheeze. Gastrointestinal: No abdominal pain, nausea, vomiting or diarrhea. Genitourinary: Pt with suprapubic catheter in place, no complaints  Musculoskeletal: No joint or muscle pain, no back pain. Neurologic: No headache, numbness, tingling or weakness. Physical Assessment:     Visit Vitals  /86   Pulse (!) 55   Temp 97.5 °F (36.4 °C)   Resp 11   Ht 6' (1.829 m)   Wt 81.6 kg (180 lb)   SpO2 98%   BMI 24.41 kg/m²       Temp (24hrs), Av.3 °F (36.3 °C), Min:96.6 °F (35.9 °C), Max:97.8 °F (36.6 °C)      Physical Exam:    General: Alert,appears comfortable. NAD  HEENT:  Anicteric sclerae; pink palpebral conjunctivae; mucosa moist.   Resp:  Symmetrical chest expansion, no accessory muscle use; good airway entry; no rales/ wheezing/ rhonchi noted  CV:  S1, S2 present; regular rate and rhythm  GI:  Abdomen soft, non-tender; (+) active bowel sounds. Suprapubic Cath in place. NG tube in place. Extremities: BLE edema  Skin:  Warm; no rashes/ lesions noted  Neurologic:      Assessment:   · Thrombocytopenia  · Renal Cell Carcinoma of left kidney  · Chronic Anemia  · Acute Renal failure CR 3.25 this admission. · Chronic drug use   · Ongoing tobacco use  · HTN          Plan:   · Today's platelet count is 62,698 and we will continue to follow;however, there is no therapeutic intervention warranted at this time. HIT Panel pending. · H/H today is 7.6/22.3: Iron deficiency anemia and anemia due to chronic kidney disease. Continue Retacrit 12,000 units Q7 days while inpatient. Recommend PRBC transfusion for Hgb <7  · Tobacco and ETOH cessation encouraged    HEMATOLOGY/ONCOLOGY ATTENDING's NOTE:  Patient and care plan discussed with Belkis Vogel NP. Agree with above note and A/P. Thrombocytopenia is likely multifactorial  from liver cirrhosis and alcohol toxicity. He has no bleeding.  No intervention needed to correct platelets unless=<10,000. He has low probability for HIT, and does not really to be tested for it however w/u was ordered and pending. His anemia is from chronic kidney disease and  is on erythropoietin stimulating agent. Iron store are adequate. Thank you for the consult we'll follow as needed.       Past Medical History:   Diagnosis Date    Anemia     Bradycardia, unspecified 2018    Cervical discitis     MRSA    Chronic drug abuse ([address] 1974    Chronic kidney disease     stage III    Diabetes ([address] 01/1990    Diabetes mellitus ([address]     Drug abuse ([address]     Encephalopathy     GERD (gastroesophageal reflux disease)     Hypertension     Muscle weakness     Renal cell carcinoma of left kidney (HCC) 12/17/13    Pathological Stage [id number] cc RCC FG3 s/p left open partial nephrectomy in 12/13      Sepsis due to methicillin resistant Staphylococcus aureus (HCC)     Thrombocytopenia (HCC)     Urethral erosion     Viral hepatitis C     Xerosis cutis      Past Surgical History:   Procedure Laterality Date    HX CIRCUMCISION  12/17/13    Dr[facility] Lowell Valle[facility], [facility]    HX NEPHRECTOMY Left 12/17/13    Open Partial, Dr. Lowell Vallejo, [facility]    HX OTHER SURGICAL Right 2/27/2016    removed right ft  2nd meta-tarsal    HX OTHER SURGICAL  04/2017    abscess removed from back of neck     DE REMOVAL WITH INSERTION OF SUPRAPUBIC CATHETER  2018       Family History   Problem Relation Age of Onset    Diabetes Mother     Sickle Cell Anemia Father     Diabetes Sister     Hypertension Sister      Allergies   Allergen Reactions    Iodine Hives       Home Medications:   Home Meds reviewed with patient    Hospital Medications:   Current hospital medications reviewed    Labs:  Recent Labs     04/01/19  0547 03/31/19  0227 03/30/19  0146   WBC 6.2 3.0* 3.4*   RBC 2.68* 2.88* 2.67*   HCT 22.3* 24.3* 23.1*   MCV 83.2 84.4 86.5   MCH 28.4 28.1 28.1   MCHC 34.1 33.3 32.5   RDW 18.0* 18.1* 19.0*       Recent Labs     04/01/19  0547 03/31/19  0227 03/30/19  0146   CO2 20* 20* 19*   BUN 33* 29* 26*       No results for input(s): ALBUMIN in the last 72 hours. No lab exists for component: [facility], [facility]    Thank you for requesting us to consult on your patient. We appreciate the opportunity to participate in your patient's care. Please call if you have questions.       Wilian Potts NP

## 2019-04-01 NOTE — PROGRESS NOTES
NUTRITION    Nutrition Consult: Management of Tube Feeding, General Nutrition Management & Supplements      RECOMMENDATIONS / PLAN:     - Add supplements: Magic Cup BID.  - Change to nocturnal tube feeding of Jevity 1.5 at 55 mL/hr x 12 hours overnight (to meet approximately 50% of estimated nutrition needs). Monitor po intake and adjust/discontinue enteral regimen as needed. - Continue RD inpatient monitoring and evaluation. Goal EN Regimen: Jevity 1.5 at 55 mL/hr x 12 hours + 100 mL q 4 hour water flushes to provide: 990 kcal, 42 gm protein, 33 gm fat, 143 gm CHO, 15 gm fiber, 502 mL free water, 1102 mL total water, 100% RDIs     NUTRITION INTERVENTIONS & DIAGNOSIS:     [x] Meals/snacks: modified composition   [x] Medical food supplement therapy: initiate    [x] Enteral nutrition: modify regimen  [x] Collaboration and referral of nutrition care: pt discussed with nursing     Nutrition Diagnosis: Inadequate oral intake related to mentation and decreased appetite as evidenced by pt confused, started on dysphagia diet and requires assistance eating. ASSESSMENT:     4/1: Pt tolerating feeds at goal then held today after pt started on diet by SLP after swallow evaluation. Pt hungry and wants to eat, no meal intake yet and NGT remains in place and clamped. 3/30: PT with AMS, transferred to the ICU. Reoccurring episodes of hypoglycemia. SLP consulted, however per nurse, patient too confused. NGT in placed and verified.      Average po intake adequate to meet patients estimated nutritional needs:   [] Yes     [x] No   [] Unable to determine at this time    Diet: DIET TUBE FEEDING  DIET DYSPHAGIA [address] (NDD2) 2 [place]/2 [place] Thick      Food Allergies: NKFA  Current Appetite:   [x] Good     [] Fair     [] Poor     [] Other:   Appetite/meal intake prior to admission:   [] Good     [] Fair     [] Poor     [x] Other: unknown  Feeding Limitations:  [] Swallowing difficulty    [] Chewing difficulty    [x] Other: mentation  Current Meal Intake:   Patient Vitals for the past 100 hrs:   % Diet Eaten   04/01/19 1312 0 %   04/01/19 0937 0 %   03/31/19 1700 5 %       BM: 3/31, loose   Skin Integrity: abrasion to right eye, abrasion to right cheek, pressure injury to right coccyx- stage unknown  Edema:   [x] No     [] Yes   Pertinent Medications: Reviewed: nephrocap, colace, steroid, calcitriol, lactulose, zofran, protonix, SSI    Recent Labs     04/01/19  0547 03/31/19  0227 03/30/19  0146    138 146*   K 3.8 4.3 4.5   * 114* 116*   CO2 20* 20* 19*   * 170* 83   BUN 33* 29* 26*   CREA 4.13* 4.10* 3.94*   CA 8.2* 8.4* 7.8*  7.8*   MG 2.4  --  2.3   PHOS 3.6  --  5.0*   ALB 2.2*  --  2.1*   SGOT 16  --  19   ALT 13*  --  12*       Intake/Output Summary (Last 24 hours) at 4/1/2019 1411  Last data filed at 4/1/2019 1312  Gross per 24 hour   Intake 1005 ml   Output 725 ml   Net 280 ml       Anthropometrics:  Ht Readings from Last 1 Encounters:   03/28/19 6' (1.829 m)     Last 3 Recorded Weights in this Encounter    03/29/19 0700 03/30/19 1000 04/01/19 0830   Weight: 80.2 kg (176 lb 12.9 oz) 79.1 kg (174 lb 6.1 oz) 81.6 kg (180 lb)     Body mass index is 24.41 kg/m².     Weight History: patient reports usual weight of 140-150 lb   Weight Metrics 4/1/2019 3/27/2019 3/26/2019 2/26/2019 2/14/2019 1/31/2019 11/5/2018   Weight 180 lb - 140 lb 142 lb 142 lb 142 lb 145 lb   BMI - 24.41 kg/m2 18.99 kg/m2 19.26 kg/m2 19.26 kg/m2 19.26 kg/m2 19.67 kg/m2        Admitting Diagnosis: Oliguria [R34]  Renal injury [S37.009A]  Acute renal failure (ARF) (HCC) [N17.9]  Pertinent PMHx: renal cancer, HTN, quadriparesis, CKD stage III, cirrhosis, COPD     Education Needs:        [x] None identified  [] Identified - Not appropriate at this time  []  Identified and addressed - refer to education log  Learning Limitations:   [] None identified  [x] Identified: AMS- improved    Cultural, Anabaptist & ethnic food preferences:  [x] None identified    [] Identified and addressed     ESTIMATED NUTRITION NEEDS:     Calories: 7673-5230 kcal (MSJx1.2-1.3) based on  [x] Actual BW 79 kg     [] IBW   Protein: 63-95 gm (0.8-1.2 gm/kg) based on  [x] Actual BW      [] IBW   Fluid: 1 mL/kcal     MONITORING & EVALUATION:     Nutrition Goal(s): goal modified   1. Nutritional needs will be met through adequate oral intake and/or nutrition support within the next 7 days.   Outcome:  [] Met/Ongoing    [] Progressing towards goal    [] Not progressing towards goal    [x] New/Initial goal      Monitoring:   [x] Food and beverage intake   [x] Diet order   [x] Nutrition-focused physical findings   [x] Treatment/therapy   [] Weight   [] Enteral nutrition intake        Previous Recommendations (for follow-up assessments only):     [x]   Implemented       []   Not Implemented (RD to address)      [x] No Longer Appropriate     [] No Recommendation Made     Discharge Planning: renal diet, consistency as tolerated per SLP  [x] Participated in care planning, discharge planning, & interdisciplinary rounds as appropriate        Reed Wilhelm, [address]   Pager: [phone]

## 2019-04-01 NOTE — PROGRESS NOTES
Progress Note    Patient: Ubaldo Mccollum MRN: [id number]  CSN: [id number]    YOB: 1960  Age: 59 y.o. Sex: male    DOA: 3/27/2019 LOS:  LOS: 4 days                    Subjective:   Pt is laying down in bed sister at the bed side  Pt is more alert today and A&Ox3 to location, month and year. Pt has no new complaints. Pt had pt this morning was able to get out of bed . Pt still has  NG tube feeding  Started on puree diet before getting out of ICU . Discussed with speech therapy up grade diet to soft diet and might D/C NG tube feeding if able to eat enough calories     Cosyntropin results discussed with Endocrinology and appear within normal limits. Glc elevated to 201.     Platelets down trending with drop >20 from baseline. Hgb remains stable. GI consulted to monitor for possible UGI bleed. Pt has been in [facility] reviewed record had EGD 3/13 no active bleeding gastritis and duodenitis      Cont zosyn, ID to f/u      Cr at 4.13 today. F/u nephrology      Chief Complaint:   Chief Complaint   Patient presents with    Urinary Catheter Problem    Abdominal Pain       Review of systems  General: No fevers or chills. Cardiovascular: No chest pain or pressure. No palpitations. Pulmonary: No shortness of breath, cough or wheeze. Gastrointestinal: No abdominal pain, nausea, vomiting or diarrhea. NG tube in place  Genitourinary: suprapubic cath    Musculoskeletal: generalized weakness h/o cervical disc disease     Neurologic: No headache question seizure on admission started on Keppra f/u neurology    Objective:     Physical Exam:  Visit Vitals  BP (!) 163/91   Pulse (!) 56   Temp 97.6 °F (36.4 °C)   Resp 15   Ht 6' (1.829 m)   Wt 81.6 kg (180 lb)   SpO2 96%   BMI 24.41 kg/m²        General:         Alert, no acute distress, slightly sleepy slow response . NG tube in place   HEENT:           NC, Atraumatic. PERRLA, anicteric sclerae. Lungs:            CTA Bilaterally.  No Wheezing/Rhonchi/Rales. Heart:              Regular  rhythm,  No murmur, No Rubs, No Gallops  Abdomen:      Soft, Non distended, Non tender.   suprapubic cath no sign of infection around the tube   Extremities:   No LE edema. SCDs in place. Psych:            Not anxious or agitated. Neurologic:    Alert and oriented x 3.        Intake and Output:  Current Shift:  04/01 0701 - 04/01 1900  In: 0   Out: 500 [Urine:500]  Last three shifts:  03/30 1901 - 04/01 0700  In: 2682 [P.O.:50; I.V.:950]  Out: 800 [Urine:800]    Labs: Results:       Chemistry Recent Labs     04/01/19  0547 03/31/19 0227 03/30/19 0146   * 170* 83    138 146*   K 3.8 4.3 4.5   * 114* 116*   CO2 20* 20* 19*   BUN 33* 29* 26*   CREA 4.13* 4.10* 3.94*   CA 8.2* 8.4* 7.8*  7.8*   AGAP 7 4 11   BUCR 8* 7* 7*     --  73   TP 6.5  --  5.6*   ALB 2.2*  --  2.1*   GLOB 4.3*  --  3.5   AGRAT 0.5*  --  0.6*      CBC w/Diff Recent Labs     04/01/19  0547 03/31/19 0227 03/30/19 0146   WBC 6.2 3.0* 3.4*   RBC 2.68* 2.88* 2.67*   HGB 7.6* 8.1* 7.5*   HCT 22.3* 24.3* 23.1*   PLT 69* 70* 64*   GRANS 83* 86* 57   LYMPH 8* 10* 26   EOS 0 0 1      Cardiac Enzymes No results for input(s): CPK, CKND1, LINDA in the last 72 hours. No lab exists for component: CKRMB, TROIP   Coagulation No results for input(s): PTP, INR, APTT in the last 72 hours.     No lab exists for component: INREXT    Lipid Panel Lab Results   Component Value Date/Time    Cholesterol, total 160 03/29/2019 02:28 AM    Cholesterol, total 142 03/29/2019 02:28 AM    HDL Cholesterol 58 03/29/2019 02:28 AM    HDL Cholesterol 58 03/29/2019 02:28 AM    LDL, calculated 88.2 03/29/2019 02:28 AM    LDL, calculated 70.2 03/29/2019 02:28 AM    VLDL, calculated 13.8 03/29/2019 02:28 AM    VLDL, calculated 13.8 03/29/2019 02:28 AM    Triglyceride 69 03/29/2019 02:28 AM    Triglyceride 69 03/29/2019 02:28 AM    CHOL/HDL Ratio 2.8 03/29/2019 02:28 AM    CHOL/HDL Ratio 2.4 03/29/2019 02:28 AM      BNP No results for input(s): BNPP in the last 72 hours.    Liver Enzymes Recent Labs     04/01/19  0547   TP 6.5   ALB 2.2*      SGOT 16      Thyroid Studies Lab Results   Component Value Date/Time    TSH 0.74 03/30/2019 10:20 AM          Procedures/imaging: see electronic medical records for all procedures/Xrays and details which were not copied into this note but were reviewed prior to creation of Plan    Medications:   Current Facility-Administered Medications   Medication Dose Route Frequency    pantoprazole (PROTONIX) granules for oral suspension 40 mg  40 mg Per NG tube BID    hydrocortisone Sod Succ (PF) (SOLU-CORTEF) injection 50 mg  50 mg IntraVENous Q8H    piperacillin-tazobactam (ZOSYN) 2.25 g in 0.9% sodium chloride (MBP/ADV) 50 mL MBP  2.25 g IntraVENous Q8H    epoetin cristy-epbx (RETACRIT) 12,000 Units combo injection  12,000 Units SubCUTAneous Q7D    B complex-vitaminC-folic acid (NEPHROCAP) cap  1 Cap Oral DAILY    sodium bicarbonate tablet 650 mg  650 mg Oral TID    calcitRIOL (ROCALTROL) capsule 0.25 mcg  0.25 mcg Oral Q MON, WED & FRI    lactulose (CHRONULAC) solution 10 g  10 g Oral BID    levETIRAcetam (KEPPRA) 500 mg in 0.9% sodium chloride (MBP/ADV) 100 mL MBP  500 mg IntraVENous Q12H    sodium chloride (NS) flush 5-10 mL  5-10 mL IntraVENous PRN    insulin lispro (HUMALOG) injection   SubCUTAneous Q6H    glucose chewable tablet 16 g  4 Tab Oral PRN    glucagon (GLUCAGEN) injection 1 mg  1 mg IntraMUSCular PRN    dextrose (D50W) injection syrg 12.5-25 g  25-50 mL IntraVENous PRN    tamsulosin (FLOMAX) capsule 0.4 mg  0.4 mg Oral DAILY    acetaminophen (TYLENOL) tablet 650 mg  650 mg Oral Q6H PRN    oxyCODONE-acetaminophen (PERCOCET) 5-325 mg per tablet 1 Tab  1 Tab Oral Q6H PRN    naloxone (NARCAN) injection 0.4 mg  0.4 mg IntraVENous PRN    ondansetron (ZOFRAN) injection 4 mg  4 mg IntraVENous Q6H PRN    docusate sodium (COLACE) capsule 100 mg  100 mg Oral BID PRN    levothyroxine (SYNTHROID) tablet 100 mcg  100 mcg Oral 6am       Assessment/Plan     Active Problems:    Oliguria (3/28/2019)      Renal injury (3/28/2019)      Suprapubic catheter ([address] (3/28/2019)      Bradycardia, sinus (3/28/2019)      Acute UTI (3/28/2019)      Type 2 diabetes mellitus with hypoglycemia ([address] (3/28/2019)      Acute renal failure (ARF) ([address] (3/28/2019)      Renal failure (ARF), acute on chronic ([address] (3/31/2019)      ATN (acute tubular necrosis) ([address] (4/1/2019)    Plan    Hypoglycemia / Hypothermia/ hypocalcemia/ Adrenal insufficiency   Urine Culture positive for achromobater and candida. Continue Zosyn 2.25 mg IV q 8h.      Discussed  Cosyntropin results with Endocrinology. He will evaluate patinet  Will decrease Hydrocortisone,  to 50mg q8hr. Continue monitoring glc level.         SHEILA on CKD stage 2-3,  possibly due to progressive CKD   - Nephrology following  - CT 3/28: Abd with pelvic fluid, pleural effusion  - CXR 3/28: cardiomegaly and pulm edema, questionable infiltrate vs atelectasis. BNP elevated from baseline. Cardiology consulted, pt stable and without cardiac symptoms. - cardiac markers flat/indeterminant, continue monitorig on telemetry  - monitor BMP     UTI/  sepsis   - Lactic acid normal x2   - Continue zosyn IV, f/u ID  - f/u blood cultures 3/28/19 and f/u urine culture 3/27/19 - as stated above  - follow up Urology consulted, Suprapubic cath functioning appropriately, no sign of obstruction, recommended to consider addition of fluconazole due to history of candida UTI recently. - Hx of aspiration, recent hospitalization with PNA. Will repeat speech therapy consult. Pt able to advance diet as able to tolerate safely. Continue NG tube feedings until able to meet caloric/ nutritional needs.     HTN, HLD, bradycardia chronic HR 40-50s   - continue to monitor use to be on Norvasc  Hydralazine 10 mg q 6h prn SBP above 160  -3/29:  HDL 58 LDL 70.2 TG 71      Metabolic possible Infectious Encephalopathy in setting of SHEILA, Sepsis, hypoglycemia  - MRI. MRA negative   - Dr. Sean Max started Keppra   - EEG performed 3/29. Results pending     Hx of recent GIB/ Chronic anemia / pancytopenia  - IV protonix   - Continue to monitor H&H due to h/o GI bleeding. EGD performed 3/8/19 revealed gastritis/ duodenitis. - Platelets down trending with significant drop from baseline. Will take off heparin. Will order HIT panel. F/u with hematology  Consult. .  - DVT prophylaxis with SCDs and PT/OT to increase mobility. Mobilize to chair with assistant  as tolerated     Abdominal Pain, CT abdomen Gallbladder with wall slightly thickened and/or pericholecystic fluid  Abdominal pain resolved. RUQ US 3/28/19 no acute cholecystitis  No N/V noted, Continue Zofran 4mg q6 hr PRN      Hep C, Cirrhosis  Hep B negative and HIV none reactive   Hep C AB positive Hep C ab and elevated Hep C virus ab positive   Quant hepatitis c undetectable   INR &PT within normal limits. Continue Lactulose 10g BID prn      DM2  -Hypoglycemic on admission, A1c 5.3  - Pt now hyperglycemic with POC glc 207.  -  will start Humalog sliding scale     Hypothyroidism  - pt was recently increased on synthroid to 100mcg at prior hospitalization. Continue levothyroxine 100mcg once daily. - TFTs remain within normal limits     Anemia chronic disease, Recent Acute blood loss anemia from UGIB EGD neg for source, chronic thrombocytopenia  - Continue to monitor with daily CBC, transfuse to keep hgb <7 if needed, monitor plt count.       History of Drug Abuse, Reported heroin use during recent hospitalization this month, ongoing Tob use, Etoh Use  - H/O  heroin use, does not know if he uses IV drugs currently or not.  Has past history of IVDA.   - UDS + for opiods  - acute hepatitis panel neg for Hep A&B,  hepatits c Ab positive  No active infection  - HIV negative   - discussed risks, recommended to quit     - pt has h/o poor compliant with multiple admissions    - pt would benefit from SNF, has declined in past will continue to [facility]         Cbc, cmp, mag in AM  F/u endo, nephrology, ID and heme  Monitor urine output   F/u PT and OT and speech therapy        CODE:  Full.  on admission  Sister  is MPOA for patient.    Pt seen and examined independently     Susy Prader, MD  4/1/2019 2:02 PM

## 2019-04-01 NOTE — PROGRESS NOTES
4/1/2019 3:17 PM    SSN: [id number]    Subjective:   70-year-old male admitted on March 29 with confusion. He had poor urine output. He had profound hypoglycemia on admission, but he remains somewhat confused even after this was corrected. He was started on Keppra initially because the clinical picture was not clear, but there was no documentation or witnessed seizure activity at any time. Since admission he had an MRI of the brain March 29 showing diffuse chronic microvascular changes and nothing acute.      Social History     Socioeconomic History    Marital status:      Spouse name: Not on file    Number of children: Not on file    Years of education: Not on file    Highest education level: Not on file   Occupational History    Not on file   Social Needs    Financial resource strain: Not on file    Food insecurity:     Worry: Not on file     Inability: Not on file    Transportation needs:     Medical: Not on file     Non-medical: Not on file   Tobacco Use    Smoking status: Current Every Day Smoker     Packs/day: 0.50     Years: 30.00     Pack years: 15.00     Types: Cigarettes    Smokeless tobacco: Never Used   Substance and Sexual Activity    Alcohol use: Yes     Comment: beer occasionally    Drug use: No    Sexual activity: Never   Lifestyle    Physical activity:     Days per week: Not on file     Minutes per session: Not on file    Stress: Not on file   Relationships    Social connections:     Talks on phone: Not on file     Gets together: Not on file     Attends [employer] service: Not on file     Active member of club or organization: Not on file     Attends meetings of clubs or organizations: Not on file     Relationship status: Not on file    Intimate partner violence:     Fear of current or ex partner: Not on file     Emotionally abused: Not on file     Physically abused: Not on file     Forced sexual activity: Not on file   Other Topics Concern    Not on file   Social History Narrative     since 2012;  for 12 yrs; [employer]; [address] [employer]  just recently furloughed;  No  service       Family History   Problem Relation Age of Onset    Diabetes Mother     Sickle Cell Anemia Father     Diabetes Sister     Hypertension Sister        Current Facility-Administered Medications   Medication Dose Route Frequency Provider Last Rate Last Dose    pantoprazole (PROTONIX) granules for oral suspension 40 mg  40 mg Per NG tube BID Rj Aquino MD   40 mg at 04/01/19 0831    hydrocortisone Sod Succ (PF) (SOLU-CORTEF) injection 50 mg  50 mg IntraVENous Q8H Chalo Renner MD   50 mg at 04/01/19 1439    hydrALAZINE (APRESOLINE) tablet 10 mg  10 mg Oral Q6H PRN Perico Laureano MD        piperacillin-tazobactam (ZOSYN) 2.25 g in 0.9% sodium chloride (MBP/ADV) 50 mL MBP  2.25 g IntraVENous Q8H Linda Ramirez PA-C 100 mL/hr at 04/01/19 1240 2.25 g at 04/01/19 1240    epoetin cristy-epbx (RETACRIT) 12,000 Units combo injection  12,000 Units SubCUTAneous Q7D Yazmin Barajas MD   Stopped at 03/31/19 2100    B complex-vitaminC-folic acid (NEPHROCAP) cap  1 Cap Oral DAILY Yazmin Barajas MD   1 Cap at 04/01/19 0831    sodium bicarbonate tablet 650 mg  650 mg Oral TID Yazmin Barajas MD   650 mg at 04/01/19 0831    calcitRIOL (ROCALTROL) capsule 0.25 mcg  0.25 mcg Oral Q MON, WED & Angelita Hallman MD        lactulose (CHRONULAC) solution 10 g  10 g Oral BID Ogoy, January-Yahaira ELDER PA-C   Stopped at 03/31/19 0900    levETIRAcetam (KEPPRA) 500 mg in 0.9% sodium chloride (MBP/ADV) 100 mL MBP  500 mg IntraVENous Q12H Valeriy Alfaro MD   500 mg at 04/01/19 1504    sodium chloride (NS) flush 5-10 mL  5-10 mL IntraVENous PRN Emily Galeana MD        insulin lispro (HUMALOG) injection   SubCUTAneous Q6H Rj Aquino MD   6 Units at 04/01/19 1240    glucose chewable tablet 16 g  4 Tab Oral PRN Emily Galeana MD  glucagon (GLUCAGEN) injection 1 mg  1 mg IntraMUSCular PRN Karuna Cooper MD        dextrose (D50W) injection syrg 12.5-25 g  25-50 mL IntraVENous PRN Karuna Cooper MD   25 g at 03/29/19 1104    tamsulosin (FLOMAX) capsule 0.4 mg  0.4 mg Oral DAILY Karuna Cooper MD   0.4 mg at 04/01/19 0831    acetaminophen (TYLENOL) tablet 650 mg  650 mg Oral Q6H PRN Karuna Cooper MD        oxyCODONE-acetaminophen (PERCOCET) 5-325 mg per tablet 1 Tab  1 Tab Oral Q6H PRN Karuna Cooper MD   1 Tab at 04/01/19 0601    naloxone (NARCAN) injection 0.4 mg  0.4 mg IntraVENous PRN Karuna Cooper MD        ondansetron [facility]) injection 4 mg  4 mg IntraVENous Q6H PRN Karuna Cooper MD   4 mg at 03/29/19 2735    docusate sodium (COLACE) capsule 100 mg  100 mg Oral BID PRN Karuna Cooper MD        levothyroxine (SYNTHROID) tablet 100 mcg  100 mcg Oral 6am Natalya Aquino MD   100 mcg at 04/01/19 0601       Past Medical History:   Diagnosis Date    Anemia     Bradycardia, unspecified 2018    Cervical discitis     MRSA    Chronic drug abuse ([address] 1974    Chronic kidney disease     stage III    Diabetes ([address] 01/1990    Diabetes mellitus ([address]     Drug abuse ([facility]     Encephalopathy     GERD (gastroesophageal reflux disease)     Hypertension     Muscle weakness     Renal cell carcinoma of left kidney ([address] 12/17/13    Pathological Stage [id number] cc RCC FG3 s/p left open partial nephrectomy in 12/13      Sepsis due to methicillin resistant Staphylococcus aureus (HCC)     Thrombocytopenia (HCC)     Urethral erosion     Viral hepatitis C     Xerosis cutis        Past Surgical History:   Procedure Laterality Date    HX CIRCUMCISION  12/17/13    [facility] Carp[facility]    HX NEPHRECTOMY Left 12/17/13    Open Partial, Dr. Tari Carpio, [facility]    HX OTHER SURGICAL Right 2/27/2016    removed right ft  2nd meta-tarsal    HX OTHER SURGICAL  04/2017    abscess removed from back of neck  VT REMOVAL WITH INSERTION OF SUPRAPUBIC CATHETER  2018       Allergies   Allergen Reactions    Iodine Hives       Vital signs:    Visit Vitals  /85   Pulse (!) 52   Temp 97.6 °F (36.4 °C)   Resp 12   Ht 6' (1.829 m)   Wt 81.6 kg (180 lb)   SpO2 99%   BMI 24.41 kg/m²       Review of Systems:   GENERAL: Denies fever or fatigue  CARDIAC: No CP or SOB  PULMONARY: No cough of SOB  MUSCULOSKELETAL: No new joint pain  NEURO: SEE HPI      EXAM: Alert, in NAD. Heart is regular. Oriented x3, EOM's are full, PERRL, no facial asymmetries. Strength and tone are normal. DTR's +2, gait symmetric     Recent Results (from the past 24 hour(s))   GLUCOSE, POC    Collection Time: 03/31/19  5:25 PM   Result Value Ref Range    Glucose (POC) 199 (H) 70 - 110 mg/dL   GLUCOSE, POC    Collection Time: 03/31/19 11:03 PM   Result Value Ref Range    Glucose (POC) 230 (H) 70 - 110 mg/dL   CBC WITH AUTOMATED DIFF    Collection Time: 04/01/19  5:47 AM   Result Value Ref Range    WBC 6.2 4.6 - 13.2 K/uL    RBC 2.68 (L) 4.70 - 5.50 M/uL    HGB 7.6 (L) 13.0 - 16.0 g/dL    HCT 22.3 (L) 36.0 - 48.0 %    MCV 83.2 74.0 - 97.0 FL    MCH 28.4 24.0 - 34.0 PG    MCHC 34.1 31.0 - 37.0 g/dL    RDW 18.0 (H) 11.6 - 14.5 %    PLATELET 69 (L) 351 - 420 K/uL    MPV 11.6 9.2 - 11.8 FL    NEUTROPHILS 83 (H) 42 - 75 %    BAND NEUTROPHILS 3 0 - 5 %    LYMPHOCYTES 8 (L) 20 - 51 %    MONOCYTES 4 2 - 9 %    EOSINOPHILS 0 0 - 5 %    BASOPHILS 0 0 - 3 %    MYELOCYTES 2 (H) 0 %    ABS. NEUTROPHILS 5.3 1.8 - 8.0 K/UL    ABS. LYMPHOCYTES 0.5 (L) 0.8 - 3.5 K/UL    ABS. MONOCYTES 0.2 0 - 1.0 K/UL    ABS. EOSINOPHILS 0.0 0.0 - 0.4 K/UL    ABS.  BASOPHILS 0.0 0.0 - 0.06 K/UL    DF MANUAL      PLATELET COMMENTS DECREASED PLATELETS      RBC COMMENTS ANISOCYTOSIS  1+        RBC COMMENTS POLYCHROMASIA  1+        RBC COMMENTS TARGET CELLS  1+       METABOLIC PANEL, COMPREHENSIVE    Collection Time: 04/01/19  5:47 AM   Result Value Ref Range    Sodium 141 136 - 145 mmol/L Potassium 3.8 3.5 - 5.5 mmol/L    Chloride 114 (H) 100 - 108 mmol/L    CO2 20 (L) 21 - 32 mmol/L    Anion gap 7 3.0 - 18 mmol/L    Glucose 192 (H) 74 - 99 mg/dL    BUN 33 (H) 7.0 - 18 MG/DL    Creatinine 4.13 (H) 0.6 - 1.3 MG/DL    BUN/Creatinine ratio 8 (L) 12 - 20      GFR est AA 18 (L) >60 ml/min/1.73m2    GFR est non-AA 15 (L) >60 ml/min/1.73m2    Calcium 8.2 (L) 8.5 - 10.1 MG/DL    Bilirubin, total 0.4 0.2 - 1.0 MG/DL    ALT (SGPT) 13 (L) 16 - 61 U/L    AST (SGOT) 16 15 - 37 U/L    Alk. phosphatase 100 45 - 117 U/L    Protein, total 6.5 6.4 - 8.2 g/dL    Albumin 2.2 (L) 3.4 - 5.0 g/dL    Globulin 4.3 (H) 2.0 - 4.0 g/dL    A-G Ratio 0.5 (L) 0.8 - 1.7     MAGNESIUM    Collection Time: 04/01/19  5:47 AM   Result Value Ref Range    Magnesium 2.4 1.6 - 2.6 mg/dL   PHOSPHORUS    Collection Time: 04/01/19  5:47 AM   Result Value Ref Range    Phosphorus 3.6 2.5 - 4.9 MG/DL   GLUCOSE, POC    Collection Time: 04/01/19  6:09 AM   Result Value Ref Range    Glucose (POC) 199 (H) 70 - 110 mg/dL   GLUCOSE, POC    Collection Time: 04/01/19 11:15 AM   Result Value Ref Range    Glucose (POC) 207 (H) 70 - 110 mg/dL     UA on admission showed too numerous to count WBCs and 2+ bacteria, a follow-up on the 28th that showed 4+ bacteria again with too numerous to count WBCs. Assessment/Plan: Encephalopathy, has been consistently improving since admission. I think that the evidence that he had an underlying seizure is lacking and they are obvious metabolic insults he had mostly his hypoglycemia and UTI explaining his presentation. Therefore I do not have a good reason to recommend continuation of Keppra and have gone ahead and taken the liberty of stopping this. I do not have further neuro recommendations, will sign off. PLEASE NOTE:   Portions of this document may have been produced using voice recognition software. Unrecognized errors in transcription may be present.        This note will not be viewable in MyChart.

## 2019-04-02 ENCOUNTER — APPOINTMENT (OUTPATIENT)
Dept: GENERAL RADIOLOGY | Age: 59
DRG: 720 | End: 2019-04-02
Attending: INTERNAL MEDICINE
Payer: MEDICAID

## 2019-04-02 LAB
ALBUMIN SERPL-MCNC: 2.3 G/DL (ref 3.4–5)
ALBUMIN/GLOB SERPL: 0.6 {RATIO} (ref 0.8–1.7)
ALP SERPL-CCNC: 104 U/L (ref 45–117)
ALT SERPL-CCNC: 11 U/L (ref 16–61)
ANION GAP SERPL CALC-SCNC: 6 MMOL/L (ref 3–18)
ANION GAP SERPL CALC-SCNC: 7 MMOL/L (ref 3–18)
AST SERPL-CCNC: 16 U/L (ref 15–37)
BASOPHILS # BLD: 0 K/UL (ref 0–0.1)
BASOPHILS NFR BLD: 0 % (ref 0–2)
BILIRUB SERPL-MCNC: 0.7 MG/DL (ref 0.2–1)
BUN SERPL-MCNC: 35 MG/DL (ref 7–18)
BUN SERPL-MCNC: 37 MG/DL (ref 7–18)
BUN/CREAT SERPL: 9 (ref 12–20)
BUN/CREAT SERPL: 9 (ref 12–20)
CALCIUM SERPL-MCNC: 8.1 MG/DL (ref 8.5–10.1)
CALCIUM SERPL-MCNC: 8.5 MG/DL (ref 8.5–10.1)
CHLORIDE SERPL-SCNC: 113 MMOL/L (ref 100–108)
CHLORIDE SERPL-SCNC: 114 MMOL/L (ref 100–108)
CO2 SERPL-SCNC: 22 MMOL/L (ref 21–32)
CO2 SERPL-SCNC: 24 MMOL/L (ref 21–32)
CREAT SERPL-MCNC: 3.93 MG/DL (ref 0.6–1.3)
CREAT SERPL-MCNC: 4.01 MG/DL (ref 0.6–1.3)
DIFFERENTIAL METHOD BLD: ABNORMAL
EOSINOPHIL # BLD: 0 K/UL (ref 0–0.4)
EOSINOPHIL NFR BLD: 0 % (ref 0–5)
ERYTHROCYTE [DISTWIDTH] IN BLOOD BY AUTOMATED COUNT: 18 % (ref 11.6–14.5)
EST. AVERAGE GLUCOSE BLD GHB EST-MCNC: 111 MG/DL
GLOBULIN SER CALC-MCNC: 4.1 G/DL (ref 2–4)
GLUCOSE BLD STRIP.AUTO-MCNC: 112 MG/DL (ref 70–110)
GLUCOSE BLD STRIP.AUTO-MCNC: 217 MG/DL (ref 70–110)
GLUCOSE BLD STRIP.AUTO-MCNC: 257 MG/DL (ref 70–110)
GLUCOSE BLD STRIP.AUTO-MCNC: 67 MG/DL (ref 70–110)
GLUCOSE BLD STRIP.AUTO-MCNC: 74 MG/DL (ref 70–110)
GLUCOSE BLD STRIP.AUTO-MCNC: 93 MG/DL (ref 70–110)
GLUCOSE SERPL-MCNC: 223 MG/DL (ref 74–99)
GLUCOSE SERPL-MCNC: 224 MG/DL (ref 74–99)
HBA1C MFR BLD: 5.5 % (ref 4.2–5.6)
HCT VFR BLD AUTO: 22.6 % (ref 36–48)
HEMOCCULT STL QL: POSITIVE
HGB BLD-MCNC: 7.7 G/DL (ref 13–16)
IRON SATN MFR SERPL: 33 %
IRON SERPL-MCNC: 91 UG/DL (ref 50–175)
LYMPHOCYTES # BLD: 0.5 K/UL (ref 0.9–3.6)
LYMPHOCYTES NFR BLD: 9 % (ref 21–52)
MAGNESIUM SERPL-MCNC: 2.4 MG/DL (ref 1.6–2.6)
MCH RBC QN AUTO: 28.3 PG (ref 24–34)
MCHC RBC AUTO-ENTMCNC: 34.1 G/DL (ref 31–37)
MCV RBC AUTO: 83.1 FL (ref 74–97)
MONOCYTES # BLD: 0.3 K/UL (ref 0.05–1.2)
MONOCYTES NFR BLD: 6 % (ref 3–10)
NEUTS SEG # BLD: 4.9 K/UL (ref 1.8–8)
NEUTS SEG NFR BLD: 85 % (ref 40–73)
PLATELET # BLD AUTO: 69 K/UL (ref 135–420)
POTASSIUM SERPL-SCNC: 3.8 MMOL/L (ref 3.5–5.5)
POTASSIUM SERPL-SCNC: 3.8 MMOL/L (ref 3.5–5.5)
PROT SERPL-MCNC: 6.4 G/DL (ref 6.4–8.2)
RBC # BLD AUTO: 2.72 M/UL (ref 4.7–5.5)
SODIUM SERPL-SCNC: 141 MMOL/L (ref 136–145)
SODIUM SERPL-SCNC: 145 MMOL/L (ref 136–145)
TIBC SERPL-MCNC: 274 UG/DL (ref 250–450)
WBC # BLD AUTO: 5.7 K/UL (ref 4.6–13.2)

## 2019-04-02 PROCEDURE — 74011250636 HC RX REV CODE- 250/636: Performed by: FAMILY MEDICINE

## 2019-04-02 PROCEDURE — 80053 COMPREHEN METABOLIC PANEL: CPT

## 2019-04-02 PROCEDURE — 74011636637 HC RX REV CODE- 636/637: Performed by: FAMILY MEDICINE

## 2019-04-02 PROCEDURE — 83735 ASSAY OF MAGNESIUM: CPT

## 2019-04-02 PROCEDURE — 87522 HEPATITIS C REVRS TRNSCRPJ: CPT

## 2019-04-02 PROCEDURE — 74011250637 HC RX REV CODE- 250/637: Performed by: INTERNAL MEDICINE

## 2019-04-02 PROCEDURE — 92526 ORAL FUNCTION THERAPY: CPT

## 2019-04-02 PROCEDURE — 74011000258 HC RX REV CODE- 258: Performed by: FAMILY MEDICINE

## 2019-04-02 PROCEDURE — 74011250636 HC RX REV CODE- 250/636: Performed by: INTERNAL MEDICINE

## 2019-04-02 PROCEDURE — 83036 HEMOGLOBIN GLYCOSYLATED A1C: CPT

## 2019-04-02 PROCEDURE — 74011250637 HC RX REV CODE- 250/637: Performed by: FAMILY MEDICINE

## 2019-04-02 PROCEDURE — 83540 ASSAY OF IRON: CPT

## 2019-04-02 PROCEDURE — 86704 HEP B CORE ANTIBODY TOTAL: CPT

## 2019-04-02 PROCEDURE — 74011250636 HC RX REV CODE- 250/636: Performed by: PHYSICIAN ASSISTANT

## 2019-04-02 PROCEDURE — 85025 COMPLETE CBC W/AUTO DIFF WBC: CPT

## 2019-04-02 PROCEDURE — 65270000029 HC RM PRIVATE

## 2019-04-02 PROCEDURE — 82107 ALPHA-FETOPROTEIN L3: CPT

## 2019-04-02 PROCEDURE — 71045 X-RAY EXAM CHEST 1 VIEW: CPT

## 2019-04-02 PROCEDURE — 97530 THERAPEUTIC ACTIVITIES: CPT

## 2019-04-02 PROCEDURE — 82272 OCCULT BLD FECES 1-3 TESTS: CPT

## 2019-04-02 PROCEDURE — 36415 COLL VENOUS BLD VENIPUNCTURE: CPT

## 2019-04-02 PROCEDURE — 97110 THERAPEUTIC EXERCISES: CPT

## 2019-04-02 PROCEDURE — 82962 GLUCOSE BLOOD TEST: CPT

## 2019-04-02 PROCEDURE — 74011000258 HC RX REV CODE- 258: Performed by: PHYSICIAN ASSISTANT

## 2019-04-02 PROCEDURE — 97116 GAIT TRAINING THERAPY: CPT

## 2019-04-02 PROCEDURE — 74011636637 HC RX REV CODE- 636/637: Performed by: INTERNAL MEDICINE

## 2019-04-02 PROCEDURE — 74011250637 HC RX REV CODE- 250/637: Performed by: PHYSICIAN ASSISTANT

## 2019-04-02 RX ORDER — INSULIN LISPRO 100 [IU]/ML
6 INJECTION, SOLUTION INTRAVENOUS; SUBCUTANEOUS
Status: DISCONTINUED | OUTPATIENT
Start: 2019-04-02 | End: 2019-04-06 | Stop reason: HOSPADM

## 2019-04-02 RX ORDER — HYDRALAZINE HYDROCHLORIDE 25 MG/1
25 TABLET, FILM COATED ORAL
Status: DISCONTINUED | OUTPATIENT
Start: 2019-04-02 | End: 2019-04-06 | Stop reason: HOSPADM

## 2019-04-02 RX ORDER — HYDROCORTISONE SODIUM SUCCINATE 100 MG/2ML
50 INJECTION, POWDER, FOR SOLUTION INTRAMUSCULAR; INTRAVENOUS EVERY 12 HOURS
Status: DISCONTINUED | OUTPATIENT
Start: 2019-04-02 | End: 2019-04-04

## 2019-04-02 RX ORDER — HYDRALAZINE HYDROCHLORIDE 25 MG/1
25 TABLET, FILM COATED ORAL 3 TIMES DAILY
Status: DISCONTINUED | OUTPATIENT
Start: 2019-04-02 | End: 2019-04-04

## 2019-04-02 RX ORDER — LIOTHYRONINE SODIUM 25 UG/1
25 TABLET ORAL DAILY
Status: DISCONTINUED | OUTPATIENT
Start: 2019-04-02 | End: 2019-04-04

## 2019-04-02 RX ORDER — INSULIN LISPRO 100 [IU]/ML
INJECTION, SOLUTION INTRAVENOUS; SUBCUTANEOUS
Status: DISCONTINUED | OUTPATIENT
Start: 2019-04-02 | End: 2019-04-06 | Stop reason: HOSPADM

## 2019-04-02 RX ORDER — HYDROCORTISONE SODIUM SUCCINATE 100 MG/2ML
50 INJECTION, POWDER, FOR SOLUTION INTRAMUSCULAR; INTRAVENOUS EVERY 6 HOURS
Status: DISCONTINUED | OUTPATIENT
Start: 2019-04-02 | End: 2019-04-02

## 2019-04-02 RX ADMIN — OXYCODONE AND ACETAMINOPHEN 1 TABLET: 5; 325 TABLET ORAL at 21:34

## 2019-04-02 RX ADMIN — LACTULOSE 10 G: 20 SOLUTION ORAL at 17:35

## 2019-04-02 RX ADMIN — PIPERACILLIN AND TAZOBACTAM 2.25 G: 2; .25 INJECTION, POWDER, FOR SOLUTION INTRAVENOUS at 11:18

## 2019-04-02 RX ADMIN — INSULIN LISPRO 6 UNITS: 100 INJECTION, SOLUTION INTRAVENOUS; SUBCUTANEOUS at 01:00

## 2019-04-02 RX ADMIN — LIOTHYRONINE SODIUM 25 MCG: 25 TABLET ORAL at 11:34

## 2019-04-02 RX ADMIN — PIPERACILLIN AND TAZOBACTAM 2.25 G: 2; .25 INJECTION, POWDER, FOR SOLUTION INTRAVENOUS at 23:32

## 2019-04-02 RX ADMIN — NEPHROCAP 1 CAPSULE: 1 CAP ORAL at 08:33

## 2019-04-02 RX ADMIN — LEVOTHYROXINE SODIUM 100 MCG: 100 TABLET ORAL at 06:57

## 2019-04-02 RX ADMIN — HYDRALAZINE HYDROCHLORIDE 25 MG: 25 TABLET ORAL at 21:34

## 2019-04-02 RX ADMIN — PANTOPRAZOLE SODIUM 40 MG: 40 GRANULE, DELAYED RELEASE ORAL at 09:00

## 2019-04-02 RX ADMIN — LACTULOSE 10 G: 20 SOLUTION ORAL at 08:34

## 2019-04-02 RX ADMIN — PANTOPRAZOLE SODIUM 40 MG: 40 GRANULE, DELAYED RELEASE ORAL at 17:36

## 2019-04-02 RX ADMIN — SODIUM BICARBONATE 650 MG TABLET 650 MG: at 08:33

## 2019-04-02 RX ADMIN — TAMSULOSIN HYDROCHLORIDE 0.4 MG: 0.4 CAPSULE ORAL at 08:33

## 2019-04-02 RX ADMIN — HYDROCORTISONE SODIUM SUCCINATE 50 MG: 100 INJECTION, POWDER, FOR SOLUTION INTRAMUSCULAR; INTRAVENOUS at 21:34

## 2019-04-02 RX ADMIN — SODIUM BICARBONATE 650 MG TABLET 650 MG: at 16:44

## 2019-04-02 RX ADMIN — INSULIN LISPRO 9 UNITS: 100 INJECTION, SOLUTION INTRAVENOUS; SUBCUTANEOUS at 06:59

## 2019-04-02 RX ADMIN — Medication 10 ML: at 06:58

## 2019-04-02 RX ADMIN — INSULIN LISPRO 6 UNITS: 100 INJECTION, SOLUTION INTRAVENOUS; SUBCUTANEOUS at 11:39

## 2019-04-02 RX ADMIN — PIPERACILLIN AND TAZOBACTAM 2.25 G: 2; .25 INJECTION, POWDER, FOR SOLUTION INTRAVENOUS at 16:44

## 2019-04-02 RX ADMIN — PIPERACILLIN AND TAZOBACTAM 2.25 G: 2; .25 INJECTION, POWDER, FOR SOLUTION INTRAVENOUS at 03:41

## 2019-04-02 RX ADMIN — HYDRALAZINE HYDROCHLORIDE 25 MG: 25 TABLET ORAL at 16:44

## 2019-04-02 RX ADMIN — HYDRALAZINE HYDROCHLORIDE 25 MG: 25 TABLET ORAL at 11:18

## 2019-04-02 RX ADMIN — OXYCODONE AND ACETAMINOPHEN 1 TABLET: 5; 325 TABLET ORAL at 03:47

## 2019-04-02 RX ADMIN — HYDRALAZINE HYDROCHLORIDE 25 MG: 25 TABLET, FILM COATED ORAL at 06:58

## 2019-04-02 RX ADMIN — HYDROCORTISONE SODIUM SUCCINATE 50 MG: 100 INJECTION, POWDER, FOR SOLUTION INTRAMUSCULAR; INTRAVENOUS at 03:41

## 2019-04-02 RX ADMIN — SODIUM BICARBONATE 650 MG TABLET 650 MG: at 21:34

## 2019-04-02 NOTE — PROGRESS NOTES
Dr. Emma Tiwari called to inform patients temperature 94.5 after several attempts and blood pressure 174/85. Patient resting quietly in bed, under warm blankets, room temperature increased, no complaints, skin feels warm and dry. Physician ordered change in hydrocortisone to Q6H and hydralazine 25mg PO P5Q PRN for systolic BP >470. No further orders given. Will continue to monitor.

## 2019-04-02 NOTE — PROGRESS NOTES
CNA reported FSBS 67. Gave OJ per protocol. Rechecked FSBS 15 minutes later, FSBS 93. Glucometer currently on linking with [employer]. Held scheduled and sliding scale insulin per protocol.

## 2019-04-02 NOTE — PROGRESS NOTES
Progress Note    Cinthia Tripathi  59 y.o. Admit Date: 3/27/2019  Active Problems:    Oliguria (3/28/2019) POA: Unknown      Renal injury (3/28/2019) POA: Unknown      Suprapubic catheter ([address] (3/28/2019) POA: Unknown      Bradycardia, sinus (3/28/2019) POA: Unknown      Acute UTI (3/28/2019) POA: Unknown      Type 2 diabetes mellitus with hypoglycemia ([address] (3/28/2019) POA: Unknown      Acute renal failure (ARF) ([address] (3/28/2019) POA: Unknown      Renal failure (ARF), acute on chronic ([address] (3/31/2019) POA: Yes      ATN (acute tubular necrosis) ([address] (4/1/2019) POA: Clinically Undetermined            Subjective:     Patient fees good, no SOB, mentally clear, continues to make good UOP through Suprapubic catheter. A comprehensive review of systems was negative except for that written in the History of Present Illness.     Objective:     Visit Vitals  /82   Pulse 62   Temp 97.6 °F (36.4 °C)   Resp 12   Ht 6' (1.829 m)   Wt 81.6 kg (180 lb)   SpO2 100%   BMI 24.41 kg/m²         Intake/Output Summary (Last 24 hours) at 4/2/2019 1214  Last data filed at 4/2/2019 0934  Gross per 24 hour   Intake 1020 ml   Output 1225 ml   Net -205 ml       Current Facility-Administered Medications   Medication Dose Route Frequency Provider Last Rate Last Dose    hydrALAZINE (APRESOLINE) tablet 25 mg  25 mg Oral Q8H PRN Francesco Mckenzie MD   25 mg at 04/02/19 0658    hydrALAZINE (APRESOLINE) tablet 25 mg  25 mg Oral TID Walt Aquino MD   25 mg at 04/02/19 1118    insulin lispro (HUMALOG) injection   SubCUTAneous AC&HS Juarez Boyce MD   6 Units at 04/02/19 1139    insulin lispro (HUMALOG) injection 6 Units  6 Units SubCUTAneous TIDAC Juarez Boyce MD   6 Units at 04/02/19 1139    liothyronine (CYTOMEL) tablet 25 mcg  25 mcg Oral DAILY Juarez Boyce MD   25 mcg at 04/02/19 1134    hydrocortisone Sod Succ (PF) (SOLU-CORTEF) injection 50 mg  50 mg IntraVENous Q12H Juarez Boyce MD  piperacillin-tazobactam (ZOSYN) 2.25 g in 0.9% sodium chloride (MBP/ADV) 50 mL MBP  2.25 g IntraVENous Q6H Mila Aquino  mL/hr at 04/02/19 1118 2.25 g at 04/02/19 1118    pantoprazole (PROTONIX) granules for oral suspension 40 mg  40 mg Per NG tube BID Pati Aquino MD   40 mg at 04/02/19 0900    epoetin cristy-epbx (RETACRIT) 12,000 Units combo injection  12,000 Units SubCUTAneous Q7D Vangie Nielsen MD   Stopped at 03/31/19 2100    B complex-vitaminC-folic acid (NEPHROCAP) cap  1 Cap Oral DAILY Vangie Nielsen MD   1 Cap at 04/02/19 3229    sodium bicarbonate tablet 650 mg  650 mg Oral TID Vangie Nielsen MD   650 mg at 04/02/19 1916    calcitRIOL (ROCALTROL) capsule 0.25 mcg  0.25 mcg Oral Q MON, WED & Karen Nicole Higginbotham MD   0.25 mcg at 04/01/19 2211    lactulose (CHRONULAC) solution 10 g  10 g Oral BID Heather Beltran PA-C   10 g at 04/02/19 0834    sodium chloride (NS) flush 5-10 mL  5-10 mL IntraVENous PRN Nick Alvarado MD   10 mL at 04/02/19 0658    glucose chewable tablet 16 g  4 Tab Oral PRN Nick Alvarado MD        glucagon (GLUCAGEN) injection 1 mg  1 mg IntraMUSCular PRN Nick Alvarado MD        dextrose (D50W) injection syrg 12.5-25 g  25-50 mL IntraVENous PRN Nick Alvarado MD   25 g at 03/29/19 1104    tamsulosin (FLOMAX) capsule 0.4 mg  0.4 mg Oral DAILY Nick Alvarado MD   0.4 mg at 04/02/19 5406    acetaminophen (TYLENOL) tablet 650 mg  650 mg Oral Q6H PRN Nick Alvarado MD        oxyCODONE-acetaminophen (PERCOCET) 5-325 mg per tablet 1 Tab  1 Tab Oral Q6H PRN Nick Alvarado MD   1 Tab at 04/02/19 0347    naloxone (NARCAN) injection 0.4 mg  0.4 mg IntraVENous PRN Nick Alvarado MD        ondansetron [facility]) injection 4 mg  4 mg IntraVENous Q6H PRN Nick Alvarado MD   4 mg at 03/29/19 7166    docusate sodium (COLACE) capsule 100 mg  100 mg Oral BID PRN Nick Alvarado MD        levothyroxine (SYNTHROID) tablet 100 mcg  100 mcg Oral 6am Juhi Aquino MD   100 mcg at 04/02/19 0622        Physical Exam:     Physical Exam:   General:  Alert, cooperative, no distress, appears stated age. Eyes:  Conjunctivae/corneas clear. PERRL, EOMs intact. Mouth/Throat: Lips, mucosa, and tongue normal. Teeth and gums normal.   Neck: Supple, symmetrical, trachea midline, no adenopathy, thyroid: no enlargement/tenderness/nodules, no carotid bruit and no JVD. Lungs:   Clear to auscultation bilaterally. Heart:  Regular rate and rhythm, S1, S2 normal, no murmur, click, rub or gallop. Abdomen:   Soft, non-tender. Bowel sounds normal. No masses,  No organomegaly. Extremities: Extremities normal, atraumatic, no cyanosis, no edema. Pulses: 2+ and symmetric all extremities.    Skin: Skin color, texture, turgor normal. No rashes or lesions         Data Review:    CBC w/Diff    Recent Labs     04/02/19 0529 04/01/19 0547 03/31/19 0227   WBC 5.7 6.2 3.0*   RBC 2.72* 2.68* 2.88*   HGB 7.7* 7.6* 8.1*   HCT 22.6* 22.3* 24.3*   MCV 83.1 83.2 84.4   MCH 28.3 28.4 28.1   MCHC 34.1 34.1 33.3   RDW 18.0* 18.0* 18.1*    Recent Labs     04/02/19 0529 04/01/19 0547 03/31/19 0227   BANDS  --  3  --    MONOS 6 4 4   EOS 0 0 0   BASOS 0 0 0   RDW 18.0* 18.0* 18.1*        Comprehensive Metabolic Profile    Recent Labs     04/02/19 0529 04/01/19 0547 03/31/19 0227     141 141 138   K 3.8  3.8 3.8 4.3   *  113* 114* 114*   CO2 24  22 20* 20*   BUN 35*  37* 33* 29*   CREA 4.01*  3.93* 4.13* 4.10*    Recent Labs     04/02/19 0529 04/01/19 0547 03/31/19 0227   CA 8.5  8.1* 8.2* 8.4*   PHOS  --  3.6  --    ALB 2.3* 2.2*  --    TP 6.4 6.5  --    SGOT 16 16  --    TBILI 0.7 0.4  --                         Impression:       Active Hospital Problems    Diagnosis Date Noted    ATN (acute tubular necrosis) (HCC) 04/01/2019    Renal failure (ARF), acute on chronic (HCC) 03/31/2019    Oliguria 03/28/2019    Renal injury 03/28/2019    [facility] 03/28/2019    Bradycardia, sinus 03/28/2019    Acute UTI 03/28/2019    Type 2 diabetes mellitus with hypoglycemia [facility] 03/28/2019    Acute renal failure (ARF) ([facility] 03/28/2019        No more acidotic, Renal function stable, Na is on the high side.     Plan:     Needs to have Free water, taking orally, continue Rocaltrol , Retacrit as orderd      Roro Rodriguez MD

## 2019-04-02 NOTE — PROGRESS NOTES
NUTRITION    Nutrition Consult: Management of Tube Feeding, General Nutrition Management & Supplements      RECOMMENDATIONS / PLAN:     - Change supplements to Glucerna Shake BID.   - Discontinue tube feeding.   - Continue RD inpatient monitoring and evaluation. NUTRITION INTERVENTIONS & DIAGNOSIS:     [x] Meals/snacks: modified composition   [x] Medical food supplement therapy: Magic Cup BID- modify   [x] Enteral nutrition: discontinue   [x] Collaboration and referral of nutrition care: pt discussed with nursing     Nutrition Diagnosis: Inadequate oral intake related to decreased appetite and weakness as evidenced by pt with variable meal intake, consuming 50% or less of some meals. ASSESSMENT:     4/2: Tolerated nocturnal feeds with improved appetite and fair meal intake and NGT removed today. Diet advanced after follow up swallow evaluation. 4/1: Pt tolerating feeds at goal then held today after pt started on diet by SLP after swallow evaluation. Pt hungry and wants to eat, no meal intake yet and NGT remains in place and clamped. 3/30: PT with AMS, transferred to the ICU. Reoccurring episodes of hypoglycemia. SLP consulted, however per nurse, patient too confused. NGT in placed and verified.      Average po intake adequate to meet patients estimated nutritional needs:   [] Yes     [] No   [x] Unable to determine at this time    Diet: DIET NUTRITIONAL SUPPLEMENTS Breakfast, Dinner; MAGIC CUPS  DIET TUBE FEEDING  DIET REGULAR      Food Allergies: NKFA  Current Appetite:   [x] Good     [x] Fair     [] Poor     [] Other:   Appetite/meal intake prior to admission:   [] Good     [] Fair     [] Poor     [x] Other: unknown  Feeding Limitations:  [] Swallowing difficulty    [] Chewing difficulty    [x] Other: SLP following   Current Meal Intake:   Patient Vitals for the past 100 hrs:   % Diet Eaten   04/02/19 0934 50 %   04/01/19 1838 25 %   04/01/19 1312 50 %   04/01/19 0937 0 %   03/31/19 1700 5 %       BM: 4/1  Skin Integrity: abrasion to right eye, abrasion to right cheek, pressure injury to right coccyx- stage unknown  Edema:   [] No     [x] Yes   Pertinent Medications: Reviewed: nephrocap, colace, steroid, calcitriol, lactulose, zofran, protonix, SSI    Recent Labs     04/02/19  0529 04/01/19  0547 03/31/19  0227     141 141 138   K 3.8  3.8 3.8 4.3   *  113* 114* 114*   CO2 24  22 20* 20*   *  223* 192* 170*   BUN 35*  37* 33* 29*   CREA 4.01*  3.93* 4.13* 4.10*   CA 8.5  8.1* 8.2* 8.4*   MG 2.4 2.4  --    PHOS  --  3.6  --    ALB 2.3* 2.2*  --    SGOT 16 16  --    ALT 11* 13*  --        Intake/Output Summary (Last 24 hours) at 4/2/2019 1405  Last data filed at 4/2/2019 1233  Gross per 24 hour   Intake 780 ml   Output 1675 ml   Net -895 ml       Anthropometrics:  Ht Readings from Last 1 Encounters:   03/28/19 6' (1.829 m)     Last 3 Recorded Weights in this Encounter    03/29/19 0700 03/30/19 1000 04/01/19 0830   Weight: 80.2 kg (176 lb 12.9 oz) 79.1 kg (174 lb 6.1 oz) 81.6 kg (180 lb)     Body mass index is 24.41 kg/m².     Weight History: patient reports usual weight of 140-150 lb   Weight Metrics 4/1/2019 3/27/2019 3/26/2019 2/26/2019 2/14/2019 1/31/2019 11/5/2018   Weight 180 lb - 140 lb 142 lb 142 lb 142 lb 145 lb   BMI - 24.41 kg/m2 18.99 kg/m2 19.26 kg/m2 19.26 kg/m2 19.26 kg/m2 19.67 kg/m2        Admitting Diagnosis: Oliguria [R34]  Renal injury [S37.009A]  Acute renal failure (ARF) (HCC) [N17.9]  Pertinent PMHx: renal cancer, HTN, quadriparesis, CKD stage III, cirrhosis, COPD     Education Needs:        [x] None identified  [] Identified - Not appropriate at this time  []  Identified and addressed - refer to education log  Learning Limitations:   [] None identified  [x] Identified: AMS- improved    Cultural, Islam & ethnic food preferences:  [x] None identified    [] Identified and addressed     ESTIMATED NUTRITION NEEDS:     Calories: 4028-8209 kcal (MSJx1.2-1.3) based on [x] Actual BW 79 kg     [] IBW   Protein: 63-95 gm (0.8-1.2 gm/kg) based on  [x] Actual BW      [] IBW   Fluid: 1 mL/kcal     MONITORING & EVALUATION:     Nutrition Goal(s): goal modified   1. Po intake of meals will meet >75% of patient estimated nutritional needs within the next 7 days.   Outcome:  [] Met/Ongoing    [] Progressing towards goal    [] Not progressing towards goal    [x] New/Initial goal      Monitoring:   [x] Food and beverage intake   [x] Diet order   [x] Nutrition-focused physical findings   [x] Treatment/therapy   [] Weight   [] Enteral nutrition intake        Previous Recommendations (for follow-up assessments only):     [x]   Implemented       []   Not Implemented (RD to address)      [] No Longer Appropriate     [] No Recommendation Made     Discharge Planning: renal diet, consistency as tolerated per SLP  [x] Participated in care planning, discharge planning, & interdisciplinary rounds as appropriate        Johnny Iglesias, [address], [address] [place] [address]   Pager: [phone]

## 2019-04-02 NOTE — PROGRESS NOTES
Bedside shift change report given to [address] [place] [address] (oncoming nurse) by Jose Enrique Arellano (offgoing nurse). Report included the following information SBAR, Kardex, Procedure Summary, Intake/Output, and Recent Results.

## 2019-04-02 NOTE — CONSULTS
Pt examined. Chart reviewed. Imp: Suspect hypopituitarism. Thyroid level may be inadequate, since his levothyroxine was stopped last August, and only restarted several weeks ago. He has impaired T4 to T3 conversion, due to infection and steroid therapy, but perhaps endogenously. Liothyronine (T3) will help. Presently he is hyperglycemic. Plan: Numerous assays ( see orders). Will start on liothyronine and increase insulin at present. Full consult to follow.

## 2019-04-02 NOTE — PROCEDURES
[facility]  EEG    Name:  Sanjana Gunn  MR#:   [id number]  :  1960  ACCOUNT #:  [de-identified]  DATE OF SERVICE:  2019    REFERRING PROVIDER:  Rowena Byrd MD    EEG Number:  [facility] [id number]    CLINICAL HISTORY:  This is an apparently wakeful EEG on a 67-year-old patient evaluated for metabolic or toxic encephalopathy. The patient suffered a prolonged episode of hypoglycemia. MEDICATIONS:  Include cefepime and Flomax. EEG REPORT:  The predominant apparently wakeful background consists of 30 to 40 microvolts, irregular but symmetrical, 6 to 7 Hz waves mixed with frequently occurring 20 to 30 microvolts, 4-5 Hz waves. Bifrontal 2-3 microvolts, 16-20 Hz waves are seen, which are symmetrical in their appearance. Step flash photic stimulation causes no driving response. IMPRESSION:  Abnormal electroencephalogram due to the presence of generalized slow-wave activity indicative of diffuse encephalopathic process. No epileptiform or focal abnormality was identified.       Wesley Lamas MD      [id number]  D:  2019 15:24  T:  2019 18:26  JOB #:  [id number]

## 2019-04-02 NOTE — DIABETES MGMT
Glycemic Control Plan of Care    T2DM with current A1c of 5.3% (3/27/2019). Home diabetes medications:   Actos 15 mg daily. Januvia 100 mg daily. Humalog sliding scale insulin. POC BG range on 4/01/2019: 143-241 mg/dL. POC BG report on 4/02/2019 at time of review: 257, 217, 67 mg/dL. Treatment for hypoglycemia in progress. Patient stated that he ate lunch and dinner without any problem after NG TF was discontinued earlier today. Recommendation(s):  1.) Cont inpatient glycemic monitoring and intervention. Discussed with nursing BG of 67 and being treated. Holding 6 units prandial humalog insulin at this time. 2.) Assess BG and p o intake on 4/03/2019. Patient is currently on regular diet. Assessment:  Patient is 59year old with past medical history including type 2 diabetes mellitus, hypertension,  Stage 3 CKD,  Renal call carcinoma, COPD, GI bleed, pericardial effusion, and quadriparesis, and chronic drug abuse - was admitted on 3/27/2019 with report of urinary catheter problem/retetion. Noted:  Sepsis. Chronic hep C  Acute renal failure, chronic UTI. Hypoglycemia. Dysphagia. Discontinued TF on 4/02/2019. T2DM with current A1c of 5.3% (3/27/2019). Most recent blood glucose values:    Results for Valeriano Roca (MRN [id number]) as of 4/2/2019 16:54   Ref. Range 4/1/2019 06:09 4/1/2019 11:15 4/1/2019 15:56 4/1/2019 19:05 4/1/2019 23:18   GLUCOSE,FAST - POC Latest Ref Range: 70 - 110 mg/dL 199 (H) 207 (H) 156 (H) 143 (H) 241 (H)     Results for Valeriano Roca (MRN [id number]) as of 4/2/2019 16:54   Ref. Range 4/2/2019 06:19 4/2/2019 11:03 4/2/2019 16:19   GLUCOSE,FAST - POC Latest Ref Range: 70 - 110 mg/dL 257 (H) 217 (H) 67 (L)     Current A1C: 5.3% (3/27/2019) which is equivalent to estimated average blood glucose of 99 mg/dL during the past 2-3 months. Current hospital diabetes medications:   Prandial lispro insulin 6 units TID AC. Correctional lispro insulin ACHS.  Very resistant dose.    Total daily dose insulin requirement previous day: 4/01/2019:  Lispro: 11 units    Home diabetes medications: Patient reported on 4/02/2019:  Actos 15 mg daily. Januvia 100 mg daily. Humalog sliding scale insulin. Diet: Regular    Goals:  Blood glucose will be within target range of  mg/dL by 4/05/2019.     Education:  ___  Refer to Diabetes Education Record             _X__  Education not indicated at this time    Magdalena Mejias RN [facility]  Pager: [phone]

## 2019-04-02 NOTE — PROGRESS NOTES
Problem: Dysphagia (Adult)  Goal: *Acute Goals and Plan of Care (Insert Text)  Description  Patient will:  1. Tolerate PO trials with 0 s/s overt distress in 4/5 trials  2. Utilize compensatory swallow strategies/maneuvers (decrease bite/sip, size/rate, alt. liq/sol) with min cues in 4/5 trials  3. Perform oral-motor/laryngeal exercises to increase oropharyngeal swallow function with min cues  4. Complete an objective swallow study (i.e., MBSS) to assess swallow integrity, r/o aspiration, and determine of safest LRD, min A    Rec:     Regular diet with thin liquids   Meds per patient preference   Aspiration precautions  HOB >45 during po intake, remain >30 for 30-45 minutes after po   Small bites/sips; alternate liquid/solid with slow feeding rate   Oral care three times daily        Outcome: Progressing Towards Goal   SPEECH LANGUAGE PATHOLOGY DYSPHAGIA TREATMENT    Patient: Sunny Deleon (61 y.o. male)  Date: 4/2/2019  Diagnosis: Oliguria [R34]  Renal injury [S37.009A]  Acute renal failure (ARF) (HCC) [N17.9]      Precautions: Aspiration, Seizure, Skin    ASSESSMENT:  Pt seen for follow up dysphagia tx. Pt alert and oriented x4, following commands s/p NG removal this am.  Pt reporting \"I can't eat most of the stuff they are sending me\". Pt observed self-feeding mech soft items from tray with mildly increased but thorough mastication/clearance. Pt demo labored mastication with regular solids with ~10% lingual spread post swallow. Pt reporting \"I normally take a long time to chew\" but reports tolerating a regular diet with thin liquids at baseline. Pt tolerating thin liquids + straw with timely swallow initiation, adequate laryngeal elevation to palpation and no overt s/sx aspiration. No changes to vitals noted across intake. Recommend advance diet to regular with thin liquids with strict use of the above mentioned compensatory strategies/aspiration precautions.   Results/recommendations discussed with pt and RN; understanding verbalized. Will continue to follow for further dysphagia management. Progression toward goals:  ?         Improving appropriately and progressing toward goals  ? Improving slowly and progressing toward goals  ? Not making progress toward goals and plan of care will be adjusted     PLAN:  Recommendations and Planned Interventions:  Patient continues to benefit from skilled intervention to address the above impairments. Continue treatment per established plan of care. Discharge Recommendations: To Be Determined     SUBJECTIVE:   Patient stated ? Can I eat regular stuff now??    OBJECTIVE:   Cognitive and Communication Status:  Neurologic State: Alert  Orientation Level: Oriented to person, Oriented to place, Oriented to situation  Cognition: Follows commands  Perception: Appears intact  Perseveration: No perseveration noted  Safety/Judgement: Fall prevention  Dysphagia Treatment:  P.O. Trials:   Vocal quality prior to P.O.: Breathy, Fatigue, Hoarse, Low volume   Consistency Presented: Thin liquid, Solid, Mechanical soft   How Presented: Self-fed/presented, Cup/sip, Spoon, Straw, Successive swallows   Bolus Acceptance: No impairment   Bolus Formation/Control: Impaired   Type of Impairment: Mastication   Propulsion: Delayed (# of seconds)   Oral Residue: Less than 10% of bolus, Lingual   Initiation of Swallow: No impairment   Laryngeal Elevation: Functional   Aspiration Signs/Symptoms: None   Pharyngeal Phase Characteristics: No impairment, issues, or problems    Effective Modifications: Small sips and bites, Alternate liquids/solids   Cues for Modifications: Minimal   Oral Phase Severity: Mild   Pharyngeal Phase Severity : No impairment     PAIN:  Pt reports 0/10 pain or discomfort prior to tx. Pt reports 0/10 pain or discomfort post tx. After treatment:   ?              Patient left in no apparent distress sitting up in chair  ?               Patient left in no apparent distress in bed  ? Call bell left within reach  ? Nursing notified  ? Caregiver present  ? Bed alarm activated      COMMUNICATION/EDUCATION:   ?              SLP educated pt with regard to compensatory swallow strategies and        aspiration/reflux precautions including: small bites/sips,        alternate liquids/solids, decrease feeding rate, HOB > 45 with all po, and                   upright in bed at 30 degrees after po for at least 45        minutes.      Joseph Dyer M.S., [address] [facility]  Speech-Language Pathologist

## 2019-04-02 NOTE — PROGRESS NOTES
Progress Note    Patient: Mayra Peres MRN: [id number]  CSN: [id number]    YOB: 1960  Age: 59 y.o. Sex: male    DOA: 3/27/2019 LOS:  LOS: 5 days                    Subjective:   Pt is laying in bed and more alert in participating in the interview. Pt is A&O x2 and responds slowly but remains awake during discussion and is able to hold basic conversation. He has tolerated PO puree foods, diet advanced to mechanical soft, his NGT is clamped currently pending breakfast. He wishes to proceed to a normal diet. Pt has no other complaints today. Overnight, pt's temp dropped to 94.5F. Returned to previous scheduled dosing of hydrocortisone 50mg IV q6hrs. Temp returned to 97.6   Discussed with endocrinology, Dr. Víctor Flaherty will see patient today, plan to wean steroids as toelrated. BP also elevated overnight to 174/85. Given Hydralazine. Continue Hydralazine 25mg PO V2E PRN for systolic BP >571. Will add standing hydralazine 25mg tid. Avoid AV lydia blockers due to bradycardia. PO challenge with soft solids/nectar/thick liquids. If tolerated, can d/c NG tube. Chief Complaint:   Chief Complaint   Patient presents with    Urinary Catheter Problem    Abdominal Pain       Review of systems  General: No fevers or chills. Cardiovascular: No chest pain or pressure. Pulmonary: No shortness of breath or cough. Gastrointestinal: No abdominal pain, nausea, vomiting or diarrhea. Increased appetite. Genitourinary: No pain surrounding suprapubic cath   Musculoskeletal: Generalized weakness secondary to h/o of discitis   Neurologic: No headache, numbness or tingling    Objective:     Physical Exam:  Visit Vitals  /86   Pulse 62   Temp 97.6 °F (36.4 °C)   Resp 13   Ht 6' (1.829 m)   Wt 81.6 kg (180 lb)   SpO2 100%   BMI 24.41 kg/m²        General:        Increasing alertness. A&O to person and place. Unable to report correct year. HEENT: NC, Atraumatic. PERRLA, anicteric sclerae.  NG tube in place.  Lungs: CTA Bilaterally. No Wheezing/Rhonchi/Rales. Heart:  Regular  Rhythm, Bradycardic (HR in 50's). No murmur, No Rubs, No Gallops  Abdomen: Soft, Non distended, Non tender.  +Bowel sounds. Suprapubic cath in place without any signs of infection. Extremities: No LE edema today  Psych:   Not anxious or agitated. Neurologic:  Weakness in B/L UEs due to h/o cervical discitis     Intake and Output:  Current Shift:  No intake/output data recorded. Last three shifts:  03/31 1901 - 04/02 0700  In: 1690 [P.O.:480]  Out: 1825 [Urine:1825]    Labs: Results:       Chemistry Recent Labs     04/02/19 0529 04/01/19 0547 03/31/19 0227   *  223* 192* 170*     141 141 138   K 3.8  3.8 3.8 4.3   *  113* 114* 114*   CO2 24  22 20* 20*   BUN 35*  37* 33* 29*   CREA 4.01*  3.93* 4.13* 4.10*   CA 8.5  8.1* 8.2* 8.4*   AGAP 7  6 7 4   BUCR 9*  9* 8* 7*    100  --    TP 6.4 6.5  --    ALB 2.3* 2.2*  --    GLOB 4.1* 4.3*  --    AGRAT 0.6* 0.5*  --       CBC w/Diff Recent Labs     04/02/19 0529 04/01/19 0547 03/31/19 0227   WBC 5.7 6.2 3.0*   RBC 2.72* 2.68* 2.88*   HGB 7.7* 7.6* 8.1*   HCT 22.6* 22.3* 24.3*   PLT 69* 69* 70*   GRANS 85* 83* 86*   LYMPH 9* 8* 10*   EOS 0 0 0      Cardiac Enzymes No results for input(s): CPK, CKND1, LINDA in the last 72 hours. No lab exists for component: CKRMB, TROIP   Coagulation No results for input(s): PTP, INR, APTT in the last 72 hours.     No lab exists for component: INREXT    Lipid Panel Lab Results   Component Value Date/Time    Cholesterol, total 160 03/29/2019 02:28 AM    Cholesterol, total 142 03/29/2019 02:28 AM    HDL Cholesterol 58 03/29/2019 02:28 AM    HDL Cholesterol 58 03/29/2019 02:28 AM    LDL, calculated 88.2 03/29/2019 02:28 AM    LDL, calculated 70.2 03/29/2019 02:28 AM    VLDL, calculated 13.8 03/29/2019 02:28 AM    VLDL, calculated 13.8 03/29/2019 02:28 AM    Triglyceride 69 03/29/2019 02:28 AM    Triglyceride 69 03/29/2019 02:28 AM    CHOL/HDL Ratio 2.8 03/29/2019 02:28 AM    CHOL/HDL Ratio 2.4 03/29/2019 02:28 AM      BNP No results for input(s): BNPP in the last 72 hours.    Liver Enzymes Recent Labs     04/02/19  0529   TP 6.4   ALB 2.3*      SGOT 16      Thyroid Studies Lab Results   Component Value Date/Time    TSH 0.74 03/30/2019 10:20 AM          Procedures/imaging: see electronic medical records for all procedures/Xrays and details which were not copied into this note but were reviewed prior to creation of Plan    Medications:   Current Facility-Administered Medications   Medication Dose Route Frequency    hydrocortisone Sod Succ (PF) (SOLU-CORTEF) injection 50 mg  50 mg IntraVENous Q6H    hydrALAZINE (APRESOLINE) tablet 25 mg  25 mg Oral Q8H PRN    pantoprazole (PROTONIX) granules for oral suspension 40 mg  40 mg Per NG tube BID    piperacillin-tazobactam (ZOSYN) 2.25 g in 0.9% sodium chloride (MBP/ADV) 50 mL MBP  2.25 g IntraVENous Q8H    epoetin cristy-epbx (RETACRIT) 12,000 Units combo injection  12,000 Units SubCUTAneous Q7D    B complex-vitaminC-folic acid (NEPHROCAP) cap  1 Cap Oral DAILY    sodium bicarbonate tablet 650 mg  650 mg Oral TID    calcitRIOL (ROCALTROL) capsule 0.25 mcg  0.25 mcg Oral Q MON, WED & FRI    lactulose (CHRONULAC) solution 10 g  10 g Oral BID    sodium chloride (NS) flush 5-10 mL  5-10 mL IntraVENous PRN    insulin lispro (HUMALOG) injection   SubCUTAneous Q6H    glucose chewable tablet 16 g  4 Tab Oral PRN    glucagon (GLUCAGEN) injection 1 mg  1 mg IntraMUSCular PRN    dextrose (D50W) injection syrg 12.5-25 g  25-50 mL IntraVENous PRN    tamsulosin (FLOMAX) capsule 0.4 mg  0.4 mg Oral DAILY    acetaminophen (TYLENOL) tablet 650 mg  650 mg Oral Q6H PRN    oxyCODONE-acetaminophen (PERCOCET) 5-325 mg per tablet 1 Tab  1 Tab Oral Q6H PRN    naloxone (NARCAN) injection 0.4 mg  0.4 mg IntraVENous PRN    ondansetron (ZOFRAN) injection 4 mg  4 mg IntraVENous Q6H PRN    docusate sodium (COLACE) capsule 100 mg  100 mg Oral BID PRN    levothyroxine (SYNTHROID) tablet 100 mcg  100 mcg Oral 6am       Assessment/Plan     Active Problems:    Oliguria (3/28/2019)      Renal injury (3/28/2019)      Suprapubic catheter ([address] (3/28/2019)      Bradycardia, sinus (3/28/2019)      Acute UTI (3/28/2019)      Type 2 diabetes mellitus with hypoglycemia ([address] (3/28/2019)      Acute renal failure (ARF) ([address] (3/28/2019)      Renal failure (ARF), acute on chronic ([address] (3/31/2019)      ATN (acute tubular necrosis) ([address] (4/1/2019)    Hypoglycemia / Hypothermia/ hypocalcemia/ Adrenal insufficiency/hypopituitarism, hypothyroidism  - Discussed Cosyntropin results with Endocrinology. Recommended to continue to wean off hydrocortisone, unlikely that change from q6 to q8hr yesterday had effect with hypothermia. Decreased to Hydrocortisone 50mg IV q12hr per endocrinology recs. - Per endo, concern for suspected hypopituitarism. Pt currently on levothyroxine 100mcg daily. Had been discontinued off his levothyroixine 8/2018, was only recently restarted last month during hospitalization.    - Also concern for impaired T4 to T3 conversion, added liothyronine 25mcg tab daily per endocrinology.   - Repeat TFTs tomorrow. Check vit D. SHEILA on CKD stage 2-3,  possibly due to progressive CKD   - Nephrology following  - CT 3/28: Abd with pelvic fluid, pleural effusion  - CXR 3/28: cardiomegaly and pulm edema, questionable infiltrate vs atelectasis. BNP elevated from baseline.  - Cardiology consulted, pt stable and without cardiac symptoms.   - cardiac markers flat/indeterminant, continue monitorig on telemetry  - monitor BMP     UTI/  sepsis   - Lactic acid normal x2   - blood cultures 3/28/19 negative    - Urine Culture 3/27/19  positive for achromobater and candida. Likely colonized by candida.  - Per ID, Continue zosyn IV 2.25mg IV q8hr (day 3/7). Hold on switching to bactrim d/t elevated Cr.  Will discuss with nephrology pending improvement in renal function.   - Suprapubic cath functioning appropriately, no sign of obstruction.      HTN, HLD, bradycardia chronic HR 40-50s   - Hypertensive overnight. Begin scheduled hydralazine 25mg tab TID. Additional hydralazine 25mg q8h PRN for SBP >160.  - Continue tamsulosin 0.4mg cap daily  - Monitor BP and adjust accordingly  -3/29:  HDL 58 ZNV 57.0 NE 69      Metabolic possible Infectious Encephalopathy in setting of SHEILA, Sepsis, hypoglycemia  - MRI/MRA negative   - Dr. Consuelo Mortimer started Jessica Overton   - EEG performed 3/29. Diffuse encephalopathic process. No epileptiform or focal abnormalities. Yesterday keppra discontinued, discussed patient with neuro, Dr. Hayden Alfredo, was not aware of seizure activity wittness by ICU team or ID consult however reports that with likely provoking hypoglycemia still would recommend to hold off on Keppra at this time and monitor  -  Continue Lactulose.     Hx of recent GIB/ Chronic anemia / pancytopenia  - IV protonix   - Continue to monitor H&H due to h/o GI bleeding. EGD performed 3/8/19 revealed gastritis/ duodenitis. - Platelets stable but with significant drop from baseline. Hold heparin. HIT panel pending.   - F/u with hematology   - DVT prophylaxis with SCDs and PT/OT to increase mobility.  Mobilize to chair with assistant  as tolerated     Abdominal Pain, CT abdomen Gallbladder with wall slightly thickened and/or pericholecystic fluid  -Abdominal pain resolved.   -RUQ US 3/28/19 no acute cholecystitis  -No N/V noted, Continue Zofran 4mg q6 hr PRN      Hep C, Cirrhosis  -Hep B negative and HIV none reactive   -Hep C AB positive Hep C ab and elevated Hep C virus ab positive   Quant hepatitis c undetectable   INR &PT within normal limits.  -Continue Lactulose 10g BID prn   - GI ordered AFP pending, signed off, pt needs follow up as outpatient, recommend to re-consult if any active bleeding     DM2  -Hypoglycemic on admission, A1c 5.3  - Pt now hyperglycemic with POC glc 207. - Continue Humalog sliding scale. Insulin adjustments per endocrinology. - Continue glc monitoring     Anemia chronic disease, Recent Acute blood loss anemia from UGIB EGD neg for source, chronic thrombocytopenia  - Continue to monitor with daily CBC, transfuse as needed if hgb <7, monitor plt count.     History of Drug Abuse, Reported heroin use during recent hospitalization this month, ongoing Tob use, Etoh Use  - H/O  heroin use, does not know if he uses IV drugs currently or not. Has past history of IVDA. - UDS + for opiods  - acute hepatitis panel neg for Hep A&B,  hepatits c Ab positive  No active infection  - HIV negative   - discussed risks, recommended to quit     - pt has h/o poor compliant with multiple admissions    - pt would benefit from SNF, has declined in past will continue to [facility]     Cbc, cmp, mag, TFTs, vit D in AM  F/u endo, nephrology, ID and heme  Monitor urine output   F/u PT and Rosalina Rump therapy        CODE:  Full.  on admission  Sister  is MPOA for patient.    Pt seen and examined independently     PADMAJA Bishop-Student  4/2/2019 7:21 AM        Patient seen and examined independently. Reviewed PA student note and changes made where appropriate.   Agree with assessment and plan    Signed By: Connor Mata MD     April 2, 2019     0224

## 2019-04-02 NOTE — ROUTINE PROCESS
Verbal shift change report given to [address] [place] [address] (oncoming nurse) by Reggie Bauer (offgoing nurse). Report included the following information SBAR, Kardex, Intake/Output, MAR and Cardiac Rhythm NSR.

## 2019-04-02 NOTE — PROGRESS NOTES
Hematology/Medical Oncology Progress Note             Name: Padmini Mosley   : 1960   MRN: [id number]   Date: 2019 9:09 AM     [x]I have reviewed the flowsheet and previous days notes. Events overnight reviewed and discussed with nursing staff. Vital signs and records reviewed. Mr. Beatrice Mendez is a 59-year-old -American man who we are following for his chronic anemia and thrombocytopenia. Admitted with Acute on Chronic Renal Failure. Pt voices no new c/o today. ROS:  General: No fevers or chills. Cardiovascular: No chest pain or pressure. No palpitations. Pulmonary: No shortness of breath, cough or wheeze. Gastrointestinal: No abdominal pain, nausea, vomiting or diarrhea. Genitourinary: Pt with suprapubic catheter in place, no complaints  Musculoskeletal: No joint or muscle pain, no back pain. Neurologic: No headache, numbness, tingling or weakness. Vital Signs:    Visit Vitals  /83   Pulse (!) 53   Temp 97.5 °F (36.4 °C)   Resp 10   Ht 6' (1.829 m)   Wt 81.6 kg (180 lb)   SpO2 100%   BMI 24.41 kg/m²       O2 Device: Room air   O2 Flow Rate (L/min): 2 l/min   Temp (24hrs), Av.7 °F (35.9 °C), Min:94.5 °F (34.7 °C), Max:97.6 °F (36.4 °C)       Intake/Output:   Last shift:      No intake/output data recorded. Last 3 shifts:  190 -  0700  In: 1690 [P.O.:480]  Out: 1825 [Urine:1825]    Intake/Output Summary (Last 24 hours) at 2019 0909  Last data filed at 2019 0400  Gross per 24 hour   Intake 985 ml   Output 1225 ml   Net -240 ml       Physical Exam:  General: Alert,appears comfortable. NAD  HEENT:  Anicteric sclerae; pink palpebral conjunctivae; mucosa moist.   Resp:  Symmetrical chest expansion, no accessory muscle use; good airway entry; no rales/ wheezing/ rhonchi noted  CV:  S1, S2 present; regular rate and rhythm  GI:  Abdomen soft, non-tender; (+) active bowel sounds. Suprapubic Cath in place. NG tube in place.   Extremities: BLE edema  Skin:  Warm; no rashes/ lesions noted  Neurologic:  Nonfocal          DATA:   Current Facility-Administered Medications   Medication Dose Route Frequency    hydrocortisone Sod Succ (PF) (SOLU-CORTEF) injection 50 mg  50 mg IntraVENous Q6H    hydrALAZINE (APRESOLINE) tablet 25 mg  25 mg Oral Q8H PRN    hydrALAZINE (APRESOLINE) tablet 25 mg  25 mg Oral TID    pantoprazole (PROTONIX) granules for oral suspension 40 mg  40 mg Per NG tube BID    piperacillin-tazobactam (ZOSYN) 2.25 g in 0.9% sodium chloride (MBP/ADV) 50 mL MBP  2.25 g IntraVENous Q8H    epoetin cristy-epbx (RETACRIT) 12,000 Units combo injection  12,000 Units SubCUTAneous Q7D    B complex-vitaminC-folic acid (NEPHROCAP) cap  1 Cap Oral DAILY    sodium bicarbonate tablet 650 mg  650 mg Oral TID    calcitRIOL (ROCALTROL) capsule 0.25 mcg  0.25 mcg Oral Q MON, WED & FRI    lactulose (CHRONULAC) solution 10 g  10 g Oral BID    sodium chloride (NS) flush 5-10 mL  5-10 mL IntraVENous PRN    insulin lispro (HUMALOG) injection   SubCUTAneous Q6H    glucose chewable tablet 16 g  4 Tab Oral PRN    glucagon (GLUCAGEN) injection 1 mg  1 mg IntraMUSCular PRN    dextrose (D50W) injection syrg 12.5-25 g  25-50 mL IntraVENous PRN    tamsulosin (FLOMAX) capsule 0.4 mg  0.4 mg Oral DAILY    acetaminophen (TYLENOL) tablet 650 mg  650 mg Oral Q6H PRN    oxyCODONE-acetaminophen (PERCOCET) 5-325 mg per tablet 1 Tab  1 Tab Oral Q6H PRN    naloxone (NARCAN) injection 0.4 mg  0.4 mg IntraVENous PRN    ondansetron (ZOFRAN) injection 4 mg  4 mg IntraVENous Q6H PRN    docusate sodium (COLACE) capsule 100 mg  100 mg Oral BID PRN    levothyroxine (SYNTHROID) tablet 100 mcg  100 mcg Oral 6am                    Labs:  Recent Labs     04/02/19  0529 04/01/19  0547 03/31/19  0227   WBC 5.7 6.2 3.0*   HGB 7.7* 7.6* 8.1*   HCT 22.6* 22.3* 24.3*   PLT 69* 69* 70*     Recent Labs     04/02/19  0529 04/01/19  0547 03/31/19  0227     141 141 138   K 3.8  3.8 3.8 4.3   *  113* 114* 114*   CO2 24  22 20* 20*   *  223* 192* 170*   BUN 35*  37* 33* 29*   CREA 4.01*  3.93* 4.13* 4.10*   CA 8.5  8.1* 8.2* 8.4*   MG 2.4 2.4  --    PHOS  --  3.6  --    ALB 2.3* 2.2*  --    SGOT 16 16  --    ALT 11* 13*  --      No results for input(s): PH, PCO2, PO2, HCO3, FIO2 in the last 72 hours. IMPRESSION:   · Thrombocytopenia  · Renal Cell Carcinoma of left kidney  · Chronic Anemia  · Acute Renal failure CR 3.25 this admission. · Chronic drug use   · Ongoing tobacco use  · HTN             PLAN:   · Today's platelet count remains 69,000 and we will continue to follow;however, there is no therapeutic intervention warranted at this time. HIT Panel pending. · H/H today is 7.7/22.6: Continue Retacrit 12,000 units Q7 days while inpatient. Iron is 91 and Ferritin is 196.   Recommend PRBC transfusion for Hgb <7  · Tobacco and ETOH cessation encouraged      ·  ·        The patient is: [x] acutely ill Risk of deterioration: [] moderate    [] critically ill  [] high         My assessment/plan was discussed with:  [x]nursing []PT/OT    []respiratory therapy [x]Dr.Lloyd RADHA Medley MD/ Dr. Josey Matrinez MD     []family []       Rajani Ye NP

## 2019-04-02 NOTE — PROGRESS NOTES
Problem: Mobility Impaired (Adult and Pediatric)  Goal: *Acute Goals and Plan of Care (Insert Text)  Description  Physical Therapy Goals  Initiated 4/1/2019 and to be accomplished within 7 day(s)  1. Patient will move from supine to sit and sit to supine  and roll side to side in bed with modified independence. 2.  Patient will transfer from bed to chair and chair to bed with supervision/set-up using the least restrictive device. 3.  Patient will perform sit to stand with supervision/set-up. 4.  Patient will ambulate with supervision/set-up for 100 feet with the least restrictive device. 5.  Patient will ascend/descend 12 stairs with 1 handrail(s) with minimal assistance/contact guard assist.   Outcome: Progressing Towards Goal   PHYSICAL THERAPY TREATMENT    Patient: Kwadwo Spain (23 y.o. male)  Date: 4/2/2019  Diagnosis: Oliguria [R34]  Renal injury [S37.009A]  Acute renal failure (ARF) (HCC) [N17.9] <principal problem not specified>    Precautions: Aspiration, Seizure, Skin  PLOF: Pt lives with his sister in a 1 story apartment that requires 12 stairs to enter, 1 railing. Pt ambulated with a Rollator walker in the community but no AD within his apartment. ASSESSMENT:  Patient found supine in bed willing to work with PT. Pt sat at EOB with SBA and was provided with RW. Pt stood and ambulated in room with a small amount of buckling in left LE, pt is generally weak, but more so on left than right. Pt ambulated to bathroom where he passed a large amount of liquid stool. Pt requires assistance from this LPTA for stu-care which was performed in standing. Pt returned to EOB where he performed LAQ x 10 bilaterally (muscle weakness noted, evidence by shaking with eccentric control.) Pt returned to supine and positioned comfortably. Spoke with pt at length about his journey as a quadriplegic and his recovery process.  Pt is currently in outpatient physical therapy (reportedly a [facility] facility) where he continues to progress. Recommend return to ou-patient PT at D/C, pt lives with his sister and has care aides in the home 6 days of the week. Progression toward goals:  ?      Improving appropriately and progressing toward goals  ? Improving slowly and progressing toward goals  ? Not making progress toward goals and plan of care will be adjusted     PLAN:  Patient continues to benefit from skilled intervention to address the above impairments. Continue treatment per established plan of care. Discharge Recommendations: Continue Outpatient  Further Equipment Recommendations for Discharge:  Pt has Rollator     SUBJECTIVE:   Patient stated ? God kept me here, I don't know why.?    OBJECTIVE DATA SUMMARY:   Critical Behavior:  Neurologic State: Alert  Orientation Level: Oriented to person, Oriented to place, Oriented to situation  Cognition: Follows commands  Safety/Judgement: Fall prevention  Functional Mobility Training:  Bed Mobility:  Rolling: Stand-by assistance  Supine to Sit: Stand-by assistance  Sit to Supine: Stand-by assistance; Additional time  Transfers:  Sit to Stand: Stand-by assistance;Contact guard assistance  Stand to Sit: Stand-by assistance;Contact guard assistance  Balance:  Sitting: Intact  Standing: Impaired; With support  Standing - Static: Fair  Standing - Dynamic : Fair  Ambulation/Gait Training:  Distance (ft): 45 Feet (ft)  Assistive Device: Walker, rolling  Ambulation - Level of Assistance: Contact guard assistance  Gait Abnormalities: Decreased step clearance    Therapeutic Exercises:   Per above      Pain:  Pain level pre-treatment: 0/10  Pain level post-treatment: 0/10   Pain Intervention(s): Repositioning     Activity Tolerance:   Fair  Please refer to the flowsheet for vital signs taken during this treatment. After treatment:   ? Patient left in no apparent distress sitting up in chair  ? Patient left in no apparent distress in bed  ? Call bell left within reach  ?  Nursing notified  ? Caregiver present  ? Bed alarm activated  ? SCDs applied      COMMUNICATION/EDUCATION:   ?         Role of Physical Therapy in the acute care setting. ?         Fall prevention education was provided and the patient/caregiver indicated understanding. ? Patient/family have participated as able in working toward goals and plan of care. ?         Patient/family agree to work toward stated goals and plan of care. ?         Patient understands intent and goals of therapy, but is neutral about his/her participation. ? Patient is unable to participate in stated goals/plan of care: ongoing with therapy staff.   ?         Other:        Janice Hinton PTA   Time Calculation: 56 mins

## 2019-04-02 NOTE — PROGRESS NOTES
[email or web address]  [phone]    Gastroenterology follow up-Progress note    Impression:  1. Chronic Hep C- completed [place] in Dec 2018. 3/28-HCV Ab+. 2. Hx of GI Bleed-was admitted to [facility] 3/7/19 - 3/15/19 for GI Bleed. Denies any bleeding this admission. 3. Hx Renal Cell Cancer- suprapubic catheter placed 11/2018. 4. Chronic IV drug use. 5. Cirrhosis-EGD 10/11/18 (Dr. Mcarthur)=gastroparesis. No varicies. 3/30/19 US=trace ascites  6. Chronic anemia -7.7/22.6 today. Iron profile WNL. 7. Occult blood in stool +. Plan:  1. Hep B core Ab today  2. Awaiting Hep C and AFP results. 3. Cirrhotics tend to run Occult +. Will f/u as OP in 2-4 weeks. 4. Monitor H/H: Transfuse if Hgb<7.   4. Will sign off and be available as needed if any active bleeding occurs. Chief Complaint: GI Bleed      Subjective:  Feeling a bit better; Denies abdominal pain; denies rectal bleeding. ROS: Denies any fevers, chills, rash.      Eyes: conjunctiva normal, EOM normal   Neck: ROM normal, supple and trachea normal   Cardiovascular: heart normal, intact distal pulses, normal rate and regular rhythm   Pulmonary/Chest Wall: breath sounds normal and effort normal   Abdominal: appearance normal, bowel sounds normal and soft, non-acute, non-tender     Patient Active Problem List   Diagnosis Code    Type II diabetes mellitus, uncontrolled ([facility] [address] E11.65    Urinary retention R33.9    Chronic hepatitis C without hepatic coma (HCC) B18.2    Essential hypertension I10    IV drug abuse (HCC) F19.10    Quadriparesis (HCC) R82.93    Umbilical hernia V55.8    Bradycardia R00.1    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    Hyperkalemia E87.5    Hypercalcemia E83.52    Chronic kidney disease, stage III (moderate) (HCC) N18.3    Thrombocytopenia (HCC) D69.6    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) Z89.421    Chronic anemia D64.9    Chronic drug abuse (HCC) F19.10    Hypertension I10    Renal cell carcinoma of left kidney (HCC) C64.2    Urethral erosion N36.8    BPH (benign prostatic hyperplasia) N40.0    Bladder atony N31.2    Cerumen impaction H61.20    Chronic neck pain M54.2, G89.29    Cirrhosis of liver (HCC) K74.60    COPD, moderate (HCC) J44.9    Dry skin dermatitis L85.3    Elevated PSA R97.20    Generalized weakness R53.1    Hematuria R31.9    History of diabetes mellitus Z86.39    History of elevated lipids Z86.39    History of hay fever Z87.09    Hyperlipidemia E78.5    Hypothyroid E03.9    Lung nodules R91.8    Medication management Z79.899    Neck mass R22.1    Pericardial effusion I31.3    Preoperative clearance Z01.818    Prostate disorder N42.9    Recurrent UTI N39.0    Vitamin D deficiency E55.9    Oliguria R34    Renal injury S37.009A    Suprapubic catheter (HCC) Z93.59    Bradycardia, sinus R00.1    Acute UTI N39.0    Type 2 diabetes mellitus with hypoglycemia (HCC) E11.649    Acute renal failure (ARF) (HCC) N17.9    Renal failure (ARF), acute on chronic (HCC) N17.9, N18.9    ATN (acute tubular necrosis) ([facility]) N17.0         Visit Vitals  /82   Pulse 62   Temp 97.6 °F (36.4 °C)   Resp 12   Ht 6' (1.829 m)   Wt 81.6 kg (180 lb)   SpO2 100%   BMI 24.41 kg/m²           Intake/Output Summary (Last 24 hours) at 4/2/2019 1214  Last data filed at 4/2/2019 0934  Gross per 24 hour   Intake 1020 ml   Output 1225 ml   Net -205 ml       CBC w/Diff    Lab Results   Component Value Date/Time    WBC 5.7 04/02/2019 05:29 AM    RBC 2.72 (L) 04/02/2019 05:29 AM    HGB 7.7 (L) 04/02/2019 05:29 AM    HCT 22.6 (L) 04/02/2019 05:29 AM    MCV 83.1 04/02/2019 05:29 AM    MCH 28.3 04/02/2019 05:29 AM    MCHC 34.1 04/02/2019 05:29 AM    RDW 18.0 (H) 04/02/2019 05:29 AM    PLT 69 (L) 04/02/2019 05:29 AM    Lab Results   Component Value Date/Time    GRANS 85 (H) 04/02/2019 05:29 AM    LYMPH 9 (L) 04/02/2019 05:29 AM    EOS 0 04/02/2019 05:29 AM    BANDS 3 04/01/2019 05:47 AM BASOS 0 04/02/2019 05:29 AM    MYELO 2 (H) 04/01/2019 05:47 AM      Basic Metabolic Profile   Recent Labs     04/02/19  0529 04/01/19  0547     141 141   K 3.8  3.8 3.8   *  113* 114*   CO2 24  22 20*   BUN 35*  37* 33*   CA 8.5  8.1* 8.2*   MG 2.4 2.4   PHOS  --  3.6        Hepatic Function    Lab Results   Component Value Date/Time    ALB 2.3 (L) 04/02/2019 05:29 AM    TP 6.4 04/02/2019 05:29 AM     04/02/2019 05:29 AM    Lab Results   Component Value Date/Time    SGOT 16 04/02/2019 05:29 AM          Coags   No results for input(s): PTP, INR, APTT in the last 72 hours. No lab exists for component: INREXT      Will sign off-Thank you for this consultation and the opportunity to participate in the care of this patient. Please do not hesitate to call with any questions or concerns, or should event occur that may necessitate additional GI evaluation. Abdirahman Sparrow NP    [facility]. [email or web address]  Phone: [phone]  Pager: [phone]

## 2019-04-02 NOTE — NURSE NAVIGATOR
Talked with patient and his sister. Told them that PT is recommending SNF vs HHC depending on patient's progress. Patient states he absolutely does not want to go to SNF. States he has personal care aides at home 6-7 days/week and sister is there with him when the aides leave. He states he can get PT/OT at home and would much rather go home. Patient does live on 2nd floor condo and at this point would need transport to get him to 2nd floor but hope therapist can work with him on stair climbing. [address] signed for [facility] - will continue to monitor and assist as needed with discharge planning. Becky Grey.  Redd OLZANON, RN  Care Management  [phone]

## 2019-04-02 NOTE — PROGRESS NOTES
Infectious Disease progress Note    Requested by: Dr. Rafael Harris    Reason: sepsis, hypothermia, hypoglycemia, UTI    Current abx Prior abx   Pip/tazo since 3/31/19 Cefepime 3/28-3/31; metronidazole, vancomycin 3/28     Lines:       Assessment :    59 y.o.  male who has significant history for chronic urinary retention with suprapubic catheter placement, history of Renal Cell carcinoma post-left partial nephrectomy 12/2013 followed by Dr. Leanna Hansen urology, chronic kidney disease baseline Cr 1.6-2.2, history of c-spine laminectomy with post op paralysis, Heroin Drug Abuse, ongoing TOB use, DM2, anemia of chronic disease, thrombocytopenia, hepatitis C with cirrhosis followed by GI Dr. Chadwick Sauer, [facility], Hypothyroidism admitted to [facility] on 3/28/19 with altered mental status. hospitalization at [facility] 3/7/19-3/15/19 for gi bleed, hypothermia, hypoglycemia, sepsis, pneumonia, uti     hospitalization [facility] 3/17/19-3/20/19 for hypoglycemia, hypothermia, uti    Now with acute renal failure, persistent hypoglycemia, altered mentation, seizures,hypothermia      After obtaining detailed history and exam; clinical probability of sepsis seems low. Patient has had recurrent hospitalization since 3/7/19 for hypoglycemia, hypothermia. Endocrinology evaluation appreciated. Suspected hypopituitarism. Yeast in urine culture likely colonizer. Suprapubic cath exchanged 3/26. Urine cultures 3/7 had e.coli. Current urine culture 3/27 reveal 40,000 achromobacter denitrificans, >100,000 candida glabrata. Candida in urine culture likely colonizer. Also, difficult to determine significance of achromobacter in urine culture - could represent colonization versus partially treated uti. Recent outpatient antibiotic use can mask the full clinical presentation. Unable to use trimeth/sulfa due to acute renal failure. Improved mentation today.  Able to communicate effectively. Recommendations:    1. Continue pip/tazo (d3/7)  2. F/u endocrinology recommendations for hypothermia, hypoglycemia  3. F/u neurology recommendations  4. Monitor renal function  5. May complete treatment of UTI with po bactrim whenever renal function improves and ok with nephrologist.     Advance Care planning: full code: discussed  with patient/surrogate decision maker:  Jadyn oLwery: [phone]     Please call me if any further questions or concerns. Will continue to participate in the care of this patient. HPI:    Feels good. No fever,chills, dysuria, abdominal pain. home Medication List    Details   amLODIPine (NORVASC) 10 mg tablet Take 10 mg by mouth daily. levothyroxine (SYNTHROID) 100 mcg tablet Take 100 mcg by mouth Daily (before breakfast). omeprazole (PRILOSEC) 20 mg capsule Take 20 mg by mouth two (2) times a day. sodium bicarbonate 650 mg tablet Take 650 mg by mouth three (3) times daily. sucralfate (CARAFATE) 1 gram tablet Take 1 g by mouth four (4) times daily. therapeutic multivitamin (THERAGRAN) tablet Take 1 Tab by mouth daily. traMADol (ULTRAM) 50 mg tablet Take 50 mg by mouth every six (6) hours as needed for Pain.      trospium (SANCTURA) 20 mg tablet Take 20 mg by mouth daily. pioglitazone (ACTOS) 15 mg tablet Take 1 Tab by mouth. Syringe, Disposable, (BD SYRINGE CATHETER TIP) 60 mL syrg Use 1 syringe daily with irrigations  Qty: 30 Syringe, Refills: 11      docusate sodium (COLACE) 100 mg capsule 100 mg two (2) times daily as needed. ergocalciferol (DRISDOL) 50,000 unit capsule Take 50,000 Units by mouth every seven (7) days. SITagliptin (JANUVIA) 100 mg tablet Take 50 mg by mouth daily. Associated Diagnoses: Pneumonia of both lungs due to infectious organism, unspecified part of lung; Hypoxia; Dyspnea on exertion;  Neuromuscular respiratory weakness; Physical deconditioning      insulin lispro (HUMALOG) 100 unit/mL injection Normal Insulin Sensitivity   For Blood Sugar (mg/dL) of:     Less than 150 =   0 units           150 -199 =   2 units  200 -249 =   4 units  250 -299 =   6 units  300 -349 =   8 units  350 and above =   10 units  If 2 glucose readings are above 200 mg/dL within a 24 hr period, proceed to \"Very Insul  Qty: 1 Vial, Refills: 0      tamsulosin (FLOMAX) 0.4 mg capsule Take 1 Cap by mouth daily. Qty: 30 Cap, Refills: 0      doxycycline (ADOXA) 100 mg tablet Take 100 mg by mouth daily. Associated Diagnoses: Pneumonia of both lungs due to infectious organism, unspecified part of lung; Hypoxia; Dyspnea on exertion;  Neuromuscular respiratory weakness; Physical deconditioning             Current Facility-Administered Medications   Medication Dose Route Frequency    hydrALAZINE (APRESOLINE) tablet 25 mg  25 mg Oral Q8H PRN    hydrALAZINE (APRESOLINE) tablet 25 mg  25 mg Oral TID    insulin lispro (HUMALOG) injection   SubCUTAneous AC&HS    insulin lispro (HUMALOG) injection 6 Units  6 Units SubCUTAneous TIDAC    liothyronine (CYTOMEL) tablet 25 mcg  25 mcg Oral DAILY    hydrocortisone Sod Succ (PF) (SOLU-CORTEF) injection 50 mg  50 mg IntraVENous Q12H    pantoprazole (PROTONIX) granules for oral suspension 40 mg  40 mg Per NG tube BID    piperacillin-tazobactam (ZOSYN) 2.25 g in 0.9% sodium chloride (MBP/ADV) 50 mL MBP  2.25 g IntraVENous Q8H    epoetin cristy-epbx (RETACRIT) 12,000 Units combo injection  12,000 Units SubCUTAneous Q7D    B complex-vitaminC-folic acid (NEPHROCAP) cap  1 Cap Oral DAILY    sodium bicarbonate tablet 650 mg  650 mg Oral TID    calcitRIOL (ROCALTROL) capsule 0.25 mcg  0.25 mcg Oral Q MON, WED & FRI    lactulose (CHRONULAC) solution 10 g  10 g Oral BID    sodium chloride (NS) flush 5-10 mL  5-10 mL IntraVENous PRN    glucose chewable tablet 16 g  4 Tab Oral PRN    glucagon (GLUCAGEN) injection 1 mg  1 mg IntraMUSCular PRN    dextrose (D50W) injection syrg 12.5-25 g 25-50 mL IntraVENous PRN    tamsulosin (FLOMAX) capsule 0.4 mg  0.4 mg Oral DAILY    acetaminophen (TYLENOL) tablet 650 mg  650 mg Oral Q6H PRN    oxyCODONE-acetaminophen (PERCOCET) 5-325 mg per tablet 1 Tab  1 Tab Oral Q6H PRN    naloxone (NARCAN) injection 0.4 mg  0.4 mg IntraVENous PRN    ondansetron (ZOFRAN) injection 4 mg  4 mg IntraVENous Q6H PRN    docusate sodium (COLACE) capsule 100 mg  100 mg Oral BID PRN    levothyroxine (SYNTHROID) tablet 100 mcg  100 mcg Oral 6am       Allergies: Iodine      Temp (24hrs), Av.7 °F (35.9 °C), Min:94.5 °F (34.7 °C), Max:97.6 °F (36.4 °C)    Visit Vitals  /83   Pulse (!) 53   Temp 97.5 °F (36.4 °C)   Resp 10   Ht 6' (1.829 m)   Wt 81.6 kg (180 lb)   SpO2 100%   BMI 24.41 kg/m²       ROS: 12 point ROS obtained in details. Pertinent positives as mentioned in HPI,   otherwise negative    Physical Exam:    General:  Alert, non communicative     Head:  Normocephalic, without obvious abnormality, atraumatic. Eyes:  Conjunctivae/corneas clear. Nose: Nares normal.    Throat: Not examined    Neck: Supple, symmetrical, trachea midline, no adenopathy,    Lungs:   Clear to auscultation bilaterally. Heart:  bradycardia. Abdomen: Soft, no apparent tenderness. Bowel sounds normal. No obvious edema in abdominal wall. SPC in place, no drainage or erythema at insertion site. Cath bag with yellow urine, minimal.   Extremities: LE edema to hips bilaterally, no apparent calf tenderness   Pulses: 2+ and symmetric all extremities. Skin: No rashes or lesions   Neurologic:  doesn't follow commands. Detailed neuro eval not feasible.  .              Labs: Results:   Chemistry Recent Labs     19  0529 19  0547 19  0227   *  223* 192* 170*     141 141 138   K 3.8  3.8 3.8 4.3   *  113* 114* 114*   CO2 24  22 20* 20*   BUN 35*  37* 33* 29*   CREA 4.01*  3.93* 4.13* 4.10*   CA 8.5  8.1* 8.2* 8.4*   AGAP 7  6 7 4   BUCR 9*  9* 8* 7*    100  --    TP 6.4 6.5  --    ALB 2.3* 2.2*  --    GLOB 4.1* 4.3*  --    AGRAT 0.6* 0.5*  --       CBC w/Diff Recent Labs     04/02/19  0529 04/01/19  0547 03/31/19 0227   WBC 5.7 6.2 3.0*   RBC 2.72* 2.68* 2.88*   HGB 7.7* 7.6* 8.1*   HCT 22.6* 22.3* 24.3*   PLT 69* 69* 70*   GRANS 85* 83* 86*   LYMPH 9* 8* 10*   EOS 0 0 0      Microbiology No results for input(s): CULT in the last 72 hours.        RADIOLOGY:    All available imaging studies/reports in [facility] for this admission were reviewed    Dr. Elena Caldwell, Infectious Disease Specialist  [phone]  April 2, 2019  1:54 PM

## 2019-04-03 LAB
25(OH)D3 SERPL-MCNC: 38.5 NG/ML (ref 30–100)
AFP L3 MFR SERPL: NORMAL % (ref 0–9.9)
AFP SERPL-MCNC: 1 NG/ML (ref 0–8)
ALBUMIN SERPL-MCNC: 2.1 G/DL (ref 3.4–5)
ALBUMIN/GLOB SERPL: 0.5 {RATIO} (ref 0.8–1.7)
ALP SERPL-CCNC: 86 U/L (ref 45–117)
ALT SERPL-CCNC: 11 U/L (ref 16–61)
ANION GAP SERPL CALC-SCNC: 6 MMOL/L (ref 3–18)
AST SERPL-CCNC: 18 U/L (ref 15–37)
BACTERIA SPEC CULT: NORMAL
BACTERIA SPEC CULT: NORMAL
BASOPHILS # BLD: 0 K/UL (ref 0–0.1)
BASOPHILS NFR BLD: 0 % (ref 0–2)
BILIRUB SERPL-MCNC: 0.4 MG/DL (ref 0.2–1)
BUN SERPL-MCNC: 39 MG/DL (ref 7–18)
BUN/CREAT SERPL: 10 (ref 12–20)
CA-I SERPL-SCNC: 1.21 MMOL/L (ref 1.12–1.32)
CALCIUM SERPL-MCNC: 8.3 MG/DL (ref 8.5–10.1)
CALCIUM SERPL-MCNC: 8.4 MG/DL (ref 8.5–10.1)
CHLORIDE SERPL-SCNC: 115 MMOL/L (ref 100–108)
CO2 SERPL-SCNC: 25 MMOL/L (ref 21–32)
CREAT SERPL-MCNC: 3.88 MG/DL (ref 0.6–1.3)
DIFFERENTIAL METHOD BLD: ABNORMAL
EOSINOPHIL # BLD: 0 K/UL (ref 0–0.4)
EOSINOPHIL NFR BLD: 0 % (ref 0–5)
ERYTHROCYTE [DISTWIDTH] IN BLOOD BY AUTOMATED COUNT: 18.2 % (ref 11.6–14.5)
GLOBULIN SER CALC-MCNC: 4.2 G/DL (ref 2–4)
GLUCOSE BLD STRIP.AUTO-MCNC: 153 MG/DL (ref 70–110)
GLUCOSE BLD STRIP.AUTO-MCNC: 154 MG/DL (ref 70–110)
GLUCOSE BLD STRIP.AUTO-MCNC: 187 MG/DL (ref 70–110)
GLUCOSE BLD STRIP.AUTO-MCNC: 94 MG/DL (ref 70–110)
GLUCOSE SERPL-MCNC: 160 MG/DL (ref 74–99)
HBV CORE AB SERPL QL IA: POSITIVE
HBV CORE IGM SERPL QL IA: ABNORMAL
HCT VFR BLD AUTO: 23.1 % (ref 36–48)
HEPARIN AGGREGATION,XHEAGT: NEGATIVE
HGB BLD-MCNC: 7.6 G/DL (ref 13–16)
LYMPHOCYTES # BLD: 0.9 K/UL (ref 0.9–3.6)
LYMPHOCYTES NFR BLD: 13 % (ref 21–52)
MAGNESIUM SERPL-MCNC: 2.4 MG/DL (ref 1.6–2.6)
MCH RBC QN AUTO: 27.7 PG (ref 24–34)
MCHC RBC AUTO-ENTMCNC: 32.9 G/DL (ref 31–37)
MCV RBC AUTO: 84.3 FL (ref 74–97)
MONOCYTES # BLD: 0.5 K/UL (ref 0.05–1.2)
MONOCYTES NFR BLD: 7 % (ref 3–10)
NEUTS SEG # BLD: 5.5 K/UL (ref 1.8–8)
NEUTS SEG NFR BLD: 80 % (ref 40–73)
PHOSPHATE SERPL-MCNC: 3.4 MG/DL (ref 2.5–4.9)
PLATELET # BLD AUTO: 67 K/UL (ref 135–420)
PLATELET FACTOR 4,XPF4T: NEGATIVE
POTASSIUM SERPL-SCNC: 3.5 MMOL/L (ref 3.5–5.5)
PROT SERPL-MCNC: 6.3 G/DL (ref 6.4–8.2)
PTH-INTACT SERPL-MCNC: 144.6 PG/ML (ref 18.4–88)
RBC # BLD AUTO: 2.74 M/UL (ref 4.7–5.5)
SERVICE CMNT-IMP: NORMAL
SERVICE CMNT-IMP: NORMAL
SODIUM SERPL-SCNC: 146 MMOL/L (ref 136–145)
WBC # BLD AUTO: 6.9 K/UL (ref 4.6–13.2)

## 2019-04-03 PROCEDURE — 65270000029 HC RM PRIVATE

## 2019-04-03 PROCEDURE — 74011250636 HC RX REV CODE- 250/636: Performed by: FAMILY MEDICINE

## 2019-04-03 PROCEDURE — 82962 GLUCOSE BLOOD TEST: CPT

## 2019-04-03 PROCEDURE — 74011636637 HC RX REV CODE- 636/637: Performed by: INTERNAL MEDICINE

## 2019-04-03 PROCEDURE — 74011250637 HC RX REV CODE- 250/637: Performed by: FAMILY MEDICINE

## 2019-04-03 PROCEDURE — 97530 THERAPEUTIC ACTIVITIES: CPT

## 2019-04-03 PROCEDURE — 85025 COMPLETE CBC W/AUTO DIFF WBC: CPT

## 2019-04-03 PROCEDURE — 84100 ASSAY OF PHOSPHORUS: CPT

## 2019-04-03 PROCEDURE — 82330 ASSAY OF CALCIUM: CPT

## 2019-04-03 PROCEDURE — 83970 ASSAY OF PARATHORMONE: CPT

## 2019-04-03 PROCEDURE — 74011250636 HC RX REV CODE- 250/636: Performed by: INTERNAL MEDICINE

## 2019-04-03 PROCEDURE — 92526 ORAL FUNCTION THERAPY: CPT

## 2019-04-03 PROCEDURE — 84146 ASSAY OF PROLACTIN: CPT

## 2019-04-03 PROCEDURE — 74011250637 HC RX REV CODE- 250/637: Performed by: INTERNAL MEDICINE

## 2019-04-03 PROCEDURE — 97535 SELF CARE MNGMENT TRAINING: CPT

## 2019-04-03 PROCEDURE — 80053 COMPREHEN METABOLIC PANEL: CPT

## 2019-04-03 PROCEDURE — 74011000258 HC RX REV CODE- 258: Performed by: FAMILY MEDICINE

## 2019-04-03 PROCEDURE — 82306 VITAMIN D 25 HYDROXY: CPT

## 2019-04-03 PROCEDURE — 84403 ASSAY OF TOTAL TESTOSTERONE: CPT

## 2019-04-03 PROCEDURE — 36415 COLL VENOUS BLD VENIPUNCTURE: CPT

## 2019-04-03 PROCEDURE — 83001 ASSAY OF GONADOTROPIN (FSH): CPT

## 2019-04-03 PROCEDURE — 84305 ASSAY OF SOMATOMEDIN: CPT

## 2019-04-03 PROCEDURE — 83735 ASSAY OF MAGNESIUM: CPT

## 2019-04-03 RX ADMIN — CALCITRIOL CAPSULES 0.25 MCG 0.25 MCG: 0.25 CAPSULE ORAL at 22:48

## 2019-04-03 RX ADMIN — TAMSULOSIN HYDROCHLORIDE 0.4 MG: 0.4 CAPSULE ORAL at 08:01

## 2019-04-03 RX ADMIN — SODIUM BICARBONATE 650 MG TABLET 650 MG: at 08:01

## 2019-04-03 RX ADMIN — NEPHROCAP 1 CAPSULE: 1 CAP ORAL at 08:02

## 2019-04-03 RX ADMIN — INSULIN LISPRO 3 UNITS: 100 INJECTION, SOLUTION INTRAVENOUS; SUBCUTANEOUS at 08:15

## 2019-04-03 RX ADMIN — INSULIN LISPRO 3 UNITS: 100 INJECTION, SOLUTION INTRAVENOUS; SUBCUTANEOUS at 16:49

## 2019-04-03 RX ADMIN — SODIUM BICARBONATE 650 MG TABLET 650 MG: at 15:56

## 2019-04-03 RX ADMIN — HYDROCORTISONE SODIUM SUCCINATE 50 MG: 100 INJECTION, POWDER, FOR SOLUTION INTRAMUSCULAR; INTRAVENOUS at 08:02

## 2019-04-03 RX ADMIN — PIPERACILLIN AND TAZOBACTAM 2.25 G: 2; .25 INJECTION, POWDER, FOR SOLUTION INTRAVENOUS at 06:35

## 2019-04-03 RX ADMIN — PIPERACILLIN AND TAZOBACTAM 2.25 G: 2; .25 INJECTION, POWDER, FOR SOLUTION INTRAVENOUS at 22:49

## 2019-04-03 RX ADMIN — LIOTHYRONINE SODIUM 25 MCG: 25 TABLET ORAL at 08:02

## 2019-04-03 RX ADMIN — HYDROCORTISONE SODIUM SUCCINATE 50 MG: 100 INJECTION, POWDER, FOR SOLUTION INTRAMUSCULAR; INTRAVENOUS at 22:48

## 2019-04-03 RX ADMIN — PANTOPRAZOLE SODIUM 40 MG: 40 GRANULE, DELAYED RELEASE ORAL at 17:00

## 2019-04-03 RX ADMIN — LEVOTHYROXINE SODIUM 100 MCG: 100 TABLET ORAL at 06:35

## 2019-04-03 RX ADMIN — HYDRALAZINE HYDROCHLORIDE 25 MG: 25 TABLET, FILM COATED ORAL at 06:35

## 2019-04-03 RX ADMIN — PIPERACILLIN AND TAZOBACTAM 2.25 G: 2; .25 INJECTION, POWDER, FOR SOLUTION INTRAVENOUS at 16:48

## 2019-04-03 RX ADMIN — HYDRALAZINE HYDROCHLORIDE 25 MG: 25 TABLET ORAL at 15:57

## 2019-04-03 RX ADMIN — PIPERACILLIN AND TAZOBACTAM 2.25 G: 2; .25 INJECTION, POWDER, FOR SOLUTION INTRAVENOUS at 11:46

## 2019-04-03 RX ADMIN — INSULIN LISPRO 6 UNITS: 100 INJECTION, SOLUTION INTRAVENOUS; SUBCUTANEOUS at 16:48

## 2019-04-03 RX ADMIN — HYDRALAZINE HYDROCHLORIDE 25 MG: 25 TABLET ORAL at 22:48

## 2019-04-03 RX ADMIN — HYDRALAZINE HYDROCHLORIDE 25 MG: 25 TABLET ORAL at 08:01

## 2019-04-03 RX ADMIN — INSULIN LISPRO 6 UNITS: 100 INJECTION, SOLUTION INTRAVENOUS; SUBCUTANEOUS at 11:59

## 2019-04-03 RX ADMIN — INSULIN LISPRO 6 UNITS: 100 INJECTION, SOLUTION INTRAVENOUS; SUBCUTANEOUS at 08:15

## 2019-04-03 RX ADMIN — INSULIN LISPRO 3 UNITS: 100 INJECTION, SOLUTION INTRAVENOUS; SUBCUTANEOUS at 12:00

## 2019-04-03 RX ADMIN — PANTOPRAZOLE SODIUM 40 MG: 40 GRANULE, DELAYED RELEASE ORAL at 08:02

## 2019-04-03 NOTE — ROUTINE PROCESS
Verbal shift change report given to [address] [place] [address] (oncoming nurse) by Kulwant Gutiérrez (offgoing nurse). Report included the following information SBAR, Kardex, Intake/Output, MAR and Cardiac Rhythm Sinus Loren Buff.

## 2019-04-03 NOTE — PROGRESS NOTES
Problem: Self Care Deficits Care Plan (Adult)  Goal: *Acute Goals and Plan of Care (Insert Text)  Description  Occupational Therapy Goals  Initiated 4/1/2019 within 7 day(s). 1.  Patient will simulate self-feeding with supervision/set-up   2. Patient will perform grooming with supervision/set-up. 3.  Patient will perform upper body dressing with minimal assistance/contact guard assist.  4.  Patient will perform lower body dressing with moderate assistance . 5. Patient will participate in upper extremity therapeutic exercise/activities with minimal assistance/contact guard assist for 8 minutes. 6.  Patient will utilize energy conservation techniques during functional activities with minimal verbal cues. Outcome: Progressing Towards Goal      OCCUPATIONAL THERAPY TREATMENT    Patient: Elton Hassan (85 y.o. male)  Date: 4/3/2019  Diagnosis: Oliguria [R34]  Renal injury [S37.009A]  Acute renal failure (ARF) (HCC) [N17.9] <principal problem not specified>       Precautions: Aspiration, Seizure, Skin  PLOF: Pt reports he lives with his sister in a [facility]. Pt was drowsy, and often needed verbal cues to keep eyes opened. He reports he was (I) with feeding and grooming, but did not provide how much assistance he required for bathing, dressing, and toileting PTA. Chart, occupational therapy assessment, plan of care, and goals were reviewed. ASSESSMENT:  Pt is asleep upon entry, requiring additional time and VCs to arouse. Pt reports he didn't get much sleep last night, however, with encouragement agreed to participate in OT. Pt maneuvered to EOB w/Supervision, required Mod A to perform LB dressing (don brief and socks). Pt stating he has a care aid that comes 9-3 daily to help him with ADLs. Pt maneuvered to the BR w/FWW, requiring additional time and VCs for erect posture and safe descend on the toilet. Pt was able to perform his toileting hygiene w/SBA and VCs for thoroughness.  Pt then maneuvered to the sink and was able to perform mult ADL grooming tasks w/Supervision in std for ~ 20 minutes. Pt requires [place] assist (assist RUE w/his LUE) for washing face in std, however was able to complete thoroughly. Pt returned to bed at the end of the session, educated on performing gentle stretches for bilateral distal UE with joint protective strategies, pt verbalized and demonstrated understanding. Progression toward goals:  ?          Improving appropriately and progressing toward goals  ? Improving slowly and progressing toward goals  ? Not making progress toward goals and plan of care will be adjusted     PLAN:  Patient continues to benefit from skilled intervention to address the above impairments. Continue treatment per established plan of care. Discharge Recommendations:  Home Health w/care aid   Further Equipment Recommendations for Discharge:  N/A     SUBJECTIVE:   Patient stated I go to outpatient OT and PT.    OBJECTIVE DATA SUMMARY:   Cognitive/Behavioral Status:  Neurologic State: Alert  Orientation Level: Oriented X4  Cognition: Follows commands  Safety/Judgement: Fall prevention    Functional Mobility and Transfers for ADLs:   Bed Mobility:     Supine to Sit: Supervision  Sit to Supine: Supervision      Transfers:  Sit to Stand: Stand-by assistance;Minimum assistance    Toilet Transfer : Minimum assistance;Stand-by assistance(SBA from the higher surface)    Balance:  Sitting: Intact  Standing: Impaired; With support  Standing - Static: Fair(plus)  Standing - Dynamic : Fair    ADL Intervention:  Feeding  Feeding Assistance: Set-up    Grooming  Grooming Assistance: Supervision(std sinkside)  Washing Face: Supervision/set-up  Washing Hands: Supervision/set-up  Lower Body Dressing Assistance  Underpants: Moderate assistance  Socks:  Moderate assistance    Toileting  Bowel Hygiene: Stand-by assistance    UE Therapeutic Exercises:   See above    Pain:  Pain level pre-treatment: 0/10 Pain level post-treatment: 0/10    Response to intervention: Nurse notified,    Activity Tolerance:    Good  Please refer to the flowsheet for vital signs taken during this treatment. After treatment:   ?  Patient left in no apparent distress sitting up in chair  ? Patient left in no apparent distress in bed  ? Call bell left within reach  ? Nursing notified  ? Caregiver present  ?   Bed alarm activated    Thank you for this referral.  QUINTON Ledesma/L  Time Calculation: 58 mins

## 2019-04-03 NOTE — PROGRESS NOTES
Progress Note    Olayinka Corey  59 y.o. Admit Date: 3/27/2019  Active Problems:    Oliguria (3/28/2019) POA: Unknown      Renal injury (3/28/2019) POA: Unknown      Suprapubic catheter ([address] (3/28/2019) POA: Unknown      Bradycardia, sinus (3/28/2019) POA: Unknown      Acute UTI (3/28/2019) POA: Unknown      Type 2 diabetes mellitus with hypoglycemia ([address] (3/28/2019) POA: Unknown      Acute renal failure (ARF) ([address] (3/28/2019) POA: Unknown      Renal failure (ARF), acute on chronic ([address] (3/31/2019) POA: Yes      ATN (acute tubular necrosis) ([address] (4/1/2019) POA: Clinically Undetermined            Subjective:     Patient feels good, eating lunch, drinking water,clean urine through Suprapubic catheter. A comprehensive review of systems was negative except for that written in the History of Present Illness.     Objective:     Visit Vitals  /77   Pulse (!) 56   Temp 97.4 °F (36.3 °C)   Resp 12   Ht 6' (1.829 m)   Wt 75.8 kg (167 lb)   SpO2 100%   BMI 22.65 kg/m²         Intake/Output Summary (Last 24 hours) at 4/3/2019 1236  Last data filed at 4/3/2019 1146  Gross per 24 hour   Intake 730 ml   Output 1000 ml   Net -270 ml       Current Facility-Administered Medications   Medication Dose Route Frequency Provider Last Rate Last Dose    hydrALAZINE (APRESOLINE) tablet 25 mg  25 mg Oral Q8H PRN Destiny Rubin MD   25 mg at 04/03/19 2094    hydrALAZINE (APRESOLINE) tablet 25 mg  25 mg Oral TID Deanna Aquino MD   25 mg at 04/03/19 0801    insulin lispro (HUMALOG) injection   SubCUTAneous AC&HS Buzz Cooper MD   3 Units at 04/03/19 1200    insulin lispro (HUMALOG) injection 6 Units  6 Units SubCUTAneous TIDAC Buzz Cooper MD   6 Units at 04/03/19 1159    liothyronine (CYTOMEL) tablet 25 mcg  25 mcg Oral DAILY Buzz Cooper MD   25 mcg at 04/03/19 0802    hydrocortisone Sod Succ (PF) (SOLU-CORTEF) injection 50 mg  50 mg IntraVENous Q12H Buzz Cooper MD   50 mg at 04/03/19 0802    piperacillin-tazobactam (ZOSYN) 2.25 g in 0.9% sodium chloride (MBP/ADV) 50 mL MBP  2.25 g IntraVENous Q6H Mila Aquino  mL/hr at 04/03/19 1146 2.25 g at 04/03/19 1146    pantoprazole (PROTONIX) granules for oral suspension 40 mg  40 mg Per NG tube BID Chun Aquino Mc, MD   40 mg at 04/03/19 0802    epoetin cristy-epbx (RETACRIT) 12,000 Units combo injection  12,000 Units SubCUTAneous Q7D Kumar Phillips MD   Stopped at 03/31/19 2100    B complex-vitaminC-folic acid (NEPHROCAP) cap  1 Cap Oral DAILY Kumar Phillips MD   1 Cap at 04/03/19 0802    sodium bicarbonate tablet 650 mg  650 mg Oral TID Kumar Phillips MD   650 mg at 04/03/19 0801    calcitRIOL (ROCALTROL) capsule 0.25 mcg  0.25 mcg Oral Q MON, WED & Jl Huff, Lizzy Zafar MD   0.25 mcg at 04/01/19 2211    lactulose (CHRONULAC) solution 10 g  10 g Oral BID Heather Beltran PA-C   Stopped at 04/03/19 0900    sodium chloride (NS) flush 5-10 mL  5-10 mL IntraVENous PRN Jennifer Valle MD   10 mL at 04/02/19 0658    glucose chewable tablet 16 g  4 Tab Oral PRN Jennifer Valle MD        glucagon (GLUCAGEN) injection 1 mg  1 mg IntraMUSCular PRN Jennifer Valle MD        dextrose (D50W) injection syrg 12.5-25 g  25-50 mL IntraVENous PRN Jennifer Valle MD   25 g at 03/29/19 1104    tamsulosin (FLOMAX) capsule 0.4 mg  0.4 mg Oral DAILY Jennifer Valle MD   0.4 mg at 04/03/19 0801    acetaminophen (TYLENOL) tablet 650 mg  650 mg Oral Q6H PRN Jennifer Valle MD        oxyCODONE-acetaminophen (PERCOCET) 5-325 mg per tablet 1 Tab  1 Tab Oral Q6H PRN Jennifer Valle MD   1 Tab at 04/02/19 2134    naloxone (NARCAN) injection 0.4 mg  0.4 mg IntraVENous PRN Jennifer Valle MD        ondansetron [facility]) injection 4 mg  4 mg IntraVENous Q6H PRN Jennifer Valle MD   4 mg at 03/29/19 0711    docusate sodium (COLACE) capsule 100 mg  100 mg Oral BID PRN Jennifer Valle MD        levothyroxine (SYNTHROID) tablet 100 mcg  100 mcg Oral 6am Alix Aquino MD   100 mcg at 04/03/19 4067        Physical Exam:     Physical Exam:   General:  Alert, cooperative, no distress, appears stated age. Mouth/Throat: Lips, mucosa, and tongue normal. Teeth and gums normal.   Neck: Supple, symmetrical, trachea midline, no adenopathy, thyroid: no enlargement/tenderness/nodules, no carotid bruit and no JVD. Lungs:   Clear to auscultation bilaterally. Heart:  Regular rate and rhythm, S1, S2 normal, no murmur, click, rub or gallop. Abdomen:   Soft, non-tender. Bowel sounds normal. No masses,  No organomegaly. Extremities: Extremities normal, atraumatic, no cyanosis or edema. Data Review:    CBC w/Diff    Recent Labs     04/03/19  0600 04/02/19  0529 04/01/19  0547   WBC 6.9 5.7 6.2   RBC 2.74* 2.72* 2.68*   HGB 7.6* 7.7* 7.6*   HCT 23.1* 22.6* 22.3*   MCV 84.3 83.1 83.2   MCH 27.7 28.3 28.4   MCHC 32.9 34.1 34.1   RDW 18.2* 18.0* 18.0*    Recent Labs     04/03/19  0600 04/02/19  0529 04/01/19  0547   BANDS  --   --  3   MONOS 7 6 4   EOS 0 0 0   BASOS 0 0 0   RDW 18.2* 18.0* 18.0*        Comprehensive Metabolic Profile    Recent Labs     04/03/19  0600 04/02/19  0529 04/01/19  0547   * 145  141 141   K 3.5 3.8  3.8 3.8   * 114*  113* 114*   CO2 25 24  22 20*   BUN 39* 35*  37* 33*   CREA 3.88* 4.01*  3.93* 4.13*    Recent Labs     04/03/19  0600 04/02/19  0529 04/01/19  0547   CA 8.4*  8.3* 8.5  8.1* 8.2*   PHOS 3.4  --  3.6   ALB 2.1* 2.3* 2.2*   TP 6.3* 6.4 6.5   SGOT 18 16 16   TBILI 0.4 0.7 0.4          CLINICAL HISTORY/INDICATION: pulmonary infiltrate , oliguria stage III chronic  kidney disease,, acute renal failure, Acute urinary tract infection, follow-up  infiltrates /edema       COMPARISON: Chest x-ray March 29, 2019, April 3, 2018,      TECHNIQUE: Single AP view     FINDINGS:      The lungs are mildly hypoinflated.  Vague increased parenchymal opacity is seen  at the lateral medial basal lungs with nonpenetration through the left heart. The costophrenic angles are sharp. Pulmonary vascularity is normal. The heart is  normal in size. Lower cervical spine fusion hardware demonstrated.     IMPRESSION  IMPRESSION:     Persistent left greater than right basal opacity likely subsegmental  atelectasis. Cannot exclude small pleural effusion. Impression:       Active Hospital Problems    Diagnosis Date Noted    ATN (acute tubular necrosis) ([address] 04/01/2019    Renal failure (ARF), acute on chronic (HCC) 03/31/2019    Oliguria 03/28/2019    Renal injury 03/28/2019    Suprapubic catheter ([address] 03/28/2019    Bradycardia, sinus 03/28/2019    Acute UTI 03/28/2019    Type 2 diabetes mellitus with hypoglycemia ([address] 03/28/2019    Acute renal failure (ARF) (HCC) 03/28/2019      Non oliguric, renal function improved has anemia, Secondary Hyperparathyroidism. No more acidotic. Plan:     Continue supportive  care, drink more free water, weekly Retacrit , adjust medicine as per egfr.       Manuel Garcia MD

## 2019-04-03 NOTE — PROGRESS NOTES
Infectious Disease progress Note    Requested by: Dr. Cherie Jordan    Reason: sepsis, hypothermia, hypoglycemia, UTI    Current abx Prior abx   Pip/tazo since 3/31/19 Cefepime 3/28-3/31; metronidazole, vancomycin 3/28     Lines:       Assessment :    59 y.o.  male who has significant history for chronic urinary retention with suprapubic catheter placement, history of Renal Cell carcinoma post-left partial nephrectomy 12/2013 followed by Dr. Travis Gómez urology, chronic kidney disease baseline Cr 1.6-2.2, history of c-spine laminectomy with post op paralysis, Heroin Drug Abuse, ongoing TOB use, DM2, anemia of chronic disease, thrombocytopenia, hepatitis C with cirrhosis followed by GI Dr. Elvin Salmon, [facility], Hypothyroidism admitted to [facility] on 3/28/19 with altered mental status. hospitalization at [facility] 3/7/19-3/15/19 for gi bleed, hypothermia, hypoglycemia, sepsis, pneumonia, uti     hospitalization [facility] 3/17/19-3/20/19 for hypoglycemia, hypothermia, uti    Now with acute renal failure, persistent hypoglycemia, altered mentation, seizures,hypothermia      After obtaining detailed history and exam; clinical probability of sepsis seems low. Patient has had recurrent hospitalization since 3/7/19 for hypoglycemia, hypothermia. Endocrinology evaluation appreciated. Suspected hypopituitarism. Yeast in urine culture likely colonizer. Suprapubic cath exchanged 3/26. Urine cultures 3/7 had e.coli. Current urine culture 3/27 reveal 40,000 achromobacter denitrificans, >100,000 candida glabrata. Candida in urine culture likely colonizer. Also, difficult to determine significance of achromobacter in urine culture - could represent colonization versus partially treated uti. Recent outpatient antibiotic use can mask the full clinical presentation. Unable to use trimeth/sulfa due to acute renal failure. Improved mentation today.  Able to communicate effectively. Recommendations:    1. Continue pip/tazo (d 4/7)  2. F/u endocrinology recommendations for hypothermia, hypoglycemia  3. F/u neurology recommendations  4. Monitor renal function  5. May complete treatment of UTI with po bactrim whenever renal function improves and ok with nephrologist.     Advance Care planning: full code: discussed  with patient/surrogate decision maker:  Teresa Cam: [phone]     Please call me if any further questions or concerns. Will continue to participate in the care of this patient. HPI:    Feels good. No fever,chills, dysuria, abdominal pain. home Medication List    Details   amLODIPine (NORVASC) 10 mg tablet Take 10 mg by mouth daily. levothyroxine (SYNTHROID) 100 mcg tablet Take 100 mcg by mouth Daily (before breakfast). omeprazole (PRILOSEC) 20 mg capsule Take 20 mg by mouth two (2) times a day. sodium bicarbonate 650 mg tablet Take 650 mg by mouth three (3) times daily. sucralfate (CARAFATE) 1 gram tablet Take 1 g by mouth four (4) times daily. therapeutic multivitamin (THERAGRAN) tablet Take 1 Tab by mouth daily. traMADol (ULTRAM) 50 mg tablet Take 50 mg by mouth every six (6) hours as needed for Pain.      trospium (SANCTURA) 20 mg tablet Take 20 mg by mouth daily. pioglitazone (ACTOS) 15 mg tablet Take 1 Tab by mouth. Syringe, Disposable, (BD SYRINGE CATHETER TIP) 60 mL syrg Use 1 syringe daily with irrigations  Qty: 30 Syringe, Refills: 11      docusate sodium (COLACE) 100 mg capsule 100 mg two (2) times daily as needed. ergocalciferol (DRISDOL) 50,000 unit capsule Take 50,000 Units by mouth every seven (7) days. SITagliptin (JANUVIA) 100 mg tablet Take 50 mg by mouth daily. Associated Diagnoses: Pneumonia of both lungs due to infectious organism, unspecified part of lung; Hypoxia; Dyspnea on exertion;  Neuromuscular respiratory weakness; Physical deconditioning      insulin lispro (HUMALOG) 100 unit/mL injection Normal Insulin Sensitivity   For Blood Sugar (mg/dL) of:     Less than 150 =   0 units           150 -199 =   2 units  200 -249 =   4 units  250 -299 =   6 units  300 -349 =   8 units  350 and above =   10 units  If 2 glucose readings are above 200 mg/dL within a 24 hr period, proceed to \"Very Insul  Qty: 1 Vial, Refills: 0      tamsulosin (FLOMAX) 0.4 mg capsule Take 1 Cap by mouth daily. Qty: 30 Cap, Refills: 0      doxycycline (ADOXA) 100 mg tablet Take 100 mg by mouth daily. Associated Diagnoses: Pneumonia of both lungs due to infectious organism, unspecified part of lung; Hypoxia; Dyspnea on exertion;  Neuromuscular respiratory weakness; Physical deconditioning             Current Facility-Administered Medications   Medication Dose Route Frequency    hydrALAZINE (APRESOLINE) tablet 25 mg  25 mg Oral Q8H PRN    hydrALAZINE (APRESOLINE) tablet 25 mg  25 mg Oral TID    insulin lispro (HUMALOG) injection   SubCUTAneous AC&HS    insulin lispro (HUMALOG) injection 6 Units  6 Units SubCUTAneous TIDAC    liothyronine (CYTOMEL) tablet 25 mcg  25 mcg Oral DAILY    hydrocortisone Sod Succ (PF) (SOLU-CORTEF) injection 50 mg  50 mg IntraVENous Q12H    piperacillin-tazobactam (ZOSYN) 2.25 g in 0.9% sodium chloride (MBP/ADV) 50 mL MBP  2.25 g IntraVENous Q6H    pantoprazole (PROTONIX) granules for oral suspension 40 mg  40 mg Per NG tube BID    epoetin cristy-epbx (RETACRIT) 12,000 Units combo injection  12,000 Units SubCUTAneous Q7D    B complex-vitaminC-folic acid (NEPHROCAP) cap  1 Cap Oral DAILY    sodium bicarbonate tablet 650 mg  650 mg Oral TID    calcitRIOL (ROCALTROL) capsule 0.25 mcg  0.25 mcg Oral Q MON, WED & FRI    lactulose (CHRONULAC) solution 10 g  10 g Oral BID    sodium chloride (NS) flush 5-10 mL  5-10 mL IntraVENous PRN    glucose chewable tablet 16 g  4 Tab Oral PRN    glucagon (GLUCAGEN) injection 1 mg  1 mg IntraMUSCular PRN    dextrose (D50W) injection syrg 12.5-25 g 25-50 mL IntraVENous PRN    tamsulosin (FLOMAX) capsule 0.4 mg  0.4 mg Oral DAILY    acetaminophen (TYLENOL) tablet 650 mg  650 mg Oral Q6H PRN    oxyCODONE-acetaminophen (PERCOCET) 5-325 mg per tablet 1 Tab  1 Tab Oral Q6H PRN    naloxone (NARCAN) injection 0.4 mg  0.4 mg IntraVENous PRN    ondansetron (ZOFRAN) injection 4 mg  4 mg IntraVENous Q6H PRN    docusate sodium (COLACE) capsule 100 mg  100 mg Oral BID PRN    levothyroxine (SYNTHROID) tablet 100 mcg  100 mcg Oral 6am       Allergies: Iodine      Temp (24hrs), Av.6 °F (36.4 °C), Min:97.5 °F (36.4 °C), Max:97.7 °F (36.5 °C)    Visit Vitals  /77   Pulse 60   Temp 97.5 °F (36.4 °C)   Resp 12   Ht 6' (1.829 m)   Wt 75.8 kg (167 lb)   SpO2 98%   BMI 22.65 kg/m²       ROS: 12 point ROS obtained in details. Pertinent positives as mentioned in HPI,   otherwise negative    Physical Exam:    General:  Alert, non communicative     Head:  Normocephalic, without obvious abnormality, atraumatic. Eyes:  Conjunctivae/corneas clear. Nose: Nares normal.    Throat: Not examined    Neck: Supple, symmetrical, trachea midline, no adenopathy,    Lungs:   Clear to auscultation bilaterally. Heart:  bradycardia. Abdomen: Soft, no apparent tenderness. Bowel sounds normal. No obvious edema in abdominal wall. SPC in place, no drainage or erythema at insertion site. Cath bag with yellow urine, minimal.   Extremities: LE edema to hips bilaterally, no apparent calf tenderness   Pulses: 2+ and symmetric all extremities. Skin: No rashes or lesions   Neurologic:  doesn't follow commands. Detailed neuro eval not feasible.  .              Labs: Results:   Chemistry Recent Labs     19  0600 19  0529 19  0547   * 224*  223* 192*   * 145  141 141   K 3.5 3.8  3.8 3.8   * 114*  113* 114*   CO2 25 24  22 20*   BUN 39* 35*  37* 33*   CREA 3.88* 4.01*  3.93* 4.13*   CA 8.4*  8.3* 8.5  8.1* 8.2*   AGAP 6 7  6 7   BUCR 10* 9* 9* 8*   AP 86 104 100   TP 6.3* 6.4 6.5   ALB 2.1* 2.3* 2.2*   GLOB 4.2* 4.1* 4.3*   AGRAT 0.5* 0.6* 0.5*      CBC w/Diff Recent Labs     04/03/19  0600 04/02/19  0529 04/01/19  0547   WBC 6.9 5.7 6.2   RBC 2.74* 2.72* 2.68*   HGB 7.6* 7.7* 7.6*   HCT 23.1* 22.6* 22.3*   PLT 67* 69* 69*   GRANS 80* 85* 83*   LYMPH 13* 9* 8*   EOS 0 0 0      Microbiology No results for input(s): CULT in the last 72 hours.        RADIOLOGY:    All available imaging studies/reports in [facility] care for this admission were reviewed    Dr. Fabio Whitfield, Infectious Disease Specialist  [phone]  April 3, 2019  1:54 PM

## 2019-04-03 NOTE — PROGRESS NOTES
Hematology/Medical Oncology Progress Note             Name: Ave Gaytan   : 1960   MRN: [id number]   Date: 4/3/2019 9:09 AM     [x]I have reviewed the flowsheet and previous days notes. Events overnight reviewed and discussed with nursing staff. Vital signs and records reviewed. Mr. Brandon Lobo is a 78-year-old -American man who we are following for his chronic anemia and thrombocytopenia. Admitted with Acute on Chronic Renal Failure. Pt voices no new c/o today. ROS:  General: No fevers or chills. Cardiovascular: No chest pain or pressure. No palpitations. Pulmonary: No shortness of breath, cough or wheeze. Gastrointestinal: No abdominal pain, nausea, vomiting or diarrhea. Genitourinary: Pt with suprapubic catheter in place, no complaints  Musculoskeletal: No joint or muscle pain, no back pain. Neurologic: No headache, numbness, tingling or weakness. Vital Signs:    Visit Vitals  /83   Pulse (!) 55   Temp 97.6 °F (36.4 °C)   Resp 11   Ht 6' (1.829 m)   Wt 75.8 kg (167 lb)   SpO2 95%   BMI 22.65 kg/m²       O2 Device: Room air   O2 Flow Rate (L/min): 2 l/min   Temp (24hrs), Av.6 °F (36.4 °C), Min:97.5 °F (36.4 °C), Max:97.7 °F (36.5 °C)       Intake/Output:   Last shift:      No intake/output data recorded. Last 3 shifts:  1901 -  0700  In: 6453 [P.O.:720; I.V.:150]  Out: 1400 [Urine:1400]    Intake/Output Summary (Last 24 hours) at 4/3/2019 0750  Last data filed at 2019 1853  Gross per 24 hour   Intake 870 ml   Output 700 ml   Net 170 ml       Physical Exam:  General: Alert,appears comfortable. NAD  HEENT:  Anicteric sclerae; pink palpebral conjunctivae; mucosa moist.   Resp:  Symmetrical chest expansion, no accessory muscle use; good airway entry; no rales/ wheezing/ rhonchi noted  CV:  S1, S2 present; regular rate and rhythm  GI:  Abdomen soft, non-tender; (+) active bowel sounds. Suprapubic Cath in place. NG tube in place.   Extremities: BLE edema  Skin:  Warm; no rashes/ lesions noted  Neurologic:  Nonfocal          DATA:   Current Facility-Administered Medications   Medication Dose Route Frequency    hydrALAZINE (APRESOLINE) tablet 25 mg  25 mg Oral Q8H PRN    hydrALAZINE (APRESOLINE) tablet 25 mg  25 mg Oral TID    insulin lispro (HUMALOG) injection   SubCUTAneous AC&HS    insulin lispro (HUMALOG) injection 6 Units  6 Units SubCUTAneous TIDAC    liothyronine (CYTOMEL) tablet 25 mcg  25 mcg Oral DAILY    hydrocortisone Sod Succ (PF) (SOLU-CORTEF) injection 50 mg  50 mg IntraVENous Q12H    piperacillin-tazobactam (ZOSYN) 2.25 g in 0.9% sodium chloride (MBP/ADV) 50 mL MBP  2.25 g IntraVENous Q6H    pantoprazole (PROTONIX) granules for oral suspension 40 mg  40 mg Per NG tube BID    epoetin cristy-epbx (RETACRIT) 12,000 Units combo injection  12,000 Units SubCUTAneous Q7D    B complex-vitaminC-folic acid (NEPHROCAP) cap  1 Cap Oral DAILY    sodium bicarbonate tablet 650 mg  650 mg Oral TID    calcitRIOL (ROCALTROL) capsule 0.25 mcg  0.25 mcg Oral Q MON, WED & FRI    lactulose (CHRONULAC) solution 10 g  10 g Oral BID    sodium chloride (NS) flush 5-10 mL  5-10 mL IntraVENous PRN    glucose chewable tablet 16 g  4 Tab Oral PRN    glucagon (GLUCAGEN) injection 1 mg  1 mg IntraMUSCular PRN    dextrose (D50W) injection syrg 12.5-25 g  25-50 mL IntraVENous PRN    tamsulosin (FLOMAX) capsule 0.4 mg  0.4 mg Oral DAILY    acetaminophen (TYLENOL) tablet 650 mg  650 mg Oral Q6H PRN    oxyCODONE-acetaminophen (PERCOCET) 5-325 mg per tablet 1 Tab  1 Tab Oral Q6H PRN    naloxone (NARCAN) injection 0.4 mg  0.4 mg IntraVENous PRN    ondansetron (ZOFRAN) injection 4 mg  4 mg IntraVENous Q6H PRN    docusate sodium (COLACE) capsule 100 mg  100 mg Oral BID PRN    levothyroxine (SYNTHROID) tablet 100 mcg  100 mcg Oral 6am                    Labs:  Recent Labs     04/03/19  0600 04/02/19  0529 04/01/19  0547   WBC 6.9 5.7 6.2   HGB 7.6* 7.7* 7.6*   HCT 23.1* 22.6* 22.3*   PLT 67* 69* 69*     Recent Labs     04/03/19  0600 04/02/19  0529 04/01/19  0547   * 145  141 141   K 3.5 3.8  3.8 3.8   * 114*  113* 114*   CO2 25 24  22 20*   * 224*  223* 192*   BUN 39* 35*  37* 33*   CREA 3.88* 4.01*  3.93* 4.13*   CA 8.4*  8.3* 8.5  8.1* 8.2*   MG  --  2.4 2.4   PHOS 3.4  --  3.6   ALB 2.1* 2.3* 2.2*   SGOT 18 16 16   ALT 11* 11* 13*     No results for input(s): PH, PCO2, PO2, HCO3, FIO2 in the last 72 hours. IMPRESSION:   · Thrombocytopenia  · Renal Cell Carcinoma of left kidney  · Chronic Anemia  · Acute Renal failure CR 3.25 this admission. · Chronic drug use   · Ongoing tobacco use  · HTN             PLAN:   · Today's platelet count  41,821 and we will continue to follow;however, there is no therapeutic intervention warranted at this time. HIT Panel pending. · H/H today is 7.6/23.1: Continue Retacrit 12,000 units Q7 days while inpatient. Iron is 91 and Ferritin is 196. Recommend PRBC transfusion for Hgb <7  · GI has signed off.   · Tobacco and ETOH cessation encouraged   ·        The patient is: [x] acutely ill Risk of deterioration: [] moderate    [] critically ill  [] high         My assessment/plan was discussed with:  [x]nursing []PT/OT    []respiratory therapy [x]Dr.Lloyd RADHA Goff MD/ Dr. Kim Abdullahi MD     []family []       Jana Phelps, NP

## 2019-04-03 NOTE — DIABETES MGMT
Glycemic Control Plan of Care    T2DM with current A1c of 5.3% (3/27/2019). Home diabetes medications:   Actos 15 mg daily. Januvia 100 mg daily. Humalog sliding scale insulin. POC BG range on 4/02/2019:  mg/dL. Noted patient was given orange juice x2 for treatment hypoglycemia. Humalog insulin was held. POC BG report on 4/03/2019 at time of review: 154, 153 mg/dL. Current Meal Intake: recorded  Patient Vitals for the past 100 hrs:   % Diet Eaten   04/02/19 1732 75 %   04/02/19 1400 25 %   04/02/19 0934 50 %   04/01/19 1838 25 %   04/01/19 1312 50 %   04/01/19 0937 0 %   03/31/19 1700 5 %     Recommendation(s):  1.) Cont inpatient glycemic monitoring and intervention. 2.) Assess BG and p o intake on 4/04/2019. Patient is currently on regular diet. Consider changing to diabetic consistent carb diet if patient cont to tolerate po intake. Assessment:  Patient is 59year old with past medical history including type 2 diabetes mellitus, hypertension,  Stage 3 CKD,  Renal call carcinoma, COPD, GI bleed, pericardial effusion, and quadriparesis, and chronic drug abuse - was admitted on 3/27/2019 with report of urinary catheter problem/retetion. Noted:  Sepsis. Chronic hep C  Acute renal failure, chronic UTI. Hypoglycemia. Dysphagia. Discontinued TF on 4/02/2019. T2DM with current A1c of 5.3% (3/27/2019). Most recent blood glucose values:    Results for Ana Luisa Maldonado (MRN [id number]) as of 4/3/2019 14:14   Ref. Range 4/2/2019 06:19 4/2/2019 11:03 4/2/2019 16:19 4/2/2019 16:39 4/2/2019 17:08 4/2/2019 21:33   GLUCOSE,FAST - POC Latest Ref Range: 70 - 110 mg/dL 257 (H) 217 (H) 67 (L) 74 93 112 (H)     Results for Ana Luisa Mass (MRN [id number]) as of 4/3/2019 14:14   Ref.  Range 4/3/2019 07:52 4/3/2019 11:38   GLUCOSE,FAST - POC Latest Ref Range: 70 - 110 mg/dL 154 (H) 153 (H)     Current A1C: 5.3% (3/27/2019) which is equivalent to estimated average blood glucose of 99 mg/dL during the past 2-3 months. Current hospital diabetes medications:   Prandial lispro insulin 6 units TID AC. Correctional lispro insulin ACHS. Very resistant dose. Total daily dose insulin requirement previous day: 4/02/2019:  Lispro: 27 units    Home diabetes medications: Patient reported on 4/02/2019:  Actos 15 mg daily. Januvia 100 mg daily. Humalog sliding scale insulin. Diet: Regular    Goals:  Blood glucose will be within target range of  mg/dL by 4/06/2019.     Education:  ___  Refer to Diabetes Education Record             _X__  Education not indicated at this time    Gualberto El RN [facility]  Pager: [phone]

## 2019-04-03 NOTE — PROGRESS NOTES
Progress Note    Patient: Sunny Deleon MRN: [id number]  CSN: [id number]    YOB: 1960  Age: 59 y.o. Sex: male    DOA: 3/27/2019 LOS:  LOS: 6 days                    Subjective:   Pt is alert and responding to questions. NG tube removed yesterday. SP progressing to regular diet with thin liquids. Pt has no new complaints. Pt is addiment that he does not want to go to nursing home rehab after D/C. Discussed importance of rehab and strength training with pt. He is willing to do in-pt rehab here at [facility]. Decreased to hydrocortisone 50mg q12 hrs. No episodes of hypothermia overnight. Hgb stable at 7.6. Platelets down trending, today: 79    Chief Complaint:   Chief Complaint   Patient presents with    Urinary Catheter Problem    Abdominal Pain       Review of systems  General: No fevers or chills. Cardiovascular: No chest pain or pressure. No palpitations. Pulmonary: No shortness of breath, cough or wheeze. Gastrointestinal: No abdominal pain, nausea or vomiting  Genitourinary: Suprapubic cath in place. Good UOP  Musculoskeletal: Generalized weakness. H/O cervical disc disease  Neurologic: Per Pt's nurse, no recent seizure-like activity. Objective:     Physical Exam:  Visit Vitals  /83   Pulse (!) 55   Temp 97.6 °F (36.4 °C)   Resp 11   Ht 6' (1.829 m)   Wt 75.8 kg (167 lb)   SpO2 95%   BMI 22.65 kg/m²        General:         Alert, slightly sleepy with slowed responses. NAD  HEENT: NC, Atraumatic. Lungs: CTA Bilaterally. No Wheezing/Rhonchi/Rales. Heart:  Regular  rhythm,  No murmur, No Rubs, No Gallops  Abdomen: Soft, Non distended, Non tender. Suprapubic cath in place. Extremities: No LE edema. Psych:   Not anxious or agitated. Wishes to return home  Neurologic:  CN 2-12 grossly intact, No acute neurological deficits    Intake and Output:  Current Shift:  No intake/output data recorded. Last three shifts:  04/01 1901 - 04/03 0700  In: 8241 [P.O.:720;  I.V.:150]  Out: 1400 [[facility]:4254]    Labs: Results:       Chemistry Recent Labs     04/02/19  0529 04/01/19  0547   *  223* 192*     141 141   K 3.8  3.8 3.8   *  113* 114*   CO2 24  22 20*   BUN 35*  37* 33*   CREA 4.01*  3.93* 4.13*   CA 8.5  8.1* 8.2*   AGAP 7  6 7   BUCR 9*  9* 8*    100   TP 6.4 6.5   ALB 2.3* 2.2*   GLOB 4.1* 4.3*   AGRAT 0.6* 0.5*      CBC w/Diff Recent Labs     04/03/19  0600 04/02/19  0529 04/01/19  0547   WBC 6.9 5.7 6.2   RBC 2.74* 2.72* 2.68*   HGB 7.6* 7.7* 7.6*   HCT 23.1* 22.6* 22.3*   PLT 67* 69* 69*   GRANS 80* 85* 83*   LYMPH 13* 9* 8*   EOS 0 0 0      Cardiac Enzymes No results for input(s): CPK, CKND1, LINDA in the last 72 hours. No lab exists for component: CKRMB, TROIP   Coagulation No results for input(s): PTP, INR, APTT in the last 72 hours. No lab exists for component: INREXT    Lipid Panel Lab Results   Component Value Date/Time    Cholesterol, total 160 03/29/2019 02:28 AM    Cholesterol, total 142 03/29/2019 02:28 AM    HDL Cholesterol 58 03/29/2019 02:28 AM    HDL Cholesterol 58 03/29/2019 02:28 AM    LDL, calculated 88.2 03/29/2019 02:28 AM    LDL, calculated 70.2 03/29/2019 02:28 AM    VLDL, calculated 13.8 03/29/2019 02:28 AM    VLDL, calculated 13.8 03/29/2019 02:28 AM    Triglyceride 69 03/29/2019 02:28 AM    Triglyceride 69 03/29/2019 02:28 AM    CHOL/HDL Ratio 2.8 03/29/2019 02:28 AM    CHOL/HDL Ratio 2.4 03/29/2019 02:28 AM      BNP No results for input(s): BNPP in the last 72 hours.    Liver Enzymes Recent Labs     04/02/19  0529   TP 6.4   ALB 2.3*      SGOT 16      Thyroid Studies Lab Results   Component Value Date/Time    TSH 0.74 03/30/2019 10:20 AM          Procedures/imaging: see electronic medical records for all procedures/Xrays and details which were not copied into this note but were reviewed prior to creation of Plan    Medications:   Current Facility-Administered Medications   Medication Dose Route Frequency    hydrALAZINE (APRESOLINE) tablet 25 mg  25 mg Oral Q8H PRN    hydrALAZINE (APRESOLINE) tablet 25 mg  25 mg Oral TID    insulin lispro (HUMALOG) injection   SubCUTAneous AC&HS    insulin lispro (HUMALOG) injection 6 Units  6 Units SubCUTAneous TIDAC    liothyronine (CYTOMEL) tablet 25 mcg  25 mcg Oral DAILY    hydrocortisone Sod Succ (PF) (SOLU-CORTEF) injection 50 mg  50 mg IntraVENous Q12H    piperacillin-tazobactam (ZOSYN) 2.25 g in 0.9% sodium chloride (MBP/ADV) 50 mL MBP  2.25 g IntraVENous Q6H    pantoprazole (PROTONIX) granules for oral suspension 40 mg  40 mg Per NG tube BID    epoetin cristy-epbx (RETACRIT) 12,000 Units combo injection  12,000 Units SubCUTAneous Q7D    B complex-vitaminC-folic acid (NEPHROCAP) cap  1 Cap Oral DAILY    sodium bicarbonate tablet 650 mg  650 mg Oral TID    calcitRIOL (ROCALTROL) capsule 0.25 mcg  0.25 mcg Oral Q MON, WED & FRI    lactulose (CHRONULAC) solution 10 g  10 g Oral BID    sodium chloride (NS) flush 5-10 mL  5-10 mL IntraVENous PRN    glucose chewable tablet 16 g  4 Tab Oral PRN    glucagon (GLUCAGEN) injection 1 mg  1 mg IntraMUSCular PRN    dextrose (D50W) injection syrg 12.5-25 g  25-50 mL IntraVENous PRN    tamsulosin (FLOMAX) capsule 0.4 mg  0.4 mg Oral DAILY    acetaminophen (TYLENOL) tablet 650 mg  650 mg Oral Q6H PRN    oxyCODONE-acetaminophen (PERCOCET) 5-325 mg per tablet 1 Tab  1 Tab Oral Q6H PRN    naloxone (NARCAN) injection 0.4 mg  0.4 mg IntraVENous PRN    ondansetron (ZOFRAN) injection 4 mg  4 mg IntraVENous Q6H PRN    docusate sodium (COLACE) capsule 100 mg  100 mg Oral BID PRN    levothyroxine (SYNTHROID) tablet 100 mcg  100 mcg Oral 6am       Assessment/Plan     Active Problems:    Oliguria (3/28/2019)      Renal injury (3/28/2019)      Suprapubic catheter ([address] (3/28/2019)      Bradycardia, sinus (3/28/2019)      Acute UTI (3/28/2019)      Type 2 diabetes mellitus with hypoglycemia ([facility] (3/28/2019)      Acute renal failure (ARF) ([facility] (3/28/2019)      Renal failure (ARF), acute on chronic ([facility] (3/31/2019)      ATN (acute tubular necrosis) ([facility] (4/1/2019)    Hypoglycemia / Hypothermia/ hypocalcemia/ Adrenal insufficiency/hypopituitarism, hypothyroidism  - Discussed Cosyntropin results with Endocrinology. Recommended to continue to wean off hydrocortisone, unlikely that change from q6 to q8hr yesterday had effect with hypothermia. Decreased to Hydrocortisone 50mg IV q12hr per endocrinology recs. - Per endo, concern for suspected hypopituitarism. Pt currently on levothyroxine 100mcg daily. Had been discontinued off his levothyroixine 8/2018, was only recently restarted last month during hospitalization.    - Also concern for impaired T4 to T3 conversion, added liothyronine 25mcg tab daily per endocrinology. - Intact PTH, FSH/LH, IGF, testosterone and Vit D pending  - Repeat TFT panel tomorrow to assess for improvement     SHEILA on CKD stage 2-3, possibly due to progressive CKD/ Renal Cell Carcinoma of Left Kidney  - Nephrology following  - CT 3/28: Abd with pelvic fluid, pleural effusion  - CXR 3/28: cardiomegaly and pulm edema, questionable infiltrate vs atelectasis. BNP elevated from baseline.  - Cardiology consulted, pt stable and without cardiac symptoms.   - cardiac markers flat/indeterminant, continue monitorig on telemetry  - monitor BMP     UTI/  sepsis   - Lactic acid normal x2   - blood cultures 3/28/19 negative    - Urine Culture 3/27/19  positive for achromobater and candida. Likely colonized by candida.  - Per ID, Continue zosyn IV 2.25mg IV q8hr (today is day 4/7). Hold on switching to bactrim d/t elevated Cr. Will discuss with nephrology pending improvement in renal function.   - Suprapubic cath functioning appropriately, no sign of obstruction.      HTN, HLD, bradycardia chronic HR 40-50s   - Hypertensive overnight on 4/1. Began scheduled hydralazine 25mg tab TID. Continue additional hydralazine 25mg q8h PRN for SBP >160.   - Continue tamsulosin 0.4mg cap daily  - Monitor BP and adjust accordingly. Intermittent elevation likely secondary to h/o discitis  -3/29:  HDL 58 OXS 93.7 ADLER 69      Metabolic possible Infectious Encephalopathy in setting of SHEILA, Sepsis, hypoglycemia  - MRI/MRA negative   - Dr. Michelle Ill started Orlene Cools   - EEG performed 3/29. Diffuse encephalopathic process. No epileptiform or focal abnormalities. Yesterday keppra discontinued, discussed patient with neuro, Dr. Leigha Coleman, was not aware of seizure activity wittness by ICU team or ID consult however reports that with likely provoking hypoglycemia still would recommend to hold off on Keppra at this time and monitor  -  Continue Lactulose.     Hx of recent GIB/ Chronic anemia / pancytopenia  - IV protonix   - Continue to monitor H&H due to h/o GI bleeding. EGD performed 3/8/19 revealed gastritis/ duodenitis. - Platelets stable but with significant drop from baseline. Hold heparin. HIT panel pending.   - F/u with hematology   - DVT prophylaxis with SCDs and PT/OT to increase mobility. Mobilize to chair with assistant as tolerated     Abdominal Pain, CT abdomen Gallbladder with wall slightly thickened and/or pericholecystic fluid  -Abdominal pain resolved.   -RUQ US 3/28/19 no acute cholecystitis  -No N/V noted, Continue Zofran 4mg q6 hr PRN      Hep C, Cirrhosis  -Hep B negative and HIV none reactive   -Hep C AB positive Hep C ab and elevated Hep C virus ab positive   Quant hepatitis c undetectable INR &PT within normal limits. - Cirrhosis is possible cause of Occult + test, no acute concern for GI bleed  -Continue Lactulose 10g BID prn   - AFP pending  - GI signed off, pt needs follow up as outpatient with Dr. Jose Cross in 2-4 wks, recommend to re-consult if any active bleeding     DM2  -Hypoglycemic on admission, A1c 5.3  - Pt now hyperglycemic with POC glc 207. - Continue Humalog sliding scale. Insulin adjustments per endocrinology.   - Continue glc monitoring     Anemia chronic disease, Recent Acute blood loss anemia from UGIB EGD neg for source, chronic thrombocytopenia  - Continue Retacrit and Rocaltrol  - Continue to monitor with daily CBC, transfuse as needed if hgb <7, monitor plt count. History of Drug Abuse, Reported heroin use during recent hospitalization this month, ongoing Tob use, Etoh Use  - H/O  heroin use, does not know if he uses IV drugs currently or not. Has past history of IVDA. - UDS + for opiods  - acute hepatitis panel neg for Hep A&B,  hepatits c Ab positive  No active infection  - HIV negative   - discussed risks, recommended to quit     - pt has h/o poor compliance with multiple admissions    - Pt has agreed to In-pt rehab at [facility] after D/C if approved by program and insurance.     Pending pituitary hormone labs  F/u endo, nephrology, ID and heme  Monitor urine output   F/u PT and OT and speech therapy  Encourage PO fluids     CODE:  Full.  on admission  Sister  is MPOA for patient.    Pt seen and examined independently        Lokesh Aguilar- Student  4/3/2019 7:29 AM      The patient was seen and examined independently, I agree with the student note. With the following discussedwith nursing staff pt has good urine output with suprapubic cath    Discussed with patient inpatient rehab . He declined SNF with h/o cervical disc disease osteomylitis in the past with Rt side residual weakness and generalized weakness multiple admission to different hospital will benefit from inpatient rehab.     Dsicussed with case jake will get inpatinet rehab evaluation today     Cbc, cmp, mag adjust insuline dose with endo   Continue to monitor Bp h/o labial Bp after cervical surgery      Elvis Altamirano MD  4/3/2019

## 2019-04-03 NOTE — PROGRESS NOTES
Problem: Dysphagia (Adult)  Goal: *Acute Goals and Plan of Care (Insert Text)  Description  Patient will:  1. Tolerate PO trials with 0 s/s overt distress in 4/5 trials-met   2. Utilize compensatory swallow strategies/maneuvers (decrease bite/sip, size/rate, alt. liq/sol) with min cues in 4/5 trials-met   3. Perform oral-motor/laryngeal exercises to increase oropharyngeal swallow function with min cues-n/a   4. Complete an objective swallow study (i.e., MBSS) to assess swallow integrity, r/o aspiration, and determine of safest LRD, min A-n/a    Rec:     Regular diet with thin liquids   Meds per patient preference   Aspiration precautions  HOB >45 during po intake, remain >30 for 30-45 minutes after po   Small bites/sips; alternate liquid/solid with slow feeding rate   Oral care three times daily         Outcome: Resolved/Met   SPEECH LANGUAGE PATHOLOGY DYSPHAGIA TREATMENT & DISCHARGE    Patient: Domenica Bustillos (15 y.o. male)  Date: 4/3/2019  Diagnosis: Oliguria [R34]  Renal injury [S37.009A]  Acute renal failure (ARF) (HCC) [N17.9]   Precautions: Aspiration, Seizure, Skin  PLOF: Regular diet with thin liquids    ASSESSMENT  Pt seen for follow up dysphagia tx feeding self regular/thin breakfast meal.  Pt consumed 100% of meal with mildly increased but thorough bolus prep. Pt continuing to demo timely swallow initiation and no overt s/sx aspiration and able to utilize comp strategies independently. Laryngeal elevation adequate upon palpation. Recommend continue current diet with continued aspiration precautions in place. Education completed with pt related to diet recs, aspiration s/sx and to alert MD/RN if symptoms arise. Will sign off. Please re-consult as indicated. Discussed with pt and RN. Progression toward goals:  ?         Improving appropriately - goals met/approximated  ?          Not making progress/Not appropriate - will d/c POC     PLAN:  Recommendations and Planned Interventions:  Maximum therapeutic gains met; safest, least restrictive diet achieved. D/C ST intervention at this time. Discharge Recommendations:  No further dysphagia needs anticipated at discharge       SUBJECTIVE:   Patient stated ? I've been eating everything? .    OBJECTIVE:   Cognitive and Communication Status:  Neurologic State: Alert  Orientation Level: Oriented X4  Cognition: Appropriate decision making, Appropriate safety awareness, Follows commands, Recognition of people/places  Perception: Appears intact  Perseveration: No perseveration noted  Safety/Judgement: Fall prevention  Dysphagia Treatment:  Oral Assessment:  WNL  P.O. Trials:    Vocal quality prior to P.O.: Breathy, Fatigue, Hoarse, Low volume   Consistency Presented: Thin liquid, Solid, Mechanical soft   How Presented: Self-fed/presented, Cup/sip, Spoon, Straw, Successive swallows   Bolus Acceptance: No impairment   Bolus Formation/Control: Impaired   Type of Impairment: Mastication   Propulsion: Delayed (# of seconds)   Oral Residue: Less than 10% of bolus, Lingual   Initiation of Swallow: No impairment   Laryngeal Elevation: Functional   Aspiration Signs/Symptoms: None   Pharyngeal Phase Characteristics: No impairment, issues, or problems    Effective Modifications: Small sips and bites, Alternate liquids/solids   Cues for Modifications: Minimal   Oral Phase Severity: Mild   Pharyngeal Phase Severity : No impairment     PAIN:  Start of Tx: 0  End of Tx: 0     After treatment:   ?              Patient left in no apparent distress sitting up in chair  ? Patient left in no apparent distress in bed  ? Call bell left within reach  ? Nursing notified  ? Caregiver present  ? Bed alarm activated      COMMUNICATION/EDUCATION:   ? Aspiration precautions; swallow safety; compensatory techniques. Pt able to verbalize understanding. ?         Patient unable to participate in education; education ongoing with staff  ? Posted safety precautions in patient's room.   ? Oral-motor/laryngeal strengthening exercises    Thank you for this referral,  Darlen Klinefelter, SLP  Time Calculation: 15 mins

## 2019-04-04 LAB
ALBUMIN SERPL-MCNC: 2.1 G/DL (ref 3.4–5)
ALBUMIN/GLOB SERPL: 0.5 {RATIO} (ref 0.8–1.7)
ALP SERPL-CCNC: 80 U/L (ref 45–117)
ALT SERPL-CCNC: 10 U/L (ref 16–61)
ANION GAP SERPL CALC-SCNC: 8 MMOL/L (ref 3–18)
AST SERPL-CCNC: 16 U/L (ref 15–37)
BASOPHILS # BLD: 0 K/UL (ref 0–0.1)
BASOPHILS NFR BLD: 0 % (ref 0–2)
BILIRUB SERPL-MCNC: 0.4 MG/DL (ref 0.2–1)
BUN SERPL-MCNC: 42 MG/DL (ref 7–18)
BUN/CREAT SERPL: 11 (ref 12–20)
CALCIUM SERPL-MCNC: 8.3 MG/DL (ref 8.5–10.1)
CHLORIDE SERPL-SCNC: 112 MMOL/L (ref 100–108)
CO2 SERPL-SCNC: 24 MMOL/L (ref 21–32)
CREAT SERPL-MCNC: 3.75 MG/DL (ref 0.6–1.3)
DIFFERENTIAL METHOD BLD: ABNORMAL
EOSINOPHIL # BLD: 0 K/UL (ref 0–0.4)
EOSINOPHIL NFR BLD: 0 % (ref 0–5)
ERYTHROCYTE [DISTWIDTH] IN BLOOD BY AUTOMATED COUNT: 18.2 % (ref 11.6–14.5)
FSH SERPL-ACNC: 12.3 MIU/ML
GLOBULIN SER CALC-MCNC: 4 G/DL (ref 2–4)
GLUCOSE BLD STRIP.AUTO-MCNC: 105 MG/DL (ref 70–110)
GLUCOSE BLD STRIP.AUTO-MCNC: 140 MG/DL (ref 70–110)
GLUCOSE BLD STRIP.AUTO-MCNC: 189 MG/DL (ref 70–110)
GLUCOSE BLD STRIP.AUTO-MCNC: 230 MG/DL (ref 70–110)
GLUCOSE SERPL-MCNC: 127 MG/DL (ref 74–99)
HCT VFR BLD AUTO: 22.8 % (ref 36–48)
HCV GENOTYPE: NORMAL
HCV RNA SERPL NAA+PROBE-ACNC: NORMAL IU/ML
HCV RNA SERPL NAA+PROBE-LOG IU: NORMAL LOG10 IU/ML
HGB BLD-MCNC: 7.5 G/DL (ref 13–16)
IGF-I SERPL-MCNC: 73 NG/ML (ref 54–194)
LH SERPL-ACNC: 16.6 MIU/ML
LYMPHOCYTES # BLD: 0.9 K/UL (ref 0.9–3.6)
LYMPHOCYTES NFR BLD: 15 % (ref 21–52)
MAGNESIUM SERPL-MCNC: 2.4 MG/DL (ref 1.6–2.6)
MCH RBC QN AUTO: 27.7 PG (ref 24–34)
MCHC RBC AUTO-ENTMCNC: 32.9 G/DL (ref 31–37)
MCV RBC AUTO: 84.1 FL (ref 74–97)
MONOCYTES # BLD: 0.4 K/UL (ref 0.05–1.2)
MONOCYTES NFR BLD: 6 % (ref 3–10)
NEUTS SEG # BLD: 4.8 K/UL (ref 1.8–8)
NEUTS SEG NFR BLD: 79 % (ref 40–73)
PHOSPHATE SERPL-MCNC: 3.4 MG/DL (ref 2.5–4.9)
PLATELET # BLD AUTO: 66 K/UL (ref 135–420)
POTASSIUM SERPL-SCNC: 3.4 MMOL/L (ref 3.5–5.5)
PROLACTIN SERPL-MCNC: 2.3 NG/ML
PROT SERPL-MCNC: 6.1 G/DL (ref 6.4–8.2)
RBC # BLD AUTO: 2.71 M/UL (ref 4.7–5.5)
SODIUM SERPL-SCNC: 144 MMOL/L (ref 136–145)
TEST INFORMATION, 550045: NORMAL
TESTOST FREE SERPL-MCNC: 5.1 PG/ML (ref 6.6–18.1)
TESTOST SERPL-MCNC: 685 NG/DL (ref 264–916)
WBC # BLD AUTO: 6.1 K/UL (ref 4.6–13.2)

## 2019-04-04 PROCEDURE — 74011250636 HC RX REV CODE- 250/636: Performed by: FAMILY MEDICINE

## 2019-04-04 PROCEDURE — 74011250637 HC RX REV CODE- 250/637: Performed by: INTERNAL MEDICINE

## 2019-04-04 PROCEDURE — 74011250637 HC RX REV CODE- 250/637: Performed by: FAMILY MEDICINE

## 2019-04-04 PROCEDURE — 74011636637 HC RX REV CODE- 636/637: Performed by: INTERNAL MEDICINE

## 2019-04-04 PROCEDURE — 36415 COLL VENOUS BLD VENIPUNCTURE: CPT

## 2019-04-04 PROCEDURE — 74011000258 HC RX REV CODE- 258: Performed by: FAMILY MEDICINE

## 2019-04-04 PROCEDURE — 83735 ASSAY OF MAGNESIUM: CPT

## 2019-04-04 PROCEDURE — 74011250636 HC RX REV CODE- 250/636: Performed by: INTERNAL MEDICINE

## 2019-04-04 PROCEDURE — 85025 COMPLETE CBC W/AUTO DIFF WBC: CPT

## 2019-04-04 PROCEDURE — 65270000029 HC RM PRIVATE

## 2019-04-04 PROCEDURE — 82962 GLUCOSE BLOOD TEST: CPT

## 2019-04-04 PROCEDURE — 97116 GAIT TRAINING THERAPY: CPT

## 2019-04-04 PROCEDURE — 80053 COMPREHEN METABOLIC PANEL: CPT

## 2019-04-04 PROCEDURE — 84100 ASSAY OF PHOSPHORUS: CPT

## 2019-04-04 RX ORDER — HYDROCORTISONE SODIUM SUCCINATE 100 MG/2ML
50 INJECTION, POWDER, FOR SOLUTION INTRAMUSCULAR; INTRAVENOUS DAILY
Status: DISCONTINUED | OUTPATIENT
Start: 2019-04-05 | End: 2019-04-06

## 2019-04-04 RX ORDER — HYDRALAZINE HYDROCHLORIDE 50 MG/1
50 TABLET, FILM COATED ORAL 3 TIMES DAILY
Status: DISCONTINUED | OUTPATIENT
Start: 2019-04-04 | End: 2019-04-06

## 2019-04-04 RX ORDER — POTASSIUM CHLORIDE 20 MEQ/1
20 TABLET, EXTENDED RELEASE ORAL
Status: COMPLETED | OUTPATIENT
Start: 2019-04-04 | End: 2019-04-04

## 2019-04-04 RX ORDER — LIOTHYRONINE SODIUM 25 UG/1
25 TABLET ORAL 2 TIMES DAILY
Status: DISCONTINUED | OUTPATIENT
Start: 2019-04-04 | End: 2019-04-06 | Stop reason: HOSPADM

## 2019-04-04 RX ORDER — POTASSIUM CHLORIDE 20 MEQ/1
40 TABLET, EXTENDED RELEASE ORAL
Status: COMPLETED | OUTPATIENT
Start: 2019-04-04 | End: 2019-04-04

## 2019-04-04 RX ADMIN — LIOTHYRONINE SODIUM 25 MCG: 25 TABLET ORAL at 17:07

## 2019-04-04 RX ADMIN — PIPERACILLIN AND TAZOBACTAM 2.25 G: 2; .25 INJECTION, POWDER, FOR SOLUTION INTRAVENOUS at 16:54

## 2019-04-04 RX ADMIN — HYDRALAZINE HYDROCHLORIDE 50 MG: 50 TABLET, FILM COATED ORAL at 16:53

## 2019-04-04 RX ADMIN — HYDRALAZINE HYDROCHLORIDE 50 MG: 50 TABLET, FILM COATED ORAL at 10:01

## 2019-04-04 RX ADMIN — PIPERACILLIN AND TAZOBACTAM 2.25 G: 2; .25 INJECTION, POWDER, FOR SOLUTION INTRAVENOUS at 10:06

## 2019-04-04 RX ADMIN — NEPHROCAP 1 CAPSULE: 1 CAP ORAL at 10:00

## 2019-04-04 RX ADMIN — POTASSIUM CHLORIDE 40 MEQ: 20 TABLET, EXTENDED RELEASE ORAL at 16:54

## 2019-04-04 RX ADMIN — INSULIN LISPRO 6 UNITS: 100 INJECTION, SOLUTION INTRAVENOUS; SUBCUTANEOUS at 16:30

## 2019-04-04 RX ADMIN — HYDRALAZINE HYDROCHLORIDE 50 MG: 50 TABLET, FILM COATED ORAL at 21:30

## 2019-04-04 RX ADMIN — SODIUM BICARBONATE 650 MG TABLET 650 MG: at 10:01

## 2019-04-04 RX ADMIN — PANTOPRAZOLE SODIUM 40 MG: 40 GRANULE, DELAYED RELEASE ORAL at 09:00

## 2019-04-04 RX ADMIN — PIPERACILLIN AND TAZOBACTAM 2.25 G: 2; .25 INJECTION, POWDER, FOR SOLUTION INTRAVENOUS at 05:56

## 2019-04-04 RX ADMIN — SODIUM BICARBONATE 650 MG TABLET 650 MG: at 21:30

## 2019-04-04 RX ADMIN — HYDRALAZINE HYDROCHLORIDE 25 MG: 25 TABLET, FILM COATED ORAL at 01:50

## 2019-04-04 RX ADMIN — PIPERACILLIN AND TAZOBACTAM 2.25 G: 2; .25 INJECTION, POWDER, FOR SOLUTION INTRAVENOUS at 23:20

## 2019-04-04 RX ADMIN — POTASSIUM CHLORIDE 20 MEQ: 20 TABLET, EXTENDED RELEASE ORAL at 10:01

## 2019-04-04 RX ADMIN — INSULIN LISPRO 6 UNITS: 100 INJECTION, SOLUTION INTRAVENOUS; SUBCUTANEOUS at 11:30

## 2019-04-04 RX ADMIN — TAMSULOSIN HYDROCHLORIDE 0.4 MG: 0.4 CAPSULE ORAL at 10:00

## 2019-04-04 RX ADMIN — SODIUM BICARBONATE 650 MG TABLET 650 MG: at 16:54

## 2019-04-04 RX ADMIN — INSULIN LISPRO 3 UNITS: 100 INJECTION, SOLUTION INTRAVENOUS; SUBCUTANEOUS at 11:30

## 2019-04-04 RX ADMIN — HYDROCORTISONE SODIUM SUCCINATE 50 MG: 100 INJECTION, POWDER, FOR SOLUTION INTRAMUSCULAR; INTRAVENOUS at 10:00

## 2019-04-04 RX ADMIN — LEVOTHYROXINE SODIUM 100 MCG: 100 TABLET ORAL at 05:58

## 2019-04-04 RX ADMIN — PANTOPRAZOLE SODIUM 40 MG: 40 GRANULE, DELAYED RELEASE ORAL at 18:00

## 2019-04-04 RX ADMIN — LIOTHYRONINE SODIUM 25 MCG: 25 TABLET ORAL at 10:06

## 2019-04-04 NOTE — PROGRESS NOTES
2000  - unable to obtain oral or axillary temp from pt, notified pt that I need to obtain rectal temp. pt had family present and requested that I did it after they left. Pt was asymptomatic, a&o x4, NAD    2230. Rectal temp of 93.7. MD notified. Of pt condition, no orders at this time. Repeat temp in 2 hours.     Applied warm blankets to pt    0030 - oral temp of 97.3

## 2019-04-04 NOTE — NURSE NAVIGATOR
PT continues to recommend Out patient PT - patient was able to go up and down stairs today with minimal assistance. Pt not candidate for ARU - too high functioning. Patient has already picked [facility] for when he is ready to d/c. Will continue to monitor and assist as needed with discharge planning. Patient also given some resource information regarding drug usage/addiction. Myra Zamora.  Annelise LOZANON, RN  Care Management  [phone]

## 2019-04-04 NOTE — WOUND CARE
Seen by wound care skin assessment performed at this time. No open wounds noted during skin assessment. Several areas of scabbing noted. No wound care intervention required at this time. No wound care education provided to pt at this time. Plan of care reviewed with nursing to maintain skin integrity. Will turn over care to nursing staff at this time, Corrina Mon RN notified.   Will Nolen, Wound Care Department

## 2019-04-04 NOTE — PROGRESS NOTES
Bedside and Verbal shift change report given to Suzi Adkins (oncoming nurse) by Ernestine Ferris RN (offgoing nurse). Report included the following information SBAR, Kardex, Intake/Output, MAR, Recent Results and Med Rec Status.

## 2019-04-04 NOTE — PROGRESS NOTES
Spoke with Ping Morales RN regarding temp of 96.2. States he is attempting to recheck temp at this time. Warm blankets have been placed on pt.

## 2019-04-04 NOTE — PROGRESS NOTES
Hematology/Medical Oncology Progress Note             Name: Chelsey Price   : 1960   MRN: [id number]   Date: 2019 9:09 AM     [x]I have reviewed the flowsheet and previous days notes. Events overnight reviewed and discussed with nursing staff. Vital signs and records reviewed. Mr. Phyllis Gonzalez is a 78-year-old -American man who we are following for his chronic anemia and thrombocytopenia. Admitted with Acute on Chronic Renal Failure. Pt voices no new c/o today. Expresses readiness to go home tomorrow. ROS:  General: No fevers or chills. Cardiovascular: No chest pain or pressure. No palpitations. Pulmonary: No shortness of breath, cough or wheeze. Gastrointestinal: No abdominal pain, nausea, vomiting or diarrhea. Genitourinary: Pt with suprapubic catheter in place, no complaints  Musculoskeletal: No joint or muscle pain, no back pain. Neurologic: No headache, numbness, tingling or weakness. Vital Signs:    Visit Vitals  /80   Pulse 60   Temp (!) 93.7 °F (34.3 °C) Comment: md notified    Resp 14   Ht 6' (1.829 m)   Wt 76.6 kg (168 lb 14.4 oz)   SpO2 94%   BMI 22.91 kg/m²       O2 Device: Room air   O2 Flow Rate (L/min): 2 l/min   Temp (24hrs), Av.1 °F (35.6 °C), Min:93.7 °F (34.3 °C), Max:97.4 °F (36.3 °C)       Intake/Output:   Last shift:      No intake/output data recorded. Last 3 shifts:  1901 -  0700  In: 150 [I.V.:150]  Out: 1025 [Urine:1025]    Intake/Output Summary (Last 24 hours) at 2019 0752  Last data filed at 4/3/2019 1600  Gross per 24 hour   Intake 150 ml   Output 1025 ml   Net -875 ml       Physical Exam:  General: Alert,appears comfortable. NAD  HEENT:  Anicteric sclerae; pink palpebral conjunctivae; mucosa moist.   Resp:  Symmetrical chest expansion, no accessory muscle use; good airway entry; no rales/ wheezing/ rhonchi noted  CV:  S1, S2 present; regular rate and rhythm  GI:  Abdomen soft, non-tender; (+) active bowel sounds. Suprapubic Cath in place. NG tube in place.   Extremities: BLE edema  Skin:  Warm; no rashes/ lesions noted  Neurologic:  Nonfocal          DATA:   Current Facility-Administered Medications   Medication Dose Route Frequency    potassium chloride (K-DUR, KLOR-CON) SR tablet 20 mEq  20 mEq Oral NOW    hydrALAZINE (APRESOLINE) tablet 25 mg  25 mg Oral Q8H PRN    hydrALAZINE (APRESOLINE) tablet 25 mg  25 mg Oral TID    insulin lispro (HUMALOG) injection   SubCUTAneous AC&HS    insulin lispro (HUMALOG) injection 6 Units  6 Units SubCUTAneous TIDAC    liothyronine (CYTOMEL) tablet 25 mcg  25 mcg Oral DAILY    hydrocortisone Sod Succ (PF) (SOLU-CORTEF) injection 50 mg  50 mg IntraVENous Q12H    piperacillin-tazobactam (ZOSYN) 2.25 g in 0.9% sodium chloride (MBP/ADV) 50 mL MBP  2.25 g IntraVENous Q6H    pantoprazole (PROTONIX) granules for oral suspension 40 mg  40 mg Per NG tube BID    epoetin cristy-epbx (RETACRIT) 12,000 Units combo injection  12,000 Units SubCUTAneous Q7D    B complex-vitaminC-folic acid (NEPHROCAP) cap  1 Cap Oral DAILY    sodium bicarbonate tablet 650 mg  650 mg Oral TID    calcitRIOL (ROCALTROL) capsule 0.25 mcg  0.25 mcg Oral Q MON, WED & FRI    lactulose (CHRONULAC) solution 10 g  10 g Oral BID    sodium chloride (NS) flush 5-10 mL  5-10 mL IntraVENous PRN    glucose chewable tablet 16 g  4 Tab Oral PRN    glucagon (GLUCAGEN) injection 1 mg  1 mg IntraMUSCular PRN    dextrose (D50W) injection syrg 12.5-25 g  25-50 mL IntraVENous PRN    tamsulosin (FLOMAX) capsule 0.4 mg  0.4 mg Oral DAILY    acetaminophen (TYLENOL) tablet 650 mg  650 mg Oral Q6H PRN    oxyCODONE-acetaminophen (PERCOCET) 5-325 mg per tablet 1 Tab  1 Tab Oral Q6H PRN    naloxone (NARCAN) injection 0.4 mg  0.4 mg IntraVENous PRN    ondansetron (ZOFRAN) injection 4 mg  4 mg IntraVENous Q6H PRN    docusate sodium (COLACE) capsule 100 mg  100 mg Oral BID PRN    levothyroxine (SYNTHROID) tablet 100 mcg  100 mcg Oral 6am                    Labs:  Recent Labs     04/04/19  0543 04/03/19  0600 04/02/19  0529   WBC 6.1 6.9 5.7   HGB 7.5* 7.6* 7.7*   HCT 22.8* 23.1* 22.6*   PLT 66* 67* 69*     Recent Labs     04/04/19  0543 04/03/19  0600 04/02/19  0529    146* 145  141   K 3.4* 3.5 3.8  3.8   * 115* 114*  113*   CO2 24 25 24  22   * 160* 224*  223*   BUN 42* 39* 35*  37*   CREA 3.75* 3.88* 4.01*  3.93*   CA 8.3* 8.4*  8.3* 8.5  8.1*   MG 2.4 2.4 2.4   PHOS 3.4 3.4  --    ALB 2.1* 2.1* 2.3*   SGOT 16 18 16   ALT 10* 11* 11*     No results for input(s): PH, PCO2, PO2, HCO3, FIO2 in the last 72 hours. IMPRESSION:   · Thrombocytopenia  · Renal Cell Carcinoma of left kidney  · Chronic Anemia  · Acute Renal failure CR 3.25 this admission. · Chronic drug use   · Ongoing tobacco use  · HTN             PLAN:   · Today's platelet count  38,473 and we will continue to follow;however, there is no therapeutic intervention warranted at this time. HIT Panel pending. · H/H today is 7.5/22.8: Continue Retacrit 12,000 units Q7 days while inpatient. Iron is 91 and Ferritin is 196. Recommend PRBC transfusion for Hgb <7  · GI has signed off.   · Tobacco and ETOH cessation encouraged   ·        The patient is: [x] acutely ill Risk of deterioration: [] moderate    [] critically ill  [] high         My assessment/plan was discussed with:  [x]nursing []PT/OT    []respiratory therapy [x]Dr.Lloyd RADHA Roman MD/ Dr. Pedrito Bazzi MD     []family []       Wilian Potts NP

## 2019-04-04 NOTE — PROGRESS NOTES
Progress Note    Olayinka Corey  59 y.o. Admit Date: 3/27/2019  Active Problems:    Oliguria (3/28/2019) POA: Unknown      Renal injury (3/28/2019) POA: Unknown      Suprapubic catheter ([facility] [address] (3/28/2019) POA: Unknown      Bradycardia, sinus (3/28/2019) POA: Unknown      Acute UTI (3/28/2019) POA: Unknown      Type 2 diabetes mellitus with hypoglycemia ([address] (3/28/2019) POA: Unknown      Acute renal failure (ARF) ([address] (3/28/2019) POA: Unknown      Renal failure (ARF), acute on chronic ([address] (3/31/2019) POA: Yes      ATN (acute tubular necrosis) ([address] (4/1/2019) POA: Clinically Undetermined            Subjective:     Patient is OOB to chair, completed his Stephanie Garre may go home tomorrw & will do OP rehab,decliing  Skilled Nursing home Rehab care, received Retacrit on 03/31/9 & supposed to get  q 7 days      A comprehensive review of systems was negative except for that written in the History of Present Illness.     Objective:     Visit Vitals  /73 (BP Patient Position: At rest)   Pulse 63   Temp 96.2 °F (35.7 °C)   Resp 16   Ht 6' (1.829 m)   Wt 76.6 kg (168 lb 14.4 oz)   SpO2 99%   BMI 22.91 kg/m²         Intake/Output Summary (Last 24 hours) at 4/4/2019 1408  Last data filed at 4/4/2019 1347  Gross per 24 hour   Intake 850 ml   Output 275 ml   Net 575 ml       Current Facility-Administered Medications   Medication Dose Route Frequency Provider Last Rate Last Dose    hydrALAZINE (APRESOLINE) tablet 50 mg  50 mg Oral TID Mila Aquino MD   50 mg at 04/04/19 1001    hydrALAZINE (APRESOLINE) tablet 25 mg  25 mg Oral Q8H PRN Destiny Rubin MD   25 mg at 04/04/19 0150    insulin lispro (HUMALOG) injection   SubCUTAneous AC&HS Buzz Cooper MD   Stopped at 04/03/19 2247    insulin lispro (HUMALOG) injection 6 Units  6 Units SubCUTAneous TIDAC Buzz Cooper MD   Stopped at 04/04/19 0730    liothyronine (CYTOMEL) tablet 25 mcg  25 mcg Oral DAILY Buzz Cooper MD   25 mcg at 04/04/19 1006    hydrocortisone Sod Succ (PF) (SOLU-CORTEF) injection 50 mg  50 mg IntraVENous Q12H Michelle Mello MD   50 mg at 04/04/19 1000    piperacillin-tazobactam (ZOSYN) 2.25 g in 0.9% sodium chloride (MBP/ADV) 50 mL MBP  2.25 g IntraVENous Q6H Mila Aquino  mL/hr at 04/04/19 1006 2.25 g at 04/04/19 1006    pantoprazole (PROTONIX) granules for oral suspension 40 mg  40 mg Per NG tube BID Catalina Aquino MD   40 mg at 04/04/19 0900    epoetin cristy-epbx (RETACRIT) 12,000 Units combo injection  12,000 Units SubCUTAneous Q7D Baldemar Villagomez MD   Stopped at 03/31/19 2100    B complex-vitaminC-folic acid (NEPHROCAP) cap  1 Cap Oral DAILY Baldemar Villagomez MD   1 Cap at 04/04/19 1000    sodium bicarbonate tablet 650 mg  650 mg Oral TID Baldemar Villagomez MD   650 mg at 04/04/19 1001    calcitRIOL (ROCALTROL) capsule 0.25 mcg  0.25 mcg Oral Q MON, WED & Morenita Whitlock MD   0.25 mcg at 04/03/19 2248    lactulose (CHRONULAC) solution 10 g  10 g Oral BID Heather Beltran PA-C   Stopped at 04/03/19 0900    sodium chloride (NS) flush 5-10 mL  5-10 mL IntraVENous PRN Meme Shore MD   10 mL at 04/02/19 0658    glucose chewable tablet 16 g  4 Tab Oral PRN Meme Shore MD        glucagon (GLUCAGEN) injection 1 mg  1 mg IntraMUSCular PRN Meme Shore MD        dextrose (D50W) injection syrg 12.5-25 g  25-50 mL IntraVENous PRN Meme Shore MD   25 g at 03/29/19 1104    tamsulosin (FLOMAX) capsule 0.4 mg  0.4 mg Oral DAILY Meme Shore MD   0.4 mg at 04/04/19 1000    acetaminophen (TYLENOL) tablet 650 mg  650 mg Oral Q6H PRN Meme Shore MD        oxyCODONE-acetaminophen (PERCOCET) 5-325 mg per tablet 1 Tab  1 Tab Oral Q6H PRN Meme Shore MD   1 Tab at 04/02/19 2134    naloxone (NARCAN) injection 0.4 mg  0.4 mg IntraVENous PRN Meme Shore MD        ondansetron [facility]) injection 4 mg  4 mg IntraVENous Q6H PRN Meme Shore MD 4 mg at 03/29/19 1004    docusate sodium (COLACE) capsule 100 mg  100 mg Oral BID PRN Lisbeth Hester MD        levothyroxine (SYNTHROID) tablet 100 mcg  100 mcg Oral 6am Arely Aquino MD   100 mcg at 04/04/19 0688        Physical Exam:     Physical Exam:   General:  Alert, cooperative, no distress, appears stated age. Neck: Supple, symmetrical, trachea midline, no adenopathy, thyroid: no enlargement/tenderness/nodules, no carotid bruit and no JVD. Lungs:   Clear to auscultation bilaterally. Heart:  Regular rate and rhythm, S1, S2 normal, no murmur, click, rub or gallop. Abdomen:   Soft, non-tender. Bowel sounds normal. No masses,  No organomegaly.    Extremities: Extremities normal, atraumatic, has 2 plus edema   Skin: Skin color, texture, turgor normal. No rashes or lesions         Data Review:    CBC w/Diff    Recent Labs     04/04/19 0543 04/03/19  0600 04/02/19  0529   WBC 6.1 6.9 5.7   RBC 2.71* 2.74* 2.72*   HGB 7.5* 7.6* 7.7*   HCT 22.8* 23.1* 22.6*   MCV 84.1 84.3 83.1   MCH 27.7 27.7 28.3   MCHC 32.9 32.9 34.1   RDW 18.2* 18.2* 18.0*    Recent Labs     04/04/19  0543 04/03/19  0600 04/02/19  0529   MONOS 6 7 6   EOS 0 0 0   BASOS 0 0 0   RDW 18.2* 18.2* 18.0*        Comprehensive Metabolic Profile    Recent Labs     04/04/19  0543 04/03/19  0600 04/02/19  0529    146* 145  141   K 3.4* 3.5 3.8  3.8   * 115* 114*  113*   CO2 24 25 24  22   BUN 42* 39* 35*  37*   CREA 3.75* 3.88* 4.01*  3.93*    Recent Labs     04/04/19  0543 04/03/19  0600 04/02/19  0529   CA 8.3* 8.4*  8.3* 8.5  8.1*   PHOS 3.4 3.4  --    ALB 2.1* 2.1* 2.3*   TP 6.1* 6.3* 6.4   SGOT 16 18 16   TBILI 0.4 0.4 0.7                        Impression:       Active Hospital Problems    Diagnosis Date Noted    ATN (acute tubular necrosis) (HCC) 04/01/2019    Renal failure (ARF), acute on chronic (HCC) 03/31/2019    Oliguria 03/28/2019    Renal injury 03/28/2019    Suprapubic catheter ([facility] [address] 03/28/2019    Bradycardia, sinus 03/28/2019    Acute UTI 03/28/2019    Type 2 diabetes mellitus with hypoglycemia ([facility] 03/28/2019    Acute renal failure (ARF) ([facility] 03/28/2019            Plan:     Replace K today, continue supportive care, no Diuretics, low albumin with edema,if he goes home tomorrow then will arrange 1 more dose of Rertacrit  Before D for Anemia from CRF. Would not give any Bactrim at this renal function,Bactrim will raise his Creatinine to a higher level.       Becca Robb MD

## 2019-04-04 NOTE — PROGRESS NOTES
Progress Note    Patient: Bella Corey MRN: [id number]  CSN: [id number]    YOB: 1960  Age: 59 y.o. Sex: male    DOA: 3/27/2019 LOS:  LOS: 7 days                    Subjective:   Pt is sitting up in bed, holding a conversation and is A&Ox3. Pt progressing w/ SP, continues regular diet with thin liquids. He is still weak but continues PT/OT. Pt is improving but not yet at baseline. Pt with temperature drop last night to 93.7F, repeat this am normalized. BP remains stable with SBP in 150s. Pt is asymptomatic during these episodes of hypothermia and denies feeling cold/shivering. He denies any new complaints but continues to wish to go home. Pt reviewed by [facility], they do not feel he meets criteria for admission d/t being too functional. Discussed importance of considering SNF even if only for 2 weeks. He is insistent that his home health nurse and out-pt PT will be enough for him to regain his strength. Discussed concern for pt's health and likely readmission with pt and his sister Annemarie Garcia with whom he lives. She will attempt to talk with the pt again today, however if he is discharged, she will have him come home with her. Discussed pt's hx of substance abuse in detail. Pt has history of alcohol and heroin abuse, says he has been snorting this recently, no IV use. He says that he will try to stay away but is not positive that he can d/t his addiction. I offered  to give resources. Pt plans to get back into [employer] to help him.     - Platelets stable  - AFP: 1, Testosterone: 709, Vit D 38.5,  - PTH elevated at 144.6, Ca mildly low at 8.4  - Send prolactin     Chief Complaint:   Chief Complaint   Patient presents with    Urinary Catheter Problem    Abdominal Pain       Review of systems  General: No fevers, chills or rigors. Cardiovascular: No chest pain or pressure. No palpitations. Pulmonary: No shortness of breath, cough or wheeze. Gastrointestinal: No abdominal pain, N/V  Genitourinary: Suprapubic cath in place. Good UOP  Musculoskeletal: Generalized weakness. H/O cervical disc disease  Neurologic: No HA, Seizure-like activity      Objective:     Physical Exam:  Visit Vitals  /80   Pulse 60   Temp (!) 93.7 °F (34.3 °C)   Resp 14   Ht 6' (1.829 m)   Wt 76.6 kg (168 lb 14.4 oz)   SpO2 94%   BMI 22.91 kg/m²        General: A&Ox3, NAD  HEENT: NC, Atraumatic. Lungs: CTA Bilaterally. No Wheezing/Rhonchi/Rales. Heart: Regular  rhythm,  No murmur, No Rubs, No Gallops  Abdomen: Soft, Non distended, Non tender. Suprapubic cath in place. Extremities: Mild 1+ LE edema   Psych: Not anxious or agitated. Wishes to return home. Polysubstance abuse  Neurologic: CN 2-12 grossly intact, generalized weakness. No acute neurological deficits    Intake and Output:  Current Shift:  No intake/output data recorded. Last three shifts:  04/02 0701 - 04/03 1900  In: 3124 [P.O.:720; I.V.:300]  Out: 1725 [Urine:1725]    Labs: Results:       Chemistry Recent Labs     04/03/19  0600 04/02/19  0529   * 224*  223*   * 145  141   K 3.5 3.8  3.8   * 114*  113*   CO2 25 24  22   BUN 39* 35*  37*   CREA 3.88* 4.01*  3.93*   CA 8.4*  8.3* 8.5  8.1*   AGAP 6 7  6   BUCR 10* 9*  9*   AP 86 104   TP 6.3* 6.4   ALB 2.1* 2.3*   GLOB 4.2* 4.1*   AGRAT 0.5* 0.6*      CBC w/Diff Recent Labs     04/03/19  0600 04/02/19  0529   WBC 6.9 5.7   RBC 2.74* 2.72*   HGB 7.6* 7.7*   HCT 23.1* 22.6*   PLT 67* 69*   GRANS 80* 85*   LYMPH 13* 9*   EOS 0 0      Cardiac Enzymes No results for input(s): CPK, CKND1, LINDA in the last 72 hours. No lab exists for component: CKRMB, TROIP   Coagulation No results for input(s): PTP, INR, APTT in the last 72 hours.     No lab exists for component: INREXT    Lipid Panel Lab Results   Component Value Date/Time    Cholesterol, total 160 03/29/2019 02:28 AM    Cholesterol, total 142 03/29/2019 02:28 AM    HDL Cholesterol 58 03/29/2019 02:28 AM    HDL Cholesterol 58 03/29/2019 02:28 AM    LDL, calculated 88.2 03/29/2019 02:28 AM    LDL, calculated 70.2 03/29/2019 02:28 AM    VLDL, calculated 13.8 03/29/2019 02:28 AM    VLDL, calculated 13.8 03/29/2019 02:28 AM    Triglyceride 69 03/29/2019 02:28 AM    Triglyceride 69 03/29/2019 02:28 AM    CHOL/HDL Ratio 2.8 03/29/2019 02:28 AM    CHOL/HDL Ratio 2.4 03/29/2019 02:28 AM      BNP No results for input(s): BNPP in the last 72 hours.    Liver Enzymes Recent Labs     04/03/19  0600   TP 6.3*   ALB 2.1*   AP 86   SGOT 18      Thyroid Studies Lab Results   Component Value Date/Time    TSH 0.74 03/30/2019 10:20 AM          Procedures/imaging: see electronic medical records for all procedures/Xrays and details which were not copied into this note but were reviewed prior to creation of Plan    Medications:   Current Facility-Administered Medications   Medication Dose Route Frequency    hydrALAZINE (APRESOLINE) tablet 25 mg  25 mg Oral Q8H PRN    hydrALAZINE (APRESOLINE) tablet 25 mg  25 mg Oral TID    insulin lispro (HUMALOG) injection   SubCUTAneous AC&HS    insulin lispro (HUMALOG) injection 6 Units  6 Units SubCUTAneous TIDAC    liothyronine (CYTOMEL) tablet 25 mcg  25 mcg Oral DAILY    hydrocortisone Sod Succ (PF) (SOLU-CORTEF) injection 50 mg  50 mg IntraVENous Q12H    piperacillin-tazobactam (ZOSYN) 2.25 g in 0.9% sodium chloride (MBP/ADV) 50 mL MBP  2.25 g IntraVENous Q6H    pantoprazole (PROTONIX) granules for oral suspension 40 mg  40 mg Per NG tube BID    epoetin cristy-epbx (RETACRIT) 12,000 Units combo injection  12,000 Units SubCUTAneous Q7D    B complex-vitaminC-folic acid (NEPHROCAP) cap  1 Cap Oral DAILY    sodium bicarbonate tablet 650 mg  650 mg Oral TID    calcitRIOL (ROCALTROL) capsule 0.25 mcg  0.25 mcg Oral Q MON, WED & FRI    lactulose (CHRONULAC) solution 10 g  10 g Oral BID    sodium chloride (NS) flush 5-10 mL  5-10 mL IntraVENous PRN    glucose chewable tablet 16 g  4 Tab Oral PRN    glucagon (GLUCAGEN) injection 1 mg  1 mg IntraMUSCular PRN    dextrose (D50W) injection syrg 12.5-25 g  25-50 mL IntraVENous PRN    tamsulosin (FLOMAX) capsule 0.4 mg  0.4 mg Oral DAILY    acetaminophen (TYLENOL) tablet 650 mg  650 mg Oral Q6H PRN    oxyCODONE-acetaminophen (PERCOCET) 5-325 mg per tablet 1 Tab  1 Tab Oral Q6H PRN    naloxone (NARCAN) injection 0.4 mg  0.4 mg IntraVENous PRN    ondansetron (ZOFRAN) injection 4 mg  4 mg IntraVENous Q6H PRN    docusate sodium (COLACE) capsule 100 mg  100 mg Oral BID PRN    levothyroxine (SYNTHROID) tablet 100 mcg  100 mcg Oral 6am       Assessment/Plan     Active Problems:    Oliguria (3/28/2019)      Renal injury (3/28/2019)      Suprapubic catheter ([facility] [address] (3/28/2019)      Bradycardia, sinus (3/28/2019)      Acute UTI (3/28/2019)      Type 2 diabetes mellitus with hypoglycemia ([address] (3/28/2019)      Acute renal failure (ARF) ([address] (3/28/2019)      Renal failure (ARF), acute on chronic ([address] (3/31/2019)      ATN (acute tubular necrosis) ([address] (4/1/2019)    Hypoglycemia / Hypothermia/ hypocalcemia/ Adrenal insufficiency/hypopituitarism, hypothyroidism  - Discussed Cosyntropin results with Endocrinology. Recommended to continue to wean off hydrocortisone, continue wean per endocrinology recs.    - Per endo, concern for suspected hypopituitarism.  Pt currently on levothyroxine 100mcg daily. Had been discontinued off his levothyroixine 8/2018, was only recently restarted last month during hospitalization.    - Also concern for impaired T4 to T3 conversion, added liothyronine 25mcg tab daily per endocrinology. Will recheck TFT panel tomorrow   - PTH elevated at 144.6, Ca mildly low at 8.4  - FSH/LH and IGF pending, prolactin ordered  - AFP, Testosterone and vit D all wnl (AFP: 1, Testosterone: 709, Vit D 38.5)     SHEILA on CKD stage 2-3, possibly due to progressive CKD/ Renal Cell Carcinoma of Left Kidney  - Nephrology following  - CT 3/28: Abd with pelvic fluid, pleural effusion  - CXR 3/28: cardiomegaly and pulm edema, questionable infiltrate vs atelectasis. BNP elevated from baseline.  - Cardiology consulted, pt stable and without cardiac symptoms.   - cardiac markers flat/indeterminant, continue monitorig on telemetry  - monitor BMP  - Increase PO free water intake  - Replace Potassium today, monitor     UTI/  sepsis   - Lactic acid normal x2   - blood cultures 3/28/19 negative    - Urine Culture 3/27/19  positive for achromobater and candida. Likely colonized by candida.  - Per ID, Continue zosyn IV 2.25mg IV q8hr (today is day 5/7). Hold on switching to bactrim d/t elevated Cr. Will discuss with nephrology pending improvement in renal function.   - Suprapubic cath functioning appropriately, no sign of obstruction.      HTN, HLD, bradycardia chronic HR 40-50s   - Increase hydralazine to 50mg TID. Continue additional hydralazine 25mg q8h PRN for SBP >160.  - Continue tamsulosin 0.4mg cap daily  - Monitor BP and adjust accordingly. Avoid AV lydia blocking agents due to bradycardia.  -3/29:  HDL 58 OXX 74.2 FM 69      Metabolic possible Infectious Encephalopathy in setting of SHEILA, Sepsis, hypoglycemia  - MRI/MRA negative   - Dr. Harris Velazquez started [address] 3/29. Diffuse encephalopathic process. No epileptiform or focal abnormalities. Yesterday keppra discontinued, discussed patient with neuro, Dr. Laney Thomason, was not aware of seizure activity wittness by ICU team or ID consult however reports that with likely provoking hypoglycemia still would recommend to hold off on Keppra at this time and monitor  -  Continue Lactulose.     Hx of recent GIB/ Chronic anemia / pancytopenia  - IV protonix   - Continue to monitor H&H due to h/o GI bleeding. EGD performed 3/8/19 revealed gastritis/ duodenitis. - Platelets stable but with significant drop from baseline. Hold heparin.  HIT panel negative for platelet factor 4 and Heparin aggregation. F/u with hematology   - DVT prophylaxis with SCDs and PT/OT to increase mobility. Mobilize to chair with assistant as tolerated     Abdominal Pain, CT abdomen Gallbladder with wall slightly thickened and/or pericholecystic fluid  -Abdominal pain resolved.   -RUQ US 3/28/19 no acute cholecystitis  -No N/V noted, Continue Zofran 4mg q6 hr PRN      Hep C, Cirrhosis  -Hep B negative and HIV none reactive   -Hep C AB positive Hep C ab and elevated Hep C virus ab positive Quant hepatitis c undetectable INR &PT within normal limits. - Cirrhosis is possible cause of Occult + test, no acute concern for GI bleed  -Continue Lactulose 10g BID prn   - AFP normal  - GI signed off, pt needs follow up as outpatient with Dr. Pino Colon in 2-4 wks, recommend to re-consult if any active bleeding     DM2  -Hypoglycemic on admission, A1c 5.3  - Pt now hyperglycemic with POC glc 207. - Continue Humalog sliding scale. Insulin adjustments per endocrinology. - Continue glc monitoring     Anemia chronic disease, Recent Acute blood loss anemia from UGIB EGD neg for source, chronic thrombocytopenia  - Continue Retacrit and Rocaltrol  - Continue to monitor with daily CBC, transfuse as needed if hgb <7, monitor plt count.     History of Drug Abuse, Reported heroin use during recent hospitalization this month, ongoing Tob use, Etoh Use  - H/O  heroin use, indorses snorting heroin. Has past history of IVDA none currently. UDS +Opiods  - H/O alcohol abuse  - acute hepatitis panel neg for Hep A&B,  hepatits c Ab positive  No active infection  - HIV negative   - Discussed significant risks, recommended to quit. Pt is willing but not confident he can quit. Social work coonsulted for possible pt resources to help quit. Pt may benefit from Psychiatry as out-pt.  He say he will get involved in [employer] to help him stay away from drugs.      - pt has h/o poor compliance with multiple admissions     - Pt denied by [facility]. Pt unwilling to go to SNF after extensive discussion with patient regarding concern for re-admission x3. Strongly recommended he consider short-term SNF for rehab, health concerns and concern for readmission. Pt's sister is on board with SNF and will discuss with pt further. Pt does have 8-9 steps to navigate to get into home, will ask PT to evaluate to ensure he is safe as at this time he strongly desires discharge to home and likely this will be the discharge plan.       Pending pituitary hormone labs  F/u endo, nephrology, ID and heme  Monitor urine output   F/u PT and OT and speech therapy  Encourage PO fluids  Social work involvement for polysubstance abuse  D/C planning: SNF vs home health/ out-pt rehab     CODE:  Full.  on admission  Sister  is MPOA for patient. Trish Alvarez patient is awake, alert, oriented, able to participate in his own medical decision making.       Lokesh Pepe- Student  4/3/2019 7:29 AM        Patient seen and examined independently. Reviewed PA student note and changes made where appropriate.   Agree with assessment and plan    Signed By: Tameka Alba MD     April 4, 2019     0904

## 2019-04-04 NOTE — PROGRESS NOTES
Infectious Disease progress Note    Requested by: Dr. Deja Anderson- Kimberli Hung    Reason: sepsis, hypothermia, hypoglycemia, UTI    Current abx Prior abx   Pip/tazo since 3/31/19 Cefepime 3/28-3/31; metronidazole, vancomycin 3/28     Lines:       Assessment :    59 y.o.  male who has significant history for chronic urinary retention with suprapubic catheter placement, history of Renal Cell carcinoma post-left partial nephrectomy 12/2013 followed by Dr. Porter Situ urology, chronic kidney disease baseline Cr 1.6-2.2, history of c-spine laminectomy with post op paralysis, Heroin Drug Abuse, ongoing TOB use, DM2, anemia of chronic disease, thrombocytopenia, hepatitis C with cirrhosis followed by GI Dr. Analia Escobedo, [facility], Hypothyroidism admitted to [facility] on 3/28/19 with altered mental status. hospitalization at [facility] 3/7/19-3/15/19 for gi bleed, hypothermia, hypoglycemia, sepsis, pneumonia, uti     hospitalization [facility] 3/17/19-3/20/19 for hypoglycemia, hypothermia, uti    Now with acute renal failure, persistent hypoglycemia, altered mentation, seizures,hypothermia      After obtaining detailed history and exam; clinical probability of sepsis seems low. Patient has had recurrent hospitalization since 3/7/19 for hypoglycemia, hypothermia. Endocrinology evaluation appreciated. Suspected hypopituitarism. Yeast in urine culture likely colonizer. Suprapubic cath exchanged 3/26. Urine cultures 3/7 had e.coli. Current urine culture 3/27 reveal 40,000 achromobacter denitrificans, >100,000 candida glabrata. Candida in urine culture likely colonizer. Also, difficult to determine significance of achromobacter in urine culture - could represent colonization versus partially treated uti. Recent outpatient antibiotic use can mask the full clinical presentation. Unable to use trimeth/sulfa due to acute renal failure. Improved mentation .  Able to communicate effectively. Recommendations:    1. Continue pip/tazo (d 5/7)  2. F/u endocrinology recommendations for hypothermia, hypoglycemia  3. F/u neurology recommendations  4. Monitor renal function  5. May complete treatment of UTI with po bactrim whenever renal function improves and ok with nephrologist.     Advance Care planning: full code: discussed  with patient/surrogate decision maker:  Devyn Noss: [phone]     Please call me if any further questions or concerns. Will continue to participate in the care of this patient. HPI:    Feels good. No fever,chills, dysuria, abdominal pain. home Medication List    Details   amLODIPine (NORVASC) 10 mg tablet Take 10 mg by mouth daily. levothyroxine (SYNTHROID) 100 mcg tablet Take 100 mcg by mouth Daily (before breakfast). omeprazole (PRILOSEC) 20 mg capsule Take 20 mg by mouth two (2) times a day. sodium bicarbonate 650 mg tablet Take 650 mg by mouth three (3) times daily. sucralfate (CARAFATE) 1 gram tablet Take 1 g by mouth four (4) times daily. therapeutic multivitamin (THERAGRAN) tablet Take 1 Tab by mouth daily. traMADol (ULTRAM) 50 mg tablet Take 50 mg by mouth every six (6) hours as needed for Pain.      trospium (SANCTURA) 20 mg tablet Take 20 mg by mouth daily. pioglitazone (ACTOS) 15 mg tablet Take 1 Tab by mouth. Syringe, Disposable, (BD SYRINGE CATHETER TIP) 60 mL syrg Use 1 syringe daily with irrigations  Qty: 30 Syringe, Refills: 11      docusate sodium (COLACE) 100 mg capsule 100 mg two (2) times daily as needed. ergocalciferol (DRISDOL) 50,000 unit capsule Take 50,000 Units by mouth every seven (7) days. SITagliptin (JANUVIA) 100 mg tablet Take 50 mg by mouth daily. Associated Diagnoses: Pneumonia of both lungs due to infectious organism, unspecified part of lung; Hypoxia; Dyspnea on exertion;  Neuromuscular respiratory weakness; Physical deconditioning      insulin lispro (HUMALOG) 100 unit/mL injection Normal Insulin Sensitivity   For Blood Sugar (mg/dL) of:     Less than 150 =   0 units           150 -199 =   2 units  200 -249 =   4 units  250 -299 =   6 units  300 -349 =   8 units  350 and above =   10 units  If 2 glucose readings are above 200 mg/dL within a 24 hr period, proceed to \"Very Insul  Qty: 1 Vial, Refills: 0      tamsulosin (FLOMAX) 0.4 mg capsule Take 1 Cap by mouth daily. Qty: 30 Cap, Refills: 0      doxycycline (ADOXA) 100 mg tablet Take 100 mg by mouth daily. Associated Diagnoses: Pneumonia of both lungs due to infectious organism, unspecified part of lung; Hypoxia; Dyspnea on exertion;  Neuromuscular respiratory weakness; Physical deconditioning             Current Facility-Administered Medications   Medication Dose Route Frequency    potassium chloride (K-DUR, KLOR-CON) SR tablet 20 mEq  20 mEq Oral NOW    hydrALAZINE (APRESOLINE) tablet 25 mg  25 mg Oral Q8H PRN    hydrALAZINE (APRESOLINE) tablet 25 mg  25 mg Oral TID    insulin lispro (HUMALOG) injection   SubCUTAneous AC&HS    insulin lispro (HUMALOG) injection 6 Units  6 Units SubCUTAneous TIDAC    liothyronine (CYTOMEL) tablet 25 mcg  25 mcg Oral DAILY    hydrocortisone Sod Succ (PF) (SOLU-CORTEF) injection 50 mg  50 mg IntraVENous Q12H    piperacillin-tazobactam (ZOSYN) 2.25 g in 0.9% sodium chloride (MBP/ADV) 50 mL MBP  2.25 g IntraVENous Q6H    pantoprazole (PROTONIX) granules for oral suspension 40 mg  40 mg Per NG tube BID    epoetin cristy-epbx (RETACRIT) 12,000 Units combo injection  12,000 Units SubCUTAneous Q7D    B complex-vitaminC-folic acid (NEPHROCAP) cap  1 Cap Oral DAILY    sodium bicarbonate tablet 650 mg  650 mg Oral TID    calcitRIOL (ROCALTROL) capsule 0.25 mcg  0.25 mcg Oral Q MON, WED & FRI    lactulose (CHRONULAC) solution 10 g  10 g Oral BID    sodium chloride (NS) flush 5-10 mL  5-10 mL IntraVENous PRN    glucose chewable tablet 16 g  4 Tab Oral PRN    glucagon (GLUCAGEN) injection 1 mg  1 mg IntraMUSCular PRN    dextrose (D50W) injection syrg 12.5-25 g  25-50 mL IntraVENous PRN    tamsulosin (FLOMAX) capsule 0.4 mg  0.4 mg Oral DAILY    acetaminophen (TYLENOL) tablet 650 mg  650 mg Oral Q6H PRN    oxyCODONE-acetaminophen (PERCOCET) 5-325 mg per tablet 1 Tab  1 Tab Oral Q6H PRN    naloxone (NARCAN) injection 0.4 mg  0.4 mg IntraVENous PRN    ondansetron (ZOFRAN) injection 4 mg  4 mg IntraVENous Q6H PRN    docusate sodium (COLACE) capsule 100 mg  100 mg Oral BID PRN    levothyroxine (SYNTHROID) tablet 100 mcg  100 mcg Oral 6am       Allergies: Iodine      Temp (24hrs), Av.5 °F (35.8 °C), Min:93.7 °F (34.3 °C), Max:97.7 °F (36.5 °C)    Visit Vitals  /79 (BP 1 Location: Right arm, BP Patient Position: At rest)   Pulse 60   Temp 97.7 °F (36.5 °C)   Resp 17   Ht 6' (1.829 m)   Wt 76.6 kg (168 lb 14.4 oz)   SpO2 99%   BMI 22.91 kg/m²       ROS: 12 point ROS obtained in details. Pertinent positives as mentioned in HPI,   otherwise negative    Physical Exam:    General:  Alert, non communicative     Head:  Normocephalic, without obvious abnormality, atraumatic. Eyes:  Conjunctivae/corneas clear. Nose: Nares normal.    Throat: Not examined    Neck: Supple, symmetrical, trachea midline, no adenopathy,    Lungs:   Clear to auscultation bilaterally. Heart:  bradycardia. Abdomen: Soft, no apparent tenderness. Bowel sounds normal. No obvious edema in abdominal wall. SPC in place, no drainage or erythema at insertion site. Cath bag with yellow urine, minimal.   Extremities: LE edema to hips bilaterally, no apparent calf tenderness   Pulses: 2+ and symmetric all extremities. Skin: No rashes or lesions   Neurologic:  doesn't follow commands. Detailed neuro eval not feasible.  .              Labs: Results:   Chemistry Recent Labs     19  0543 19  0600 19  0529   * 160* 224*  223*    146* 145  141   K 3.4* 3.5 3.8  3.8   * 115* 114* 113*   CO2 24 25 24  22   BUN 42* 39* 35*  37*   CREA 3.75* 3.88* 4.01*  3.93*   CA 8.3* 8.4*  8.3* 8.5  8.1*   AGAP 8 6 7  6   BUCR 11* 10* 9*  9*   AP 80 86 104   TP 6.1* 6.3* 6.4   ALB 2.1* 2.1* 2.3*   GLOB 4.0 4.2* 4.1*   AGRAT 0.5* 0.5* 0.6*      CBC w/Diff Recent Labs     04/04/19  0543 04/03/19  0600 04/02/19  0529   WBC 6.1 6.9 5.7   RBC 2.71* 2.74* 2.72*   HGB 7.5* 7.6* 7.7*   HCT 22.8* 23.1* 22.6*   PLT 66* 67* 69*   GRANS 79* 80* 85*   LYMPH 15* 13* 9*   EOS 0 0 0      Microbiology No results for input(s): CULT in the last 72 hours.        RADIOLOGY:    All available imaging studies/reports in [facility] for this admission were reviewed    Dr. Krunal Perkins, Infectious Disease Specialist  [phone]  April 4, 2019  1:54 PM

## 2019-04-04 NOTE — PROGRESS NOTES
Problem: Mobility Impaired (Adult and Pediatric)  Goal: *Acute Goals and Plan of Care (Insert Text)  Description  Physical Therapy Goals  Initiated 4/1/2019 and to be accomplished within 7 day(s)  1. Patient will move from supine to sit and sit to supine  and roll side to side in bed with modified independence. 2.  Patient will transfer from bed to chair and chair to bed with supervision/set-up using the least restrictive device. 3.  Patient will perform sit to stand with supervision/set-up. 4.  Patient will ambulate with supervision/set-up for 100 feet with the least restrictive device. 5.  Patient will ascend/descend 12 stairs with 1 handrail(s) with minimal assistance/contact guard assist.   Outcome: Progressing Towards Goal   PHYSICAL THERAPY TREATMENT    Patient: Padmini Mosley (18 y.o. male)  Date: 4/4/2019  Diagnosis: Oliguria [R34]  Renal injury [S37.009A]  Acute renal failure (ARF) (HCC) [N17.9]   Precautions: Aspiration, Seizure, Skin   Chart, physical therapy assessment, plan of care and goals were reviewed. OBJECTIVE/ ASSESSMENT:  Patient found sitting in b/s chair willing to work with PT. [place] and is able to ambulate in hallway, increasing distance. Pt declines need for any rest breaks. Pt was able to negotiate 12 total stair with bilateral hand rails, presenting with step to step pattern. Pt returned to room to b/s chair and was positioned comfortably. Pt encouraged to continue ankle pumps as he now present with some LE edema. Pt clarified this tx that his las out-patient PT session was in February and that he was set up to continue in March but was unable to make it to his evaluation due to hospitalization. Pt will benefit from HHPT / continuation of out-patient PT. Education:  ?         Bed mobility  ? Transfers  ? Ambulation / gait  ? Assistive device management  ? Stairs  ? Body mechanics  ? Position change   ? Therapeutic exercise  ? Activity pacing / energy conservation  ? Other:    Progression toward goals:  ?      Improving appropriately and progressing toward goals  ? Improving slowly and progressing toward goals  ? Not making progress toward goals and plan of care will be adjusted     PLAN:  Patient continues to benefit from skilled intervention to address the above impairments. Continue treatment per established plan of care. Discharge Recommendations:  Home Health  Further Equipment Recommendations for Discharge: Pt has Rollator     SUBJECTIVE:   Patient stated ? I'm maribel do some therapy at home. ?    OBJECTIVE DATA SUMMARY:   Critical Behavior:  Neurologic State: Alert  Orientation Level: Oriented X4  Cognition: Follows commands  Safety/Judgement: Fall prevention  Functional Mobility Training:  Bed Mobility:  Supine to Sit: Supervision  Sit to Supine: Supervision  Transfers:  Sit to Stand: Minimum assistance  Stand to Sit: Supervision  Balance:  Sitting: Intact  Standing: Impaired; With support  Standing - Static: Good  Standing - Dynamic : Fair(+)  Ambulation/Gait Training:  Distance (ft): 165 Feet (ft)  Assistive Device: Walker, rollator  Ambulation - Level of Assistance: Stand-by assistance;Supervision  Gait Abnormalities: Decreased step clearance      Pain:  Pre tx pain: 0  Post tx pain: 0  Pain Scale 1: Numeric (0 - 10)  Pain Intensity 1: 0  Activity Tolerance:   Fair  Please refer to the flowsheet for vital signs taken during this treatment. After treatment:   ? Patient left in no apparent distress sitting up in chair  ? Patient left in no apparent distress in bed  ? Call bell left within reach  ? Nursing notified  ? Caregiver present  ? Bed alarm activated  ? SCDs applied  ?  Ice applied      [employer], PTA   Time Calculation: 23 mins

## 2019-04-05 LAB
ANION GAP SERPL CALC-SCNC: 6 MMOL/L (ref 3–18)
BASOPHILS # BLD: 0 K/UL (ref 0–0.1)
BASOPHILS NFR BLD: 0 % (ref 0–2)
BUN SERPL-MCNC: 46 MG/DL (ref 7–18)
BUN/CREAT SERPL: 13 (ref 12–20)
CALCIUM SERPL-MCNC: 8.2 MG/DL (ref 8.5–10.1)
CHLORIDE SERPL-SCNC: 114 MMOL/L (ref 100–108)
CO2 SERPL-SCNC: 24 MMOL/L (ref 21–32)
CREAT SERPL-MCNC: 3.67 MG/DL (ref 0.6–1.3)
DIFFERENTIAL METHOD BLD: ABNORMAL
EOSINOPHIL # BLD: 0 K/UL (ref 0–0.4)
EOSINOPHIL NFR BLD: 1 % (ref 0–5)
ERYTHROCYTE [DISTWIDTH] IN BLOOD BY AUTOMATED COUNT: 18.5 % (ref 11.6–14.5)
GLUCOSE BLD STRIP.AUTO-MCNC: 102 MG/DL (ref 70–110)
GLUCOSE BLD STRIP.AUTO-MCNC: 190 MG/DL (ref 70–110)
GLUCOSE BLD STRIP.AUTO-MCNC: 212 MG/DL (ref 70–110)
GLUCOSE BLD STRIP.AUTO-MCNC: 90 MG/DL (ref 70–110)
GLUCOSE SERPL-MCNC: 78 MG/DL (ref 74–99)
HCT VFR BLD AUTO: 21.4 % (ref 36–48)
HGB BLD-MCNC: 7.1 G/DL (ref 13–16)
LYMPHOCYTES # BLD: 1.6 K/UL (ref 0.9–3.6)
LYMPHOCYTES NFR BLD: 25 % (ref 21–52)
MAGNESIUM SERPL-MCNC: 2.2 MG/DL (ref 1.6–2.6)
MCH RBC QN AUTO: 28.1 PG (ref 24–34)
MCHC RBC AUTO-ENTMCNC: 33.2 G/DL (ref 31–37)
MCV RBC AUTO: 84.6 FL (ref 74–97)
MONOCYTES # BLD: 0.7 K/UL (ref 0.05–1.2)
MONOCYTES NFR BLD: 11 % (ref 3–10)
NEUTS SEG # BLD: 4 K/UL (ref 1.8–8)
NEUTS SEG NFR BLD: 63 % (ref 40–73)
PHOSPHATE SERPL-MCNC: 3.5 MG/DL (ref 2.5–4.9)
PLATELET # BLD AUTO: 58 K/UL (ref 135–420)
POTASSIUM SERPL-SCNC: 3.5 MMOL/L (ref 3.5–5.5)
RBC # BLD AUTO: 2.53 M/UL (ref 4.7–5.5)
SODIUM SERPL-SCNC: 144 MMOL/L (ref 136–145)
T3FREE SERPL-MCNC: 2.8 PG/ML (ref 2.18–3.98)
T4 FREE SERPL-MCNC: 0.8 NG/DL (ref 0.7–1.5)
WBC # BLD AUTO: 6.3 K/UL (ref 4.6–13.2)

## 2019-04-05 PROCEDURE — 74011250637 HC RX REV CODE- 250/637: Performed by: INTERNAL MEDICINE

## 2019-04-05 PROCEDURE — 74011250636 HC RX REV CODE- 250/636: Performed by: FAMILY MEDICINE

## 2019-04-05 PROCEDURE — 84100 ASSAY OF PHOSPHORUS: CPT

## 2019-04-05 PROCEDURE — 74011250637 HC RX REV CODE- 250/637: Performed by: FAMILY MEDICINE

## 2019-04-05 PROCEDURE — 84439 ASSAY OF FREE THYROXINE: CPT

## 2019-04-05 PROCEDURE — 65270000029 HC RM PRIVATE

## 2019-04-05 PROCEDURE — 97530 THERAPEUTIC ACTIVITIES: CPT

## 2019-04-05 PROCEDURE — 85025 COMPLETE CBC W/AUTO DIFF WBC: CPT

## 2019-04-05 PROCEDURE — 97116 GAIT TRAINING THERAPY: CPT

## 2019-04-05 PROCEDURE — 84146 ASSAY OF PROLACTIN: CPT

## 2019-04-05 PROCEDURE — 83735 ASSAY OF MAGNESIUM: CPT

## 2019-04-05 PROCEDURE — 74011000258 HC RX REV CODE- 258: Performed by: FAMILY MEDICINE

## 2019-04-05 PROCEDURE — 36415 COLL VENOUS BLD VENIPUNCTURE: CPT

## 2019-04-05 PROCEDURE — 82962 GLUCOSE BLOOD TEST: CPT

## 2019-04-05 PROCEDURE — 84481 FREE ASSAY (FT-3): CPT

## 2019-04-05 PROCEDURE — 74011636637 HC RX REV CODE- 636/637: Performed by: INTERNAL MEDICINE

## 2019-04-05 PROCEDURE — 74011250636 HC RX REV CODE- 250/636: Performed by: INTERNAL MEDICINE

## 2019-04-05 PROCEDURE — 80048 BASIC METABOLIC PNL TOTAL CA: CPT

## 2019-04-05 RX ORDER — SULFAMETHOXAZOLE AND TRIMETHOPRIM 400; 80 MG/1; MG/1
1 TABLET ORAL DAILY
Status: COMPLETED | OUTPATIENT
Start: 2019-04-05 | End: 2019-04-05

## 2019-04-05 RX ORDER — FERROUS GLUCONATE 324(37.5)
1 TABLET ORAL 2 TIMES DAILY WITH MEALS
Status: DISCONTINUED | OUTPATIENT
Start: 2019-04-05 | End: 2019-04-06 | Stop reason: HOSPADM

## 2019-04-05 RX ADMIN — INSULIN LISPRO 6 UNITS: 100 INJECTION, SOLUTION INTRAVENOUS; SUBCUTANEOUS at 21:31

## 2019-04-05 RX ADMIN — CALCITRIOL CAPSULES 0.25 MCG 0.25 MCG: 0.25 CAPSULE ORAL at 21:28

## 2019-04-05 RX ADMIN — INSULIN LISPRO 6 UNITS: 100 INJECTION, SOLUTION INTRAVENOUS; SUBCUTANEOUS at 13:50

## 2019-04-05 RX ADMIN — SULFAMETHOXAZOLE AND TRIMETHOPRIM 1 TABLET: 400; 80 TABLET ORAL at 09:43

## 2019-04-05 RX ADMIN — TAMSULOSIN HYDROCHLORIDE 0.4 MG: 0.4 CAPSULE ORAL at 09:42

## 2019-04-05 RX ADMIN — PANTOPRAZOLE SODIUM 40 MG: 40 GRANULE, DELAYED RELEASE ORAL at 18:24

## 2019-04-05 RX ADMIN — LIOTHYRONINE SODIUM 25 MCG: 25 TABLET ORAL at 18:24

## 2019-04-05 RX ADMIN — HYDRALAZINE HYDROCHLORIDE 25 MG: 25 TABLET, FILM COATED ORAL at 23:23

## 2019-04-05 RX ADMIN — HYDROCORTISONE SODIUM SUCCINATE 50 MG: 100 INJECTION, POWDER, FOR SOLUTION INTRAMUSCULAR; INTRAVENOUS at 09:42

## 2019-04-05 RX ADMIN — FERROUS GLUCONATE TAB 324 MG (37.5 MG ELEMENTAL IRON) 1 TABLET: 324 (37.5 FE) TAB at 16:16

## 2019-04-05 RX ADMIN — NEPHROCAP 1 CAPSULE: 1 CAP ORAL at 09:43

## 2019-04-05 RX ADMIN — LEVOTHYROXINE SODIUM 100 MCG: 100 TABLET ORAL at 05:16

## 2019-04-05 RX ADMIN — PIPERACILLIN AND TAZOBACTAM 2.25 G: 2; .25 INJECTION, POWDER, FOR SOLUTION INTRAVENOUS at 05:16

## 2019-04-05 RX ADMIN — HYDRALAZINE HYDROCHLORIDE 50 MG: 50 TABLET, FILM COATED ORAL at 21:28

## 2019-04-05 RX ADMIN — SODIUM BICARBONATE 650 MG TABLET 650 MG: at 16:16

## 2019-04-05 RX ADMIN — EPOETIN ALFA-EPBX 12000 UNITS: 10000 INJECTION, SOLUTION INTRAVENOUS; SUBCUTANEOUS at 13:52

## 2019-04-05 RX ADMIN — INSULIN LISPRO 3 UNITS: 100 INJECTION, SOLUTION INTRAVENOUS; SUBCUTANEOUS at 13:50

## 2019-04-05 RX ADMIN — OXYCODONE AND ACETAMINOPHEN 1 TABLET: 5; 325 TABLET ORAL at 02:42

## 2019-04-05 RX ADMIN — LIOTHYRONINE SODIUM 25 MCG: 25 TABLET ORAL at 09:49

## 2019-04-05 RX ADMIN — SODIUM BICARBONATE 650 MG TABLET 650 MG: at 09:42

## 2019-04-05 RX ADMIN — HYDRALAZINE HYDROCHLORIDE 50 MG: 50 TABLET, FILM COATED ORAL at 16:16

## 2019-04-05 RX ADMIN — INSULIN LISPRO 6 UNITS: 100 INJECTION, SOLUTION INTRAVENOUS; SUBCUTANEOUS at 07:30

## 2019-04-05 RX ADMIN — SODIUM BICARBONATE 650 MG TABLET 650 MG: at 21:28

## 2019-04-05 RX ADMIN — PANTOPRAZOLE SODIUM 40 MG: 40 GRANULE, DELAYED RELEASE ORAL at 09:42

## 2019-04-05 RX ADMIN — HYDRALAZINE HYDROCHLORIDE 50 MG: 50 TABLET, FILM COATED ORAL at 09:45

## 2019-04-05 NOTE — PROGRESS NOTES
Alert, responsive. Eating his meals with good appetite. Temp improved. Pulse rate staying above 55. Plan: Will recheck free T4, free T3 in the morning. Recommend continuing 25 mcg of liothyronine twice a day for now.

## 2019-04-05 NOTE — ROUTINE PROCESS
1930-Bedside and verbal report from Britany Reddy RN. Pt resting in bed, alert and oriented times four, no c/o pain, no SOB on room air. Call bell within reach. Bed alarm on. PT instructed not to get up without assistance. 2030-Pt resting in bed, no change in LOC. Family at bedside, no apparent pain, vitals stable, call bell within reach, pt clean/dry. 2130-Pt resting, NAD, no apparent pain, no change in LOC. No SOB, call bell within reach. 2230-Pt resting, NAD, call bell within reach, no c/o pain, no SOB, Sinus melecio on the monitor. 0000-Pt sleeping, Sinus melecio on the monitor in the 50's. BP stable. No apparent pain, no SOB on room air. Call bell within reach. 0200-Pt sleeping, Sinus melecio on the monitor, no apparent pain, no SOB, call bell within reach, pt clean/dry, suprapubic patent. 0245-Pt c/o pain, PRN Percocet given, see pain assessment. 0330-Pt sleeping, NAD, no apparent pain, no SOB, call bell within reach, Vitals stable. MEW score 1.     0530-Pt given complete bed bath, linen ad gown changed, clean brief placed, suprabic cath cleaned with appropriate green varner wipes. No change in LOC. No apparent pain. IV patent. 0700-Bedside and vebral report given to Vickie Sabillon RN.

## 2019-04-05 NOTE — PROGRESS NOTES
Progress Note    Patient: Juan Rain MRN: [id number]  CSN: [id number]    YOB: 1960  Age: 59 y.o. Sex: male    DOA: 3/27/2019 LOS:  LOS: 8 days                    Subjective:     Pt says he is doing well with no new complaints. He eats a regular diet without problems. Pt working with PT, able to ambulate with Rollator with increasing distance. Also able to climb 12 stairs with hand rails. Overnight, pt had recurrent episode of hypothermia of 93.7F. Pt asymptomatic and no intervention performed. Repeat 2 hours later was 97.3. Later decreased to 96.2F    Pt reviewed by  inpatient [facility], they do not feel he meets criteria for admission d/t being too functional. Repeated discussion on importance of considering SNF even if only for 2 weeks. Pt insists that he will go home after D/C. Also encouraged pt to stop using heroin and offered psychiatric services for resources to quit but pt refused. Also spoke to pt's sister  POA separately my concerns that pt will begin using heroin after returning home which will further damage his kidneys and could result in readmission.     - Platelets dropped to 58  - H&H down trending, today 7.1/21.4  - Potassium 3.5  - Prolactin 2.3    - Free T3 pending    Chief Complaint:   Chief Complaint   Patient presents with    Urinary Catheter Problem    Abdominal Pain       Review of systems  General: No fevers or chills. Recurrent hypothermia  Cardiovascular: No chest pain or pressure. No palpitations. Pulmonary: No shortness of breath, cough or wheeze. Gastrointestinal: No abdominal pain, nausea, vomiting or diarrhea. Genitourinary: Suprapubic cath in place. Good UOP  Musculoskeletal: Generalized weakness. H/O cervical disc disease  Neurologic: No headache, numbness, or tingling.  No seizure-like activity    Objective:     Physical Exam:  Visit Vitals  /75 (BP 1 Location: Right arm, BP Patient Position: At rest)   Pulse 63   Temp 97.7 °F (36.5 °C)   Resp 13   Ht 6' (1.829 m)   Wt 74.9 kg (165 lb 3.2 oz)   SpO2 99%   BMI 22.41 kg/m²        General: Alert, cooperative, no acute distress    HEENT: NC, Atraumatic.    Lungs: CTA Bilaterally. No Wheezing/Rhonchi/Rales. Heart: Regular  rhythm,  No murmur, No Rubs, No Gallops  Abdomen: Soft, Non distended, Non tender. Suprapubic cath in place. Extremities: Mild 1+ LE edema   Psych: Not anxious or agitated. Wishes to return home. Polysubstance abuse  Neurologic: CN 2-12 grossly intact, generalized weakness. more weakness lt side H/O cervical disc disease and surgery         Intake and Output:  Current Shift:  04/05 0701 - 04/05 1900  In: 360 [P.O.:360]  Out: 400 [Urine:400]  Last three shifts:  04/03 1901 - 04/05 0700  In: 1630 [P.O.:1230; I.V.:400]  Out: 1025 [Urine:1025]    Labs: Results:       Chemistry Recent Labs     04/05/19 0540 04/04/19  0543 04/03/19  0600   GLU 78 127* 160*    144 146*   K 3.5 3.4* 3.5   * 112* 115*   CO2 24 24 25   BUN 46* 42* 39*   CREA 3.67* 3.75* 3.88*   CA 8.2* 8.3* 8.4*  8.3*   AGAP 6 8 6   BUCR 13 11* 10*   AP  --  80 86   TP  --  6.1* 6.3*   ALB  --  2.1* 2.1*   GLOB  --  4.0 4.2*   AGRAT  --  0.5* 0.5*      CBC w/Diff Recent Labs     04/05/19 0540 04/04/19  0543 04/03/19  0600   WBC 6.3 6.1 6.9   RBC 2.53* 2.71* 2.74*   HGB 7.1* 7.5* 7.6*   HCT 21.4* 22.8* 23.1*   PLT 58* 66* 67*   GRANS 63 79* 80*   LYMPH 25 15* 13*   EOS 1 0 0      Cardiac Enzymes No results for input(s): CPK, CKND1, LINDA in the last 72 hours. No lab exists for component: CKRMB, TROIP   Coagulation No results for input(s): PTP, INR, APTT in the last 72 hours.     No lab exists for component: INREXT, INREXT    Lipid Panel Lab Results   Component Value Date/Time    Cholesterol, total 160 03/29/2019 02:28 AM    Cholesterol, total 142 03/29/2019 02:28 AM    HDL Cholesterol 58 03/29/2019 02:28 AM    HDL Cholesterol 58 03/29/2019 02:28 AM    LDL, calculated 88.2 03/29/2019 02:28 AM    LDL, calculated 70.2 03/29/2019 02:28 AM    VLDL, calculated 13.8 03/29/2019 02:28 AM    VLDL, calculated 13.8 03/29/2019 02:28 AM    Triglyceride 69 03/29/2019 02:28 AM    Triglyceride 69 03/29/2019 02:28 AM    CHOL/HDL Ratio 2.8 03/29/2019 02:28 AM    CHOL/HDL Ratio 2.4 03/29/2019 02:28 AM      BNP No results for input(s): BNPP in the last 72 hours.    Liver Enzymes Recent Labs     04/04/19  0543   TP 6.1*   ALB 2.1*   AP 80   SGOT 16      Thyroid Studies Lab Results   Component Value Date/Time    TSH 0.74 03/30/2019 10:20 AM          Procedures/imaging: see electronic medical records for all procedures/Xrays and details which were not copied into this note but were reviewed prior to creation of Plan    Medications:   Current Facility-Administered Medications   Medication Dose Route Frequency    epoetin cristy-epbx (RETACRIT) 12,000 Units combo injection  12,000 Units SubCUTAneous Q7D    hydrALAZINE (APRESOLINE) tablet 50 mg  50 mg Oral TID    hydrocortisone Sod Succ (PF) (SOLU-CORTEF) injection 50 mg  50 mg IntraVENous DAILY    liothyronine (CYTOMEL) tablet 25 mcg  25 mcg Oral BID    hydrALAZINE (APRESOLINE) tablet 25 mg  25 mg Oral Q8H PRN    insulin lispro (HUMALOG) injection   SubCUTAneous AC&HS    insulin lispro (HUMALOG) injection 6 Units  6 Units SubCUTAneous TIDAC    pantoprazole (PROTONIX) granules for oral suspension 40 mg  40 mg Per NG tube BID    B complex-vitaminC-folic acid (NEPHROCAP) cap  1 Cap Oral DAILY    sodium bicarbonate tablet 650 mg  650 mg Oral TID    calcitRIOL (ROCALTROL) capsule 0.25 mcg  0.25 mcg Oral Q MON, WED & FRI    lactulose (CHRONULAC) solution 10 g  10 g Oral BID    sodium chloride (NS) flush 5-10 mL  5-10 mL IntraVENous PRN    glucose chewable tablet 16 g  4 Tab Oral PRN    glucagon (GLUCAGEN) injection 1 mg  1 mg IntraMUSCular PRN    dextrose (D50W) injection syrg 12.5-25 g  25-50 mL IntraVENous PRN    tamsulosin (FLOMAX) capsule 0.4 mg  0.4 mg Oral DAILY    acetaminophen (TYLENOL) tablet 650 mg  650 mg Oral Q6H PRN    oxyCODONE-acetaminophen (PERCOCET) 5-325 mg per tablet 1 Tab  1 Tab Oral Q6H PRN    naloxone (NARCAN) injection 0.4 mg  0.4 mg IntraVENous PRN    ondansetron (ZOFRAN) injection 4 mg  4 mg IntraVENous Q6H PRN    docusate sodium (COLACE) capsule 100 mg  100 mg Oral BID PRN    levothyroxine (SYNTHROID) tablet 100 mcg  100 mcg Oral 6am       Assessment/Plan     Active Problems:    Oliguria (3/28/2019)      Renal injury (3/28/2019)      Suprapubic catheter ([address] (3/28/2019)      Bradycardia, sinus (3/28/2019)      Acute UTI (3/28/2019)      Type 2 diabetes mellitus with hypoglycemia ([address] (3/28/2019)      Acute renal failure (ARF) ([address] (3/28/2019)      Renal failure (ARF), acute on chronic ([address] (3/31/2019)      ATN (acute tubular necrosis) ([address] (4/1/2019)    Hypoglycemia / Hypothermia/ hypocalcemia/ Adrenal insufficiency/hypopituitarism, hypothyroidism  - pt had  Cosyntropin   - Endocrinology. Recommended to continue to wean off hydrocortisone, continue wean per endocrinology recs.    - Decrease Hydrocortisone  To IV daily  - Per endo, concern for suspected hypopituitarism.  Pt currently on levothyroxine 100mcg daily. Had been discontinued off his levothyroixine 8/2018, was only recently restarted last month during hospitalization.    - Also concern for impaired T4 to T3 conversion,     Dsicussed with Dr Laura Polk  Will increase liothyronine 25mcg tab d BID he will see pt today adjust med . Pending free T3   - PTH elevated at 144.6, Ca mildly low at 8.4  - [address] elevated at 12.3, LH elevated at 16.6.  - IGF pending   - AFP, Testosterone, Prolactin and vit D all wnl (AFP: 1, Testosterone: 709, Vit D 38.5, Prolactin 2.3)      SHEILA on CKD stage 2-3, possibly due to progressive CKD/ Renal Cell Carcinoma of Left Kidney  - Nephrology following  - CT 3/28: Abd with pelvic fluid, pleural effusion  - CXR 3/28: cardiomegaly and pulm edema, questionable infiltrate vs atelectasis. BNP elevated from baseline.  - Cardiology consulted, pt stable and without cardiac symptoms.   - cardiac markers flat/indeterminant, continue monitorig on telemetry  - monitor BMP  - Increase PO free water intake  - Replaced Potassium 4/4. Potassium 3.5 today. Continue to monitor     UTI/  sepsis   - Lactic acid normal x2   - blood cultures 3/28/19 negative    - Urine Culture 3/27/19  positive for achromobater and candida. Likely colonized by candida.  - Per ID, stop zosyn IV 2.25mg IV q8hr (today is day 6/7). Give Bactrim 1 SS tab po once to complete TX. Discussed with Dr Jim Moise would like to avoid Septra for renal issue   - Suprapubic cath functioning appropriately, no sign of obstruction.      HTN, HLD, bradycardia chronic HR 40-50s   - Increase hydralazine to 50mg TID. Continue additional hydralazine 25mg q8h PRN for SBP >160.  - Continue tamsulosin 0.4mg cap daily  - Monitor BP and adjust accordingly. Avoid AV lydia blocking agents due to bradycardia.  -3/29:  HDL 58 BRD 66.8 RF 69      Metabolic possible Infectious Encephalopathy in setting of SHEILA, Sepsis, hypoglycemia  - MRI/MRA negative   - Dr. García Pinto started [address] 3/29. Diffuse encephalopathic process. No epileptiform or focal abnormalities.     - keppra discontinued, per neuro, Dr. Traci Kraus, was not aware of seizure activity wittness by ICU team or ID consult however reports that with likely provoking hypoglycemia still would recommend to hold off on Keppra at this time and monitor  -  Continue Lactulose.     Hx of recent GIB/ Chronic anemia / pancytopenia  - IV protonix   - Continue to monitor H&H due to h/o GI bleeding. EGD performed 3/8/19 revealed gastritis/ duodenitis. - Hold heparin. HIT panel negative for platelet factor 4 and Heparin aggregation. F/u with hematology   - DVT prophylaxis with SCDs and PT/OT to increase mobility.  Mobilize to [facility] tolerated     Abdominal Pain, CT abdomen Gallbladder with wall slightly thickened and/or pericholecystic fluid  -Abdominal pain resolved.   -RUQ US 3/28/19 no acute cholecystitis  -No N/V noted, Continue Zofran 4mg q6 hr PRN      Hep C, Cirrhosis  -Hep B negative and HIV none reactive   -Hep C AB positive Hep C ab and elevated Hep C virus ab positive Quant hepatitis c undetectable INR &PT within normal limits. - Cirrhosis is possible cause of Occult + test, no acute concern for GI bleed  -Continue Lactulose 10g BID prn   - AFP normal  - GI signed off, pt needs follow up as outpatient with Dr. Angeline Mascorro in 2-4 wks, recommend to re-consult if any active bleeding     DM2  -Hypoglycemic on admission, A1c 5.3  - Pt with hyperglycemic episodes with POC glc 207. - Continue Humalog sliding scale. Insulin adjustments per endocrinology. - Continue glc monitoring     Anemia chronic disease, Recent Acute blood loss anemia from UGIB EGD neg for source, chronic thrombocytopenia  - Continue Retacrit and Rocaltrol  - 4/5: Platelets down trending to 58. H&H: 7.1/21. 4.   - Continue to monitor with daily CBC, transfuse as needed if hgb <7, monitor plt count.     History of Drug Abuse, Reported heroin use during recent hospitalization this month, ongoing Tob use, Etoh Use  - H/O  heroin use, indorses snorting heroin. Has past history of IVDA none currently. UDS +Opiods  - H/O alcohol abuse  - acute hepatitis panel neg for Hep A&B,  hepatits c Ab positive  No active infection  - HIV negative   - Discussed significant risks, recommended to quit. Social work coonsulted for resources to help pt quit. Pt denies psychiatric consult.      - Pt has h/o poor compliance with multiple admissions     - Pt denied by [facility]. Pt unwilling to go to SNF after extensive discussion with patient regarding concern for re-admission x3.  Strongly recommended he consider short-term SNF for rehab, health concerns and concern for readmission. Pt's sister is on board with SNF and will discuss with pt further. Pt does have 8-9 steps to navigate to get into home, will ask PT to evaluate to ensure he is safe. As at this time he strongly desires discharge to home and likely this will be the discharge plan. F/u endo, nephrology, ID and heme  Monitor urine output   F/u PT and OT and speech therapy  Encourage PO fluids  Social work involvement for polysubstance abuse  D/C planning: SNF vs home health/ out-pt rehab     CODE:  Full.  on admission  Sister  is MPOA for patient. Rachel Magallanes patient is awake, alert, oriented, able to participate in his own medical decision making.   Will wait for endo to adjust med   Will need epogen recheck H &H might need transfusion if drop H&H less than 7 f/u hematology recommednations  Cbc, bmp ,mag in AM  Start ferrous gluconate BID     Ailyn Silva MD,   4/5/2019 7:24 AM

## 2019-04-05 NOTE — PROGRESS NOTES
Progress Note Patient: Sara Bourgeois MRN: [id number]  CSN: [id number] YOB: 1960  Age: 59 y.o. Sex: male DOA: 3/27/2019 LOS:  LOS: 8 days Subjective:  
 
Pt says he is doing well with no new complaints. He eats a regular diet without problems. Pt working with PT, able to ambulate with Rollator with increasing distance. Also able to climb 12 stairs with hand rails. Overnight, pt had recurrent episode of hypothermia of 93.7F. Pt asymptomatic and no intervention performed. Repeat 2 hours later was 97.3. Later decreased to 96.2F Pt reviewed by [facility], they do not feel he meets criteria for admission d/t being too functional. Repeated discussion on importance of considering SNF even if only for 2 weeks. Pt insists that he will go home after D/C. Also encouraged pt to stop using heroin and offered psychiatric services for resources to quit but pt refused. Also spoke to pt's sister in [facility] about my concerns that pt will begin using heroin after returning home which will further damage his kidneys and could result in readmission.  
 
- Platelets dropped to 58 
- H&H down trending, today 7.1/21.4 
- Potassium 3.5 
- Prolactin 2.3   
- Free T3 pending Chief Complaint:  
Chief Complaint Patient presents with  Urinary Catheter Problem  Abdominal Pain Review of systems General: No fevers or chills. Cardiovascular: No chest pain or pressure. No palpitations. Pulmonary: No shortness of breath, cough or wheeze. Gastrointestinal: No abdominal pain, nausea, vomiting or diarrhea. Genitourinary: Suprapubic cath in place. Good UOP Musculoskeletal: Generalized weakness. H/O cervical disc disease Neurologic: No headache, numbness, or tingling. No seizure-like activity Objective:  
 
Physical Exam: 
Visit Vitals /77 (BP 1 Location: Right arm, BP Patient Position: At rest) Pulse (!) 55 Temp 98 °F (36.7 °C) Resp 14 Ht 6' (1.829 m) Wt 74.9 kg (165 lb 3.2 oz) SpO2 96% BMI 22.41 kg/m² General: Alert, cooperative, no acute distress   
HEENT: NC, Atraumatic.   
Lungs: CTA Bilaterally. No Wheezing/Rhonchi/Rales. Heart: Regular  rhythm,  No murmur, No Rubs, No Gallops Abdomen: Soft, Non distended, Non tender. Suprapubic cath in place. Extremities: Mild 1+ LE edema  
Psych: Not anxious or agitated. Wishes to return home. Polysubstance abuse Neurologic: CN 2-12 grossly intact, generalized weakness. No acute neurological deficits Intake and Output: 
Current Shift:  No intake/output data recorded. Last three shifts:  04/03 1901 - 04/05 0700 In: 1630 [P.O.:1230; I.V.:400] Out: 1025 [Urine:1025] Labs: Results:  
   
Chemistry Recent Labs 04/05/19 0540 04/04/19 
0543 04/03/19 
0600 GLU 78 127* 160*  144 146*  
K 3.5 3.4* 3.5 * 112* 115* CO2 24 24 25 BUN 46* 42* 39* CREA 3.67* 3.75* 3.88* CA 8.2* 8.3* 8.4*  8.3* AGAP 6 8 6 BUCR 13 11* 10* AP  --  80 86  
TP  --  6.1* 6.3* ALB  --  2.1* 2.1*  
GLOB  --  4.0 4.2* AGRAT  --  0.5* 0.5* CBC w/Diff Recent Labs 04/05/19 0540 04/04/19 
0543 04/03/19 
0600 WBC 6.3 6.1 6.9  
RBC 2.53* 2.71* 2.74* HGB 7.1* 7.5* 7.6* HCT 21.4* 22.8* 23.1*  
PLT 58* 66* 67* GRANS 63 79* 80* LYMPH 25 15* 13* EOS 1 0 0 Cardiac Enzymes No results for input(s): CPK, CKND1, LINDA in the last 72 hours. No lab exists for component: Galindo Maroon Coagulation No results for input(s): PTP, INR, APTT in the last 72 hours. No lab exists for component: INREXT Lipid Panel Lab Results Component Value Date/Time  Cholesterol, total 160 03/29/2019 02:28 AM  
 Cholesterol, total 142 03/29/2019 02:28 AM  
 HDL Cholesterol 58 03/29/2019 02:28 AM  
 HDL Cholesterol 58 03/29/2019 02:28 AM  
 LDL, calculated 88.2 03/29/2019 02:28 AM  
 LDL, calculated 70.2 03/29/2019 02:28 AM  
 VLDL, calculated 13.8 03/29/2019 02:28 AM  
 VLDL, calculated 13.8 03/29/2019 02:28 AM  
 Triglyceride 69 03/29/2019 02:28 AM  
 Triglyceride 69 03/29/2019 02:28 AM  
 CHOL/HDL Ratio 2.8 03/29/2019 02:28 AM  
 CHOL/HDL Ratio 2.4 03/29/2019 02:28 AM  
  
BNP No results for input(s): BNPP in the last 72 hours. Liver Enzymes Recent Labs 04/04/19 
0543 TP 6.1* ALB 2.1* AP 80 SGOT 16 Thyroid Studies Lab Results Component Value Date/Time TSH 0.74 03/30/2019 10:20 AM  
    
 
Procedures/imaging: see electronic medical records for all procedures/Xrays and details which were not copied into this note but were reviewed prior to creation of Plan Medications:  
Current Facility-Administered Medications Medication Dose Route Frequency  hydrALAZINE (APRESOLINE) tablet 50 mg  50 mg Oral TID  hydrocortisone Sod Succ (PF) (SOLU-CORTEF) injection 50 mg  50 mg IntraVENous DAILY  liothyronine (CYTOMEL) tablet 25 mcg  25 mcg Oral BID  hydrALAZINE (APRESOLINE) tablet 25 mg  25 mg Oral Q8H PRN  
 insulin lispro (HUMALOG) injection   SubCUTAneous AC&HS  insulin lispro (HUMALOG) injection 6 Units  6 Units SubCUTAneous TIDAC  pantoprazole (PROTONIX) granules for oral suspension 40 mg  40 mg Per NG tube BID  epoetin cristy-epbx (RETACRIT) 12,000 Units combo injection  12,000 Units SubCUTAneous Q7D  
 B complex-vitaminC-folic acid (NEPHROCAP) cap  1 Cap Oral DAILY  sodium bicarbonate tablet 650 mg  650 mg Oral TID  calcitRIOL (ROCALTROL) capsule 0.25 mcg  0.25 mcg Oral Q MON, WED & FRI  lactulose (CHRONULAC) solution 10 g  10 g Oral BID  sodium chloride (NS) flush 5-10 mL  5-10 mL IntraVENous PRN  
 glucose chewable tablet 16 g  4 Tab Oral PRN  
 glucagon (GLUCAGEN) injection 1 mg  1 mg IntraMUSCular PRN  
 dextrose (D50W) injection syrg 12.5-25 g  25-50 mL IntraVENous PRN  
 tamsulosin (FLOMAX) capsule 0.4 mg  0.4 mg Oral DAILY  acetaminophen (TYLENOL) tablet 650 mg  650 mg Oral Q6H PRN  
  oxyCODONE-acetaminophen (PERCOCET) 5-325 mg per tablet 1 Tab  1 Tab Oral Q6H PRN  
 naloxone (NARCAN) injection 0.4 mg  0.4 mg IntraVENous PRN  
 ondansetron (ZOFRAN) injection 4 mg  4 mg IntraVENous Q6H PRN  
 docusate sodium (COLACE) capsule 100 mg  100 mg Oral BID PRN  
 levothyroxine (SYNTHROID) tablet 100 mcg  100 mcg Oral 6am  
 
 
Assessment/Plan Active Problems: 
  Oliguria (3/28/2019) Renal injury (3/28/2019) Suprapubic catheter ([facility] (3/28/2019) Bradycardia, sinus (3/28/2019) Acute UTI (3/28/2019) Type 2 diabetes mellitus with hypoglycemia ([address] (3/28/2019) Acute renal failure (ARF) ([facility] [address] (3/28/2019) Renal failure (ARF), acute on chronic (HCC) (3/31/2019) ATN (acute tubular necrosis) ([facility] [address] (4/1/2019) Hypoglycemia / Hypothermia/ hypocalcemia/ Adrenal insufficiency/hypopituitarism, hypothyroidism 
- Discussed Cosyntropin results with Endocrinology. Recommended to continue to wean off hydrocortisone, continue wean per endocrinology recs.   
- Decrease Hydrocortisone q12 hr to Hydrocortisone 50mg daily - Per endo, concern for suspected hypopituitarism. Pt currently on levothyroxine 100mcg daily. Had been discontinued off his levothyroixine 8/2018, was only recently restarted last month during hospitalization.   
- Also concern for impaired T4 to T3 conversion, added liothyronine 25mcg tab daily per endocrinology. Pending free T3  
- PTH elevated at 144.6, Ca mildly low at 8.4 
- FSH elevated at 12.3, LH elevated at 16.6. 
- IGF pending - AFP, Testosterone, Prolactin and vit D all wnl (AFP: 1, Testosterone: 709, Vit D 38.5, Prolactin 2.3)  
  SHEILA on CKD stage 2-3, possibly due to progressive CKD/ Renal Cell Carcinoma of Left Kidney - Nephrology following - CT 3/28: Abd with pelvic fluid, pleural effusion - CXR 3/28: cardiomegaly and pulm edema, questionable infiltrate vs atelectasis. BNP elevated from baseline. - Cardiology consulted, pt stable and without cardiac symptoms.  
- cardiac markers flat/indeterminant, continue monitorig on telemetry 
- monitor BMP 
- Increase PO free water intake - Replaced Potassium 4/4. Potassium 3.5 today. Continue to monitor 
  
UTI/  sepsis  
- Lactic acid normal x2  
- blood cultures 3/28/19 negative   
- Urine Culture 3/27/19  positive for achromobater and candida. Likely colonized by candida. 
- Per ID, stop zosyn IV 2.25mg IV q8hr (today is day 6/7). Give Bactrim 1 SS tab po once to complete TX.  
- Suprapubic cath functioning appropriately, no sign of obstruction.  
  
HTN, HLD, bradycardia chronic HR 40-50s  
- Increase hydralazine to 50mg TID. Continue additional hydralazine 25mg q8h PRN for SBP >160. 
- Continue tamsulosin 0.4mg cap daily - Monitor BP and adjust accordingly. Avoid AV lydia blocking agents due to bradycardia. 
-3/29:  HDL 58 UZL 87.8 TJ 69 
   
Metabolic possible Infectious Encephalopathy in setting of SHEILA, Sepsis, hypoglycemia - MRI/MRA negative  
- Dr. Morris Jain started [address] - EEG performed 3/29. Diffuse encephalopathic process. No epileptiform or focal abnormalities. Yesterday keppra discontinued, discussed patient with neuro, Dr. Kika Springer, was not aware of seizure activity wittness by ICU team or ID consult however reports that with likely provoking hypoglycemia still would recommend to hold off on Keppra at this time and monitor -  Continue Lactulose. 
  
Hx of recent GIB/ Chronic anemia / pancytopenia - IV protonix  
- Continue to monitor H&H due to h/o GI bleeding. EGD performed 3/8/19 revealed gastritis/ duodenitis. - Hold heparin. HIT panel negative for platelet factor 4 and Heparin aggregation. F/u with hematology  
- DVT prophylaxis with SCDs and PT/OT to increase mobility. Mobilize to chair with assistant as tolerated 
  
Abdominal Pain, CT abdomen Gallbladder with wall slightly thickened and/or pericholecystic fluid -Abdominal pain resolved.  
-RUQ US 3/28/19 no acute cholecystitis 
-No N/V noted, Continue Zofran 4mg q6 hr PRN 
   
Hep C, Cirrhosis -Hep B negative and HIV none reactive  
-Hep C AB positive Hep C ab and elevated Hep C virus ab positive Quant hepatitis c undetectable INR &PT within normal limits. - Cirrhosis is possible cause of Occult + test, no acute concern for GI bleed 
-Continue Lactulose 10g BID prn  
- AFP normal 
- GI signed off, pt needs follow up as outpatient with Dr. Gayatri Leavitt in 2-4 wks, recommend to re-consult if any active bleeding 
  
DM2 
-Hypoglycemic on admission, A1c 5.3 
- Pt with hyperglycemic episodes with POC glc 207. - Continue Humalog sliding scale. Insulin adjustments per endocrinology. - Continue glc monitoring 
  
Anemia chronic disease, Recent Acute blood loss anemia from UGIB EGD neg for source, chronic thrombocytopenia 
- Continue Retacrit and Rocaltrol - 4/5: Platelets down trending to 58. H&H: 7.1/21. 4.  
- Continue to monitor with daily CBC, transfuse as needed if hgb <7, monitor plt count. 
  
History of Drug Abuse, Reported heroin use during recent hospitalization this month, ongoing Tob use, Etoh Use 
- H/O  heroin use, indorses snorting heroin. Has past history of IVDA none currently. UDS +Opiods - H/O alcohol abuse 
- acute hepatitis panel neg for Hep A&B,  hepatits c Ab positive  No active infection 
- HIV negative  
- Discussed significant risks, recommended to quit. Social work coonsulted for resources to help pt quit. Pt denies psychiatric consult.  
  
- Pt has h/o poor compliance with multiple admissions    
- Pt denied by [facility]. Pt unwilling to go to SNF after extensive discussion with patient regarding concern for re-admission x3. Strongly recommended he consider short-term SNF for rehab, health concerns and concern for readmission. Pt's sister is on board with SNF and will discuss with pt further.  Pt does have 8-9 steps to navigate to get into home, will ask PT to evaluate to ensure he is safe. As at this time he strongly desires discharge to home and likely this will be the discharge plan. F/u endo, nephrology, ID and heme Monitor urine output F/u PT and Mayra Peres therapy Encourage PO fluids Social work involvement for polysubstance abuse D/C planning: SNF vs home health/ out-pt rehab 
  
CODE:  Full.  on admission  Sister  is MPOA for patient. Ayse Smith patient is awake, alert, oriented, able to participate in his own medical decision making. 
  
Angelica Lamar [place]- Student 4/5/2019 7:24 AM

## 2019-04-05 NOTE — PROGRESS NOTES
OT attempted, pt politely declining 2/2 visiting w/family from out of town. Will continue to follow.

## 2019-04-05 NOTE — DIABETES MGMT
Glycemic Control Plan of Care    T2DM with current A1c of 5.3% (3/27/2019). Home diabetes medications:   Actos 15 mg daily. Januvia 100 mg daily. Humalog sliding scale insulin. POC BG range on 4/04/2019: 140-230 mg/dL. POC BG report on 4/05/2019 at time of review: 90, 190 mg/dL. Current Meal Intake:  Patient Vitals for the past 100 hrs:   % Diet Eaten   04/05/19 1329 100 %   04/05/19 0952 100 %   04/04/19 1857 50 %   04/04/19 1347 100 %   04/04/19 0919 100 %   04/02/19 1732 75 %   04/02/19 1400 25 %   04/02/19 0934 50 %   04/01/19 1838 25 %   04/01/19 1312 50 %     Recommendation(s):  1.) Cont inpatient glycemic monitoring and intervention. 2.) Assess BG and p o intake on 4/05/2019. Patient is currently on regular diet. Consider changing to diabetic consistent carb diet if patient cont to tolerate po intake and BG above target range. Assessment:  Patient is 59year old with past medical history including type 2 diabetes mellitus, hypertension,  Stage 3 CKD,  Renal call carcinoma, COPD, GI bleed, pericardial effusion, and quadriparesis, and chronic drug abuse - was admitted on 3/27/2019 with report of urinary catheter problem/retetion. Noted:  Sepsis. Chronic hep C  Acute renal failure, chronic UTI. Hypoglycemia. Dysphagia. Discontinued TF on 4/02/2019. T2DM with current A1c of 5.3% (3/27/2019). Most recent blood glucose values:    Results for Jose Davidson (MRN [id number]) as of 4/5/2019 15:07   Ref. Range 4/4/2019 07:27 4/4/2019 11:05 4/4/2019 16:49 4/4/2019 21:29   GLUCOSE,FAST - POC Latest Ref Range: 70 - 110 mg/dL 140 (H) 189 (H) 230 (H) 105     Results for Jose Davidson (MRN [id number]) as of 4/5/2019 15:07   Ref. Range 4/5/2019 07:47 4/5/2019 13:22   GLUCOSE,FAST - POC Latest Ref Range: 70 - 110 mg/dL 90 190 (H)     Current A1C: 5.3% (3/27/2019) which is equivalent to estimated average blood glucose of 99 mg/dL during the past 2-3 months.     Current hospital diabetes medications: Prandial lispro insulin 6 units TID AC. Correctional lispro insulin ACHS. Very resistant dose. Total daily dose insulin requirement previous day: 4/04/2019:  Lispro: 21 units    Home diabetes medications: Patient reported on 4/02/2019:  Actos 15 mg daily. Januvia 100 mg daily. Humalog sliding scale insulin. Diet: Regular; nutr suppl: glucerna shake with breakfast and dinner. Goals:  Blood glucose will be within target range of  mg/dL by 4/08/2019.     Education:  ___  Refer to Diabetes Education Record             _X__  Education not indicated at this time    Madie Esqueda RN [facility]  Pager: [phone]

## 2019-04-05 NOTE — PROGRESS NOTES
Progress Note    Sunny Deleon  59 y.o. Admit Date: 3/27/2019  Active Problems:    Oliguria (3/28/2019) POA: Unknown      Renal injury (3/28/2019) POA: Unknown      Suprapubic catheter ([address] (3/28/2019) POA: Unknown      Bradycardia, sinus (3/28/2019) POA: Unknown      Acute UTI (3/28/2019) POA: Unknown      Type 2 diabetes mellitus with hypoglycemia ([address] (3/28/2019) POA: Unknown      Acute renal failure (ARF) ([address] (3/28/2019) POA: Unknown      Renal failure (ARF), acute on chronic ([address] (3/31/2019) POA: Yes      ATN (acute tubular necrosis) ([address] (4/1/2019) POA: Clinically Undetermined            Subjective:     Patient feels good, sitting on recliner, ,no SOB, clear urine through Suprapubic Catheter. Says his Hep C is gone  As he was treated by [facility]. Clear mental status. , says he is going home tomorrow with OP rehab care. Received Bactrim SS this Am. A comprehensive review of systems was negative except for that written in the History of Present Illness.     Objective:     Visit Vitals  /75 (BP 1 Location: Right arm, BP Patient Position: At rest)   Pulse 63   Temp 97.7 °F (36.5 °C)   Resp 13   Ht 6' (1.829 m)   Wt 74.9 kg (165 lb 3.2 oz)   SpO2 99%   BMI 22.41 kg/m²         Intake/Output Summary (Last 24 hours) at 4/5/2019 1206  Last data filed at 4/5/2019 0955  Gross per 24 hour   Intake 1390 ml   Output 1425 ml   Net -35 ml       Current Facility-Administered Medications   Medication Dose Route Frequency Provider Last Rate Last Dose    hydrALAZINE (APRESOLINE) tablet 50 mg  50 mg Oral TID Mila Aquino MD   50 mg at 04/05/19 0945    hydrocortisone Sod Succ (PF) (SOLU-CORTEF) injection 50 mg  50 mg IntraVENous DAILY Dominique Vance MD   50 mg at 04/05/19 0942    liothyronine (CYTOMEL) tablet 25 mcg  25 mcg Oral BID Dominique Vance MD   25 mcg at 04/05/19 0949    hydrALAZINE (APRESOLINE) tablet 25 mg  25 mg Oral Q8H PRN Dominique Vance MD   25 mg at 04/04/19 0150    insulin lispro (HUMALOG) injection   SubCUTAneous AC&HS Estuardo Marte MD   Stopped at 04/04/19 2200    insulin lispro (HUMALOG) injection 6 Units  6 Units SubCUTAneous TIDAC Estuardo Marte MD   6 Units at 04/05/19 0730    pantoprazole (PROTONIX) granules for oral suspension 40 mg  40 mg Per NG tube BID Yaron Aquino MD   40 mg at 04/05/19 0942    epoetin cristy-epbx (RETACRIT) 12,000 Units combo injection  12,000 Units SubCUTAneous Q7D Lory Prado MD   Stopped at 03/31/19 2100    B complex-vitaminC-folic acid (NEPHROCAP) cap  1 Cap Oral DAILY Lory Prado MD   1 Cap at 04/05/19 0943    sodium bicarbonate tablet 650 mg  650 mg Oral TID Lory Prado MD   650 mg at 04/05/19 0942    calcitRIOL (ROCALTROL) capsule 0.25 mcg  0.25 mcg Oral Q MON, WED & Nadja Anderson MD   0.25 mcg at 04/03/19 2248    lactulose (CHRONULAC) solution 10 g  10 g Oral BID Ogoy, January-Yahaira ELDER PA-C   Stopped at 04/03/19 0900    sodium chloride (NS) flush 5-10 mL  5-10 mL IntraVENous PRN Gardenia Moscoso MD   10 mL at 04/02/19 0658    glucose chewable tablet 16 g  4 Tab Oral PRN Gardenia Moscoso MD        glucagon (GLUCAGEN) injection 1 mg  1 mg IntraMUSCular PRN Gardenia Moscoso MD        dextrose (D50W) injection syrg 12.5-25 g  25-50 mL IntraVENous PRN Gardenia Moscoso MD   25 g at 03/29/19 1104    tamsulosin (FLOMAX) capsule 0.4 mg  0.4 mg Oral DAILY Gardenia Moscoso MD   0.4 mg at 04/05/19 4339    acetaminophen (TYLENOL) tablet 650 mg  650 mg Oral Q6H PRN Gardenia Moscoso MD        oxyCODONE-acetaminophen (PERCOCET) 5-325 mg per tablet 1 Tab  1 Tab Oral Q6H PRN Gardenia Moscoso MD   1 Tab at 04/05/19 0242    naloxone (NARCAN) injection 0.4 mg  0.4 mg IntraVENous PRN Gardenia Moscoso MD        ondansetron [facility]) injection 4 mg  4 mg IntraVENous Q6H PRN Gardenia Moscoso MD   4 mg at 03/29/19 0726    docusate sodium (COLACE) capsule 100 mg  100 mg Oral BID PRN Rajinder, Alycia Ng MD        levothyroxine (SYNTHROID) tablet 100 mcg  100 mcg Oral 6am Miles, Elizabeth Womack MD   100 mcg at 04/05/19 2938        Physical Exam:     Physical Exam:   General:  Alert, cooperative, no distress, appears stated age. Neck: Supple, symmetrical, trachea midline, no adenopathy, thyroid: no enlargement/tenderness/nodules, no carotid bruit and no JVD. Lungs:   Clear to auscultation bilaterally. Heart:  Regular rate and rhythm, S1, S2 normal, no murmur, click, rub or gallop. Abdomen:   Soft, non-tender. Bowel sounds normal. No masses,  No organomegaly. Extremities: Extremities normal, atraumatic, no cyanosis or edema.          Data Review:    CBC w/Diff    Recent Labs     04/05/19 0540 04/04/19 0543 04/03/19  0600   WBC 6.3 6.1 6.9   RBC 2.53* 2.71* 2.74*   HGB 7.1* 7.5* 7.6*   HCT 21.4* 22.8* 23.1*   MCV 84.6 84.1 84.3   MCH 28.1 27.7 27.7   MCHC 33.2 32.9 32.9   RDW 18.5* 18.2* 18.2*    Recent Labs     04/05/19 0540 04/04/19  0543 04/03/19  0600   MONOS 11* 6 7   EOS 1 0 0   BASOS 0 0 0   RDW 18.5* 18.2* 18.2*        Comprehensive Metabolic Profile    Recent Labs     04/05/19 0540 04/04/19  0543 04/03/19  0600    144 146*   K 3.5 3.4* 3.5   * 112* 115*   CO2 24 24 25   BUN 46* 42* 39*   CREA 3.67* 3.75* 3.88*    Recent Labs     04/05/19 0540 04/04/19 0543 04/03/19  0600   CA 8.2* 8.3* 8.4*  8.3*   PHOS 3.5 3.4 3.4   ALB  --  2.1* 2.1*   TP  --  6.1* 6.3*   SGOT  --  16 18   TBILI  --  0.4 0.4                        Impression:       Active Hospital Problems    Diagnosis Date Noted    ATN (acute tubular necrosis) (HCC) 04/01/2019    Renal failure (ARF), acute on chronic ([address] 03/31/2019    Oliguria 03/28/2019    Renal injury 03/28/2019    Suprapubic catheter ([address] 03/28/2019    Bradycardia, sinus 03/28/2019    Acute UTI 03/28/2019    Type 2 diabetes mellitus with hypoglycemia ([address] 03/28/2019    Acute renal failure (ARF) ([address] 03/28/2019      Continue current Supportive care. Has stage 4  CRF, no need for any Dialysis yet but in the long run he will need. Has Thrombocytopenia for long time, HIT negative, Doubt any TTP/UHS , Hematology is following. Recommend to  Keep BP < 140/80 mmhg. Plan:     Avoid Nephrotoxins, adjust  Medicine as per egfr, continue Renal Diet with 2 Gm Na, 2 Gm K, Protein 0.7 gm/kg of body weight. Needs Retacrit before DC. Received Bactrim SS this AM, no more Zosyn. From renal point he can be discharged , can follow with me  In 4 weeks.           Taylor Morales MD

## 2019-04-05 NOTE — ROUTINE PROCESS
1930-Bedside and verbal report from Sahil Pavon RN. Pt resting in bed, NAD, no c/o pain, no SOB, call bell within reach. 2030-Pt resting, NAD, no apparent pain, no SOB, call bell within reach. 2130-Pt resting, NAD, no apparent pain, vitals stable, call bell within reach. NSR on the monitor. 2230-Pt resting, NAD, NSR, no c/o pain, vitals stable. 0000-Pt resting, NAD, NSR, no c/o pain, vitals stable. 0200-Pt sleeping, NAD, NSR, no c/o pain, vitals stable. 0400-Pt resting, NAD, NSR, no chest pain, no SOB, on room air.     0600-Pt assisted to RR, loose BM, pt cleaned up, linen changed, pt used walker. No SOB on exertion. 0700-Bedside and verbal report given to PRINCE Castillo.

## 2019-04-05 NOTE — PROGRESS NOTES
Infectious Disease progress Note    Requested by: Dr. Traci Vicente    Reason: sepsis, hypothermia, hypoglycemia, UTI    Current abx Prior abx   Pip/tazo since 3/31/19 Cefepime 3/28-3/31; metronidazole, vancomycin 3/28     Lines:       Assessment :    59 y.o.  male who has significant history for chronic urinary retention with suprapubic catheter placement, history of Renal Cell carcinoma post-left partial nephrectomy 12/2013 followed by Dr. Mar Cruz urology, chronic kidney disease baseline Cr 1.6-2.2, history of c-spine laminectomy with post op paralysis, Heroin Drug Abuse, ongoing TOB use, DM2, anemia of chronic disease, thrombocytopenia, hepatitis C with cirrhosis followed by GI Dr. Jean Marie Harrison, [facility], Hypothyroidism admitted to [facility] on 3/28/19 with altered mental status. hospitalization at [facility] 3/7/19-3/15/19 for gi bleed, hypothermia, hypoglycemia, sepsis, pneumonia, uti     hospitalization [facility] 3/17/19-3/20/19 for hypoglycemia, hypothermia, uti    Now with acute renal failure, persistent hypoglycemia, altered mentation, seizures,hypothermia      After obtaining detailed history and exam; clinical probability of sepsis seems low. Patient has had recurrent hospitalization since 3/7/19 for hypoglycemia, hypothermia. Endocrinology evaluation appreciated. Suspected hypopituitarism. Yeast in urine culture likely colonizer. Suprapubic cath exchanged 3/26. Urine cultures 3/7 had e.coli. Current urine culture 3/27 reveal 40,000 achromobacter denitrificans, >100,000 candida glabrata. Candida in urine culture likely colonizer. Also, difficult to determine significance of achromobacter in urine culture - could represent colonization versus partially treated uti. Recent outpatient antibiotic use can mask the full clinical presentation. Unable to use trimeth/sulfa due to acute renal failure. Improved mentation .  Able to communicate effectively. Recommendations:    1. discontinue pip/tazo - give bactrim 1 SS tab po once to complete treatment. 2. F/u endocrinology recommendations for hypothermia, hypoglycemia  3. F/u neurology recommendations  4. Monitor renal function      Will sign off. thanks    Advance Care planning: full code: discussed  with patient/surrogate decision maker:  Eddie Preston: [phone]    D/w dr. Bernardo Landeros. Please call me if any further questions or concerns. Will continue to participate in the care of this patient. HPI:    Feels good. No fever,chills, dysuria, abdominal pain. home Medication List    Details   amLODIPine (NORVASC) 10 mg tablet Take 10 mg by mouth daily. levothyroxine (SYNTHROID) 100 mcg tablet Take 100 mcg by mouth Daily (before breakfast). omeprazole (PRILOSEC) 20 mg capsule Take 20 mg by mouth two (2) times a day. sodium bicarbonate 650 mg tablet Take 650 mg by mouth three (3) times daily. sucralfate (CARAFATE) 1 gram tablet Take 1 g by mouth four (4) times daily. therapeutic multivitamin (THERAGRAN) tablet Take 1 Tab by mouth daily. traMADol (ULTRAM) 50 mg tablet Take 50 mg by mouth every six (6) hours as needed for Pain.      trospium (SANCTURA) 20 mg tablet Take 20 mg by mouth daily. pioglitazone (ACTOS) 15 mg tablet Take 1 Tab by mouth. Syringe, Disposable, (BD SYRINGE CATHETER TIP) 60 mL syrg Use 1 syringe daily with irrigations  Qty: 30 Syringe, Refills: 11      docusate sodium (COLACE) 100 mg capsule 100 mg two (2) times daily as needed. ergocalciferol (DRISDOL) 50,000 unit capsule Take 50,000 Units by mouth every seven (7) days. SITagliptin (JANUVIA) 100 mg tablet Take 50 mg by mouth daily. Associated Diagnoses: Pneumonia of both lungs due to infectious organism, unspecified part of lung; Hypoxia; Dyspnea on exertion;  Neuromuscular respiratory weakness; Physical deconditioning      insulin lispro (HUMALOG) 100 unit/mL injection Normal Insulin Sensitivity   For Blood Sugar (mg/dL) of:     Less than 150 =   0 units           150 -199 =   2 units  200 -249 =   4 units  250 -299 =   6 units  300 -349 =   8 units  350 and above =   10 units  If 2 glucose readings are above 200 mg/dL within a 24 hr period, proceed to \"Very Insul  Qty: 1 Vial, Refills: 0      tamsulosin (FLOMAX) 0.4 mg capsule Take 1 Cap by mouth daily. Qty: 30 Cap, Refills: 0      doxycycline (ADOXA) 100 mg tablet Take 100 mg by mouth daily. Associated Diagnoses: Pneumonia of both lungs due to infectious organism, unspecified part of lung; Hypoxia; Dyspnea on exertion;  Neuromuscular respiratory weakness; Physical deconditioning             Current Facility-Administered Medications   Medication Dose Route Frequency    hydrALAZINE (APRESOLINE) tablet 50 mg  50 mg Oral TID    hydrocortisone Sod Succ (PF) (SOLU-CORTEF) injection 50 mg  50 mg IntraVENous DAILY    liothyronine (CYTOMEL) tablet 25 mcg  25 mcg Oral BID    hydrALAZINE (APRESOLINE) tablet 25 mg  25 mg Oral Q8H PRN    insulin lispro (HUMALOG) injection   SubCUTAneous AC&HS    insulin lispro (HUMALOG) injection 6 Units  6 Units SubCUTAneous TIDAC    pantoprazole (PROTONIX) granules for oral suspension 40 mg  40 mg Per NG tube BID    epoetin cristy-epbx (RETACRIT) 12,000 Units combo injection  12,000 Units SubCUTAneous Q7D    B complex-vitaminC-folic acid (NEPHROCAP) cap  1 Cap Oral DAILY    sodium bicarbonate tablet 650 mg  650 mg Oral TID    calcitRIOL (ROCALTROL) capsule 0.25 mcg  0.25 mcg Oral Q MON, WED & FRI    lactulose (CHRONULAC) solution 10 g  10 g Oral BID    sodium chloride (NS) flush 5-10 mL  5-10 mL IntraVENous PRN    glucose chewable tablet 16 g  4 Tab Oral PRN    glucagon (GLUCAGEN) injection 1 mg  1 mg IntraMUSCular PRN    dextrose (D50W) injection syrg 12.5-25 g  25-50 mL IntraVENous PRN    tamsulosin (FLOMAX) capsule 0.4 mg  0.4 mg Oral DAILY    acetaminophen (TYLENOL) tablet 650 mg  650 mg Oral Q6H PRN    oxyCODONE-acetaminophen (PERCOCET) 5-325 mg per tablet 1 Tab  1 Tab Oral Q6H PRN    naloxone (NARCAN) injection 0.4 mg  0.4 mg IntraVENous PRN    ondansetron (ZOFRAN) injection 4 mg  4 mg IntraVENous Q6H PRN    docusate sodium (COLACE) capsule 100 mg  100 mg Oral BID PRN    levothyroxine (SYNTHROID) tablet 100 mcg  100 mcg Oral 6am       Allergies: Iodine      Temp (24hrs), Av.8 °F (36 °C), Min:95.1 °F (35.1 °C), Max:98 °F (36.7 °C)    Visit Vitals  /75 (BP 1 Location: Right arm, BP Patient Position: At rest)   Pulse 63   Temp 97.7 °F (36.5 °C)   Resp 13   Ht 6' (1.829 m)   Wt 74.9 kg (165 lb 3.2 oz)   SpO2 99%   BMI 22.41 kg/m²       ROS: 12 point ROS obtained in details. Pertinent positives as mentioned in HPI,   otherwise negative    Physical Exam:    General:  Alert, non communicative     Head:  Normocephalic, without obvious abnormality, atraumatic. Eyes:  Conjunctivae/corneas clear. Nose: Nares normal.    Throat: Not examined    Neck: Supple, symmetrical, trachea midline, no adenopathy,    Lungs:   Clear to auscultation bilaterally. Heart:  bradycardia. Abdomen: Soft, no apparent tenderness. Bowel sounds normal. No obvious edema in abdominal wall. SPC in place, no drainage or erythema at insertion site. Cath bag with yellow urine, minimal.   Extremities: LE edema to hips bilaterally, no apparent calf tenderness   Pulses: 2+ and symmetric all extremities. Skin: No rashes or lesions   Neurologic:  doesn't follow commands. Detailed neuro eval not feasible.  .              Labs: Results:   Chemistry Recent Labs     19  0540 19  0543 19  0600   GLU 78 127* 160*    144 146*   K 3.5 3.4* 3.5   * 112* 115*   CO2 24 24 25   BUN 46* 42* 39*   CREA 3.67* 3.75* 3.88*   CA 8.2* 8.3* 8.4*  8.3*   AGAP 6 8 6   BUCR 13 11* 10*   AP  --  80 86   TP  --  6.1* 6.3*   ALB  --  2.1* 2.1*   GLOB  --  4.0 4.2*   AGRAT  --  0.5* 0.5*      CBC w/Diff Recent Labs     04/05/19  0540 04/04/19  0543 04/03/19  0600   WBC 6.3 6.1 6.9   RBC 2.53* 2.71* 2.74*   HGB 7.1* 7.5* 7.6*   HCT 21.4* 22.8* 23.1*   PLT 58* 66* 67*   GRANS 63 79* 80*   LYMPH 25 15* 13*   EOS 1 0 0      Microbiology No results for input(s): CULT in the last 72 hours.        RADIOLOGY:    All available imaging studies/reports in [facility] for this admission were reviewed    Dr. Junior Farah, Infectious Disease Specialist  [phone]  April 5, 2019  1:54 PM

## 2019-04-05 NOTE — PROGRESS NOTES
Hematology/Medical Oncology Progress Note             Name: Hoda Wu   : 1960   MRN: [id number]   Date: 2019 9:09 AM     [x]I have reviewed the flowsheet and previous days notes. Events overnight reviewed and discussed with nursing staff. Vital signs and records reviewed. Mr. Theresa Mistry is a 59-year-old -American man who we are following for his chronic anemia and thrombocytopenia. Admitted with Acute on Chronic Renal Failure. Pt voices no new c/o today. Expresses readiness to go home today. ROS:  General: No fevers or chills. Cardiovascular: No chest pain or pressure. No palpitations. Pulmonary: No shortness of breath, cough or wheeze. Gastrointestinal: No abdominal pain, nausea, vomiting or diarrhea. Genitourinary: Pt with suprapubic catheter in place, no complaints  Musculoskeletal: No joint or muscle pain, no back pain. Neurologic: No headache, numbness, tingling or weakness. Vital Signs:    Visit Vitals  /75 (BP 1 Location: Right arm, BP Patient Position: At rest)   Pulse 63   Temp 97.7 °F (36.5 °C)   Resp 13   Ht 6' (1.829 m)   Wt 74.9 kg (165 lb 3.2 oz)   SpO2 99%   BMI 22.41 kg/m²       O2 Device: Room air   O2 Flow Rate (L/min): 2 l/min   Temp (24hrs), Av.7 °F (35.9 °C), Min:95.1 °F (35.1 °C), Max:98 °F (36.7 °C)       Intake/Output:   Last shift:      701 -  190  In: 360 [P.O.:360]  Out: 400 [Urine:400]  Last 3 shifts:  190 -  0700  In: 3599 [P.O.:1230; I.V.:400]  Out: 1025 [Urine:1025]    Intake/Output Summary (Last 24 hours) at 2019 1040  Last data filed at 2019 0955  Gross per 24 hour   Intake 1590 ml   Output 1425 ml   Net 165 ml       Physical Exam:  General: Alert,appears comfortable.  NAD  HEENT:  Anicteric sclerae; pink palpebral conjunctivae; mucosa moist.   Resp:  Symmetrical chest expansion, no accessory muscle use; good airway entry; no rales/ wheezing/ rhonchi noted  CV:  S1, S2 present; regular rate and rhythm  GI:  Abdomen soft, non-tender; (+) active bowel sounds. Suprapubic Cath in place.    Extremities: BLE edema  Skin:  Warm; no rashes/ lesions noted  Neurologic:  Nonfocal          DATA:   Current Facility-Administered Medications   Medication Dose Route Frequency    hydrALAZINE (APRESOLINE) tablet 50 mg  50 mg Oral TID    hydrocortisone Sod Succ (PF) (SOLU-CORTEF) injection 50 mg  50 mg IntraVENous DAILY    liothyronine (CYTOMEL) tablet 25 mcg  25 mcg Oral BID    hydrALAZINE (APRESOLINE) tablet 25 mg  25 mg Oral Q8H PRN    insulin lispro (HUMALOG) injection   SubCUTAneous AC&HS    insulin lispro (HUMALOG) injection 6 Units  6 Units SubCUTAneous TIDAC    pantoprazole (PROTONIX) granules for oral suspension 40 mg  40 mg Per NG tube BID    epoetin cristy-epbx (RETACRIT) 12,000 Units combo injection  12,000 Units SubCUTAneous Q7D    B complex-vitaminC-folic acid (NEPHROCAP) cap  1 Cap Oral DAILY    sodium bicarbonate tablet 650 mg  650 mg Oral TID    calcitRIOL (ROCALTROL) capsule 0.25 mcg  0.25 mcg Oral Q MON, WED & FRI    lactulose (CHRONULAC) solution 10 g  10 g Oral BID    sodium chloride (NS) flush 5-10 mL  5-10 mL IntraVENous PRN    glucose chewable tablet 16 g  4 Tab Oral PRN    glucagon (GLUCAGEN) injection 1 mg  1 mg IntraMUSCular PRN    dextrose (D50W) injection syrg 12.5-25 g  25-50 mL IntraVENous PRN    tamsulosin (FLOMAX) capsule 0.4 mg  0.4 mg Oral DAILY    acetaminophen (TYLENOL) tablet 650 mg  650 mg Oral Q6H PRN    oxyCODONE-acetaminophen (PERCOCET) 5-325 mg per tablet 1 Tab  1 Tab Oral Q6H PRN    naloxone (NARCAN) injection 0.4 mg  0.4 mg IntraVENous PRN    ondansetron (ZOFRAN) injection 4 mg  4 mg IntraVENous Q6H PRN    docusate sodium (COLACE) capsule 100 mg  100 mg Oral BID PRN    levothyroxine (SYNTHROID) tablet 100 mcg  100 mcg Oral 6am                    Labs:  Recent Labs     04/05/19  0540 04/04/19  0543 04/03/19  0600   WBC 6.3 6.1 6.9   HGB 7.1* 7.5* 7.6* HCT 21.4* 22.8* 23.1*   PLT 58* 66* 67*     Recent Labs     04/05/19  0540 04/04/19  0543 04/03/19  0600    144 146*   K 3.5 3.4* 3.5   * 112* 115*   CO2 24 24 25   GLU 78 127* 160*   BUN 46* 42* 39*   CREA 3.67* 3.75* 3.88*   CA 8.2* 8.3* 8.4*  8.3*   MG 2.2 2.4 2.4   PHOS 3.5 3.4 3.4   ALB  --  2.1* 2.1*   SGOT  --  16 18   ALT  --  10* 11*     No results for input(s): PH, PCO2, PO2, HCO3, FIO2 in the last 72 hours. IMPRESSION:   · Thrombocytopenia  · Renal Cell Carcinoma of left kidney  · Chronic Anemia  · Acute Renal failure CR 3.25 this admission. · Chronic drug use   · Ongoing tobacco use  · HTN             PLAN:   · Today's platelet count  08,507 and we will continue to follow;however, there is       no therapeutic intervention warranted at this time. HIT Panel negative. · H/H today is 7.1/21.4: Continue Retacrit 12,000 units Q7 days while inpatient. Iron is 91 and Ferritin is 196. Recommend PRBC transfusion for Hgb <7  · GI has signed off. · Tobacco and ETOH cessation encouraged  · Follow up with Dr. Isidra Medina early next week.    ·        The patient is: [x] acutely ill Risk of deterioration: [] moderate    [] critically ill  [] high         My assessment/plan was discussed with:  [x]nursing []PT/OT    []respiratory therapy [x] Dr. Wade Laboy MD     []family []       Shadi Garcia NP

## 2019-04-05 NOTE — PROGRESS NOTES
Problem: Mobility Impaired (Adult and Pediatric)  Goal: *Acute Goals and Plan of Care (Insert Text)  Description  Physical Therapy Goals  Initiated 4/1/2019 and to be accomplished within 7 day(s)  1. Patient will move from supine to sit and sit to supine  and roll side to side in bed with modified independence. 2.  Patient will transfer from bed to chair and chair to bed with supervision/set-up using the least restrictive device. 3.  Patient will perform sit to stand with supervision/set-up. 4.  Patient will ambulate with supervision/set-up for 100 feet with the least restrictive device. 5.  Patient will ascend/descend 12 stairs with 1 handrail(s) with minimal assistance/contact guard assist.   Outcome: Progressing Towards Goal  PHYSICAL THERAPY TREATMENT    Patient: Sheryl Vivas (67 y.o. male)  Date: 4/5/2019  Diagnosis: Oliguria [R34]  Renal injury [S37.009A]  Acute renal failure (ARF) (HCC) [N17.9] <principal problem not specified>       Precautions: Aspiration, Seizure, Skin  PLOF: Pt lives with sister in a 1 story condo with 12 stairs to enter, bilateral railings    ASSESSMENT:  Pt was cleared for participation by nursing. Pt was received sitting up in recliner, agreeable to work with therapy. Pt performed sit-stand transfer with CGA. Pt displayed good static and fair dynamic standing balance. Pt ambulated x 90 feet to stairwell and RW and negotiated 12 stairs with SBA and 1 railing. Pt then ambulated x 90 feet back to room with RW and SBA. Pt c/o no fatigue. Pt will be able to safely access his condo upon return home. At conclusion of session, pt was made comfortable in recliner, needs met, nursing notified. Progression toward goals:   ? Bed mobility  ? Transfers  ? Ambulation  ? Assistive device management  ? Stairs  ? Body mechanics  ? Position change  ? Activity pacing/energy conservation  ? Other:   ? Improving appropriately and progressing toward goals  ? Improving slowly and progressing toward goals  ? Not making progress toward goals and plan of care will be adjusted     PLAN:  Patient continues to benefit from skilled intervention to address the above impairments. Continue treatment per established plan of care. Discharge Recommendations:  Home Health  Further Equipment Recommendations for Discharge:  N/A     SUBJECTIVE:   Patient stated ? I usually use a Rollator walker, but I can use this one.?    OBJECTIVE DATA SUMMARY:   Critical Behavior:  Neurologic State: Alert  Orientation Level: Oriented X4  Cognition: Appropriate for age attention/concentration, Appropriate safety awareness, Follows commands  Safety/Judgement: Fall prevention  Functional Mobility Training:  Bed Mobility:  Supine to Sit: Supervision  Transfers:  Sit to Stand: Contact guard assistance  Stand to Sit: Supervision    Balance:  Sitting: Intact  Standing: Impaired; With support  Standing - Static: Good  Standing - Dynamic : Fair     Ambulation/Gait Training:  Distance (ft): 180 Feet (ft)  Assistive Device: Walker, rolling  Ambulation - Level of Assistance: Stand-by assistance        Gait Abnormalities: Decreased step clearance    Stairs:   12 stairs with 1 railing, SBA    Pain:  Pain level pre-treatment: 0/10  Pain level post-treatment: 0/10   Pain Intervention(s): Medication (see MAR); Rest, Ice, Repositioning   Response to intervention: Nurse notified, See doc flow    Activity Tolerance:   Good  Please refer to the flowsheet for vital signs taken during this treatment. After treatment:   ? Patient left in no apparent distress sitting up in chair  ? Patient left in no apparent distress in bed  ? Call bell left within reach  ? Nursing notified  ? Caregiver present  ? Bed alarm activated  ? SCDs applied      COMMUNICATION/EDUCATION:   ?         Role of Physical Therapy in the acute care setting.   ?         Fall prevention education was provided and the patient/caregiver indicated understanding. ? Patient/family have participated as able in working toward goals and plan of care. ?         Patient/family agree to work toward stated goals and plan of care. ?         Patient understands intent and goals of therapy, but is neutral about his/her participation. ? Patient is unable to participate in stated goals/plan of care: ongoing with therapy staff.   ?         Other:        Emily Alfaro, PT   Time Calculation: 24 mins

## 2019-04-06 VITALS
HEART RATE: 60 BPM | OXYGEN SATURATION: 98 % | HEIGHT: 72 IN | BODY MASS INDEX: 22.47 KG/M2 | TEMPERATURE: 98.1 F | SYSTOLIC BLOOD PRESSURE: 153 MMHG | RESPIRATION RATE: 13 BRPM | DIASTOLIC BLOOD PRESSURE: 75 MMHG | WEIGHT: 165.9 LBS

## 2019-04-06 LAB
ANION GAP SERPL CALC-SCNC: 6 MMOL/L (ref 3–18)
BASOPHILS # BLD: 0 K/UL (ref 0–0.1)
BASOPHILS NFR BLD: 0 % (ref 0–2)
BUN SERPL-MCNC: 45 MG/DL (ref 7–18)
BUN/CREAT SERPL: 12 (ref 12–20)
CALCIUM SERPL-MCNC: 8.1 MG/DL (ref 8.5–10.1)
CHLORIDE SERPL-SCNC: 117 MMOL/L (ref 100–108)
CO2 SERPL-SCNC: 25 MMOL/L (ref 21–32)
CREAT SERPL-MCNC: 3.67 MG/DL (ref 0.6–1.3)
DIFFERENTIAL METHOD BLD: ABNORMAL
EOSINOPHIL # BLD: 0.1 K/UL (ref 0–0.4)
EOSINOPHIL NFR BLD: 2 % (ref 0–5)
ERYTHROCYTE [DISTWIDTH] IN BLOOD BY AUTOMATED COUNT: 18.8 % (ref 11.6–14.5)
GLUCOSE BLD STRIP.AUTO-MCNC: 101 MG/DL (ref 70–110)
GLUCOSE BLD STRIP.AUTO-MCNC: 233 MG/DL (ref 70–110)
GLUCOSE BLD STRIP.AUTO-MCNC: 83 MG/DL (ref 70–110)
GLUCOSE SERPL-MCNC: 105 MG/DL (ref 74–99)
HCT VFR BLD AUTO: 21.6 % (ref 36–48)
HGB BLD-MCNC: 7.3 G/DL (ref 13–16)
LYMPHOCYTES # BLD: 1.8 K/UL (ref 0.9–3.6)
LYMPHOCYTES NFR BLD: 30 % (ref 21–52)
MCH RBC QN AUTO: 28.7 PG (ref 24–34)
MCHC RBC AUTO-ENTMCNC: 33.8 G/DL (ref 31–37)
MCV RBC AUTO: 85 FL (ref 74–97)
MONOCYTES # BLD: 0.4 K/UL (ref 0.05–1.2)
MONOCYTES NFR BLD: 7 % (ref 3–10)
NEUTS SEG # BLD: 3.8 K/UL (ref 1.8–8)
NEUTS SEG NFR BLD: 61 % (ref 40–73)
PHOSPHATE SERPL-MCNC: 4 MG/DL (ref 2.5–4.9)
PLATELET # BLD AUTO: 59 K/UL (ref 135–420)
PMV BLD AUTO: 11.1 FL (ref 9.2–11.8)
POTASSIUM SERPL-SCNC: 3.4 MMOL/L (ref 3.5–5.5)
RBC # BLD AUTO: 2.54 M/UL (ref 4.7–5.5)
SODIUM SERPL-SCNC: 148 MMOL/L (ref 136–145)
WBC # BLD AUTO: 6.1 K/UL (ref 4.6–13.2)

## 2019-04-06 PROCEDURE — 74011250637 HC RX REV CODE- 250/637: Performed by: INTERNAL MEDICINE

## 2019-04-06 PROCEDURE — 74011250637 HC RX REV CODE- 250/637: Performed by: FAMILY MEDICINE

## 2019-04-06 PROCEDURE — 82962 GLUCOSE BLOOD TEST: CPT

## 2019-04-06 PROCEDURE — 94762 N-INVAS EAR/PLS OXIMTRY CONT: CPT

## 2019-04-06 PROCEDURE — 84100 ASSAY OF PHOSPHORUS: CPT

## 2019-04-06 PROCEDURE — 74011636637 HC RX REV CODE- 636/637: Performed by: INTERNAL MEDICINE

## 2019-04-06 PROCEDURE — 74011636637 HC RX REV CODE- 636/637: Performed by: FAMILY MEDICINE

## 2019-04-06 PROCEDURE — 74011250637 HC RX REV CODE- 250/637: Performed by: PHYSICIAN ASSISTANT

## 2019-04-06 PROCEDURE — 80048 BASIC METABOLIC PNL TOTAL CA: CPT

## 2019-04-06 PROCEDURE — 85025 COMPLETE CBC W/AUTO DIFF WBC: CPT

## 2019-04-06 PROCEDURE — 36415 COLL VENOUS BLD VENIPUNCTURE: CPT

## 2019-04-06 RX ORDER — PREDNISONE 10 MG/1
10 TABLET ORAL
Qty: 21 TAB | Refills: 0 | Status: SHIPPED | OUTPATIENT
Start: 2019-04-06 | End: 2019-07-08

## 2019-04-06 RX ORDER — LEVOTHYROXINE SODIUM 100 UG/1
100 TABLET ORAL
Qty: 30 TAB | Refills: 1 | Status: SHIPPED | OUTPATIENT
Start: 2019-04-07 | End: 2020-02-25

## 2019-04-06 RX ORDER — PREDNISONE 20 MG/1
40 TABLET ORAL
Status: DISCONTINUED | OUTPATIENT
Start: 2019-04-06 | End: 2019-04-06 | Stop reason: HOSPADM

## 2019-04-06 RX ORDER — CALCITRIOL 0.25 UG/1
0.25 CAPSULE ORAL
Qty: 30 CAP | Refills: 0 | Status: SHIPPED | OUTPATIENT
Start: 2019-04-08 | End: 2020-02-25

## 2019-04-06 RX ORDER — LIOTHYRONINE SODIUM 25 UG/1
25 TABLET ORAL 2 TIMES DAILY
Qty: 60 TAB | Refills: 0 | Status: SHIPPED | OUTPATIENT
Start: 2019-04-06 | End: 2020-02-25

## 2019-04-06 RX ORDER — ACETAMINOPHEN 325 MG/1
650 TABLET ORAL
Qty: 30 TAB | Refills: 1 | Status: SHIPPED | OUTPATIENT
Start: 2019-04-06 | End: 2019-09-05 | Stop reason: ALTCHOICE

## 2019-04-06 RX ORDER — MAGNESIUM SULFATE 100 %
4 CRYSTALS MISCELLANEOUS AS NEEDED
Qty: 30 TAB | Refills: 0 | Status: SHIPPED | OUTPATIENT
Start: 2019-04-06 | End: 2019-04-15 | Stop reason: ALTCHOICE

## 2019-04-06 RX ORDER — AMLODIPINE BESYLATE 10 MG/1
10 TABLET ORAL DAILY
Status: DISCONTINUED | OUTPATIENT
Start: 2019-04-06 | End: 2019-04-06 | Stop reason: HOSPADM

## 2019-04-06 RX ORDER — HYDRALAZINE HYDROCHLORIDE 25 MG/1
25 TABLET, FILM COATED ORAL
Qty: 30 TAB | Refills: 0 | Status: SHIPPED | OUTPATIENT
Start: 2019-04-06

## 2019-04-06 RX ORDER — FERROUS GLUCONATE 324(37.5)
324 TABLET ORAL 2 TIMES DAILY WITH MEALS
Qty: 60 TAB | Refills: 0 | Status: SHIPPED | OUTPATIENT
Start: 2019-04-06 | End: 2020-06-08 | Stop reason: ALTCHOICE

## 2019-04-06 RX ORDER — POTASSIUM CHLORIDE 20 MEQ/1
20 TABLET, EXTENDED RELEASE ORAL DAILY
Status: DISCONTINUED | OUTPATIENT
Start: 2019-04-07 | End: 2019-04-06 | Stop reason: HOSPADM

## 2019-04-06 RX ORDER — AMLODIPINE BESYLATE 5 MG/1
5 TABLET ORAL DAILY
Status: DISCONTINUED | OUTPATIENT
Start: 2019-04-06 | End: 2019-04-06

## 2019-04-06 RX ORDER — POTASSIUM CHLORIDE 1.5 G/1.77G
20 POWDER, FOR SOLUTION ORAL
Status: COMPLETED | OUTPATIENT
Start: 2019-04-06 | End: 2019-04-06

## 2019-04-06 RX ADMIN — AMLODIPINE BESYLATE 10 MG: 10 TABLET ORAL at 10:03

## 2019-04-06 RX ADMIN — NEPHROCAP 1 CAPSULE: 1 CAP ORAL at 10:03

## 2019-04-06 RX ADMIN — LEVOTHYROXINE SODIUM 100 MCG: 100 TABLET ORAL at 05:38

## 2019-04-06 RX ADMIN — INSULIN LISPRO 6 UNITS: 100 INJECTION, SOLUTION INTRAVENOUS; SUBCUTANEOUS at 12:22

## 2019-04-06 RX ADMIN — PREDNISONE 40 MG: 20 TABLET ORAL at 10:03

## 2019-04-06 RX ADMIN — FERROUS GLUCONATE TAB 324 MG (37.5 MG ELEMENTAL IRON) 1 TABLET: 324 (37.5 FE) TAB at 10:03

## 2019-04-06 RX ADMIN — TAMSULOSIN HYDROCHLORIDE 0.4 MG: 0.4 CAPSULE ORAL at 10:03

## 2019-04-06 RX ADMIN — LIOTHYRONINE SODIUM 25 MCG: 25 TABLET ORAL at 17:54

## 2019-04-06 RX ADMIN — INSULIN LISPRO 6 UNITS: 100 INJECTION, SOLUTION INTRAVENOUS; SUBCUTANEOUS at 10:04

## 2019-04-06 RX ADMIN — FERROUS GLUCONATE TAB 324 MG (37.5 MG ELEMENTAL IRON) 1 TABLET: 324 (37.5 FE) TAB at 16:45

## 2019-04-06 RX ADMIN — LIOTHYRONINE SODIUM 25 MCG: 25 TABLET ORAL at 10:03

## 2019-04-06 RX ADMIN — PANTOPRAZOLE SODIUM 40 MG: 40 GRANULE, DELAYED RELEASE ORAL at 10:03

## 2019-04-06 RX ADMIN — SODIUM BICARBONATE 650 MG TABLET 650 MG: at 10:03

## 2019-04-06 RX ADMIN — INSULIN LISPRO 6 UNITS: 100 INJECTION, SOLUTION INTRAVENOUS; SUBCUTANEOUS at 12:21

## 2019-04-06 RX ADMIN — SODIUM BICARBONATE 650 MG TABLET 650 MG: at 16:45

## 2019-04-06 RX ADMIN — POTASSIUM CHLORIDE 20 MEQ: 1.5 POWDER, FOR SOLUTION ORAL at 17:00

## 2019-04-06 NOTE — PROGRESS NOTES
Hematology/Medical Oncology Progress Note             Name: Cinthia Tripathi   : 1960   MRN: [id number]   Date: 2019 9:09 AM     [x]I have reviewed the flowsheet and previous days notes. Events overnight reviewed and discussed with nursing staff. Vital signs and records reviewed. Mr. Jadon Hdz is a 68-year-old -American man who we are following for his chronic anemia and thrombocytopenia. Admitted with Acute on Chronic Renal Failure. Pt voices no new c/o today. Expresses readiness to go home today. He says that he wants to live and he does not intend to use heroin anymore. ROS:  General: No fevers or chills. Cardiovascular: No chest pain or pressure. No palpitations. Pulmonary: No shortness of breath, cough or wheeze. Gastrointestinal: No abdominal pain, nausea, vomiting or diarrhea. Genitourinary: Pt with suprapubic catheter in place, no complaints  Musculoskeletal: No joint or muscle pain, no back pain. Neurologic: No headache, numbness, tingling or weakness. Vital Signs:    Visit Vitals  /82   Pulse 61   Temp 97.6 °F (36.4 °C)   Resp 13   Ht 6' (1.829 m)   Wt 75.3 kg (165 lb 14.4 oz)   SpO2 99%   BMI 22.50 kg/m²       O2 Device: Room air   O2 Flow Rate (L/min): 2 l/min   Temp (24hrs), Av.6 °F (36.4 °C), Min:97.4 °F (36.3 °C), Max:97.9 °F (36.6 °C)       Intake/Output:   Last shift:      No intake/output data recorded. Last 3 shifts: 1901 -  0700  In: 1560 [P.O.:1560]  Out: 2185 [Urine:2185]    Intake/Output Summary (Last 24 hours) at 2019 0833  Last data filed at 2019 0700  Gross per 24 hour   Intake 1080 ml   Output 1535 ml   Net -455 ml       Physical Exam:  General: Alert,appears comfortable.  NAD  HEENT:  Anicteric sclerae; pink palpebral conjunctivae; mucosa moist.   Resp:  Symmetrical chest expansion, no accessory muscle use; good airway entry; no rales/ wheezing/ rhonchi noted  CV:  S1, S2 present; regular rate and rhythm  GI: Abdomen soft, non-tender; (+) active bowel sounds. Suprapubic Cath in place.    Extremities: BLE edema  Skin:  Warm; no rashes/ lesions noted  Neurologic:  Nonfocal          DATA:   Current Facility-Administered Medications   Medication Dose Route Frequency    epoetin cristy-epbx (RETACRIT) 12,000 Units combo injection  12,000 Units SubCUTAneous Q7D    ferrous gluconate 324 mg (37.5 mg iron) tablet 1 Tab  1 Tab Oral BID WITH MEALS    hydrALAZINE (APRESOLINE) tablet 50 mg  50 mg Oral TID    hydrocortisone Sod Succ (PF) (SOLU-CORTEF) injection 50 mg  50 mg IntraVENous DAILY    liothyronine (CYTOMEL) tablet 25 mcg  25 mcg Oral BID    hydrALAZINE (APRESOLINE) tablet 25 mg  25 mg Oral Q8H PRN    insulin lispro (HUMALOG) injection   SubCUTAneous AC&HS    insulin lispro (HUMALOG) injection 6 Units  6 Units SubCUTAneous TIDAC    pantoprazole (PROTONIX) granules for oral suspension 40 mg  40 mg Per NG tube BID    B complex-vitaminC-folic acid (NEPHROCAP) cap  1 Cap Oral DAILY    sodium bicarbonate tablet 650 mg  650 mg Oral TID    calcitRIOL (ROCALTROL) capsule 0.25 mcg  0.25 mcg Oral Q MON, WED & FRI    lactulose (CHRONULAC) solution 10 g  10 g Oral BID    sodium chloride (NS) flush 5-10 mL  5-10 mL IntraVENous PRN    glucose chewable tablet 16 g  4 Tab Oral PRN    glucagon (GLUCAGEN) injection 1 mg  1 mg IntraMUSCular PRN    dextrose (D50W) injection syrg 12.5-25 g  25-50 mL IntraVENous PRN    tamsulosin (FLOMAX) capsule 0.4 mg  0.4 mg Oral DAILY    acetaminophen (TYLENOL) tablet 650 mg  650 mg Oral Q6H PRN    oxyCODONE-acetaminophen (PERCOCET) 5-325 mg per tablet 1 Tab  1 Tab Oral Q6H PRN    naloxone (NARCAN) injection 0.4 mg  0.4 mg IntraVENous PRN    ondansetron (ZOFRAN) injection 4 mg  4 mg IntraVENous Q6H PRN    docusate sodium (COLACE) capsule 100 mg  100 mg Oral BID PRN    levothyroxine (SYNTHROID) tablet 100 mcg  100 mcg Oral 6am                    Labs:  Recent Labs     04/06/19  9830 04/05/19  0540 04/04/19  0543   WBC 6.1 6.3 6.1   HGB 7.3* 7.1* 7.5*   HCT 21.6* 21.4* 22.8*   PLT 59* 58* 66*     Recent Labs     04/06/19  0344 04/05/19  0540 04/04/19  0543   * 144 144   K 3.4* 3.5 3.4*   * 114* 112*   CO2 25 24 24   * 78 127*   BUN 45* 46* 42*   CREA 3.67* 3.67* 3.75*   CA 8.1* 8.2* 8.3*   MG  --  2.2 2.4   PHOS 4.0 3.5 3.4   ALB  --   --  2.1*   SGOT  --   --  16   ALT  --   --  10*     No results for input(s): PH, PCO2, PO2, HCO3, FIO2 in the last 72 hours. IMPRESSION:   · Thrombocytopenia  · Renal Cell Carcinoma of left kidney  · Chronic Anemia  · Acute Renal failure CR 3.25 this admission. · Chronic drug use   · Ongoing tobacco use  · HTN             PLAN:   · Today's platelet count  75,920 and we will continue to follow;however, there is       no therapeutic intervention warranted at this time. HIT Panel negative. · H/H today is 7.3/21.6: Continue Retacrit 12,000 units Q7 days while inpatient per nephrology. Iron is 91 and Ferritin is 196. Recommend PRBC transfusion for Hgb <7  · GI has signed off. · Tobacco,Heroin and ETOH cessation encouraged  · Follow up with Dr. Catarina Lewis within 5-7 days of discharge.    ·        The patient is: [x] acutely ill Risk of deterioration: [] moderate    [] critically ill  [] high         My assessment/plan was discussed with:  [x]nursing []PT/OT    []respiratory therapy [x] Dr. Kandi Nicole MD     []family []       Antonio Otto NP

## 2019-04-06 NOTE — DISCHARGE SUMMARY
Discharge Summary     Patient: Nitin Baez MRN: [id number]  SSN: [id number]    YOB: 1960  Age: 59 y.o.   Sex: male       Admit Date: 3/27/2019    Discharge Date: 4/6/2019      Admission Diagnoses: Oliguria [R34]  Renal injury [S37.009A]  Acute renal failure (ARF) ([facility] [N17.9]    Discharge Diagnoses:   Problem List as of 4/6/2019 Date Reviewed: 4/1/2019          Codes Class Noted - Resolved    ATN (acute tubular necrosis) ([facility] ICD-10-CM: N17.0  ICD-9-CM: 584.5  4/1/2019 - Present        Renal failure (ARF), acute on chronic ([facility] ICD-10-CM: N17.9, N18.9  ICD-9-CM: 584.9, 585.9  3/31/2019 - Present        Oliguria ICD-10-CM: R34  ICD-9-CM: 788.5  3/28/2019 - Present        Renal injury ICD-10-CM: S37.009A  ICD-9-CM: 866.00  3/28/2019 - Present        Suprapubic catheter ([facility] ICD-10-CM: Z93.59  ICD-9-CM: V44.59  3/28/2019 - Present        Bradycardia, sinus ICD-10-CM: R00.1  ICD-9-CM: 427.89  3/28/2019 - Present        Acute UTI ICD-10-CM: N39.0  ICD-9-CM: 599.0  3/28/2019 - Present        Type 2 diabetes mellitus with hypoglycemia ([facility] ICD-10-CM: E11.649  ICD-9-CM: 250.80  3/28/2019 - Present        Acute renal failure (ARF) ([facility] ICD-10-CM: N17.9  ICD-9-CM: 584.9  3/28/2019 - Present        Bladder atony ICD-10-CM: N31.2  ICD-9-CM: 596.4  1/31/2019 - Present        Cerumen impaction ICD-10-CM: H61.20  ICD-9-CM: 380.4  1/31/2019 - Present        Chronic neck pain ICD-10-CM: M54.2, G89.29  ICD-9-CM: 723.1, 338.29  1/31/2019 - Present        Cirrhosis of liver ([facility] ICD-10-CM: K74.60  ICD-9-CM: 571.5  1/31/2019 - Present        COPD, moderate ([facility] ICD-10-CM: J44.9  ICD-9-CM: 496  1/31/2019 - Present        Dry skin dermatitis ICD-10-CM: L85.3  ICD-9-CM: 692.89  1/31/2019 - Present        Elevated PSA ICD-10-CM: R97.20  ICD-9-CM: 790.93  1/31/2019 - Present        History of diabetes mellitus ICD-10-CM: Z86.39  ICD-9-CM: V12.29  1/31/2019 - Present        History of elevated lipids ICD-10-CM: Z86.39  ICD-9-CM: V12.29  1/31/2019 - Present        History of hay fever ICD-10-CM: Z87.09  ICD-9-CM: V12.69  1/31/2019 - Present        Hyperlipidemia ICD-10-CM: E78.5  ICD-9-CM: 272.4  1/31/2019 - Present        Hypothyroid ICD-10-CM: E03.9  ICD-9-CM: 244.9  1/31/2019 - Present        Lung nodules ICD-10-CM: R91.8  ICD-9-CM: 793.19  1/31/2019 - Present        Medication management ICD-10-CM: Z79.899  ICD-9-CM: V58.69  1/31/2019 - Present        Neck mass ICD-10-CM: R22.1  ICD-9-CM: 784.2  1/31/2019 - Present        Pericardial effusion ICD-10-CM: I31.3  ICD-9-CM: 423.9  1/31/2019 - Present        Preoperative clearance ICD-10-CM: Z01.818  ICD-9-CM: V72.84  1/31/2019 - Present        Prostate disorder ICD-10-CM: N42.9  ICD-9-CM: 602.9  1/31/2019 - Present        Recurrent UTI ICD-10-CM: N39.0  ICD-9-CM: 599.0  1/31/2019 - Present        Vitamin D deficiency ICD-10-CM: E55.9  ICD-9-CM: 268.9  1/31/2019 - Present        BPH (benign prostatic hyperplasia) ICD-10-CM: N40.0  ICD-9-CM: 600.00  12/20/2018 - Present        Hematuria ICD-10-CM: R31.9  ICD-9-CM: 599.70  10/14/2018 - Present        Generalized weakness ICD-10-CM: R53.1  ICD-9-CM: 780.79  7/3/2018 - Present        Hypertension ICD-10-CM: I10  ICD-9-CM: 401.9  Unknown - Present        Urethral erosion ICD-10-CM: N36.8  ICD-9-CM: 599.84  Unknown - Present        Chronic anemia ICD-10-CM: D64.9  ICD-9-CM: 285.9  5/23/2018 - Present        Status post amputation of toe of right foot ([facility] ICD-10-CM: Z89.421  ICD-9-CM: V49.72  5/18/2018 - Present        Thrombocytopenia ([facility] ICD-10-CM: D69.6  ICD-9-CM: 287.5  4/10/2018 - Present        Chronic kidney disease, stage III (moderate) (HCC) ICD-10-CM: N18.3  ICD-9-CM: 585.3  4/4/2018 - Present        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  ICD-9-CM: 583.9  4/2/2018 - Present        Hyperkalemia ICD-10-CM: E87.5  ICD-9-CM: 276.7  4/2/2018 - Present        Hypercalcemia ICD-10-CM: E83.52  ICD-9-CM: 275.42  4/2/2018 - Present        Bradycardia ICD-10-CM: R00.1  ICD-9-CM: 427.89  3/31/2018 - Present        Urinary retention ICD-10-CM: R33.9  ICD-9-CM: 788.20  10/30/2017 - Present        Chronic hepatitis C without hepatic coma ([facility] ICD-10-CM: B18.2  ICD-9-CM: 070.54  5/31/2017 - Present        Quadriparesis ([facility] ICD-10-CM: G82.50  ICD-9-CM: 344.00  5/31/2017 - Present        Essential hypertension ICD-10-CM: I10  ICD-9-CM: 401.9  4/28/2017 - Present        IV drug abuse ([facility] ICD-10-CM: F19.10  ICD-9-CM: 305.90  4/28/2017 - Present        Type II diabetes mellitus, uncontrolled ([facility] ICD-10-CM: E11.65  ICD-9-CM: 250.02  12/16/2015 - Present        Renal cell carcinoma of left kidney (HCC) ICD-10-CM: C64.2  ICD-9-CM: 189.0  12/17/2013 - Present    Overview Signed 6/12/2018 11:33 AM by Mau Cross, ANN     Pathological Stage [id number] cc RCC FG3 s/p left open partial nephrectomy in 12/13               Umbilical hernia P-72-RE: K42.9  ICD-9-CM: 553.1  9/2/2013 - Present        Chronic drug abuse ([facility] ICD-10-CM: F19.10  ICD-9-CM: 304.90  1/1/1974 - Present        RESOLVED: Chronic kidney disease ICD-10-CM: N18.9  ICD-9-CM: 152. 9  Unknown - 12/20/2018        RESOLVED: Diabetes mellitus ([facility] ICD-10-CM: E11.9  ICD-9-CM: 250.00  Unknown - 12/20/2018        RESOLVED: Sepsis due to methicillin resistant Staphylococcus aureus ([facility] ICD-10-CM: A41.02  ICD-9-CM: 038.12, 995.91  Unknown - 12/20/2018        RESOLVED: Type 2 diabetes with nephropathy ([facility] ICD-10-CM: E11.21  ICD-9-CM: 250.40, 583.81  5/9/2018 - 12/20/2018        RESOLVED: Acute kidney injury ([facility] ICD-10-CM: N17.9  ICD-9-CM: 584.9  4/10/2018 - 12/20/2018        RESOLVED: Hepatitis C antibody positive in blood ICD-10-CM: R76.8  ICD-9-CM: 795.79  4/4/2018 - 12/20/2018        RESOLVED: Metabolic acidosis XUJ-64-GI: E87.2  ICD-9-CM: 276.2  4/2/2018 - 12/20/2018        RESOLVED: Acute renal insufficiency ICD-10-CM: N28.9  ICD-9-CM: 593.9  3/31/2018 - 4/4/2018        RESOLVED: H/O Clostridium difficile infection ICD-10-CM: Z86.19  ICD-9-CM: V12.09  3/31/2018 - 12/20/2018        RESOLVED: Bradycardia, unspecified ICD-10-CM: R00.1  ICD-9-CM: 427.89  1/1/2018 - 12/20/2018        RESOLVED: Acute respiratory failure with hypoxia ([facility] ICD-10-CM: J96.01  ICD-9-CM: 518.81  5/31/2017 - 12/20/2018        RESOLVED: Diarrhea ICD-10-CM: R19.7  ICD-9-CM: 787.91  5/31/2017 - 12/20/2018        RESOLVED: History of renal cell cancer ICD-10-CM: F93.937  ICD-9-CM: V10.52  5/31/2017 - 12/20/2018        RESOLVED: MRSA bacteremia ICD-10-CM: R78.81  ICD-9-CM: 790.7, 041.12  5/31/2017 - 12/20/2018        RESOLVED: Anemia ICD-10-CM: D64.9  ICD-9-CM: 285.9  5/20/2017 - 12/20/2018        RESOLVED: Fever ICD-10-CM: R50.9  ICD-9-CM: 780.60  5/20/2017 - 12/20/2018        RESOLVED: Hyponatremia ICD-10-CM: E87.1  ICD-9-CM: 276.1  4/28/2017 - 12/20/2018        RESOLVED: Diabetic foot ulcer ([facility] ICD-10-CM: E11.621, L97.509  ICD-9-CM: 250.80, 707.15  12/30/2016 - 12/20/2018        RESOLVED: Osteomyelitis ([facility] ICD-10-CM: M86.9  ICD-9-CM: 730.20  2/25/2016 - 2/29/2016        RESOLVED: Cellulitis of foot ICD-10-CM: G18.602  ICD-9-CM: 682.7  5/1/2015 - 12/20/2018        RESOLVED: Renal cell cancer ([facility] ICD-10-CM: C64.9  ICD-9-CM: 189.0  11/5/2014 - 12/20/2018        RESOLVED: Cancer of left kidney ([facility] ICD-10-CM: C64.2  ICD-9-CM: 189.0  12/23/2013 - 12/20/2018        RESOLVED: Renal mass, left ICD-10-CM: N28.89  ICD-9-CM: 593.9  12/17/2013 - 12/20/2018        RESOLVED: Renal mass ICD-10-CM: N28.89  ICD-9-CM: 593.9  12/16/2013 - 12/20/2018        RESOLVED: Diabetes ([facility] ICD-10-CM: E11.9  ICD-9-CM: 250.00  1/1/1990 - 12/20/2018               Discharge Condition: Stable    Hospital Course:     Annita Vasquez is a 59 y.o.   male who has significant history for chronic urinary retention with suprapubic catheter placement, history of Renal Cell carcinoma post-left partial nephrectomy 12/2013 followed by Dr. Mar Cruz urology, chronic kidney disease baseline Cr 1.6-2.2, history of c-spine laminectomy with post op paralysis, Heroin Drug Abuse, ongoing TOB use, DM2, anemia of chronic disease, thrombocytopenia, hepatitis C with cirrhosis followed by GI Dr. Jean Marie Harrison, [facility], Hypothyroidism. Patient has had mutiple hospitalizations over past month with decline in functional status but declining SNF placement. He was hospitalized at [facility] 3/7/19-3/15/19 for upper GI bleed requiring transfusion PRBCs and Platelets, had EGD 9/0/05 without obvious beeding source found, started prilosec BID, Acute Renal Failure Cr 3.0, Pneumonia completed 7 days ABT prior to discharge, candida UTI given fluconazole, acute respiratory failure requiring short intubation, acute on chronic diastolic CHF responded to lasix diuresis was off lasix prior to discharge. Within few days of return to home poor PO intake, drowsiness, EMS called and patient brought to [facility] where he was hospitalized for UTI with Sepsis admitted 3/17/19-3/20/19. He has had poor urine output, sister did call Dr. Tosha Kirk, Urology, 3/25/19 discussed poor urine output despite irrigation and was advised to go to ER, did go to [facility], was given a dose of lasix. Followed up with Urology next day 3/26/19 where he had bladder irrigation performed and cath was changed. He was seen yesterday 3/27/19 in office with Dr. Tenisha Varghese for hospital follow up visit. Caregiver reporting that has still not had great PO intake, has been pushing PO fluids but still having decreased urine output. Reported only one ounce fluid in bag within 24hrs. pt had  no urine output was instructed to  Go  to ER.   Started on cefepime for possible UTI, given 1L IVF with minimal UOP.      Pt admitted to hospitalist service,  And was switched to our service later   Cardiology, Urology and Nephrology have been consulted.         pt was found to have Hypoglycemia / Hypothermia/ hypocalcemia/ question  Adrenal insufficiency/hypopituitarism, hypothyroidism  - pt had  Cosyntropin   - Endocrinology. Recommended to continue to wean off hydrocortisone, continue wean per endocrinology recs.   Discussed with Dr Ivis Denise before discharge will switch tp prednisone 10 mg daily for 2 weeks then 5 mg daily for 2 weeks and f/u with him in 4 weeks      - Per endo, concern for suspected hypopituitarism. Pt currently on levothyroxine 100mcg daily. Had been discontinued off his levothyroixine 8/2018, was only recently restarted last month during hospitalization.    -  discussed with Dr Shanice Youssef most like all his hypothermia and hypglycemia related to hypothyroid and should improved with cytomel 25 mg BID might decrease to 25 mg daily after 4 weeks       - PTH elevated at 144.6, Ca mildly low at 8.4  - FSH elevated at 12.3, LH elevated at 16.6.  - IGF  73 within normal   - AFP, Testosterone, Prolactin and vit D all wnl (AFP: 1, Testosterone: 709, Vit D 38.5, Prolactin 2. 3)      SHEILA on CKD stage 2-3, possibly due to progressive CKD/ Renal Cell Carcinoma of Left Kidney  -  pt was seen by nephrology Dr Maryuri Membreno he will f/u with him in 2-3 weeks   - CT 3/28: Abd with pelvic fluid, pleural effusion  - CXR 3/28: cardiomegaly and pulm edema, questionable infiltrate vs atelectasis. BNP elevated from baseline.  - Cardiology consulted, pt stable and without cardiac symptoms.   - cardiac markers flat/indeterminant, continue monitorig on telemetry and remains stable  - potassium slightly low give bryn potassium before discharge needs to monitor a sout patinet     UTI/  sepsis   - Lactic acid normal x2   - blood cultures 3/28/19 negative    - Urine Culture 3/27/19  positive for achromobater and candida. Likely colonized by candida.  - Per ID, stop zosyn IV 2.25mg IV q8hr (today is day 6/7).  Give Bactrim 1 SS tab po once to completed TX. - Suprapubic cath functioning appropriately, no sign of obstruction.      HTN, HLD, bradycardia chronic HR 40-50s    switched Bp to norvasc 10 mg his regular dose    Continue additional hydralazine 25mg q8h PRN for SBP >160.  - Continue tamsulosin 0.4mg cap daily  - Monitor BP and adjust accordingly. Avoid AV lydia blocking agents due to bradycardia.  -3/29:  HDL 58 NELLIE 84.8 MY 69      Metabolic possible Infectious Encephalopathy in setting of SHEILA, Sepsis, hypoglycemia  - MRI/MRA negative   - Dr. Juan Cast started [address] 3/29. Diffuse encephalopathic process. No epileptiform or focal abnormalities.      - keppra discontinued, per neuro, Dr. Carlo Pena, was not aware of seizure activity wittness by ICU team or ID consult however reports that with likely provoking hypoglycemia still would recommend to hold off on Keppra at this time and monitor  -  Continue Lactulose.     Hx of recent GIB/ Chronic anemia / pancytopenia  -  Continue prilosec 20 mg ID f/u Dr Víctor Vallejo ion 2 weeks discussed with Dr Víctor Vallejo before discharge nothing to be done  Iron saturation normal  - Continue to monitor H&H due to h/o GI bleeding. EGD performed 3/8/19 revealed gastritis/ duodenitis.   -  HIT panel negative for platelet factor 4 and Heparin aggregation. F/u with hematology   - DVT prophylaxis with SCDs and PT/OT to increase mobility. Mobilize to chair with assistant as tolerated     Abdominal Pain, CT abdomen Gallbladder with wall slightly thickened and/or pericholecystic fluid  -Abdominal pain resolved.   -RUQ US 3/28/19 no acute cholecystitis  -No N/V noted, Continue Zofran 4mg q6 hr PRN      Hep C, Cirrhosis  -Hep B negative and HIV none reactive   -Hep C AB positive Hep C ab and elevated Hep C virus ab positive Quant hepatitis c undetectable INR &PT within normal limits.   - Cirrhosis is possible cause of Occult + test, no acute concern for GI bleed  -Continue Lactulose 10g BID prn   - AFP normal  - GI signed off, pt needs follow up as outpatient with Dr. Negro Rebollar in 2-4 wks, recommend to re-consult if any active bleeding     DM2  -Hypoglycemic on admission, A1c 5.3  - Pt with hyperglycemic episodes with POC glc 207. - Continue Humalog sliding scale. I and januvia stop actose continue to monitor glucose level closely- Continue glc monitoring     Anemia chronic disease, Recent Acute blood loss anemia from UGIB EGD neg for source, chronic thrombocytopenia  - Continue Retacrit and Rocaltrol  - 4/5: Platelets down trending to 58. H&H: 7.1/21. 4.   - Continue to monitor  Weekly CBC, transfuse as needed if hgb <7, monitor plt count. - pt needs to  F/u for Retacrit injection of H &H less than 10/30     History of Drug Abuse, Reported heroin use during recent hospitalization this month, ongoing Tob use, Etoh Use  - H/O  heroin use, indorses snorting heroin. Has past history of IVDA none currently.  UDS +Opiods  - H/O alcohol abuse  - acute hepatitis panel neg for Hep A&B,  hepatits c Ab positive  No active infection  - HIV negative   - Discussed significant risks, recommended to quit. Social work coonsulted for resources to help pt quit. Pt denies psychiatric consult. Denied SNF or Rehab      - Pt has h/o poor compliance with multiple admissions     - Pt denied by [facility]. Pt unwilling to go to SNF after extensive discussion with patient regarding concern for re-admission x3. Strongly recommended he consider short-term SNF for rehab, health concerns and concern for readmission. Pt's sister is on board with SNF and will discuss with pt further. Pt does have 8-9 steps to navigate to get into home, will ask PT to evaluate to ensure he is safe.  As at this time he strongly desires discharge to home and likely this will be the discharge plan.     F/u endo, nephrology, ID and heme  Monitor urine output   F/u PT and OT and speech therapy as outpatinet   Encourage PO fluids  Social work involvement for polysubstance abuse   discussed with sister again before discharge she can not force him to go to SNF no other options available will D/C with home health . Discussed with case jake and he is set  up with home health with personal touch . Pt also has personal care      CODE:  Full.  on admission  Sister  is MPOA for patient. Continue to be  awake, alert, oriented, able to participate in his own medical decision making. Consults: Cardiology, Hematology/Oncology, Infectious Disease and Nephrology        Physical Examination:     General: Alert, cooperative, no acute distress    HEENT: NC, Atraumatic.    Lungs: CTA Bilaterally. No Wheezing/Rhonchi/Rales. Heart: Regular  rhythm,  No murmur,  Abdomen: Soft, Non distended, Non tender. Suprapubic cath in place. Extremities: Mild 1+ LE edema   Psych: Not anxious or agitated. Wishes to return home. Polysubstance abuse  Neurologic: CN 2-12 grossly intact, generalized weakness able to walk with pt and ot with walker . more weakness lt side H/O cervical disc disease and surgery         Disposition: home with home health    Discharge Medication List as of 4/6/2019  6:04 PM      START taking these medications    Details   acetaminophen (TYLENOL) 325 mg tablet Take 2 Tabs by mouth every six (6) hours as needed for Pain., Normal, Disp-30 Tab, R-1      b complex-vitamin c-folic acid (NEPHROCAPS) 1 mg capsule Take 1 Cap by mouth daily. , Print, Disp-30 Cap, R-1      calcitRIOL (ROCALTROL) 0.25 mcg capsule Take 1 Cap by mouth every Monday, Wednesday, Friday. , Print, Disp-30 Cap, R-0      ferrous gluconate 324 mg (37.5 mg iron) tablet Take 1 Tab by mouth two (2) times daily (with meals). , Print, Disp-60 Tab, R-0      glucose 4 gram chewable tablet Take 4 Tabs by mouth as needed for Other (hypoglycemia). , Normal, Disp-30 Tab, R-0      hydrALAZINE (APRESOLINE) 25 mg tablet Take 1 Tab by mouth every eight (8) hours as needed (systolic B/P >029). , Print, Disp-30 Tab, R-0      lactulose (CHRONULAC) 10 gram/15 mL solution Take 15 mL by mouth two (2) times a day., Print, Disp-1 Bottle, R-0      predniSONE (DELTASONE) 10 mg tablet Take 10 mg by mouth daily (with breakfast). 10 mg daily for 2 weeks then 1/2 tab po daily for 2 week then stop, Print, Disp-21 Tab, R-0      liothyronine (CYTOMEL) 25 mcg tablet Take 1 Tab by mouth two (2) times a day., Print, Disp-60 Tab, R-0         CONTINUE these medications which have CHANGED    Details   levothyroxine (SYNTHROID) 100 mcg tablet Take 1 Tab by mouth every morning., Print, Disp-30 Tab, R-1         CONTINUE these medications which have NOT CHANGED    Details   amLODIPine (NORVASC) 10 mg tablet Take 10 mg by mouth daily. , Historical Med      omeprazole (PRILOSEC) 20 mg capsule Take 20 mg by mouth two (2) times a day., Historical Med      sodium bicarbonate 650 mg tablet Take 650 mg by mouth three (3) times daily. , Historical Med      sucralfate (CARAFATE) 1 gram tablet Take 1 g by mouth four (4) times daily. , Historical Med      Syringe, Disposable, (BD SYRINGE CATHETER TIP) 60 mL syrg Use 1 syringe daily with irrigations, Normal, Disp-30 Syringe, R-11      docusate sodium (COLACE) 100 mg capsule 100 mg two (2) times daily as needed., Historical Med      ergocalciferol (DRISDOL) 50,000 unit capsule Take 50,000 Units by mouth every seven (7) days. , Historical Med      insulin lispro (HUMALOG) 100 unit/mL injection Normal Insulin Sensitivity   For Blood Sugar (mg/dL) of:     Less than 150 =   0 units           150 -199 =   2 units  200 -249 =   4 units  250 -299 =   6 units  300 -349 =   8 units  350 and above =   10 units  If 2 glucose readings are above 200 mg/dL  within a 24 hr period, proceed to \"Very Insul, No Print, Disp-1 Vial, R-0      tamsulosin (FLOMAX) 0.4 mg capsule Take 1 Cap by mouth daily. , No Print, Disp-30 Cap, R-0      doxycycline (ADOXA) 100 mg tablet Take 100 mg by mouth daily. , Historical Med                STOP taking these medications       therapeutic multivitamin [facility]) tablet Comments:   Reason for Stopping:         traMADol (ULTRAM) 50 mg tablet Comments:   Reason for Stopping:         trospium (SANCTURA) 20 mg tablet Comments:   Reason for Stopping:         pioglitazone (ACTOS) 15 mg tablet Comments:   Reason for Stopping:               Activity: Activity as tolerated. Fall precautions   Diet: Cardiac Diet and Renal Diet  Wound Care: As directed suprapubic catheter care    Follow-up Appointments   Procedures    FOLLOW UP VISIT Appointment in: Other (Specify) F/U Dr Veta Dandy  Monday cbc, Toya Rodriguez . F/u Dr Liza Gonzalez in 4 weeks . Continue prednisone 10 mg daily for 2 weeks then 5 mg daily for 2 weeks then stop. Pt declined SNF and psychiatry consult pt advise. .. F/U Dr Veta Dandy  Monday cbc, cmp, mag . F/u Dr Liza Gonzalez in 4 weeks . Continue prednisone 10 mg daily for 2 weeks then 5 mg daily for 2 weeks then stop. Pt declined SNF and psychiatry consult pt advised to stop heroin abuse. Pt needs f/u GI Dr Víctor Vallejo in 2 weeks . F/u urology [facility]  For suprapubic care . F/u Dr Azalia Tafoya and Dr Patito Edwards within one week he will need Retacrite injection q 7 days for H/H less than 10/30 . Home health pt and ot speech therapy medication management monitor vitals closely     Standing Status:   Standing     Number of Occurrences:   1     Order Specific Question:   Appointment in     Answer: Other (Specify)    discussed with Dr Junior Jeff Maldonado before discharge discussed with case mangyoana .  Discussed with nursing staff and sister POA to stop oral med use only humalog with sliding scale until f/u appt Monday to adjust med call if she has any question     Time for discharge pt more than 45 minutes     Signed By: Rosary Apgar, MD     2019

## 2019-04-06 NOTE — PROGRESS NOTES
0700 Bedside and Verbal shift change report given to [place] (oncoming nurse) by Vicky Colmenares RN (offgoing nurse). Report included the following information SBAR, Kardex, ED Summary, Intake/Output, MAR, Recent Results and Cardiac Rhythm SR/SB/1AVB. 1415 Patient asking to go home. Zurdo napierd. Stated patient can go home once he is cleared by endocrinology. Zurdo SUE stated he will contact endocrinology. 1538 Bedside and Verbal shift change report given to [address] [place] [address] (oncoming nurse) by Kelly Pacheco RN (offgoing nurse). Report included the following information SBAR, Kardex, ED Summary, Intake/Output, MAR, Recent Results and Cardiac Rhythm SR/SB/1AVB.

## 2019-04-06 NOTE — ROUTINE PROCESS
Bedside and Verbal shift change report given to Nathaniel Duque RN (oncoming nurse) by Lalita Santa RN (offgoing nurse). Report included the following information Kardex, Intake/Output, MAR and Recent Results.

## 2019-04-06 NOTE — PROGRESS NOTES
Progress Note    Ave Gaytan  59 y.o. Admit Date: 3/27/2019  Active Problems:    Oliguria (3/28/2019) POA: Unknown      Renal injury (3/28/2019) POA: Unknown      Suprapubic catheter ([address] (3/28/2019) POA: Unknown      Bradycardia, sinus (3/28/2019) POA: Unknown      Acute UTI (3/28/2019) POA: Unknown      Type 2 diabetes mellitus with hypoglycemia ([address] (3/28/2019) POA: Unknown      Acute renal failure (ARF) ([address] (3/28/2019) POA: Unknown      Renal failure (ARF), acute on chronic ([address] (3/31/2019) POA: Yes      ATN (acute tubular necrosis) ([address] (4/1/2019) POA: Clinically Undetermined            Subjective:     Patient feels good, says waiting to be going home, no urinary complain. A comprehensive review of systems was negative except for that written in the History of Present Illness.     Objective:     Visit Vitals  /78   Pulse 63   Temp 97.4 °F (36.3 °C)   Resp 14   Ht 6' (1.829 m)   Wt 75.3 kg (165 lb 14.4 oz)   SpO2 98%   BMI 22.50 kg/m²         Intake/Output Summary (Last 24 hours) at 4/6/2019 1522  Last data filed at 4/6/2019 1252  Gross per 24 hour   Intake 1200 ml   Output 1435 ml   Net -235 ml       Current Facility-Administered Medications   Medication Dose Route Frequency Provider Last Rate Last Dose    predniSONE (DELTASONE) tablet 40 mg  40 mg Oral DAILY WITH BREAKFAST Chalo Renner MD   40 mg at 04/06/19 1003    amLODIPine (NORVASC) tablet 10 mg  10 mg Oral DAILY Anju Okeefe MD   10 mg at 04/06/19 1003    epoetin cristy-epbx (RETACRIT) 12,000 Units combo injection  12,000 Units SubCUTAneous Q7D Lory Prado MD   12,000 Units at 04/05/19 1352    ferrous gluconate 324 mg (37.5 mg iron) tablet 1 Tab  1 Tab Oral BID WITH MEALS Anju Okeefe MD   1 Tab at 04/06/19 1003    liothyronine (CYTOMEL) tablet 25 mcg  25 mcg Oral BID Anju Okeefe MD   25 mcg at 04/06/19 1003    hydrALAZINE (APRESOLINE) tablet 25 mg  25 mg Oral Q8H PRN Anju Okeefe MD 25 mg at 04/05/19 2323    insulin lispro (HUMALOG) injection   SubCUTAneous AC&HS Karlyn Meckel, MD   6 Units at 04/06/19 1222    insulin lispro (HUMALOG) injection 6 Units  6 Units SubCUTAneous Wilder Callaway MD   6 Units at 04/06/19 1221    pantoprazole (PROTONIX) granules for oral suspension 40 mg  40 mg Per NG tube BID Hernandez Aquino MD   40 mg at 04/06/19 1003    B complex-vitaminC-folic acid (NEPHROCAP) cap  1 Cap Oral DAILY Krysta Piedra MD   1 Cap at 04/06/19 1003    sodium bicarbonate tablet 650 mg  650 mg Oral TID Krysta Piedra MD   650 mg at 04/06/19 1003    calcitRIOL (ROCALTROL) capsule 0.25 mcg  0.25 mcg Oral Q MON, WED & Mikael Bingham MD   0.25 mcg at 04/05/19 2128    lactulose (CHRONULAC) solution 10 g  10 g Oral BID Heather Beltran PA-C   Stopped at 04/03/19 0900    sodium chloride (NS) flush 5-10 mL  5-10 mL IntraVENous ANDRÉS Baron MD   10 mL at 04/02/19 0658    glucose chewable tablet 16 g  4 Tab Oral PRN Gwen Baron MD        glucagon (GLUCAGEN) injection 1 mg  1 mg IntraMUSCular PRN Gwen Baron MD        dextrose (D50W) injection syrg 12.5-25 g  25-50 mL IntraVENous PRTRAVIS Baron MD   25 g at 03/29/19 1104    tamsulosin (FLOMAX) capsule 0.4 mg  0.4 mg Oral DAILY Gwen Baron MD   0.4 mg at 04/06/19 1003    acetaminophen (TYLENOL) tablet 650 mg  650 mg Oral Q6H PRN Gwen Baron MD        oxyCODONE-acetaminophen (PERCOCET) 5-325 mg per tablet 1 Tab  1 Tab Oral Q6H ANDRÉS Baron MD   1 Tab at 04/05/19 0242    naloxone (NARCAN) injection 0.4 mg  0.4 mg IntraVENous PRN Gwen Baron MD        [facility] injection 4 mg  4 mg IntraVENous Q6H PRN Gwen Baron MD   4 mg at 03/29/19 8391    docusate sodium (COLACE) capsule 100 mg  100 mg Oral BID PRN Gwen Baron MD        levothyroxine (SYNTHROID) tablet 100 mcg  100 mcg Oral 6am Hernandez Aquino MD   100 mcg at 04/06/19 0538        Physical Exam:     Physical Exam:   General:  Alert, cooperative, no distress, appears stated age. Mouth/Throat: Lips, mucosa, and tongue normal. Teeth and gums normal.   Neck: Supple, symmetrical, trachea midline, no adenopathy, thyroid: no enlargement/tenderness/nodules, no carotid bruit and no JVD. Lungs:   Clear to auscultation bilaterally. Heart:  Regular rate and rhythm, S1, S2 normal, no murmur, click, rub or gallop. Abdomen:   Soft, non-tender. Bowel sounds normal. No masses,  No organomegaly. Extremities: Extremities normal, atraumatic, no cyanosis or edema.    Skin: Skin color, texture, turgor normal. No rashes or lesions         Data Review:    CBC w/Diff    Recent Labs     04/06/19 0344 04/05/19  0540 04/04/19  0543   WBC 6.1 6.3 6.1   RBC 2.54* 2.53* 2.71*   HGB 7.3* 7.1* 7.5*   HCT 21.6* 21.4* 22.8*   MCV 85.0 84.6 84.1   MCH 28.7 28.1 27.7   MCHC 33.8 33.2 32.9   RDW 18.8* 18.5* 18.2*    Recent Labs     04/06/19 0344 04/05/19  0540 04/04/19  0543   MONOS 7 11* 6   EOS 2 1 0   BASOS 0 0 0   RDW 18.8* 18.5* 18.2*        Comprehensive Metabolic Profile    Recent Labs     04/06/19 0344 04/05/19  0540 04/04/19  0543   * 144 144   K 3.4* 3.5 3.4*   * 114* 112*   CO2 25 24 24   BUN 45* 46* 42*   CREA 3.67* 3.67* 3.75*    Recent Labs     04/06/19 0344 04/05/19  0540 04/04/19  0543   CA 8.1* 8.2* 8.3*   PHOS 4.0 3.5 3.4   ALB  --   --  2.1*   TP  --   --  6.1*   SGOT  --   --  16   TBILI  --   --  0.4                        Impression:       Active Hospital Problems    Diagnosis Date Noted    ATN (acute tubular necrosis) (HCC) 04/01/2019    Renal failure (ARF), acute on chronic (HCC) 03/31/2019    Oliguria 03/28/2019    Renal injury 03/28/2019    Suprapub[facility] 03/28/2019    Bradycardia, sinus 03/28/2019    Acute UTI 03/28/2019    Type 2 diabetes mellitus with hypoglycemia ([facility] 03/28/2019    Acute renal failure (ARF) ([facility] 03/28/2019 Continue current care,stable renal wise. Plan:     Replace ALBA Bergeron MD

## 2019-04-06 NOTE — PROGRESS NOTES
Discharge order noted for today. Pt has been accepted to [address] [place] [address]. Met with patient who is agreeable to the transition plan today. Transport has been arranged through pt's sister. Patient's discharge summary and home health  orders have been forwarded to personal [facility] home health  agency via [employer], fax, and phone call. Updated bedside RN,  to the transition plan.   Discharge information has been documented on the AVS.       BLANE Fatima  Case Management  [phone]

## 2019-04-06 NOTE — DISCHARGE INSTRUCTIONS
Patient Education        Type 2 Diabetes: Care Instructions  Your Care Instructions    Type 2 diabetes is a disease that develops when the body's tissues cannot use insulin properly. Over time, the pancreas cannot make enough insulin. Insulin is a hormone that helps the body's cells use sugar (glucose) for energy. It also helps the body store extra sugar in muscle, fat, and liver cells. Without insulin, the sugar cannot get into the cells to do its work. It stays in the blood instead. This can cause high blood sugar levels. A person has diabetes when the blood sugar stays too high too much of the time. Over time, diabetes can lead to diseases of the heart, blood vessels, nerves, kidneys, and eyes. You may be able to control your blood sugar by losing weight, eating a healthy diet, and getting daily exercise. You may also have to take insulin or other diabetes medicine. Follow-up care is a key part of your treatment and safety. Be sure to make and go to all appointments. Call your doctor if you are having problems. It's also a good idea to know your test results and keep a list of the medicines you take. How can you care for yourself at home? · Keep your blood sugar at a target level (which you set with your doctor). ? Eat a good diet that spreads carbohydrate throughout the day. Carbohydrate--the body's main source of fuel--affects blood sugar more than any other nutrient. Carbohydrate is in fruits, vegetables, milk, and yogurt. It also is in breads, cereals, vegetables such as potatoes and corn, and sugary foods such as candy and cakes. ? Aim for 30 minutes of exercise on most, preferably all, days of the week. Walking is a good choice. You also may want to do other activities, such as running, swimming, cycling, or playing tennis or team sports. If your doctor says it's okay, do muscle-strengthening exercises at least 2 times a week. ? Take your medicines exactly as prescribed.  Call your doctor if you think you are having a problem with your medicine. You will get more details on the specific medicines your doctor prescribes. · Check your blood sugar as often as your doctor recommends. It is important to keep track of any symptoms you have, such as low blood sugar. Also tell your doctor if you have any changes in your activities, diet, or insulin use. · Talk to your doctor before you start taking aspirin every day. Aspirin can help certain people lower their risk of a heart attack or stroke. But taking aspirin isn't right for everyone, because it can cause serious bleeding. · Do not smoke. If you need help quitting, talk to your doctor about stop-smoking programs and medicines. These can increase your chances of quitting for good. · Keep your cholesterol and blood pressure at normal levels. You may need to take one or more medicines to reach your goals. Take them exactly as directed. Do not stop or change a medicine without talking to your doctor first.  When should you call for help? Call 911 anytime you think you may need emergency care. For example, call if:    · You passed out (lost consciousness), or you suddenly become very sleepy or confused. (You may have very low blood sugar.)    Call your doctor now or seek immediate medical care if:    · Your blood sugar is 300 mg/dL or is higher than the level your doctor has set for you.     · You have symptoms of low blood sugar, such as:  ? Sweating. ? Feeling nervous, shaky, and weak. ? Extreme hunger and slight nausea. ? Dizziness and headache.  ? Blurred vision. ? Confusion.    Watch closely for changes in your health, and be sure to contact your doctor if:    · You often have problems controlling your blood sugar.     · You have symptoms of long-term diabetes problems, such as:  ? New vision changes. ? New pain, numbness, or tingling in your hands or feet. ? Skin problems. Where can you learn more?   Go to http://gabo-curly.info/. Enter C553 in the search box to learn more about \"Type 2 Diabetes: Care Instructions. \"  Current as of: July 25, 2018  Content Version: 11.9  © 8663-7037 [employer]. Care instructions adapted under license by [employer] (which disclaims liability or warranty for this information). If you have questions about a medical condition or this instruction, always ask your healthcare professional. [place] [address] any warranty or liability for your use of this information. Patient Education        Chronic Kidney Disease: Care Instructions  Your Care Instructions    Chronic kidney disease happens when your kidneys don't work as well as they should. Your kidneys have a few important jobs. They remove waste from your blood. This waste leaves your body in your urine. They also balance your body's fluids and chemicals. When your kidneys don't work well, extra waste and fluid can build up. This can poison the body and sometimes cause death. The most common causes of this disease are diabetes and high blood pressure. In some cases, the disease develops in 2 to 3 months. But it usually develops over many years. If you take medicine and make healthy changes to your lifestyle, you may be able to prevent the disease from getting worse. But if your kidney damage gets worse, you may need dialysis or a kidney transplant. Dialysis uses a machine to filter waste from the blood. A transplant is surgery to give you a healthy kidney from another person. Follow-up care is a key part of your treatment and safety. Be sure to make and go to all appointments, and call your doctor if you are having problems. It's also a good idea to know your test results and keep a list of the medicines you take. How can you care for yourself at home?   Treatments and appointments    · Be safe with medicines. Take your medicines exactly as prescribed.  Call your doctor if you have any problems with your medicine. You also may take medicine to control your blood pressure or to treat diabetes. Many people who have diabetes take blood pressure medicine.     · If you have diabetes, do your best to keep your blood sugar in your target range. You may do this by eating healthy food and exercising. You may also take medicines.     · Go to your dialysis appointments if you have this treatment.     · Do not take ibuprofen (Advil, Motrin), naproxen (Aleve), or similar medicines, unless your doctor tells you to. These may make the disease worse.     · Do not take any vitamins, over-the-counter medicines, or herbal products without talking to your doctor first.     · Do not smoke or use other tobacco products. Smoking can reduce blood flow to the kidneys. If you need help quitting, talk to your doctor about stop-smoking programs and medicines. These can increase your chances of quitting for good.     · Do not drink alcohol or use illegal drugs.     · Talk to your doctor about an exercise plan. Exercise helps lower your blood pressure. It also makes you feel better.     · If you have an advance directive, let your doctor know. It may include a living will and a durable power of  for health care. If you don't have one, you may want to prepare one. It lets your doctor and loved ones know your health care wishes if you become unable to speak for yourself. Diet    · Talk to a registered dietitian. He or she can help you make a meal plan that is right for you. Most people with kidney disease need to limit salt (sodium), fluids, and protein. Some also have to limit potassium and phosphorus.     · You may have to give up many foods you like.  But try to focus on the fact that this will help you stay healthy for as long as possible.     · If you have a hard time eating enough, talk to your doctor or dietitian about ways to add calories to your diet.     · Your diet may change as your disease changes. See your doctor for regular testing. And work with a dietitian to change your diet as needed. When should you call for help? Call 911 anytime you think you may need emergency care. For example, call if:    · You passed out (lost consciousness).    Call your doctor now or seek immediate medical care if:    · You have less urine than normal or no urine.     · You have trouble urinating or can urinate only very small amounts.     · You are confused or have trouble thinking clearly.     · You feel weaker or more tired than usual.     · You are very thirsty, lightheaded, or dizzy.     · You have nausea and vomiting.     · You have new swelling of your arms or feet, or your swelling is worse.     · You have blood in your urine.     · You have new or worse trouble breathing.    Watch closely for changes in your health, and be sure to contact your doctor if:    · You have any problems with your medicine or other treatment. Where can you learn more? Go to http://gabo-curly.info/. Enter N276 in the search box to learn more about \"Chronic Kidney Disease: Care Instructions. \"  Current as of: March 14, 2018  Content Version: 11.9  © 2381-8125 [employer]. Care instructions adapted under license by [employer] (which disclaims liability or warranty for this information). If you have questions about a medical condition or this instruction, always ask your healthcare professional. [place] [address] any warranty or liability for your use of this information.

## 2019-04-08 LAB — PROLACTIN SERPL-MCNC: 3.7 NG/ML

## 2019-04-12 ENCOUNTER — HOSPITAL ENCOUNTER (OUTPATIENT)
Dept: ONCOLOGY | Age: 59
Discharge: HOME | End: 2019-04-12

## 2019-04-12 ENCOUNTER — OFFICE VISIT (OUTPATIENT)
Dept: ONCOLOGY | Age: 59
End: 2019-04-12

## 2019-04-12 VITALS
HEIGHT: 72 IN | TEMPERATURE: 98.5 F | SYSTOLIC BLOOD PRESSURE: 144 MMHG | WEIGHT: 176.6 LBS | OXYGEN SATURATION: 100 % | RESPIRATION RATE: 16 BRPM | BODY MASS INDEX: 23.92 KG/M2 | DIASTOLIC BLOOD PRESSURE: 65 MMHG | HEART RATE: 62 BPM

## 2019-04-12 DIAGNOSIS — D69.6 THROMBOCYTOPENIA (HCC): ICD-10-CM

## 2019-04-12 DIAGNOSIS — D64.9 CHRONIC ANEMIA: ICD-10-CM

## 2019-04-12 DIAGNOSIS — D64.9 CHRONIC ANEMIA: Primary | ICD-10-CM

## 2019-04-12 LAB
BASO+EOS+MONOS # BLD AUTO: 0.8 K/UL (ref 0–2.3)
BASO+EOS+MONOS NFR BLD AUTO: 7 % (ref 0.1–17)
DIFFERENTIAL METHOD BLD: ABNORMAL
ERYTHROCYTE [DISTWIDTH] IN BLOOD BY AUTOMATED COUNT: 19.9 % (ref 11.5–14.5)
HCT VFR BLD AUTO: 24.7 % (ref 36–48)
HGB BLD-MCNC: 7.8 G/DL (ref 12–16)
LYMPHOCYTES # BLD: 2.5 K/UL (ref 1.1–5.9)
LYMPHOCYTES NFR BLD: 21 % (ref 14–44)
MCH RBC QN AUTO: 29.1 PG (ref 25–35)
MCHC RBC AUTO-ENTMCNC: 31.6 G/DL (ref 31–37)
MCV RBC AUTO: 92.2 FL (ref 78–102)
NEUTS SEG # BLD: 8.3 K/UL (ref 1.8–9.5)
NEUTS SEG NFR BLD: 72 % (ref 40–70)
PLATELET # BLD AUTO: 108 K/UL (ref 140–440)
RBC # BLD AUTO: 2.68 M/UL (ref 4.1–5.1)
WBC # BLD AUTO: 11.6 K/UL (ref 4.5–13)

## 2019-04-12 NOTE — PATIENT INSTRUCTIONS
Epoetin Wil (Epogen, Procrit) - (By injection)   Why this medicine is used:   Treats anemia. Contact a nurse or doctor right away if you have:  · Chest pain, fast heartbeat, sudden shortness of breath  · Anxiety, cough, dizziness or lightheadedness, fainting  · Seizures  · Pain or swelling in your lower leg  · Fever, chills, cough, stuffy or runny nose, sore throat     Common side effects:  · Nausea, vomiting  · Muscle or joint pain  · Pain, itching, burning, swelling where the shot is given  © 2017 [address] is for End User's use only and may not be sold, redistributed or otherwise used for commercial purposes.

## 2019-04-12 NOTE — PROGRESS NOTES
Hematology/Oncology  Progress Note    Name: Padmini Mosley  Date: 2019  : 1960    PCP: Eneida Alves MD     Mr. Beatrice Mendez is a 59 y.o.  male who was seen for follow-up of his thrombocytopenia. He was seen during his recent hospitalization. Current Therapy: Retacrit every 2 weeks whenever his hemoglobin is <10g/dL and hematocrit is <30%. Subjective:     Mr. Beatrice Mendez is a 78-year-old -American man who was seen for thrombocytopenia and anemia due to chronic kidney disease. He is in the office today for follow up. He denies fatigue, tiredness, and shortness of breath. He denies chest pain and dizziness. Since his last clinic visit he has not experienced any unexplained bruising or bleeding. He uses a walker for support and mobility. He does not have any complaints or concerns to report at this time. Today he is accompanied by his private duty nurse. Past medical history, family history, and social history: these were reviewed and remains unchanged.     Past Medical History:   Diagnosis Date    Anemia     Bradycardia, unspecified     Cervical discitis     MRSA    Chronic drug abuse ([address] 1974    Chronic kidney disease     stage III    Diabetes ([address] 1990    Diabetes mellitus ([address]     Drug abuse ([address]     Encephalopathy     GERD (gastroesophageal reflux disease)     Hypertension     Muscle weakness     Renal cell carcinoma of left kidney ([address] 13    Pathological Stage [id number] cc RCC FG3 s/p left open partial nephrectomy in       Sepsis due to methicillin resistant Staphylococcus aureus (HCC)     Suprapubic catheter (HCC)     Thrombocytopenia (HCC)     Urethral erosion     Viral hepatitis C     Xerosis cutis      Past Surgical History:   Procedure Laterality Date    HX CIRCUMCISION  13    Dr. Miguel Ángel Noble, [facility]    HX NEPHRECTOMY Left 13    Open Partial, Dr. Miguel Ángel Noble, [facility]    HX OTHER SURGICAL Right 2016    removed right ft  2nd meta-tarsal  HX OTHER SURGICAL  04/2017    abscess removed from back of neck     GA REMOVAL WITH INSERTION OF SUPRAPUBIC CATHETER  2018     Social History     Socioeconomic History    Marital status:      Spouse name: Not on file    Number of children: Not on file    Years of education: Not on file    Highest education level: Not on file   Occupational History    Not on file   Social Needs    Financial resource strain: Not on file    Food insecurity:     Worry: Not on file     Inability: Not on file    Transportation needs:     Medical: Not on file     Non-medical: Not on file   Tobacco Use    Smoking status: Current Every Day Smoker     Packs/day: 0.50     Years: 30.00     Pack years: 15.00     Types: Cigarettes    Smokeless tobacco: Never Used   Substance and Sexual Activity    Alcohol use: Yes     Comment: beer occasionally    Drug use: No    Sexual activity: Never   Lifestyle    Physical activity:     Days per week: Not on file     Minutes per session: Not on file    Stress: Not on file   Relationships    Social connections:     Talks on phone: Not on file     Gets together: Not on file     Attends Taoism service: Not on file     Active member of club or organization: Not on file     Attends meetings of clubs or organizations: Not on file     Relationship status: Not on file    Intimate partner violence:     Fear of current or ex partner: Not on file     Emotionally abused: Not on file     Physically abused: Not on file     Forced sexual activity: Not on file   Other Topics Concern    Not on file   Social History Narrative     since 2012;  for 12 yrs; [employer]; [address] [email or web address]  just recently furloughed;  No  service     Family History   Problem Relation Age of Onset    Diabetes Mother     Sickle Cell Anemia Father     Diabetes Sister     Hypertension Sister      Current Outpatient Medications   Medication Sig Dispense Refill    levothyroxine (SYNTHROID) 100 mcg tablet Take 1 Tab by mouth every morning. 30 Tab 1    acetaminophen (TYLENOL) 325 mg tablet Take 2 Tabs by mouth every six (6) hours as needed for Pain. 30 Tab 1    b complex-vitamin c-folic acid (NEPHROCAPS) 1 mg capsule Take 1 Cap by mouth daily. 30 Cap 1    calcitRIOL (ROCALTROL) 0.25 mcg capsule Take 1 Cap by mouth every Monday, Wednesday, Friday. 30 Cap 0    ferrous gluconate 324 mg (37.5 mg iron) tablet Take 1 Tab by mouth two (2) times daily (with meals). 60 Tab 0    glucose 4 gram chewable tablet Take 4 Tabs by mouth as needed for Other (hypoglycemia). 30 Tab 0    hydrALAZINE (APRESOLINE) 25 mg tablet Take 1 Tab by mouth every eight (8) hours as needed (systolic B/P >351). 30 Tab 0    lactulose (CHRONULAC) 10 gram/15 mL solution Take 15 mL by mouth two (2) times a day. 1 Bottle 0    predniSONE (DELTASONE) 10 mg tablet Take 10 mg by mouth daily (with breakfast). 10 mg daily for 2 weeks then 1/2 tab po daily for 2 week then stop 21 Tab 0    liothyronine (CYTOMEL) 25 mcg tablet Take 1 Tab by mouth two (2) times a day. 60 Tab 0    amLODIPine (NORVASC) 10 mg tablet Take 10 mg by mouth daily.  omeprazole (PRILOSEC) 20 mg capsule Take 20 mg by mouth two (2) times a day.  sodium bicarbonate 650 mg tablet Take 650 mg by mouth three (3) times daily.  sucralfate (CARAFATE) 1 gram tablet Take 1 g by mouth four (4) times daily.  Syringe, Disposable, (BD SYRINGE CATHETER TIP) 60 mL syrg Use 1 syringe daily with irrigations 30 Syringe 11    docusate sodium (COLACE) 100 mg capsule 100 mg two (2) times daily as needed.  ergocalciferol (DRISDOL) 50,000 unit capsule Take 50,000 Units by mouth every seven (7) days.  doxycycline (ADOXA) 100 mg tablet Take 100 mg by mouth daily.       insulin lispro (HUMALOG) 100 unit/mL injection Normal Insulin Sensitivity   For Blood Sugar (mg/dL) of:     Less than 150 =   0 units           150 -199 =   2 units  200 -249 =   4 units  250 -299 =   6 units  300 -349 =   8 units  350 and above =   10 units  If 2 glucose readings are above 200 mg/dL within a 24 hr period, proceed to \"Very Insul 1 Vial 0    tamsulosin (FLOMAX) 0.4 mg capsule Take 1 Cap by mouth daily. 30 Cap 0       Review of Systems  Constitutional: The patient has no acute distress or discomfort. HEENT: The patient denies recent head trauma, eye pain, blurred vision,  hearing deficit, oropharyngeal mucosal pain or lesions, and the patient denies throat pain or discomfort. Lymphatics: The patient denies palpable peripheral lymphadenopathy. Hematologic: The patient denies having bruising, bleeding, or progressive fatigue. Respiratory: Patient denies having shortness of breath, cough, sputum production, fever, or dyspnea on exertion. Cardiovascular: The patient denies having leg pain, leg swelling, heart palpitations, chest permit, chest pain, or lightheadedness. The patient denies having dyspnea on exertion. Gastrointestinal: The patient denies having nausea, emesis, or diarrhea. The patient denies having any hematemesis or blood in the stool. Genitourinary: Patient denies having urinary urgency, frequency, or dysuria. The patient denies having blood in the urine. Psychological: The patient denies having symptoms of nervousness, anxiety, depression, or thoughts of harming self. Skin: Patient denies having skin rashes, skin, ulcerations, or unexplained itching or pruritus. Musculoskeletal: The patient denies having pain in the joints or bones. Objective:     Visit Vitals  /65   Pulse 62   Temp 98.5 °F (36.9 °C) (Oral)   Resp 16   Ht 6' (1.829 m)   Wt 80.1 kg (176 lb 9.6 oz)   SpO2 100%   BMI 23.95 kg/m²     ECOG PS=0  Physical Exam:   Gen. Appearance: The patient is in no acute distress. Skin: There is no bruise or rash. HEENT: The exam is unremarkable. Neck: Supple without lymphadenopathy or thyromegaly.   Lungs: Clear to auscultation and percussion; there are no wheezes or rhonchi. Heart: Regular rate and rhythm; there are no murmurs, gallops, or rubs. Abdomen: Bowel sounds are present and normal.  There is no guarding, tenderness, or hepatosplenomegaly. Extremities: There is no clubbing, cyanosis, or edema. Neurologic: There are no focal neurologic deficits. Lymphatics: There is no palpable peripheral lymphadenopathy. Musculoskeletal: The patient has been in his arms and hands. With the use of either a wheelchair for balance or a walker. There is no evidence of joint deformity or effusions. There is no focal joint tenderness. Psychological/psychiatric: There is no clinical evidence of anxiety, depression, or melancholy. Lab data:      Results for orders placed or performed during the hospital encounter of 04/12/19   CBC WITH 3 PART DIFF     Status: Abnormal   Result Value Ref Range Status    WBC 11.6 4.5 - 13.0 K/uL Final    RBC 2.68 (L) 4.10 - 5.10 M/uL Final    HGB 7.8 (L) 12.0 - 16.0 g/dL Final    HCT 24.7 (L) 36 - 48 % Final    MCV 92.2 78 - 102 FL Final    MCH 29.1 25.0 - 35.0 PG Final    MCHC 31.6 31 - 37 g/dL Final    RDW 19.9 (H) 11.5 - 14.5 % Final    PLATELET 820 (L) 488 - 440 K/uL Final    NEUTROPHILS 72 (H) 40 - 70 % Final    MIXED CELLS 7 0.1 - 17 % Final    LYMPHOCYTES 21 14 - 44 % Final    ABS. NEUTROPHILS 8.3 1.8 - 9.5 K/UL Final    ABS. MIXED CELLS 0.8 0.0 - 2.3 K/uL Final    ABS. LYMPHOCYTES 2.5 1.1 - 5.9 K/UL Final     Comment: Test performed at [address] [place] [address] or Outpatient Infusion Center Location. Reviewed by Medical Director. DF AUTOMATED   Final           Assessment:     1. Chronic anemia    2. Thrombocytopenia (HCC)        Plan:     Iron deficiency anemia and anemia due to chronic kidney disease: Today his CBC shows his hemoglobin is 7.8g/dL with hematocrit of 24.7%.  I explained to the patient that his anemia is due to chronic kidney disease therefore we recommend Retacrit every 2 weeks if his hemoglobin is below 10g/dL and hematocrit below 30%. Thrombocytopenia  I have explained to patient that his CBC from today shows that his platelet counts is stable at 108,000. We will recheck his blood counts again in about 8 weeks. No therapeutic intervention is warranted at this time. Follow-up in 8 weeks or sooner if indicated. Orders Placed This Encounter    COMPLETE CBC & AUTO DIFF WBC    InHouse CBC ([place])     Standing Status:   Future     Number of Occurrences:   1     Standing Expiration Date:   4/19/2019    IRON PROFILE     Standing Status:   Future     Standing Expiration Date:   4/12/2020    FERRITIN     Standing Status:   Future     Standing Expiration Date:   8/99/4904    METABOLIC PANEL, COMPREHENSIVE     Standing Status:   Future     Standing Expiration Date:   4/12/2020    ERYTHROPOIETIN     Standing Status:   Future     Standing Expiration Date:   4/12/2020       Tiarra Flowers NP  4/12/2019       I have assessed the patient independently and  agree with the full assessment as outlined.   Rakesh Clifford MD, [place]

## 2019-04-13 LAB
A-G RATIO,AGRAT: 1.2 RATIO (ref 1.1–2.6)
A-G RATIO,AGRAT: 1.2 RATIO (ref 1.1–2.6)
ALBUMIN SERPL-MCNC: 3.3 G/DL (ref 3.5–5)
ALBUMIN SERPL-MCNC: 3.3 G/DL (ref 3.5–5)
ALP SERPL-CCNC: 99 U/L (ref 40–125)
ALP SERPL-CCNC: 99 U/L (ref 40–125)
ALT SERPL-CCNC: 13 U/L (ref 5–40)
ALT SERPL-CCNC: 13 U/L (ref 5–40)
ANION GAP SERPL CALC-SCNC: 15 MMOL/L
ANION GAP SERPL CALC-SCNC: 15 MMOL/L
AST SERPL W P-5'-P-CCNC: 12 U/L (ref 10–37)
AST SERPL W P-5'-P-CCNC: 12 U/L (ref 10–37)
BILIRUB SERPL-MCNC: 0.4 MG/DL (ref 0.2–1.2)
BILIRUB SERPL-MCNC: 0.4 MG/DL (ref 0.2–1.2)
BUN SERPL-MCNC: 41 MG/DL (ref 6–22)
BUN SERPL-MCNC: 41 MG/DL (ref 6–22)
CALCIUM SERPL-MCNC: 9 MG/DL (ref 8.4–10.4)
CALCIUM SERPL-MCNC: 9 MG/DL (ref 8.4–10.4)
CHLORIDE SERPL-SCNC: 111 MMOL/L (ref 98–110)
CHLORIDE SERPL-SCNC: 111 MMOL/L (ref 98–110)
CO2 SERPL-SCNC: 22 MMOL/L (ref 20–32)
CO2 SERPL-SCNC: 22 MMOL/L (ref 20–32)
CREAT SERPL-MCNC: 3.1 MG/DL (ref 0.8–1.6)
CREAT SERPL-MCNC: 3.1 MG/DL (ref 0.8–1.6)
FE % SATURATION,PSAT: 19 % (ref 20–50)
FERRITIN SERPL-MCNC: 178 NG/ML (ref 22–322)
GFRAA, 66117: 25.1
GFRAA, 66117: 25.1
GFRNA, 66118: 20.7
GFRNA, 66118: 20.7
GLOBULIN,GLOB: 2.8 G/DL (ref 2–4)
GLOBULIN,GLOB: 2.8 G/DL (ref 2–4)
GLUCOSE SERPL-MCNC: 97 MG/DL (ref 70–99)
GLUCOSE SERPL-MCNC: 97 MG/DL (ref 70–99)
IRON,IRN: 57 MCG/DL (ref 45–160)
POTASSIUM SERPL-SCNC: 4.4 MMOL/L (ref 3.5–5.5)
POTASSIUM SERPL-SCNC: 4.4 MMOL/L (ref 3.5–5.5)
PROT SERPL-MCNC: 6.1 G/DL (ref 6.2–8.1)
PROT SERPL-MCNC: 6.1 G/DL (ref 6.2–8.1)
SODIUM SERPL-SCNC: 148 MMOL/L (ref 133–145)
SODIUM SERPL-SCNC: 148 MMOL/L (ref 133–145)
TIBC,TIBC: 298 MCG/DL (ref 228–428)
UIBC SERPL-MCNC: 241 MCG/DL (ref 110–370)

## 2019-04-14 LAB
A-G RATIO,AGRAT: 1.2 RATIO (ref 1.1–2.6)
ALBUMIN SERPL-MCNC: 3.3 G/DL (ref 3.5–5)
ALP SERPL-CCNC: 99 U/L (ref 40–125)
ALT SERPL-CCNC: 13 U/L (ref 5–40)
ANION GAP SERPL CALC-SCNC: 15 MMOL/L
AST SERPL W P-5'-P-CCNC: 12 U/L (ref 10–37)
BILIRUB SERPL-MCNC: 0.4 MG/DL (ref 0.2–1.2)
BUN SERPL-MCNC: 41 MG/DL (ref 6–22)
CALCIUM SERPL-MCNC: 9 MG/DL (ref 8.4–10.4)
CHLORIDE SERPL-SCNC: 111 MMOL/L (ref 98–110)
CO2 SERPL-SCNC: 22 MMOL/L (ref 20–32)
CREAT SERPL-MCNC: 3.1 MG/DL (ref 0.8–1.6)
ERYTHROPOIETIN, 081424: 30.2 MIU/ML (ref 2.6–18.5)
GFRAA, 66117: 25.1
GFRNA, 66118: 20.7
GLOBULIN,GLOB: 2.8 G/DL (ref 2–4)
GLUCOSE SERPL-MCNC: 97 MG/DL (ref 70–99)
POTASSIUM SERPL-SCNC: 4.4 MMOL/L (ref 3.5–5.5)
PROT SERPL-MCNC: 6.1 G/DL (ref 6.2–8.1)
SODIUM SERPL-SCNC: 148 MMOL/L (ref 133–145)

## 2019-04-15 ENCOUNTER — OFFICE VISIT (OUTPATIENT)
Dept: CARDIOLOGY CLINIC | Age: 59
End: 2019-04-15

## 2019-04-15 VITALS
BODY MASS INDEX: 23.84 KG/M2 | SYSTOLIC BLOOD PRESSURE: 174 MMHG | HEIGHT: 72 IN | HEART RATE: 57 BPM | DIASTOLIC BLOOD PRESSURE: 88 MMHG | WEIGHT: 176 LBS | OXYGEN SATURATION: 98 %

## 2019-04-15 DIAGNOSIS — N18.9 HISTORY OF ANEMIA DUE TO CHRONIC KIDNEY DISEASE: ICD-10-CM

## 2019-04-15 DIAGNOSIS — C64.2 RENAL CELL CARCINOMA OF LEFT KIDNEY (HCC): ICD-10-CM

## 2019-04-15 DIAGNOSIS — I10 ESSENTIAL HYPERTENSION: ICD-10-CM

## 2019-04-15 DIAGNOSIS — N18.4 STAGE 4 CHRONIC KIDNEY DISEASE (HCC): ICD-10-CM

## 2019-04-15 DIAGNOSIS — Z86.2 HISTORY OF ANEMIA DUE TO CHRONIC KIDNEY DISEASE: ICD-10-CM

## 2019-04-15 DIAGNOSIS — E08.21 DIABETIC NEPHROPATHY ASSOCIATED WITH DIABETES MELLITUS DUE TO UNDERLYING CONDITION (HCC): ICD-10-CM

## 2019-04-15 DIAGNOSIS — I73.9 PERIPHERAL VASCULAR DISEASE (HCC): ICD-10-CM

## 2019-04-15 DIAGNOSIS — N04.9 ANASARCA ASSOCIATED WITH DISORDER OF KIDNEY: ICD-10-CM

## 2019-04-15 DIAGNOSIS — R00.1 BRADYCARDIA, SINUS: Primary | ICD-10-CM

## 2019-04-15 DIAGNOSIS — D69.6 THROMBOCYTOPENIA (HCC): ICD-10-CM

## 2019-04-15 NOTE — PROGRESS NOTES
HPI:  I saw Ginny Lailiane in my office today in cardiovascular evaluation regarding his past problems with bradycardia and recent issues with worsening renal failure and peripheral edema     Mr. Catrachita Worthy is a pleasant 76-year-old -American male who has had a rather complicated past medical history. His past medical history includes a renal mass with renal cell carcinoma of the left kidney with partial left nephrectomy, diabetes, hypertension, and chronic drug abuse. He was admitted to the hospital in April 2017 with history of C3 through C7 discitis with osteomyelitis with an L4-L5 phlegmon and is status post a C3 through C7 laminectomy with posterior lateral fusion and hardware on April 29, 2017 for rapid onset of quadriplegia from the discitis and osteomyelitis in the setting of MRSA bacteremia and abscess cultures from the spine. He had a very prolonged recovery and is still somewhat limited in his activity getting around on a walker and is on chronic doxycycline due to his neck hardware. He was admitted from a skilled nursing facility to [facility] in March 2018 due to increasing peripheral edema, but it was actually found to have no signs of heart failure with a normal NT proBNP and completely normal left ventricular systolic function, but he was found to be anemic and dehydrated with acute kidney injury. During that hospitalization he was very bradycardic secondary to beta-blocker therapy which was discontinued for his blood pressure and amlodipine was started with reasonably good control. He also during that hospitalization was worked up for anemia and thrombocytopenia including a bone marrow biopsy on April 6, 2018, but his thrombocytopenia apparently spontaneously resolved and he had a platelet count of [phone],517 on August 21, 2018 and on October 1, 2018 his platelet count was down to 94,000, however this was not felt to be severe enough to require any treatment by Dr. Emilee Chun.    Over the past several months he has had recurrent admissions to various hospitals including [place] [address] and [facility].  He had had a suprapubic catheter placed by Dr. Mary Ann Leonard to be completed on October 12, 2018 over at [facility] and most recently had some poor output from the catheter and a urinary tract infection for which he was admitted to [facility]. He was also found to be hypoglycemic and hypothermic during his most recent hospitalization with concerns for adrenal insufficiency/hypopituitarism and hypothyroidism. He apparently was treated with both prednisone and levothyroxine and he comes in today relating that he is doing reasonably well except for his peripheral edema which has slowly gotten worse and in fact his weight is up in my office by 37 pounds compared to his weight here in the office on October 8, 2018. He denies any problems with shortness of breath and although he does have peripheral edema and sleeps with 2 pillows for comfort he is not complaining of any orthopnea or paroxysmal nocturnal dyspnea. It should be noted that during his hospital stay the patient patient had an echocardiogram which demonstrated completely normal left ventricular systolic function and only mildly elevated pulmonary artery pressures. Encounter Diagnoses   Name Primary?  Bradycardia, sinus Yes    Anasarca associated with disorder of kidney     Stage 4 chronic kidney disease (HCC)     Diabetic nephropathy associated with diabetes mellitus due to underlying condition ([address]     Peripheral vascular disease ([address] status post     Renal cell carcinoma of left kidney (HCC) status post partial left nephrectomy     Essential hypertension     Thrombocytopenia ([address] mild currently     History of anemia due to chronic kidney disease        Discussion:  This gentleman has completely normal left ventricular systolic function, but significant fluid overload currently related to his end-stage renal disease which is late stage IV chronic kidney disease for which he will need dialysis at some point in the near future. He certainly could use some diuresis with Lasix, but he is putting out urine reasonably well via his suprapubic catheter and this is being followed by Dr. Sree Zendejas. The patient really does not want to go on dialysis if possible and Dr. Sree Zendejas is holding off on diuretic therapy for his long as possible to allow his kidneys to recover as much as they possibly can before initiation of diuretic therapy. His creatinine has been as high as 4.13 and is most recently down to 3.1. I explained to the patient that at some point he would develop enough fluid overloaded to begin to get short of breath with activity or have development of orthopnea at which time I think we will have to consider diuretic therapy but I am going to leave initiation of the diuretic therapy to Dr. Sree Zendejas. Does have significant elevation of his blood pressure and certainly we could adjust his blood pressure medications moving forward, but again I am going to leave this to Dr. Sree Zendejas I have asked the patient to get an appointment with him in the near future. Since from a cardiac vantage his EKG is stable and although he has bradycardia is not having any dizziness issues or signs of higher degree conduction system disease I am simply going to plan to see him again in a few months.     PCP: Bradly Jackson DO      Past Medical History:   Diagnosis Date    Anemia     Bradycardia, unspecified 2018    Cervical discitis     MRSA    Chronic drug abuse ([address] 1974    Chronic kidney disease     stage III    Diabetes ([address] 01/1990    Diabetes mellitus ([address]     Drug abuse ([address]     Encephalopathy     GERD (gastroesophageal reflux disease)     Hypertension     Muscle weakness     Renal cell carcinoma of left kidney (HCC) 12/17/13    Pathological Stage [id number] cc RCC FG3 s/p left open partial nephrectomy in 12/13      Sepsis due to methicillin resistant Staphylococcus aureus (HCC)     Suprapubic catheter (HCC)     Thrombocytopenia (HCC)     Urethral erosion     Viral hepatitis C     Xerosis cutis        Past Surgical History:   Procedure Laterality Date    HX CIRCUMCISION  12/17/13    [facility] Sanket Arellano, [facility] HX NEPHRECTOMY Left 12/17/13    Open Partial, [facility] Snaket Arellano, [facility]    HX OTHER SURGICAL Right 2/27/2016    removed right ft  2nd meta-tarsal    HX OTHER SURGICAL  04/2017    abscess removed from back of neck     [place] REMOVAL WITH INSERTION OF SUPRAPUBIC CATHETER  2018       Current Outpatient Medications   Medication Sig    levothyroxine (SYNTHROID) 100 mcg tablet Take 1 Tab by mouth every morning.  acetaminophen (TYLENOL) 325 mg tablet Take 2 Tabs by mouth every six (6) hours as needed for Pain.  b complex-vitamin c-folic acid (NEPHROCAPS) 1 mg capsule Take 1 Cap by mouth daily.  calcitRIOL (ROCALTROL) 0.25 mcg capsule Take 1 Cap by mouth every Monday, Wednesday, Friday.  ferrous gluconate 324 mg (37.5 mg iron) tablet Take 1 Tab by mouth two (2) times daily (with meals).  hydrALAZINE (APRESOLINE) 25 mg tablet Take 1 Tab by mouth every eight (8) hours as needed (systolic B/P >000).  lactulose (CHRONULAC) 10 gram/15 mL solution Take 15 mL by mouth two (2) times a day.  predniSONE (DELTASONE) 10 mg tablet Take 10 mg by mouth daily (with breakfast). 10 mg daily for 2 weeks then 1/2 tab po daily for 2 week then stop    liothyronine (CYTOMEL) 25 mcg tablet Take 1 Tab by mouth two (2) times a day.  amLODIPine (NORVASC) 10 mg tablet Take 10 mg by mouth daily.  omeprazole (PRILOSEC) 20 mg capsule Take 20 mg by mouth two (2) times a day.  sucralfate (CARAFATE) 1 gram tablet Take 1 g by mouth four (4) times daily.     Syringe, Disposable, (BD SYRINGE CATHETER TIP) 60 mL syrg Use 1 syringe daily with irrigations    docusate sodium (COLACE) 100 mg capsule 100 mg two (2) times daily as needed.  ergocalciferol (DRISDOL) 50,000 unit capsule Take 50,000 Units by mouth every seven (7) days.  doxycycline (ADOXA) 100 mg tablet Take 100 mg by mouth daily.  insulin lispro (HUMALOG) 100 unit/mL injection Normal Insulin Sensitivity   For Blood Sugar (mg/dL) of:     Less than 150 =   0 units           150 -199 =   2 units  200 -249 =   4 units  250 -299 =   6 units  300 -349 =   8 units  350 and above =   10 units  If 2 glucose readings are above 200 mg/dL within a 24 hr period, proceed to \"Very Insul    tamsulosin (FLOMAX) 0.4 mg capsule Take 1 Cap by mouth daily. No current facility-administered medications for this visit. Allergies   Allergen Reactions    Iodine Hives       Social History :  Social History     Tobacco Use    Smoking status: Current Every Day Smoker     Packs/day: 0.50     Years: 30.00     Pack years: 15.00     Types: Cigarettes    Smokeless tobacco: Never Used   Substance Use Topics    Alcohol use: Yes     Comment: beer occasionally        Family History: family history includes Diabetes in his mother and sister; Hypertension in his sister; Sickle Cell Anemia in his father. Review of Systems:   Constitutional: Positive for weight loss. Negative for chills, fever, and malaise/fatigue. Respiratory: Negative for cough, hemoptysis, shortness of breath and wheezing. Cardiovascular: Positive for leg swelling. Negative for chest pain, palpitations and orthopnea. Gastrointestinal: Negative for abdominal pain, blood in stool, constipation, diarrhea, heartburn, melena, nausea and vomiting. Musculoskeletal: Negative for falls, joint pain and myalgias. Neurological: Negative for dizziness. Physical Exam:    The patient is a cooperative, alert, well developed, well nourished 59 y.o.  male who is in no acute distress at the time of the examination.   Visit Vitals  /88   Pulse (!) 57   Ht 6' (1.829 m)   Wt 79.8 kg (176 lb)   SpO2 98%   BMI 23.87 kg/m²       HEENT: Conjuctiva white, mucosa moist, no pallor or cyanosis. NECK: Supple without masses, tenderness or thyromegaly. There was positive jugular venous distention. Carotid are full bilaterally without bruits. CHEST: Symmetrical with good excursion. LUNGS: Clear to auscultation in all fields, except very mild decrease breath sounds in the bases. HEART: The apex is not displaced. There is a left parasternal lift without thrills or heaves. There is a normal S1 and increased S2.  Grade 2/6 apical systolic murmur with poor radiation without appreciable systolic murmurs, rubs, clicks, or gallops auscultated. ABDOMEN: Protuberant with some ascites. There is some presacral edema  EXTREMITIES: Full peripheral pulses with 4 + peripheral edema. Review of Data: See PMH and Cardiology and Imaging sections for cardiac testing  Lab Results   Component Value Date/Time    Cholesterol, total 160 03/29/2019 02:28 AM    Cholesterol, total 142 03/29/2019 02:28 AM    HDL Cholesterol 58 03/29/2019 02:28 AM    HDL Cholesterol 58 03/29/2019 02:28 AM    LDL, calculated 88.2 03/29/2019 02:28 AM    LDL, calculated 70.2 03/29/2019 02:28 AM    Triglyceride 69 03/29/2019 02:28 AM    Triglyceride 69 03/29/2019 02:28 AM    CHOL/HDL Ratio 2.8 03/29/2019 02:28 AM    CHOL/HDL Ratio 2.4 03/29/2019 02:28 AM       Results for orders placed or performed in visit on 04/15/19   AMB POC EKG ROUTINE W/ 12 LEADS, INTER & REP     Status: None    Narrative    Sinus bradycardia rate 57. Left ventricular hypertrophy by cardio voltage criteria. This EKG is similar to the EKG of October 8, 2018. Hyun Butts D.O., F.A.C.C. [facility]  [address] [place] [address] and [facility]  [address] [place] [address]. [address]      PLEASE NOTE:  This document has been produced using voice recognition software.  Unrecognized errors in transcription may be present.

## 2019-05-08 ENCOUNTER — HOSPITAL ENCOUNTER (EMERGENCY)
Age: 59
Discharge: HOME | End: 2019-05-08
Attending: EMERGENCY MEDICINE
Payer: MEDICAID

## 2019-05-08 VITALS
OXYGEN SATURATION: 100 % | HEART RATE: 65 BPM | RESPIRATION RATE: 16 BRPM | TEMPERATURE: 97.5 F | SYSTOLIC BLOOD PRESSURE: 163 MMHG | DIASTOLIC BLOOD PRESSURE: 97 MMHG

## 2019-05-08 DIAGNOSIS — G56.32 RADIAL NERVE PALSY, LEFT: Primary | ICD-10-CM

## 2019-05-08 PROCEDURE — 99284 EMERGENCY DEPT VISIT MOD MDM: CPT

## 2019-05-09 NOTE — ED TRIAGE NOTES
Per medic patient came from home. Pt c/o of increased forearm and hand numbness. Pt was ambulate down 25 steps to get to EMS stretcher. Pt is AOX4; per medic patient states he has had the numbness for the last two years but increased tonight. /70, HR 70, RR 16, 98% on RA, BG 89. Pt was able to ambulate from EMS stretcher to ED stretcher with stand by assistance.

## 2019-05-09 NOTE — ED PROVIDER NOTES
EMERGENCY DEPARTMENT HISTORY AND PHYSICAL EXAM    11:49 PM      Date: 5/8/2019  Patient Name: Dario Michel    History of Presenting Illness     Chief Complaint   Patient presents with    Numbness     left forearm & hand         History Provided By: Patient    Additional History (Context): Dario Michel is a 59 y.o. male with history of diabetes, hypertension, chronic kidney disease, formally quadriplegic, currently with some improved upper extremity strength, and physical therapy who presents with complaint of decreased sensation in the left hand for the past hour. He states that he had been resting with his left arm hanging off the side of the bed, and noted worst sensation in the left hand. When asked exactly where, he indicates the dorsal side of the hand and the radial nerve distribution area, but states that sensation is normal elsewhere. He notes no change in his strength, no speech deficits, dizziness or other associated symptoms. PCP: Noel Uribe, DO    Current Outpatient Medications   Medication Sig Dispense Refill    levothyroxine (SYNTHROID) 100 mcg tablet Take 1 Tab by mouth every morning. 30 Tab 1    acetaminophen (TYLENOL) 325 mg tablet Take 2 Tabs by mouth every six (6) hours as needed for Pain. 30 Tab 1    b complex-vitamin c-folic acid (NEPHROCAPS) 1 mg capsule Take 1 Cap by mouth daily. 30 Cap 1    calcitRIOL (ROCALTROL) 0.25 mcg capsule Take 1 Cap by mouth every Monday, Wednesday, Friday. 30 Cap 0    ferrous gluconate 324 mg (37.5 mg iron) tablet Take 1 Tab by mouth two (2) times daily (with meals). 60 Tab 0    hydrALAZINE (APRESOLINE) 25 mg tablet Take 1 Tab by mouth every eight (8) hours as needed (systolic B/P >065). 30 Tab 0    lactulose (CHRONULAC) 10 gram/15 mL solution Take 15 mL by mouth two (2) times a day. 1 Bottle 0    predniSONE (DELTASONE) 10 mg tablet Take 10 mg by mouth daily (with breakfast).  10 mg daily for 2 weeks then 1/2 tab po daily for 2 week then stop 21 Tab 0    liothyronine (CYTOMEL) 25 mcg tablet Take 1 Tab by mouth two (2) times a day. 60 Tab 0    amLODIPine (NORVASC) 10 mg tablet Take 10 mg by mouth daily.  omeprazole (PRILOSEC) 20 mg capsule Take 20 mg by mouth two (2) times a day.  sucralfate (CARAFATE) 1 gram tablet Take 1 g by mouth four (4) times daily.  Syringe, Disposable, (BD SYRINGE CATHETER TIP) 60 mL syrg Use 1 syringe daily with irrigations 30 Syringe 11    docusate sodium (COLACE) 100 mg capsule 100 mg two (2) times daily as needed.  ergocalciferol (DRISDOL) 50,000 unit capsule Take 50,000 Units by mouth every seven (7) days.  doxycycline (ADOXA) 100 mg tablet Take 100 mg by mouth daily.  insulin lispro (HUMALOG) 100 unit/mL injection Normal Insulin Sensitivity   For Blood Sugar (mg/dL) of:     Less than 150 =   0 units           150 -199 =   2 units  200 -249 =   4 units  250 -299 =   6 units  300 -349 =   8 units  350 and above =   10 units  If 2 glucose readings are above 200 mg/dL within a 24 hr period, proceed to \"Very Insul 1 Vial 0    tamsulosin (FLOMAX) 0.4 mg capsule Take 1 Cap by mouth daily.  30 Cap 0       Past History     Past Medical History:  Past Medical History:   Diagnosis Date    Anemia     Bradycardia, unspecified 2018    Cervical discitis     MRSA    Chronic drug abuse ([facility] [address] 1974    Chronic kidney disease     stage III    Diabetes ([facility] [address] 01/1990    Diabetes mellitus ([facility] [address]     Drug abuse ([facility] [address]     Encephalopathy     GERD (gastroesophageal reflux disease)     Hypertension     Muscle weakness     Renal cell carcinoma of left kidney ([facility] [address] 12/17/13    Pathological Stage [id number] cc RCC FG3 s/p left open partial nephrectomy in 12/13      Sepsis due to methicillin resistant Staphylococcus aureus (HCC)     Suprapubic catheter (HCC)     Thrombocytopenia (HCC)     Urethral erosion     Viral hepatitis C     Xerosis cutis        Past Surgical History:  Past Surgical History: Procedure Laterality Date    HX CIRCUMCISION  12/17/13    Dr. Payton Dandy, [address]    HX NEPHRECTOMY Left 12/17/13    Open Partial, Dr. Payton Dandy, [address]    HX OTHER SURGICAL Right 2/27/2016    removed right ft  2nd meta-tarsal    HX OTHER SURGICAL  04/2017    abscess removed from back of neck     OH REMOVAL WITH INSERTION OF SUPRAPUBIC CATHETER  2018       Family History:  Family History   Problem Relation Age of Onset    Diabetes Mother     Sickle Cell Anemia Father     Diabetes Sister     Hypertension Sister        Social History:  Social History     Tobacco Use    Smoking status: Current Every Day Smoker     Packs/day: 0.50     Years: 30.00     Pack years: 15.00     Types: Cigarettes    Smokeless tobacco: Never Used   Substance Use Topics    Alcohol use: Yes     Comment: beer occasionally    Drug use: No       Allergies: Allergies   Allergen Reactions    Iodine Hives         Review of Systems       Review of Systems   Constitutional: Negative for activity change and appetite change. HENT: Negative for congestion. Eyes: Negative for visual disturbance. Respiratory: Negative for cough and shortness of breath. Cardiovascular: Negative for chest pain. Gastrointestinal: Negative for abdominal pain, diarrhea, nausea and vomiting. Genitourinary: Negative for dysuria. Musculoskeletal: Negative for arthralgias and myalgias. Skin: Negative for rash. Neurological: Positive for numbness. Negative for weakness. Physical Exam     Visit Vitals  BP (!) 163/97   Pulse 65   Temp 97.5 °F (36.4 °C)   Resp 16   SpO2 100%         Physical Exam   Constitutional: He is oriented to person, place, and time. He appears well-developed and well-nourished. HENT:   Head: Normocephalic and atraumatic. Mouth/Throat: Oropharynx is clear and moist.   Eyes: Conjunctivae are normal.   Neck: Normal range of motion. Neck supple. No JVD present.    Cardiovascular: Normal rate, regular rhythm, normal heart sounds and intact distal pulses. No murmur heard. Pulmonary/Chest: Effort normal and breath sounds normal.   Abdominal: Soft. Bowel sounds are normal. He exhibits no distension. There is no tenderness. Musculoskeletal: Normal range of motion. He exhibits no deformity. Lymphadenopathy:     He has no cervical adenopathy. Neurological: He is alert and oriented to person, place, and time. Coordination normal.   Strength appears symmetric and consistent with the patient's baseline. He does note some decreased sensation in the radial nerve distribution of the left hand. Strength appears similar to his baseline as reported by the patient. No other weakness, numbness or speech deficit noted. Skin: Skin is warm and dry. No rash noted. Psychiatric: He has a normal mood and affect. Nursing note and vitals reviewed. Diagnostic Study Results     Labs -  No results found for this or any previous visit (from the past 12 hour(s)). Radiologic Studies -   No orders to display         Medical Decision Making   I am the first provider for this patient. I reviewed the vital signs, available nursing notes, past medical history, past surgical history, family history and social history. Vital Signs-Reviewed the patient's vital signs. Records Reviewed: Nursing Notes (Time of Review: 11:49 PM)      Provider Notes (Medical Decision Making):   Theresa Garcia is a 59 y.o. male with history of diabetes, hypertension, chronic kidney disease, formally quadriplegic, currently with some improved upper extremity strength, and physical therapy who presents with complaint of decreased sensation in the left hand for the past hour. He states that he had been resting with his left arm hanging off the side of the bed, and noted worst sensation in the left hand. When asked exactly where, he indicates the dorsal side of the hand and the radial nerve distribution area, but states that sensation is normal elsewhere.   He notes no change in his strength, no speech deficits, dizziness or other associated symptoms. Differential Diagnosis: Patient symptoms appear solely in the radial nerve distribution area, after he had been lying in bed with his left arm hanging off the side of the bed. I primarily suspect this is due to a partial radial nerve palsy due to the position he was lying in. Signs and symptoms are not consistent with acute stroke. Testing: None further indicated  Treatments: Patient does still have normal strength, so and splinting is not necessary. Given home care instructions and return precautions. Follow-up instructions also given. Diagnosis     Clinical Impression:   1. Radial nerve palsy, left        Disposition: Discharge    Follow-up Information     Follow up With Specialties Details Why Contact Info    Maury Leyva DO Family Practice Schedule an appointment as soon as possible for a visit  [facility] [place] [address]      [facility] EMERGENCY DEPT Emergency Medicine  If symptoms worsen [address]  [phone]           Discharge Medication List as of 5/8/2019 11:00 PM      CONTINUE these medications which have NOT CHANGED    Details   levothyroxine (SYNTHROID) 100 mcg tablet Take 1 Tab by mouth every morning., Print, Disp-30 Tab, R-1      acetaminophen (TYLENOL) 325 mg tablet Take 2 Tabs by mouth every six (6) hours as needed for Pain., Normal, Disp-30 Tab, R-1      b complex-vitamin c-folic acid (NEPHROCAPS) 1 mg capsule Take 1 Cap by mouth daily. , Print, Disp-30 Cap, R-1      calcitRIOL (ROCALTROL) 0.25 mcg capsule Take 1 Cap by mouth every Monday, Wednesday, Friday. , Print, Disp-30 Cap, R-0      ferrous gluconate 324 mg (37.5 mg iron) tablet Take 1 Tab by mouth two (2) times daily (with meals). , Print, Disp-60 Tab, R-0      hydrALAZINE (APRESOLINE) 25 mg tablet Take 1 Tab by mouth every eight (8) hours as needed (systolic B/P >140). , Print, Disp-30 Tab, R-0      lactulose (CHRONULAC) 10 gram/15 mL solution Take 15 mL by mouth two (2) times a day., Print, Disp-1 Bottle, R-0      predniSONE (DELTASONE) 10 mg tablet Take 10 mg by mouth daily (with breakfast). 10 mg daily for 2 weeks then 1/2 tab po daily for 2 week then stop, Print, Disp-21 Tab, R-0      liothyronine (CYTOMEL) 25 mcg tablet Take 1 Tab by mouth two (2) times a day., Print, Disp-60 Tab, R-0      amLODIPine (NORVASC) 10 mg tablet Take 10 mg by mouth daily. , Historical Med      omeprazole (PRILOSEC) 20 mg capsule Take 20 mg by mouth two (2) times a day., Historical Med      sucralfate (CARAFATE) 1 gram tablet Take 1 g by mouth four (4) times daily. , Historical Med      Syringe, Disposable, (BD SYRINGE CATHETER TIP) 60 mL syrg Use 1 syringe daily with irrigations, Normal, Disp-30 Syringe, R-11      docusate sodium (COLACE) 100 mg capsule 100 mg two (2) times daily as needed., Historical Med      ergocalciferol (DRISDOL) 50,000 unit capsule Take 50,000 Units by mouth every seven (7) days. , Historical Med      doxycycline (ADOXA) 100 mg tablet Take 100 mg by mouth daily. , Historical Med      insulin lispro (HUMALOG) 100 unit/mL injection Normal Insulin Sensitivity   For Blood Sugar (mg/dL) of:     Less than 150 =   0 units           150 -199 =   2 units  200 -249 =   4 units  250 -299 =   6 units  300 -349 =   8 units  350 and above =   10 units  If 2 glucose readings are above 200 mg/dL  within a 24 hr period, proceed to \"Very Insul, No Print, Disp-1 Vial, R-0      tamsulosin (FLOMAX) 0.4 mg capsule Take 1 Cap by mouth daily. , No Print, Disp-30 Cap, R-0           _______________________________    Attestations:  Roderick Lynch MD acting as a scribe for and in the presence of No att. providers found      May 08, 2019 at 11:49 PM       Provider Attestation:      I personally performed the services described in the documentation, reviewed the documentation, as recorded by the scribe in my presence, and it accurately and completely records my words and actions.  May 08, 2019 at 11:49 PM - No att. providers found    _______________________________

## 2019-05-09 NOTE — DISCHARGE INSTRUCTIONS
Patient Education        Radial Nerve Palsy: Care Instructions  Your Care Instructions    The radial nerve runs down the arm. It controls muscles in the back of the arm. It also helps with movement and feeling in the wrist and hand. If you injure the back of your arm or pinch the nerve, you might have trouble moving your arm, wrist, or hand. You might also have pain, weakness, numbness, tingling, or trouble lifting your wrist or fingers. This can also happen if you fall asleep in a way that puts pressure on the nerve, such as with your arm hanging over a chair. In most cases, no treatment is needed. You will slowly get more strength and feeling. This can take weeks or even months. Sometimes physical or occupational therapy is used to keep up muscle strength. You might also need other tests, such as nerve tests or an MRI. If you don't get better, or if the injury is more serious, surgery might be needed to fix the nerve or remove something pressing on it. Follow-up care is a key part of your treatment and safety. Be sure to make and go to all appointments, and call your doctor if you are having problems. It's also a good idea to know your test results and keep a list of the medicines you take. How can you care for yourself at home? · Be safe with medicines. Read and follow all instructions on the label. ? If the doctor gave you a prescription medicine for pain, take it as prescribed. ? If you are not taking a prescription pain medicine, ask your doctor if you can take an over-the-counter medicine. · Follow your doctor's directions for wearing a splint, brace, or other device to help you use your hand. When should you call for help?   Call your doctor now or seek immediate medical care if:    · You have new or worse numbness in your arm, wrist or hand.     · You have new or worse weakness in your arm, wrist or hand.     · You have new pain, or your pain gets worse.    Watch closely for changes in your health, and be sure to contact your doctor if you do not get better as expected. Where can you learn more? Go to http://gabo-curly.info/. Enter I211 in the search box to learn more about \"Radial Nerve Palsy: Care Instructions. \"  Current as of: Swati 3, 2018  Content Version: 11.9  © 2715-0133 [employer]. Care instructions adapted under license by [employer] (which disclaims liability or warranty for this information). If you have questions about a medical condition or this instruction, always ask your healthcare professional. [address] any warranty or liability for your use of this information.

## 2019-05-23 ENCOUNTER — HOSPITAL ENCOUNTER (OUTPATIENT)
Dept: INFUSION THERAPY | Age: 59
End: 2019-05-23
Payer: MEDICAID

## 2019-05-23 ENCOUNTER — HOSPITAL ENCOUNTER (OUTPATIENT)
Dept: ONCOLOGY | Age: 59
Discharge: HOME | End: 2019-05-23

## 2019-05-23 DIAGNOSIS — D64.9 CHRONIC ANEMIA: ICD-10-CM

## 2019-05-24 ENCOUNTER — HOSPITAL ENCOUNTER (OUTPATIENT)
Dept: INFUSION THERAPY | Age: 59
Discharge: HOME | End: 2019-05-24
Payer: MEDICAID

## 2019-05-24 VITALS
DIASTOLIC BLOOD PRESSURE: 82 MMHG | SYSTOLIC BLOOD PRESSURE: 157 MMHG | RESPIRATION RATE: 18 BRPM | OXYGEN SATURATION: 96 % | HEART RATE: 87 BPM | TEMPERATURE: 98.2 F

## 2019-05-24 LAB
BASO+EOS+MONOS # BLD AUTO: 0.5 K/UL (ref 0–2.3)
BASO+EOS+MONOS NFR BLD AUTO: 8 % (ref 0.1–17)
DIFFERENTIAL METHOD BLD: ABNORMAL
ERYTHROCYTE [DISTWIDTH] IN BLOOD BY AUTOMATED COUNT: 16.8 % (ref 11.5–14.5)
HCT VFR BLD AUTO: 26.4 % (ref 36–48)
HGB BLD-MCNC: 8.3 G/DL (ref 12–16)
LYMPHOCYTES # BLD: 2 K/UL (ref 1.1–5.9)
LYMPHOCYTES NFR BLD: 33 % (ref 14–44)
MCH RBC QN AUTO: 28.5 PG (ref 25–35)
MCHC RBC AUTO-ENTMCNC: 31.4 G/DL (ref 31–37)
MCV RBC AUTO: 90.7 FL (ref 78–102)
NEUTS SEG # BLD: 3.6 K/UL (ref 1.8–9.5)
NEUTS SEG NFR BLD: 60 % (ref 40–70)
PLATELET # BLD AUTO: 72 K/UL (ref 140–440)
RBC # BLD AUTO: 2.91 M/UL (ref 4.1–5.1)
WBC # BLD AUTO: 6.1 K/UL (ref 4.5–13)

## 2019-05-24 PROCEDURE — 85025 COMPLETE CBC W/AUTO DIFF WBC: CPT

## 2019-05-24 PROCEDURE — 36415 COLL VENOUS BLD VENIPUNCTURE: CPT

## 2019-05-24 PROCEDURE — 74011250636 HC RX REV CODE- 250/636: Performed by: NURSE PRACTITIONER

## 2019-05-24 PROCEDURE — 96372 THER/PROPH/DIAG INJ SC/IM: CPT

## 2019-05-24 RX ADMIN — EPOETIN ALFA-EPBX 60000 UNITS: 40000 INJECTION, SOLUTION INTRAVENOUS; SUBCUTANEOUS at 15:28

## 2019-05-24 NOTE — PROGRESS NOTES
[facility] Progress Note    Date: May 24, 2019    Name: Hayley Hernandez    MRN: [id number]         : 1960      Mr. Eber Hercules to [facility], ambulatory at 1500. Pt was assessed and education was provided. Patient states he was receiving Retacrit inpatient. Mr. Maribel Kim vitals were reviewed and patient was observed for 5 minutes prior to treatment. Blood obtained peripherally from the right forearm x4 attempts and sent to lab for CBC per written orders. No bleeding or hematoma noted at site. Guaze and coban applied. Lab results were obtained and reviewed. Recent Results (from the past 12 hour(s))   CBC WITH 3 PART DIFF    Collection Time: 19  3:18 PM   Result Value Ref Range    WBC 6.1 4.5 - 13.0 K/uL    RBC 2.91 (L) 4.10 - 5.10 M/uL    HGB 8.3 (L) 12.0 - 16 g/dL    HCT 26.4 (L) 36 - 48 %    MCV 90.7 78 - 102 FL    MCH 28.5 25.0 - 35.0 PG    MCHC 31.4 31 - 37 g/dL    RDW 16.8 (H) 11.5 - 14.5 %    PLATELET 72 (L) 603 - 440 K/uL    NEUTROPHILS 60 40 - 70 %    MIXED CELLS 8 0.1 - 17 %    LYMPHOCYTES 33 14 - 44 %    ABS. NEUTROPHILS 3.6 1.8 - 9.5 K/UL    ABS. MIXED CELLS 0.5 0.0 - 2.3 K/uL    ABS. LYMPHOCYTES 2.0 1.1 - 5.9 K/UL    DF AUTOMATED       Results within ordered parameters to Retacrit today. Retacrit 60,000 units was administered subcutaneous in two divided doses in the RLQ of the abdomen. No irritation or bleeding noted at site. Bandaid applied. Mr. Eber Hercules tolerated well, and had no complaints. Patient armband removed and shredded. Mr. Eber Hercules was discharged from [place] [address] in stable condition at 1530. He is to return on 2019 at [id number] for his next appointment.     Ismael Cain RN  May 24, 2019

## 2019-06-06 ENCOUNTER — APPOINTMENT (OUTPATIENT)
Dept: INFUSION THERAPY | Age: 59
End: 2019-06-06
Payer: MEDICAID

## 2019-06-07 ENCOUNTER — HOSPITAL ENCOUNTER (OUTPATIENT)
Dept: INFUSION THERAPY | Age: 59
Discharge: HOME | End: 2019-06-07
Payer: MEDICAID

## 2019-06-07 VITALS
SYSTOLIC BLOOD PRESSURE: 174 MMHG | OXYGEN SATURATION: 98 % | TEMPERATURE: 98 F | DIASTOLIC BLOOD PRESSURE: 84 MMHG | RESPIRATION RATE: 18 BRPM | HEART RATE: 59 BPM

## 2019-06-07 LAB
BASO+EOS+MONOS # BLD AUTO: 0.4 K/UL (ref 0–2.3)
BASO+EOS+MONOS NFR BLD AUTO: 8 % (ref 0.1–17)
DIFFERENTIAL METHOD BLD: ABNORMAL
ERYTHROCYTE [DISTWIDTH] IN BLOOD BY AUTOMATED COUNT: 19.8 % (ref 11.5–14.5)
HCT VFR BLD AUTO: 28.7 % (ref 36–48)
HGB BLD-MCNC: 9.3 G/DL (ref 12–16)
LYMPHOCYTES # BLD: 1.8 K/UL (ref 1.1–5.9)
LYMPHOCYTES NFR BLD: 36 % (ref 14–44)
MCH RBC QN AUTO: 29.8 PG (ref 25–35)
MCHC RBC AUTO-ENTMCNC: 32.4 G/DL (ref 31–37)
MCV RBC AUTO: 92 FL (ref 78–102)
NEUTS SEG # BLD: 2.8 K/UL (ref 1.8–9.5)
NEUTS SEG NFR BLD: 56 % (ref 40–70)
PLATELET # BLD AUTO: 91 K/UL (ref 140–440)
RBC # BLD AUTO: 3.12 M/UL (ref 4.1–5.1)
WBC # BLD AUTO: 5 K/UL (ref 4.5–13)

## 2019-06-07 PROCEDURE — 85025 COMPLETE CBC W/AUTO DIFF WBC: CPT

## 2019-06-07 PROCEDURE — 96372 THER/PROPH/DIAG INJ SC/IM: CPT

## 2019-06-07 PROCEDURE — 36415 COLL VENOUS BLD VENIPUNCTURE: CPT

## 2019-06-07 PROCEDURE — 74011250636 HC RX REV CODE- 250/636: Performed by: NURSE PRACTITIONER

## 2019-06-07 RX ADMIN — EPOETIN ALFA-EPBX 60000 UNITS: 40000 INJECTION, SOLUTION INTRAVENOUS; SUBCUTANEOUS at 15:04

## 2019-06-07 NOTE — PROGRESS NOTES
[facility] Progress Note    Date: 2019    Name: Rakan Velez    MRN: [id number]         : 1960      Mr. Goldstein to [facility], ambulatory at 1445. Pt was assessed and education was provided. Mr. Nik Mullins vitals were reviewed and patient was observed for 5 minutes prior to treatment. Blood obtained peripherally from the [facility] x3 attempts and sent to lab for CBC per written orders. No bleeding or hematoma noted at site. Gauze and coban applied. Lab results were obtained and reviewed. Recent Results (from the past 12 hour(s))   CBC WITH 3 PART DIFF    Collection Time: 19  2:56 PM   Result Value Ref Range    WBC 5.0 4.5 - 13.0 K/uL    RBC 3.12 (L) 4.10 - 5.10 M/uL    HGB 9.3 (L) 12.0 - 16 g/dL    HCT 28.7 (L) 36 - 48 %    MCV 92.0 78 - 102 FL    MCH 29.8 25.0 - 35.0 PG    MCHC 32.4 31 - 37 g/dL    RDW 19.8 (H) 11.5 - 14.5 %    PLATELET 91 (L) 654 - 440 K/uL    NEUTROPHILS 56 40 - 70 %    MIXED CELLS 8 0.1 - 17 %    LYMPHOCYTES 36 14 - 44 %    ABS. NEUTROPHILS 2.8 1.8 - 9.5 K/UL    ABS. MIXED CELLS 0.4 0.0 - 2.3 K/uL    ABS. LYMPHOCYTES 1.8 1.1 - 5.9 K/UL    DF AUTOMATED       Results within ordered parameters to Retacrit today. Retacrit 60,000 units was administered subcutaneous in two divided doses in the RUE. No irritation or bleeding noted at site. Bandaid applied. Mr. Goldstein tolerated well, and had no complaints. Patient armband removed and shredded. Mr. Goldstein was discharged from [place] [address] in stable condition at 1515. He is to return on 2019 at 1430 for his next appointment.     Susan Gonzales RN  2019    [id number]

## 2019-06-14 ENCOUNTER — OFFICE VISIT (OUTPATIENT)
Dept: ONCOLOGY | Age: 59
End: 2019-06-14

## 2019-06-14 ENCOUNTER — HOSPITAL ENCOUNTER (OUTPATIENT)
Dept: ONCOLOGY | Age: 59
Discharge: HOME | End: 2019-06-14

## 2019-06-14 VITALS
OXYGEN SATURATION: 100 % | DIASTOLIC BLOOD PRESSURE: 81 MMHG | TEMPERATURE: 97.8 F | SYSTOLIC BLOOD PRESSURE: 180 MMHG | HEIGHT: 72 IN | RESPIRATION RATE: 16 BRPM | BODY MASS INDEX: 18.42 KG/M2 | WEIGHT: 136 LBS | HEART RATE: 56 BPM

## 2019-06-14 DIAGNOSIS — D64.9 CHRONIC ANEMIA: Primary | ICD-10-CM

## 2019-06-14 DIAGNOSIS — D64.9 CHRONIC ANEMIA: ICD-10-CM

## 2019-06-14 DIAGNOSIS — D69.6 THROMBOCYTOPENIA (HCC): ICD-10-CM

## 2019-06-14 NOTE — PROGRESS NOTES
Hematology/Oncology  Progress Note    Name: Sheryl Vivas  Date: 2019  : 1960    PCP: Zac Navarrete MD     Mr. Ibis Gusman is a 59 y.o.  male who was seen for follow-up of his thrombocytopenia. Current Therapy: Retacrit every 2 weeks whenever his hemoglobin is <10g/dL and hematocrit is <30%. Subjective:     Mr. Ibis Gusman is a 64-year-old -American man who was seen for thrombocytopenia and anemia due to chronic kidney disease. He is in the office today for follow up. He denies fatigue, tiredness, and shortness of breath. He denies chest pain and dizziness. Since his last clinic visit he has not experienced any unexplained bruising or bleeding. He uses a walker for support and mobility. He does not have any complaints or concerns to report at this time. Past medical history, family history, and social history: these were reviewed and remains unchanged.     Past Medical History:   Diagnosis Date    Anemia     Bradycardia, unspecified 2018    Cervical discitis     MRSA    Chronic drug abuse ([address] 1974    Chronic kidney disease     stage III    Diabetes ([address] 1990    Diabetes mellitus ([address]     Drug abuse ([address]     Encephalopathy     GERD (gastroesophageal reflux disease)     Hypertension     Muscle weakness     Renal cell carcinoma of left kidney ([address] 13    Pathological Stage [id number] cc RCC FG3 s/p left open partial nephrectomy in       Sepsis due to methicillin resistant Staphylococcus aureus (HCC)     Suprapubic catheter (HCC)     Thrombocytopenia (HCC)     Urethral erosion     Viral hepatitis C     Xerosis cutis      Past Surgical History:   Procedure Laterality Date    HX CIRCUMCISION  13    Dr. Ayaz Sanders, [facility]    HX NEPHRECTOMY Left 13    Open Partial, Dr. Ayaz Sanders, [facility]    HX OTHER SURGICAL Right 2016    removed right ft  2nd meta-tarsal    HX OTHER SURGICAL  2017    abscess removed from back of neck     NE REMOVAL WITH INSERTION OF SUPRAPUBIC CATHETER  2018     Social History     Socioeconomic History    Marital status:      Spouse name: Not on file    Number of children: Not on file    Years of education: Not on file    Highest education level: Not on file   Occupational History    Not on file   Social Needs    Financial resource strain: Not on file    Food insecurity:     Worry: Not on file     Inability: Not on file    Transportation needs:     Medical: Not on file     Non-medical: Not on file   Tobacco Use    Smoking status: Current Every Day Smoker     Packs/day: 0.50     Years: 30.00     Pack years: 15.00     Types: Cigarettes    Smokeless tobacco: Never Used   Substance and Sexual Activity    Alcohol use: Yes     Comment: beer occasionally    Drug use: No    Sexual activity: Never   Lifestyle    Physical activity:     Days per week: Not on file     Minutes per session: Not on file    Stress: Not on file   Relationships    Social connections:     Talks on phone: Not on file     Gets together: Not on file     Attends Mu-ism service: Not on file     Active member of club or organization: Not on file     Attends meetings of clubs or organizations: Not on file     Relationship status: Not on file    Intimate partner violence:     Fear of current or ex partner: Not on file     Emotionally abused: Not on file     Physically abused: Not on file     Forced sexual activity: Not on file   Other Topics Concern    Not on file   Social History Narrative     since 2012;  for 12 yrs; [employer]K; [address] [employer]  just recently furloughed; No  service     Family History   Problem Relation Age of Onset    Diabetes Mother     Sickle Cell Anemia Father     Diabetes Sister     Hypertension Sister      Current Outpatient Medications   Medication Sig Dispense Refill    levothyroxine (SYNTHROID) 100 mcg tablet Take 1 Tab by mouth every morning.  30 Tab 1    acetaminophen (TYLENOL) 325 mg tablet Take 2 Tabs by mouth every six (6) hours as needed for Pain. 30 Tab 1    b complex-vitamin c-folic acid (NEPHROCAPS) 1 mg capsule Take 1 Cap by mouth daily. 30 Cap 1    calcitRIOL (ROCALTROL) 0.25 mcg capsule Take 1 Cap by mouth every Monday, Wednesday, Friday. 30 Cap 0    ferrous gluconate 324 mg (37.5 mg iron) tablet Take 1 Tab by mouth two (2) times daily (with meals). 60 Tab 0    hydrALAZINE (APRESOLINE) 25 mg tablet Take 1 Tab by mouth every eight (8) hours as needed (systolic B/P >886). 30 Tab 0    lactulose (CHRONULAC) 10 gram/15 mL solution Take 15 mL by mouth two (2) times a day. 1 Bottle 0    predniSONE (DELTASONE) 10 mg tablet Take 10 mg by mouth daily (with breakfast). 10 mg daily for 2 weeks then 1/2 tab po daily for 2 week then stop 21 Tab 0    liothyronine (CYTOMEL) 25 mcg tablet Take 1 Tab by mouth two (2) times a day. 60 Tab 0    amLODIPine (NORVASC) 10 mg tablet Take 10 mg by mouth daily.  omeprazole (PRILOSEC) 20 mg capsule Take 20 mg by mouth two (2) times a day.  sucralfate (CARAFATE) 1 gram tablet Take 1 g by mouth four (4) times daily.  Syringe, Disposable, (BD SYRINGE CATHETER TIP) 60 mL syrg Use 1 syringe daily with irrigations 30 Syringe 11    docusate sodium (COLACE) 100 mg capsule 100 mg two (2) times daily as needed.  ergocalciferol (DRISDOL) 50,000 unit capsule Take 50,000 Units by mouth every seven (7) days.  doxycycline (ADOXA) 100 mg tablet Take 100 mg by mouth daily.  insulin lispro (HUMALOG) 100 unit/mL injection Normal Insulin Sensitivity   For Blood Sugar (mg/dL) of:     Less than 150 =   0 units           150 -199 =   2 units  200 -249 =   4 units  250 -299 =   6 units  300 -349 =   8 units  350 and above =   10 units  If 2 glucose readings are above 200 mg/dL within a 24 hr period, proceed to \"Very Insul 1 Vial 0    tamsulosin (FLOMAX) 0.4 mg capsule Take 1 Cap by mouth daily.  30 Cap 0       Review of Systems  Constitutional: The patient has no acute distress or discomfort. HEENT: The patient denies recent head trauma, eye pain, blurred vision,  hearing deficit, oropharyngeal mucosal pain or lesions, and the patient denies throat pain or discomfort. Lymphatics: The patient denies palpable peripheral lymphadenopathy. Hematologic: The patient denies having bruising, bleeding, or progressive fatigue. Respiratory: Patient denies having shortness of breath, cough, sputum production, fever, or dyspnea on exertion. Cardiovascular: The patient denies having leg pain, leg swelling, heart palpitations, chest permit, chest pain, or lightheadedness. The patient denies having dyspnea on exertion. Gastrointestinal: The patient denies having nausea, emesis, or diarrhea. The patient denies having any hematemesis or blood in the stool. Genitourinary: Patient denies having urinary urgency, frequency, or dysuria. The patient denies having blood in the urine. Psychological: The patient denies having symptoms of nervousness, anxiety, depression, or thoughts of harming self. Skin: Patient denies having skin rashes, skin, ulcerations, or unexplained itching or pruritus. Musculoskeletal: The patient denies having pain in the joints or bones. Objective:     Visit Vitals  /81   Pulse (!) 56   Temp 97.8 °F (36.6 °C) (Oral)   Resp 16   Ht 6' (1.829 m)   Wt 61.7 kg (136 lb)   SpO2 100%   BMI 18.44 kg/m²     ECOG PS=0  Physical Exam:   Gen. Appearance: The patient is in no acute distress. Skin: There is no bruise or rash. HEENT: The exam is unremarkable. Neck: Supple without lymphadenopathy or thyromegaly. Lungs: Clear to auscultation and percussion; there are no wheezes or rhonchi. Heart: Regular rate and rhythm; there are no murmurs, gallops, or rubs. Abdomen: Bowel sounds are present and normal.  There is no guarding, tenderness, or hepatosplenomegaly. Extremities: There is no clubbing, cyanosis, or edema.   Neurologic: There are no focal neurologic deficits. Lymphatics: There is no palpable peripheral lymphadenopathy. Musculoskeletal: The patient has been in his arms and hands. With the use of either a wheelchair for balance or a walker. There is no evidence of joint deformity or effusions. There is no focal joint tenderness. Psychological/psychiatric: There is no clinical evidence of anxiety, depression, or melancholy. Lab data:      Results for orders placed or performed during the hospital encounter of 06/07/19   CBC WITH 3 PART DIFF     Status: Abnormal   Result Value Ref Range Status    WBC 5.0 4.5 - 13.0 K/uL Final    RBC 3.12 (L) 4.10 - 5.10 M/uL Final    HGB 9.3 (L) 12.0 - 16 g/dL Final    HCT 28.7 (L) 36 - 48 % Final    MCV 92.0 78 - 102 FL Final    MCH 29.8 25.0 - 35.0 PG Final    MCHC 32.4 31 - 37 g/dL Final    RDW 19.8 (H) 11.5 - 14.5 % Final    PLATELET 91 (L) 989 - 440 K/uL Final    NEUTROPHILS 56 40 - 70 % Final    MIXED CELLS 8 0.1 - 17 % Final    LYMPHOCYTES 36 14 - 44 % Final    ABS. NEUTROPHILS 2.8 1.8 - 9.5 K/UL Final    ABS. MIXED CELLS 0.4 0.0 - 2.3 K/uL Final    ABS. LYMPHOCYTES 1.8 1.1 - 5.9 K/UL Final     Comment: Test performed at [address] [place] [address] or [facility]patient [facility] Location. Reviewed by Medical Director. DF AUTOMATED   Final           Assessment:     1. Chronic anemia    2. Thrombocytopenia (HCC)        Plan:     Iron deficiency anemia and anemia due to chronic kidney disease: Today his CBC shows his hemoglobin has improved to 9.3g/dL with hematocrit of 28.7%. I explained to the patient that his anemia is due to chronic kidney disease therefore we recommend Retacrit every 2 weeks if his hemoglobin is below 10g/dL and hematocrit below 30%. Iron Profile and Ferritin will be obtained at this time. Thrombocytopenia  I have explained to patient that his CBC from today shows that his platelet counts is stable at 91,000.  We will recheck his blood counts again in about 8 weeks. No therapeutic intervention is warranted at this time. Follow-up in 8 weeks or sooner if indicated. Orders Placed This Encounter    COMPLETE CBC & AUTO DIFF WBC    InHouse CBC ([facility] [place])     Standing Status:   Future     Number of Occurrences:   1     Standing Expiration Date:   6/21/2019    IRON PROFILE     Standing Status:   Future     Standing Expiration Date:   6/14/2020    FERRITIN     Standing Status:   Future     Standing Expiration Date:   7/22/4573    METABOLIC PANEL, COMPREHENSIVE     Standing Status:   Future     Standing Expiration Date:   6/14/2020       Santosh Zaman NP  6/14/2019       I have assessed the patient independently and  agree with the full assessment as outlined.   Paris Martin MD, Mariaelena Márquez

## 2019-06-14 NOTE — PATIENT INSTRUCTIONS
Anemia: Care Instructions  Your Care Instructions    Anemia is a low level of red blood cells, which carry oxygen throughout your body. Many things can cause anemia. Lack of iron is one of the most common causes. Your body needs iron to make hemoglobin, a substance in red blood cells that carries oxygen from the lungs to your body's cells. Without enough iron, the body produces fewer and smaller red blood cells. As a result, your body's cells do not get enough oxygen, and you feel tired and weak. And you may have trouble concentrating. Bleeding is the most common cause of a lack of iron. You may have heavy menstrual bleeding or bleeding caused by conditions such as ulcers, hemorrhoids, or cancer. Regular use of aspirin or other anti-inflammatory medicines (such as ibuprofen) also can cause bleeding in some people. A lack of iron in your diet also can cause anemia, especially at times when the body needs more iron, such as during pregnancy, infancy, and the teen years. Your doctor may have prescribed iron pills. It may take several months of treatment for your iron levels to return to normal. Your doctor also may suggest that you eat foods that are rich in iron, such as meat and beans. There are many other causes of anemia. It is not always due to a lack of iron. Finding the specific cause of your anemia will help your doctor find the right treatment for you. Follow-up care is a key part of your treatment and safety. Be sure to make and go to all appointments, and call your doctor if you are having problems. It's also a good idea to know your test results and keep a list of the medicines you take. How can you care for yourself at home? · Take your medicines exactly as prescribed. Call your doctor if you think you are having a problem with your medicine. · If your doctor recommends iron pills, take them as directed:  ? Try to take the pills on an empty stomach about 1 hour before or 2 hours after meals. But you may need to take iron with food to avoid an upset stomach. ? Do not take antacids or drink milk or caffeine drinks (such as coffee, tea, or cola) at the same time or within 2 hours of the time that you take your iron. They can make it hard for your body to absorb the iron. ? Vitamin C (from food or supplements) helps your body absorb iron. Try taking iron pills with a glass of orange juice or some other food that is high in vitamin C, such as citrus fruits. ? Iron pills may cause stomach problems, such as heartburn, nausea, diarrhea, constipation, and cramps. Be sure to drink plenty of fluids, and include fruits, vegetables, and fiber in your diet each day. Iron pills often make your bowel movements dark or green. ? If you forget to take an iron pill, do not take a double dose of iron the next time you take a pill. ? Keep iron pills out of the reach of small children. An overdose of iron can be very dangerous. · Follow your doctor's advice about eating iron-rich foods. These include red meat, shellfish, poultry, eggs, beans, raisins, whole-grain bread, and leafy green vegetables. · Steam vegetables to help them keep their iron content. When should you call for help? Call 911 anytime you think you may need emergency care. For example, call if:    · You have symptoms of a heart attack. These may include:  ? Chest pain or pressure, or a strange feeling in the chest.  ? Sweating. ? Shortness of breath. ? Nausea or vomiting. ? Pain, pressure, or a strange feeling in the back, neck, jaw, or upper belly or in one or both shoulders or arms. ? Lightheadedness or sudden weakness. ? A fast or irregular heartbeat. After you call 911, the  may tell you to chew 1 adult-strength or 2 to 4 low-dose aspirin. Wait for an ambulance.  Do not try to drive yourself.     · You passed out (lost consciousness).    Call your doctor now or seek immediate medical care if:    · You have new or increased shortness of breath.     · You are dizzy or lightheaded, or you feel like you may faint.     · Your fatigue and weakness continue or get worse.     · You have any abnormal bleeding, such as:  ? Nosebleeds. ? Vaginal bleeding that is different (heavier, more frequent, at a different time of the month) than what you are used to.  ? Bloody or black stools, or rectal bleeding. ? Bloody or pink urine.    Watch closely for changes in your health, and be sure to contact your doctor if:    · You do not get better as expected. Where can you learn more? Go to http://gabo-curly.info/. Enter R301 in the search box to learn more about \"Anemia: Care Instructions. \"  Current as of: May 6, 2018  Content Version: 11.9  © 1303-9085 [employer]. Care instructions adapted under license by [employer] (which disclaims liability or warranty for this information). If you have questions about a medical condition or this instruction, always ask your healthcare professional. [address] any warranty or liability for your use of this information.

## 2019-06-20 ENCOUNTER — APPOINTMENT (OUTPATIENT)
Dept: INFUSION THERAPY | Age: 59
End: 2019-06-20
Payer: MEDICAID

## 2019-06-21 ENCOUNTER — HOSPITAL ENCOUNTER (OUTPATIENT)
Dept: INFUSION THERAPY | Age: 59
End: 2019-06-21
Payer: MEDICAID

## 2019-06-25 ENCOUNTER — HOSPITAL ENCOUNTER (OUTPATIENT)
Dept: LAB | Age: 59
Discharge: HOME | End: 2019-06-25

## 2019-06-25 LAB — SENTARA SPECIMEN COL,SENBCF: NORMAL

## 2019-06-25 PROCEDURE — 99001 SPECIMEN HANDLING PT-LAB: CPT

## 2019-06-26 ENCOUNTER — HOSPITAL ENCOUNTER (OUTPATIENT)
Dept: INFUSION THERAPY | Age: 59
Discharge: HOME | End: 2019-06-26
Payer: MEDICAID

## 2019-06-26 VITALS
TEMPERATURE: 97.6 F | HEART RATE: 57 BPM | SYSTOLIC BLOOD PRESSURE: 177 MMHG | OXYGEN SATURATION: 97 % | DIASTOLIC BLOOD PRESSURE: 85 MMHG | RESPIRATION RATE: 18 BRPM

## 2019-06-26 LAB
BASO+EOS+MONOS # BLD AUTO: 0.4 K/UL (ref 0–2.3)
BASO+EOS+MONOS NFR BLD AUTO: 9 % (ref 0.1–17)
DIFFERENTIAL METHOD BLD: ABNORMAL
ERYTHROCYTE [DISTWIDTH] IN BLOOD BY AUTOMATED COUNT: 18.9 % (ref 11.5–14.5)
HCT VFR BLD AUTO: 36.4 % (ref 36–48)
HGB BLD-MCNC: 11.4 G/DL (ref 12–16)
LYMPHOCYTES # BLD: 1.9 K/UL (ref 1.1–5.9)
LYMPHOCYTES NFR BLD: 41 % (ref 14–44)
MCH RBC QN AUTO: 29.8 PG (ref 25–35)
MCHC RBC AUTO-ENTMCNC: 31.3 G/DL (ref 31–37)
MCV RBC AUTO: 95.3 FL (ref 78–102)
NEUTS SEG # BLD: 2.5 K/UL (ref 1.8–9.5)
NEUTS SEG NFR BLD: 51 % (ref 40–70)
PLATELET # BLD AUTO: 72 K/UL (ref 140–440)
RBC # BLD AUTO: 3.82 M/UL (ref 4.1–5.1)
WBC # BLD AUTO: 4.8 K/UL (ref 4.5–13)

## 2019-06-26 PROCEDURE — 36415 COLL VENOUS BLD VENIPUNCTURE: CPT

## 2019-06-26 PROCEDURE — 85025 COMPLETE CBC W/AUTO DIFF WBC: CPT

## 2019-06-26 NOTE — PROGRESS NOTES
[facility] Progress Note    Date: 2019    Name: Jose De Jesus Shell    MRN: [id number]         : 1960     Retacrit      Mr. Warner Celeste to [facility], ambulatory at [phone]. Pt was assessed and education was provided. Mr. Marilia Najera vitals were reviewed and patient was observed for 5 minutes prior to treatment. Blood obtained peripherally from the right [facility] x2 attempts and sent to lab for CBC per written orders. No bleeding or hematoma noted at site. Gauze and coban applied. Lab results were obtained and reviewed. Recent Results (from the past 12 hour(s))   CBC WITH 3 PART DIFF    Collection Time: 19  2:54 PM   Result Value Ref Range    WBC 4.8 4.5 - 13.0 K/uL    RBC 3.82 (L) 4.10 - 5.10 M/uL    HGB 11.4 (L) 12.0 - 16 g/dL    HCT 36.4 36 - 48 %    MCV 95.3 78 - 102 FL    MCH 29.8 25.0 - 35.0 PG    MCHC 31.3 31 - 37 g/dL    RDW 18.9 (H) 11.5 - 14.5 %    PLATELET 72 (L) 200 - 440 K/uL    NEUTROPHILS 51 40 - 70 %    MIXED CELLS 9 0.1 - 17 %    LYMPHOCYTES 41 14 - 44 %    ABS. NEUTROPHILS 2.5 1.8 - 9.5 K/UL    ABS. MIXED CELLS 0.4 0.0 - 2.3 K/uL    ABS. LYMPHOCYTES 1.9 1.1 - 5.9 K/UL    DF AUTOMATED        Results within parameters to HOLD retacrit today. Mr. Warner Celeste tolerated well, and had no complaints. Patient armband removed and shredded. Mr. Warner Celeste was discharged from [place] [address] in stable condition at 1515. He is to return on 07/10/2019 at 1330 for his next appointment.     Gurinder Hernandez RN  2019

## 2019-07-05 ENCOUNTER — APPOINTMENT (OUTPATIENT)
Dept: INFUSION THERAPY | Age: 59
End: 2019-07-05
Payer: MEDICAID

## 2019-07-10 ENCOUNTER — HOSPITAL ENCOUNTER (OUTPATIENT)
Dept: INFUSION THERAPY | Age: 59
End: 2019-07-10
Payer: MEDICAID

## 2019-07-11 ENCOUNTER — ANESTHESIA EVENT (OUTPATIENT)
Dept: ENDOSCOPY | Age: 59
End: 2019-07-11
Payer: MEDICAID

## 2019-07-12 ENCOUNTER — ANESTHESIA (OUTPATIENT)
Dept: ENDOSCOPY | Age: 59
End: 2019-07-12
Payer: MEDICAID

## 2019-07-12 ENCOUNTER — HOSPITAL ENCOUNTER (OUTPATIENT)
Age: 59
Setting detail: OUTPATIENT SURGERY
Discharge: HOME | End: 2019-07-12
Attending: INTERNAL MEDICINE | Admitting: INTERNAL MEDICINE
Payer: MEDICAID

## 2019-07-12 VITALS
SYSTOLIC BLOOD PRESSURE: 156 MMHG | BODY MASS INDEX: 19.23 KG/M2 | HEIGHT: 71 IN | TEMPERATURE: 98.4 F | DIASTOLIC BLOOD PRESSURE: 71 MMHG | HEART RATE: 68 BPM | WEIGHT: 137.38 LBS | RESPIRATION RATE: 13 BRPM | OXYGEN SATURATION: 100 %

## 2019-07-12 LAB
AMPHET UR QL SCN: NEGATIVE
BARBITURATES UR QL SCN: NEGATIVE
BENZODIAZ UR QL: NEGATIVE
CANNABINOIDS UR QL SCN: NEGATIVE
COCAINE UR QL SCN: NEGATIVE
GLUCOSE BLD STRIP.AUTO-MCNC: 118 MG/DL (ref 70–110)
GLUCOSE BLD STRIP.AUTO-MCNC: 58 MG/DL (ref 70–110)
GLUCOSE BLD STRIP.AUTO-MCNC: 71 MG/DL (ref 70–110)
GLUCOSE BLD STRIP.AUTO-MCNC: 98 MG/DL (ref 70–110)
HDSCOM,HDSCOM: ABNORMAL
METHADONE UR QL: NEGATIVE
OPIATES UR QL: POSITIVE
PCP UR QL: NEGATIVE

## 2019-07-12 PROCEDURE — 77030018846 HC SOL IRR STRL H20 ICUM -A: Performed by: INTERNAL MEDICINE

## 2019-07-12 PROCEDURE — 77030013992 HC SNR POLYP ENDOSC BSC -B: Performed by: INTERNAL MEDICINE

## 2019-07-12 PROCEDURE — 76060000031 HC ANESTHESIA FIRST 0.5 HR: Performed by: INTERNAL MEDICINE

## 2019-07-12 PROCEDURE — 82962 GLUCOSE BLOOD TEST: CPT

## 2019-07-12 PROCEDURE — 80307 DRUG TEST PRSMV CHEM ANLYZR: CPT

## 2019-07-12 PROCEDURE — 77030021593 HC FCPS BIOP ENDOSC BSC -A: Performed by: INTERNAL MEDICINE

## 2019-07-12 PROCEDURE — 76040000019: Performed by: INTERNAL MEDICINE

## 2019-07-12 PROCEDURE — 77030008565 HC TBNG SUC IRR ERBE -B: Performed by: INTERNAL MEDICINE

## 2019-07-12 PROCEDURE — 74011000250 HC RX REV CODE- 250

## 2019-07-12 PROCEDURE — 74011250636 HC RX REV CODE- 250/636: Performed by: ANESTHESIOLOGY

## 2019-07-12 PROCEDURE — 74011250636 HC RX REV CODE- 250/636: Performed by: NURSE ANESTHETIST, CERTIFIED REGISTERED

## 2019-07-12 PROCEDURE — 74011250636 HC RX REV CODE- 250/636

## 2019-07-12 RX ORDER — LIDOCAINE HYDROCHLORIDE 20 MG/ML
INJECTION, SOLUTION EPIDURAL; INFILTRATION; INTRACAUDAL; PERINEURAL AS NEEDED
Status: DISCONTINUED | OUTPATIENT
Start: 2019-07-12 | End: 2019-07-12 | Stop reason: HOSPADM

## 2019-07-12 RX ORDER — INSULIN LISPRO 100 [IU]/ML
INJECTION, SOLUTION INTRAVENOUS; SUBCUTANEOUS ONCE
Status: DISCONTINUED | OUTPATIENT
Start: 2019-07-12 | End: 2019-07-12 | Stop reason: HOSPADM

## 2019-07-12 RX ORDER — DEXTROSE 50 % IN WATER (D50W) INTRAVENOUS SYRINGE
25-50 AS NEEDED
Status: DISCONTINUED | OUTPATIENT
Start: 2019-07-12 | End: 2019-07-12 | Stop reason: HOSPADM

## 2019-07-12 RX ORDER — MAGNESIUM SULFATE 100 %
4 CRYSTALS MISCELLANEOUS AS NEEDED
Status: DISCONTINUED | OUTPATIENT
Start: 2019-07-12 | End: 2019-07-12 | Stop reason: HOSPADM

## 2019-07-12 RX ORDER — FAMOTIDINE 20 MG/1
20 TABLET, FILM COATED ORAL ONCE
Status: DISCONTINUED | OUTPATIENT
Start: 2019-07-12 | End: 2019-07-12

## 2019-07-12 RX ORDER — SODIUM CHLORIDE 0.9 % (FLUSH) 0.9 %
5-40 SYRINGE (ML) INJECTION AS NEEDED
Status: DISCONTINUED | OUTPATIENT
Start: 2019-07-12 | End: 2019-07-12 | Stop reason: HOSPADM

## 2019-07-12 RX ORDER — PROPOFOL 10 MG/ML
INJECTION, EMULSION INTRAVENOUS AS NEEDED
Status: DISCONTINUED | OUTPATIENT
Start: 2019-07-12 | End: 2019-07-12 | Stop reason: HOSPADM

## 2019-07-12 RX ORDER — DEXTROSE, SODIUM CHLORIDE, SODIUM LACTATE, POTASSIUM CHLORIDE, AND CALCIUM CHLORIDE 5; .6; .31; .03; .02 G/100ML; G/100ML; G/100ML; G/100ML; G/100ML
50 INJECTION, SOLUTION INTRAVENOUS ONCE
Status: COMPLETED | OUTPATIENT
Start: 2019-07-12 | End: 2019-07-12

## 2019-07-12 RX ORDER — SODIUM CHLORIDE 0.9 % (FLUSH) 0.9 %
5-40 SYRINGE (ML) INJECTION EVERY 8 HOURS
Status: DISCONTINUED | OUTPATIENT
Start: 2019-07-12 | End: 2019-07-12 | Stop reason: HOSPADM

## 2019-07-12 RX ORDER — SODIUM CHLORIDE, SODIUM LACTATE, POTASSIUM CHLORIDE, CALCIUM CHLORIDE 600; 310; 30; 20 MG/100ML; MG/100ML; MG/100ML; MG/100ML
50 INJECTION, SOLUTION INTRAVENOUS CONTINUOUS
Status: DISCONTINUED | OUTPATIENT
Start: 2019-07-12 | End: 2019-07-12

## 2019-07-12 RX ORDER — FENTANYL CITRATE 50 UG/ML
25 INJECTION, SOLUTION INTRAMUSCULAR; INTRAVENOUS
Status: DISCONTINUED | OUTPATIENT
Start: 2019-07-12 | End: 2019-07-12 | Stop reason: HOSPADM

## 2019-07-12 RX ORDER — HYDRALAZINE HYDROCHLORIDE 20 MG/ML
10 INJECTION INTRAMUSCULAR; INTRAVENOUS ONCE
Status: COMPLETED | OUTPATIENT
Start: 2019-07-12 | End: 2019-07-12

## 2019-07-12 RX ORDER — LIDOCAINE HYDROCHLORIDE 10 MG/ML
0.1 INJECTION, SOLUTION EPIDURAL; INFILTRATION; INTRACAUDAL; PERINEURAL AS NEEDED
Status: DISCONTINUED | OUTPATIENT
Start: 2019-07-12 | End: 2019-07-12 | Stop reason: HOSPADM

## 2019-07-12 RX ORDER — DEXTROSE, SODIUM CHLORIDE, SODIUM LACTATE, POTASSIUM CHLORIDE, AND CALCIUM CHLORIDE 5; .6; .31; .03; .02 G/100ML; G/100ML; G/100ML; G/100ML; G/100ML
50 INJECTION, SOLUTION INTRAVENOUS CONTINUOUS
Status: DISCONTINUED | OUTPATIENT
Start: 2019-07-12 | End: 2019-07-12 | Stop reason: HOSPADM

## 2019-07-12 RX ORDER — GLYCOPYRROLATE 0.2 MG/ML
INJECTION INTRAMUSCULAR; INTRAVENOUS AS NEEDED
Status: DISCONTINUED | OUTPATIENT
Start: 2019-07-12 | End: 2019-07-12 | Stop reason: HOSPADM

## 2019-07-12 RX ADMIN — SODIUM CHLORIDE, SODIUM LACTATE, POTASSIUM CHLORIDE, CALCIUM CHLORIDE, AND DEXTROSE MONOHYDRATE: 600; 310; 30; 20; 5 INJECTION, SOLUTION INTRAVENOUS at 10:19

## 2019-07-12 RX ADMIN — SODIUM CHLORIDE, SODIUM LACTATE, POTASSIUM CHLORIDE, AND CALCIUM CHLORIDE 50 ML/HR: 600; 310; 30; 20 INJECTION, SOLUTION INTRAVENOUS at 08:25

## 2019-07-12 RX ADMIN — LIDOCAINE HYDROCHLORIDE 40 MG: 20 INJECTION, SOLUTION EPIDURAL; INFILTRATION; INTRACAUDAL; PERINEURAL at 10:25

## 2019-07-12 RX ADMIN — FAMOTIDINE 20 MG: 10 INJECTION, SOLUTION INTRAVENOUS at 08:40

## 2019-07-12 RX ADMIN — SODIUM CHLORIDE, SODIUM LACTATE, POTASSIUM CHLORIDE, CALCIUM CHLORIDE, AND DEXTROSE MONOHYDRATE 50 ML/HR: 600; 310; 30; 20; 5 INJECTION, SOLUTION INTRAVENOUS at 09:28

## 2019-07-12 RX ADMIN — HYDRALAZINE HYDROCHLORIDE 10 MG: 20 INJECTION INTRAMUSCULAR; INTRAVENOUS at 08:38

## 2019-07-12 RX ADMIN — PROPOFOL 70 MG: 10 INJECTION, EMULSION INTRAVENOUS at 10:25

## 2019-07-12 RX ADMIN — GLYCOPYRROLATE 0.1 MG: 0.2 INJECTION INTRAMUSCULAR; INTRAVENOUS at 10:19

## 2019-07-12 NOTE — ANESTHESIA PREPROCEDURE EVALUATION
Relevant Problems   No relevant active problems       Anesthetic History   No history of anesthetic complications            Review of Systems / Medical History  Patient summary reviewed and pertinent labs reviewed    Pulmonary    COPD: moderate               Neuro/Psych             Comments: Quadriplegia Cardiovascular    Hypertension: well controlled        Dysrhythmias (H/O bradycardia)       Exercise tolerance: <4 METS: Uses a walker     GI/Hepatic/Renal     GERD: well controlled  Hepatitis: type C    Liver disease     Endo/Other    Diabetes: well controlled, type 2  Hypothyroidism: well controlled  Arthritis     Other Findings   Comments: Recent use of Marihuana. Denies Cocaine use.            Physical Exam    Airway  Mallampati: II  TM Distance: 4 - 6 cm  Neck ROM: normal range of motion   Mouth opening: Normal     Cardiovascular  Regular rate and rhythm,  S1 and S2 normal,  no murmur, click, rub, or gallop             Dental    Dentition: Edentulous     Pulmonary  Breath sounds clear to auscultation               Abdominal  GI exam deferred       Other Findings            Anesthetic Plan    ASA: 3  Anesthesia type: MAC          Induction: Intravenous  Anesthetic plan and risks discussed with: Patient

## 2019-07-12 NOTE — PERIOP NOTES
Anesthesia MD advised about patients elevated b/p, low temp, and low Accu-check results, new med orders received, and patient placed on monitor for further evaluation.  Warm blankets applied, fluid warmer started, med given and and CRNA to bedside now to assist.

## 2019-07-12 NOTE — H&P
[facility]   [email or web address]      Impression:   1.hypothermic from prep ? And continued weight losss , doubt he is septic with hypertension noted and no tachy or clincial complaints   2. Cirrhosis  3. Weight loss  4. bleeding       Plan:     1. egd mac all risks benefits and alt discussed   2. Lavelle cancelled did nt take prep   3. warming blanket       Chief Complaint:       HPI:  Fawad Chambers is a 59 y.o. male who is being seen on consult for cirrhosis varices and iron def .     PMH:   Past Medical History:   Diagnosis Date    Anemia     Bradycardia, unspecified 2018    Cervical discitis     MRSA    Chronic drug abuse ([facility] 1974    Chronic kidney disease     stage III    Diabetes ([facility] [address] 01/1990    Drug abuse ([facility] [address]     Encephalopathy     GERD (gastroesophageal reflux disease)     Hypertension     Muscle weakness     Renal cell carcinoma of left kidney (HCC) 12/17/13    Pathological Stage [id number] cc RCC FG3 s/p left open partial nephrectomy in 12/13      Sepsis due to methicillin resistant Staphylococcus aureus (HCC)     Suprapubic catheter (HCC)     Thrombocytopenia (HCC)     Urethral erosion     Viral hepatitis C     Xerosis cutis        PSH:   Past Surgical History:   Procedure Laterality Date    HX CIRCUMCISION  12/17/13    Dr. Sanjeev Rasmussen, [facility]    HX NEPHRECTOMY Left 12/17/13    Open/ Partial,nephrectomy    HX OTHER SURGICAL Right 2/27/2016    removed right ft  2nd meta-tarsal    HX OTHER SURGICAL  04/2017    abscess removed from back of neck     [place] REMOVAL WITH INSERTION OF SUPRAPUBIC CATHETER  2018       Social HX:   Social History     Socioeconomic History    Marital status:      Spouse name: Not on file    Number of children: Not on file    Years of education: Not on file    Highest education level: Not on file   Occupational History    Not on file   Social Needs    Financial resource strain: Not on file   Xavi-Imtiaz insecurity:     Worry: Not on file     Inability: Not on file    Transportation needs:     Medical: Not on file     Non-medical: Not on file   Tobacco Use    Smoking status: Current Every Day Smoker     Packs/day: 0.25     Years: 30.00     Pack years: 7.50     Types: Cigarettes    Smokeless tobacco: Never Used   Substance and Sexual Activity    Alcohol use: Yes     Comment: beer occasionally    Drug use: Yes     Types: Marijuana, Heroin    Sexual activity: Never   Lifestyle    Physical activity:     Days per week: Not on file     Minutes per session: Not on file    Stress: Not on file   Relationships    Social connections:     Talks on phone: Not on file     Gets together: Not on file     Attends Congregational service: Not on file     Active member of club or organization: Not on file     Attends meetings of clubs or organizations: Not on file     Relationship status: Not on file    Intimate partner violence:     Fear of current or ex partner: Not on file     Emotionally abused: Not on file     Physically abused: Not on file     Forced sexual activity: Not on file   Other Topics Concern    Not on file   Social History Narrative     since 2012;  for 12 yrs; [employer]; [address] [employer] worker just recently furloughed; No  service       FHX:   Family History   Problem Relation Age of Onset    Diabetes Mother     Sickle Cell Anemia Father     Diabetes Sister     Hypertension Sister        Allergy:   Allergies   Allergen Reactions    Iodine Hives       Home Medications:     Medications Prior to Admission   Medication Sig    omeprazole (PRILOSEC) 20 mg capsule     levothyroxine (SYNTHROID) 100 mcg tablet Take 1 Tab by mouth every morning.  b complex-vitamin c-folic acid (NEPHROCAPS) 1 mg capsule Take 1 Cap by mouth daily.  calcitRIOL (ROCALTROL) 0.25 mcg capsule Take 1 Cap by mouth every Monday, Wednesday, Friday.     ferrous gluconate 324 mg (37.5 mg iron) tablet Take 1 Tab by mouth two (2) times daily (with meals).  liothyronine (CYTOMEL) 25 mcg tablet Take 1 Tab by mouth two (2) times a day.  amLODIPine (NORVASC) 10 mg tablet Take 10 mg by mouth daily.  Syringe, Disposable, (BD SYRINGE CATHETER TIP) 60 mL syrg Use 1 syringe daily with irrigations    ergocalciferol (DRISDOL) 50,000 unit capsule Take 50,000 Units by mouth every seven (7) days.  doxycycline (ADOXA) 100 mg tablet Take 100 mg by mouth daily.  tamsulosin (FLOMAX) 0.4 mg capsule Take 1 Cap by mouth daily.  acetaminophen (TYLENOL) 325 mg tablet Take 2 Tabs by mouth every six (6) hours as needed for Pain.  hydrALAZINE (APRESOLINE) 25 mg tablet Take 1 Tab by mouth every eight (8) hours as needed (systolic B/P >487).  lactulose (CHRONULAC) 10 gram/15 mL solution Take 15 mL by mouth two (2) times a day.  docusate sodium (COLACE) 100 mg capsule 100 mg two (2) times daily as needed.  insulin lispro (HUMALOG) 100 unit/mL injection Normal Insulin Sensitivity   For Blood Sugar (mg/dL) of:     Less than 150 =   0 units           150 -199 =   2 units  200 -249 =   4 units  250 -299 =   6 units  300 -349 =   8 units  350 and above =   10 units  If 2 glucose readings are above 200 mg/dL within a 24 hr period, proceed to \"Very Insul       Review of Systems:     Constitutional: No fevers, chills, +weight loss, +fatigue. Skin: No rashes, pruritis, jaundice, ulcerations, erythema. HENT: No headaches, nosebleeds, sinus pressure, rhinorrhea, sore throat. Eyes: No visual changes, blurred vision, eye pain, photophobia, jaundice. Cardiovascular: No chest pain, heart palpitations. Respiratory: No cough, SOB, wheezing, chest discomfort, orthopnea. Gastrointestinal: Anorexia weight loss bleeding    Genitourinary: No dysuria, bleeding, discharge, pyuria. Musculoskeletal: No weakness, arthralgias, wasting. Endo: No sweats. Heme: No bruising, easy bleeding. Allergies: As noted.    Neurological: Cranial nerves intact. Alert and oriented. Gait not assessed. Psychiatric:  No anxiety, depression, hallucinations. Visit Vitals  /74   Pulse 60   Temp (!) 94.6 °F (34.8 °C)   Resp 16   Ht 5' 11\" (1.803 m)   Wt 62.3 kg (137 lb 6 oz)   SpO2 100%   BMI 19.16 kg/m²       Physical Assessment:     constitutional: appearance: very thin, normal habitus, no deformities, in no acute distress. skin: inspection: no rashes, ulcers, icterus or other lesions; no clubbing or telangiectasias. palpation: no induration or subcutaneos nodules. eyes: inspection: normal conjunctivae and lids; no jaundice pupils: symmetrical, normoreactive to light, normal accommodation and size. ENMT: mouth: normal oral mucosa,lips and gums; good dentition. oropharynx: normal tongue, hard and soft palate; posterior pharynx without erythema, exudate or lesions. neck: no masses organomegaly or tenderness. respiratory: effort: normal chest excursion; no intercostal retraction or accessory muscle use. cardiovascular: abdominal aorta: normal size and position; no bruits. palpation: PMI of normal size and position; normal rhythm; no thrill or murmurs. abdominal: abdomen: normal consistency; no tenderness or masses. hernias: no hernias appreciated. liver: normal size and consistency. spleen: not palpable. rectal: hemoccult/guaiac: not performed. musculoskeletal: no deformities or muscle wasting   lymphatic: axilae: not palpable. groin: not palpable. neck: within normal limits. other: not palpable. neurologic: cranial nerves: II-XII normal.   psychiatric: judgement/insight: within normal limits. memory: within normal limits for recent and remote events. mood and affect: no evidence of depression, anxiety or agitation. orientation: oriented to time, space and person. Basic Metabolic Profile   No results for input(s): NA, K, CL, CO2, BUN, GLU, CA, MG, PHOS in the last 72 hours.     No lab exists for component: CREAT CBC w/Diff    No results for input(s): WBC, RBC, HGB, HCT, MCV, MCH, MCHC, RDW, PLT, HGBEXT, HCTEXT, PLTEXT in the last 72 hours. No lab exists for component: MPV No results for input(s): GRANS, LYMPH, EOS, PRO, MYELO, METAS, BLAST in the last 72 hours. No lab exists for component: MONO, BASO     Hepatic Function   No results for input(s): ALB, TP, TBILI, GPT, SGOT, AP, AML, LPSE in the last 72 hours. No lab exists for component: Italia Kothari MD, M.D. [facility], [address] [place] [address]  [email or web address]. [facility]

## 2019-07-12 NOTE — DISCHARGE INSTRUCTIONS
Patient Education        Upper GI Endoscopy: What to Expect at Home  Your Recovery  After you have an endoscopy, you will stay at the hospital or clinic for 1 to 2 hours. This will allow the medicine to wear off. You will be able to go home after your doctor or nurse checks to make sure you are not having any problems. You may have to stay overnight if you had treatment during the test. You may have a sore throat for a day or two after the test.  This care sheet gives you a general idea about what to expect after the test.  How can you care for yourself at home? Activity  · Rest as much as you need to after you go home. · You should be able to go back to your usual activities the day after the test.  Diet  · Follow your doctor's directions for eating after the test.  · Drink plenty of fluids (unless your doctor has told you not to). Medications  · If you have a sore throat the day after the test, use an over-the-counter spray to numb your throat. Follow-up care is a key part of your treatment and safety. Be sure to make and go to all appointments, and call your doctor if you are having problems. It's also a good idea to know your test results and keep a list of the medicines you take. When should you call for help? Call 911 anytime you think you may need emergency care. For example, call if:    · You passed out (loses consciousness).     · You have trouble breathing.     · You pass maroon or bloody stools.    Call your doctor now or seek immediate medical care if:    · You have pain that does not get better after your take pain medicine.     · You have new or worse belly pain.     · You have blood in your stools.     · You are sick to your stomach and cannot keep fluids down.     · You have a fever.     · You cannot pass stools or gas.    Watch closely for changes in your health, and be sure to contact your doctor if:    · Your throat still hurts after a day or two.     · You do not get better as expected. Where can you learn more? Go to http://gabo-curly.info/. Enter [phone] in the search box to learn more about \"Upper GI Endoscopy: What to Expect at Home. \"  Current as of: March 27, 2018  Content Version: 11.9  © 5140-5935 [employer]. Care instructions adapted under license by [employer] (which disclaims liability or warranty for this information). If you have questions about a medical condition or this instruction, always ask your healthcare professional. [address] any warranty or liability for your use of this information. DISCHARGE SUMMARY from Nurse    PATIENT INSTRUCTIONS:    After general anesthesia or intravenous sedation, for 24 hours or while taking prescription Narcotics:  · Limit your activities  · Do not drive and operate hazardous machinery  · Do not make important personal or business decisions  · Do  not drink alcoholic beverages  · If you have not urinated within 8 hours after discharge, please contact your surgeon on call. Report the following to your surgeon:  · Excessive pain, swelling, redness or odor of or around the surgical area  · Temperature over 100.5  · Nausea and vomiting lasting longer than 4 hours or if unable to take medications  · Any signs of decreased circulation or nerve impairment to extremity: change in color, persistent  numbness, tingling, coldness or increase pain  · Any questions    What to do at Home:  Recommended activity: Activity as tolerated and no driving for today. *  Please give a list of your current medications to your Primary Care Provider. *  Please update this list whenever your medications are discontinued, doses are      changed, or new medications (including over-the-counter products) are added. *  Please carry medication information at all times in case of emergency situations.     These are general instructions for a healthy lifestyle:    No smoking/ No tobacco products/ Avoid exposure to second hand smoke  Surgeon General's Warning:  Quitting smoking now greatly reduces serious risk to your health. Obesity, smoking, and sedentary lifestyle greatly increases your risk for illness    A healthy diet, regular physical exercise & weight monitoring are important for maintaining a healthy lifestyle    You may be retaining fluid if you have a history of heart failure or if you experience any of the following symptoms:  Weight gain of 3 pounds or more overnight or 5 pounds in a week, increased swelling in our hands or feet or shortness of breath while lying flat in bed. Please call your doctor as soon as you notice any of these symptoms; do not wait until your next office visit. The discharge information has been reviewed with the patient and sister.   The patient and sister verbalized understanding.    ___________________________________________________________________________________________________________________________________

## 2019-07-12 NOTE — ANESTHESIA POSTPROCEDURE EVALUATION
Procedure(s):  UPPER ENDOSCOPY. MAC    Anesthesia Post Evaluation      Multimodal analgesia: multimodal analgesia used between 6 hours prior to anesthesia start to PACU discharge  Patient location during evaluation: bedside  Patient participation: complete - patient participated  Level of consciousness: awake  Pain score: 0  Pain management: adequate  Airway patency: patent  Anesthetic complications: no  Cardiovascular status: stable  Respiratory status: acceptable  Hydration status: acceptable  Post anesthesia nausea and vomiting:  controlled      Vitals Value Taken Time   /71 7/12/2019 10:57 AM   Temp 36.9 °C (98.4 °F) 7/12/2019 10:38 AM   Pulse 66 7/12/2019 11:01 AM   Resp 7 7/12/2019 11:01 AM   SpO2 100 % 7/12/2019 11:01 AM   Vitals shown include unvalidated device data. Have You Had Botox Before?: has had botox When Was Your Last Botox Treatment?: 3/16/17

## 2019-07-16 ENCOUNTER — HOSPITAL ENCOUNTER (OUTPATIENT)
Dept: INFUSION THERAPY | Age: 59
Discharge: HOME | End: 2019-07-16
Payer: MEDICAID

## 2019-07-16 VITALS
HEART RATE: 51 BPM | RESPIRATION RATE: 18 BRPM | OXYGEN SATURATION: 96 % | SYSTOLIC BLOOD PRESSURE: 176 MMHG | TEMPERATURE: 95.6 F | DIASTOLIC BLOOD PRESSURE: 77 MMHG

## 2019-07-16 LAB
BASO+EOS+MONOS # BLD AUTO: 0.4 K/UL (ref 0–2.3)
BASO+EOS+MONOS NFR BLD AUTO: 7 % (ref 0.1–17)
DIFFERENTIAL METHOD BLD: ABNORMAL
ERYTHROCYTE [DISTWIDTH] IN BLOOD BY AUTOMATED COUNT: 16.3 % (ref 11.5–14.5)
HCT VFR BLD AUTO: 34.3 % (ref 36–48)
HGB BLD-MCNC: 10.9 G/DL (ref 12–16)
LYMPHOCYTES # BLD: 2.2 K/UL (ref 1.1–5.9)
LYMPHOCYTES NFR BLD: 42 % (ref 14–44)
MCH RBC QN AUTO: 29.4 PG (ref 25–35)
MCHC RBC AUTO-ENTMCNC: 31.8 G/DL (ref 31–37)
MCV RBC AUTO: 92.5 FL (ref 78–102)
NEUTS SEG # BLD: 2.7 K/UL (ref 1.8–9.5)
NEUTS SEG NFR BLD: 51 % (ref 40–70)
PLATELET # BLD AUTO: 37 K/UL (ref 140–440)
RBC # BLD AUTO: 3.71 M/UL (ref 4.1–5.1)
WBC # BLD AUTO: 5.3 K/UL (ref 4.5–13)

## 2019-07-16 PROCEDURE — 85025 COMPLETE CBC W/AUTO DIFF WBC: CPT

## 2019-07-16 PROCEDURE — 36415 COLL VENOUS BLD VENIPUNCTURE: CPT

## 2019-07-16 NOTE — PROGRESS NOTES
[facility] Progress Note    Date: 2019    Name: Apollo Mcgee    MRN: [id number]         : 1960     Retacrit      Mr. Karol Sanchez to [facility], ambulatory at 1310. Pt was assessed and education was provided. Mr. Felisa Guido vitals were reviewed and patient was observed for 5 minutes prior to treatment. Blood obtained peripherally from the right forearm x3 attempts and sent to lab for CBC per written orders. No bleeding or hematoma noted at site. Gauze and coban applied. Lab results were obtained and reviewed. Recent Results (from the past 12 hour(s))   CBC WITH 3 PART DIFF    Collection Time: 19  1:27 PM   Result Value Ref Range    WBC 5.3 4.5 - 13.0 K/uL    RBC 3.71 (L) 4.10 - 5.10 M/uL    HGB 10.9 (L) 12.0 - 16 g/dL    HCT 34.3 (L) 36 - 48 %    MCV 92.5 78 - 102 FL    MCH 29.4 25.0 - 35.0 PG    MCHC 31.8 31 - 37 g/dL    RDW 16.3 (H) 11.5 - 14.5 %    PLATELET 37 (L) 438 - 440 K/uL    NEUTROPHILS 51 40 - 70 %    MIXED CELLS 7 0.1 - 17 %    LYMPHOCYTES 42 14 - 44 %    ABS. NEUTROPHILS 2.7 1.8 - 9.5 K/UL    ABS. MIXED CELLS 0.4 0.0 - 2.3 K/uL    ABS. LYMPHOCYTES 2.2 1.1 - 5.9 K/UL    DF AUTOMATED        Results within parameters to HOLD retacrit today. Mr. Karol Sanchez tolerated well, and had no complaints. Patient armband removed and shredded. Mr. Karol Sanchez was discharged from [place] [id number] in stable condition at 1335. He is to return on 2019 at 1330 for his next appointment.     Woody Estevez RN  2019  1337

## 2019-07-18 ENCOUNTER — HOSPITAL ENCOUNTER (OUTPATIENT)
Dept: ULTRASOUND IMAGING | Age: 59
Discharge: HOME | End: 2019-07-18
Attending: INTERNAL MEDICINE

## 2019-07-18 DIAGNOSIS — K74.60 NON-ALCOHOLIC CIRRHOSIS (HCC): ICD-10-CM

## 2019-07-24 ENCOUNTER — APPOINTMENT (OUTPATIENT)
Dept: INFUSION THERAPY | Age: 59
End: 2019-07-24
Payer: MEDICAID

## 2019-07-27 NOTE — PROCEDURES
[facility]  [facility], [address]    Procedure Note    Patient: Judi Singletary MRN: [id number]  SSN: [id number]    YOB: 1960  Age: 59 y.o. Sex: male      Date/Time:  7/27/2019 6:42 AM    Esophagogastroduodenoscopy (EGD) Procedure Note    Procedure: Esophagogastroduodenoscopy --diagnostic    Impression:    -Normal upper endoscopy, with no endoscopic evidence of neoplasia or mucosal abnormality. Recommendations:  -Continue acid suppression. , -Follow up with me., -colo mac in future     Indication:  cirrhosis r/o varices  Pre-operative Diagnosis: see indication above  Post-operative Diagnosis: see findings below  :  Estella Hussein MD  Referring Provider:   William Daily, DO    Exam:  Airway: clear, no airway problems anticipated  Heart: RRR, without gallops or rubs  Lungs: clear bilaterally without wheezes, crackles, or rhonchi  Abdomen: soft, nontender, nondistended, bowel sounds present  Mental Status: awake, alert and oriented to person, place and time     Anethesia/Sedation:  MAC anesthesia Propofol  Procedure Details   After informed consent was obtained for the procedure, with all risks and benefits of procedure explained the patient was taken to the endoscopy suite and placed in the left lateral decubitus position. Following sequential administration of sedation as per above, the [id number] gastroscope was inserted into the mouth and advanced under direct vision to second portion of the duodenum. A careful inspection was made as the gastroscope was withdrawn, including a retroflexed view of the proximal stomach; findings and interventions are described below. Findings: OROPHARYNX: Cords and pyriform recesses normal.   ESOPHAGUS: The esophagus is normal. The proximal, mid, and distal portions are normal. The Z-Line is intact.    STOMACH: The fundus on antegrade and retroflex views is normal. The body, antrum, and pylorus are normal.   DUODENUM: The bulb and second portions are normal.     Therapies:  none  Specimens: none  Estimated blood loss:  None   Implants none            Complications:   None; patient tolerated the procedure well.     Discharge disposition:  Home in the company of  when able to ambulate    Popeye Franco MD

## 2019-08-19 ENCOUNTER — HOSPITAL ENCOUNTER (OUTPATIENT)
Dept: ONCOLOGY | Age: 59
Discharge: HOME | End: 2019-08-19

## 2019-08-19 ENCOUNTER — OFFICE VISIT (OUTPATIENT)
Dept: ONCOLOGY | Age: 59
End: 2019-08-19

## 2019-08-19 ENCOUNTER — HOSPITAL ENCOUNTER (OUTPATIENT)
Dept: INFUSION THERAPY | Age: 59
Discharge: HOME | End: 2019-08-19
Payer: MEDICAID

## 2019-08-19 VITALS
OXYGEN SATURATION: 98 % | HEART RATE: 56 BPM | DIASTOLIC BLOOD PRESSURE: 77 MMHG | TEMPERATURE: 96.9 F | SYSTOLIC BLOOD PRESSURE: 169 MMHG | BODY MASS INDEX: 19.32 KG/M2 | WEIGHT: 138 LBS | RESPIRATION RATE: 18 BRPM | HEIGHT: 71 IN

## 2019-08-19 DIAGNOSIS — D63.1 ANEMIA ASSOCIATED WITH STAGE 3 CHRONIC RENAL FAILURE (HCC): Primary | ICD-10-CM

## 2019-08-19 DIAGNOSIS — C64.2 RENAL CELL CARCINOMA OF LEFT KIDNEY (HCC): ICD-10-CM

## 2019-08-19 DIAGNOSIS — N18.30 ANEMIA ASSOCIATED WITH STAGE 3 CHRONIC RENAL FAILURE (HCC): Primary | ICD-10-CM

## 2019-08-19 DIAGNOSIS — D64.9 CHRONIC ANEMIA: ICD-10-CM

## 2019-08-19 DIAGNOSIS — D69.6 THROMBOCYTOPENIA (HCC): ICD-10-CM

## 2019-08-19 LAB
BASO+EOS+MONOS # BLD AUTO: 0.5 K/UL (ref 0–2.3)
BASO+EOS+MONOS NFR BLD AUTO: 9 % (ref 0.1–17)
DIFFERENTIAL METHOD BLD: ABNORMAL
ERYTHROCYTE [DISTWIDTH] IN BLOOD BY AUTOMATED COUNT: 16.2 % (ref 11.5–14.5)
HCT VFR BLD AUTO: 28.1 % (ref 36–48)
HGB BLD-MCNC: 9.1 G/DL (ref 12–16)
LYMPHOCYTES # BLD: 1.7 K/UL (ref 1.1–5.9)
LYMPHOCYTES NFR BLD: 31 % (ref 14–44)
MCH RBC QN AUTO: 28.8 PG (ref 25–35)
MCHC RBC AUTO-ENTMCNC: 32.4 G/DL (ref 31–37)
MCV RBC AUTO: 88.9 FL (ref 78–102)
NEUTS SEG # BLD: 3.1 K/UL (ref 1.8–9.5)
NEUTS SEG NFR BLD: 60 % (ref 40–70)
PLATELET # BLD AUTO: 84 K/UL (ref 140–440)
RBC # BLD AUTO: 3.16 M/UL (ref 4.1–5.1)
WBC # BLD AUTO: 5.3 K/UL (ref 4.5–13)

## 2019-08-19 PROCEDURE — 96372 THER/PROPH/DIAG INJ SC/IM: CPT

## 2019-08-19 PROCEDURE — 74011250636 HC RX REV CODE- 250/636: Performed by: NURSE PRACTITIONER

## 2019-08-19 RX ADMIN — EPOETIN ALFA-EPBX 60000 UNITS: 40000 INJECTION, SOLUTION INTRAVENOUS; SUBCUTANEOUS at 16:16

## 2019-08-19 NOTE — PROGRESS NOTES
Hematology/Oncology  Progress Note    Name: Christine Toussaint  Date: 2019  : 1960    PCP: Whitley Florian MD     Mr. Prasanna Silva is a 62 y.o.  male who was seen for follow-up of his thrombocytopenia. He was seen during his recent hospitalization. Current Therapy: Retacrit every 2 weeks whenever his hemoglobin is <10g/dL and hematocrit is <30%. Subjective:     Mr. Prasanna Silva is a 17-year-old -American man who was seen for thrombocytopenia and anemia due to chronic kidney disease. He is in the office today for follow up. He denies fatigue, tiredness, and shortness of breath. He denies chest pain and dizziness. Since his last clinic visit he has not experienced any unexplained bruising or bleeding. He uses a walker for support and mobility. He does not have any complaints or concerns to report at this time. The patient had a CBC done on 2019 which showed that his hemoglobin was 10.9 g/dL with hematocrit 34.3%. His platelet count had declined to 37,000. Past medical history, family history, and social history: these were reviewed and remains unchanged.     Past Medical History:   Diagnosis Date    Anemia     Bradycardia, unspecified     Cervical discitis     MRSA    Chronic drug abuse ([address] 1974    Chronic kidney disease     stage III    Diabetes ([address] 1990    Drug abuse ([address]     Encephalopathy     GERD (gastroesophageal reflux disease)     Hypertension     Muscle weakness     Renal cell carcinoma of left kidney ([address] 13    Pathological Stage [id number] cc RCC FG3 s/p left open partial nephrectomy in       Sepsis due to methicillin resistant Staphylococcus aureus (HCC)     Suprapubic catheter (HCC)     Thrombocytopenia (HCC)     Urethral erosion     Viral hepatitis C     Xerosis cutis      Past Surgical History:   Procedure Laterality Date    HX CIRCUMCISION  13    Dr. Dimas Mcclendon, [facility]    HX NEPHRECTOMY Left 13    Open/ Partial,nephrectomy    HX OTHER SURGICAL Right 2/27/2016    removed right ft  2nd meta-tarsal    HX OTHER SURGICAL  04/2017    abscess removed from back of neck     VA REMOVAL WITH INSERTION OF SUPRAPUBIC CATHETER  2018     Social History     Socioeconomic History    Marital status:      Spouse name: Not on file    Number of children: Not on file    Years of education: Not on file    Highest education level: Not on file   Occupational History    Not on file   Social Needs    Financial resource strain: Not on file    Food insecurity:     Worry: Not on file     Inability: Not on file    Transportation needs:     Medical: Not on file     Non-medical: Not on file   Tobacco Use    Smoking status: Current Every Day Smoker     Packs/day: 0.25     Years: 30.00     Pack years: 7.50     Types: Cigarettes    Smokeless tobacco: Never Used   Substance and Sexual Activity    Alcohol use: Yes     Comment: beer occasionally    Drug use: Yes     Types: Marijuana, Heroin    Sexual activity: Never   Lifestyle    Physical activity:     Days per week: Not on file     Minutes per session: Not on file    Stress: Not on file   Relationships    Social connections:     Talks on phone: Not on file     Gets together: Not on file     Attends Buddhism service: Not on file     Active member of club or organization: Not on file     Attends meetings of clubs or organizations: Not on file     Relationship status: Not on file    Intimate partner violence:     Fear of current or ex partner: Not on file     Emotionally abused: Not on file     Physically abused: Not on file     Forced sexual activity: Not on file   Other Topics Concern    Not on file   Social History Narrative     since 2012;  for 12 yrs; [employer]; [address] [employer]  just recently furloughed;  No  service     Family History   Problem Relation Age of Onset    Diabetes Mother     Sickle Cell Anemia Father     Diabetes Sister     Hypertension Sister Current Outpatient Medications   Medication Sig Dispense Refill    omeprazole (PRILOSEC) 20 mg capsule   1    levothyroxine (SYNTHROID) 100 mcg tablet Take 1 Tab by mouth every morning. 30 Tab 1    acetaminophen (TYLENOL) 325 mg tablet Take 2 Tabs by mouth every six (6) hours as needed for Pain. 30 Tab 1    b complex-vitamin c-folic acid (NEPHROCAPS) 1 mg capsule Take 1 Cap by mouth daily. 30 Cap 1    calcitRIOL (ROCALTROL) 0.25 mcg capsule Take 1 Cap by mouth every Monday, Wednesday, Friday. 30 Cap 0    ferrous gluconate 324 mg (37.5 mg iron) tablet Take 1 Tab by mouth two (2) times daily (with meals). 60 Tab 0    hydrALAZINE (APRESOLINE) 25 mg tablet Take 1 Tab by mouth every eight (8) hours as needed (systolic B/P >292). 30 Tab 0    lactulose (CHRONULAC) 10 gram/15 mL solution Take 15 mL by mouth two (2) times a day. 1 Bottle 0    liothyronine (CYTOMEL) 25 mcg tablet Take 1 Tab by mouth two (2) times a day. 60 Tab 0    amLODIPine (NORVASC) 10 mg tablet Take 10 mg by mouth daily.  Syringe, Disposable, (BD SYRINGE CATHETER TIP) 60 mL syrg Use 1 syringe daily with irrigations 30 Syringe 11    docusate sodium (COLACE) 100 mg capsule 100 mg two (2) times daily as needed.  ergocalciferol (DRISDOL) 50,000 unit capsule Take 50,000 Units by mouth every seven (7) days.  doxycycline (ADOXA) 100 mg tablet Take 100 mg by mouth daily.  insulin lispro (HUMALOG) 100 unit/mL injection Normal Insulin Sensitivity   For Blood Sugar (mg/dL) of:     Less than 150 =   0 units           150 -199 =   2 units  200 -249 =   4 units  250 -299 =   6 units  300 -349 =   8 units  350 and above =   10 units  If 2 glucose readings are above 200 mg/dL within a 24 hr period, proceed to \"Very Insul 1 Vial 0    tamsulosin (FLOMAX) 0.4 mg capsule Take 1 Cap by mouth daily. 30 Cap 0       Review of Systems  Constitutional: The patient has no acute distress or discomfort.   HEENT: The patient denies recent head trauma, eye pain, blurred vision,  hearing deficit, oropharyngeal mucosal pain or lesions, and the patient denies throat pain or discomfort. Lymphatics: The patient denies palpable peripheral lymphadenopathy. Hematologic: The patient denies having bruising, bleeding, or progressive fatigue. Respiratory: Patient denies having shortness of breath, cough, sputum production, fever, or dyspnea on exertion. Cardiovascular: The patient denies having leg pain, leg swelling, heart palpitations, chest permit, chest pain, or lightheadedness. The patient denies having dyspnea on exertion. Gastrointestinal: The patient denies having nausea, emesis, or diarrhea. The patient denies having any hematemesis or blood in the stool. Genitourinary: Patient denies having urinary urgency, frequency, or dysuria. The patient denies having blood in the urine. Psychological: The patient denies having symptoms of nervousness, anxiety, depression, or thoughts of harming self. Skin: Patient denies having skin rashes, skin, ulcerations, or unexplained itching or pruritus. Musculoskeletal: The patient denies having pain in the joints or bones. Objective:     Visit Vitals  /77   Pulse (!) 56   Temp 96.9 °F (36.1 °C) (Oral)   Resp 18   Ht 5' 11\" (1.803 m)   Wt 62.6 kg (138 lb)   SpO2 98%   BMI 19.25 kg/m²     ECOG PS=0  Physical Exam:   Gen. Appearance: The patient is in no acute distress. Skin: There is no bruise or rash. HEENT: The exam is unremarkable. Neck: Supple without lymphadenopathy or thyromegaly. Lungs: Clear to auscultation and percussion; there are no wheezes or rhonchi. Heart: Regular rate and rhythm; there are no murmurs, gallops, or rubs. Abdomen: Bowel sounds are present and normal.  There is no guarding, tenderness, or hepatosplenomegaly. Extremities: There is no clubbing, cyanosis, or edema. Neurologic: There are no focal neurologic deficits. Lymphatics:  There is no palpable peripheral lymphadenopathy. Musculoskeletal: The patient has been in his arms and hands. With the use of either a wheelchair for balance or a walker. There is no evidence of joint deformity or effusions. There is no focal joint tenderness. Psychological/psychiatric: There is no clinical evidence of anxiety, depression, or melancholy. Lab data:      Results for orders placed or performed during the hospital encounter of 08/19/19   CBC WITH 3 PART DIFF     Status: Abnormal   Result Value Ref Range Status    WBC 5.3 4.5 - 13.0 K/uL Final    RBC 3.16 (L) 4.10 - 5.10 M/uL Final    HGB 9.1 (L) 12.0 - 16 g/dL Final    HCT 28.1 (L) 36 - 48 % Final    MCV 88.9 78 - 102 FL Final    MCH 28.8 25.0 - 35.0 PG Final    MCHC 32.4 31 - 37 g/dL Final    RDW 16.2 (H) 11.5 - 14.5 % Final    PLATELET 84 (L) 444 - 440 K/uL Final    NEUTROPHILS 60 40 - 70 % Final    MIXED CELLS 9 0.1 - 17 % Final    LYMPHOCYTES 31 14 - 44 % Final    ABS. NEUTROPHILS 3.1 1.8 - 9.5 K/UL Final    ABS. MIXED CELLS 0.5 0.0 - 2.3 K/uL Final    ABS. LYMPHOCYTES 1.7 1.1 - 5.9 K/UL Final     Comment: Test performed at [address] [place] [address] or Outpatient Infusion Center Location. Reviewed by Medical Director. DF AUTOMATED   Final           Assessment:     1. Anemia associated with stage 3 chronic renal failure ([facility]    2. Chronic anemia    3. Thrombocytopenia ([facility]    4. Renal cell carcinoma of left kidney (HCC)        Plan:     Iron deficiency anemia and anemia due to chronic kidney disease: Today his CBC shows his hemoglobin is 9.1 g/dL with hematocrit of 28 point %. I explained to the patient that his anemia is due to chronic kidney disease therefore we recommend Retacrit every 2 weeks if his hemoglobin is below 10g/dL and hematocrit below 30%. He will receive the Retacrit here in the infusion center later today.     Thrombocytopenia  I have explained to patient that his CBC from today shows that his platelet counts is currently 84,000. We will recheck his blood counts again in about 8 weeks. No therapeutic intervention is warranted at this time. Follow-up in 8 weeks or sooner if indicated.    Orders Placed This Encounter    COMPLETE CBC & AUTO DIFF WBC    InHouse CBC ([facility])     Standing Status:   Future     Number of Occurrences:   1     Standing Expiration Date:   2/57/2149    METABOLIC PANEL, COMPREHENSIVE     Standing Status:   Future     Number of Occurrences:   1     Standing Expiration Date:   8/19/2020    IRON PROFILE     Standing Status:   Future     Number of Occurrences:   1     Standing Expiration Date:   8/19/2020    FERRITIN     Standing Status:   Future     Number of Occurrences:   1     Standing Expiration Date:   8/19/2020       Samara Arreguin MD  8/19/2019

## 2019-08-19 NOTE — PROGRESS NOTES
[facility] Progress Note    Date: 2019    Name: Edgard Lockwood    MRN: [id number]         : 1960     Retacrit      Mr. Regan Magaña to [facility], ambulatory at 1610. Blood drawn at pt's office visit Lam Wadsworth and patient sent over for Retacrit. Lab results were obtained and reviewed. Recent Results (from the past 12 hour(s))   CBC WITH 3 PART DIFF    Collection Time: 19  3:19 PM   Result Value Ref Range    WBC 5.3 4.5 - 13.0 K/uL    RBC 3.16 (L) 4.10 - 5.10 M/uL    HGB 9.1 (L) 12.0 - 16 g/dL    HCT 28.1 (L) 36 - 48 %    MCV 88.9 78 - 102 FL    MCH 28.8 25.0 - 35.0 PG    MCHC 32.4 31 - 37 g/dL    RDW 16.2 (H) 11.5 - 14.5 %    PLATELET 84 (L) 094 - 440 K/uL    NEUTROPHILS 60 40 - 70 %    MIXED CELLS 9 0.1 - 17 %    LYMPHOCYTES 31 14 - 44 %    ABS. NEUTROPHILS 3.1 1.8 - 9.5 K/UL    ABS. MIXED CELLS 0.5 0.0 - 2.3 K/uL    ABS. LYMPHOCYTES 1.7 1.1 - 5.9 K/UL    DF AUTOMATED        Retacrit 60,000 units administered SQ in two divided doses to the back of each arm. Band-aids applied. Mr. Regan Magaña tolerated well, and had no complaints. Patient armband removed and shredded. Mr. Regan Magaña was discharged from [place] [address] in stable condition at 1620. He is to return on 2019 at 1430 for his next appointment.     Aleksandra Siu RN  2019

## 2019-08-20 ENCOUNTER — HOSPITAL ENCOUNTER (OUTPATIENT)
Dept: INFUSION THERAPY | Age: 59
End: 2019-08-20
Payer: MEDICAID

## 2019-08-20 ENCOUNTER — HOSPITAL ENCOUNTER (OUTPATIENT)
Dept: ULTRASOUND IMAGING | Age: 59
Discharge: HOME | End: 2019-08-20
Attending: INTERNAL MEDICINE
Payer: MEDICAID

## 2019-08-20 LAB
A-G RATIO,AGRAT: 1.4 RATIO (ref 1.1–2.6)
ALBUMIN SERPL-MCNC: 3.9 G/DL (ref 3.5–5)
ALP SERPL-CCNC: 107 U/L (ref 40–125)
ALT SERPL-CCNC: 35 U/L (ref 5–40)
ANION GAP SERPL CALC-SCNC: 14 MMOL/L
AST SERPL W P-5'-P-CCNC: 31 U/L (ref 10–37)
BILIRUB SERPL-MCNC: 0.4 MG/DL (ref 0.2–1.2)
BUN SERPL-MCNC: 42 MG/DL (ref 6–22)
CALCIUM SERPL-MCNC: 9 MG/DL (ref 8.4–10.4)
CHLORIDE SERPL-SCNC: 111 MMOL/L (ref 98–110)
CO2 SERPL-SCNC: 19 MMOL/L (ref 20–32)
CREAT SERPL-MCNC: 2.6 MG/DL (ref 0.8–1.6)
FE % SATURATION,PSAT: 48 % (ref 20–50)
FERRITIN SERPL-MCNC: 168 NG/ML (ref 22–322)
GFRAA, 66117: 30.6
GFRNA, 66118: 25.2
GLOBULIN,GLOB: 2.8 G/DL (ref 2–4)
GLUCOSE SERPL-MCNC: 87 MG/DL (ref 70–99)
IRON,IRN: 122 MCG/DL (ref 45–160)
POTASSIUM SERPL-SCNC: 5.4 MMOL/L (ref 3.5–5.5)
PROT SERPL-MCNC: 6.7 G/DL (ref 6.2–8.1)
SODIUM SERPL-SCNC: 144 MMOL/L (ref 133–145)
TIBC,TIBC: 255 MCG/DL (ref 228–428)
UIBC SERPL-MCNC: 133 MCG/DL (ref 110–370)

## 2019-08-20 PROCEDURE — 76705 ECHO EXAM OF ABDOMEN: CPT

## 2019-08-27 ENCOUNTER — HOSPITAL ENCOUNTER (OUTPATIENT)
Dept: LAB | Age: 59
Discharge: HOME | End: 2019-08-27
Payer: MEDICAID

## 2019-08-27 ENCOUNTER — APPOINTMENT (OUTPATIENT)
Dept: INFUSION THERAPY | Age: 59
End: 2019-08-27
Payer: MEDICAID

## 2019-08-27 LAB
25(OH)D3 SERPL-MCNC: 44.3 NG/ML (ref 30–100)
25(OH)D3 SERPL-MCNC: 44.3 NG/ML (ref 30–100)
ALBUMIN SERPL-MCNC: 3.1 G/DL (ref 3.4–5)
ALBUMIN SERPL-MCNC: 3.2 G/DL (ref 3.4–5)
ALBUMIN SERPL-MCNC: 3.2 G/DL (ref 3.4–5)
ALBUMIN/GLOB SERPL: 0.8 {RATIO} (ref 0.8–1.7)
ALBUMIN/GLOB SERPL: 0.9 {RATIO} (ref 0.8–1.7)
ALBUMIN/GLOB SERPL: 0.9 {RATIO} (ref 0.8–1.7)
ALP SERPL-CCNC: 118 U/L (ref 45–117)
ALP SERPL-CCNC: 119 U/L (ref 45–117)
ALP SERPL-CCNC: 124 U/L (ref 45–117)
ALT SERPL-CCNC: 48 U/L (ref 16–61)
ANION GAP SERPL CALC-SCNC: 3 MMOL/L (ref 3–18)
ANION GAP SERPL CALC-SCNC: 6 MMOL/L (ref 3–18)
ANION GAP SERPL CALC-SCNC: 6 MMOL/L (ref 3–18)
AST SERPL-CCNC: 43 U/L (ref 10–38)
AST SERPL-CCNC: 44 U/L (ref 10–38)
AST SERPL-CCNC: 44 U/L (ref 10–38)
BASOPHILS # BLD: 0 K/UL (ref 0–0.1)
BASOPHILS NFR BLD: 0 % (ref 0–2)
BASOPHILS NFR BLD: 0 % (ref 0–2)
BASOPHILS NFR BLD: 1 % (ref 0–2)
BILIRUB SERPL-MCNC: 0.6 MG/DL (ref 0.2–1)
BILIRUB SERPL-MCNC: 0.6 MG/DL (ref 0.2–1)
BILIRUB SERPL-MCNC: 0.7 MG/DL (ref 0.2–1)
BUN SERPL-MCNC: 40 MG/DL (ref 7–18)
BUN SERPL-MCNC: 41 MG/DL (ref 7–18)
BUN SERPL-MCNC: 41 MG/DL (ref 7–18)
BUN/CREAT SERPL: 15 (ref 12–20)
CALCIUM SERPL-MCNC: 8.7 MG/DL (ref 8.5–10.1)
CALCIUM SERPL-MCNC: 8.8 MG/DL (ref 8.5–10.1)
CALCIUM SERPL-MCNC: 8.8 MG/DL (ref 8.5–10.1)
CALCIUM SERPL-MCNC: 8.9 MG/DL (ref 8.5–10.1)
CHLORIDE SERPL-SCNC: 116 MMOL/L (ref 100–111)
CHLORIDE SERPL-SCNC: 117 MMOL/L (ref 100–111)
CHLORIDE SERPL-SCNC: 117 MMOL/L (ref 100–111)
CHOLEST SERPL-MCNC: 209 MG/DL
CO2 SERPL-SCNC: 25 MMOL/L (ref 21–32)
CO2 SERPL-SCNC: 26 MMOL/L (ref 21–32)
CO2 SERPL-SCNC: 27 MMOL/L (ref 21–32)
CREAT SERPL-MCNC: 2.69 MG/DL (ref 0.6–1.3)
CREAT SERPL-MCNC: 2.7 MG/DL (ref 0.6–1.3)
CREAT SERPL-MCNC: 2.71 MG/DL (ref 0.6–1.3)
CREAT UR-MCNC: 84 MG/DL (ref 30–125)
DIFFERENTIAL METHOD BLD: ABNORMAL
EOSINOPHIL # BLD: 0.1 K/UL (ref 0–0.4)
EOSINOPHIL NFR BLD: 2 % (ref 0–5)
EOSINOPHIL NFR BLD: 3 % (ref 0–5)
EOSINOPHIL NFR BLD: 3 % (ref 0–5)
ERYTHROCYTE [DISTWIDTH] IN BLOOD BY AUTOMATED COUNT: 17.9 % (ref 11.6–14.5)
ERYTHROCYTE [DISTWIDTH] IN BLOOD BY AUTOMATED COUNT: 17.9 % (ref 11.6–14.5)
ERYTHROCYTE [DISTWIDTH] IN BLOOD BY AUTOMATED COUNT: 18 % (ref 11.6–14.5)
GLOBULIN SER CALC-MCNC: 3.5 G/DL (ref 2–4)
GLOBULIN SER CALC-MCNC: 3.7 G/DL (ref 2–4)
GLOBULIN SER CALC-MCNC: 3.7 G/DL (ref 2–4)
GLUCOSE SERPL-MCNC: 82 MG/DL (ref 74–99)
GLUCOSE SERPL-MCNC: 84 MG/DL (ref 74–99)
GLUCOSE SERPL-MCNC: 86 MG/DL (ref 74–99)
HBA1C MFR BLD: 5.4 % (ref 4.2–5.6)
HBA1C MFR BLD: 5.4 % (ref 4.2–5.6)
HBV SURFACE AG SER QL: 0.8 INDEX
HBV SURFACE AG SER QL: NEGATIVE
HCT VFR BLD AUTO: 29.4 % (ref 36–48)
HCT VFR BLD AUTO: 29.6 % (ref 36–48)
HCT VFR BLD AUTO: 30.1 % (ref 36–48)
HDLC SERPL-MCNC: 81 MG/DL (ref 40–60)
HDLC SERPL: 2.6 {RATIO} (ref 0–5)
HGB BLD-MCNC: 9.6 G/DL (ref 13–16)
HGB BLD-MCNC: 9.6 G/DL (ref 13–16)
HGB BLD-MCNC: 9.7 G/DL (ref 13–16)
INR PPP: 1 (ref 0.8–1.2)
LDLC SERPL CALC-MCNC: 108.6 MG/DL (ref 0–100)
LIPID PROFILE,FLP: ABNORMAL
LYMPHOCYTES # BLD: 1.6 K/UL (ref 0.9–3.6)
LYMPHOCYTES # BLD: 1.6 K/UL (ref 0.9–3.6)
LYMPHOCYTES # BLD: 1.7 K/UL (ref 0.9–3.6)
LYMPHOCYTES NFR BLD: 39 % (ref 21–52)
LYMPHOCYTES NFR BLD: 41 % (ref 21–52)
LYMPHOCYTES NFR BLD: 41 % (ref 21–52)
MAGNESIUM SERPL-MCNC: 1.8 MG/DL (ref 1.6–2.6)
MCH RBC QN AUTO: 28.2 PG (ref 24–34)
MCH RBC QN AUTO: 28.7 PG (ref 24–34)
MCH RBC QN AUTO: 28.8 PG (ref 24–34)
MCHC RBC AUTO-ENTMCNC: 31.9 G/DL (ref 31–37)
MCHC RBC AUTO-ENTMCNC: 32.7 G/DL (ref 31–37)
MCHC RBC AUTO-ENTMCNC: 32.8 G/DL (ref 31–37)
MCV RBC AUTO: 87.8 FL (ref 74–97)
MCV RBC AUTO: 87.8 FL (ref 74–97)
MCV RBC AUTO: 88.3 FL (ref 74–97)
MICROALBUMIN UR-MCNC: 315 MG/DL (ref 0–3)
MICROALBUMIN/CREAT UR-RTO: 3750 MG/G (ref 0–30)
MONOCYTES # BLD: 0.4 K/UL (ref 0.05–1.2)
MONOCYTES NFR BLD: 10 % (ref 3–10)
NEUTS SEG # BLD: 1.8 K/UL (ref 1.8–8)
NEUTS SEG # BLD: 1.9 K/UL (ref 1.8–8)
NEUTS SEG # BLD: 1.9 K/UL (ref 1.8–8)
NEUTS SEG NFR BLD: 46 % (ref 40–73)
NEUTS SEG NFR BLD: 47 % (ref 40–73)
NEUTS SEG NFR BLD: 47 % (ref 40–73)
PHOSPHATE SERPL-MCNC: 4.2 MG/DL (ref 2.5–4.9)
PLATELET # BLD AUTO: 124 K/UL (ref 135–420)
PLATELET # BLD AUTO: 129 K/UL (ref 135–420)
PLATELET # BLD AUTO: 131 K/UL (ref 135–420)
PMV BLD AUTO: 10.7 FL (ref 9.2–11.8)
PMV BLD AUTO: 11.5 FL (ref 9.2–11.8)
PMV BLD AUTO: 11.7 FL (ref 9.2–11.8)
POTASSIUM SERPL-SCNC: 4.6 MMOL/L (ref 3.5–5.5)
POTASSIUM SERPL-SCNC: 4.7 MMOL/L (ref 3.5–5.5)
POTASSIUM SERPL-SCNC: 4.7 MMOL/L (ref 3.5–5.5)
PROT SERPL-MCNC: 6.7 G/DL (ref 6.4–8.2)
PROT SERPL-MCNC: 6.8 G/DL (ref 6.4–8.2)
PROT SERPL-MCNC: 6.9 G/DL (ref 6.4–8.2)
PROTHROMBIN TIME: 12.8 SEC (ref 11.5–15.2)
PTH-INTACT SERPL-MCNC: 151.5 PG/ML (ref 18.4–88)
RBC # BLD AUTO: 3.35 M/UL (ref 4.7–5.5)
RBC # BLD AUTO: 3.37 M/UL (ref 4.7–5.5)
RBC # BLD AUTO: 3.41 M/UL (ref 4.7–5.5)
SODIUM SERPL-SCNC: 147 MMOL/L (ref 136–145)
SODIUM SERPL-SCNC: 147 MMOL/L (ref 136–145)
SODIUM SERPL-SCNC: 149 MMOL/L (ref 136–145)
TIBC SERPL-MCNC: 297 UG/DL (ref 250–450)
TRIGL SERPL-MCNC: 97 MG/DL (ref ?–150)
TSH SERPL DL<=0.05 MIU/L-ACNC: 0.16 UIU/ML (ref 0.36–3.74)
URATE SERPL-MCNC: 6.9 MG/DL (ref 2.6–7.2)
VLDLC SERPL CALC-MCNC: 19.4 MG/DL
WBC # BLD AUTO: 3.9 K/UL (ref 4.6–13.2)
WBC # BLD AUTO: 4 K/UL (ref 4.6–13.2)
WBC # BLD AUTO: 4.2 K/UL (ref 4.6–13.2)

## 2019-08-27 PROCEDURE — 84100 ASSAY OF PHOSPHORUS: CPT

## 2019-08-27 PROCEDURE — 82306 VITAMIN D 25 HYDROXY: CPT

## 2019-08-27 PROCEDURE — 80053 COMPREHEN METABOLIC PANEL: CPT

## 2019-08-27 PROCEDURE — 87517 HEPATITIS B DNA QUANT: CPT

## 2019-08-27 PROCEDURE — 83970 ASSAY OF PARATHORMONE: CPT

## 2019-08-27 PROCEDURE — 80061 LIPID PANEL: CPT

## 2019-08-27 PROCEDURE — 85610 PROTHROMBIN TIME: CPT

## 2019-08-27 PROCEDURE — 83036 HEMOGLOBIN GLYCOSYLATED A1C: CPT

## 2019-08-27 PROCEDURE — 82172 ASSAY OF APOLIPOPROTEIN: CPT

## 2019-08-27 PROCEDURE — 82784 ASSAY IGA/IGD/IGG/IGM EACH: CPT

## 2019-08-27 PROCEDURE — 85025 COMPLETE CBC W/AUTO DIFF WBC: CPT

## 2019-08-27 PROCEDURE — 83550 IRON BINDING TEST: CPT

## 2019-08-27 PROCEDURE — 84550 ASSAY OF BLOOD/URIC ACID: CPT

## 2019-08-27 PROCEDURE — 84443 ASSAY THYROID STIM HORMONE: CPT

## 2019-08-27 PROCEDURE — 82105 ALPHA-FETOPROTEIN SERUM: CPT

## 2019-08-27 PROCEDURE — 87340 HEPATITIS B SURFACE AG IA: CPT

## 2019-08-27 PROCEDURE — 36415 COLL VENOUS BLD VENIPUNCTURE: CPT

## 2019-08-27 PROCEDURE — 82043 UR ALBUMIN QUANTITATIVE: CPT

## 2019-08-27 PROCEDURE — 83735 ASSAY OF MAGNESIUM: CPT

## 2019-08-28 LAB — AFP-TM SERPL-MCNC: <0.9 NG/ML (ref 0–8.3)

## 2019-08-29 LAB
APO B SERPL-MCNC: 80 MG/DL
CHOLEST SERPL-MCNC: 209 MG/DL (ref 100–199)
HBV DNA SERPL NAA+PROBE-ACNC: NORMAL IU/ML
HBV DNA SERPL NAA+PROBE-LOG IU: NORMAL LOG10 IU/ML
HDLC SERPL-MCNC: 76 MG/DL
IGA SERPL-MCNC: 272 MG/DL (ref 61–437)
IGG SERPL-MCNC: 1351 MG/DL (ref 700–1600)
IGM SERPL-MCNC: 71 MG/DL (ref 20–172)
LDLC SERPL CALC-MCNC: 115 MG/DL (ref 0–99)
LDLC/HDLC SERPL: 1.5 RATIO (ref 0–3.6)
NONHDLC SERPL-MCNC: 133 MG/DL (ref 0–129)
PROT PATTERN SERPL IFE-IMP: NORMAL
TEST INFORMATION: NORMAL
TRIGL SERPL-MCNC: 88 MG/DL (ref 0–149)

## 2019-08-29 NOTE — PROGRESS NOTES
Rocio Zamora presents today for a 4 month check-up. He is a 25-year-old -American male with history of a renal mass, renal cell carcinoma of the left kidney, diabetes, chronic drug abuse, and hypertension. He is status post partial left nephrectomy. He also has history of C3 through C7 discitis with osteomyelitis, L4-5 phlegmon in April 2017 status post C3 through 77 laminectomy with posterior lateral fusion and hardware on April 29, 2017 for rapid onset quadriplegia from discitis/osteomyelitis and history of MRSA bacteremia and abscess cultures from the spine. He presented to the hospital on 3/31/18, with complaints of progressive and worsening lower extremity edema. A week prior to being admitted, he was treated for C. difficile. He is a resident at a skilled nursing facility ([facility] [employer]) and states that he had reported the increasing edema to the nursing staff. Upon arrival to the emergency room, his NT proBNP was only noted to be 562 which was within the normal limits. His hemoglobin was 7.7 and hematocrit 22.9, BUN and creatinine were elevated at 68 and 1.78, respectively. It was suspected that he was dehydrated and was given IV fluids and his renal function did improve. During his hospital stay, he was also noted to be bradycardic and his beta-blocker was discontinued. With discontinuation of the beta-blocker, his blood pressure came elevated and amlodipine was added for blood pressure control. His ACE inhibitor was held due to acute kidney injury. We were consulted for that reason. He underwent an echocardiogram and it showed ejection fraction of 60% and no obvious wall motion abnormalities. His RVSP was noted to be 42 mmHg. He had noninvasive vascular studies done to rule out DVT in his lower and upper extremities and both were negative.   Due to the acute kidney injury, Dr. Stephen Ivy (nephrologist) was consulted and it was first believed that the kidney injury was due to NSAID use however, because of the anemia and thrombocytopenia; further workup was ordered. 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 There was also some concern for multiple myeloma with light chain induced cast nephropathy. 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 He underwent renal biopsy on April 9, 2018 (nephrology to follow). His thrombocytopenia spontaneously resolved and was HIT panel negative. He underwent bone marrow biopsy on April 6, 2018 (hematology/oncology to follow). Hedenies chest pain, tightness, heaviness, and palpitations. Denies shortness of breath at rest, dyspnea on exertion, orthopnea and PND. Denies abdominal bloating. Denies lightheadedness, dizziness, and syncope. Denies lower extremity edema and claudication. Denies nausea, vomiting, diarrhea, melena, hematochezia. Denies hematuria, urgency, frequency. Denies fever, chills. He states that he saw his primary care physician as well as his nephrologist today. He currently complains of a headache. He states that his blood pressure today at Dr. Cindra Goldberg office was 138/74.       PMH:  Past Medical History:   Diagnosis Date    Anemia     Bradycardia, unspecified 2018    Cervical discitis     MRSA    Chronic drug abuse ([facility] 1974    Chronic kidney disease     stage III    Diabetes ([facility] 01/1990    Drug abuse ([facility]     Encephalopathy     GERD (gastroesophageal reflux disease)     Hypertension     Muscle weakness     Renal cell carcinoma of left kidney ([facility] 12/17/13    Pathological Stage [id number] cc RCC FG3 s/p left open partial nephrectomy in 12/13      Sepsis due to methicillin resistant Staphylococcus aureus (HCC)     Suprapubic catheter (HCC)     Thrombocytopenia (HCC)     Urethral erosion     Viral hepatitis C     Xerosis cutis PSH:  Past Surgical History:   Procedure Laterality Date    HX CIRCUMCISION  12/17/13    [facility] Al[facility]f [facility], [facility]    HX NEPHRECTOMY Left 12/17/13    Open/ Partial,nephrectomy    HX OTHER SURGICAL Right 2/27/2016    removed right ft  2nd meta-tarsal    HX OTHER SURGICAL  04/2017    abscess removed from back of neck     NM REMOVAL WITH INSERTION OF SUPRAPUBIC CATHETER  2018       MEDS:  Current Outpatient Medications   Medication Sig    levothyroxine (SYNTHROID) 100 mcg tablet Take 1 Tab by mouth every morning.  b complex-vitamin c-folic acid (NEPHROCAPS) 1 mg capsule Take 1 Cap by mouth daily.  calcitRIOL (ROCALTROL) 0.25 mcg capsule Take 1 Cap by mouth every Monday, Wednesday, Friday.  ferrous gluconate 324 mg (37.5 mg iron) tablet Take 1 Tab by mouth two (2) times daily (with meals).  hydrALAZINE (APRESOLINE) 25 mg tablet Take 1 Tab by mouth every eight (8) hours as needed (systolic B/P >460).  lactulose (CHRONULAC) 10 gram/15 mL solution Take 15 mL by mouth two (2) times a day.  liothyronine (CYTOMEL) 25 mcg tablet Take 1 Tab by mouth two (2) times a day.  Syringe, Disposable, (BD SYRINGE CATHETER TIP) 60 mL syrg Use 1 syringe daily with irrigations    docusate sodium (COLACE) 100 mg capsule 100 mg two (2) times daily as needed.  ergocalciferol (DRISDOL) 50,000 unit capsule Take 50,000 Units by mouth every seven (7) days.  doxycycline (ADOXA) 100 mg tablet Take 100 mg by mouth daily.  insulin lispro (HUMALOG) 100 unit/mL injection Normal Insulin Sensitivity   For Blood Sugar (mg/dL) of:     Less than 150 =   0 units           150 -199 =   2 units  200 -249 =   4 units  250 -299 =   6 units  300 -349 =   8 units  350 and above =   10 units  If 2 glucose readings are above 200 mg/dL within a 24 hr period, proceed to \"Very Insul    omeprazole (PRILOSEC) 20 mg capsule     tamsulosin (FLOMAX) 0.4 mg capsule Take 1 Cap by mouth daily.      No current facility-administered medications for this visit. Allergies and Sensitivities:  Allergies   Allergen Reactions    Iodine Hives       Family History:  Family History   Problem Relation Age of Onset    Diabetes Mother     Sickle Cell Anemia Father     Diabetes Sister     Hypertension Sister        Social History:  He  reports that he has been smoking cigarettes. He has a 7.50 pack-year smoking history. He has never used smokeless tobacco.  He  reports that he drinks alcohol. Physical:  Visit Vitals  /90   Pulse (!) 53   Ht 5' 11\" (1.803 m)   Wt 64.4 kg (142 lb)   BMI 19.80 kg/m²       His weight is down 34 pounds since his last appointment. Exam:  Neck:  Supple, no JVD, no carotid bruits  CV:  Normal S1 and  S2, grade I/VI AUTUMN noted, no rubs, or gallops noted  Lungs:  Clear to ausculation throughout, no wheezes or rales  Abd:  Soft, non-tender, non-distended with good bowel sounds. No hepatosplenomegaly  Extremities:  Trace lower extremity edema      Data:  EKG:     Read by Dean Pyle MD - Sinus bradycardia.  Voltage criteria for LVH.  Nonspecific T-abnormality. LABS:  Lab Results   Component Value Date/Time    Sodium 147 (H) 08/27/2019 09:58 AM    Potassium 4.7 08/27/2019 09:58 AM    Chloride 117 (H) 08/27/2019 09:58 AM    CO2 27 08/27/2019 09:58 AM    Glucose 84 08/27/2019 09:58 AM    BUN 41 (H) 08/27/2019 09:58 AM    Creatinine 2.70 (H) 08/27/2019 09:58 AM     Lab Results   Component Value Date/Time    Cholesterol, total 209 (H) 08/27/2019 09:52 AM    HDL Cholesterol 81 (H) 08/27/2019 09:52 AM    LDL, calculated 108.6 (H) 08/27/2019 09:52 AM    Triglyceride 97 08/27/2019 09:52 AM    CHOL/HDL Ratio 2.6 08/27/2019 09:52 AM     Lab Results   Component Value Date/Time    ALT (SGPT) 48 08/27/2019 09:58 AM         Impression/Plan:  1. Asymptomatic bradycardia, no longer on beta blocker  2. Hypertension, blood pressure   3. Chronic kidney disease, stage III  4. Anemia  5.   History of drug abuse  6. Hepatitis C  7. History of cervical disc disease  8. History of renal mass and left renal cell carcinoma, status post partial left nephrectomy  9. Diabetes mellitus, recommend Hgb A1c less than 7% from cardiac standpoint    Mr. Anila Ernst was seen today for a 4 month check-up. Cardiac wise, he offers no complaints. He denies any chest pain, shortness of breath, or palpitations. He states that he saw both his primary care physician and his nephrologist today and his blood pressure reading was 138/74. His blood pressure reading here in our office was 184/90 and this was rechecked twice. He states that he has a headache and he is tired from all of his appointments today. He states that his nephrologist and primary care physician have been managing his blood pressure. No adjustments were made to his medication regimen as it appears that his blood pressure, for the most part, is well controlled when outside of our office. We will continue to defer management of his blood pressure to his nephrologist and/or primary care physician. He will be scheduled to follow-up with Dr. Samara Mackay as scheduled and as needed. Kait David MSN, FNP-BC    Please note:  Portions of this chart were created with Dragon medical speech to text program.  Unrecognized errors may be present.

## 2019-08-30 LAB
25(OH)D2 SERPL-MCNC: 41 NG/ML
25(OH)D3 SERPL-MCNC: 5.7 NG/ML
25(OH)D3+25(OH)D2 SERPL-MCNC: 47 NG/ML

## 2019-09-03 ENCOUNTER — HOSPITAL ENCOUNTER (OUTPATIENT)
Dept: INFUSION THERAPY | Age: 59
Discharge: HOME | End: 2019-09-03
Payer: MEDICAID

## 2019-09-03 VITALS
TEMPERATURE: 97.9 F | SYSTOLIC BLOOD PRESSURE: 169 MMHG | RESPIRATION RATE: 18 BRPM | HEART RATE: 60 BPM | OXYGEN SATURATION: 98 % | DIASTOLIC BLOOD PRESSURE: 84 MMHG

## 2019-09-03 LAB
BASO+EOS+MONOS # BLD AUTO: 0.5 K/UL (ref 0–2.3)
BASO+EOS+MONOS NFR BLD AUTO: 11 % (ref 0.1–17)
DIFFERENTIAL METHOD BLD: ABNORMAL
ERYTHROCYTE [DISTWIDTH] IN BLOOD BY AUTOMATED COUNT: 18.6 % (ref 11.5–14.5)
HCT VFR BLD AUTO: 30.3 % (ref 36–48)
HGB BLD-MCNC: 9.5 G/DL (ref 12–16)
LYMPHOCYTES # BLD: 1.6 K/UL (ref 1.1–5.9)
LYMPHOCYTES NFR BLD: 35 % (ref 14–44)
MCH RBC QN AUTO: 28.5 PG (ref 25–35)
MCHC RBC AUTO-ENTMCNC: 31.4 G/DL (ref 31–37)
MCV RBC AUTO: 91 FL (ref 78–102)
NEUTS SEG # BLD: 2.5 K/UL (ref 1.8–9.5)
NEUTS SEG NFR BLD: 54 % (ref 40–70)
PLATELET # BLD AUTO: 78 K/UL (ref 140–440)
RBC # BLD AUTO: 3.33 M/UL (ref 4.1–5.1)
WBC # BLD AUTO: 4.6 K/UL (ref 4.5–13)

## 2019-09-03 PROCEDURE — 85025 COMPLETE CBC W/AUTO DIFF WBC: CPT

## 2019-09-03 PROCEDURE — 36415 COLL VENOUS BLD VENIPUNCTURE: CPT

## 2019-09-03 NOTE — PROGRESS NOTES
[facility] Progress Note    Date: September 3, 2019    Name: Markell Adamson    MRN: [id number]         : 1960     Retacrit      Mr. Kelly Dinh to [facility], ambulatory at 1440. Pt was assessed and education was provided. Mr. Omar Meneses vitals were reviewed and patient was observed for 5 minutes prior to treatment. Blood [facility]ally [facility] and sent to lab for CBC per written orders. No bleeding or hematoma noted at site. Gauze and coban applied. Lab results were obtained and reviewed. Recent Results (from the past 12 hour(s))   CBC WITH 3 PART DIFF    Collection Time: 19  2:49 PM   Result Value Ref Range    WBC 4.6 4.5 - 13.0 K/uL    RBC 3.33 (L) 4.10 - 5.10 M/uL    HGB 9.5 (L) 12.0 - 16 g/dL    HCT 30.3 (L) 36 - 48 %    MCV 91.0 78 - 102 FL    MCH 28.5 25.0 - 35.0 PG    MCHC 31.4 31 - 37 g/dL    RDW 18.6 (H) 11.5 - 14.5 %    PLATELET 78 (L) 199 - 440 K/uL    NEUTROPHILS 54 40 - 70 %    MIXED CELLS 11 0.1 - 17 %    LYMPHOCYTES 35 14 - 44 %    ABS. NEUTROPHILS 2.5 1.8 - 9.5 K/UL    ABS. MIXED CELLS 0.5 0.0 - 2.3 K/uL    ABS. LYMPHOCYTES 1.6 1.1 - 5.9 K/UL    DF AUTOMATED        Results within parameters to HOLD retacrit today. Mr. Kelly Dinh tolerated well, and had no complaints. Patient armband removed and shredded. Mr. Kelly Dinh was discharged from [place] [address] in stable condition at 1455. He is to return on 2019 at 1330 for his next appointment.     Meme Story RN  September 3, 2019  1522

## 2019-09-05 ENCOUNTER — OFFICE VISIT (OUTPATIENT)
Dept: CARDIOLOGY CLINIC | Age: 59
End: 2019-09-05

## 2019-09-05 VITALS
HEART RATE: 53 BPM | SYSTOLIC BLOOD PRESSURE: 184 MMHG | BODY MASS INDEX: 19.88 KG/M2 | HEIGHT: 71 IN | WEIGHT: 142 LBS | DIASTOLIC BLOOD PRESSURE: 90 MMHG

## 2019-09-05 DIAGNOSIS — R00.1 BRADYCARDIA, SINUS: Primary | ICD-10-CM

## 2019-09-05 DIAGNOSIS — E11.21 TYPE 2 DIABETES WITH NEPHROPATHY (HCC): ICD-10-CM

## 2019-09-05 DIAGNOSIS — I10 ESSENTIAL HYPERTENSION: ICD-10-CM

## 2019-09-24 ENCOUNTER — HOSPITAL ENCOUNTER (OUTPATIENT)
Dept: INFUSION THERAPY | Age: 59
Discharge: HOME | End: 2019-09-24
Payer: MEDICAID

## 2019-09-24 VITALS
SYSTOLIC BLOOD PRESSURE: 153 MMHG | TEMPERATURE: 97.7 F | RESPIRATION RATE: 18 BRPM | HEART RATE: 61 BPM | OXYGEN SATURATION: 98 % | DIASTOLIC BLOOD PRESSURE: 82 MMHG

## 2019-09-24 LAB
BASO+EOS+MONOS # BLD AUTO: 0.5 K/UL (ref 0–2.3)
BASO+EOS+MONOS NFR BLD AUTO: 9 % (ref 0.1–17)
DIFFERENTIAL METHOD BLD: ABNORMAL
ERYTHROCYTE [DISTWIDTH] IN BLOOD BY AUTOMATED COUNT: 18 % (ref 11.5–14.5)
HCT VFR BLD AUTO: 29.4 % (ref 36–48)
HGB BLD-MCNC: 9.3 G/DL (ref 12–16)
LYMPHOCYTES # BLD: 2 K/UL (ref 1.1–5.9)
LYMPHOCYTES NFR BLD: 38 % (ref 14–44)
MCH RBC QN AUTO: 28.4 PG (ref 25–35)
MCHC RBC AUTO-ENTMCNC: 31.6 G/DL (ref 31–37)
MCV RBC AUTO: 89.6 FL (ref 78–102)
NEUTS SEG # BLD: 2.7 K/UL (ref 1.8–9.5)
NEUTS SEG NFR BLD: 53 % (ref 40–70)
PLATELET # BLD AUTO: 65 K/UL (ref 140–440)
RBC # BLD AUTO: 3.28 M/UL (ref 4.1–5.1)
WBC # BLD AUTO: 5.2 K/UL (ref 4.5–13)

## 2019-09-24 PROCEDURE — 74011250636 HC RX REV CODE- 250/636: Performed by: INTERNAL MEDICINE

## 2019-09-24 PROCEDURE — 85025 COMPLETE CBC W/AUTO DIFF WBC: CPT

## 2019-09-24 PROCEDURE — 96372 THER/PROPH/DIAG INJ SC/IM: CPT

## 2019-09-24 PROCEDURE — 36415 COLL VENOUS BLD VENIPUNCTURE: CPT

## 2019-09-24 RX ADMIN — EPOETIN ALFA-EPBX 60000 UNITS: 10000 INJECTION, SOLUTION INTRAVENOUS; SUBCUTANEOUS at 13:56

## 2019-09-24 NOTE — PROGRESS NOTES
[facility] Progress Note    Date: 2019    Name: Alba Lloyd    MRN: [id number]         : 1960      Mr. Shira Chaves arrived to [facility] at 12. Pt is here today for q2 week CBC/ Retacrit. Mr. Shira Chaves was assessed and education was provided. Mr. Anni Jefferson vitals were reviewed. Visit Vitals  /82 (BP 1 Location: Left arm, BP Patient Position: Sitting)   Pulse 61   Temp 97.7 °F (36.5 °C)   Resp 18   SpO2 98%       Pt was observed for 5 minutes after obtaining vital signs prior to initiating treatment. Blood drawn for labs via left posterior mid forearm x1 attempt. Gauze/ plastic tape to site. Lab results were obtained and reviewed. Recent Results (from the past 12 hour(s))   CBC WITH 3 PART DIFF    Collection Time: 19  1:37 PM   Result Value Ref Range    WBC 5.2 4.5 - 13.0 K/uL    RBC 3.28 (L) 4.10 - 5.10 M/uL    HGB 9.3 (L) 12.0 - 16 g/dL    HCT 29.4 (L) 36 - 48 %    MCV 89.6 78 - 102 FL    MCH 28.4 25.0 - 35.0 PG    MCHC 31.6 31 - 37 g/dL    RDW 18.0 (H) 11.5 - 14.5 %    PLATELET 65 (L) 511 - 440 K/uL    NEUTROPHILS 53 40 - 70 %    MIXED CELLS 9 0.1 - 17 %    LYMPHOCYTES 38 14 - 44 %    ABS. NEUTROPHILS 2.7 1.8 - 9.5 K/UL    ABS. MIXED CELLS 0.5 0.0 - 2.3 K/uL    ABS. LYMPHOCYTES 2.0 1.1 - 5.9 K/UL    DF AUTOMATED         Retacrit [id number] was administered as ordered SQ in patient's left upper arm (in 2 divided injections). Band-aids to sites. Mr. Shira Chaves tolerated well without complaints. Mr. Shira Chaves was discharged from [place] [address] in stable condition at 1400. He is to return on 10/8/19 at 1330 for his next appointment.     Ania Velez RN  2019

## 2019-09-30 RX ORDER — FOLIC ACID/VIT B COMPLEX AND C 0.8 MG
0.8 TABLET ORAL DAILY
Refills: 2 | COMMUNITY
Start: 2019-08-15 | End: 2020-02-25

## 2019-10-01 ENCOUNTER — APPOINTMENT (OUTPATIENT)
Dept: INFUSION THERAPY | Age: 59
End: 2019-10-01
Payer: MEDICAID

## 2019-10-02 ENCOUNTER — ANESTHESIA EVENT (OUTPATIENT)
Dept: ENDOSCOPY | Age: 59
End: 2019-10-02
Payer: MEDICAID

## 2019-10-03 ENCOUNTER — ANESTHESIA (OUTPATIENT)
Dept: ENDOSCOPY | Age: 59
End: 2019-10-03
Payer: MEDICAID

## 2019-10-03 ENCOUNTER — HOSPITAL ENCOUNTER (OUTPATIENT)
Age: 59
Setting detail: OUTPATIENT SURGERY
Discharge: HOME | End: 2019-10-03
Attending: INTERNAL MEDICINE | Admitting: INTERNAL MEDICINE
Payer: MEDICAID

## 2019-10-03 VITALS
SYSTOLIC BLOOD PRESSURE: 160 MMHG | WEIGHT: 139 LBS | DIASTOLIC BLOOD PRESSURE: 87 MMHG | TEMPERATURE: 94.1 F | HEIGHT: 72 IN | OXYGEN SATURATION: 100 % | HEART RATE: 53 BPM | RESPIRATION RATE: 13 BRPM | BODY MASS INDEX: 18.83 KG/M2

## 2019-10-03 LAB
GLUCOSE BLD STRIP.AUTO-MCNC: 81 MG/DL (ref 70–110)
GLUCOSE BLD STRIP.AUTO-MCNC: 83 MG/DL (ref 70–110)

## 2019-10-03 PROCEDURE — 76040000007: Performed by: INTERNAL MEDICINE

## 2019-10-03 PROCEDURE — 77030018846 HC SOL IRR STRL H20 ICUM -A: Performed by: INTERNAL MEDICINE

## 2019-10-03 PROCEDURE — 82962 GLUCOSE BLOOD TEST: CPT

## 2019-10-03 PROCEDURE — 74011250636 HC RX REV CODE- 250/636: Performed by: NURSE ANESTHETIST, CERTIFIED REGISTERED

## 2019-10-03 PROCEDURE — 77030013992 HC SNR POLYP ENDOSC BSC -B: Performed by: INTERNAL MEDICINE

## 2019-10-03 PROCEDURE — 74011250636 HC RX REV CODE- 250/636

## 2019-10-03 PROCEDURE — 74011000250 HC RX REV CODE- 250

## 2019-10-03 PROCEDURE — 77030008565 HC TBNG SUC IRR ERBE -B: Performed by: INTERNAL MEDICINE

## 2019-10-03 PROCEDURE — 74011000250 HC RX REV CODE- 250: Performed by: NURSE ANESTHETIST, CERTIFIED REGISTERED

## 2019-10-03 PROCEDURE — 88305 TISSUE EXAM BY PATHOLOGIST: CPT

## 2019-10-03 PROCEDURE — 76060000032 HC ANESTHESIA 0.5 TO 1 HR: Performed by: INTERNAL MEDICINE

## 2019-10-03 RX ORDER — SODIUM CHLORIDE 0.9 % (FLUSH) 0.9 %
5-40 SYRINGE (ML) INJECTION AS NEEDED
Status: DISCONTINUED | OUTPATIENT
Start: 2019-10-03 | End: 2019-10-03 | Stop reason: HOSPADM

## 2019-10-03 RX ORDER — DEXTROSE 50 % IN WATER (D50W) INTRAVENOUS SYRINGE
25-50 AS NEEDED
Status: CANCELLED | OUTPATIENT
Start: 2019-10-03

## 2019-10-03 RX ORDER — SODIUM CHLORIDE 0.9 % (FLUSH) 0.9 %
5-40 SYRINGE (ML) INJECTION EVERY 8 HOURS
Status: CANCELLED | OUTPATIENT
Start: 2019-10-03

## 2019-10-03 RX ORDER — INSULIN LISPRO 100 [IU]/ML
INJECTION, SOLUTION INTRAVENOUS; SUBCUTANEOUS ONCE
Status: DISCONTINUED | OUTPATIENT
Start: 2019-10-03 | End: 2019-10-03 | Stop reason: HOSPADM

## 2019-10-03 RX ORDER — LIDOCAINE HYDROCHLORIDE 20 MG/ML
INJECTION, SOLUTION EPIDURAL; INFILTRATION; INTRACAUDAL; PERINEURAL AS NEEDED
Status: DISCONTINUED | OUTPATIENT
Start: 2019-10-03 | End: 2019-10-03 | Stop reason: HOSPADM

## 2019-10-03 RX ORDER — INSULIN LISPRO 100 [IU]/ML
INJECTION, SOLUTION INTRAVENOUS; SUBCUTANEOUS ONCE
Status: CANCELLED | OUTPATIENT
Start: 2019-10-03 | End: 2019-10-03

## 2019-10-03 RX ORDER — PROPOFOL 10 MG/ML
INJECTION, EMULSION INTRAVENOUS AS NEEDED
Status: DISCONTINUED | OUTPATIENT
Start: 2019-10-03 | End: 2019-10-03 | Stop reason: HOSPADM

## 2019-10-03 RX ORDER — SODIUM CHLORIDE 0.9 % (FLUSH) 0.9 %
5-40 SYRINGE (ML) INJECTION EVERY 8 HOURS
Status: DISCONTINUED | OUTPATIENT
Start: 2019-10-03 | End: 2019-10-03 | Stop reason: HOSPADM

## 2019-10-03 RX ORDER — MAGNESIUM SULFATE 100 %
4 CRYSTALS MISCELLANEOUS AS NEEDED
Status: CANCELLED | OUTPATIENT
Start: 2019-10-03

## 2019-10-03 RX ORDER — SODIUM CHLORIDE, SODIUM LACTATE, POTASSIUM CHLORIDE, CALCIUM CHLORIDE 600; 310; 30; 20 MG/100ML; MG/100ML; MG/100ML; MG/100ML
75 INJECTION, SOLUTION INTRAVENOUS CONTINUOUS
Status: DISCONTINUED | OUTPATIENT
Start: 2019-10-03 | End: 2019-10-03 | Stop reason: HOSPADM

## 2019-10-03 RX ORDER — SODIUM CHLORIDE, SODIUM LACTATE, POTASSIUM CHLORIDE, CALCIUM CHLORIDE 600; 310; 30; 20 MG/100ML; MG/100ML; MG/100ML; MG/100ML
50 INJECTION, SOLUTION INTRAVENOUS CONTINUOUS
Status: CANCELLED | OUTPATIENT
Start: 2019-10-03

## 2019-10-03 RX ORDER — SODIUM CHLORIDE 0.9 % (FLUSH) 0.9 %
5-40 SYRINGE (ML) INJECTION AS NEEDED
Status: CANCELLED | OUTPATIENT
Start: 2019-10-03

## 2019-10-03 RX ADMIN — PROPOFOL 50 MG: 10 INJECTION, EMULSION INTRAVENOUS at 10:59

## 2019-10-03 RX ADMIN — PROPOFOL 50 MG: 10 INJECTION, EMULSION INTRAVENOUS at 11:20

## 2019-10-03 RX ADMIN — PROPOFOL 50 MG: 10 INJECTION, EMULSION INTRAVENOUS at 11:08

## 2019-10-03 RX ADMIN — SODIUM CHLORIDE, SODIUM LACTATE, POTASSIUM CHLORIDE, AND CALCIUM CHLORIDE 75 ML/HR: 600; 310; 30; 20 INJECTION, SOLUTION INTRAVENOUS at 11:01

## 2019-10-03 RX ADMIN — FAMOTIDINE 20 MG: 10 INJECTION INTRAVENOUS at 11:02

## 2019-10-03 RX ADMIN — LIDOCAINE HYDROCHLORIDE 80 MG: 20 INJECTION, SOLUTION EPIDURAL; INFILTRATION; INTRACAUDAL; PERINEURAL at 10:59

## 2019-10-03 NOTE — ANESTHESIA POSTPROCEDURE EVALUATION
Procedure(s):  COLONOSCOPY / polypectomy. MAC    Anesthesia Post Evaluation      Multimodal analgesia: multimodal analgesia used between 6 hours prior to anesthesia start to PACU discharge  Patient location during evaluation: bedside  Patient participation: complete - patient participated  Level of consciousness: awake  Pain management: adequate  Airway patency: patent  Anesthetic complications: no  Cardiovascular status: stable  Respiratory status: acceptable  Hydration status: acceptable  Post anesthesia nausea and vomiting:  controlled      Vitals Value Taken Time   /87 10/3/2019 12:05 PM   Temp 34.1 °C (93.4 °F) 10/3/2019 11:39 AM   Pulse 54 10/3/2019 12:10 PM   Resp 11 10/3/2019 12:10 PM   SpO2 100 % 10/3/2019 12:10 PM   Vitals shown include unvalidated device data.

## 2019-10-03 NOTE — DISCHARGE INSTRUCTIONS
Colon Polyps: Care Instructions  Your Care Instructions    Colon polyps are growths in the colon or the rectum. The cause of most colon polyps is not known, and most people who get them do not have any problems. But a certain kind can turn into cancer. For this reason, regular testing for colon polyps is important for people as they get older. It is also important for anyone who has an increased risk for colon cancer. Polyps are usually found through routine colon cancer screening tests. Although most colon polyps are not cancerous, they are usually removed and then tested for cancer. Screening for colon cancer saves lives because the cancer can usually be cured if it is caught early. If you have a polyp that is the type that can turn into cancer, you may need more tests to examine your entire colon. The doctor will remove any other polyps that he or she finds, and you will be tested more often. Follow-up care is a key part of your treatment and safety. Be sure to make and go to all appointments, and call your doctor if you are having problems. It's also a good idea to know your test results and keep a list of the medicines you take. How can you care for yourself at home? Regular exams to look for colon polyps are the best way to prevent polyps from turning into colon cancer. These can include stool tests, sigmoidoscopy, colonoscopy, and CT colonography. Talk with your doctor about a testing schedule that is right for you. To prevent polyps  There is no home treatment that can prevent colon polyps. But these steps may help lower your risk for cancer. · Stay active. Being active can help you get to and stay at a healthy weight. Try to exercise on most days of the week. Walking is a good choice. · Eat well. Choose a variety of vegetables, fruits, legumes (such as peas and beans), fish, poultry, and whole grains. · Do not smoke.  If you need help quitting, talk to your doctor about stop-smoking programs and medicines. These can increase your chances of quitting for good. · If you drink alcohol, limit how much you drink. Limit alcohol to 2 drinks a day for men and 1 drink a day for women. When should you call for help? Call your doctor now or seek immediate medical care if:    · You have severe belly pain.     · Your stools are maroon or very bloody.    Watch closely for changes in your health, and be sure to contact your doctor if:    · You have a fever.     · You have nausea or vomiting.     · You have a change in bowel habits (new constipation or diarrhea).     · Your symptoms get worse or are not improving as expected. Where can you learn more? Go to http://gaboParagon 28curly.info/. Enter 95 [id number] in the search box to learn more about \"Colon Polyps: Care Instructions. \"  Current as of: December 19, 2018  Content Version: 12.2  © 6809-6911 [employer]. Care instructions adapted under license by [employer] (which disclaims liability or warranty for this information). If you have questions about a medical condition or this instruction, always ask your healthcare professional. [place] [address] any warranty or liability for your use of this information. Colonoscopy: What to Expect at [address] [place] [address]  After you have a colonoscopy, you will stay at the clinic for 1 to 2 hours until the medicines wear off. Then you can go home. But you will need to arrange for a ride. Your doctor will tell you when you can eat and do your other usual activities. Your doctor will talk to you about when you will need your next colonoscopy. Your doctor can help you decide how often you need to be checked. This will depend on the results of your test and your risk for colorectal cancer. After the test, you may be bloated or have gas pains. You may need to pass gas.  If a biopsy was done or a polyp was removed, you may have streaks of blood in your stool (feces) for a few days. Problems such as heavy rectal bleeding may not occur until several weeks after the test. This isn't common. But it can happen after polyps are removed. This care sheet gives you a general idea about how long it will take for you to recover. But each person recovers at a different pace. Follow the steps below to get better as quickly as possible. How can you care for yourself at home? Activity    · Rest when you feel tired.     · You can do your normal activities when it feels okay to do so. Diet    · Follow your doctor's directions for eating.     · Unless your doctor has told you not to, drink plenty of fluids. This helps to replace the fluids that were lost during the colon prep.     · Do not drink alcohol. Medicines    · Your doctor will tell you if and when you can restart your medicines. He or she will also give you instructions about taking any new medicines.     · If you take blood thinners, such as warfarin (Coumadin), clopidogrel (Plavix), or aspirin, be sure to talk to your doctor. He or she will tell you if and when to start taking those medicines again. Make sure that you understand exactly what your doctor wants you to do.     · If polyps were removed or a biopsy was done during the test, your doctor may tell you not to take aspirin or other anti-inflammatory medicines for a few days. These include ibuprofen (Advil, Motrin) and naproxen (Aleve). Other instructions    · For your safety, do not drive or operate machinery until the medicine wears off and you can think clearly. Your doctor may tell you not to drive or operate machinery until the day after your test.     · Do not sign legal documents or make major decisions until the medicine wears off and you can think clearly. The anesthesia can make it hard for you to fully understand what you are agreeing to. Follow-up care is a key part of your treatment and safety.  Be sure to make and go to all appointments, and call your doctor if you are having problems. It's also a good idea to know your test results and keep a list of the medicines you take. When should you call for help? Call 911 anytime you think you may need emergency care. For example, call if:    · You passed out (lost consciousness).     · You pass maroon or bloody stools.     · You have trouble breathing.    Call your doctor now or seek immediate medical care if:    · You have pain that does not get better after you take pain medicine.     · You are sick to your stomach or cannot drink fluids.     · You have new or worse belly pain.     · You have blood in your stools.     · You have a fever.     · You cannot pass stools or gas.    Watch closely for changes in your health, and be sure to contact your doctor if you have any problems. Where can you learn more? Go to http://gabo-curly.info/. Enter E264 in the search box to learn more about \"Colonoscopy: What to Expect at Home. \"  Current as of: December 19, 2018  Content Version: 12.2  © 4557-1361 [employer]. Care instructions adapted under license by [employer] (which disclaims liability or warranty for this information). If you have questions about a medical condition or this instruction, always ask your healthcare professional. [address] any warranty or liability for your use of this information. DISCHARGE SUMMARY from Nurse    PATIENT INSTRUCTIONS:    After general anesthesia or intravenous sedation, for 24 hours or while taking prescription Narcotics:  · Limit your activities  · Do not drive and operate hazardous machinery  · Do not make important personal or business decisions  · Do  not drink alcoholic beverages  · If you have not urinated within 8 hours after discharge, please contact your surgeon on call.     Report the following to your surgeon:  · Excessive pain, swelling, redness or odor of or around the surgical area  · Temperature over 100.5  · Nausea and vomiting lasting longer than 4 hours or if unable to take medications  · Any signs of decreased circulation or nerve impairment to extremity: change in color, persistent  numbness, tingling, coldness or increase pain  · Any questions    What to do at Home:  Recommended activity: Activity as tolerated and no driving for today. *  Please give a list of your current medications to your Primary Care Provider. *  Please update this list whenever your medications are discontinued, doses are      changed, or new medications (including over-the-counter products) are added. *  Please carry medication information at all times in case of emergency situations. These are general instructions for a healthy lifestyle:    No smoking/ No tobacco products/ Avoid exposure to second hand smoke  Surgeon General's Warning:  Quitting smoking now greatly reduces serious risk to your health. Obesity, smoking, and sedentary lifestyle greatly increases your risk for illness    A healthy diet, regular physical exercise & weight monitoring are important for maintaining a healthy lifestyle    You may be retaining fluid if you have a history of heart failure or if you experience any of the following symptoms:  Weight gain of 3 pounds or more overnight or 5 pounds in a week, increased swelling in our hands or feet or shortness of breath while lying flat in bed. Please call your doctor as soon as you notice any of these symptoms; do not wait until your next office visit. The discharge information has been reviewed with the patient and sister. The patient and sister verbalized understanding. Discharge medications reviewed with the patient and sister and appropriate educational materials and side effects teaching were provided.   ___________________________________________________________________________________________________________________________________

## 2019-10-03 NOTE — ANESTHESIA PREPROCEDURE EVALUATION
Relevant Problems   No relevant active problems       Anesthetic History   No history of anesthetic complications            Review of Systems / Medical History  Patient summary reviewed and pertinent labs reviewed    Pulmonary    COPD      Smoker         Neuro/Psych              Cardiovascular    Hypertension        Dysrhythmias            GI/Hepatic/Renal     GERD    Renal disease: CRI  Liver disease     Endo/Other    Diabetes: type 2, using insulin  Hypothyroidism  Arthritis and anemia     Other Findings              Physical Exam    Airway  Mallampati: III  TM Distance: 4 - 6 cm  Neck ROM: decreased range of motion   Mouth opening: Diminished (comment)     Cardiovascular    Rhythm: regular  Rate: normal         Dental    Dentition: Poor dentition and Upper partial plate     Pulmonary  Breath sounds clear to auscultation               Abdominal  GI exam deferred       Other Findings            Anesthetic Plan    ASA: 3  Anesthesia type: MAC            Anesthetic plan and risks discussed with: Patient

## 2019-10-03 NOTE — H&P
[facility]    [email or web address]      Impression:   1.heme pos  2. JESSICA      Plan:     1. Hospers mac all risks benefits and alt discussed       Chief Complaint: jessica and heme pos      HPI:  Jarrett Walker is a 62 y.o. male who is being seen on consult for heme pos and JESSICA.     PMH:   Past Medical History:   Diagnosis Date    Anemia     Bradycardia, unspecified 2018    Cervical discitis     MRSA    Chronic drug abuse ([facility] 1974    Chronic kidney disease     stage III    Diabetes ([facility] 01/1990    Drug abuse ([facility]     Encephalopathy     GERD (gastroesophageal reflux disease)     Hypertension     Muscle weakness     Quadriparesis ([facility] 2017    Renal cell carcinoma of left kidney ([facility] 12/17/13    Pathological Stage [id number] cc RCC FG3 s/p left open partial nephrectomy in 12/13      Sepsis due to methicillin resistant Staphylococcus aureus (HCC)     Suprapubic catheter (HCC)     Thrombocytopenia (HCC)     Urethral erosion     Viral hepatitis B     Viral hepatitis C     Xerosis cutis        PSH:   Past Surgical History:   Procedure Laterality Date    HX CIRCUMCISION  12/17/13    [facility] Steele [facility]    HX NEPHRECTOMY Left 12/17/13    Open/ Partial,nephrectomy    HX OTHER SURGICAL Right 2/27/2016    removed right ft  2nd meta-tarsal    HX OTHER SURGICAL  04/2017    abscess removed from back of neck     NEUROLOGICAL PROCEDURE UNLISTED  2017    neck surgery-abcess-hardware neck    WI REMOVAL WITH INSERTION OF SUPRAPUBIC CATHETER  2018       Social HX:   Social History     Socioeconomic History    Marital status:      Spouse name: Not on file    Number of children: Not on file    Years of education: Not on file    Highest education level: Not on file   Occupational History    Not on file   Social Needs    Financial resource strain: Not on file    Food insecurity:     Worry: Not on file     Inability: Not on file    Transportation needs: Medical: Not on file     Non-medical: Not on file   Tobacco Use    Smoking status: Current Every Day Smoker     Packs/day: 0.25     Years: 30.00     Pack years: 7.50     Types: Cigarettes    Smokeless tobacco: Never Used   Substance and Sexual Activity    Alcohol use: Yes     Comment: beer occasionally    Drug use: Yes     Types: Marijuana, Heroin     Comment: marijuana-December 2018, heroin-9/15/19-approx    Sexual activity: Never   Lifestyle    Physical activity:     Days per week: Not on file     Minutes per session: Not on file    Stress: Not on file   Relationships    Social connections:     Talks on phone: Not on file     Gets together: Not on file     Attends [employer] service: Not on file     Active member of club or organization: Not on file     Attends meetings of clubs or organizations: Not on file     Relationship status: Not on file    Intimate partner violence:     Fear of current or ex partner: Not on file     Emotionally abused: Not on file     Physically abused: Not on file     Forced sexual activity: Not on file   Other Topics Concern    Not on file   Social History Narrative     since 2012;  for 12 yrs; [employer]; [address] [employer] worker just recently furloughed; No  service       FHX:   Family History   Problem Relation Age of Onset    Diabetes Mother     Sickle Cell Anemia Father     Diabetes Sister     Hypertension Sister        Allergy:   Allergies   Allergen Reactions    Iodine Hives       Home Medications:     Medications Prior to Admission   Medication Sig    FUNMILAYO-DEBBIE 0.8 mg tab tablet Take 0.8 mg by mouth daily.  levothyroxine (SYNTHROID) 100 mcg tablet Take 1 Tab by mouth every morning.  calcitRIOL (ROCALTROL) 0.25 mcg capsule Take 1 Cap by mouth every Monday, Wednesday, Friday.  ferrous gluconate 324 mg (37.5 mg iron) tablet Take 1 Tab by mouth two (2) times daily (with meals).     hydrALAZINE (APRESOLINE) 25 mg tablet Take 1 Tab by mouth every eight (8) hours as needed (systolic B/P >772).  docusate sodium (COLACE) 100 mg capsule 100 mg two (2) times daily as needed.  ergocalciferol (DRISDOL) 50,000 unit capsule Take 50,000 Units by mouth every seven (7) days.  doxycycline (ADOXA) 100 mg tablet Take 100 mg by mouth daily.  insulin lispro (HUMALOG) 100 unit/mL injection Normal Insulin Sensitivity   For Blood Sugar (mg/dL) of:     Less than 150 =   0 units           150 -199 =   2 units  200 -249 =   4 units  250 -299 =   6 units  300 -349 =   8 units  350 and above =   10 units  If 2 glucose readings are above 200 mg/dL within a 24 hr period, proceed to \"Very Insul    tamsulosin (FLOMAX) 0.4 mg capsule Take 1 Cap by mouth daily.  liothyronine (CYTOMEL) 25 mcg tablet Take 1 Tab by mouth two (2) times a day.  Syringe, Disposable, (BD SYRINGE CATHETER TIP) 60 mL syrg Use 1 syringe daily with irrigations       Review of Systems:     Constitutional: No fevers, chills, weight loss, fatigue. Skin: No rashes, pruritis, jaundice, ulcerations, erythema. HENT: No headaches, nosebleeds, sinus pressure, rhinorrhea, sore throat. Eyes: No visual changes, blurred vision, eye pain, photophobia, jaundice. Cardiovascular: No chest pain, heart palpitations. Respiratory: No cough, SOB, wheezing, chest discomfort, orthopnea. Gastrointestinal: neg   Genitourinary: No dysuria, bleeding, discharge, pyuria. Musculoskeletal: No weakness, arthralgias, wasting. Endo: No sweats. Heme: No bruising, easy bleeding. Allergies: As noted. Neurological: Cranial nerves intact. Alert and oriented. Gait not assessed. Psychiatric:  No anxiety, depression, hallucinations. Visit Vitals  /88   Pulse 61   Resp 20   Ht 6' (1.829 m)   Wt 63 kg (139 lb)   SpO2 100%   BMI 18.85 kg/m²       Physical Assessment:     constitutional: appearance: well developed, well nourished, normal habitus, no deformities, in no acute distress.    skin: inspection: no rashes, ulcers, icterus or other lesions; no clubbing or telangiectasias. palpation: no induration or subcutaneos nodules. eyes: inspection: normal conjunctivae and lids; no jaundice pupils: symmetrical, normoreactive to light, normal accommodation and size. ENMT: mouth: normal oral mucosa,lips and gums; good dentition. oropharynx: normal tongue, hard and soft palate; posterior pharynx without erythema, exudate or lesions. neck: no masses organomegaly or tenderness. respiratory: effort: normal chest excursion; no intercostal retraction or accessory muscle use. cardiovascular: abdominal aorta: normal size and position; no bruits. palpation: PMI of normal size and position; normal rhythm; no thrill or murmurs. abdominal: abdomen: normal consistency; no tenderness or masses. hernias: no hernias appreciated. liver: normal size and consistency. spleen: not palpable. rectal: hemoccult/guaiac: not performed. musculoskeletal: no deformities or muscle wasting   lymphatic: axilae: not palpable. groin: not palpable. neck: within normal limits. other: not palpable. neurologic: cranial nerves: II-XII normal.   psychiatric: judgement/insight: within normal limits. memory: within normal limits for recent and remote events. mood and affect: no evidence of depression, anxiety or agitation. orientation: oriented to time, space and person. Basic Metabolic Profile   No results for input(s): NA, K, CL, CO2, BUN, GLU, CA, MG, PHOS in the last 72 hours. No lab exists for component: CREAT      CBC w/Diff    No results for input(s): WBC, RBC, HGB, HCT, MCV, MCH, MCHC, RDW, PLT, HGBEXT, HCTEXT, PLTEXT in the last 72 hours. No lab exists for component: MPV No results for input(s): GRANS, LYMPH, EOS, PRO, MYELO, METAS, BLAST in the last 72 hours.     No lab exists for component: MONO, BASO     Hepatic Function   No results for input(s): ALB, TP, TBILI, GPT, SGOT, AP, AML, LPSE in the last 72 hours.    No lab exists for component: Ton Murphy MD, MTHUAN. [facility], [address] [place] [address]  [email or web address]

## 2019-10-22 ENCOUNTER — HOSPITAL ENCOUNTER (OUTPATIENT)
Dept: INFUSION THERAPY | Age: 59
Discharge: HOME | End: 2019-10-22
Payer: MEDICAID

## 2019-10-22 VITALS
HEART RATE: 60 BPM | RESPIRATION RATE: 18 BRPM | OXYGEN SATURATION: 98 % | DIASTOLIC BLOOD PRESSURE: 83 MMHG | TEMPERATURE: 97.7 F | SYSTOLIC BLOOD PRESSURE: 150 MMHG

## 2019-10-22 LAB
BASO+EOS+MONOS # BLD AUTO: 0.3 K/UL (ref 0–2.3)
BASO+EOS+MONOS NFR BLD AUTO: 6 % (ref 0.1–17)
DIFFERENTIAL METHOD BLD: ABNORMAL
ERYTHROCYTE [DISTWIDTH] IN BLOOD BY AUTOMATED COUNT: 19.1 % (ref 11.5–14.5)
HCT VFR BLD AUTO: 30.5 % (ref 36–48)
HGB BLD-MCNC: 9.6 G/DL (ref 12–16)
LYMPHOCYTES # BLD: 1.4 K/UL (ref 1.1–5.9)
LYMPHOCYTES NFR BLD: 28 % (ref 14–44)
MCH RBC QN AUTO: 28.7 PG (ref 25–35)
MCHC RBC AUTO-ENTMCNC: 31.5 G/DL (ref 31–37)
MCV RBC AUTO: 91.3 FL (ref 78–102)
NEUTS SEG # BLD: 3.2 K/UL (ref 1.8–9.5)
NEUTS SEG NFR BLD: 66 % (ref 40–70)
PLATELET # BLD AUTO: 62 K/UL (ref 135–420)
RBC # BLD AUTO: 3.34 M/UL (ref 4.1–5.1)
WBC # BLD AUTO: 4.9 K/UL (ref 4.5–13)

## 2019-10-22 PROCEDURE — 36415 COLL VENOUS BLD VENIPUNCTURE: CPT

## 2019-10-22 PROCEDURE — 85025 COMPLETE CBC W/AUTO DIFF WBC: CPT

## 2019-10-22 PROCEDURE — 85049 AUTOMATED PLATELET COUNT: CPT

## 2019-10-22 NOTE — PROGRESS NOTES
[facility] Progress Note    Date: 2019    Name: Apollo Mcgee    MRN: [id number]         : 1960      Mr. Karol Sanchez arrived to [facility] at 200. Pt is here today for q2 week CBC/ Retacrit. Mr. Karol aSnchez was assessed and education was provided. Mr. Felisa Guido vitals were reviewed. Visit Vitals  /83 (BP 1 Location: Left arm, BP Patient Position: Sitting)   Pulse 60   Temp 97.7 °F (36.5 °C)   Resp 18   SpO2 98%       Pt was observed for 5 minutes after obtaining vital signs prior to initiating treatment. Blood drawn for labs per order by [facility] phlebotomist.     Lab results were obtained and reviewed. Recent Results (from the past 12 hour(s))   CBC WITH 3 PART DIFF    Collection Time: 10/22/19  2:55 PM   Result Value Ref Range    WBC 4.9 4.5 - 13.0 K/uL    RBC 3.34 (L) 4.10 - 5.10 M/uL    HGB 9.6 (L) 12.0 - 16 g/dL    HCT 30.5 (L) 36 - 48 %    MCV 91.3 78 - 102 FL    MCH 28.7 25.0 - 35.0 PG    MCHC 31.5 31 - 37 g/dL    RDW 19.1 (H) 11.5 - 14.5 %    NEUTROPHILS 66 40 - 70 %    MIXED CELLS 6 0.1 - 17 %    LYMPHOCYTES 28 14 - 44 %    ABS. NEUTROPHILS 3.2 1.8 - 9.5 K/UL    ABS. MIXED CELLS 0.3 0.0 - 2.3 K/uL    ABS. LYMPHOCYTES 1.4 1.1 - 5.9 K/UL    DF AUTOMATED         Retacrit held per order (labs within hold parameters). Pt armband removed & shredded. Mr. Karol Sanchez was discharged from [place] [address] in stable condition at 1505. He is to return on 19 at 1400 for his next appointment.     García Castorena RN  2019

## 2019-10-28 ENCOUNTER — OFFICE VISIT (OUTPATIENT)
Dept: ONCOLOGY | Age: 59
End: 2019-10-28

## 2019-10-28 VITALS
SYSTOLIC BLOOD PRESSURE: 170 MMHG | HEIGHT: 72 IN | DIASTOLIC BLOOD PRESSURE: 86 MMHG | OXYGEN SATURATION: 99 % | RESPIRATION RATE: 18 BRPM | WEIGHT: 147 LBS | HEART RATE: 66 BPM | BODY MASS INDEX: 19.91 KG/M2

## 2019-10-28 DIAGNOSIS — D64.9 CHRONIC ANEMIA: ICD-10-CM

## 2019-10-28 DIAGNOSIS — D69.6 THROMBOCYTOPENIA (HCC): ICD-10-CM

## 2019-10-28 DIAGNOSIS — C64.2 RENAL CELL CARCINOMA OF LEFT KIDNEY (HCC): ICD-10-CM

## 2019-10-28 DIAGNOSIS — D63.1 ANEMIA ASSOCIATED WITH STAGE 3 CHRONIC RENAL FAILURE (HCC): Primary | ICD-10-CM

## 2019-10-28 DIAGNOSIS — N18.30 ANEMIA ASSOCIATED WITH STAGE 3 CHRONIC RENAL FAILURE (HCC): Primary | ICD-10-CM

## 2019-10-28 PROBLEM — A41.9 SEPSIS (HCC): Status: ACTIVE | Noted: 2019-03-17

## 2019-10-28 PROBLEM — J18.9 PNEUMONIA DUE TO ORGANISM: Status: ACTIVE | Noted: 2019-03-07

## 2019-10-28 PROBLEM — T68.XXXA HYPOTHERMIA: Status: ACTIVE | Noted: 2019-03-17

## 2019-10-28 NOTE — PROGRESS NOTES
Hematology/Oncology  Progress Note    Name: Sameer Locus  Date: 10/28/2019  : 1960    PCP: Titi Flores MD     Mr. Ny Edouard is a 62 y.o.  male who was seen for follow-up of his thrombocytopenia. He was seen during his recent hospitalization. Current Therapy: Retacrit every 2 weeks whenever his hemoglobin is <10g/dL and hematocrit is <30%. Subjective:     Mr. Ny Edouard is a 77-year-old -American man who was seen for thrombocytopenia and anemia due to chronic kidney disease. He is in the office today for follow up. He denies fatigue, tiredness, and shortness of breath. He denies chest pain and dizziness. Since his last clinic visit he has not experienced any unexplained bruising or bleeding. He uses a walker for support and mobility. He does not have any complaints or concerns to report at this time. The patient had a CBC done on 10/22/2019 which showed a WBC count of 4.9, hemoglobin 9.6 g/dL, hematocrit of 30.5%, and the platelet count was 12,154. The patient reports that he is ambulating quite well and now wishes to begin using a cane instead of a walker. His energy level has significantly improved. He occasionally requires the Procrit injections. Past medical history, family history, and social history: these were reviewed and remains unchanged.     Past Medical History:   Diagnosis Date    Anemia     Bradycardia, unspecified     Cervical discitis     MRSA    Chronic drug abuse ([address]     Chronic kidney disease     stage III    Diabetes ([address] 1990    Drug abuse ([address]     Encephalopathy     GERD (gastroesophageal reflux disease)     Hypertension     Muscle weakness     Quadriparesis ([address]     Renal cell carcinoma of left kidney ([address] 13    Pathological Stage [id number] cc RCC FG3 s/p left open partial nephrectomy in       Sepsis due to methicillin resistant Staphylococcus aureus (HCC)     Suprapubic catheter (HCC)     Thrombocytopenia ([address]     Urethral erosion     Viral hepatitis B     Viral hepatitis C     Xerosis cutis      Past Surgical History:   Procedure Laterality Date    COLONOSCOPY N/A 10/3/2019    COLONOSCOPY / polypectomy performed by Marie Pena MD at [address] HX CIRCUMCISION  12/17/13    Dr. Moose Jasso, [facility]    HX NEPHRECTOMY Left 12/17/13    Open/ Partial,nephrectomy    HX OTHER SURGICAL Right 2/27/2016    removed right ft  2nd meta-tarsal    HX OTHER SURGICAL  04/2017    abscess removed from back of neck     NEUROLOGICAL PROCEDURE UNLISTED  2017    neck surgery-abcess-hardware neck    [place] REMOVAL WITH INSERTION OF SUPRAPUBIC CATHETER  2018     Social History     Socioeconomic History    Marital status:      Spouse name: Not on file    Number of children: Not on file    Years of education: Not on file    Highest education level: Not on file   Occupational History    Not on file   Social Needs    Financial resource strain: Not on file    Food insecurity:     Worry: Not on file     Inability: Not on file    Transportation needs:     Medical: Not on file     Non-medical: Not on file   Tobacco Use    Smoking status: Current Every Day Smoker     Packs/day: 0.25     Years: 30.00     Pack years: 7.50     Types: Cigarettes    Smokeless tobacco: Never Used   Substance and Sexual Activity    Alcohol use: Yes     Comment: beer occasionally    Drug use: Yes     Types: Marijuana, Heroin     Comment: marijuana-December 2018, heroin-9/15/19-approx    Sexual activity: Never   Lifestyle    Physical activity:     Days per week: Not on file     Minutes per session: Not on file    Stress: Not on file   Relationships    Social connections:     Talks on phone: Not on file     Gets together: Not on file     Attends [employer] service: Not on file     Active member of club or organization: Not on file     Attends meetings of clubs or organizations: Not on file     Relationship status: Not on file    Intimate partner violence:     Fear of current or ex partner: Not on file     Emotionally abused: Not on file     Physically abused: Not on file     Forced sexual activity: Not on file   Other Topics Concern    Not on file   Social History Narrative     since 2012;  for 12 yrs; 2K; [address]  just recently furloughed; No  service     Family History   Problem Relation Age of Onset    Diabetes Mother     Sickle Cell Anemia Father     Diabetes Sister     Hypertension Sister      Current Outpatient Medications   Medication Sig Dispense Refill    FUNMILAYO-DEBBIE 0.8 mg tab tablet Take 0.8 mg by mouth daily. 2    levothyroxine (SYNTHROID) 100 mcg tablet Take 1 Tab by mouth every morning. 30 Tab 1    calcitRIOL (ROCALTROL) 0.25 mcg capsule Take 1 Cap by mouth every Monday, Wednesday, Friday. 30 Cap 0    ferrous gluconate 324 mg (37.5 mg iron) tablet Take 1 Tab by mouth two (2) times daily (with meals). 60 Tab 0    hydrALAZINE (APRESOLINE) 25 mg tablet Take 1 Tab by mouth every eight (8) hours as needed (systolic B/P >556). 30 Tab 0    liothyronine (CYTOMEL) 25 mcg tablet Take 1 Tab by mouth two (2) times a day. 60 Tab 0    Syringe, Disposable, (BD SYRINGE CATHETER TIP) 60 mL syrg Use 1 syringe daily with irrigations 30 Syringe 11    docusate sodium (COLACE) 100 mg capsule 100 mg two (2) times daily as needed.  ergocalciferol (DRISDOL) 50,000 unit capsule Take 50,000 Units by mouth every seven (7) days.  doxycycline (ADOXA) 100 mg tablet Take 100 mg by mouth daily.       insulin lispro (HUMALOG) 100 unit/mL injection Normal Insulin Sensitivity   For Blood Sugar (mg/dL) of:     Less than 150 =   0 units           150 -199 =   2 units  200 -249 =   4 units  250 -299 =   6 units  300 -349 =   8 units  350 and above =   10 units  If 2 glucose readings are above 200 mg/dL within a 24 hr period, proceed to \"Very Insul 1 Vial 0    tamsulosin (FLOMAX) 0.4 mg capsule Take 1 Cap by mouth daily. 30 Cap 0       Review of Systems  Constitutional: The patient has no acute distress or discomfort. HEENT: The patient denies recent head trauma, eye pain, blurred vision,  hearing deficit, oropharyngeal mucosal pain or lesions, and the patient denies throat pain or discomfort. Lymphatics: The patient denies palpable peripheral lymphadenopathy. Hematologic: The patient denies having bruising, bleeding, or progressive fatigue. Respiratory: Patient denies having shortness of breath, cough, sputum production, fever, or dyspnea on exertion. Cardiovascular: The patient denies having leg pain, leg swelling, heart palpitations, chest permit, chest pain, or lightheadedness. The patient denies having dyspnea on exertion. Gastrointestinal: The patient denies having nausea, emesis, or diarrhea. The patient denies having any hematemesis or blood in the stool. Genitourinary: Patient denies having urinary urgency, frequency, or dysuria. The patient denies having blood in the urine. Psychological: The patient denies having symptoms of nervousness, anxiety, depression, or thoughts of harming self. Skin: Patient denies having skin rashes, skin, ulcerations, or unexplained itching or pruritus. Musculoskeletal: The patient denies having pain in the joints or bones. Objective:     Visit Vitals  /86   Pulse 66   Resp 18   Ht 6' (1.829 m)   Wt 66.7 kg (147 lb)   SpO2 99%   BMI 19.94 kg/m²     ECOG PS=0  Physical Exam:   Gen. Appearance: The patient is in no acute distress. Skin: There is no bruise or rash. HEENT: The exam is unremarkable. Neck: Supple without lymphadenopathy or thyromegaly. Lungs: Clear to auscultation and percussion; there are no wheezes or rhonchi. Heart: Regular rate and rhythm; there are no murmurs, gallops, or rubs. Abdomen: Bowel sounds are present and normal.  There is no guarding, tenderness, or hepatosplenomegaly. Extremities: There is no clubbing, cyanosis, or edema. Neurologic: There are no focal neurologic deficits. Lymphatics: There is no palpable peripheral lymphadenopathy. Musculoskeletal: The patient has been in his arms and hands. With the use of either a wheelchair for balance or a walker. There is no evidence of joint deformity or effusions. There is no focal joint tenderness. Psychological/psychiatric: There is no clinical evidence of anxiety, depression, or melancholy. Lab data:      Results for orders placed or performed during the hospital encounter of 10/22/19   CBC WITH 3 PART DIFF     Status: Abnormal   Result Value Ref Range Status    WBC 4.9 4.5 - 13.0 K/uL Final    RBC 3.34 (L) 4.10 - 5.10 M/uL Final    HGB 9.6 (L) 12.0 - 16 g/dL Final    HCT 30.5 (L) 36 - 48 % Final    MCV 91.3 78 - 102 FL Final    MCH 28.7 25.0 - 35.0 PG Final    MCHC 31.5 31 - 37 g/dL Final    RDW 19.1 (H) 11.5 - 14.5 % Final    NEUTROPHILS 66 40 - 70 % Final    MIXED CELLS 6 0.1 - 17 % Final    LYMPHOCYTES 28 14 - 44 % Final    ABS. NEUTROPHILS 3.2 1.8 - 9.5 K/UL Final    ABS. MIXED CELLS 0.3 0.0 - 2.3 K/uL Final    ABS. LYMPHOCYTES 1.4 1.1 - 5.9 K/UL Final     Comment: Test performed at [address] [place] [address] or Outpatient Infusion Center Location. Reviewed by Medical Director. DF AUTOMATED   Final           Assessment:     1. Anemia associated with stage 3 chronic renal failure ([facility]    2. Renal cell carcinoma of left kidney (HCC)    3. Chronic anemia    4. Thrombocytopenia ([facility]        Plan:     Iron deficiency anemia/chronic anemia/anemia due to chronic kidney disease stage III: The CBC from 10/22/2018 showed a hemoglobin of 9.6 g/dL with hematocrit 30.5%. I explained to the patient that his anemia is due to chronic kidney disease therefore we recommend Retacrit every 2 weeks if his hemoglobin is below 10g/dL and hematocrit below 30%. The comprehensive metabolic panel, iron profile, and ferritin levels will be obtained.   The goal is to maintain his ferritin level above 100 while undergoing treatment with Procrit therapy. Thrombocytopenia  I have explained to patient that his CBC from 10/22/2019 reveals a platelet count of 15,282. We will recheck his blood counts again in about 8 weeks. No therapeutic intervention is warranted at this time. I have explained to the patient that therapeutic intervention for thrombocytopenia is only indicated if the platelet counts decline below 30,000. Follow-up in 8 weeks .   Orders Placed This Encounter    CBC WITH AUTOMATED DIFF     Standing Status:   Future     Number of Occurrences:   1     Standing Expiration Date:   59/61/5483    METABOLIC PANEL, COMPREHENSIVE     Standing Status:   Future     Number of Occurrences:   1     Standing Expiration Date:   10/28/2020    IRON PROFILE     Standing Status:   Future     Number of Occurrences:   1     Standing Expiration Date:   10/28/2020    FERRITIN     Standing Status:   Future     Number of Occurrences:   1     Standing Expiration Date:   10/28/2020       Marol Gusman MD  10/28/2019

## 2019-10-28 NOTE — PATIENT INSTRUCTIONS
Anemia From Chronic Disease: Care Instructions  Your Care Instructions    Anemia is a low level of red blood cells. Red blood cells carry oxygen from your lungs to the rest of your body. Sometimes when you have a long-term (chronic) disease, such as kidney disease, arthritis, diabetes, cancer, or an infection, your body does not make enough red blood cells. Follow-up care is a key part of your treatment and safety. Be sure to make and go to all appointments, and call your doctor if you are having problems. It's also a good idea to know your test results and keep a list of the medicines you take. How can you care for yourself at home? · Follow your doctor's instructions to treat the chronic condition that is causing the anemia. · Be safe with medicines. Take your medicine to treat your chronic condition exactly as prescribed. Call your doctor if you think you are having a problem with your medicine. · Take your medicine for anemia exactly as prescribed. Call your doctor if you think you are having a problem with your medicine. Medicines to increase the number of red blood cells (such as epoetin or darbepoetin) may be given as an injection. ? If you miss a dose, take it as soon as you can, unless it is almost time for your next dose. In that case, get back on your regular schedule and take only one dose. ? Do not freeze this medicine. Store it in the refrigerator. Do not shake the bottle before you prepare the shot. · Keep all your appointments for blood tests to check on your hemoglobin levels. When should you call for help? Call 911 anytime you think you may need emergency care.  For example, call if:    · You passed out (lost consciousness).    Call your doctor now or seek immediate medical care if:    · You are short of breath.     · You are dizzy or light-headed, or you feel like you may faint.     · You have new or worse bleeding.    Watch closely for changes in your health, and be sure to contact your doctor if:    · You feel weaker or more tired than usual.     · You do not get better as expected. Where can you learn more? Go to http://gabo-curly.info/. Enter E502 in the search box to learn more about \"Anemia From Chronic Disease: Care Instructions. \"  Current as of: March 28, 2019  Content Version: 12.2  © 7509-8336 [employer]. Care instructions adapted under license by [employer] (which disclaims liability or warranty for this information). If you have questions about a medical condition or this instruction, always ask your healthcare professional. [place] [address] any warranty or liability for your use of this information.

## 2019-10-29 LAB
A-G RATIO,AGRAT: 1.3 RATIO (ref 1.1–2.6)
ABSOLUTE LYMPHOCYTE COUNT, 10803: 1.7 K/UL (ref 1–4.8)
ALBUMIN SERPL-MCNC: 3.9 G/DL (ref 3.5–5)
ALP SERPL-CCNC: 89 U/L (ref 40–125)
ALT SERPL-CCNC: 31 U/L (ref 5–40)
ANION GAP SERPL CALC-SCNC: 17 MMOL/L
AST SERPL W P-5'-P-CCNC: 30 U/L (ref 10–37)
BASOPHILS # BLD: 0 K/UL (ref 0–0.2)
BASOPHILS NFR BLD: 1 % (ref 0–2)
BILIRUB SERPL-MCNC: 0.9 MG/DL (ref 0.2–1.2)
BUN SERPL-MCNC: 42 MG/DL (ref 6–22)
CALCIUM SERPL-MCNC: 9 MG/DL (ref 8.4–10.4)
CHLORIDE SERPL-SCNC: 114 MMOL/L (ref 98–110)
CO2 SERPL-SCNC: 18 MMOL/L (ref 20–32)
CREAT SERPL-MCNC: 2.9 MG/DL (ref 0.8–1.6)
EOSINOPHIL # BLD: 0.1 K/UL (ref 0–0.5)
EOSINOPHIL NFR BLD: 2 % (ref 0–6)
ERYTHROCYTE [DISTWIDTH] IN BLOOD BY AUTOMATED COUNT: 19.7 % (ref 10–15.5)
FE % SATURATION,PSAT: 37 % (ref 20–50)
FERRITIN SERPL-MCNC: 154 NG/ML (ref 22–322)
GFRAA, 66117: 27.1
GFRNA, 66118: 22.3
GLOBULIN,GLOB: 2.9 G/DL (ref 2–4)
GLUCOSE SERPL-MCNC: 78 MG/DL (ref 70–99)
GRANULOCYTES,GRANS: 56 % (ref 40–75)
HCT VFR BLD AUTO: 28.6 % (ref 39.3–51.6)
HGB BLD-MCNC: 8.6 G/DL (ref 13.1–17.2)
IMMATURE PLATELET FRACTION: 14.3 % (ref 1.1–7.1)
IRON,IRN: 101 MCG/DL (ref 45–160)
LYMPHOCYTES, LYMLT: 33 % (ref 20–45)
MCH RBC QN AUTO: 28 PG (ref 26–34)
MCHC RBC AUTO-ENTMCNC: 30 G/DL (ref 31–36)
MCV RBC AUTO: 93 FL (ref 80–95)
MONOCYTES # BLD: 0.4 K/UL (ref 0.1–1)
MONOCYTES NFR BLD: 8 % (ref 3–12)
NEUTROPHILS # BLD AUTO: 2.8 K/UL (ref 1.8–7.7)
PLATELET # BLD AUTO: 74 K/UL (ref 140–440)
POTASSIUM SERPL-SCNC: 5.4 MMOL/L (ref 3.5–5.5)
PROT SERPL-MCNC: 6.8 G/DL (ref 6.2–8.1)
RBC # BLD AUTO: 3.07 M/UL (ref 3.8–5.8)
SODIUM SERPL-SCNC: 149 MMOL/L (ref 133–145)
TIBC,TIBC: 275 MCG/DL (ref 228–428)
UIBC SERPL-MCNC: 174 MCG/DL (ref 110–370)
WBC # BLD AUTO: 5 K/UL (ref 4–11)

## 2019-10-29 RX ORDER — SODIUM CHLORIDE 9 MG/ML
10 INJECTION INTRAMUSCULAR; INTRAVENOUS; SUBCUTANEOUS AS NEEDED
Status: CANCELLED | OUTPATIENT
Start: 2019-11-05

## 2019-10-29 RX ORDER — SODIUM CHLORIDE 0.9 % (FLUSH) 0.9 %
10 SYRINGE (ML) INJECTION AS NEEDED
Status: CANCELLED
Start: 2019-11-05

## 2019-10-29 RX ORDER — HEPARIN 100 UNIT/ML
300-500 SYRINGE INTRAVENOUS AS NEEDED
Status: CANCELLED
Start: 2019-11-05

## 2019-11-05 ENCOUNTER — HOSPITAL ENCOUNTER (OUTPATIENT)
Dept: INFUSION THERAPY | Age: 59
Discharge: HOME | End: 2019-11-05
Payer: MEDICAID

## 2019-11-05 VITALS
SYSTOLIC BLOOD PRESSURE: 160 MMHG | OXYGEN SATURATION: 98 % | HEART RATE: 58 BPM | DIASTOLIC BLOOD PRESSURE: 77 MMHG | TEMPERATURE: 97.3 F | RESPIRATION RATE: 18 BRPM

## 2019-11-05 DIAGNOSIS — N17.9 ACUTE RENAL FAILURE SUPERIMPOSED ON CHRONIC KIDNEY DISEASE, UNSPECIFIED CKD STAGE, UNSPECIFIED ACUTE RENAL FAILURE TYPE (HCC): ICD-10-CM

## 2019-11-05 DIAGNOSIS — N17.9 ACUTE RENAL FAILURE SUPERIMPOSED ON CHRONIC KIDNEY DISEASE, UNSPECIFIED CKD STAGE, UNSPECIFIED ACUTE RENAL FAILURE TYPE (HCC): Primary | ICD-10-CM

## 2019-11-05 DIAGNOSIS — N18.9 ACUTE RENAL FAILURE SUPERIMPOSED ON CHRONIC KIDNEY DISEASE, UNSPECIFIED CKD STAGE, UNSPECIFIED ACUTE RENAL FAILURE TYPE (HCC): Primary | ICD-10-CM

## 2019-11-05 DIAGNOSIS — N18.9 ACUTE RENAL FAILURE SUPERIMPOSED ON CHRONIC KIDNEY DISEASE, UNSPECIFIED CKD STAGE, UNSPECIFIED ACUTE RENAL FAILURE TYPE (HCC): ICD-10-CM

## 2019-11-05 LAB
BASO+EOS+MONOS # BLD AUTO: 0.1 K/UL (ref 0–2.3)
BASO+EOS+MONOS NFR BLD AUTO: 3 % (ref 0.1–17)
DIFFERENTIAL METHOD BLD: ABNORMAL
ERYTHROCYTE [DISTWIDTH] IN BLOOD BY AUTOMATED COUNT: 19.4 % (ref 11.5–14.5)
HCT VFR BLD AUTO: 26.7 % (ref 36–48)
HGB BLD-MCNC: 8.4 G/DL (ref 12–16)
LYMPHOCYTES # BLD: 1.6 K/UL (ref 1.1–5.9)
LYMPHOCYTES NFR BLD: 35 % (ref 14–44)
MCH RBC QN AUTO: 28.3 PG (ref 25–35)
MCHC RBC AUTO-ENTMCNC: 31.5 G/DL (ref 31–37)
MCV RBC AUTO: 89.9 FL (ref 78–102)
NEUTS SEG # BLD: 2.8 K/UL (ref 1.8–9.5)
NEUTS SEG NFR BLD: 63 % (ref 40–70)
PLATELET # BLD AUTO: 72 K/UL (ref 135–420)
RBC # BLD AUTO: 2.97 M/UL (ref 4.1–5.1)
WBC # BLD AUTO: 4.5 K/UL (ref 4.5–13)

## 2019-11-05 PROCEDURE — 74011250636 HC RX REV CODE- 250/636: Performed by: NURSE PRACTITIONER

## 2019-11-05 PROCEDURE — 96372 THER/PROPH/DIAG INJ SC/IM: CPT

## 2019-11-05 PROCEDURE — 36415 COLL VENOUS BLD VENIPUNCTURE: CPT

## 2019-11-05 PROCEDURE — 85049 AUTOMATED PLATELET COUNT: CPT

## 2019-11-05 PROCEDURE — 85025 COMPLETE CBC W/AUTO DIFF WBC: CPT

## 2019-11-05 RX ORDER — SODIUM CHLORIDE 0.9 % (FLUSH) 0.9 %
10 SYRINGE (ML) INJECTION AS NEEDED
Status: CANCELLED
Start: 2019-11-19

## 2019-11-05 RX ORDER — HEPARIN 100 UNIT/ML
300-500 SYRINGE INTRAVENOUS AS NEEDED
Status: CANCELLED
Start: 2019-11-19

## 2019-11-05 RX ORDER — SODIUM CHLORIDE 9 MG/ML
10 INJECTION INTRAMUSCULAR; INTRAVENOUS; SUBCUTANEOUS AS NEEDED
Status: CANCELLED | OUTPATIENT
Start: 2019-11-19

## 2019-11-05 RX ADMIN — EPOETIN ALFA-EPBX 60000 UNITS: 40000 INJECTION, SOLUTION INTRAVENOUS; SUBCUTANEOUS at 14:48

## 2019-11-05 NOTE — PROGRESS NOTES
[facility] Progress Note    Date: 2019    Name: Andreia Min    MRN: [id number]         : 1960      Mr. Goldstein arrived to [place] at 80. No complaints or concerns voiced    Pt is here today for q2 week CBC/ Retacrit. Mr. Goldstein was assessed and education was provided. Mr. Martha Irizarry vitals were reviewed. Visit Vitals  /77 (BP 1 Location: Left arm, BP Patient Position: Sitting)   Pulse (!) 58   Temp 97.3 °F (36.3 °C)   Resp 18   SpO2 98%       Pt was observed for 5 minutes after obtaining vital signs prior to initiating treatment. Blood drawn for cbc via left AC on the 3rd attempt. Gauze/ plastic tape to site. Lab results were obtained and reviewed. Recent Results (from the past 12 hour(s))   CBC WITH 3 PART DIFF    Collection Time: 19  2:18 PM   Result Value Ref Range    WBC 4.5 4.5 - 13.0 K/uL    RBC 2.97 (L) 4.10 - 5.10 M/uL    HGB 8.4 (L) 12.0 - 16 g/dL    HCT 26.7 (L) 36 - 48 %    MCV 89.9 78 - 102 FL    MCH 28.3 25.0 - 35.0 PG    MCHC 31.5 31 - 37 g/dL    RDW 19.4 (H) 11.5 - 14.5 %    NEUTROPHILS 63 40 - 70 %    MIXED CELLS 3 0.1 - 17 %    LYMPHOCYTES 35 14 - 44 %    ABS. NEUTROPHILS 2.8 1.8 - 9.5 K/UL    ABS. MIXED CELLS 0.1 0.0 - 2.3 K/uL    ABS. LYMPHOCYTES 1.6 1.1 - 5.9 K/UL    DF AUTOMATED         Platelet count flagged. Sample sent out to [facility] lab for further testing    Retacrit 31919 was administered as ordered SQ in patient's left and right upper arm (in 2 divided injections). Band-aids to sites. Mr. Goldstein tolerated well without complaints. Mr. Goldstein was discharged from [place] [address] in stable condition at 1455. He is to return on 19 at 3 pm for his next appointment.     Randolph English RN  2019

## 2019-11-19 ENCOUNTER — HOSPITAL ENCOUNTER (OUTPATIENT)
Dept: INFUSION THERAPY | Age: 59
Discharge: HOME | End: 2019-11-19
Payer: MEDICAID

## 2019-11-19 VITALS
OXYGEN SATURATION: 100 % | HEART RATE: 56 BPM | RESPIRATION RATE: 18 BRPM | DIASTOLIC BLOOD PRESSURE: 83 MMHG | SYSTOLIC BLOOD PRESSURE: 176 MMHG

## 2019-11-19 DIAGNOSIS — N18.9 ACUTE RENAL FAILURE SUPERIMPOSED ON CHRONIC KIDNEY DISEASE, UNSPECIFIED CKD STAGE, UNSPECIFIED ACUTE RENAL FAILURE TYPE (HCC): Primary | ICD-10-CM

## 2019-11-19 DIAGNOSIS — N18.9 ACUTE RENAL FAILURE SUPERIMPOSED ON CHRONIC KIDNEY DISEASE, UNSPECIFIED CKD STAGE, UNSPECIFIED ACUTE RENAL FAILURE TYPE (HCC): ICD-10-CM

## 2019-11-19 DIAGNOSIS — N17.9 ACUTE RENAL FAILURE SUPERIMPOSED ON CHRONIC KIDNEY DISEASE, UNSPECIFIED CKD STAGE, UNSPECIFIED ACUTE RENAL FAILURE TYPE (HCC): ICD-10-CM

## 2019-11-19 DIAGNOSIS — N17.9 ACUTE RENAL FAILURE SUPERIMPOSED ON CHRONIC KIDNEY DISEASE, UNSPECIFIED CKD STAGE, UNSPECIFIED ACUTE RENAL FAILURE TYPE (HCC): Primary | ICD-10-CM

## 2019-11-19 LAB
BASO+EOS+MONOS # BLD AUTO: 0.4 K/UL (ref 0–2.3)
BASO+EOS+MONOS NFR BLD AUTO: 9 % (ref 0.1–17)
DIFFERENTIAL METHOD BLD: ABNORMAL
ERYTHROCYTE [DISTWIDTH] IN BLOOD BY AUTOMATED COUNT: 22 % (ref 11.5–14.5)
HCT VFR BLD AUTO: 26.2 % (ref 36–48)
HGB BLD-MCNC: 8.3 G/DL (ref 12–16)
LYMPHOCYTES # BLD: 1.2 K/UL (ref 1.1–5.9)
LYMPHOCYTES NFR BLD: 28 % (ref 14–44)
MCH RBC QN AUTO: 29.3 PG (ref 25–35)
MCHC RBC AUTO-ENTMCNC: 31.7 G/DL (ref 31–37)
MCV RBC AUTO: 92.6 FL (ref 78–102)
NEUTS SEG # BLD: 2.7 K/UL (ref 1.8–9.5)
NEUTS SEG NFR BLD: 63 % (ref 40–70)
PLATELET # BLD AUTO: 62 K/UL (ref 135–420)
RBC # BLD AUTO: 2.83 M/UL (ref 4.1–5.1)
WBC # BLD AUTO: 4.3 K/UL (ref 4.5–13)

## 2019-11-19 PROCEDURE — 36415 COLL VENOUS BLD VENIPUNCTURE: CPT

## 2019-11-19 PROCEDURE — 85049 AUTOMATED PLATELET COUNT: CPT

## 2019-11-19 PROCEDURE — 85025 COMPLETE CBC W/AUTO DIFF WBC: CPT

## 2019-11-19 PROCEDURE — 74011250636 HC RX REV CODE- 250/636: Performed by: NURSE PRACTITIONER

## 2019-11-19 PROCEDURE — 96372 THER/PROPH/DIAG INJ SC/IM: CPT

## 2019-11-19 RX ORDER — SODIUM CHLORIDE 0.9 % (FLUSH) 0.9 %
10 SYRINGE (ML) INJECTION AS NEEDED
Status: CANCELLED
Start: 2019-12-03

## 2019-11-19 RX ORDER — HEPARIN 100 UNIT/ML
300-500 SYRINGE INTRAVENOUS AS NEEDED
Status: CANCELLED
Start: 2019-12-03

## 2019-11-19 RX ORDER — SODIUM CHLORIDE 9 MG/ML
10 INJECTION INTRAMUSCULAR; INTRAVENOUS; SUBCUTANEOUS AS NEEDED
Status: CANCELLED | OUTPATIENT
Start: 2019-12-03

## 2019-11-19 RX ADMIN — EPOETIN ALFA-EPBX 60000 UNITS: 40000 INJECTION, SOLUTION INTRAVENOUS; SUBCUTANEOUS at 15:54

## 2019-11-19 NOTE — PROGRESS NOTES
[facility] Progress Note    Date: 2019    Name: Wero Main    MRN: [id number]         : 1960      Mr. Kourtney Land arrived to [facility] at [phone]. Pt is here today for q2 week CBC/ Retacrit. Mr. Kourtney Land was assessed and education was provided. Mr. Brenda Vigil vitals were reviewed. Visit Vitals  /83 (BP 1 Location: Left arm, BP Patient Position: Sitting)   Pulse (!) 56   Resp 18   SpO2 100%       Pt was observed for 5 minutes after obtaining vital signs prior to initiating treatment. Blood drawn for labs per order by [facility] phlebotomist via left AC venipuncture. Lab results were obtained and reviewed. Recent Results (from the past 12 hour(s))   CBC WITH 3 PART DIFF    Collection Time: 19  3:20 PM   Result Value Ref Range    WBC 4.3 (L) 4.5 - 13.0 K/uL    RBC 2.83 (L) 4.10 - 5.10 M/uL    HGB 8.3 (L) 12.0 - 16 g/dL    HCT 26.2 (L) 36 - 48 %    MCV 92.6 78 - 102 FL    MCH 29.3 25.0 - 35.0 PG    MCHC 31.7 31 - 37 g/dL    RDW 22.0 (H) 11.5 - 14.5 %    NEUTROPHILS 63 40 - 70 %    MIXED CELLS 9 0.1 - 17 %    LYMPHOCYTES 28 14 - 44 %    ABS. NEUTROPHILS 2.7 1.8 - 9.5 K/UL    ABS. MIXED CELLS 0.4 0.0 - 2.3 K/uL    ABS. LYMPHOCYTES 1.2 1.1 - 5.9 K/UL    DF AUTOMATED         Retacrit [id number] units administered as ordered SQ in patient's R & L upper arms (medications divided into 2 injections). Pt armband removed & shredded. Mr. Kourtney Land was discharged from [place] [address] in stable condition at [phone]. He is to return on 12/3/19 at 1400 for his next appointment.     Luis Angel Motley RN  2019

## 2019-12-03 DIAGNOSIS — N17.9 ACUTE RENAL FAILURE SUPERIMPOSED ON CHRONIC KIDNEY DISEASE, UNSPECIFIED CKD STAGE, UNSPECIFIED ACUTE RENAL FAILURE TYPE (HCC): ICD-10-CM

## 2019-12-03 DIAGNOSIS — N18.9 ACUTE RENAL FAILURE SUPERIMPOSED ON CHRONIC KIDNEY DISEASE, UNSPECIFIED CKD STAGE, UNSPECIFIED ACUTE RENAL FAILURE TYPE (HCC): ICD-10-CM

## 2019-12-16 ENCOUNTER — OFFICE VISIT (OUTPATIENT)
Dept: CARDIOLOGY CLINIC | Age: 59
End: 2019-12-16

## 2019-12-16 VITALS
OXYGEN SATURATION: 99 % | HEIGHT: 72 IN | HEART RATE: 46 BPM | SYSTOLIC BLOOD PRESSURE: 140 MMHG | DIASTOLIC BLOOD PRESSURE: 80 MMHG | BODY MASS INDEX: 20.18 KG/M2 | WEIGHT: 149 LBS

## 2019-12-16 DIAGNOSIS — R00.1 BRADYCARDIA: Primary | ICD-10-CM

## 2019-12-16 DIAGNOSIS — N18.4 STAGE 4 CHRONIC KIDNEY DISEASE (HCC): ICD-10-CM

## 2019-12-16 DIAGNOSIS — I73.9 PERIPHERAL VASCULAR DISEASE (HCC): ICD-10-CM

## 2019-12-16 DIAGNOSIS — D64.9 CHRONIC ANEMIA: ICD-10-CM

## 2019-12-16 DIAGNOSIS — I10 ESSENTIAL HYPERTENSION: ICD-10-CM

## 2019-12-16 DIAGNOSIS — E11.21 TYPE 2 DIABETES WITH NEPHROPATHY (HCC): ICD-10-CM

## 2019-12-16 DIAGNOSIS — D69.6 THROMBOCYTOPENIA (HCC): ICD-10-CM

## 2019-12-16 NOTE — PROGRESS NOTES
Milka Meier presents today for   Chief Complaint   Patient presents with    Slow Heart Rate     8 MONTH F/U     Leg Swelling     everyday        Milka Meier preferred language for health care discussion is english/other. Is someone accompanying this pt? no    Is the patient using any DME equipment during [address]? yes    Depression Screening:  3 most recent PHQ Screens 6/14/2019   Little interest or pleasure in doing things Not at all   Feeling down, depressed, irritable, or hopeless Not at all   Total Score PHQ 2 0       Learning Assessment:  Learning Assessment 5/18/2018   PRIMARY LEARNER Patient   PRIMARY LANGUAGE ENGLISH   LEARNER PREFERENCE PRIMARY DEMONSTRATION   ANSWERED BY patient   RELATIONSHIP SELF       Abuse Screening:  Abuse Screening Questionnaire 8/19/2019   Do you ever feel afraid of your partner? N   Are you in a relationship with someone who physically or mentally threatens you? N   Is it safe for you to go home? Y       Fall Risk  Fall Risk Assessment, last 12 mths 8/19/2019   Able to walk? Yes   Fall in past 12 months? No       Pt currently taking Anticoagulant therapy? no    Coordination of Care:  1. Have you been to the ER, urgent care clinic since your last visit? Hospitalized since your last visit? no    2. Have you seen or consulted any other health care providers outside of the [address] [place] [address] since your last visit? Include any pap smears or colon screening.  no

## 2019-12-16 NOTE — PROGRESS NOTES
HPI:   I saw Brigida Patel in my office today in cardiovascular evaluation regarding his past problems with bradycardia and recent issues with worsening renal failure and peripheral edema     Mr. Mario Prajapati is a pleasant 62-year-old -American male who has had a rather complicated past medical history.  His past medical history includes a renal mass with renal cell carcinoma of the left kidney with partial left nephrectomy, diabetes, hypertension, and chronic drug abuse. Mayela Burrows was admitted to the hospital in April 2017 with history of C3 through C7 discitis with osteomyelitis with an L4-L5 phlegmon and is status post a C3 through C7 laminectomy with posterior lateral fusion and hardware on April 29, 2017 for rapid onset of quadriplegia from the discitis and osteomyelitis in the setting of MRSA bacteremia and abscess cultures from the spine. He had a very prolonged recovery and is still somewhat limited in his activity getting around on a cane and had been on chronic doxycycline due to his neck hardware for an extended period of time though he does not appear to be on that medication currently.     He was admitted from a skilled nursing facility to [facility] in March 2018 due to increasing peripheral edema, but it was actually found to have no signs of heart failure with a normal NT proBNP and completely normal left ventricular systolic function, but he was found to be anemic and dehydrated with acute kidney injury.  During that hospitalization he was very bradycardic secondary to beta-blocker therapy which was discontinued for his blood pressure and amlodipine was started with reasonably good control.     He also during that hospitalization was worked up for anemia and thrombocytopenia including a bone marrow biopsy on April 6, 2018, but his thrombocytopenia apparently spontaneously resolved and he had a platelet count of 645,518 on August 21, 2018 and on October 1, 2018 his platelet count was down to 94,000, however this was not felt to be severe enough to require any treatment by Dr. Jose Eduardo Davis.      In 2018 he has had recurrent admissions to various hospitals including [place] [address] and [facility].  He had had a suprapubic catheter placed by Dr. Roberto Latin be completed on October 12, 2018 over at [facility] and most recently had some poor output from the catheter and a urinary tract infection for which he was admitted to [facility]. He was also found to be hypoglycemic and hypothermic during his most recent hospitalization with concerns for adrenal insufficiency/hypopituitarism and hypothyroidism. He apparently was treated with both prednisone and levothyroxine. It should be noted that he had a tremendous amount of weight loss when he was treated for his discitis  and actually dropped down to weight in the low 130's at one point although was up to 149 pounds today. He denies any problems with shortness of breath and although he does have peripheral edema and sleeps with 2 pillows for comfort he is not complaining of any orthopnea or paroxysmal nocturnal dyspnea. It should be noted that during 1 of his hospitalizations in April 2018 he had an echocardiogram which demonstrated completely normal left ventricular systolic function and only mildly elevated pulmonary artery pressures.     Encounter Diagnoses   Name Primary?  Bradycardia, asymptomatic possibly reflecting early sick sinus syndrome Yes    Essential hypertension     Stage 4 chronic kidney disease (HCC)     Chronic anemia     Thrombocytopenia (HCC), mild currently     Peripheral vascular disease ([address] status post     Type 2 diabetes with nephropathy ([facility] [address]        Discussion: This patient is somewhat difficult to assess. He does have a chronic bradycardia off AV blocking drugs but as far as I can determine this is completely asymptomatic.   Certainly if he complains of any dizziness or lightheadedness I would consider doing a Holter monitor study. I am planning on doing a TSH just to be sure he is not showing any signs of hypothyroidism which could be associated with peripheral edema and bradycardia which certainly would be possible since he is only on 100 mcg of Synthroid daily. He does have what seems like a left parasternal lift on examination today and his echocardiogram in April 2018 which demonstrated normal left ventricular function but only mild pulmonary hypertension and I am going to recheck that to see if we can determine indirectly if his pulmonary hypertension is in fact severe as might be suggested by his auscultatory examination. He does have significant peripheral edema and a case certainly could be made for more aggressive diuresis, but I am going to have to review the records to see who is following him from [facility] and further discuss diuretic therapy with nephrology. Since clinically he seems to be fairly stable at this time I will simply plan to see him again in several months.     PCP: Denise Easton DO      Past Medical History:   Diagnosis Date    Anemia     Bradycardia, unspecified 2018    Cervical discitis     MRSA    Chronic drug abuse ([facility] [address] 1974    Chronic kidney disease     stage III    Diabetes ([address] 01/1990    Drug abuse ([facility] [address]     Encephalopathy     GERD (gastroesophageal reflux disease)     Hypertension     Muscle weakness     Quadriparesis ([address] 2017    Renal cell carcinoma of left kidney ([address] 12/17/13    Pathological Stage [id number] cc RCC FG3 s/p left open partial nephrectomy in 12/13      Sepsis due to methicillin resistant Staphylococcus aureus (HCC)     Suprapubic catheter (HCC)     Thrombocytopenia (HCC)     Urethral erosion     Viral hepatitis B     Viral hepatitis C     Xerosis cutis        Past Surgical History:   Procedure Laterality Date    COLONOSCOPY N/A 10/3/2019    COLONOSCOPY / polypectomy performed by Yves Apgar, MD at [address] HX CIRCUMCISION  13    Dr. Chuck Cooper, [address] [facility] HX NEPHRECTOMY Left 13    Open/ Partial,nephrectomy    HX OTHER SURGICAL Right 2016    removed right ft  2nd meta-tarsal    HX OTHER SURGICAL  2017    abscess removed from back of neck     NEUROLOGICAL PROCEDURE UNLISTED      neck surgery-abcess-hardware neck    CT REMOVAL WITH INSERTION OF SUPRAPUBIC CATHETER         Current Outpatient Medications   Medication Sig    FUNMILAYO-DEBBIE 0.8 mg tab tablet Take 0.8 mg by mouth daily.  levothyroxine (SYNTHROID) 100 mcg tablet Take 1 Tab by mouth every morning.  calcitRIOL (ROCALTROL) 0.25 mcg capsule Take 1 Cap by mouth every Monday, Wednesday, Friday.  ferrous gluconate 324 mg (37.5 mg iron) tablet Take 1 Tab by mouth two (2) times daily (with meals).  hydrALAZINE (APRESOLINE) 25 mg tablet Take 1 Tab by mouth every eight (8) hours as needed (systolic B/P >190).  liothyronine (CYTOMEL) 25 mcg tablet Take 1 Tab by mouth two (2) times a day.  Syringe, Disposable, (BD SYRINGE CATHETER TIP) 60 mL syrg Use 1 syringe daily with irrigations    docusate sodium (COLACE) 100 mg capsule 100 mg two (2) times daily as needed.  ergocalciferol (DRISDOL) 50,000 unit capsule Take 50,000 Units by mouth every seven (7) days.  doxycycline (ADOXA) 100 mg tablet Take 100 mg by mouth daily.  insulin lispro (HUMALOG) 100 unit/mL injection Normal Insulin Sensitivity   For Blood Sugar (mg/dL) of:     Less than 150 =   0 units           150 -199 =   2 units  200 -249 =   4 units  250 -299 =   6 units  300 -349 =   8 units  350 and above =   10 units  If 2 glucose readings are above 200 mg/dL within a 24 hr period, proceed to \"Very Insul    tamsulosin (FLOMAX) 0.4 mg capsule Take 1 Cap by mouth daily. No current facility-administered medications for this visit.         Allergies   Allergen Reactions    Iodine Hives       Social History :  Social History Tobacco Use    Smoking status: Current Every Day Smoker     Packs/day: 0.25     Years: 30.00     Pack years: 7.50     Types: Cigarettes    Smokeless tobacco: Never Used   Substance Use Topics    Alcohol use: Yes     Comment: beer occasionally        Family History: family history includes Diabetes in his mother and sister; Hypertension in his sister; Sickle Cell Anemia in his father. Review of Systems   Constitutional: Negative. Respiratory: Negative. Cardiovascular: Positive for leg swelling. Negative for chest pain, palpitations and orthopnea. Gastrointestinal: Negative. Musculoskeletal: Negative. Neurological: Negative for dizziness. Physical Exam:    The patient is a cooperative, alert, well developed, well nourished 62 y.o.  male who is in no acute distress at the time of the examination. Visit Vitals  /80   Pulse (!) 46   Ht 6' (1.829 m)   Wt 67.6 kg (149 lb)   SpO2 99%   BMI 20.21 kg/m²       HEENT: Conjuctiva white, mucosa moist, no pallor or cyanosis. NECK: Supple without masses, tenderness or thyromegaly. There was positive jugular venous distention. Carotid are full bilaterally without bruits. CHEST: Symmetrical with good excursion. LUNGS: Clear to auscultation in all fields, except very mild decrease breath sounds in the bases. HEART: The apex is not displaced. There is a left parasternal lift without thrills or heaves. There is a normal S1 and increased S2.  Grade 2/6 apical systolic murmur with poor radiation without appreciable systolic murmurs, rubs, clicks, or gallops auscultated. ABDOMEN: Protuberant with some ascites. There is some presacral edema  EXTREMITIES: Full peripheral pulses with 3-4 + peripheral edema.       Review of Data: See PMH and Cardiology and Imaging sections for cardiac testing  Lab Results   Component Value Date/Time    Cholesterol, total 209 (H) 08/27/2019 09:52 AM    HDL Cholesterol 81 (H) 08/27/2019 09:52 AM LDL, calculated 108.6 (H) 08/27/2019 09:52 AM    Triglyceride 97 08/27/2019 09:52 AM    CHOL/HDL Ratio 2.6 08/27/2019 09:52 AM       Results for orders placed or performed in visit on 12/16/19   AMB POC EKG ROUTINE W/ 12 LEADS, INTER & REP     Status: None    Narrative    Sinus bradycardia, rate 46. Left ventricular hypertrophy by limb lead voltage criteria. Diffuse nonspecific inferolateral and high lateral ST-T flattening. Compared to the EKG of September 5, 2019 the heart rate is somewhat slower being 53 at that time         Cara Arteaga D.O., F.A.C.C. [facility]  [facility]  [address]      PLEASE NOTE:  This document has been produced using voice recognition software. Unrecognized errors in transcription may be present.

## 2019-12-16 NOTE — PATIENT INSTRUCTIONS
If you have not heard from the central scheduler to schedule your testing in 48 hours, please call [phone].

## 2019-12-17 ENCOUNTER — HOSPITAL ENCOUNTER (OUTPATIENT)
Dept: INFUSION THERAPY | Age: 59
Discharge: HOME | End: 2019-12-17
Payer: MEDICAID

## 2019-12-17 VITALS
DIASTOLIC BLOOD PRESSURE: 68 MMHG | OXYGEN SATURATION: 98 % | HEART RATE: 52 BPM | RESPIRATION RATE: 16 BRPM | TEMPERATURE: 94.7 F | SYSTOLIC BLOOD PRESSURE: 150 MMHG

## 2019-12-17 DIAGNOSIS — N18.9 ACUTE RENAL FAILURE SUPERIMPOSED ON CHRONIC KIDNEY DISEASE, UNSPECIFIED CKD STAGE, UNSPECIFIED ACUTE RENAL FAILURE TYPE (HCC): Primary | ICD-10-CM

## 2019-12-17 DIAGNOSIS — N17.9 ACUTE RENAL FAILURE SUPERIMPOSED ON CHRONIC KIDNEY DISEASE, UNSPECIFIED CKD STAGE, UNSPECIFIED ACUTE RENAL FAILURE TYPE (HCC): ICD-10-CM

## 2019-12-17 DIAGNOSIS — N17.9 ACUTE RENAL FAILURE SUPERIMPOSED ON CHRONIC KIDNEY DISEASE, UNSPECIFIED CKD STAGE, UNSPECIFIED ACUTE RENAL FAILURE TYPE (HCC): Primary | ICD-10-CM

## 2019-12-17 DIAGNOSIS — N18.9 ACUTE RENAL FAILURE SUPERIMPOSED ON CHRONIC KIDNEY DISEASE, UNSPECIFIED CKD STAGE, UNSPECIFIED ACUTE RENAL FAILURE TYPE (HCC): ICD-10-CM

## 2019-12-17 LAB
BASO+EOS+MONOS # BLD AUTO: 0.3 K/UL (ref 0–2.3)
BASO+EOS+MONOS NFR BLD AUTO: 9 % (ref 0.1–17)
DIFFERENTIAL METHOD BLD: ABNORMAL
ERYTHROCYTE [DISTWIDTH] IN BLOOD BY AUTOMATED COUNT: 20 % (ref 11.5–14.5)
HCT VFR BLD AUTO: 26.3 % (ref 36–48)
HGB BLD-MCNC: 8.2 G/DL (ref 12–16)
LYMPHOCYTES # BLD: 1.1 K/UL (ref 1.1–5.9)
LYMPHOCYTES NFR BLD: 31 % (ref 14–44)
MCH RBC QN AUTO: 29.3 PG (ref 25–35)
MCHC RBC AUTO-ENTMCNC: 31.2 G/DL (ref 31–37)
MCV RBC AUTO: 93.9 FL (ref 78–102)
NEUTS SEG # BLD: 2.2 K/UL (ref 1.8–9.5)
NEUTS SEG NFR BLD: 61 % (ref 40–70)
PLATELET # BLD AUTO: 50 K/UL (ref 135–420)
RBC # BLD AUTO: 2.8 M/UL (ref 4.1–5.1)
WBC # BLD AUTO: 3.6 K/UL (ref 4.5–13)

## 2019-12-17 PROCEDURE — 85049 AUTOMATED PLATELET COUNT: CPT

## 2019-12-17 PROCEDURE — 36415 COLL VENOUS BLD VENIPUNCTURE: CPT

## 2019-12-17 PROCEDURE — 74011250636 HC RX REV CODE- 250/636: Performed by: INTERNAL MEDICINE

## 2019-12-17 PROCEDURE — 85025 COMPLETE CBC W/AUTO DIFF WBC: CPT

## 2019-12-17 PROCEDURE — 96372 THER/PROPH/DIAG INJ SC/IM: CPT

## 2019-12-17 RX ORDER — SODIUM CHLORIDE 9 MG/ML
10 INJECTION INTRAMUSCULAR; INTRAVENOUS; SUBCUTANEOUS AS NEEDED
Status: CANCELLED | OUTPATIENT
Start: 2019-12-31

## 2019-12-17 RX ORDER — SODIUM CHLORIDE 0.9 % (FLUSH) 0.9 %
10 SYRINGE (ML) INJECTION AS NEEDED
Status: CANCELLED
Start: 2019-12-31

## 2019-12-17 RX ORDER — HEPARIN 100 UNIT/ML
300-500 SYRINGE INTRAVENOUS AS NEEDED
Status: CANCELLED
Start: 2019-12-31

## 2019-12-17 RX ADMIN — EPOETIN ALFA-EPBX 60000 UNITS: 40000 INJECTION, SOLUTION INTRAVENOUS; SUBCUTANEOUS at 14:15

## 2019-12-17 NOTE — PROGRESS NOTES
[facility] Progress Note    Date: 2019    Name: Markell Adamson    MRN: [id number]         : 1960      Mr. Goldstein arrived to [facility] at [phone]. Pt is here today for q2 week CBC/ Retacrit. Mr. Goldstein was assessed and education was provided. Mr. Omar Meneses vitals were reviewed. Visit Vitals  /68 (BP 1 Location: Left arm, BP Patient Position: Sitting)   Pulse (!) 52   Temp (!) 94.7 °F (34.8 °C)   Resp 16   SpO2 98%       Pt was observed for 5 minutes after obtaining vital signs prior to initiating treatment. CBC drawn via left AC venipuncture x2 attempts, 2x2 and bandaid applied. Lab results were obtained and reviewed. Recent Results (from the past 12 hour(s))   CBC WITH 3 PART DIFF    Collection Time: 19  1:58 PM   Result Value Ref Range    WBC 3.6 (L) 4.5 - 13.0 K/uL    RBC 2.80 (L) 4.10 - 5.10 M/uL    HGB 8.2 (L) 12.0 - 16 g/dL    HCT 26.3 (L) 36 - 48 %    MCV 93.9 78 - 102 FL    MCH 29.3 25.0 - 35.0 PG    MCHC 31.2 31 - 37 g/dL    RDW 20.0 (H) 11.5 - 14.5 %    NEUTROPHILS 61 40 - 70 %    MIXED CELLS 9 0.1 - 17 %    LYMPHOCYTES 31 14 - 44 %    ABS. NEUTROPHILS 2.2 1.8 - 9.5 K/UL    ABS. MIXED CELLS 0.3 0.0 - 2.3 K/uL    ABS. LYMPHOCYTES 1.1 1.1 - 5.9 K/UL    DF AUTOMATED         Retacrit 03911 units administered as ordered SQ in patient's R & L upper arms (medications divided into 2 injections). Pt armband removed & shredded. Mr. Goldstein was discharged from [place] [address] in stable condition at [phone]. He is to return on 20 at 1400 for his next appointment.     Francis Fonseca RN  2019

## 2019-12-31 DIAGNOSIS — N18.9 ACUTE RENAL FAILURE SUPERIMPOSED ON CHRONIC KIDNEY DISEASE, UNSPECIFIED CKD STAGE, UNSPECIFIED ACUTE RENAL FAILURE TYPE (HCC): ICD-10-CM

## 2019-12-31 DIAGNOSIS — N17.9 ACUTE RENAL FAILURE SUPERIMPOSED ON CHRONIC KIDNEY DISEASE, UNSPECIFIED CKD STAGE, UNSPECIFIED ACUTE RENAL FAILURE TYPE (HCC): ICD-10-CM

## 2020-01-14 DIAGNOSIS — N17.9 ACUTE RENAL FAILURE SUPERIMPOSED ON CHRONIC KIDNEY DISEASE, UNSPECIFIED CKD STAGE, UNSPECIFIED ACUTE RENAL FAILURE TYPE (HCC): ICD-10-CM

## 2020-01-14 DIAGNOSIS — N18.9 ACUTE RENAL FAILURE SUPERIMPOSED ON CHRONIC KIDNEY DISEASE, UNSPECIFIED CKD STAGE, UNSPECIFIED ACUTE RENAL FAILURE TYPE (HCC): ICD-10-CM

## 2020-01-16 ENCOUNTER — HOSPITAL ENCOUNTER (OUTPATIENT)
Dept: INFUSION THERAPY | Age: 60
End: 2020-01-16

## 2020-01-28 DIAGNOSIS — N17.9 ACUTE RENAL FAILURE SUPERIMPOSED ON CHRONIC KIDNEY DISEASE, UNSPECIFIED CKD STAGE, UNSPECIFIED ACUTE RENAL FAILURE TYPE (HCC): ICD-10-CM

## 2020-01-28 DIAGNOSIS — N18.9 ACUTE RENAL FAILURE SUPERIMPOSED ON CHRONIC KIDNEY DISEASE, UNSPECIFIED CKD STAGE, UNSPECIFIED ACUTE RENAL FAILURE TYPE (HCC): ICD-10-CM

## 2020-01-30 ENCOUNTER — APPOINTMENT (OUTPATIENT)
Dept: INFUSION THERAPY | Age: 60
End: 2020-01-30

## 2020-02-06 ENCOUNTER — HOSPITAL ENCOUNTER (OUTPATIENT)
Dept: INFUSION THERAPY | Age: 60
Discharge: HOME | End: 2020-02-06
Payer: MEDICARE

## 2020-02-06 VITALS
OXYGEN SATURATION: 100 % | RESPIRATION RATE: 16 BRPM | HEART RATE: 46 BPM | DIASTOLIC BLOOD PRESSURE: 59 MMHG | SYSTOLIC BLOOD PRESSURE: 112 MMHG

## 2020-02-06 DIAGNOSIS — N18.9 ACUTE RENAL FAILURE SUPERIMPOSED ON CHRONIC KIDNEY DISEASE, UNSPECIFIED CKD STAGE, UNSPECIFIED ACUTE RENAL FAILURE TYPE (HCC): Primary | ICD-10-CM

## 2020-02-06 DIAGNOSIS — N17.9 ACUTE RENAL FAILURE SUPERIMPOSED ON CHRONIC KIDNEY DISEASE, UNSPECIFIED CKD STAGE, UNSPECIFIED ACUTE RENAL FAILURE TYPE (HCC): Primary | ICD-10-CM

## 2020-02-06 LAB
BASOPHILS # BLD: 0 K/UL (ref 0–0.06)
BASOPHILS NFR BLD: 0 % (ref 0–3)
DIFFERENTIAL METHOD BLD: ABNORMAL
EOSINOPHIL # BLD: 0 K/UL (ref 0–0.4)
EOSINOPHIL NFR BLD: 1 % (ref 0–5)
ERYTHROCYTE [DISTWIDTH] IN BLOOD BY AUTOMATED COUNT: 20.6 % (ref 11.6–14.5)
HCT VFR BLD AUTO: 26.6 % (ref 36–48)
HGB BLD-MCNC: 8.4 G/DL (ref 13–16)
LYMPHOCYTES # BLD: 0.6 K/UL (ref 0.8–3.5)
LYMPHOCYTES NFR BLD: 21 % (ref 20–51)
MCH RBC QN AUTO: 28.7 PG (ref 24–34)
MCHC RBC AUTO-ENTMCNC: 31.6 G/DL (ref 31–37)
MCV RBC AUTO: 90.8 FL (ref 74–97)
MONOCYTES # BLD: 0.2 K/UL (ref 0–1)
MONOCYTES NFR BLD: 8 % (ref 2–9)
NEUTS SEG # BLD: 2.1 K/UL (ref 1.8–8)
NEUTS SEG NFR BLD: 70 % (ref 42–75)
PLATELET # BLD AUTO: 80 K/UL (ref 135–420)
PLATELET COMMENTS,PCOM: ABNORMAL
RBC # BLD AUTO: 2.93 M/UL (ref 4.7–5.5)
RBC MORPH BLD: ABNORMAL
RBC MORPH BLD: ABNORMAL
WBC # BLD AUTO: 2.9 K/UL (ref 4.6–13.2)

## 2020-02-06 PROCEDURE — 36415 COLL VENOUS BLD VENIPUNCTURE: CPT

## 2020-02-06 PROCEDURE — 74011250636 HC RX REV CODE- 250/636: Performed by: INTERNAL MEDICINE

## 2020-02-06 PROCEDURE — 96372 THER/PROPH/DIAG INJ SC/IM: CPT

## 2020-02-06 PROCEDURE — 85025 COMPLETE CBC W/AUTO DIFF WBC: CPT

## 2020-02-06 RX ORDER — HEPARIN 100 UNIT/ML
300-500 SYRINGE INTRAVENOUS AS NEEDED
Status: CANCELLED
Start: 2020-02-11

## 2020-02-06 RX ORDER — SODIUM CHLORIDE 9 MG/ML
10 INJECTION INTRAMUSCULAR; INTRAVENOUS; SUBCUTANEOUS AS NEEDED
Status: CANCELLED | OUTPATIENT
Start: 2020-02-11

## 2020-02-06 RX ORDER — SODIUM CHLORIDE 0.9 % (FLUSH) 0.9 %
10 SYRINGE (ML) INJECTION AS NEEDED
Status: CANCELLED
Start: 2020-02-11

## 2020-02-06 RX ADMIN — EPOETIN ALFA-EPBX 60000 UNITS: 40000 INJECTION, SOLUTION INTRAVENOUS; SUBCUTANEOUS at 14:11

## 2020-02-06 NOTE — PROGRESS NOTES
[facility] Progress Note    Date: 2020    Name: Cosme Argueta    MRN: [id number]         : 1960      Mr. Goldstein arrived to [facility] at [phone]. Pt is here today for q2 week CBC/ Retacrit. Mr. Goldstein was assessed and education was provided. Mr. Luisa Cox vitals were reviewed. Visit Vitals  /59 (BP 1 Location: Left arm, BP Patient Position: Sitting)   Pulse (!) 46   Resp 16   SpO2 100%       Pt was observed for 5 minutes after obtaining vital signs prior to initiating treatment. Pt states he is followed by Dr. Emy Hernandez for cardiology. He states his heart rate \"goes up and down\". Reviewed Dr. Nicholson Matters note from 2019 at which time pt's HR was 46. Pt denies complaints and is asymptomatic of bradycardia. Blood drawn for labs per order via right forearm venipuncture. Gauze/ coban to site. Lab results were obtained and reviewed: pt's hgb 8.6 & hct 26.9. (Preliminary CBC results scanned into pt's chart.)    Retacrit 08130 units administered as ordered SQ in patient's R & L upper arms (medications divided into 2 injections). Pt armband removed & shredded. In regard to pt's low HR, advised pt that if he starts having chest pain/ tightness, sob, dizziness, weakness to report to the ER or call 911. Pt verbalized understanding. Mr. Goldstein was discharged from [place] [address] in stable condition at [phone]. He is to return on 20 at 1400 for his next appointment.     Andrew Cruz RN  2020

## 2020-02-10 ENCOUNTER — OFFICE VISIT (OUTPATIENT)
Dept: ONCOLOGY | Age: 60
End: 2020-02-10

## 2020-02-10 ENCOUNTER — HOSPITAL ENCOUNTER (OUTPATIENT)
Dept: ONCOLOGY | Age: 60
Discharge: HOME | End: 2020-02-10

## 2020-02-10 VITALS
BODY MASS INDEX: 22.35 KG/M2 | WEIGHT: 165 LBS | TEMPERATURE: 98 F | RESPIRATION RATE: 16 BRPM | OXYGEN SATURATION: 98 % | HEIGHT: 72 IN | HEART RATE: 48 BPM | SYSTOLIC BLOOD PRESSURE: 136 MMHG | DIASTOLIC BLOOD PRESSURE: 60 MMHG

## 2020-02-10 DIAGNOSIS — D63.1 ANEMIA ASSOCIATED WITH STAGE 3 CHRONIC RENAL FAILURE (HCC): Primary | ICD-10-CM

## 2020-02-10 DIAGNOSIS — D64.9 CHRONIC ANEMIA: ICD-10-CM

## 2020-02-10 DIAGNOSIS — C64.2 RENAL CELL CARCINOMA OF LEFT KIDNEY (HCC): ICD-10-CM

## 2020-02-10 DIAGNOSIS — D69.6 THROMBOCYTOPENIA (HCC): ICD-10-CM

## 2020-02-10 DIAGNOSIS — N18.30 ANEMIA ASSOCIATED WITH STAGE 3 CHRONIC RENAL FAILURE (HCC): Primary | ICD-10-CM

## 2020-02-10 LAB
BASO+EOS+MONOS # BLD AUTO: 0.5 K/UL (ref 0–2.3)
BASO+EOS+MONOS NFR BLD AUTO: 9 % (ref 0.1–17)
DIFFERENTIAL METHOD BLD: ABNORMAL
ERYTHROCYTE [DISTWIDTH] IN BLOOD BY AUTOMATED COUNT: 21.6 % (ref 11.5–14.5)
HCT VFR BLD AUTO: 28.3 % (ref 36–48)
HGB BLD-MCNC: 8.6 G/DL (ref 12–16)
LYMPHOCYTES # BLD: 1.9 K/UL (ref 1.1–5.9)
LYMPHOCYTES NFR BLD: 38 % (ref 14–44)
MCH RBC QN AUTO: 29.2 PG (ref 25–35)
MCHC RBC AUTO-ENTMCNC: 30.4 G/DL (ref 31–37)
MCV RBC AUTO: 95.9 FL (ref 78–102)
NEUTS SEG # BLD: 2.6 K/UL (ref 1.8–9.5)
NEUTS SEG NFR BLD: 53 % (ref 40–70)
PLATELET # BLD AUTO: 51 K/UL (ref 135–420)
RBC # BLD AUTO: 2.95 M/UL (ref 4.1–5.1)
WBC # BLD AUTO: 5 K/UL (ref 4.5–13)

## 2020-02-10 NOTE — PROGRESS NOTES
Hematology/Oncology  Progress Note    Name: Manuela Denny  Date: 2/10/2020  : 1960    PCP: Betsey Gallo MD     Mr. Katharina Mccullough is a 62 y.o.  male who was seen for follow-up of his thrombocytopenia. He was seen during his recent hospitalization. Current Therapy: Retacrit every 2 weeks whenever his hemoglobin is <10g/dL and hematocrit is <30%. Subjective:     Mr. Katharina Mccullough is a 26-year-old -American man who was seen for thrombocytopenia and anemia due to chronic kidney disease. He is in the office today for follow up. He denies fatigue, tiredness, and shortness of breath. He denies chest pain and dizziness. Since his last clinic visit he has not experienced any unexplained bruising or bleeding. He occasionally uses a walker for support and mobility. He does not have any complaints or concerns to report at this time. The patient had a CBC done on 10/22/2019 which showed a WBC count of 4.9, hemoglobin 9.6 g/dL, hematocrit of 30.5%, and the platelet count was 24,485. Since he was last seen he was hospitalized with thrombocytopenia and some bruising. On the day of his discharge, 2020 his hemoglobin was 8.1 g/dL and hematocrit was 29.8%. Today he is ambulating with the use of his cane only and is no longer using a walker. Past medical history, family history, and social history: these were reviewed and remains unchanged.     Past Medical History:   Diagnosis Date    Anemia     Bradycardia, unspecified     Cervical discitis     MRSA    Chronic drug abuse ([facility] 1974    Chronic kidney disease     stage III    Diabetes ([facility] 1990    Drug abuse ([facility]     Encephalopathy     GERD (gastroesophageal reflux disease)     Hypertension     Muscle weakness     Quadriparesis ([address]     Renal cell carcinoma of left kidney ([facility] 13    Pathological Stage [id number] cc RCC FG3 s/p left open partial nephrectomy in       Sepsis due to methicillin resistant Staphylococcus aureus ([facility]     Suprapubic catheter ([facility]     Thrombocytopenia ([address]     Urethral erosion     Viral hepatitis B     Viral hepatitis C     Xerosis cutis      Past Surgical History:   Procedure Laterality Date    COLONOSCOPY N/A 10/3/2019    COLONOSCOPY / polypectomy performed by Adalgisa Mack MD at [place] [address]  12/17/13    Dr. Bharath Lujan, [facility]    HX NEPHRECTOMY Left 12/17/13    Open/ Partial,nephrectomy    HX OTHER SURGICAL Right 2/27/2016    removed right ft  2nd meta-tarsal    HX OTHER SURGICAL  04/2017    abscess removed from back of neck     NEUROLOGICAL PROCEDURE UNLISTED  2017    neck surgery-abcess-hardware neck    LA REMOVAL WITH INSERTION OF SUPRAPUBIC CATHETER  2018     Social History     Socioeconomic History    Marital status:      Spouse name: Not on file    Number of children: Not on file    Years of education: Not on file    Highest education level: Not on file   Occupational History    Not on file   Social Needs    Financial resource strain: Not on file    Food insecurity:     Worry: Not on file     Inability: Not on file    Transportation needs:     Medical: Not on file     Non-medical: Not on file   Tobacco Use    Smoking status: Current Every Day Smoker     Packs/day: 0.25     Years: 30.00     Pack years: 7.50     Types: Cigarettes    Smokeless tobacco: Never Used   Substance and Sexual Activity    Alcohol use: Yes     Comment: beer occasionally    Drug use: Yes     Types: Marijuana, Heroin     Comment: marijuana-December 2018, heroin-9/15/19-approx    Sexual activity: Never   Lifestyle    Physical activity:     Days per week: Not on file     Minutes per session: Not on file    Stress: Not on file   Relationships    Social connections:     Talks on phone: Not on file     Gets together: Not on file     Attends Judaism service: Not on file     Active member of club or organization: Not on file     Attends meetings of clubs or organizations: Not on file     Relationship status: Not on file    Intimate partner violence:     Fear of current or ex partner: Not on file     Emotionally abused: Not on file     Physically abused: Not on file     Forced sexual activity: Not on file   Other Topics Concern    Not on file   Social History Narrative     since 2012;  for 12 yrs; [employer]; [address] [employer]  just recently furloughed; No  service     Family History   Problem Relation Age of Onset    Diabetes Mother     Sickle Cell Anemia Father     Diabetes Sister     Hypertension Sister      Current Outpatient Medications   Medication Sig Dispense Refill    FUNMILAYO-EDBBIE 0.8 mg tab tablet Take 0.8 mg by mouth daily. 2    levothyroxine (SYNTHROID) 100 mcg tablet Take 1 Tab by mouth every morning. 30 Tab 1    calcitRIOL (ROCALTROL) 0.25 mcg capsule Take 1 Cap by mouth every Monday, Wednesday, Friday. 30 Cap 0    ferrous gluconate 324 mg (37.5 mg iron) tablet Take 1 Tab by mouth two (2) times daily (with meals). 60 Tab 0    hydrALAZINE (APRESOLINE) 25 mg tablet Take 1 Tab by mouth every eight (8) hours as needed (systolic B/P >415). 30 Tab 0    liothyronine (CYTOMEL) 25 mcg tablet Take 1 Tab by mouth two (2) times a day. 60 Tab 0    Syringe, Disposable, (BD SYRINGE CATHETER TIP) 60 mL syrg Use 1 syringe daily with irrigations 30 Syringe 11    docusate sodium (COLACE) 100 mg capsule 100 mg two (2) times daily as needed.  ergocalciferol (DRISDOL) 50,000 unit capsule Take 50,000 Units by mouth every seven (7) days.  doxycycline (ADOXA) 100 mg tablet Take 100 mg by mouth daily.       insulin lispro (HUMALOG) 100 unit/mL injection Normal Insulin Sensitivity   For Blood Sugar (mg/dL) of:     Less than 150 =   0 units           150 -199 =   2 units  200 -249 =   4 units  250 -299 =   6 units  300 -349 =   8 units  350 and above =   10 units  If 2 glucose readings are above 200 mg/dL within a 24 hr period, proceed to \"Very Insul 1 Vial 0  tamsulosin (FLOMAX) 0.4 mg capsule Take 1 Cap by mouth daily. 30 Cap 0       Review of Systems  Constitutional: The patient has no acute distress or discomfort. HEENT: The patient denies recent head trauma, eye pain, blurred vision,  hearing deficit, oropharyngeal mucosal pain or lesions, and the patient denies throat pain or discomfort. Lymphatics: The patient denies palpable peripheral lymphadenopathy. Hematologic: The patient denies having bruising, bleeding, or progressive fatigue. Respiratory: Patient denies having shortness of breath, cough, sputum production, fever, or dyspnea on exertion. Cardiovascular: The patient denies having leg pain, leg swelling, heart palpitations, chest permit, chest pain, or lightheadedness. The patient denies having dyspnea on exertion. Gastrointestinal: The patient denies having nausea, emesis, or diarrhea. The patient denies having any hematemesis or blood in the stool. Genitourinary: Patient denies having urinary urgency, frequency, or dysuria. The patient denies having blood in the urine. Psychological: The patient denies having symptoms of nervousness, anxiety, depression, or thoughts of harming self. Skin: Patient denies having skin rashes, skin, ulcerations, or unexplained itching or pruritus. Musculoskeletal: The patient denies having pain in the joints or bones. Objective:     Visit Vitals  /60   Pulse (!) 48   Temp 98 °F (36.7 °C) (Oral)   Resp 16   Ht 6' (1.829 m)   Wt 74.8 kg (165 lb)   SpO2 98%   BMI 22.38 kg/m²     ECOG PS=0  Physical Exam:   Gen. Appearance: The patient is in no acute distress. Skin: There is no bruise or rash. HEENT: The exam is unremarkable. Neck: Supple without lymphadenopathy or thyromegaly. Lungs: Clear to auscultation and percussion; there are no wheezes or rhonchi. Heart: Regular rate and rhythm; there are no murmurs, gallops, or rubs. Abdomen:  Bowel sounds are present and normal.  There is no guarding, tenderness, or hepatosplenomegaly. Extremities: There is no clubbing, cyanosis, or edema. Neurologic: There are no focal neurologic deficits. Lymphatics: There is no palpable peripheral lymphadenopathy. Musculoskeletal: The patient has been in his arms and hands. With the use of either a wheelchair for balance or a walker. There is no evidence of joint deformity or effusions. There is no focal joint tenderness. Psychological/psychiatric: There is no clinical evidence of anxiety, depression, or melancholy. Lab data:      Results for orders placed or performed during the hospital encounter of 02/10/20   CBC WITH 3 PART DIFF     Status: Abnormal   Result Value Ref Range Status    WBC 5.0 4.5 - 13.0 K/uL Final    RBC 2.95 (L) 4.10 - 5.10 M/uL Final    HGB 8.6 (L) 12.0 - 16 g/dL Final    HCT 28.3 (L) 36 - 48 % Final    MCV 95.9 78 - 102 FL Final    MCH 29.2 25.0 - 35.0 PG Final    MCHC 30.4 (L) 31 - 37 g/dL Final    RDW 21.6 (H) 11.5 - 14.5 % Final    NEUTROPHILS 53 40 - 70 % Final    MIXED CELLS 9 0.1 - 17 % Final    LYMPHOCYTES 38 14 - 44 % Final    ABS. NEUTROPHILS 2.6 1.8 - 9.5 K/UL Final    ABS. MIXED CELLS 0.5 0.0 - 2.3 K/uL Final    ABS. LYMPHOCYTES 1.9 1.1 - 5.9 K/UL Final     Comment: Test performed at [address] [place] [address] or Outpatient Infusion Center Location. Reviewed by Medical Director. DF AUTOMATED   Final           Assessment:     1. Anemia associated with stage 3 chronic renal failure ([facility]    2. Renal cell carcinoma of left kidney (HCC)    3. Chronic anemia    4. Thrombocytopenia (HCC)        Plan:     Iron deficiency anemia/chronic anemia/anemia due to chronic kidney disease stage III: The CBC from today shows a hemoglobin of 8.6 g/dL with hematocrit of 28.3%. I explained to the patient that his anemia is due to chronic kidney disease therefore we recommend Retacrit every 2 weeks if his hemoglobin is below 10g/dL and hematocrit below 30%.   We will attempt to have him receive his treatment here in the infusion center later today. The comprehensive metabolic panel, iron profile, and ferritin levels will be obtained. The goal is to maintain his ferritin level above 100 while undergoing treatment with Procrit therapy. Thrombocytopenia  I have explained to patient that his CBC from, 2/10/2020, shows that his platelet count preliminarily is 50,000. We will recheck his blood counts again in about 8 weeks. No therapeutic intervention is warranted at this time. I have explained to the patient that therapeutic intervention for thrombocytopenia is only indicated if the platelet counts decline below 30,000. Follow-up in 6 weeks .   Orders Placed This Encounter    COMPLETE CBC & AUTO DIFF WBC    InHouse CBC ([facility])     Standing Status:   Future     Number of Occurrences:   1     Standing Expiration Date:   2/17/2020       Jdue Gudino MD  2/10/2020

## 2020-02-11 DIAGNOSIS — N18.9 ACUTE RENAL FAILURE SUPERIMPOSED ON CHRONIC KIDNEY DISEASE, UNSPECIFIED CKD STAGE, UNSPECIFIED ACUTE RENAL FAILURE TYPE (HCC): ICD-10-CM

## 2020-02-11 DIAGNOSIS — N17.9 ACUTE RENAL FAILURE SUPERIMPOSED ON CHRONIC KIDNEY DISEASE, UNSPECIFIED CKD STAGE, UNSPECIFIED ACUTE RENAL FAILURE TYPE (HCC): ICD-10-CM

## 2020-02-11 LAB
A-G RATIO,AGRAT: 1.5 RATIO (ref 1.1–2.6)
ALBUMIN SERPL-MCNC: 3.9 G/DL (ref 3.5–5)
ALP SERPL-CCNC: 114 U/L (ref 40–125)
ALT SERPL-CCNC: 25 U/L (ref 5–40)
ANION GAP SERPL CALC-SCNC: 15 MMOL/L
AST SERPL W P-5'-P-CCNC: 28 U/L (ref 10–37)
BILIRUB SERPL-MCNC: 0.9 MG/DL (ref 0.2–1.2)
BUN SERPL-MCNC: 60 MG/DL (ref 6–22)
CALCIUM SERPL-MCNC: 8.6 MG/DL (ref 8.4–10.5)
CHLORIDE SERPL-SCNC: 121 MMOL/L (ref 98–110)
CO2 SERPL-SCNC: 15 MMOL/L (ref 20–32)
CREAT SERPL-MCNC: 5.7 MG/DL (ref 0.8–1.6)
FE % SATURATION,PSAT: 37 % (ref 20–50)
FERRITIN SERPL-MCNC: 168 NG/ML (ref 22–322)
GFRAA, 66117: 12.6
GFRNA, 66118: 10.4
GLOBULIN,GLOB: 2.6 G/DL (ref 2–4)
GLUCOSE SERPL-MCNC: 70 MG/DL (ref 70–99)
IRON,IRN: 94 MCG/DL (ref 45–160)
POTASSIUM SERPL-SCNC: 5.1 MMOL/L (ref 3.5–5.5)
PROT SERPL-MCNC: 6.5 G/DL (ref 6.2–8.1)
SODIUM SERPL-SCNC: 151 MMOL/L (ref 133–145)
TIBC,TIBC: 251 MCG/DL (ref 228–428)
UIBC SERPL-MCNC: 157 MCG/DL (ref 110–370)

## 2020-02-13 ENCOUNTER — HOSPITAL ENCOUNTER (EMERGENCY)
Age: 60
Discharge: HOME | End: 2020-02-13
Attending: EMERGENCY MEDICINE
Payer: MEDICARE

## 2020-02-13 VITALS — HEART RATE: 41 BPM | DIASTOLIC BLOOD PRESSURE: 58 MMHG | SYSTOLIC BLOOD PRESSURE: 108 MMHG | RESPIRATION RATE: 16 BRPM

## 2020-02-13 DIAGNOSIS — N18.9 CHRONIC KIDNEY DISEASE, UNSPECIFIED CKD STAGE: Primary | ICD-10-CM

## 2020-02-13 DIAGNOSIS — N17.9 AKI (ACUTE KIDNEY INJURY) (HCC): ICD-10-CM

## 2020-02-13 LAB
ALBUMIN SERPL-MCNC: 3.3 G/DL (ref 3.4–5)
ALBUMIN/GLOB SERPL: 0.8 {RATIO} (ref 0.8–1.7)
ALP SERPL-CCNC: 112 U/L (ref 45–117)
ALT SERPL-CCNC: 36 U/L (ref 16–61)
ANION GAP SERPL CALC-SCNC: 7 MMOL/L (ref 3–18)
AST SERPL-CCNC: 37 U/L (ref 10–38)
BASOPHILS # BLD: 0 K/UL (ref 0–0.1)
BASOPHILS NFR BLD: 1 % (ref 0–3)
BILIRUB SERPL-MCNC: 1.1 MG/DL (ref 0.2–1)
BUN SERPL-MCNC: 58 MG/DL (ref 7–18)
BUN/CREAT SERPL: 10 (ref 12–20)
CALCIUM SERPL-MCNC: 8.6 MG/DL (ref 8.5–10.1)
CHLORIDE SERPL-SCNC: 126 MMOL/L (ref 100–111)
CO2 SERPL-SCNC: 19 MMOL/L (ref 21–32)
CREAT SERPL-MCNC: 5.8 MG/DL (ref 0.6–1.3)
DIFFERENTIAL METHOD BLD: ABNORMAL
EOSINOPHIL # BLD: 0.1 K/UL (ref 0–0.4)
EOSINOPHIL NFR BLD: 3 % (ref 0–5)
ERYTHROCYTE [DISTWIDTH] IN BLOOD BY AUTOMATED COUNT: 21.7 % (ref 11.6–14.5)
GLOBULIN SER CALC-MCNC: 4.1 G/DL (ref 2–4)
GLUCOSE SERPL-MCNC: 117 MG/DL (ref 74–99)
HCT VFR BLD AUTO: 30.2 % (ref 36–48)
HGB BLD-MCNC: 9.6 G/DL (ref 13–16)
LYMPHOCYTES # BLD: 0.8 K/UL (ref 0.8–3.5)
LYMPHOCYTES NFR BLD: 29 % (ref 20–51)
MAGNESIUM SERPL-MCNC: 2.2 MG/DL (ref 1.6–2.6)
MCH RBC QN AUTO: 29.6 PG (ref 24–34)
MCHC RBC AUTO-ENTMCNC: 31.8 G/DL (ref 31–37)
MCV RBC AUTO: 93.2 FL (ref 74–97)
MONOCYTES # BLD: 0.2 K/UL (ref 0–1)
MONOCYTES NFR BLD: 7 % (ref 2–9)
MYELOCYTES NFR BLD MANUAL: 2 %
NEUTS BAND NFR BLD MANUAL: 2 % (ref 0–5)
NEUTS SEG # BLD: 1.6 K/UL (ref 1.8–8)
NEUTS SEG NFR BLD: 56 % (ref 42–75)
PLATELET # BLD AUTO: 45 K/UL (ref 135–420)
PLATELET COMMENTS,PCOM: ABNORMAL
POTASSIUM SERPL-SCNC: 5 MMOL/L (ref 3.5–5.5)
PROT SERPL-MCNC: 7.4 G/DL (ref 6.4–8.2)
RBC # BLD AUTO: 3.24 M/UL (ref 4.7–5.5)
RBC MORPH BLD: ABNORMAL
SODIUM SERPL-SCNC: 152 MMOL/L (ref 136–145)
WBC # BLD AUTO: 2.9 K/UL (ref 4.6–13.2)

## 2020-02-13 PROCEDURE — 83735 ASSAY OF MAGNESIUM: CPT

## 2020-02-13 PROCEDURE — 85025 COMPLETE CBC W/AUTO DIFF WBC: CPT

## 2020-02-13 PROCEDURE — 80053 COMPREHEN METABOLIC PANEL: CPT

## 2020-02-13 PROCEDURE — 99282 EMERGENCY DEPT VISIT SF MDM: CPT

## 2020-02-13 NOTE — ED NOTES
I performed a brief evaluation, including history and physical, of the patient here in triage and I have determined that pt will need further treatment and evaluation from the main side ER physician. I have placed initial orders to help in expediting patients care.      February 13, 2020 at 6:31 PM - Rosi Alfaro PA-C      Sent by Dr. Sal Richards for worsening renal function, may need to start dialysis    Visit Vitals  /58   Pulse (!) 41   Resp 16

## 2020-02-13 NOTE — PROGRESS NOTES
Patient was sent to ER for Abnormal Lab,Doing OK,NO SOB, ,good appetite. Has 2 plus  leg edema, on Lasix & low dose of Metolazone. Has hypernatremia, mild Hyperkalemia, acidosis,Anemi a & Thrombocytopenia. ,egfr 13 cc /mint  He does not want to be admitted, wants to see how he dies in the week end, if he becomes symptomatic he agreed to come back to ER otherwise he will come back on Monday for re-evaluation, says if he needs  Dialysis he will proceed .

## 2020-02-13 NOTE — ED PROVIDER NOTES
EMERGENCY DEPARTMENT HISTORY AND PHYSICAL EXAM    3:57 PM  Date: 2/13/2020  Patient Name: Chelsey Price    History of Presenting Illness     No chief complaint on file. History Provided By: patient     HPI: Chelsey Price is a 62 y.o. male with past medical history of diabetes, CKD, anemia presents with elevated creatinine and potassium. Patient went to his PMD and his creatinine was 5.9 from 4.5 and potassium 5.2. Dr. Luda Torres nephrologist is aware. Patient denies any shortness of breath, chest pain, leg swelling.        PCP: Anupama Leyva DO    Past History     Past Medical History:  Past Medical History:   Diagnosis Date    Anemia     Bradycardia, unspecified 2018    Cervical discitis     MRSA    Chronic drug abuse ([address] 1974    Chronic kidney disease     stage III    Diabetes ([address] 01/1990    Drug abuse ([address]     Encephalopathy     GERD (gastroesophageal reflux disease)     Hypertension     Muscle weakness     Quadriparesis ([address] 2017    Renal cell carcinoma of left kidney ([address] 12/17/13    Pathological Stage [id number] cc RCC FG3 s/p left open partial nephrectomy in 12/13      Sepsis due to methicillin resistant Staphylococcus aureus (HCC)     Suprapubic catheter ([address]     Thrombocytopenia (HCC)     Urethral erosion     Viral hepatitis B     Viral hepatitis C     Xerosis cutis        Past Surgical History:  Past Surgical History:   Procedure Laterality Date    COLONOSCOPY N/A 10/3/2019    COLONOSCOPY / polypectomy performed by Mateus Do MD at [address] [place] [address] HX CIRCUMCISION  12/17/13    Dr. Yvette Lagunas, [facility]    HX NEPHRECTOMY Left 12/17/13    Open/ Partial,nephrectomy    HX OTHER SURGICAL Right 2/27/2016    removed right ft  2nd meta-tarsal    HX OTHER SURGICAL  04/2017    abscess removed from back of neck     NEUROLOGICAL PROCEDURE UNLISTED  2017    neck surgery-abcess-hardware neck    NM REMOVAL WITH INSERTION OF SUPRAPUBIC CATHETER  2018       Family History:  Family History   Problem Relation Age of Onset    Diabetes Mother     Sickle Cell Anemia Father     Diabetes Sister     Hypertension Sister        Social History:  Social History     Tobacco Use    Smoking status: Current Every Day Smoker     Packs/day: 0.25     Years: 30.00     Pack years: 7.50     Types: Cigarettes    Smokeless tobacco: Never Used   Substance Use Topics    Alcohol use: Yes     Comment: beer occasionally    Drug use: Yes     Types: Marijuana, Heroin     Comment: marijuana-December 2018, heroin-9/15/19-approx       Allergies: Allergies   Allergen Reactions    Iodine Hives and Other (comments)       Review of Systems   Review of Systems   Constitutional: Negative for activity change, appetite change and chills. HENT: Negative for congestion, ear discharge, ear pain and sore throat. Eyes: Negative for photophobia and pain. Respiratory: Negative for cough and choking. Cardiovascular: Negative for palpitations and leg swelling. Gastrointestinal: Negative for anal bleeding and rectal pain. Endocrine: Negative for polydipsia and polyuria. Genitourinary: Negative for genital sores and urgency. Musculoskeletal: Negative for arthralgias and myalgias. Neurological: Negative for dizziness, seizures and speech difficulty. Psychiatric/Behavioral: Negative for hallucinations, self-injury and suicidal ideas. Physical Exam     Patient Vitals for the past 12 hrs:   Pulse Resp BP   02/13/20 1458 (!) 41 16 108/58       Physical Exam  Vitals signs and nursing note reviewed. Constitutional:       Appearance: He is well-developed. HENT:      Head: Normocephalic and atraumatic. Eyes:      General:         Right eye: No discharge. Left eye: No discharge. Neck:      Musculoskeletal: Normal range of motion and neck supple. Cardiovascular:      Rate and Rhythm: Normal rate and regular rhythm. Heart sounds: Normal heart sounds. No murmur.    Pulmonary: Effort: Pulmonary effort is normal. No respiratory distress. Breath sounds: Normal breath sounds. No stridor. No wheezing or rales. Chest:      Chest wall: No tenderness. Abdominal:      General: Bowel sounds are normal. There is no distension. Palpations: Abdomen is soft. Tenderness: There is no abdominal tenderness. There is no guarding or rebound. Musculoskeletal: Normal range of motion. Skin:     General: Skin is warm and dry. Neurological:      Mental Status: He is alert and oriented to person, place, and time. Diagnostic Study Results     Labs -  Recent Results (from the past 12 hour(s))   CBC WITH AUTOMATED DIFF    Collection Time: 02/13/20  3:36 PM   Result Value Ref Range    WBC 2.9 (L) 4.6 - 13.2 K/uL    RBC 3.24 (L) 4.70 - 5.50 M/uL    HGB 9.6 (L) 13.0 - 16.0 g/dL    HCT 30.2 (L) 36.0 - 48.0 %    MCV 93.2 74.0 - 97.0 FL    MCH 29.6 24.0 - 34.0 PG    MCHC 31.8 31.0 - 37.0 g/dL    RDW 21.7 (H) 11.6 - 14.5 %    PLATELET 45 (L) 929 - 420 K/uL    NEUTROPHILS 56 42 - 75 %    BAND NEUTROPHILS 2 0 - 5 %    LYMPHOCYTES 29 20 - 51 %    MONOCYTES 7 2 - 9 %    EOSINOPHILS 3 0 - 5 %    BASOPHILS 1 0 - 3 %    MYELOCYTES 2 (H) 0 %    ABS. NEUTROPHILS 1.6 (L) 1.8 - 8.0 K/UL    ABS. LYMPHOCYTES 0.8 0.8 - 3.5 K/UL    ABS. MONOCYTES 0.2 0 - 1.0 K/UL    ABS. EOSINOPHILS 0.1 0.0 - 0.4 K/UL    ABS.  BASOPHILS 0.0 0.0 - 0.1 K/UL    PLATELET COMMENTS LARGE PLATELETS      RBC COMMENTS ANISOCYTOSIS  2+        RBC COMMENTS POIKILOCYTOSIS  2+        RBC COMMENTS TARGET CELLS  1+        RBC COMMENTS POLYCHROMASIA  1+        DF MANUAL     METABOLIC PANEL, COMPREHENSIVE    Collection Time: 02/13/20  3:36 PM   Result Value Ref Range    Sodium 152 (H) 136 - 145 mmol/L    Potassium 5.0 3.5 - 5.5 mmol/L    Chloride 126 (H) 100 - 111 mmol/L    CO2 19 (L) 21 - 32 mmol/L    Anion gap 7 3.0 - 18 mmol/L    Glucose 117 (H) 74 - 99 mg/dL    BUN 58 (H) 7.0 - 18 MG/DL    Creatinine 5.80 (H) 0.6 - 1.3 MG/DL BUN/Creatinine ratio 10 (L) 12 - 20      GFR est AA 12 (L) >60 ml/min/1.73m2    GFR est non-AA 10 (L) >60 ml/min/1.73m2    Calcium 8.6 8.5 - 10.1 MG/DL    Bilirubin, total 1.1 (H) 0.2 - 1.0 MG/DL    ALT (SGPT) 36 16 - 61 U/L    AST (SGOT) 37 10 - 38 U/L    Alk. phosphatase 112 45 - 117 U/L    Protein, total 7.4 6.4 - 8.2 g/dL    Albumin 3.3 (L) 3.4 - 5.0 g/dL    Globulin 4.1 (H) 2.0 - 4.0 g/dL    A-G Ratio 0.8 0.8 - 1.7     MAGNESIUM    Collection Time: 02/13/20  3:36 PM   Result Value Ref Range    Magnesium 2.2 1.6 - 2.6 mg/dL       Radiologic Studies -   No results found. Medical Decision Making     ED Course: Progress Notes, Reevaluation, and Consults:    3:57 PM Initial assessment performed. The patients presenting problems have been discussed, and they/their family are in agreement with the care plan formulated and outlined with them. I have encouraged them to ask questions as they arise throughout their visit. Provider Notes (Medical Decision Making): Patient with elevated creatinine, SHEILA in a background of CKD  CMP repeated  Potassium 5 , creatinine 5.8  Patient is asymptomatic  Dr. Regalado [place] nephrologist came and evaluated patient, given his and symptomatic and patient does not desire to be hospitalized to hospital for him as outpatient. Possibly patient is a candidate for dialysis. Patient advised to return if he has any chest pain shortness of breath, leg swelling or any other concerns      Vital Signs-Reviewed the patient's vital signs. Reviewed pt's pulse ox reading. Records Reviewed:  old medical records (Time of Review: 3:57 PM)  -I am the first provider for this patient.  -I reviewed the vital signs, available nursing notes, past medical history, past surgical history, family history and social history. Current Outpatient Medications   Medication Sig Dispense Refill    FUNMILAYO-DEBBIE 0.8 mg tab tablet Take 0.8 mg by mouth daily.   2    levothyroxine (SYNTHROID) 100 mcg tablet Take 1 Tab by mouth every morning. 30 Tab 1    calcitRIOL (ROCALTROL) 0.25 mcg capsule Take 1 Cap by mouth every Monday, Wednesday, Friday. 30 Cap 0    ferrous gluconate 324 mg (37.5 mg iron) tablet Take 1 Tab by mouth two (2) times daily (with meals). 60 Tab 0    hydrALAZINE (APRESOLINE) 25 mg tablet Take 1 Tab by mouth every eight (8) hours as needed (systolic B/P >617). 30 Tab 0    liothyronine (CYTOMEL) 25 mcg tablet Take 1 Tab by mouth two (2) times a day. 60 Tab 0    Syringe, Disposable, (BD SYRINGE CATHETER TIP) 60 mL syrg Use 1 syringe daily with irrigations 30 Syringe 11    docusate sodium (COLACE) 100 mg capsule 100 mg two (2) times daily as needed.  ergocalciferol (DRISDOL) 50,000 unit capsule Take 50,000 Units by mouth every seven (7) days.  doxycycline (ADOXA) 100 mg tablet Take 100 mg by mouth daily.  insulin lispro (HUMALOG) 100 unit/mL injection Normal Insulin Sensitivity   For Blood Sugar (mg/dL) of:     Less than 150 =   0 units           150 -199 =   2 units  200 -249 =   4 units  250 -299 =   6 units  300 -349 =   8 units  350 and above =   10 units  If 2 glucose readings are above 200 mg/dL within a 24 hr period, proceed to \"Very Insul 1 Vial 0    tamsulosin (FLOMAX) 0.4 mg capsule Take 1 Cap by mouth daily. 30 Cap 0        Clinical Impression     Clinical Impression:   1. Chronic kidney disease, unspecified CKD stage    2. SHEILA (acute kidney injury) ([facility]        Disposition: discharge      DISCHARGE NOTE:   Pt has been reexamined. Patient has no new complaints, changes, or physical findings. Care plan outlined and precautions discussed. Results were reviewed with the patient. All medications were reviewed with the patient; will d/c home with PMD f/u. All of pt's questions and concerns were addressed. Patient was instructed and agrees to follow up with PMD, as well as to return to the ED upon further deterioration. Patient is ready to go home.     This note was dictated utilizing voice recognition software which may lead to typographical errors. I apologize in advance if the situation occurs. If questions arise please do not hesitate to contact me or call our department. This note was dictated utilizing voice recognition software which may lead to typographical errors. I apologize in advance if the situation occurs. If questions arise please do not hesitate to contact me or call our department.     Merna Tong MD  3:57 PM

## 2020-02-13 NOTE — DISCHARGE INSTRUCTIONS
Patient Education        Acute Kidney Injury: Care Instructions  Your Care Instructions    Acute kidney injury (SHEILA) is a sudden decrease in kidney function. This can happen over a period of hours, days or, in some cases, weeks. SHEILA used to be called acute renal failure, but kidney failure doesn't always happen with SHEILA. Common causes of SHEILA are dehydration, blood loss, and medicines. When SHEILA happens, the kidneys have trouble removing waste and excess fluids from the body. The waste and fluids build up and become harmful. Kidney function may return to normal if the cause of SHEILA is treated quickly. Your chance of a full recovery depends on what caused the problem, how quickly the cause was treated, and what other medical problems you have. A machine may be used to help your kidneys remove waste and fluids for a short period of time. This is called dialysis. Follow-up care is a key part of your treatment and safety. Be sure to make and go to all appointments, and call your doctor if you are having problems. It's also a good idea to know your test results and keep a list of the medicines you take. How can you care for yourself at home? · Talk to your doctor about how much fluid you should drink. · Eat a balanced diet. Talk to your doctor or a dietitian about what type of diet may be best for you. You may need to limit sodium, potassium, and phosphorus. · If you need dialysis, follow the instructions and schedule for dialysis that your doctor gives you. · Do not smoke. Smoking can make your condition worse. If you need help quitting, talk to your doctor about stop-smoking programs and medicines. These can increase your chances of quitting for good. · Do not drink alcohol. · Review all of your medicines with your doctor.  Do not take any medicines, including nonsteroidal anti-inflammatory drugs (NSAIDs) such as ibuprofen (Advil, Motrin) or naproxen (Aleve), unless your doctor says it is safe for you to do so.  · Make sure that anyone treating you for any health problem knows that you have had SHEILA. When should you call for help? Call 911 anytime you think you may need emergency care. For example, call if:    · You passed out (lost consciousness).    Call your doctor now or seek immediate medical care if:    · You have new or worse nausea and vomiting.     · You have much less urine than normal, or you have no urine.     · You are feeling confused or cannot think clearly.     · You have new or more blood in your urine.     · You have new swelling.     · You are dizzy or lightheaded, or feel like you may faint.    Watch closely for changes in your health, and be sure to contact your doctor if:    · You do not get better as expected. Where can you learn more? Go to http://gabo-curly.info/. Enter K025 in the search box to learn more about \"Acute Kidney Injury: Care Instructions. \"  Current as of: October 31, 2018  Content Version: 12.2  © 0365-7063 [employer]. Care instructions adapted under license by [employer] (which disclaims liability or warranty for this information). If you have questions about a medical condition or this instruction, always ask your healthcare professional. [address] any warranty or liability for your use of this information.

## 2020-02-15 ENCOUNTER — APPOINTMENT (OUTPATIENT)
Dept: GENERAL RADIOLOGY | Age: 60
DRG: 673 | End: 2020-02-15
Attending: EMERGENCY MEDICINE
Payer: MEDICARE

## 2020-02-15 ENCOUNTER — HOSPITAL ENCOUNTER (INPATIENT)
Age: 60
LOS: 10 days | Discharge: HOME | DRG: 673 | End: 2020-02-25
Attending: EMERGENCY MEDICINE | Admitting: FAMILY MEDICINE
Payer: MEDICARE

## 2020-02-15 ENCOUNTER — APPOINTMENT (OUTPATIENT)
Dept: GENERAL RADIOLOGY | Age: 60
DRG: 673 | End: 2020-02-15
Attending: PHYSICIAN ASSISTANT
Payer: MEDICARE

## 2020-02-15 DIAGNOSIS — N17.9 AKI (ACUTE KIDNEY INJURY) (HCC): Primary | ICD-10-CM

## 2020-02-15 DIAGNOSIS — D64.9 ANEMIA, UNSPECIFIED TYPE: ICD-10-CM

## 2020-02-15 DIAGNOSIS — D69.6 THROMBOCYTOPENIA (HCC): ICD-10-CM

## 2020-02-15 DIAGNOSIS — N30.00 ACUTE CYSTITIS WITHOUT HEMATURIA: ICD-10-CM

## 2020-02-15 PROBLEM — R00.1 BRADYCARDIA: Status: ACTIVE | Noted: 2020-02-15

## 2020-02-15 PROBLEM — R00.1 BRADYCARDIA, SINUS: Status: RESOLVED | Noted: 2019-03-28 | Resolved: 2020-02-15

## 2020-02-15 PROBLEM — E11.649 TYPE 2 DIABETES MELLITUS WITH HYPOGLYCEMIA (HCC): Status: RESOLVED | Noted: 2019-03-28 | Resolved: 2020-02-15

## 2020-02-15 PROBLEM — Z87.09 HISTORY OF HAY FEVER: Status: RESOLVED | Noted: 2019-01-31 | Resolved: 2020-02-15

## 2020-02-15 PROBLEM — Z86.39 HISTORY OF DIABETES MELLITUS: Status: RESOLVED | Noted: 2019-01-31 | Resolved: 2020-02-15

## 2020-02-15 PROBLEM — N42.9 PROSTATE DISORDER: Status: RESOLVED | Noted: 2019-01-31 | Resolved: 2020-02-15

## 2020-02-15 PROBLEM — H61.20 CERUMEN IMPACTION: Status: RESOLVED | Noted: 2019-01-31 | Resolved: 2020-02-15

## 2020-02-15 PROBLEM — T68.XXXA HYPOTHERMIA: Status: RESOLVED | Noted: 2019-03-17 | Resolved: 2020-02-15

## 2020-02-15 PROBLEM — Z01.818 PREOPERATIVE CLEARANCE: Status: RESOLVED | Noted: 2019-01-31 | Resolved: 2020-02-15

## 2020-02-15 PROBLEM — E83.52 HYPERCALCEMIA: Status: RESOLVED | Noted: 2018-04-02 | Resolved: 2020-02-15

## 2020-02-15 PROBLEM — S37.009A RENAL INJURY: Status: RESOLVED | Noted: 2019-03-28 | Resolved: 2020-02-15

## 2020-02-15 PROBLEM — N39.0 RECURRENT UTI: Status: RESOLVED | Noted: 2019-01-31 | Resolved: 2020-02-15

## 2020-02-15 PROBLEM — Z86.39 HISTORY OF ELEVATED LIPIDS: Status: RESOLVED | Noted: 2019-01-31 | Resolved: 2020-02-15

## 2020-02-15 PROBLEM — A41.9 SEPSIS (HCC): Status: RESOLVED | Noted: 2019-03-17 | Resolved: 2020-02-15

## 2020-02-15 PROBLEM — R00.1 BRADYCARDIA: Status: RESOLVED | Noted: 2018-03-31 | Resolved: 2020-02-15

## 2020-02-15 PROBLEM — N17.0 ATN (ACUTE TUBULAR NECROSIS) (HCC): Status: RESOLVED | Noted: 2019-04-01 | Resolved: 2020-02-15

## 2020-02-15 PROBLEM — Z79.899 MEDICATION MANAGEMENT: Status: RESOLVED | Noted: 2019-01-31 | Resolved: 2020-02-15

## 2020-02-15 PROBLEM — N39.0 ACUTE UTI: Status: RESOLVED | Noted: 2019-03-28 | Resolved: 2020-02-15

## 2020-02-15 PROBLEM — E78.5 HYPERLIPIDEMIA: Status: RESOLVED | Noted: 2019-01-31 | Resolved: 2020-02-15

## 2020-02-15 PROBLEM — N18.30 ACUTE RENAL FAILURE SUPERIMPOSED ON STAGE 3 CHRONIC KIDNEY DISEASE (HCC): Status: ACTIVE | Noted: 2020-02-15

## 2020-02-15 PROBLEM — R31.9 HEMATURIA: Status: RESOLVED | Noted: 2018-10-14 | Resolved: 2020-02-15

## 2020-02-15 PROBLEM — R34 OLIGURIA: Status: RESOLVED | Noted: 2019-03-28 | Resolved: 2020-02-15

## 2020-02-15 PROBLEM — R33.9 URINARY RETENTION: Status: RESOLVED | Noted: 2017-10-30 | Resolved: 2020-02-15

## 2020-02-15 PROBLEM — E87.5 HYPERKALEMIA: Status: RESOLVED | Noted: 2018-04-02 | Resolved: 2020-02-15

## 2020-02-15 PROBLEM — N18.9 ACUTE RENAL FAILURE SUPERIMPOSED ON CHRONIC KIDNEY DISEASE (HCC): Status: ACTIVE | Noted: 2020-02-15

## 2020-02-15 PROBLEM — J18.9 PNEUMONIA DUE TO ORGANISM: Status: RESOLVED | Noted: 2019-03-07 | Resolved: 2020-02-15

## 2020-02-15 PROBLEM — R97.20 ELEVATED PSA: Status: RESOLVED | Noted: 2019-01-31 | Resolved: 2020-02-15

## 2020-02-15 PROBLEM — N39.0 UTI (URINARY TRACT INFECTION): Status: ACTIVE | Noted: 2020-02-15

## 2020-02-15 PROBLEM — R53.1 GENERALIZED WEAKNESS: Status: RESOLVED | Noted: 2018-07-03 | Resolved: 2020-02-15

## 2020-02-15 LAB
ABO + RH BLD: NORMAL
ALBUMIN SERPL-MCNC: 3.2 G/DL (ref 3.4–5)
ALBUMIN/GLOB SERPL: 0.9 {RATIO} (ref 0.8–1.7)
ALP SERPL-CCNC: 96 U/L (ref 45–117)
ALT SERPL-CCNC: 39 U/L (ref 16–61)
AMPHET UR QL SCN: NEGATIVE
ANION GAP SERPL CALC-SCNC: 8 MMOL/L (ref 3–18)
APPEARANCE UR: ABNORMAL
AST SERPL-CCNC: 32 U/L (ref 10–38)
ATRIAL RATE: 44 BPM
BACTERIA URNS QL MICRO: ABNORMAL /HPF
BARBITURATES UR QL SCN: NEGATIVE
BASOPHILS # BLD: 0 K/UL (ref 0–0.1)
BASOPHILS NFR BLD: 0 % (ref 0–2)
BENZODIAZ UR QL: NEGATIVE
BILIRUB SERPL-MCNC: 0.8 MG/DL (ref 0.2–1)
BILIRUB UR QL: NEGATIVE
BLOOD GROUP ANTIBODIES SERPL: NORMAL
BNP SERPL-MCNC: 3847 PG/ML (ref 0–900)
BUN SERPL-MCNC: 59 MG/DL (ref 7–18)
BUN/CREAT SERPL: 9 (ref 12–20)
CALCIUM SERPL-MCNC: 8 MG/DL (ref 8.5–10.1)
CALCULATED P AXIS, ECG09: 67 DEGREES
CALCULATED R AXIS, ECG10: -18 DEGREES
CALCULATED T AXIS, ECG11: -3 DEGREES
CANNABINOIDS UR QL SCN: NEGATIVE
CHLORIDE SERPL-SCNC: 123 MMOL/L (ref 100–111)
CO2 SERPL-SCNC: 17 MMOL/L (ref 21–32)
COCAINE UR QL SCN: NEGATIVE
COLOR UR: ABNORMAL
CREAT SERPL-MCNC: 6.31 MG/DL (ref 0.6–1.3)
DIAGNOSIS, 93000: NORMAL
DIFFERENTIAL METHOD BLD: ABNORMAL
EOSINOPHIL # BLD: 0 K/UL (ref 0–0.4)
EOSINOPHIL NFR BLD: 1 % (ref 0–5)
EPITH CASTS URNS QL MICRO: ABNORMAL /LPF (ref 0–5)
ERYTHROCYTE [DISTWIDTH] IN BLOOD BY AUTOMATED COUNT: 21.5 % (ref 11.6–14.5)
GLOBULIN SER CALC-MCNC: 3.7 G/DL (ref 2–4)
GLUCOSE BLD STRIP.AUTO-MCNC: 74 MG/DL (ref 70–110)
GLUCOSE BLD STRIP.AUTO-MCNC: 76 MG/DL (ref 70–110)
GLUCOSE SERPL-MCNC: 62 MG/DL (ref 74–99)
GLUCOSE UR STRIP.AUTO-MCNC: NEGATIVE MG/DL
HCT VFR BLD AUTO: 28.2 % (ref 36–48)
HDSCOM,HDSCOM: ABNORMAL
HGB BLD-MCNC: 8.7 G/DL (ref 13–16)
HGB UR QL STRIP: ABNORMAL
KETONES UR QL STRIP.AUTO: NEGATIVE MG/DL
LEUKOCYTE ESTERASE UR QL STRIP.AUTO: ABNORMAL
LYMPHOCYTES # BLD: 1.1 K/UL (ref 0.9–3.6)
LYMPHOCYTES NFR BLD: 34 % (ref 21–52)
MCH RBC QN AUTO: 28.9 PG (ref 24–34)
MCHC RBC AUTO-ENTMCNC: 30.9 G/DL (ref 31–37)
MCV RBC AUTO: 93.7 FL (ref 74–97)
METHADONE UR QL: NEGATIVE
MONOCYTES # BLD: 0.2 K/UL (ref 0.05–1.2)
MONOCYTES NFR BLD: 7 % (ref 3–10)
NEUTS SEG # BLD: 1.8 K/UL (ref 1.8–8)
NEUTS SEG NFR BLD: 58 % (ref 40–73)
NITRITE UR QL STRIP.AUTO: NEGATIVE
OPIATES UR QL: POSITIVE
P-R INTERVAL, ECG05: 206 MS
PCP UR QL: NEGATIVE
PH UR STRIP: 5 [PH] (ref 5–8)
PLATELET # BLD AUTO: 35 K/UL (ref 135–420)
POTASSIUM SERPL-SCNC: 4.9 MMOL/L (ref 3.5–5.5)
PROT SERPL-MCNC: 6.9 G/DL (ref 6.4–8.2)
PROT UR STRIP-MCNC: >1000 MG/DL
Q-T INTERVAL, ECG07: 534 MS
QRS DURATION, ECG06: 100 MS
QTC CALCULATION (BEZET), ECG08: 456 MS
RBC # BLD AUTO: 3.01 M/UL (ref 4.7–5.5)
RBC #/AREA URNS HPF: ABNORMAL /HPF (ref 0–5)
SODIUM SERPL-SCNC: 148 MMOL/L (ref 136–145)
SP GR UR REFRACTOMETRY: 1.02 (ref 1–1.03)
SPECIMEN EXP DATE BLD: NORMAL
TROPONIN I SERPL-MCNC: 0.05 NG/ML (ref 0–0.04)
UROBILINOGEN UR QL STRIP.AUTO: 0.2 EU/DL (ref 0.2–1)
VENTRICULAR RATE, ECG03: 44 BPM
WBC # BLD AUTO: 3.1 K/UL (ref 4.6–13.2)
WBC URNS QL MICRO: ABNORMAL /HPF (ref 0–4)

## 2020-02-15 PROCEDURE — 87106 FUNGI IDENTIFICATION YEAST: CPT

## 2020-02-15 PROCEDURE — 96375 TX/PRO/DX INJ NEW DRUG ADDON: CPT

## 2020-02-15 PROCEDURE — 74011000250 HC RX REV CODE- 250: Performed by: EMERGENCY MEDICINE

## 2020-02-15 PROCEDURE — 86900 BLOOD TYPING SEROLOGIC ABO: CPT

## 2020-02-15 PROCEDURE — 74011250636 HC RX REV CODE- 250/636: Performed by: EMERGENCY MEDICINE

## 2020-02-15 PROCEDURE — 71045 X-RAY EXAM CHEST 1 VIEW: CPT

## 2020-02-15 PROCEDURE — 74011250636 HC RX REV CODE- 250/636

## 2020-02-15 PROCEDURE — 83880 ASSAY OF NATRIURETIC PEPTIDE: CPT

## 2020-02-15 PROCEDURE — 74011250637 HC RX REV CODE- 250/637: Performed by: FAMILY MEDICINE

## 2020-02-15 PROCEDURE — 81001 URINALYSIS AUTO W/SCOPE: CPT

## 2020-02-15 PROCEDURE — 74011250636 HC RX REV CODE- 250/636: Performed by: PHYSICIAN ASSISTANT

## 2020-02-15 PROCEDURE — 87086 URINE CULTURE/COLONY COUNT: CPT

## 2020-02-15 PROCEDURE — 96374 THER/PROPH/DIAG INJ IV PUSH: CPT

## 2020-02-15 PROCEDURE — 80307 DRUG TEST PRSMV CHEM ANLYZR: CPT

## 2020-02-15 PROCEDURE — 99285 EMERGENCY DEPT VISIT HI MDM: CPT

## 2020-02-15 PROCEDURE — 85025 COMPLETE CBC W/AUTO DIFF WBC: CPT

## 2020-02-15 PROCEDURE — 80053 COMPREHEN METABOLIC PANEL: CPT

## 2020-02-15 PROCEDURE — 87040 BLOOD CULTURE FOR BACTERIA: CPT

## 2020-02-15 PROCEDURE — 75810000455 HC PLCMT CENT VENOUS CATH LVL 2 5182

## 2020-02-15 PROCEDURE — 93005 ELECTROCARDIOGRAM TRACING: CPT

## 2020-02-15 PROCEDURE — 84484 ASSAY OF TROPONIN QUANT: CPT

## 2020-02-15 PROCEDURE — 65610000006 HC RM INTENSIVE CARE

## 2020-02-15 PROCEDURE — 82962 GLUCOSE BLOOD TEST: CPT

## 2020-02-15 RX ORDER — NOREPINEPHRINE BITARTRATE/D5W 8 MG/250ML
.5-16 PLASTIC BAG, INJECTION (ML) INTRAVENOUS
Status: DISCONTINUED | OUTPATIENT
Start: 2020-02-15 | End: 2020-02-15

## 2020-02-15 RX ORDER — INSULIN LISPRO 100 [IU]/ML
INJECTION, SOLUTION INTRAVENOUS; SUBCUTANEOUS
Status: DISCONTINUED | OUTPATIENT
Start: 2020-02-15 | End: 2020-02-19

## 2020-02-15 RX ORDER — SODIUM BICARBONATE 650 MG/1
650 TABLET ORAL 2 TIMES DAILY
Status: DISCONTINUED | OUTPATIENT
Start: 2020-02-15 | End: 2020-02-19

## 2020-02-15 RX ORDER — FERROUS GLUCONATE 324(37.5)
1 TABLET ORAL 2 TIMES DAILY WITH MEALS
Status: DISCONTINUED | OUTPATIENT
Start: 2020-02-15 | End: 2020-02-25 | Stop reason: HOSPADM

## 2020-02-15 RX ORDER — FENTANYL CITRATE 50 UG/ML
INJECTION, SOLUTION INTRAMUSCULAR; INTRAVENOUS
Status: COMPLETED
Start: 2020-02-15 | End: 2020-02-15

## 2020-02-15 RX ORDER — ATROPINE SULFATE 1 MG/ML
0.5 INJECTION, SOLUTION INTRAVENOUS
Status: COMPLETED | OUTPATIENT
Start: 2020-02-15 | End: 2020-02-15

## 2020-02-15 RX ORDER — LEVOTHYROXINE SODIUM 100 UG/1
100 TABLET ORAL
Status: DISCONTINUED | OUTPATIENT
Start: 2020-02-16 | End: 2020-02-25 | Stop reason: HOSPADM

## 2020-02-15 RX ORDER — FENTANYL CITRATE 50 UG/ML
100 INJECTION, SOLUTION INTRAMUSCULAR; INTRAVENOUS
Status: COMPLETED | OUTPATIENT
Start: 2020-02-15 | End: 2020-02-15

## 2020-02-15 RX ORDER — NOREPINEPHRINE BITARTRATE/D5W 8 MG/250ML
.5-16 PLASTIC BAG, INJECTION (ML) INTRAVENOUS
Status: DISCONTINUED | OUTPATIENT
Start: 2020-02-15 | End: 2020-02-19

## 2020-02-15 RX ADMIN — SODIUM BICARBONATE 650 MG TABLET 650 MG: at 20:12

## 2020-02-15 RX ADMIN — FENTANYL CITRATE 100 MCG: 50 INJECTION, SOLUTION INTRAMUSCULAR; INTRAVENOUS at 22:10

## 2020-02-15 RX ADMIN — CEFTRIAXONE SODIUM 1 G: 1 INJECTION, POWDER, FOR SOLUTION INTRAMUSCULAR; INTRAVENOUS at 17:33

## 2020-02-15 RX ADMIN — Medication 4 MCG/MIN: at 22:35

## 2020-02-15 RX ADMIN — FERROUS GLUCONATE TAB 324 MG (37.5 MG ELEMENTAL IRON) 1 TABLET: 324 (37.5 FE) TAB at 20:12

## 2020-02-15 RX ADMIN — SODIUM CHLORIDE 500 ML: 900 INJECTION, SOLUTION INTRAVENOUS at 17:40

## 2020-02-15 RX ADMIN — ATROPINE SULFATE 0.5 MG: 1 INJECTION, SOLUTION INTRAMUSCULAR; INTRAVENOUS; SUBCUTANEOUS at 20:08

## 2020-02-15 NOTE — ED PROVIDER NOTES
EMERGENCY DEPARTMENT HISTORY AND PHYSICAL EXAM    4:17 PM      Date: 2/15/2020  Patient Name: Dario Michel    History of Presenting Illness     No chief complaint on file. History Provided By: Patient      Additional History (Context): Dario Michel is a 62 y.o. male who presents with increased leg swelling. Patient states that he was in the ED 2 days ago and was found to have SHEILA. His potassium was 5.2. Nephrology was consulted and saw the patient in the ED. The patient at that time did not want to be admitted and was discharged home with outpatient follow up. He was instructed that if he has worsening symptoms to return to the ED. Patient complains of increased swelling of bilateral legs. Patient denies any lightheadedness, dizziness, chest pain, shortness of breath, abdominal pain, nausea, vomiting. PCP: Noel Uribe DO      Current Facility-Administered Medications   Medication Dose Route Frequency Provider Last Rate Last Dose    [START ON 2/16/2020] levothyroxine (SYNTHROID) tablet 100 mcg  100 mcg Oral 6am Klaudia Pope MD        insulin lispro (HUMALOG) injection   SubCUTAneous TIDAC Klaudia Pope MD       [facility] ON 2/16/2020] B complex-vitaminC-folic acid (NEPHROCAP) cap  1 Cap Oral DAILY Klaudia Pope MD        sodium bicarbonate tablet 650 mg  650 mg Oral BID Klaudia Pope MD        ferrous gluconate 324 mg (37.5 mg iron) tablet 1 Tab  1 Tab Oral BID WITH MEALS Klaudia Pope MD         Current Outpatient Medications   Medication Sig Dispense Refill    FUNMILAYO-DEBBIE 0.8 mg tab tablet Take 0.8 mg by mouth daily. 2    levothyroxine (SYNTHROID) 100 mcg tablet Take 1 Tab by mouth every morning. 30 Tab 1    calcitRIOL (ROCALTROL) 0.25 mcg capsule Take 1 Cap by mouth every Monday, Wednesday, Friday. 30 Cap 0    ferrous gluconate 324 mg (37.5 mg iron) tablet Take 1 Tab by mouth two (2) times daily (with meals).  60 Tab 0    hydrALAZINE (APRESOLINE) 25 mg tablet Take 1 Tab by mouth every eight (8) hours as needed (systolic B/P >552). 30 Tab 0    liothyronine (CYTOMEL) 25 mcg tablet Take 1 Tab by mouth two (2) times a day. 60 Tab 0    Syringe, Disposable, (BD SYRINGE CATHETER TIP) 60 mL syrg Use 1 syringe daily with irrigations 30 Syringe 11    docusate sodium (COLACE) 100 mg capsule 100 mg two (2) times daily as needed.  ergocalciferol (DRISDOL) 50,000 unit capsule Take 50,000 Units by mouth every seven (7) days.  doxycycline (ADOXA) 100 mg tablet Take 100 mg by mouth daily.  insulin lispro (HUMALOG) 100 unit/mL injection Normal Insulin Sensitivity   For Blood Sugar (mg/dL) of:     Less than 150 =   0 units           150 -199 =   2 units  200 -249 =   4 units  250 -299 =   6 units  300 -349 =   8 units  350 and above =   10 units  If 2 glucose readings are above 200 mg/dL within a 24 hr period, proceed to \"Very Insul 1 Vial 0    tamsulosin (FLOMAX) 0.4 mg capsule Take 1 Cap by mouth daily.  30 Cap 0       Past History     Past Medical History:  Past Medical History:   Diagnosis Date    Anemia     Bradycardia, unspecified 2018    Cervical discitis     MRSA    Chronic drug abuse ([facility] [address] 1974    Chronic kidney disease     stage III    Diabetes ([facility] [address] 01/1990    Drug abuse ([facility] [address]     Encephalopathy     GERD (gastroesophageal reflux disease)     Hypertension     Muscle weakness     Quadriparesis ([facility] [address] 2017    Renal cell carcinoma of left kidney ([facility] [address] 12/17/13    Pathological Stage [id number] cc RCC FG3 s/p left open partial nephrectomy in 12/13      Sepsis due to methicillin resistant Staphylococcus aureus (HCC)     Suprapubic catheter (HCC)     Thrombocytopenia (HCC)     Urethral erosion     Viral hepatitis B     Viral hepatitis C     Xerosis cutis        Past Surgical History:  Past Surgical History:   Procedure Laterality Date    COLONOSCOPY N/A 10/3/2019    COLONOSCOPY / polypectomy performed by Zoya Diaz MD at [facility]    HX CIRCUMCISION  12/17/13    Dr. Ronan Antonio, [facility]    HX NEPHRECTOMY Left 12/17/13    Open/ Partial,nephrectomy    HX OTHER SURGICAL Right 2/27/2016    removed right ft  2nd meta-tarsal    HX OTHER SURGICAL  04/2017    abscess removed from back of neck     NEUROLOGICAL PROCEDURE UNLISTED  2017    neck surgery-abcess-hardware neck    WA REMOVAL WITH INSERTION OF SUPRAPUBIC CATHETER  2018       Family History:  Family History   Problem Relation Age of Onset    Diabetes Mother     Sickle Cell Anemia Father     Diabetes Sister     Hypertension Sister        Social History:  Social History     Tobacco Use    Smoking status: Current Every Day Smoker     Packs/day: 0.25     Years: 30.00     Pack years: 7.50     Types: Cigarettes    Smokeless tobacco: Never Used   Substance Use Topics    Alcohol use: Yes     Comment: beer occasionally    Drug use: Yes     Types: Marijuana, Heroin     Comment: marijuana-December 2018, heroin-9/15/19-approx       Allergies: Allergies   Allergen Reactions    Iodine Hives and Other (comments)         Review of Systems       Review of Systems   Constitutional: Negative for chills, fatigue and fever. HENT: Negative for congestion, rhinorrhea, sinus pressure, sinus pain, sneezing and sore throat. Eyes: Negative for photophobia and visual disturbance. Respiratory: Negative for cough, shortness of breath and wheezing. Cardiovascular: Positive for leg swelling. Negative for chest pain and palpitations. Gastrointestinal: Negative for abdominal pain, constipation, diarrhea, nausea and vomiting. Genitourinary: Negative for dysuria, frequency and hematuria. Musculoskeletal: Negative for back pain, neck pain and neck stiffness. Skin: Negative for rash and wound. Neurological: Negative for dizziness, light-headedness and headaches. Psychiatric/Behavioral: Negative for agitation, behavioral problems and confusion.          Physical Exam     Visit Vitals  BP 98/51   Pulse (!) 34   Resp 8   SpO2 99%       Physical Exam  Constitutional:       General: He is not in acute distress. Appearance: He is not toxic-appearing. HENT:      Head: Normocephalic and atraumatic. Nose: No congestion or rhinorrhea. Mouth/Throat:      Pharynx: No oropharyngeal exudate or posterior oropharyngeal erythema. Eyes:      General: No scleral icterus. Neck:      Musculoskeletal: Normal range of motion. No neck rigidity. Cardiovascular:      Rate and Rhythm: Bradycardia present. Heart sounds: No murmur. No gallop. Pulmonary:      Effort: No respiratory distress. Breath sounds: Decreased breath sounds present. No wheezing. Comments: Patient has diminished breath sounds in bilateral lung bases. No acute respiratory distress. No wheezes or rhonchi. Pulse ox 100% on room air. Abdominal:      General: There is no distension. Palpations: Abdomen is soft. Tenderness: There is no abdominal tenderness. Genitourinary:     Rectum: Guaiac result negative. Comments: Patient is guaiac negative. Nurse present at bedside during the examination. Musculoskeletal:      Right lower leg: Edema present. Left lower leg: Edema present. Comments: Patient has +2 pitting edema bilateral lower extremities. Lymphadenopathy:      Cervical: No cervical adenopathy. Skin:     General: Skin is warm. Capillary Refill: Capillary refill takes less than 2 seconds. Coloration: Skin is not jaundiced. Findings: No bruising. Neurological:      Mental Status: He is alert and oriented to person, place, and time. Cranial Nerves: No cranial nerve deficit. Motor: No weakness. Psychiatric:         Mood and Affect: Mood normal.         Thought Content:  Thought content normal.           Diagnostic Study Results     Labs -  Recent Results (from the past 12 hour(s))   EKG, 12 LEAD, INITIAL    Collection Time: 02/15/20  3:58 PM   Result Value Ref Range    Ventricular Rate 44 BPM    Atrial Rate 44 BPM    P-R Interval 206 ms    QRS Duration 100 ms    Q-T Interval 534 ms    QTC Calculation (Bezet) 456 ms    Calculated P Axis 67 degrees    Calculated R Axis -18 degrees    Calculated T Axis -3 degrees    Diagnosis       Marked sinus bradycardia  Abnormal ECG  When compared with ECG of 28-MAR-2019 03:09,  No significant change was found  Confirmed by Jona Tatum ([id number]) on 2/15/2020 4:14:57 PM     CBC WITH AUTOMATED DIFF    Collection Time: 02/15/20  4:15 PM   Result Value Ref Range    WBC 3.1 (L) 4.6 - 13.2 K/uL    RBC 3.01 (L) 4.70 - 5.50 M/uL    HGB 8.7 (L) 13.0 - 16.0 g/dL    HCT 28.2 (L) 36.0 - 48.0 %    MCV 93.7 74.0 - 97.0 FL    MCH 28.9 24.0 - 34.0 PG    MCHC 30.9 (L) 31.0 - 37.0 g/dL    RDW 21.5 (H) 11.6 - 14.5 %    PLATELET 35 (L) 951 - 420 K/uL    NEUTROPHILS 58 40 - 73 %    LYMPHOCYTES 34 21 - 52 %    MONOCYTES 7 3 - 10 %    EOSINOPHILS 1 0 - 5 %    BASOPHILS 0 0 - 2 %    ABS. NEUTROPHILS 1.8 1.8 - 8.0 K/UL    ABS. LYMPHOCYTES 1.1 0.9 - 3.6 K/UL    ABS. MONOCYTES 0.2 0.05 - 1.2 K/UL    ABS. EOSINOPHILS 0.0 0.0 - 0.4 K/UL    ABS. BASOPHILS 0.0 0.0 - 0.1 K/UL    DF AUTOMATED     METABOLIC PANEL, COMPREHENSIVE    Collection Time: 02/15/20  4:15 PM   Result Value Ref Range    Sodium 148 (H) 136 - 145 mmol/L    Potassium 4.9 3.5 - 5.5 mmol/L    Chloride 123 (H) 100 - 111 mmol/L    CO2 17 (L) 21 - 32 mmol/L    Anion gap 8 3.0 - 18 mmol/L    Glucose 62 (L) 74 - 99 mg/dL    BUN 59 (H) 7.0 - 18 MG/DL    Creatinine 6.31 (H) 0.6 - 1.3 MG/DL    BUN/Creatinine ratio 9 (L) 12 - 20      GFR est AA 11 (L) >60 ml/min/1.73m2    GFR est non-AA 9 (L) >60 ml/min/1.73m2    Calcium 8.0 (L) 8.5 - 10.1 MG/DL    Bilirubin, total 0.8 0.2 - 1.0 MG/DL    ALT (SGPT) 39 16 - 61 U/L    AST (SGOT) 32 10 - 38 U/L    Alk.  phosphatase 96 45 - 117 U/L    Protein, total 6.9 6.4 - 8.2 g/dL    Albumin 3.2 (L) 3.4 - 5.0 g/dL    Globulin 3.7 2.0 - 4.0 g/dL    A-G Ratio 0.9 0.8 - 1.7 TROPONIN I    Collection Time: 02/15/20  4:15 PM   Result Value Ref Range    Troponin-I, QT 0.05 (H) 0.0 - 0.045 NG/ML   TYPE & SCREEN    Collection Time: 02/15/20  4:15 PM   Result Value Ref Range    Crossmatch Expiration 02/18/2020     ABO/Rh(D) Melissa Bradshaw POSITIVE     Antibody screen NEG    NT-PRO BNP    Collection Time: 02/15/20  4:15 PM   Result Value Ref Range    NT pro-BNP 3,847 (H) 0 - 900 PG/ML   URINALYSIS W/ RFLX MICROSCOPIC    Collection Time: 02/15/20  4:20 PM   Result Value Ref Range    Color DARK YELLOW      Appearance TURBID      Specific gravity 1.018 1.005 - 1.030      pH (UA) 5.0 5.0 - 8.0      Protein >1,000 (A) NEG mg/dL    Glucose NEGATIVE  NEG mg/dL    Ketone NEGATIVE  NEG mg/dL    Bilirubin NEGATIVE  NEG      Blood LARGE (A) NEG      Urobilinogen 0.2 0.2 - 1.0 EU/dL    Nitrites NEGATIVE  NEG      Leukocyte Esterase LARGE (A) NEG     URINE MICROSCOPIC ONLY    Collection Time: 02/15/20  4:20 PM   Result Value Ref Range    WBC TOO NUMEROUS TO COUNT 0 - 4 /hpf    RBC TOO NUMEROUS TO COUNT 0 - 5 /hpf    Epithelial cells 2+ 0 - 5 /lpf    Bacteria 4+ (A) NEG /hpf   GLUCOSE, POC    Collection Time: 02/15/20  6:40 PM   Result Value Ref Range    Glucose (POC) 74 70 - 110 mg/dL       Radiologic Studies -   XR CHEST PORT   Final Result   IMPRESSION:       Slight blunting of the right costophrenic sulcus consistent with small pleural   effusion versus thickening. Streaky right perihilar opacity, likely atelectasis. Medical Decision Making   I am the first provider for this patient. I reviewed the vital signs, available nursing notes, past medical history, past surgical history, family history and social history. Vital Signs-Reviewed the patient's vital signs. EKG: Sinus bradycardia, no acute ischemic changes seen.     Records Reviewed: Nursing Notes (Time of Review: 4:17 PM)    ED Course: Progress Notes, Reevaluation, and Consults:  Patient was reevaluated, his labs results were discussed with him. Guaiac was negative on my examination, nurse present during the testing. The patient will be admitted for further evaluation. Time: 17:01. Spoke with , nephrology. Discussed case. He states that he is aware of the patient and saw him 2 days ago in the emergency department. He recommends admission and the patient will be arranged for dialysis. Time: 17:06. Spoke with , hospitalist.  Discussed case, she will admit the patient. Time: 17:49. Spoke with , internist.  Discussed the case, they will admit the patient. I did make a mistake earlier with admitting to the wrong physician. He recommended consulting cardiology for the patient's asymptomatic bradycardia. Time: 18:06. Spoke with Isaias Jamil, cardiology. Discussed case. She will see the patient in consultation. Provider Notes (Medical Decision Making):   MDM  Number of Diagnoses or Management Options  Acute cystitis without hematuria:   SHEILA (acute kidney injury) ([address]): Anemia, unspecified type:   Diagnosis management comments: Patient is a 57-year-old male who presents with a chief complaint of abnormal lab results. Patient was seen 2 days ago in the emergency department had acute kidney injury. Patient did not want to be admitted that time. He now has increasing creatinine from 2 days ago, as well as worsening anemia. The patient will be admitted for further evaluation. I did speak with nephrology and they will arrange dialysis. Patient has a known history of thrombocytopenia and anemia. Critical Care Time: 0      Diagnosis     Clinical Impression:   1. SHEILA (acute kidney injury) ([address]    2. Anemia, unspecified type    3. Acute cystitis without hematuria    4.  Thrombocytopenia ([address]        Disposition: Admit    Follow-up Information    None          Patient's Medications   Start Taking    No medications on file   Continue Taking    CALCITRIOL (ROCALTROL) 0.25 MCG CAPSULE    Take 1 Cap by mouth every Monday, Wednesday, Friday. DOCUSATE SODIUM (COLACE) 100 MG CAPSULE    100 mg two (2) times daily as needed. DOXYCYCLINE (ADOXA) 100 MG TABLET    Take 100 mg by mouth daily. ERGOCALCIFEROL (DRISDOL) 50,000 UNIT CAPSULE    Take 50,000 Units by mouth every seven (7) days. FERROUS GLUCONATE 324 MG (37.5 MG IRON) TABLET    Take 1 Tab by mouth two (2) times daily (with meals). HYDRALAZINE (APRESOLINE) 25 MG TABLET    Take 1 Tab by mouth every eight (8) hours as needed (systolic B/P >658). INSULIN LISPRO (HUMALOG) 100 UNIT/ML INJECTION    Normal Insulin Sensitivity   For Blood Sugar (mg/dL) of:     Less than 150 =   0 units           150 -199 =   2 units  200 -249 =   4 units  250 -299 =   6 units  300 -349 =   8 units  350 and above =   10 units  If 2 glucose readings are above 200 mg/dL within a 24 hr period, proceed to \"Very Insul    LEVOTHYROXINE (SYNTHROID) 100 MCG TABLET    Take 1 Tab by mouth every morning. LIOTHYRONINE (CYTOMEL) 25 MCG TABLET    Take 1 Tab by mouth two (2) times a day. FUNMILAYO-DEBBIE 0.8 MG TAB TABLET    Take 0.8 mg by mouth daily. SYRINGE, DISPOSABLE, (BD SYRINGE CATHETER TIP) 60 ML SYRG    Use 1 syringe daily with irrigations    TAMSULOSIN (FLOMAX) 0.4 MG CAPSULE    Take 1 Cap by mouth daily. These Medications have changed    No medications on file   Stop Taking    No medications on file     _______________________________    Please note that this dictation was completed with [employer], the [employer] voice recognition software. Quite often unanticipated grammatical, syntax, homophones, and other interpretive errors are inadvertently transcribed by the computer software. Please disregard these errors. Please excuse any errors that have escaped final proofreading.

## 2020-02-15 NOTE — ED NOTES
Assumed care of patient from triage via wheelchair. Wife provides history stating pt went to his PCP on Thursday and they told him to come to hospital to be admitted for fluid overload. Pt did not want to come until today for personal reasons. Today pt denies pain or SOB. Pitting edema noted BLEs, wife states worse than his usual. Takes lasix at home. EKG done. Wife at bedside. Hold 500ml NS bolus for now per Dr Coty Greenfield.

## 2020-02-15 NOTE — ED NOTES
1st set blood cultures drawn from [facility]. Sent to lab. Urine sent to lab for UDS. Spoke to Dr Mann Torres regarding HR 30's-40's and BP still 90's/50's. Dr Negrete Genaro with these vitals because pt is asymptomatic and states she spoke to cardiology who says his HR is normally in the 40's and they monitor it.

## 2020-02-15 NOTE — ED TRIAGE NOTES
Patient states he was here Thursday his dr wanted him to be admitted, he is back today to be admitted.

## 2020-02-16 ENCOUNTER — APPOINTMENT (OUTPATIENT)
Dept: NON INVASIVE DIAGNOSTICS | Age: 60
DRG: 673 | End: 2020-02-16
Attending: PHYSICIAN ASSISTANT
Payer: MEDICARE

## 2020-02-16 LAB
25(OH)D3 SERPL-MCNC: 110.2 NG/ML (ref 30–100)
ALBUMIN SERPL-MCNC: 3.1 G/DL (ref 3.4–5)
ALBUMIN/GLOB SERPL: 0.9 {RATIO} (ref 0.8–1.7)
ALP SERPL-CCNC: 95 U/L (ref 45–117)
ALT SERPL-CCNC: 39 U/L (ref 16–61)
ANION GAP SERPL CALC-SCNC: 8 MMOL/L (ref 3–18)
AST SERPL-CCNC: 32 U/L (ref 10–38)
BASOPHILS # BLD: 0 K/UL (ref 0–0.06)
BASOPHILS NFR BLD: 0 % (ref 0–3)
BILIRUB SERPL-MCNC: 0.6 MG/DL (ref 0.2–1)
BUN SERPL-MCNC: 63 MG/DL (ref 7–18)
BUN/CREAT SERPL: 10 (ref 12–20)
CALCIUM SERPL-MCNC: 7.9 MG/DL (ref 8.5–10.1)
CHLORIDE SERPL-SCNC: 122 MMOL/L (ref 100–111)
CO2 SERPL-SCNC: 17 MMOL/L (ref 21–32)
CREAT SERPL-MCNC: 6.34 MG/DL (ref 0.6–1.3)
DIFFERENTIAL METHOD BLD: ABNORMAL
ECHO AO ROOT DIAM: 3.59 CM
ECHO LA AREA 4C: 23.5 CM2
ECHO LA VOL 2C: 74.02 ML (ref 18–58)
ECHO LA VOL 4C: 64.71 ML (ref 18–58)
ECHO LA VOL BP: 74.28 ML (ref 18–58)
ECHO LA VOL/BSA BIPLANE: 38.34 ML/M2 (ref 16–28)
ECHO LA VOLUME INDEX A2C: 38.2 ML/M2 (ref 16–28)
ECHO LA VOLUME INDEX A4C: 33.4 ML/M2 (ref 16–28)
ECHO LV E' LATERAL VELOCITY: 12.67 CM/S
ECHO LV E' SEPTAL VELOCITY: 10.62 CM/S
ECHO LV EDV TEICHHOLZ: 0.91 ML
ECHO LV ESV TEICHHOLZ: 0.21 ML
ECHO LV INTERNAL DIMENSION DIASTOLIC: 5.79 CM (ref 4.2–5.9)
ECHO LV INTERNAL DIMENSION SYSTOLIC: 3.1 CM
ECHO LV IVSD: 0.98 CM (ref 0.6–1)
ECHO LV MASS 2D: 296.1 G (ref 88–224)
ECHO LV MASS INDEX 2D: 152.8 G/M2 (ref 49–115)
ECHO LV POSTERIOR WALL DIASTOLIC: 1.13 CM (ref 0.6–1)
ECHO LVOT DIAM: 2.01 CM
ECHO MV A VELOCITY: 54.9 CM/S
ECHO MV E DECELERATION TIME (DT): 232.7 MS
ECHO MV E VELOCITY: 104.79 CM/S
ECHO MV E/A RATIO: 1.91
ECHO MV E/E' LATERAL: 8.27
ECHO MV E/E' RATIO (AVERAGED): 9.07
ECHO MV E/E' SEPTAL: 9.87
ECHO PULMONARY ARTERY SYSTOLIC PRESSURE (PASP): 53 MMHG
ECHO RV TAPSE: 2.19 CM (ref 1.5–2)
ECHO TV REGURGITANT MAX VELOCITY: 327.63 CM/S
ECHO TV REGURGITANT PEAK GRADIENT: 42.9 MMHG
EOSINOPHIL # BLD: 0 K/UL (ref 0–0.4)
EOSINOPHIL NFR BLD: 1 % (ref 0–5)
ERYTHROCYTE [DISTWIDTH] IN BLOOD BY AUTOMATED COUNT: 21.7 % (ref 11.6–14.5)
GLOBULIN SER CALC-MCNC: 3.6 G/DL (ref 2–4)
GLUCOSE BLD STRIP.AUTO-MCNC: 95 MG/DL (ref 70–110)
GLUCOSE BLD STRIP.AUTO-MCNC: 99 MG/DL (ref 70–110)
GLUCOSE SERPL-MCNC: 109 MG/DL (ref 74–99)
HCT VFR BLD AUTO: 28.2 % (ref 36–48)
HGB BLD-MCNC: 8.7 G/DL (ref 13–16)
LVFS 2D: 46.34 %
LVSV (TEICH): 66.44 ML
LYMPHOCYTES # BLD: 1.3 K/UL (ref 0.8–3.5)
LYMPHOCYTES NFR BLD: 39 % (ref 20–51)
MAGNESIUM SERPL-MCNC: 2.1 MG/DL (ref 1.6–2.6)
MCH RBC QN AUTO: 28.7 PG (ref 24–34)
MCHC RBC AUTO-ENTMCNC: 30.9 G/DL (ref 31–37)
MCV RBC AUTO: 93.1 FL (ref 74–97)
MONOCYTES # BLD: 0.1 K/UL (ref 0–1)
MONOCYTES NFR BLD: 3 % (ref 2–9)
MV DEC SLOPE: 4.5
NEUTS SEG # BLD: 2 K/UL (ref 1.8–8)
NEUTS SEG NFR BLD: 57 % (ref 42–75)
NRBC BLD-RTO: 39 PER 100 WBC
PLATELET # BLD AUTO: 38 K/UL (ref 135–420)
PLATELET COMMENTS,PCOM: ABNORMAL
POTASSIUM SERPL-SCNC: 5.4 MMOL/L (ref 3.5–5.5)
PROT SERPL-MCNC: 6.7 G/DL (ref 6.4–8.2)
RBC # BLD AUTO: 3.03 M/UL (ref 4.7–5.5)
RBC MORPH BLD: ABNORMAL
SODIUM SERPL-SCNC: 147 MMOL/L (ref 136–145)
TSH SERPL DL<=0.05 MIU/L-ACNC: 3.15 UIU/ML (ref 0.36–3.74)
WBC # BLD AUTO: 3.4 K/UL (ref 4.6–13.2)

## 2020-02-16 PROCEDURE — 74011000250 HC RX REV CODE- 250: Performed by: EMERGENCY MEDICINE

## 2020-02-16 PROCEDURE — 74011250637 HC RX REV CODE- 250/637: Performed by: INTERNAL MEDICINE

## 2020-02-16 PROCEDURE — 80053 COMPREHEN METABOLIC PANEL: CPT

## 2020-02-16 PROCEDURE — 85025 COMPLETE CBC W/AUTO DIFF WBC: CPT

## 2020-02-16 PROCEDURE — 74011250636 HC RX REV CODE- 250/636: Performed by: FAMILY MEDICINE

## 2020-02-16 PROCEDURE — 82306 VITAMIN D 25 HYDROXY: CPT

## 2020-02-16 PROCEDURE — 84443 ASSAY THYROID STIM HORMONE: CPT

## 2020-02-16 PROCEDURE — 77030040831 HC BAG URINE DRNG MDII -A

## 2020-02-16 PROCEDURE — 65620000000 HC RM CCU GENERAL

## 2020-02-16 PROCEDURE — 82962 GLUCOSE BLOOD TEST: CPT

## 2020-02-16 PROCEDURE — 94762 N-INVAS EAR/PLS OXIMTRY CONT: CPT

## 2020-02-16 PROCEDURE — 74011000250 HC RX REV CODE- 250: Performed by: FAMILY MEDICINE

## 2020-02-16 PROCEDURE — 74011250637 HC RX REV CODE- 250/637: Performed by: FAMILY MEDICINE

## 2020-02-16 PROCEDURE — 83735 ASSAY OF MAGNESIUM: CPT

## 2020-02-16 PROCEDURE — 93306 TTE W/DOPPLER COMPLETE: CPT

## 2020-02-16 RX ORDER — PANTOPRAZOLE SODIUM 40 MG/1
40 TABLET, DELAYED RELEASE ORAL
Status: DISCONTINUED | OUTPATIENT
Start: 2020-02-16 | End: 2020-02-25 | Stop reason: HOSPADM

## 2020-02-16 RX ORDER — SODIUM POLYSTYRENE SULFONATE 15 G/60ML
45 SUSPENSION ORAL; RECTAL
Status: COMPLETED | OUTPATIENT
Start: 2020-02-16 | End: 2020-02-16

## 2020-02-16 RX ADMIN — Medication 3 MCG/MIN: at 15:47

## 2020-02-16 RX ADMIN — SODIUM BICARBONATE 650 MG TABLET 650 MG: at 18:32

## 2020-02-16 RX ADMIN — WATER 1 G: 1 INJECTION INTRAMUSCULAR; INTRAVENOUS; SUBCUTANEOUS at 18:32

## 2020-02-16 RX ADMIN — SODIUM BICARBONATE 650 MG TABLET 650 MG: at 12:31

## 2020-02-16 RX ADMIN — LEVOTHYROXINE SODIUM 100 MCG: 100 TABLET ORAL at 06:03

## 2020-02-16 RX ADMIN — SODIUM POLYSTYRENE SULFONATE 45 G: 15 SUSPENSION ORAL; RECTAL at 13:30

## 2020-02-16 RX ADMIN — NEPHROCAP 1 CAPSULE: 1 CAP ORAL at 12:32

## 2020-02-16 RX ADMIN — FERROUS GLUCONATE TAB 324 MG (37.5 MG ELEMENTAL IRON) 1 TABLET: 324 (37.5 FE) TAB at 17:00

## 2020-02-16 RX ADMIN — PANTOPRAZOLE SODIUM 40 MG: 40 TABLET, DELAYED RELEASE ORAL at 15:49

## 2020-02-16 NOTE — PROGRESS NOTES
61 yo male with h/o hepatitis C, CKD,pancytopenia with chronic  thrombocytopenia which is multifacorial from hepatitis C with associated liver disease ( low thrombopoietin output) ,  hypersplenism and UTI. I discussed with Dr. Hilton Alicea who want me to see patient because nephrology wants to place a vascular catheter. He said that patient has no active bleeding. I told him in that case to:  1. Give patient platelets apheresis to  keep platelets above 63,284  2. Then place the vascular cath while platelets >=86,237.  3. We will see patient and further recommendations as fit. ADDENDUM: It seems that there was a confusion regarding apheresis platelets. This means giving units of single donor platelets (goal being to minimize risk of platelets alloantibodies since patient has chronic thrombocytopenia and might need multiple platelet transfusion in the future) and is completely different from plasmapheresis.   Thank you

## 2020-02-16 NOTE — H&P
History & Physical    Patient: Vale Romo MRN: [id number]  CSN: [id number]    YOB: 1960  Age: 62 y.o. Sex: male      DOA: 2/15/2020    Chief Complaint: No chief complaint on file. HPI:     Vale Romo is a 62 y.o.  male who has history of chronic renal failure thrombocytopenia suprapubic catheter due to chronic urinary retention quadriparesis after cervical spine infection and surgery. Patient has history of chronic bradycardia has been evaluated by cardiology in the past patient also had history of chronic polysubstance abuse reported that the last heroin use about 4 days ago. Patient has history of hypothyroidism diabetes mellitus hypertension. Patient was seen at our office Friday and was instructed to go to the ER due to worsening of the creatinine patient was seen at the ER by Dr. Sangeetha Bowen and patient review is to be admitted to start dialysis. Patient came last night with worsening lower extremity edema  And decrease urin output denied any chest pain no shortness of breath no dizziness. Patient open to discussion to start dialysis. Patient was found to have severe bradycardia in the ER his heart rate dropped down to 33 even though patient remains asymptomatic. Patient had central line placed by the ER physician and started on levo fed drip cardiology was consulted for further evaluation. Patient had an echocardiogram this morning showed ejection fraction 65% mild to moderate pulmonary hypertension on a change from March 18, 2019. Patient has recent admission to [facility] in beginning of February for anemia and thrombocytopenia patient had 2 units of packed red blood cells on that admission. Patient has history of left renal cell carcinoma partial nephrectomy of the left kidney.   Patient has history of intravenous drug abuse hepatitis C has been following up with a GI Dr. Bladimir Schmid    Patient was admitted for further evaluation for his bradycardia and evaluation to start hemodialysis. Patient was found to have acute UTI in the ER urine culture blood cultures pending patient started on Rocephin IV. H/o C5/6 discitis/OM with quadriplegia s/p C3-7 laminectomy with posterolateral fusion and hardware 4/29/2017  Possible autonomic dysfunction.  (Patient says his temperature has been labile/LOW since discitis/quadriplegia 4/2017)      CXR 2/15  Status post right IJ approach central venous catheter placement without evidence  of pneumothorax.     Trace blunting of the right costophrenic sulcus consistent with tiny pleural  effusion versus thickening, slightly less well seen compared to the preceding  radiograph. No evidence of focal pulmonary consolidation. Recent Results (from the past 24 hour(s))   EKG, 12 LEAD, INITIAL    Collection Time: 02/15/20  3:58 PM   Result Value Ref Range    Ventricular Rate 44 BPM    Atrial Rate 44 BPM    P-R Interval 206 ms    QRS Duration 100 ms    Q-T Interval 534 ms    QTC Calculation (Bezet) 456 ms    Calculated P Axis 67 degrees    Calculated R Axis -18 degrees    Calculated T Axis -3 degrees    Diagnosis       Marked sinus bradycardia  Abnormal ECG  When compared with ECG of 28-MAR-2019 03:09,  No significant change was found  Confirmed by Sheldon Bloch ([id number]) on 2/15/2020 4:14:57 PM     CBC WITH AUTOMATED DIFF    Collection Time: 02/15/20  4:15 PM   Result Value Ref Range    WBC 3.1 (L) 4.6 - 13.2 K/uL    RBC 3.01 (L) 4.70 - 5.50 M/uL    HGB 8.7 (L) 13.0 - 16.0 g/dL    HCT 28.2 (L) 36.0 - 48.0 %    MCV 93.7 74.0 - 97.0 FL    MCH 28.9 24.0 - 34.0 PG    MCHC 30.9 (L) 31.0 - 37.0 g/dL    RDW 21.5 (H) 11.6 - 14.5 %    PLATELET 35 (L) 891 - 420 K/uL    NEUTROPHILS 58 40 - 73 %    LYMPHOCYTES 34 21 - 52 %    MONOCYTES 7 3 - 10 %    EOSINOPHILS 1 0 - 5 %    BASOPHILS 0 0 - 2 %    ABS. NEUTROPHILS 1.8 1.8 - 8.0 K/UL    ABS. LYMPHOCYTES 1.1 0.9 - 3.6 K/UL    ABS. MONOCYTES 0.2 0.05 - 1.2 K/UL    ABS.  EOSINOPHILS 0.0 0.0 - 0.4 K/UL    ABS. BASOPHILS 0.0 0.0 - 0.1 K/UL    DF AUTOMATED     METABOLIC PANEL, COMPREHENSIVE    Collection Time: 02/15/20  4:15 PM   Result Value Ref Range    Sodium 148 (H) 136 - 145 mmol/L    Potassium 4.9 3.5 - 5.5 mmol/L    Chloride 123 (H) 100 - 111 mmol/L    CO2 17 (L) 21 - 32 mmol/L    Anion gap 8 3.0 - 18 mmol/L    Glucose 62 (L) 74 - 99 mg/dL    BUN 59 (H) 7.0 - 18 MG/DL    Creatinine 6.31 (H) 0.6 - 1.3 MG/DL    BUN/Creatinine ratio 9 (L) 12 - 20      GFR est AA 11 (L) >60 ml/min/1.73m2    GFR est non-AA 9 (L) >60 ml/min/1.73m2    Calcium 8.0 (L) 8.5 - 10.1 MG/DL    Bilirubin, total 0.8 0.2 - 1.0 MG/DL    ALT (SGPT) 39 16 - 61 U/L    AST (SGOT) 32 10 - 38 U/L    Alk.  phosphatase 96 45 - 117 U/L    Protein, total 6.9 6.4 - 8.2 g/dL    Albumin 3.2 (L) 3.4 - 5.0 g/dL    Globulin 3.7 2.0 - 4.0 g/dL    A-G Ratio 0.9 0.8 - 1.7     TROPONIN I    Collection Time: 02/15/20  4:15 PM   Result Value Ref Range    Troponin-I, QT 0.05 (H) 0.0 - 0.045 NG/ML   TYPE & SCREEN    Collection Time: 02/15/20  4:15 PM   Result Value Ref Range    Crossmatch Expiration 02/18/2020     ABO/Rh(D) Brenda Stanchfield POSITIVE     Antibody screen NEG    NT-PRO BNP    Collection Time: 02/15/20  4:15 PM   Result Value Ref Range    NT pro-BNP 3,847 (H) 0 - 900 PG/ML   URINALYSIS W/ RFLX MICROSCOPIC    Collection Time: 02/15/20  4:20 PM   Result Value Ref Range    Color DARK YELLOW      Appearance TURBID      Specific gravity 1.018 1.005 - 1.030      pH (UA) 5.0 5.0 - 8.0      Protein >1,000 (A) NEG mg/dL    Glucose NEGATIVE  NEG mg/dL    Ketone NEGATIVE  NEG mg/dL    Bilirubin NEGATIVE  NEG      Blood LARGE (A) NEG      Urobilinogen 0.2 0.2 - 1.0 EU/dL    Nitrites NEGATIVE  NEG      Leukocyte Esterase LARGE (A) NEG     URINE MICROSCOPIC ONLY    Collection Time: 02/15/20  4:20 PM   Result Value Ref Range    WBC TOO NUMEROUS TO COUNT 0 - 4 /hpf    RBC TOO NUMEROUS TO COUNT 0 - 5 /hpf    Epithelial cells 2+ 0 - 5 /lpf    Bacteria 4+ (A) NEG /hpf CULTURE, URINE    Collection Time: 02/15/20  4:20 PM   Result Value Ref Range    Special Requests: NO SPECIAL REQUESTS      Culture result: >100,000 COLONIES/mL YEAST (A)     GLUCOSE, POC    Collection Time: 02/15/20  6:40 PM   Result Value Ref Range    Glucose (POC) 74 70 - 110 mg/dL   DRUG SCREEN, URINE    Collection Time: 02/15/20  6:52 PM   Result Value Ref Range    BENZODIAZEPINES NEGATIVE  NEG      BARBITURATES NEGATIVE  NEG      THC (TH-CANNABINOL) NEGATIVE  NEG      OPIATES POSITIVE (A) NEG      PCP(PHENCYCLIDINE) NEGATIVE  NEG      COCAINE NEGATIVE  NEG      AMPHETAMINES NEGATIVE  NEG      METHADONE NEGATIVE  NEG      HDSCOM (NOTE)    CULTURE, BLOOD    Collection Time: 02/15/20  6:52 PM   Result Value Ref Range    Special Requests: NO SPECIAL REQUESTS  RIGHT  ARM        Culture result: NO GROWTH AFTER 10 HOURS     CULTURE, BLOOD    Collection Time: 02/15/20  7:23 PM   Result Value Ref Range    Special Requests: NO SPECIAL REQUESTS      Culture result: NO GROWTH AFTER 10 HOURS     GLUCOSE, POC    Collection Time: 02/15/20  8:12 PM   Result Value Ref Range    Glucose (POC) 76 70 - 110 mg/dL   CBC WITH AUTOMATED DIFF    Collection Time: 02/16/20  4:08 AM   Result Value Ref Range    WBC 3.4 (L) 4.6 - 13.2 K/uL    RBC 3.03 (L) 4.70 - 5.50 M/uL    HGB 8.7 (L) 13.0 - 16.0 g/dL    HCT 28.2 (L) 36.0 - 48.0 %    MCV 93.1 74.0 - 97.0 FL    MCH 28.7 24.0 - 34.0 PG    MCHC 30.9 (L) 31.0 - 37.0 g/dL    RDW 21.7 (H) 11.6 - 14.5 %    PLATELET 38 (L) 574 - 420 K/uL    NEUTROPHILS 57 42 - 75 %    LYMPHOCYTES 39 20 - 51 %    MONOCYTES 3 2 - 9 %    EOSINOPHILS 1 0 - 5 %    BASOPHILS 0 0 - 3 %    NRBC 39.0 (H) 0  WBC    ABS. NEUTROPHILS 2.0 1.8 - 8.0 K/UL    ABS. LYMPHOCYTES 1.3 0.8 - 3.5 K/UL    ABS. MONOCYTES 0.1 0 - 1.0 K/UL    ABS. EOSINOPHILS 0.0 0.0 - 0.4 K/UL    ABS.  BASOPHILS 0.0 0.0 - 0.06 K/UL    DF MANUAL      PLATELET COMMENTS DECREASED PLATELETS      RBC COMMENTS ANISOCYTOSIS  2+        RBC COMMENTS POLYCHROMASIA  1+        RBC COMMENTS HYPOCHROMIA  1+        RBC COMMENTS TARGET CELLS  1+       METABOLIC PANEL, COMPREHENSIVE    Collection Time: 02/16/20  4:08 AM   Result Value Ref Range    Sodium 147 (H) 136 - 145 mmol/L    Potassium 5.4 3.5 - 5.5 mmol/L    Chloride 122 (H) 100 - 111 mmol/L    CO2 17 (L) 21 - 32 mmol/L    Anion gap 8 3.0 - 18 mmol/L    Glucose 109 (H) 74 - 99 mg/dL    BUN 63 (H) 7.0 - 18 MG/DL    Creatinine 6.34 (H) 0.6 - 1.3 MG/DL    BUN/Creatinine ratio 10 (L) 12 - 20      GFR est AA 11 (L) >60 ml/min/1.73m2    GFR est non-AA 9 (L) >60 ml/min/1.73m2    Calcium 7.9 (L) 8.5 - 10.1 MG/DL    Bilirubin, total 0.6 0.2 - 1.0 MG/DL    ALT (SGPT) 39 16 - 61 U/L    AST (SGOT) 32 10 - 38 U/L    Alk.  phosphatase 95 45 - 117 U/L    Protein, total 6.7 6.4 - 8.2 g/dL    Albumin 3.1 (L) 3.4 - 5.0 g/dL    Globulin 3.6 2.0 - 4.0 g/dL    A-G Ratio 0.9 0.8 - 1.7     MAGNESIUM    Collection Time: 02/16/20  4:08 AM   Result Value Ref Range    Magnesium 2.1 1.6 - 2.6 mg/dL   VITAMIN D, 25 HYDROXY    Collection Time: 02/16/20  4:08 AM   Result Value Ref Range    Vitamin D 25-Hydroxy 110.2 (H) 30 - 100 ng/mL   TSH 3RD GENERATION    Collection Time: 02/16/20  4:08 AM   Result Value Ref Range    TSH 3.15 0.36 - 3.74 uIU/mL   GLUCOSE, POC    Collection Time: 02/16/20  8:48 AM   Result Value Ref Range    Glucose (POC) 99 70 - 110 mg/dL   ECHO ADULT COMPLETE    Collection Time: 02/16/20 11:17 AM   Result Value Ref Range    LA Volume 74.28 18 - 58 mL    LV E' Lateral Velocity 12.67 cm/s    LV E' Septal Velocity 10.62 cm/s    Tapse 2.19 (A) 1.5 - 2.0 cm    Ao Root D 3.59 cm    LVIDd 5.79 4.2 - 5.9 cm    LVPWd 1.13 (A) 0.6 - 1.0 cm    LVIDs 3.10 cm    IVSd 0.98 0.6 - 1.0 cm    LVOT d 2.01 cm    MV A Michael 54.90 cm/s    MV E Michael 104.79 cm/s    MV E/A 1.91     LA Vol 4C 64.71 (A) 18 - 58 mL    LA Vol 2C 74.02 (A) 18 - 58 mL    LA Area 4C 23.5 cm2    LV Mass .1 (A) 88 - 224 g    LV Mass AL Index 152.8 (A) 49 - 115 g/m2 E/E' lateral 8.27     E/E' septal 9.87     E/E' ratio (averaged) 9.07     Mitral Valve E Wave Deceleration Time 232.7 ms    Triscuspid Valve Regurgitation Peak Gradient 42.9 mmHg    TR Max Velocity 327.63 cm/s    LA Vol Index 38.34 16 - 28 ml/m2    PASP 53.0 mmHg    LA Vol Index 38.20 16 - 28 ml/m2    LA Vol Index 33.40 16 - 28 ml/m2    Left Ventricular Fractional Shortening by 2D 37.323954891 %    Mitral Valve Deceleration Pickens 0.0476129416667     Left Ventricular End Diastolic Volume by Teichholz Method 2.90841694537 mL    Left Ventricular End Systolic Volume by Teichholz Method 5.9463664000 mL    Left Ventricular Stroke Volume by Kali Fragmin Method 32.811611516 mL       Past Medical History:   Diagnosis Date    Anemia     Bradycardia, unspecified 2018    Cervical discitis     MRSA    Chronic drug abuse ([facility] 1974    Chronic kidney disease     stage III    Diabetes ([facility] [address] 01/1990    Drug abuse ([facility] [facility]     Encephalopathy     GERD (gastroesophageal reflux disease)     Hypertension     Muscle weakness     Quadriparesis ([facility] [address] [facility] 2017    Renal cell carcinoma of left kidney ([facility] [facility] 12/17/13    Pathological Stage [id number] cc RCC FG3 s/p left open partial nephrectomy in 12/13      Sepsis due to methicillin resistant Staphylococcus aureus (HCC)     Suprapubic catheter (HCC)     Thrombocytopenia (HCC)     Urethral erosion     Viral hepatitis B     Viral hepatitis C     Xerosis cutis        Past Surgical History:   Procedure Laterality Date    COLONOSCOPY N/A 10/3/2019    COLONOSCOPY / polypectomy performed by Galdino Nicole [facility] [address] [facility]X CIRCUMCISION  12/17/13    Dr. Verlin Koyanagi, [facility]    HX NEPHRECTOMY Left 12/17/13    Open/ Partial,nephrectomy    HX OTHER SURGICAL Right 2/27/2016    removed right ft  2nd meta-tarsal    HX OTHER SURGICAL  04/2017    abscess removed from back of neck     NEUROLOGICAL PROCEDURE UNLISTED  2017    neck surgery-abcess-hardware neck    WA REMOVAL WITH INSERTION OF SUPRAPUBIC CATHETER  2018       Family History   Problem Relation Age of Onset    Diabetes Mother     Sickle Cell Anemia Father     Diabetes Sister     Hypertension Sister        Social History     Socioeconomic History    Marital status:      Spouse name: Not on file    Number of children: Not on file    Years of education: Not on file    Highest education level: Not on file   Tobacco Use    Smoking status: Current Every Day Smoker     Packs/day: 0.25     Years: 30.00     Pack years: 7.50     Types: Cigarettes    Smokeless tobacco: Never Used   Substance and Sexual Activity    Alcohol use: Yes     Comment: beer occasionally    Drug use: Yes     Types: Marijuana, Heroin     Comment: marijuana-December 2018, heroin-9/15/19-approx    Sexual activity: Never   Social History Narrative     since 2012;  for 12 yrs; [employer]K; [address] 3n[employer] worker just recently furloughed; No  service       Prior to Admission medications    Medication Sig Start Date End Date Taking? Authorizing Provider   FUNMILAYO-DEBBIE 0.8 mg tab tablet Take 0.8 mg by mouth daily. 8/15/19   Provider, Historical   levothyroxine (SYNTHROID) 100 mcg tablet Take 1 Tab by mouth every morning. 4/7/19   Teresa Wiley MD   calcitRIOL (ROCALTROL) 0.25 mcg capsule Take 1 Cap by mouth every Monday, Wednesday, Friday. 4/8/19   Teresa Wiley MD   ferrous gluconate 324 mg (37.5 mg iron) tablet Take 1 Tab by mouth two (2) times daily (with meals). 4/6/19   Teresa Wiley MD   hydrALAZINE (APRESOLINE) 25 mg tablet Take 1 Tab by mouth every eight (8) hours as needed (systolic B/P >178). 8/8/73   Teresa Wiley MD   liothyronine (CYTOMEL) 25 mcg tablet Take 1 Tab by mouth two (2) times a day.  4/6/19   Teresa Wiley MD   Syringe, Disposable, (BD SYRINGE CATHETER TIP) 60 mL syrg Use 1 syringe daily with irrigations 11/7/18   Elisa Snyder MD   docusate sodium (COLACE) 100 mg capsule 100 mg two (2) times daily as needed. 10/12/18   Provider, Historical   ergocalciferol (DRISDOL) 50,000 unit capsule Take 50,000 Units by mouth every seven (7) days. 8/8/18   Provider, Historical   doxycycline (ADOXA) 100 mg tablet Take 100 mg by mouth daily. Provider, Historical   insulin lispro (HUMALOG) 100 unit/mL injection Normal Insulin Sensitivity   For Blood Sugar (mg/dL) of:     Less than 150 =   0 units           150 -199 =   2 units  200 -249 =   4 units  250 -299 =   6 units  300 -349 =   8 units  350 and above =   10 units  If 2 glucose readings are above 200 mg/dL within a 24 hr period, proceed to \"Very Insul 4/10/18   Reineke-Piper, Caryle Aid A, MD   tamsulosin (FLOMAX) 0.4 mg capsule Take 1 Cap by mouth daily. 4/10/18   Frantz Aquino MD       Allergies   Allergen Reactions    Iodine Hives and Other (comments)       Review of Systems  GENERAL: Patient alert, awake and oriented slight confusion , able to communicate full sentences and not in distress. HEENT: No change in vision, no earache, tinnitus, sore throat or sinus congestion. NECK: No pain or stiffness. CARDIOVASCULAR: No chest pain or pressure. No palpitations. PULMONARY: No shortness of breath, cough or wheeze. GASTROINTESTINAL: No abdominal pain, nausea, vomiting or diarrhea, melena or       bright red blood per rectum. GENITOURINARY: suprapubic catheter   MUSCULOSKELETAL: quadriparesis after neck infection and surgery  DERMATOLOGIC: No rash, no itching, no lesions. ENDOCRINE: H/O diabetes mellitus   HEMATOLOGICAL: H/O  anemia positive  easy bruising no overt  bleeding. NEUROLOGIC: No headache, seizures, generalized weakness  PSYCHIATRIC: No depression, anxiety, mood disorder, no loss of interest in normal       activity or change in sleep pattern.         Physical Exam:     Physical Exam:  Visit Vitals  /62   Pulse (!) 41   Temp 98.6 °F (37 °C)   Resp 12   Ht 6' (1.829 m)   Wt 72.6 kg (160 lb)   SpO2 98%   BMI 21.70 kg/m² O2 Device: Room air    Temp (24hrs), Av.6 °F (37 °C), Min:98.6 °F (37 °C), Max:98.6 °F (37 °C)    No intake/output data recorded. 1 -  0700  In: 500 [I.V.:500]  Out: -     General:  Alert, cooperative, no distress, appears stated age. IJ centra line Rt neck   Head:  Normocephalic, without obvious abnormality, atraumatic. Eyes:  Conjunctivae/corneas clear. PERRL, EOMs intact. Nose: Nares normal. No drainage or sinus tenderness. Throat: Lips, mucosa, and tongue dry poor dentition    Neck: Supple, symmetrical, trachea midline, no adenopathy, thyroid: no enlargement/tenderness/nodules, no carotid bruit and no JVD. Back:   ROM normal. No CVA tenderness. Lungs:   Clear to auscultation bilaterally. Chest wall:  No tenderness or deformity. Heart:  Regular bradycardia S1, S2 normal, no murmur, click, rub or gallop. Abdomen: Soft, non-tender. Bowel sounds normal. No masses,  No organomegaly. suprapubic cath    Extremities: Extremities normal, atraumatic,3 + edema    Pulses: 2+ and symmetric all extremities. Skin: Skin color, texture, turgor normal. No rashes or lesions   Neurologic: CNII-XII intact. No focal motor or sensory deficit. Labs Reviewed: All lab results for the last 24 hours reviewed.   EKG    Procedures/imaging: see electronic medical records for all procedures/Xrays and details which were not copied into this note but were reviewed prior to creation of Plan        Assessment/Plan     Principal Problem:    Acute renal failure superimposed on stage 3 chronic kidney disease ([facility] [address] (2/15/2020)    Active Problems:    Chronic hepatitis C without hepatic coma ([facility] [address] (2017)      Essential hypertension (2017)      IV drug abuse ([facility] [address] (2017)      Quadriparesis ([facility] [address] (2017)      Renal cell carcinoma of left kidney ([facility] [address] (2013)      Overview: Pathological Stage [id number] cc RCC FG3 s/p left open partial nephrectomy       in         COPD, moderate ([facility] (1/31/2019)      Hypothyroid (1/31/2019)      UTI (urinary tract infection) (2/15/2020)      Acute renal failure superimposed on chronic kidney disease ([facility] [address] (2/15/2020)      Bradycardia (2/15/2020)       Plan    Severe Bradycardia  Patient will be admitted to cardiac ICU  Continue IV epinephrine drip   Monitor blood pressure heart rate  Follow-up cardiology recommendation  Avoid any heart slowing medication  Monitor for possible withdrawal from chronic heroin use. Acute renal failure on top of chronic renal failure  Discussed with Dr. Adelfo Mosley patient will need hemodialysis  We will start on dialysis this admission. Continue monitor potassium level and volume overload    Urinary tract infection/ pt has suprapubic cath was changes recently at [facility]   Continue Rocephin 1 g IV  Follow-up urine culture and blood culture    Thrombocytopenia/pancytopenia  We will get hematology oncology consult  Patient was seen by Dr. Tushar Cervantes in the past  Patient had extensive work-up for low platelet  We will check with Dr. Tushar Cervantes if he had bone marrow biopsy in the past      Hypertension  Continue hydralazine 25 mg p.o. every 8 hours as needed systolic blood pressure above 160    Diabetes mellitus  Hemoglobin A1c on two 2/11/20 4.9   LDL 63  Humalog sliding scale    Anemia due to chronic disease  Patient has recent transfusion to [facility] to 1 unit of packed red blood cells  Iron saturation 37%  Ferritin 160    Hepatitis C/ H/O upper GI bleeding gastritis  Patient was treated for hepatitis C with Dr. Chuck Orr  Protonix 40 mg daily  Continue to monitor liver function    Cbc, cmp, mag in AM  Hematology consult discussed with  Dr Angelica Arreguin covering Dr Tushar Cervantes. He recommended PLT transfusion I ask him to evaluate pt first and give us clear  recommendations  before insertion dialysis catheter .      DVT/GI Prophylaxis: SCD's and H2B/PPI  Time for admission more than 65 minutes included discussion with ER nursing staff discussion with pt and nephrology consult Dr Sara Vasquez old record office record and care everywhere  documentation       Maritza De Los Santos MD  2/16/2020  11:46 AM

## 2020-02-16 NOTE — PROGRESS NOTES
conducted an initial consultation and Spiritual Assessment for Diana Vasquez, who is a 62 y.o.,male. Patients Primary Language is: [place]. According to the patients EMR Holiness Affiliation is: [place]. The reason the Patient came to the hospital is:   Patient Active Problem List    Diagnosis Date Noted    Acute renal failure superimposed on stage 3 chronic kidney disease ([address] 02/15/2020    UTI (urinary tract infection) 02/15/2020    Acute renal failure superimposed on chronic kidney disease ([address] 02/15/2020    Bradycardia 02/15/2020    Renal failure (ARF), acute on chronic (HCC) 03/31/2019    Suprapubic catheter ([address] 03/28/2019    Bladder atony 01/31/2019    Chronic neck pain 01/31/2019    Cirrhosis of liver ([address] 01/31/2019    COPD, moderate ([address] 01/31/2019    Dry skin dermatitis 01/31/2019    Hypothyroid 01/31/2019    Lung nodules 01/31/2019    Neck mass 01/31/2019    Pericardial effusion 01/31/2019    Vitamin D deficiency 01/31/2019    BPH (benign prostatic hyperplasia) 12/20/2018    Hypertension     Urethral erosion     Chronic anemia 05/23/2018    Status post amputation of toe of right foot ([address] 05/18/2018    Thrombocytopenia ([address] 04/10/2018    Chronic kidney disease, stage III (moderate) ([address] 04/04/2018    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 Explored issues of sunil, belief, spirituality and Shinto/ritual needs while hospitalized. Listened empathically. Provided chaplaincy education. Provided information about Spiritual Care Services.   Offered assurance of continued prayers on patient's behalf. Chart reviewed. The following outcomes where achieved:  Patient expressed gratitude for 's visit. Assessment:  Patient does not have any Holiness/cultural needs that will affect patients preferences in health care. There are no spiritual or Holiness issues which require intervention at this time. Plan:  Chaplains will continue to follow and will provide pastoral care on an as needed/requested basis.  recommends bedside caregivers page  on duty if patient shows signs of acute spiritual or emotional distress.         [id number].   [phone]

## 2020-02-16 NOTE — ED NOTES
Patient was admitted for worsening of his kidney failure. However he became more bradycardic. Was not physically evaluated by cardiology and there were no notes in the system. His blood pressure also started to drop so I called his admitting physician Dr. Raj Toney who agreed on the plan of starting him on Levophed and admitting him to CVT ICU. I consented the patient for central line for the Levophed and discussed the risks and benefits and the patient agreed. Levo fed was started at a low rate of 4 mics and patient maintain good maps and heart rate in the 70s. Upon my evaluation, this patient had a high probability of imminent or life-threatening deterioration due to bradycardia and hypotension, which required my direct attention, intervention, and personal management. I have personally provided 60 minutes of critical care time exclusive of time spent on separately billable procedures. Time includes review of laboratory data, radiology results, discussion with consultants, and monitoring for potential decompensation. Interventions were performed as documented above. Mk Guillory MD  6:42 AM    Central Line  Date/Time: 2/16/2020 6:43 AM  Performed by: Hailee Kee MD  Authorized by: Hailee Kee MD     Consent:     Consent obtained:  Verbal    Consent given by:  Patient    Risks discussed:  Arterial puncture, bleeding, incorrect placement, infection and pneumothorax    Alternatives discussed:  Delayed treatment  Pre-procedure details:     Hand hygiene: Hand hygiene performed prior to insertion      Sterile barrier technique: All elements of maximal sterile technique followed      Skin preparation:  2% chlorhexidine    Skin preparation agent: Skin preparation agent completely dried prior to procedure    Sedation:     Sedation type: Anxiolysis  Anesthesia (see MAR for exact dosages):      Anesthesia method:  Local infiltration    Local anesthetic:  Lidocaine 1% w/o epi  Procedure details: Location:  R internal jugular    Patient position:  Flat    Procedural supplies:  Triple lumen    Landmarks identified: yes      Ultrasound guidance: yes      Sterile ultrasound techniques: Sterile gel and sterile probe covers were used      Number of attempts:  1    Successful placement: yes    Post-procedure details:     Post-procedure:  Dressing applied and line sutured    Assessment:  Blood return through all ports, free fluid flow, no pneumothorax on x-ray and placement verified by x-ray    Patient tolerance of procedure:   Tolerated well, no immediate complications

## 2020-02-16 NOTE — CONSULTS
[facility] - Consult Note    Consultation request by Dr. Tyrell Erwin for advice/opinion related to evaluating sinus bradycardia    Date of  Admission: 2/15/2020  3:45 PM   Primary Care Physician:  Rubia Worthy DO     Assessment:     - Acute on chronic renal failure. Presented due to reduced urine output x 2 days and increased leg swelling x 1 week. This now appears to be approaching end-stage with likely need for hemodialysis in the near future  - Asymptomatic sinus bradycardia. EKG appears unchanged. Heart rate remains in the 40s. Patient has a long history of this. This is unlikely contributing to his worsening renal failure or hypotension.  - Chronic HFpEF. Repeat echocardiogram today demonstrated a dilated left ventricle with preserved LV systolic function, EF 64% with mild to moderate pulmonary hypertension. Unchanged compared to his prior echocardiogram from 3/18/19 at [facility]. He appears mildly volume overloaded  - Hx RCC s/p left partial nephrectomy 2013,   - S/p suprapubic catheter placement 11/12/18 at [facility]  - Hypoalbuminemia, mild, Albumin 3.1 on 2/16/2020.  - Hypertension.  Currently well controlled. - Diabetes, Hgb A1c 5.3  - Chronic anemia, Hgb 8.7 this admission   - Acute on chronic thrombocytopenia, Plt down to 35-38K this admission, followed by Dr. Mandie Resendiz as outpatient - seen in office 2/10/2020. Patient appears to have pancytopenia as well  - Chronic hepatitis C, followed by Dr. Paige Arredondo with GI   - Hypothyroidism, synthroid   - Hx drug abuse  - ongoing tobacco use, currently smokes 1 pack cigarettes/week    - Hx spinal osteomyelitis with quadriplegia, s/p C3-C7 laminectomy in April 2017     Primary cardiologist: Dr. Verduzco Person:     -Continued on telemetry monitoring. Avoid any rate slowing agents.  -Would wean Levophed as able. -Volume management per nephrology team, appreciate assistance.   Patient may require hemodialysis this admission.  -No need for urgent pacemaker at this time. History of Present Illness: This is a 62 y.o. male admitted for SHEILA (acute kidney injury) ([facility] [N17.9]  SHEILA (acute kidney injury) ([facility] [N17.9]  Bradycardia [R00.1]. Patient complains of:  Decreased urine output, increased leg swelling    Memory Radha is a 62 y.o. male with PMHx as described above, who presented to the hospital due to decreased urine output over the past two days. Pt also reports chronic leg swelling that has worsened over the past week. He reports he uses 2 pillows at night and denies any PND/orthopnea. No chest pain/discomfort. No vomiting. Has chronic intermittent diarrhea, unchanged from baseline. No palpitations, dizziness, lightheadedness or syncope.     Cardiac risk factors: diabetes mellitus, male gender, hypertension      Review of Symptoms:  Except as stated above include:  Constitutional:  negative  Respiratory:  negative  Cardiovascular:  negative  Gastrointestinal: negative  Genitourinary:  As per HPI  Musculoskeletal:  Negative  Neurological:  Negative  Dermatological:  Negative  Endocrinological: Negative  Psychological:  Negative       Past Medical History:     Past Medical History:   Diagnosis Date    Anemia     Bradycardia, unspecified 2018    Cervical discitis     MRSA    Chronic drug abuse ([facility] 1974    Chronic kidney disease     stage III    Diabetes ([facility] 01/1990    Drug abuse ([facility]     Encephalopathy     GERD (gastroesophageal reflux disease)     Hypertension     Muscle weakness     Quadriparesis ([facility] 2017    Renal cell carcinoma of left kidney ([facility] 12/17/13    Pathological Stage [id number] cc RCC FG3 s/p left open partial nephrectomy in 12/13      Sepsis due to methicillin resistant Staphylococcus aureus (HCC)     Suprapubic catheter (HCC)     Thrombocytopenia (HCC)     Urethral erosion     Viral hepatitis B     Viral hepatitis C     Xerosis cutis          Social History:     Social History Socioeconomic History    Marital status:      Spouse name: Not on file    Number of children: Not on file    Years of education: Not on file    Highest education level: Not on file   Tobacco Use    Smoking status: Current Every Day Smoker     Packs/day: 0.25     Years: 30.00     Pack years: 7.50     Types: Cigarettes    Smokeless tobacco: Never Used   Substance and Sexual Activity    Alcohol use: Yes     Comment: beer occasionally    Drug use: Yes     Types: Marijuana, Heroin     Comment: marijuana-December 2018, heroin-9/15/19-approx    Sexual activity: Never   Social History Narrative     since 2012;  for 12 yrs; 2K; [address] [employer] worker just recently furloughed; No  service        Family History:     Family History   Problem Relation Age of Onset    Diabetes Mother     Sickle Cell Anemia Father     Diabetes Sister     Hypertension Sister         Medications:      Allergies   Allergen Reactions    Iodine Hives and Other (comments)        Current Facility-Administered Medications   Medication Dose Route Frequency    levothyroxine (SYNTHROID) tablet 100 mcg  100 mcg Oral 6am    insulin lispro (HUMALOG) injection   SubCUTAneous TIDAC    B complex-vitaminC-folic acid (NEPHROCAP) cap  1 Cap Oral DAILY    sodium bicarbonate tablet 650 mg  650 mg Oral BID    ferrous gluconate 324 mg (37.5 mg iron) tablet 1 Tab  1 Tab Oral BID WITH MEALS    NOREPINephrine (LEVOPHED) 8 mg in 5% dextrose 250mL (32 mcg/mL) infusion  0.5-16 mcg/min IntraVENous TITRATE         Physical Exam:     Visit Vitals  /58   Pulse 68   Temp 98.6 °F (37 °C)   Resp 13   Ht 6' (1.829 m)   Wt 160 lb (72.6 kg)   SpO2 99%   BMI 21.70 kg/m²     BP Readings from Last 3 Encounters:   02/16/20 123/58   02/13/20 108/58   02/10/20 136/60     Pulse Readings from Last 3 Encounters:   02/16/20 68   02/13/20 (!) 41   02/10/20 (!) 48     Wt Readings from Last 3 Encounters:   02/15/20 160 lb (72.6 kg) 02/10/20 165 lb (74.8 kg)   12/16/19 149 lb (67.6 kg)       General:  alert, cooperative, no distress, appears stated age  Neck:  supple  Lungs:  clear to auscultation bilaterally to anterolateral lung fields  Heart:  Bradycardic regular rhythm  Abdomen:  abdomen is soft without significant tenderness, masses, organomegaly or guarding  Extremities:  Atraumatic, 2+ edema to lower extremities  Skin: Warm and dry. Neuro: alert, oriented x3, affect appropriate, no focal neurological deficits, moves all extremities well, no involuntary movements  Psych: non focal     Data Review:     Recent Labs     02/16/20  0408 02/15/20  1615 02/13/20  1536   WBC 3.4* 3.1* 2.9*   HGB 8.7* 8.7* 9.6*   HCT 28.2* 28.2* 30.2*   PLT 38* 35* 45*     Recent Labs     02/16/20  0408 02/15/20  1615 02/13/20  1536   * 148* 152*   K 5.4 4.9 5.0   * 123* 126*   CO2 17* 17* 19*   * 62* 117*   BUN 63* 59* 58*   CREA 6.34* 6.31* 5.80*   CA 7.9* 8.0* 8.6   MG 2.1  --  2.2   ALB 3.1* 3.2* 3.3*   SGOT 32 32 37   ALT 39 39 36       Results for orders placed or performed during the hospital encounter of 02/15/20   EKG, 12 LEAD, INITIAL   Result Value Ref Range    Ventricular Rate 44 BPM    Atrial Rate 44 BPM    P-R Interval 206 ms    QRS Duration 100 ms    Q-T Interval 534 ms    QTC Calculation (Bezet) 456 ms    Calculated P Axis 67 degrees    Calculated R Axis -18 degrees    Calculated T Axis -3 degrees    Diagnosis       Marked sinus bradycardia  Abnormal ECG  When compared with ECG of 28-MAR-2019 03:09,  No significant change was found  Confirmed by Angelia Ashley ([id number]) on 2/15/2020 4:14:57 PM     Results for orders placed or performed in visit on 12/16/19   AMB POC EKG ROUTINE W/ 12 LEADS, INTER & REP    Narrative    Sinus bradycardia, rate 46. Left ventricular hypertrophy by limb lead voltage criteria. Diffuse nonspecific inferolateral and high lateral ST-T flattening.   Compared to the EKG of September 5, 2019 the heart rate is somewhat slower being 53 at that time       All Cardiac Markers in the last 24 hours:    Lab Results   Component Value Date/Time    TROIQ 0.05 (H) 02/15/2020 04:15 PM       Last Lipid:    Lab Results   Component Value Date/Time    Cholesterol, total 148 02/11/2020 02:24 PM    HDL Cholesterol 71 02/11/2020 02:24 PM    LDL, calculated 63 02/11/2020 02:24 PM    Triglyceride 72 02/11/2020 02:24 PM    CHOL/HDL Ratio 2.1 02/11/2020 02:24 PM       Signed By: Daniel Leung PA-C     February 16, 2020      Cami Robert MD

## 2020-02-16 NOTE — PROGRESS NOTES
1710: Contacted by Kristal Suazo, Sepsis [facility], due to Dr. Margie Canas' plan of care including starting Rocephin IV. No antibiotic orders received from physician. Dr. Winnie Josue paged via . 1740:  contacted since Dr. Winnie Josue has not returned page,  asked to re-page physician as per request. [facility], nursing supervisor notified, instructed to contact nursing Supervisor again if physician does not return call. 1800: [facility], Nursing supervisor contacted since return page noted answered. Nursing supervisor will page physician directly. 1815: Dr. Winnie Josue returned page, orders received for Daily rocephin dosage, repeated and order verification completed.

## 2020-02-16 NOTE — PROGRESS NOTES
Spoke with primary RN Krysta regarding abx order clarification. RN verbalized understanding. Will continue to monitor. Thank you,    C.  [facility], RN   Sepsis Phani Hensley  Call Back # [phone]

## 2020-02-16 NOTE — ED NOTES
Dr. Crocker Credit nnotified of patient's heart rate and pulse. Verbal orders received.  See [facility]

## 2020-02-16 NOTE — ED NOTES
7 AM patient has been admitted for some time I received a call from the nursing supervisor asking about the ICU admission. He appears to be having been admitted to the CVT ICU although the order was placed for the medical ICU. We will place a transfer order as a courtesy to my colleague.     Surya Pineda MD

## 2020-02-16 NOTE — ED NOTES
Bedside and verbal shift report given by Yash Mccollum RN (off going nurse) to Rafaela London RN (oncoming nurse). Report included the following information SBAR and ED Summary.

## 2020-02-16 NOTE — PROGRESS NOTES
Stage 5 CRF / ESRD. Diabetic Nephropathy. Metabolic  Acidosis. Bradycardia (chronic)  Pancytopenia. All the work up thrombocytopenia was Unremarkable in the Past , no Bone Marrow study done. H/O Hep C & was treated. H/O Hep B . Volume Overload. Substance abuse. Plan : continue Monitor in tele bed,follow Cardiology's recommendations,give Kayexalate, hold Diuretics. Agree with Hematology consult. Finally  he has agreed with Dialysis. Start Retacrit. after intial lab drawn. Will Consult IR for [facility] tomorrow & then will start Daily dialysis, recheck Coagulopathy, check              Peripheral smear for Schistocyte. (Doubt TTP/HUS) at all.              Will follow

## 2020-02-16 NOTE — PROGRESS NOTES
0915: Pt received from ER, transferred via wheelchair accompanied by two staff RNs. Pt ambulated from stretcher to CVT ICU bed 2353, approximately 10 feet with unsteady gait. Not able to register oral temperature on patient at time of admission, axillary temp 94F. Pt skin cool and dry, pt shivering and stated, \"I am cold, I need some blankets\". Shaniqua hugger and warm blankets applied. Suprapubic catheter in place, scant amount of concentrated urine in leg bag, pt stated that \"it (suprapubic catheter) has not beed draining much lately\". Epinephrine infusion @4mcgm/min infusing via RIJ CVL white infusion port. HR 38bpm with /78, RR 12bpm. Room air O2 saturation 95%. 1145: Pt skin warm and dry to touch, Axillary temp 94.7F, oral temperature does not register. Pt denies complaint at this time, pt sleeping intermittently, callbell in reach. 1315: Pt sleeping, pts' sister Margarette Winters  [phone] @ pts' bedside, given pt condition update. 1915: Bedside and Verbal shift change report given to April Mims (oncoming nurse) by Edward Keith (offgoing nurse). Report included the following information Kardex, ED Summary, Intake/Output, MAR, Accordion, Recent Results, Med Rec Status, Cardiac Rhythm ., Alarm Parameters , Quality Measures and Dual Neuro Assessment.

## 2020-02-16 NOTE — ED NOTES
TRANSFER - OUT REPORT:    Verbal report given to receiving nurse on Sheryl Vivas  being transferred to CVT ICU for routine progression of care       Report consisted of patients Situation, Background, Assessment and   Recommendations(SBAR). Information from the following report(s) SBAR, ED Summary, Intake/Output and MAR was reviewed with the receiving nurse. Lines:   Peripheral IV 02/15/20 Right;Upper Arm (Active)   Site Assessment Clean, dry, & intact 2/15/2020  4:20 PM   Phlebitis Assessment 0 2/15/2020  4:20 PM   Infiltration Assessment 0 2/15/2020  4:20 PM   Dressing Status Clean, dry, & intact 2/15/2020  4:20 PM   Dressing Type Tape;Transparent 2/15/2020  4:20 PM   Hub Color/Line Status Pink;Patent; Flushed 2/15/2020  4:20 PM       Peripheral IV 02/15/20 Left External jugular (Active)   Site Assessment Clean, dry, & intact 2/15/2020  8:14 PM   Phlebitis Assessment 0 2/15/2020  8:14 PM   Infiltration Assessment 0 2/15/2020  8:14 PM   Dressing Status Clean, dry, & intact 2/15/2020  8:14 PM   Dressing Type Transparent 2/15/2020  8:14 PM        Opportunity for questions and clarification was provided.       Patient transported with:   Monitor  Registered Nurse

## 2020-02-16 NOTE — ED NOTES
Labs drawn via straight stick to L hand . Pressure dressing applied. Pt tolerated well. Blood culture #2 sent to lab.

## 2020-02-17 PROBLEM — N25.81 SECONDARY HYPERPARATHYROIDISM OF RENAL ORIGIN (HCC): Status: ACTIVE | Noted: 2020-02-17

## 2020-02-17 PROBLEM — N18.6 ESRD (END STAGE RENAL DISEASE) (HCC): Status: ACTIVE | Noted: 2020-02-17

## 2020-02-17 LAB
ALBUMIN SERPL-MCNC: 2.9 G/DL (ref 3.4–5)
ALBUMIN/GLOB SERPL: 0.9 {RATIO} (ref 0.8–1.7)
ALP SERPL-CCNC: 86 U/L (ref 45–117)
ALT SERPL-CCNC: 31 U/L (ref 16–61)
ANION GAP SERPL CALC-SCNC: 8 MMOL/L (ref 3–18)
APTT PPP: 53.6 SEC (ref 23–36.4)
AST SERPL-CCNC: 18 U/L (ref 10–38)
BASOPHILS # BLD: 0 K/UL (ref 0–0.1)
BASOPHILS NFR BLD: 1 % (ref 0–2)
BILIRUB SERPL-MCNC: 0.5 MG/DL (ref 0.2–1)
BUN SERPL-MCNC: 65 MG/DL (ref 7–18)
BUN/CREAT SERPL: 10 (ref 12–20)
CALCIUM SERPL-MCNC: 7.8 MG/DL (ref 8.5–10.1)
CALCIUM SERPL-MCNC: 7.9 MG/DL (ref 8.5–10.1)
CHLORIDE SERPL-SCNC: 122 MMOL/L (ref 100–111)
CO2 SERPL-SCNC: 17 MMOL/L (ref 21–32)
CREAT SERPL-MCNC: 6.82 MG/DL (ref 0.6–1.3)
DIFFERENTIAL METHOD BLD: ABNORMAL
EOSINOPHIL # BLD: 0 K/UL (ref 0–0.4)
EOSINOPHIL NFR BLD: 1 % (ref 0–5)
ERYTHROCYTE [DISTWIDTH] IN BLOOD BY AUTOMATED COUNT: 21.6 % (ref 11.6–14.5)
GLOBULIN SER CALC-MCNC: 3.4 G/DL (ref 2–4)
GLUCOSE BLD STRIP.AUTO-MCNC: 106 MG/DL (ref 70–110)
GLUCOSE BLD STRIP.AUTO-MCNC: 63 MG/DL (ref 70–110)
GLUCOSE BLD STRIP.AUTO-MCNC: 66 MG/DL (ref 70–110)
GLUCOSE BLD STRIP.AUTO-MCNC: 69 MG/DL (ref 70–110)
GLUCOSE BLD STRIP.AUTO-MCNC: 83 MG/DL (ref 70–110)
GLUCOSE BLD STRIP.AUTO-MCNC: 94 MG/DL (ref 70–110)
GLUCOSE SERPL-MCNC: 77 MG/DL (ref 74–99)
HBV SURFACE AB SER QL IA: NEGATIVE
HBV SURFACE AB SERPL IA-ACNC: 8.77 MIU/ML
HBV SURFACE AG SER QL: <0.1 INDEX
HBV SURFACE AG SER QL: NEGATIVE
HCT VFR BLD AUTO: 25.9 % (ref 36–48)
HCV AB SER IA-ACNC: >11 INDEX
HCV AB SERPL QL IA: POSITIVE
HCV COMMENT,HCGAC: ABNORMAL
HEP BS AB COMMENT,HBSAC: ABNORMAL
HGB BLD-MCNC: 8 G/DL (ref 13–16)
INR PPP: 1.1 (ref 0.8–1.2)
LDH SERPL L TO P-CCNC: 225 U/L (ref 81–234)
LYMPHOCYTES # BLD: 0.8 K/UL (ref 0.9–3.6)
LYMPHOCYTES NFR BLD: 21 % (ref 21–52)
MCH RBC QN AUTO: 28.8 PG (ref 24–34)
MCHC RBC AUTO-ENTMCNC: 30.9 G/DL (ref 31–37)
MCV RBC AUTO: 93.2 FL (ref 74–97)
MONOCYTES # BLD: 0.3 K/UL (ref 0.05–1.2)
MONOCYTES NFR BLD: 8 % (ref 3–10)
NEUTS SEG # BLD: 2.6 K/UL (ref 1.8–8)
NEUTS SEG NFR BLD: 69 % (ref 40–73)
PHOSPHATE SERPL-MCNC: 8.2 MG/DL (ref 2.5–4.9)
PLATELET # BLD AUTO: 32 K/UL (ref 135–420)
POTASSIUM SERPL-SCNC: 5.4 MMOL/L (ref 3.5–5.5)
PROT SERPL-MCNC: 6.3 G/DL (ref 6.4–8.2)
PROTHROMBIN TIME: 14.2 SEC (ref 11.5–15.2)
PTH-INTACT SERPL-MCNC: 338.8 PG/ML (ref 18.4–88)
RBC # BLD AUTO: 2.78 M/UL (ref 4.7–5.5)
RETICS/RBC NFR AUTO: 5.6 % (ref 0.5–2.3)
SODIUM SERPL-SCNC: 147 MMOL/L (ref 136–145)
WBC # BLD AUTO: 3.7 K/UL (ref 4.6–13.2)

## 2020-02-17 PROCEDURE — 83970 ASSAY OF PARATHORMONE: CPT

## 2020-02-17 PROCEDURE — 65620000000 HC RM CCU GENERAL

## 2020-02-17 PROCEDURE — 85610 PROTHROMBIN TIME: CPT

## 2020-02-17 PROCEDURE — 85045 AUTOMATED RETICULOCYTE COUNT: CPT

## 2020-02-17 PROCEDURE — 86803 HEPATITIS C AB TEST: CPT

## 2020-02-17 PROCEDURE — 74011250637 HC RX REV CODE- 250/637: Performed by: FAMILY MEDICINE

## 2020-02-17 PROCEDURE — 84100 ASSAY OF PHOSPHORUS: CPT

## 2020-02-17 PROCEDURE — 83615 LACTATE (LD) (LDH) ENZYME: CPT

## 2020-02-17 PROCEDURE — 82962 GLUCOSE BLOOD TEST: CPT

## 2020-02-17 PROCEDURE — 74011250636 HC RX REV CODE- 250/636: Performed by: FAMILY MEDICINE

## 2020-02-17 PROCEDURE — 85730 THROMBOPLASTIN TIME PARTIAL: CPT

## 2020-02-17 PROCEDURE — 80053 COMPREHEN METABOLIC PANEL: CPT

## 2020-02-17 PROCEDURE — 87340 HEPATITIS B SURFACE AG IA: CPT

## 2020-02-17 PROCEDURE — 36592 COLLECT BLOOD FROM PICC: CPT

## 2020-02-17 PROCEDURE — 74011000250 HC RX REV CODE- 250: Performed by: FAMILY MEDICINE

## 2020-02-17 PROCEDURE — 86706 HEP B SURFACE ANTIBODY: CPT

## 2020-02-17 PROCEDURE — 94762 N-INVAS EAR/PLS OXIMTRY CONT: CPT

## 2020-02-17 PROCEDURE — 86704 HEP B CORE ANTIBODY TOTAL: CPT

## 2020-02-17 PROCEDURE — 85025 COMPLETE CBC W/AUTO DIFF WBC: CPT

## 2020-02-17 RX ORDER — SODIUM CHLORIDE 9 MG/ML
250 INJECTION, SOLUTION INTRAVENOUS AS NEEDED
Status: DISCONTINUED | OUTPATIENT
Start: 2020-02-17 | End: 2020-02-19

## 2020-02-17 RX ORDER — MAGNESIUM SULFATE 100 %
4 CRYSTALS MISCELLANEOUS AS NEEDED
Status: DISCONTINUED | OUTPATIENT
Start: 2020-02-17 | End: 2020-02-25 | Stop reason: HOSPADM

## 2020-02-17 RX ORDER — DIPHENHYDRAMINE HYDROCHLORIDE 50 MG/ML
12.5 INJECTION, SOLUTION INTRAMUSCULAR; INTRAVENOUS
Status: DISCONTINUED | OUTPATIENT
Start: 2020-02-17 | End: 2020-02-25 | Stop reason: HOSPADM

## 2020-02-17 RX ORDER — DEXTROSE 50 % IN WATER (D50W) INTRAVENOUS SYRINGE
25-50 AS NEEDED
Status: DISCONTINUED | OUTPATIENT
Start: 2020-02-17 | End: 2020-02-25 | Stop reason: HOSPADM

## 2020-02-17 RX ADMIN — SODIUM BICARBONATE 650 MG TABLET 650 MG: at 08:29

## 2020-02-17 RX ADMIN — FERROUS GLUCONATE TAB 324 MG (37.5 MG ELEMENTAL IRON) 1 TABLET: 324 (37.5 FE) TAB at 08:29

## 2020-02-17 RX ADMIN — FERROUS GLUCONATE TAB 324 MG (37.5 MG ELEMENTAL IRON) 1 TABLET: 324 (37.5 FE) TAB at 17:00

## 2020-02-17 RX ADMIN — SODIUM BICARBONATE 650 MG TABLET 650 MG: at 18:00

## 2020-02-17 RX ADMIN — WATER 1 G: 1 INJECTION INTRAMUSCULAR; INTRAVENOUS; SUBCUTANEOUS at 18:00

## 2020-02-17 RX ADMIN — DEXTROSE 50 % IN WATER (D50W) INTRAVENOUS SYRINGE 25 G: at 16:32

## 2020-02-17 RX ADMIN — DIPHENHYDRAMINE HYDROCHLORIDE 12.5 MG: 50 INJECTION, SOLUTION INTRAMUSCULAR; INTRAVENOUS at 16:31

## 2020-02-17 RX ADMIN — PANTOPRAZOLE SODIUM 40 MG: 40 TABLET, DELAYED RELEASE ORAL at 17:00

## 2020-02-17 RX ADMIN — LEVOTHYROXINE SODIUM 100 MCG: 100 TABLET ORAL at 05:50

## 2020-02-17 RX ADMIN — NEPHROCAP 1 CAPSULE: 1 CAP ORAL at 08:29

## 2020-02-17 RX ADMIN — PANTOPRAZOLE SODIUM 40 MG: 40 TABLET, DELAYED RELEASE ORAL at 08:29

## 2020-02-17 RX ADMIN — DEXTROSE 50 % IN WATER (D50W) INTRAVENOUS SYRINGE 25 G: at 11:42

## 2020-02-17 NOTE — PROGRESS NOTES
[facility]  -  Progress Note      Patient: Rylee Jamison MRN: [id number]  SSN: [id number]    YOB: 1960  Age: 62 y.o. Sex: male      Admit Date: 2/15/2020    Assessment:     Hospital Problems  Date Reviewed: 2/12/2020          Codes Class Noted POA    * (Principal) Acute renal failure superimposed on stage 3 chronic kidney disease (HCC) ICD-10-CM: N17.9, N18.3  ICD-9-CM: 584.9, 585.3  2/15/2020 Yes        UTI (urinary tract infection) ICD-10-CM: N39.0  ICD-9-CM: 599.0  2/15/2020 Yes        Acute renal failure superimposed on chronic kidney disease ([facility] ICD-10-CM: N17.9, N18.9  ICD-9-CM: 584.9, 585.9  2/15/2020 Yes        Bradycardia ICD-10-CM: R00.1  ICD-9-CM: 427.89  2/15/2020 Unknown        COPD, moderate ([facility] ICD-10-CM: J44.9  ICD-9-CM: 985  1/31/2019 Yes        Hypothyroid ICD-10-CM: E03.9  ICD-9-CM: 244.9  1/31/2019 Yes        Chronic hepatitis C without hepatic coma ([facility] ICD-10-CM: B18.2  ICD-9-CM: 070.54  5/31/2017 Yes        Quadriparesis ([facility] ICD-10-CM: G82.50  ICD-9-CM: 344.00  5/31/2017 Yes        Essential hypertension ICD-10-CM: I10  ICD-9-CM: 401.9  4/28/2017 Yes        IV drug abuse ([facility] ICD-10-CM: F19.10  ICD-9-CM: 305.90  4/28/2017 Yes        Renal cell carcinoma of left kidney (HCC) ICD-10-CM: C64.2  ICD-9-CM: 189.0  12/17/2013 Yes    Overview Signed 6/12/2018 11:33 AM by [facility] CMA     Pathological Stage [id number] cc RCC FG3 s/p left open partial nephrectomy in 12/13                   - Acute on chronic renal failure. Presented due to reduced urine output x 2 days and increased leg swelling x 1 week. This now appears to be approaching end-stage with likely need for hemodialysis in the near future. Creatinine continues to climb  - Asymptomatic sinus bradycardia. EKG appears unchanged. Heart rate remains in the 40s. Patient has a long history of this.   This is unlikely contributing to his worsening renal failure or hypotension.  - Chronic HFpEF. Repeat echocardiogram today demonstrated a dilated left ventricle with preserved LV systolic function, EF 10% with mild to moderate pulmonary hypertension. Unchanged compared to his prior echocardiogram from 3/18/19 at [facility]. He appears euvolemic this am.  - Hx RCC s/p left partial nephrectomy 2013,   - S/p suprapubic catheter placement 11/12/18 at [facility]  - Hypoalbuminemia, mild, Albumin 3.1 on 2/16/2020.  - Hypertension.  Currently well controlled. - Diabetes, Hgb A1c 5.3  - Chronic anemia, Hgb 8.7 this admission   - Acute on chronic thrombocytopenia, Plt down to 35-38K this admission, followed by Dr. Isidra Medina as outpatient - seen in office 2/10/2020. Patient appears to have pancytopenia as well  - Chronic hepatitis C, followed by Dr. Cher Mosqueda with GI   - Hypothyroidism, synthroid   - Hx drug abuse  - ongoing tobacco use, currently smokes 1 pack cigarettes/week    - Hx spinal osteomyelitis with quadriplegia, s/p C3-C7 laminectomy in April 2017     Primary cardiologist: Dr. Shayla Choe:     Stable  Volume status stable this am but with renal indices climbing. Patient apparently discussed with Dr. Tristin Bush regarding dialysis and is amenable to this per the patient. Will defer further planning and discussion on initiating this with patient and Dr. Tristin Bush. Continue with current medications monitor volume status. His heart rate remains stable in the 40's and patient with long history of sinus bradycardia that is documented. Subjective:     No new complaints.  Denies any pain or shortness of breath    Objective:      Patient Vitals for the past 8 hrs:   Temp Pulse Resp BP SpO2   02/17/20 0900 -- (!) 46 12 121/69 97 %   02/17/20 0800 -- (!) 43 14 116/62 97 %   02/17/20 0700 -- (!) 45 10 112/60 96 %   02/17/20 0600 -- (!) 43 8 104/58 99 %   02/17/20 0500 -- (!) 43 11 114/64 96 %   02/17/20 0400 95 °F (35 °C) (!) 44 13 109/62 98 %   02/17/20 0300 -- (!) 44 14 117/65 99 %   02/17/20 0200 -- (!) 46 9 111/62 98 %         Patient Vitals for the past 96 hrs:   Weight   02/17/20 0550 77.1 kg (170 lb)   02/16/20 1116 72.6 kg (160 lb)   02/16/20 0915 72.6 kg (160 lb 0.9 oz)   02/15/20 2121 72.6 kg (160 lb)         Intake/Output Summary (Last 24 hours) at 2/17/2020 0942  Last data filed at 2/17/2020 0800  Gross per 24 hour   Intake 984.69 ml   Output 260 ml   Net 724.69 ml       Physical Exam:  General:  alert, cooperative, no distress, appears stated age  Neck:  nontender, no JVD  Lungs:  clear to auscultation bilaterally  Heart:  regular bradycardia rate and rhythm, S1, S2 normal, no murmur, click, rub or gallop  Abdomen:  abdomen is soft without significant tenderness, masses, organomegaly or guarding  Extremities:  extremities normal, atraumatic, no cyanosis or edema    Data Review:     Labs: Results:       Chemistry Recent Labs     02/17/20 0255 02/16/20  0408 02/15/20  1615   GLU 77 109* 62*   * 147* 148*   K 5.4 5.4 4.9   * 122* 123*   CO2 17* 17* 17*   BUN 65* 63* 59*   CREA 6.82* 6.34* 6.31*   CA 7.9*  7.8* 7.9* 8.0*   MG  --  2.1  --    PHOS 8.2*  --   --    AGAP 8 8 8   BUCR 10* 10* 9*   AP 86 95 96   TP 6.3* 6.7 6.9   ALB 2.9* 3.1* 3.2*   GLOB 3.4 3.6 3.7   AGRAT 0.9 0.9 0.9      CBC w/Diff Recent Labs     02/17/20 0255 02/16/20  0408 02/15/20  1615   WBC 3.7* 3.4* 3.1*   RBC 2.78* 3.03* 3.01*   HGB 8.0* 8.7* 8.7*   HCT 25.9* 28.2* 28.2*   PLT 32* 38* 35*   GRANS 69 57 58   LYMPH 21 39 34   EOS 1 1 1      Cardiac Enzymes No results found for: CPK, CK, CKMMB, CKMB, RCK3, CKMBT, CKNDX, CKND1, LINDA, TROPT, TROIQ, MIGUEL, TROPT, TNIPOC, BNP, BNPP   Coagulation Recent Labs     02/17/20  0255   PTP 14.2   INR 1.1   APTT 53.6*       Lipid Panel Lab Results   Component Value Date/Time    Cholesterol, total 148 02/11/2020 02:24 PM    HDL Cholesterol 71 02/11/2020 02:24 PM    LDL, calculated 63 02/11/2020 02:24 PM    VLDL, calculated 19.4 08/27/2019 09:52 AM    Triglyceride 72 02/11/2020 02:24 PM    CHOL/HDL Ratio 2.1 02/11/2020 02:24 PM      BNP No results found for: BNP, BNPP, XBNPT   Liver Enzymes Recent Labs     02/17/20  0255   TP 6.3*   ALB 2.9*   AP 86   SGOT 18      Digoxin    Thyroid Studies Lab Results   Component Value Date/Time    TSH 3.15 02/16/2020 04:08 AM

## 2020-02-17 NOTE — CONSULTS
Hematology / Oncology Consult Note      Reason for Consultation:  Elton Hassan is a 62 y.o. male who I've been asked to consult for assistance with the management of the patient's chronic thrombocytopenia. Subjective:     HPI: Mr. Moody Gutiérrez is a 24-year-old -American male with a history of diabetes mellitus, hypothyroidism, chronic renal failure, thrombocytopenia, suprapubic catheter due to chronic urinary retention quadriparesis after cervical spine infection and surgery. He has a history of bradycardia and has been evaluated by cardiology in the past.  He also has a history of chronic polysubstance abuse reported that the last heroin use was about 4 days ago. Patient was instructed by his PCP to go to the ED due to worsening creatinine. Patient was seen by nephrology and admitted to start dialysis. The ED was found to have severe bradycardia heart rate dropping down to 33 even though he remained asymptomatic. Patient was started on Levophed drip cardiology was consulted. Patient had an echocardiogram this morning showed ejection fraction 65% mild to moderate pulmonary hypertension on a change from March 18, 2019. In addition patient was found to have acute UTI in the ER and started on Rocephin IV. Tentative plan is for dialysis catheter to be placed, we have recommended 2 units of platelets to be given approximately 1 hour to the procedure. On admission WBC 3.1, hemoglobin 8.7, hematocrit 28.2, platelet count 88,202, sodium 148, potassium 4.9, chloride 123, glucose 62, BUN 59, creatinine 6.31, ALT 39, AST 32, troponin- 0.05, NT Pro-BNP 3,847    Chest x-ray 2/15/2020  IMPRESSION:      Status post right IJ approach central venous catheter placement without evidence  of pneumothorax.     Trace blunting of the right costophrenic sulcus consistent with tiny pleural  effusion versus thickening, slightly less well seen compared to the preceding  radiograph.  No evidence of pulmonary consolidation. Pt states that he feels fine, he said he never felt bad prior to the admission. Review of Systems:   Constitutional:  Negative for fever, chills, diaphoresis, activity change, appetite change and unexpected weight change. HENT: Negative for nosebleeds, congestion, facial swelling, mouth sores, trouble swallowing, neck pain, neck stiffness, voice change and postnasal drip. Eyes: Negative for photophobia, pain, discharge and itching. Respiratory: Negative for apnea, cough, choking, chest tightness, wheezing and stridor. Cardiovascular: Negative for chest pain, palpitations and leg swelling. Gastrointestinal: Negative for abdominal pain. Negative for nausea, diarrhea, constipation, blood in stool and rectal pain. Genitourinary: Negative for dysuria, urgency, hematuria, flank pain and difficulty urinating. Musculoskeletal: Negative for back pain, joint swelling, arthralgias and gait problem. Skin: Positive bruising noted at LUE, with swelling. Neurological:  Negative for dizziness, facial asymmetry, speech difficulty, light-headedness and headaches. Hematological: Negative for adenopathy. Does not bruise/bleed easily. Psychiatric/Behavioral: Negative for hallucinations, confusion, disturbed wake/sleep cycle and agitation. Physical Assessment:     Visit Vitals  /60   Pulse (!) 49   Temp 95 °F (35 °C)   Resp 21   Ht 6' (1.829 m)   Wt 77.1 kg (170 lb)   SpO2 96%   BMI 23.06 kg/m²       Temp (24hrs), Av.7 °F (35.4 °C), Min:95 °F (35 °C), Max:96.1 °F (35.6 °C)      Physical Exam:    General: Appears comfortable, NAD. HEENT:  Anicteric sclerae; pink palpebral conjunctivae; mucosa moist  Resp:  Symmetrical chest expansion, no accessory muscle use; good airway entry; no rales/ wheezing/ rhonchi noted  CV:  S1, S2 present; regular rate and rhythm  GI:  Abdomen soft, non-tender; (+) active bowel sounds  Extremities:  +2 pulses on all extremities; RUE edema.   Skin: Warm; Bruising at LUE. Neurologic:  Non-focal          Assessment:   · Chronic thrombocytopenia  · Acute renal failure superimposed on chronic kidney disease age 3  · Bradycardia  · Chronic hepatitis C          Plan:   · Platelet count today is 32,000, I have recommended 2 units of single donor platelets to be given approximately 1 hour prior to procedure today. Otherwise no therapeutic intervention is warranted unless his platelet count declines below 30,000. · H/H 8 0.0/25.9, will recommend PRBC transfusion for hemoglobin less than 7g/dl. I have discussed with Dr. Ventura Sanz, nephrology- as an outpatient the patient has been receiving Retacrit every 2 weeks whenever his hemoglobin is less than 10g/dl and Hematocrit <30%. we will start Retacrit 12,000 units to be given 3 times a week with dialysis while inpatient. · On Rocephin for UTI- Urine Cx results are still pending. · Patient to follow-up with Dr. Pavan Do within 5 to 7 days after discharge.     Past Medical History:   Diagnosis Date    Anemia     Bradycardia, unspecified 2018    Cervical discitis     MRSA    Chronic drug abuse ([address] 1974    Chronic kidney disease     stage III    Diabetes ([address] 01/1990    Drug abuse ([address]     Encephalopathy     GERD (gastroesophageal reflux disease)     Hypertension     Muscle weakness     Quadriparesis ([address] 2017    Renal cell carcinoma of left kidney ([address] 12/17/13    Pathological Stage [id number] cc RCC FG3 s/p left open partial nephrectomy in 12/13      Sepsis due to methicillin resistant Staphylococcus aureus (HCC)     Suprapubic catheter ([address]     Thrombocytopenia ([address]     Urethral erosion     Viral hepatitis B     Viral hepatitis C     Xerosis cutis      Past Surgical History:   Procedure Laterality Date    COLONOSCOPY N/A 10/3/2019    COLONOSCOPY / polypectomy performed by Varghese Khan MD at [address] [place] [address] HX CIRCUMCISION  12/17/13    Dr. Lowell Vallejo, [facility]    HX NEPHRECTOMY Left 12/17/13 Open/ Partial,nephrectomy    HX OTHER SURGICAL Right 2/27/2016    removed right ft  2nd meta-tarsal    HX OTHER SURGICAL  04/2017    abscess removed from back of neck     NEUROLOGICAL PROCEDURE UNLISTED  2017    neck surgery-abcess-hardware neck    AR REMOVAL WITH INSERTION OF SUPRAPUBIC CATHETER  2018       Family History   Problem Relation Age of Onset    Diabetes Mother     Sickle Cell Anemia Father     Diabetes Sister     Hypertension Sister      Allergies   Allergen Reactions    Iodine Hives and Other (comments)       Home Medications:   Home Meds reviewed with patient    Hospital Medications:   Current hospital medications reviewed    Labs:  Recent Labs     02/17/20  0255 02/16/20  0408 02/15/20  1615   WBC 3.7* 3.4* 3.1*   RBC 2.78* 3.03* 3.01*   HCT 25.9* 28.2* 28.2*   MCV 93.2 93.1 93.7   MCH 28.8 28.7 28.9   MCHC 30.9* 30.9* 30.9*   RDW 21.6* 21.7* 21.5*       Recent Labs     02/17/20  0255 02/16/20  0408 02/15/20  1615   CO2 17* 17* 17*   BUN 65* 63* 59*       No results for input(s): ALBUMIN in the last 72 hours. No lab exists for component: [facility], [facility]    Thank you for requesting us to consult on your patient. We appreciate the opportunity to participate in your patient's care. Please call if you have questions. Dragon Medical dictation software was used for portions of this report. Efforts have been made to edit the dictations, but occasionally words are mis-transcribed.     Bushra Ortega NP

## 2020-02-17 NOTE — PROGRESS NOTES
Patient has aid at home 56 hours a week through [employer] personal care. Patient ambulated with cane at home. Lives with Ravinder holly. Address updated on chart. [address], [place], [address]  If dialysis is needed then patient will have Medicaid transportation, will set up standing order. Reason for Admission:   SHEILA (acute kidney injury) ([facility] [N17.9]  SHEILA (acute kidney injury) ([facility] [N17.9]  Bradycardia [R00.1]               RRAT Score:     30%             Resources/supports as identified by patient/family:   Lives with sister, has personal care aid 56 hours a week through [employer]. Top Challenges facing patient (as identified by patient/family and CM): None reported. Current Advanced Directive/Advance Care Plan:   no                          Plan for utilizing home health:    yes                      Likelihood of readmission:   HIGH    Transition of Care Plan:                    Initial assessment completed with patient and Ravinder holly. Cognitive status of patient: oriented to time, place, person and situation. Face sheet information confirmed:  yes. The patient designates Ravinder holly to participate in his discharge plan and to receive any needed information. This patient lives in a single family home with Ravinder holly. Patient is not able to navigate steps as needed. Prior to hospitalization, patient was considered to be independent with ADLs/IADLS : no . If not independent,  patient needs assist with : bathing, food preparation and cooking    Patient has a current ACP document on file: no  The sister/BLS will be available to transport patient home upon discharge. The patient already has Lucie Corona, [facility] chair,  medical equipment available in the home. Patient is not currently active with home health. Patient has not stayed in a skilled nursing facility or rehab.       This patient is on dialysis :no       Currently, the discharge plan is Home with Home Health. The patient states that he can obtain his medications from the pharmacy, and take his medications as directed. Patient's current insurance is [employer] and Medicaid. Care Management Interventions  PCP Verified by CM:  Yes  Palliative Care Criteria Met (RRAT>21 & CHF Dx)?: No  Mode of Transport at Discharge: S  Transition of Care Consult (CM Consult): Home Health, Discharge Planning  Physical Therapy Consult: Yes  Occupational Therapy Consult: Yes  Current Support Network: Relative's Home, Has Personal Caregivers(lives with sister, has aid)  Confirm Follow Up Transport: Family  Discharge Location  Discharge Placement: Home with home health        BLANE Nicole  Case Management  [phone]

## 2020-02-17 NOTE — PROGRESS NOTES
NUTRITION    Nursing Referral: [facility]     RECOMMENDATIONS / PLAN:     - Monitor readiness to resume oral diet and nutritional supplements as tolerated following procedure. - Continue RD inpatient monitoring and evaluation. NUTRITION INTERVENTIONS & DIAGNOSIS:     - Meals/snacks: NPO  - Medical food supplement therapy: Nepro Shake, TID- held    Nutrition Diagnosis: Inadequate energy intake related to scheduled procedures and decreased appetite as evidenced by pt NPO awating TDC  Increased nutrient needs protein and energy related to increased energy expenditure as evidenced by pt with ESRD plan to start HD     ASSESSMENT:     Pt unavailable x 2 attempts, sleeping and with MD. NPO this am for [facility] placement to start HD, fluid overloaded upon admission with noted significant wt gain PTA per chart. Overall fair meal intake, supplements started per RN, 50% intake. Tolerating diet.     Nutritional intake adequate to meet patients estimated nutritional needs:  No    Diet: DIET NUTRITIONAL SUPPLEMENTS All Meals; NEPRO  DIET NPO Except Meds  DIET NPO      Food Allergies: NKFA  Current Appetite: Not Applicable - NPO  Appetite/meal intake prior to admission: Unknown  Feeding Limitations:  [] Swallowing difficulty    [] Chewing difficulty    [] Other:  Current Meal Intake:   Patient Vitals for the past 100 hrs:   % Diet Eaten   02/16/20 1600 50 %   02/16/20 1200 45 %   02/16/20 1000 50 %       BM: 2/15  Skin Integrity: WDL  Edema:   [] No     [x] Yes   Pertinent Medications: Reviewed: nephrocap, ferrous gluconate, SSI, levothyroxine, pantoprazole, sodium bicarbonate    Recent Labs     02/17/20  0255 02/16/20  0408 02/15/20  1615   * 147* 148*   K 5.4 5.4 4.9   * 122* 123*   CO2 17* 17* 17*   GLU 77 109* 62*   BUN 65* 63* 59*   CREA 6.82* 6.34* 6.31*   CA 7.9*  7.8* 7.9* 8.0*   MG  --  2.1  --    PHOS 8.2*  --   --    ALB 2.9* 3.1* 3.2*   SGOT 18 32 32   ALT 31 39 39       Intake/Output Summary (Last 24 hours) at 2/17/2020 1405  Last data filed at 2/17/2020 1200  Gross per 24 hour   Intake 519.06 ml   Output 260 ml   Net 259.06 ml       Anthropometrics:  Ht Readings from Last 1 Encounters:   02/16/20 6' (1.829 m)     Last 3 Recorded Weights in this Encounter    02/16/20 0915 02/16/20 1116 02/17/20 0550   Weight: 72.6 kg (160 lb 0.9 oz) 72.6 kg (160 lb) 77.1 kg (170 lb)     Body mass index is 23.06 kg/m². Weight History: Wt gain per chart review, noted volume overloaded upon admission. Weight Metrics 2/17/2020 2/15/2020 2/10/2020 12/16/2019 10/28/2019 10/3/2019 9/10/2019   Weight 170 lb - 165 lb 149 lb 147 lb 139 lb 143 lb   BMI - 23.06 kg/m2 22.38 kg/m2 20.21 kg/m2 19.94 kg/m2 18.85 kg/m2 19.94 kg/m2        Admitting Diagnosis: SHEILA (acute kidney injury) ([facility] [N17.9]  SHEILA (acute kidney injury) ([facility] [N17.9]  Bradycardia [R00.1]  Pertinent PMHx: anemia, drug abuse, CKD, DM, GERD, HTN, quadriparesis, renal cell carcinoma s/p nephrectomy    Education Needs:        [x] None identified  [] Identified - Not appropriate at this time  []  Identified and addressed - refer to education log  Learning Limitations:   [x] None identified  [] Identified    Cultural, Methodist & ethnic food preferences:  [x] None identified    [] Identified and addressed     ESTIMATED NUTRITION NEEDS:     Calories: 2161-8697 kcal (30-35 kcal/kg) based on  [] Actual BW      [x] SBW: 81 kg   Protein:  gm (1.2-1.5 gm/kg) based on  [] Actual BW      [x] SBW   Fluid: 750-1500 mL/day     MONITORING & EVALUATION:     Nutrition Goal(s):   - PO nutrition intake will meet >75% of patient estimated nutritional needs within the next 7 days.    Outcome: New/Initial goal     Monitoring:   [x] Food and nutrient intake   [x] Food and nutrient administration  [x] Comparative standards   [x] Nutrition-focused physical findings   [x] Anthropometric Measurements   [x] Treatment/therapy   [x] Biochemical data, medical tests, and procedures        Previous Recommendations (for follow-up assessments only):  Not Applicable     Discharge Planning: No nutritional discharge needs at this time. Participated in care planning, discharge planning, & interdisciplinary rounds as appropriate.       Yoni Gar RD  Pager: [phone]

## 2020-02-17 NOTE — CONSULTS
[facility]    Name:  Michael Álvarez  MR#:   [id number]  :  1960  ACCOUNT #:  [de-identified]  DATE OF SERVICE:  2020      REQUESTING PHYSICIAN:  Marlon Lerma MD    REASON FOR CONSULTATION:  Advanced renal failure with leg swelling and bradycardia. IMPRESSION AND PLAN:  1. The patient has stage V chronic renal failure or end-stage renal disease. 2.  The patient has diabetic nephropathy along with diabetic nephrosclerosis, biopsy proven, biopsy was done in 2018. 3.  Metabolic acidosis. 4.  Bradycardia. 5.  Pancytopenia. All the workup for thrombocytopenia was unremarkable including hepatitis B was positive, hepatitis C was treated, also negative human immunodeficiency virus and heparin-induced thrombocytopenia factor was negative and also follows with hematologist, Dr. Nicolle Mcginnis, not sure whether the bone marrow was done or not. 6.  Volume overload. 7.  Substance abuse. 8.  Has suprapubic catheter. 9.  Has circumcision. 10.  Open partial nephrectomy of kidney due to renal cell carcinoma, that was done on the left side and that was stage T1b Nx M0 R0 and it was left open partial nephrectomy in 2013 possibly. 11.  Substance abuse. 12.  Bradycardia. 13.  Mild hyperkalemia. 14.  Metabolic acidosis. PLAN:  Continue to monitor the patient in telemetry bed. Follow Cardiology recommendations. As per Cardiology recommendation, no immediate need for pacemaker with mild potassium high side. We will give one dose of Kayexalate. We will hold diuretics as the blood pressure initially was low and is on pressor. The patient will need long-term dialysis, which plan was discussed with him several times in the past, but he was not agreeing, but now says that if he needs he would not delay any further, would start dialysis. Also would consult Interventional Radiology to put a tunneled dialysis catheter.   Once the catheter had been inserted, we will start dialysis in hospital & then  [facility]. In the meanwhile, we will recheck his coagulopathy, check his peripheral smears for any schistocytes, which we doubt very much, check LDH for any kind of hemolysis and retic count and do the anemia workup. We will start the Retacrit once workup had been done and follow hematologist's plan and recommendations. All the workup was done in the past possibly, except the bone marrow, whether they will do that one or not is not clear. At this time, the patient is hemodynamically stable, mildly acidotic, mild hyperkalemia, no immediate need for any dialysis and as he agreed finally, we will start dialysis tomorrow. HISTORY OF PRESENT ILLNESS:  This 75-year-old  male, long-term patient of mine who comes to the hospital with different acute problems, now has developed increased leg swelling, which has been difficult to control. Initially, he was on the Lasix recently given the increased swelling of his legs, low dose of diuretics, metolazone was added and noticed that his creatinine started going high. The patient cannot lay flat, uses 2 to 3 pillows when he sleeps. He has multiple problems including history of hypertension, type 2 diabetes mellitus, chronic renal failure, anemia of chronic disease, thyroid problem, hepatitis C, being treated, hepatitis B, chronic thrombocytopenia. Follows with Dr. Eleni Perez, also has history of hypothyroidism. The patient was brought to the emergency room as per my advice on last Thursday and similar plan was discussed with him, but he wanted to go home and observe, see if the leg swelling improves or not, which he did not and thought that his urine output has decreased further and becoming short of breath with increased leg swelling and he came to the emergency room. The patient was found to be severely bradycardic in the ER, heart rate dropped down to 33. The patient remained asymptomatic.   The patient had a central line placed in the emergency room and started Levophed drip and Cardiology was also consulted for further evaluation. The patient had an echocardiogram this morning, which showed ejection fraction around 65%, mild-to-moderate pulmonary hypertension, on 03/24/2019. The patient has long-term history of intravenous drug abuse and has history of hepatitis C, being treated by Dr. Víctor Vallejo, also the patient has a history of tubular adenoma, removed by Dr. Víctor Vallejo colonoscopically. The patient denies any nausea, vomiting. The patient also found to have acute UTI in the emergency room, the patient started on IV Rocephin, suspicious for UTI off and on, he goes to the nursing home. He has quadriplegia with a history of C5-6 diskitis, osteomyelitis with quadriplegia, status post C3-7 laminectomy with posterior lateral fusion and hardware in 04/2017. Possible autoimmune dysfunction also. PAST MEDICAL HISTORY:  As I mentioned. REVIEW OF SYSTEMS:  An elderly person without any acute symptoms of palpitations with dizziness, lightheadedness. No nausea, no vomiting. No chest pain, but has increased leg swelling with mild shortness of breath. No urinary symptoms. PAST SURGICAL HISTORY:  Numerous, including colonoscopy, circumcision, partial nephrectomy, kidney biopsy, adenoma removal, neurological procedure, and now also a suprapubic catheter in the right IJ line. FAMILY HISTORY:  Significant for diabetes in mother, sickle cell anemia in father, diabetes and hypertension in sibling. SOCIAL HISTORY:  . Smokes everyday for 30 years. Alcohol use, yes. Marijuana and heroin use very often. LIST OF MEDICATIONS:  Renovite, Synthroid, Rocaltrol, ferrous gluconate, hydralazine, Cytomel, docusate sodium, doxycycline, not taking regularly, insulin Humalog, Flomax. ALLERGIES:  HE IS ALLERGIC TO IODINE.     PHYSICAL EXAMINATION:  VITAL SIGNS:  Temperature 98.6, heart rate 40-50 per minute, blood pressure 130/62. HEENT:  Oral mucosa moist.  Jugular venous pressure not distended. NECK:  Supple. LUNGS:  Decreased breath sounds. HEART:  S1, S2 without any gallop or murmur. ABDOMEN:  Soft. No palpable mass. Suprapubic catheter. Bladder is not distended. EXTREMITIES:  Ankle, 2 to 3+ edema. LABORATORY DATA:  Relevant labs that has been done so far, WBC 3.4, with a hemoglobin of 8.7, hematocrit of 28.2, platelets of 98,128. Neutrophils 57%, lymphocytes 39%. Prothrombin time last time was checked in 08/2019 with INR of 1.0, PT of 12.8, PTT was 36.9, slightly elevated. Chemistries:  As of today, sodium 147, potassium 5.4, chloride 122, CO2 17, glucose of 109, blood urea nitrogen of 63, creatinine of 6.34, on 02/13 it was 5.80 which has progressively increased, on 02/11, it was 5.9. Troponin on this admission was 0.05, BNP of 3847. Chest x-ray was done, shows trace blunting of the right costophrenic sulcus consistent with tiny pleural effusion versus thickening, no evidence of any focal pulmonary consolidation. The patient had a kidney biopsy, left kidney, random left renal biopsy that was done on 04/09/2018 and was read at  [facility]. Final interpretation was diabetic glomerulosclerosis in a patient with moderate arterionephrosclerosis and moderate-to-severe tubular interstitial scarring, focal mild acute tubular injury. IMPRESSION AND PLAN:  As explained on my initial part of the consultation. Thanks once again for asking me to see this patient and we will follow along with you from renal point of view.       MD EFFIE [id number]  D:  02/16/2020 14:44  T:  02/16/2020 20:38  JOB #:  [id number]

## 2020-02-17 NOTE — PROGRESS NOTES
0710: Bedside and Verbal shift change report given to [facility], RN (oncoming nurse) by Rolando Rollins RN (offgoing nurse). Report included the following information SBAR, ED Summary, Intake/Output, MAR, Recent Results and Med Rec Status. 1910: Bedside and Verbal shift change report given to RN (oncoming nurse) by [facility], RN (offgoing nurse). Report included the following information SBAR, Intake/Output, MAR, Recent Results and Med Rec Status.

## 2020-02-17 NOTE — PROGRESS NOTES
Progress Note    Olayinka Corey  62 y.o. Admit Date: 2/15/2020  Principal Problem:    Acute renal failure superimposed on stage 3 chronic kidney disease ([facility] (2/15/2020) POA: Yes    Active Problems:    Chronic hepatitis C without hepatic coma ([address] (5/31/2017) POA: Yes      Essential hypertension (4/28/2017) POA: Yes      IV drug abuse ([address] (4/28/2017) POA: Yes      Quadriparesis ([address] (5/31/2017) POA: Yes      Renal cell carcinoma of left kidney ([facility] (12/17/2013) POA: Yes      Overview: Pathological Stage [id number] cc RCC FG3 s/p left open partial nephrectomy       in 12/13        COPD, moderate ([address] (1/31/2019) POA: Yes      Hypothyroid (1/31/2019) POA: Yes      UTI (urinary tract infection) (2/15/2020) POA: Yes      Acute renal failure superimposed on chronic kidney disease ([address] (2/15/2020) POA: Yes      Bradycardia (2/15/2020) POA: Unknown      ESRD (end stage renal disease) ([facility] (2/17/2020) POA: Yes      Secondary hyperparathyroidism of renal origin ([facility] (2/17/2020) POA: Unknown            Subjective:     Patient feels good, off Pressor, remained Bradycardic (asymptomatic) & Thrombocytopenic, waiting for TDC. A comprehensive review of systems was negative except for that written in the History of Present Illness.     Objective:     Visit Vitals  /60   Pulse (!) 47   Temp 95 °F (35 °C)   Resp 10   Ht 6' (1.829 m)   Wt 77.1 kg (170 lb)   SpO2 99%   BMI 23.06 kg/m²         Intake/Output Summary (Last 24 hours) at 2/17/2020 1255  Last data filed at 2/17/2020 0800  Gross per 24 hour   Intake 744.06 ml   Output 250 ml   Net 494.06 ml       Current Facility-Administered Medications   Medication Dose Route Frequency Provider Last Rate Last Dose    0.9% sodium chloride infusion 250 mL  250 mL IntraVENous PRN Destiny Panda, MD        glucose chewable tablet 16 g  4 Tab Oral PRN Destiny Rubin MD        glucagon (GLUCAGEN) injection 1 mg  1 mg IntraMUSCular PRN Destiny Rubin MD  dextrose (D50W) injection syrg 12.5-25 g  25-50 mL IntraVENous PRN Jasmin Severino MD   25 g at 02/17/20 1142    [START ON 2/18/2020] epoetin cristy-epbx (RETACRIT) 12,000 Units combo injection  12,000 Units SubCUTAneous Q TUE, THU & SAT Thresa Harder, NP        pantoprazole (PROTONIX) tablet 40 mg  40 mg Oral ACB&D Jasmin Severino MD   40 mg at 02/17/20 1665    cefTRIAXone (ROCEPHIN) 1 g in sterile water (preservative free) 10 mL IV syringe  1 g IntraVENous Q24H Sherif Renner MD   1 g at 02/16/20 1832    levothyroxine (SYNTHROID) tablet 100 mcg  100 mcg Oral Sarah Mauricio MD   100 mcg at 02/17/20 0550    insulin lispro (HUMALOG) injection   SubCUTAneous TIDAC Jasmin Severino MD   Stopped at 02/16/20 0730    B complex-vitaminC-folic acid (NEPHROCAP) cap  1 Cap Oral DAILY Jasmin Severino MD   1 Cap at 02/17/20 2025    sodium bicarbonate tablet 650 mg  650 mg Oral BID Jasmin Severino MD   650 mg at 02/17/20 3811    ferrous gluconate 324 mg (37.5 mg iron) tablet 1 Tab  1 Tab Oral BID WITH MEALS Jasmin Severino MD   1 Tab at 02/17/20 0829    NOREPINephrine (LEVOPHED) 8 mg in 5% dextrose 250mL (32 mcg/mL) infusion  0.5-16 mcg/min IntraVENous TITRATE Chantal Hamm MD   Stopped at 02/17/20 0028        Physical Exam:     Physical Exam:   General:  Alert, cooperative, no distress, appears stated age. Mouth/Throat: Lips, mucosa, and tongue normal. Teeth and gums normal.   Neck: Supple, symmetrical, trachea midline, no adenopathy, thyroid: no enlargement/tenderness/nodules, no carotid bruit and no JVD. Lungs:   Clear to auscultation bilaterally. Heart:  Regular rate and rhythm, S1, S2 normal, no murmur, click, rub or gallop. Abdomen:   Soft, non-tender. Bowel sounds normal. No masses,  No organomegaly. ,has Supra-pubic catheter. Extremities: Extremities normal, atraumatic, no cyanosis . , has 2 plus edema         Data Review:    CBC w/Diff    Recent Labs     02/17/20  0255 02/16/20  0408 02/15/20  1615   WBC 3.7* 3.4* 3.1*   RBC 2.78* 3.03* 3.01*   HGB 8.0* 8.7* 8.7*   HCT 25.9* 28.2* 28.2*   MCV 93.2 93.1 93.7   MCH 28.8 28.7 28.9   MCHC 30.9* 30.9* 30.9*   RDW 21.6* 21.7* 21.5*    Recent Labs     02/17/20  0255 02/16/20  0408 02/15/20  1615   MONOS 8 3 7   EOS 1 1 1   BASOS 1 0 0   RDW 21.6* 21.7* 21.5*        Comprehensive Metabolic Profile    Recent Labs     02/17/20 0255 02/16/20  0408 02/15/20  1615   * 147* 148*   K 5.4 5.4 4.9   * 122* 123*   CO2 17* 17* 17*   BUN 65* 63* 59*   CREA 6.82* 6.34* 6.31*    Recent Labs     02/17/20  0255 02/16/20  0408 02/15/20  1615   CA 7.9*  7.8* 7.9* 8.0*   PHOS 8.2*  --   --    ALB 2.9* 3.1* 3.2*   TP 6.3* 6.7 6.9   SGOT 18 32 32   TBILI 0.5 0.6 0.8        Patient Name: Angella Mckeon Specimen #: [id number]   Page 2 of [address] [place] [address] [facility] [place] [address]   LIGHT MICROSCOPY: The tissue is examined at 10 levels of section with H&E, PAS, trichrome, and Ledesma silver stains. The up to 14 glomeruli per level of section for evaluation demonstrate 2 with global sclerosis and none with segmental tuft sclerosis. Nodular glomerulosclerosis with well-developed [place] type mesangial nodules is seen. Capillary walls are mildly thickened. No endocapillary proliferation is seen. There is moderate tubulointerstitial scarring with minimal mononuclear inflammation in the scarred interstitium and with superimposed   mild acute tubular injury. Arterioles demonstrate mild to moderate hyalinosis and arteries reveal moderate intimal fibroelastosis. IMMUNOFLUORESCENCE MICROSCOPY: Six (6) glomeruli are available for evaluation of which 1 reveals global sclerosis. No diagnostic glomerular or extraglomerular staining is seen with IgG, IgA, IgM, C3, C1q, fibrin, and kappa and lambda light chains.    ELECTRON MICROSCOPY: The semi thin section demonstrates 2 glomeruli with nodular glomerulosclerosis. Mild acute tubular injury is noted. Ultrastructural examination demonstrates diffusely thickened glomerular basement membranes with widespread effacement of the overlying foot processes. No discrete immune complex type electron dense deposits are identified. Impression:       Active Hospital Problems    Diagnosis Date Noted    ESRD (end stage renal disease) ([facility] 02/17/2020    Secondary hyperparathyroidism of renal origin ([address] 02/17/2020    Acute renal failure superimposed on stage 3 chronic kidney disease ([facility] 02/15/2020    UTI (urinary tract infection) 02/15/2020    Acute renal failure superimposed on chronic kidney disease ([address] 02/15/2020    Bradycardia 02/15/2020    COPD, moderate ([address] 01/31/2019    Hypothyroid 01/31/2019    Chronic hepatitis C without hepatic coma ([facility] 05/31/2017    Quadriparesis ([facility] 05/31/2017    Essential hypertension 04/28/2017    IV drug abuse ([facility] 04/28/2017    Renal cell carcinoma of left kidney ([facility] 12/17/2013     Pathological Stage [id number] cc RCC FG3 s/p left open partial nephrectomy in 86/95        Metabolic acidosis        Plan:     Continue current care, Await TDC by IR & then will start Dialysis, discussed with [facility].        Conner Hays MD

## 2020-02-17 NOTE — DIABETES MGMT
Glycemic Control Plan of Care    T2DM with A1c of 4.9% (2/11/2020). POC BG range on 02/16/2020: 95-99. POC BG report on 02/17/2020 at time of review: 69, 63, 86. 25 gm D50 given at 11:42 AM. Patient stated that his blood sugar dropped to low due to lack of food. Discussed hypoglycemia protocol with staff. Received report patient was NPO status today but procedure date changed to tomorrow therefore diet will be ordered today    Recommendation(s):  1.) follow hypoglycemia protocol and prevention. Assessment:  Patient is 62year old with past medical history including type diabetes mellitus, CKD stage 3, drug abuse, GERD, quadriparesis, left renal carcinoma, suprapubic catheter and hepatitis B&C - was admitted on 2/15/2020 with report that he was sent from MD office due to worsening Cr and LLE edema. Noted:  Acute renal failure. Stage 3 CKD. History of renal cell CA, s/p left partial nephrectomy. COPD. Hypothyroid. UTI.  T2DM. Most recent blood glucose values:    Results for Con Soto (MRN [id number]) as of 2/17/2020 16:03   Ref. Range 2/16/2020 08:48 2/16/2020 18:25   GLUCOSE,FAST - POC Latest Ref Range: 70 - 110 mg/dL 99 95     Results for Con Soto (MRN [id number]) as of 2/17/2020 16:03   Ref. Range 2/17/2020 08:42 2/17/2020 11:34 2/17/2020 13:16   GLUCOSE,FAST - POC Latest Ref Range: 70 - 110 mg/dL 69 (L) 63 (L) 83     Current A1C: 4.9% (2/11/2020). Current hospital diabetes medications:  Correctional lispro insulin TID AC. Normal sensitivity dose. Total daily dose insulin requirement previous day: 02/16/2020  None. Home diabetes medications:   Lispro sliding scale insulin. Diet: NPO and noted plan to feed patient since procedure will not be done until tomorrow, 02/18. Goals:  Blood glucose will be within target range of  mg/dL by 02/20/2020.     Education:  ___  Refer to Diabetes Education Record             _X__  Education not indicated at this time    John Yosi Sarkar RN [facility]  Pager: [phone]

## 2020-02-17 NOTE — PROGRESS NOTES
0630 - Paged Dr. Lexi Cedeno through answering service, to inform of platelet count this AM of 32 and pt to go to IR for [facility], recommended from Dr. Charis Tamayo to transfuse platelet if less than <50, awaiting call back.   0700 - Dr. Lexi Cedeno returned call, no new orders, await for IR and Hematology to see pt today

## 2020-02-17 NOTE — PROGRESS NOTES
Progress Note    Patient: Elizabeth Rider MRN: [id number]  CSN: [id number]    YOB: 1960  Age: 62 y.o. Sex: male    DOA: 2/15/2020 LOS:  LOS: 2 days                    Subjective:     Pt stable some ecchymosis lt arm no overt bleeding supposed to have IR for dialysis catheter today will transfuse 2 unites of PLT 1/2h to one hour before procedure . discussed with Dr Damian Goff in agreement     Pt off  Epinephrine drip seen by cardiology for bradycardia . Pt denied sx this morning     He is on rocephin for UTI urine culture and blood culture is pending. Review of systems  General: No fevers or chills. Rt IJ catheter   Cardiovascular: No chest pain or pressure. No palpitations. Pulmonary: No shortness of breath, cough or wheeze. Gastrointestinal: No abdominal pain, nausea, vomiting or diarrhea. Genitourinary: decrease urine out pit   Musculoskeletal: quadriparesis after neck surgery   Neurologic: No headache,  No seizure generalized weakness     Objective:     Physical Exam:  Visit Vitals  /61   Pulse (!) 45   Temp 95 °F (35 °C)   Resp 11   Ht 6' (1.829 m)   Wt 77.1 kg (170 lb)   SpO2 97%   BMI 23.06 kg/m²        General:         Alert, , no acute distress    HEENT: NC, Atraumatic. PERRLA, anicteric sclerae. Lungs: CTA Bilaterally. No Wheezing/Rhonchi/Rales. Heart:  Bradycardia   No murmur, No Rubs, No Gallops  Abdomen: Soft, Non distended, Non tender.    Extremities: No lower limb edema ecchymosis Lt arm stable lower limb edema  3 +  Psych:   Not anxious or agitated.   Neurologic:  CN 2-12 grossly intact, Alert and oriented X 3. quadriparesis   Intake and Output:  Current Shift:  02/17 0701 - 02/17 1900  In: 0   Out: 30 [Urine:30]  Last three shifts:  02/15 1901 - 02/17 0700  In: 1064.7 [P.O.:880; I.V.:184.7]  Out: 275 [Urine:275]    Labs: Results:       Chemistry Recent Labs     02/17/20  0255 02/16/20  0408 02/15/20  1615   GLU 77 109* 62*   * 147* 148*   K 5.4 5.4 4.9   * 122* 123*   CO2 17* 17* 17*   BUN 65* 63* 59*   CREA 6.82* 6.34* 6.31*   CA 7.9*  7.8* 7.9* 8.0*   AGAP 8 8 8   BUCR 10* 10* 9*   AP 86 95 96   TP 6.3* 6.7 6.9   ALB 2.9* 3.1* 3.2*   GLOB 3.4 3.6 3.7   AGRAT 0.9 0.9 0.9      CBC w/Diff Recent Labs     02/17/20  0255 02/16/20  0408 02/15/20  1615   WBC 3.7* 3.4* 3.1*   RBC 2.78* 3.03* 3.01*   HGB 8.0* 8.7* 8.7*   HCT 25.9* 28.2* 28.2*   PLT 32* 38* 35*   GRANS 69 57 58   LYMPH 21 39 34   EOS 1 1 1      Cardiac Enzymes No results for input(s): CPK, CKND1, LINDA in the last 72 hours. No lab exists for component: CKRMB, TROIP   Coagulation Recent Labs     02/17/20 0255   PTP 14.2   INR 1.1   APTT 53.6*       Lipid Panel Lab Results   Component Value Date/Time    Cholesterol, total 148 02/11/2020 02:24 PM    HDL Cholesterol 71 02/11/2020 02:24 PM    LDL, calculated 63 02/11/2020 02:24 PM    VLDL, calculated 19.4 08/27/2019 09:52 AM    Triglyceride 72 02/11/2020 02:24 PM    CHOL/HDL Ratio 2.1 02/11/2020 02:24 PM      BNP No results for input(s): BNPP in the last 72 hours.    Liver Enzymes Recent Labs     02/17/20 0255   TP 6.3*   ALB 2.9*   AP 86   SGOT 18      Thyroid Studies Lab Results   Component Value Date/Time    TSH 3.15 02/16/2020 04:08 AM          Procedures/imaging: see electronic medical records for all procedures/Xrays and details which were not copied into this note but were reviewed prior to creation of Plan    Medications:   Current Facility-Administered Medications   Medication Dose Route Frequency    0.9% sodium chloride infusion 250 mL  250 mL IntraVENous PRN    glucose chewable tablet 16 g  4 Tab Oral PRN    glucagon (GLUCAGEN) injection 1 mg  1 mg IntraMUSCular PRN    dextrose (D50W) injection syrg 12.5-25 g  25-50 mL IntraVENous PRN    pantoprazole (PROTONIX) tablet 40 mg  40 mg Oral ACB&D    cefTRIAXone (ROCEPHIN) 1 g in sterile water (preservative free) 10 mL IV syringe  1 g IntraVENous Q24H    levothyroxine (SYNTHROID) tablet 100 mcg  100 mcg Oral 6am    insulin lispro (HUMALOG) injection   SubCUTAneous TIDAC    B complex-vitaminC-folic acid (NEPHROCAP) cap  1 Cap Oral DAILY    sodium bicarbonate tablet 650 mg  650 mg Oral BID    ferrous gluconate 324 mg (37.5 mg iron) tablet 1 Tab  1 Tab Oral BID WITH MEALS    NOREPINephrine (LEVOPHED) 8 mg in 5% dextrose 250mL (32 mcg/mL) infusion  0.5-16 mcg/min IntraVENous TITRATE       Assessment/Plan     Principal Problem:    Acute renal failure superimposed on stage 3 chronic kidney disease (HCC) (2/15/2020)    Active Problems:    Chronic hepatitis C without hepatic coma (HCC) (5/31/2017)      Essential hypertension (4/28/2017)      IV drug abuse ([facility] (4/28/2017)      Quadriparesis ([facility] (5/31/2017)      Renal cell carcinoma of left kidney ([facility] [address] (12/17/2013)      Overview: Pathological Stage [id number] cc RCC FG3 s/p left open partial nephrectomy       in 12/13        COPD, moderate ([facility] (1/31/2019)      Hypothyroid (1/31/2019)      UTI (urinary tract infection) (2/15/2020)      Acute renal failure superimposed on chronic kidney disease (HCC) (2/15/2020)      Bradycardia (2/15/2020)    plan     Severe Bradycardia   stable seen by cardiology continue to monitor   Off  IV epinephrine drip   Monitor blood pressure heart rate  Follow-up cardiology recommendation  Avoid any heart slowing medication  Monitor for possible withdrawal from chronic heroin use.     Acute renal failure on top of chronic renal failure   temporary  Hemodialysis catheter today  Discussed with his nurse will transfuse 2 unites 1/2 h -1 hour before procedure   Discussed with hematology team Dr Mandie Resendiz and Camilla Alejandre NP will see pt today    will start on dialysis this admission.   Continue monitor potassium level and volume overload     Urinary tract infection/ pt has suprapubic cath was changes recently at [facility]   Continue Rocephin 1 g IV  Follow-up urine culture and blood culture     Thrombocytopenia/pancytopenia  We will get hematology oncology consult  Patient was seen by Dr. Mayela Lopez in the past  Patient had extensive work-up for low platelet  We will check with Dr. Mayela Lopez if he had bone marrow biopsy in the past     Hypertension/ possible autonomic neuropathy after neck surgery   Continue hydralazine 25 mg p.o. every 8 hours as needed systolic blood pressure above 160     Diabetes mellitus  Hemoglobin A1c on two 2/11/20 4.9   LDL 63  Humalog sliding scale  Hypoglycemia protocol discussed with his nurse today      Anemia due to chronic disease  Patient has recent transfusion to [facility] to 1 unit of packed red blood cells  Iron saturation 37%  Ferritin 160     Hepatitis C/ H/O upper GI bleeding gastritis  Patient was treated for hepatitis C with Dr. Rakesh Lawler  Protonix 40 mg daily  Continue to monitor liver function      continue to monitor lab Cbc, cmp, mag in AM   2 unites PLT transfusion 1/2 h before procedure   F/u hematology consult      DVT/GI Prophylaxis: SCD's and H2B/PP      Reena Lee MD  2/17/2020 11:00 AM

## 2020-02-18 ENCOUNTER — APPOINTMENT (OUTPATIENT)
Dept: INFUSION THERAPY | Age: 60
End: 2020-02-18
Payer: MEDICARE

## 2020-02-18 ENCOUNTER — APPOINTMENT (OUTPATIENT)
Dept: ULTRASOUND IMAGING | Age: 60
DRG: 673 | End: 2020-02-18
Attending: NURSE PRACTITIONER
Payer: MEDICARE

## 2020-02-18 ENCOUNTER — HOSPITAL ENCOUNTER (OUTPATIENT)
Dept: INTERVENTIONAL RADIOLOGY/VASCULAR | Age: 60
Discharge: HOME | DRG: 673 | End: 2020-02-18
Attending: PHYSICIAN ASSISTANT
Payer: MEDICARE

## 2020-02-18 VITALS
SYSTOLIC BLOOD PRESSURE: 136 MMHG | OXYGEN SATURATION: 100 % | HEART RATE: 59 BPM | DIASTOLIC BLOOD PRESSURE: 66 MMHG | RESPIRATION RATE: 11 BRPM

## 2020-02-18 DIAGNOSIS — Z99.2 ESRD (END STAGE RENAL DISEASE) ON DIALYSIS (HCC): Primary | ICD-10-CM

## 2020-02-18 DIAGNOSIS — N18.6 ESRD (END STAGE RENAL DISEASE) ON DIALYSIS (HCC): Primary | ICD-10-CM

## 2020-02-18 LAB
ALBUMIN SERPL-MCNC: 2.8 G/DL (ref 3.4–5)
ALBUMIN/GLOB SERPL: 0.8 {RATIO} (ref 0.8–1.7)
ALP SERPL-CCNC: 84 U/L (ref 45–117)
ALT SERPL-CCNC: 28 U/L (ref 16–61)
ANION GAP SERPL CALC-SCNC: 10 MMOL/L (ref 3–18)
AST SERPL-CCNC: 16 U/L (ref 10–38)
BASOPHILS # BLD: 0 K/UL (ref 0–0.06)
BASOPHILS NFR BLD: 0 % (ref 0–3)
BILIRUB SERPL-MCNC: 0.6 MG/DL (ref 0.2–1)
BUN SERPL-MCNC: 70 MG/DL (ref 7–18)
BUN/CREAT SERPL: 9 (ref 12–20)
CALCIUM SERPL-MCNC: 7.7 MG/DL (ref 8.5–10.1)
CHLORIDE SERPL-SCNC: 119 MMOL/L (ref 100–111)
CO2 SERPL-SCNC: 15 MMOL/L (ref 21–32)
CREAT SERPL-MCNC: 7.43 MG/DL (ref 0.6–1.3)
DIFFERENTIAL METHOD BLD: ABNORMAL
EOSINOPHIL # BLD: 0 K/UL (ref 0–0.4)
EOSINOPHIL NFR BLD: 0 % (ref 0–5)
ERYTHROCYTE [DISTWIDTH] IN BLOOD BY AUTOMATED COUNT: 21.5 % (ref 11.6–14.5)
GLOBULIN SER CALC-MCNC: 3.5 G/DL (ref 2–4)
GLUCOSE BLD STRIP.AUTO-MCNC: 70 MG/DL (ref 70–110)
GLUCOSE BLD STRIP.AUTO-MCNC: 75 MG/DL (ref 70–110)
GLUCOSE BLD STRIP.AUTO-MCNC: 91 MG/DL (ref 70–110)
GLUCOSE SERPL-MCNC: 55 MG/DL (ref 74–99)
HBV CORE AB SERPL QL IA: POSITIVE
HCT VFR BLD AUTO: 25.5 % (ref 36–48)
HGB BLD-MCNC: 8 G/DL (ref 13–16)
LYMPHOCYTES # BLD: 1.1 K/UL (ref 0.8–3.5)
LYMPHOCYTES NFR BLD: 35 % (ref 20–51)
MAGNESIUM SERPL-MCNC: 2 MG/DL (ref 1.6–2.6)
MCH RBC QN AUTO: 29.1 PG (ref 24–34)
MCHC RBC AUTO-ENTMCNC: 31.4 G/DL (ref 31–37)
MCV RBC AUTO: 92.7 FL (ref 74–97)
MONOCYTES # BLD: 0.2 K/UL (ref 0–1)
MONOCYTES NFR BLD: 6 % (ref 2–9)
NEUTS SEG # BLD: 1.8 K/UL (ref 1.8–8)
NEUTS SEG NFR BLD: 59 % (ref 42–75)
NRBC BLD-RTO: 7 PER 100 WBC
PERIPHERAL SMEAR,PSM: NORMAL
PLATELET # BLD AUTO: 29 K/UL (ref 135–420)
PLATELET COMMENTS,PCOM: ABNORMAL
POTASSIUM SERPL-SCNC: 5 MMOL/L (ref 3.5–5.5)
PROT SERPL-MCNC: 6.3 G/DL (ref 6.4–8.2)
RBC # BLD AUTO: 2.75 M/UL (ref 4.7–5.5)
RBC MORPH BLD: ABNORMAL
SODIUM SERPL-SCNC: 144 MMOL/L (ref 136–145)
WBC # BLD AUTO: 3.1 K/UL (ref 4.6–13.2)

## 2020-02-18 PROCEDURE — 74011250636 HC RX REV CODE- 250/636: Performed by: FAMILY MEDICINE

## 2020-02-18 PROCEDURE — 65620000000 HC RM CCU GENERAL

## 2020-02-18 PROCEDURE — P9037 PLATE PHERES LEUKOREDU IRRAD: HCPCS

## 2020-02-18 PROCEDURE — 94762 N-INVAS EAR/PLS OXIMTRY CONT: CPT

## 2020-02-18 PROCEDURE — 83735 ASSAY OF MAGNESIUM: CPT

## 2020-02-18 PROCEDURE — 74011000250 HC RX REV CODE- 250: Performed by: RADIOLOGY

## 2020-02-18 PROCEDURE — 82962 GLUCOSE BLOOD TEST: CPT

## 2020-02-18 PROCEDURE — 02H633Z INSERTION OF INFUSION DEVICE INTO RIGHT ATRIUM, PERCUTANEOUS APPROACH: ICD-10-PCS | Performed by: RADIOLOGY

## 2020-02-18 PROCEDURE — 74011250636 HC RX REV CODE- 250/636: Performed by: NURSE PRACTITIONER

## 2020-02-18 PROCEDURE — 80053 COMPREHEN METABOLIC PANEL: CPT

## 2020-02-18 PROCEDURE — 85025 COMPLETE CBC W/AUTO DIFF WBC: CPT

## 2020-02-18 PROCEDURE — B5181ZA FLUOROSCOPY OF SUPERIOR VENA CAVA USING LOW OSMOLAR CONTRAST, GUIDANCE: ICD-10-PCS | Performed by: RADIOLOGY

## 2020-02-18 PROCEDURE — 74011250637 HC RX REV CODE- 250/637: Performed by: FAMILY MEDICINE

## 2020-02-18 PROCEDURE — 74011250636 HC RX REV CODE- 250/636: Performed by: RADIOLOGY

## 2020-02-18 PROCEDURE — 30233R1 TRANSFUSION OF NONAUTOLOGOUS PLATELETS INTO PERIPHERAL VEIN, PERCUTANEOUS APPROACH: ICD-10-PCS | Performed by: FAMILY MEDICINE

## 2020-02-18 PROCEDURE — 74011250636 HC RX REV CODE- 250/636: Performed by: PHYSICIAN ASSISTANT

## 2020-02-18 PROCEDURE — 77030018719 IR INSERT TUNL CVC W/O PORT OVER 5 YR

## 2020-02-18 PROCEDURE — 0JH63XZ INSERTION OF TUNNELED VASCULAR ACCESS DEVICE INTO CHEST SUBCUTANEOUS TISSUE AND FASCIA, PERCUTANEOUS APPROACH: ICD-10-PCS | Performed by: RADIOLOGY

## 2020-02-18 PROCEDURE — 76700 US EXAM ABDOM COMPLETE: CPT

## 2020-02-18 PROCEDURE — 36430 TRANSFUSION BLD/BLD COMPNT: CPT

## 2020-02-18 PROCEDURE — P9035 PLATELET PHERES LEUKOREDUCED: HCPCS

## 2020-02-18 PROCEDURE — 90935 HEMODIALYSIS ONE EVALUATION: CPT

## 2020-02-18 RX ORDER — MIDAZOLAM HYDROCHLORIDE 1 MG/ML
1 INJECTION, SOLUTION INTRAMUSCULAR; INTRAVENOUS
Status: DISCONTINUED | OUTPATIENT
Start: 2020-02-18 | End: 2020-02-20 | Stop reason: HOSPADM

## 2020-02-18 RX ORDER — LIDOCAINE HYDROCHLORIDE AND EPINEPHRINE 10; 10 MG/ML; UG/ML
20 INJECTION, SOLUTION INFILTRATION; PERINEURAL ONCE
Status: COMPLETED | OUTPATIENT
Start: 2020-02-18 | End: 2020-02-18

## 2020-02-18 RX ORDER — HEPARIN SODIUM 1000 [USP'U]/ML
5000 INJECTION, SOLUTION INTRAVENOUS; SUBCUTANEOUS ONCE
Status: COMPLETED | OUTPATIENT
Start: 2020-02-18 | End: 2020-02-18

## 2020-02-18 RX ORDER — LORAZEPAM 2 MG/ML
INJECTION INTRAMUSCULAR
Status: DISPENSED
Start: 2020-02-18 | End: 2020-02-19

## 2020-02-18 RX ORDER — LORAZEPAM 2 MG/ML
0.5 INJECTION INTRAMUSCULAR
Status: DISCONTINUED | OUTPATIENT
Start: 2020-02-18 | End: 2020-02-25 | Stop reason: HOSPADM

## 2020-02-18 RX ORDER — FENTANYL CITRATE 50 UG/ML
12.5-5 INJECTION, SOLUTION INTRAMUSCULAR; INTRAVENOUS
Status: DISCONTINUED | OUTPATIENT
Start: 2020-02-18 | End: 2020-02-20 | Stop reason: HOSPADM

## 2020-02-18 RX ORDER — CEFAZOLIN SODIUM 2 G/50ML
2 SOLUTION INTRAVENOUS
Status: COMPLETED | OUTPATIENT
Start: 2020-02-18 | End: 2020-02-18

## 2020-02-18 RX ADMIN — FENTANYL CITRATE 50 MCG: 50 INJECTION INTRAMUSCULAR; INTRAVENOUS at 11:35

## 2020-02-18 RX ADMIN — NEPHROCAP 1 CAPSULE: 1 CAP ORAL at 13:15

## 2020-02-18 RX ADMIN — LORAZEPAM 0.5 MG: 2 INJECTION INTRAMUSCULAR; INTRAVENOUS at 22:30

## 2020-02-18 RX ADMIN — MIDAZOLAM 1 MG: 1 INJECTION INTRAMUSCULAR; INTRAVENOUS at 11:35

## 2020-02-18 RX ADMIN — LEVOTHYROXINE SODIUM 100 MCG: 100 TABLET ORAL at 06:42

## 2020-02-18 RX ADMIN — MIDAZOLAM 1 MG: 1 INJECTION INTRAMUSCULAR; INTRAVENOUS at 11:40

## 2020-02-18 RX ADMIN — Medication 16 G: at 08:45

## 2020-02-18 RX ADMIN — DIPHENHYDRAMINE HYDROCHLORIDE 12.5 MG: 50 INJECTION, SOLUTION INTRAMUSCULAR; INTRAVENOUS at 13:37

## 2020-02-18 RX ADMIN — FERROUS GLUCONATE TAB 324 MG (37.5 MG ELEMENTAL IRON) 1 TABLET: 324 (37.5 FE) TAB at 13:15

## 2020-02-18 RX ADMIN — SODIUM BICARBONATE 650 MG TABLET 650 MG: at 13:15

## 2020-02-18 RX ADMIN — SODIUM BICARBONATE 650 MG TABLET 650 MG: at 16:49

## 2020-02-18 RX ADMIN — CEFAZOLIN SODIUM 2 G: 2 SOLUTION INTRAVENOUS at 11:35

## 2020-02-18 RX ADMIN — HEPARIN SODIUM 10000 UNITS: 1000 INJECTION INTRAVENOUS; SUBCUTANEOUS at 11:50

## 2020-02-18 RX ADMIN — EPOETIN ALFA-EPBX 12000 UNITS: 10000 INJECTION, SOLUTION INTRAVENOUS; SUBCUTANEOUS at 20:47

## 2020-02-18 RX ADMIN — FERROUS GLUCONATE TAB 324 MG (37.5 MG ELEMENTAL IRON) 1 TABLET: 324 (37.5 FE) TAB at 16:49

## 2020-02-18 RX ADMIN — PANTOPRAZOLE SODIUM 40 MG: 40 TABLET, DELAYED RELEASE ORAL at 13:15

## 2020-02-18 RX ADMIN — LIDOCAINE HYDROCHLORIDE,EPINEPHRINE BITARTRATE 200 MG: 10; .01 INJECTION, SOLUTION INFILTRATION; PERINEURAL at 11:35

## 2020-02-18 RX ADMIN — PANTOPRAZOLE SODIUM 40 MG: 40 TABLET, DELAYED RELEASE ORAL at 16:49

## 2020-02-18 RX ADMIN — DIPHENHYDRAMINE HYDROCHLORIDE 12.5 MG: 50 INJECTION, SOLUTION INTRAMUSCULAR; INTRAVENOUS at 19:09

## 2020-02-18 RX ADMIN — FENTANYL CITRATE 50 MCG: 50 INJECTION INTRAMUSCULAR; INTRAVENOUS at 11:40

## 2020-02-18 NOTE — CONSULTS
[email or web address]  [phone]    GASTROENTEROLOGY CONSULT      Impression:   1. Cirrhosis d/t HCV- MELD 21 which is likely creatinine driven. US late fall was negative for [address] EGD in 7/2019 negative for varices. Normal LFTs at this time doubtful there is acute hepatitis but will await viral loads for HBV/HCV. No signs of ascites or HE   2. Chronic Hepatitis C- he has been treated with 12 weeks of Mavyret and achieved SVR 4/2019. Given his normal LFTs, doubt there is active infection but will get HCV viral load to confirm  3. H/O Hepatitis B. He is known coreAb positive. Neg Surface Ag and has previously had a neg surface Ab (2019). Likely had the virus in the distant past and is immune. He has had negative viral loads in the past with last being 8/2019- HBV viral load from primary team is pending  4. Thrombocytopenia, chronic- mild thrombocytopenia is often seen in cirrhosis but with the decrease in plts this is likely due to another cause. He is being followed heme/onc. 5. Acute on chronic kidney disease- to start dialysis after catheter placed  6. IV drug abuse; still uses heroin. Last use was 4 days prior to admission  7. Quadriparesis  8. H/o RCC s/p partial nephrectomy (2013); suprapubic cath in place  9. Bradycardia  10. Secondary hyperparathyroidism  11. UTI with suprapubic cath in place  12. Multiple gallbladder polyps vs stones; noted on US in 8/2019; asymptomatic. Plan:     1. Will follow viral loads for HCV/HBV  2. Monitor LFTs/platelets  3. F/u recommendations from Heme/onc for pancytopenia  4. Will get complete abd  US for [facility] [address] screening and to evaluate for splenomegaly  4. Medical management per primary team      Chief Complaint: H/o hepatitis C with thrombocytopenia      HPI:  Sara Bourgeois is a 62 y.o. male who I am being asked to see in consultation for an opinion regarding the above.   Patient was actually admitted from PCP office for worsening kidney function and recommendations for dialysis. He also was noted to have worsening pedal/lower extremity edema. We are being consulted as he has worsening chronic thrombocytopenia and with h/o HCV there is a concern for ? Active infection/worsening liver disease. He has been followed as an outpatient by hematology who has also done evaluation of thrombocytopenia. Patient is familiar to our practice as he has been followed by Dr. Julian Roy for Hepatitis C. He was treated last year (2019) with Nain Denny and achieved SVR in 4/2019. He was noted to have cirrhosis at that time and has been compliant with [facility] [address] screenings with the last being in 8/2019. He has no signs of decompensation or portal HTN. He previous MELD was 12 as an outpatient  He had EGD in 7/2019 which was negative for varices. He had a colonoscopy 10/2019 with polypectomy x3 and f/u in 3 years recommended.        PMH:   Past Medical History:   Diagnosis Date    Anemia     Bradycardia, unspecified 2018    Cervical discitis     MRSA    Chronic drug abuse ([address] 1974    Chronic kidney disease     stage III    Diabetes ([address] 01/1990    Drug abuse ([address]     Encephalopathy     GERD (gastroesophageal reflux disease)     Hypertension     Muscle weakness     Quadriparesis ([address] 2017    Renal cell carcinoma of left kidney ([address] 12/17/13    Pathological Stage [id number] cc RCC FG3 s/p left open partial nephrectomy in 12/13      Sepsis due to methicillin resistant Staphylococcus aureus (HCC)     Suprapubic catheter (HCC)     Thrombocytopenia (HCC)     Urethral erosion     Viral hepatitis B     Viral hepatitis C     Xerosis cutis        PSH:   Past Surgical History:   Procedure Laterality Date    COLONOSCOPY N/A 10/3/2019    COLONOSCOPY / polypectomy performed by Nolan Segal MD at [address] HX CIRCUMCISION  12/17/13    Dr. Millie Robbins, [facility]    HX NEPHRECTOMY Left 12/17/13    Open/ Partial,nephrectomy    HX OTHER SURGICAL Right 2/27/2016    removed right ft  2nd meta-tarsal    HX OTHER SURGICAL  04/2017    abscess removed from back of neck     NEUROLOGICAL PROCEDURE UNLISTED  2017    neck surgery-abcess-hardware neck    NE REMOVAL WITH INSERTION OF SUPRAPUBIC CATHETER  2018       Social HX:   Social History     Socioeconomic History    Marital status:      Spouse name: Not on file    Number of children: Not on file    Years of education: Not on file    Highest education level: Not on file   Occupational History    Not on file   Social Needs    Financial resource strain: Not on file    Food insecurity:     Worry: Not on file     Inability: Not on file    Transportation needs:     Medical: Not on file     Non-medical: Not on file   Tobacco Use    Smoking status: Current Every Day Smoker     Packs/day: 0.25     Years: 30.00     Pack years: 7.50     Types: Cigarettes    Smokeless tobacco: Never Used   Substance and Sexual Activity    Alcohol use: Yes     Comment: beer occasionally    Drug use: Yes     Types: Marijuana, Heroin     Comment: marijuana-December 2018, heroin-9/15/19-approx    Sexual activity: Never   Lifestyle    Physical activity:     Days per week: Not on file     Minutes per session: Not on file    Stress: Not on file   Relationships    Social connections:     Talks on phone: Not on file     Gets together: Not on file     Attends Worship service: Not on file     Active member of club or organization: Not on file     Attends meetings of clubs or organizations: Not on file     Relationship status: Not on file    Intimate partner violence:     Fear of current or ex partner: Not on file     Emotionally abused: Not on file     Physically abused: Not on file     Forced sexual activity: Not on file   Other Topics Concern    Not on file   Social History Narrative     since 2012;  for 12 yrs; [employer]; [address] [employer]  just recently furloughed;  No  service       FHX:   Family History   Problem Relation Age of Onset  Diabetes Mother     Sickle Cell Anemia Father     Diabetes Sister     Hypertension Sister        Allergy:   Allergies   Allergen Reactions    Iodine Hives and Other (comments)       Patient Active Problem List   Diagnosis Code    Chronic hepatitis C without hepatic coma (HCC) B18.2    Essential hypertension I10    IV drug abuse ([facility] F19.10    [facility] B58.68    Umbilical hernia D26.9    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    Chronic kidney disease, stage III (moderate) (HCC) N18.3    Thrombocytopenia (HCC) D69.6    Status post amputation of toe of right foot ([facility] Z89.421    Chronic anemia D64.9    Chronic drug abuse ([facility] F19.10    Hypertension I10    Renal cell carcinoma of left kidney (HCC) C64.2    Urethral erosion N36.8    BPH (benign prostatic hyperplasia) N40.0    Bladder atony N31.2    Chronic neck pain M54.2, G89.29    Cirrhosis of liver (HCC) K74.60    COPD, moderate (HCC) J44.9    Dry skin dermatitis L85.3    Hypothyroid E03.9    Lung nodules R91.8    Neck mass R22.1    Pericardial effusion I31.3    Vitamin D deficiency E55.9    Suprapubic catheter ([facility]) Z93.59    Renal failure (ARF), acute on chronic (HCC) N17.9, N18.9    Acute renal failure superimposed on stage 3 chronic kidney disease (HCC) N17.9, N18.3    UTI (urinary tract infection) N39.0    Acute renal failure superimposed on chronic kidney disease (HCC) N17.9, N18.9    Bradycardia R00.1    ESRD (end stage renal disease) ([facility]) N18.6    Secondary hyperparathyroidism of renal origin ([facility] N25.81       Home Medications:     Medications Prior to Admission   Medication Sig    FUNMILAYO-DEBBIE 0.8 mg tab tablet Take 0.8 mg by mouth daily.  levothyroxine (SYNTHROID) 100 mcg tablet Take 1 Tab by mouth every morning.  calcitRIOL (ROCALTROL) 0.25 mcg capsule Take 1 Cap by mouth every Monday, Wednesday, Friday.     ferrous gluconate 324 mg (37.5 mg iron) tablet Take 1 Tab by mouth two (2) times daily (with meals).  hydrALAZINE (APRESOLINE) 25 mg tablet Take 1 Tab by mouth every eight (8) hours as needed (systolic B/P >035).  liothyronine (CYTOMEL) 25 mcg tablet Take 1 Tab by mouth two (2) times a day.  Syringe, Disposable, (BD SYRINGE CATHETER TIP) 60 mL syrg Use 1 syringe daily with irrigations    docusate sodium (COLACE) 100 mg capsule 100 mg two (2) times daily as needed.  ergocalciferol (DRISDOL) 50,000 unit capsule Take 50,000 Units by mouth every seven (7) days.  doxycycline (ADOXA) 100 mg tablet Take 100 mg by mouth daily.  insulin lispro (HUMALOG) 100 unit/mL injection Normal Insulin Sensitivity   For Blood Sugar (mg/dL) of:     Less than 150 =   0 units           150 -199 =   2 units  200 -249 =   4 units  250 -299 =   6 units  300 -349 =   8 units  350 and above =   10 units  If 2 glucose readings are above 200 mg/dL within a 24 hr period, proceed to \"Very Insul    tamsulosin (FLOMAX) 0.4 mg capsule Take 1 Cap by mouth daily. Review of Systems:     Constitutional: No fevers, chills, weight loss, fatigue. Skin: No rashes, pruritis, jaundice, ulcerations, erythema. HENT: No headaches, nosebleeds, sinus pressure, rhinorrhea, sore throat. Eyes: No visual changes, blurred vision, eye pain, photophobia, jaundice. Cardiovascular: No chest pain, heart palpitations. Respiratory: No cough, SOB, wheezing, chest discomfort, orthopnea. Gastrointestinal: See HPI   Genitourinary: No dysuria, bleeding, discharge, pyuria. Musculoskeletal: No weakness, arthralgias, wasting. Endo: No sweats. Heme: No bruising, easy bleeding. Allergies: As noted. Neurological: Cranial nerves intact. Alert and oriented. Gait not assessed. Psychiatric:  No anxiety, depression, hallucinations.           Visit Vitals  /71   Pulse (!) 53   Temp 97.8 °F (36.6 °C)   Resp 12   Ht 6' (1.829 m)   Wt 77.9 kg (171 lb 11.2 oz)   SpO2 96%   BMI 23.29 kg/m²       Physical Assessment: constitutional: appearance: well developed, well nourished, normal habitus, no deformities, in no acute distress. skin: inspection: no rashes, ulcers, icterus or other lesions; no clubbing or telangiectasias. palpation: no induration or subcutaneos nodules. eyes: inspection: normal conjunctivae and lids; no jaundice pupils: normal  ENMT: mouth: normal oral mucosa,lips and gums; good dentition. oropharynx: normal tongue, hard and soft palate; posterior pharynx without erithema, exudate or lesions. neck: thyroid: normal size, consistency and position; no masses or tenderness. respiratory: effort: normal chest excursion; no intercostal retraction or accessory muscle use. cardiovascular: abdominal aorta: normal size and position; no bruits. palpation: PMI of normal size and position; normal rhythm; no thrill or murmurs. abdominal: abdomen: normal consistency; no tenderness or masses. hernias: no hernias appreciated. liver: unable to palpate. spleen: not palpable. rectal: hemoccult/guaiac: not performed. musculoskeletal: digits and nails: no clubbing, cyanosis, petechiae or other inflammatory conditions. head and neck: normal range of motion; no pain, crepitation or contracture. spine/ribs/pelvis: normal range of motion; no pain, deformity or contracture. neurologic:  Pupils intact.    psychiatric: judgement/insight: recently sedated with versed/fentanyl       Basic Metabolic Profile   Recent Labs     02/18/20  0530 02/17/20  0255    147*   K 5.0 5.4   * 122*   CO2 15* 17*   BUN 70* 65*   GLU 55* 77   CA 7.7* 7.9*  7.8*   MG 2.0  --    PHOS  --  8.2*         CBC w/Diff    Recent Labs     02/18/20  0530   WBC 3.1*   RBC 2.75*   HGB 8.0*   HCT 25.5*   MCV 92.7   MCH 29.1   MCHC 31.4   RDW 21.5*   PLT 29*    Recent Labs     02/18/20  0530   GRANS 59   LYMPH 35   EOS 0        Hepatic Function   Recent Labs     02/18/20  0530   ALB 2.8*   TP 6.3*   TBILI 0.6   SGOT 16   AP 84        Coags Recent Labs     02/17/20  0255   PTP 14.2   INR 1.1   APTT 53.6*           Gayatri Truong NP. [facility], [address] [place] [address]  Cell: [phone]  [email or web address]

## 2020-02-18 NOTE — PROGRESS NOTES
Patient aox 4 no resp distress, apparently supposed to have  2 platelets transfusion early this am at 5 am, per PADMAJA Mckinley per instruction from yesterday, nightshift nurse did not transfuse when she called IR no answer and was not told what time procedure will take place, got a call from IR during change of shift requesting for pt to go down for procedure, informed them platelets has not been given yet, will transfuse ASAP,     Called IR at 9 am 2 platelets transfused. 1225 down to IR and back right chest wall subclavian TD cath placed will be dialyzed today per Dr. Fritz Hernandes. Called Adelia from dialysis notified. 1525 bedside U/S abd.done at bedside. 1530 dialysis nurse at bedside to start hemodialysis. 1823 patient having loose frequent stool, will place FMS if needed. With increasing restlessness and agitation noted, ? Withdrawal?, refocused and reassured. 1930 placed fecal mgt system for frequent loose stool,     1945 Bedside and Verbal shift change report given to nurse Juan Diego Sneed (oncoming nurse) by Ct Garcia RN   (offgoing nurse). Report included the following information SBAR, Kardex, Procedure Summary, Intake/Output, MAR and Recent Results.

## 2020-02-18 NOTE — PROGRESS NOTES
Progress Note    Patient: Mayra Peres MRN: [id number]  CSN: [id number]    YOB: 1960  Age: 62 y.o. Sex: male    DOA: 2/15/2020 LOS:  LOS: 3 days                    Subjective:     Platelets decreased today 29K, no obvious bleed, bruising to left arm stable, no hematomas. Received 2 pack platelet today per Heme/Onc rec, IR unable to do cath yesterday plan to do this am.  Discussed with nursing awaiting IR this am.        Patient stable off pressors. BP stable, continues to have asymptomatic bradycardia. Cardiology following, discussed with dr. Good [place], no further workup/medicaiton recs a tthis time, avoid AV lydia blockers. Hypoglycemia this am, hypothermic. Pt on air warmer and given  Amp D50 x2 yesterday. Fingerstick glucose most recent 70. Patient NPO for IR procedure, discussed with nursing to get patient meal when returns. If sugars drop can start low volume D10, avoid excess volume due to edema/renal failure patient awaiting dialysis. Started on rocephin for UTI, blood cultures neg x 3 days, urine culture yeast no bacterial infection. Discontinue IV antibiotic and monitor. Patient complains of discomfort/itching from having right IJ TLC and left EJ dual lumen IV. Discussed with patient nurse today, has peripheral IV as well, will see where IR able to place [facility], may be able to discontinue left EJ for patient comfort if peripheral IV is functional.      Review of systems  General: No fevers or chills. Rt IJ catheter   Cardiovascular: No chest pain or pressure. No palpitations. Pulmonary: No shortness of breath, cough or wheeze. Gastrointestinal: No abdominal pain, nausea, vomiting or diarrhea.    Genitourinary: decrease urine out pit   Musculoskeletal: quadriparesis after neck surgery   Neurologic: No headache,  No seizure generalized weakness     Objective:     Physical Exam:  Visit Vitals  /66   Pulse (!) 52   Temp (!) 94 °F (34.4 °C)   Resp 11   Ht 6' (1.829 m) Wt 77.1 kg (170 lb)   SpO2 96%   BMI 23.06 kg/m²        General:         Alert, , no acute distress    HEENT: NC, Atraumatic. PERRLA, anicteric sclerae. Lungs: CTA Bilaterally. No Wheezing/Rhonchi/Rales. Heart:  Bradycardia   No murmur, No Rubs, No Gallops  Abdomen: Soft, Non distended, Non tender.    Extremities: ecchymosis Lt arm stable, lower limb edema  3 + to above the knee  Psych:   Not anxious or agitated. Neurologic:  CN 2-12 grossly intact, Alert, quadriparesis   Intake and Output:  Current Shift:  02/18 0701 - 02/18 1900  In: 576 [I.V.:10]  Out: 17 [Urine:17]  Last three shifts:  02/16 1901 - 02/18 0700  In: 277.7 [P.O.:240; I.V.:37.7]  Out: 275 [Urine:275]    Labs: Results:       Chemistry Recent Labs     02/18/20  0530 02/17/20  0255 02/16/20  0408   GLU 55* 77 109*    147* 147*   K 5.0 5.4 5.4   * 122* 122*   CO2 15* 17* 17*   BUN 70* 65* 63*   CREA 7.43* 6.82* 6.34*   CA 7.7* 7.9*  7.8* 7.9*   AGAP 10 8 8   BUCR 9* 10* 10*   AP 84 86 95   TP 6.3* 6.3* 6.7   ALB 2.8* 2.9* 3.1*   GLOB 3.5 3.4 3.6   AGRAT 0.8 0.9 0.9      CBC w/Diff Recent Labs     02/18/20  0530 02/17/20 0255 02/16/20  0408   WBC 3.1* 3.7* 3.4*   RBC 2.75* 2.78* 3.03*   HGB 8.0* 8.0* 8.7*   HCT 25.5* 25.9* 28.2*   PLT 29* 32* 38*   GRANS 59 69 57   LYMPH 35 21 39   EOS 0 1 1      Cardiac Enzymes No results for input(s): CPK, CKND1, LINDA in the last 72 hours. No lab exists for component: CKRMB, TROIP   Coagulation Recent Labs     02/17/20  0255   PTP 14.2   INR 1.1   APTT 53.6*       Lipid Panel Lab Results   Component Value Date/Time    Cholesterol, total 148 02/11/2020 02:24 PM    HDL Cholesterol 71 02/11/2020 02:24 PM    LDL, calculated 63 02/11/2020 02:24 PM    VLDL, calculated 19.4 08/27/2019 09:52 AM    Triglyceride 72 02/11/2020 02:24 PM    CHOL/HDL Ratio 2.1 02/11/2020 02:24 PM      BNP No results for input(s): BNPP in the last 72 hours.    Liver Enzymes Recent Labs     02/18/20  0530   TP 6.3*   ALB 2.8*   AP 84   SGOT 16      Thyroid Studies Lab Results   Component Value Date/Time    TSH 3.15 02/16/2020 04:08 AM          Procedures/imaging: see electronic medical records for all procedures/Xrays and details which were not copied into this note but were reviewed prior to creation of Plan    Medications:   Current Facility-Administered Medications   Medication Dose Route Frequency    0.9% sodium chloride infusion 250 mL  250 mL IntraVENous PRN    glucose chewable tablet 16 g  4 Tab Oral PRN    glucagon (GLUCAGEN) injection 1 mg  1 mg IntraMUSCular PRN    dextrose (D50W) injection syrg 12.5-25 g  25-50 mL IntraVENous PRN    epoetin cristy-epbx (RETACRIT) 12,000 Units combo injection  12,000 Units SubCUTAneous Q TUE, THU & SAT    diphenhydrAMINE (BENADRYL) injection 12.5 mg  12.5 mg IntraVENous Q6H PRN    pantoprazole (PROTONIX) tablet 40 mg  40 mg Oral ACB&D    cefTRIAXone (ROCEPHIN) 1 g in sterile water (preservative free) 10 mL IV syringe  1 g IntraVENous Q24H    levothyroxine (SYNTHROID) tablet 100 mcg  100 mcg Oral 6am    insulin lispro (HUMALOG) injection   SubCUTAneous TIDAC    B complex-vitaminC-folic acid (NEPHROCAP) cap  1 Cap Oral DAILY    sodium bicarbonate tablet 650 mg  650 mg Oral BID    ferrous gluconate 324 mg (37.5 mg iron) tablet 1 Tab  1 Tab Oral BID WITH MEALS    NOREPINephrine (LEVOPHED) 8 mg in 5% dextrose 250mL (32 mcg/mL) infusion  0.5-16 mcg/min IntraVENous TITRATE       Assessment/Plan     Principal Problem:    Acute renal failure superimposed on stage 3 chronic kidney disease (HCC) (2/15/2020)    Active Problems:    Chronic hepatitis C without hepatic coma ([facility] (5/31/2017)      Essential hypertension (4/28/2017)      [facility] (4/28/2017)      Quadriparesis ([facility] (5/31/2017)      Renal cell carcinoma of left kidney ([facility] (12/17/2013)      Overview: Pathological Stage [id number] cc RCC FG3 s/p left open partial nephrectomy       in 12/13        COPD, moderate ([facility] (1/31/2019)      Hypothyroid (1/31/2019)      UTI (urinary tract infection) (2/15/2020)      Acute renal failure superimposed on chronic kidney disease ([facility] (2/15/2020)      Bradycardia (2/15/2020)      ESRD (end stage renal disease) ([facility] (2/17/2020)      Secondary hyperparathyroidism of renal origin ([facility] (2/17/2020)    plan     Asymptomatic Bradycardia  Cardiology consulted, recommended stable in marked sinus bradycardia, keep off all AV blocking drugs. No further cardiac workup at this time, signed off 2/18/20. Monitor on tele     Acute renal failure on top of chronic kidney disease  Plan for [facility] today per IR, received 2 units platelets this am  Nephrology, Dr. Chantel Martínez, plan to start dialysis after catheter placed. Continue monitor potassium level and volume overload     Urinary tract infection ruled out, urine culture positive for yeast only/ pt has suprapubic cath was changes recently at   History of Renal Cell Carcinoma s/p L partial nephrectomy 12/2013, monitored by Dr. Wise Standard urology outpatient. Chronic urinary retention with history of C-spine laminectomy and post-op paralysis s/p [facility] 10/2018. Discontinue Rocephin 1 g IV  Blood cultures no growth x 3 days  Urine culture >100K yeast, chronic, previously thought to be colonizer       Thrombocytopenia/pancytopenia, anemia of chronic disease  Hematology oncology consulted, Dr. Lam Lilly following.     2 units platelets given today for plt count 29K as well as need for [facility] placmeent for dialysis  Continues on retacrit 12,000 units Three times weekly  May need bone marrow biopsy, will follow up heme/onc recs     Hypertension/ possible autonomic neuropathy after neck surgery   Continue hydralazine 25 mg p.o. every 8 hours as needed systolic blood pressure above 160     Diabetes mellitus  Hemoglobin A1c on two 2/11/20 4.9   LDL 63  Humalog sliding scale  Hypoglycemia protocol        Hepatitis C/ H/O upper GI bleeding gastritis, history of Hep B  Patient was treated for hepatitis C with Dr. Wing Oglesby  Protonix 40 mg daily  Continue to monitor liver function  Ongoing drug abuse/heroin, Hep C ab positive history of hep c treated will check hep c titers ensure no new infection, recheck hiv (negative 3/2019). Hep B core ab positive, surface Ag negative no active infection suggested. Discussed with Dr. Lauro Charlton, recommending GI consultation due to history liver disease, possible hep c as cause for thrombocytopenia.   Discussed with GI, MARCE Austin.        DVT/GI Prophylaxis: SCD's and H2B/PP      Shweta Haji MD  2/18/2020 10:49AM

## 2020-02-18 NOTE — PROGRESS NOTES
1910-Assumed care of pt.   2000-Pt assessment done. Pt lethargic but answers questions appropriately. Pt denies pain at this time. 2125-Pt's blood glucose checked, 94, no further action needed at this time. 0000-Pt sleeping in bed but easily aroused. Pt denies pain and denies needs at this time. 0400-Pt resting in bed, denies needs at this time. 0600-Complete bath given (CHG). 0702-Nurse called IR to see what time pt is going down for the dialysis catheter placement. IR states they don't know what time he is going but he is on the schedule. Nurse requested phone call to alert nurse of pt's scheduled time so that Platelets can be transfused 1/2hr to one hour before dialysis cath placement per order. IR verbalized understanding and states they will call to inform nurse of time of procedure.

## 2020-02-18 NOTE — PROGRESS NOTES
[facility]  -  Progress Note      Patient: Diana Vasquez MRN: [id number]  SSN: [id number]    YOB: 1960  Age: 62 y.o. Sex: male      Admit Date: 2/15/2020    Assessment:     Hospital Problems  Date Reviewed: 2/12/2020          Codes Class Noted POA    ESRD (end stage renal disease) ([facility] ICD-10-CM: N18.6  ICD-9-CM: 585.6  2/17/2020 Yes        Secondary hyperparathyroidism of renal origin [facility]) ICD-10-CM: N25.81  ICD-9-CM: 588.81  2/17/2020 Unknown        * (Principal) Acute renal failure superimposed on stage 3 chronic kidney disease (HCC) ICD-10-CM: N17.9, N18.3  ICD-9-CM: 584.9, 585.3  2/15/2020 Yes        UTI (urinary tract infection) ICD-10-CM: N39.0  ICD-9-CM: 599.0  2/15/2020 Yes        Acute renal failure superimposed on chronic kidney disease ([facility] ICD-10-CM: N17.9, N18.9  ICD-9-CM: 584.9, 585.9  2/15/2020 Yes        Bradycardia ICD-10-CM: R00.1  ICD-9-CM: 427.89  2/15/2020 Unknown        COPD, moderate ([facility] ICD-10-CM: J44.9  ICD-9-CM: 595  1/31/2019 Yes        Hypothyroid ICD-10-CM: E03.9  ICD-9-CM: 244.9  1/31/2019 Yes        Chronic hepatitis C without hepatic coma ([facility] ICD-10-CM: B18.2  ICD-9-CM: 070.54  5/31/2017 Yes        Quadriparesis ([facility] ICD-10-CM: G82.50  ICD-9-CM: 344.00  5/31/2017 Yes        Essential hypertension ICD-10-CM: I10  ICD-9-CM: 401.9  4/28/2017 Yes        IV drug abuse ([facility] ICD-10-CM: F19.10  ICD-9-CM: 305.90  4/28/2017 Yes        Renal cell carcinoma of left kidney (HCC) ICD-10-CM: C64.2  ICD-9-CM: 189.0  12/17/2013 Yes    Overview Signed 6/12/2018 11:33 AM by Weston Bentley CMA     Pathological Stage [id number] cc RCC FG3 s/p left open partial nephrectomy in 12/13                 - Acute on chronic renal failure.  Presented due to reduced urine output x 2 days and increased leg swelling x 1 week.  This now appears to be approaching end-stage with likely need for hemodialysis in the near future.  Creatinine continues to climb  - Asymptomatic sinus bradycardia.  EKG appears unchanged.  Heart rate remains in the 45s.  Patient has a long history of this.  This is unlikely contributing to his worsening renal failure or hypotension.  - Chronic HFpEF.  Repeat echocardiogram today demonstrated a dilated left ventricle with preserved LV systolic function, EF 40% with mild to moderate pulmonary hypertension.  Unchanged compared to his prior echocardiogram from 3/18/19 at [facility] appears euvolemic this am.  - Hx RCC s/p left partial nephrectomy 2013,   - S/p suprapubic catheter placement 11/12/18 at [facility]  - Hypoalbuminemia, mild, Albumin 3.1 on 2/16/2020.  - Hypertension.  Currently well controlled. - Diabetes, Hgb A1c 5.3  - Chronic anemia, Hgb 8.7 this admission   - Pancytopenia  - Acute on chronic thrombocytopenia, Plt down to 35-38K this admission, followed by Dr. Tushar Cervantes as outpatient - seen in office 2/10/2020.    - Chronic hepatitis C, followed by Dr. Chuck Orr with GI   - Hypothyroidism, synthroid   - Hx drug abuse  - ongoing tobacco use, currently smokes 1 pack cigarettes/week    - Hx spinal osteomyelitis with quadriplegia, s/p C3-C7 laminectomy in April 2017     Primary cardiologist: Dr. Diamante Flores:     1. Renal function continues to deteriorate and being readied for dialysis. 2. Remains stable in marked sinus bradycardia would just keep off all AV blocking drugs. 3. Getting 2 units of platelets for platelet count at 63E today  4. Dialysis catheter placement per IR later this morning. 5. Continue current medical regimen. 6. No further cardiac work-up at this time. Will sign off for now and be available as needed. Please call with questions or new CV issues. Subjective:     No new complaints. No chest pain, shortness of breath, or palpitations. Continues in asymptomatic sinus bradycardia in the 40's which has been long standing. Blood pressure remains in normal range from low 219'Y to 835 systolic.  Echo from 2/16//2020 with normal LV size, wall thickness, and function with EF 60-65% with moderate pulmonary hypertension with PA systolic estimated at 36.6 mmHg.     Objective:      Patient Vitals for the past 8 hrs:   Temp Pulse Resp BP SpO2   02/18/20 0823 (!) 94 °F (34.4 °C) -- -- -- --   02/18/20 0600 -- (!) 49 16 131/80 99 %   02/18/20 0500 -- (!) 47 11 122/69 --   02/18/20 0400 96 °F (35.6 °C) (!) 46 14 122/86 99 %   02/18/20 0300 -- (!) 46 11 124/61 100 %   02/18/20 0200 -- (!) 44 9 111/62 98 %         Patient Vitals for the past 96 hrs:   Weight   02/17/20 0550 77.1 kg (170 lb)   02/16/20 1116 72.6 kg (160 lb)   02/16/20 0915 72.6 kg (160 lb 0.9 oz)   02/15/20 2121 72.6 kg (160 lb)         Intake/Output Summary (Last 24 hours) at 2/18/2020 0910  Last data filed at 2/18/2020 0902  Gross per 24 hour   Intake 622 ml   Output 202 ml   Net 420 ml       Physical Exam:  General:  alert, cooperative, no distress, appears stated age  Neck:  no JVD  Lungs:  clear to auscultation bilaterally  Heart:  regular rate and rhythm, S1, S2 normal, no murmur, click, rub or gallop  Abdomen:  abdomen is soft without significant tenderness, masses, organomegaly or guarding  Extremities:  extremities normal, atraumatic, no cyanosis with 3 + edema    Data Review:     Labs: Results:       Chemistry Recent Labs     02/18/20  0530 02/17/20  0255 02/16/20  0408   GLU 55* 77 109*    147* 147*   K 5.0 5.4 5.4   * 122* 122*   CO2 15* 17* 17*   BUN 70* 65* 63*   CREA 7.43* 6.82* 6.34*   CA 7.7* 7.9*  7.8* 7.9*   MG 2.0  --  2.1   PHOS  --  8.2*  --    AGAP 10 8 8   BUCR 9* 10* 10*   AP 84 86 95   TP 6.3* 6.3* 6.7   ALB 2.8* 2.9* 3.1*   GLOB 3.5 3.4 3.6   AGRAT 0.8 0.9 0.9      CBC w/Diff Recent Labs     02/18/20  0530 02/17/20  0255 02/16/20  0408   WBC 3.1* 3.7* 3.4*   RBC 2.75* 2.78* 3.03*   HGB 8.0* 8.0* 8.7*   HCT 25.5* 25.9* 28.2*   PLT 29* 32* 38*   GRANS 59 69 57   LYMPH 35 21 39   EOS 0 1 1      Cardiac Enzymes No results found for: CPK, CK, CKMMB, CKMB, RCK3, CKMBT, CKNDX, CKND1, LINDA, TROPT, TROIQ, MIGUEL, TROPT, TNIPOC, BNP, BNPP   Coagulation Recent Labs     02/17/20  0255   PTP 14.2   INR 1.1   APTT 53.6*       Lipid Panel Lab Results   Component Value Date/Time    Cholesterol, total 148 02/11/2020 02:24 PM    HDL Cholesterol 71 02/11/2020 02:24 PM    LDL, calculated 63 02/11/2020 02:24 PM    VLDL, calculated 19.4 08/27/2019 09:52 AM    Triglyceride 72 02/11/2020 02:24 PM    CHOL/HDL Ratio 2.1 02/11/2020 02:24 PM      BNP No results found for: BNP, BNPP, XBNPT   Liver Enzymes Recent Labs     02/18/20  0530   TP 6.3*   ALB 2.8*   AP 84   SGOT 16      Digoxin    Thyroid Studies Lab Results   Component Value Date/Time    TSH 3.15 02/16/2020 04:08 AM           Tabatha Washington DO   February 18, 2020

## 2020-02-18 NOTE — PROGRESS NOTES
TRANSFER - OUT REPORT:    **Jacqueline RN accompanied pt from ICU and stayed for during of procedure, as well as transporting pt back to unit. **    Verbal report given to [facility] RN(name) on Vale Romo  being transferred to CVTICU(unit) for routine post - op       Report consisted of patients Situation, Background, Assessment and   Recommendations(SBAR). Information from the following report(s) SBAR, Kardex, Procedure Summary and MAR was reviewed with the receiving nurse. Lines:   Triple Lumen Triple lemen CVK 02/16/20 Right Internal jugular (Active)   Central Line Being Utilized Yes 2/18/2020  8:00 AM   Criteria for Appropriate Use Other (comment) 2/18/2020  8:00 AM   Site Assessment Clean, dry, & intact 2/18/2020  8:00 AM   Infiltration Assessment 0 2/18/2020  8:00 AM   Affected Extremity/Extremities Color distal to insertion site pink (or appropriate for race); Pulses palpable;Range of motion performed 2/18/2020  8:00 AM   Date of Last Dressing Change 02/16/20 2/18/2020  8:00 AM   Dressing Status Clean, dry, & intact 2/18/2020  8:00 AM   Dressing Type Bacteriocidal;Disk with Chlorhexadine gluconate (CHG); Tape;Transparent 2/18/2020  8:00 AM   Action Taken Open ports on tubing capped;Blood drawn 2/18/2020  8:00 AM   Proximal Hub Color/Line Status White;Flushed;Patent 2/18/2020  8:00 AM   Positive Blood Return (Medial Site) Yes 2/18/2020  8:00 AM   Medial Hub Color/Line Status Blue;Flushed;Patent 2/18/2020  8:00 AM   Positive Blood Return (Lateral Site) Yes 2/18/2020  8:00 AM   Distal Hub Color/Line Status Brown;Flushed;Patent 2/18/2020  8:00 AM   Positive Blood Return (Site #3) Yes 2/18/2020  8:00 AM   Alcohol Cap Used Yes 2/18/2020  8:00 AM       Peripheral IV 02/15/20 Right;Upper Arm (Active)   Site Assessment Clean, dry, & intact 2/18/2020  8:00 AM   Phlebitis Assessment 0 2/18/2020  8:00 AM   Infiltration Assessment 0 2/18/2020  8:00 AM   Dressing Status Clean, dry, & intact 2/18/2020  8:00 AM Dressing Type Tape;Transparent 2/18/2020  8:00 AM   Hub Color/Line Status Pink;Flushed;Patent 2/18/2020  8:00 AM   Action Taken Open ports on tubing capped 2/18/2020  8:00 AM   Alcohol Cap Used Yes 2/18/2020  8:00 AM       Peripheral IV 02/15/20 Left External jugular (Active)   Site Assessment Clean, dry, & intact 2/18/2020  8:00 AM   Phlebitis Assessment 0 2/18/2020  8:00 AM   Infiltration Assessment 0 2/18/2020  8:00 AM   Dressing Status Clean, dry, & intact 2/18/2020  8:00 AM   Dressing Type Tape;Transparent 2/18/2020  8:00 AM   Hub Color/Line Status Green;Flushed;Patent 2/18/2020  8:00 AM   Action Taken Open ports on tubing capped 2/18/2020  8:00 AM   Alcohol Cap Used Yes 2/18/2020  8:00 AM        Opportunity for questions and clarification was provided.       Patient transported with:   Registered Nurse

## 2020-02-18 NOTE — PROGRESS NOTES
Hematology/Medical Oncology Progress Note             Name: Vale Romo   : 1960   MRN: [id number]   Date: 2020 7:47 AM     [x]I have reviewed the flowsheet and previous days notes. Events overnight reviewed and discussed with nursing staff. Vital signs and records reviewed. Mr. Jeff Dean is a 64-year-old -American male with a history of diabetes mellitus, hypothyroidism, chronic renal failure, thrombocytopenia, suprapubic catheter due to chronic urinary retention quadriparesis after cervical spine infection and surgery. He has a history of bradycardia and has been evaluated by cardiology in the past.  He also has a history of chronic polysubstance abuse reported that the last heroin use was about 4 days prior to this admission. Pt was seen by PCP and instructed to come to ED due to worsening Creatinine. Patient was seen by nephrology and admitted to start dialysis. Subsequently the patient was found to have pancytopenia and will require platelet tranfsusion prior to his dialysis catheter placement, unless required sooner. ROS:  Constitutional:  Negative for fever, chills, diaphoresis, activity change, appetite change and unexpected weight change. HENT: Negative for nosebleeds, congestion, facial swelling, mouth sores, trouble swallowing, neck pain, neck stiffness, voice change and postnasal drip. Eyes: Negative for photophobia, pain, discharge and itching. Respiratory: Negative for apnea, cough, choking, chest tightness, wheezing and stridor. Cardiovascular: Negative for chest pain, palpitations and leg swelling. Gastrointestinal: Negative for abdominal pain. Negative for nausea, diarrhea, constipation, blood in stool and rectal pain. Genitourinary: Negative for dysuria, urgency, hematuria, flank pain and difficulty urinating. Musculoskeletal: Negative for back pain, joint swelling, arthralgias and gait problem.    Skin: Positive bruising noted at LUE, with swelling, bruising noted at LLE (varner catheter resting on this area). Neurological:  Negative for dizziness, facial asymmetry, speech difficulty, light-headedness and headaches. Hematological: Negative for adenopathy. Does not bruise/bleed easily. Psychiatric/Behavioral: Negative for hallucinations, confusion, disturbed wake/sleep cycle and agitation. Vital Signs:    Visit Vitals  /80   Pulse (!) 49   Temp 96 °F (35.6 °C)   Resp 16   Ht 6' (1.829 m)   Wt 77.1 kg (170 lb)   SpO2 99%   BMI 23.06 kg/m²       O2 Device: Room air       Temp (24hrs), Av °F (35.6 °C), Min:96 °F (35.6 °C), Max:96 °F (35.6 °C)       Intake/Output:   Last shift:      No intake/output data recorded. Last 3 shifts:  1901 -  0700  In: 277.7 [P.O.:240; I.V.:37.7]  Out: 275 [Urine:275]    Intake/Output Summary (Last 24 hours) at 2020 0747  Last data filed at 2020 0600  Gross per 24 hour   Intake 0 ml   Output 215 ml   Net -215 ml       Physical Exam:  General: Appears comfortable, NAD. HEENT:  Anicteric sclerae; pink palpebral conjunctivae; mucosa moist  Resp:  Symmetrical chest expansion, no accessory muscle use; good airway entry; no rales/ wheezing/ rhonchi noted  CV:  S1, S2 present; regular rate and rhythm  GI:  Abdomen soft, non-tender; (+) active bowel sounds  Extremities:  +2 pulses on all extremities; RUE edema-improving. Skin:  Warm; Bruising at LUE and LLE thigh area (varner cath resting on this area).   Neurologic:  Non-focal        DATA:   Current Facility-Administered Medications   Medication Dose Route Frequency    0.9% sodium chloride infusion 250 mL  250 mL IntraVENous PRN    glucose chewable tablet 16 g  4 Tab Oral PRN    glucagon (GLUCAGEN) injection 1 mg  1 mg IntraMUSCular PRN    dextrose (D50W) injection syrg 12.5-25 g  25-50 mL IntraVENous PRN    epoetin cristy-epbx (RETACRIT) 12,000 Units combo injection  12,000 Units SubCUTAneous Q TUE, THU & SAT    diphenhydrAMINE (BENADRYL) injection 12.5 mg  12.5 mg IntraVENous Q6H PRN    pantoprazole (PROTONIX) tablet 40 mg  40 mg Oral ACB&D    cefTRIAXone (ROCEPHIN) 1 g in sterile water (preservative free) 10 mL IV syringe  1 g IntraVENous Q24H    levothyroxine (SYNTHROID) tablet 100 mcg  100 mcg Oral 6am    insulin lispro (HUMALOG) injection   SubCUTAneous TIDAC    B complex-vitaminC-folic acid (NEPHROCAP) cap  1 Cap Oral DAILY    sodium bicarbonate tablet 650 mg  650 mg Oral BID    ferrous gluconate 324 mg (37.5 mg iron) tablet 1 Tab  1 Tab Oral BID WITH MEALS    NOREPINephrine (LEVOPHED) 8 mg in 5% dextrose 250mL (32 mcg/mL) infusion  0.5-16 mcg/min IntraVENous TITRATE                    Labs:  Recent Labs     02/18/20  0530 02/17/20  0255 02/16/20  0408   WBC 3.1* 3.7* 3.4*   HGB 8.0* 8.0* 8.7*   HCT 25.5* 25.9* 28.2*   PLT 29* 32* 38*     Recent Labs     02/18/20  0530 02/17/20  0255 02/16/20  0408    147* 147*   K 5.0 5.4 5.4   * 122* 122*   CO2 15* 17* 17*   GLU 55* 77 109*   BUN 70* 65* 63*   CREA 7.43* 6.82* 6.34*   CA 7.7* 7.9*  7.8* 7.9*   MG 2.0  --  2.1   PHOS  --  8.2*  --    ALB 2.8* 2.9* 3.1*   SGOT 16 18 32   ALT 28 31 39   INR  --  1.1  --      No results for input(s): PH, PCO2, PO2, HCO3, FIO2 in the last 72 hours. IMPRESSION:   · Chronic thrombocytopenia  · Acute renal failure superimposed on chronic kidney disease age 3  · Bradycardia  · Chronic hepatitis C  · Polysubstance abuse-recent heroin use           PLAN:   · Platelet count today is 29,000 , Recommend 2 units of platelets to be given today. · H/H 8 0.0/25.5, will recommend PRBC transfusion for hemoglobin less than 7g/dl. I have discussed with Dr. Fritz Hernandes, nephrology-Continue Retacrit 12,000 units (nephrology preferred dose)  to be given 3 times a week with dialysis while inpatient. · Substance abuse cessation encouraged.  Would benefit from substance abuse center referral.  · Patient to follow-up with Dr. Franco Nova within 5 to 7 days after discharge.  ·        The patient is: [x] acutely ill Risk of deterioration: [] moderate    [] critically ill  [] high         My assessment/plan was discussed with:  [x]nursing []PT/OT    []respiratory therapy [x]Dr.Lloyd Karla Felipe MD      []family []       Anselmo Frazier, NP

## 2020-02-18 NOTE — CONSULTS
Vascular Surgery Consult      Patient: Sara Bourgeois MRN: [id number]  CSN: [id number]      YOB: 1960    Age: 62 y.o. Sex: male      DOA: 2/15/2020       HPI:     Sara Bourgeois is a 62 y.o. male with a history of diabetes mellitus, bradycardia, hypothyroidism, chronic renal failure with history of renal cell carcinoma of the left kidney and is s/p left open partial nephrectomy, thrombocytopenia, suprapubic catheter due to chronic urinary retention quadriparesis after cervical spine infection and surgery.  He also has a history of chronic polysubstance abuse reportedly last used heroin about 4 days prior to this admission. Unfortunately his renal function has worsened and he is now in need of dialysis. Vascular consultation was requested per Dr Nic Mccormick as he will need permanent dialysis access as outpatient. He is to have [facility] placed today per IR. Patient is right handed.        Past Medical History:   Diagnosis Date    Anemia     Bradycardia, unspecified 2018    Cervical discitis     MRSA    Chronic drug abuse ([facility] 1974    Chronic kidney disease     stage III    Diabetes ([facility] [facility] 01/1990    Drug abuse ([facility]     Encephalopathy     GERD (gastroesophageal reflux disease)     Hypertension     Muscle weakness     Quadriparesis ([facility] 2017    Renal cell carcinoma of left kidney ([facility] 12/17/13    Pathological Stage [id number] cc RCC FG3 s/p left open partial nephrectomy in 12/13      Sepsis due to methicillin resistant Staphylococcus aureus (HCC)     Suprapubic catheter (HCC)     Thrombocytopenia (HCC)     Urethral erosion     Viral hepatitis B     Viral hepatitis C     Xerosis cutis        Past Surgical History:   Procedure Laterality Date    COLONOSCOPY N/A 10/3/2019    COLONOSCOPY / polypectomy performed by Zoya Diaz MD at [address] HX CIRCUMCISION  12/17/13    Dr. Alyson Dean, [facility]    HX NEPHRECTOMY Left 12/17/13    Open/ Partial,nephrectomy    HX OTHER SURGICAL Right 2/27/2016    removed right ft  2nd meta-tarsal    HX OTHER SURGICAL  04/2017    abscess removed from back of neck     NEUROLOGICAL PROCEDURE UNLISTED  2017    neck surgery-abcess-hardware neck    PA REMOVAL WITH INSERTION OF SUPRAPUBIC CATHETER  2018       Family History   Problem Relation Age of Onset    Diabetes Mother     Sickle Cell Anemia Father     Diabetes Sister     Hypertension Sister        Social History     Socioeconomic History    Marital status:      Spouse name: Not on file    Number of children: Not on file    Years of education: Not on file    Highest education level: Not on file   Tobacco Use    Smoking status: Current Every Day Smoker     Packs/day: 0.25     Years: 30.00     Pack years: 7.50     Types: Cigarettes    Smokeless tobacco: Never Used   Substance and Sexual Activity    Alcohol use: Yes     Comment: beer occasionally    Drug use: Yes     Types: Marijuana, Heroin     Comment: marijuana-December 2018, heroin-9/15/19-approx    Sexual activity: Never   Social History Narrative     since 2012;  for 12 yrs; [employer]K; [address] [employer]  just recently furloughed; No  service       Prior to Admission medications    Medication Sig Start Date End Date Taking? Authorizing Provider   FUNMILAYO-DEBBIE 0.8 mg tab tablet Take 0.8 mg by mouth daily. 8/15/19   Provider, Historical   levothyroxine (SYNTHROID) 100 mcg tablet Take 1 Tab by mouth every morning. 4/7/19   Inocencio Marie MD   calcitRIOL (ROCALTROL) 0.25 mcg capsule Take 1 Cap by mouth every Monday, Wednesday, Friday. 4/8/19   Inocencio Marie MD   ferrous gluconate 324 mg (37.5 mg iron) tablet Take 1 Tab by mouth two (2) times daily (with meals).  4/6/19   Inocencio Marie MD   hydrALAZINE (APRESOLINE) 25 mg tablet Take 1 Tab by mouth every eight (8) hours as needed (systolic B/P >947). 9/0/98   Inocencio Marie MD   liothyronine (CYTOMEL) 25 mcg tablet Take 1 Tab by mouth two (2) times a day. 4/6/19   Destiny Rubin MD   Syringe, Disposable, (BD SYRINGE CATHETER TIP) 60 mL syrg Use 1 syringe daily with irrigations 11/7/18   Chase Jacobo MD   docusate sodium (COLACE) 100 mg capsule 100 mg two (2) times daily as needed. 10/12/18   Provider, Historical   ergocalciferol (DRISDOL) 50,000 unit capsule Take 50,000 Units by mouth every seven (7) days. 8/8/18   Provider, Historical   doxycycline (ADOXA) 100 mg tablet Take 100 mg by mouth daily. Provider, Historical   insulin lispro (HUMALOG) 100 unit/mL injection Normal Insulin Sensitivity   For Blood Sugar (mg/dL) of:     Less than 150 =   0 units           150 -199 =   2 units  200 -249 =   4 units  250 -299 =   6 units  300 -349 =   8 units  350 and above =   10 units  If 2 glucose readings are above 200 mg/dL within a 24 hr period, proceed to \"Very Insul 4/10/18   Deanna Aquino MD   tamsulosin (FLOMAX) 0.4 mg capsule Take 1 Cap by mouth daily. 4/10/18   Bill Aquino MD       Allergies   Allergen Reactions    Iodine Hives and Other (comments)       Review of Systems  Constitutional: negative   HEENT: negative   Respiratory: negative   Cardiovascular: negative   Gastrointestinal: negative   Genitourinary:negative   Hematologic/lymphatic: negative   Musculoskeletal:negative   Neurological: positive for quadriplegia   Behavioral/Psych: negative   Endocrine: negative   Allergic/Immunologic: negative        Physical Exam:      Visit Vitals  /66   Pulse (!) 52   Temp (!) 94 °F (34.4 °C)   Resp 11   Ht 6' (1.829 m)   Wt 170 lb (77.1 kg)   SpO2 96%   BMI 23.06 kg/m²       Physical Exam:  General: male in no acute distress   HEENT: EOMI, no scleral icterus is noted. Cardiovascular: melecio, RR, 2+ radial pulses  Pulmonary: No increased work or breathing is noted. O2 sats 98% on RA  Abdomen:  soft, nondistended. Extremities: Warm and well perfused bilaterally.  He does have some contracture of the fingers of his left hand.    Neuro: alert, interactive , appropriate, quadriplegia    Integument: No ulcerations are identified visibly      Data Review:    CBC:   Lab Results   Component Value Date/Time    WBC 3.1 (L) 02/18/2020 05:30 AM    RBC 2.75 (L) 02/18/2020 05:30 AM    HGB 8.0 (L) 02/18/2020 05:30 AM    HCT 25.5 (L) 02/18/2020 05:30 AM    PLATELET 29 (LL) 49/08/5657 05:30 AM      BMP:   Lab Results   Component Value Date/Time    Glucose 55 (L) 02/18/2020 05:30 AM    Sodium 144 02/18/2020 05:30 AM    Potassium 5.0 02/18/2020 05:30 AM    Chloride 119 (H) 02/18/2020 05:30 AM    CO2 15 (L) 02/18/2020 05:30 AM    BUN 70 (H) 02/18/2020 05:30 AM    Creatinine 7.43 (H) 02/18/2020 05:30 AM    Calcium 7.7 (L) 02/18/2020 05:30 AM     Coagulation:   Lab Results   Component Value Date/Time    Prothrombin time 14.2 02/17/2020 02:55 AM    INR 1.1 02/17/2020 02:55 AM    aPTT 53.6 (H) 02/17/2020 02:55 AM         Assessment/Plan     63 yo male with ESRD in need of dialysis. He is scheduled to have [address] placed today per IR. He will need outpatient workup for permanent dialysis access. This was discussed with him and his wife who is at the bedside. Will have him follow up in our office as outpatient for vein mapping and further surgical discussion. Orders placed. Please call with any further questions or concerns. Will remain available as needed.        Principal Problem:    Acute renal failure superimposed on stage 3 chronic kidney disease ([facility] [address] (2/15/2020)    Active Problems:    Chronic hepatitis C without hepatic coma ([facility] [address] (5/31/2017)      Essential hypertension (4/28/2017)      IV drug abuse ([facility] [address] (4/28/2017)      Quadriparesis ([facility] [address] (5/31/2017)      Renal cell carcinoma of left kidney ([facility] [address] (12/17/2013)      Overview: Pathological Stage [id number] cc RCC FG3 s/p left open partial nephrectomy       in 12/13        COPD, moderate ([facility] [address] (1/31/2019)      Hypothyroid (1/31/2019)      UTI (urinary tract infection) (2/15/2020)      Acute renal failure superimposed on chronic kidney disease ([facility] [facility] (2/15/2020)      Bradycardia (2/15/2020)      ESRD (end stage renal disease) ([facility] [address] (2/17/2020)      Secondary hyperparathyroidism of renal origin [facility]) (2/17/2020)        Scotty Jiang [place]  February 18, 2020

## 2020-02-18 NOTE — PROCEDURES
Interventional Radiology Brief Procedure Note    Patient: Angella Taylor MRN: [id number]  SSN: [id number]    YOB: 1960  Age: 62 y.o. Sex: male      Date of Procedure: 2/18/2020    Procedure(s): Tunneled dialysis catheter placement    Performed By: PADMAJA Napier    Anesthesia: Lidocaine and Moderate Sedation    Estimated Blood Loss: Less than 3ml    Specimens: None    Findings:   - Written and verbal informed consent was obtained. - Time Out was performed. - Permanent image storage performed on PACS. - Image guided right IJ double lumen TDC placement with ultrasound and fluoroscopic guidance. - Please refer to report on PACS for full details    Implants: None    Plan: Okay for immediate use.       Signed By: Lokesh Napier     February 18, 2020

## 2020-02-18 NOTE — PROGRESS NOTES
Per Dr. Albania Crump will be set up at [facility], left message for Sushil Fail to see about getting chair time set up. Will set up standing order dialysis transport when finalized.        BLANE Mo  Case Management  [phone]

## 2020-02-18 NOTE — CONSULTS
Consult Note    Patient: Creig Simmonds               Sex: male          DOA: 2/15/2020         YOB: 1960      Age:  62 y.o.        LOS:  LOS: 3 days              HPI:     Creig Simmonds is a 62 y.o. male who has been seen in evaluation of SHEILA on CKD3 at the request of Dr. Maryuri Membreno. Patient has a history of HTN, left kidney renal cell carcinoma s/p left open partial nephrectomy, CHF, DM2, COPD, chronic thrombocytopenia and HCV. He was admitted for worsening kidney failure, bradycardia and hypotension. Nephrology was consulted for further recommendations.      Past Medical History:   Diagnosis Date    Anemia     Bradycardia, unspecified 2018    Cervical discitis     MRSA    Chronic drug abuse ([facility] [address] 1974    Chronic kidney disease     stage III    Diabetes ([address] 01/1990    Drug abuse ([facility] [address]     Encephalopathy     GERD (gastroesophageal reflux disease)     Hypertension     Muscle weakness     Quadriparesis ([address] 2017    Renal cell carcinoma of left kidney ([facility] [address] 12/17/13    Pathological Stage [id number] cc RCC FG3 s/p left open partial nephrectomy in 12/13      Sepsis due to methicillin resistant Staphylococcus aureus (HCC)     Suprapubic catheter (HCC)     Thrombocytopenia (HCC)     Urethral erosion     Viral hepatitis B     Viral hepatitis C     Xerosis cutis        Past Surgical History:   Procedure Laterality Date    COLONOSCOPY N/A 10/3/2019    COLONOSCOPY / polypectomy performed by Mehnaz Watson MD at [address] HX CIRCUMCISION  12/17/13    Dr. Tash Eubanks, [facility]    HX NEPHRECTOMY Left 12/17/13    Open/ Partial,nephrectomy    HX OTHER SURGICAL Right 2/27/2016    removed right ft  2nd meta-tarsal    HX OTHER SURGICAL  04/2017    abscess removed from back of neck     NEUROLOGICAL PROCEDURE UNLISTED  2017    neck surgery-abcess-hardware neck    CT REMOVAL WITH INSERTION OF SUPRAPUBIC CATHETER  2018       Family History   Problem Relation Age of Onset    Diabetes Mother  Sickle Cell Anemia Father     Diabetes Sister     Hypertension Sister        Social History     Socioeconomic History    Marital status:      Spouse name: Not on file    Number of children: Not on file    Years of education: Not on file    Highest education level: Not on file   Tobacco Use    Smoking status: Current Every Day Smoker     Packs/day: 0.25     Years: 30.00     Pack years: 7.50     Types: Cigarettes    Smokeless tobacco: Never Used   Substance and Sexual Activity    Alcohol use: Yes     Comment: beer occasionally    Drug use: Yes     Types: Marijuana, Heroin     Comment: marijuana-December 2018, heroin-9/15/19-approx    Sexual activity: Never   Social History Narrative     since 2012;  for 12 yrs; [employer]K; [address] [employer]  just recently furloughed; No  service       Prior to Admission medications    Medication Sig Start Date End Date Taking? Authorizing Provider   FUNMILAYO-DEBBIE 0.8 mg tab tablet Take 0.8 mg by mouth daily. 8/15/19   Provider, Historical   levothyroxine (SYNTHROID) 100 mcg tablet Take 1 Tab by mouth every morning. 4/7/19   Jasmin Severino MD   calcitRIOL (ROCALTROL) 0.25 mcg capsule Take 1 Cap by mouth every Monday, Wednesday, Friday. 4/8/19   Jasmin Severino MD   ferrous gluconate 324 mg (37.5 mg iron) tablet Take 1 Tab by mouth two (2) times daily (with meals). 4/6/19   Jasmin Severino MD   hydrALAZINE (APRESOLINE) 25 mg tablet Take 1 Tab by mouth every eight (8) hours as needed (systolic B/P >967). 7/3/51   Jasmin Severino MD   liothyronine (CYTOMEL) 25 mcg tablet Take 1 Tab by mouth two (2) times a day. 4/6/19   Jasmin Severino MD   Syringe, Disposable, (BD SYRINGE CATHETER TIP) 60 mL syrg Use 1 syringe daily with irrigations 11/7/18   Black Lamar MD   docusate sodium (COLACE) 100 mg capsule 100 mg two (2) times daily as needed.  10/12/18   Provider, Historical   ergocalciferol (DRISDOL) 50,000 unit capsule Take 50,000 Units by mouth every seven (7) days. 8/8/18   Provider, Historical   doxycycline (ADOXA) 100 mg tablet Take 100 mg by mouth daily. Provider, Historical   insulin lispro (HUMALOG) 100 unit/mL injection Normal Insulin Sensitivity   For Blood Sugar (mg/dL) of:     Less than 150 =   0 units           150 -199 =   2 units  200 -249 =   4 units  250 -299 =   6 units  300 -349 =   8 units  350 and above =   10 units  If 2 glucose readings are above 200 mg/dL within a 24 hr period, proceed to \"Very Insul 4/10/18   Teddy Aquino MD   tamsulosin (FLOMAX) 0.4 mg capsule Take 1 Cap by mouth daily. 4/10/18   Chun Aquino Mc, MD       Allergies   Allergen Reactions    Iodine Hives and Other (comments)       Review of Systems  Pertinent items are noted in the History of Present Illness. Physical Exam:      Visit Vitals  /71   Pulse (!) 53   Temp (!) 94 °F (34.4 °C)   Resp 12   Ht 6' (1.829 m)   Wt 77.1 kg (170 lb)   SpO2 96%   BMI 23.06 kg/m²       Physical Exam:  Constitutional: No apparent distress. Hard of hearing. A&Ox4. Respiratory: Normal respiratory effort. Symmetrical rise and fall of chest.   Cardiovascular: Regular rate. Gastrointestinal: Soft, NT, ND.      Labs Reviewed:  CMP:   Lab Results   Component Value Date/Time     02/18/2020 05:30 AM    K 5.0 02/18/2020 05:30 AM     (H) 02/18/2020 05:30 AM    CO2 15 (L) 02/18/2020 05:30 AM    AGAP 10 02/18/2020 05:30 AM    GLU 55 (L) 02/18/2020 05:30 AM    BUN 70 (H) 02/18/2020 05:30 AM    CREA 7.43 (H) 02/18/2020 05:30 AM    GFRAA 9 (L) 02/18/2020 05:30 AM    GFRNA 8 (L) 02/18/2020 05:30 AM    CA 7.7 (L) 02/18/2020 05:30 AM    MG 2.0 02/18/2020 05:30 AM    ALB 2.8 (L) 02/18/2020 05:30 AM    TP 6.3 (L) 02/18/2020 05:30 AM    GLOB 3.5 02/18/2020 05:30 AM    AGRAT 0.8 02/18/2020 05:30 AM    SGOT 16 02/18/2020 05:30 AM    ALT 28 02/18/2020 05:30 AM     CBC:   Lab Results   Component Value Date/Time    WBC 3.1 (L) 02/18/2020 05:30 AM    HGB 8.0 (L) 02/18/2020 05:30 AM    HCT 25.5 (L) 02/18/2020 05:30 AM    PLT 29 (LL) 02/18/2020 05:30 AM     COAGS: No results found for: APTT, PTP, INR, INREXT, INREXT    Assessment/Plan   Principal Problem:    Acute renal failure superimposed on stage 3 chronic kidney disease ([address] (2/15/2020)    Active Problems:    Chronic hepatitis C without hepatic coma ([address] (5/31/2017)      Essential hypertension (4/28/2017)      IV drug abuse ([address] (4/28/2017)      Quadriparesis ([address] (5/31/2017)      Renal cell carcinoma of left kidney ([address] (12/17/2013)      Overview: Pathological Stage [id number] cc RCC FG3 s/p left open partial nephrectomy       in 12/13        COPD, moderate ([address] (1/31/2019)      Hypothyroid (1/31/2019)      UTI (urinary tract infection) (2/15/2020)      Acute renal failure superimposed on chronic kidney disease ([address] (2/15/2020)      Bradycardia (2/15/2020)      ESRD (end stage renal disease) ([address] (2/17/2020)      Secondary hyperparathyroidism of renal origin ([address] (2/17/2020)        Caroline Lynn is a 62 y.o. male with a history of CKD3 presenting with worsening renal function, bradycardia and hypotension. Plan     Case and images reviewed by Dr. Keila Turk. Discussions held between Dr. Keila Turk and Dr. Maddi Tay regarding management options. Given that the patient's kidney function has worsened and there is a need for long-term dialysis access, will plan for image-guided tunneled dialysis catheter placement with moderate sedation. Patient to remain NPO with blood thinning medications held. As discussed with PRINCE Tiwari, platelets to be given prior to procedure. Consent to be obtained prior to procedure.

## 2020-02-18 NOTE — PROGRESS NOTES
NUTRITION    Nursing Referral: [facility]     RECOMMENDATIONS / PLAN:     - Continue oral supplements to optimize nutritional status. - Continue RD inpatient monitoring and evaluation. NUTRITION INTERVENTIONS & DIAGNOSIS:     - Meals/snacks: modified composition  - Medical food supplement therapy: Nepro Shake, TID    Nutrition Diagnosis: Increased nutrient needs protein and energy related to increased energy expenditure as evidenced by pt with ESRD plan to start HD     ASSESSMENT:     2/18: TDC rescheduled to today, NPO since midnight. Obtained nutrition hx, reports good intake PTA with wt gain associated with fluid status. States meals intake has been fair 2/2 dislikes of meals/foods provided. ADDEDNUM 1520: Diet & supplements resumed. 2/17: Pt unavailable x 2 attempts, sleeping and with MD. NPO this am for [facility] placement to start HD, fluid overloaded upon admission with noted significant wt gain PTA per chart. Overall fair meal intake, supplements started per RN, 50% intake. Tolerating diet.     Nutritional intake adequate to meet patients estimated nutritional needs:  Unable to determine at this time    Diet: DIET NUTRITIONAL SUPPLEMENTS All Meals; NEPRO  DIET RENAL Regular      Food Allergies: NKFA  Current Appetite: Not Applicable - NPO  Appetite/meal intake prior to admission: Good per pt and sisters report; IV drug use PTA  Feeding Limitations:  [] Swallowing difficulty    [] Chewing difficulty    [x] Other: quadriparesis  Current Meal Intake:   Patient Vitals for the past 100 hrs:   % Diet Eaten   02/16/20 1600 50 %   02/16/20 1200 45 %   02/16/20 1000 50 %       BM: 2/15  Skin Integrity: WDL  Edema:   [] No     [x] Yes   Pertinent Medications: Reviewed: nephrocap, ferrous gluconate, SSI, levothyroxine, pantoprazole, sodium bicarbonate, epoetin    Recent Labs     02/18/20  0530 02/17/20  0255 02/16/20  0408    147* 147*   K 5.0 5.4 5.4   * 122* 122*   CO2 15* 17* 17*   GLU 55* 77 109*   BUN 70* 65* 63*   CREA 7.43* 6.82* 6.34*   CA 7.7* 7.9*  7.8* 7.9*   MG 2.0  --  2.1   PHOS  --  8.2*  --    ALB 2.8* 2.9* 3.1*   SGOT 16 18 32   ALT 28 31 39       Intake/Output Summary (Last 24 hours) at 2/18/2020 1520  Last data filed at 2/18/2020 1458  Gross per 24 hour   Intake 632 ml   Output 302 ml   Net 330 ml       Anthropometrics:  Ht Readings from Last 1 Encounters:   02/16/20 6' (1.829 m)     Last 3 Recorded Weights in this Encounter    02/16/20 1116 02/17/20 0550 02/18/20 1219   Weight: 72.6 kg (160 lb) 77.1 kg (170 lb) 77.9 kg (171 lb 11.2 oz)     Body mass index is 23.29 kg/m². Weight History: Wt gain per chart review, noted volume overloaded upon admission. Pt reports UBW of 160-165#    Weight Metrics 2/18/2020 2/15/2020 2/10/2020 12/16/2019 10/28/2019 10/3/2019 9/10/2019   Weight 171 lb 11.2 oz - 165 lb 149 lb 147 lb 139 lb 143 lb   BMI - 23.29 kg/m2 22.38 kg/m2 20.21 kg/m2 19.94 kg/m2 18.85 kg/m2 19.94 kg/m2        Admitting Diagnosis: SHEILA (acute kidney injury) ([facility] [N17.9]  SHEILA (acute kidney injury) ([facility] [address] [N17.9]  Bradycardia [R00.1]  Pertinent PMHx: anemia, IV drug abuse, CKD, DM, GERD, HTN, quadriparesis, renal cell carcinoma s/p partial nephrectomy, cirrhosis, hepatitis B & C, multiple gallbladder polyps vs stones    Education Needs:        [x] None identified  [] Identified - Not appropriate at this time  []  Identified and addressed - refer to education log  Learning Limitations:   [x] None identified  [] Identified    Cultural, Restoration & ethnic food preferences:  [x] None identified    [] Identified and addressed     ESTIMATED NUTRITION NEEDS:     Calories: 9251-1213 kcal (30-35 kcal/kg) based on  [] Actual BW      [x] SBW: 81 kg   Protein:  gm (1.2-1.5 gm/kg) based on  [] Actual BW      [x] SBW   Fluid: 750-1500 mL/day     MONITORING & EVALUATION:     Nutrition Goal(s):   - PO nutrition intake will meet >75% of patient estimated nutritional needs within the next 7 days.    Outcome: Not progressing towards goal    Monitoring:   [x] Food and nutrient intake   [x] Food and nutrient administration  [x] Comparative standards   [x] Nutrition-focused physical findings   [x] Anthropometric Measurements   [x] Treatment/therapy   [x] Biochemical data, medical tests, and procedures        Previous Recommendations (for follow-up assessments only):  No Recommendation Made      Discharge Planning: No nutritional discharge needs at this time. Participated in care planning, discharge planning, & interdisciplinary rounds as appropriate.       Emani Snow, RD  Pager: [phone]

## 2020-02-18 NOTE — PROGRESS NOTES
Progress Note    Bella Corey  62 y.o. Admit Date: 2/15/2020  Principal Problem:    Acute renal failure superimposed on stage 3 chronic kidney disease ([facility] [address] (2/15/2020) POA: Yes    Active Problems:    Chronic hepatitis C without hepatic coma ([address] (5/31/2017) POA: Yes      Essential hypertension (4/28/2017) POA: Yes      IV drug abuse ([address] (4/28/2017) POA: Yes      Quadriparesis ([address] (5/31/2017) POA: Yes      Renal cell carcinoma of left kidney ([facility] [address] (12/17/2013) POA: Yes      Overview: Pathological Stage [id number] cc RCC FG3 s/p left open partial nephrectomy       in 12/13        COPD, moderate ([address] (1/31/2019) POA: Yes      Hypothyroid (1/31/2019) POA: Yes      UTI (urinary tract infection) (2/15/2020) POA: Yes      Acute renal failure superimposed on chronic kidney disease ([address] (2/15/2020) POA: Yes      Bradycardia (2/15/2020) POA: Unknown      ESRD (end stage renal disease) ([facility] [address] (2/17/2020) POA: Yes      Secondary hyperparathyroidism of renal origin ([facility] [address] (2/17/2020) POA: Unknown            Subjective:   Seen earlier, No SOB, received Platelets. Patient feels good, off Pressor, remained Bradycardic (asymptomatic) & Thrombocytopenic, waiting for [facility]. A comprehensive review of systems was negative except for that written in the History of Present Illness.     Objective:     Visit Vitals  /71   Pulse (!) 53   Temp 97.8 °F (36.6 °C)   Resp 12   Ht 6' (1.829 m)   Wt 77.9 kg (171 lb 11.2 oz)   SpO2 96%   BMI 23.29 kg/m²         Intake/Output Summary (Last 24 hours) at 2/18/2020 1429  Last data filed at 2/18/2020 6830  Gross per 24 hour   Intake 632 ml   Output 192 ml   Net 440 ml       Current Facility-Administered Medications   Medication Dose Route Frequency Provider Last Rate Last Dose    0.9% sodium chloride infusion 250 mL  250 mL IntraVENous PRN Leticia Pearl MD        glucose chewable tablet 16 g  4 Tab Oral PRN Leticia Pearl MD   16 g at 02/18/20 0845    glucagon (GLUCAGEN) injection 1 mg  1 mg IntraMUSCular PRN Blue Fuchs MD        dextrose (D50W) injection syrg 12.5-25 g  25-50 mL IntraVENous PRN Blue Fuchs MD   25 g at 02/17/20 1632    epoetin cristy-epbx (RETACRIT) 12,000 Units combo injection  12,000 Units SubCUTAneous Q TUE, THU & SAT July MARCE [place]        diphenhydrAMINE (BENADRYL) injection 12.5 mg  12.5 mg IntraVENous Q6H PRN Gabby Aquino MD   12.5 mg at 02/18/20 1337    pantoprazole (PROTONIX) tablet 40 mg  40 mg Oral ACB&D Blue Fuchs MD   40 mg at 02/18/20 1315    levothyroxine (SYNTHROID) tablet 100 mcg  100 mcg Oral Roya Brown MD   100 mcg at 02/18/20 4277    insulin lispro (HUMALOG) injection   SubCUTAneous TIDAC Blue Fuchs MD   Stopped at 02/16/20 0730    B complex-vitaminC-folic acid (NEPHROCAP) cap  1 Cap Oral DAILY Blue Fuchs MD   1 Cap at 02/18/20 1315    sodium bicarbonate tablet 650 mg  650 mg Oral BID Blue Fuchs MD   650 mg at 02/18/20 1315    ferrous gluconate 324 mg (37.5 mg iron) tablet 1 Tab  1 Tab Oral BID WITH MEALS Blue Fuchs MD   1 Tab at 02/18/20 1315    NOREPINephrine (LEVOPHED) 8 mg in 5% dextrose 250mL (32 mcg/mL) infusion  0.5-16 mcg/min IntraVENous TITRATE Chantal Hamm MD   Stopped at 02/17/20 0028     Facility-Administered Medications Ordered in Other Encounters   Medication Dose Route Frequency Provider Last Rate Last Dose    fentaNYL citrate (PF) injection 12.5-50 mcg  12.5-50 mcg IntraVENous Multiple Fall LancasterTaryn PA   50 mcg at 02/18/20 1140    midazolam (VERSED) injection 1 mg  1 mg IntraVENous Multiple Fall [place]Taryn PA   1 mg at 02/18/20 1140        Physical Exam:     Physical Exam:   General:  Alert, cooperative, no distress, appears stated age.    Mouth/Throat: Lips, mucosa, and tongue normal. Teeth and gums normal.   Neck: Supple, symmetrical, trachea midline, no adenopathy, thyroid: no enlargement/tenderness/nodules, no carotid bruit and no JVD. Lungs:   Clear to auscultation bilaterally. Heart:  Regular rate and rhythm, S1, S2 normal, no murmur, click, rub or gallop. Abdomen:   Soft, non-tender. Bowel sounds normal. No masses,  No organomegaly. ,has Supra-pubic catheter. Extremities: Extremities normal, atraumatic, no cyanosis . , has 2 plus edema         Data Review:    CBC w/Diff    Recent Labs     02/18/20  0530 02/17/20  0255 02/16/20  0408   WBC 3.1* 3.7* 3.4*   RBC 2.75* 2.78* 3.03*   HGB 8.0* 8.0* 8.7*   HCT 25.5* 25.9* 28.2*   MCV 92.7 93.2 93.1   MCH 29.1 28.8 28.7   MCHC 31.4 30.9* 30.9*   RDW 21.5* 21.6* 21.7*    Recent Labs     02/18/20  0530 02/17/20  0255 02/16/20  0408   MONOS 6 8 3   EOS 0 1 1   BASOS 0 1 0   RDW 21.5* 21.6* 21.7*        Comprehensive Metabolic Profile    Recent Labs     02/18/20  0530 02/17/20  0255 02/16/20  0408    147* 147*   K 5.0 5.4 5.4   * 122* 122*   CO2 15* 17* 17*   BUN 70* 65* 63*   CREA 7.43* 6.82* 6.34*    Recent Labs     02/18/20  0530 02/17/20  0255 02/16/20  0408   CA 7.7* 7.9*  7.8* 7.9*   PHOS  --  8.2*  --    ALB 2.8* 2.9* 3.1*   TP 6.3* 6.3* 6.7   SGOT 16 18 32   TBILI 0.6 0.5 0.6        Patient Name: Marino Limb Specimen #: [id number]   Page 2 of [address] [place] [address] [facility] [place] [address]   LIGHT MICROSCOPY: The tissue is examined at 10 levels of section with H&E, PAS, trichrome, and Ledesma silver stains. The up to 14 glomeruli per level of section for evaluation demonstrate 2 with global sclerosis and none with segmental tuft sclerosis. Nodular glomerulosclerosis with well-developed Aldo Chancy type mesangial nodules is seen. Capillary walls are mildly thickened. No endocapillary proliferation is seen. There is moderate tubulointerstitial scarring with minimal mononuclear inflammation in the scarred interstitium and with superimposed   mild acute tubular injury.  Arterioles demonstrate mild to moderate hyalinosis and arteries reveal moderate intimal fibroelastosis. IMMUNOFLUORESCENCE MICROSCOPY: Six (6) glomeruli are available for evaluation of which 1 reveals global sclerosis. No diagnostic glomerular or extraglomerular staining is seen with IgG, IgA, IgM, C3, C1q, fibrin, and kappa and lambda light chains. ELECTRON MICROSCOPY: The semi thin section demonstrates 2 glomeruli with nodular glomerulosclerosis. Mild acute tubular injury is noted. Ultrastructural examination demonstrates diffusely thickened glomerular basement membranes with widespread effacement of the overlying foot processes. No discrete immune complex type electron dense deposits are identified.                  Impression:       Active Hospital Problems    Diagnosis Date Noted    ESRD (end stage renal disease) ([facility] [address] 02/17/2020    Secondary hyperparathyroidism of renal origin ([facility] [address] 02/17/2020    Acute renal failure superimposed on stage 3 chronic kidney disease ([facility] [address] 02/15/2020    UTI (urinary tract infection) 02/15/2020    Acute renal failure superimposed on chronic kidney disease ([facility] [address] 02/15/2020    Bradycardia 02/15/2020    COPD, moderate ([address] 01/31/2019    Hypothyroid 01/31/2019    Chronic hepatitis C without hepatic coma ([facility] [address] 05/31/2017    Quadriparesis ([facility] [address] 05/31/2017    Essential hypertension 04/28/2017    IV drug abuse ([facility] [address] 04/28/2017    Renal cell carcinoma of left kidney ([facility] [address] 12/17/2013     Pathological Stage [id number] cc RCC FG3 s/p left open partial nephrectomy in 04/49        Metabolic acidosis        Plan:   Worsening Renal function, , Await TDC by IR & then will start Dialysis today , discussed with IR. ,recommend to have Opinion of GI regarding his low Platelets & association with Hep B & Hep C, working to find out patient Dialysis unit for him      Jamie García MD

## 2020-02-19 LAB
ALBUMIN SERPL-MCNC: 2.9 G/DL (ref 3.4–5)
ALBUMIN/GLOB SERPL: 0.9 {RATIO} (ref 0.8–1.7)
ALP SERPL-CCNC: 86 U/L (ref 45–117)
ALT SERPL-CCNC: 24 U/L (ref 16–61)
ANION GAP SERPL CALC-SCNC: 8 MMOL/L (ref 3–18)
AST SERPL-CCNC: 19 U/L (ref 10–38)
BASOPHILS # BLD: 0 K/UL (ref 0–0.06)
BASOPHILS NFR BLD: 0 % (ref 0–3)
BILIRUB SERPL-MCNC: 1.4 MG/DL (ref 0.2–1)
BLD PROD TYP BPU: NORMAL
BLD PROD TYP BPU: NORMAL
BPU ID: NORMAL
BPU ID: NORMAL
BUN SERPL-MCNC: 42 MG/DL (ref 7–18)
BUN/CREAT SERPL: 8 (ref 12–20)
CALCIUM SERPL-MCNC: 7.9 MG/DL (ref 8.5–10.1)
CALLED TO:,BCALL1: NORMAL
CHLORIDE SERPL-SCNC: 111 MMOL/L (ref 100–111)
CO2 SERPL-SCNC: 23 MMOL/L (ref 21–32)
CREAT SERPL-MCNC: 5.36 MG/DL (ref 0.6–1.3)
DIFFERENTIAL METHOD BLD: ABNORMAL
EOSINOPHIL # BLD: 0 K/UL (ref 0–0.4)
EOSINOPHIL NFR BLD: 1 % (ref 0–5)
ERYTHROCYTE [DISTWIDTH] IN BLOOD BY AUTOMATED COUNT: 21.1 % (ref 11.6–14.5)
GLOBULIN SER CALC-MCNC: 3.3 G/DL (ref 2–4)
GLUCOSE BLD STRIP.AUTO-MCNC: 105 MG/DL (ref 70–110)
GLUCOSE BLD STRIP.AUTO-MCNC: 109 MG/DL (ref 70–110)
GLUCOSE BLD STRIP.AUTO-MCNC: 33 MG/DL (ref 70–110)
GLUCOSE BLD STRIP.AUTO-MCNC: 36 MG/DL (ref 70–110)
GLUCOSE BLD STRIP.AUTO-MCNC: 59 MG/DL (ref 70–110)
GLUCOSE BLD STRIP.AUTO-MCNC: 76 MG/DL (ref 70–110)
GLUCOSE BLD STRIP.AUTO-MCNC: 77 MG/DL (ref 70–110)
GLUCOSE BLD STRIP.AUTO-MCNC: 85 MG/DL (ref 70–110)
GLUCOSE SERPL-MCNC: 24 MG/DL (ref 74–99)
HBV CORE IGM SER QL: ABNORMAL
HCT VFR BLD AUTO: 23.6 % (ref 36–48)
HGB BLD-MCNC: 7.5 G/DL (ref 13–16)
HIV 1+2 AB+HIV1 P24 AG SERPL QL IA: NONREACTIVE
HIV12 RESULT COMMENT, HHIVC: NORMAL
LYMPHOCYTES # BLD: 0.7 K/UL (ref 0.8–3.5)
LYMPHOCYTES NFR BLD: 21 % (ref 20–51)
MAGNESIUM SERPL-MCNC: 1.9 MG/DL (ref 1.6–2.6)
MCH RBC QN AUTO: 29.1 PG (ref 24–34)
MCHC RBC AUTO-ENTMCNC: 31.8 G/DL (ref 31–37)
MCV RBC AUTO: 91.5 FL (ref 74–97)
MONOCYTES # BLD: 0.2 K/UL (ref 0–1)
MONOCYTES NFR BLD: 6 % (ref 2–9)
NEUTS SEG # BLD: 2.2 K/UL (ref 1.8–8)
NEUTS SEG NFR BLD: 72 % (ref 42–75)
NRBC BLD-RTO: 10 PER 100 WBC
PHOSPHATE SERPL-MCNC: 5.9 MG/DL (ref 2.5–4.9)
PHOSPHATE SERPL-MCNC: 5.9 MG/DL (ref 2.5–4.9)
PLATELET # BLD AUTO: 44 K/UL (ref 135–420)
PLATELET COMMENTS,PCOM: ABNORMAL
POTASSIUM SERPL-SCNC: 4 MMOL/L (ref 3.5–5.5)
PROT SERPL-MCNC: 6.2 G/DL (ref 6.4–8.2)
RBC # BLD AUTO: 2.58 M/UL (ref 4.7–5.5)
RBC MORPH BLD: ABNORMAL
SODIUM SERPL-SCNC: 142 MMOL/L (ref 136–145)
STATUS OF UNIT,%ST: NORMAL
STATUS OF UNIT,%ST: NORMAL
UNIT DIVISION, %UDIV: 0
UNIT DIVISION, %UDIV: 0
WBC # BLD AUTO: 3.1 K/UL (ref 4.6–13.2)

## 2020-02-19 PROCEDURE — 65660000000 HC RM CCU STEPDOWN

## 2020-02-19 PROCEDURE — 74011250636 HC RX REV CODE- 250/636: Performed by: FAMILY MEDICINE

## 2020-02-19 PROCEDURE — 83735 ASSAY OF MAGNESIUM: CPT

## 2020-02-19 PROCEDURE — 84100 ASSAY OF PHOSPHORUS: CPT

## 2020-02-19 PROCEDURE — 74011250637 HC RX REV CODE- 250/637: Performed by: FAMILY MEDICINE

## 2020-02-19 PROCEDURE — 86705 HEP B CORE ANTIBODY IGM: CPT

## 2020-02-19 PROCEDURE — 85025 COMPLETE CBC W/AUTO DIFF WBC: CPT

## 2020-02-19 PROCEDURE — 82962 GLUCOSE BLOOD TEST: CPT

## 2020-02-19 PROCEDURE — 87389 HIV-1 AG W/HIV-1&-2 AB AG IA: CPT

## 2020-02-19 PROCEDURE — 80053 COMPREHEN METABOLIC PANEL: CPT

## 2020-02-19 PROCEDURE — 5A1D70Z PERFORMANCE OF URINARY FILTRATION, INTERMITTENT, LESS THAN 6 HOURS PER DAY: ICD-10-PCS | Performed by: FAMILY MEDICINE

## 2020-02-19 PROCEDURE — 87517 HEPATITIS B DNA QUANT: CPT

## 2020-02-19 PROCEDURE — 74011250637 HC RX REV CODE- 250/637: Performed by: INTERNAL MEDICINE

## 2020-02-19 PROCEDURE — 74011250636 HC RX REV CODE- 250/636: Performed by: INTERNAL MEDICINE

## 2020-02-19 PROCEDURE — 74011000250 HC RX REV CODE- 250: Performed by: FAMILY MEDICINE

## 2020-02-19 PROCEDURE — 90935 HEMODIALYSIS ONE EVALUATION: CPT

## 2020-02-19 PROCEDURE — 87522 HEPATITIS C REVRS TRNSCRPJ: CPT

## 2020-02-19 RX ORDER — CALCIUM ACETATE 667 MG/1
1 CAPSULE ORAL
Status: DISCONTINUED | OUTPATIENT
Start: 2020-02-19 | End: 2020-02-25 | Stop reason: HOSPADM

## 2020-02-19 RX ORDER — DEXTROSE MONOHYDRATE 50 MG/ML
25 INJECTION, SOLUTION INTRAVENOUS CONTINUOUS
Status: DISCONTINUED | OUTPATIENT
Start: 2020-02-19 | End: 2020-02-22

## 2020-02-19 RX ADMIN — FERROUS GLUCONATE TAB 324 MG (37.5 MG ELEMENTAL IRON) 1 TABLET: 324 (37.5 FE) TAB at 17:19

## 2020-02-19 RX ADMIN — CALCIUM ACETATE 667 MG: 667 CAPSULE ORAL at 13:05

## 2020-02-19 RX ADMIN — CALCIUM ACETATE 667 MG: 667 CAPSULE ORAL at 17:19

## 2020-02-19 RX ADMIN — NEPHROCAP 1 CAPSULE: 1 CAP ORAL at 09:08

## 2020-02-19 RX ADMIN — PANTOPRAZOLE SODIUM 40 MG: 40 TABLET, DELAYED RELEASE ORAL at 09:08

## 2020-02-19 RX ADMIN — SODIUM BICARBONATE 650 MG TABLET 650 MG: at 09:08

## 2020-02-19 RX ADMIN — LORAZEPAM 0.5 MG: 2 INJECTION INTRAMUSCULAR; INTRAVENOUS at 14:52

## 2020-02-19 RX ADMIN — FERROUS GLUCONATE TAB 324 MG (37.5 MG ELEMENTAL IRON) 1 TABLET: 324 (37.5 FE) TAB at 09:10

## 2020-02-19 RX ADMIN — PANTOPRAZOLE SODIUM 40 MG: 40 TABLET, DELAYED RELEASE ORAL at 17:19

## 2020-02-19 RX ADMIN — DEXTROSE 50 % IN WATER (D50W) INTRAVENOUS SYRINGE 25 G: at 07:06

## 2020-02-19 RX ADMIN — DIPHENHYDRAMINE HYDROCHLORIDE 12.5 MG: 50 INJECTION, SOLUTION INTRAMUSCULAR; INTRAVENOUS at 22:31

## 2020-02-19 RX ADMIN — DEXTROSE MONOHYDRATE 25 ML/HR: 5 INJECTION, SOLUTION INTRAVENOUS at 18:55

## 2020-02-19 NOTE — PROGRESS NOTES
Notified by lab that patient had low blood sugar result on morning labs of 33. Checked FSBS and was 36. 1 amp D50 given. FSBS recheck 109.  Notified oncoming nurse Felix Alvarado

## 2020-02-19 NOTE — DIABETES MGMT
Glycemic Control Plan of Care    T2DM with A1c of 4.9% (2/11/2020). Patient on dialysis this morning. POC BG range on 02/18/2020: 70-91. Patient was NPO after midnight and had temporary dialysis catheter placement. Patient placed on diet after procedure to help prevent hypoglycemia. POC BG report on 02/19/2020 at time of review: 33, 36, 109. Noted 1 amp of D50 was given with f/u BG of 109. BG dropped again to 59 at 8:49 AM and breakfast was given with f/u BG of 76. Recommendation(s):  1.) discussed hypoglycemia protocol with nursing to treat every 15 minutes until BG is at least 80 then feed patient. 2.) obtained verbal order diabetic bedtime snack from Dr. Val Rodgers. Assessment:  Patient is 62year old with past medical history including type diabetes mellitus, CKD stage 3, drug abuse, GERD, quadriparesis, left renal carcinoma, suprapubic catheter and hepatitis B&C - was admitted on 2/15/2020 with report that he was sent from MD office due to worsening Cr and LLE edema. Noted:  ESRD. Status post temporary dialysis catheter placement done 02/18/2020. History of renal cell CA, s/p left partial nephrectomy. COPD. Hypothyroid. UTI.  T2DM. Most recent blood glucose values:    Results for Antwan Benavides (MRN [id number]) as of 2/19/2020 11:25   Ref. Range 2/18/2020 08:37 2/18/2020 13:13 2/18/2020 16:17   GLUCOSE,FAST - POC Latest Ref Range: 70 - 110 mg/dL 70 75 91     Results for Antwan Bneavides (MRN [id number]) as of 2/19/2020 11:25   Ref. Range 2/19/2020 07:00 2/19/2020 07:01 2/19/2020 07:19 2/19/2020 08:49 2/19/2020 09:07   GLUCOSE,FAST - POC Latest Ref Range: 70 - 110 mg/dL 33 (LL) 36 (LL) 109 59 (L) 76     Current A1C: 4.9% (2/11/2020). Current hospital diabetes medications:  None. Total daily dose insulin requirement previous day: 02/18/2020  None. Home diabetes medications:   Lispro sliding scale insulin.     Diet: Renal regular; nutr suppl: Nepro all meals;     Goals:  Blood glucose will be within target range of  mg/dL by 02/21/2020.     Education:  ___  Refer to Diabetes Education Record             _X__  Education not indicated at this time    Cristina Collazo RN [facility]  Pager: [phone]

## 2020-02-19 NOTE — PROGRESS NOTES
Hematology/Medical Oncology Progress Note             Name: Sunny Deleon   : 1960   MRN: [id number]   Date: 2020 7:47 AM     [x]I have reviewed the flowsheet and previous days notes. Events overnight reviewed and discussed with nursing staff. Vital signs and records reviewed. Mr. 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 He has a history of bradycardia and has been evaluated by cardiology in the past.  He also has a history of chronic polysubstance abuse reported that the last heroin use was about 4 days prior to this admission. Pt was seen by PCP and instructed to come to ED due to worsening Creatinine. Patient was seen by nephrology and admitted to start dialysis. Subsequently the patient was found to have pancytopenia and required platelet tranfsusion prior to his dialysis catheter placement on yesterday. ROS:  Constitutional:  Negative for fever, chills, diaphoresis, activity change, appetite change and unexpected weight change. HENT: Negative for nosebleeds, congestion, facial swelling, mouth sores, trouble swallowing, neck pain, neck stiffness, voice change and postnasal drip. Eyes: Negative for photophobia, pain, discharge and itching. Respiratory: Negative for apnea, cough, choking, chest tightness, wheezing and stridor. Cardiovascular: Negative for chest pain, palpitations and leg swelling. Gastrointestinal: Negative for abdominal pain. Negative for nausea, diarrhea, constipation, blood in stool and rectal pain. Genitourinary: Negative for dysuria, urgency, hematuria, flank pain and difficulty urinating. Musculoskeletal: Negative for back pain, joint swelling, arthralgias and gait problem.    Skin: Positive bruising noted at LUE, with swelling, bruising noted at LLE (varner catheter resting on this area). Neurological:  Negative for dizziness, facial asymmetry, speech difficulty, light-headedness and headaches. Hematological: Negative for adenopathy. Does not bruise/bleed easily. Psychiatric/Behavioral: Negative for hallucinations, confusion, disturbed wake/sleep cycle and agitation. Vital Signs:    Visit Vitals  /90   Pulse (!) 59   Temp 97.7 °F (36.5 °C) (Oral)   Resp 13   Ht 6' (1.829 m)   Wt 76.2 kg (168 lb 1.6 oz)   SpO2 95%   BMI 22.80 kg/m²       O2 Device: Room air       Temp (24hrs), Av.7 °F (35.9 °C), Min:92.5 °F (33.6 °C), Max:98.2 °F (36.8 °C)       Intake/Output:   Last shift:       07 - 1900  In: 360 [P.O.:360]  Out: 1945 [Urine:95; Drains:350]  Last 3 shifts:  190 -  0700  In: 807 [I.V.:10]  Out: 2142 [Urine:642]    Intake/Output Summary (Last 24 hours) at 2020 1344  Last data filed at 2020 1240  Gross per 24 hour   Intake 360 ml   Output 3895 ml   Net -3535 ml       Physical Exam:  General: Appears comfortable, NAD. HEENT:  Anicteric sclerae; pink palpebral conjunctivae; mucosa moist  Resp:  Symmetrical chest expansion, no accessory muscle use; good airway entry; no rales/ wheezing/ rhonchi noted  CV:  S1, S2 present; regular rate and rhythm  GI:  Abdomen soft, non-tender; (+) active bowel sounds  Extremities:  +2 pulses on all extremities; RUE edema-improving. Skin:  Warm; Bruising at LUE and LLE thigh area (varner cath resting on this area).   Neurologic:  Non-focal        DATA:   Current Facility-Administered Medications   Medication Dose Route Frequency    calcium acetate(phosphat bind) (PHOSLO) capsule 667 mg  1 Cap Oral TID WITH MEALS    LORazepam (ATIVAN) injection 0.5 mg  0.5 mg IntraVENous Q6H PRN    glucose chewable tablet 16 g  4 Tab Oral PRN    glucagon (GLUCAGEN) injection 1 mg  1 mg IntraMUSCular PRN    dextrose (D50W) injection syrg 12.5-25 g  25-50 mL IntraVENous PRN  epoetin cristy-epbx (RETACRIT) 12,000 Units combo injection  12,000 Units SubCUTAneous Q TUE, THU & SAT    diphenhydrAMINE (BENADRYL) injection 12.5 mg  12.5 mg IntraVENous Q6H PRN    pantoprazole (PROTONIX) tablet 40 mg  40 mg Oral ACB&D    levothyroxine (SYNTHROID) tablet 100 mcg  100 mcg Oral 6am    B complex-vitaminC-folic acid (NEPHROCAP) cap  1 Cap Oral DAILY    ferrous gluconate 324 mg (37.5 mg iron) tablet 1 Tab  1 Tab Oral BID WITH MEALS     Facility-Administered Medications Ordered in Other Encounters   Medication Dose Route Frequency    fentaNYL citrate (PF) injection 12.5-50 mcg  12.5-50 mcg IntraVENous Multiple    midazolam (VERSED) injection 1 mg  1 mg IntraVENous Multiple                    Labs:  Recent Labs     02/19/20  0534 02/18/20  0530 02/17/20  0255   WBC 3.1* 3.1* 3.7*   HGB 7.5* 8.0* 8.0*   HCT 23.6* 25.5* 25.9*   PLT 44* 29* 32*     Recent Labs     02/19/20  0534 02/18/20  0530 02/17/20  0255    144 147*   K 4.0 5.0 5.4    119* 122*   CO2 23 15* 17*   GLU 24* 55* 77   BUN 42* 70* 65*   CREA 5.36* 7.43* 6.82*   CA 7.9* 7.7* 7.9*  7.8*   MG 1.9 2.0  --    PHOS 5.9*  5.9*  --  8.2*   ALB 2.9* 2.8* 2.9*   SGOT 19 16 18   ALT 24 28 31   INR  --   --  1.1     No results for input(s): PH, PCO2, PO2, HCO3, FIO2 in the last 72 hours. IMPRESSION:   · Chronic thrombocytopenia  · Acute renal failure superimposed on chronic kidney disease age 9  · Bradycardia  · Chronic hepatitis C  · Polysubstance abuse-recent heroin use           PLAN:   · Platelet count today is 44,000 , s/p 2 units of platelets. No intervention warranted at this time. · H/H 7.5/23.6, will recommend PRBC transfusion for hemoglobin less than 7g/dl. Continue Retacrit 12,000 units (nephrology preferred dose)  to be given 3 times a week with dialysis while inpatient on dialysis days. · Substance abuse cessation encouraged. May benefit from substance abuse center referral-will defer to attending.   · Patient to follow-up in the outpatient hematology/oncology clinic in 5 to 7 days after discharge.  ·        The patient is: [x] acutely ill Risk of deterioration: [] moderate    [] critically ill  [] high         My assessment/plan was discussed with:  [x]nursing []PT/OT    []respiratory therapy []Dr.Lloyd Tika Siu MD      []family []       Gosia Durham MD

## 2020-02-19 NOTE — PROGRESS NOTES
VERONICA        ACUTE HEMODIALYSIS FLOW SHEET      HEMODIALYSIS ORDERS: Physician: Scot Flower: isaac   Duration: 2.5 hr  BFR: 300   DFR: 600   Dialysate:  Temp 36-37*C  K+   36    Ca+  3 Na 2 Bicarb 35   Weight:  76.2 kg kg    Patient Chart []     Unable to Obtain []  UF Goal: 1500 Access RU chest CVC    Heparin []  Bolus      Units    [] Hourly       Units    [x]None    Catheter locking solution    Pre BP:   144/78    Pulse:     53       Respirations: 14  Temperature:   92.5 axillary   Labs: Pre        Post:        [] N/A   Additional Orders(medications, blood products, hypotension management):       [x] N/A     [x] Veronica Consent Verified     CATHETER ACCESS: []N/A   [x]Right   []Left   []IJ     []Fem   [x]chest wall   [] First use X-ray verified     [x]Tunnel                [] Non Tunneled   [x]No S/S infection  []Redness  []Drainage []Cultured []Swelling []Pain   [x]Medical Aseptic Prep Utilized   []Dressing Changed  [x] Biopatch  Date: 2/18/2020       []Clotted   [x]Patent   Flows: [x]Good  []Poor  []Reversed   If access problem,  notified: []Yes    [x]N/A  Date:           GRAFT/FISTULA ACCESS: [x]N/A     []Right     []Left     []UE     []LE                 GENERAL ASSESSMENT:      LUNGS:  Rate 13 SaO2%  95        [] N/A    [] Clear  [x] Coarse  [] Crackles  [] Wheezing        [] Diminished     Location : []RLL   []LLL    []RUL  []MYLENE     Cough: []Productive  []Dry  []N/A   Respirations:  []Easy  []Labored     Therapy:   [x]RA  []NC  l/min    Mask: []NRB []Venti       O2%                  []Ventilator  []Intubated  [] Trach  [] BiPaP     CARDIAC: []Regular      [x] Irregular   [] Pericardial Rub  [] JVD        [x]  Monitored  [x] Bedside  [] Remotely monitored [] N/A  Rhythm: chronic melecio     EDEMA: [] None  []Generalized  [x] Pitting [x] 1    [] 2    [] 3    [] 4                 [] Facial  [] Pedal  []  UE  [x] LE     SKIN:   [x] Warm  [] Hot     [] Cold   [x] Dry     [] Pale   [] Diaphoretic                  [] Flushed  [] Jaundiced  [] Cyanotic  [] Rash  [] Weeping     LOC:    [x] Alert      [x]Oriented:    [x] Person     [] Place  []Time               [x] Confused  [] Lethargic  [] Medicated  [] Non-responsive     GI / ABDOMEN: [x] Flat    [] Distended    [x] Soft    [] Firm   []  Obese                             [x] Diarrhea  [] Bowel Sounds  [] Nausea  [] Vomiting   Fecal mgmt system in place     / URINE ASSESSMENT:[x] Voiding   [] Oliguria  [] Anuria   [x]  Willis suprapubic     [] Incontinent    []  Incontinent Brief      []  Bathroom Privileges       PAIN: [x] 0 []1  []2   []3   []4   []5   []6   []7   []8   []9   []10              Scale 0-10  Action/Follow Up: n/a     MOBILITY:  [] Amb    [] Amb/Assist    [x] Bed    [] Wheelchair  [] Stretcher      All Vitals and Treatment Details on Attached [address]: [facility]          Room # 2353/01      [] 1st Time Acute  [] Stat  [] Routine  [] Urgent     [] Acute Room  [x]  Bedside  [x] ICU/CCU  [] ER   Isolation Precautions:   There are currently no Active Isolations      Special Considerations:         [] Blood Consent Verified [x]N/A     ALLERGIES:   Allergies   Allergen Reactions    Iodine Hives and Other (comments)               Code Status:Full Code        Hepatitis Status:                   Lab Results   Component Value Date/Time    Hepatitis A, IgM NEGATIVE  03/28/2019 11:15 AM    Hepatitis B surface Ag <0.10 02/17/2020 02:55 AM    Hepatitis B surface Ab 8.77 (L) 02/17/2020 02:55 AM    Hep B Core Ab,Total Positive (A) 09/15/2010 08:15 AM    Hepatitis B core, IgM NEGATIVE  03/28/2019 11:15 AM    Hepatitis Be AG Negative 11/15/2010 09:55 AM    Hep B Core Ab, IgM Indeterminate 04/02/2019 05:29 AM    Hep B Core Ab, total Positive (A) 02/17/2020 02:55 AM    Hepatitis C virus Ab >11.00 (H) 02/17/2020 02:55 AM                     Current Labs:   Lab Results   Component Value Date/Time    Sodium 142 02/19/2020 05:34 AM    Potassium 4.0 02/19/2020 05:34 AM    Chloride 111 02/19/2020 05:34 AM    CO2 23 02/19/2020 05:34 AM    Anion gap 8 02/19/2020 05:34 AM    Glucose 24 (LL) 02/19/2020 05:34 AM    BUN 42 (H) 02/19/2020 05:34 AM    Creatinine 5.36 (H) 02/19/2020 05:34 AM    BUN/Creatinine ratio 8 (L) 02/19/2020 05:34 AM    GFR est AA 13 (L) 02/19/2020 05:34 AM    GFR est non-AA 11 (L) 02/19/2020 05:34 AM    Calcium 7.9 (L) 02/19/2020 05:34 AM      Lab Results   Component Value Date/Time    WBC 3.1 (L) 02/19/2020 05:34 AM    Hemoglobin, POC 8.2 (L) 10/11/2018 09:34 AM    HGB 7.5 (L) 02/19/2020 05:34 AM    Hematocrit, POC 24 (L) 10/11/2018 09:34 AM    HCT 23.6 (L) 02/19/2020 05:34 AM    PLATELET 44 (L) 97/19/2933 05:34 AM    MCV 91.5 02/19/2020 05:34 AM                                                                                     DIET: DIET NUTRITIONAL SUPPLEMENTS  DIET RENAL       PRIMARY NURSE REPORT: First initial/Last name/Title      Pre Dialysis: Kirill Sharpe RN     Time: 9292      EDUCATION:    [x] Patient [] Other         Knowledge Basis: []None []Minimal [] Substantial   Barriers to learning [x] confusion   [] Access Care     [] S&S of infection     [] Fluid Management     []K+     [x]Procedural    []Albumin     [] Medications     [] Tx Options     [] Transplant     [] Diet     [] Other   Teaching Tools:  [x] Explain  [x] Demo  [] Handouts [] Video  Patient response:   [x] Verbalized understanding  [] Teach back  [] Return demonstration   [x] Requires follow up due to confusion, repetitive statements   Inappropriate due to            [x] Time Out/Safety Check  [x]Extracorporeal Circuit Tested for integrity       [address] - Before each treatment:     Machine Number:                   [facility]                                                          [x] Portable Machine #4/RO serial # [id number]                                                        Alarm Test:  Pass time [id number]               [x] RO/Machine Log Complete      Temp    36             Dialysate: pH  7.4 Conductivity: Meter   13.8     HD Machine   14.0                  TCD: 13.7  Dialyzer Lot # [id number]            Blood Tubing Lot # [id number]         Saline Lot #  [id number]     CHLORINE TESTING-Before each treatment and every 4 hours    Total Chlorine: [x] less than 0.1 ppm  Time: 0905 4 Hr/2nd Check Time:    (if greater than 0.1 ppm from Primary then every 30 minutes from Secondary)     TREATMENT INITIATION - with Dialysis Precautions:   [x] All Connections Secured                 [x] Saline Line Double Clamped   [x] Venous Parameters Set                  [x] Arterial Parameters Set    [x] Prime Given 250ml                          [x]Air Foam Detector Engaged      Treatment Initiation Note:  RN arrived to pt in bed in CVT ICU, pt is A&O to person, intermittent confusion AEB restlessness, repetitive verbalizations. no S/S of distress, VSS except temp - 92.5 - bear hugger applied. tx started with out complications. .... CVC no S/S of complications. Monitor settings are set for Q15min. During Treatment Notes:   2263: pt resting in bed, pt restless, No S/S of distress, face and access in view. 1100: pt resting in bed, pt restless, No S/S of distress, face and access in view. 1130: pt resting in bed, pt restless, No S/S of distress, face and access in view. 1200: pt resting in bed, pt restless, No S/S of distress, face and access in view.      Medication Dose Volume Route Time [facility] name Title   none     Johnny Harvey RN                   Post Assessment:   Dialyzer Cleared: [x] Good [] Fair  [] Poor  Blood processed:  43.2 L  UF Removed  1500 Ml  POst BP:   165/90       Pulse: 59        Respirations: 13  Temperature: 97.7 Lungs:     [x] Clear      [] Course         [] Crackles    [] Wheezing         [] Diminished   Post Tx Vascular Access:    N/A Cardiac:   [x] Regular   [] Irregular   [x] Monitor  [] N/A      Rhythm: NSR       Catheter: Locking solution: Heparin 1:1000   Art. 1.9  Venanico. 1.9      Skin:   Pain:    [x] Warm  [x] Dry [] Diaphoretic    [] Flushed    [] Pale [] Cyanotic [x]0  []1  []2   []3  []4   []5   []6   []7   []8   []9   []10     Post Treatment Note:    Pt resting in his bed, pt tolerated the tx, pt states they are good, no S/S of distress.       POST TREATMENT PRIMARY NURSE HANDOFF REPORT:     First initial/Last name/Title         Post Dialysis: Rachna Llamas RN  Time:  1250     Abbreviations: AVG-arterial venous graft, AVF-arterial venous fistula, IJ-Internal Jugular, Subcl-Subclavian, Fem-Femoral, Tx-treatment, AP/HR-apical heart rate, DFR-dialysate flow rate, BFR-blood flow rate, AP-arterial pressure, -venous pressure, UF-ultrafiltrate, TMP-transmembrane pressure, Venancio-Venous, Art-Arterial, RO-Reverse Osmosis

## 2020-02-19 NOTE — PROGRESS NOTES
Hematology/Medical Oncology Progress Note             Name: Sara Bourgeois   : 1960   MRN: [id number]   Date: 2020 7:47 AM     [x]I have reviewed the flowsheet and previous days notes. Events overnight reviewed and discussed with nursing staff. Vital signs and records reviewed. Mr. 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 He has a history of bradycardia and has been evaluated by cardiology in the past.  He also has a history of chronic polysubstance abuse reported that the last heroin use was about 4 days prior to this admission. Pt was seen by PCP and instructed to come to ED due to worsening Creatinine. Patient was seen by nephrology and admitted to start dialysis. Subsequently the patient was found to have pancytopenia and required platelet tranfsusion prior to his dialysis catheter placement on yesterday. ROS:  Constitutional:  Negative for fever, chills, diaphoresis, activity change, appetite change and unexpected weight change. HENT: Negative for nosebleeds, congestion, facial swelling, mouth sores, trouble swallowing, neck pain, neck stiffness, voice change and postnasal drip. Eyes: Negative for photophobia, pain, discharge and itching. Respiratory: Negative for apnea, cough, choking, chest tightness, wheezing and stridor. Cardiovascular: Negative for chest pain, palpitations and leg swelling. Gastrointestinal: Negative for abdominal pain. Negative for nausea, diarrhea, constipation, blood in stool and rectal pain. Genitourinary: Negative for dysuria, urgency, hematuria, flank pain and difficulty urinating. Musculoskeletal: Negative for back pain, joint swelling, arthralgias and gait problem.    Skin: Positive bruising noted at LUE, with swelling, bruising noted at LLE (varner catheter resting on this area). Neurological:  Negative for dizziness, facial asymmetry, speech difficulty, light-headedness and headaches. Hematological: Negative for adenopathy. Does not bruise/bleed easily. Psychiatric/Behavioral: Negative for hallucinations, confusion, disturbed wake/sleep cycle and agitation. Vital Signs:    Visit Vitals  BP (!) 148/94   Pulse (!) 53   Temp 97.9 °F (36.6 °C)   Resp 14   Ht 6' (1.829 m)   Wt 76.2 kg (168 lb 1.6 oz)   SpO2 96%   BMI 22.80 kg/m²       O2 Device: Room air       Temp (24hrs), Av.4 °F (36.3 °C), Min:94 °F (34.4 °C), Max:98.2 °F (36.8 °C)       Intake/Output:   Last shift:      No intake/output data recorded. Last 3 shifts:  1901 -  0700  In: 632 [I.V.:10]  Out: 2142 [Urine:642]    Intake/Output Summary (Last 24 hours) at 2020 0820  Last data filed at 2020 0644  Gross per 24 hour   Intake 622 ml   Output 1967 ml   Net -1345 ml       Physical Exam:  General: Appears comfortable, NAD. HEENT:  Anicteric sclerae; pink palpebral conjunctivae; mucosa moist  Resp:  Symmetrical chest expansion, no accessory muscle use; good airway entry; no rales/ wheezing/ rhonchi noted  CV:  S1, S2 present; regular rate and rhythm  GI:  Abdomen soft, non-tender; (+) active bowel sounds  Extremities:  +2 pulses on all extremities; RUE edema-improving. Skin:  Warm; Bruising at LUE and LLE thigh area (varner cath resting on this area).   Neurologic:  Non-focal        DATA:   Current Facility-Administered Medications   Medication Dose Route Frequency    LORazepam (ATIVAN) injection 0.5 mg  0.5 mg IntraVENous Q6H PRN    LORazepam (ATIVAN) 2 mg/mL injection        0.9% sodium chloride infusion 250 mL  250 mL IntraVENous PRN    glucose chewable tablet 16 g  4 Tab Oral PRN    glucagon (GLUCAGEN) injection 1 mg  1 mg IntraMUSCular PRN    dextrose (D50W) injection syrg 12.5-25 g  25-50 mL IntraVENous PRN    epoetin cristy-epbx (RETACRIT) 12,000 Units combo injection  12,000 Units SubCUTAneous Q TUE, THU & SAT    diphenhydrAMINE (BENADRYL) injection 12.5 mg  12.5 mg IntraVENous Q6H PRN    pantoprazole (PROTONIX) tablet 40 mg  40 mg Oral ACB&D    levothyroxine (SYNTHROID) tablet 100 mcg  100 mcg Oral 6am    insulin lispro (HUMALOG) injection   SubCUTAneous TIDAC    B complex-vitaminC-folic acid (NEPHROCAP) cap  1 Cap Oral DAILY    sodium bicarbonate tablet 650 mg  650 mg Oral BID    ferrous gluconate 324 mg (37.5 mg iron) tablet 1 Tab  1 Tab Oral BID WITH MEALS    NOREPINephrine (LEVOPHED) 8 mg in 5% dextrose 250mL (32 mcg/mL) infusion  0.5-16 mcg/min IntraVENous TITRATE     Facility-Administered Medications Ordered in Other Encounters   Medication Dose Route Frequency    fentaNYL citrate (PF) injection 12.5-50 mcg  12.5-50 mcg IntraVENous Multiple    midazolam (VERSED) injection 1 mg  1 mg IntraVENous Multiple                    Labs:  Recent Labs     02/19/20  0534 02/18/20  0530 02/17/20  0255   WBC 3.1* 3.1* 3.7*   HGB 7.5* 8.0* 8.0*   HCT 23.6* 25.5* 25.9*   PLT 44* 29* 32*     Recent Labs     02/19/20  0534 02/18/20  0530 02/17/20  0255    144 147*   K 4.0 5.0 5.4    119* 122*   CO2 23 15* 17*   GLU 24* 55* 77   BUN 42* 70* 65*   CREA 5.36* 7.43* 6.82*   CA 7.9* 7.7* 7.9*  7.8*   MG  --  2.0  --    PHOS 5.9*  --  8.2*   ALB 2.9* 2.8* 2.9*   SGOT 19 16 18   ALT 24 28 31   INR  --   --  1.1     No results for input(s): PH, PCO2, PO2, HCO3, FIO2 in the last 72 hours. IMPRESSION:   · Chronic thrombocytopenia  · Acute renal failure superimposed on chronic kidney disease age 9  · Bradycardia  · Chronic hepatitis C  · Polysubstance abuse-recent heroin use           PLAN:   · Platelet count today is 44,000 , s/p 2 units of platelets. No intervention warranted at this time. · H/H 7.5/23.6, will recommend PRBC transfusion for hemoglobin less than 7g/dl.  Continue Retacrit 12,000 units (nephrology preferred dose)  to be given 3 times a week with dialysis while inpatient on dialysis days. · Substance abuse cessation encouraged. May benefit from substance abuse center referral-will defer to attending. · Patient to follow-up with Dr. Catarina Lewis within 5 to 7 days after discharge.  ·        The patient is: [x] acutely ill Risk of deterioration: [] moderate    [] critically ill  [] high         My assessment/plan was discussed with:  [x]nursing []PT/OT    []respiratory therapy [x]Dr.Lloyd Patty Garza MD      []family []       Antonio Otto NP

## 2020-02-19 NOTE — CONSULTS
Pt examined. Chart reviewed. Full consult to follow. Imp: Hypoglycemia due to renal failure and protein/calorie malnutrition. Plan:      1. Tests ordered to rule out other causes (see orders). Nutrition consult for malnutrition. 2.  Support patient with IV dextrose (D5) infusion to maintain a glucose level above above 50.      3.  Regular dialysis and adequate feeding should ameliorate the hypoglycemia over the next week or so. Will follow patient with you to interpret results of tests and adjust treatment as needed.

## 2020-02-19 NOTE — PROGRESS NOTES
RENAL PROGRESS NOTE: Pt seen on dialysis. Follow up of ESRD     Present on Admission:   Acute renal failure superimposed on stage 3 chronic kidney disease ([facility]   UTI (urinary tract infection)   Chronic hepatitis C without hepatic coma ([facility]   Essential hypertension   [facility]   Quadriparesis ([facility]   Renal cell carcinoma of left kidney (HCC)   COPD, moderate ([facility]   Hypothyroid   Acute renal failure superimposed on chronic kidney disease ([facility]   ESRD (end stage renal disease) ([facility]         Subjective: Feels ok, but has low Blood sugar & low temperature. Tolerated 1st dialysis well yesterday. Patient is on Dialysis.      Objective:    Patient Vitals for the past 12 hrs:   Temp Pulse Resp BP SpO2   02/19/20 1009 -- (!) 53 -- 149/88 98 %   02/19/20 1000 -- (!) 53 14 149/83 95 %   02/19/20 0948 (!) 92.5 °F (33.6 °C) (!) 53 13 144/78 --   02/19/20 0900 -- (!) 49 8 -- 98 %   02/19/20 0800 (!) 93 °F (33.9 °C) (!) 50 15 162/79 95 %   02/19/20 0700 -- (!) 53 14 (!) 148/94 --   02/19/20 0600 -- (!) 54 12 (!) 155/103 96 %   02/19/20 0500 -- (!) 50 10 143/73 94 %   02/19/20 0400 97.9 °F (36.6 °C) (!) 55 14 150/75 96 %   02/19/20 0300 -- (!) 58 11 156/78 90 %   02/19/20 0200 -- (!) 57 12 153/66 97 %   02/19/20 0100 -- 60 12 155/77 96 %   02/19/20 0000 98 °F (36.7 °C) 60 14 160/86 96 %   02/18/20 2300 -- 62 12 157/85 93 %        Intake/Output Summary (Last 24 hours) at 2/19/2020 1013  Last data filed at 2/19/2020 0644  Gross per 24 hour   Intake --   Output 1950 ml   Net -1950 ml       Physical Assessment:     General Appearance: NAD  Lung: clear to auscultation  Heart: regular rate and rhythm and no murmurs, clicks or gallops  Lower Extremities: 1 plus  edema   Access: Right IJ TDC, qb 350    Labs    CBC w/Diff    Recent Labs     02/19/20  0534 02/18/20  0530 02/17/20  0255   WBC 3.1* 3.1* 3.7*   RBC 2.58* 2.75* 2.78*   HGB 7.5* 8.0* 8.0*   HCT 23.6* 25.5* 25.9*   MCV 91.5 92.7 93.2   MCH 29.1 29.1 28.8   MCHC 31.8 31.4 30.9*   RDW 21.1* 21.5* 21.6*    Recent Labs     02/19/20  0534 02/18/20  0530 02/17/20  0255   MONOS 6 6 8   EOS 1 0 1   BASOS 0 0 1   RDW 21.1* 21.5* 21.6*        Comprehensive Metabolic Profile    Recent Labs     02/19/20  0534 02/18/20  0530 02/17/20  0255    144 147*   K 4.0 5.0 5.4    119* 122*   CO2 23 15* 17*   BUN 42* 70* 65*   CREA 5.36* 7.43* 6.82*    Recent Labs     02/19/20  0534 02/18/20  0530 02/17/20  0255   CA 7.9* 7.7* 7.9*  7.8*   PHOS 5.9*  5.9*  --  8.2*   ALB 2.9* 2.8* 2.9*   TP 6.2* 6.3* 6.3*   SGOT 19 16 18   TBILI 1.4* 0.6 0.5          Basic Metabolic Profile       results  reviwed. MEDS:Reviwed.   Current Facility-Administered Medications   Medication Dose Route Frequency Provider Last Rate Last Dose    LORazepam (ATIVAN) injection 0.5 mg  0.5 mg IntraVENous Q6H PRN Areli Renner MD   0.5 mg at 02/18/20 2230    LORazepam (ATIVAN) 2 mg/mL injection             glucose chewable tablet 16 g  4 Tab Oral PRN Marlene Soto MD   16 g at 02/18/20 0845    glucagon (GLUCAGEN) injection 1 mg  1 mg IntraMUSCular PRN Marlene Soto MD        dextrose (D50W) injection syrg 12.5-25 g  25-50 mL IntraVENous PRN Marlene Soto MD   25 g at 02/19/20 0706    epoetin cristy-epbx (RETACRIT) 12,000 Units combo injection  12,000 Units SubCUTAneous Q TUE, THU & SAT Ulice Pod, NP   12,000 Units at 02/18/20 2047    diphenhydrAMINE (BENADRYL) injection 12.5 mg  12.5 mg IntraVENous Q6H PRN Lili Aquino MD   12.5 mg at 02/18/20 1909    pantoprazole (PROTONIX) tablet 40 mg  40 mg Oral ACB&D Marlene Soto MD   40 mg at 02/19/20 0908    levothyroxine (SYNTHROID) tablet 100 mcg  100 mcg Oral Ana Luisa Okeefe MD   100 mcg at 02/18/20 0300    insulin lispro (HUMALOG) injection   SubCUTAneous TIDAC Marlene Soto MD   Stopped at 02/16/20 0730    B complex-vitaminC-folic acid (NEPHROCAP) cap  1 Cap Oral DAILY Hoda Cain MD   1 Cap at 02/19/20 0983    sodium bicarbonate tablet 650 mg  650 mg Oral BID Hoda Cain MD   650 mg at 02/19/20 0908    ferrous gluconate 324 mg (37.5 mg iron) tablet 1 Tab  1 Tab Oral BID WITH MEALS Hoda Cain MD   1 Tab at 02/19/20 0910    NOREPINephrine (LEVOPHED) 8 mg in 5% dextrose 250mL (32 mcg/mL) infusion  0.5-16 mcg/min IntraVENous TITRATE Chantal Hamm MD   Stopped at 02/17/20 0028     Facility-Administered Medications Ordered in Other Encounters   Medication Dose Route Frequency Provider Last Rate Last Dose    fentaNYL citrate (PF) injection 12.5-50 mcg  12.5-50 mcg IntraVENous [place] [place]   50 mcg at 02/18/20 1140    midazolam (VERSED) injection 1 mg  1 mg IntraVENous [place] [place]   1 mg at 02/18/20 1140       Impression:    ESRD: Tolerating HD well. Anemia of CKD: on  Epogen. Hyperparathyroidism Yes  Pancytopenia. Plan  Dialysis for volume and solute management  Epogen  : continue Retacrit as planned,Platelets are  low but better. Hectorol:  Not today. .  Will DC Bicarb. Will send Hep B Core ab IGM . Will Dialyze again tomorrow & then 3 times per week.         Conner Hays MD

## 2020-02-19 NOTE — PROGRESS NOTES
Progress Note    Patient: Ros Garcia MRN: [id number]  CSN: [id number]    YOB: 1960  Age: 62 y.o. Sex: male    DOA: 2/15/2020 LOS:  LOS: 4 days                    Subjective:     Pt still has hypothermia and hypoglycemia seen by Radha last admission was on steroids . I am not sure if pt kept f/u appt , no sign of sepsis urine culture and blood culture negative off Rocephin . Discussed with nursing staff he was agitated last night treated with ativan pt alert and oriented x 2 this morning looks at his base line       Patient stable off pressors. BP stable, continues to have asymptomatic bradycardia. Cardiology following, discussed with dr. Abran Denny, no further workup/medicaiton recs a tthis time, avoid AV lydia blockers. Hypoglycemia this am, hypothermic. Pt on air warmer and given  Amp D50 x2 yesterday. Fingerstick glucose most recent 76. Discussed with his nurse and diabetic educator will D/C humalog check glucose level before bed and give before bed snacks         Started on rocephin for UTI, blood cultures neg x 3 days, urine culture yeast no bacterial infection. Discontinue IV antibiotic and monitor. Pt has dialysis catheter Rt Subclavian and Rt IJC . GI saw pt for thrombocytopenia ultrasound liver pending          Review of systems  General: No fevers or chills. Rt IJ catheter   Cardiovascular: No chest pain or pressure. No palpitations. Pulmonary: No shortness of breath, cough or wheeze. Gastrointestinal: No abdominal pain, nausea, vomiting or diarrhea.    Genitourinary: decrease urine out pit started on dialysis   Musculoskeletal: quadriparesis after neck surgery   Neurologic: No headache,  No seizure generalized weakness deformity both hand     Objective:     Physical Exam:  Visit Vitals  /84   Pulse (!) 52   Temp (!) 92.5 °F (33.6 °C)   Resp 13   Ht 6' (1.829 m)   Wt 76.2 kg (168 lb 1.6 oz)   SpO2 95%   BMI 22.80 kg/m²        General:         Alert, , no acute distress    HEENT: NC, Atraumatic. PERRLA, anicteric sclerae. Lungs: CTA Bilaterally. No Wheezing/Rhonchi/Rales. Heart:  Bradycardia   No murmur, No Rubs, No Gallops  Abdomen: Soft, Non distended, Non tender.    Extremities: ecchymosis Lt arm stable, lower limb edema  2+ to above the knee  Psych:   Not anxious or agitated. Neurologic:  CN 2-12 grossly intact, Alert, quadriparesis   Intake and Output:  Current Shift:  02/19 0701 - 02/19 1900  In: 360 [P.O.:360]  Out: -   Last three shifts:  02/17 1901 - 02/19 0700  In: 632 [I.V.:10]  Out: 2142 [Urine:642]    Labs: Results:       Chemistry Recent Labs     02/19/20  0534 02/18/20  0530 02/17/20  0255   GLU 24* 55* 77    144 147*   K 4.0 5.0 5.4    119* 122*   CO2 23 15* 17*   BUN 42* 70* 65*   CREA 5.36* 7.43* 6.82*   CA 7.9* 7.7* 7.9*  7.8*   AGAP 8 10 8   BUCR 8* 9* 10*   AP 86 84 86   TP 6.2* 6.3* 6.3*   ALB 2.9* 2.8* 2.9*   GLOB 3.3 3.5 3.4   AGRAT 0.9 0.8 0.9      CBC w/Diff Recent Labs     02/19/20  0534 02/18/20  0530 02/17/20  0255   WBC 3.1* 3.1* 3.7*   RBC 2.58* 2.75* 2.78*   HGB 7.5* 8.0* 8.0*   HCT 23.6* 25.5* 25.9*   PLT 44* 29* 32*   GRANS 72 59 69   LYMPH 21 35 21   EOS 1 0 1      Cardiac Enzymes No results for input(s): CPK, CKND1, LINDA in the last 72 hours. No lab exists for component: CKRMB, TROIP   Coagulation Recent Labs     02/17/20  0255   PTP 14.2   INR 1.1   APTT 53.6*       Lipid Panel Lab Results   Component Value Date/Time    Cholesterol, total 148 02/11/2020 02:24 PM    HDL Cholesterol 71 02/11/2020 02:24 PM    LDL, calculated 63 02/11/2020 02:24 PM    VLDL, calculated 19.4 08/27/2019 09:52 AM    Triglyceride 72 02/11/2020 02:24 PM    CHOL/HDL Ratio 2.1 02/11/2020 02:24 PM      BNP No results for input(s): BNPP in the last 72 hours.    Liver Enzymes Recent Labs     02/19/20  0534   TP 6.2*   ALB 2.9*   AP 86   SGOT 19      Thyroid Studies Lab Results   Component Value Date/Time    TSH 3.15 02/16/2020 04:08 AM Procedures/imaging: see electronic medical records for all procedures/Xrays and details which were not copied into this note but were reviewed prior to creation of Plan    Medications:   Current Facility-Administered Medications   Medication Dose Route Frequency    calcium acetate(phosphat bind) (PHOSLO) capsule 667 mg  1 Cap Oral TID WITH MEALS    LORazepam (ATIVAN) injection 0.5 mg  0.5 mg IntraVENous Q6H PRN    glucose chewable tablet 16 g  4 Tab Oral PRN    glucagon (GLUCAGEN) injection 1 mg  1 mg IntraMUSCular PRN    dextrose (D50W) injection syrg 12.5-25 g  25-50 mL IntraVENous PRN    epoetin cristy-epbx (RETACRIT) 12,000 Units combo injection  12,000 Units SubCUTAneous Q TUE, THU & SAT    diphenhydrAMINE (BENADRYL) injection 12.5 mg  12.5 mg IntraVENous Q6H PRN    pantoprazole (PROTONIX) tablet 40 mg  40 mg Oral ACB&D    levothyroxine (SYNTHROID) tablet 100 mcg  100 mcg Oral 6am    B complex-vitaminC-folic acid (NEPHROCAP) cap  1 Cap Oral DAILY    ferrous gluconate 324 mg (37.5 mg iron) tablet 1 Tab  1 Tab Oral BID WITH MEALS     Facility-Administered Medications Ordered in Other Encounters   Medication Dose Route Frequency    fentaNYL citrate (PF) injection 12.5-50 mcg  12.5-50 mcg IntraVENous Multiple    midazolam (VERSED) injection 1 mg  1 mg IntraVENous Multiple       Assessment/Plan     Principal Problem:    Acute renal failure superimposed on stage 3 chronic kidney disease (HCC) (2/15/2020)    Active Problems:    Chronic hepatitis C without hepatic coma ([facility] (5/31/2017)      Essential hypertension (4/28/2017)      IV drug abuse ([facility] (4/28/2017)      Quadriparesis ([facility] (5/31/2017)      Renal cell carcinoma of left kidney ([facility] (12/17/2013)      Overview: Pathological Stage [id number] cc RCC FG3 s/p left open partial nephrectomy       in 12/13        COPD, moderate ([address] (1/31/2019)      Hypothyroid (1/31/2019)      UTI (urinary tract infection) (2/15/2020)      Acute renal failure superimposed on chronic kidney disease ([facility] (2/15/2020)      Bradycardia (2/15/2020)      ESRD (end stage renal disease) ([facility] (2/17/2020)      Secondary hyperparathyroidism of renal origin ([facility] (2/17/2020)    plan     Asymptomatic Bradycardia  Cardiology consulted, recommended stable in marked sinus bradycardia, keep off all AV blocking drugs. Echo done this admission non change from previous echo  No further cardiac workup at this time, signed off 2/18/20. Monitor on tele     Acute renal failure on top of chronic kidney disease  RT TDC  per IR, received 2 units platelets, PLT still low   Nephrology, Dr. Lauro Charlton, plan to start dialysis after catheter placed. Continue monitor potassium level and volume overload     Urinary tract infection ruled out, urine culture positive for yeast only/ pt has suprapubic cath was changes recently at [facility]  History of Renal Cell Carcinoma s/p L partial nephrectomy 12/2013, monitored by Dr. Dede Melvin urology outpatient. Chronic urinary retention with history of C-spine laminectomy and post-op paralysis s/p [facility] 10/2018. Discontinue Rocephin 1 g IV  Blood cultures no growth x 3 days  Urine culture >100K yeast, chronic, previously thought to be colonizer       Thrombocytopenia/pancytopenia, anemia of chronic disease  Hematology oncology consulted, Dr. Wayne Ortiz following.     Continues on retacrit 12,000 units Three times weekly  May need bone marrow biopsy, will follow up heme/onc recs     Hypertension/ possible autonomic neuropathy after neck surgery   Continue hydralazine 25 mg p.o. every 8 hours as needed systolic blood pressure above 160     Diabetes mellitus  Hemoglobin A1c on two 2/11/20 4.9   LDL 63  Humalog sliding scale  Hypoglycemia protocol  Will get endocrinology consult possible adrenal insufficiency         Hepatitis C/ H/O upper GI bleeding gastritis, history of Hep B  Patient was treated for hepatitis C with Dr. Wing Oglesby  Protonix 40 mg daily  Continue to monitor liver function  Ongoing drug abuse/heroin, Hep C ab positive history of hep c treated will check hep c titers ensure no new infection, recheck hiv (negative 3/2019). Hep B core ab positive, surface Ag negative no active infection suggested.    GI consult done ordered ultrasound liver  Case manger consult to explore drug rehab options with pt   Discussed with pt he will not go to inpatient rehab .         Transfer to step down  Endocrinology consult  D/C Humalog change finger stick glucose monitor to Q AC&HS  Before bed snacks  Ativan 0.5 mg q 6h prn agitation   Cbc, cmp, mag in AM  Monitor platelet number   F/u liver ultrasound  Will need pt and ot evaluation and treatment before discharge         DVT/GI Prophylaxis: SCD's and H2B/PP      Gabriela Ojeda MD  2/19/2020 11:28 AM

## 2020-02-19 NOTE — PROGRESS NOTES
0700: Assumed care of patient resting in bed, night nurse at bedside treating low BG per lab. Patient is alert but confused. Will continue to monitor patient closely. 0800: Temp 93 axillary, placed warm blankets on patient. 0850: BG re-checked and low at 59, patient is asymptomatic. Assisted patient to eat breakfast.   0907: BG re-check is 76, still assisting patient with meal.   0930: AKIN Scruggs, NP at bedside to see patient, aware of platelets, NP states she will not treat unless platelets are below 30.  0945: Temp 93.5 axillary, more warm blankets place, patient had been partly uncovered to eat breakfast.  0955: Dialysis nurse at bedside to start HD, report given to [facility], RN.   1000: Bear-hugger placed back on patient for low temp  1130: Dr Milo Villarreal at bedside to see patient. Discussed glucose, temp, and plan of care with MD. OK for patient to be transferred to [facility]. 1200: Temp 97.4 oral temp patient keeps pulling bear-hugger off. Bear hugger removed. 1245: HD completed, patient tolerated well, 1.5L removed per [facility], RN.    1410: Patient asking to put pants on, informed patient he is in ICU and has multiple tubes preventing him from being able to wear pants at this time. Patient asked to see pants, wanted to get something out of them. Assisted patient to get contents of his pockets. Patient has ID's and $83 in cash in one pocket and in the other pocket was a plastic straw-like item inside of folded up paper/cardboard which appeared to be some type of drug paraphernalia. This writer discarded item. Patient refused for this writer to have money secured with security. Placed cash and ID's back in pant pocket, folded them up, placed them back in belongings bag and put bag in closet. 1500: Patient has asked multiple times for this writer to remove item from trash, referred to it as \"medicine\" and asked this writer to get it from the trash and \"give me some of that medicine\".  Told patient that those substances are illegal and not permitted on the premises. This writer had house keeping empty trash in patient's room, patient offered  cash to get item out of the trash for him which she declined. This writer notified security of situation. 1900: Bedside and Verbal shift change report given to PRINCE Clark (oncoming nurse) by Hay Brown RN (offgoing nurse).  Report included the following information SBAR, Kardex, Intake/Output, MAR, Recent Results and Cardiac Rhythm SB-SR.

## 2020-02-19 NOTE — PROGRESS NOTES
Shift Summary: Vital signs stable, no pain noted. At the beginning of the shift, patient scratching at IJ and said tape was itching, prn benadryl given. Patient began displaying withdrawal symptoms and became confused throughout the night and began hallucinating. Pupils equal and patient moving all extremities. Order received for PRN ativan. Ativan given and patient able to rest for a while. This morning patient remains confused and is picking at lines, refused to have temperature taken and refused morning medication. Oriented to self only. Will continue to give prn ativan for agitation.

## 2020-02-19 NOTE — PROGRESS NOTES
Patient becoming agitated, pulling at right IJ and yelling profanities. Patient is a known drug user and possibly experiencing withdrawal symptoms as this is his third day since admission. Dr. Prosper Calles called and order received for 0.5mg PRN ativan q6h. Orders implemented. Will continue to monitor.

## 2020-02-19 NOTE — PROGRESS NOTES
[email or web address]  [phone]    Gastroenterology follow up-Progress note    Impression:  1. Cirrhosis d/t HCV- MELD 21 which is likely creatinine driven. US late fall was negative for [address] EGD in 7/2019 negative for varices. Normal LFTs at this time doubtful there is acute hepatitis but will await viral loads for HBV/HCV. No signs of ascites or HE   2. Chronic Hepatitis C- he has been treated with 12 weeks of Mavyret and achieved SVR 4/2019. Given his normal LFTs, doubt there is active infection but will get HCV viral load to confirm  3. H/O Hepatitis B. He is known coreAb positive. Neg Surface Ag and has previously had a neg surface Ab (2019). Likely had the virus in the distant past and is immune. He has had negative viral loads in the past with last being 8/2019- HBV viral load from primary team is pending  4. Thrombocytopenia, chronic- mild thrombocytopenia is often seen in cirrhosis (60-90K) but with the decrease in plts this is likely due to another cause. He is being followed heme/onc. US pending to evaluate for splenomegaly   5. Acute on chronic kidney disease- nephrology following; dialysis 3x/weekly  6. IV drug abuse; still uses heroin. Last use was 4 days prior to admission  7. Quadriparesis  8. H/o RCC s/p partial nephrectomy (2013); suprapubic cath in place  9. Bradycardia  10. Secondary hyperparathyroidism  11. UTI with suprapubic cath in place  12. Multiple gallbladder polyps vs stones; noted on US in 8/2019; asymptomatic. 13.  Anemia, likely multifactorial: no signs of overt GI bleeding    Plan:  1. Will follow viral loads for HCV/HBV- still pending  2. Monitor LFTs/platelets  3. F/u recommendations from Heme/onc for pancytopenia  4. Will get complete abd  US for [facility] screening and to evaluate for splenomegaly- this is currently pending  5. Monitor H/H and transfuse per protocol; no signs of overt GI bleeding; no plans for scopes at this time.    5. Medical management per primary team    Chief Complaint: H/O hepatitis C with thrombocytopenia      Subjective:  No complaints. No abd pain, jaundice. Tolerating diet. ROS: Denies any fevers, chills, rash.      Eyes: conjunctiva normal, EOM normal   Neck: ROM normal, supple and trachea normal   Cardiovascular: heart normal, intact distal pulses, normal rate and regular rhythm   Pulmonary/Chest Wall: breath sounds normal and effort normal   Abdominal: appearance normal, bowel sounds normal and soft, non-acute, non-tender     Patient Active Problem List   Diagnosis Code    Chronic hepatitis C without hepatic coma (HCC) B18.2    Essential hypertension I10    IV drug abuse ([facility] F19.10    Quadriparesis ([facility] W49.51    Umbilical hernia X60.6    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    Chronic kidney disease, stage III (moderate) (HCC) N18.3    Thrombocytopenia (HCC) D69.6    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) Z89.421    Chronic anemia D64.9    Chronic drug abuse ([facility] F19.10    Hypertension I10    Renal cell carcinoma of left kidney (HCC) C64.2    Urethral erosion N36.8    BPH (benign prostatic hyperplasia) N40.0    Bladder atony N31.2    Chronic neck pain M54.2, G89.29    Cirrhosis of liver (HCC) K74.60    COPD, moderate (HCC) J44.9    Dry skin dermatitis L85.3    Hypothyroid E03.9    Lung nodules R91.8    Neck mass R22.1    Pericardial effusion I31.3    Vitamin D deficiency E55.9    Suprapubic catheter (HCC) Z93.59    Renal failure (ARF), acute on chronic (HCC) N17.9, N18.9    Acute renal failure superimposed on stage 3 chronic kidney disease (HCC) N17.9, N18.3    UTI (urinary tract infection) N39.0    Acute renal failure superimposed on chronic kidney disease (HCC) N17.9, N18.9    Bradycardia R00.1    ESRD (end stage renal disease) (HCC) N18.6    Secondary hyperparathyroidism of renal origin ([facility] N25.81         Visit Vitals  BP (!) 153/92   Pulse (!) 53   Temp (!) 92.5 °F (33.6 °C) Comment: heating blanket applied   Resp 14   Ht 6' (1.829 m)   Wt 76.2 kg (168 lb 1.6 oz)   SpO2 95%   BMI 22.80 kg/m²           Intake/Output Summary (Last 24 hours) at 2/19/2020 1116  Last data filed at 2/19/2020 0930  Gross per 24 hour   Intake 360 ml   Output 1950 ml   Net -1590 ml       CBC w/Diff    Lab Results   Component Value Date/Time    WBC 3.1 (L) 02/19/2020 05:34 AM    RBC 2.58 (L) 02/19/2020 05:34 AM    HGB 7.5 (L) 02/19/2020 05:34 AM    HCT 23.6 (L) 02/19/2020 05:34 AM    MCV 91.5 02/19/2020 05:34 AM    MCH 29.1 02/19/2020 05:34 AM    MCHC 31.8 02/19/2020 05:34 AM    RDW 21.1 (H) 02/19/2020 05:34 AM    PLT 44 (L) 02/19/2020 05:34 AM    Lab Results   Component Value Date/Time    GRANS 72 02/19/2020 05:34 AM    LYMPH 21 02/19/2020 05:34 AM    EOS 1 02/19/2020 05:34 AM    BANDS 2 02/13/2020 03:36 PM    BASOS 0 02/19/2020 05:34 AM    MYELO 2 (H) 02/13/2020 03:36 PM    METAS 2.9 (H) 02/11/2020 02:24 PM      Basic Metabolic Profile   Recent Labs     02/19/20  0534      K 4.0      CO2 23   BUN 42*   CA 7.9*   MG 1.9   PHOS 5.9*  5.9*        Hepatic Function    Lab Results   Component Value Date/Time    ALB 2.9 (L) 02/19/2020 05:34 AM    TP 6.2 (L) 02/19/2020 05:34 AM    AP 86 02/19/2020 05:34 AM    Lab Results   Component Value Date/Time    SGOT 19 02/19/2020 05:34 AM          Coags   Recent Labs     02/17/20  0255   PTP 14.2   INR 1.1   APTT 53.6*               Benita Renae, NP    [facility]. [email or web address]  Phone: [phone]  Pager: [phone]

## 2020-02-20 LAB
ALBUMIN SERPL-MCNC: 2.9 G/DL (ref 3.4–5)
ALBUMIN/GLOB SERPL: 0.9 {RATIO} (ref 0.8–1.7)
ALP SERPL-CCNC: 88 U/L (ref 45–117)
ALT SERPL-CCNC: 20 U/L (ref 16–61)
ANION GAP SERPL CALC-SCNC: 7 MMOL/L (ref 3–18)
AST SERPL-CCNC: 19 U/L (ref 10–38)
BASOPHILS # BLD: 0 K/UL (ref 0–0.06)
BASOPHILS NFR BLD: 0 % (ref 0–3)
BILIRUB SERPL-MCNC: 1.5 MG/DL (ref 0.2–1)
BUN SERPL-MCNC: 30 MG/DL (ref 7–18)
BUN/CREAT SERPL: 7 (ref 12–20)
CALCIUM SERPL-MCNC: 8.2 MG/DL (ref 8.5–10.1)
CHLORIDE SERPL-SCNC: 110 MMOL/L (ref 100–111)
CO2 SERPL-SCNC: 26 MMOL/L (ref 21–32)
CREAT SERPL-MCNC: 4.38 MG/DL (ref 0.6–1.3)
DIFFERENTIAL METHOD BLD: ABNORMAL
EOSINOPHIL # BLD: 0 K/UL (ref 0–0.4)
EOSINOPHIL NFR BLD: 1 % (ref 0–5)
ERYTHROCYTE [DISTWIDTH] IN BLOOD BY AUTOMATED COUNT: 21 % (ref 11.6–14.5)
FSH SERPL-ACNC: 8 MIU/ML
GLOBULIN SER CALC-MCNC: 3.4 G/DL (ref 2–4)
GLUCOSE BLD STRIP.AUTO-MCNC: 103 MG/DL (ref 70–110)
GLUCOSE BLD STRIP.AUTO-MCNC: 110 MG/DL (ref 70–110)
GLUCOSE BLD STRIP.AUTO-MCNC: 128 MG/DL (ref 70–110)
GLUCOSE BLD STRIP.AUTO-MCNC: 185 MG/DL (ref 70–110)
GLUCOSE BLD STRIP.AUTO-MCNC: 75 MG/DL (ref 70–110)
GLUCOSE SERPL-MCNC: 69 MG/DL (ref 74–99)
HCT VFR BLD AUTO: 24.4 % (ref 36–48)
HCV GENOTYPE: NORMAL
HCV RNA SERPL NAA+PROBE-ACNC: NORMAL IU/ML
HCV RNA SERPL NAA+PROBE-LOG IU: NORMAL LOG10 IU/ML
HGB BLD-MCNC: 7.8 G/DL (ref 13–16)
LH SERPL-ACNC: 9.8 MIU/ML
LYMPHOCYTES # BLD: 0.6 K/UL (ref 0.8–3.5)
LYMPHOCYTES NFR BLD: 20 % (ref 20–51)
MAGNESIUM SERPL-MCNC: 1.9 MG/DL (ref 1.6–2.6)
MCH RBC QN AUTO: 28.9 PG (ref 24–34)
MCHC RBC AUTO-ENTMCNC: 32 G/DL (ref 31–37)
MCV RBC AUTO: 90.4 FL (ref 74–97)
MONOCYTES # BLD: 0.2 K/UL (ref 0–1)
MONOCYTES NFR BLD: 6 % (ref 2–9)
NEUTS BAND NFR BLD MANUAL: 2 % (ref 0–5)
NEUTS SEG # BLD: 2.4 K/UL (ref 1.8–8)
NEUTS SEG NFR BLD: 71 % (ref 42–75)
NRBC BLD-RTO: 10 PER 100 WBC
PHOSPHATE SERPL-MCNC: 4.1 MG/DL (ref 2.5–4.9)
PLATELET # BLD AUTO: 39 K/UL (ref 135–420)
PLATELET COMMENTS,PCOM: ABNORMAL
POTASSIUM SERPL-SCNC: 3.6 MMOL/L (ref 3.5–5.5)
PREALB SERPL-MCNC: 17 MG/DL (ref 20–40)
PROLACTIN SERPL-MCNC: 2.8 NG/ML
PROT SERPL-MCNC: 6.3 G/DL (ref 6.4–8.2)
RBC # BLD AUTO: 2.7 M/UL (ref 4.7–5.5)
RBC MORPH BLD: ABNORMAL
SODIUM SERPL-SCNC: 143 MMOL/L (ref 136–145)
T4 FREE SERPL-MCNC: 1 NG/DL (ref 0.7–1.5)
TEST INFORMATION, 550045: NORMAL
TSH SERPL DL<=0.05 MIU/L-ACNC: 1.93 UIU/ML (ref 0.36–3.74)
WBC # BLD AUTO: 3.2 K/UL (ref 4.6–13.2)

## 2020-02-20 PROCEDURE — 82962 GLUCOSE BLOOD TEST: CPT

## 2020-02-20 PROCEDURE — 84466 ASSAY OF TRANSFERRIN: CPT

## 2020-02-20 PROCEDURE — 83525 ASSAY OF INSULIN: CPT

## 2020-02-20 PROCEDURE — 84100 ASSAY OF PHOSPHORUS: CPT

## 2020-02-20 PROCEDURE — 84134 ASSAY OF PREALBUMIN: CPT

## 2020-02-20 PROCEDURE — 85025 COMPLETE CBC W/AUTO DIFF WBC: CPT

## 2020-02-20 PROCEDURE — 84443 ASSAY THYROID STIM HORMONE: CPT

## 2020-02-20 PROCEDURE — 83735 ASSAY OF MAGNESIUM: CPT

## 2020-02-20 PROCEDURE — 82024 ASSAY OF ACTH: CPT

## 2020-02-20 PROCEDURE — 94762 N-INVAS EAR/PLS OXIMTRY CONT: CPT

## 2020-02-20 PROCEDURE — 84439 ASSAY OF FREE THYROXINE: CPT

## 2020-02-20 PROCEDURE — 97162 PT EVAL MOD COMPLEX 30 MIN: CPT

## 2020-02-20 PROCEDURE — 97112 NEUROMUSCULAR REEDUCATION: CPT

## 2020-02-20 PROCEDURE — 84402 ASSAY OF FREE TESTOSTERONE: CPT

## 2020-02-20 PROCEDURE — 65660000000 HC RM CCU STEPDOWN

## 2020-02-20 PROCEDURE — 80053 COMPREHEN METABOLIC PANEL: CPT

## 2020-02-20 PROCEDURE — 77030040392 HC DRSG OPTIFOAM MDII -A

## 2020-02-20 PROCEDURE — 97165 OT EVAL LOW COMPLEX 30 MIN: CPT

## 2020-02-20 PROCEDURE — 74011250636 HC RX REV CODE- 250/636: Performed by: NURSE PRACTITIONER

## 2020-02-20 PROCEDURE — 74011250637 HC RX REV CODE- 250/637: Performed by: FAMILY MEDICINE

## 2020-02-20 PROCEDURE — 84305 ASSAY OF SOMATOMEDIN: CPT

## 2020-02-20 PROCEDURE — 74011250637 HC RX REV CODE- 250/637: Performed by: INTERNAL MEDICINE

## 2020-02-20 PROCEDURE — 84146 ASSAY OF PROLACTIN: CPT

## 2020-02-20 PROCEDURE — 83001 ASSAY OF GONADOTROPIN (FSH): CPT

## 2020-02-20 RX ADMIN — CALCIUM ACETATE 667 MG: 667 CAPSULE ORAL at 12:34

## 2020-02-20 RX ADMIN — NEPHROCAP 1 CAPSULE: 1 CAP ORAL at 08:41

## 2020-02-20 RX ADMIN — LEVOTHYROXINE SODIUM 100 MCG: 100 TABLET ORAL at 06:19

## 2020-02-20 RX ADMIN — CALCIUM ACETATE 667 MG: 667 CAPSULE ORAL at 17:42

## 2020-02-20 RX ADMIN — CALCIUM ACETATE 667 MG: 667 CAPSULE ORAL at 08:41

## 2020-02-20 RX ADMIN — EPOETIN ALFA-EPBX 12000 UNITS: 10000 INJECTION, SOLUTION INTRAVENOUS; SUBCUTANEOUS at 21:27

## 2020-02-20 RX ADMIN — FERROUS GLUCONATE TAB 324 MG (37.5 MG ELEMENTAL IRON) 1 TABLET: 324 (37.5 FE) TAB at 08:41

## 2020-02-20 RX ADMIN — PANTOPRAZOLE SODIUM 40 MG: 40 TABLET, DELAYED RELEASE ORAL at 17:42

## 2020-02-20 RX ADMIN — FERROUS GLUCONATE TAB 324 MG (37.5 MG ELEMENTAL IRON) 1 TABLET: 324 (37.5 FE) TAB at 17:42

## 2020-02-20 RX ADMIN — PANTOPRAZOLE SODIUM 40 MG: 40 TABLET, DELAYED RELEASE ORAL at 08:41

## 2020-02-20 NOTE — PROGRESS NOTES
[email or web address]  [phone]    Gastroenterology follow up-Progress note    Impression:  1. Cirrhosis d/t HCV- MELD 21 which is likely creatinine driven. US late fall was negative for [address] EGD in 7/2019 negative for varices. Normal LFTs at this time doubtful there is acute hepatitis but will await viral loads for HBV/HCV. No signs of ascites or HE   2. Chronic Hepatitis C- he has been treated with 12 weeks of Mavyret and achieved SVR 4/2019. Given his normal LFTs, doubt there is active infection but will get HCV viral load to confirm  3. H/O Hepatitis B. He is known coreAb positive. Neg Surface Ag and has previously had a neg surface Ab (2019). Likely had the virus in the distant past and is immune. He has had negative viral loads in the past with last being 8/2019- HBV viral load from primary team is pending  4. Thrombocytopenia, chronic- mild thrombocytopenia is often seen in cirrhosis (60-90K) but with the decrease in plts this is likely due to another cause. He is being followed heme/onc. US pending to evaluate for splenomegaly   5. Acute on chronic kidney disease- nephrology following; dialysis 3x/weekly  6. IV drug abuse; still uses heroin. Last use was 4 days prior to admission- he apparently was noted to have drug paraphernalia in his bag which was discarded- per primary team he attempted bribery to housekeeping for them to retrieve it from the trash. 7. Quadriparesis  8. H/o RCC s/p partial nephrectomy (2013); suprapubic cath in place  9. Bradycardia  10. Secondary hyperparathyroidism  11. UTI with suprapubic cath in place  12. Multiple gallbladder polyps vs stones; noted on US in 8/2019  Again noted on US 2/18/20 - pericholecystic fluid is noted but thought to be from liver disease; remains asymptomatic  13. Anemia, likely multifactorial: no signs of overt GI bleeding  14. Dilated CBD without discrete obstructing process    Plan:  1. Will follow viral loads for HCV/HBV- still pending  2. Monitor LFTs/platelets  3. F/u recommendations from Heme/onc for pancytopenia  4. Will need MRCP to further evaluate dilated CBD especially in light of rising bili. Will need non contrast given renal disease  5. Monitor H/H and transfuse per protocol; no signs of overt GI bleeding; no plans for scopes at this time. 5. Medical management per primary team    Chief Complaint: H/O hepatitis C with thrombocytopenia      Subjective:  No complaints. No abd pain, jaundice. Tolerating diet. Behavioral events of overnight noted above. ROS: Denies any fevers, chills, rash.      Eyes: conjunctiva normal, EOM normal   Neck: ROM normal, supple and trachea normal   Cardiovascular: heart normal, intact distal pulses, normal rate and regular rhythm   Pulmonary/Chest Wall: breath sounds normal and effort normal   Abdominal: appearance normal, bowel sounds normal and soft, non-acute, non-tender     Patient Active Problem List   Diagnosis Code    Chronic hepatitis C without hepatic coma (HCC) B18.2    Essential hypertension I10    IV drug abuse ([facility] F19.10    Quadriparesis ([facility] X20.40    Umbilical hernia T71.7    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    Chronic kidney disease, stage III (moderate) (HCC) N18.3    Thrombocytopenia (HCC) D69.6    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) Z89.421    Chronic anemia D64.9    Chronic drug abuse ([facility] F19.10    Hypertension I10    Renal cell carcinoma of left kidney (HCC) C64.2    Urethral erosion N36.8    BPH (benign prostatic hyperplasia) N40.0    Bladder atony N31.2    Chronic neck pain M54.2, G89.29    Cirrhosis of liver (HCC) K74.60    COPD, moderate (HCC) J44.9    Dry skin dermatitis L85.3    Hypothyroid E03.9    Lung nodules R91.8    Neck mass R22.1    Pericardial effusion I31.3    Vitamin D deficiency E55.9    Suprapubic catheter (HCC) Z93.59    Renal failure (ARF), acute on chronic (HCC) N17.9, N18.9    Acute renal failure superimposed on stage 3 chronic kidney disease (HCC) N17.9, N18.3    UTI (urinary tract infection) N39.0    Acute renal failure superimposed on chronic kidney disease ([facility] N17.9, N18.9    Bradycardia R00.1    ESRD (end stage renal disease) [facility] N18.6    Secondary hyperparathyroidism of renal origin ([facility] N25.81         Visit Vitals  /89 (BP 1 Location: Left arm, BP Patient Position: At rest)   Pulse (!) 52   Temp (!) 93.3 °F (34.1 °C)   Resp 14   Ht 6' (1.829 m)   Wt 74.3 kg (163 lb 14.4 oz)   SpO2 97%   BMI 22.23 kg/m²           Intake/Output Summary (Last 24 hours) at 2/20/2020 1109  Last data filed at 2/20/2020 0900  Gross per 24 hour   Intake 967.08 ml   Output 2403 ml   Net -1435.92 ml       CBC w/Diff    Lab Results   Component Value Date/Time    WBC 3.2 (L) 02/20/2020 05:00 AM    RBC 2.70 (L) 02/20/2020 05:00 AM    HGB 7.8 (L) 02/20/2020 05:00 AM    HCT 24.4 (L) 02/20/2020 05:00 AM    MCV 90.4 02/20/2020 05:00 AM    MCH 28.9 02/20/2020 05:00 AM    MCHC 32.0 02/20/2020 05:00 AM    RDW 21.0 (H) 02/20/2020 05:00 AM    PLT 39 (L) 02/20/2020 05:00 AM    Lab Results   Component Value Date/Time    GRANS 71 02/20/2020 05:00 AM    LYMPH 20 02/20/2020 05:00 AM    EOS 1 02/20/2020 05:00 AM    BANDS 2 02/20/2020 05:00 AM    BASOS 0 02/20/2020 05:00 AM    MYELO 2 (H) 02/13/2020 03:36 PM    METAS 2.9 (H) 02/11/2020 02:24 PM      Basic Metabolic Profile   Recent Labs     02/20/20  0500      K 3.6      CO2 26   BUN 30*   CA 8.2*   MG 1.9   PHOS 4.1        Hepatic Function    Lab Results   Component Value Date/Time    ALB 2.9 (L) 02/20/2020 05:00 AM    TP 6.3 (L) 02/20/2020 05:00 AM    AP 88 02/20/2020 05:00 AM    Lab Results   Component Value Date/Time    SGOT 19 02/20/2020 05:00 AM          Coags   No results for input(s): PTP, INR, APTT, INREXT, INREXT in the last 72 hours. Marli Whitehead NP    [facility]. [email or web address]  Phone: [phone]  Pager: [phone]

## 2020-02-20 NOTE — PROGRESS NOTES
0700: Assumed care of patient resting in bed, NAD noted, patient is more oriented this morning. 0715: Axillary temp of 93.3 placed bear-hugger back on patient. 4778: Patient's BG is 75, gave patient apple juice to drink. 0800: Dr Merlin Whitehead at bedside to see patient. 0930: Dialysis nurse called to report patient will not get HD today per Dr Stella Diaz, will run HD tomorrow. 1041: PT/OT at bedside to work with patient. 1142: MRI MRCP order acknowledged, spoke to MRI staff who states patient will need to cooperate, lay flat, follow commands including intermittently holding his breath for 45 minutes. Patient is not cooperative, constantly trying to get OOB, intermittently hallucinating. Discussed with Benny Alcantara NP and she states it can be done another day when patient is more appropriate for exam.   1440: Soft stool coming out around FMS, patient c/o of discomfort requesting FMS to be removed. Removed FMS, patient tolerated well. Cleaned patient up.   97 70 84: OT at bedside to work with patient.

## 2020-02-20 NOTE — PROGRESS NOTES
NUTRITION    [facility]  Nutrition Consult: General Nutrition Management & Supplements      RECOMMENDATIONS / PLAN:     - Liberalize to regular diet and change supplements to Ensure Enlive TID and Magic Cup BID.  - Continue HS snack.   - Continue RD inpatient monitoring and evaluation. NUTRITION INTERVENTIONS & DIAGNOSIS:     - Meals/snacks: modify composition  - Medical food supplement therapy: Nepro Shake TID- modify   - Vitamin and mineral supplement therapy: ferrous gluconate, nephrocap     Nutrition Diagnosis: Increased nutrient needs protein and energy related to increased energy expenditure as evidenced by pt with ESRD plan to start HD     ASSESSMENT:     2/20: Fair appetite, meal intake improving some and hypoglycemia improved with dextrose infusion. Loose stool via FMS. HD UF 1.5 L on 2/19, planned again tomorrow. 2/18: TDC rescheduled to today, NPO since midnight. Obtained nutrition hx, reports good intake PTA with wt gain associated with fluid status. States meals intake has been fair 2/2 dislikes of meals/foods provided. ADDEDNUM 1520: Diet & supplements resumed. 2/17: Pt unavailable x 2 attempts, sleeping and with MD. RIDDLE this am for [facility] placement to start HD, fluid overloaded upon admission with noted significant wt gain PTA per chart. Overall fair meal intake, supplements started per RN, 50% intake. Tolerating diet.     Nutritional intake adequate to meet patients estimated nutritional needs:  No    Diet: DIET NUTRITIONAL SUPPLEMENTS All Meals; NEPRO  DIET RENAL Regular      Food Allergies: NKFA  Current Appetite: Fair  Appetite/meal intake prior to admission: Good per pt and sisters report; IV drug use PTA  Feeding Limitations:  [] Swallowing difficulty    [] Chewing difficulty    [x] Other: quadriparesis  Current Meal Intake:   Patient Vitals for the past 100 hrs:   % Diet Eaten   02/20/20 1200 25 %   02/20/20 0900 75 %   02/19/20 1700 50 %   02/19/20 1300 2 %   02/19/20 0930 75 %   02/16/20 1600 50 %   02/16/20 1200 45 %       BM: 2/20; FMS  Skin Integrity: finger skin tear   Edema:   [] No     [x] Yes   Pertinent Medications: Reviewed: nephrocap, ferrous gluconate, SSI, levothyroxine, pantoprazole, epoetin, D5 at 25 mL/hr (30 gm dextrose, 102 kcal per day)     Recent Labs     02/20/20  0500 02/19/20  0534 02/18/20  0530    142 144   K 3.6 4.0 5.0    111 119*   CO2 26 23 15*   GLU 69* 24* 55*   BUN 30* 42* 70*   CREA 4.38* 5.36* 7.43*   CA 8.2* 7.9* 7.7*   MG 1.9 1.9 2.0   PHOS 4.1 5.9*  5.9*  --    ALB 2.9* 2.9* 2.8*   PREALB 17.0*  --   --    SGOT 19 19 16   ALT 20 24 28     Lab Results   Component Value Date/Time    Calcium 8.2 (L) 02/20/2020 05:00 AM    Parathyroid hormone 77 (A) 06/04/2018    Phosphorus 4.1 02/20/2020 05:00 AM    PTH, Intact 338.8 (H) 02/17/2020 02:55 AM       Intake/Output Summary (Last 24 hours) at 2/20/2020 1419  Last data filed at 2/20/2020 1200  Gross per 24 hour   Intake 967.08 ml   Output 1117 ml   Net -149.92 ml       Anthropometrics:  Ht Readings from Last 1 Encounters:   02/16/20 6' (1.829 m)     Last 3 Recorded Weights in this Encounter    02/18/20 1219 02/19/20 0730 02/20/20 0400   Weight: 77.9 kg (171 lb 11.2 oz) 76.2 kg (168 lb 1.6 oz) 74.3 kg (163 lb 14.4 oz)     Body mass index is 22.23 kg/m². Weight History: Wt gain per chart review, noted volume overloaded upon admission.  Pt reports UBW of 160-165#    Weight Metrics 2/20/2020 2/15/2020 2/10/2020 12/16/2019 10/28/2019 10/3/2019 9/10/2019   Weight 163 lb 14.4 oz - 165 lb 149 lb 147 lb 139 lb 143 lb   BMI - 22.23 kg/m2 22.38 kg/m2 20.21 kg/m2 19.94 kg/m2 18.85 kg/m2 19.94 kg/m2        Admitting Diagnosis: SHEILA (acute kidney injury) ([facility] [address] [N17.9]  SHEILA (acute kidney injury) ([facility] [address] [N17.9]  Bradycardia [R00.1]  Pertinent PMHx: anemia, IV drug abuse, CKD, DM, GERD, HTN, quadriparesis, renal cell carcinoma s/p partial nephrectomy, cirrhosis, hepatitis B & C, multiple gallbladder polyps vs stones    Education Needs:        [x] None identified  [] Identified - Not appropriate at this time  []  Identified and addressed - refer to education log  Learning Limitations:   [x] None identified  [] Identified    Cultural, Holiness & ethnic food preferences:  [x] None identified    [] Identified and addressed     ESTIMATED NUTRITION NEEDS:     Calories: 4604-3284 kcal (30-35 kcal/kg) based on  [] Actual BW      [x] SBW: 81 kg   Protein:  gm (1.2-1.5 gm/kg) based on  [] Actual BW      [x] SBW   Fluid: 750-1500 mL/day     MONITORING & EVALUATION:     Nutrition Goal(s):   - PO nutrition intake will meet >75% of patient estimated nutritional needs within the next 7 days. Outcome: Progressing towards goal     Monitoring:   [x] Food and nutrient intake   [x] Food and nutrient administration  [x] Comparative standards   [x] Nutrition-focused physical findings   [x] Anthropometric Measurements   [x] Treatment/therapy   [x] Biochemical data, medical tests, and procedures        Previous Recommendations (for follow-up assessments only): Implemented      Discharge Planning: No nutritional discharge needs at this time. Participated in care planning, discharge planning, & interdisciplinary rounds as appropriate.       Radha Andre RD, [address]   Pager: [phone]

## 2020-02-20 NOTE — PROGRESS NOTES
OT order received and chart reviewed. Spoke with RN, patient resting in bed with low BG, bear hugger donned. OT will check back on patient later this a.m. for full participation in skilled OT evaluation.         Thank you for this referral,   Dilcia Goode MS, OTR/L

## 2020-02-20 NOTE — PROGRESS NOTES
Progress Note    Sunny Deleon  62 y.o. Admit Date: 2/15/2020  Principal Problem:    Acute renal failure superimposed on stage 3 chronic kidney disease ([facility] [address] (2/15/2020) POA: Yes    Active Problems:    Chronic hepatitis C without hepatic coma ([address] (5/31/2017) POA: Yes      Essential hypertension (4/28/2017) POA: Yes      IV drug abuse ([facility] [address] (4/28/2017) POA: Yes      Quadriparesis ([address] (5/31/2017) POA: Yes      Renal cell carcinoma of left kidney ([facility] [address] (12/17/2013) POA: Yes      Overview: Pathological Stage [id number] cc RCC FG3 s/p left open partial nephrectomy       in 12/13        COPD, moderate ([facility] [address] (1/31/2019) POA: Yes      Hypothyroid (1/31/2019) POA: Yes      UTI (urinary tract infection) (2/15/2020) POA: Yes      Acute renal failure superimposed on chronic kidney disease ([address] (2/15/2020) POA: Yes      Bradycardia (2/15/2020) POA: Unknown      ESRD (end stage renal disease) ([facility] [address] (2/17/2020) POA: Yes      Secondary hyperparathyroidism of renal origin ([facility] [address] (2/17/2020) POA: Unknown            Subjective:     Patient feels ok,still having low sugar , on D5 W at k v o,Insulin on hold. . Dialyzed 2 days in rows. A comprehensive review of systems was negative except for that written in the History of Present Illness.     Objective:     Visit Vitals  /84   Pulse (!) 52   Temp 97.4 °F (36.3 °C)   Resp 14   Ht 6' (1.829 m)   Wt 74.3 kg (163 lb 14.4 oz)   SpO2 97%   BMI 22.23 kg/m²         Intake/Output Summary (Last 24 hours) at 2/20/2020 1500  Last data filed at 2/20/2020 1200  Gross per 24 hour   Intake 967.08 ml   Output 1117 ml   Net -149.92 ml       Current Facility-Administered Medications   Medication Dose Route Frequency Provider Last Rate Last Dose    calcium acetate(phosphat bind) (PHOSLO) capsule 667 mg  1 Cap Oral TID WITH MEALS Marky Frazier MD   667 mg at 02/20/20 1234    dextrose 5% infusion  25 mL/hr IntraVENous CONTINUOUS Altagracia Boyce MD 25 mL/hr at 02/20/20 0842 25 mL/hr at 02/20/20 0842    LORazepam (ATIVAN) injection 0.5 mg  0.5 mg IntraVENous Q6H PRN Blue Fuchs MD   0.5 mg at 02/19/20 1452    glucose chewable tablet 16 g  4 Tab Oral PRN Blue Fuchs MD   16 g at 02/18/20 0845    glucagon (GLUCAGEN) injection 1 mg  1 mg IntraMUSCular PRN Blue Fuchs MD        dextrose (D50W) injection syrg 12.5-25 g  25-50 mL IntraVENous PRN Blue Fuchs MD   25 g at 02/19/20 0706    epoetin cristy-epbx (RETACRIT) 12,000 Units combo injection  12,000 Units SubCUTAneous Q TUE, THU & SAT July MARCE Stockton   12,000 Units at 02/18/20 2047    diphenhydrAMINE (BENADRYL) injection 12.5 mg  12.5 mg IntraVENous Q6H PRN Gabby Aquino MD   12.5 mg at 02/19/20 2231    pantoprazole (PROTONIX) tablet 40 mg  40 mg Oral ACB&D Blue Fuchs MD   40 mg at 02/20/20 0841    levothyroxine (SYNTHROID) tablet 100 mcg  100 mcg Oral Roya Brown MD   100 mcg at 02/20/20 6079    B complex-vitaminC-folic acid (NEPHROCAP) cap  1 Cap Oral DAILY Blue Fuchs MD   1 Cap at 02/20/20 7160    ferrous gluconate 324 mg (37.5 mg iron) tablet 1 Tab  1 Tab Oral BID WITH MEALS Blue Fuchs MD   1 Tab at 02/20/20 8837        Physical Exam:     Physical Exam:   General:  Alert, cooperative, no distress, appears stated age. Mouth/Throat: Lips, mucosa, and tongue normal. Teeth and gums normal.   Neck: Supple, symmetrical, trachea midline, no adenopathy, thyroid: no enlargement/tenderness/nodules, no carotid bruit and no JVD. Lungs:   Clear to auscultation bilaterally. Heart:  Regular rate and rhythm, S1, S2 normal, no murmur, click, rub or gallop. Abdomen:   Soft, non-tender. Bowel sounds normal. No masses,  No organomegaly.    Extremities: Extremities normal, atraumatic, no cyanosis or edema, HDc catheter is well dressed   Skin: Skin color, texture, turgor normal. No rashes or lesions         Data Review:    CBC w/Diff    Recent Labs 02/20/20  0500 02/19/20  0534 02/18/20  0530   WBC 3.2* 3.1* 3.1*   RBC 2.70* 2.58* 2.75*   HGB 7.8* 7.5* 8.0*   HCT 24.4* 23.6* 25.5*   MCV 90.4 91.5 92.7   MCH 28.9 29.1 29.1   MCHC 32.0 31.8 31.4   RDW 21.0* 21.1* 21.5*    Recent Labs     02/20/20  0500 02/19/20  0534 02/18/20  0530   BANDS 2  --   --    MONOS 6 6 6   EOS 1 1 0   BASOS 0 0 0   RDW 21.0* 21.1* 21.5*        Comprehensive Metabolic Profile    Recent Labs     02/20/20  0500 02/19/20  0534 02/18/20  0530    142 144   K 3.6 4.0 5.0    111 119*   CO2 26 23 15*   BUN 30* 42* 70*   CREA 4.38* 5.36* 7.43*    Recent Labs     02/20/20  0500 02/19/20  0534 02/18/20  0530   CA 8.2* 7.9* 7.7*   PHOS 4.1 5.9*  5.9*  --    ALB 2.9* 2.9* 2.8*   TP 6.3* 6.2* 6.3*   SGOT 19 19 16   TBILI 1.5* 1.4* 0.6          Component Value Flag Ref Range Units Status   Hepatitis B core, IgM EQUIVOCAL  Abnormal   NEG   Final   Comment: It is recommended for Equivocal results to repeat testing with a new sample in 2 weeks                   Impression:       Active Hospital Problems    Diagnosis Date Noted    ESRD (end stage renal disease) ([facility] [address] 02/17/2020    Secondary hyperparathyroidism of renal origin ([facility] [address] 02/17/2020    Acute renal failure superimposed on stage 3 chronic kidney disease ([facility] [address] 02/15/2020    UTI (urinary tract infection) 02/15/2020    Acute renal failure superimposed on chronic kidney disease ([facility] [address] 02/15/2020    Bradycardia 02/15/2020    COPD, moderate ([facility] [address] 01/31/2019    Hypothyroid 01/31/2019    Chronic hepatitis C without hepatic coma ([facility] 05/31/2017    Quadriparesis ([facility] 05/31/2017    Essential hypertension 04/28/2017    IV drug abuse ([facility] 04/28/2017    Renal cell carcinoma of left kidney ([facility] 12/17/2013     Pathological Stage [id number] cc RCC FG3 s/p left open partial nephrectomy in 12/13                Plan:     No need for any dialysis today,will dialyze tomorrow & q M,W & Friday.  Waiting to finalize OP center,[facility] once he is more stable , no Hypoglycemia & low Temperature.       Laurence Sarkar MD

## 2020-02-20 NOTE — PROGRESS NOTES
Progress Note    Patient: Padmini Mosley MRN: [id number]  CSN: [id number]    YOB: 1960  Age: 62 y.o. Sex: male    DOA: 2/15/2020 LOS:  LOS: 5 days                    Subjective:     Pt hypothermic this morning and on air warmer. Endocrinology followed up for chronic hypoglycemia, ordered IV dextrose to maintain glucose level above 50 and further testing ordered. Fingerstick glucose most recent 103. Discussed with his nurse and diabetic educator yesterday, D/C humalog, check glucose level before bed and give before bed snacks. Discussed with nursing staff he was agitated yesterday when possible drug paraphernalia was found in his bag and discarded. He then attempted to give the  money to retrieve the item from the trash for him, she declined. Not agitated this morning, however he is intermittently giggling in response to questions and discussion, he is alert but not oriented other than to self. BP remains stable off pressors, Stable asymptomatic bradycardia. Pt has dialysis catheter Rt Subclavian and Rt IJC . GI saw pt for thrombocytopenia ultrasound liver pending. Hepatitis b and c titers pending. Discussed pancytopenia with heme/onc dr. Lacie Reynolds, suspected that myelosuppression is from substance abuse and the form of alcohol and drugs, patient needs to remain abstinent. Recommended no indication for bone marrow biopsy at this time. Patient continues to decline need for outpatient drug/alcohol rehab program or information, case management consulted to provide patient resources in case he cahnges mind. Counseled patient extensively on need to abstain. Patient reports he snorts heroin, denies any IV drug use. Patient now with [facility] for HD access. Discussed with patient at length ongoing use of drugs and alcohol and especially if he were to use IV drugs high risk for severe infection and death. Review of systems  General: No fevers or chills.  Rt IJ tripper lumen CVC, Rt subclavian dialysis catheter   Cardiovascular: No chest pain or pressure. No palpitations. Pulmonary: No shortness of breath, cough or wheeze. Gastrointestinal: No abdominal pain, nausea, vomiting or diarrhea. Genitourinary: decrease urine out pit started on dialysis   Musculoskeletal: quadriparesis after neck surgery   Neurologic: No headache,  No seizure generalized weakness deformity both hand     Objective:     Physical Exam:  Visit Vitals  /89 (BP 1 Location: Left arm, BP Patient Position: At rest)   Pulse (!) 49   Temp (!) 93.3 °F (34.1 °C)   Resp 18   Ht 6' (1.829 m)   Wt 74.3 kg (163 lb 14.4 oz)   SpO2 92%   BMI 22.23 kg/m²        General:         Alert, , no acute distress    HEENT: NC, Atraumatic. PERRLA, anicteric sclerae. TLC CVC to right IJ. Lungs: CTA Bilaterally. No Wheezing/Rhonchi/Rales. TDC to right upper chest.  Heart:  Bradycardia. No murmur, No Rubs, No Gallops  Abdomen: Soft, Non distended, Non tender.    Extremities: ecchymosis Lt arm stable, trace lower limb edema   Psych:   Not anxious or agitated. Neurologic:  CN 2-12 grossly intact, Alert, confused.  Oriented to self. quadriparesis   Intake and Output:  Current Shift:  02/20 0701 - 02/20 1900  In: 460 [P.O.:360; I.V.:100]  Out: 0   Last three shifts:  02/18 1901 - 02/20 0700  In: 867.1 [P.O.:640; I.V.:227.1]  Out: 3128 [Urine:625; Drains:1000]    Labs: Results:       Chemistry Recent Labs     02/20/20  0500 02/19/20  0534 02/18/20  0530   GLU 69* 24* 55*    142 144   K 3.6 4.0 5.0    111 119*   CO2 26 23 15*   BUN 30* 42* 70*   CREA 4.38* 5.36* 7.43*   CA 8.2* 7.9* 7.7*   AGAP 7 8 10   BUCR 7* 8* 9*   AP 88 86 84   TP 6.3* 6.2* 6.3*   ALB 2.9* 2.9* 2.8*   GLOB 3.4 3.3 3.5   AGRAT 0.9 0.9 0.8      CBC w/Diff Recent Labs     02/20/20  0500 02/19/20  0534 02/18/20  0530   WBC 3.2* 3.1* 3.1*   RBC 2.70* 2.58* 2.75*   HGB 7.8* 7.5* 8.0*   HCT 24.4* 23.6* 25.5*   PLT 39* 44* 29*   GRANS 71 72 59   LYMPH 20 21 35   EOS 1 1 0      Cardiac Enzymes No results for input(s): CPK, CKND1, LINDA in the last 72 hours. No lab exists for component: CKRMB, TROIP   Coagulation No results for input(s): PTP, INR, APTT, INREXT, INREXT in the last 72 hours. Lipid Panel Lab Results   Component Value Date/Time    Cholesterol, total 148 02/11/2020 02:24 PM    HDL Cholesterol 71 02/11/2020 02:24 PM    LDL, calculated 63 02/11/2020 02:24 PM    VLDL, calculated 19.4 08/27/2019 09:52 AM    Triglyceride 72 02/11/2020 02:24 PM    CHOL/HDL Ratio 2.1 02/11/2020 02:24 PM      BNP No results for input(s): BNPP in the last 72 hours.    Liver Enzymes Recent Labs     02/20/20  0500   TP 6.3*   ALB 2.9*   AP 88   SGOT 19      Thyroid Studies Lab Results   Component Value Date/Time    TSH 1.93 02/20/2020 05:00 AM          Procedures/imaging: see electronic medical records for all procedures/Xrays and details which were not copied into this note but were reviewed prior to creation of Plan    Medications:   Current Facility-Administered Medications   Medication Dose Route Frequency    calcium acetate(phosphat bind) (PHOSLO) capsule 667 mg  1 Cap Oral TID WITH MEALS    dextrose 5% infusion  25 mL/hr IntraVENous CONTINUOUS    LORazepam (ATIVAN) injection 0.5 mg  0.5 mg IntraVENous Q6H PRN    glucose chewable tablet 16 g  4 Tab Oral PRN    glucagon (GLUCAGEN) injection 1 mg  1 mg IntraMUSCular PRN    dextrose (D50W) injection syrg 12.5-25 g  25-50 mL IntraVENous PRN    epoetin cristy-epbx (RETACRIT) 12,000 Units combo injection  12,000 Units SubCUTAneous Q TUE, THU & SAT    diphenhydrAMINE (BENADRYL) injection 12.5 mg  12.5 mg IntraVENous Q6H PRN    pantoprazole (PROTONIX) tablet 40 mg  40 mg Oral ACB&D    levothyroxine (SYNTHROID) tablet 100 mcg  100 mcg Oral 6am    B complex-vitaminC-folic acid (NEPHROCAP) cap  1 Cap Oral DAILY    ferrous gluconate 324 mg (37.5 mg iron) tablet 1 Tab  1 Tab Oral BID WITH MEALS       Assessment/Plan     Principal Problem:    Acute renal failure superimposed on stage 3 chronic kidney disease ([address] (2/15/2020)    Active Problems:    Chronic hepatitis C without hepatic coma ([address] (5/31/2017)      Essential hypertension (4/28/2017)      IV drug abuse ([address] (4/28/2017)      Quadriparesis ([address] (5/31/2017)      Renal cell carcinoma of left kidney ([facility] [address] (12/17/2013)      Overview: Pathological Stage [id number] cc RCC FG3 s/p left open partial nephrectomy       in 12/13        COPD, moderate ([address] (1/31/2019)      Hypothyroid (1/31/2019)      UTI (urinary tract infection) (2/15/2020)      Acute renal failure superimposed on chronic kidney disease ([address] (2/15/2020)      Bradycardia (2/15/2020)      ESRD (end stage renal disease) ([address] (2/17/2020)      Secondary hyperparathyroidism of renal origin ([address] (2/17/2020)    plan     Asymptomatic Bradycardia  Cardiology consulted, recommended stable in marked sinus bradycardia, keep off all AV blocking drugs. Echo done this admission non change from previous echo  No further cardiac workup at this time, signed off 2/18/20. Monitor on tele     Acute renal failure on top of chronic kidney disease  RT TDC  per IR placed 2/18/20  Nephrology, Dr. Lauro Charlton, started dialysis 2/18  Continue monitor potassium level and volume overload     Urinary tract infection ruled out, urine culture positive for yeast only/ pt has suprapubic cath was changes recently at [facility]  History of Renal Cell Carcinoma s/p L partial nephrectomy 12/2013, monitored by Dr. Dede Melvin urology outpatient. Chronic urinary retention with history of C-spine laminectomy and post-op paralysis s/p [facility] 10/2018. Discontinued Rocephin 1 g IV  Blood cultures no growth  Urine culture >100K yeast, chronic, previously thought to be colonizer       Thrombocytopenia/pancytopenia, anemia of chronic disease  Hematology oncology consulted, Dr. Wayne Ortiz following.     Dr. Wayne Ortiz does not recommend bone marrow biopsy at this time, notes myelosuppression likely from polysubstance abuse. Continues on retacrit 12,000 units Three times weekly     Hypertension/ possible autonomic neuropathy after neck surgery   Continue hydralazine 25 mg p.o. every 8 hours as needed systolic blood pressure above 160     Diabetes mellitus  Hemoglobin A1c on two 2/11/20 4.9   LDL 63  Hypoglycemia protocol  Endocrinology consulted, Dr. Whitney Ye, following.        Hepatitis C/ H/O upper GI bleeding gastritis, history of Hep B  Patient was treated for hepatitis C with Dr. Theda Bence  Protonix 40 mg daily  Continue to monitor liver function  Ongoing drug abuse/heroin, Hep C ab positive history of hep c treated will check hep c titers ensure no new infection  HIV negative  Hep B core ab positive, surface Ag negative no active infection suggested. GI consult done ordered ultrasound liver  Case manger consult to explore drug rehab options with pt   Discussed with pt again today, continue to decline inpatient rehab, also declines need for outpatient resources. Discussed at length high risk for severe illness and death with ongoing drug and alcohol use.          Transfer to step down when bed available  F/u Endocrinology consult recs  Before bed snacks  Ativan 0.5 mg q 6h prn agitation   Cbc, cmp, mag in AM  Monitor platelet number   F/u liver ultrasound, hepatitis b and c titers  Will need pt and ot evaluation and treatment before discharge       DVT/GI Prophylaxis: SCD's and H2B/PP      Estella Hernandes  2/20/2020 10:30 AM      Patient seen and examined independently. Reviewed PA student note and changes made where appropriate.   Agree with assessment and plan    Signed By: Patrick Herbert MD     February 20, 2020     11:03am

## 2020-02-20 NOTE — PROGRESS NOTES
Hematology/Medical Oncology Progress Note             Name: Annita Vasquez   : 1960   MRN: [id number]   Date: 2020 7:47 AM     [x]I have reviewed the flowsheet and previous days notes. Events overnight reviewed and discussed with nursing staff. Vital signs and records reviewed. Mr. Catrachita Worthy is a 25-year-old -American male with a history of diabetes mellitus, hypothyroidism, chronic renal failure, thrombocytopenia, suprapubic catheter due to chronic urinary retention quadriparesis after cervical spine infection and surgery. He has a history of bradycardia and has been evaluated by cardiology in the past.  He also has a history of chronic polysubstance abuse reported that the last heroin use was about 4 days prior to this admission. Pt was seen by PCP and instructed to come to ED due to worsening Creatinine. Patient was seen by nephrology and admitted to start dialysis. Subsequently the patient was found to have pancytopenia and required platelet tranfsusion prior to his dialysis catheter placement on yesterday. ROS:  Constitutional:  Negative for fever, chills, diaphoresis, activity change, appetite change and unexpected weight change. HENT: Negative for nosebleeds, congestion, facial swelling, mouth sores, trouble swallowing, neck pain, neck stiffness, voice change and postnasal drip. Eyes: Negative for photophobia, pain, discharge and itching. Respiratory: Negative for apnea, cough, choking, chest tightness, wheezing and stridor. Cardiovascular: Negative for chest pain, palpitations and leg swelling. Gastrointestinal: Negative for abdominal pain. Negative for nausea, diarrhea, constipation, blood in stool and rectal pain. Genitourinary: Negative for dysuria, urgency, hematuria, flank pain and difficulty urinating. Musculoskeletal: Negative for back pain, joint swelling, arthralgias and gait problem.    Skin: Positive bruising noted at LUE, with swelling, bruising noted at LLE (varner catheter resting on this area). Neurological:  Negative for dizziness, facial asymmetry, speech difficulty, light-headedness and headaches. Hematological: Negative for adenopathy. Does not bruise/bleed easily. Psychiatric/Behavioral: Negative for hallucinations, confusion, disturbed wake/sleep cycle and agitation. Vital Signs:    Visit Vitals  BP (!) 164/96 (BP 1 Location: Left arm, BP Patient Position: At rest)   Pulse (!) 48   Temp 97.8 °F (36.6 °C)   Resp 14   Ht 6' (1.829 m)   Wt 74.3 kg (163 lb 14.4 oz)   SpO2 97%   BMI 22.23 kg/m²       O2 Device: Room air       Temp (24hrs), Av.4 °F (35.8 °C), Min:92.5 °F (33.6 °C), Max:97.8 °F (36.6 °C)       Intake/Output:   Last shift:      No intake/output data recorded. Last 3 shifts:  1901 -  0700  In: 867.1 [P.O.:640; I.V.:227.1]  Out: 3128 [Urine:625; Drains:1000]    Intake/Output Summary (Last 24 hours) at 2020 0746  Last data filed at 2020 0600  Gross per 24 hour   Intake 867.08 ml   Output 2848 ml   Net -1980.92 ml       Physical Exam:  General: Appears comfortable, NAD. HEENT:  Anicteric sclerae; pink palpebral conjunctivae; mucosa moist  Resp:  Symmetrical chest expansion, no accessory muscle use; good airway entry; no rales/ wheezing/ rhonchi noted  CV:  S1, S2 present; regular rate and rhythm  GI:  Abdomen soft, non-tender; (+) active bowel sounds  Extremities:  +2 pulses on all extremities; RUE edema-improving. Skin:  Warm; Bruising at LUE and LLE thigh area (varner cath resting on this area).   Neurologic:  Non-focal        DATA:   Current Facility-Administered Medications   Medication Dose Route Frequency    calcium acetate(phosphat bind) (PHOSLO) capsule 667 mg  1 Cap Oral TID WITH MEALS    dextrose 5% infusion  25 mL/hr IntraVENous CONTINUOUS    LORazepam (ATIVAN) injection 0.5 mg  0.5 mg IntraVENous Q6H PRN    glucose chewable tablet 16 g  4 Tab Oral PRN    glucagon (GLUCAGEN) injection 1 mg  1 mg IntraMUSCular PRN    dextrose (D50W) injection syrg 12.5-25 g  25-50 mL IntraVENous PRN    epoetin cristy-epbx (RETACRIT) 12,000 Units combo injection  12,000 Units SubCUTAneous Q TUE, THU & SAT    diphenhydrAMINE (BENADRYL) injection 12.5 mg  12.5 mg IntraVENous Q6H PRN    pantoprazole (PROTONIX) tablet 40 mg  40 mg Oral ACB&D    levothyroxine (SYNTHROID) tablet 100 mcg  100 mcg Oral 6am    B complex-vitaminC-folic acid (NEPHROCAP) cap  1 Cap Oral DAILY    ferrous gluconate 324 mg (37.5 mg iron) tablet 1 Tab  1 Tab Oral BID WITH MEALS     Facility-Administered Medications Ordered in Other Encounters   Medication Dose Route Frequency    fentaNYL citrate (PF) injection 12.5-50 mcg  12.5-50 mcg IntraVENous Multiple    midazolam (VERSED) injection 1 mg  1 mg IntraVENous Multiple                    Labs:  Recent Labs     02/20/20  0500 02/19/20  0534 02/18/20  0530   WBC 3.2* 3.1* 3.1*   HGB 7.8* 7.5* 8.0*   HCT 24.4* 23.6* 25.5*   PLT PENDING 44* 29*     Recent Labs     02/20/20  0500 02/19/20  0534 02/18/20  0530    142 144   K 3.6 4.0 5.0    111 119*   CO2 26 23 15*   GLU 69* 24* 55*   BUN 30* 42* 70*   CREA 4.38* 5.36* 7.43*   CA 8.2* 7.9* 7.7*   MG 1.9 1.9 2.0   PHOS 4.1 5.9*  5.9*  --    ALB 2.9* 2.9* 2.8*   SGOT 19 19 16   ALT 20 24 28     No results for input(s): PH, PCO2, PO2, HCO3, FIO2 in the last 72 hours. IMPRESSION:   · Chronic thrombocytopenia  · Acute renal failure superimposed on chronic kidney disease age 9  · Bradycardia  · Chronic hepatitis C  · Polysubstance abuse-recent heroin use           PLAN:   · Platelet count today is 39,000 , s/p 2 units of platelets. No intervention warranted at this time. The severe chronic thrombocytopenia is due to his long-term myelosuppression from substance abuse in the form of alcohol and drugs. · H/H 7.8/24.4, will recommend PRBC transfusion for hemoglobin less than 7g/dl.  Continue Retacrit 12,000 units (nephrology preferred dose) to be given 3 times a week with dialysis while inpatient on dialysis days. · Substance abuse cessation encouraged. May benefit from substance abuse center referral-will defer to attending. · Patient to follow-up in the outpatient hematology/oncology clinic in 5 to 7 days after discharge.  ·        The patient is: [x] acutely ill Risk of deterioration: [] moderate    [] critically ill  [] high         My assessment/plan was discussed with:  [x]nursing []PT/OT    []respiratory therapy []     []family []       Angelina Bravo MD

## 2020-02-20 NOTE — PROGRESS NOTES
Problem: Mobility Impaired (Adult and Pediatric)  Goal: *Acute Goals and Plan of Care (Insert Text)  Description  Physical Therapy Goals  Initiated 2/20/2020 and to be accomplished within 7 day(s)  1. Patient will move from supine to sit and sit to supine , scoot up and down and roll side to side in bed with modified independence. 2.  Patient will transfer from bed to chair and chair to bed with supervision/set-up using the least restrictive device. 3.  Patient will perform sit to stand with supervision/set-up. 4.  Patient will ambulate with minimal assistance/contact guard assist for 25-50 feet with the least restrictive device. 5.  Assess stairs as aprpopriate    Prior Level of Function:   Patient was modified independence for all mobility including gait using single point cane. Patient lives with his sister in a 2nd floor apartment, about [address]. Patient indicates he has been going to outpatient PT for a couple years working on exercises. Outcome: Progressing Towards Goal     PHYSICAL THERAPY EVALUATION    Patient: Angella Taylor (89 y.o. male)  Date: 2/20/2020  Primary Diagnosis: SHEILA (acute kidney injury) ([facility] [address] [N17.9]  SHEILA (acute kidney injury) ([address] [N17.9]  Bradycardia [R00.1]        Precautions:   Fall    ASSESSMENT :  PT orders received and patient cleared by nursing to participate with therapy. Patient is a 62 y.o. male admitted to the hospital due to acute on chronic renal failure. Patient also with drug withdrawal, altered mental status, and h/o quadriparesis related to a prior cervical spine infection/injury. Patient consents to PT evaluation and treatment. Patient received semi-reclined in bed. Patient answers most questions during history, but occasionally states he has already answered these questions, and does not respond. Patient with altered mental status, as he occasionally calls out to others he thinks are across the room (no one else present).  He indicated that he was talking to his sister without realizing she was not present. Patient performed supine to sit with SBA; cuing provided to weight shift and scoot forward to EOB. While seated unsupported at EOB patient frequently leans backward. When this therapist reached out to protect him from continuing to fall backward, patient stated he wasn't falling, despite demonstrating a significant posterior lean. Patient performed sit to stand Carrie while linens were changed. Patient occasionally demonstrates knee buckling and requires cuing to remain fully upright. Patient steps 3ft laterally Carrie with RW along EOB to reposition himself in the bed. Patient with increased time for weight shifting. Patient noted to have decreased use of L UE with functional mobility. Requires cuing to use hand on RW and it occasionally fell off. Patient with decreased  strength B. Patient performed sit to supine SBA and performed scooting up in bed with Carrie x2. Session ended with patient supine in bed, all needs met. Patient will benefit from skilled intervention to address the above impairments.   Patient's rehabilitation potential is considered to be Good  Factors which may influence rehabilitation potential include:   []         None noted  [x]         Mental ability/status  [x]         Medical condition  []         Home/family situation and support systems  [x]         Safety awareness  []         Pain tolerance/management  []         Other:      PLAN :  Recommendations and Planned Interventions:   [x]           Bed Mobility Training             [x]    Neuromuscular Re-Education  [x]           Transfer Training                   []    Orthotic/Prosthetic Training  [x]           Gait Training                          [x]    Modalities  [x]           Therapeutic Exercises           [x]    Edema Management/Control  [x]           Therapeutic Activities            [x]    Family Training/Education  [x]           Patient Education  []           Other (comment):    Frequency/Duration: Patient will be followed by physical therapy 1-2 times per day/4-7 days per week to address goals. Discharge Recommendations: Skilled Nursing Facility  Further Equipment Recommendations for Discharge: N/A     SUBJECTIVE:   Patient stated if one more person asks me that I'm going to punch them.  (when asked if he knew where he was) \"I'm not falling (pt losing balance in bed/leaning posterior in sitting). \"     OBJECTIVE DATA SUMMARY:     Past Medical History:   Diagnosis Date    Anemia     Bradycardia, unspecified 2018    Cervical discitis     MRSA    Chronic drug abuse ([facility] [address] 1974    Chronic kidney disease     stage III    Diabetes ([facility] [address] 01/1990    Drug abuse ([facility] [address]     Encephalopathy     GERD (gastroesophageal reflux disease)     Hypertension     Muscle weakness     Quadriparesis ([facility] [address] 2017    Renal cell carcinoma of left kidney ([facility] [address] 12/17/13    Pathological Stage [id number] cc RCC FG3 s/p left open partial nephrectomy in 12/13      Sepsis due to methicillin resistant Staphylococcus aureus (HCC)     Suprapubic catheter (HCC)     Thrombocytopenia (HCC)     Urethral erosion     Viral hepatitis B     Viral hepatitis C     Xerosis cutis      Past Surgical History:   Procedure Laterality Date    COLONOSCOPY N/A 10/3/2019    COLONOSCOPY / polypectomy performed by Darrius Leyva MD at [address] HX CIRCUMCISION  12/17/13    Dr. Kathi Robison, [facility]    HX NEPHRECTOMY Left 12/17/13    Open/ Partial,nephrectomy    HX OTHER SURGICAL Right 2/27/2016    removed right ft  2nd meta-tarsal    HX OTHER SURGICAL  04/2017    abscess removed from back of neck     IR INSERT TUNL CVC W/O PORT OVER 5 YR  2/18/2020    NEUROLOGICAL PROCEDURE UNLISTED  2017    neck surgery-abcess-hardware neck    MS REMOVAL WITH INSERTION OF SUPRAPUBIC CATHETER  2018     Barriers to Learning/Limitations: yes;  cognitive and altered mental status (i.e.Sedation, Confusion)  Compensate with: Visual Cues, Verbal Cues, and Tactile Cues  Home Situation:  Home Situation  Home Environment: Apartment(2nd floor)  # Steps to Enter: 9  Rails to Enter: Yes  Hand Rails : Bilateral  Living Alone: No  Support Systems: Family member(s)  Patient Expects to be Discharged to[de-identified] Apartment  Current DME Used/Available at Home: [employer] Grumbling, straight, Walker, rollator, Commode, bedside, Shower chair, Grab bars  Critical Behavior:  Neurologic State: Alert;Confused  Orientation Level: Oriented to person(\"If one more person asks me this I'm going to punch them\")  Cognition: Follows commands  Safety/Judgement: Fall prevention;Decreased awareness of need for safety  Psychosocial  Patient Behaviors: Calm;Confused; Cooperative; Altered mental status  Needs Expressed: Educational;Emotional;Social/Environmental  Purposeful Interaction: Yes  Pt Identified Daily Priority: Clinical issues (comment); Social issues (comment)(hypoglycemia/thermia, renal failure, illicit drug use)  Caritas Process: Nurture loving kindness;Establish trust;Teaching/learning; Attend basic human needs;Create healing environment  Caring Interventions: Reassure; Therapeutic modalities  Reassure: Therapeutic listening; Informing; Acceptance; Support family  Therapeutic Modalities: Humor; Intentional therapeutic touch  Other Caring Modalities: hourly rounding  Skin Condition/Temp: Warm;Dry     Skin Integrity: Tear(L finger)  Skin Integumentary  Skin Color: Appropriate for ethnicity  Skin Condition/Temp: Warm;Dry  Skin Integrity: Tear(L finger)  Turgor: Epidermis thin w/ loss of subcut tissue  Hair Growth: Sparce  Varicosities: Absent     B LE Strength:    Strength: Within functional limits              B LE Tone & Sensation:   Tone: Normal          Sensation: Intact           B LE Range Of Motion:  AROM: Within functional limits                 Posture:  Posture (WDL): Exceptions to WDL  Posture Assessment:  Forward head;Rounded shoulders  Functional Mobility:  Bed Mobility:     Supine to Sit: Stand-by assistance  Sit to Supine: Stand-by assistance  Scooting: Minimum assistance  Transfers:  Sit to Stand: Minimum assistance   Stand to Sit: Minimum assistance  jeff:   Sitting: Impaired; Without support  Sitting - Static: Fair (occasional)  Sitting - Dynamic: Fair (occasional)((-))  Standing: Impaired; With support  Standing - Static: Fair  Standing - Dynamic : Occasional    Ambulation/Gait Training:  Distance (ft): 3 Feet (ft)  Assistive Device: Walker, rolling  Ambulation - Level of Assistance: Minimal assistance  Base of Support: Narrowed  Stance: Weight shift  Speed/Stacia: Slow  Step Length: Left shortened;Right shortened     Therapeutic Exercises:   Reviewed and performed ankle pumps to increase blood flow and circulation. Neuromuscular Re-education:   Challenged sitting balance at EOB with LE and UE therapeutic exercise, reaching, and scooting. Challenged standing balance with cuing to remain upright; knees occasionally buckling during pericare. Pain:  Pain level pre-treatment: 8/10 R 2nd finger, neck  Pain level post-treatment: same  Pain Intervention(s) : Medication (see MAR); Rest, Repositioning  Response to intervention: Nurse notified, See doc flow    Activity Tolerance:   Fair  Please refer to the flowsheet for vital signs taken during this treatment. After treatment:   []         Patient left in no apparent distress sitting up in chair  [x]         Patient left in no apparent distress in bed  [x]         Call bell left within reach  [x]   Personal items in reach   [x]         Nursing notified Carlos Orosco  []         Caregiver present  [x]         Bed/chair alarm activated  []         SCDs applied    COMMUNICATION/EDUCATION:   [x]         Role of Physical Therapy in the acute care setting. [x]         Fall prevention education was provided and the patient/caregiver indicated understanding. [x]         Patient/family have participated as able in goal setting and plan of care.   [x] Patient/family agree to work toward stated goals and plan of care. []         Patient understands intent and goals of therapy, but is neutral about his/her participation. []         Patient is unable to participate in goal setting/plan of care: ongoing with therapy staff. [x]         Out of bed with nursing assistance 3-5 times a day. []         Other: Thank you for this referral.  Daniel Roblero DPT   Time Calculation: 31 mins      Eval Complexity: History: HIGH Complexity :3+ comorbidities / personal factors will impact the outcome/ POC Exam:HIGH Complexity : 4+ Standardized tests and measures addressing body structure, function, activity limitation and / or participation in recreation  Presentation: MEDIUM Complexity : Evolving with changing characteristics  Clinical Decision Making:Medium Complexity    Overall Complexity:MEDIUM    A student participated in this treatment session. Per CMS Medicare statements and guidelines I certify that the following was true:   1. I was present and directly observed the entire session. 2. I made all skilled judgments and clinical decisions regarding care. 3. I am the practitioner responsible for assessment, treatment, and documentation.     Thank you,   Tanner Rubi, PT, DPT

## 2020-02-20 NOTE — PROGRESS NOTES
Problem: Falls - Risk of  Goal: *Absence of Falls  Description  Document Jigna Zavala Fall Risk and appropriate interventions in the flowsheet. Outcome: Progressing Towards Goal  Note: Fall Risk Interventions:  Mobility Interventions: Assess mobility with egress test, Bed/chair exit alarm, Communicate number of staff needed for ambulation/transfer, PT Consult for mobility concerns, Patient to call before getting OOB, PT Consult for assist device competence, Strengthening exercises (ROM-active/passive), Utilize walker, cane, or other assistive device, OT consult for ADLs    Mentation Interventions: Adequate sleep, hydration, pain control, Door open when patient unattended, Bed/chair exit alarm, Increase mobility, More frequent rounding, Reorient patient, Room close to nurse's station, Update white board    Medication Interventions: Bed/chair exit alarm, Evaluate medications/consider consulting pharmacy, Patient to call before getting OOB    Elimination Interventions: Bed/chair exit alarm, Call light in reach    History of Falls Interventions: Bed/chair exit alarm, Door open when patient unattended, Room close to nurse's station         Problem: Patient Education: Go to Patient Education Activity  Goal: Patient/Family Education  Outcome: Progressing Towards Goal     Problem: Pressure Injury - Risk of  Goal: *Prevention of pressure injury  Description  Document Blake Scale and appropriate interventions in the flowsheet.   Note: Pressure Injury Interventions:  Sensory Interventions: Assess changes in LOC, Assess need for specialty bed, Check visual cues for pain, Discuss PT/OT consult with provider, Keep linens dry and wrinkle-free, Maintain/enhance activity level, Minimize linen layers, Pressure redistribution bed/mattress (bed type)    Moisture Interventions: Absorbent underpads, Apply protective barrier, creams and emollients, Internal/External fecal devices, Internal/External urinary devices, Minimize layers    Activity Interventions: Pressure redistribution bed/mattress(bed type), PT/OT evaluation    Mobility Interventions: Assess need for specialty bed, Pressure redistribution bed/mattress (bed type), PT/OT evaluation    Nutrition Interventions: Document food/fluid/supplement intake, Offer support with meals,snacks and hydration    Friction and Shear Interventions: Apply protective barrier, creams and emollients, Lift sheet, Foam dressings/transparent film/skin sealants, Minimize layers                Problem: Patient Education: Go to Patient Education Activity  Goal: Patient/Family Education  Outcome: Progressing Towards Goal

## 2020-02-20 NOTE — DIABETES MGMT
Glycemic Control Plan of Care    T2DM with A1c of 4.9% (2/11/2020). Home diabetes medications:   Lispro sliding scale insulin. Seen patient this morning in CVT ICU. He was awake, alert and pleasant. POC BG range on 02/19/2020: . Patient was treated with 25 gm of D50 for hypoglycemia. Discussed hypoglycemia protocol with nursing to treat every 15 minutes until BG is at least 80 then feed patient. Added bedtime snack. POC BG report on 02/20/2020 at time of review: 75, 103, 128. IVF: D5% at 25 ml/hr cont infusion. Recommendation(s):  1.) cont inpatient glycemic monitoring and follow hypoglycemia protocol and prevention. Assessment:  Patient is 62year old with past medical history including type diabetes mellitus, CKD stage 3, drug abuse, GERD, quadriparesis, left renal carcinoma, suprapubic catheter and hepatitis B&C - was admitted on 2/15/2020 with report that he was sent from MD office due to worsening Cr and LLE edema. Noted:  ESRD. Status post temporary dialysis catheter placement done 02/18/2020. History of renal cell CA, s/p left partial nephrectomy. COPD. Hypothyroid. UTI.  T2DM. Most recent blood glucose values:    Results for David Booth (MRN [id number]) as of 2/20/2020 15:44   Ref. Range 2/19/2020 07:00 2/19/2020 07:01 2/19/2020 07:19 2/19/2020 08:49 2/19/2020 09:07 2/19/2020 11:59 2/19/2020 17:11 2/19/2020 21:18   GLUCOSE,FAST - POC Latest Ref Range: 70 - 110 mg/dL 33 (LL) 36 (LL) 109 59 (L) 76 85 77 105     Results for David Booth (MRN [id number]) as of 2/20/2020 15:44   Ref. Range 2/20/2020 07:33 2/20/2020 07:47 2/20/2020 12:23   GLUCOSE,FAST - POC Latest Ref Range: 70 - 110 mg/dL 75 103 128 (H)     Current A1C: 4.9% (2/11/2020). Current hospital diabetes medications:  None. Total daily dose insulin requirement previous day: 02/18/2020  None. Diet: Renal regular; nutr suppl: Nepro all meals; ensure enlive with all meals.     Goals:  Blood glucose will be within target range of  mg/dL by 02/22/2020.     Education:  ___  Refer to Diabetes Education Record             _X__  Education not indicated at this time    Logan Petit RN [facility]  Pager: [phone]

## 2020-02-20 NOTE — PROGRESS NOTES
MRI Screening form needs to be filled out and faxed to [phone] MRI can be scheduled. If unable to obtain information from pt, [facility] needs to be contacted.  If pt is claustro or will need pain meds, please have ordered in advance in order to facilitate exam.

## 2020-02-20 NOTE — PROGRESS NOTES
Problem: Self Care Deficits Care Plan (Adult)  Goal: *Acute Goals and Plan of Care (Insert Text)  Description  Occupational Therapy Goals  Initiated 2/20/2020 within 7 day(s). 1.  Patient will perform bed mobility in preparation for selfcare with supervision/set-up. 2.  Patient will perform lower body dressing with supervision/set-up. 3.  Patient will perform upper body dressing with modified independence using compensatory techniques prn.  4.  Patient will perform toilet transfers with supervision/set-up. 5.  Patient will perform all aspects of toileting with supervision/set-up. 6.  Patient will participate in upper extremity therapeutic exercise/activities with modified independence for 8 minutes. 7.  Patient will utilize energy conservation techniques during functional activities with verbal cues. Prior Level of Function: Patient was independent with self-care and used a cane for functional mobility PTA. Patient has RW and shower chair and reports he has an aide that assists >40 hours/week with IADLs (cooking, cleaning). Outcome: Progressing Towards Goal     OCCUPATIONAL THERAPY EVALUATION    Patient: Patricia Hawthorne (37 y.o. male)  Date: 2/20/2020  Primary Diagnosis: SHEILA (acute kidney injury) ([facility] [address] [N17.9]  SHEILA (acute kidney injury) ([facility] [address] [N17.9]  Bradycardia [R00.1]  Precautions:  Fall    ASSESSMENT :  Patient cleared to participate in OT evaluation by RN. Upon entering the room, the pt was supine in bed, alert, and agreeable to participate in OT evaluation. Patient oriented to self and demos intermittent confusion throughout evaluation. During the evaluation, the pt presented with limited LUE AROM and decreased BUE strength, however BUEs present to be still functional. Patient PMH significant for quadriparesis with residual deficits. Patient min assist for grooming, min assist for donning sock after doffing and contact guard assist for bed mobility.  Patient observed holding his breath during LBD and educated on energy conservation techniques, pursed lip breathing, and self pacing during daily activities. Based on the objective data described below, the patient presented with decreased endurance, decreased strength, decreased independence, decreased functional balance, and decreased functional mobility, which impede pt's function with basic self-care/ADL tasks. Patient will benefit from skilled intervention to address the above impairments. Patient's rehabilitation potential is considered to be Good  Factors which may influence rehabilitation potential include:   []             None noted  [x]             Mental ability/status  [x]             Medical condition  [x]             Home/family situation and support systems  [x]             Safety awareness  []             Pain tolerance/management  []             Other:      PLAN :  Recommendations and Planned Interventions:   [x]               Self Care Training                  [x]      Therapeutic Activities  [x]               Functional Mobility Training   [x]      Cognitive Retraining  [x]               Therapeutic Exercises           [x]      Endurance Activities  [x]               Balance Training                    [x]      Neuromuscular Re-Education  []               Visual/Perceptual Training     [x]      Home Safety Training  [x]               Patient Education                   [x]      Family Training/Education  []               Other (comment):    Frequency/Duration: Patient will be followed by occupational therapy 1-2 times per day/4-7 days per week to address goals.   Discharge Recommendations: Fabián Hart  Further Equipment Recommendations for Discharge: N/A     SUBJECTIVE:   Patient stated I cant open this hand all the way    OBJECTIVE DATA SUMMARY:     Past Medical History:   Diagnosis Date    Anemia     Bradycardia, unspecified 2018    Cervical discitis     MRSA    Chronic drug abuse ([facility] [address] 1974    Chronic kidney disease stage III    Diabetes ([facility] [address] 01/1990    Drug abuse ([facility] [address]     Encephalopathy     GERD (gastroesophageal reflux disease)     Hypertension     Muscle weakness     Quadriparesis ([address] 2017    Renal cell carcinoma of left kidney ([address] 12/17/13    Pathological Stage [id number] cc RCC FG3 s/p left open partial nephrectomy in 12/13      Sepsis due to methicillin resistant Staphylococcus aureus ([address]     Suprapubic catheter ([facility] [address]     Thrombocytopenia ([facility] [address]     Urethral erosion     Viral hepatitis B     Viral hepatitis C     Xerosis cutis      Past Surgical History:   Procedure Laterality Date    COLONOSCOPY N/A 10/3/2019    COLONOSCOPY / polypectomy performed by Kilo Smith MD at [address]  12/17/13    Dr. Simona Padron, [facility]    HX NEPHRECTOMY Left 12/17/13    Open/ Partial,nephrectomy    HX OTHER SURGICAL Right 2/27/2016    removed right ft  2nd meta-tarsal    HX OTHER SURGICAL  04/2017    abscess removed from back of neck     IR INSERT TUNL CVC W/O PORT OVER 5 YR  2/18/2020    NEUROLOGICAL PROCEDURE UNLISTED  2017    neck surgery-abcess-hardware neck    MI REMOVAL WITH INSERTION OF SUPRAPUBIC CATHETER  2018     Barriers to Learning/Limitations: yes;  altered mental status (i.e.Sedation, Confusion)  Compensate with: visual, verbal, tactile, kinesthetic cues/model    Home Situation:   Home Situation  Home Environment: Apartment(2nd floor)  # Steps to Enter: 9  Rails to Enter: Yes  Hand Rails : Bilateral  Living Alone: No  Support Systems: Family member(s)  Patient Expects to be Discharged to[de-identified] Apartment  Current DME Used/Available at Home: Cane, straight, Walker, rollator, Commode, bedside, Shower chair, Grab bars  [x]  Right hand dominant   []  Left hand dominant    Cognitive/Behavioral Status:  Neurologic State: Alert;Confused  Orientation Level: Oriented to person  Cognition: Follows commands  Safety/Judgement: Fall prevention    Skin: Intact, bruising observed on LUE  Edema: None noted    Vision/Perceptual:    Acuity: Within Defined Limits        Coordination: BUE  Fine Motor Skills-Upper: Left Impaired;Right Intact    Gross Motor Skills-Upper: Left Intact; Right Intact(patient compensates with lateral neck lean for LUE)    Balance:  Sitting: Impaired; Without support  Sitting - Static: Fair (occasional)  Sitting - Dynamic: Fair (occasional)    Strength: BUE  Strength: Generally decreased, functional    Tone & Sensation: BUE  Tone: Abnormal(LUE)  Sensation: Impaired(LUE, able to feel LT)    Range of Motion: BUE  AROM: Generally decreased, functional(L shoulder, fingers limited)    Functional Mobility and Transfers for ADLs:  Bed Mobility:   Supine to Sit: Contact guard assistance  Sit to Supine: Contact guard assistance  Scooting: Contact guard assistance      ADL Assessment:   Feeding: Stand-by assistance    Oral Facial Hygiene/Grooming: Stand-by assistance    Bathing: Minimum assistance    Upper Body Dressing: Stand-by assistance    Lower Body Dressing: Minimum assistance    Toileting: Minimum assistance    ADL Intervention:  Feeding  Feeding Assistance: Stand-by assistance  Container Management: Standing-by assistance(additional time needed doffing lid )  Drink to Mouth: Stand-by assistance    Grooming  Grooming Assistance: Minimum assistance(using LUE)  Brushing/Combing Hair: Minimum assistance    Lower Body Dressing Assistance  Dressing Assistance: Minimum assistance  Socks: Minimum assistance(donning)  Leg Crossed Method Used: Yes  Position Performed: Long sitting on bed    Cognitive Retraining  Safety/Judgement: Fall prevention    Pain:  Pain level pre-treatment: 0/10   Pain level post-treatment: 0/10   Pain Intervention(s): Medication (see MAR); Response to intervention: See doc flow    Activity Tolerance:   Fair    Please refer to the flowsheet for vital signs taken during this treatment.   After treatment:   [] Patient left in no apparent distress sitting up in chair  [x] Patient left in no apparent distress in bed  [x] Call bell left within reach  [x] Nursing notified  [] Caregiver present  [] Bed alarm activated    COMMUNICATION/EDUCATION:   [x] Role of Occupational Therapy in the acute care setting  [x] Home safety education was provided and the patient/caregiver indicated understanding. [x] Patient/family have participated as able in goal setting and plan of care. [x] Patient/family agree to work toward stated goals and plan of care. [] Patient understands intent and goals of therapy, but is neutral about his/her participation. [] Patient is unable to participate in goal setting and plan of care. Thank you for this referral.  Mel Signs, OTR/L  Time Calculation: 14 mins    Eval Complexity: History: MEDIUM Complexity : Expanded review of history including physical, cognitive and psychosocial  history ; Examination: MEDIUM Complexity : 3-5 performance deficits relating to physical, cognitive , or psychosocial skils that result in activity limitations and / or participation restrictions; Decision Making:MEDIUM Complexity : Patient may present with comorbidities that affect occupational performnce.  Miniml to moderate modification of tasks or assistance (eg, physical or verbal ) with assesment(s) is necessary to enable patient to complete evaluation

## 2020-02-21 ENCOUNTER — APPOINTMENT (OUTPATIENT)
Dept: MRI IMAGING | Age: 60
DRG: 673 | End: 2020-02-21
Attending: NURSE PRACTITIONER
Payer: MEDICARE

## 2020-02-21 LAB
ACTH PLAS-MCNC: 58.7 PG/ML (ref 7.2–63.3)
ALBUMIN SERPL-MCNC: 2.8 G/DL (ref 3.4–5)
ALBUMIN/GLOB SERPL: 0.8 {RATIO} (ref 0.8–1.7)
ALP SERPL-CCNC: 95 U/L (ref 45–117)
ALT SERPL-CCNC: 18 U/L (ref 16–61)
ANION GAP SERPL CALC-SCNC: 7 MMOL/L (ref 3–18)
AST SERPL-CCNC: 21 U/L (ref 10–38)
BACTERIA SPEC CULT: ABNORMAL
BACTERIA SPEC CULT: NORMAL
BACTERIA SPEC CULT: NORMAL
BASOPHILS # BLD: 0 K/UL (ref 0–0.06)
BASOPHILS NFR BLD: 0 % (ref 0–3)
BILIRUB SERPL-MCNC: 2.6 MG/DL (ref 0.2–1)
BUN SERPL-MCNC: 33 MG/DL (ref 7–18)
BUN/CREAT SERPL: 7 (ref 12–20)
CALCIUM SERPL-MCNC: 8.2 MG/DL (ref 8.5–10.1)
CHLORIDE SERPL-SCNC: 105 MMOL/L (ref 100–111)
CO2 SERPL-SCNC: 25 MMOL/L (ref 21–32)
CREAT SERPL-MCNC: 4.81 MG/DL (ref 0.6–1.3)
DIFFERENTIAL METHOD BLD: ABNORMAL
EOSINOPHIL # BLD: 0 K/UL (ref 0–0.4)
EOSINOPHIL NFR BLD: 0 % (ref 0–5)
ERYTHROCYTE [DISTWIDTH] IN BLOOD BY AUTOMATED COUNT: 20.3 % (ref 11.6–14.5)
GLOBULIN SER CALC-MCNC: 3.5 G/DL (ref 2–4)
GLUCOSE BLD STRIP.AUTO-MCNC: 108 MG/DL (ref 70–110)
GLUCOSE BLD STRIP.AUTO-MCNC: 95 MG/DL (ref 70–110)
GLUCOSE BLD STRIP.AUTO-MCNC: 96 MG/DL (ref 70–110)
GLUCOSE SERPL-MCNC: 98 MG/DL (ref 74–99)
HCT VFR BLD AUTO: 23.3 % (ref 36–48)
HGB BLD-MCNC: 7.7 G/DL (ref 13–16)
IGF-I SERPL-MCNC: 67 NG/ML (ref 49–188)
LYMPHOCYTES # BLD: 0.6 K/UL (ref 0.8–3.5)
LYMPHOCYTES NFR BLD: 19 % (ref 20–51)
MAGNESIUM SERPL-MCNC: 1.9 MG/DL (ref 1.6–2.6)
MCH RBC QN AUTO: 29.3 PG (ref 24–34)
MCHC RBC AUTO-ENTMCNC: 33 G/DL (ref 31–37)
MCV RBC AUTO: 88.6 FL (ref 74–97)
MONOCYTES # BLD: 0.3 K/UL (ref 0–1)
MONOCYTES NFR BLD: 10 % (ref 2–9)
NEUTS SEG # BLD: 2.4 K/UL (ref 1.8–8)
NEUTS SEG NFR BLD: 71 % (ref 42–75)
NRBC BLD-RTO: 6 PER 100 WBC
PLATELET # BLD AUTO: 39 K/UL (ref 135–420)
PLATELET COMMENTS,PCOM: ABNORMAL
POTASSIUM SERPL-SCNC: 3.6 MMOL/L (ref 3.5–5.5)
PROT SERPL-MCNC: 6.3 G/DL (ref 6.4–8.2)
RBC # BLD AUTO: 2.63 M/UL (ref 4.7–5.5)
RBC MORPH BLD: ABNORMAL
SERVICE CMNT-IMP: ABNORMAL
SERVICE CMNT-IMP: NORMAL
SERVICE CMNT-IMP: NORMAL
SODIUM SERPL-SCNC: 137 MMOL/L (ref 136–145)
TRANSFERRIN SERPL-MCNC: 181 MG/DL (ref 200–370)
WBC # BLD AUTO: 3.3 K/UL (ref 4.6–13.2)

## 2020-02-21 PROCEDURE — 82962 GLUCOSE BLOOD TEST: CPT

## 2020-02-21 PROCEDURE — 74181 MRI ABDOMEN W/O CONTRAST: CPT

## 2020-02-21 PROCEDURE — 74011250637 HC RX REV CODE- 250/637: Performed by: FAMILY MEDICINE

## 2020-02-21 PROCEDURE — 65660000004 HC RM CVT STEPDOWN

## 2020-02-21 PROCEDURE — 90935 HEMODIALYSIS ONE EVALUATION: CPT

## 2020-02-21 PROCEDURE — 83735 ASSAY OF MAGNESIUM: CPT

## 2020-02-21 PROCEDURE — 74011250636 HC RX REV CODE- 250/636: Performed by: INTERNAL MEDICINE

## 2020-02-21 PROCEDURE — 74011250637 HC RX REV CODE- 250/637: Performed by: INTERNAL MEDICINE

## 2020-02-21 PROCEDURE — 85025 COMPLETE CBC W/AUTO DIFF WBC: CPT

## 2020-02-21 PROCEDURE — 80053 COMPREHEN METABOLIC PANEL: CPT

## 2020-02-21 RX ORDER — DOXERCALCIFEROL 4 UG/2ML
1 INJECTION INTRAVENOUS
Status: DISCONTINUED | OUTPATIENT
Start: 2020-02-21 | End: 2020-02-25 | Stop reason: HOSPADM

## 2020-02-21 RX ADMIN — PANTOPRAZOLE SODIUM 40 MG: 40 TABLET, DELAYED RELEASE ORAL at 16:30

## 2020-02-21 RX ADMIN — NEPHROCAP 1 CAPSULE: 1 CAP ORAL at 08:00

## 2020-02-21 RX ADMIN — DEXTROSE MONOHYDRATE 25 ML/HR: 5 INJECTION, SOLUTION INTRAVENOUS at 08:13

## 2020-02-21 RX ADMIN — CALCIUM ACETATE 667 MG: 667 CAPSULE ORAL at 08:00

## 2020-02-21 RX ADMIN — FERROUS GLUCONATE TAB 324 MG (37.5 MG ELEMENTAL IRON) 1 TABLET: 324 (37.5 FE) TAB at 08:00

## 2020-02-21 RX ADMIN — LEVOTHYROXINE SODIUM 100 MCG: 100 TABLET ORAL at 05:05

## 2020-02-21 RX ADMIN — DEXTROSE MONOHYDRATE 25 ML/HR: 5 INJECTION, SOLUTION INTRAVENOUS at 18:14

## 2020-02-21 RX ADMIN — PANTOPRAZOLE SODIUM 40 MG: 40 TABLET, DELAYED RELEASE ORAL at 07:59

## 2020-02-21 NOTE — PROGRESS NOTES
Per Apolonia Carpenter at [facility], patient will be set up with [facility], however patient still does not have a chairtime due to suspicious Hep B results, which they are sending out for further testing. Enma instructed CM to call back this afternoon for updates.            BLANE Fall  Case Management  [phone]

## 2020-02-21 NOTE — PROGRESS NOTES
conducted a Follow up consultation and Spiritual Assessment for Vale Romo, who is a 62 y.o.,male. The  provided the following Interventions:  Continued the relationship of care and support. Listened empathically. Offered prayer and assurance of continued prayer on patients behalf. Chart reviewed. The following outcomes were achieved:  Patient expressed gratitude for 's visit. Assessment:  There are no further spiritual or Church issues which require Spiritual Care Services interventions at this time. Plan:  Chaplains will continue to follow and will provide pastoral care on an as needed/requested basis.  recommends bedside caregivers page  on duty if patient shows signs of acute spiritual or emotional distress.        [id number]   [phone]

## 2020-02-21 NOTE — PROGRESS NOTES
Progress Note    Patient: Manuela Denny MRN: [id number]  CSN: [id number]    YOB: 1960  Age: 62 y.o. Sex: male    DOA: 2/15/2020 LOS:  LOS: 6 days                    Subjective:     Pt hypothermic this morning and on air warmer. Endocrinology followed up for chronic hypoglycemia,  Hypothermia ordered IV dextrose to maintain glucose level above 50 and further testing ordered. Fingerstick glucose most recent 96. Humalog discontinued, check glucose level before bed and give before bed snacks. Discussed with nursing staff he had no hallucinations overnight and is not currently agitated. GI ordered an MRCP yesterday but he was unable to stay still and calm during the scan and it was unable to be completed. They will attempt the MRCP on another day when the patient is more cooperative. BP remains stable off pressors, Stable asymptomatic bradycardia. Pt has dialysis catheter Rt Subclavian and Rt IJC . GI saw pt for thrombocytopenia and ultrasound showed dilated CBD without discrete obstructing process and cholelithiasis and pericholecystic fluid. GI ordered an MRCP for further evaluation which will be completed when patient is more cooperative. Hepatitis b and c titers pending. Discussed pancytopenia with heme/onc dr. Franco Nova, suspected that myelosuppression is from substance abuse and the form of alcohol and drugs, patient needs to remain abstinent. Recommended no indication for bone marrow biopsy at this time. Patient continues to decline need for outpatient drug/alcohol rehab program or information, case management consulted to provide patient resources in case he changes his mind. Counseled patient extensively on need to abstain. Patient reports he snorts heroin, denies any IV drug use. Patient now with [facility] for HD access.   Discussed with patient at length ongoing use of drugs and alcohol and especially if he were to use IV drugs high risk for severe infection and death.    Review of systems  General: No fevers or chills. Rt IJ tripper lumen CVC, Rt subclavian dialysis catheter   Cardiovascular: No chest pain or pressure. No palpitations. Pulmonary: No shortness of breath, cough or wheeze. Gastrointestinal: No abdominal pain, nausea, vomiting or diarrhea. Genitourinary: decrease urine out pit started on dialysis   Musculoskeletal: quadriparesis after neck surgery   Neurologic: No headache,  No seizure generalized weakness deformity both hand     Objective:     Physical Exam:  Visit Vitals  /87   Pulse (!) 47   Temp 97.4 °F (36.3 °C)   Resp 19   Ht 6' (1.829 m)   Wt 74.8 kg (165 lb)   SpO2 97%   BMI 22.38 kg/m²        General:         Alert, , no acute distress    HEENT: NC, Atraumatic. PERRLA, anicteric sclerae. TLC CVC to right IJ. Lungs: CTA Bilaterally. No Wheezing/Rhonchi/Rales. TDC to right upper chest.  Heart:  Bradycardia. No murmur, No Rubs, No Gallops  Abdomen: Soft, Non distended, Non tender.    Extremities: ecchymosis Lt arm stable, trace lower limb edema   Psych:   Not anxious or agitated. Neurologic:  CN 2-12 grossly intact, Alert, confused.  Oriented to self. quadriparesis   Intake and Output:  Current Shift:  02/21 0701 - 02/21 1900  In: 25 [I.V.:25]  Out: 50 [Urine:50]  Last three shifts:  02/19 1901 - 02/21 0700  In: 1540 [P.O.:640; I.V.:900]  Out: 1052 [Urine:700; Drains:350]    Labs: Results:       Chemistry Recent Labs     02/21/20  0426 02/20/20  0500 02/19/20  0534   GLU 98 69* 24*    143 142   K 3.6 3.6 4.0    110 111   CO2 25 26 23   BUN 33* 30* 42*   CREA 4.81* 4.38* 5.36*   CA 8.2* 8.2* 7.9*   AGAP 7 7 8   BUCR 7* 7* 8*   AP 95 88 86   TP 6.3* 6.3* 6.2*   ALB 2.8* 2.9* 2.9*   GLOB 3.5 3.4 3.3   AGRAT 0.8 0.9 0.9      CBC w/Diff Recent Labs     02/21/20  0426 02/20/20  0500 02/19/20  0534   WBC 3.3* 3.2* 3.1*   RBC 2.63* 2.70* 2.58*   HGB 7.7* 7.8* 7.5*   HCT 23.3* 24.4* 23.6*   PLT 39* 39* 44*   GRANS 71 71 72   LYMPH 19* 20 21   EOS 0 1 1      Cardiac Enzymes No results for input(s): CPK, CKND1, LINDA in the last 72 hours. No lab exists for component: CKRMB, TROIP   Coagulation No results for input(s): PTP, INR, APTT, INREXT, INREXT in the last 72 hours. Lipid Panel Lab Results   Component Value Date/Time    Cholesterol, total 148 02/11/2020 02:24 PM    HDL Cholesterol 71 02/11/2020 02:24 PM    LDL, calculated 63 02/11/2020 02:24 PM    VLDL, calculated 19.4 08/27/2019 09:52 AM    Triglyceride 72 02/11/2020 02:24 PM    CHOL/HDL Ratio 2.1 02/11/2020 02:24 PM      BNP No results for input(s): BNPP in the last 72 hours.    Liver Enzymes Recent Labs     02/21/20  0426   TP 6.3*   ALB 2.8*   AP 95   SGOT 21      Thyroid Studies Lab Results   Component Value Date/Time    TSH 1.93 02/20/2020 05:00 AM          Procedures/imaging: see electronic medical records for all procedures/Xrays and details which were not copied into this note but were reviewed prior to creation of Plan    Medications:   Current Facility-Administered Medications   Medication Dose Route Frequency    calcium acetate(phosphat bind) (PHOSLO) capsule 667 mg  1 Cap Oral TID WITH MEALS    dextrose 5% infusion  25 mL/hr IntraVENous CONTINUOUS    LORazepam (ATIVAN) injection 0.5 mg  0.5 mg IntraVENous Q6H PRN    glucose chewable tablet 16 g  4 Tab Oral PRN    glucagon (GLUCAGEN) injection 1 mg  1 mg IntraMUSCular PRN    dextrose (D50W) injection syrg 12.5-25 g  25-50 mL IntraVENous PRN    epoetin cristy-epbx (RETACRIT) 12,000 Units combo injection  12,000 Units SubCUTAneous Q TUE, THU & SAT    diphenhydrAMINE (BENADRYL) injection 12.5 mg  12.5 mg IntraVENous Q6H PRN    pantoprazole (PROTONIX) tablet 40 mg  40 mg Oral ACB&D    levothyroxine (SYNTHROID) tablet 100 mcg  100 mcg Oral 6am    B complex-vitaminC-folic acid (NEPHROCAP) cap  1 Cap Oral DAILY    ferrous gluconate 324 mg (37.5 mg iron) tablet 1 Tab  1 Tab Oral BID WITH MEALS       Assessment/Plan Principal Problem:    Acute renal failure superimposed on stage 3 chronic kidney disease ([address] (2/15/2020)    Active Problems:    Chronic hepatitis C without hepatic coma ([address] (5/31/2017)      Essential hypertension (4/28/2017)      IV drug abuse ([address] (4/28/2017)      Quadriparesis ([address] (5/31/2017)      Renal cell carcinoma of left kidney ([address] (12/17/2013)      Overview: Pathological Stage [id number] cc RCC FG3 s/p left open partial nephrectomy       in 12/13        COPD, moderate ([address] (1/31/2019)      Hypothyroid (1/31/2019)      UTI (urinary tract infection) (2/15/2020)      Acute renal failure superimposed on chronic kidney disease ([address] (2/15/2020)      Bradycardia (2/15/2020)      ESRD (end stage renal disease) ([address] (2/17/2020)      Secondary hyperparathyroidism of renal origin ([address] (2/17/2020)    plan     Asymptomatic Bradycardia  Cardiology consulted, recommended stable in marked sinus bradycardia, keep off all AV blocking drugs. Echo done this admission non change from previous echo  No further cardiac workup at this time, signed off 2/18/20. Monitor on tele     Acute renal failure on top of chronic kidney disease  RT TDC  per IR placed 2/18/20  Nephrology, Dr. Girish Moise, started dialysis 2/18  Continue monitor potassium level and volume overload     Urinary tract infection ruled out, urine culture positive for yeast only/ pt has suprapubic cath was changes [facility] at [facility]  History of Renal Cell Carcinoma s/p L partial nephrectomy 12/2013, monitored by Dr. Arnulfo Colón urology outpatient. Chronic urinary retention with history of C-spine laminectomy and post-op paralysis s/p [address] 10/2018. Discontinued Rocephin 1 g IV  Blood cultures no growth  Urine culture >100K yeast, chronic, previously thought to be colonizer       Thrombocytopenia/pancytopenia, anemia of chronic disease  Hematology oncology consulted, Dr. Nicolle Mcginnis following.     Dr. Nicolle Mcginnis does not recommend bone marrow biopsy at this time, notes myelosuppression likely from polysubstance abuse. Continues on retacrit 12,000 units Three times weekly     Hypertension/ possible autonomic neuropathy after neck surgery   Continue hydralazine 25 mg p.o. every 8 hours as needed systolic blood pressure above 160     Diabetes mellitus  Hemoglobin A1c on two 2/11/20 4.9   LDL 63  Hypoglycemia protocol  Nutrition consult for protein/calorie malnutrition and hypoglycemia   Endocrinology consulted, Dr. Fermin Roger, following.        Hepatitis C/ H/O upper GI bleeding gastritis, history of Hep B  Patient was treated for hepatitis C with Dr. Paige Arredondo  Protonix 40 mg daily  Continue to monitor liver function  Ongoing drug abuse/heroin, Hep C ab positive history of hep c treated will check hep c titers ensure no new infection  HIV negative  Hep B core ab positive, surface Ag negative no active infection suggested. GI consult done ordered ultrasound liver, showed dilated CBD without discrete obstructing process and cholelithiasis and pericholecystic fluid. GI ordered MRCP, will be completed when pt is more cooperative   Case manger consult to explore drug rehab options with pt   Discussed with pt again today, continue to decline inpatient rehab, also declines need for outpatient resources.   Discussed at length high risk for severe illness and death with ongoing drug and alcohol use.          Transfer to step down when bed available  F/u Endocrinology consult recs testosterone , ACTH lab pending work up for pan hypopit so far negative FSH and LH within  normal  Before bed snacks  Ativan 0.5 mg q 6h prn agitation   Cbc, cmp, mag in AM  Monitor platelet number   F/u MRCP might need HIDA scan hepatitis b and c titers   pt and ot evaluation and treatment before discharge       DVT/GI Prophylaxis: SCD's and H2B/PP    The patient was seen and examined independently, I agree with the student note with above changes      Lydia Vargas MD  2/21/2020 8:45 AM

## 2020-02-21 NOTE — PROGRESS NOTES
New OT order received and chart reviewed. Patient currently on OT caseload; order acknowledged.       Thank you for the referral.   Dariana Leblanc MS, OTR/L

## 2020-02-21 NOTE — PROGRESS NOTES
RENAL PROGRESS NOTE: Pt seen on dialysis. Follow up of ESRD     Present on Admission:   Acute renal failure superimposed on stage 3 chronic kidney disease ([address]   UTI (urinary tract infection)   Chronic hepatitis C without hepatic coma ([address]   Essential hypertension   IV drug abuse ([address]   Quadriparesis ([address]   Renal cell carcinoma of left kidney (HCC)   COPD, moderate ([address]   Hypothyroid   Acute renal failure superimposed on chronic kidney disease ([address]   ESRD (end stage renal disease) ([address]         Subjective: Feels good, no SOB, reviewed all events. [facility] [place] [facility]    Patient is on Dialysis.      Objective:    Patient Vitals for the past 12 hrs:   Temp Pulse Resp BP SpO2   02/21/20 1345 -- (!) 47 -- 155/85 --   02/21/20 1330 -- (!) 48 -- 157/89 --   02/21/20 1315 -- (!) 45 -- 158/86 --   02/21/20 1300 -- (!) 46 -- 151/87 --   02/21/20 1245 -- (!) 49 -- 163/84 --   02/21/20 1230 -- (!) 49 -- 150/82 --   02/21/20 1215 -- (!) 50 -- 160/87 --   02/21/20 1200 -- (!) 53 -- (!) 153/92 --   02/21/20 1145 -- (!) 52 -- 191/83 --   02/21/20 1130 -- (!) 54 -- 167/88 --   02/21/20 1115 -- (!) 50 -- 144/78 --   02/21/20 1100 -- (!) 51 -- 154/85 --   02/21/20 1021 97.5 °F (36.4 °C) (!) 55 16 144/74 --   02/21/20 0931 97.4 °F (36.3 °C) -- -- -- --   02/21/20 0800 (!) 94.2 °F (34.6 °C) (!) 47 19 167/87 97 %   02/21/20 0700 -- (!) 41 11 -- 97 %   02/21/20 0600 -- (!) 44 13 -- 96 %   02/21/20 0500 -- (!) 48 18 -- 94 %   02/21/20 0403 97.3 °F (36.3 °C) (!) 48 12 167/89 98 %   02/21/20 0400 -- (!) 48 15 -- 97 %   02/21/20 0300 -- (!) 49 16 -- 97 %        Intake/Output Summary (Last 24 hours) at 2/21/2020 1444  Last data filed at 2/21/2020 0800  Gross per 24 hour   Intake 720 ml   Output 460 ml   Net 260 ml       Physical Assessment:     General Appearance: NAD  Lung: clear to auscultation  Heart: regular rate and rhythm and no murmurs, clicks or gallops  Lower Extremities:  No edema   Access: HD catheter, qb 350    Labs    CBC w/Diff    Recent Labs     02/21/20  0426 02/20/20  0500 02/19/20  0534   WBC 3.3* 3.2* 3.1*   RBC 2.63* 2.70* 2.58*   HGB 7.7* 7.8* 7.5*   HCT 23.3* 24.4* 23.6*   MCV 88.6 90.4 91.5   MCH 29.3 28.9 29.1   MCHC 33.0 32.0 31.8   RDW 20.3* 21.0* 21.1*    Recent Labs     02/21/20  0426 02/20/20  0500 02/19/20  0534   BANDS  --  2  --    MONOS 10* 6 6   EOS 0 1 1   BASOS 0 0 0   RDW 20.3* 21.0* 21.1*        Comprehensive Metabolic Profile    Recent Labs     02/21/20  0426 02/20/20  0500 02/19/20  0534    143 142   K 3.6 3.6 4.0    110 111   CO2 25 26 23   BUN 33* 30* 42*   CREA 4.81* 4.38* 5.36*    Recent Labs     02/21/20  0426 02/20/20  0500 02/19/20  0534   CA 8.2* 8.2* 7.9*   PHOS  --  4.1 5.9*  5.9*   ALB 2.8* 2.9* 2.9*   TP 6.3* 6.3* 6.2*   SGOT 21 19 19   TBILI 2.6* 1.5* 1.4*          Basic Metabolic Profile       results  reviwed. MEDS:Reviwed.   Current Facility-Administered Medications   Medication Dose Route Frequency Provider Last Rate Last Dose    calcium acetate(phosphat bind) (PHOSLO) capsule 667 mg  1 Cap Oral TID WITH MEALS Gadiel Santos MD   667 mg at 02/21/20 0800    dextrose 5% infusion  25 mL/hr IntraVENous CONTINUOUS Howie Osorio MD 25 mL/hr at 02/21/20 0813 25 mL/hr at 02/21/20 0813    LORazepam (ATIVAN) injection 0.5 mg  0.5 mg IntraVENous Q6H PRN Gina Jackson MD   0.5 mg at 02/19/20 1452    glucose chewable tablet 16 g  4 Tab Oral PRN Gina Jackson MD   16 g at 02/18/20 0845    glucagon (GLUCAGEN) injection 1 mg  1 mg IntraMUSCular PRN Gina Jackson MD        dextrose (D50W) injection syrg 12.5-25 g  25-50 mL IntraVENous PRN Gina Jackson MD   25 g at 02/19/20 0706    epoetin cristy-epbx (RETACRIT) 12,000 Units combo injection  12,000 Units SubCUTAneous Q TUE, THU & SAT Nora Pretty, NP   12,000 Units at 02/20/20 2127    diphenhydrAMINE (BENADRYL) injection 12.5 mg  12.5 mg IntraVENous Q6H PRN Reineke-Piper, Chrissie Ryan MD   12.5 mg at 02/19/20 2231    pantoprazole (PROTONIX) tablet 40 mg  40 mg Oral ACB&D Dipak Sidhu MD   40 mg at 02/21/20 0759    levothyroxine (SYNTHROID) tablet 100 mcg  100 mcg Oral Vale Wilkins MD   100 mcg at 02/21/20 0505    B complex-vitaminC-folic acid (NEPHROCAP) cap  1 Cap Oral DAILY Dipak Sidhu MD   1 Cap at 02/21/20 0800    ferrous gluconate 324 mg (37.5 mg iron) tablet 1 Tab  1 Tab Oral BID WITH MEALS Dipak Sidhu MD   1 Tab at 02/21/20 0800       Impression:    ESRD: Tolerating HD well. Anemia of CKD: on  Epogen. Hyperparathyroidism Yes  Hep B  Hypoglycemia  Plan  Dialysis for volume and solute management  Epogen  ( Retacrit ) will change to q Mon,Wed & Friady  Hectorol: 1 microgram  q HD. Will wait for Viral load of Hep B. If non significant  Then will start OP dialysis at [facility] unit at  [facility]. MRCP post dialysis today.     Allyn Zamora MD

## 2020-02-21 NOTE — PROGRESS NOTES
Hematology/Medical Oncology Progress Note             Name: Esthela Winston   : 1960   MRN: [id number]   Date: 2020 8:45 AM     [x]I have reviewed the flowsheet and previous days notes. Events overnight reviewed and discussed with nursing staff. Vital signs and records reviewed. Mr. Rosa Coronado is a 49-year-old -American male with a history of diabetes mellitus, hypothyroidism, chronic renal failure, thrombocytopenia, suprapubic catheter due to chronic urinary retention quadriparesis after cervical spine infection and surgery. He has a history of bradycardia and has been evaluated by cardiology in the past.  He also has a history of chronic polysubstance abuse reported that the last heroin use was about 4 days prior to this admission. Pt was seen by PCP and instructed to come to ED due to worsening Creatinine. Patient was seen by nephrology and admitted to start dialysis. Subsequently the patient was found to have pancytopenia and required platelet tranfsusion prior to his dialysis catheter placement on yesterday. ROS:  Constitutional:  Negative for fever, chills, diaphoresis, activity change, appetite change and unexpected weight change. HENT: Negative for nosebleeds, congestion, facial swelling, mouth sores, trouble swallowing, neck pain, neck stiffness, voice change and postnasal drip. Eyes: Negative for photophobia, pain, discharge and itching. Respiratory: Negative for apnea, cough, choking, chest tightness, wheezing and stridor. Cardiovascular: Negative for chest pain, palpitations and leg swelling. Gastrointestinal: Negative for abdominal pain. Negative for nausea, diarrhea, constipation, blood in stool and rectal pain. Genitourinary: Negative for dysuria, urgency, hematuria, flank pain and difficulty urinating. Musculoskeletal: Negative for back pain, joint swelling, arthralgias and gait problem.    Skin: Positive bruising noted at LUE, with swelling, bruising noted at LLE (varner catheter resting on this area). Neurological:  Negative for dizziness, facial asymmetry, speech difficulty, light-headedness and headaches. Hematological: Negative for adenopathy. Does not bruise/bleed easily. Psychiatric/Behavioral: Negative for hallucinations, confusion, disturbed wake/sleep cycle and agitation. Vital Signs:    Visit Vitals  /87   Pulse (!) 47   Temp (!) 94.2 °F (34.6 °C) Comment: [employer]   Resp 19   Ht 6' (1.829 m)   Wt 74.8 kg (165 lb)   SpO2 97%   BMI 22.38 kg/m²       O2 Device: Room air       Temp (24hrs), Av °F (36.1 °C), Min:94.2 °F (34.6 °C), Max:97.6 °F (36.4 °C)       Intake/Output:   Last shift:       07 -  190  In: 25 [I.V.:25]  Out: 50 [Urine:50]  Last 3 shifts:  190 -  0700  In: 1118 [P.O.:640; I.V.:900]  Out: 1052 [Urine:700; Drains:350]    Intake/Output Summary (Last 24 hours) at 2020 0901  Last data filed at 2020 0800  Gross per 24 hour   Intake 780 ml   Output 675 ml   Net 105 ml       Physical Exam:  General: Appears comfortable, NAD. HEENT:  Anicteric sclerae; pink palpebral conjunctivae; mucosa moist  Resp:  Symmetrical chest expansion, no accessory muscle use; good airway entry; no rales/ wheezing/ rhonchi noted  CV:  S1, S2 present; regular rate and rhythm  GI:  Abdomen soft, non-tender; (+) active bowel sounds  Extremities:  +2 pulses on all extremities; RUE edema-improving. Skin:  Warm; Bruising at LUE and LLE thigh area (varner cath resting on this area).   Neurologic:  Non-focal        DATA:   Current Facility-Administered Medications   Medication Dose Route Frequency    calcium acetate(phosphat bind) (PHOSLO) capsule 667 mg  1 Cap Oral TID WITH MEALS    dextrose 5% infusion  25 mL/hr IntraVENous CONTINUOUS    LORazepam (ATIVAN) injection 0.5 mg  0.5 mg IntraVENous Q6H PRN    glucose chewable tablet 16 g  4 Tab Oral PRN    glucagon (GLUCAGEN) injection 1 mg  1 mg IntraMUSCular PRN    dextrose (D50W) injection syrg 12.5-25 g  25-50 mL IntraVENous PRN    epoetin cristy-epbx (RETACRIT) 12,000 Units combo injection  12,000 Units SubCUTAneous Q TUE, THU & SAT    diphenhydrAMINE (BENADRYL) injection 12.5 mg  12.5 mg IntraVENous Q6H PRN    pantoprazole (PROTONIX) tablet 40 mg  40 mg Oral ACB&D    levothyroxine (SYNTHROID) tablet 100 mcg  100 mcg Oral 6am    B complex-vitaminC-folic acid (NEPHROCAP) cap  1 Cap Oral DAILY    ferrous gluconate 324 mg (37.5 mg iron) tablet 1 Tab  1 Tab Oral BID WITH MEALS                    Labs:  Recent Labs     02/21/20  0426 02/20/20  0500 02/19/20  0534   WBC 3.3* 3.2* 3.1*   HGB 7.7* 7.8* 7.5*   HCT 23.3* 24.4* 23.6*   PLT 39* 39* 44*     Recent Labs     02/21/20  0426 02/20/20  0500 02/19/20  0534    143 142   K 3.6 3.6 4.0    110 111   CO2 25 26 23   GLU 98 69* 24*   BUN 33* 30* 42*   CREA 4.81* 4.38* 5.36*   CA 8.2* 8.2* 7.9*   MG 1.9 1.9 1.9   PHOS  --  4.1 5.9*  5.9*   ALB 2.8* 2.9* 2.9*   SGOT 21 19 19   ALT 18 20 24     No results for input(s): PH, PCO2, PO2, HCO3, FIO2 in the last 72 hours. IMPRESSION:   · Chronic thrombocytopenia  · Acute renal failure superimposed on chronic kidney disease age 9  · Bradycardia  · Chronic hepatitis C  · Polysubstance abuse-recent heroin use           PLAN:   · Platelet count today is 39,000 , s/p 2 units of platelets. No intervention warranted at this time. The severe chronic thrombocytopenia is due to his long-term myelosuppression from substance abuse in the form of alcohol and drugs. · H/H 7.7/23.3, will recommend PRBC transfusion for hemoglobin less than 7g/dl. Continue Retacrit 12,000 units (nephrology preferred dose)  to be given 3 times a week with dialysis while inpatient on dialysis days. · Substance abuse cessation encouraged. May benefit from substance abuse center referral-will defer to attending.   · Patient to follow-up in the outpatient hematology/oncology clinic in 5 to 7 days after discharge.  ·        The patient is: [x] acutely ill Risk of deterioration: [] moderate    [] critically ill  [] high         My assessment/plan was discussed with:  [x]nursing []PT/OT    []respiratory therapy []     []family []       Sacha Natarajan MD

## 2020-02-21 NOTE — DIALYSIS
[facility]        ACUTE HEMODIALYSIS FLOW SHEET      HEMODIALYSIS ORDERS: Physician: Kenya Russell: isaac   Duration: 3.5 hr  BFR: 300   DFR: 600   Dialysate:  Temp 36-37*C  K+   3    Ca+  3 Na 138 Bicarb 35   Weight:  74.8 kg kg    Patient Chart [x]     Unable to Obtain []   Dry weight/UF Goal: 1500   Access right chest CVL     Heparin[]  Bolus      Units    [] Hourly       Units    [x]None      Catheter locking solution heparin   Pre BP:   144/74    Pulse:     55       Respirations: 16  Temperature:   97.5   Labs: Pre        Post:        [x] N/A   Additional Orders(medications, blood products, hypotension management):       [x] N/A     [x] Veronica Consent Verified     CATHETER ACCESS: []N/A   [x]Right   []Left   []IJ     []Fem   [x]chest wall   [] First use X-ray verified     [x]Tunnel                [] Non Tunneled   [x]No S/S infection  []Redness  []Drainage []Cultured []Swelling []Pain   [x]Medical Aseptic Prep Utilized   []Dressing Changed  [x] Biopatch  Date: 02/19/2020       []Clotted   []Patent   Flows: [x]Good  []Poor  []Reversed   If access problem,  notified: []Yes    [x]N/A  Date:           GRAFT/FISTULA ACCESS:  [x]N/A     []Right     []Left     []UE     []LE   []AVG   []AVF        []Buttonhole    []Medical Aseptic Prep Utilized   []No S/S infection  []Redness  []Drainage []Cultured []Swelling []Pain    Bruit:   [] Strong    [] Weak       Thrill :   [] Strong    [] Weak       Needle Gauge:    Length:     If access problem,  notified: []Yes     [x]N/A  Date:        Please describe access if present and not used:                            GENERAL ASSESSMENT:      LUNGS:  Rate 16 SaO2%        [] N/A    [x] Clear  [] Coarse  [] Crackles  [] Wheezing        [] Diminished     Location : []RLL   []LLL    []RUL  []MYLENE     Cough: []Productive  []Dry  [x]N/A   Respirations:  []Easy  []Labored     Therapy:   [x]RA  []NC  l/min    Mask: []NRB []Venti       O2%                  []Ventilator  []Intubated [] Trach  [] BiPaP     CARDIAC: [x]Regular      [] Irregular   [] Pericardial Rub  [] JVD        []  Monitored  [] Bedside  [] Remotely monitored [] N/A  Rhythm: melecio     EDEMA: [x] None  []Generalized  [] Pitting [] 1    [] 2    [] 3    [] 4                 [] Facial  [] Pedal  []  UE  [] LE     SKIN:   [x] Warm  [] Hot     [] Cold   [x] Dry     [] Pale   [] Diaphoretic                  [] Flushed  [] Jaundiced  [] Cyanotic  [] Rash  [] Weeping     LOC:    [x] Alert      [x]Oriented:    [x] Person     [x] Place  []Time               [] Confused  [] Lethargic  [] Medicated  [] Non-responsive     GI / ABDOMEN:  [] Flat    [] Distended    [x] Soft    [] Firm   []  Obese                             [] Diarrhea  [x] Bowel Sounds  [] Nausea  [] Vomiting       / URINE ASSESSMENT:[] Voiding   [x] Oliguria  [] Anuria   [x]  Willis     [] Incontinent    []  Incontinent Brief      []  Bathroom Privileges        PAIN: [x] 0 []1  []2   []3   []4   []5   []6   []7   []8   []9   []10              Scale 0-10  Action/Follow Up:      MOBILITY:  [] Amb    [] Amb/Assist    [x] Bed    [] Wheelchair  [] Stretcher      All Vitals and Treatment Details on Attached [address]: [facility]          Room # 2353/01      [] 1st Time Acute  [] Stat  [x] Routine  [] Urgent     [x] Acute Room  []  Bedside  [] ICU/CCU  [] ER   Isolation Precautions:   There are currently no Active Isolations      Special Considerations:         [] Blood Consent Verified [x]N/A     ALLERGIES:   Allergies   Allergen Reactions    Iodine Hives and Other (comments)               Code Status:Full Code        Hepatitis Status:    2nd RN check: PK                    Lab Results   Component Value Date/Time    Hepatitis A, IgM NEGATIVE  03/28/2019 11:15 AM    Hepatitis B surface Ag <0.10 02/17/2020 02:55 AM    Hepatitis B surface Ab 8.77 (L) 02/17/2020 02:55 AM    Hep B Core Ab,Total Positive (A) 09/15/2010 08:15 AM    Hepatitis B core, IgM EQUIVOCAL (A) 02/19/2020 10:13 AM    Hepatitis Be AG Negative 11/15/2010 09:55 AM    Hep B Core Ab, IgM Indeterminate 04/02/2019 05:29 AM    Hep B Core Ab, total Positive (A) 02/17/2020 02:55 AM    Hepatitis C virus Ab >11.00 (H) 02/17/2020 02:55 AM                     Current Labs:   Lab Results   Component Value Date/Time    Sodium 137 02/21/2020 04:26 AM    Potassium 3.6 02/21/2020 04:26 AM    Chloride 105 02/21/2020 04:26 AM    CO2 25 02/21/2020 04:26 AM    Anion gap 7 02/21/2020 04:26 AM    Glucose 98 02/21/2020 04:26 AM    BUN 33 (H) 02/21/2020 04:26 AM    Creatinine 4.81 (H) 02/21/2020 04:26 AM    BUN/Creatinine ratio 7 (L) 02/21/2020 04:26 AM    GFR est AA 15 (L) 02/21/2020 04:26 AM    GFR est non-AA 12 (L) 02/21/2020 04:26 AM    Calcium 8.2 (L) 02/21/2020 04:26 AM      Lab Results   Component Value Date/Time    WBC 3.3 (L) 02/21/2020 04:26 AM    Hemoglobin, POC 8.2 (L) 10/11/2018 09:34 AM    HGB 7.7 (L) 02/21/2020 04:26 AM    Hematocrit, POC 24 (L) 10/11/2018 09:34 AM    HCT 23.3 (L) 02/21/2020 04:26 AM    PLATELET 39 (L) 08/86/9762 04:26 AM    MCV 88.6 02/21/2020 04:26 AM                                                                                     DIET: DIET NUTRITIONAL SUPPLEMENTS  DIET NUTRITIONAL SUPPLEMENTS  DIET NPO       PRIMARY NURSE REPORT: First initial/Last name/Title      Pre Dialysis: RADHA Lopes     Time: 0920      EDUCATION:    [x] Patient [] Other         Knowledge Basis: []None [x]Minimal [] Substantial   Barriers to learning [x]N/A   [] Access Care     [] S&S of infection     [] Fluid Management     []K+     [x]Procedural    []Albumin     [] Medications     [] Tx Options     [] Transplant     [] Diet     [] Other   Teaching Tools:  [x] Explain  [] Demo  [] Handouts [] Video  Patient response:   [x] Verbalized understanding  [] Teach back  [] Return demonstration [] Requires follow up   Inappropriate due to            [x]Time Out/Safety Check  [x]Extracorporeal Circuit Tested for integrity RO/HEMODIALYSIS MACHINE SAFETY CHECKS - Before each treatment:     Machine Number:                   [facility]                                   [x] Unit Machine # 6 with centralized RO                                     Alarm Test:  Pass time 0930               [x] RO/Machine Log Complete      Temp    36             Dialysate: pH  7.4 Conductivity: Meter   14.2     HD Machine   13.7                  TCD: 13.8  Dialyzer Lot # [id number]            Blood Tubing Lot # [id number]          Saline Lot #  [id number]     CHLORINE TESTING-Before each treatment and every 4 hours    Total Chlorine: [x] less than 0.1 ppm  Time: 0900 4 Hr/2nd Check Time: 1300   (if greater than 0.1 ppm from Primary then every 30 minutes from Secondary)     TREATMENT INITIATION - with Dialysis Precautions:   [x] All Connections Secured                 [x] Saline Line Double Clamped   [x] Venous Parameters Set                  [x] Arterial Parameters Set    [x] Prime Given 250ml                          [x]Air Foam Detector Engaged      Treatment Initiation Note:Pt arrived to unit in bed via transport. Right chest TDC assessed, clean, patent, and intact. HD initiated without difficulty. During Treatment Notes: PT tolerating HD well. Medication Dose Volume Route Time [facility] name Title   none      RN                                              Post Assessment:   Dialyzer Cleared: [] Good [x] Fair  [] Poor   Blood processed:  63.9 L  UF Removed  1500 Ml  POst BP:   167/96       Pulse: 52        Respirations: 16  Temperature: 96.6 Lungs:     [x] Clear      [] Course         [] Crackles    [] Wheezing         [] Diminished   Post Tx Vascular Access:   AVF/AVG: Bleeding stopped    N/A  Cardiac:   [x] Regular   [] Irregular   [] Monitor  [] N/A      Rhythm:    melecio   Catheter:   Locking solution: Heparin 1:1000   Art.  1.9 Venancio. 1.9  N/A    Skin:   Pain:    [x] Warm  [x] Dry [] Diaphoretic    [] Flushed    [] Pale [] Cyanotic [x]0  []1  []2   []3  []4   []5   []6   []7   []8   []9   []10     Post Treatment Note:   Pt tolerated HD well without incident. 1.5 UF removed. NAD noted during or post treatment.        POST TREATMENT PRIMARY NURSE HANDOFF REPORT:     First initial/Last name/Title         Post Dialysis: 9052 Time:  JAIRON Sousa RN     Abbreviations: AVG-arterial venous graft, AVF-arterial venous fistula, IJ-Internal Jugular, Subcl-Subclavian, Fem-Femoral, Tx-treatment, AP/HR-apical heart rate, DFR-dialysate flow rate, BFR-blood flow rate, AP-arterial pressure, -venous pressure, UF-ultrafiltrate, TMP-transmembrane pressure, Venancio-Venous, Art-Arterial, RO-Reverse Osmosis

## 2020-02-21 NOTE — PROGRESS NOTES
1327 - Pt off unit for HD. Will follow up later in day. 1351 - Pt still off floor. Per nursing and care manager pt will being going to MRI from HD. Will follow up at later date.

## 2020-02-21 NOTE — PROGRESS NOTES
Shift Summary:  Pt had an uneventful shift. Pt A/Ox4 through out the night, never observed him having any hallucinations. He used the call bell to advise he needed to use the bathroom. Pt wanted to get up and go to the toilet but advised he needs to work with PT a little more and get stronger. Bath given. Pt SB in 40-50s which is his normal, Clear on RA, supra pubic catheter, no complaints of pain.

## 2020-02-21 NOTE — PROGRESS NOTES
[email or web address]  [phone]    Gastroenterology follow up-Progress note    Impression:  1. Cirrhosis d/t HCV- MELD 21 which is likely creatinine driven. US late fall was negative for [address] EGD in 7/2019 negative for varices. No signs of ascites or HE   2. Chronic Hepatitis C- he has been treated with 12 weeks of Mavyret and achieved SVR 4/2019. HCV NOT detected; no active HCV  3. H/O Hepatitis B. He is known coreAb positive. Neg Surface Ag and has previously had a neg surface Ab (2019). Likely had the virus in the distant past and is immune. He has had negative viral loads in the past with last being 8/2019- HBV viral load from primary team is pending  4. Thrombocytopenia, chronic- mild thrombocytopenia is often seen in cirrhosis (60-90K) but with the decrease in plts this is likely due to another cause. He is being followed heme/onc. US pending to evaluate for splenomegaly   5. Acute on chronic kidney disease- nephrology following; dialysis 3x/weekly  6. IV drug abuse; still uses heroin. Last use was 4 days prior to admission- he  7. Quadriparesis  8. H/o RCC s/p partial nephrectomy (2013); suprapubic cath in place  9. Bradycardia  10. Secondary hyperparathyroidism  11. UTI with suprapubic cath in place  12. Multiple gallbladder polyps vs stones; noted on US in 8/2019  Again noted on US 2/18/20 - pericholecystic fluid is noted but thought to be from liver disease; remains asymptomatic  13. Anemia, likely multifactorial: no signs of overt GI bleeding  14. Dilated CBD without discrete obstructing process    Plan:  1. Will follow viral load for HBV- still pending  2. Monitor LFTs/platelets  3. F/u recommendations from Heme/onc for pancytopenia  4. Will need MRCP to further evaluate dilated CBD especially in light of rising bili. Will need non contrast given renal disease  5. Monitor H/H and transfuse per protocol; no signs of overt GI bleeding; no plans for scopes at this time.    5. Medical management per primary team    Chief Complaint: H/O hepatitis C with thrombocytopenia      Subjective:  No complaints. No abd pain, jaundice. Tolerating diet. ROS: Denies any fevers, chills, rash.      Eyes: conjunctiva normal, EOM normal   Neck: ROM normal, supple and trachea normal   Cardiovascular: heart normal, intact distal pulses, normal rate and regular rhythm   Pulmonary/Chest Wall: breath sounds normal and effort normal   Abdominal: appearance normal, bowel sounds normal and soft, non-acute, non-tender     Patient Active Problem List   Diagnosis Code    Chronic hepatitis C without hepatic coma (HCC) B18.2    Essential hypertension I10    IV drug abuse ([facility] [address] F19.10    Quadriparesis ([facility] U22.26    Umbilical hernia M18.2    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    Chronic kidney disease, stage III (moderate) (HCC) N18.3    Thrombocytopenia (HCC) D69.6    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) Z89.421    Chronic anemia D64.9    Chronic drug abuse ([facility] [address] F19.10    Hypertension I10    Renal cell carcinoma of left kidney (HCC) C64.2    Urethral erosion N36.8    BPH (benign prostatic hyperplasia) N40.0    Bladder atony N31.2    Chronic neck pain M54.2, G89.29    Cirrhosis of liver (HCC) K74.60    COPD, moderate (HCC) J44.9    Dry skin dermatitis L85.3    Hypothyroid E03.9    Lung nodules R91.8    Neck mass R22.1    Pericardial effusion I31.3    Vitamin D deficiency E55.9    Suprapubic catheter (HCC) Z93.59    Renal failure (ARF), acute on chronic (HCC) N17.9, N18.9    Acute renal failure superimposed on stage 3 chronic kidney disease (HCC) N17.9, N18.3    UTI (urinary tract infection) N39.0    Acute renal failure superimposed on chronic kidney disease (HCC) N17.9, N18.9    Bradycardia R00.1    ESRD (end stage renal disease) (HCC) N18.6    Secondary hyperparathyroidism of renal origin ([facility] [address] N25.81         Visit Vitals  BP (!) 153/92   Pulse (!) 53   Temp 97.5 °F (36.4 °C) (Oral) Resp 16   Ht 6' (1.829 m)   Wt 74.8 kg (165 lb)   SpO2 97%   BMI 22.38 kg/m²           Intake/Output Summary (Last 24 hours) at 2/21/2020 1234  Last data filed at 2/21/2020 0800  Gross per 24 hour   Intake 720 ml   Output 460 ml   Net 260 ml       CBC w/Diff    Lab Results   Component Value Date/Time    WBC 3.3 (L) 02/21/2020 04:26 AM    RBC 2.63 (L) 02/21/2020 04:26 AM    HGB 7.7 (L) 02/21/2020 04:26 AM    HCT 23.3 (L) 02/21/2020 04:26 AM    MCV 88.6 02/21/2020 04:26 AM    MCH 29.3 02/21/2020 04:26 AM    MCHC 33.0 02/21/2020 04:26 AM    RDW 20.3 (H) 02/21/2020 04:26 AM    PLT 39 (L) 02/21/2020 04:26 AM    Lab Results   Component Value Date/Time    GRANS 71 02/21/2020 04:26 AM    LYMPH 19 (L) 02/21/2020 04:26 AM    EOS 0 02/21/2020 04:26 AM    BANDS 2 02/20/2020 05:00 AM    BASOS 0 02/21/2020 04:26 AM    MYELO 2 (H) 02/13/2020 03:36 PM    METAS 2.9 (H) 02/11/2020 02:24 PM      Basic Metabolic Profile   Recent Labs     02/21/20  0426 02/20/20  0500    143   K 3.6 3.6    110   CO2 25 26   BUN 33* 30*   CA 8.2* 8.2*   MG 1.9 1.9   PHOS  --  4.1        Hepatic Function    Lab Results   Component Value Date/Time    ALB 2.8 (L) 02/21/2020 04:26 AM    TP 6.3 (L) 02/21/2020 04:26 AM    AP 95 02/21/2020 04:26 AM    Lab Results   Component Value Date/Time    SGOT 21 02/21/2020 04:26 AM          Coags   No results for input(s): PTP, INR, APTT, INREXT, INREXT in the last 72 hours. Marli Whitehead, NP    [facility]. [email or web address]  Phone: [phone]  Pager: [phone]

## 2020-02-21 NOTE — PROGRESS NOTES
1100: Bedside shift change report given to Olegario Mobley RN (oncoming nurse) by Jonathan De RN (offgoing nurse). Report included the following information SBAR and Kardex. Patient currently in dialysis and will be transported to MRI after dialysis. 1446: TRANSFER - IN REPORT:    Verbal report received from PRINCE Fitch(name) on Hoda Wu  being received from dialysis(unit) for routine progression of care      Report consisted of patients Situation, Background, Assessment and   Recommendations(SBAR). Information from the following report(s) SBAR was reviewed with the receiving nurse. Opportunity for questions and clarification was provided. Assessment completed upon patients arrival to unit and care assumed. 1450: Paged MRI, patient will be transported from dialysis to MRI.     1500: MRI called concerning patient's MRI checklist. Patient's chart not on unit. 1900: Bedside shift change report given to Lorraine Eldridge RN (oncoming nurse) by Olegario Mobley RN (offgoing nurse). Report included the following information SBAR and Kardex.

## 2020-02-21 NOTE — PROGRESS NOTES
Pt. not in room. Glucose has been stable with minimal D5 infusion rates. Thyroid functions are normal.INsulin levels pending. I suspect his hypoglycemia and hypothermia will repond to dialysis and appropriate nutrition.

## 2020-02-21 NOTE — PROGRESS NOTES
Progress Note    Patient: Ave Gaytan MRN: [id number]  CSN: [id number]    YOB: 1960  Age: 62 y.o. Sex: male    DOA: 2/15/2020 LOS:  LOS: 6 days                    Subjective:     Pt hypothermic this morning and on air warmer. Endocrinology followed up for chronic hypoglycemia, ordered IV dextrose to maintain glucose level above 50 and further testing ordered. Fingerstick glucose most recent 96. Humalog discontinued, check glucose level before bed and give before bed snacks. Discussed with nursing staff he had no hallucinations overnight and is not currently agitated. GI ordered an MRCP yesterday but he was unable to stay still and calm during the scan and it was unable to be completed. They will attempt the MRCP on another day when the patient is more cooperative. BP remains stable off pressors, Stable asymptomatic bradycardia. Pt has dialysis catheter Rt Subclavian and Rt IJC . GI saw pt for thrombocytopenia and ultrasound showed dilated CBD without discrete obstructing process and cholelithiasis and pericholecystic fluid. GI ordered an MRCP for further evaluation which will be completed when patient is more cooperative. Hepatitis b and c titers pending. Discussed pancytopenia with heme/onc dr. Fritz Ramirez, suspected that myelosuppression is from substance abuse and the form of alcohol and drugs, patient needs to remain abstinent. Recommended no indication for bone marrow biopsy at this time. Patient continues to decline need for outpatient drug/alcohol rehab program or information, case management consulted to provide patient resources in case he changes his mind. Counseled patient extensively on need to abstain. Patient reports he snorts heroin, denies any IV drug use. Patient [facility] with [facility]. Discussed with patient at length ongoing use of drugs and alcohol and especially if he were to use IV drugs high risk for severe infection and death.     Review of systems  General: No fevers or chills. Rt IJ tripper lumen CVC, Rt subclavian dialysis catheter   Cardiovascular: No chest pain or pressure. No palpitations. Pulmonary: No shortness of breath, cough or wheeze. Gastrointestinal: No abdominal pain, nausea, vomiting or diarrhea. Genitourinary: decrease urine out pit started on dialysis   Musculoskeletal: quadriparesis after neck surgery   Neurologic: No headache,  No seizure generalized weakness deformity both hand     Objective:     Physical Exam:  Visit Vitals  /87   Pulse (!) 47   Temp (!) 94.2 °F (34.6 °C)   Resp 19   Ht 6' (1.829 m)   Wt 74.8 kg (165 lb)   SpO2 97%   BMI 22.38 kg/m²        General:         Alert, , no acute distress    HEENT: NC, Atraumatic. PERRLA, anicteric sclerae. TLC CVC to right IJ. Lungs: CTA Bilaterally. No Wheezing/Rhonchi/Rales. TDC to right upper chest.  Heart:  Bradycardia. No murmur, No Rubs, No Gallops  Abdomen: Soft, Non distended, Non tender.    Extremities: ecchymosis Lt arm stable, trace lower limb edema   Psych:   Not anxious or agitated. Neurologic:  CN 2-12 grossly intact, Alert, confused.  Oriented to self. quadriparesis   Intake and Output:  Current Shift:  02/21 0701 - 02/21 1900  In: 25 [I.V.:25]  Out: 50 [Urine:50]  Last three shifts:  02/19 1901 - 02/21 0700  In: 1540 [P.O.:640; I.V.:900]  Out: 1052 [Urine:700; Drains:350]    Labs: Results:       Chemistry Recent Labs     02/21/20  0426 02/20/20  0500 02/19/20  0534   GLU 98 69* 24*    143 142   K 3.6 3.6 4.0    110 111   CO2 25 26 23   BUN 33* 30* 42*   CREA 4.81* 4.38* 5.36*   CA 8.2* 8.2* 7.9*   AGAP 7 7 8   BUCR 7* 7* 8*   AP 95 88 86   TP 6.3* 6.3* 6.2*   ALB 2.8* 2.9* 2.9*   GLOB 3.5 3.4 3.3   AGRAT 0.8 0.9 0.9      CBC w/Diff Recent Labs     02/21/20  0426 02/20/20  0500 02/19/20  0534   WBC 3.3* 3.2* 3.1*   RBC 2.63* 2.70* 2.58*   HGB 7.7* 7.8* 7.5*   HCT 23.3* 24.4* 23.6*   PLT 39* 39* 44*   GRANS 71 71 72   LYMPH 19* 20 21   EOS 0 1 1      Cardiac Enzymes No results for input(s): CPK, CKND1, LINDA in the last 72 hours. No lab exists for component: CKRMB, TROIP   Coagulation No results for input(s): PTP, INR, APTT, INREXT, INREXT in the last 72 hours. Lipid Panel Lab Results   Component Value Date/Time    Cholesterol, total 148 02/11/2020 02:24 PM    HDL Cholesterol 71 02/11/2020 02:24 PM    LDL, calculated 63 02/11/2020 02:24 PM    VLDL, calculated 19.4 08/27/2019 09:52 AM    Triglyceride 72 02/11/2020 02:24 PM    CHOL/HDL Ratio 2.1 02/11/2020 02:24 PM      BNP No results for input(s): BNPP in the last 72 hours.    Liver Enzymes Recent Labs     02/21/20  0426   TP 6.3*   ALB 2.8*   AP 95   SGOT 21      Thyroid Studies Lab Results   Component Value Date/Time    TSH 1.93 02/20/2020 05:00 AM          Procedures/imaging: see electronic medical records for all procedures/Xrays and details which were not copied into this note but were reviewed prior to creation of Plan    Medications:   Current Facility-Administered Medications   Medication Dose Route Frequency    calcium acetate(phosphat bind) (PHOSLO) capsule 667 mg  1 Cap Oral TID WITH MEALS    dextrose 5% infusion  25 mL/hr IntraVENous CONTINUOUS    LORazepam (ATIVAN) injection 0.5 mg  0.5 mg IntraVENous Q6H PRN    glucose chewable tablet 16 g  4 Tab Oral PRN    glucagon (GLUCAGEN) injection 1 mg  1 mg IntraMUSCular PRN    dextrose (D50W) injection syrg 12.5-25 g  25-50 mL IntraVENous PRN    epoetin cristy-epbx (RETACRIT) 12,000 Units combo injection  12,000 Units SubCUTAneous Q TUE, THU & SAT    diphenhydrAMINE (BENADRYL) injection 12.5 mg  12.5 mg IntraVENous Q6H PRN    pantoprazole (PROTONIX) tablet 40 mg  40 mg Oral ACB&D    levothyroxine (SYNTHROID) tablet 100 mcg  100 mcg Oral 6am    B complex-vitaminC-folic acid (NEPHROCAP) cap  1 Cap Oral DAILY    ferrous gluconate 324 mg (37.5 mg iron) tablet 1 Tab  1 Tab Oral BID WITH MEALS       Assessment/Plan     Principal Problem: Acute renal failure superimposed on stage 3 chronic kidney disease ([address] (2/15/2020)    Active Problems:    Chronic hepatitis C without hepatic coma ([address] (5/31/2017)      Essential hypertension (4/28/2017)      IV drug abuse ([address] (4/28/2017)      Quadriparesis ([address] (5/31/2017)      Renal cell carcinoma of left kidney ([address] (12/17/2013)      Overview: Pathological Stage [id number] cc RCC FG3 s/p left open partial nephrectomy       in 12/13        COPD, moderate ([address] (1/31/2019)      Hypothyroid (1/31/2019)      UTI (urinary tract infection) (2/15/2020)      Acute renal failure superimposed on chronic kidney disease ([address] (2/15/2020)      Bradycardia (2/15/2020)      ESRD (end stage renal disease) ([address] (2/17/2020)      Secondary hyperparathyroidism of renal origin ([address] (2/17/2020)    plan     Asymptomatic Bradycardia  Cardiology consulted, recommended stable in marked sinus bradycardia, keep off all AV blocking drugs. Echo done this admission non change from previous echo  No further cardiac workup at this time, signed off 2/18/20. Monitor on tele     Acute renal failure on top of chronic kidney disease  RT TDC  per IR placed 2/18/20  Nephrology, Dr. Robyn Gunn, started dialysis 2/18  Continue monitor potassium level and volume overload     Urinary tract infection ruled out, urine culture positive for yeast only/ pt has suprapubic cath was changes recently at [facility]  History of Renal Cell Carcinoma s/p L partial nephrectomy 12/2013, monitored by Dr. Del Rosario [facility] urology outpatient. Chronic urinary retention with history of C-spine laminectomy and post-op paralysis s/p [facility] 10/2018. Discontinued Rocephin 1 g IV  Blood cultures no growth  Urine culture >100K yeast, chronic, previously thought to be colonizer       Thrombocytopenia/pancytopenia, anemia of chronic disease  Hematology oncology consulted, Dr. Lacie Reynolds following.     Dr. Lacie Reynolds does not recommend bone marrow biopsy at this time, notes myelosuppression likely from polysubstance abuse. Continues on retacrit 12,000 units Three times weekly     Hypertension/ possible autonomic neuropathy after neck surgery   Continue hydralazine 25 mg p.o. every 8 hours as needed systolic blood pressure above 160     Diabetes mellitus  Hemoglobin A1c on two 2/11/20 4.9   LDL 63  Hypoglycemia protocol  Nutrition consult for protein/calorie malnutrition and hypoglycemia   Endocrinology consulted, Dr. Azael Lawson, following.        Hepatitis C/ H/O upper GI bleeding gastritis, history of Hep B  Patient was treated for hepatitis C with Dr. Scottie Arredondo  Protonix 40 mg daily  Continue to monitor liver function  Ongoing drug abuse/heroin, Hep C ab positive history of hep c treated will check hep c titers ensure no new infection  HIV negative  Hep B core ab positive, surface Ag negative no active infection suggested. GI consult done ordered ultrasound liver, showed dilated CBD without discrete obstructing process and cholelithiasis and pericholecystic fluid. GI ordered MRCP, will be completed when pt is more cooperative   Case manger consult to explore drug rehab options with pt   Discussed with pt again today, continue to decline inpatient rehab, also declines need for outpatient resources.   Discussed at length high risk for severe illness and death with ongoing drug and alcohol use.          Transfer to step down when bed available  F/u Endocrinology consult recs  Before bed snacks  Ativan 0.5 mg q 6h prn agitation   Cbc, cmp, mag in AM  Monitor platelet number   F/u liver ultrasound, hepatitis b and c titers  Will need pt and ot evaluation and treatment before discharge       DVT/GI Prophylaxis: SCD's and H2B/PP      Estella Hernandes  2/21/2020 8:45 AM

## 2020-02-22 LAB
ALBUMIN SERPL-MCNC: 2.7 G/DL (ref 3.4–5)
ALBUMIN/GLOB SERPL: 0.8 {RATIO} (ref 0.8–1.7)
ALP SERPL-CCNC: 83 U/L (ref 45–117)
ALT SERPL-CCNC: 16 U/L (ref 16–61)
ANION GAP SERPL CALC-SCNC: 4 MMOL/L (ref 3–18)
AST SERPL-CCNC: 16 U/L (ref 10–38)
BASOPHILS # BLD: 0 K/UL (ref 0–0.06)
BASOPHILS NFR BLD: 0 % (ref 0–3)
BILIRUB SERPL-MCNC: 1.4 MG/DL (ref 0.2–1)
BUN SERPL-MCNC: 18 MG/DL (ref 7–18)
BUN/CREAT SERPL: 5 (ref 12–20)
CALCIUM SERPL-MCNC: 8.2 MG/DL (ref 8.5–10.1)
CHLORIDE SERPL-SCNC: 103 MMOL/L (ref 100–111)
CO2 SERPL-SCNC: 30 MMOL/L (ref 21–32)
CREAT SERPL-MCNC: 3.41 MG/DL (ref 0.6–1.3)
DIFFERENTIAL METHOD BLD: ABNORMAL
EOSINOPHIL # BLD: 0.1 K/UL (ref 0–0.4)
EOSINOPHIL NFR BLD: 3 % (ref 0–5)
ERYTHROCYTE [DISTWIDTH] IN BLOOD BY AUTOMATED COUNT: 20.1 % (ref 11.6–14.5)
GLOBULIN SER CALC-MCNC: 3.5 G/DL (ref 2–4)
GLUCOSE BLD STRIP.AUTO-MCNC: 128 MG/DL (ref 70–110)
GLUCOSE BLD STRIP.AUTO-MCNC: 84 MG/DL (ref 70–110)
GLUCOSE SERPL-MCNC: 77 MG/DL (ref 74–99)
HCT VFR BLD AUTO: 24.4 % (ref 36–48)
HGB BLD-MCNC: 7.9 G/DL (ref 13–16)
LYMPHOCYTES # BLD: 0.5 K/UL (ref 0.8–3.5)
LYMPHOCYTES NFR BLD: 14 % (ref 20–51)
MAGNESIUM SERPL-MCNC: 1.8 MG/DL (ref 1.6–2.6)
MCH RBC QN AUTO: 28.7 PG (ref 24–34)
MCHC RBC AUTO-ENTMCNC: 32.4 G/DL (ref 31–37)
MCV RBC AUTO: 88.7 FL (ref 74–97)
MONOCYTES # BLD: 0.4 K/UL (ref 0–1)
MONOCYTES NFR BLD: 11 % (ref 2–9)
NEUTS BAND NFR BLD MANUAL: 1 % (ref 0–5)
NEUTS SEG # BLD: 2.4 K/UL (ref 1.8–8)
NEUTS SEG NFR BLD: 67 % (ref 42–75)
NRBC BLD-RTO: 6 PER 100 WBC
OTHER CELLS NFR BLD MANUAL: 4 %
PLATELET # BLD AUTO: 43 K/UL (ref 135–420)
PLATELET COMMENTS,PCOM: ABNORMAL
POTASSIUM SERPL-SCNC: 3.8 MMOL/L (ref 3.5–5.5)
PROT SERPL-MCNC: 6.2 G/DL (ref 6.4–8.2)
RBC # BLD AUTO: 2.75 M/UL (ref 4.7–5.5)
RBC MORPH BLD: ABNORMAL
SODIUM SERPL-SCNC: 137 MMOL/L (ref 136–145)
TESTOST FREE SERPL-MCNC: 2.1 PG/ML (ref 6.6–18.1)
TESTOST SERPL-MCNC: 133 NG/DL (ref 264–916)
WBC # BLD AUTO: 3.5 K/UL (ref 4.6–13.2)
WBC MORPH BLD: ABNORMAL

## 2020-02-22 PROCEDURE — 83735 ASSAY OF MAGNESIUM: CPT

## 2020-02-22 PROCEDURE — 65660000000 HC RM CCU STEPDOWN

## 2020-02-22 PROCEDURE — 74011250636 HC RX REV CODE- 250/636: Performed by: FAMILY MEDICINE

## 2020-02-22 PROCEDURE — 97116 GAIT TRAINING THERAPY: CPT

## 2020-02-22 PROCEDURE — 82962 GLUCOSE BLOOD TEST: CPT

## 2020-02-22 PROCEDURE — 97530 THERAPEUTIC ACTIVITIES: CPT

## 2020-02-22 PROCEDURE — 97535 SELF CARE MNGMENT TRAINING: CPT

## 2020-02-22 PROCEDURE — 80053 COMPREHEN METABOLIC PANEL: CPT

## 2020-02-22 PROCEDURE — 74011250637 HC RX REV CODE- 250/637: Performed by: FAMILY MEDICINE

## 2020-02-22 PROCEDURE — 85025 COMPLETE CBC W/AUTO DIFF WBC: CPT

## 2020-02-22 PROCEDURE — 74011250637 HC RX REV CODE- 250/637: Performed by: INTERNAL MEDICINE

## 2020-02-22 PROCEDURE — 74011250636 HC RX REV CODE- 250/636: Performed by: INTERNAL MEDICINE

## 2020-02-22 RX ADMIN — LEVOTHYROXINE SODIUM 100 MCG: 100 TABLET ORAL at 07:13

## 2020-02-22 RX ADMIN — FERROUS GLUCONATE TAB 324 MG (37.5 MG ELEMENTAL IRON) 1 TABLET: 324 (37.5 FE) TAB at 08:27

## 2020-02-22 RX ADMIN — DEXTROSE MONOHYDRATE 25 ML/HR: 5 INJECTION, SOLUTION INTRAVENOUS at 07:00

## 2020-02-22 RX ADMIN — FERROUS GLUCONATE TAB 324 MG (37.5 MG ELEMENTAL IRON) 1 TABLET: 324 (37.5 FE) TAB at 17:22

## 2020-02-22 RX ADMIN — CALCIUM ACETATE 667 MG: 667 CAPSULE ORAL at 08:27

## 2020-02-22 RX ADMIN — NEPHROCAP 1 CAPSULE: 1 CAP ORAL at 08:27

## 2020-02-22 RX ADMIN — PANTOPRAZOLE SODIUM 40 MG: 40 TABLET, DELAYED RELEASE ORAL at 17:22

## 2020-02-22 RX ADMIN — CALCIUM ACETATE 667 MG: 667 CAPSULE ORAL at 17:22

## 2020-02-22 RX ADMIN — PANTOPRAZOLE SODIUM 40 MG: 40 TABLET, DELAYED RELEASE ORAL at 08:28

## 2020-02-22 RX ADMIN — DIPHENHYDRAMINE HYDROCHLORIDE 12.5 MG: 50 INJECTION, SOLUTION INTRAMUSCULAR; INTRAVENOUS at 23:38

## 2020-02-22 RX ADMIN — CALCIUM ACETATE 667 MG: 667 CAPSULE ORAL at 11:23

## 2020-02-22 NOTE — PROGRESS NOTES
New PT orders received with pt on caseload. Will follow up as appropriate. Acknowledged new orders.      Thank you,   Loco Nieto, PT, DPT

## 2020-02-22 NOTE — PROGRESS NOTES
0700: Bedside shift change report given to Sharad Abel RN (oncoming nurse) by Vivek Carreno RN (offgoing nurse). Report included the following information SBAR and Kardex. 1630: [facility] cath with quick clot. Patient tolerated fine. Shift Summary:     Patient had PT/OT consult, walked in [place] with cane X1 assist. Patient up in recliner most of the day. Patient w/o c/o pain, SOB, nausea. 1900: Bedside shift change report given to Vivek Carreno RN (oncoming nurse) by Sharad Abel RN (offgoing nurse). Report included the following information SBAR and Kardex.

## 2020-02-22 NOTE — PROGRESS NOTES
1935 pt is back on the floor, asking for something to eat, paged Dr. Renate Hodges. Bedside turnover given to me by RN Heather Angel. Pt is in bed on the cardiac telemetry monitor, sinus melecio at 54. Bed is in the lowest position with wheels locked and call bell within reach. 80 Spoke with the doctor and ordered a regular renal diet. 2000 Vitals assessed by CNA/  2020 white board updated, went in to ask patient if he was ready to eat and he is sleeping currently. Will return shortly to offer a meal.   2120 sleeping in bed on monitor, no signs of distress, hrly round  2230 asleep on monitor, call bell within reach at bedside  2310 given a healthy choice meal and ginger ale sitting up in bed watching tv on monitor,denies pain and denies SOB  0010 vitals reassessed, pt is awake, hourly round, asked to go to the restroom, informed him that we should use the bedside commode and he agreed, his pads on the bed and sheets had bowel movement all over it, changed out his linen and obtained new pads for the bed. Pt was able to wipe himself and return to bed, suprapubic bag emptied. 0115 pt is awake in bed, asked if he would like anything, assessed pain pt denies pain and denies sob. Given a jello and some ice water. No other needs at this time. 0230 hourly round asleep in bed on cardiac monitor, bed is in the lowest position, wheels locked, call bell and his cell phone on the bedside table. No signs of distress, will continue to monitor  0330 hrly round in bed asleep on monitor.

## 2020-02-22 NOTE — PROGRESS NOTES
NUTRITION    Nursing Referral: [facility]  Nutrition Consult: General Nutrition Management & Supplements      RECOMMENDATIONS / PLAN:     - Continue current nutrition interventions at this time. - Continue RD inpatient monitoring and evaluation. NUTRITION INTERVENTIONS & DIAGNOSIS:     - Meals/snacks: modified composition, HS snack  - Medical food supplement therapy: Ensure Enlive TID, Magic Cup BID  - Vitamin and mineral supplement therapy: ferrous gluconate, nephrocap  - Collaboration and referral of nutrition care: discussed diet and IVF with MD.     Nutrition Diagnosis: Increased nutrient needs protein and energy related to increased energy expenditure as evidenced by pt with ESRD plan to start HD     ASSESSMENT:     2/22: Renal diet resumed after NPO, plan to continue for now. Noted plan for HD long term. Ate well this AM per nurse, about 50%, and consumed some of the Ensure. Attempted to discuss diet and appetite with pt, but pt sleeping and wouldn't wake up long enough to answer questions. 2/20: Fair appetite, meal intake improving some and hypoglycemia improved with dextrose infusion. Loose stool via FMS. HD UF 1.5 L on 2/19, planned again tomorrow. 2/18: TDC rescheduled to today, NPO since midnight. Obtained nutrition hx, reports good intake PTA with wt gain associated with fluid status. States meals intake has been fair 2/2 dislikes of meals/foods provided. ADDEDNUM 1520: Diet & supplements resumed. 2/17: Pt unavailable x 2 attempts, sleeping and with MD. NPO this am for [facility] placement to start HD, fluid overloaded upon admission with noted significant wt gain PTA per chart. Overall fair meal intake, supplements started per RN, 50% intake. Tolerating diet.     Nutritional intake adequate to meet patients estimated nutritional needs:  No    Diet: DIET NUTRITIONAL SUPPLEMENTS Breakfast, Lunch, Dinner; ENSURE ENLIVE  DIET NUTRITIONAL SUPPLEMENTS Dinner, Breakfast; MAGIC CUPS  DIET RENAL Regular Food Allergies: NKFA  Current Appetite: Fair  Appetite/meal intake prior to admission: Good per pt and sisters report; IV drug use PTA  Feeding Limitations:  [] Swallowing difficulty    [] Chewing difficulty    [x] Other: quadriparesis  Current Meal Intake: Patient Vitals for the past 100 hrs:   % Diet Eaten   02/22/20 0928 50 %   02/20/20 1744 75 %   02/20/20 1200 25 %   02/20/20 0900 75 %   02/19/20 1700 50 %   02/19/20 1300 2 %   02/19/20 0930 75 %       BM: 2/21  Skin Integrity: finger skin tear   Edema:   [] No     [x] Yes   Pertinent Medications: Reviewed: nephrocap, ferrous gluconate, SSI, levothyroxine, pantoprazole, epoetin, D5 at 25 mL/hr (30 gm dextrose, 102 kcal per day)     Recent Labs     02/22/20  0600 02/21/20  0426 02/20/20  0500    137 143   K 3.8 3.6 3.6    105 110   CO2 30 25 26   GLU 77 98 69*   BUN 18 33* 30*   CREA 3.41* 4.81* 4.38*   CA 8.2* 8.2* 8.2*   MG 1.8 1.9 1.9   PHOS  --   --  4.1   ALB 2.7* 2.8* 2.9*   PREALB  --   --  17.0*   SGOT 16 21 19   ALT 16 18 20     Lab Results   Component Value Date/Time    Calcium 8.2 (L) 02/22/2020 06:00 AM    Parathyroid hormone 77 (A) 06/04/2018    Phosphorus 4.1 02/20/2020 05:00 AM    PTH, Intact 338.8 (H) 02/17/2020 02:55 AM       Intake/Output Summary (Last 24 hours) at 2/22/2020 1055  Last data filed at 2/22/2020 0928  Gross per 24 hour   Intake 857.08 ml   Output 2203 ml   Net -1345.92 ml       Anthropometrics:  Ht Readings from Last 1 Encounters:   02/16/20 6' (1.829 m)     Last 3 Recorded Weights in this Encounter    02/19/20 0730 02/20/20 0400 02/21/20 0415   Weight: 76.2 kg (168 lb 1.6 oz) 74.3 kg (163 lb 14.4 oz) 74.8 kg (165 lb)     Body mass index is 22.38 kg/m². Weight History: Wt gain per chart review, noted volume overloaded upon admission.  Pt reports UBW of 160-165#    Weight Metrics 2/21/2020 2/15/2020 2/10/2020 12/16/2019 10/28/2019 10/3/2019 9/10/2019   Weight 165 lb - 165 lb 149 lb 147 lb 139 lb 143 lb   BMI - 22.38 kg/m2 22.38 kg/m2 20.21 kg/m2 19.94 kg/m2 18.85 kg/m2 19.94 kg/m2        Admitting Diagnosis: [facility]ILA [facility]acute [facility]) ([facility] [N17.9]  SHEILA (acute kidney injury) ([facility] [N17.9]  Bradycardia [R00.1]  Pertinent PMHx: anemia, IV drug abuse, CKD, DM, GERD, HTN, quadriparesis, renal cell carcinoma s/p partial nephrectomy, cirrhosis, hepatitis B & C, multiple gallbladder polyps vs stones    Education Needs:        [x] None identified  [] Identified - Not appropriate at this time  []  Identified and addressed - refer to education log  Learning Limitations:   [x] None identified  [] Identified    Cultural, Taoism & ethnic food preferences:  [x] None identified    [] Identified and addressed     ESTIMATED NUTRITION NEEDS:     Calories: 0106-0775 kcal (30-35 kcal/kg) based on  [] Actual BW      [x] SBW: 81 kg   Protein:  gm (1.2-1.5 gm/kg) based on  [] Actual BW      [x] SBW   Fluid: 750-1500 mL/day     MONITORING & EVALUATION:     Nutrition Goal(s):   - PO nutrition intake will meet >75% of patient estimated nutritional needs within the next 7 days. Outcome: Progressing towards goal     Monitoring:   [x] Food and nutrient intake   [x] Food and nutrient administration  [x] Comparative standards   [x] Nutrition-focused physical findings   [x] Anthropometric Measurements   [x] Treatment/therapy   [x] Biochemical data, medical tests, and procedures        Previous Recommendations (for follow-up assessments only): Implemented      Discharge Planning: No nutritional discharge needs at this time. Participated in care planning, discharge planning, & interdisciplinary rounds as appropriate.       Rhys Sierra RD, [address] [place]   Pager: [phone]

## 2020-02-22 NOTE — PROGRESS NOTES
Progress Note    Patient: Dario Michel MRN: [id number]  CSN: [id number]    YOB: 1960  Age: 62 y.o. Sex: male    DOA: 2/15/2020 LOS:  LOS: 7 days                    Subjective:     Pt off air warmer. Still on IV fluid D5  25cc/h has been on dialysis with significant decrease swelling lower extremities . No chest pain no SOB , no abdominal pain started on renal diet . Tolerating his diet    Pt has dialysis catheter Rt chest discussed with pt it will be fatal if he inject any street drug in the dialysis catheter     Pt had MRCP yesterday due to dilated bile duct result is pending     Endocrinology f/u work up hypothermia and hypoglycemia. ACTH  58.7 , testosterone 133  Insuline like growth factor 67    Echo    · Normal wall thickness and systolic function (ejection fraction normal). Mildly dilated left ventricle. Estimated left ventricular ejection fraction is 60 - 65%. Visually measured ejection fraction. No regional wall motion abnormality noted. Inconclusive left ventricular diastolic function. · Mildly dilated left atrium. · Dilated right atrium. · Mild tricuspid valve regurgitation is present. · Moderate Pulmonary hypertension. · Moderately elevated central venous pressure (10-15 mmHg); IVC diameter is larger than 21 mm and collapses more than 50% with respiration. Review of systems  General: No fevers or chills. Cardiovascular: No chest pain or pressure. Pulmonary: No shortness of breath, cough or wheeze. Gastrointestinal: No abdominal pain, nausea, vomiting or diarrhea. Genitourinary: end stage renal failure on dialysis.  Suprapubic acth   Musculoskeletal: generalized weakness   Neurologic: No headache,no seizure     Objective:     Physical Exam:  Visit Vitals  /79 (BP 1 Location: Left arm)   Pulse (!) 59   Temp 98.2 °F (36.8 °C)   Resp 18   Ht 6' (1.829 m)   Wt 74.8 kg (165 lb)   SpO2 100%   BMI 22.38 kg/m²        General:         Alert,no acute distress  , HEENT: NC, Atraumatic. PERRLA, anicteric sclerae. Lungs: CTA Bilaterally. No Wheezing/Rhonchi/Rales. Heart:  Regular  rhythm,  No murmur, No Rubs, No Gallops  Abdomen: Soft, Non distended, Non tender.    Extremities: No lower limb edema   Psych:   Not anxious or agitated. Neurologic:  CN 2-12 grossly intact, Alert and oriented X 3. No acute neurological                                 Deficits,     Intake and Output:  Current Shift:  02/22 0701 - 02/22 1900  In: 240 [P.O.:240]  Out: 300 [Urine:300]  Last three shifts:  02/20 1901 - 02/22 0700  In: 1217.1 [P.O.:120; I.V.:1097.1]  Out: 2253 [Urine:750]    Labs: Results:       Chemistry Recent Labs     02/22/20  0600 02/21/20  0426 02/20/20  0500   GLU 77 98 69*    137 143   K 3.8 3.6 3.6    105 110   CO2 30 25 26   BUN 18 33* 30*   CREA 3.41* 4.81* 4.38*   CA 8.2* 8.2* 8.2*   AGAP 4 7 7   BUCR 5* 7* 7*   AP 83 95 88   TP 6.2* 6.3* 6.3*   ALB 2.7* 2.8* 2.9*   GLOB 3.5 3.5 3.4   AGRAT 0.8 0.8 0.9      CBC w/Diff Recent Labs     02/22/20  0600 02/21/20  0426 02/20/20  0500   WBC 3.5* 3.3* 3.2*   RBC 2.75* 2.63* 2.70*   HGB 7.9* 7.7* 7.8*   HCT 24.4* 23.3* 24.4*   PLT 43* 39* 39*   GRANS 67 71 71   LYMPH 14* 19* 20   EOS 3 0 1      Cardiac Enzymes No results for input(s): CPK, CKND1, LINDA in the last 72 hours. No lab exists for component: CKRMB, TROIP   Coagulation No results for input(s): PTP, INR, APTT, INREXT in the last 72 hours. Lipid Panel Lab Results   Component Value Date/Time    Cholesterol, total 148 02/11/2020 02:24 PM    HDL Cholesterol 71 02/11/2020 02:24 PM    LDL, calculated 63 02/11/2020 02:24 PM    VLDL, calculated 19.4 08/27/2019 09:52 AM    Triglyceride 72 02/11/2020 02:24 PM    CHOL/HDL Ratio 2.1 02/11/2020 02:24 PM      BNP No results for input(s): BNPP in the last 72 hours.    Liver Enzymes Recent Labs     02/22/20  0600   TP 6.2*   ALB 2.7*   AP 83   SGOT 16      Thyroid Studies Lab Results   Component Value Date/Time    TSH 1.93 02/20/2020 05:00 AM          Procedures/imaging: see electronic medical records for all procedures/Xrays and details which were not copied into this note but were reviewed prior to creation of Plan    Medications:   Current Facility-Administered Medications   Medication Dose Route Frequency    [START ON 2/24/2020] epoetin cristy-epbx (RETACRIT) 12,000 Units combo injection  12,000 Units SubCUTAneous Q MON, WED & FRI    doxercalciferoL (HECTOROL) 4 mcg/2 mL injection 1 mcg  1 mcg IntraVENous DIALYSIS MON, WED & FRI    calcium acetate(phosphat bind) (PHOSLO) capsule 667 mg  1 Cap Oral TID WITH MEALS    dextrose 5% infusion  25 mL/hr IntraVENous CONTINUOUS    LORazepam (ATIVAN) injection 0.5 mg  0.5 mg IntraVENous Q6H PRN    glucose chewable tablet 16 g  4 Tab Oral PRN    glucagon (GLUCAGEN) injection 1 mg  1 mg IntraMUSCular PRN    dextrose (D50W) injection syrg 12.5-25 g  25-50 mL IntraVENous PRN    diphenhydrAMINE (BENADRYL) injection 12.5 mg  12.5 mg IntraVENous Q6H PRN    pantoprazole (PROTONIX) tablet 40 mg  40 mg Oral ACB&D    levothyroxine (SYNTHROID) tablet 100 mcg  100 mcg Oral 6am    B complex-vitaminC-folic acid (NEPHROCAP) cap  1 Cap Oral DAILY    ferrous gluconate 324 mg (37.5 mg iron) tablet 1 Tab  1 Tab Oral BID WITH MEALS       Assessment/Plan     Principal Problem:    Acute renal failure superimposed on stage 3 chronic kidney disease (HCC) (2/15/2020)    Active Problems:    Chronic hepatitis C without hepatic coma ([facility] (5/31/2017)      Essential hypertension (4/28/2017)      IV drug abuse ([facility] (4/28/2017)      Quadriparesis ([facility] (5/31/2017)      Renal cell carcinoma of left kidney ([facility] (12/17/2013)      Overview: Pathological Stage [id number] cc RCC FG3 s/p left open partial nephrectomy       in 12/13        COPD, moderate ([address] (1/31/2019)      Hypothyroid (1/31/2019)      UTI (urinary tract infection) (2/15/2020)      Acute renal failure superimposed on chronic kidney disease ([facility] (2/15/2020)      Bradycardia (2/15/2020)      ESRD (end stage renal disease) ([facility] (2/17/2020)      Secondary hyperparathyroidism of renal origin ([facility] (2/17/2020)      Plan:    Hypoglycemia/ testosterone deficiency  Pt still on IVD5 at 25 cc/h will D/C today    discuss Dr Emmy Gomez      Hepatitis C/ H/O upper GI bleeding gastritis, history of Hep B  Patient was treated for hepatitis C with Dr. Marie Salinas  Protonix 40 mg daily  Continue to monitor liver function  Ongoing drug abuse/heroin, Hep C ab positive history of hep c treated will check hep c titers ensure no new infection  HIV negative  Hep B core ab positive, surface Ag negative no active infection suggested. GI consult done ordered ultrasound liver, showed dilated CBD without discrete obstructing process and cholelithiasis and pericholecystic fluid. GI ordered MRCP, will be completed when pt is more cooperative   Case manger consult to explore drug rehab options with pt   Discussed with pt again today, continue to decline inpatient rehab, also declines need for outpatient resources. Discussed at length high risk for severe illness and death with ongoing drug and alcohol use. Hypertension/ possible autonomic neuropathy after neck surgery   Continue hydralazine 25 mg p.o. every 8 hours as needed systolic blood pressure above 160  Pt and ot evaluation and treatment    Acute renal failure on top of chronic kidney disease  RT TDC  per IR placed 2/18/20  Nephrology, Dr. Hilario Whitlock, started dialysis 2/18  Continue monitor potassium level and volume overload       Asymptomatic Bradycardia  Cardiology consulted, recommended stable in marked sinus bradycardia, keep off all AV blocking drugs. Echo done this admission non change from previous echo  No further cardiac workup at this time, signed off 2/18/20.   Monitor on tele     Cbc, cmp, mag in AM  D/C IV fluid   Pt and ot evaluation and treatment  Pt would like to get into methadone clinic will consult case jake for resources   Monitor glucose level discussed with Dr Ty Jonas   Discussed with dietary continue oral supplement      Gabriela Ojeda MD  2/22/2020 11:23  AM

## 2020-02-22 NOTE — PROGRESS NOTES
Progress Note    Mag Dockery  62 y.o. Admit Date: 2/15/2020  Principal Problem:    Acute renal failure superimposed on stage 3 chronic kidney disease ([facility] (2/15/2020) POA: Yes    Active Problems:    Chronic hepatitis C without hepatic coma ([address] (5/31/2017) POA: Yes      Essential hypertension (4/28/2017) POA: Yes      IV drug abuse ([facility] (4/28/2017) POA: Yes      Quadriparesis ([address] (5/31/2017) POA: Yes      Renal cell carcinoma of left kidney ([facility] (12/17/2013) POA: Yes      Overview: Pathological Stage [id number] cc RCC FG3 s/p left open partial nephrectomy       in 12/13        COPD, moderate ([address] (1/31/2019) POA: Yes      Hypothyroid (1/31/2019) POA: Yes      UTI (urinary tract infection) (2/15/2020) POA: Yes      Acute renal failure superimposed on chronic kidney disease ([address] (2/15/2020) POA: Yes      Bradycardia (2/15/2020) POA: Unknown      ESRD (end stage renal disease) ([facility] (2/17/2020) POA: Yes      Secondary hyperparathyroidism of renal origin ([facility] (2/17/2020) POA: Unknown            Subjective:     Patient feels good,eating lunch,still on D5 IVF,no more Hypoglycemic. Was Dialyzed yesterday. A comprehensive review of systems was negative except for that written in the History of Present Illness.     Objective:     Visit Vitals  /71 (BP 1 Location: Left arm, BP Patient Position: At rest)   Pulse 75   Temp 98.4 °F (36.9 °C)   Resp 18   Ht 6' (1.829 m)   Wt 74.8 kg (165 lb)   SpO2 96%   BMI 22.38 kg/m²         Intake/Output Summary (Last 24 hours) at 2/22/2020 1227  Last data filed at 2/22/2020 1025  Gross per 24 hour   Intake 857.08 ml   Output 2203 ml   Net -1345.92 ml       Current Facility-Administered Medications   Medication Dose Route Frequency Provider Last Rate Last Dose    [START ON 2/24/2020] epoetin cristy-epbx (RETACRIT) 12,000 Units combo injection  12,000 Units SubCUTAneous Q MON, WED & Monty Valle MD        doxercalciferoL (HECTOROL) 4 mcg/2 mL injection 1 mcg  1 mcg IntraVENous DIALYSIS DENISA LUA & Yanira Holbrook MD        calcium acetate(phosphat bind) (PHOSLO) capsule 667 mg  1 Cap Oral TID WITH MEALS Mk Guillory MD   667 mg at 02/22/20 1123    LORazepam (ATIVAN) injection 0.5 mg  0.5 mg IntraVENous Q6H PRN Inocencio Marie MD   0.5 mg at 02/19/20 1452    glucose chewable tablet 16 g  4 Tab Oral PRN Inocencio Marie MD   16 g at 02/18/20 0845    glucagon (GLUCAGEN) injection 1 mg  1 mg IntraMUSCular PRN Inocencio Marie MD        dextrose (D50W) injection syrg 12.5-25 g  25-50 mL IntraVENous PRN Inocencio Marie MD   25 g at 02/19/20 0706    diphenhydrAMINE (BENADRYL) injection 12.5 mg  12.5 mg IntraVENous Q6H PRN Amelia Aquino MD   12.5 mg at 02/19/20 2231    pantoprazole (PROTONIX) tablet 40 mg  40 mg Oral ACB&D Inocencio Marie MD   40 mg at 02/22/20 2396    levothyroxine (SYNTHROID) tablet 100 mcg  100 mcg Oral Calvin Toth MD   100 mcg at 02/22/20 3702    B complex-vitaminC-folic acid (NEPHROCAP) cap  1 Cap Oral DAILY Inocencio Marie MD   1 Cap at 02/22/20 0827    ferrous gluconate 324 mg (37.5 mg iron) tablet 1 Tab  1 Tab Oral BID WITH MEALS Inocencio Marie MD   1 Tab at 02/22/20 0827        Physical Exam:     Physical Exam:   General:  Alert, cooperative, no distress, appears stated age. Lungs:   Clear to auscultation bilaterally. Heart:  Regular rate and rhythm, S1, S2 normal, no murmur, click, rub or gallop. Abdomen:   Soft, non-tender. Bowel sounds normal. No masses,  No organomegaly. Extremities: Extremities normal, atraumatic, no cyanosis or edema, HD catheter is on Right IJ. Pulses: 2+ and symmetric all extremities.    Skin: Skin color, texture, turgor normal. No rashes or lesions         Data Review:    CBC w/Diff    Recent Labs     02/22/20  0600 02/21/20  0426 02/20/20  0500   WBC 3.5* 3.3* 3.2*   RBC 2.75* 2.63* 2.70*   HGB 7.9* 7.7* 7.8*   HCT 24.4* 23.3* 24.4*   MCV 88.7 88.6 90.4   MCH 28.7 29.3 28.9   MCHC 32.4 33.0 32.0   RDW 20.1* 20.3* 21.0*    Recent Labs     02/22/20  0600 02/21/20  0426 02/20/20  0500   BANDS 1  --  2   MONOS 11* 10* 6   EOS 3 0 1   BASOS 0 0 0   RDW 20.1* 20.3* 21.0*        Comprehensive Metabolic Profile    Recent Labs     02/22/20  0600 02/21/20  0426 02/20/20  0500    137 143   K 3.8 3.6 3.6    105 110   CO2 30 25 26   BUN 18 33* 30*   CREA 3.41* 4.81* 4.38*    Recent Labs     02/22/20  0600 02/21/20  0426 02/20/20  0500   CA 8.2* 8.2* 8.2*   PHOS  --   --  4.1   ALB 2.7* 2.8* 2.9*   TP 6.2* 6.3* 6.3*   SGOT 16 21 19   TBILI 1.4* 2.6* 1.5*        Hep B Viral load test was not even ordered. Impression:       Active Hospital Problems    Diagnosis Date Noted    ESRD (end stage renal disease) ([facility] 02/17/2020    Secondary hyperparathyroidism of renal origin ([facility] 02/17/2020    Acute renal failure superimposed on stage 3 chronic kidney disease ([facility] 02/15/2020    UTI (urinary tract infection) 02/15/2020    Acute renal failure superimposed on chronic kidney disease ([facility] 02/15/2020    Bradycardia 02/15/2020    COPD, moderate ([facility] 01/31/2019    Hypothyroid 01/31/2019    Chronic hepatitis C without hepatic coma ([facility] 05/31/2017    Quadriparesis ([facility] 05/31/2017    Essential hypertension 04/28/2017    IV drug abuse ([facility] 04/28/2017    Renal cell carcinoma of left kidney ([facility] 12/17/2013     Pathological Stage [id number] cc RCC FG3 s/p left open partial nephrectomy in 12/13                Plan:     Watch BP , if SBP  Drops< 90 mmhg may give Bolus of  cc. No plan for any dialysis until next Monday.       Laurence Sarkar MD

## 2020-02-22 NOTE — PROGRESS NOTES
Hematology/Medical Oncology Progress Note             Name: Caroline Lynn   : 1960   MRN: [id number]   Date: 2020 8:13 AM     [x]I have reviewed the flowsheet and previous days notes. Events overnight reviewed and discussed with nursing staff. Vital signs and records reviewed. Mr. 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 He has a history of bradycardia and has been evaluated by cardiology in the past.  He also has a history of chronic polysubstance abuse reported that the last heroin use was about 4 days prior to this admission. Pt was seen by PCP and instructed to come to ED due to worsening Creatinine. Patient was seen by nephrology and admitted to start dialysis. Subsequently the patient was found to have pancytopenia and required platelet tranfsusion prior to his dialysis catheter placement on yesterday. ROS:  Constitutional:  Negative for fever, chills, diaphoresis, activity change, appetite change and unexpected weight change. HENT: Negative for nosebleeds, congestion, facial swelling, mouth sores, trouble swallowing, neck pain, neck stiffness, voice change and postnasal drip. Eyes: Negative for photophobia, pain, discharge and itching. Respiratory: Negative for apnea, cough, choking, chest tightness, wheezing and stridor. Cardiovascular: Negative for chest pain, palpitations and leg swelling. Gastrointestinal: Negative for abdominal pain. Negative for nausea, diarrhea, constipation, blood in stool and rectal pain. Genitourinary: Negative for dysuria, urgency, hematuria, flank pain and difficulty urinating. Musculoskeletal: Negative for back pain, joint swelling, arthralgias and gait problem.    Skin: Positive bruising noted at LUE, with swelling, bruising noted at LLE (varner catheter resting on this area). Neurological:  Negative for dizziness, facial asymmetry, speech difficulty, light-headedness and headaches. Hematological: Negative for adenopathy. Does not bruise/bleed easily. Psychiatric/Behavioral: Negative for hallucinations, confusion, disturbed wake/sleep cycle and agitation. Vital Signs:    Visit Vitals  /78 (BP 1 Location: Left arm, BP Patient Position: At rest)   Pulse (!) 54   Temp 97.8 °F (36.6 °C)   Resp 18   Ht 6' (1.829 m)   Wt 74.8 kg (165 lb)   SpO2 97%   BMI 22.38 kg/m²       O2 Device: Room air       Temp (24hrs), Av.2 °F (36.2 °C), Min:96.6 °F (35.9 °C), Max:97.8 °F (36.6 °C)       Intake/Output:   Last shift:      No intake/output data recorded. Last 3 shifts:  1901 -  0700  In: 1217.1 [P.O.:120; I.V.:1097.1]  Out: 2253 [Urine:750]    Intake/Output Summary (Last 24 hours) at 2020 0813  Last data filed at 2020 0400  Gross per 24 hour   Intake 617.08 ml   Output 1903 ml   Net -1285.92 ml       Physical Exam:  General: Appears comfortable, NAD. HEENT:  Anicteric sclerae; pink palpebral conjunctivae; mucosa moist  Resp:  Symmetrical chest expansion, no accessory muscle use; good airway entry; no rales/ wheezing/ rhonchi noted  CV:  S1, S2 present; regular rate and rhythm  GI:  Abdomen soft, non-tender; (+) active bowel sounds  Extremities:  +2 pulses on all extremities; RUE edema-improving. Skin:  Warm; Bruising at LUE and LLE thigh area (varner cath resting on this area).   Neurologic:  Non-focal        DATA:   Current Facility-Administered Medications   Medication Dose Route Frequency    [START ON 2020] epoetin cristy-epbx (RETACRIT) 12,000 Units combo injection  12,000 Units SubCUTAneous Q MON, WED & FRI    doxercalciferoL (HECTOROL) 4 mcg/2 mL injection 1 mcg  1 mcg IntraVENous DIALYSIS MON, WED & FRI    calcium acetate(phosphat bind) (PHOSLO) capsule 667 mg  1 Cap Oral TID WITH MEALS    dextrose 5% infusion  25 mL/hr IntraVENous CONTINUOUS    LORazepam (ATIVAN) injection 0.5 mg  0.5 mg IntraVENous Q6H PRN    glucose chewable tablet 16 g  4 Tab Oral PRN    glucagon (GLUCAGEN) injection 1 mg  1 mg IntraMUSCular PRN    dextrose (D50W) injection syrg 12.5-25 g  25-50 mL IntraVENous PRN    diphenhydrAMINE (BENADRYL) injection 12.5 mg  12.5 mg IntraVENous Q6H PRN    pantoprazole (PROTONIX) tablet 40 mg  40 mg Oral ACB&D    levothyroxine (SYNTHROID) tablet 100 mcg  100 mcg Oral 6am    B complex-vitaminC-folic acid (NEPHROCAP) cap  1 Cap Oral DAILY    ferrous gluconate 324 mg (37.5 mg iron) tablet 1 Tab  1 Tab Oral BID WITH MEALS                    Labs:  Recent Labs     02/22/20  0600 02/21/20  0426 02/20/20  0500   WBC 3.5* 3.3* 3.2*   HGB 7.9* 7.7* 7.8*   HCT 24.4* 23.3* 24.4*   PLT 43* 39* 39*     Recent Labs     02/22/20  0600 02/21/20  0426 02/20/20  0500    137 143   K 3.8 3.6 3.6    105 110   CO2 30 25 26   GLU 77 98 69*   BUN 18 33* 30*   CREA 3.41* 4.81* 4.38*   CA 8.2* 8.2* 8.2*   MG 1.8 1.9 1.9   PHOS  --   --  4.1   ALB 2.7* 2.8* 2.9*   SGOT 16 21 19   ALT 16 18 20     No results for input(s): PH, PCO2, PO2, HCO3, FIO2 in the last 72 hours. IMPRESSION:   · Chronic thrombocytopenia  · Acute renal failure superimposed on chronic kidney disease age 9  · Bradycardia  · Chronic hepatitis C  · Polysubstance abuse-recent heroin use           PLAN:   · Platelet count today is 43,000 , s/p 2 units of platelets. No intervention warranted at this time. The severe chronic thrombocytopenia is due to his long-term myelosuppression from substance abuse in the form of alcohol and drugs. · H/H 7.9/24.4, will recommend PRBC transfusion for hemoglobin less than 7g/dl. Continue Retacrit 12,000 units (nephrology preferred dose)  to be given 3 times a week with dialysis while inpatient on dialysis days. · Substance abuse cessation encouraged.  May benefit from substance abuse center referral-will defer to attending. · Patient to follow-up in the outpatient hematology/oncology clinic in 5 to 7 days after discharge.  ·        The patient is: [x] acutely ill Risk of deterioration: [] moderate    [] critically ill  [] high         My assessment/plan was discussed with:  [x]nursing []PT/OT    []respiratory therapy []     []family []       Ursula Shannon MD

## 2020-02-22 NOTE — PROGRESS NOTES
Pt is alert, sitting in chair, eating lunch by himself. Glucose levels have been good, without hypoglycemia. Recommend: Discontinue IV dextrose infusion. Continue nutrition as at present. I think he can go home tomorrow from an endocrine point of view, if other issues are stable. He should see us within three months.

## 2020-02-22 NOTE — PROGRESS NOTES
Problem: Self Care Deficits Care Plan (Adult)  Goal: *Acute Goals and Plan of Care (Insert Text)  Description  Occupational Therapy Goals  Initiated 2/20/2020 within 7 day(s). 1.  Patient will perform bed mobility in preparation for selfcare with supervision/set-up. 2.  Patient will perform lower body dressing with supervision/set-up. 3.  Patient will perform upper body dressing with modified independence using compensatory techniques prn.  4.  Patient will perform toilet transfers with supervision/set-up. 5.  Patient will perform all aspects of toileting with supervision/set-up. 6.  Patient will participate in upper extremity therapeutic exercise/activities with modified independence for 8 minutes. 7.  Patient will utilize energy conservation techniques during functional activities with verbal cues. Prior Level of Function: Patient was independent with self-care and used a cane for functional mobility PTA. Patient has RW and shower chair and reports he has an aide that assists >40 hours/week with IADLs (cooking, cleaning). Outcome: Progressing Towards Goal  OCCUPATIONAL THERAPY TREATMENT    Patient: Annita Vasquez (36 y.o. male)  Date: 2/22/2020  Diagnosis: SHEILA (acute kidney injury) ([facility] [address] [N17.9]  SHEILA (acute kidney injury) ([facility] [address] [N17.9]  Bradycardia [R00.1]   Acute renal failure superimposed on stage 3 chronic kidney disease ([facility] [address]       Precautions: Fall    Chart, occupational therapy assessment, plan of care, and goals were reviewed. ASSESSMENT:  Pt is very motivated to participate in OT. Pt reports he's been getting up to [facility], however, would like to try to walk to the BR. Pt required SBA to maneuver to the EOB and CGA to don gown seated unsupported and to simulate donning of the socks. Pt demonstrates decreased standing balance and required CGA to maneuver to the BR and Min A for toilet txfr. Pt completed toileting hygiene w/SBA in sitting and MiN A in std with additional time for thoroughness. Pt tolerated functional mobility w/SPC for ~ 4 minutes prior to maneuvering to the chair for lunch, requiring occasional CGA for safety. Pt's nurse notified that pt is in the chair. New OT orders received and chart reviewed. Pt is on OT caseload. New OT order will be acknowledged. Thank you for the referral.     JORGE Hardin  Progression toward goals:  [x]          Improving appropriately and progressing toward goals  []          Improving slowly and progressing toward goals  []          Not making progress toward goals and plan of care will be adjusted     PLAN:  Patient continues to benefit from skilled intervention to address the above impairments. Continue treatment per established plan of care. Discharge Recommendations:  Home Health w/Supervision vs Skilled Nursing Facility  Further Equipment Recommendations for Discharge:  bedside commode, shower chair, and rolling walker     SUBJECTIVE:   Patient stated I haven't tried to walk to the bathroom    OBJECTIVE DATA SUMMARY:   Cognitive/Behavioral Status:  Neurologic State: Alert  Orientation Level: Oriented X4  Cognition: Follows commands  Safety/Judgement: Fall prevention    Functional Mobility and Transfers for ADLs:   Bed Mobility:     Supine to Sit: Stand-by assistance    Transfers:  Sit to Stand: Stand-by assistance;Minimum assistance(Min A from toilet)  Stand to Sit: Stand-by assistance   Toilet Transfer : Minimum assistance(to std from the lower surface)    Balance:  Sitting: Intact  Sitting - Dynamic: Fair (occasional)  Standing: Impaired; With support(SPC)  Standing - Static: Fair(Fair -)  Standing - Dynamic : Fair(minus)    ADL Intervention:       Grooming  Grooming Assistance: Minimum assistance  Position Performed: Standing  Washing Hands: Minimum assistance  Upper Body Dressing Assistance  Shirt simulation with hospital gown: Contact guard assistance    Lower Body Dressing Assistance  Socks: Contact guard assistance(simulated)    Toileting  Toileting Assistance: Minimum assistance(standing)  Bowel Hygiene: Minimum assistance  Pain:  Pain level pre-treatment: not rated  Pain level post-treatment: not rated    Activity Tolerance:    Fair  Please refer to the flowsheet for vital signs taken during this treatment. After treatment:   [x]  Patient left in no apparent distress sitting up in chair  []  Patient left in no apparent distress in bed  [x]  Call bell left within reach  [x]  Nursing notified, clears pt to remain in the chair w/o chair alarm  []  Caregiver present  []  Bed alarm activated    COMMUNICATION/EDUCATION:   [x] Role of Occupational Therapy in the acute care setting  [x] Home safety education was provided and the patient/caregiver indicated understanding. [x] Patient/family have participated as able in working towards goals and plan of care. [x] Patient/family agree to work toward stated goals and plan of care. [] Patient understands intent and goals of therapy, but is neutral about his/her participation. [] Patient is unable to participate in goal setting and plan of care.       Thank you for this referral.  QUINTON Rubi/IVAN  Time Calculation: 24 mins

## 2020-02-22 NOTE — PROGRESS NOTES
Problem: Mobility Impaired (Adult and Pediatric)  Goal: *Acute Goals and Plan of Care (Insert Text)  Description  Physical Therapy Goals  Initiated 2/20/2020 and to be accomplished within 7 day(s)  1. Patient will move from supine to sit and sit to supine , scoot up and down and roll side to side in bed with modified independence. 2.  Patient will transfer from bed to chair and chair to bed with supervision/set-up using the least restrictive device. 3.  Patient will perform sit to stand with supervision/set-up. 4.  Patient will ambulate with minimal assistance/contact guard assist for 25-50 feet with the least restrictive device. 5.  Assess stairs as aprpopriate    Prior Level of Function:   Patient was modified independence for all mobility including gait using single point cane. Patient lives with his sister in a 2nd floor apartment, about [address]. Patient indicates he has been going to outpatient PT for a couple years working on exercises. Outcome: Progressing Towards Goal    PHYSICAL THERAPY TREATMENT    Patient: Yu Mcclendon (15 y.o. male)  Date: 2/22/2020  Diagnosis: SHEILA (acute kidney injury) ([address] [N17.9]  SHEILA (acute kidney injury) ([address] [N17.9]  Bradycardia [R00.1]   Acute renal failure superimposed on stage 3 chronic kidney disease ([address]    Precautions: Fall    ASSESSMENT:  Pt found sitting up in bed with call bell and willing to participate in therapy. Pt progressing well with bed mobility, SBA to perform supine to sit. He is SBA for sit to stand transfers from a higher surface but requires Min A from lower surfaces. He is progressing with ambulation distance (using SPC on the right) reqiring CGA. He ambulated a total of 120 feet (10 feet to bathroom, 80 feet hallway ambulation, 30 feet within the room between the bathroom and to the chair). He has fluctuations in john with ambulation with Fair dynamic balance.  He has improving safety awareness in his ability to verbally acknowledge that he should not get up without assistance. Pt left sitting in chair with needs in reach. Progression toward goals:  [x]      Improving appropriately and progressing toward goals  []      Improving slowly and progressing toward goals  []      Not making progress toward goals and plan of care will be adjusted     PLAN:  Patient continues to benefit from skilled intervention to address the above impairments. Continue treatment per established plan of care. Discharge Recommendations: HHPT with supervision vs SNF  Further Equipment Recommendations for Discharge:  straight cane (pt needs offset adjustable SPC)     SUBJECTIVE:   Patient stated \"I was able to walk at home without my cane and only use it when I'm out. I go up the stairs at home.     OBJECTIVE DATA SUMMARY:   Critical Behavior:  Neurologic State: Alert, Appropriate for age  Orientation Level: Oriented X4  Cognition: Follows commands  Safety/Judgement: Fall prevention  Functional Mobility Training:  Bed Mobility:   Supine to Sit: Stand-by assistance    Transfers:  Sit to Stand: Stand-by assistance;Minimum assistance(Min A from toilet)  Stand to Sit: Stand-by assistance   Balance:  Sitting: Intact  Sitting - Dynamic: Fair (occasional)  Standing: Impaired; With support(SPC)  Standing - Static: Fair(Fair -)  Ambulation/Gait Training:  Distance (ft): 120 Feet (ft)  Assistive Device: Cane, straight  Ambulation - Level of Assistance: Contact guard assistance    Base of Support: Narrowed   Speed/Stacia: Fluctuations  Step Length: Left shortened;Right shortened     Pain:  Pain level pre-treatment: 0/10  Pain level post-treatment: 0/10   Pain Intervention(s): Medication (see MAR); Pt did not report pain   Response to intervention: No intervention required    Activity Tolerance:   Good  Please refer to the flowsheet for vital signs taken during this treatment.   After treatment:   [x] Patient left in no apparent distress sitting up in chair  [] Patient left in no apparent distress in bed  [x] Call bell left within reach  [x] Nursing notified (nursing was okay with pt sitting without chair alarm)  [] Caregiver present  [] Bed alarm activated  [] SCDs applied      COMMUNICATION/EDUCATION:   []         Role of Physical Therapy in the acute care setting. [x]         Fall prevention education was provided and the patient/caregiver indicated understanding. []         Patient/family have participated as able in working toward goals and plan of care. [x]         Patient/family agree to work toward stated goals and plan of care. []         Patient understands intent and goals of therapy, but is neutral about his/her participation.   []         Patient is unable to participate in stated goals/plan of care: ongoing with therapy staff.  []         Other:        Redd Grace PTA   Time Calculation: 24 mins

## 2020-02-23 LAB
ALBUMIN SERPL-MCNC: 2.7 G/DL (ref 3.4–5)
ALBUMIN/GLOB SERPL: 0.8 {RATIO} (ref 0.8–1.7)
ALP SERPL-CCNC: 83 U/L (ref 45–117)
ALT SERPL-CCNC: 13 U/L (ref 16–61)
ANION GAP SERPL CALC-SCNC: 5 MMOL/L (ref 3–18)
AST SERPL-CCNC: 17 U/L (ref 10–38)
BASOPHILS # BLD: 0 K/UL (ref 0–0.06)
BASOPHILS NFR BLD: 0 % (ref 0–3)
BILIRUB SERPL-MCNC: 1.3 MG/DL (ref 0.2–1)
BUN SERPL-MCNC: 29 MG/DL (ref 7–18)
BUN/CREAT SERPL: 7 (ref 12–20)
CALCIUM SERPL-MCNC: 7.9 MG/DL (ref 8.5–10.1)
CHLORIDE SERPL-SCNC: 103 MMOL/L (ref 100–111)
CO2 SERPL-SCNC: 27 MMOL/L (ref 21–32)
CREAT SERPL-MCNC: 4.22 MG/DL (ref 0.6–1.3)
DIFFERENTIAL METHOD BLD: ABNORMAL
EOSINOPHIL # BLD: 0 K/UL (ref 0–0.4)
EOSINOPHIL NFR BLD: 0 % (ref 0–5)
ERYTHROCYTE [DISTWIDTH] IN BLOOD BY AUTOMATED COUNT: 20.2 % (ref 11.6–14.5)
GLOBULIN SER CALC-MCNC: 3.4 G/DL (ref 2–4)
GLUCOSE BLD STRIP.AUTO-MCNC: 100 MG/DL (ref 70–110)
GLUCOSE BLD STRIP.AUTO-MCNC: 102 MG/DL (ref 70–110)
GLUCOSE BLD STRIP.AUTO-MCNC: 166 MG/DL (ref 70–110)
GLUCOSE BLD STRIP.AUTO-MCNC: 81 MG/DL (ref 70–110)
GLUCOSE SERPL-MCNC: 76 MG/DL (ref 74–99)
HCT VFR BLD AUTO: 23.4 % (ref 36–48)
HGB BLD-MCNC: 7.6 G/DL (ref 13–16)
LYMPHOCYTES # BLD: 1.3 K/UL (ref 0.8–3.5)
LYMPHOCYTES NFR BLD: 30 % (ref 20–51)
MAGNESIUM SERPL-MCNC: 1.9 MG/DL (ref 1.6–2.6)
MCH RBC QN AUTO: 29.1 PG (ref 24–34)
MCHC RBC AUTO-ENTMCNC: 32.5 G/DL (ref 31–37)
MCV RBC AUTO: 89.7 FL (ref 74–97)
MONOCYTES # BLD: 0.4 K/UL (ref 0–1)
MONOCYTES NFR BLD: 10 % (ref 2–9)
NEUTS BAND NFR BLD MANUAL: 1 % (ref 0–5)
NEUTS SEG # BLD: 2.5 K/UL (ref 1.8–8)
NEUTS SEG NFR BLD: 59 % (ref 42–75)
NRBC BLD-RTO: 4 PER 100 WBC
PLATELET # BLD AUTO: 52 K/UL (ref 135–420)
PLATELET COMMENTS,PCOM: ABNORMAL
POTASSIUM SERPL-SCNC: 3.8 MMOL/L (ref 3.5–5.5)
PROT SERPL-MCNC: 6.1 G/DL (ref 6.4–8.2)
RBC # BLD AUTO: 2.61 M/UL (ref 4.7–5.5)
RBC MORPH BLD: ABNORMAL
SODIUM SERPL-SCNC: 135 MMOL/L (ref 136–145)
WBC # BLD AUTO: 4.2 K/UL (ref 4.6–13.2)

## 2020-02-23 PROCEDURE — 80053 COMPREHEN METABOLIC PANEL: CPT

## 2020-02-23 PROCEDURE — 74011250637 HC RX REV CODE- 250/637: Performed by: INTERNAL MEDICINE

## 2020-02-23 PROCEDURE — 65660000000 HC RM CCU STEPDOWN

## 2020-02-23 PROCEDURE — 74011250636 HC RX REV CODE- 250/636: Performed by: FAMILY MEDICINE

## 2020-02-23 PROCEDURE — 94762 N-INVAS EAR/PLS OXIMTRY CONT: CPT

## 2020-02-23 PROCEDURE — 74011250637 HC RX REV CODE- 250/637: Performed by: FAMILY MEDICINE

## 2020-02-23 PROCEDURE — 85025 COMPLETE CBC W/AUTO DIFF WBC: CPT

## 2020-02-23 PROCEDURE — 82962 GLUCOSE BLOOD TEST: CPT

## 2020-02-23 PROCEDURE — 87517 HEPATITIS B DNA QUANT: CPT

## 2020-02-23 PROCEDURE — 83735 ASSAY OF MAGNESIUM: CPT

## 2020-02-23 RX ORDER — LOPERAMIDE HYDROCHLORIDE 2 MG/1
2 CAPSULE ORAL
Status: DISCONTINUED | OUTPATIENT
Start: 2020-02-23 | End: 2020-02-25 | Stop reason: HOSPADM

## 2020-02-23 RX ADMIN — CALCIUM ACETATE 667 MG: 667 CAPSULE ORAL at 12:44

## 2020-02-23 RX ADMIN — DIPHENHYDRAMINE HYDROCHLORIDE 12.5 MG: 50 INJECTION, SOLUTION INTRAMUSCULAR; INTRAVENOUS at 05:47

## 2020-02-23 RX ADMIN — FERROUS GLUCONATE TAB 324 MG (37.5 MG ELEMENTAL IRON) 1 TABLET: 324 (37.5 FE) TAB at 08:45

## 2020-02-23 RX ADMIN — LEVOTHYROXINE SODIUM 100 MCG: 100 TABLET ORAL at 05:50

## 2020-02-23 RX ADMIN — FERROUS GLUCONATE TAB 324 MG (37.5 MG ELEMENTAL IRON) 1 TABLET: 324 (37.5 FE) TAB at 18:26

## 2020-02-23 RX ADMIN — CALCIUM ACETATE 667 MG: 667 CAPSULE ORAL at 08:46

## 2020-02-23 RX ADMIN — PANTOPRAZOLE SODIUM 40 MG: 40 TABLET, DELAYED RELEASE ORAL at 06:40

## 2020-02-23 RX ADMIN — NEPHROCAP 1 CAPSULE: 1 CAP ORAL at 08:45

## 2020-02-23 RX ADMIN — PANTOPRAZOLE SODIUM 40 MG: 40 TABLET, DELAYED RELEASE ORAL at 18:26

## 2020-02-23 RX ADMIN — CALCIUM ACETATE 667 MG: 667 CAPSULE ORAL at 18:26

## 2020-02-23 NOTE — PROGRESS NOTES
Problem: Falls - Risk of  Goal: *Absence of Falls  Description  Document Monica Dowling Fall Risk and appropriate interventions in the flowsheet. Outcome: Progressing Towards Goal  Note: Fall Risk Interventions:  Mobility Interventions: Bed/chair exit alarm, Communicate number of staff needed for ambulation/transfer, Patient to call before getting OOB, PT Consult for mobility concerns, OT consult for ADLs, Utilize walker, cane, or other assistive device, PT Consult for assist device competence    Mentation Interventions: Adequate sleep, hydration, pain control, Bed/chair exit alarm, Increase mobility, More frequent rounding, Update white board    Medication Interventions: Patient to call before getting OOB, Teach patient to arise slowly, Bed/chair exit alarm    Elimination Interventions: Bed/chair exit alarm, Call light in reach, Patient to call for help with toileting needs, Toileting schedule/hourly rounds    History of Falls Interventions: Bed/chair exit alarm         Problem: Patient Education: Go to Patient Education Activity  Goal: Patient/Family Education  Outcome: Progressing Towards Goal     Problem: Pressure Injury - Risk of  Goal: *Prevention of pressure injury  Description  Document Blake Scale and appropriate interventions in the flowsheet.   Outcome: Progressing Towards Goal  Note: Pressure Injury Interventions:  Sensory Interventions: Assess changes in LOC, Check visual cues for pain, Discuss PT/OT consult with provider, Keep linens dry and wrinkle-free, Maintain/enhance activity level, Minimize linen layers    Moisture Interventions: Absorbent underpads, Internal/External urinary devices    Activity Interventions: Increase time out of bed, Pressure redistribution bed/mattress(bed type), PT/OT evaluation    Mobility Interventions: Pressure redistribution bed/mattress (bed type), PT/OT evaluation, HOB 30 degrees or less    Nutrition Interventions: Document food/fluid/supplement intake    Friction and Shear Interventions: HOB 30 degrees or less                Problem: Patient Education: Go to Patient Education Activity  Goal: Patient/Family Education  Outcome: Progressing Towards Goal     Problem: Nutrition Deficit  Goal: *Optimize nutritional status  Outcome: Progressing Towards Goal     Problem: Delirium Treatment  Goal: *Level of consciousness restored to baseline  Outcome: Progressing Towards Goal  Goal: *Level of environmental perceptions restored to baseline  Outcome: Progressing Towards Goal  Goal: *Sensory perception restored to baseline  Outcome: Progressing Towards Goal  Goal: *Emotional stability restored to baseline  Outcome: Progressing Towards Goal  Goal: *Functional assessment restored to baseline  Outcome: Progressing Towards Goal  Goal: *Absence of falls  Outcome: Progressing Towards Goal  Goal: *Will remain free of delirium, CAM Score negative  Outcome: Progressing Towards Goal  Goal: *Cognitive status will be restored to baseline  Outcome: Progressing Towards Goal  Goal: Interventions  Outcome: Progressing Towards Goal     Problem: Patient Education: Go to Patient Education Activity  Goal: Patient/Family Education  Outcome: Progressing Towards Goal     Problem: Patient Education: Go to Patient Education Activity  Goal: Patient/Family Education  Outcome: Progressing Towards Goal     Problem: Patient Education: Go to Patient Education Activity  Goal: Patient/Family Education  Outcome: Progressing Towards Goal

## 2020-02-23 NOTE — PROGRESS NOTES
Hematology/Medical Oncology Progress Note             Name: Nitin Baez   : 1960   MRN: [id number]   Date: 2020 8:13 AM     [x]I have reviewed the flowsheet and previous days notes. Events overnight reviewed and discussed with nursing staff. Vital signs and records reviewed. Mr. Tess Ovalle is a 77-year-old -American male with a history of diabetes mellitus, hypothyroidism, chronic renal failure, thrombocytopenia, suprapubic catheter due to chronic urinary retention quadriparesis after cervical spine infection and surgery. He has a history of bradycardia and has been evaluated by cardiology in the past.  He also has a history of chronic polysubstance abuse reported that the last heroin use was about 4 days prior to this admission. Pt was seen by PCP and instructed to come to ED due to worsening Creatinine. Patient was seen by nephrology and admitted to start dialysis. Subsequently the patient was found to have pancytopenia and required platelet tranfsusion prior to his dialysis catheter placement on yesterday. Patient voices no new complaints today, expresses readiness to go home             ROS:  Constitutional:  Negative for fever, chills, diaphoresis, activity change, appetite change and unexpected weight change. HENT: Negative for nosebleeds, congestion, facial swelling, mouth sores, trouble swallowing, neck pain, neck stiffness, voice change and postnasal drip. Eyes: Negative for photophobia, pain, discharge and itching. Respiratory: Negative for apnea, cough, choking, chest tightness, wheezing and stridor. Cardiovascular: Negative for chest pain, palpitations and leg swelling. Gastrointestinal: Negative for abdominal pain. Negative for nausea, diarrhea, constipation, blood in stool and rectal pain. Genitourinary: Negative for dysuria, urgency, hematuria, flank pain and difficulty urinating.    Musculoskeletal: Negative for back pain, joint swelling, arthralgias and gait problem. Skin: Positive bruising noted at LUE, with swelling, bruising noted at LLE, improved. Neurological:  Negative for dizziness, facial asymmetry, speech difficulty, light-headedness and headaches. Hematological: Negative for adenopathy. Positive for bruise easily  Psychiatric/Behavioral: Negative for hallucinations, confusion, disturbed wake/sleep cycle and agitation. Vital Signs:    Visit Vitals  /79 (BP 1 Location: Left arm, BP Patient Position: At rest)   Pulse (!) 56   Temp 98.7 °F (37.1 °C)   Resp 16   Ht 6' (1.829 m)   Wt 78.9 kg (173 lb 15.9 oz)   SpO2 100%   BMI 23.60 kg/m²       O2 Device: Room air       Temp (24hrs), Av.4 °F (36.9 °C), Min:98.2 °F (36.8 °C), Max:98.7 °F (37.1 °C)       Intake/Output:   Last shift:       0701 -  1900  In: 480 [P.O.:480]  Out: 380 [Urine:380]  Last 3 shifts:  190 -  0700  In: 1097.1 [P.O.:600; I.V.:497.1]  Out: 2528 [Urine:2525]    Intake/Output Summary (Last 24 hours) at 2020 0943  Last data filed at 2020 0929  Gross per 24 hour   Intake 720 ml   Output 1730 ml   Net -1010 ml       Physical Exam:  General: Appears comfortable, NAD. HEENT:  Anicteric sclerae; pink palpebral conjunctivae; mucosa moist  Resp:  Symmetrical chest expansion, no accessory muscle use; good airway entry; no rales/ wheezing/ rhonchi noted  CV:  S1, S2 present; regular rate and rhythm  GI:  Abdomen soft, non-tender; (+) active bowel sounds  Extremities:  +2 pulses on all extremities; RUE edema-improving.   Skin:  Warm; Bruising at LUE and LLE thigh area, improving   neurologic:  Non-focal        DATA:   Current Facility-Administered Medications   Medication Dose Route Frequency    [START ON 2020] epoetin cristy-epbx (RETACRIT) 12,000 Units combo injection  12,000 Units SubCUTAneous Q MON, WED & FRI    doxercalciferoL (HECTOROL) 4 mcg/2 mL injection 1 mcg  1 mcg IntraVENous DIALYSIS MON, WED & FRI    calcium acetate(phosphat bind) (PHOSLO) capsule 667 mg  1 Cap Oral TID WITH MEALS    LORazepam (ATIVAN) injection 0.5 mg  0.5 mg IntraVENous Q6H PRN    glucose chewable tablet 16 g  4 Tab Oral PRN    glucagon (GLUCAGEN) injection 1 mg  1 mg IntraMUSCular PRN    dextrose (D50W) injection syrg 12.5-25 g  25-50 mL IntraVENous PRN    diphenhydrAMINE (BENADRYL) injection 12.5 mg  12.5 mg IntraVENous Q6H PRN    pantoprazole (PROTONIX) tablet 40 mg  40 mg Oral ACB&D    levothyroxine (SYNTHROID) tablet 100 mcg  100 mcg Oral 6am    B complex-vitaminC-folic acid (NEPHROCAP) cap  1 Cap Oral DAILY    ferrous gluconate 324 mg (37.5 mg iron) tablet 1 Tab  1 Tab Oral BID WITH MEALS                    Labs:  Recent Labs     02/23/20  0540 02/22/20  0600 02/21/20  0426   WBC 4.2* 3.5* 3.3*   HGB 7.6* 7.9* 7.7*   HCT 23.4* 24.4* 23.3*   PLT 52* 43* 39*     Recent Labs     02/23/20  0540 02/22/20  0600 02/21/20  0426   * 137 137   K 3.8 3.8 3.6    103 105   CO2 27 30 25   GLU 76 77 98   BUN 29* 18 33*   CREA 4.22* 3.41* 4.81*   CA 7.9* 8.2* 8.2*   MG 1.9 1.8 1.9   ALB 2.7* 2.7* 2.8*   SGOT 17 16 21   ALT 13* 16 18     No results for input(s): PH, PCO2, PO2, HCO3, FIO2 in the last 72 hours. IMPRESSION:   · Chronic thrombocytopenia  · Acute renal failure superimposed on chronic kidney disease age 9  · Bradycardia  · Chronic hepatitis C  · Polysubstance abuse-recent heroin use           PLAN:   · Platelet count today 52,000: No intervention warranted at this time. The severe chronic thrombocytopenia is due to his long-term myelosuppression from substance abuse in the form of alcohol and drugs. · H/H 7.6/23.4, will recommend PRBC transfusion for hemoglobin less than 7g/dl. Continue Retacrit 12,000 units (nephrology preferred dose)  to be given 3 times a week with dialysis while inpatient on dialysis days. · Substance abuse cessation encouraged. May benefit from substance abuse center referral-will defer to attending.   · Patient to follow-up in the outpatient hematology/oncology clinic in 5 to 7 days after discharge.  ·        The patient is: [x] acutely ill Risk of deterioration: [] moderate    [] critically ill  [] high         My assessment/plan was discussed with:  [x]nursing []PT/OT    []respiratory therapy []     []family []       Esteban Cotton MD

## 2020-02-23 NOTE — PROGRESS NOTES
Feels better. Glucose levels improved. Hypoglycemia resolved. I think ist is okay to discharge him from the endocrine point of view,he should make an appointment to see us in eight weeks.

## 2020-02-23 NOTE — PROGRESS NOTES
Progress Note    Mago Rivera  62 y.o. Admit Date: 2/15/2020  Principal Problem:    Acute renal failure superimposed on stage 3 chronic kidney disease ([facility] [address] (2/15/2020) POA: Yes    Active Problems:    Chronic hepatitis C without hepatic coma ([address] (5/31/2017) POA: Yes      Essential hypertension (4/28/2017) POA: Yes      IV drug abuse ([facility] [address] (4/28/2017) POA: Yes      Quadriparesis ([address] (5/31/2017) POA: Yes      Renal cell carcinoma of left kidney ([facility] [address] (12/17/2013) POA: Yes      Overview: Pathological Stage [id number] cc RCC FG3 s/p left open partial nephrectomy       in 12/13        COPD, moderate ([facility] [address] (1/31/2019) POA: Yes      Hypothyroid (1/31/2019) POA: Yes      UTI (urinary tract infection) (2/15/2020) POA: Yes      Acute renal failure superimposed on chronic kidney disease ([address] (2/15/2020) POA: Yes      Bradycardia (2/15/2020) POA: Unknown      ESRD (end stage renal disease) ([facility] [address] (2/17/2020) POA: Yes      Secondary hyperparathyroidism of renal origin ([facility] [address] (2/17/2020) POA: Unknown            Subjective:     Patient feels good,eating lunch,OOB to chair,finished Lunch,  Off  D5 IVF,no more Hypoglycemic. Was Dialyzed last Friday. A comprehensive review of systems was negative except for that written in the History of Present Illness.     Objective:     Visit Vitals  /87 (BP 1 Location: Left arm, BP Patient Position: At rest)   Pulse (!) 56   Temp 97.1 °F (36.2 °C)   Resp 16   Ht 6' (1.829 m)   Wt 78.9 kg (173 lb 15.9 oz)   SpO2 100%   BMI 23.60 kg/m²         Intake/Output Summary (Last 24 hours) at 2/23/2020 1310  Last data filed at 2/23/2020 1208  Gross per 24 hour   Intake 720 ml   Output 1830 ml   Net -1110 ml       Current Facility-Administered Medications   Medication Dose Route Frequency Provider Last Rate Last Dose    loperamide (IMODIUM) capsule 2 mg  2 mg Oral TID PRN Perico Laureano MD        [START ON 2/24/2020] epoetin cristy-epbx (RETACRIT) 12,000 Units combo injection  12,000 Units SubCUTAneous Q Paulina LUA MD        doxercalciferoL (HECTOROL) 4 mcg/2 mL injection 1 mcg  1 mcg IntraVENous DIALYSIS Paulina LUA MD        calcium acetate(phosphat bind) (PHOSLO) capsule 667 mg  1 Cap Oral TID WITH MEALS Krysta Piedra MD   667 mg at 02/23/20 1244    LORazepam (ATIVAN) injection 0.5 mg  0.5 mg IntraVENous Q6H PRN Hoda Cain MD   0.5 mg at 02/19/20 1452    glucose chewable tablet 16 g  4 Tab Oral PRN Hoda Cain MD   16 g at 02/18/20 0845    glucagon (GLUCAGEN) injection 1 mg  1 mg IntraMUSCular PRN Hoda Cain MD        dextrose (D50W) injection syrg 12.5-25 g  25-50 mL IntraVENous PRN Hoda Cain MD   25 g at 02/19/20 0706    diphenhydrAMINE (BENADRYL) injection 12.5 mg  12.5 mg IntraVENous Q6H PRN Hernandez Aquino MD   12.5 mg at 02/23/20 0547    pantoprazole (PROTONIX) tablet 40 mg  40 mg Oral ACB&D Hoda Cain MD   40 mg at 02/23/20 0640    levothyroxine (SYNTHROID) tablet 100 mcg  100 mcg Oral Julia Amaro MD   100 mcg at 02/23/20 0550    B complex-vitaminC-folic acid (NEPHROCAP) cap  1 Cap Oral DAILY Hoda Cain MD   1 Cap at 02/23/20 0845    ferrous gluconate 324 mg (37.5 mg iron) tablet 1 Tab  1 Tab Oral BID WITH MEALS Hoda Cain MD   1 Tab at 02/23/20 0845        Physical Exam:     Physical Exam:   General:  Alert, cooperative, no distress, appears stated age. Lungs:   Clear to auscultation bilaterally. Heart:  Regular rate and rhythm, S1, S2 normal, no murmur, click, rub or gallop. Abdomen:   Soft, non-tender. Bowel sounds normal. No masses,  No organomegaly. Extremities: Extremities normal, atraumatic, no cyanosis or edema, HD catheter is on Right IJ. Pulses: 2+ and symmetric all extremities.    Skin: Skin color, texture, turgor normal. No rashes or lesions         Data Review:    CBC w/Diff    Recent Labs     02/23/20  0540 02/22/20  0600 02/21/20  0426   WBC 4.2* 3.5* 3.3*   RBC 2.61* 2.75* 2.63*   HGB 7.6* 7.9* 7.7*   HCT 23.4* 24.4* 23.3*   MCV 89.7 88.7 88.6   MCH 29.1 28.7 29.3   MCHC 32.5 32.4 33.0   RDW 20.2* 20.1* 20.3*    Recent Labs     02/23/20  0540 02/22/20  0600 02/21/20  0426   BANDS 1 1  --    MONOS 10* 11* 10*   EOS 0 3 0   BASOS 0 0 0   RDW 20.2* 20.1* 20.3*        Comprehensive Metabolic Profile    Recent Labs     02/23/20  0540 02/22/20  0600 02/21/20  0426   * 137 137   K 3.8 3.8 3.6    103 105   CO2 27 30 25   BUN 29* 18 33*   CREA 4.22* 3.41* 4.81*    Recent Labs     02/23/20  0540 02/22/20  0600 02/21/20  0426   CA 7.9* 8.2* 8.2*   ALB 2.7* 2.7* 2.8*   TP 6.1* 6.2* 6.3*   SGOT 17 16 21   TBILI 1.3* 1.4* 2.6*        Hep B Viral load test result is pending. Alexandra Gearing MRCP :   IMPRESSION[de-identified]     1. No finding for common bile duct obstruction. The CBD is of normal caliber.     2.  Minor amount of edema along hepatic wall of gallbladder which is felt  attributable to hepatic disease, or general 3rd spacing of fluid given effusions  and ascites              Impression:       Active Hospital Problems    Diagnosis Date Noted    ESRD (end stage renal disease) ([facility] [address] 02/17/2020    Secondary hyperparathyroidism of renal origin ([facility] [address] 02/17/2020    Acute renal failure superimposed on stage 3 chronic kidney disease ([facility] [address] 02/15/2020    UTI (urinary tract infection) 02/15/2020    Acute renal failure superimposed on chronic kidney disease ([facility] [address] 02/15/2020    Bradycardia 02/15/2020    COPD, moderate ([facility] [address] 01/31/2019    Hypothyroid 01/31/2019    Chronic hepatitis C without hepatic coma ([facility] 05/31/2017    Quadriparesis ([facility] 05/31/2017    Essential hypertension 04/28/2017    IV drug abuse ([facility] 04/28/2017    Renal cell carcinoma of left kidney ([facility] 12/17/2013     Pathological Stage [id number] cc RCC FG3 s/p left open partial nephrectomy in 12/13                Plan:     Watch BP , if SBP  Drops< 90 mmhg may give Bolus of  cc.No need  for any dialysis today, will Dialyze tomorrow, Monday. Await DNA Hep B Viral load.       Caden Zheng MD

## 2020-02-23 NOTE — PROGRESS NOTES
0700: Bedside shift change report given to Tiffanie Walker RN (oncoming nurse) by Debra Wu RN (offgoing nurse). Report included the following information SBAR and Kardex. 1206: Arrived to beside to administer scheduled meds. Patient up at sink w/o cane. Patient with unsteady gait. Assisted patient in walking to bathroom with cane. While walking to bathroom patient had loose bowel movement on the floor. Patient combative when education provided for calling nurse to bedside when ambulating in room. Patient has poor safety awareness and is a high fall risk. MD notified. 1845: New dressing change for patient's TDC. Patient tolerated well. Patient did not eat any of his dinner, asked for cr crackers and peanut butter. 1900: Bedside shift change report given to Debra Wu RN (oncoming nurse) by Tiffanie Walker RN (offgoing nurse). Report included the following information SBAR and Kardex.

## 2020-02-23 NOTE — PROGRESS NOTES
Progress Note    Patient: Rylee Jamison MRN: [id number]  CSN: [id number]    YOB: 1960  Age: 62 y.o. Sex: male    DOA: 2/15/2020 LOS:  LOS: 8 days                    Subjective:     Pt off air warmer. Still on IV fluid D5  25cc/h has been on dialysis with significant decrease swelling lower extremities . No chest pain no SOB , no abdominal pain started on renal diet . Tolerating his diet    Pt has dialysis catheter Rt chest discussed with pt it will be fatal if he inject any street drug in the dialysis catheter . Pt has diarrhea after starting insure will monitor sx will give imodim prn. Pt moving out of bed without assistant discussed with pt he needs to wait for his nurse      Pt had MRCP   1. No finding for common bile duct obstruction. The CBD is of normal caliber.     2. Minor amount of edema along hepatic wall of gallbladder which is felt  attributable to hepatic disease, or general 3rd spacing of fluid given effusions  and ascites    Endocrinology f/u work up hypothermia and hypoglycemia. ACTH  58.7 , testosterone 133. Quantitative hepatitis B viral load pending. He needs viral load before setting him up for dialysis     Case manger consult for drug rehab     Insuline like growth factor 67 discussed with Dr Stephanie Brown hypoglycemia possible due to liver cirrhosis    Echo    · Normal wall thickness and systolic function (ejection fraction normal). Mildly dilated left ventricle. Estimated left ventricular ejection fraction is 60 - 65%. Visually measured ejection fraction. No regional wall motion abnormality noted. Inconclusive left ventricular diastolic function. · Mildly dilated left atrium. · Dilated right atrium. · Mild tricuspid valve regurgitation is present. · Moderate Pulmonary hypertension. · Moderately elevated central venous pressure (10-15 mmHg); IVC diameter is larger than 21 mm and collapses more than 50% with respiration.       Review of systems  General: No fevers or chills. Cardiovascular: No chest pain or pressure. Pulmonary: No shortness of breath, cough or wheeze. Gastrointestinal: No abdominal pain, nausea, vomiting or diarrhea. Genitourinary: end stage renal failure on dialysis. Suprapubic acth   Musculoskeletal: generalized weakness   Neurologic: No headache,no seizure     Objective:     Physical Exam:  Visit Vitals  /87 (BP 1 Location: Left arm, BP Patient Position: At rest)   Pulse (!) 56   Temp 97.1 °F (36.2 °C)   Resp 16   Ht 6' (1.829 m)   Wt 78.9 kg (173 lb 15.9 oz)   SpO2 100%   BMI 23.60 kg/m²        General:         Alert,no acute distress  ,   HEENT: NC, Atraumatic. PERRLA, anicteric sclerae. Lungs: CTA Bilaterally. No Wheezing/Rhonchi/Rales. Heart:  Regular  rhythm,  No murmur, No Rubs, No Gallops  Abdomen: Soft, Non distended, Non tender.    Extremities: No lower limb edema   Psych:   Not anxious or agitated. Neurologic:  CN 2-12 grossly intact, Alert and oriented X 3. No acute neurological                                 Deficits,     Intake and Output:  Current Shift:  02/23 0701 - 02/23 1900  In: 480 [P.O.:480]  Out: 380 [Urine:380]  Last three shifts:  02/21 1901 - 02/23 0700  In: 1097.1 [P.O.:600; I.V.:497.1]  Out: 4546 [Urine:2525]    Labs: Results:       Chemistry Recent Labs     02/23/20  0540 02/22/20  0600 02/21/20  0426   GLU 76 77 98   * 137 137   K 3.8 3.8 3.6    103 105   CO2 27 30 25   BUN 29* 18 33*   CREA 4.22* 3.41* 4.81*   CA 7.9* 8.2* 8.2*   AGAP 5 4 7   BUCR 7* 5* 7*   AP 83 83 95   TP 6.1* 6.2* 6.3*   ALB 2.7* 2.7* 2.8*   GLOB 3.4 3.5 3.5   AGRAT 0.8 0.8 0.8      CBC w/Diff Recent Labs     02/23/20  0540 02/22/20  0600 02/21/20  0426   WBC 4.2* 3.5* 3.3*   RBC 2.61* 2.75* 2.63*   HGB 7.6* 7.9* 7.7*   HCT 23.4* 24.4* 23.3*   PLT 52* 43* 39*   GRANS 59 67 71   LYMPH 30 14* 19*   EOS 0 3 0      Cardiac Enzymes No results for input(s): CPK, CKND1, LINDA in the last 72 hours.     No lab exists for component: CKRMB, TROIP   Coagulation No results for input(s): PTP, INR, APTT, INREXT, INREXT in the last 72 hours. Lipid Panel Lab Results   Component Value Date/Time    Cholesterol, total 148 02/11/2020 02:24 PM    HDL Cholesterol 71 02/11/2020 02:24 PM    LDL, calculated 63 02/11/2020 02:24 PM    VLDL, calculated 19.4 08/27/2019 09:52 AM    Triglyceride 72 02/11/2020 02:24 PM    CHOL/HDL Ratio 2.1 02/11/2020 02:24 PM      BNP No results for input(s): BNPP in the last 72 hours.    Liver Enzymes Recent Labs     02/23/20  0540   TP 6.1*   ALB 2.7*   AP 83   SGOT 17      Thyroid Studies Lab Results   Component Value Date/Time    TSH 1.93 02/20/2020 05:00 AM          Procedures/imaging: see electronic medical records for all procedures/Xrays and details which were not copied into this note but were reviewed prior to creation of Plan    Medications:   Current Facility-Administered Medications   Medication Dose Route Frequency    [START ON 2/24/2020] epoetin cristy-epbx (RETACRIT) 12,000 Units combo injection  12,000 Units SubCUTAneous Q MON, WED & FRI    doxercalciferoL (HECTOROL) 4 mcg/2 mL injection 1 mcg  1 mcg IntraVENous DIALYSIS MON, WED & FRI    calcium acetate(phosphat bind) (PHOSLO) capsule 667 mg  1 Cap Oral TID WITH MEALS    LORazepam (ATIVAN) injection 0.5 mg  0.5 mg IntraVENous Q6H PRN    glucose chewable tablet 16 g  4 Tab Oral PRN    glucagon (GLUCAGEN) injection 1 mg  1 mg IntraMUSCular PRN    dextrose (D50W) injection syrg 12.5-25 g  25-50 mL IntraVENous PRN    diphenhydrAMINE (BENADRYL) injection 12.5 mg  12.5 mg IntraVENous Q6H PRN    pantoprazole (PROTONIX) tablet 40 mg  40 mg Oral ACB&D    levothyroxine (SYNTHROID) tablet 100 mcg  100 mcg Oral 6am    B complex-vitaminC-folic acid (NEPHROCAP) cap  1 Cap Oral DAILY    ferrous gluconate 324 mg (37.5 mg iron) tablet 1 Tab  1 Tab Oral BID WITH MEALS       Assessment/Plan     Principal Problem:    Acute renal failure superimposed on stage 3 chronic kidney disease ([address] (2/15/2020)    Active Problems:    Chronic hepatitis C without hepatic coma ([address] (5/31/2017)      Essential hypertension (4/28/2017)      IV drug abuse ([address] (4/28/2017)      Quadriparesis ([address] (5/31/2017)      Renal cell carcinoma of left kidney ([address] (12/17/2013)      Overview: Pathological Stage [id number] cc RCC FG3 s/p left open partial nephrectomy       in 12/13        COPD, moderate ([address] (1/31/2019)      Hypothyroid (1/31/2019)      UTI (urinary tract infection) (2/15/2020)      Acute renal failure superimposed on chronic kidney disease ([address] (2/15/2020)      Bradycardia (2/15/2020)      ESRD (end stage renal disease) ([address] (2/17/2020)      Secondary hyperparathyroidism of renal origin ([address] (2/17/2020)      Plan:    Hypoglycemia/ testosterone deficiency   stable off IV fluid   Tolerating his diet   discuss Dr Arce Organ hypoglycemia due to liver cirrhosis     Hepatitis C/ H/O upper GI bleeding gastritis, history of Hep B  Patient was treated for hepatitis C with Dr. Yanelis Victor  Protonix 40 mg daily  Continue to monitor liver function  Ongoing drug abuse/heroin, Hep C ab positive history of hep c treated will check hep c titers ensure no new infection  HIV negative  Hep B core ab positive, surface Ag negative no active infection suggested. GI consult done ordered ultrasound liver, showed dilated CBD without discrete obstructing process and cholelithiasis and pericholecystic fluid. MRCP  Done  F/u GI recommednations  Case manger consult to explore drug rehab options   Discussed with pt again today, continue to decline inpatient drug  rehab, also agree to methadone rehab . Discussed at length high risk for severe illness and death with ongoing drug and alcohol use.       Hypertension/ possible autonomic neuropathy after neck surgery   Continue hydralazine 25 mg p.o. every 8 hours as needed systolic blood pressure above 160  Pt and ot evaluation and treatment    Acute renal failure on top of chronic kidney disease  RT TDC  per IR placed 2/18/20  Nephrology, Dr. Robyn Gunn, started dialysis 2/18  Continue monitor potassium level and volume overload       Asymptomatic Bradycardia  Cardiology consulted, recommended stable in marked sinus bradycardia, keep off all AV blocking drugs. Echo done this admission non change from previous echo  No further cardiac workup at this time, signed off 2/18/20.   Monitor on tele     Cbc, cmp, mag in AM  F/u quantitative hepatitis B   Pt and ot evaluation and treatment  Pt would like to get into methadone clinic will consult case manger for resources   Monitor glucose level discussed with Dr Urban Bamberger   Discussed with dietary continue oral supplement, monitor for diarrhea     Discussed with nursing staff    Eneida Alves MD  2/23/2020 12:08   PM

## 2020-02-24 LAB
ALBUMIN SERPL-MCNC: 2.9 G/DL (ref 3.4–5)
ALBUMIN/GLOB SERPL: 0.8 {RATIO} (ref 0.8–1.7)
ALP SERPL-CCNC: 86 U/L (ref 45–117)
ALT SERPL-CCNC: 14 U/L (ref 16–61)
ANION GAP SERPL CALC-SCNC: 6 MMOL/L (ref 3–18)
AST SERPL-CCNC: 16 U/L (ref 10–38)
BASOPHILS # BLD: 0 K/UL (ref 0–0.1)
BASOPHILS NFR BLD: 0 % (ref 0–2)
BILIRUB SERPL-MCNC: 1.1 MG/DL (ref 0.2–1)
BUN SERPL-MCNC: 37 MG/DL (ref 7–18)
BUN/CREAT SERPL: 7 (ref 12–20)
CALCIUM SERPL-MCNC: 8.3 MG/DL (ref 8.5–10.1)
CHLORIDE SERPL-SCNC: 105 MMOL/L (ref 100–111)
CO2 SERPL-SCNC: 28 MMOL/L (ref 21–32)
CREAT SERPL-MCNC: 5.05 MG/DL (ref 0.6–1.3)
DIFFERENTIAL METHOD BLD: ABNORMAL
EOSINOPHIL # BLD: 0.1 K/UL (ref 0–0.4)
EOSINOPHIL NFR BLD: 2 % (ref 0–5)
ERYTHROCYTE [DISTWIDTH] IN BLOOD BY AUTOMATED COUNT: 19.9 % (ref 11.6–14.5)
GLOBULIN SER CALC-MCNC: 3.7 G/DL (ref 2–4)
GLUCOSE BLD STRIP.AUTO-MCNC: 100 MG/DL (ref 70–110)
GLUCOSE BLD STRIP.AUTO-MCNC: 122 MG/DL (ref 70–110)
GLUCOSE BLD STRIP.AUTO-MCNC: 126 MG/DL (ref 70–110)
GLUCOSE BLD STRIP.AUTO-MCNC: 131 MG/DL (ref 70–110)
GLUCOSE BLD STRIP.AUTO-MCNC: 132 MG/DL (ref 70–110)
GLUCOSE BLD STRIP.AUTO-MCNC: 62 MG/DL (ref 70–110)
GLUCOSE BLD STRIP.AUTO-MCNC: 64 MG/DL (ref 70–110)
GLUCOSE SERPL-MCNC: 118 MG/DL (ref 74–99)
HBV DNA SERPL NAA+PROBE-ACNC: NORMAL IU/ML
HBV DNA SERPL NAA+PROBE-LOG IU: NORMAL LOG10 IU/ML
HCT VFR BLD AUTO: 25.9 % (ref 36–48)
HGB BLD-MCNC: 8.2 G/DL (ref 13–16)
INSULIN FREE SERPL-ACNC: 1.3 UU/ML
INSULIN SERPL-ACNC: 1.3 UU/ML
LYMPHOCYTES # BLD: 1 K/UL (ref 0.9–3.6)
LYMPHOCYTES NFR BLD: 24 % (ref 21–52)
MAGNESIUM SERPL-MCNC: 2.1 MG/DL (ref 1.6–2.6)
MCH RBC QN AUTO: 28.9 PG (ref 24–34)
MCHC RBC AUTO-ENTMCNC: 31.7 G/DL (ref 31–37)
MCV RBC AUTO: 91.2 FL (ref 74–97)
MONOCYTES # BLD: 0.5 K/UL (ref 0.05–1.2)
MONOCYTES NFR BLD: 11 % (ref 3–10)
NEUTS SEG # BLD: 2.7 K/UL (ref 1.8–8)
NEUTS SEG NFR BLD: 63 % (ref 40–73)
PHOSPHATE SERPL-MCNC: 3.9 MG/DL (ref 2.5–4.9)
PLATELET # BLD AUTO: 65 K/UL (ref 135–420)
PLATELET COMMENTS,PCOM: ABNORMAL
POTASSIUM SERPL-SCNC: 3.8 MMOL/L (ref 3.5–5.5)
PROT SERPL-MCNC: 6.6 G/DL (ref 6.4–8.2)
RBC # BLD AUTO: 2.84 M/UL (ref 4.7–5.5)
RBC MORPH BLD: ABNORMAL
SODIUM SERPL-SCNC: 139 MMOL/L (ref 136–145)
TEST INFORMATION: NORMAL
WBC # BLD AUTO: 4.3 K/UL (ref 4.6–13.2)

## 2020-02-24 PROCEDURE — 83735 ASSAY OF MAGNESIUM: CPT

## 2020-02-24 PROCEDURE — 74011250636 HC RX REV CODE- 250/636: Performed by: INTERNAL MEDICINE

## 2020-02-24 PROCEDURE — 90935 HEMODIALYSIS ONE EVALUATION: CPT

## 2020-02-24 PROCEDURE — 74011250637 HC RX REV CODE- 250/637: Performed by: INTERNAL MEDICINE

## 2020-02-24 PROCEDURE — 82962 GLUCOSE BLOOD TEST: CPT

## 2020-02-24 PROCEDURE — 84100 ASSAY OF PHOSPHORUS: CPT

## 2020-02-24 PROCEDURE — 80053 COMPREHEN METABOLIC PANEL: CPT

## 2020-02-24 PROCEDURE — 85025 COMPLETE CBC W/AUTO DIFF WBC: CPT

## 2020-02-24 PROCEDURE — 74011250637 HC RX REV CODE- 250/637: Performed by: FAMILY MEDICINE

## 2020-02-24 PROCEDURE — 65660000000 HC RM CCU STEPDOWN

## 2020-02-24 RX ORDER — HEPARIN SODIUM 1000 [USP'U]/ML
INJECTION, SOLUTION INTRAVENOUS; SUBCUTANEOUS
Status: DISPENSED
Start: 2020-02-24 | End: 2020-02-25

## 2020-02-24 RX ADMIN — EPOETIN ALFA-EPBX 12000 UNITS: 10000 INJECTION, SOLUTION INTRAVENOUS; SUBCUTANEOUS at 22:30

## 2020-02-24 RX ADMIN — PANTOPRAZOLE SODIUM 40 MG: 40 TABLET, DELAYED RELEASE ORAL at 08:59

## 2020-02-24 RX ADMIN — FERROUS GLUCONATE TAB 324 MG (37.5 MG ELEMENTAL IRON) 1 TABLET: 324 (37.5 FE) TAB at 17:45

## 2020-02-24 RX ADMIN — CALCIUM ACETATE 667 MG: 667 CAPSULE ORAL at 17:44

## 2020-02-24 RX ADMIN — LEVOTHYROXINE SODIUM 100 MCG: 100 TABLET ORAL at 06:26

## 2020-02-24 RX ADMIN — LOPERAMIDE HYDROCHLORIDE 2 MG: 2 CAPSULE ORAL at 06:28

## 2020-02-24 RX ADMIN — PANTOPRAZOLE SODIUM 40 MG: 40 TABLET, DELAYED RELEASE ORAL at 17:45

## 2020-02-24 RX ADMIN — DOXERCALCIFEROL 1 MCG: 4 INJECTION, SOLUTION INTRAVENOUS at 11:45

## 2020-02-24 NOTE — PROGRESS NOTES
Per Adarsh Ellis at [facility] they will not have the Hep B results back until mid week, they will not give patient a chair time until this is confirmed, and patient cannot begin dialysis until after results are in. Will call CM back mid-week. Patient educated that chairtime must be established prior to d/c. Patient asking to go to [facility] at [facility], CM informed patient to speak to Dr. Carloz Silver regarding this. Spoke with patient regarding Methadone Clinic, patient stated he will be going to the one in [place] [employer], already has all of the information, and knows he must call for a pre-screening, CM offered to help patient call and he refused. Patient stated he has been on Methadone before, knows he will need to go everyday, and has no problem staying clean. CM again educated on the importance of staying clean. Patient not interested in further resources. Patient wants to go home, states he has been walking fine here and will do fine at home with his cane.  Patient is not interested in SNF, patient has aids at home and will be set up with home health upon d/c.             BLANE Dueñas  Case Management  [phone]

## 2020-02-24 NOTE — PROGRESS NOTES
Progress Note    Patient: Cinthia Tripathi MRN: [id number]  CSN: [id number]    YOB: 1960  Age: 62 y.o. Sex: male    DOA: 2/15/2020 LOS:  LOS: 9 days                    Subjective:     Pt seen at dialysis today continue to improve no hypoglycemic control  Discussed case manger consult for Drug rehab pt will need to initiate process by himself on walk in basis he has been in methadone clinic before [facility]      Pt has dialysis catheter Rt chest discussed with pt it will be fatal if he inject any street drug in the dialysis catheter . Pt has diarrhea after starting insure will monitor sx will give imodim prn. Pt moving out of bed without assistant discussed with pt he needs to wait for his nurse      Pt had MRCP   1. No finding for common bile duct obstruction. The CBD is of normal caliber.     2. Minor amount of edema along hepatic wall of gallbladder which is felt  attributable to hepatic disease, or general 3rd spacing of fluid given effusions  and ascites    Endocrinology f/u work up hypothermia and hypoglycemia. ACTH  58.7 , testosterone 133. Quantitative hepatitis B viral load pending. He needs viral load before setting him up for dialysis     Case manger consult for drug rehab     Insuline like growth factor 67 discussed with Dr Velvet Wood hypoglycemia possible due to liver cirrhosis    Echo    · Normal wall thickness and systolic function (ejection fraction normal). Mildly dilated left ventricle. Estimated left ventricular ejection fraction is 60 - 65%. Visually measured ejection fraction. No regional wall motion abnormality noted. Inconclusive left ventricular diastolic function. · Mildly dilated left atrium. · Dilated right atrium. · Mild tricuspid valve regurgitation is present. · Moderate Pulmonary hypertension. · Moderately elevated central venous pressure (10-15 mmHg); IVC diameter is larger than 21 mm and collapses more than 50% with respiration.       Review of systems  General: No fevers or chills. Cardiovascular: No chest pain or pressure. Pulmonary: No shortness of breath, cough or wheeze. Gastrointestinal: No abdominal pain, nausea, vomiting or diarrhea. Genitourinary: end stage renal failure on dialysis. Suprapubic cath  Musculoskeletal: generalized weakness   Neurologic: No headache,no seizure     Objective:     Physical Exam:  Visit Vitals  /77   Pulse (!) 58   Temp 98.2 °F (36.8 °C) (Oral)   Resp 18   Ht 6' (1.829 m)   Wt 73.9 kg (162 lb 14.4 oz)   SpO2 97%   BMI 22.09 kg/m²        General:         Alert,no acute distress  ,   HEENT: NC, Atraumatic. PERRLA, anicteric sclerae. Lungs: CTA Bilaterally. No Wheezing/Rhonchi/Rales. Heart:  Regular  rhythm,  No murmur, No Rubs, No Gallops  Abdomen: Soft, Non distended, Non tender.    Extremities: No lower limb edema   Psych:   Not anxious or agitated. Neurologic:  CN 2-12 grossly intact, Alert and oriented X 3. No acute neurological                                 Deficits,     Intake and Output:  Current Shift:  02/24 0701 - 02/24 1900  In: 240 [P.O.:240]  Out: 450 [Urine:450]  Last three shifts:  02/22 1901 - 02/24 0700  In: 900 [P.O.:900]  Out: 1030 [Urine:1030]    Labs: Results:       Chemistry Recent Labs     02/24/20  0900 02/23/20  0540 02/22/20  0600   * 76 77    135* 137   K 3.8 3.8 3.8    103 103   CO2 28 27 30   BUN 37* 29* 18   CREA 5.05* 4.22* 3.41*   CA 8.3* 7.9* 8.2*   AGAP 6 5 4   BUCR 7* 7* 5*   AP PENDING 83 83   TP PENDING 6.1* 6.2*   ALB 2.9* 2.7* 2.7*   GLOB PENDING 3.4 3.5   AGRAT PENDING 0.8 0.8      CBC w/Diff Recent Labs     02/23/20  0540 02/22/20  0600   WBC 4.2* 3.5*   RBC 2.61* 2.75*   HGB 7.6* 7.9*   HCT 23.4* 24.4*   PLT 52* 43*   GRANS 59 67   LYMPH 30 14*   EOS 0 3      Cardiac Enzymes No results for input(s): CPK, CKND1, LINDA in the last 72 hours.     No lab exists for component: CKRMB, TROIP   Coagulation No results for input(s): PTP, INR, APTT, INREXT, INREXT in the last 72 hours. Lipid Panel Lab Results   Component Value Date/Time    Cholesterol, total 148 02/11/2020 02:24 PM    HDL Cholesterol 71 02/11/2020 02:24 PM    LDL, calculated 63 02/11/2020 02:24 PM    VLDL, calculated 19.4 08/27/2019 09:52 AM    Triglyceride 72 02/11/2020 02:24 PM    CHOL/HDL Ratio 2.1 02/11/2020 02:24 PM      BNP No results for input(s): BNPP in the last 72 hours.    Liver Enzymes Recent Labs     02/24/20  0900   TP PENDING   ALB 2.9*   AP PENDING   SGOT 16      Thyroid Studies Lab Results   Component Value Date/Time    TSH 1.93 02/20/2020 05:00 AM          Procedures/imaging: see electronic medical records for all procedures/Xrays and details which were not copied into this note but were reviewed prior to creation of Plan    Medications:   Current Facility-Administered Medications   Medication Dose Route Frequency    loperamide (IMODIUM) capsule 2 mg  2 mg Oral TID PRN    epoetin cristy-epbx (RETACRIT) 12,000 Units combo injection  12,000 Units SubCUTAneous Q MON, WED & FRI    doxercalciferoL (HECTOROL) 4 mcg/2 mL injection 1 mcg  1 mcg IntraVENous DIALYSIS MON, WED & FRI    calcium acetate(phosphat bind) (PHOSLO) capsule 667 mg  1 Cap Oral TID WITH MEALS    LORazepam (ATIVAN) injection 0.5 mg  0.5 mg IntraVENous Q6H PRN    glucose chewable tablet 16 g  4 Tab Oral PRN    glucagon (GLUCAGEN) injection 1 mg  1 mg IntraMUSCular PRN    dextrose (D50W) injection syrg 12.5-25 g  25-50 mL IntraVENous PRN    diphenhydrAMINE (BENADRYL) injection 12.5 mg  12.5 mg IntraVENous Q6H PRN    pantoprazole (PROTONIX) tablet 40 mg  40 mg Oral ACB&D    levothyroxine (SYNTHROID) tablet 100 mcg  100 mcg Oral 6am    B complex-vitaminC-folic acid (NEPHROCAP) cap  1 Cap Oral DAILY    ferrous gluconate 324 mg (37.5 mg iron) tablet 1 Tab  1 Tab Oral BID WITH MEALS       Assessment/Plan     Principal Problem:    Acute renal failure superimposed on stage 3 chronic kidney disease (HCC) (2/15/2020)    Active Problems:    Chronic hepatitis C without hepatic coma ([address] (5/31/2017)      Essential hypertension (4/28/2017)      IV drug abuse ([address] (4/28/2017)      Quadriparesis ([address] (5/31/2017)      Renal cell carcinoma of left kidney ([address] (12/17/2013)      Overview: Pathological Stage [id number] cc RCC FG3 s/p left open partial nephrectomy       in 12/13        COPD, moderate ([address] (1/31/2019)      Hypothyroid (1/31/2019)      UTI (urinary tract infection) (2/15/2020)      Acute renal failure superimposed on chronic kidney disease ([address] (2/15/2020)      Bradycardia (2/15/2020)      ESRD (end stage renal disease) ([address] (2/17/2020)      Secondary hyperparathyroidism of renal origin ([address] (2/17/2020)      Plan:    Hypoglycemi resolved a/ testosterone deficiency   stable off IV fluid   Tolerating his diet   discuss Dr Christopher Main hypoglycemia due to liver cirrhosis poor nutrition and drug abuse   F/u after discharge with him no need for testosterone therapy now      Hepatitis C/ H/O upper GI bleeding gastritis, history of Hep B  Patient was treated for hepatitis C with Dr. Nba Farfan  Protonix 40 mg daily  Continue to monitor liver function  Ongoing drug abuse/heroin, Hep C ab positive history of hep c treated HIV negative  Hep B core ab positive, surface Ag negative no active infection suggested. Dr Pedrito Rodriguez asking for clarification from GI if no active infection he will set him up with dialysis   GI consult done ordered ultrasound liver, showed dilated CBD without discrete obstructing process and cholelithiasis and pericholecystic fluid. MRCP  Done  F/u GI recommednations  Case manger consult to explore drug rehab options . Pt has to initiate phone call or walk in to the clinic  Discussed with pt again today, continue to decline inpatient drug  rehab, also agree to methadone rehab . Discussed at length high risk for severe illness and death with ongoing drug and alcohol use.       Hypertension/ possible autonomic neuropathy after neck surgery   Continue hydralazine 25 mg p.o. every 8 hours as needed systolic blood pressure above 160  Pt and ot evaluation and treatment    Acute renal failure on top of chronic kidney disease  RT TDC  per IR placed 2/18/20  Nephrology, Dr. Maryuri Membreno, started dialysis 2/18  Continue monitor potassium level and volume overload       Asymptomatic Bradycardia  Cardiology consulted, recommended stable in marked sinus bradycardia, keep off all AV blocking drugs. Echo done this admission non change from previous echo  No further cardiac workup at this time, signed off 2/18/20.   Monitor on tele     Cbc, cmp, mag in AM  F/u quantitative hepatitis B   Pt and ot evaluation and treatment  F/u endo as outpatient  F/u methadone clinic as outpatient   Waiting to set up dialysis as outpatinet before discharge    Shukri Castle MD  2/24/2020 10:54 AM

## 2020-02-24 NOTE — PROGRESS NOTES
Patient off floor for dialysis:    Hep B DNA PCR is negative from 8/2019, has resolved Hep B infection as labs show only Hep B core ab positive. MCRP negative for CBD obstruction, gallstone present on US but remains asymptomatic, small pericholecystic fluid noted but likely due to liver disease, Tbili improved now at 1.3. Severe chronic thrombocytopenia likely due to myelosuppression from long term substance abuse. Continue to monitor LFTs/platelets   Monitor h/h and transfuse per protocol  Medical management per primary team.    Will follow up with him in the morning.

## 2020-02-24 NOTE — PROGRESS NOTES
Hematology/Medical Oncology Progress Note             Name: Sara Bourgeois   : 1960   MRN: [id number]   Date: 2020 8:28 AM     [x]I have reviewed the flowsheet and previous days notes. Events overnight reviewed and discussed with nursing staff. Vital signs and records reviewed. Mr. 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 He has a history of bradycardia and has been evaluated by cardiology in the past.  He also has a history of chronic polysubstance abuse reported that the last heroin use was about 4 days prior to this admission. Pt was seen by PCP and instructed to come to ED due to worsening Creatinine. Patient was seen by nephrology and admitted to start dialysis. Subsequently the patient was found to have pancytopenia and required platelet tranfsusion prior to his dialysis catheter placement on yesterday. Patient voices no new complaints today, continues to express readiness to go home             ROS:  Constitutional:  Negative for fever, chills, diaphoresis, activity change, appetite change and unexpected weight change. HENT: Negative for nosebleeds, congestion, facial swelling, mouth sores, trouble swallowing, neck pain, neck stiffness, voice change and postnasal drip. Eyes: Negative for photophobia, pain, discharge and itching. Respiratory: Negative for apnea, cough, choking, chest tightness, wheezing and stridor. Cardiovascular: Negative for chest pain, palpitations and leg swelling. Gastrointestinal: Negative for abdominal pain. Negative for nausea, diarrhea, constipation, blood in stool and rectal pain. Genitourinary: Negative for dysuria, urgency, hematuria, flank pain and difficulty urinating.    Musculoskeletal: Negative for back pain, joint swelling, arthralgias and gait problem. Skin: Positive bruising noted at LUE, with swelling, bruising noted at LLE, improved. Neurological:  Negative for dizziness, facial asymmetry, speech difficulty, light-headedness and headaches. Hematological: Negative for adenopathy. Positive for bruise easily  Psychiatric/Behavioral: Negative for hallucinations, confusion, disturbed wake/sleep cycle and agitation. Vital Signs:    Visit Vitals  /86   Pulse (!) 51   Temp 97.9 °F (36.6 °C)   Resp 18   Ht 6' (1.829 m)   Wt 73.9 kg (162 lb 14.4 oz)   SpO2 97%   BMI 22.09 kg/m²       O2 Device: Room air       Temp (24hrs), Av.7 °F (36.5 °C), Min:97.1 °F (36.2 °C), Max:98.2 °F (36.8 °C)       Intake/Output:   Last shift:       07 -  190  In: 240 [P.O.:240]  Out: 450 [Urine:450]  Last 3 shifts:  190 -  0700  In: 900 [P.O.:900]  Out: 1030 [Urine:1030]    Intake/Output Summary (Last 24 hours) at 2020 0920  Last data filed at 2020 0910  Gross per 24 hour   Intake 660 ml   Output 650 ml   Net 10 ml       Physical Exam:  General: Appears comfortable, NAD. HEENT:  Anicteric sclerae; pink palpebral conjunctivae; mucosa moist  Resp:  Symmetrical chest expansion, no accessory muscle use; good airway entry; no rales/ wheezing/ rhonchi noted  CV:  S1, S2 present; regular rate and rhythm  GI:  Abdomen soft, non-tender; (+) active bowel sounds  Extremities:  +2 pulses on all extremities; RUE edema-improving.   Skin:  Warm; Bruising at LUE and LLE thigh area, improving   neurologic:  Non-focal        DATA:   Current Facility-Administered Medications   Medication Dose Route Frequency    loperamide (IMODIUM) capsule 2 mg  2 mg Oral TID PRN    epoetin cristy-epbx (RETACRIT) 12,000 Units combo injection  12,000 Units SubCUTAneous Q MON, WED & FRI    doxercalciferoL (HECTOROL) 4 mcg/2 mL injection 1 mcg  1 mcg IntraVENous DIALYSIS MON, WED & FRI    calcium acetate(phosphat bind) (PHOSLO) capsule 667 mg  1 Cap Oral TID WITH MEALS    LORazepam (ATIVAN) injection 0.5 mg  0.5 mg IntraVENous Q6H PRN    glucose chewable tablet 16 g  4 Tab Oral PRN    glucagon (GLUCAGEN) injection 1 mg  1 mg IntraMUSCular PRN    dextrose (D50W) injection syrg 12.5-25 g  25-50 mL IntraVENous PRN    diphenhydrAMINE (BENADRYL) injection 12.5 mg  12.5 mg IntraVENous Q6H PRN    pantoprazole (PROTONIX) tablet 40 mg  40 mg Oral ACB&D    levothyroxine (SYNTHROID) tablet 100 mcg  100 mcg Oral 6am    B complex-vitaminC-folic acid (NEPHROCAP) cap  1 Cap Oral DAILY    ferrous gluconate 324 mg (37.5 mg iron) tablet 1 Tab  1 Tab Oral BID WITH MEALS                    Labs:  Recent Labs     02/23/20  0540 02/22/20  0600   WBC 4.2* 3.5*   HGB 7.6* 7.9*   HCT 23.4* 24.4*   PLT 52* 43*     Recent Labs     02/23/20  0540 02/22/20  0600   * 137   K 3.8 3.8    103   CO2 27 30   GLU 76 77   BUN 29* 18   CREA 4.22* 3.41*   CA 7.9* 8.2*   MG 1.9 1.8   ALB 2.7* 2.7*   SGOT 17 16   ALT 13* 16     No results for input(s): PH, PCO2, PO2, HCO3, FIO2 in the last 72 hours. IMPRESSION:   · Chronic thrombocytopenia  · Acute renal failure superimposed on chronic kidney disease age 9  · Bradycardia  · Chronic hepatitis C  · Polysubstance abuse-recent heroin use           PLAN:   · Platelet count today 52,000: No intervention warranted at this time. The severe chronic thrombocytopenia is due to his long-term myelosuppression from substance abuse in the form of alcohol and drugs. · H/H7.6 g/dl/23.4%, will recommend PRBC transfusion for hemoglobin less than 7g/dl. Continue Retacrit 12,000 units (nephrology preferred dose)  to be given 3 times a week with dialysis while inpatient on dialysis days. · Substance abuse cessation encouraged. May benefit from substance abuse center referral-will defer to attending. · Patient to follow-up in the outpatient hematology/oncology clinic in 5 to 7 days after discharge.  ·        The patient is: [x] acutely ill Risk of deterioration: [] moderate    [] critically ill  [] high         My assessment/plan was discussed with:  [x]nursing []PT/OT    []respiratory therapy []     []family []       Mark Majano MD

## 2020-02-24 NOTE — PROGRESS NOTES
ACUTE HEMODIALYSIS FLOW SHEET    HEMODIALYSIS ORDERS: Physician: Dr. Jennifer Quinn: Amber   Duration: 3.5 hr  BFR: 300   DFR: 600   Dialysate:  Temp 35.6   K+   3    Ca+  3   Na 138   Bicarb 35   Wt Readings from Last 1 Encounters:   02/24/20 73.9 kg (162 lb 14.4 oz)    Patient Chart [x]   Unable to Obtain []  Dry weight/UF Goal: 1500 ml  Access right chest TDC     Heparin []  Bolus    Units    [] Hourly    Units    [x]None      Catheter locking solution heparin   Pre BP:   144/77    Pulse:  58   Respirations: 16    Temperature:  98.2    Tx: NSS    [x] N/A   Labs: []  Pre  []  Post:   [x] N/A   Additional Orders(medications, blood products, hypotension management): [] Yes   [x] No     [x]  [facility] Consent Verified     CATHETER ACCESS:  []N/A   [x]Right   []Left   []IJ     []Fem   [x]Chest wall   [] First use X-ray verified     [x]Tunnel    [] Non Tunneled   [x]No S/S infection  []Redness  []Drainage []Cultured []Swelling []Pain   [x]Medical Aseptic Prep Utilized   []Dressing Changed  [x] Biopatch  Date: 2/23/20   []Clotted   [x]Patent   Flows: [x]Good  []Poor  []Reversed   If access problem,  notified: []Yes    [x]N/A        GRAFT/FISTULA ACCESS:   [x]N/A     []Right     []Left     []UE     []LE   []AVG   []AVF     []Buttonhole    []Medical Aseptic Prep Utilized   []No S/S infection  []Redness  []Drainage []Cultured  [] Swelling  [] Pain  Bruit:   [] Strong    [] Weak       Thrill :   [] Strong    [] Weak     Needle Gauge: 15   Length: 1 inch   If access problem,  notified: []Yes     [x]N/A     Please describe access if present and not used: N/A       GENERAL ASSESSMENT:    LUNGS:  Rate 16   SaO2%      [] Clear  [x] Coarse  [] Crackles  [] Wheezing                                                [] Diminished     Location : []RLL   []LLL    []RUL  []MYLENE   Cough: []Productive  []Dry  [x]N/A   Respirations:  [x]Easy  []Labored   Therapy:  [x]RA  []NC l/min    Mask: []NRB  [] Venti    O2% []Ventilator  []Intubated  [] Trach  [] BiPaP   CARDIAC: []Regular      [x] Irregular (melecio)  [] Pericardial Rub  [] JVD          []  Monitored  [] Bedside  [] Remotely monitored     EDEMA: [] None  [x]Generalized  [] Pitting [] 1    [] 2    [] 3    [] 4                 [] Facial  [] Pedal  []  UE  [] LE   SKIN:   [x] Warm  [] Hot     [] Cold   [x] Dry     [] Pale   [] Diaphoretic                  [] Flushed  [] Jaundiced  [] Cyanotic  [] Rash  [] Weeping   LOC:    [x] Alert      [x]Oriented:    [x] Person     [x] Place  [x]Time               [] Confused  [] Lethargic  [] Medicated  [] Non-responsive   GI / ABDOMEN:                     [] Flat    [] Distended    [x] Soft    [] Firm   []  Obese                   [] Diarrhea  [x] Bowel Sounds  [] Nausea  [] Vomiting     / URINE ASSESSMENT:                   [] Voiding   [x] Oliguria  [] Anuria   [x]  Willis                  [] Incontinent  []  Incontinent Brief []  Fecal Management System     PAIN:  [x] 0 []1  []2   []3   []4   []5   []6   []7   []8   []9   []10            Scale 0-10  Action/Follow Up:    MOBILITY:  [x] Bed    [] Stretcher      All Vitals and Treatment Details on Attached [address]: [facility]          Room # 2302/01   [] 1st Time Acute      [] Stat       [x] Routine      [] Urgent     [x] Acute Room  []  Bedside  [] ICU/CCU  [] ER   Isolation Precautions:  [x] Dialysis    There are currently no Active Isolations       ALLERGIES:     Allergies   Allergen Reactions    Iodine Hives and Other (comments)      Code Status:  Full Code     Hepatitis Status   2nd RN check :  TLO   Lab Results   Component Value Date/Time    Hepatitis A, IgM NEGATIVE  03/28/2019 11:15 AM    Hepatitis B surface Ag <0.10 02/17/2020 02:55 AM    Hepatitis B surface Ab 8.77 (L) 02/17/2020 02:55 AM    Hep B Core Ab,Total Positive (A) 09/15/2010 08:15 AM    Hepatitis B core, IgM REACTIVE 02/19/2020 10:15 AM    Hepatitis Be AG Negative 11/15/2010 09:55 AM    Hep B Core Ab, IgM Indeterminate 04/02/2019 05:29 AM    Hep B Core Ab, total Positive (A) 02/17/2020 02:55 AM    Hepatitis C virus Ab >11.00 (H) 02/17/2020 02:55 AM        Current Labs:      Lab Results   Component Value Date/Time    WBC 4.2 (L) 02/23/2020 05:40 AM    Hemoglobin, POC 8.2 (L) 10/11/2018 09:34 AM    HGB 7.6 (L) 02/23/2020 05:40 AM    Hematocrit, POC 24 (L) 10/11/2018 09:34 AM    HCT 23.4 (L) 02/23/2020 05:40 AM    PLATELET 52 (L) 94/60/2547 05:40 AM    MCV 89.7 02/23/2020 05:40 AM     Lab Results   Component Value Date/Time    Sodium 135 (L) 02/23/2020 05:40 AM    Potassium 3.8 02/23/2020 05:40 AM    Chloride 103 02/23/2020 05:40 AM    CO2 27 02/23/2020 05:40 AM    Anion gap 5 02/23/2020 05:40 AM    Glucose 76 02/23/2020 05:40 AM    BUN 29 (H) 02/23/2020 05:40 AM    Creatinine 4.22 (H) 02/23/2020 05:40 AM    BUN/Creatinine ratio 7 (L) 02/23/2020 05:40 AM    GFR est AA 17 (L) 02/23/2020 05:40 AM    GFR est non-AA 14 (L) 02/23/2020 05:40 AM    Calcium 7.9 (L) 02/23/2020 05:40 AM          DIET:  DIET NUTRITIONAL SUPPLEMENTS  DIET NUTRITIONAL SUPPLEMENTS  DIET RENAL      PRIMARY NURSE REPORT:   Pre Dialysis: Antonio Landry  RN   Time: 2540        EDUCATION:    [x] Patient [] Other           Knowledge Basis: []None [x]Minimal [] Substantial   Barriers to learning  [x]N/A   [] Access Care     [] S&S of infection  [] Fluid Management  [] K+   [x] Procedural    []Albumin   [] Medications   [] Tx Options   [] Transplant   [] Diet   [] Other   Teaching Tools:  [x] Explain  [] Demo  [] Handouts [] Video  Patient response: [x] Verbalized understanding  [] Teach back  [] Return demonstration   [] Requires follow up      [x]Time Out/Safety Check  [x] Extracorporeal Circuit Tested for integrity       RO/HEMODIALYSIS MACHINE SAFETY CHECKS - Before each treatment:     Machine Number:                   [facility]                                     [x] Unit Machine # 8 with centralized RO Alarm Test:  Pass time 0811           [x] RO/Machine Log Complete    Machine Temp    35.6             Dialysate: pH  7.4    Conductivity: Meter 13.6     HD Machine  14.1      TCD: 13.8  Dialyzer Lot # [id number]     Blood Tubing Lot # [id number]     Saline Lot # -FW     CHLORINE TESTING-Before each treatment and every 4 hours    Total Chlorine: [x] less than 0.1 ppm  Initial Time Check: 0900       4 Hr/2nd Check Time: 1300   (if greater than 0.1 ppm from Primary then every 30 minutes from Secondary)     TREATMENT INITIATION - with Dialysis Precautions:   [x] All Connections Secured              [x] Saline Line Double Clamped   [x] Venous Parameters Set               [x] Arterial Parameters Set    [x] Prime Given 250ml NSS              [x]Air Foam Detector Engaged      Treatment Initiation Note:  Pt arrived to HD unit via bed in stable condition. A&Ox4 with no noted distress. Resp easy and unlabored on RA. Right chest wall TDC assessed, dressing clean and intact. Lines flushed easily. Pt started on HD. During Treatment Notes:  1000 Vascular access visible with arterial and venous line connections intact. 1100  Dr. Pedrito Rodriguez at bedside. Vascular access visible with arterial and venous line connections intact. 1200 Vascular access visible with arterial and venous line connections intact. 1300 Vascular access visible with arterial and venous line connections intact.        Medication Dose Volume Route Time Veronica Nurse, Title   hectorol 1 mcg 0.5 mL IV 1149 Jona Crowder RN     Post Assessment  Dialyzer Cleared:[] Good [x] Fair  [] Poor  Blood processed:  60.1 L  UF Removed:  1500 Ml  Post /93  Pulse  56 Resp  16   Temp 98.2 Lungs: [] Clear [x] Course  [] Crackles                 [] Wheezing [] Diminished   Post Tx Vascular Access: [x] N/A     Cardiac:[] Regular   [x] Irregular melecio  Rhythm:  [] Monitored   [] Not Monitored    CVC Catheter: [] N/A  Locking solution: Heparin 1:1000 U  Arterial port 1.9 ml   Venous port 1.9 ml   Edema:  [x] None  [] General [] Facial                   [] Pedal   [] UE [] LE   Skin:[x] Warm  [x] Dry [] Diaphoretic               [] Flushed  [] Pale [] Cyanotic   Pain:  [x]0  []1 []2  []3 []4  []5  []6 []7 []8  []9  []10       Post Treatment Note:  1221  Pt tolerated procedure well.   1.5 L UF removed     POST TREATMENT PRIMARY NURSE HANDOFF REPORT:   Post Dialysis: TREVER Mercado RN             Time:  1330       Abbreviations: AVG-arterial venous graft, AVF-arterial venous fistula, IJ-Internal Jugular, Subcl-Subclavian, Fem-Femoral, Tx-treatment, AP/HR-apical heart rate, DFR-dialysate flow rate, BFR-blood flow rate, AP-arterial pressure, -venous pressure, UF-ultrafiltrate, TMP-transmembrane pressure, Venancio-Venous, Art-Arterial, RO-Reverse Osmosis

## 2020-02-24 NOTE — PROGRESS NOTES
RENAL PROGRESS NOTE: Pt seen on dialysis. Follow up of ESRD     Present on Admission:   Acute renal failure superimposed on stage 3 chronic kidney disease ([facility]   UTI (urinary tract infection)   [facility] coma [facility]   Essential hypertension   [facility] drug abuse ([facility]   Quadriparesis ([facility]   Renal cell carcinoma of left kidney (HCC)   COPD, moderate ([facility]   Hypothyroid   Acute renal failure superimposed on chronic kidney disease ([facility]   ESRD (end stage renal disease) ([facility]         Subjective: Feels good, no SOB, No more Hypoglycemia    Patient is on Dialysis.      Objective:    Patient Vitals for the past 12 hrs:   Temp Pulse Resp BP SpO2   02/24/20 1100 -- (!) 48 -- 158/84 --   02/24/20 1045 -- (!) 50 -- 154/84 --   02/24/20 1030 -- (!) 51 -- 151/80 --   02/24/20 1015 -- (!) 54 -- 157/79 --   02/24/20 1000 -- (!) 54 -- 145/76 --   02/24/20 0945 -- (!) 57 -- 151/83 --   02/24/20 0942 -- (!) 57 -- 146/76 --   02/24/20 0930 98.2 °F (36.8 °C) (!) 58 18 144/77 --   02/24/20 0752 97.9 °F (36.6 °C) (!) 51 18 171/86 97 %   02/24/20 0400 97.6 °F (36.4 °C) (!) 47 16 175/84 98 %   02/24/20 0036 97.8 °F (36.6 °C) (!) 53 20 170/84 95 %        Intake/Output Summary (Last 24 hours) at 2/24/2020 1143  Last data filed at 2/24/2020 0910  Gross per 24 hour   Intake 420 ml   Output 650 ml   Net -230 ml       Physical Assessment:     General Appearance: NAD  Lung: clear to auscultation  Heart: regular rate and rhythm and no murmurs, clicks or gallops  Lower Extremities: no edema   Access: Has TDC,qb 400    Labs    CBC w/Diff    Recent Labs     02/24/20  0900 02/23/20  0540 02/22/20  0600   WBC 4.3* 4.2* 3.5*   RBC 2.84* 2.61* 2.75*   HGB 8.2* 7.6* 7.9*   HCT 25.9* 23.4* 24.4*   MCV 91.2 89.7 88.7   MCH 28.9 29.1 28.7   MCHC 31.7 32.5 32.4   RDW 19.9* 20.2* 20.1*    Recent Labs     02/24/20  0900 02/23/20  0540 02/22/20  0600   BANDS  --  1 1   MONOS PENDING 10* 11*   EOS PENDING 0 3   BASOS PENDING 0 0   RDW 19.9* 20.2* 20.1*        Comprehensive Metabolic Profile    Recent Labs     02/24/20  0900 02/23/20  0540 02/22/20  0600    135* 137   K 3.8 3.8 3.8    103 103   CO2 28 27 30   BUN 37* 29* 18   CREA 5.05* 4.22* 3.41*    Recent Labs     02/24/20  0900 02/23/20  0540 02/22/20  0600   CA 8.3* 7.9* 8.2*   PHOS 3.9  --   --    ALB 2.9* 2.7* 2.7*   TP 6.6 6.1* 6.2*   SGOT 16 17 16   TBILI 1.1* 1.3* 1.4*          Basic Metabolic Profile       results  reviwed. MEDS:Reviwed.   Current Facility-Administered Medications   Medication Dose Route Frequency Provider Last Rate Last Dose    loperamide (IMODIUM) capsule 2 mg  2 mg Oral TID PRN Dana Bravo MD   2 mg at 02/24/20 2805    epoetin cristy-epbx (RETACRIT) 12,000 Units combo injection  12,000 Units SubCUTAneous Q DENISA LUA & Marisel Esquivel MD        doxercalciferoL (HECTOROL) 4 mcg/2 mL injection 1 mcg  1 mcg IntraVENous DIALYSIS DENISA LUA & Marisel Esquivel MD        calcium acetate(phosphat bind) (PHOSLO) capsule 667 mg  1 Cap Oral TID WITH MEALS Karen Gonsalez MD   Stopped at 02/24/20 0800    LORazepam (ATIVAN) injection 0.5 mg  0.5 mg IntraVENous Q6H PRN Dana Bravo MD   0.5 mg at 02/19/20 1452    glucose chewable tablet 16 g  4 Tab Oral PRN Dana Bravo MD   16 g at 02/18/20 0845    glucagon (GLUCAGEN) injection 1 mg  1 mg IntraMUSCular PRN Dana Bravo MD        dextrose (D50W) injection syrg 12.5-25 g  25-50 mL IntraVENous PRN Dana Bravo MD   25 g at 02/19/20 0706    diphenhydrAMINE (BENADRYL) injection 12.5 mg  12.5 mg IntraVENous Q6H PRN Mila Aquino MD   12.5 mg at 02/23/20 0547    pantoprazole (PROTONIX) tablet 40 mg  40 mg Oral ACB&D Dana Bravo MD   40 mg at 02/24/20 0859    levothyroxine (SYNTHROID) tablet 100 mcg  100 mcg Oral Jose Davidson MD   100 mcg at 02/24/20 0626    B complex-vitaminC-folic acid (NEPHROCAP) cap  1 Cap Oral DAILY Lior Mcadams MD   Stopped at 02/24/20 0900    ferrous gluconate 324 mg (37.5 mg iron) tablet 1 Tab  1 Tab Oral BID WITH MEALS Lior Mcadams MD   Stopped at 02/24/20 0800       Impression:    ESRD: Tolerating HD well. Anemia of CKD: on Epogen. Hyperparathyroidism Yes  Thrombocytopenia. Hep B Core Ab IGM reactive  Plan  Dialysis for volume and solute management  Epogen  80433 units. Hectorol: 0.5 microgram.  UF 1500. OP dialysis unit wants to have Hep DNA  Results  Before accepting, GI needs to follow to decide if he needs to be treated .         Pelon Castillo MD

## 2020-02-24 NOTE — PROGRESS NOTES
Addendum : had extensive discussion with Lab, They said Hep B DNA is not detected in our system, test was done at [address] will try to put the result in computer .

## 2020-02-24 NOTE — DIABETES MGMT
Glycemic Control Plan of Care    T2DM with A1c of 4.9% (2/11/2020). Home diabetes medications:   Lispro sliding scale insulin. Seen patient this afternoon after returned from dialysis unit. POC BG range on 02/23/2020: . POC BG report on 02/24/2020 at time of review: 64, 62, 100, 126, 122. Discussed hypoglycemia episode with nursing. Danika Acosta, stated patient had low blood sugar episode of 64 and it was rechecked (62) by CNA without treatment and then patient was fed with breakfast with f/u BG of 100 before patient went to dialysis unit. Discussed hypoglycemia protocol to treat/recheck every 15 minutes until BG is >80. Recommendation(s):  1.) cont inpatient glycemic monitoring and follow hypoglycemia protocol and prevention. Assessment:  Patient is 62year old with past medical history including type diabetes mellitus, CKD stage 3, drug abuse, GERD, quadriparesis, left renal carcinoma, suprapubic catheter and hepatitis B&C - was admitted on 2/15/2020 with report that he was sent from MD office due to worsening Cr and LLE edema. Noted:  ESRD. Status post temporary dialysis catheter placement done 02/18/2020. History of renal cell CA, s/p left partial nephrectomy. COPD. Hypothyroid. UTI.  T2DM. Most recent blood glucose values:    Results for Valeriano Roca (MRN [id number]) as of 2/24/2020 16:35   Ref. Range 2/23/2020 08:21 2/23/2020 11:23 2/23/2020 15:24 2/23/2020 20:59   GLUCOSE,FAST - POC Latest Ref Range: 70 - 110 mg/dL 81 100 102 166 (H)     Results for Valeriano Roca (MRN [id number]) as of 2/24/2020 16:35   Ref. Range 2/24/2020 08:03 2/24/2020 08:32 2/24/2020 08:52 2/24/2020 11:25 2/24/2020 13:50   GLUCOSE,FAST - POC Latest Ref Range: 70 - 110 mg/dL 64 (L) 62 (L) 100 126 (H) 122 (H)     Current A1C: 4.9% (2/11/2020). Current hospital diabetes medications:  None. Total daily dose insulin requirement previous day: 02/23/2020  None.     Diet: Renal regular; nutr suppl: ensure enlive with breakfast, lunch, dinner and magic cups with breakfast and dinner. Goals:  Blood glucose will be within target range of  mg/dL by 02/27/2020.     Education:  ___  Refer to Diabetes Education Record             _X__  Education not indicated at this time    Isis Hooks RN [facility]  Pager: [phone]

## 2020-02-25 VITALS
SYSTOLIC BLOOD PRESSURE: 144 MMHG | RESPIRATION RATE: 18 BRPM | WEIGHT: 162.5 LBS | HEIGHT: 72 IN | OXYGEN SATURATION: 98 % | DIASTOLIC BLOOD PRESSURE: 76 MMHG | TEMPERATURE: 99 F | HEART RATE: 57 BPM | BODY MASS INDEX: 22.01 KG/M2

## 2020-02-25 DIAGNOSIS — N18.9 ACUTE RENAL FAILURE SUPERIMPOSED ON CHRONIC KIDNEY DISEASE, UNSPECIFIED CKD STAGE, UNSPECIFIED ACUTE RENAL FAILURE TYPE (HCC): ICD-10-CM

## 2020-02-25 DIAGNOSIS — N17.9 ACUTE RENAL FAILURE SUPERIMPOSED ON CHRONIC KIDNEY DISEASE, UNSPECIFIED CKD STAGE, UNSPECIFIED ACUTE RENAL FAILURE TYPE (HCC): ICD-10-CM

## 2020-02-25 LAB
ALBUMIN SERPL-MCNC: 2.6 G/DL (ref 3.4–5)
ALBUMIN/GLOB SERPL: 0.8 {RATIO} (ref 0.8–1.7)
ALP SERPL-CCNC: 91 U/L (ref 45–117)
ALT SERPL-CCNC: 14 U/L (ref 16–61)
ANION GAP SERPL CALC-SCNC: 7 MMOL/L (ref 3–18)
AST SERPL-CCNC: 18 U/L (ref 10–38)
BASOPHILS # BLD: 0 K/UL (ref 0–0.1)
BASOPHILS NFR BLD: 0 % (ref 0–2)
BILIRUB SERPL-MCNC: 1.5 MG/DL (ref 0.2–1)
BUN SERPL-MCNC: 25 MG/DL (ref 7–18)
BUN/CREAT SERPL: 7 (ref 12–20)
CALCIUM SERPL-MCNC: 8 MG/DL (ref 8.5–10.1)
CHLORIDE SERPL-SCNC: 104 MMOL/L (ref 100–111)
CO2 SERPL-SCNC: 29 MMOL/L (ref 21–32)
CREAT SERPL-MCNC: 3.42 MG/DL (ref 0.6–1.3)
DIFFERENTIAL METHOD BLD: ABNORMAL
EOSINOPHIL # BLD: 0.1 K/UL (ref 0–0.4)
EOSINOPHIL NFR BLD: 2 % (ref 0–5)
ERYTHROCYTE [DISTWIDTH] IN BLOOD BY AUTOMATED COUNT: 20.6 % (ref 11.6–14.5)
GLOBULIN SER CALC-MCNC: 3.2 G/DL (ref 2–4)
GLUCOSE BLD STRIP.AUTO-MCNC: 141 MG/DL (ref 70–110)
GLUCOSE BLD STRIP.AUTO-MCNC: 95 MG/DL (ref 70–110)
GLUCOSE SERPL-MCNC: 89 MG/DL (ref 74–99)
HBV GENOTYPE: NORMAL
HBV IU/ML, 551649: NORMAL IU/ML
HCT VFR BLD AUTO: 24.5 % (ref 36–48)
HGB BLD-MCNC: 7.7 G/DL (ref 13–16)
LOG10 HBV AS IU/ML 551596: NORMAL LOG10 IU/ML
LYMPHOCYTES # BLD: 0.8 K/UL (ref 0.9–3.6)
LYMPHOCYTES NFR BLD: 17 % (ref 21–52)
MAGNESIUM SERPL-MCNC: 2.1 MG/DL (ref 1.6–2.6)
MCH RBC QN AUTO: 28.7 PG (ref 24–34)
MCHC RBC AUTO-ENTMCNC: 31.4 G/DL (ref 31–37)
MCV RBC AUTO: 91.4 FL (ref 74–97)
MONOCYTES # BLD: 0.4 K/UL (ref 0.05–1.2)
MONOCYTES NFR BLD: 8 % (ref 3–10)
NEUTS SEG # BLD: 3.6 K/UL (ref 1.8–8)
NEUTS SEG NFR BLD: 73 % (ref 40–73)
PLATELET # BLD AUTO: 75 K/UL (ref 135–420)
PLATELET COMMENTS,PCOM: ABNORMAL
POTASSIUM SERPL-SCNC: 4.2 MMOL/L (ref 3.5–5.5)
PROT SERPL-MCNC: 5.8 G/DL (ref 6.4–8.2)
RBC # BLD AUTO: 2.68 M/UL (ref 4.7–5.5)
RBC MORPH BLD: ABNORMAL
SODIUM SERPL-SCNC: 140 MMOL/L (ref 136–145)
TEST INFORMATION: NORMAL
WBC # BLD AUTO: 4.9 K/UL (ref 4.6–13.2)

## 2020-02-25 PROCEDURE — 74011250637 HC RX REV CODE- 250/637: Performed by: FAMILY MEDICINE

## 2020-02-25 PROCEDURE — 83735 ASSAY OF MAGNESIUM: CPT

## 2020-02-25 PROCEDURE — 80053 COMPREHEN METABOLIC PANEL: CPT

## 2020-02-25 PROCEDURE — 85025 COMPLETE CBC W/AUTO DIFF WBC: CPT

## 2020-02-25 PROCEDURE — 82962 GLUCOSE BLOOD TEST: CPT

## 2020-02-25 PROCEDURE — 97535 SELF CARE MNGMENT TRAINING: CPT

## 2020-02-25 PROCEDURE — 74011250637 HC RX REV CODE- 250/637: Performed by: INTERNAL MEDICINE

## 2020-02-25 RX ORDER — LEVOTHYROXINE SODIUM 100 UG/1
100 TABLET ORAL
Qty: 30 TAB | Refills: 1 | Status: SHIPPED | OUTPATIENT
Start: 2020-02-26

## 2020-02-25 RX ORDER — PANTOPRAZOLE SODIUM 40 MG/1
40 TABLET, DELAYED RELEASE ORAL
Qty: 60 TAB | Refills: 1 | Status: SHIPPED | OUTPATIENT
Start: 2020-02-25 | End: 2020-06-08 | Stop reason: ALTCHOICE

## 2020-02-25 RX ORDER — CALCIUM ACETATE 667 MG/1
1 CAPSULE ORAL
Qty: 90 CAP | Refills: 1 | Status: SHIPPED | OUTPATIENT
Start: 2020-02-25

## 2020-02-25 RX ORDER — LOPERAMIDE HYDROCHLORIDE 2 MG/1
2 CAPSULE ORAL
Qty: 30 CAP | Refills: 0 | Status: SHIPPED | OUTPATIENT
Start: 2020-02-25 | End: 2020-03-06

## 2020-02-25 RX ORDER — DOXERCALCIFEROL 4 UG/2ML
1 INJECTION INTRAVENOUS
Qty: 1 ML | Refills: 0 | Status: SHIPPED
Start: 2020-02-26

## 2020-02-25 RX ADMIN — NEPHROCAP 1 CAPSULE: 1 CAP ORAL at 08:03

## 2020-02-25 RX ADMIN — CALCIUM ACETATE 667 MG: 667 CAPSULE ORAL at 08:03

## 2020-02-25 RX ADMIN — CALCIUM ACETATE 667 MG: 667 CAPSULE ORAL at 12:08

## 2020-02-25 RX ADMIN — LEVOTHYROXINE SODIUM 100 MCG: 100 TABLET ORAL at 07:23

## 2020-02-25 RX ADMIN — PANTOPRAZOLE SODIUM 40 MG: 40 TABLET, DELAYED RELEASE ORAL at 08:03

## 2020-02-25 RX ADMIN — FERROUS GLUCONATE TAB 324 MG (37.5 MG ELEMENTAL IRON) 1 TABLET: 324 (37.5 FE) TAB at 08:03

## 2020-02-25 NOTE — PROGRESS NOTES
Hematology/Medical Oncology Progress Note             Name: Domenica Bustillos   : 1960   MRN: [id number]   Date: 2020 8:41 AM     [x]I have reviewed the flowsheet and previous days notes. Events overnight reviewed and discussed with nursing staff. Vital signs and records reviewed. Mr. Toy Astorga is a 69-year-old -American male with a history of diabetes mellitus, hypothyroidism, chronic renal failure, thrombocytopenia, suprapubic catheter due to chronic urinary retention quadriparesis after cervical spine infection and surgery. He has a history of bradycardia and has been evaluated by cardiology in the past.  He also has a history of chronic polysubstance abuse reported that the last heroin use was about 4 days prior to this admission. Pt was seen by PCP and instructed to come to ED due to worsening Creatinine. Patient was seen by nephrology and admitted to start dialysis. Subsequently the patient was found to have pancytopenia and required platelet tranfsusion prior to his dialysis catheter placement on yesterday. Patient voices no new complaints today, continues to express readiness to go home             ROS:  Constitutional:  Negative for fever, chills, diaphoresis, activity change, appetite change and unexpected weight change. HENT: Negative for nosebleeds, congestion, facial swelling, mouth sores, trouble swallowing, neck pain, neck stiffness, voice change and postnasal drip. Eyes: Negative for photophobia, pain, discharge and itching. Respiratory: Negative for apnea, cough, choking, chest tightness, wheezing and stridor. Cardiovascular: Negative for chest pain, palpitations and leg swelling. Gastrointestinal: Negative for abdominal pain. Negative for nausea, diarrhea, constipation, blood in stool and rectal pain. Genitourinary: Negative for dysuria, urgency, hematuria, flank pain and difficulty urinating.    Musculoskeletal: Negative for back pain, joint swelling, arthralgias and gait problem. Skin: Positive bruising noted at LUE, with swelling, bruising noted at LLE, improved. Neurological:  Negative for dizziness, facial asymmetry, speech difficulty, light-headedness and headaches. Hematological: Negative for adenopathy. Positive for bruise easily  Psychiatric/Behavioral: Negative for hallucinations, confusion, disturbed wake/sleep cycle and agitation. Vital Signs:    Visit Vitals  /86   Pulse 76   Temp 99.1 °F (37.3 °C)   Resp 18   Ht 6' (1.829 m)   Wt 73.7 kg (162 lb 8 oz)   SpO2 97%   BMI 22.04 kg/m²       O2 Device: Room air       Temp (24hrs), Av.6 °F (37 °C), Min:97.3 °F (36.3 °C), Max:99.9 °F (37.7 °C)       Intake/Output:   Last shift:       07 -  1900  In: 240 [P.O.:240]  Out: 425 [Urine:425]  Last 3 shifts:  190 -  0700  In: 1000 [P.O.:1000]  Out: 2750 [Urine:1250]    Intake/Output Summary (Last 24 hours) at 2020 0945  Last data filed at 2020 0911  Gross per 24 hour   Intake 940 ml   Output 2325 ml   Net -1385 ml       Physical Exam:  General: Appears comfortable, NAD. HEENT:  Anicteric sclerae; pink palpebral conjunctivae; mucosa moist  Resp:  Symmetrical chest expansion, no accessory muscle use; good airway entry; no rales/ wheezing/ rhonchi noted  CV:  S1, S2 present; regular rate and rhythm  GI:  Abdomen soft, non-tender; (+) active bowel sounds  Extremities:  +2 pulses on all extremities; RUE edema-improving.   Skin:  Warm; Bruising at LUE and LLE thigh area, improving   neurologic:  Non-focal        DATA:   Current Facility-Administered Medications   Medication Dose Route Frequency    loperamide (IMODIUM) capsule 2 mg  2 mg Oral TID PRN    epoetin cristy-epbx (RETACRIT) 12,000 Units combo injection  12,000 Units SubCUTAneous Q MON, WED & FRI    doxercalciferoL (HECTOROL) 4 mcg/2 mL injection 1 mcg  1 mcg IntraVENous DIALYSIS MON, WED & FRI    calcium acetate(phosphat bind) (PHOSLO) capsule 667 mg  1 Cap Oral TID WITH MEALS    LORazepam (ATIVAN) injection 0.5 mg  0.5 mg IntraVENous Q6H PRN    glucose chewable tablet 16 g  4 Tab Oral PRN    glucagon (GLUCAGEN) injection 1 mg  1 mg IntraMUSCular PRN    dextrose (D50W) injection syrg 12.5-25 g  25-50 mL IntraVENous PRN    diphenhydrAMINE (BENADRYL) injection 12.5 mg  12.5 mg IntraVENous Q6H PRN    pantoprazole (PROTONIX) tablet 40 mg  40 mg Oral ACB&D    levothyroxine (SYNTHROID) tablet 100 mcg  100 mcg Oral 6am    B complex-vitaminC-folic acid (NEPHROCAP) cap  1 Cap Oral DAILY    ferrous gluconate 324 mg (37.5 mg iron) tablet 1 Tab  1 Tab Oral BID WITH MEALS                    Labs:  Recent Labs     02/25/20  0534 02/24/20  0900 02/23/20  0540   WBC 4.9 4.3* 4.2*   HGB 7.7* 8.2* 7.6*   HCT 24.5* 25.9* 23.4*   PLT 75* 65* 52*     Recent Labs     02/25/20  0534 02/24/20  0900 02/23/20  0540    139 135*   K 4.2 3.8 3.8    105 103   CO2 29 28 27   GLU 89 118* 76   BUN 25* 37* 29*   CREA 3.42* 5.05* 4.22*   CA 8.0* 8.3* 7.9*   MG 2.1 2.1 1.9   PHOS  --  3.9  --    ALB 2.6* 2.9* 2.7*   SGOT 18 16 17   ALT 14* 14* 13*     No results for input(s): PH, PCO2, PO2, HCO3, FIO2 in the last 72 hours. IMPRESSION:   · Chronic thrombocytopenia  · Acute renal failure superimposed on chronic kidney disease age 9  · Bradycardia  · Chronic hepatitis C  · Polysubstance abuse-recent heroin use           PLAN:   · Platelet count today 75,000: No intervention warranted at this time. The severe chronic thrombocytopenia is due to his long-term myelosuppression from substance abuse in the form of alcohol and drugs. · H/H 7.7 g/dl/24.5%, will recommend PRBC transfusion for hemoglobin less than 7g/dl. Continue Retacrit 12,000 units (nephrology preferred dose)  to be given 3 times a week with dialysis while inpatient on dialysis days. · Substance abuse cessation encouraged. May benefit from substance abuse center referral-will defer to attending.   · Patient to follow-up in the outpatient hematology/oncology clinic in 5 to 7 days after discharge.  ·        The patient is: [x] acutely ill Risk of deterioration: [] moderate    [] critically ill  [] high         My assessment/plan was discussed with:  [x]nursing []PT/OT    []respiratory therapy []     []family []       Titi Rivera MD

## 2020-02-25 NOTE — DISCHARGE SUMMARY
Discharge Summary        Patient ID:        Caroline Lynn        [id number]        12 y.o.        1960        Admission Date: 2/15/2020      Discharge date and time: 2/25/2020        Inpatient care:   Problem that led to hospitalization:   Principal Problem:    Acute renal failure superimposed on stage 3 chronic kidney disease ([address] (2/15/2020)    Active Problems:    Chronic hepatitis C without hepatic coma ([address] (5/31/2017)      Essential hypertension (4/28/2017)      IV drug abuse ([address] (4/28/2017)      Quadriparesis ([address] (5/31/2017)      Renal cell carcinoma of left kidney ([address] (12/17/2013)      Overview: Pathological Stage [id number] cc RCC FG3 s/p left open partial nephrectomy       in 12/13        COPD, moderate ([address] (1/31/2019)      Hypothyroid (1/31/2019)      UTI (urinary tract infection) (2/15/2020)      Acute renal failure superimposed on chronic kidney disease ([address] (2/15/2020)      Bradycardia (2/15/2020)      ESRD (end stage renal disease) ([address] (2/17/2020)      Secondary hyperparathyroidism of renal origin ([address] (2/17/2020)       Key findings and test results:   CXR 2/15:  IMPRESSION:   Slight blunting of the right costophrenic sulcus consistent with small pleural effusion versus thickening. Streaky right perihilar opacity, likely atelectasis. Abd US 2/18: IMPRESSION:  1. Fatty infiltration of the liver. No suspicious lesion.      2. Rim calcified right renal cyst. This is likely a Bosniak II cyst and similar  to prior imaging.      3.  Dilated CBD without discrete obstructing process. May consider MRCP for  further evaluation.      4.  Mild ascites.      5.  Bilateral pleural effusions.      6. Cholelithiasis and pericholecystic fluid. The pericholecystic fluid is felt  to most likely be due to hepatocellular disease. If clinical concern for acute  cholecystitis, may consider nuclear medicine hepatobiliary scan for further  evaluation. MRCP 2/21:  IMPRESSION:     1.   No finding for common bile duct obstruction. The CBD is of normal caliber.     2. Minor amount of edema along hepatic wall of gallbladder which is felt  attributable to hepatic disease, or general 3rd spacing of fluid given effusions  and ascites. Consults:Cardiology, GI, Nephrology, Hematology/Oncology, Vascular Surgery and Endocrine    Procedures: IR Dialysis catheter placement             Final diagnoses (primary and secondary): Acute renal failure superimposed on stage 3 chronic kidney disease ([address]                                            Principal Problem:    Acute renal failure superimposed on stage 3 chronic kidney disease ([address] (2/15/2020)    Active Problems:    Chronic hepatitis C without hepatic coma ([address] (5/31/2017)      Essential hypertension (4/28/2017)      IV drug abuse ([address] (4/28/2017)      Quadriparesis ([address] (5/31/2017)      Renal cell carcinoma of left kidney ([address] (12/17/2013)      Overview: Pathological Stage [id number] cc RCC FG3 s/p left open partial nephrectomy       in 12/13        COPD, moderate ([address] (1/31/2019)      Hypothyroid (1/31/2019)      UTI (urinary tract infection) (2/15/2020)      Acute renal failure superimposed on chronic kidney disease ([address] (2/15/2020)      Bradycardia (2/15/2020)      ESRD (end stage renal disease) ([address] (2/17/2020)      Secondary hyperparathyroidism of renal origin ([address] (2/17/2020)            Brief hospital course  Esthela Winston is a 62 y.o.  male who has history of chronic renal failure, thrombocytopenia, suprapubic catheter due to chronic urinary retention, and quadriparesis after cervical spine infection and surgery. Patient has history of chronic bradycardia has been evaluated by cardiology in the past. Patient also had history of chronic polysubstance abuse and reported his last heroin use about 4 days prior to ER presentation. Patient has history of hypothyroidism, diabetes mellitus, hypertension.     Patient was instructed to go to the ER due to worsening creatinine, worsening lower extremity edema, and decreased urine output. He was admitted from the ER by Dr. Dary Del Rosario to start dialysis. Patient was found to have severe bradycardia in the ER with heart rate as low as 33 with no symptoms. Echo done this admission had no change from previous echo. Patient had continued bradycardia throughout his admission with no symptoms and was signed off by Cardiology with asymptomatic bradycardia.      Patient has history of left renal cell carcinoma and partial nephrectomy of the left kidney. Patient has history of intravenous drug abuse and hepatitis C, has been following up with GI Dr. Elvin Salmon. H/o C5/6 discitis/OM with quadriplegia s/p C3-7 laminectomy with posterolateral fusion and hardware 4/29/2017 Possible autonomic dysfunction.  (Patient says his temperature has been labile/LOW since discitis/quadriplegia 4/2017)    Hypoglycemia resolved/ testosterone deficiency  Stable off IV fluid   Tolerating his diet  Discuss Dr Manriquez Men hypoglycemia due to liver cirrhosis poor nutrition and drug abuse. F/u after discharge with him no need for testosterone therapy now, likely related to chronic opiod abuse. Hepatitis C/ H/O upper GI bleeding gastritis, history of Hep B  Protonix 40 mg daily  Ongoing drug abuse/heroin, Hep C ab positive, patient has history of hep c treated  By GI Dr. Elvin Salmon. Repeat Hep C Quant negative this hospitalization. HIV negative. Hep B core ab positive, surface Ag negative no active infection suggested. Hep B Quant negative this hospitalization. GI consult done ordered ultrasound liver, showed dilated CBD without discrete obstructing process and cholelithiasis and pericholecystic fluid. Follow up MRCP negative for CBD obstruction.   Patient with gallstone present on US but remains asymptomatic, small pericholecystic fluid noted but likely due to liver disease, Tbili improved now at 1.3.   GI recommends cont to monitor LFTs/platelets   Case manger consulted to explore drug rehab options. Pt has to initiate phone call or walk in to the clinic for methadone rehab. Discussed with pt again today, continues to decline inpatient drug rehab, but agreed to methadone clinic. He will self-present to methadone clinic to establish care  Discussed at length high risk for severe illness and death with ongoing drug and alcohol use.       Hypertension/ possible autonomic neuropathy after neck surgery   Continue hydralazine 25 mg p.o. every 8 hours as needed systolic blood pressure above 160  Will need referral for outpatient Pt and ot evaluation and treatment. Patient declines SNF, declines home health.     Acute renal failure on top of chronic kidney disease  [facility]  per IR placed 2/18/20  Nephrology, Dr. Candance Factor, started dialysis 2/18, arranging for chronic dialysis at [facility] unit. Continue monitor potassium level and volume overload  Seen by Vascular surgery to initiate planning for long term dialysis access, seen by PADMAJA Donnelly, Dr. Alison Nava. Will need to follow up as outpatient to complete vein mapping and further surgical discussion.     Asymptomatic Bradycardia  Cardiology consulted, recommended stable in marked sinus bradycardia, keep off all AV blocking drugs. Echo done this admission non change from previous echo. No further cardiac workup at this time, signed off 2/18/20. Thrombocytopenia/pancytopenia, anemia of chronic disease  Hematology oncology consulted, Dr. Tushar Cervantes, did not recommend bone marrow biopsy at this time, notes myelosuppression likely from polysubstance abuse. Continues on retacrit 12,000 units Three times weekly, will need to follow up with Dr. Tushar Cervantes as outpatient.     Urinary tract infection ruled out, urine culture positive for yeast only/ pt has suprapubic cath was changes recently at [facility]  History of Renal Cell Carcinoma s/p L partial nephrectomy 12/2013, monitored by Dr. Oscar Moreno urology outpatient. Chronic urinary retention with history of C-spine laminectomy and post-op paralysis s/p [facility] 10/2018. Discontinued Rocephin 1 g IV  Blood cultures no growth x 3 days  Urine culture >100K yeast, chronic, previously thought to be colonizer                 Discharge Exam:   Visit Vitals  /86   Pulse 76   Temp 99.1 °F (37.3 °C)   Resp 18   Ht 6' (1.829 m)   Wt 73.7 kg (162 lb 8 oz)   SpO2 97%   BMI 22.04 kg/m²     General:  Alert, cooperative, no distress. Neck: Supple, symmetrical, trachea midline, no adenopathy, no carotid bruit and no JVD. TDC in Rt IJ   Lungs:   Clear to auscultation bilaterally. Chest wall:  No tenderness or deformity. Heart:  Regular rate and rhythm, no murmur, click, rub or gallop. Abdomen:   Soft, non-tender. Bowel sounds normal.   Extremities: Extremities normal, atraumatic, no cyanosis or edema. Skin: Skin color, texture, turgor normal. No rashes or lesions   Neurologic: CNII-XII intact. No acute neurological deficits. A&O x 4            Postdischarge care/patient self-management          Medications at discharge  including reasons for change and indications for new ones:   Current Discharge Medication List      START taking these medications    Details   b complex-vitamin c-folic acid (NEPHROCAPS) 1 mg capsule Take 1 Cap by mouth daily. Qty: 30 Cap, Refills: 1      calcium acetate,phosphat bind, (PHOSLO) 667 mg cap Take 1 Cap by mouth three (3) times daily (with meals). Qty: 90 Cap, Refills: 1      doxercalciferoL (HECTOROL) 4 mcg/2 mL injection 0.5 mL by IntraVENous route DIALYSIS MON, WED & FRI. Qty: 1 mL, Refills: 0    Comments: To be given with dialysis      loperamide (IMODIUM) 2 mg capsule Take 1 Cap by mouth three (3) times daily as needed for Diarrhea for up to 10 days. Qty: 30 Cap, Refills: 0      pantoprazole (PROTONIX) 40 mg tablet Take 1 Tab by mouth Before breakfast and dinner.   Qty: 60 Tab, Refills: 1      food supplemt, lactose-reduced (ENSURE ENLIVE) 0.08 gram-1.5 kcal/mL liqd Take 1 Each by mouth three (3) times daily (with meals). Flavor of patient choice  Qty: 90 Bottle, Refills: 1         CONTINUE these medications which have CHANGED    Details   levothyroxine (SYNTHROID) 100 mcg tablet Take 1 Tab by mouth every morning. Qty: 30 Tab, Refills: 1         CONTINUE these medications which have NOT CHANGED    Details   ferrous gluconate 324 mg (37.5 mg iron) tablet Take 1 Tab by mouth two (2) times daily (with meals). Qty: 60 Tab, Refills: 0      hydrALAZINE (APRESOLINE) 25 mg tablet Take 1 Tab by mouth every eight (8) hours as needed (systolic B/P >420). Qty: 30 Tab, Refills: 0      Syringe, Disposable, (BD SYRINGE CATHETER TIP) 60 mL syrg Use 1 syringe daily with irrigations  Qty: 30 Syringe, Refills: 11         STOP taking these medications       FUNMILAYO-DEBBIE 0.8 mg tab tablet Comments:   Reason for Stopping:         calcitRIOL (ROCALTROL) 0.25 mcg capsule Comments:   Reason for Stopping:         liothyronine (CYTOMEL) 25 mcg tablet Comments:   Reason for Stopping:         docusate sodium (COLACE) 100 mg capsule Comments:   Reason for Stopping:         ergocalciferol (DRISDOL) 1,250 mcg (50,000 unit) capsule Comments:   Reason for Stopping:         doxycycline (ADOXA) 100 mg tablet Comments:   Reason for Stopping:         insulin lispro (HUMALOG) 100 unit/mL injection Comments:   Reason for Stopping:         tamsulosin (FLOMAX) 0.4 mg capsule Comments:   Reason for Stopping:                      Condition at discharge and functional status: improved        Diet at discharge Renal Diet        Activities at discharge: mostly moving around the house        Discharge destination: home        Advanced care plan    Full code    Contact information/plan for follow up care                  Follow-up with Dr. Sagar Jackson or Dr. Scott Reynoso in 1 week.    Follow up with Nephrology, Dr. Olivia Galo, follow up with hemodialysis as scheduled   Follow-up with Vascular Surgery, Dr. Ileana Gunderson for long term HD access planning in 1-2 weeks   Follow-up with Cardiology, Dr. Bibi Gooden in 2-3 weeks for bradycardia   Follow-up with Heme/Onc, Dr. Isabelle Velez, for pancytopenia in 1-2 weeks. Follow-up with Endocrinology, Dr. Avtar Adam, for hypogylcemia and testosterone deficiency in 2-4 weeks   Follow-up with Urology, as scheduled for suprapubic cath management. MARCO CaballeroS       Patient seen and examined independently. Reviewed PA student note and changes made where appropriate.      Signed By: Beau Portillo MD     February 25, 2020     10:33am

## 2020-02-25 NOTE — PROGRESS NOTES
Progress Note    Creig Simmonds  62 y.o. Admit Date: 2/15/2020  Principal Problem:    Acute renal failure superimposed on stage 3 chronic kidney disease ([facility] (2/15/2020) POA: Yes    Active Problems:    Chronic hepatitis C without hepatic coma ([address] (5/31/2017) POA: Yes      Essential hypertension (4/28/2017) POA: Yes      IV drug abuse ([address] (4/28/2017) POA: Yes      Quadriparesis ([address] (5/31/2017) POA: Yes      Renal cell carcinoma of left kidney ([address] (12/17/2013) POA: Yes      Overview: Pathological Stage [id number] cc RCC FG3 s/p left open partial nephrectomy       in 12/13        COPD, moderate ([address] (1/31/2019) POA: Yes      Hypothyroid (1/31/2019) POA: Yes      UTI (urinary tract infection) (2/15/2020) POA: Yes      Acute renal failure superimposed on chronic kidney disease ([address] (2/15/2020) POA: Yes      Bradycardia (2/15/2020) POA: Unknown      ESRD (end stage renal disease) ([facility] (2/17/2020) POA: Yes      Secondary hyperparathyroidism of renal origin ([facility] (2/17/2020) POA: Unknown            Subjective:     Patient feels good, no SOB,wants to go home, no new complain, still has tripple lumen catheter in neck. A comprehensive review of systems was negative except for that written in the History of Present Illness.     Objective:     Visit Vitals  /76   Pulse (!) 57   Temp 99 °F (37.2 °C)   Resp 18   Ht 6' (1.829 m)   Wt 73.7 kg (162 lb 8 oz)   SpO2 98%   BMI 22.04 kg/m²         Intake/Output Summary (Last 24 hours) at 2/25/2020 1154  Last data filed at 2/25/2020 6208  Gross per 24 hour   Intake 940 ml   Output 2325 ml   Net -1385 ml       Current Facility-Administered Medications   Medication Dose Route Frequency Provider Last Rate Last Dose    loperamide (IMODIUM) capsule 2 mg  2 mg Oral TID PRN Albin Sullivan MD   2 mg at 02/24/20 0628    epoetin cristy-epbx (RETACRIT) 12,000 Units combo injection  12,000 Units SubCUTAneous Q MON, WED & Lena Amezcua MD   12,000 Units at 02/24/20 2230    doxercalciferoL (HECTOROL) 4 mcg/2 mL injection 1 mcg  1 mcg IntraVENous DIALYSIS DENISA LUA & Ivis Jimenes MD   1 mcg at 02/24/20 1145    calcium acetate(phosphat bind) (PHOSLO) capsule 667 mg  1 Cap Oral TID WITH MEALS Elena Guerra MD   667 mg at 02/25/20 0803    LORazepam (ATIVAN) injection 0.5 mg  0.5 mg IntraVENous Q6H PRN Garcia Hansen MD   0.5 mg at 02/19/20 1452    glucose chewable tablet 16 g  4 Tab Oral PRN Garcia Hansen MD   16 g at 02/18/20 0845    glucagon (GLUCAGEN) injection 1 mg  1 mg IntraMUSCular PRN Garcia Hansen MD        dextrose (D50W) injection syrg 12.5-25 g  25-50 mL IntraVENous PRN Garcia Hansen MD   25 g at 02/19/20 0706    diphenhydrAMINE (BENADRYL) injection 12.5 mg  12.5 mg IntraVENous Q6H PRN Arsen Aquino MD   12.5 mg at 02/23/20 0547    pantoprazole (PROTONIX) tablet 40 mg  40 mg Oral ACB&D Garcai Hansen MD   40 mg at 02/25/20 0803    levothyroxine (SYNTHROID) tablet 100 mcg  100 mcg Oral Winnie Herron MD   100 mcg at 02/25/20 8576    B complex-vitaminC-folic acid (NEPHROCAP) cap  1 Cap Oral DAILY Garcia Hansen MD   1 Cap at 02/25/20 0803    ferrous gluconate 324 mg (37.5 mg iron) tablet 1 Tab  1 Tab Oral BID WITH MEALS Garcia Hansen MD   1 Tab at 02/25/20 0803        Physical Exam:     Physical Exam:   General:  Alert, cooperative, no distress, appears stated age. Neck: Supple, symmetrical, trachea midline, no adenopathy, thyroid: no enlargement/tenderness/nodules, no carotid bruit and no JVD. Lungs:   Clear to auscultation bilaterally. Heart:  Regular rate and rhythm, S1, S2 normal, no murmur, click, rub or gallop. Abdomen:   Soft, non-tender. Bowel sounds normal. No masses,  No organomegaly. Extremities: Extremities normal, atraumatic, no cyanosis or edema, Has TDC in right IJ.    Skin: Skin color, texture, turgor normal. No rashes or lesions         Data Review:    CBC w/Diff Recent Labs     02/25/20  0534 02/24/20  0900 02/23/20  0540   WBC 4.9 4.3* 4.2*   RBC 2.68* 2.84* 2.61*   HGB 7.7* 8.2* 7.6*   HCT 24.5* 25.9* 23.4*   MCV 91.4 91.2 89.7   MCH 28.7 28.9 29.1   MCHC 31.4 31.7 32.5   RDW 20.6* 19.9* 20.2*    Recent Labs     02/25/20  0534 02/24/20  0900 02/23/20  0540   BANDS  --   --  1   MONOS PENDING 11* 10*   EOS PENDING 2 0   BASOS PENDING 0 0   RDW 20.6* 19.9* 20.2*        Comprehensive Metabolic Profile    Recent Labs     02/25/20  0534 02/24/20  0900 02/23/20  0540    139 135*   K 4.2 3.8 3.8    105 103   CO2 29 28 27   BUN 25* 37* 29*   CREA 3.42* 5.05* 4.22*    Recent Labs     02/25/20  0534 02/24/20  0900 02/23/20  0540   CA 8.0* 8.3* 7.9*   PHOS  --  3.9  --    ALB 2.6* 2.9* 2.7*   TP 5.8* 6.6 6.1*   SGOT 18 16 17   TBILI 1.5* 1.1* 1.3*          Hep B DNA is negative, confirmed with lab yesterday. Impression:       Active Hospital Problems    Diagnosis Date Noted    ESRD (end stage renal disease) ([facility] 02/17/2020    Secondary hyperparathyroidism of renal origin ([facility] 02/17/2020    Acute renal failure superimposed on stage 3 chronic kidney disease ([facility] 02/15/2020    UTI (urinary tract infection) 02/15/2020    Acute renal failure superimposed on chronic kidney disease ([facility] 02/15/2020    Bradycardia 02/15/2020    COPD, moderate ([facility] 01/31/2019    Hypothyroid 01/31/2019    Chronic hepatitis C without hepatic coma ([facility] 05/31/2017    Quadriparesis ([facility] 05/31/2017    Essential hypertension 04/28/2017    IV drug abuse ([facility] 04/28/2017    Renal cell carcinoma of left kidney ([facility] 12/17/2013     Pathological Stage [id number] cc RCC FG3 s/p left open partial nephrectomy in 12/13                Plan:     Make sure that CVP line is taken out . DC Home today, start OP dialysis at [facility] ,[place] [id number],Tel No [phone].  Dialysis unit will notify him with chair time or he can call,will follow as Win Fitzgerald MD

## 2020-02-25 NOTE — PROGRESS NOTES
He will go to [facility] q Tuesday, Thursday & Saturday at 6.30 AM starting this Thursday. CM was informed patient was unenrolled in transportation benefits, patient was informed he would need to call member services to re-enroll. Patient states his aid can transport him. Follow up appointments scheduled and transport arranged to those as well. Patient refused home health. Patient has a ride home with a friend.         BLANE Mo  Case Management  [phone]

## 2020-02-25 NOTE — PROGRESS NOTES
0700: Bedside shift change report given to Zayda Barrera RN (oncoming nurse) by Sosa Moore RN (offgoing nurse). Report included the following information SBAR and Kardex. 1100: Called to patient's bedside. Patient eager to go home and stated that his ride will be here to pick him up at 1130. Made patient aware that case management will speak to him more about his discharge planning. 1224: Received call from Dr. Isis Meyers with patient dialysis schedule. Patient's will have dialysis at [facility]  on Tuesday, Thursday and Saturday at 0630. Patient's sister made aware of information, will go over again with patient during discharge teaching. Paged  to also make aware. 1230: Paged Dr. Diego Lopez concerning patient's prescription. Prescriptions were sent to patient's pharmacy electronically. 1400: I have reviewed discharge instructions with the patient and caregiver. The patient and caregiver verbalized understanding. Current Discharge Medication List      START taking these medications    Details   b complex-vitamin c-folic acid (NEPHROCAPS) 1 mg capsule Take 1 Cap by mouth daily. Qty: 30 Cap, Refills: 1      calcium acetate,phosphat bind, (PHOSLO) 667 mg cap Take 1 Cap by mouth three (3) times daily (with meals). Qty: 90 Cap, Refills: 1      doxercalciferoL (HECTOROL) 4 mcg/2 mL injection 0.5 mL by IntraVENous route DIALYSIS MON, WED & FRI. Qty: 1 mL, Refills: 0    Comments: To be given with dialysis      loperamide (IMODIUM) 2 mg capsule Take 1 Cap by mouth three (3) times daily as needed for Diarrhea for up to 10 days. Qty: 30 Cap, Refills: 0      pantoprazole (PROTONIX) 40 mg tablet Take 1 Tab by mouth Before breakfast and dinner. Qty: 60 Tab, Refills: 1      food supplemt, lactose-reduced (ENSURE ENLIVE) 0.08 gram-1.5 kcal/mL liqd Take 1 Each by mouth three (3) times daily (with meals).  Flavor of patient choice  Qty: 90 Bottle, Refills: 1         CONTINUE these medications which have CHANGED    Details   levothyroxine (SYNTHROID) 100 mcg tablet Take 1 Tab by mouth every morning. Qty: 30 Tab, Refills: 1         CONTINUE these medications which have NOT CHANGED    Details   ferrous gluconate 324 mg (37.5 mg iron) tablet Take 1 Tab by mouth two (2) times daily (with meals). Qty: 60 Tab, Refills: 0      hydrALAZINE (APRESOLINE) 25 mg tablet Take 1 Tab by mouth every eight (8) hours as needed (systolic B/P >786).   Qty: 30 Tab, Refills: 0      Syringe, Disposable, (BD SYRINGE CATHETER TIP) 60 mL syrg Use 1 syringe daily with irrigations  Qty: 30 Syringe, Refills: 11         STOP taking these medications       FUNMILAYO-DEBBIE 0.8 mg tab tablet Comments:   Reason for Stopping:         calcitRIOL (ROCALTROL) 0.25 mcg capsule Comments:   Reason for Stopping:         liothyronine (CYTOMEL) 25 mcg tablet Comments:   Reason for Stopping:         docusate sodium (COLACE) 100 mg capsule Comments:   Reason for Stopping:         ergocalciferol (DRISDOL) 1,250 mcg (50,000 unit) capsule Comments:   Reason for Stopping:         doxycycline (ADOXA) 100 mg tablet Comments:   Reason for Stopping:         insulin lispro (HUMALOG) 100 unit/mL injection Comments:   Reason for Stopping:         tamsulosin (FLOMAX) 0.4 mg capsule Comments:   Reason for Stopping:

## 2020-02-25 NOTE — PROGRESS NOTES
conducted a Follow up consultation and Spiritual Assessment for Yu Mcclendon, who is a 62 y.o.,male. The  provided the following Interventions:  Continued the relationship of care and support. Listened empathically. Offered prayer and assurance of continued prayer on patients behalf. Chart reviewed. The following outcomes were achieved:  Patient expressed gratitude for 's visit. Assessment:  There are no further spiritual or [employer] issues which require Spiritual Care Services interventions at this time. Plan:  Chaplains will continue to follow and will provide pastoral care on an as needed/requested basis.  recommends bedside caregivers page  on duty if patient shows signs of acute spiritual or emotional distress.        [id number]   [phone]

## 2020-02-25 NOTE — DISCHARGE INSTRUCTIONS
Patient Discharge Instructions    Rylee Jamison / [id number] : 1960    Admitted 2/15/2020 Discharged: 2020     · It is important that you take the medication exactly as they are prescribed. · Keep your medication in the bottles provided by the pharmacist and keep a list of the medication names, dosages, and times to be taken with you at all times. · Do not take other medications without consulting your doctor. · It is recommended that you initiate establishment of care with [facility]  · Medications were sent to your pharmacy at [facility] in [address] [place] [address] Avenue: Renal Diet, ensure enlive three times a day    Recommended Activity: Activity as tolerated, outpatient PT/OT    If you experience any of the following symptoms chest pain, shortness of breath, fever in next 48hrs, please follow up with ER.     Signed By: Braxton Ackerman MD     2020

## 2020-02-25 NOTE — ROUTINE PROCESS
0730-Bedside and verbal report given to Braxton Dobbins RN. Pt resting in  Bed, BASHIR YATES at 57, no c/o pain, no SOB on room air, call bell within reach. Pt clean/dry, linen and gown changed and given complete bed bath.

## 2020-02-25 NOTE — PROGRESS NOTES
Addendum : He will go to [facility] q Tuesday, Thursday & Saturday at 6.30 AM starting this Thursday.

## 2020-02-25 NOTE — PROGRESS NOTES
Problem: Falls - Risk of  Goal: *Absence of Falls  Description  Document Easter Getting Fall Risk and appropriate interventions in the flowsheet. Outcome: Progressing Towards Goal  Note: Fall Risk Interventions:  Mobility Interventions: Bed/chair exit alarm, Communicate number of staff needed for ambulation/transfer, Patient to call before getting OOB    Mentation Interventions: Adequate sleep, hydration, pain control, Bed/chair exit alarm    Medication Interventions: Bed/chair exit alarm, Patient to call before getting OOB, Teach patient to arise slowly    Elimination Interventions: Call light in reach, Patient to call for help with toileting needs, Bed/chair exit alarm    History of Falls Interventions: Bed/chair exit alarm         Problem: Patient Education: Go to Patient Education Activity  Goal: Patient/Family Education  Outcome: Progressing Towards Goal     Problem: Pressure Injury - Risk of  Goal: *Prevention of pressure injury  Description  Document Blake Scale and appropriate interventions in the flowsheet.   Outcome: Progressing Towards Goal  Note: Pressure Injury Interventions:  Sensory Interventions: Assess changes in LOC, Assess need for specialty bed, Discuss PT/OT consult with provider    Moisture Interventions: Absorbent underpads, Internal/External urinary devices, Check for incontinence Q2 hours and as needed, Maintain skin hydration (lotion/cream)    Activity Interventions: Increase time out of bed, Pressure redistribution bed/mattress(bed type), PT/OT evaluation    Mobility Interventions: Assess need for specialty bed, HOB 30 degrees or less, Pressure redistribution bed/mattress (bed type), PT/OT evaluation    Nutrition Interventions: Document food/fluid/supplement intake    Friction and Shear Interventions: Apply protective barrier, creams and emollients, Foam dressings/transparent film/skin sealants, Lift team/patient mobility team                Problem: Patient Education: Go to Patient Education Activity  Goal: Patient/Family Education  Outcome: Progressing Towards Goal     Problem: Nutrition Deficit  Goal: *Optimize nutritional status  Outcome: Progressing Towards Goal     Problem: Delirium Treatment  Goal: *Level of consciousness restored to baseline  Outcome: Progressing Towards Goal  Goal: *Level of environmental perceptions restored to baseline  Outcome: Progressing Towards Goal  Goal: *Sensory perception restored to baseline  Outcome: Progressing Towards Goal  Goal: *Emotional stability restored to baseline  Outcome: Progressing Towards Goal  Goal: *Functional assessment restored to baseline  Outcome: Progressing Towards Goal  Goal: *Absence of falls  Outcome: Progressing Towards Goal  Goal: *Will remain free of delirium, CAM Score negative  Outcome: Progressing Towards Goal  Goal: *Cognitive status will be restored to baseline  Outcome: Progressing Towards Goal  Goal: Interventions  Outcome: Progressing Towards Goal     Problem: Patient Education: Go to Patient Education Activity  Goal: Patient/Family Education  Outcome: Progressing Towards Goal     Problem: Patient Education: Go to Patient Education Activity  Goal: Patient/Family Education  Outcome: Progressing Towards Goal     Problem: Patient Education: Go to Patient Education Activity  Goal: Patient/Family Education  Outcome: Progressing Towards Goal

## 2020-02-25 NOTE — PROGRESS NOTES
Problem: Self Care Deficits Care Plan (Adult)  Goal: *Acute Goals and Plan of Care (Insert Text)  Description  Occupational Therapy Goals  Initiated 2/20/2020 within 7 day(s). 1.  Patient will perform bed mobility in preparation for selfcare with supervision/set-up. 2.  Patient will perform lower body dressing with supervision/set-up. 3.  Patient will perform upper body dressing with modified independence using compensatory techniques prn.  4.  Patient will perform toilet transfers with supervision/set-up. 5.  Patient will perform all aspects of toileting with supervision/set-up. 6.  Patient will participate in upper extremity therapeutic exercise/activities with modified independence for 8 minutes. 7.  Patient will utilize energy conservation techniques during functional activities with verbal cues. Prior Level of Function: Patient was independent with self-care and used a cane for functional mobility PTA. Patient has RW and shower chair and reports he has an aide that assists >40 hours/week with IADLs (cooking, cleaning). Outcome: Progressing Towards Goal   OCCUPATIONAL THERAPY TREATMENT/DISCHARGE    Patient: Cinthia Tripathi (6 y.o. male)  Date: 2/25/2020  Diagnosis: SHEILA (acute kidney injury) ([facility] [N17.9]  SHEILA (acute kidney injury) ([facility] [N17.9]  Bradycardia [R00.1]   Acute renal failure superimposed on stage 3 chronic kidney disease ([facility]       Precautions: Fall    Chart, occupational therapy assessment, plan of care, and goals were reviewed. ASSESSMENT:  Pt is pleasant and cooperative, motivated for d/c home. Pt demonstrates good safety and balance w/functional mobility/transfers and ADLs. Pt tolerates ~ 8 minutes standing  sinkside performing ADL grooming tasks. Decrease FM strength/coordination requires assist w/container mgt. Pt educated on open palm method for container mgt and provided pt w/resistive \"glove ball for improved  strength. Pt left in chair w/all item within reach.  No c/o pain throughout session. PLAN:  Patient will be discharged from occupational therapy at this time. Rationale for discharge:  [x] Goals Achieved  [] [address]  [] Patient not participating in therapy  [] Other:  Discharge Recommendations:  Outpatient  Further Equipment Recommendations for Discharge:  N/A, pt has DME     SUBJECTIVE:   Patient stated I already go to therapy.  reference outpatient therapy    OBJECTIVE DATA SUMMARY:   Cognitive/Behavioral Status:  Neurologic State: Alert  Orientation Level: Oriented X4  Cognition: Follows commands  Safety/Judgement: Fall prevention    Functional Mobility and Transfers for ADLs:   Bed Mobility:  Supine to Sit: Modified independent(w/HOB raised)   Transfers:  Sit to Stand: Stand-by assistance  Bed to Chair: Modified independent(w/SPC)   Bathroom Mobility: Modified independent(w/SPC)  Balance:  Sitting: Intact  Standing: Intact; With support  Standing - Static: Fair(fair plus)  Standing - Dynamic : Fair(fair plus)    ADL Intervention:  Grooming  Position Performed: Standing  Washing Face: Modified independent  Washing Hands: Modified independent  Brushing Teeth: Modified independent    Lower Body Dressing Assistance  Socks: Modified independent  Leg Crossed Method Used: Yes  Position Performed: Seated in chair  Cues: Don;Doff    Pain:  Pain level pre-treatment: 0/10   Pain level post-treatment: 0/10     Activity Tolerance:    Good    Please refer to the flowsheet for vital signs taken during this treatment. After treatment:   [x]  Patient left in no apparent distress sitting up in chair  []  Patient left in no apparent distress in bed  [x]  Call bell left within reach  [x]  Nursing notified  []  Caregiver present  []  Bed alarm activated    COMMUNICATION/EDUCATION:   []      Role of Occupational Therapy in the acute care setting  []      Home safety education was provided and the patient/caregiver indicated understanding.   [x]      Patient/family have participated as able and agree with findings and recommendations. []      Patient is unable to participate in plan of care at this time. Thank you for allowing me to assist in the care of this patient.   QUINTON Robins  Time Calculation: 30 mins

## 2020-02-25 NOTE — PROGRESS NOTES
No more hypoglycemia evident. Also does not have hyperglycemia any longer, which is unusual for someone with diabetes, but agin may be due to his renal failure. Will check 25 hydroxy vitamin D level.

## 2020-03-05 ENCOUNTER — HOSPITAL ENCOUNTER (OUTPATIENT)
Dept: INFUSION THERAPY | Age: 60
End: 2020-03-05

## 2020-03-09 ENCOUNTER — OFFICE VISIT (OUTPATIENT)
Dept: VASCULAR SURGERY | Age: 60
End: 2020-03-09

## 2020-03-09 ENCOUNTER — OFFICE VISIT (OUTPATIENT)
Dept: ONCOLOGY | Age: 60
End: 2020-03-09

## 2020-03-09 VITALS
HEART RATE: 60 BPM | DIASTOLIC BLOOD PRESSURE: 80 MMHG | BODY MASS INDEX: 19.77 KG/M2 | TEMPERATURE: 96.4 F | RESPIRATION RATE: 18 BRPM | HEIGHT: 72 IN | OXYGEN SATURATION: 94 % | WEIGHT: 146 LBS | SYSTOLIC BLOOD PRESSURE: 167 MMHG

## 2020-03-09 VITALS
DIASTOLIC BLOOD PRESSURE: 90 MMHG | WEIGHT: 162 LBS | OXYGEN SATURATION: 99 % | HEART RATE: 62 BPM | BODY MASS INDEX: 21.94 KG/M2 | SYSTOLIC BLOOD PRESSURE: 140 MMHG | HEIGHT: 72 IN | RESPIRATION RATE: 16 BRPM

## 2020-03-09 DIAGNOSIS — N18.6 ESRD (END STAGE RENAL DISEASE) ON DIALYSIS (HCC): Primary | ICD-10-CM

## 2020-03-09 DIAGNOSIS — N18.30 ANEMIA ASSOCIATED WITH STAGE 3 CHRONIC RENAL FAILURE (HCC): ICD-10-CM

## 2020-03-09 DIAGNOSIS — Z99.2 ESRD (END STAGE RENAL DISEASE) ON DIALYSIS (HCC): Primary | ICD-10-CM

## 2020-03-09 DIAGNOSIS — C64.2 RENAL CELL CARCINOMA OF LEFT KIDNEY (HCC): ICD-10-CM

## 2020-03-09 DIAGNOSIS — B18.2 CHRONIC HEPATITIS C WITHOUT HEPATIC COMA (HCC): ICD-10-CM

## 2020-03-09 DIAGNOSIS — D69.6 THROMBOCYTOPENIA (HCC): Primary | ICD-10-CM

## 2020-03-09 DIAGNOSIS — D50.8 IRON DEFICIENCY ANEMIA SECONDARY TO INADEQUATE DIETARY IRON INTAKE: ICD-10-CM

## 2020-03-09 DIAGNOSIS — F19.10 IV DRUG ABUSE (HCC): ICD-10-CM

## 2020-03-09 DIAGNOSIS — D63.1 ANEMIA ASSOCIATED WITH STAGE 3 CHRONIC RENAL FAILURE (HCC): ICD-10-CM

## 2020-03-09 RX ORDER — DOXYCYCLINE HYCLATE 100 MG
TABLET ORAL
COMMUNITY
Start: 2020-03-02 | End: 2020-10-28

## 2020-03-09 NOTE — PROGRESS NOTES
Hematology/Oncology  Progress Note    Name: Dario Michel  Date: 3/9/2020  : 1960    PCP: Hortencia Garcia MD     Mr. Hilda Barrientos is a 62 y.o.  male who was seen for follow-up of his thrombocytopenia. He was seen during his recent hospitalization. Current Therapy: Retacrit every 2 weeks whenever his hemoglobin is <10g/dL and hematocrit is <30%. Subjective:     Mr. Hilda Barrientos is a 49-year-old -American man who was seen for thrombocytopenia and anemia due to chronic kidney disease. He is in the office today for follow up. He denies fatigue, tiredness, and shortness of breath. He denies chest pain and dizziness. Since his last clinic visit he has not experienced any unexplained bruising or bleeding. He occasionally uses a walker for support and mobility. He does not have any complaints or concerns to report at this time. Since he was last seen he was hospitalized and did receive Retacrit to his hospitalization. The hemoglobin hematocrit on 2020 on the day of admission was 7.7 g/dL and 24.9% respectively. Past medical history, family history, and social history: these were reviewed and remains unchanged.     Past Medical History:   Diagnosis Date    Anemia     Bradycardia, unspecified     Cervical discitis     MRSA    Chronic drug abuse ([facility]     Chronic kidney disease     stage III    Diabetes ([facility] [address] [facility] 1990    Drug abuse ([facility] [address] [facility]     Encephalopathy     GERD (gastroesophageal reflux disease)     Hypertension     Muscle weakness     Quadriparesis ([address]     Renal cell carcinoma of left kidney ([address] 13    Pathological Stage [id number] cc RCC FG3 s/p left open partial nephrectomy in       Sepsis due to methicillin resistant Staphylococcus aureus (HCC)     Suprapubic catheter (HCC)     Thrombocytopenia (HCC)     Urethral erosion     Viral hepatitis B     Viral hepatitis C     Xerosis cutis      Past Surgical History:   Procedure Laterality Date    COLONOSCOPY N/A 10/3/2019    COLONOSCOPY / polypectomy performed by Mehnaz Watson MD at [address] HX CIRCUMCISION  12/17/13    Dr. Tash Eubanks, [facility]    HX NEPHRECTOMY Left 12/17/13    Open/ Partial,nephrectomy    HX OTHER SURGICAL Right 2/27/2016    removed right ft  2nd meta-tarsal    HX OTHER SURGICAL  04/2017    abscess removed from back of neck     IR INSERT TUNL CVC W/O PORT OVER 5 YR  2/18/2020    NEUROLOGICAL PROCEDURE UNLISTED  2017    neck surgery-abcess-hardware neck    MS REMOVAL WITH INSERTION OF SUPRAPUBIC CATHETER  2018     Social History     Socioeconomic History    Marital status:      Spouse name: Not on file    Number of children: Not on file    Years of education: Not on file    Highest education level: Not on file   Occupational History    Not on file   Social Needs    Financial resource strain: Not on file    Food insecurity:     Worry: Not on file     Inability: Not on file    Transportation needs:     Medical: Not on file     Non-medical: Not on file   Tobacco Use    Smoking status: Current Every Day Smoker     Packs/day: 0.25     Years: 30.00     Pack years: 7.50     Types: Cigarettes    Smokeless tobacco: Never Used   Substance and Sexual Activity    Alcohol use: Yes     Comment: beer occasionally    Drug use: Yes     Types: Marijuana, Heroin     Comment: marijuana-December 2018, heroin-9/15/19-approx    Sexual activity: Never   Lifestyle    Physical activity:     Days per week: Not on file     Minutes per session: Not on file    Stress: Not on file   Relationships    Social connections:     Talks on phone: Not on file     Gets together: Not on file     Attends Gnosticism service: Not on file     Active member of club or organization: Not on file     Attends meetings of clubs or organizations: Not on file     Relationship status: Not on file    Intimate partner violence:     Fear of current or ex partner: Not on file     Emotionally abused: Not on file     Physically abused: Not on file     Forced sexual activity: Not on file   Other Topics Concern    Not on file   Social History Narrative     since 2012;  for 12 yrs; [employer]K; [employer]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 30 Tab 1    b complex-vitamin c-folic acid (NEPHROCAPS) 1 mg capsule Take 1 Cap by mouth daily. 30 Cap 1    calcium acetate,phosphat bind, (PHOSLO) 667 mg cap Take 1 Cap by mouth three (3) times daily (with meals). 90 Cap 1    doxercalciferoL (HECTOROL) 4 mcg/2 mL injection 0.5 mL by IntraVENous route DIALYSIS MON, WED & FRI. 1 mL 0    pantoprazole (PROTONIX) 40 mg tablet Take 1 Tab by mouth Before breakfast and dinner. 60 Tab 1    food supplemt, lactose-reduced (ENSURE ENLIVE) 0.08 gram-1.5 kcal/mL liqd Take 1 Each by mouth three (3) times daily (with meals). Flavor of patient choice 90 Bottle 1    ferrous gluconate 324 mg (37.5 mg iron) tablet Take 1 Tab by mouth two (2) times daily (with meals). 60 Tab 0    hydrALAZINE (APRESOLINE) 25 mg tablet Take 1 Tab by mouth every eight (8) hours as needed (systolic B/P >359). 30 Tab 0    Syringe, Disposable, (BD SYRINGE CATHETER TIP) 60 mL syrg Use 1 syringe daily with irrigations 30 Syringe 11       Review of Systems  Constitutional: The patient has no acute distress or discomfort. HEENT: The patient denies recent head trauma, eye pain, blurred vision,  hearing deficit, oropharyngeal mucosal pain or lesions, and the patient denies throat pain or discomfort. Lymphatics: The patient denies palpable peripheral lymphadenopathy.   Hematologic: The patient denies having bruising, bleeding, or progressive fatigue. Respiratory: Patient denies having shortness of breath, cough, sputum production, fever, or dyspnea on exertion. Cardiovascular: The patient denies having leg pain, leg swelling, heart palpitations, chest permit, chest pain, or lightheadedness. The patient denies having dyspnea on exertion. Gastrointestinal: The patient denies having nausea, emesis, or diarrhea. The patient denies having any hematemesis or blood in the stool. Genitourinary: Patient denies having urinary urgency, frequency, or dysuria. The patient denies having blood in the urine. Psychological: The patient denies having symptoms of nervousness, anxiety, depression, or thoughts of harming self. Skin: Patient denies having skin rashes, skin, ulcerations, or unexplained itching or pruritus. Musculoskeletal: The patient denies having pain in the joints or bones. Objective:     Visit Vitals  /80   Pulse 60   Temp 96.4 °F (35.8 °C) (Oral)   Resp 18   Ht 6' (1.829 m)   Wt 66.2 kg (146 lb)   SpO2 94%   BMI 19.80 kg/m²     ECOG PS=0  Physical Exam:   Gen. Appearance: The patient is in no acute distress. Skin: There is no bruise or rash. HEENT: The exam is unremarkable. Neck: Supple without lymphadenopathy or thyromegaly. Lungs: Clear to auscultation and percussion; there are no wheezes or rhonchi. Heart: Regular rate and rhythm; there are no murmurs, gallops, or rubs. Abdomen: Bowel sounds are present and normal.  There is no guarding, tenderness, or hepatosplenomegaly. Extremities: There is no clubbing, cyanosis, or edema. Neurologic: There are no focal neurologic deficits. Lymphatics: There is no palpable peripheral lymphadenopathy. Musculoskeletal: The patient has been in his arms and hands. With the use of either a wheelchair for balance or a walker. There is no evidence of joint deformity or effusions. There is no focal joint tenderness.   Psychological/psychiatric: There is no clinical evidence of anxiety, depression, or melancholy. Lab data:      Results for orders placed or performed during the hospital encounter of 02/10/20   CBC WITH 3 PART DIFF     Status: Abnormal   Result Value Ref Range Status    WBC 5.0 4.5 - 13.0 K/uL Final    RBC 2.95 (L) 4.10 - 5.10 M/uL Final    HGB 8.6 (L) 12.0 - 16 g/dL Final    HCT 28.3 (L) 36 - 48 % Final    MCV 95.9 78 - 102 FL Final    MCH 29.2 25.0 - 35.0 PG Final    MCHC 30.4 (L) 31 - 37 g/dL Final    RDW 21.6 (H) 11.5 - 14.5 % Final    NEUTROPHILS 53 40 - 70 % Final    MIXED CELLS 9 0.1 - 17 % Final    LYMPHOCYTES 38 14 - 44 % Final    ABS. NEUTROPHILS 2.6 1.8 - 9.5 K/UL Final    ABS. MIXED CELLS 0.5 0.0 - 2.3 K/uL Final    ABS. LYMPHOCYTES 1.9 1.1 - 5.9 K/UL Final     Comment: Test performed at [address] [place] [address] or Outpatient Infusion Center Location. Reviewed by Medical Director. DF AUTOMATED   Final           Assessment:     1. Thrombocytopenia ([facility]    2. Anemia associated with stage 5 chronic renal failure ([facility] [facility]    3. Iron deficiency anemia secondary to inadequate dietary iron intake        Plan:     Iron deficiency anemia/chronic anemia/anemia due to chronic kidney disease stage III: The CBC from today is pending. The CBC on 2/25/2020 on the day of discharge from the hospital showed a normal WBC count but the platelet count was 69,200. The hemoglobin was 7.7 g/dL with hematocrit of 24.5%. He is now receiving outpatient dialysis and states that his nephrologist did resume his paretic with therapy in the outpatient setting. At this time I will check his iron profile and ferritin levels. Thrombocytopenia  I have explained to patient that his CBC from, 2/25/2020, shows that his platelet count preliminarily is 75,000. We will recheck his blood counts again in about 8 weeks. No therapeutic intervention is warranted at this time.   I have explained to the patient that therapeutic intervention for thrombocytopenia is only indicated if the platelet counts decline below 30,000. Follow-up in 6 weeks .   Orders Placed This Encounter    CBC WITH AUTOMATED DIFF     Standing Status:   Future     Number of Occurrences:   1     Standing Expiration Date:   1/04/1936    METABOLIC PANEL, COMPREHENSIVE     Standing Status:   Future     Number of Occurrences:   1     Standing Expiration Date:   3/10/2021    IRON PROFILE     Standing Status:   Future     Number of Occurrences:   1     Standing Expiration Date:   3/10/2021    FERRITIN     Standing Status:   Future     Number of Occurrences:   1     Standing Expiration Date:   3/9/2021    doxycycline (VIBRA-TABS) 100 mg tablet       Sebastián Murray MD  3/9/2020

## 2020-03-09 NOTE — PROGRESS NOTES
History and Physical     Esthela Winston is a 62 y.o. male with recent diagnosis of ESRD. He had a RIJ permcath placed which is functioning without problems and presents today to discuss permanent dialysis access. Vein mapping showed only left basilic vein adequate for fistula creation. Patient is right handed. He does have some chronic weakness in his left arm. No fever/chills. He presents today with his wife and sister.      Past Medical History:   Diagnosis Date    Anemia     Bradycardia, unspecified 2018    Cervical discitis     MRSA    Chronic drug abuse ([address] 1974    Chronic kidney disease     stage III    Diabetes ([address] 01/1990    Drug abuse ([address]     Encephalopathy     GERD (gastroesophageal reflux disease)     Hypertension     Muscle weakness     Quadriparesis ([address] 2017    Renal cell carcinoma of left kidney ([address] 12/17/13    Pathological Stage [id number] cc RCC FG3 s/p left open partial nephrectomy in 12/13      Sepsis due to methicillin resistant Staphylococcus aureus (HCC)     [facility]     Thrombocytopenia ([address]     Urethral erosion     Viral hepatitis B     Viral hepatitis C     Xerosis cutis        Past Surgical History:   Procedure Laterality Date    COLONOSCOPY N/A 10/3/2019    COLONOSCOPY / polypectomy performed by Chantel White MD at [address] HX CIRCUMCISION  12/17/13    Dr. Billie Davis, [facility] HX NEPHRECTOMY Left 12/17/13    Open/ Partial,nephrectomy    HX OTHER SURGICAL Right 2/27/2016    removed right ft  2nd meta-tarsal    HX OTHER SURGICAL  04/2017    abscess removed from back of neck     IR INSERT TUNL CVC W/O PORT OVER 5 YR  2/18/2020    NEUROLOGICAL PROCEDURE UNLISTED  2017    neck surgery-abcess-hardware neck    NC REMOVAL WITH INSERTION OF SUPRAPUBIC CATHETER  2018         Current Outpatient Medications:     levothyroxine (SYNTHROID) 100 mcg tablet, Take 1 Tab by mouth every morning., Disp: 30 Tab, Rfl: 1    calcium acetate,phosphat bind, (PHOSLO) 667 mg cap, Take 1 Cap by mouth three (3) times daily (with meals). , Disp: 90 Cap, Rfl: 1    doxercalciferoL (HECTOROL) 4 mcg/2 mL injection, 0.5 mL by IntraVENous route DIALYSIS MON, WED & FRI., Disp: 1 mL, Rfl: 0    pantoprazole (PROTONIX) 40 mg tablet, Take 1 Tab by mouth Before breakfast and dinner., Disp: 60 Tab, Rfl: 1    food supplemt, lactose-reduced (ENSURE ENLIVE) 0.08 gram-1.5 kcal/mL liqd, Take 1 Each by mouth three (3) times daily (with meals). Flavor of patient choice, Disp: 90 Bottle, Rfl: 1    ferrous gluconate 324 mg (37.5 mg iron) tablet, Take 1 Tab by mouth two (2) times daily (with meals). , Disp: 60 Tab, Rfl: 0    Syringe, Disposable, (BD SYRINGE CATHETER TIP) 60 mL syrg, Use 1 syringe daily with irrigations, Disp: 30 Syringe, Rfl: 11    b complex-vitamin c-folic acid (NEPHROCAPS) 1 mg capsule, Take 1 Cap by mouth daily. , Disp: 30 Cap, Rfl: 1    hydrALAZINE (APRESOLINE) 25 mg tablet, Take 1 Tab by mouth every eight (8) hours as needed (systolic B/P >484). , Disp: 30 Tab, Rfl: 0    Allergies   Allergen Reactions    Iodine Hives and Other (comments)       Physical Exam:   Visit Vitals  /90 (BP 1 Location: Left arm, BP Patient Position: Sitting)   Pulse 62   Resp 16   Ht 6' (1.829 m)   Wt 162 lb (73.5 kg)   SpO2 99%   BMI 21.97 kg/m²     General: male in no acute distress. Pt ambulating with a cane  HEENT: EOMI, no scleral icterus is noted. Cardiovascular: RRR ,  Has 2+ radial pulses bilaterally   Pulmonary: No increased work or breathing is noted. Clear to auscultation bilaterally. No wheeze, rales or rhonchi. Abdomen: soft, nondistended. Extremities: Pt has no LE edema. Neuro: Cranial nerves II through XII are grossly intact , chronic left sided weakness  Integument: No ulcerations are identified visibly      Impression and Plan:   Antwan Canas is a 62 y.o. male with ESRD recently started on HD.  He is currently using a RIJ permcath for HD access and presents today to discuss more permanent access. Discussed dialysis access options at length with patient including HD with either a fistula or graft. I discussed options of either a left arm basilic transposition which is a 2 part surgery versus AV graft placement which is a one-time surgery. Educational material was given in office today. All questions answered. Risks vs benefits of procedures were discussed at length. Patient prefers to have one time surgery with AV graft. He will be scheduled for left arm AV graft placement and will f/u within 1 week s/p procedure. Plan was discussed. Patient expresses understanding and agrees.     Edith Marrufo

## 2020-03-09 NOTE — PROGRESS NOTES
1. Have you been to an emergency room or urgent care clinic since your last visit? NO    Hospitalized since your last visit? If yes, where, when, and reason for visit? NO  2. Have you seen or consulted any other health care providers outside of the [facility] since your last visit including any procedures, health maintenance items. If yes, where, when and reason for visit?  JESUS Medina

## 2020-03-10 DIAGNOSIS — N18.9 ACUTE RENAL FAILURE SUPERIMPOSED ON CHRONIC KIDNEY DISEASE, UNSPECIFIED CKD STAGE, UNSPECIFIED ACUTE RENAL FAILURE TYPE (HCC): ICD-10-CM

## 2020-03-10 DIAGNOSIS — N17.9 ACUTE RENAL FAILURE SUPERIMPOSED ON CHRONIC KIDNEY DISEASE, UNSPECIFIED CKD STAGE, UNSPECIFIED ACUTE RENAL FAILURE TYPE (HCC): ICD-10-CM

## 2020-03-10 LAB
ALBUMIN SERPL-MCNC: 3.9 G/DL (ref 3.8–4.8)
ALBUMIN/GLOB SERPL: 1.3 {RATIO} (ref 1.2–2.2)
ALP SERPL-CCNC: 102 IU/L (ref 39–117)
ALT SERPL-CCNC: 9 IU/L (ref 0–44)
AST SERPL-CCNC: 17 IU/L (ref 0–40)
BASOPHILS # BLD AUTO: 0 X10E3/UL (ref 0–0.2)
BASOPHILS NFR BLD AUTO: 1 %
BILIRUB SERPL-MCNC: 0.8 MG/DL (ref 0–1.2)
BUN SERPL-MCNC: 19 MG/DL (ref 8–27)
BUN/CREAT SERPL: 6 (ref 10–24)
CALCIUM SERPL-MCNC: 8.8 MG/DL (ref 8.6–10.2)
CHLORIDE SERPL-SCNC: 103 MMOL/L (ref 96–106)
CO2 SERPL-SCNC: 22 MMOL/L (ref 20–29)
CREAT SERPL-MCNC: 3.04 MG/DL (ref 0.76–1.27)
EOSINOPHIL # BLD AUTO: 0 X10E3/UL (ref 0–0.4)
EOSINOPHIL NFR BLD AUTO: 1 %
ERYTHROCYTE [DISTWIDTH] IN BLOOD BY AUTOMATED COUNT: 17.5 % (ref 11.6–15.4)
FERRITIN SERPL-MCNC: 114 NG/ML (ref 30–400)
GLOBULIN SER CALC-MCNC: 3 G/DL (ref 1.5–4.5)
GLUCOSE SERPL-MCNC: 120 MG/DL (ref 65–99)
HCT VFR BLD AUTO: 31.7 % (ref 37.5–51)
HGB BLD-MCNC: 10 G/DL (ref 13–17.7)
IMM GRANULOCYTES # BLD AUTO: 0 X10E3/UL (ref 0–0.1)
IMM GRANULOCYTES NFR BLD AUTO: 0 %
IRON SATN MFR SERPL: 17 % (ref 15–55)
IRON SERPL-MCNC: 52 UG/DL (ref 38–169)
LYMPHOCYTES # BLD AUTO: 0.8 X10E3/UL (ref 0.7–3.1)
LYMPHOCYTES NFR BLD AUTO: 20 %
MCH RBC QN AUTO: 29.3 PG (ref 26.6–33)
MCHC RBC AUTO-ENTMCNC: 31.5 G/DL (ref 31.5–35.7)
MCV RBC AUTO: 93 FL (ref 79–97)
MONOCYTES # BLD AUTO: 0.3 X10E3/UL (ref 0.1–0.9)
MONOCYTES NFR BLD AUTO: 7 %
NEUTROPHILS # BLD AUTO: 2.9 X10E3/UL (ref 1.4–7)
NEUTROPHILS NFR BLD AUTO: 71 %
PLATELET # BLD AUTO: 116 X10E3/UL (ref 150–450)
POTASSIUM SERPL-SCNC: 3.9 MMOL/L (ref 3.5–5.2)
PROT SERPL-MCNC: 6.9 G/DL (ref 6–8.5)
RBC # BLD AUTO: 3.41 X10E6/UL (ref 4.14–5.8)
SODIUM SERPL-SCNC: 143 MMOL/L (ref 134–144)
TIBC SERPL-MCNC: 312 UG/DL (ref 250–450)
UIBC SERPL-MCNC: 260 UG/DL (ref 111–343)
WBC # BLD AUTO: 4 X10E3/UL (ref 3.4–10.8)

## 2020-03-15 NOTE — PROGRESS NOTES
Elizabeth Rider presents today for a post-hospital follow-up. He was hospitalized from 2/15/20 through 2/25/20. He was sent to the ER due to worsening creatinine, worsening lower extremity edema, and decreased urine output. He was admitted from the ER by Dr. Andrew Del Castillo to start dialysis. Patient was found to have severe bradycardia in the ER with heart rate as low as 33 but was asymptomatic. We were consulted for this and an echo was done and it showed an EF of 60-65%, no WMA, PASP of 53 mmHg, and when compared to the previous echo, there were no significant changes. No further cardiac work-up was done as his bradycardia was asymptomatic. Hemodialysis was initiated and he vascular surgery was consulted to discuss vein mapping and surgical intervention for creation of a dialysis access. He was also seen by Dr. Damian Goff, who did not recommend bone marrow biopsy during the admission as it was felt that the myelosuppression is likely from polysubstance abuse. He is a 59-year-old -American male with history of a renal mass, renal cell carcinoma of the left kidney, diabetes, chronic polysubstance  Abuse (last used heroin 4 days prior to going to the ER), chronic renal failure, hypothyroidism, chronic urinary retention (has suprapubic catheter), and hypertension. He is status post partial left nephrectomy. He also has history of C3 through C7 discitis with osteomyelitis, L4-5 phlegmon in April 2017 status post C3 through 77 laminectomy with posterior lateral fusion and hardware on April 29, 2017 for rapid onset quadriplegia from discitis/osteomyelitis and history of MRSA bacteremia and abscess cultures from the spine. He presented to the hospital on 3/31/18, with complaints of progressive and worsening lower extremity edema. A week prior to being admitted, he was treated for C. difficile.   He underwent an echocardiogram in March 2019 and it showed ejection fraction of 60% and no obvious wall motion abnormalities. His RVSP was noted to be 42 mmHg. He is followed by Dr. James Lima (nephrologist) . 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 There was also some concern for multiple myeloma with light chain induced cast nephropathy. 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 He underwent renal biopsy on April 9, 2018 (nephrology to follow). His thrombocytopenia spontaneously resolved and was HIT panel negative. He underwent bone marrow biopsy on April 6, 2018 (hematology/oncology to follow). He states that he has been feeling well since being discharged home. He denies shortness of breath and he states that his lower extremity edema has markedly improved. He appears to be tolerating hemodialysis well. He reports that he is scheduled for creation of a hemodialysis access on March 27, 2020. He denies chest pain, tightness, heaviness, and palpitations. Denies shortness of breath at rest, dyspnea on exertion, orthopnea and PND. Denies abdominal bloating. Denies lightheadedness, dizziness, and syncope. Denies lower extremity edema and claudication. Denies nausea, vomiting, diarrhea, melena, hematochezia. Denies hematuria, urgency, frequency. Denies fever, chills.           PMH:  Past Medical History:   Diagnosis Date    Anemia     Bradycardia, unspecified 2018    Cervical discitis     MRSA    Chronic drug abuse ([facility] 1974    Chronic kidney disease     stage III    Diabetes ([facility] 01/1990    Drug abuse ([facility]     Encephalopathy     GERD (gastroesophageal reflux disease)     Hypertension     Muscle weakness     Quadriparesis ([address] 2017    Renal cell carcinoma of left kidney ([facility] 12/17/13    Pathological Stage [id number] cc RCC FG3 s/p left open partial nephrectomy in 12/13      Sepsis due to methicillin resistant Staphylococcus aureus ([address]  [facility]rapubic catheter ([facility]     Thrombocytopenia ([facility]     Urethral erosion     Viral hepatitis B     Viral hepatitis C     Xerosis cutis        PSH:  Past Surgical History:   Procedure Laterality Date    COLONOSCOPY N/A 10/3/2019    COLONOSCOPY / polypectomy performed by Kerri Mendoza MD at [address] HX CIRCUMCISION  12/17/13    Dr. Cindy Meraz, [facility]    HX NEPHRECTOMY Left 12/17/13    Open/ Partial,nephrectomy    HX OTHER SURGICAL Right 2/27/2016    removed right ft  2nd meta-tarsal    HX OTHER SURGICAL  04/2017    abscess removed from back of neck     IR INSERT TUNL CVC W/O PORT OVER 5 YR  2/18/2020    NEUROLOGICAL PROCEDURE UNLISTED  2017    neck surgery-abcess-hardware neck    NM REMOVAL WITH INSERTION OF SUPRAPUBIC CATHETER  2018       MEDS:  Current Outpatient Medications   Medication Sig    doxycycline (VIBRA-TABS) 100 mg tablet     levothyroxine (SYNTHROID) 100 mcg tablet Take 1 Tab by mouth every morning.  b complex-vitamin c-folic acid (NEPHROCAPS) 1 mg capsule Take 1 Cap by mouth daily.  calcium acetate,phosphat bind, (PHOSLO) 667 mg cap Take 1 Cap by mouth three (3) times daily (with meals).  doxercalciferoL (HECTOROL) 4 mcg/2 mL injection 0.5 mL by IntraVENous route DIALYSIS MON, WED & FRI.  pantoprazole (PROTONIX) 40 mg tablet Take 1 Tab by mouth Before breakfast and dinner.  hydrALAZINE (APRESOLINE) 25 mg tablet Take 1 Tab by mouth every eight (8) hours as needed (systolic B/P >012).  Syringe, Disposable, (BD SYRINGE CATHETER TIP) 60 mL syrg Use 1 syringe daily with irrigations    food supplemt, lactose-reduced (ENSURE ENLIVE) 0.08 gram-1.5 kcal/mL liqd Take 1 Each by mouth three (3) times daily (with meals). Flavor of patient choice    ferrous gluconate 324 mg (37.5 mg iron) tablet Take 1 Tab by mouth two (2) times daily (with meals). No current facility-administered medications for this visit.         Allergies and Sensitivities:  Allergies   Allergen Reactions    Iodine Hives and Other (comments)       Family History:  Family History   Problem Relation Age of Onset    Diabetes Mother     Sickle Cell Anemia Father     Diabetes Sister     Hypertension Sister        Social History:  He  reports that he has been smoking cigarettes. He has a 7.50 pack-year smoking history. He has never used smokeless tobacco.  He  reports current alcohol use. Physical:  Visit Vitals  /86 (BP 1 Location: Left arm, BP Patient Position: Sitting)   Pulse (!) 45   Ht 6' (1.829 m)   Wt 63 kg (139 lb)   SpO2 100%   BMI 18.85 kg/m²       His weight is down 10 pounds since his last appointment. Exam:  Neck:  Supple, no JVD, no carotid bruits  CV:  Normal S1 and  S2, grade I/VI AUTUMN noted, no rubs, or gallops noted  Lungs:  Clear to ausculation throughout, no wheezes or rales  Abd:  Soft, non-tender, non-distended with good bowel sounds. No hepatosplenomegaly  Extremities:  1+ softly pitting lower extremity edema from slightly above his ankles to his feet      Data:  EKG:         LABS:  Lab Results   Component Value Date/Time    Sodium 143 03/09/2020 02:37 AM    Potassium 3.9 03/09/2020 02:37 AM    Chloride 103 03/09/2020 02:37 AM    CO2 22 03/09/2020 02:37 AM    Glucose 120 (H) 03/09/2020 02:37 AM    BUN 19 03/09/2020 02:37 AM    Creatinine 3.04 (H) 03/09/2020 02:37 AM     Lab Results   Component Value Date/Time    Cholesterol, total 148 02/11/2020 02:24 PM    HDL Cholesterol 71 02/11/2020 02:24 PM    LDL, calculated 63 02/11/2020 02:24 PM    Triglyceride 72 02/11/2020 02:24 PM    CHOL/HDL Ratio 2.1 02/11/2020 02:24 PM     Lab Results   Component Value Date/Time    ALT (SGPT) 9 03/09/2020 02:37 AM         Impression/Plan:  1. Asymptomatic bradycardia, no longer on beta blocker  2. Hypertension, blood pressure (systolic) at 663 mmHg  3. Acute renal failure superimposed on chronic kidney disease, now ESRD, on hemodialysis  4.   Tobacco abuse, continues to smoke 1/2 pack per day  5. History of drug abuse  6. Hepatitis C  7. History of cervical disc disease  8. History of renal mass and left renal cell carcinoma, status post partial left nephrectomy  9. Diabetes mellitus, recommend Hgb A1c less than 7% from cardiac standpoint    Mr. Phyllis Gonzalez was seen today for a post-hospital follow-up. He states that he has been feeling better since he began hemodialysis. He reports that his lower extremity edema has markedly improved and according to our scale, he is down 10 pounds from his previous office visit. He does not offer any cardiac complaints. He denies chest pain, shortness of breath, and palpitations. He continues to have some mild lower extremity edema but as mentioned above, markedly improved from prior to hospitalization. His blood pressure is elevated today at 160/86. He states that he did not check his blood pressure at home this morning. He was reminded to take a dose of hydralazine 25 mg every 8 hours as needed for systolic blood pressure greater than 160 mmHg. His EKG today shows T wave inversion in leads I, II, aVL, aVF, and leads V3 through V6. He remains in sinus bradycardia. He will be scheduled for a pharmacologic nuclear stress test to rule out ischemia as these EKG changes are new. He reports that he is tentatively scheduled for creation of a permanent dialysis access on March 27, 2020. He will be scheduled to follow-up with Dr. Bibi Gooden as scheduled and as needed. Nu Barreto MSN, FNP-BC    Please note:  Portions of this chart were created with Dragon medical speech to text program.  Unrecognized errors may be present.

## 2020-03-16 ENCOUNTER — OFFICE VISIT (OUTPATIENT)
Dept: CARDIOLOGY CLINIC | Age: 60
End: 2020-03-16

## 2020-03-16 VITALS
OXYGEN SATURATION: 100 % | WEIGHT: 139 LBS | DIASTOLIC BLOOD PRESSURE: 86 MMHG | HEART RATE: 45 BPM | BODY MASS INDEX: 18.83 KG/M2 | HEIGHT: 72 IN | SYSTOLIC BLOOD PRESSURE: 160 MMHG

## 2020-03-16 DIAGNOSIS — Z01.818 PRE-OPERATIVE CLEARANCE: ICD-10-CM

## 2020-03-16 DIAGNOSIS — R00.1 BRADYCARDIA: Primary | ICD-10-CM

## 2020-03-16 DIAGNOSIS — R94.31 ABNORMAL EKG: ICD-10-CM

## 2020-03-16 NOTE — PATIENT INSTRUCTIONS
Pharmacologic nuclear stress test:  Dx:  Abnormal EKG, pre-op evaluation  Take hydralazine based on parameters given upon discharge (take every 8 hours as needed for systolic BP greater than (>) 160 mmHg  Continue present medication regimen  Follow-up with Dr Ciara Johnson as scheduled and as needed

## 2020-03-16 NOTE — PROGRESS NOTES
Hoda Wu presents today for No chief complaint on file. Hoda Wu preferred language for health care discussion is english/other. Is someone accompanying this pt? no    Is the patient using any DME equipment during [address]? cane    Depression Screening:  3 most recent PHQ Screens 3/16/2020   Little interest or pleasure in doing things Not at all   Feeling down, depressed, irritable, or hopeless Not at all   Total Score PHQ 2 0       Learning Assessment:  Learning Assessment 5/18/2018   PRIMARY LEARNER Patient   PRIMARY LANGUAGE ENGLISH   LEARNER PREFERENCE PRIMARY DEMONSTRATION   ANSWERED BY patient   RELATIONSHIP SELF       Abuse Screening:  Abuse Screening Questionnaire 3/16/2020   Do you ever feel afraid of your partner? N   Are you in a relationship with someone who physically or mentally threatens you? N   Is it safe for you to go home? Y       Fall Risk  Fall Risk Assessment, last 12 mths 3/16/2020   Able to walk? Yes   Fall in past 12 months? No       Pt currently taking Anticoagulant therapy? no    Coordination of Care:  1. Have you been to the ER, urgent care clinic since your last visit? Hospitalized since your last visit? Yes, 2- to 2-    2. Have you seen or consulted any other health care providers outside of the [address] [place] [address] since your last visit? Include any pap smears or colon screening.  no

## 2020-03-19 ENCOUNTER — APPOINTMENT (OUTPATIENT)
Dept: INFUSION THERAPY | Age: 60
End: 2020-03-19

## 2020-03-24 DIAGNOSIS — N18.9 ACUTE RENAL FAILURE SUPERIMPOSED ON CHRONIC KIDNEY DISEASE, UNSPECIFIED CKD STAGE, UNSPECIFIED ACUTE RENAL FAILURE TYPE (HCC): ICD-10-CM

## 2020-03-24 DIAGNOSIS — N17.9 ACUTE RENAL FAILURE SUPERIMPOSED ON CHRONIC KIDNEY DISEASE, UNSPECIFIED CKD STAGE, UNSPECIFIED ACUTE RENAL FAILURE TYPE (HCC): ICD-10-CM

## 2020-03-25 ENCOUNTER — HOSPITAL ENCOUNTER (OUTPATIENT)
Dept: NON INVASIVE DIAGNOSTICS | Age: 60
Discharge: HOME | End: 2020-03-25
Attending: NURSE PRACTITIONER
Payer: MEDICARE

## 2020-03-25 VITALS — HEIGHT: 72 IN | BODY MASS INDEX: 18.83 KG/M2 | WEIGHT: 139 LBS

## 2020-03-25 DIAGNOSIS — Z01.818 PRE-OPERATIVE CLEARANCE: ICD-10-CM

## 2020-03-25 DIAGNOSIS — R94.31 ABNORMAL EKG: ICD-10-CM

## 2020-03-25 LAB
STRESS BASELINE DIAS BP: 80 MMHG
STRESS BASELINE HR: 45 BPM
STRESS BASELINE SYS BP: 144 MMHG
STRESS ESTIMATED WORKLOAD: 1 METS
STRESS EXERCISE DUR MIN: NORMAL
STRESS PEAK DIAS BP: 80 MMHG
STRESS PEAK SYS BP: 144 MMHG
STRESS PERCENT HR ACHIEVED: 33 %
STRESS POST PEAK HR: 52 BPM
STRESS RATE PRESSURE PRODUCT: 7488 BPM*MMHG
STRESS ST DEPRESSION: 0 MM
STRESS ST ELEVATION: 0 MM
STRESS TARGET HR: 159 BPM

## 2020-03-25 PROCEDURE — 93017 CV STRESS TEST TRACING ONLY: CPT

## 2020-03-25 PROCEDURE — 74011250636 HC RX REV CODE- 250/636: Performed by: NURSE PRACTITIONER

## 2020-03-25 RX ORDER — SODIUM CHLORIDE 9 MG/ML
250 INJECTION, SOLUTION INTRAVENOUS ONCE
Status: COMPLETED | OUTPATIENT
Start: 2020-03-25 | End: 2020-03-25

## 2020-03-25 RX ADMIN — SODIUM CHLORIDE 250 ML/HR: 900 INJECTION, SOLUTION INTRAVENOUS at 10:25

## 2020-03-25 RX ADMIN — REGADENOSON 0.4 MG: 0.08 INJECTION, SOLUTION INTRAVENOUS at 10:25

## 2020-03-25 NOTE — PROGRESS NOTES
Patient was injected with 70.9 millicuries 44KRM Sestamibi on 3/25/20 at 0830. Patient was injected with 87.4 millicuries 66LFW Sestamibi on 3/25/20 at 1025. Patient's armbands were removed and placed in shred-it box.     Patient had a Nuclear Lexiscan Stress Test.

## 2020-03-26 ENCOUNTER — ANESTHESIA EVENT (OUTPATIENT)
Dept: CARDIOTHORACIC SURGERY | Age: 60
End: 2020-03-26

## 2020-03-26 ENCOUNTER — HOSPITAL ENCOUNTER (EMERGENCY)
Age: 60
Discharge: HOME | End: 2020-03-26
Attending: EMERGENCY MEDICINE
Payer: MEDICARE

## 2020-03-26 VITALS
WEIGHT: 145 LBS | TEMPERATURE: 97.7 F | SYSTOLIC BLOOD PRESSURE: 165 MMHG | DIASTOLIC BLOOD PRESSURE: 104 MMHG | RESPIRATION RATE: 18 BRPM | HEART RATE: 61 BPM | OXYGEN SATURATION: 99 % | BODY MASS INDEX: 19.64 KG/M2 | HEIGHT: 72 IN

## 2020-03-26 DIAGNOSIS — N30.01 ACUTE CYSTITIS WITH HEMATURIA: Primary | ICD-10-CM

## 2020-03-26 DIAGNOSIS — Z93.59 SUPRAPUBIC CATHETER (HCC): ICD-10-CM

## 2020-03-26 LAB
APPEARANCE UR: ABNORMAL
BACTERIA URNS QL MICRO: ABNORMAL /HPF
BILIRUB UR QL: NEGATIVE
COLOR UR: YELLOW
EPITH CASTS URNS QL MICRO: ABNORMAL /LPF (ref 0–5)
GLUCOSE UR STRIP.AUTO-MCNC: NEGATIVE MG/DL
HGB UR QL STRIP: ABNORMAL
KETONES UR QL STRIP.AUTO: NEGATIVE MG/DL
LEUKOCYTE ESTERASE UR QL STRIP.AUTO: ABNORMAL
NITRITE UR QL STRIP.AUTO: NEGATIVE
PH UR STRIP: 5.5 [PH] (ref 5–8)
PROT UR STRIP-MCNC: 300 MG/DL
RBC #/AREA URNS HPF: ABNORMAL /HPF (ref 0–5)
SP GR UR REFRACTOMETRY: 1.01 (ref 1–1.03)
UROBILINOGEN UR QL STRIP.AUTO: 0.2 EU/DL (ref 0.2–1)
WBC URNS QL MICRO: ABNORMAL /HPF (ref 0–4)

## 2020-03-26 PROCEDURE — 81001 URINALYSIS AUTO W/SCOPE: CPT

## 2020-03-26 PROCEDURE — 87186 SC STD MICRODIL/AGAR DIL: CPT

## 2020-03-26 PROCEDURE — 87086 URINE CULTURE/COLONY COUNT: CPT

## 2020-03-26 PROCEDURE — 87077 CULTURE AEROBIC IDENTIFY: CPT

## 2020-03-26 PROCEDURE — 74011250637 HC RX REV CODE- 250/637: Performed by: NURSE PRACTITIONER

## 2020-03-26 PROCEDURE — 99283 EMERGENCY DEPT VISIT LOW MDM: CPT

## 2020-03-26 RX ORDER — CEPHALEXIN 250 MG/1
250 CAPSULE ORAL ONCE
Status: COMPLETED | OUTPATIENT
Start: 2020-03-26 | End: 2020-03-26

## 2020-03-26 RX ORDER — CEPHALEXIN 250 MG/1
250 CAPSULE ORAL 2 TIMES DAILY
Qty: 20 CAP | Refills: 0 | Status: SHIPPED | OUTPATIENT
Start: 2020-03-26 | End: 2020-04-05

## 2020-03-26 RX ADMIN — CEPHALEXIN 250 MG: 250 CAPSULE ORAL at 19:50

## 2020-03-26 NOTE — ED PROVIDER NOTES
EMERGENCY DEPARTMENT HISTORY AND PHYSICAL EXAM    7:00 PM      Date: 3/26/2020  Patient Name: Anant Mcconnell    History of Presenting Illness     Chief Complaint   Patient presents with    (LUTS) Lower Urinary Tract Symptoms         History Provided By: Patient    Additional History (Context): Anant Mcconnell is a 62 y.o. male with hx of ESRD , pt on dialysis, Cervical discitis, diabetes, THN, Hep C, Hep B, quadriparesis who presents with complain of concern of UTI. Pt reports he noticed his urine is coming out thick and cloudy since yesterday. Pt has a suprapubic catheter. Pt reports last time it looked like that he had a UTI. Pt denies any abdominal pain, pelvic, or back pain. Denies nausea, vomiting, fevers. Pt reports going to dialysis this morning as scheduled. Pt denies any other concerns. Pt reports tomorrow he is going to surgery to have dialysis catheter placed on his arm, pt has cath currently on his right chest.     PCP: Macy Vernon, DO    Current Outpatient Medications   Medication Sig Dispense Refill    cephALEXin (Keflex) 250 mg capsule Take 1 Cap by mouth two (2) times a day for 10 days. Indications: bacterial urinary tract infection 20 Cap 0    doxycycline (VIBRA-TABS) 100 mg tablet       levothyroxine (SYNTHROID) 100 mcg tablet Take 1 Tab by mouth every morning. 30 Tab 1    b complex-vitamin c-folic acid (NEPHROCAPS) 1 mg capsule Take 1 Cap by mouth daily. 30 Cap 1    calcium acetate,phosphat bind, (PHOSLO) 667 mg cap Take 1 Cap by mouth three (3) times daily (with meals). 90 Cap 1    doxercalciferoL (HECTOROL) 4 mcg/2 mL injection 0.5 mL by IntraVENous route DIALYSIS MON, WED & FRI. 1 mL 0    pantoprazole (PROTONIX) 40 mg tablet Take 1 Tab by mouth Before breakfast and dinner. 60 Tab 1    food supplemt, lactose-reduced (ENSURE ENLIVE) 0.08 gram-1.5 kcal/mL liqd Take 1 Each by mouth three (3) times daily (with meals).  Flavor of patient choice 90 Bottle 1    ferrous gluconate 324 mg (37.5 mg iron) tablet Take 1 Tab by mouth two (2) times daily (with meals). 60 Tab 0    hydrALAZINE (APRESOLINE) 25 mg tablet Take 1 Tab by mouth every eight (8) hours as needed (systolic B/P >480).  30 Tab 0    Syringe, Disposable, (BD SYRINGE CATHETER TIP) 60 mL syrg Use 1 syringe daily with irrigations 30 Syringe 11       Past History     Past Medical History:  Past Medical History:   Diagnosis Date    Anemia     Bradycardia, unspecified 2018    Cervical discitis     MRSA    Chronic drug abuse ([facility] 1974    Chronic kidney disease     stage III    Diabetes ([facility] 01/1990    Drug abuse ([facility]     Encephalopathy     GERD (gastroesophageal reflux disease)     Hypertension     Muscle weakness     Quadriparesis ([facility] 2017    Renal cell carcinoma of left kidney ([facility] [address] 12/17/13    Pathological Stage [id number] cc RCC FG3 s/p left open partial nephrectomy in 12/13      Sepsis due to methicillin resistant Staphylococcus aureus (HCC)     Suprapubic catheter (HCC)     Thrombocytopenia (HCC)     Thyroid disease     Urethral erosion     Viral hepatitis B     Viral hepatitis C     Xerosis cutis        Past Surgical History:  Past Surgical History:   Procedure Laterality Date    COLONOSCOPY N/A 10/3/2019    COLONOSCOPY / polypectomy performed by Eladia Daniels MD at [place] [address]  12/17/13    Dr. Ron Huddleston, [facility]    HX NEPHRECTOMY Left 12/17/13    Open/ Partial,nephrectomy    HX OTHER SURGICAL Right 2/27/2016    removed right ft  2nd meta-tarsal    HX OTHER SURGICAL  04/2017    abscess removed from back of neck     IR INSERT TUNL CVC W/O PORT OVER 5 YR  2/18/2020    NEUROLOGICAL PROCEDURE UNLISTED  2017    neck surgery-abcess-hardware neck    RI REMOVAL WITH INSERTION OF SUPRAPUBIC CATHETER  2018       Family History:  Family History   Problem Relation Age of Onset    Diabetes Mother     Sickle Cell Anemia Father     Diabetes Sister     Hypertension Sister Social History:  Social History     Tobacco Use    Smoking status: Current Every Day Smoker     Packs/day: 0.25     Years: 30.00     Pack years: 7.50     Types: Cigarettes    Smokeless tobacco: Never Used   Substance Use Topics    Alcohol use: Yes     Comment: beer occasionally    Drug use: Yes     Types: Marijuana, Heroin     Comment: marijuana-December 2018, heroin-9/15/19-approx       Allergies: Allergies   Allergen Reactions    Iodine Hives and Other (comments)         Review of Systems       Review of Systems   Constitutional: Negative for chills and fever. Respiratory: Negative for shortness of breath. Cardiovascular: Negative for chest pain. Gastrointestinal: Negative for abdominal pain, nausea and vomiting. Genitourinary: Negative for difficulty urinating and flank pain. Cloudy urine   Skin: Negative for rash. Neurological: Negative for weakness. All other systems reviewed and are negative. Physical Exam     Visit Vitals  BP (!) 165/104 (BP 1 Location: Left arm, BP Patient Position: At rest)   Pulse 61   Temp 97.7 °F (36.5 °C)   Resp 18   Ht 6' (1.829 m)   Wt 65.8 kg (145 lb)   SpO2 99%   BMI 19.67 kg/m²         Physical Exam  Vitals signs reviewed. Constitutional:       General: He is not in acute distress. Appearance: Normal appearance. He is well-developed. He is not ill-appearing or toxic-appearing. Comments: Pt in no acute distress. Non toxic looking. HENT:      Head: Normocephalic and atraumatic. Eyes:      Conjunctiva/sclera: Conjunctivae normal.      Pupils: Pupils are equal, round, and reactive to light. Neck:      Musculoskeletal: Normal range of motion. Trachea: Trachea normal.   Cardiovascular:      Rate and Rhythm: Normal rate and regular rhythm. Pulmonary:      Effort: Pulmonary effort is normal.      Breath sounds: Normal breath sounds. Abdominal:      General: Bowel sounds are normal. There is no distension or abdominal bruit. Palpations: Abdomen is soft. Abdomen is not rigid. There is no shifting dullness, fluid wave, mass or pulsatile mass. Tenderness: There is no abdominal tenderness. There is no guarding or rebound. Negative signs include Carty's sign and McBurney's sign. Comments: No abdominal tenderness, no sign of infection around suprapubic catheter. No tenderness around catheter. No purulent discharge. Neurological:      General: No focal deficit present. Mental Status: He is alert and oriented to person, place, and time. Diagnostic Study Results     Labs -  Recent Results (from the past 12 hour(s))   URINALYSIS W/ RFLX MICROSCOPIC    Collection Time: 03/26/20  7:13 PM   Result Value Ref Range    Color YELLOW      Appearance TURBID      Specific gravity 1.012 1.005 - 1.030      pH (UA) 5.5 5.0 - 8.0      Protein 300 (A) NEG mg/dL    Glucose NEGATIVE  NEG mg/dL    Ketone NEGATIVE  NEG mg/dL    Bilirubin NEGATIVE  NEG      Blood LARGE (A) NEG      Urobilinogen 0.2 0.2 - 1.0 EU/dL    Nitrites NEGATIVE  NEG      Leukocyte Esterase LARGE (A) NEG     URINE MICROSCOPIC ONLY    Collection Time: 03/26/20  7:13 PM   Result Value Ref Range    WBC TOO NUMEROUS TO COUNT 0 - 4 /hpf    RBC 36 to 50 0 - 5 /hpf    Epithelial cells FEW 0 - 5 /lpf    Bacteria 4+ (A) NEG /hpf       Radiologic Studies -   No orders to display         Medical Decision Making   I am the first provider for this patient. I reviewed the vital signs, available nursing notes, past medical history, past surgical history, family history and social history. Vital Signs-Reviewed the patient's vital signs. Records Reviewed: Nursing Notes and Old Medical Records (Time of Review: 7:00 PM)    ED Course: Progress Notes, Reevaluation, and Consults:       Provider Notes (Medical Decision Making):  61 yo male with ESRD,  who is currently on dialysis T,TH, Sat who presents with complain of cloudy urine that started yesterday.  Pt has a suprapubic catheter that is draining. Pt reports changing bag today. Pt has cloudy urine draining from bag. No visible blood. No abdominal tenderness, no sign of infection around suprapubic catheter. No tenderness around catheter. No purulent discharge. Pt denies any abdominal pain, flank or back pain. Pt denies fevers. Pt reports he just wants to be treated for UTI. Pt reports he has had this happen in the past. Pt reports he is supposed to go to surgery to have dialysis cath placed tomorrow pt made aware to let the doctor know about his UTI since they might need to reschedule his surgery. Pt reported understanding of instructions. Pt given keflex renally dosed as suggested by pharmacist, Lena Biggs. He recommended 250 mg BID. Pt given instructions to take medication after dialysis on the days he has dialysis. Pt given strict return precautions. Pt nontoxic looking, afebrile during visit. Diagnosis     Clinical Impression:   1. Acute cystitis with hematuria    2. Suprapubic catheter [facility])        Disposition: home     Follow-up Information     Follow up With Specialties Details Why [address], Makenna Cross, DO Family Practice In 1 week  [address] [place] [address]  [phone] [address] [facility] EMERGENCY DEPT Emergency Medicine  If symptoms worsen [address] [place]  Schedule an appointment as soon as possible for a visit in 1 day  [address] [facility]. [address] [place] [address]             Discharge Medication List as of 3/26/2020  7:46 PM      START taking these medications    Details   cephALEXin (Keflex) 250 mg capsule Take 1 Cap by mouth two (2) times a day for 10 days.  Indications: bacterial urinary tract infection, Normal, Disp-20 Cap, R-0         CONTINUE these medications which have NOT CHANGED    Details   doxycycline (VIBRA-TABS) 100 mg tablet Historical Med      levothyroxine (SYNTHROID) 100 mcg tablet Take 1 Tab by mouth every morning., Normal, Disp-30 Tab, R-1      b complex-vitamin c-folic acid (NEPHROCAPS) 1 mg capsule Take 1 Cap by mouth daily. , Normal, Disp-30 Cap, R-1      calcium acetate,phosphat bind, (PHOSLO) 667 mg cap Take 1 Cap by mouth three (3) times daily (with meals). , Normal, Disp-90 Cap, R-1      doxercalciferoL (HECTOROL) 4 mcg/2 mL injection 0.5 mL by IntraVENous route DIALYSIS MON, WED & FRI., No Print, Disp-1 mL, R-0To be given with dialysis      pantoprazole (PROTONIX) 40 mg tablet Take 1 Tab by mouth Before breakfast and dinner., Normal, Disp-60 Tab, R-1      food supplemt, lactose-reduced (ENSURE ENLIVE) 0.08 gram-1.5 kcal/mL liqd Take 1 Each by mouth three (3) times daily (with meals). Flavor of patient choice, Normal, Disp-90 Bottle, R-1      ferrous gluconate 324 mg (37.5 mg iron) tablet Take 1 Tab by mouth two (2) times daily (with meals). , Print, Disp-60 Tab, R-0      hydrALAZINE (APRESOLINE) 25 mg tablet Take 1 Tab by mouth every eight (8) hours as needed (systolic B/P >724). , Print, Disp-30 Tab, R-0      Syringe, Disposable, (BD SYRINGE CATHETER TIP) 60 mL syrg Use 1 syringe daily with irrigations, Normal, Disp-30 Syringe, R-11             Dictation disclaimer:  Please note that this dictation was completed with [employer], the Learn with [employer] voice recognition software. Quite often unanticipated grammatical, syntax, homophones, and other interpretive errors are inadvertently transcribed by the computer software. Please disregard these errors. Please excuse any errors that have escaped final proofreading.

## 2020-03-26 NOTE — ED TRIAGE NOTES
Patient voices concerns for possible UTI. He states that his suprapubic catheter was recently clogged and may have resulted in UTI. Denies symptoms at present.   States prior hx of UTI

## 2020-03-26 NOTE — DISCHARGE INSTRUCTIONS
Patient Education        Urinary Tract Infections in Men: Care Instructions  Your Care Instructions    A urinary tract infection, or UTI, is a general term for an infection anywhere between the kidneys and the tip of the penis. UTIs can also be a result of a prostate problem. Most cause pain or burning when you urinate. Most UTIs are caused by bacteria and can be cured with antibiotics. It is important to complete your treatment so that the infection does not get worse. Follow-up care is a key part of your treatment and safety. Be sure to make and go to all appointments, and call your doctor if you are having problems. It's also a good idea to know your test results and keep a list of the medicines you take. How can you care for yourself at home? · Take your antibiotics as prescribed. Do not stop taking them just because you feel better. You need to take the full course of antibiotics. · Take your medicines exactly as prescribed. Your doctor may have prescribed a medicine, such as phenazopyridine (Pyridium), to help relieve pain when you urinate. This turns your urine orange. You may stop taking it when your symptoms get better. But be sure to take all of your antibiotics, which treat the infection. · Drink extra water for the next day or two. This will help make the urine less concentrated and help wash out the bacteria causing the infection. (If you have kidney, heart, or liver disease and have to limit your fluids, talk with your doctor before you increase your fluid intake.)  · Avoid drinks that are carbonated or have caffeine. They can irritate the bladder. · Urinate often. Try to empty your bladder each time. · To relieve pain, take a hot bath or lay a heating pad (set on low) over your lower belly or genital area. Never go to sleep with a heating pad in place. To help prevent UTIs  · Drink plenty of fluids, enough so that your urine is light yellow or clear like water.  If you have kidney, heart, or liver disease and have to limit fluids, talk with your doctor before you increase the amount of fluids you drink. · Urinate when you have the urge. Do not hold your urine for a long time. Urinate before you go to sleep. · Keep your penis clean. Catheter care  If you have a drainage tube (catheter) in place, the following steps will help you care for it. · Always wash your hands before and after touching your catheter. · Check the area around the urethra for inflammation or signs of infection. Signs of infection include irritated, swollen, red, or tender skin, or pus around the catheter. · Clean the area around the catheter with soap and water two times a day. Dry with a clean towel afterward. · Do not apply powder or lotion to the skin around the catheter. To empty the urine collection bag  · Wash your hands with soap and water. · Without touching the drain spout, remove the spout from its sleeve at the bottom of the collection bag. Open the valve on the spout. · Let the urine flow out of the bag and into the toilet or a container. Do not let the tubing or drain spout touch anything. · After you empty the bag, clean the end of the drain spout with tissue and water. Close the valve and put the drain spout back into its sleeve at the bottom of the collection bag. · Wash your hands with soap and water. When should you call for help? Call your doctor now or seek immediate medical care if:    · Symptoms such as a fever, chills, nausea, or vomiting get worse or happen for the first time.     · You have new pain in your back just below your rib cage. This is called flank pain.     · There is new blood or pus in your urine.     · You are not able to take or keep down your antibiotics.    Watch closely for changes in your health, and be sure to contact your doctor if:    · You are not getting better after taking an antibiotic for 2 days.     · Your symptoms go away but then come back.    Where can you learn more?  Go to http://gabo-curly.info/  Enter X998 in the search box to learn more about \"Urinary Tract Infections in Men: Care Instructions. \"  Current as of: August 21, 2019Content Version: 12.4  © 6572-5292 [employer]. Care instructions adapted under license by [employer] (which disclaims liability or warranty for this information). If you have questions about a medical condition or this instruction, always ask your healthcare professional. [place] [address] any warranty or liability for your use of this information. Let your doctor know tomorrow about your UTI and new start of keflex. Return to ED if any worsening symptoms.

## 2020-03-26 NOTE — ROUTINE PROCESS
Genaro Ariza is a 62 y.o. male that was discharged in stable. Pt was accompanied by self. Pt is driving. The patients diagnosis, condition and treatment were explained to  patient and aftercare instructions were given. The patient verbalized understanding. Patient armband removed and shredded.

## 2020-03-27 ENCOUNTER — ANESTHESIA (OUTPATIENT)
Dept: CARDIOTHORACIC SURGERY | Age: 60
End: 2020-03-27

## 2020-03-27 ENCOUNTER — HOSPITAL ENCOUNTER (OUTPATIENT)
Age: 60
Discharge: HOME | End: 2020-03-27
Attending: SURGERY | Admitting: SURGERY
Payer: MEDICARE

## 2020-03-27 ENCOUNTER — PATIENT OUTREACH (OUTPATIENT)
Dept: CASE MANAGEMENT | Age: 60
End: 2020-03-27

## 2020-03-27 VITALS
WEIGHT: 133 LBS | RESPIRATION RATE: 18 BRPM | OXYGEN SATURATION: 99 % | BODY MASS INDEX: 18.01 KG/M2 | HEART RATE: 44 BPM | SYSTOLIC BLOOD PRESSURE: 159 MMHG | DIASTOLIC BLOOD PRESSURE: 85 MMHG | TEMPERATURE: 94.9 F | HEIGHT: 72 IN

## 2020-03-27 LAB
AMPHET UR QL SCN: NEGATIVE
ANION GAP SERPL CALC-SCNC: 4 MMOL/L (ref 3–18)
BARBITURATES UR QL SCN: NEGATIVE
BENZODIAZ UR QL: NEGATIVE
BUN BLD-MCNC: 26 MG/DL (ref 7–18)
BUN SERPL-MCNC: 21 MG/DL (ref 7–18)
BUN/CREAT SERPL: 8 (ref 12–20)
CALCIUM SERPL-MCNC: 8.6 MG/DL (ref 8.5–10.1)
CANNABINOIDS UR QL SCN: NEGATIVE
CHLORIDE SERPL-SCNC: 102 MMOL/L (ref 100–111)
CO2 SERPL-SCNC: 31 MMOL/L (ref 21–32)
COCAINE UR QL SCN: NEGATIVE
CREAT SERPL-MCNC: 2.79 MG/DL (ref 0.6–1.3)
GLUCOSE BLD STRIP.AUTO-MCNC: 82 MG/DL (ref 74–106)
GLUCOSE SERPL-MCNC: 80 MG/DL (ref 74–99)
HCT VFR BLD CALC: 41 % (ref 36–49)
HDSCOM,HDSCOM: ABNORMAL
HGB BLD-MCNC: 13.9 G/DL (ref 12–16)
LACTATE SERPL-SCNC: 0.5 MMOL/L (ref 0.4–2)
METHADONE UR QL: NEGATIVE
OPIATES UR QL: POSITIVE
PCP UR QL: NEGATIVE
POTASSIUM BLD-SCNC: 4.2 MMOL/L (ref 3.5–5.5)
POTASSIUM SERPL-SCNC: 4.2 MMOL/L (ref 3.5–5.5)
SODIUM BLD-SCNC: 135 MMOL/L (ref 136–145)
SODIUM SERPL-SCNC: 137 MMOL/L (ref 136–145)

## 2020-03-27 PROCEDURE — 82947 ASSAY GLUCOSE BLOOD QUANT: CPT

## 2020-03-27 PROCEDURE — 80048 BASIC METABOLIC PNL TOTAL CA: CPT

## 2020-03-27 PROCEDURE — 83605 ASSAY OF LACTIC ACID: CPT

## 2020-03-27 PROCEDURE — 80307 DRUG TEST PRSMV CHEM ANLYZR: CPT

## 2020-03-27 RX ORDER — INSULIN LISPRO 100 [IU]/ML
INJECTION, SOLUTION INTRAVENOUS; SUBCUTANEOUS ONCE
Status: DISCONTINUED | OUTPATIENT
Start: 2020-03-27 | End: 2020-03-27 | Stop reason: HOSPADM

## 2020-03-27 RX ORDER — CEFAZOLIN SODIUM 2 G/50ML
SOLUTION INTRAVENOUS
Status: DISCONTINUED
Start: 2020-03-27 | End: 2020-03-27 | Stop reason: HOSPADM

## 2020-03-27 RX ORDER — SODIUM CHLORIDE 0.9 % (FLUSH) 0.9 %
5-40 SYRINGE (ML) INJECTION AS NEEDED
Status: DISCONTINUED | OUTPATIENT
Start: 2020-03-27 | End: 2020-03-27 | Stop reason: HOSPADM

## 2020-03-27 RX ORDER — FAMOTIDINE 20 MG/1
20 TABLET, FILM COATED ORAL ONCE
Status: DISCONTINUED | OUTPATIENT
Start: 2020-03-27 | End: 2020-03-27 | Stop reason: HOSPADM

## 2020-03-27 RX ORDER — FAMOTIDINE 20 MG/1
TABLET, FILM COATED ORAL
Status: DISCONTINUED
Start: 2020-03-27 | End: 2020-03-27 | Stop reason: HOSPADM

## 2020-03-27 RX ORDER — CEFAZOLIN SODIUM 2 G/50ML
2 SOLUTION INTRAVENOUS ONCE
Status: DISCONTINUED | OUTPATIENT
Start: 2020-03-27 | End: 2020-03-27 | Stop reason: HOSPADM

## 2020-03-27 RX ORDER — SODIUM CHLORIDE, SODIUM LACTATE, POTASSIUM CHLORIDE, CALCIUM CHLORIDE 600; 310; 30; 20 MG/100ML; MG/100ML; MG/100ML; MG/100ML
50 INJECTION, SOLUTION INTRAVENOUS CONTINUOUS
Status: DISCONTINUED | OUTPATIENT
Start: 2020-03-27 | End: 2020-03-27 | Stop reason: HOSPADM

## 2020-03-27 RX ORDER — SODIUM CHLORIDE 0.9 % (FLUSH) 0.9 %
5-40 SYRINGE (ML) INJECTION EVERY 8 HOURS
Status: DISCONTINUED | OUTPATIENT
Start: 2020-03-27 | End: 2020-03-27 | Stop reason: HOSPADM

## 2020-03-27 NOTE — PROGRESS NOTES
Contacted patient for Transitions of Care Coordination  follow up. No answer. Left message introducing self, role and reason for call. Requested return call. Contact information provided. Will attempt to contact at a later time.

## 2020-03-27 NOTE — PROGRESS NOTES
postive UTI. He feels clammy and sick. Currently appears septic. Will get lactate. Will cancel surgery for today and possibly may require ED for work up.

## 2020-03-30 ENCOUNTER — PATIENT OUTREACH (OUTPATIENT)
Dept: CASE MANAGEMENT | Age: 60
End: 2020-03-30

## 2020-03-31 ENCOUNTER — PATIENT OUTREACH (OUTPATIENT)
Dept: CASE MANAGEMENT | Age: 60
End: 2020-03-31

## 2020-03-31 LAB
BACTERIA SPEC CULT: ABNORMAL
CC UR VC: ABNORMAL
SERVICE CMNT-IMP: ABNORMAL

## 2020-03-31 NOTE — PROGRESS NOTES
Contacted patient for Transitions of Care Coordination  follow up. No answer. Left message introducing self, role and reason for call. Requested return call. Contact information provided.  3rd attempt

## 2020-04-07 DIAGNOSIS — N18.9 ACUTE RENAL FAILURE SUPERIMPOSED ON CHRONIC KIDNEY DISEASE, UNSPECIFIED CKD STAGE, UNSPECIFIED ACUTE RENAL FAILURE TYPE (HCC): ICD-10-CM

## 2020-04-07 DIAGNOSIS — N17.9 ACUTE RENAL FAILURE SUPERIMPOSED ON CHRONIC KIDNEY DISEASE, UNSPECIFIED CKD STAGE, UNSPECIFIED ACUTE RENAL FAILURE TYPE (HCC): ICD-10-CM

## 2020-05-05 ENCOUNTER — TELEPHONE (OUTPATIENT)
Dept: VASCULAR SURGERY | Age: 60
End: 2020-05-05

## 2020-05-05 NOTE — TELEPHONE ENCOUNTER
Patient's dialysis unit has notified our office that patient's perm catheter is running high pressures and pulling clots. Discussed with PADMAJA Mora and patient will be scheduled for perm catheter exchange. Will have surgery scheduler contact patient with date and time.

## 2020-05-06 ENCOUNTER — HOSPITAL ENCOUNTER (OUTPATIENT)
Age: 60
Setting detail: OUTPATIENT SURGERY
Discharge: HOME | End: 2020-05-06
Attending: SURGERY | Admitting: SURGERY
Payer: MEDICARE

## 2020-05-06 ENCOUNTER — ANESTHESIA (OUTPATIENT)
Dept: CARDIAC CATH/INVASIVE PROCEDURES | Age: 60
End: 2020-05-06
Payer: MEDICARE

## 2020-05-06 ENCOUNTER — ANESTHESIA EVENT (OUTPATIENT)
Dept: CARDIAC CATH/INVASIVE PROCEDURES | Age: 60
End: 2020-05-06
Payer: MEDICARE

## 2020-05-06 VITALS
OXYGEN SATURATION: 99 % | HEIGHT: 72 IN | WEIGHT: 135 LBS | RESPIRATION RATE: 10 BRPM | SYSTOLIC BLOOD PRESSURE: 112 MMHG | HEART RATE: 44 BPM | BODY MASS INDEX: 18.28 KG/M2 | DIASTOLIC BLOOD PRESSURE: 68 MMHG

## 2020-05-06 DIAGNOSIS — T82.9XXA COMPLICATION OF DIALYSIS ACCESS INSERTION: ICD-10-CM

## 2020-05-06 DIAGNOSIS — N18.6 ESRD (END STAGE RENAL DISEASE) (HCC): ICD-10-CM

## 2020-05-06 PROBLEM — T82.41XA HEMODIALYSIS CATHETER DYSFUNCTION (HCC): Status: ACTIVE | Noted: 2020-05-06

## 2020-05-06 LAB
BUN BLD-MCNC: 58 MG/DL (ref 7–18)
CHLORIDE BLD-SCNC: 109 MMOL/L (ref 100–108)
GLUCOSE BLD STRIP.AUTO-MCNC: 77 MG/DL (ref 74–106)
HCT VFR BLD CALC: 47 % (ref 36–49)
HGB BLD-MCNC: 16 G/DL (ref 12–16)
POTASSIUM BLD-SCNC: 4.6 MMOL/L (ref 3.5–5.5)
SODIUM BLD-SCNC: 140 MMOL/L (ref 136–145)

## 2020-05-06 PROCEDURE — 74011000258 HC RX REV CODE- 258: Performed by: NURSE ANESTHETIST, CERTIFIED REGISTERED

## 2020-05-06 PROCEDURE — C1750 CATH, HEMODIALYSIS,LONG-TERM: HCPCS | Performed by: SURGERY

## 2020-05-06 PROCEDURE — C1769 GUIDE WIRE: HCPCS | Performed by: SURGERY

## 2020-05-06 PROCEDURE — 77030002986 HC SUT PROL J&J -A: Performed by: SURGERY

## 2020-05-06 PROCEDURE — 74011000250 HC RX REV CODE- 250: Performed by: SURGERY

## 2020-05-06 PROCEDURE — 36558 INSERT TUNNELED CV CATH: CPT | Performed by: SURGERY

## 2020-05-06 PROCEDURE — 74011250636 HC RX REV CODE- 250/636: Performed by: NURSE ANESTHETIST, CERTIFIED REGISTERED

## 2020-05-06 PROCEDURE — 76060000032 HC ANESTHESIA 0.5 TO 1 HR: Performed by: SURGERY

## 2020-05-06 PROCEDURE — 82947 ASSAY GLUCOSE BLOOD QUANT: CPT

## 2020-05-06 PROCEDURE — 74011250636 HC RX REV CODE- 250/636: Performed by: SURGERY

## 2020-05-06 DEVICE — PRECISION CHRONIC CATHETER SPORT PACK,SYMMETRICAL TIP, TAL VENATRAC STYLET,14.5 FR/CH (4.8 MM) X 19 CM
Type: IMPLANTABLE DEVICE | Status: FUNCTIONAL
Brand: PALINDROME

## 2020-05-06 RX ORDER — PROPOFOL 10 MG/ML
INJECTION, EMULSION INTRAVENOUS AS NEEDED
Status: DISCONTINUED | OUTPATIENT
Start: 2020-05-06 | End: 2020-05-06 | Stop reason: HOSPADM

## 2020-05-06 RX ORDER — INSULIN LISPRO 100 [IU]/ML
INJECTION, SOLUTION INTRAVENOUS; SUBCUTANEOUS ONCE
Status: CANCELLED | OUTPATIENT
Start: 2020-05-06 | End: 2020-05-07

## 2020-05-06 RX ORDER — DEXTROSE 50 % IN WATER (D50W) INTRAVENOUS SYRINGE
25-50 AS NEEDED
Status: CANCELLED | OUTPATIENT
Start: 2020-05-06

## 2020-05-06 RX ORDER — HEPARIN SODIUM 5000 [USP'U]/ML
INJECTION, SOLUTION INTRAVENOUS; SUBCUTANEOUS AS NEEDED
Status: DISCONTINUED | OUTPATIENT
Start: 2020-05-06 | End: 2020-05-06 | Stop reason: HOSPADM

## 2020-05-06 RX ORDER — SODIUM CHLORIDE 9 MG/ML
INJECTION, SOLUTION INTRAVENOUS
Status: DISCONTINUED | OUTPATIENT
Start: 2020-05-06 | End: 2020-05-06 | Stop reason: HOSPADM

## 2020-05-06 RX ORDER — MAGNESIUM SULFATE 100 %
4 CRYSTALS MISCELLANEOUS AS NEEDED
Status: CANCELLED | OUTPATIENT
Start: 2020-05-06

## 2020-05-06 RX ORDER — PROPOFOL 10 MG/ML
VIAL (ML) INTRAVENOUS
Status: DISCONTINUED | OUTPATIENT
Start: 2020-05-06 | End: 2020-05-06 | Stop reason: HOSPADM

## 2020-05-06 RX ORDER — ONDANSETRON 2 MG/ML
4 INJECTION INTRAMUSCULAR; INTRAVENOUS ONCE
Status: CANCELLED | OUTPATIENT
Start: 2020-05-06 | End: 2020-05-06

## 2020-05-06 RX ORDER — LIDOCAINE HYDROCHLORIDE 10 MG/ML
INJECTION, SOLUTION EPIDURAL; INFILTRATION; INTRACAUDAL; PERINEURAL AS NEEDED
Status: DISCONTINUED | OUTPATIENT
Start: 2020-05-06 | End: 2020-05-06 | Stop reason: HOSPADM

## 2020-05-06 RX ORDER — FENTANYL CITRATE 50 UG/ML
INJECTION, SOLUTION INTRAMUSCULAR; INTRAVENOUS AS NEEDED
Status: DISCONTINUED | OUTPATIENT
Start: 2020-05-06 | End: 2020-05-06 | Stop reason: HOSPADM

## 2020-05-06 RX ORDER — FENTANYL CITRATE 50 UG/ML
50 INJECTION, SOLUTION INTRAMUSCULAR; INTRAVENOUS AS NEEDED
Status: CANCELLED | OUTPATIENT
Start: 2020-05-06

## 2020-05-06 RX ORDER — SODIUM CHLORIDE 0.9 % (FLUSH) 0.9 %
5-40 SYRINGE (ML) INJECTION EVERY 8 HOURS
Status: CANCELLED | OUTPATIENT
Start: 2020-05-06

## 2020-05-06 RX ORDER — SODIUM CHLORIDE 0.9 % (FLUSH) 0.9 %
5-40 SYRINGE (ML) INJECTION AS NEEDED
Status: CANCELLED | OUTPATIENT
Start: 2020-05-06

## 2020-05-06 RX ADMIN — FENTANYL CITRATE 50 MCG: 50 INJECTION, SOLUTION INTRAMUSCULAR; INTRAVENOUS at 13:31

## 2020-05-06 RX ADMIN — PROPOFOL 30 MG: 10 INJECTION, EMULSION INTRAVENOUS at 13:36

## 2020-05-06 RX ADMIN — FENTANYL CITRATE 50 MCG: 50 INJECTION, SOLUTION INTRAMUSCULAR; INTRAVENOUS at 13:36

## 2020-05-06 RX ADMIN — PROPOFOL 50 MCG/KG/MIN: 10 INJECTION, EMULSION INTRAVENOUS at 13:33

## 2020-05-06 RX ADMIN — SODIUM CHLORIDE: 9 INJECTION, SOLUTION INTRAVENOUS at 13:29

## 2020-05-06 NOTE — Clinical Note
Right neck prepped with ChloraPrep and draped. Wet prep solution applied at: 1336. Wet prep solution dried at: 1339. Wet prep elapsed drying time: 3 mins.

## 2020-05-06 NOTE — DISCHARGE INSTRUCTIONS
Patient Education        Hemodialysis Access: What to Expect at [address] [facility]  Hemodialysis is a way to remove wastes from the blood when your kidneys can no longer do the job. It is not a cure, but it can help you live longer and feel better. It is a lifesaving treatment when you have kidney failure. Hemodialysis is often called dialysis. Your doctor created a place (called an access) in your arm for your blood to flow in and out of your body during your dialysis sessions. Your arm will probably be bruised and swollen. It may hurt. The cut (incision) may bleed. The pain and bleeding will get better over several days. You will probably need only over-the-counter pain medicine. You can reduce swelling by propping your arm on 1 or 2 pillows and keeping your elbow straight. You will have stitches. These may dissolve on their own, or your doctor will tell you when to come in to have them removed. You should also be able to return to work in a few days. You may feel some coolness or numbness in your hand. These feelings usually go away in a few weeks. Your doctor may suggest squeezing a soft object. This will strengthen your access and may make hemodialysis faster and easier. You should always be able to feel blood rushing through the fistula or graft. It feels like a slight vibration when you put your fingers on the skin over the fistula or graft. This feeling is called a thrill or pulse. This care sheet gives you a general idea about how long it will take for you to recover. But each person recovers at a different pace. Follow the steps below to get better as quickly as possible. How can you care for yourself at home? Activity    · Rest when you feel tired. Getting enough sleep will help you recover.  Do not lie on or sleep on the arm with the access.     · Avoid activities such as washing windows or gardening that put stress on the arm with the access.     · You may use your arm, but do not lift anything that weighs more than about 15 pounds. This may include a child, heavy grocery bags, a heavy briefcase or backpack, cat litter or dog food bags, or a vacuum .     · You can shower, but keep the access dry for the first 2 days. Cover the area with a plastic bag to keep it dry.     · Do not soak or scrub the incision until it has healed.     · Wear an arm guard to protect the area if you play sports or work with your arms.     · You may drive when your doctor says it is okay. This is usually in 1 to 2 days.     · Most people are able to return to work about 1 or 2 days after surgery. Diet    · Follow an eating plan that is good for your kidneys. A registered dietitian can help you make a meal plan that is right for you. You may need to limit protein, salt, fluids, and certain foods. Medicines    · Your doctor will tell you if and when you can restart your medicines. He or she will also give you instructions about taking any new medicines.     · If you take aspirin or some other blood thinner, ask your doctor if and when to start taking it again. Make sure that you understand exactly what your doctor wants you to do.     · Take pain medicines exactly as directed. ? If the doctor gave you a prescription medicine for pain, take it as prescribed. ? If you are not taking a prescription pain medicine, ask your doctor if you can take acetaminophen (Tylenol). Do not take ibuprofen (Advil, Motrin) or naproxen (Aleve), or similar medicines, unless your doctor tells you to. They may make chronic kidney disease worse. ? Do not take two or more pain medicines at the same time unless the doctor told you to. Many pain medicines have acetaminophen, which is Tylenol. Too much acetaminophen (Tylenol) can be harmful.     · If you think your pain medicine is making you sick to your stomach:  ? Take your medicine after meals (unless your doctor has told you not to). ?  Ask your doctor for a different pain medicine.     · If your doctor prescribed antibiotics, take them as directed. Do not stop taking them just because you feel better. You need to take the full course of antibiotics. Incision care    · Keep the area dry for 2 days. After 2 days, wash the area with soap and water every day, and always before dialysis.     · Do not soak or scrub the incision until it has healed.     · If you have a bandage, change it every day or as your doctor recommends. Your doctor will tell you when you can remove it. Exercise    · Squeeze a soft ball or other object as your doctor tells you. This will help blood flow through the access and help prevent blood clots.    Elevation    · Prop up the sore arm on a pillow anytime you sit or lie down during the next 3 days. Try to keep it above the level of your heart. This will help reduce swelling. Other instructions    · Every day, check your access for a pulse or thrill in the fistula or graft area. A thrill is a vibration. To feel a pulse or thrill, place the first two fingers of your hand over the access.     · Do not bump your arm.     · Do not wear tight clothing, jewelry, or anything else that may squeeze the access.     · Use your other arm to have blood drawn or blood pressure taken.     · Do not put cream or lotion on or near the access.     · Make sure all doctors you deal with know you have a vascular access. Follow-up care is a key part of your treatment and safety. Be sure to make and go to all appointments, and call your doctor if you are having problems. It's also a good idea to know your test results and keep a list of the medicines you take. When should you call for help? Call 911 anytime you think you may need emergency care.  For example, call if:    · You passed out (lost consciousness).     · You have chest pain, are short of breath, or cough up blood.    Call your doctor now or seek immediate medical care if:    · Your hand or arm is cold or dark-colored.     · You have no pulse in your access.     · You have nausea or you vomit.     · You have pain that does not get better after you take pain medicine.     · You have loose stitches, or your incision comes open.     · You are bleeding from the incision.     · You have signs of infection, such as:  ? Increased pain, swelling, warmth, or redness. ? Red streaks leading from the area. ? Pus draining from the area. ? A fever.     · You have signs of a blood clot in your leg (called a deep vein thrombosis), such as:  ? Pain in your calf, back of the knee, thigh, or groin. ? Redness or swelling in your leg.    Watch closely for changes in your health, and be sure to contact your doctor if you have any problems. Where can you learn more? Go to http://gabo-curly.info/  Enter P616 in the search box to learn more about \"Hemodialysis Access: What to Expect at Home. \"  Current as of: August 11, 2019Content Version: 12.4  © 2239-0285 [employer]. Care instructions adapted under license by [employer] (which disclaims liability or warranty for this information). If you have questions about a medical condition or this instruction, always ask your healthcare professional. [place] [address] any warranty or liability for your use of this information. DISCHARGE SUMMARY from Nurse    PATIENT INSTRUCTIONS:    After general anesthesia or intravenous sedation, for 24 hours or while taking prescription Narcotics:  · Limit your activities  · Do not drive and operate hazardous machinery  · Do not make important personal or business decisions  · Do  not drink alcoholic beverages  · If you have not urinated within 8 hours after discharge, please contact your surgeon on call.     Report the following to your surgeon:  · Excessive pain, swelling, redness or odor of or around the surgical area  · Temperature over 100.5  · Nausea and vomiting lasting longer than 4 hours or if unable to take medications  · Any signs of decreased circulation or nerve impairment to extremity: change in color, persistent  numbness, tingling, coldness or increase pain  · Any questions    What to do at Home:  Recommended activity: Activity as tolerated and no driving for today. If you experience any of the following symptoms bleeding from catheter site, please follow up with Dr. August Pisano. *  Please give a list of your current medications to your Primary Care Provider. *  Please update this list whenever your medications are discontinued, doses are      changed, or new medications (including over-the-counter products) are added. *  Please carry medication information at all times in case of emergency situations. These are general instructions for a healthy lifestyle:    No smoking/ No tobacco products/ Avoid exposure to second hand smoke  Surgeon General's Warning:  Quitting smoking now greatly reduces serious risk to your health. Obesity, smoking, and sedentary lifestyle greatly increases your risk for illness    A healthy diet, regular physical exercise & weight monitoring are important for maintaining a healthy lifestyle    You may be retaining fluid if you have a history of heart failure or if you experience any of the following symptoms:  Weight gain of 3 pounds or more overnight or 5 pounds in a week, increased swelling in our hands or feet or shortness of breath while lying flat in bed. Please call your doctor as soon as you notice any of these symptoms; do not wait until your next office visit. The discharge information has been reviewed with the patient. The patient verbalized understanding. Discharge medications reviewed with the patient and appropriate educational materials and side effects teaching were provided.   ___________________________________________________________________________________________________________________________________

## 2020-05-06 NOTE — Clinical Note
TRANSFER - OUT REPORT:     Verbal report given to: Meera Brown RN. Report consisted of patient's Situation, Background, Assessment and   Recommendations(SBAR). Opportunity for questions and clarification was provided. Patient transported with a Cardiac Cath Tech / Patient Care Tech. Patient transported to: [address]

## 2020-05-06 NOTE — ANESTHESIA POSTPROCEDURE EVALUATION
Procedure(s):  INSERTION TUNNELED CENTRAL VENOUS CATHETER/perm catheter exchange.     MAC, general - backup    Anesthesia Post Evaluation      Multimodal analgesia: multimodal analgesia used between 6 hours prior to anesthesia start to PACU discharge  Patient location during evaluation: PACU  Patient participation: complete - patient participated  Level of consciousness: awake and alert  Pain score: 0  Airway patency: patent  Anesthetic complications: no  Cardiovascular status: acceptable  Respiratory status: acceptable  Hydration status: acceptable  Post anesthesia nausea and vomiting:  none      Vitals Value Taken Time   BP 94/57 5/6/2020  2:07 PM   Temp     Pulse 42 5/6/2020  2:07 PM   Resp 16 5/6/2020  2:07 PM   SpO2 99 % 5/6/2020  2:07 PM

## 2020-05-06 NOTE — Clinical Note
TRANSFER - IN REPORT:     Verbal report received from: Bunny Bennett RN. Report consisted of patient's Situation, Background, Assessment and   Recommendations(SBAR). Opportunity for questions and clarification was provided. Assessment completed upon patient's arrival to unit and care assumed. Patient transported with a Cardiac Cath Tech / Patient Care Tech.

## 2020-05-06 NOTE — OP NOTES
Preoperative diagnosis: ESRD    Postoperative diagnosis: Same    Procedures performed:  #1  Exchange of right IJ tunneled dialysis catheter with fluoroscopy  #2    #3    #4      Cultures: None    Specimens: None    Drains: None    Estimated blood loss: Less than 50 mL    Assistants: None    Implants: Please see above    Complications: None    Anesthesia: Moderate conscious sedation. Indications for the procedure:  Warner Friday is a 62 y.o. report of dysfunctional dialysis catheter. Patient was given the appropriate risk and benefits of the procedure including but not limited to bleeding, infection, damage to adjacent structures, MI, stroke, death, loss of lower extremity, need for further surgery. Patient was understanding of all the risks and underwent a procedure. Operative findings:   #1  New dialysis catheter placed fluoroscopy confirmed good position    Procedure:  Patient was correctly identified in the precath area and taken to the Cath Lab in stable condition. Patient had pre-incision timeout prior to any incision. Patient was prepped and draped in the normal sterile fashion according to CDC guidelines aseptic technique. Localized the catheter 1% lidocaine plain. Freed up the cuff. Wired the catheter and pulled the catheter out leaving the wire in place. Placed a new 19 cm palindrome over the Amplatz wire using the stiffeners and per the central position. Catheter flushes and aspirates well. Secured with 2 Prolene sutures.   Placed concentrated heparin the catheter's caps and sterile dressing

## 2020-05-06 NOTE — ANESTHESIA PREPROCEDURE EVALUATION
Relevant Problems   No relevant active problems       Anesthetic History   No history of anesthetic complications            Review of Systems / Medical History  Patient summary reviewed and pertinent labs reviewed    Pulmonary    COPD: moderate               Neuro/Psych             Comments: H/o IV drug abuse  paraplegia Cardiovascular    Hypertension      CHF: NYHA Classification II, dyspnea on exertion    CAD and hyperlipidemia    Exercise tolerance: <4 METS  Comments: EF 60%  Mild TR  pulm htn   GI/Hepatic/Renal     GERD  Hepatitis: type B and C  Renal disease: ESRD  Liver disease     Endo/Other    Diabetes: poorly controlled, type 2  Hypothyroidism  Arthritis, cancer and anemia     Other Findings              Physical Exam    Airway  Mallampati: III  TM Distance: > 6 cm  Neck ROM: normal range of motion   Mouth opening: Normal     Cardiovascular          Murmur: Grade 1, Tricuspid area     Dental    Dentition: Poor dentition     Pulmonary      Decreased breath sounds: bilateral           Abdominal  GI exam deferred       Other Findings            Anesthetic Plan    ASA: 4  Anesthesia type: MAC and general - backup          Induction: Intravenous  Anesthetic plan and risks discussed with: Patient

## 2020-05-06 NOTE — PROGRESS NOTES
Cath holding summary     Patient escorted to cath holding from waiting area ambulatory, alert and oriented x 4, voicing no complaints. Changed into gown and placed on monitor. NPO since MN. Lab results, med rec and H&P reviewed on chart. PIV x 1 inserted without difficulty. [id number]  TRANSFER - OUT REPORT:    Verbal report given to Patricia(name) on [facility]  being transferred to cath lab(unit) for ordered procedure       Report consisted of patients Situation, Background, Assessment and   Recommendations(SBAR). Information from the following report(s) SBAR, MAR and Pre Procedure Checklist was reviewed with the receiving nurse. Lines:       Opportunity for questions and clarification was provided. Patient transported with:   Tech       [id number]  TRANSFER - IN REPORT:    Verbal report received from Rebecca(name) on [facility]  being received from cath lab(unit) for routine post - op      Report consisted of patients Situation, Background, Assessment and   Recommendations(SBAR). Information from the following report(s) SBAR, Procedure Summary and MAR was reviewed with the receiving nurse. Opportunity for questions and clarification was provided. Assessment completed upon patients arrival to unit and care assumed. [id number] brought bedside. [id number]  Pt provided discharge instructions. All questions answered and pt verbalized understanding. PIV removed. No hematoma or bleeding noted at this time. Procedure site within normal limits. Pt escorted to front of building for discharge.

## 2020-05-06 NOTE — H&P
Surgery History and Physical    Subjective:      Arden Echeverria is a 62 y.o.  male who presents with dysfunctional hd cath.     Patient Active Problem List    Diagnosis Date Noted    Hemodialysis catheter dysfunction ([address] 05/06/2020    ESRD (end stage renal disease) ([address] 02/17/2020    Secondary hyperparathyroidism of renal origin ([address] 02/17/2020    Acute renal failure superimposed on stage 3 chronic kidney disease ([address] 02/15/2020    UTI (urinary tract infection) 02/15/2020    Acute renal failure superimposed on chronic kidney disease ([address] 02/15/2020    Bradycardia 02/15/2020    Renal failure (ARF), acute on chronic (HCC) 03/31/2019    Suprapubic catheter ([address] 03/28/2019    Bladder atony 01/31/2019    Chronic neck pain 01/31/2019    Cirrhosis of liver ([address] 01/31/2019    COPD, moderate ([address] 01/31/2019    Dry skin dermatitis 01/31/2019    Hypothyroid 01/31/2019    Lung nodules 01/31/2019    Neck mass 01/31/2019    Pericardial effusion 01/31/2019    Vitamin D deficiency 01/31/2019    BPH (benign prostatic hyperplasia) 12/20/2018    Hypertension     Urethral erosion     Chronic anemia 05/23/2018    Status post amputation of toe of right foot ([address] 05/18/2018    Thrombocytopenia ([address] 04/10/2018    Chronic kidney disease, stage III (moderate) ([address] 04/04/2018    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    Chronic hepatitis C without hepatic coma ([address] 05/31/2017    Quadriparesis ([address] 05/31/2017    Essential hypertension 04/28/2017    IV drug abuse ([address] 04/28/2017    Renal cell carcinoma of left kidney ([address] 82/64/4217    Umbilical hernia 57/48/4940    Chronic drug abuse ([address] 01/01/1974     Past Medical History:   Diagnosis Date    Anemia     Bradycardia, unspecified 2018    Cervical discitis     MRSA    Chronic drug abuse ([address] 1974    Chronic kidney disease     stage III    Diabetes ([address] 01/1990    Dialysis patient ([facility]     Drug abuse ([facility]     Encephalopathy     GERD (gastroesophageal reflux disease)     Hypertension     Muscle weakness     [facility]rip[facility]is ([facility] 2017    Renal cell carcinoma of left kidney ([facility] 12/17/13    Pathological Stage [id number] cc RCC FG3 s/p left open partial nephrectomy in 12/13      Sepsis due to methicillin resistant Staphylococcus aureus (HCC)     Suprapubic catheter (HCC)     Thrombocytopenia (HCC)     Thyroid disease     Urethral erosion     Viral hepatitis B     Viral hepatitis C     Xerosis cutis       Past Surgical History:   Procedure Laterality Date    COLONOSCOPY N/A 10/3/2019    COLONOSCOPY / polypectomy performed by Saroj Dowling MD at [address] HX CIRCUMCISION  12/17/13    Dr. Matthew Cavazos, [facility]    HX NEPHRECTOMY Left 12/17/13    Open/ Partial,nephrectomy    HX OTHER SURGICAL Right 2/27/2016    removed right ft  2nd meta-tarsal    HX OTHER SURGICAL  04/2017    abscess removed from back of neck     IR INSERT TUNL CVC W/O PORT OVER 5 YR  2/18/2020    NEUROLOGICAL PROCEDURE UNLISTED  2017    neck surgery-abcess-hardware neck    [place] REMOVAL WITH INSERTION OF SUPRAPUBIC CATHETER  2018      Social History     Tobacco Use    Smoking status: Current Every Day Smoker     Packs/day: 0.25     Years: 30.00     Pack years: 7.50     Types: Cigarettes    Smokeless tobacco: Never Used   Substance Use Topics    Alcohol use: Yes     Comment: beer occasionally      Family History   Problem Relation Age of Onset    Diabetes Mother     Sickle Cell Anemia Father     Diabetes Sister     Hypertension Sister       Prior to Admission medications    Medication Sig Start Date End Date Taking? Authorizing Provider   doxycycline (VIBRA-TABS) 100 mg tablet  3/2/20   Provider, Historical   levothyroxine (SYNTHROID) 100 mcg tablet Take 1 Tab by mouth every morning. 2/26/20   Elaine Aquino MD   b complex-vitamin c-folic acid (NEPHROCAPS) 1 mg capsule Take 1 Cap by mouth daily.  2/26/20   Miles Lj Gudino MD   calcium acetate,phosphat bind, (PHOSLO) 667 mg cap Take 1 Cap by mouth three (3) times daily (with meals). 2/25/20   Lj Aquino MD   doxercalciferoL (HECTOROL) 4 mcg/2 mL injection 0.5 mL by IntraVENous route DIALYSIS MON, WED & FRI. 2/26/20   Lj Aquino MD   pantoprazole (PROTONIX) 40 mg tablet Take 1 Tab by mouth Before breakfast and dinner. 2/25/20   Lj Aquino MD   food supplemt, lactose-reduced (ENSURE ENLIVE) 0.08 gram-1.5 kcal/mL liqd Take 1 Each by mouth three (3) times daily (with meals). Flavor of patient choice 2/25/20   Lj Aquino MD   ferrous gluconate 324 mg (37.5 mg iron) tablet Take 1 Tab by mouth two (2) times daily (with meals). 4/6/19   Sherron De Paz MD   hydrALAZINE (APRESOLINE) 25 mg tablet Take 1 Tab by mouth every eight (8) hours as needed (systolic B/P >151). 0/4/11   Sherron De Paz MD   Syringe, Disposable, (BD SYRINGE CATHETER TIP) 60 mL syrg Use 1 syringe daily with irrigations 11/7/18   Patricia Beltran MD     Allergies   Allergen Reactions    Iodine Hives and Other (comments)         Review of Systems:    A comprehensive review of systems was negative except for that written in the History of Present Illness. Objective:     No data found. No data recorded. Physical Exam:  LUNG: clear to auscultation bilaterally, HEART: S1, S2 normal, ABDOMEN: soft, non-tender. Bowel sounds normal. No masses,  no organomegaly    Labs: No results found for this or any previous visit (from the past 24 hour(s)).     Data Review:    BMP:   Lab Results   Component Value Date/Time    Glucose 80 03/27/2020 08:21 AM    Sodium 137 03/27/2020 08:21 AM    Potassium 4.2 03/27/2020 08:21 AM    Chloride 102 03/27/2020 08:21 AM    CO2 31 03/27/2020 08:21 AM    BUN 21 (H) 03/27/2020 08:21 AM    Creatinine 2.79 (H) 03/27/2020 08:21 AM    Calcium 8.6 03/27/2020 08:21 AM       Assessment:     Active Problems:    Hemodialysis catheter dysfunction ([facility] (5/6/2020)        Plan:     Replace catheter    Signed By: Nguyen Garcia MD     May 6, 2020

## 2020-05-19 ENCOUNTER — TELEPHONE (OUTPATIENT)
Dept: VASCULAR SURGERY | Age: 60
End: 2020-05-19

## 2020-05-19 NOTE — TELEPHONE ENCOUNTER
----- Message from Malaika Pisano MD sent at 5/10/2020  2:44 PM EDT -----  He needs to be rescheduled for arm access please

## 2020-05-29 NOTE — H&P
Surgery History and Physical     Subjective:      Indy Ruiz is a 62 y.o.   male who presents with need for permanent dialysis access          Patient Active Problem List     Diagnosis Date Noted    Hemodialysis catheter dysfunction ([address] 05/06/2020    ESRD (end stage renal disease) ([address] 02/17/2020    Secondary hyperparathyroidism of renal origin ([address] 02/17/2020    Acute renal failure superimposed on stage 3 chronic kidney disease ([address] 02/15/2020    UTI (urinary tract infection) 02/15/2020    Acute renal failure superimposed on chronic kidney disease ([address] 02/15/2020    Bradycardia 02/15/2020    Renal failure (ARF), acute on chronic (HCC) 03/31/2019    Suprapubic catheter ([address] 03/28/2019    Bladder atony 01/31/2019    Chronic neck pain 01/31/2019    Cirrhosis of liver ([address] 01/31/2019    COPD, moderate ([address] 01/31/2019    Dry skin dermatitis 01/31/2019    Hypothyroid 01/31/2019    Lung nodules 01/31/2019    Neck mass 01/31/2019    Pericardial effusion 01/31/2019    Vitamin D deficiency 01/31/2019    BPH (benign prostatic hyperplasia) 12/20/2018    Hypertension      Urethral erosion      Chronic anemia 05/23/2018    Status post amputation of toe of right foot ([address] 05/18/2018    Thrombocytopenia ([address] 04/10/2018    Chronic kidney disease, stage III (moderate) ([address] 04/04/2018    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    Chronic hepatitis C without hepatic coma ([address] 05/31/2017    Quadriparesis ([address] 05/31/2017    Essential hypertension 04/28/2017    IV drug abuse ([address] 04/28/2017    Renal cell carcinoma of left kidney ([address] 49/00/2573    Umbilical hernia 41/10/4661    Chronic drug abuse ([address] 01/01/1974           Past Medical History:   Diagnosis Date    Anemia      Bradycardia, unspecified 2018    Cervical discitis       MRSA    Chronic drug abuse ([address] 1974    Chronic kidney disease       stage III    Diabetes ([address] 01/1990    Dialysis patient ([facility]      Drug abuse [facility]      Encephalopathy      GERD (gastroesophageal reflux disease)      Hypertension      Muscle weakness      Quadriparesis ([facility] 2017    Renal cell carcinoma of left kidney ([facility] 12/17/13     Pathological Stage [id number] cc RCC FG3 s/p left open partial nephrectomy in 12/13      Sepsis due to methicillin resistant Staphylococcus aureus (HCC)      Suprapubic catheter (HCC)      Thrombocytopenia (HCC)      Thyroid disease      Urethral erosion      Viral hepatitis B      Viral hepatitis C      Xerosis cutis              Past Surgical History:   Procedure Laterality Date    COLONOSCOPY N/A 10/3/2019     COLONOSCOPY / polypectomy performed by Diana Edward MD at [address] HX CIRCUMCISION   12/17/13     Dr. Mahin Lee, [facility]    HX NEPHRECTOMY Left 12/17/13     Open/ Partial,nephrectomy    HX OTHER SURGICAL Right 2/27/2016     removed right ft  2nd meta-tarsal    HX OTHER SURGICAL   04/2017     abscess removed from back of neck     IR INSERT TUNL CVC W/O PORT OVER 5 YR   2/18/2020    NEUROLOGICAL PROCEDURE UNLISTED   2017     neck surgery-abcess-hardware neck    NC REMOVAL WITH INSERTION OF SUPRAPUBIC CATHETER   2018      Social History            Tobacco Use    Smoking status: Current Every Day Smoker       Packs/day: 0.25       Years: 30.00       Pack years: 7.50       Types: Cigarettes    Smokeless tobacco: Never Used   Substance Use Topics    Alcohol use: Yes       Comment: beer occasionally            Family History   Problem Relation Age of Onset    Diabetes Mother      Sickle Cell Anemia Father      Diabetes Sister      Hypertension Sister                Prior to Admission medications    Medication Sig Start Date End Date Taking? Authorizing Provider   doxycycline (VIBRA-TABS) 100 mg tablet   3/2/20     Provider, Historical   levothyroxine (SYNTHROID) 100 mcg tablet Take 1 Tab by mouth every morning.  2/26/20     Ami Aquino MD b complex-vitamin c-folic acid (NEPHROCAPS) 1 mg capsule Take 1 Cap by mouth daily. 2/26/20     Estuardo Aquino MD   calcium acetate,phosphat bind, (PHOSLO) 667 mg cap Take 1 Cap by mouth three (3) times daily (with meals). 2/25/20     Estuardo Aquino MD   doxercalciferoL (HECTOROL) 4 mcg/2 mL injection 0.5 mL by IntraVENous route DIALYSIS MON, WED & FRI. 2/26/20     Estuardo Aquino MD   pantoprazole (PROTONIX) 40 mg tablet Take 1 Tab by mouth Before breakfast and dinner. 2/25/20     Estuardo Aquino MD   food supplemt, lactose-reduced (ENSURE ENLIVE) 0.08 gram-1.5 kcal/mL liqd Take 1 Each by mouth three (3) times daily (with meals). Flavor of patient choice 2/25/20     Estuardo Aquino MD   ferrous gluconate 324 mg (37.5 mg iron) tablet Take 1 Tab by mouth two (2) times daily (with meals). 4/6/19     Kavon Morales MD   hydrALAZINE (APRESOLINE) 25 mg tablet Take 1 Tab by mouth every eight (8) hours as needed (systolic B/P >843). 3/2/50     Kavon Morales MD   Syringe, Disposable, (BD SYRINGE CATHETER TIP) 60 mL syrg Use 1 syringe daily with irrigations 11/7/18     Cassidy Velazquez MD           Allergies   Allergen Reactions    Iodine Hives and Other (comments)          Review of Systems:    A comprehensive review of systems was negative except for that written in the History of Present Illness.     Objective:      No data found.     No data recorded.        Physical Exam:  LUNG: clear to auscultation bilaterally, HEART: S1, S2 normal, ABDOMEN: soft, non-tender.  Bowel sounds normal. No masses,  no organomegaly          Assessment:      esrd           Plan:      Left arm avf creation today

## 2020-05-30 ENCOUNTER — ANESTHESIA EVENT (OUTPATIENT)
Dept: CARDIOTHORACIC SURGERY | Age: 60
End: 2020-05-30
Payer: MEDICARE

## 2020-06-01 ENCOUNTER — ANESTHESIA (OUTPATIENT)
Dept: CARDIOTHORACIC SURGERY | Age: 60
End: 2020-06-01
Payer: MEDICARE

## 2020-06-01 ENCOUNTER — HOSPITAL ENCOUNTER (OUTPATIENT)
Age: 60
Discharge: HOME | End: 2020-06-01
Attending: SURGERY | Admitting: SURGERY
Payer: MEDICARE

## 2020-06-01 VITALS
HEART RATE: 60 BPM | BODY MASS INDEX: 17.36 KG/M2 | WEIGHT: 131 LBS | OXYGEN SATURATION: 100 % | RESPIRATION RATE: 14 BRPM | SYSTOLIC BLOOD PRESSURE: 136 MMHG | TEMPERATURE: 97.4 F | HEIGHT: 73 IN | DIASTOLIC BLOOD PRESSURE: 68 MMHG

## 2020-06-01 DIAGNOSIS — Z99.2 ESRD (END STAGE RENAL DISEASE) ON DIALYSIS (HCC): Primary | ICD-10-CM

## 2020-06-01 DIAGNOSIS — N18.6 ESRD (END STAGE RENAL DISEASE) ON DIALYSIS (HCC): Primary | ICD-10-CM

## 2020-06-01 LAB
AMPHET UR QL SCN: NEGATIVE
BARBITURATES UR QL SCN: NEGATIVE
BENZODIAZ UR QL: NEGATIVE
BUN BLD-MCNC: 72 MG/DL (ref 7–18)
BUN BLD-MCNC: 99 MG/DL (ref 7–18)
CANNABINOIDS UR QL SCN: NEGATIVE
CHLORIDE BLD-SCNC: 102 MMOL/L (ref 100–108)
CHLORIDE BLD-SCNC: 106 MMOL/L (ref 100–108)
COCAINE UR QL SCN: NEGATIVE
COVID-19 RAPID TEST, COVR: NOT DETECTED
GLUCOSE BLD STRIP.AUTO-MCNC: 87 MG/DL (ref 70–110)
GLUCOSE BLD STRIP.AUTO-MCNC: 87 MG/DL (ref 74–106)
GLUCOSE BLD STRIP.AUTO-MCNC: 87 MG/DL (ref 74–106)
HCT VFR BLD CALC: 42 % (ref 36–49)
HCT VFR BLD CALC: 45 % (ref 36–49)
HDSCOM,HDSCOM: ABNORMAL
HGB BLD-MCNC: 14.3 G/DL (ref 12–16)
HGB BLD-MCNC: 15.3 G/DL (ref 12–16)
METHADONE UR QL: NEGATIVE
OPIATES UR QL: POSITIVE
PCP UR QL: NEGATIVE
POTASSIUM BLD-SCNC: 5.8 MMOL/L (ref 3.5–5.5)
POTASSIUM BLD-SCNC: 8.7 MMOL/L (ref 3.5–5.5)
SODIUM BLD-SCNC: 136 MMOL/L (ref 136–145)
SODIUM BLD-SCNC: 139 MMOL/L (ref 136–145)
SOURCE, COVRS: NORMAL

## 2020-06-01 PROCEDURE — 74011250636 HC RX REV CODE- 250/636: Performed by: PHYSICIAN ASSISTANT

## 2020-06-01 PROCEDURE — 87635 SARS-COV-2 COVID-19 AMP PRB: CPT

## 2020-06-01 PROCEDURE — 74011250636 HC RX REV CODE- 250/636: Performed by: SURGERY

## 2020-06-01 PROCEDURE — 76210000022 HC REC RM PH II 1.5 TO 2 HR: Performed by: SURGERY

## 2020-06-01 PROCEDURE — C1768 GRAFT, VASCULAR: HCPCS | Performed by: SURGERY

## 2020-06-01 PROCEDURE — 82947 ASSAY GLUCOSE BLOOD QUANT: CPT

## 2020-06-01 PROCEDURE — 77030040361 HC SLV COMPR DVT MDII -B: Performed by: SURGERY

## 2020-06-01 PROCEDURE — 74011000250 HC RX REV CODE- 250: Performed by: NURSE ANESTHETIST, CERTIFIED REGISTERED

## 2020-06-01 PROCEDURE — 74011250637 HC RX REV CODE- 250/637: Performed by: NURSE ANESTHETIST, CERTIFIED REGISTERED

## 2020-06-01 PROCEDURE — 74011250636 HC RX REV CODE- 250/636: Performed by: NURSE ANESTHETIST, CERTIFIED REGISTERED

## 2020-06-01 PROCEDURE — 77030019605: Performed by: SURGERY

## 2020-06-01 PROCEDURE — 76210000017 HC OR PH I REC 1.5 TO 2 HR: Performed by: SURGERY

## 2020-06-01 PROCEDURE — 76060000033 HC ANESTHESIA 1 TO 1.5 HR: Performed by: SURGERY

## 2020-06-01 PROCEDURE — 80307 DRUG TEST PRSMV CHEM ANLYZR: CPT

## 2020-06-01 PROCEDURE — 74011000258 HC RX REV CODE- 258: Performed by: NURSE ANESTHETIST, CERTIFIED REGISTERED

## 2020-06-01 PROCEDURE — 77030002986 HC SUT PROL J&J -A: Performed by: SURGERY

## 2020-06-01 PROCEDURE — 76010000113 HC CV SURG 1 TO 1.5 HR: Performed by: SURGERY

## 2020-06-01 PROCEDURE — 77030002924 HC SUT GORTX WLGO -B: Performed by: SURGERY

## 2020-06-01 PROCEDURE — 77030040922 HC BLNKT HYPOTHRM STRY -A: Performed by: SURGERY

## 2020-06-01 PROCEDURE — 77030002933 HC SUT MCRYL J&J -A: Performed by: SURGERY

## 2020-06-01 PROCEDURE — 82962 GLUCOSE BLOOD TEST: CPT

## 2020-06-01 PROCEDURE — 74011000250 HC RX REV CODE- 250: Performed by: SURGERY

## 2020-06-01 PROCEDURE — 74011000258 HC RX REV CODE- 258: Performed by: SURGERY

## 2020-06-01 DEVICE — GRAFT VASC L45CM DIA4-7MM PTFE CBAS HEP SURF STD WALLED: Type: IMPLANTABLE DEVICE | Site: ARM | Status: FUNCTIONAL

## 2020-06-01 RX ORDER — CEFAZOLIN SODIUM 2 G/50ML
2 SOLUTION INTRAVENOUS ONCE
Status: COMPLETED | OUTPATIENT
Start: 2020-06-01 | End: 2020-06-01

## 2020-06-01 RX ORDER — SODIUM CHLORIDE 0.9 % (FLUSH) 0.9 %
5-40 SYRINGE (ML) INJECTION EVERY 8 HOURS
Status: DISCONTINUED | OUTPATIENT
Start: 2020-06-01 | End: 2020-06-01 | Stop reason: HOSPADM

## 2020-06-01 RX ORDER — FENTANYL CITRATE 50 UG/ML
INJECTION, SOLUTION INTRAMUSCULAR; INTRAVENOUS AS NEEDED
Status: DISCONTINUED | OUTPATIENT
Start: 2020-06-01 | End: 2020-06-01 | Stop reason: HOSPADM

## 2020-06-01 RX ORDER — FAMOTIDINE 20 MG/1
20 TABLET, FILM COATED ORAL ONCE
Status: COMPLETED | OUTPATIENT
Start: 2020-06-01 | End: 2020-06-01

## 2020-06-01 RX ORDER — SODIUM CHLORIDE 9 MG/ML
INJECTION, SOLUTION INTRAVENOUS
Status: DISCONTINUED | OUTPATIENT
Start: 2020-06-01 | End: 2020-06-01

## 2020-06-01 RX ORDER — HYDROCODONE BITARTRATE AND ACETAMINOPHEN 5; 325 MG/1; MG/1
1-2 TABLET ORAL
Qty: 40 TAB | Refills: 0 | Status: SHIPPED | OUTPATIENT
Start: 2020-06-01 | End: 2020-06-06

## 2020-06-01 RX ORDER — PROPOFOL 10 MG/ML
VIAL (ML) INTRAVENOUS
Status: DISCONTINUED | OUTPATIENT
Start: 2020-06-01 | End: 2020-06-01 | Stop reason: HOSPADM

## 2020-06-01 RX ORDER — LIDOCAINE HYDROCHLORIDE 10 MG/ML
INJECTION, SOLUTION EPIDURAL; INFILTRATION; INTRACAUDAL; PERINEURAL
Status: DISCONTINUED
Start: 2020-06-01 | End: 2020-06-02 | Stop reason: HOSPADM

## 2020-06-01 RX ORDER — HEPARIN SODIUM 1000 [USP'U]/ML
INJECTION, SOLUTION INTRAVENOUS; SUBCUTANEOUS AS NEEDED
Status: DISCONTINUED | OUTPATIENT
Start: 2020-06-01 | End: 2020-06-01 | Stop reason: HOSPADM

## 2020-06-01 RX ORDER — SODIUM CHLORIDE, SODIUM LACTATE, POTASSIUM CHLORIDE, CALCIUM CHLORIDE 600; 310; 30; 20 MG/100ML; MG/100ML; MG/100ML; MG/100ML
50 INJECTION, SOLUTION INTRAVENOUS CONTINUOUS
Status: DISCONTINUED | OUTPATIENT
Start: 2020-06-01 | End: 2020-06-01 | Stop reason: HOSPADM

## 2020-06-01 RX ORDER — ONDANSETRON 2 MG/ML
4 INJECTION INTRAMUSCULAR; INTRAVENOUS ONCE
Status: DISCONTINUED | OUTPATIENT
Start: 2020-06-01 | End: 2020-06-02 | Stop reason: HOSPADM

## 2020-06-01 RX ORDER — FENTANYL CITRATE 50 UG/ML
50 INJECTION, SOLUTION INTRAMUSCULAR; INTRAVENOUS
Status: DISCONTINUED | OUTPATIENT
Start: 2020-06-01 | End: 2020-06-02 | Stop reason: HOSPADM

## 2020-06-01 RX ORDER — DEXTROSE MONOHYDRATE AND SODIUM CHLORIDE 5; .225 G/100ML; G/100ML
25 INJECTION, SOLUTION INTRAVENOUS ONCE
Status: COMPLETED | OUTPATIENT
Start: 2020-06-01 | End: 2020-06-01

## 2020-06-01 RX ORDER — LIDOCAINE HYDROCHLORIDE 20 MG/ML
INJECTION, SOLUTION EPIDURAL; INFILTRATION; INTRACAUDAL; PERINEURAL AS NEEDED
Status: DISCONTINUED | OUTPATIENT
Start: 2020-06-01 | End: 2020-06-01 | Stop reason: HOSPADM

## 2020-06-01 RX ORDER — DEXTROSE MONOHYDRATE AND SODIUM CHLORIDE 5; .225 G/100ML; G/100ML
INJECTION, SOLUTION INTRAVENOUS
Status: DISCONTINUED | OUTPATIENT
Start: 2020-06-01 | End: 2020-06-01 | Stop reason: HOSPADM

## 2020-06-01 RX ORDER — HEPARIN SODIUM 200 [USP'U]/100ML
INJECTION, SOLUTION INTRAVENOUS
Status: DISCONTINUED
Start: 2020-06-01 | End: 2020-06-02 | Stop reason: HOSPADM

## 2020-06-01 RX ORDER — LIDOCAINE HYDROCHLORIDE 10 MG/ML
INJECTION, SOLUTION EPIDURAL; INFILTRATION; INTRACAUDAL; PERINEURAL AS NEEDED
Status: DISCONTINUED | OUTPATIENT
Start: 2020-06-01 | End: 2020-06-01 | Stop reason: HOSPADM

## 2020-06-01 RX ORDER — INSULIN LISPRO 100 [IU]/ML
INJECTION, SOLUTION INTRAVENOUS; SUBCUTANEOUS ONCE
Status: DISCONTINUED | OUTPATIENT
Start: 2020-06-01 | End: 2020-06-01 | Stop reason: HOSPADM

## 2020-06-01 RX ORDER — SODIUM CHLORIDE 0.9 % (FLUSH) 0.9 %
5-40 SYRINGE (ML) INJECTION AS NEEDED
Status: DISCONTINUED | OUTPATIENT
Start: 2020-06-01 | End: 2020-06-01 | Stop reason: HOSPADM

## 2020-06-01 RX ORDER — MIDAZOLAM HYDROCHLORIDE 1 MG/ML
INJECTION, SOLUTION INTRAMUSCULAR; INTRAVENOUS AS NEEDED
Status: DISCONTINUED | OUTPATIENT
Start: 2020-06-01 | End: 2020-06-01 | Stop reason: HOSPADM

## 2020-06-01 RX ADMIN — MIDAZOLAM HYDROCHLORIDE 1 MG: 2 INJECTION, SOLUTION INTRAMUSCULAR; INTRAVENOUS at 15:10

## 2020-06-01 RX ADMIN — FENTANYL CITRATE 50 MCG: 50 INJECTION, SOLUTION INTRAMUSCULAR; INTRAVENOUS at 15:33

## 2020-06-01 RX ADMIN — MIDAZOLAM HYDROCHLORIDE 1 MG: 2 INJECTION, SOLUTION INTRAMUSCULAR; INTRAVENOUS at 15:09

## 2020-06-01 RX ADMIN — FENTANYL CITRATE 50 MCG: 50 INJECTION, SOLUTION INTRAMUSCULAR; INTRAVENOUS at 15:12

## 2020-06-01 RX ADMIN — PROPOFOL 25 MCG/KG/MIN: 10 INJECTION, EMULSION INTRAVENOUS at 15:13

## 2020-06-01 RX ADMIN — FAMOTIDINE 20 MG: 20 TABLET ORAL at 10:25

## 2020-06-01 RX ADMIN — DEXTROSE MONOHYDRATE AND SODIUM CHLORIDE 25 ML/HR: 5; .225 INJECTION, SOLUTION INTRAVENOUS at 10:00

## 2020-06-01 RX ADMIN — DEXTROSE MONOHYDRATE AND SODIUM CHLORIDE: 5; .225 INJECTION, SOLUTION INTRAVENOUS at 15:09

## 2020-06-01 RX ADMIN — CEFAZOLIN 2 G: 10 INJECTION, POWDER, FOR SOLUTION INTRAVENOUS at 15:24

## 2020-06-01 RX ADMIN — HEPARIN SODIUM 2500 UNITS: 1000 INJECTION, SOLUTION INTRAVENOUS; SUBCUTANEOUS at 15:44

## 2020-06-01 RX ADMIN — LIDOCAINE HYDROCHLORIDE 60 MG: 20 INJECTION, SOLUTION EPIDURAL; INFILTRATION; INTRACAUDAL; PERINEURAL at 15:13

## 2020-06-01 NOTE — DISCHARGE INSTRUCTIONS
DISCHARGE SUMMARY from Nurse    PATIENT INSTRUCTIONS:    After general anesthesia or intravenous sedation, for 24 hours or while taking prescription Narcotics:  · Limit your activities  · Do not drive and operate hazardous machinery  · Do not make important personal or business decisions  · Do  not drink alcoholic beverages  · If you have not urinated within 8 hours after discharge, please contact your surgeon on call. Report the following to your surgeon:  · Excessive pain, swelling, redness or odor of or around the surgical area  · Temperature over 100.5  · Nausea and vomiting lasting longer than 4 hours or if unable to take medications  · Any signs of decreased circulation or nerve impairment to extremity: change in color, persistent  numbness, tingling, coldness or increase pain  · Any questions    What to do at Home:  Recommended activity: Activity as tolerated and no driving for today. *  Please give a list of your current medications to your Primary Care Provider. *  Please update this list whenever your medications are discontinued, doses are      changed, or new medications (including over-the-counter products) are added. *  Please carry medication information at all times in case of emergency situations. These are general instructions for a healthy lifestyle:    No smoking/ No tobacco products/ Avoid exposure to second hand smoke  Surgeon General's Warning:  Quitting smoking now greatly reduces serious risk to your health. Obesity, smoking, and sedentary lifestyle greatly increases your risk for illness    A healthy diet, regular physical exercise & weight monitoring are important for maintaining a healthy lifestyle    You may be retaining fluid if you have a history of heart failure or if you experience any of the following symptoms:  Weight gain of 3 pounds or more overnight or 5 pounds in a week, increased swelling in our hands or feet or shortness of breath while lying flat in bed. Please call your doctor as soon as you notice any of these symptoms; do not wait until your next office visit. The discharge information has been reviewed with the patient and sister. The patient and sister verbalized understanding. Discharge medications reviewed with the patient and sister and appropriate educational materials and side effects teaching were provided. _______________________________________________________________________________________________________________________      Patient Education        Hemodialysis Access Surgery: What to Expect at Home  Your Recovery  Hemodialysis is a way to remove wastes from the blood when your kidneys can no longer do the job. It's not a cure, but it can help you live longer and feel better. It's a lifesaving treatment when you have kidney failure. Hemodialysis is often called dialysis. Your doctor created a place (called an access) in your arm for your blood to flow in and out of your body during your dialysis sessions. Your arm will probably be bruised and swollen. It may hurt. The cut (incision) may bleed. The pain and bleeding will get better over several days. You will probably need only over-the-counter pain medicine. You can reduce swelling by propping up your arm on 1 or 2 pillows and keeping your elbow straight. You will have stitches. These may dissolve on their own, or your doctor will tell you when to come in to have them removed. You should also be able to return to work in a few days. You may feel some coolness or numbness in your hand. These feelings usually go away in a few weeks. Your doctor may suggest squeezing a soft object. This will strengthen your access and may make hemodialysis faster and easier. You should always be able to feel blood rushing through the fistula or graft. It feels like a slight vibration when you put your fingers on the skin over the fistula or graft. This feeling is called a thrill or a pulse.   This care sheet gives you a general idea about how long it will take for you to recover. But each person recovers at a different pace. Follow the steps below to get better as quickly as possible. How can you care for yourself at home? Activity  · Rest when you feel tired. Getting enough sleep will help you recover. Do not lie on or sleep on the arm with the access. · Avoid activities such as washing windows or gardening that put stress on the arm with the access. · You may use your arm, but do not lift anything that weighs more than about 15 pounds. This may include a child, heavy grocery bags, a heavy briefcase or backpack, cat litter or dog food bags, or a vacuum . · You can shower, but keep the access dry for the first 2 days. Cover the area with a plastic bag to keep it dry. · Do not soak or scrub the incision until it has healed. · Wear an arm guard to protect the area if you play sports or work with your arms. · You may drive when your doctor says it is okay. This is usually in 1 to 2 days. · Most people are able to return to work about 1 or 2 days after surgery. Diet  · Follow an eating plan that is good for your kidneys. A registered dietitian can help you make a meal plan that is right for you. You may need to limit protein, salt, fluids, and certain foods. Medicines  · Your doctor will tell you if and when you can restart your medicines. He or she will also give you instructions about taking any new medicines. · If you take aspirin or some other blood thinner, ask your doctor if and when to start taking it again. Make sure that you understand exactly what your doctor wants you to do. · Take pain medicines exactly as directed. ? If the doctor gave you a prescription medicine for pain, take it as prescribed. ? If you are not taking a prescription pain medicine, ask your doctor if you can take acetaminophen (Tylenol).  Do not take ibuprofen (Advil, Motrin) or naproxen (Aleve), or similar medicines, unless your doctor tells you to. They may make chronic kidney disease worse. ? Do not take two or more pain medicines at the same time unless the doctor told you to. Many pain medicines have acetaminophen, which is Tylenol. Too much acetaminophen (Tylenol) can be harmful. · If you think your pain medicine is making you sick to your stomach:  ? Take your medicine after meals (unless your doctor has told you not to). ? Ask your doctor for a different pain medicine. · If your doctor prescribed antibiotics, take them as directed. Do not stop taking them just because you feel better. You need to take the full course of antibiotics. Incision care  · Keep the area dry for 2 days. After 2 days, wash the area with soap and water every day, and always before dialysis. · Do not soak or scrub the incision until it has healed. · If you have a bandage, change it every day or as your doctor recommends. Your doctor will tell you when you can remove it. Exercise  · Squeeze a soft ball or other object as your doctor tells you. This will help blood flow through the access and help prevent blood clots. Elevation  · Prop up the sore arm on a pillow anytime you sit or lie down during the next 3 days. Try to keep it above the level of your heart. This will help reduce swelling. Other instructions  · Every day, check your access for a pulse or thrill in the fistula or graft area. A thrill is a vibration. To feel a pulse or thrill, place the first two fingers of your hand over the access. · Do not bump your arm. · Do not wear tight clothing, jewelry, or anything else that may squeeze the access. · Use your other arm to have blood drawn or blood pressure taken. · Do not put cream or lotion on or near the access. · Make sure all doctors you deal with know that you have a vascular access. Follow-up care is a key part of your treatment and safety.  Be sure to make and go to all appointments, and call your doctor if you are having problems. It's also a good idea to know your test results and keep a list of the medicines you take. When should you call for help? [id number] anytime you think you may need emergency care. For example, call if:  · You passed out (lost consciousness). · You have chest pain, are short of breath, or cough up blood. Call your doctor now or seek immediate medical care if:  · Your hand or arm is cold or dark-colored. · You have no pulse in your access. · You have nausea or you vomit. · You have pain that does not get better after you take pain medicine. · You have loose stitches, or your incision comes open. · You are bleeding from the incision. · You have signs of infection, such as:  ? Increased pain, swelling, warmth, or redness. ? Red streaks leading from the area. ? Pus draining from the area. ? A fever. · You have signs of a blood clot in your leg (called a deep vein thrombosis), such as:  ? Pain in your calf, back of the knee, thigh, or groin. ? Redness or swelling in your leg. Watch closely for changes in your health, and be sure to contact your doctor if you have any problems. Where can you learn more? Go to http://gabo-curly.info/  Enter P616 in the search box to learn more about \"Hemodialysis Access Surgery: What to Expect at Home. \"  Current as of: August 12, 2019               Content Version: 12.5  © 4781-4341 [employer], [employer]. Care instructions adapted under license by [employer] (which disclaims liability or warranty for this information). If you have questions about a medical condition or this instruction, always ask your healthcare professional. [place] [address] any warranty or liability for your use of this information.

## 2020-06-01 NOTE — ANESTHESIA POSTPROCEDURE EVALUATION
Procedure(s):  LEFT ARM ARTERIO VENOUS CREATION. MAC    Anesthesia Post Evaluation      Multimodal analgesia: multimodal analgesia used between 6 hours prior to anesthesia start to PACU discharge  Patient location during evaluation: bedside  Patient participation: complete - patient participated  Level of consciousness: awake  Pain management: adequate  Airway patency: patent  Anesthetic complications: no  Cardiovascular status: stable  Respiratory status: acceptable  Hydration status: acceptable  Post anesthesia nausea and vomiting:  controlled      INITIAL Post-op Vital signs:   Vitals Value Taken Time   /112 6/1/2020  6:18 PM   Temp 34.7 °C (94.4 °F) 6/1/2020  6:00 PM   Pulse 59 6/1/2020  6:23 PM   Resp 12 6/1/2020  6:23 PM   SpO2 100 % 6/1/2020  6:23 PM   Vitals shown include unvalidated device data.

## 2020-06-01 NOTE — ANESTHESIA PREPROCEDURE EVALUATION
Relevant Problems   No relevant active problems       Anesthetic History   No history of anesthetic complications            Review of Systems / Medical History  Patient summary reviewed and pertinent labs reviewed    Pulmonary    COPD: moderate      Smoker         Neuro/Psych   Within defined limits           Cardiovascular    Hypertension: well controlled              Exercise tolerance: <4 METS     GI/Hepatic/Renal     GERD: well controlled  Hepatitis: type B and C  Renal disease (Last Hd- 3 days ago): ESRD and dialysis  Liver disease     Endo/Other    Diabetes: well controlled, type 2  Hypothyroidism: well controlled  Arthritis     Other Findings   Comments: Last Use Of Marijuana ~ 1 year ago  Heroin ~ 1 month ago.   UDS pending           Physical Exam    Airway  Mallampati: II  TM Distance: 4 - 6 cm  Neck ROM: normal range of motion   Mouth opening: Normal     Cardiovascular  Regular rate and rhythm,  S1 and S2 normal,  no murmur, click, rub, or gallop             Dental    Dentition: Full lower dentures and Full upper dentures     Pulmonary  Breath sounds clear to auscultation               Abdominal  GI exam deferred       Other Findings            Anesthetic Plan    ASA: 4  Anesthesia type: MAC          Induction: Intravenous  Anesthetic plan and risks discussed with: Patient

## 2020-06-02 NOTE — OP NOTES
[address] [place] [address]   OPERATIVE REPORT    Name:  Jason Osorio  MR#:   [id number]  :  1960  ACCOUNT #:  [de-identified]  DATE OF SERVICE:  2020    PREOPERATIVE DIAGNOSIS:  End-stage renal disease, dialysis dependent. POSTOPERATIVE DIAGNOSIS:  End-stage renal disease, dialysis dependent. PROCEDURE PERFORMED:  Primary dialysis access creation, left arm arteriovenous graft. SURGEON:  DO Teresa    ASSISTANT:  None. ANESTHESIA:  Monitored sedation. COMPLICATIONS:  Zero. SPECIMENS REMOVED:  None. IMPLANTS:  4 to 7 mm Propaten graft. ESTIMATED BLOOD LOSS:  Zero. CONDITION OF THE PATIENT:  Stable. DETAILS OF PROCEDURE:  The patient is a 49-year-old with needs for dialysis. He has had a history of paraplegia. He tells me he wants graft on his left arm. He has more strength in his right and wants to maintain that. Vein mapping was poor for fistula choice, so plan today is for a graft. He had preop antibiotic. He was taken back to the room in supine position with monitored sedation. Left arm was prepped and draped in the usual fashion. 1% lidocaine was used for local.  I did a direct cutdown over the brachial artery. It was soft and patent. The second cutdown in the axilla, exposed the deep vein, the axillary vein and surrounded the vessel loops as well. I made a curvilinear tunnel between the two sides and used a 4 to 7 taper Propaten graft. Anticoagulated the patient after appropriate waiting. Vein gained the controls on the artery, made an arteriotomy with 11 blade scalpel and Munoz scissors. Cut the graft on a bevel and sewed the 4 mm end-to-side using CV-6 [place] sutures and secured in the standard fashion. Once this was completed, I clamped the graft, took the controls off. There was no bleeding. I was satisfied with this. Attention was turned to the vein. The vein controls were gained.   I made a venotomy with 11 blade scalpel and Munoz scissors, cut the graft on a bevel, sewed it end-to-side using CV-6 [place] suture and secured it in standard fashion. Upon completion of this, I released all the controls. There was a very nice thrill in the graft as a pulse in the arm as well. I irrigated the sites and closed with 3-0 Monocryl interrupted for the fascia and 4-0 Monocryl and Dermabond glue for the skin. He was transferred to Recovery in stable condition with a thrill in his grafts and no pain in his hand.       Garret Bermudez DO      [id number]  D:  06/01/2020 16:15  T:  06/01/2020 22:19  JOB #:  [id number]

## 2020-06-05 ENCOUNTER — TELEPHONE (OUTPATIENT)
Dept: VASCULAR SURGERY | Age: 60
End: 2020-06-05

## 2020-06-05 NOTE — TELEPHONE ENCOUNTER
Returned phone call and answered all questions in regards to pain medication and patient will call back if needed.

## 2020-06-05 NOTE — TELEPHONE ENCOUNTER
Patient is calling concering med that was prescribing states he is worried about taking it with his medical issues [phone]

## 2020-06-08 ENCOUNTER — OFFICE VISIT (OUTPATIENT)
Dept: CARDIOLOGY CLINIC | Age: 60
End: 2020-06-08

## 2020-06-08 VITALS
HEIGHT: 73 IN | DIASTOLIC BLOOD PRESSURE: 64 MMHG | SYSTOLIC BLOOD PRESSURE: 136 MMHG | WEIGHT: 132 LBS | HEART RATE: 58 BPM | BODY MASS INDEX: 17.49 KG/M2 | OXYGEN SATURATION: 100 %

## 2020-06-08 DIAGNOSIS — N18.6 ESRD (END STAGE RENAL DISEASE) (HCC): ICD-10-CM

## 2020-06-08 DIAGNOSIS — E11.21 TYPE 2 DIABETES WITH NEPHROPATHY (HCC): ICD-10-CM

## 2020-06-08 DIAGNOSIS — R00.1 BRADYCARDIA: Primary | ICD-10-CM

## 2020-06-08 DIAGNOSIS — I73.9 PERIPHERAL VASCULAR DISEASE (HCC): ICD-10-CM

## 2020-06-08 DIAGNOSIS — I10 ESSENTIAL HYPERTENSION: ICD-10-CM

## 2020-06-08 DIAGNOSIS — D64.9 CHRONIC ANEMIA: ICD-10-CM

## 2020-06-08 NOTE — PROGRESS NOTES
Gadiel Ivey presents today for   Chief Complaint   Patient presents with    Follow-up     6 month follow up       Gadiel Ivey preferred language for health care discussion is english/other. Is someone accompanying this pt? no    Is the patient using any DME equipment during [address]? cane    Depression Screening:  3 most recent PHQ Screens 6/8/2020   Little interest or pleasure in doing things Not at all   Feeling down, depressed, irritable, or hopeless Not at all   Total Score PHQ 2 0       Learning Assessment:  Learning Assessment 5/18/2018   PRIMARY LEARNER Patient   PRIMARY LANGUAGE ENGLISH   LEARNER PREFERENCE PRIMARY DEMONSTRATION   ANSWERED BY patient   RELATIONSHIP SELF       Abuse Screening:  Abuse Screening Questionnaire 6/8/2020   Do you ever feel afraid of your partner? N   Are you in a relationship with someone who physically or mentally threatens you? N   Is it safe for you to go home? Y       Fall Risk  Fall Risk Assessment, last 12 mths 6/8/2020   Able to walk? Yes   Fall in past 12 months? No       Pt currently taking Anticoagulant therapy? no    Coordination of Care:  1. Have you been to the ER, urgent care clinic since your last visit? Hospitalized since your last visit? no    2. Have you seen or consulted any other health care providers outside of the [address] [place] [address] since your last visit? Include any pap smears or colon screening.  no

## 2020-06-08 NOTE — PROGRESS NOTES
HPI:   I saw Yuri Alejandra in my office today in cardiovascular evaluation regarding his past problems with bradycardia and recent issues with worsening renal failure and peripheral edema     Mr. Vanda Brewster is a pleasant 62-year-old -American male who has had a rather complicated past medical history.  His past medical history includes a renal mass with renal cell carcinoma of the left kidney with partial left nephrectomy, diabetes, hypertension, and chronic drug abuse. [facility] was admitted to the hospital in April 2017 with history of C3 through C7 discitis with osteomyelitis with an L4-L5 phlegmon and is status post a C3 through C7 laminectomy with posterior lateral fusion and hardware on April 29, 2017 for rapid onset of quadriplegia from the discitis and osteomyelitis in the setting of MRSA bacteremia and abscess cultures from the spine. He had a very prolonged recovery and is still somewhat limited in his activity getting around on a cane and had been on chronic doxycycline due to his neck hardware for an extended period of time though he does not appear to be on that medication currently.     He was admitted from a skilled nursing facility to [facility] in March 2018 due to increasing peripheral edema, but it was actually found to have no signs of heart failure with a normal NT proBNP and completely normal left ventricular systolic function, but he was found to be anemic and dehydrated with acute kidney injury.  During that hospitalization he was very bradycardic secondary to beta-blocker therapy which was discontinued for his blood pressure and amlodipine was started with reasonably good control.     He also during that hospitalization was worked up for anemia and thrombocytopenia including a bone marrow biopsy on April 6, 2018, but his thrombocytopenia apparently spontaneously resolved and he had a platelet count of 924,814 on August 21, 2018 and on October 1, 2018 his platelet count was down to 94,000, however this was not felt to be severe enough to require any treatment by Dr. Ashlee Starr.      In 2018 he has had recurrent admissions to various hospitals including [facility] and [address] [place] [address] had had a suprapubic catheter placed by Dr. Danny Agosto be completed on October 12, 2018 over at [facility] most recently had some poor output from the catheter and a urinary tract infection for which he was admitted to [address] [address] was also found to be hypoglycemic and hypothermic during his most recent hospitalization with concerns for adrenal insufficiency/hypopituitarism and hypothyroidism.  He apparently was treated with both prednisone and levothyroxine. It should be noted that he had a tremendous amount of weight loss when he was treated for his discitis  and actually dropped down to weight in the low 130's at one point although was up to 149 pounds today. He since I last saw him in the office in December 2019 has been admitted to the hospital couple of times once with acute renal failure and since that time has had a fistula placed by Dr. Jeri Odom and has gone on dialysis a couple of months ago. Since that time all of his peripheral edema and anasarca has resolved and he seems to be doing quite well. He is not had any problems with dizziness or presyncope    Encounter Diagnoses   Name Primary?  Bradycardia, asymptomatic possibly reflecting early sick sinus syndrome Yes    [facility]) [facility] [address]     Essential hypertension     Peripheral vascular disease (HCC) status post     Chronic anemia     Type 2 diabetes with nephropathy ([facility] [address]        Discussion: This patient appears to be doing about as well as we could expect and I have no recommendations for change currently.   He is not having any symptoms to suggest the development of symptomatic conduction system disease and I told him that if he had any problems with dizziness issues to let us know and I would consider doing a Holter monitor study. He did have an echocardiogram while he was in the hospital in February and that just demonstrated a mildly dilated left ventricle with normal wall thickness and normal overall left ventricular ejection fraction of 60 to 65%. There is also noted moderate pulmonary hypertension, but this was all completed before dialysis and the pulmonary pressures may have significantly decreased since he has been placed on dialysis. It should be noted that he did have a screening nuclear myocardial perfusion study back in March 25, 2020 which was abnormal and intermediate risk suggesting a fixed inferior defect and mild reduction left trigger function ejection fraction 43%. This gentleman has not had any cardiac symptomatology with ith the exception of his bradycardia,,so I am will plan to see him again in several months to be sure that no significant problems are developing.   He will either follow-up with myself or Dr. Eber Bhatt since I will be retiring in the near future     PCP: Jigna Harley DO      Past Medical History:   Diagnosis Date    Anemia     Bradycardia, unspecified 2018    Cervical discitis     MRSA    Chronic drug abuse ([address] 1974    Chronic kidney disease     stage III    Diabetes ([address] 01/1990    [facility]ialysis patient [facility])     Drug abuse ([address]     Encephalopathy     GERD (gastroesophageal reflux disease)     Hypertension     Muscle weakness     Quadriparesis ([address] 2017    Renal cell carcinoma of left kidney ([address] 12/17/13    Pathological Stage [id number] cc RCC FG3 s/p left open partial nephrectomy in 12/13      Sepsis due to methicillin resistant Staphylococcus aureus ([address]     Suprapubic catheter ([address]     Thrombocytopenia ([address]     Thyroid disease     Urethral erosion     Viral hepatitis B     Viral hepatitis C     Xerosis cutis        Past Surgical History:   Procedure Laterality Date    COLONOSCOPY N/A 10/3/2019    COLONOSCOPY / polypectomy performed by Thad Pickett MD at [address] HX CIRCUMCISION  12/17/13    Dr. Hawa Laurent, [facility]    HX NEPHRECTOMY Left 12/17/13    Open/ Partial,nephrectomy    HX OTHER SURGICAL Right 2/27/2016    removed right ft  2nd meta-tarsal    HX OTHER SURGICAL  04/2017    abscess removed from back of neck     IR INSERT TUNL CVC W/O PORT OVER 5 YR  2/18/2020    NEUROLOGICAL PROCEDURE UNLISTED  2017    neck surgery-abcess-hardware neck    ME INSJ TUNNELED CVC W/O SUBQ PORT/ AGE 5 YR/> N/A 5/6/2020    INSERTION TUNNELED CENTRAL VENOUS CATHETER/perm catheter exchange performed by Evelin Burr MD at [facility] CATH LAB    ME REMOVAL WITH INSERTION OF SUPRAPUBIC CATHETER  2018       Current Outpatient Medications   Medication Sig    doxycycline (VIBRA-TABS) 100 mg tablet     levothyroxine (SYNTHROID) 100 mcg tablet Take 1 Tab by mouth every morning.  b complex-vitamin c-folic acid (NEPHROCAPS) 1 mg capsule Take 1 Cap by mouth daily.  calcium acetate,phosphat bind, (PHOSLO) 667 mg cap Take 1 Cap by mouth three (3) times daily (with meals).  doxercalciferoL (HECTOROL) 4 mcg/2 mL injection 0.5 mL by IntraVENous route DIALYSIS MON, WED & FRI.  food supplemt, lactose-reduced (ENSURE ENLIVE) 0.08 gram-1.5 kcal/mL liqd Take 1 Each by mouth three (3) times daily (with meals). Flavor of patient choice    hydrALAZINE (APRESOLINE) 25 mg tablet Take 1 Tab by mouth every eight (8) hours as needed (systolic B/P >727).  Syringe, Disposable, (BD SYRINGE CATHETER TIP) 60 mL syrg Use 1 syringe daily with irrigations     No current facility-administered medications for this visit.         Allergies   Allergen Reactions    Iodine Hives and Other (comments)       Social History :  Social History     Tobacco Use    Smoking status: Current Every Day Smoker     Packs/day: 0.25     Years: 30.00     Pack years: 7.50     Types: Cigarettes    Smokeless tobacco: Never Used   Substance Use Topics    Alcohol use: Yes     Comment: beer occasionally        Family History: family history includes Diabetes in his mother and sister; Hypertension in his sister; Sickle Cell Anemia in his father. Review of Systems   Constitutional: Negative. Respiratory: Negative. Cardiovascular: Positive for leg swelling. Negative for chest pain, palpitations and orthopnea. Gastrointestinal: Negative. Musculoskeletal: Negative. Neurological: Negative for dizziness. Physical Exam:    The patient is a cooperative, alert, well developed, well nourished 62 y.o.  male who is in no acute distress at the time of the examination. Visit Vitals  /64 (BP 1 Location: Right arm, BP Patient Position: Sitting)   Pulse (!) 58   Ht 6' 1\" (1.854 m)   Wt 132 lb (59.9 kg)   SpO2 100%   BMI 17.42 kg/m²       HEENT: Conjuctiva white, mucosa moist, no pallor or cyanosis. NECK: Supple without masses, tenderness or thyromegaly. No JVD. Carotid are full bilaterally without bruits. CHEST: Symmetrical with good excursion. LUNGS: Clear to auscultation in all fields  HEART: The apex is not displaced. There is a normal S1 and S2. There is a Grade 2/6 apical systolic murmur with poor radiation without appreciable systolic murmurs, rubs, clicks, or gallops auscultated. ABDOMEN: Soft and non tender. EXTREMITIES: Full peripheral pulses without peripheral edema       Review of Data: See PMH and Cardiology and Imaging sections for cardiac testing  Lab Results   Component Value Date/Time    Cholesterol, total 148 02/11/2020 02:24 PM    HDL Cholesterol 71 02/11/2020 02:24 PM    LDL, calculated 63 02/11/2020 02:24 PM    Triglyceride 72 02/11/2020 02:24 PM    CHOL/HDL Ratio 2.1 02/11/2020 02:24 PM       Results for orders placed or performed in visit on 06/08/20   AMB POC EKG ROUTINE W/ 12 LEADS, INTER & REP     Status: None    Narrative    Sinus bradycardia, rate 58.   Left ventricular hypertrophy. Diffuse nonspecific high lateral ST-T flattening. Compared to the EKG of December 16, 2019 no significant interval change       Crystal Pompa D.O., F.A.C.C. [facility]  [facility] [place]  [facility] [address]      PLEASE NOTE:  This document has been produced using voice recognition software. Unrecognized errors in transcription may be present.

## 2020-06-17 ENCOUNTER — OFFICE VISIT (OUTPATIENT)
Dept: VASCULAR SURGERY | Age: 60
End: 2020-06-17

## 2020-06-17 VITALS
WEIGHT: 132 LBS | BODY MASS INDEX: 17.49 KG/M2 | HEIGHT: 73 IN | SYSTOLIC BLOOD PRESSURE: 132 MMHG | RESPIRATION RATE: 12 BRPM | OXYGEN SATURATION: 99 % | HEART RATE: 74 BPM | DIASTOLIC BLOOD PRESSURE: 64 MMHG

## 2020-06-17 DIAGNOSIS — Z99.2 ESRD (END STAGE RENAL DISEASE) ON DIALYSIS (HCC): Primary | ICD-10-CM

## 2020-06-17 DIAGNOSIS — N18.6 ESRD (END STAGE RENAL DISEASE) ON DIALYSIS (HCC): Primary | ICD-10-CM

## 2020-06-17 NOTE — PROGRESS NOTES
1. Have you been to an emergency room or urgent care clinic since your last visit?   no  Hospitalized since your last visit? If yes, where, when, and reason for visit?   no  2. Have you seen or consulted any other [facility] care providers [facility] since your last visit including any procedures, health maintenance items. If yes, where, when and reason for visit?    yes

## 2020-06-17 NOTE — PROGRESS NOTES
Subjective:      Maribell Sanchez is a 62 y.o. male who presents today for post op visit, status post left arm arteriovenous graft. . He has a surgical incision wound which is located on the left arm x 2. Current symptoms: none  wound healing as expected. Objective:     Visit Vitals  /64 (BP 1 Location: Right arm, BP Patient Position: Sitting)   Pulse 74   Resp 12   Ht 6' 1\" (1.854 m)   Wt 132 lb (59.9 kg)   SpO2 99%   BMI 17.42 kg/m²       Wound:   wound margins intact and healing well. No signs of infection. Good radial pulse, no signs of steal  Graft patent with good bruit  Mild ecchymosis into forearm but no edema     Assessment:     Wound check/discharge visit. Plan:       ICD-10-CM ICD-9-CM    1. ESRD (end stage renal disease) on dialysis (HCC) N18.6 585.6     Z99.2 V45.11      No orders of the defined types were placed in this encounter. Discussed and agreed may start cannulating left arm AVG starting June 18, 2020, one needle and advance to 2 as tolerated and then call to schedule cath removal when deemed ready      PADMAJA Alonso    Portions of this note have been entered using voice recognition software.

## 2020-07-29 ENCOUNTER — HOSPITAL ENCOUNTER (OUTPATIENT)
Dept: ONCOLOGY | Age: 60
Discharge: HOME | End: 2020-07-29

## 2020-07-29 ENCOUNTER — OFFICE VISIT (OUTPATIENT)
Dept: ONCOLOGY | Age: 60
End: 2020-07-29

## 2020-07-29 VITALS
SYSTOLIC BLOOD PRESSURE: 171 MMHG | WEIGHT: 146 LBS | HEART RATE: 65 BPM | RESPIRATION RATE: 16 BRPM | OXYGEN SATURATION: 99 % | BODY MASS INDEX: 19.35 KG/M2 | HEIGHT: 73 IN | DIASTOLIC BLOOD PRESSURE: 71 MMHG

## 2020-07-29 DIAGNOSIS — N18.30 ANEMIA ASSOCIATED WITH STAGE 3 CHRONIC RENAL FAILURE (HCC): ICD-10-CM

## 2020-07-29 DIAGNOSIS — D69.6 THROMBOCYTOPENIA (HCC): ICD-10-CM

## 2020-07-29 DIAGNOSIS — C64.2 RENAL CELL CARCINOMA OF LEFT KIDNEY (HCC): ICD-10-CM

## 2020-07-29 DIAGNOSIS — N18.30 ANEMIA ASSOCIATED WITH STAGE 3 CHRONIC RENAL FAILURE (HCC): Primary | ICD-10-CM

## 2020-07-29 DIAGNOSIS — D63.1 ANEMIA ASSOCIATED WITH STAGE 3 CHRONIC RENAL FAILURE (HCC): ICD-10-CM

## 2020-07-29 DIAGNOSIS — D50.8 IRON DEFICIENCY ANEMIA SECONDARY TO INADEQUATE DIETARY IRON INTAKE: ICD-10-CM

## 2020-07-29 DIAGNOSIS — D63.1 ANEMIA ASSOCIATED WITH STAGE 3 CHRONIC RENAL FAILURE (HCC): Primary | ICD-10-CM

## 2020-07-29 DIAGNOSIS — N18.30 CHRONIC KIDNEY DISEASE, STAGE III (MODERATE) (HCC): ICD-10-CM

## 2020-07-29 LAB
BASO+EOS+MONOS # BLD AUTO: 0.6 K/UL (ref 0–2.3)
BASO+EOS+MONOS NFR BLD AUTO: 10 % (ref 0.1–17)
DIFFERENTIAL METHOD BLD: ABNORMAL
ERYTHROCYTE [DISTWIDTH] IN BLOOD BY AUTOMATED COUNT: 18.4 % (ref 11.5–14.5)
HCT VFR BLD AUTO: 33.2 % (ref 36–48)
HGB BLD-MCNC: 10.6 G/DL (ref 12–16)
LYMPHOCYTES # BLD: 2 K/UL (ref 1.1–5.9)
LYMPHOCYTES NFR BLD: 34 % (ref 14–44)
MCH RBC QN AUTO: 26.6 PG (ref 25–35)
MCHC RBC AUTO-ENTMCNC: 31.9 G/DL (ref 31–37)
MCV RBC AUTO: 83.4 FL (ref 78–102)
NEUTS SEG # BLD: 3.2 K/UL (ref 1.8–9.5)
NEUTS SEG NFR BLD: 56 % (ref 40–70)
PLATELET # BLD AUTO: 79 K/UL (ref 140–440)
RBC # BLD AUTO: 3.98 M/UL (ref 4.1–5.1)
WBC # BLD AUTO: 5.8 K/UL (ref 4.5–13)

## 2020-07-29 NOTE — PATIENT INSTRUCTIONS
Anemia From Chronic Disease: Care Instructions  Your Care Instructions     Anemia is a low level of red blood cells. Red blood cells carry oxygen from your lungs to the rest of your body. Sometimes when you have a long-term (chronic) disease, such as kidney disease, arthritis, diabetes, cancer, or an infection, your body does not make enough red blood cells. Follow-up care is a key part of your treatment and safety. Be sure to make and go to all appointments, and call your doctor if you are having problems. It's also a good idea to know your test results and keep a list of the medicines you take. How can you care for yourself at home? · Follow your doctor's instructions to treat the chronic condition that is causing the anemia. · Be safe with medicines. Take your medicine to treat your chronic condition exactly as prescribed. Call your doctor if you think you are having a problem with your medicine. · Take your medicine for anemia exactly as prescribed. Call your doctor if you think you are having a problem with your medicine. Medicines to increase the number of red blood cells (such as epoetin or darbepoetin) may be given as an injection. ? If you miss a dose, take it as soon as you can, unless it is almost time for your next dose. In that case, get back on your regular schedule and take only one dose. ? Do not freeze this medicine. Store it in the refrigerator. Do not shake the bottle before you prepare the shot. · Keep all your appointments for blood tests to check on your hemoglobin levels. When should you call for help? [id number] anytime you think you may need emergency care. For example, call if:  · You passed out (lost consciousness). Call your doctor now or seek immediate medical care if:  · You are short of breath. · You are dizzy or light-headed, or you feel like you may faint. · You have new or worse bleeding.   Watch closely for changes in your health, and be sure to contact your doctor if:  · You feel weaker or more tired than usual.  · You do not get better as expected. Where can you learn more? Go to http://www.gray.com/  Enter E502 in the search box to learn more about \"Anemia From Chronic Disease: Care Instructions. \"  Current as of: November 8, 2019               Content Version: 12.5  © 9051-3920 [employer]. Care instructions adapted under license by [employer] (which disclaims liability or warranty for this information). If you have questions about a medical condition or this instruction, always ask your healthcare professional. [address] any warranty or liability for your use of this information. Iron-Rich Diet: Care Instructions  Your Care Instructions     Your body needs iron to make hemoglobin. Hemoglobin is a substance in red blood cells that carries oxygen from the lungs to cells all through your body. If you do not get enough iron, your body makes fewer and smaller red blood cells. As a result, your body's cells may not get enough oxygen. Adult men need 8 milligrams of iron a day; adult women need 18 milligrams of iron a day. After menopause, women need 8 milligrams of iron a day. A pregnant woman needs 27 milligrams of iron a day. Infants and young children have higher iron needs relative to their size than other age groups. People who have lost blood because of ulcers or heavy menstrual periods may become very low in iron and may develop anemia. Most people can get the iron their bodies need by eating enough of certain iron-rich foods. Your doctor may recommend that you take an iron supplement along with eating an iron-rich diet. Follow-up care is a key part of your treatment and safety. Be sure to make and go to all appointments, and call your doctor if you are having problems. It's also a good idea to know your test results and keep a list of the medicines you take.   How can you care for yourself at home? · Make iron-rich foods a part of your daily diet. Iron-rich foods include:  ? All meats, such as chicken, beef, lamb, pork, fish, and shellfish. Liver is especially high in iron. ? Leafy green vegetables. ? Raisins, peas, beans, lentils, barley, and eggs. ? Iron-fortified breakfast cereals. · Eat foods with vitamin C along with iron-rich foods. Vitamin C helps you absorb more iron from food. Drink a glass of orange juice or another citrus juice with your food. · Eat meat and vegetables or grains together. The iron in meat helps your body absorb the iron in other foods. Where can you learn more? Go to http://gabo-curly.info/  Enter Z290 in the search box to learn more about \"Iron-Rich Diet: Care Instructions. \"  Current as of: August 22, 2019               Content Version: 12.5  © 0171-6241 [employer]. Care instructions adapted under license by [employer] (which disclaims liability or warranty for this information). If you have questions about a medical condition or this instruction, always ask your healthcare professional. [address] [address] any warranty or liability for your use of this information. Thrombocytopenia: Care Instructions  Your Care Instructions     Thrombocytopenia is a low number of platelets in the blood. Platelets are the cells that help blood clot. If you don't have enough of them, your blood cannot clot well. So it is harder to stop bleeding. You may have low platelets because your bone marrow does not make them. Or your body's defenses (immune system) may destroy them. Having an enlarged spleen can also reduce the number of platelets in your blood. This is because they can get trapped in the enlarged spleen. Some diseases or medicines may also cause low platelets. But platelets may go back to normal levels if the disease is treated or the medicine is stopped.   You may not need treatment if your problem is mild. If you do need treatment, you may have platelets added to your blood. Or you may get medicine to stop the loss of platelets or help your body make them. Follow-up care is a key part of your treatment and safety. Be sure to make and go to all appointments, and call your doctor if you are having problems. It's also a good idea to know your test results and keep a list of the medicines you take. How can you care for yourself at home? · Be safe with medicines. Take your medicines exactly as prescribed. Call your doctor if you think you are having a problem with your medicine. · Do not take aspirin or anti-inflammatory medicines unless your doctor says it is okay. Examples are ibuprofen (Advil, Motrin) and naproxen (Aleve). They may increase the risk of bleeding. · Avoid contact sports or activities that could cause you to fall. When should you call for help? [id number] anytime you think you may need emergency care. For example, call if:  · You passed out (lost consciousness). · You have signs of severe bleeding, which includes:  ? You have a severe headache that is different from past headaches. ? You vomit blood or what looks like coffee grounds. ? Your stools are maroon or very bloody. Call your doctor now or seek immediate medical care if:  · You are dizzy or lightheaded, or you feel like you may faint. · You have abnormal bleeding, such as:  ? A nosebleed that you can't easily stop. This means it's still bleeding after pressure has been applied 3 times for 10 minutes each time (30 minutes total). ? Your stools are black and look like tar, or they have streaks of blood. ? You have blood in your urine. ? You have joint pain. ? You have bruises or blood spots under your skin. Watch closely for changes in your health, and be sure to contact your doctor if:  · You do not get better as expected. Where can you learn more?   Go to http://www.gray.com/  Enter [id number] in the search box to learn more about \"Thrombocytopenia: Care Instructions. \"  Current as of: November 8, 2019               Content Version: 12.5  © 6281-1258 [employer]. Care instructions adapted under license by [employer] (which disclaims liability or warranty for this information). If you have questions about a medical condition or this instruction, always ask your healthcare professional. [address] any warranty or liability for your use of this information.

## 2020-07-29 NOTE — PROGRESS NOTES
Hematology/Oncology  Progress Note    Name: Aric Jacobo  Date: 2020  : 1960    PCP: Mila Miranda MD     Mr. Goldstein is a 62 y.o.  male who was seen for follow-up of his thrombocytopenia. Current Therapy: Retacrit every 2 weeks whenever his hemoglobin is <10g/dL and hematocrit is <30%. Subjective:     Mr. Goldstein is a 77-year-old -American man who was seen for thrombocytopenia and anemia due to chronic kidney disease. He is in the office today for follow up. He denies fatigue, tiredness, and shortness of breath. He denies chest pain and dizziness. Since his last clinic visit he has not experienced any unexplained bruising or bleeding. He occasionally uses a walker for support and mobility. He does not have any complaints or concerns to report at this time. Since he was last seen he has started dialysis ,  and . He is being followed by Nephrology. Past medical history, family history, and social history: these were reviewed and remains unchanged.     Past Medical History:   Diagnosis Date    Anemia     Bradycardia, unspecified     Cervical discitis     MRSA    Chronic drug abuse ([facility] 1974    Chronic kidney disease     stage III    Diabetes ([facility] 1990    Dialysis patient [facility])     Drug abuse ([facility]     Encephalopathy     GERD (gastroesophageal reflux disease)     Hypertension     Muscle weakness     Quadriparesis ([facility]     Renal cell carcinoma of left kidney ([facility] 13    Pathological Stage [id number] cc RCC FG3 s/p left open partial nephrectomy in       Sepsis due to methicillin resistant Staphylococcus aureus (HCC)     Suprapubic catheter (HCC)     Thrombocytopenia (HCC)     Thyroid disease     Urethral erosion     Viral hepatitis B     Viral hepatitis C     Xerosis cutis      Past Surgical History:   Procedure Laterality Date    COLONOSCOPY N/A 10/3/2019    COLONOSCOPY / polypectomy performed by Dick Chi Guerline Pérez MD at [facility]  12/17/13    Dr. Saira Jaramillo, [facility]    HX NEPHRECTOMY Left 12/17/13    Open/ Partial,nephrectomy    HX OTHER SURGICAL Right 2/27/2016    removed right ft  2nd meta-tarsal    HX OTHER SURGICAL  04/2017    abscess removed from back of neck     IR INSERT TUNL CVC W/O PORT OVER 5 YR  2/18/2020    NEUROLOGICAL PROCEDURE UNLISTED  2017    neck surgery-abcess-hardware neck    DE INSJ TUNNELED CVC W/O SUBQ PORT/ AGE 5 YR/> N/A 5/6/2020    INSERTION TUNNELED CENTRAL VENOUS CATHETER/perm catheter exchange performed by Michelle Baxter MD at [facility] CATH [facility]    DE REMOVAL WITH INSERTION OF SUPRAPUBIC CATHETER  2018     Social History     Socioeconomic History    Marital status:      Spouse name: Not on file    Number of children: Not on file    Years of education: Not on file    Highest education level: Not on file   Occupational History    Not on file   Social Needs    Financial resource strain: Not on file    Food insecurity     Worry: Not on file     Inability: Not on file    Transportation needs     Medical: Not on file     Non-medical: Not on file   Tobacco Use    Smoking status: Current Every Day Smoker     Packs/day: 0.25     Years: 30.00     Pack years: 7.50     Types: Cigarettes    Smokeless tobacco: Never Used   Substance and Sexual Activity    Alcohol use: Yes     Comment: beer occasionally    Drug use: Yes     Types: Marijuana, Heroin     Comment: marijuana-December 2018, heroin-9/15/19-approx    Sexual activity: Not Currently   Lifestyle    Physical activity     Days per week: Not on file     Minutes per session: Not on file    Stress: Not on file   Relationships    Social connections     Talks on phone: Not on file     Gets together: Not on file     Attends [employer] service: Not on file     Active member of club or organization: Not on file     Attends meetings of clubs or organizations: Not on file     Relationship status: Not on file  Intimate partner violence     Fear of current or ex partner: Not on file     Emotionally abused: Not on file     Physically abused: Not on file     Forced sexual activity: Not on file   Other Topics Concern    Not on file   Social History Narrative     since 2012;  for 12 yrs; [employer]K; [address] [email or web address]  just recently furloughed; No  service     Family History   Problem Relation Age of Onset    Diabetes Mother     Sickle Cell Anemia Father     Diabetes Sister     Hypertension Sister      Current Outpatient Medications   Medication Sig Dispense Refill    b complex-vitamin c-folic acid 0.8 mg (NEPHRO-DEBBIE) 0.8 mg tab tablet Take 0.8 mg by mouth daily.  doxycycline (VIBRA-TABS) 100 mg tablet       levothyroxine (SYNTHROID) 100 mcg tablet Take 1 Tab by mouth every morning. 30 Tab 1    b complex-vitamin c-folic acid (NEPHROCAPS) 1 mg capsule Take 1 Cap by mouth daily. 30 Cap 1    calcium acetate,phosphat bind, (PHOSLO) 667 mg cap Take 1 Cap by mouth three (3) times daily (with meals). 90 Cap 1    doxercalciferoL (HECTOROL) 4 mcg/2 mL injection 0.5 mL by IntraVENous route DIALYSIS MON, WED & FRI. 1 mL 0    food supplemt, lactose-reduced (ENSURE ENLIVE) 0.08 gram-1.5 kcal/mL liqd Take 1 Each by mouth three (3) times daily (with meals). Flavor of patient choice 90 Bottle 1    hydrALAZINE (APRESOLINE) 25 mg tablet Take 1 Tab by mouth every eight (8) hours as needed (systolic B/P >024). 30 Tab 0    Syringe, Disposable, (BD SYRINGE CATHETER TIP) 60 mL syrg Use 1 syringe daily with irrigations 30 Syringe 11       Review of Systems  Constitutional: The patient has no acute distress or discomfort. HEENT: The patient denies recent head trauma, eye pain, blurred vision,  hearing deficit, oropharyngeal mucosal pain or lesions, and the patient denies throat pain or discomfort. Lymphatics: The patient denies palpable peripheral lymphadenopathy.   Hematologic: The patient denies having bruising, bleeding, or progressive fatigue. Respiratory: Patient denies having shortness of breath, cough, sputum production, fever, or dyspnea on exertion. Cardiovascular: The patient denies having leg pain, leg swelling, heart palpitations, chest permit, chest pain, or lightheadedness. The patient denies having dyspnea on exertion. Gastrointestinal: The patient denies having nausea, emesis, or diarrhea. The patient denies having any hematemesis or blood in the stool. Genitourinary: Patient denies having urinary urgency, frequency, or dysuria. The patient denies having blood in the urine. Psychological: The patient denies having symptoms of nervousness, anxiety, depression, or thoughts of harming self. Skin: Patient denies having skin rashes, skin, ulcerations, or unexplained itching or pruritus. Musculoskeletal: The patient denies having pain in the joints or bones. Objective:     Visit Vitals  /71 (BP 1 Location: Right arm, BP Patient Position: Sitting)   Pulse 65   Resp 16   Ht 6' 1\" (1.854 m)   Wt 66.2 kg (146 lb)   SpO2 99%   BMI 19.26 kg/m²     ECOG PS=0  Physical Exam:   Gen. Appearance: The patient is in no acute distress. Skin: There is no bruise or rash. HEENT: The exam is unremarkable. Neck: Supple without lymphadenopathy or thyromegaly. Lungs: Clear to auscultation and percussion; there are no wheezes or rhonchi. Heart: Regular rate and rhythm; there are no murmurs, gallops, or rubs. Abdomen: Bowel sounds are present and normal.  There is no guarding, tenderness, or hepatosplenomegaly. Extremities: There is no clubbing, cyanosis, or edema. Neurologic: There are no focal neurologic deficits. Lymphatics: There is no palpable peripheral lymphadenopathy. Musculoskeletal: The patient has been in his arms and hands. With the use of either a wheelchair for balance or a walker. There is no evidence of joint deformity or effusions.   There is no focal joint tenderness. Psychological/psychiatric: There is no clinical evidence of anxiety, depression, or melancholy. Lab data:      Results for orders placed or performed during the hospital encounter of 07/29/20   CBC WITH 3 PART DIFF     Status: Abnormal   Result Value Ref Range Status    WBC 5.8 4.5 - 13.0 K/uL Final    RBC 3.98 (L) 4.10 - 5.10 M/uL Final    HGB 10.6 (L) 12.0 - 16 g/dL Final    HCT 33.2 (L) 36 - 48 % Final    MCV 83.4 78 - 102 FL Final    MCH 26.6 25.0 - 35.0 PG Final    MCHC 31.9 31 - 37 g/dL Final    RDW 18.4 (H) 11.5 - 14.5 % Final    PLATELET 79 (L) 579 - 440 K/uL Final    NEUTROPHILS 56 40 - 70 % Final    MIXED CELLS 10 0.1 - 17 % Final    LYMPHOCYTES 34 14 - 44 % Final    ABS. NEUTROPHILS 3.2 1.8 - 9.5 K/UL Final    ABS. MIXED CELLS 0.6 0.0 - 2.3 K/uL Final    ABS. LYMPHOCYTES 2.0 1.1 - 5.9 K/UL Final     Comment: Test performed at [address] [place] [address] or Outpatient Infusion Center Location. Reviewed by Medical Director. DF AUTOMATED   Final           Assessment:     1. Anemia associated with stage 5 chronic renal failure ([facility]    2. Thrombocytopenia ([facility]    3. Iron deficiency anemia secondary to inadequate dietary iron intake    4. Renal cell carcinoma of left kidney (HCC)    5. Chronic kidney disease, stage III (moderate) ([facility])        Plan:     Iron deficiency anemia/chronic anemia/anemia due to chronic kidney disease stage III: The CBC from today shows a  normal WBC count but the platelet count was 57,277. The hemoglobin was 10.6 g/dL with hematocrit of 33.2%. He is now receiving outpatient dialysis and states that his nephrologist did resume his Procrit with therapy in the outpatient setting. At this time I will check his iron profile and ferritin levels. Thrombocytopenia  I have explained to patient that his CBC from, from today 7/29/2020 shows that his platelet count preliminarily is 79,000. We will recheck his blood counts again in about 8 weeks. No therapeutic intervention is warranted at this time. I have explained to the patient that therapeutic intervention for thrombocytopenia is only indicated if the platelet counts decline below 30,000. Hx of Renal Cell Carcinoma left kidney: pt denies any flank pain, hematuria at this time      Follow-up in 8 weeks . Orders Placed This Encounter    COMPLETE CBC & AUTO DIFF WBC    METABOLIC PANEL, COMPREHENSIVE     Standing Status:   Future     Number of Occurrences:   1     Standing Expiration Date:   7/30/2021    FERRITIN     Standing Status:   Future     Number of Occurrences:   1     Standing Expiration Date:   7/29/2021    IRON PROFILE     Standing Status:   Future     Number of Occurrences:   1     Standing Expiration Date:   7/29/2021    InHouse CBC ([facility])     Standing Status:   Future     Number of Occurrences:   1     Standing Expiration Date:   8/5/2020    b complex-vitamin c-folic acid 0.8 mg (NEPHRO-DEBBIE) 0.8 mg tab tablet     Sig: Take 0.8 mg by mouth daily.        Amish Blake NP  7/29/2020

## 2020-07-30 LAB
ALBUMIN SERPL-MCNC: 4 G/DL (ref 3.8–4.8)
ALBUMIN/GLOB SERPL: 1.2 {RATIO} (ref 1.2–2.2)
ALP SERPL-CCNC: 121 IU/L (ref 39–117)
ALT SERPL-CCNC: 16 IU/L (ref 0–44)
AST SERPL-CCNC: 18 IU/L (ref 0–40)
BILIRUB SERPL-MCNC: 0.6 MG/DL (ref 0–1.2)
BUN SERPL-MCNC: 43 MG/DL (ref 8–27)
BUN/CREAT SERPL: 10 (ref 10–24)
CALCIUM SERPL-MCNC: 8.5 MG/DL (ref 8.6–10.2)
CHLORIDE SERPL-SCNC: 98 MMOL/L (ref 96–106)
CO2 SERPL-SCNC: 24 MMOL/L (ref 20–29)
CREAT SERPL-MCNC: 4.4 MG/DL (ref 0.76–1.27)
FERRITIN SERPL-MCNC: 345 NG/ML (ref 30–400)
GLOBULIN SER CALC-MCNC: 3.4 G/DL (ref 1.5–4.5)
GLUCOSE SERPL-MCNC: 155 MG/DL (ref 65–99)
INTERPRETATION: NORMAL
IRON SATN MFR SERPL: 38 % (ref 15–55)
IRON SERPL-MCNC: 111 UG/DL (ref 38–169)
POTASSIUM SERPL-SCNC: 5.3 MMOL/L (ref 3.5–5.2)
PROT SERPL-MCNC: 7.4 G/DL (ref 6–8.5)
SODIUM SERPL-SCNC: 142 MMOL/L (ref 134–144)
TIBC SERPL-MCNC: 292 UG/DL (ref 250–450)
UIBC SERPL-MCNC: 181 UG/DL (ref 111–343)

## 2020-08-05 PROBLEM — D50.9 IRON DEFICIENCY ANEMIA: Status: ACTIVE | Noted: 2020-08-05

## 2020-08-05 PROBLEM — N18.9 ANEMIA IN CHRONIC RENAL DISEASE: Status: ACTIVE | Noted: 2020-08-05

## 2020-08-05 PROBLEM — D63.1 ANEMIA IN CHRONIC RENAL DISEASE: Status: ACTIVE | Noted: 2020-08-05

## 2020-08-05 PROBLEM — K21.9 GERD (GASTROESOPHAGEAL REFLUX DISEASE): Status: ACTIVE | Noted: 2020-08-05

## 2020-08-05 PROBLEM — E83.39 HYPERPHOSPHATEMIA: Status: ACTIVE | Noted: 2020-08-05

## 2020-08-10 ENCOUNTER — CLINICAL SUPPORT (OUTPATIENT)
Dept: VASCULAR SURGERY | Age: 60
End: 2020-08-10

## 2020-08-10 VITALS
HEART RATE: 56 BPM | OXYGEN SATURATION: 98 % | RESPIRATION RATE: 15 BRPM | WEIGHT: 146 LBS | HEIGHT: 73 IN | DIASTOLIC BLOOD PRESSURE: 60 MMHG | SYSTOLIC BLOOD PRESSURE: 140 MMHG | BODY MASS INDEX: 19.35 KG/M2

## 2020-08-10 DIAGNOSIS — Z49.01 FITTING AND ADJUSTMENT OF EXTRACORPOREAL DIALYSIS CATHETER (HCC): Primary | ICD-10-CM

## 2020-08-10 DIAGNOSIS — N18.6 ESRD ON HEMODIALYSIS (HCC): ICD-10-CM

## 2020-08-10 DIAGNOSIS — Z99.2 ESRD ON HEMODIALYSIS (HCC): ICD-10-CM

## 2020-08-10 NOTE — PROGRESS NOTES
1. Have you been to an emergency room or urgent care clinic since your last visit?   no  Hospitalized since your last visit? If yes, where, when, and reason for visit?   no  2. Have you seen or consulted any other health care providers outside of the [facility] since your last visit including any procedures, health maintenance items. If yes, where, when and reason for visit?    yes

## 2020-08-10 NOTE — PROGRESS NOTES
History of Present Illness: Mr. Goldstein is here today for a follow-up on his end stage renal disease. Procedure Note: the skin was cleansed with Betadine solution. The anchoring sutures were removed. Then, approximately 7 cc of 1% Lidocaine were injected around the catheter site. Under blunt dissection, the cuff of the catheter was freed from the skin edges. The catheter was removed in it's entirety with the cuff intact. Direct pressure was applied for approximately 10 minutes followed by pressure dressing. He tolerated the procedure well. Assessment/Plan:  He was given follow-up care instructions and told to call with any problems, questions or concerns.     Miguel Raza MD

## 2021-02-08 NOTE — PROGRESS NOTES
Hematology/Medical Oncology Progress Note             Name: Terence Enriquez   : 1960   MRN: [id number]   Date: 2018 6:45 AM     [x]I have reviewed the flowsheet and previous days notes. Events overnight reviewed and discussed with nursing staff. Vital signs and records reviewed. Mr Lenoria Holstein is a 59 y.o. Man with renal failure and progressive thrombocytopenia. The patient states that he continues to feel better since admission and is looking forward to possible discharge on tomorrow. ROS:  Constitutional:  Negative for fever, chills, diaphoresis, activity change, appetite change and unexpected weight change. HENT: Negative for nosebleeds, congestion, facial swelling, mouth sores, trouble swallowing, neck pain, neck stiffness, voice change and postnasal drip. Eyes: Negative for photophobia, pain, discharge and itching. Respiratory: Negative for apnea, cough, choking, chest tightness, wheezing and stridor. Cardiovascular: Negative for chest pain, palpitations and leg swelling. Gastrointestinal: Negative for abdominal pain. Negative for nausea, diarrhea, constipation, blood in stool and rectal pain. Genitourinary: Negative for dysuria, urgency, hematuria, flank pain and difficulty urinating. Musculoskeletal: Negative for back pain, joint swelling, arthralgias and gait problem. Skin: Negative for color change, pallor, rash and wound. Neurological: Positive for weakness. Negative for dizziness, facial asymmetry, speech difficulty, light-headedness and headaches. Hematological: Negative for adenopathy. Does not bruise/bleed easily. Psychiatric/Behavioral: Negative for hallucinations, confusion, disturbed wake/sleep cycle and agitation.        Vital Signs:    Visit Vitals    /85 (BP 1 Location: Left arm, BP Patient Position: At rest)    Pulse 74    Temp 98 °F (36.7 °C)    Resp 18    Ht 6' (1.829 m)    Wt 73.7 kg (162 lb 6.4 oz)    SpO2 95%    BMI 22.03 kg/m2       O2 LOV: 1/29/21  No follow up scheduled.   Device: Room air   O2 Flow Rate (L/min): 1 l/min   Temp (24hrs), Av.9 °F (36.6 °C), Min:97.4 °F (36.3 °C), Max:98.1 °F (36.7 °C)       Intake/Output:   Last shift:      1901 -  07  In: -   Out: 201 [Urine:200]  Last 3 shifts: 701 - 1900  In: 0   Out: 2650 [Urine:2300]    Intake/Output Summary (Last 24 hours) at 18 0635  Last data filed at 18 0610   Gross per 24 hour   Intake                0 ml   Output             1351 ml   Net            -1351 ml       Physical Exam:    Constitutional:Appears comfortable, NAD. HENT: Head: Normocephalic and atraumatic. Mouth/Throat: No oropharyngeal exudate. Eyes: Conjunctivae and EOM are normal. Pupils are equal, round, and reactive to light. No scleral icterus. Neck: Normal range of motion. Neck supple. No tracheal deviation present. No thyromegaly present. Cardiovascular: Normal rate and regular rhythm. Exam reveals no gallop and no friction rub. No murmur heard. Pulmonary/Chest:Symmetrical chest expansion, no accessory muscle use; good airway entry; no rales/ wheezing/ rhonchi noted  Abdominal: Soft. Bowel sounds are normal. No distension. No tenderness. There is no rebound and no guarding. Musculoskeletal: Extremities normal, atraumatic, RUE non-pitting edema. LROM at all ext's. Lymphadenopathy:   No cervical adenopathy. Neurological:Alert and oriented to person, place, and time. Skin: Skin is warm and dry. No rash noted. No diaphoresis. No erythema. No pallor. Psychiatric: Normal mood and affect. Normal behavior.  Judgment and thought content normal.         DATA:   Current Facility-Administered Medications   Medication Dose Route Frequency    amLODIPine (NORVASC) tablet 5 mg  5 mg Oral DAILY    cefepime (MAXIPIME) 2 g in sterile water (preservative free) 10 mL IV syringe  2 g IntraVENous Q8H    VANCOMYCIN INFORMATION NOTE   Other Rx Dosing/Monitoring    tamsulosin (FLOMAX) capsule 0.4 mg  0.4 mg Oral DAILY    metroNIDAZOLE (FLAGYL) IVPB premix 500 mg  500 mg IntraVENous Q12H    HYDROmorphone (DILAUDID) tablet 4 mg  4 mg Oral Q6H PRN    furosemide (LASIX) tablet 40 mg  40 mg Oral DAILY    sodium bicarbonate tablet 650 mg  650 mg Oral TID    levothyroxine (SYNTHROID) tablet 50 mcg  50 mcg Oral ACB    glucose chewable tablet 16 g  4 Tab Oral PRN    glucagon (GLUCAGEN) injection 1 mg  1 mg IntraMUSCular PRN    dextrose (D50W) injection syrg 12.5-25 g  25-50 mL IntraVENous PRN    insulin lispro (HUMALOG) injection   SubCUTAneous AC&HS    hydrALAZINE (APRESOLINE) tablet 25 mg  25 mg Oral TID PRN    Lactobacillus Acidoph & Bulgar (FLORANEX) tablet 2 Tab  2 Tab Oral BID    linagliptin (TRADJENTA) tablet 5 mg  5 mg Oral ACB    pantoprazole (PROTONIX) tablet 40 mg  40 mg Oral ACB                    Labs:  Recent Labs      04/08/18 0108 04/07/18   0203  04/06/18   0355   WBC  4.6  5.3  4.9   HGB  7.8*  7.6*  8.0*   HCT  24.5*  23.7*  25.2*   PLT  103*  81*  73*     Recent Labs      04/08/18 0108 04/07/18   0203  04/06/18   0355   NA  145  145  146*   K  3.6  4.0  4.2   CL  110*  112*  115*   CO2  27  26  25   GLU  108*  127*  78   BUN  15  17  25*   CREA  1.00  0.96  1.05   CA  8.9  8.9  9.3   PHOS   --    --   2.6   ALB   --   2.9*  3.0*   SGOT   --   22  21   ALT   --   37  39   INR   --    --   1.3*     No results for input(s): PH, PCO2, PO2, HCO3, FIO2 in the last 72 hours.      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 Thrombocytopenia: Dr. Negro Domingo has explained to the patient that his platelets are now increasing. The most recent CBC shows that his platelet count is now  103,000 with a hemoglobin of 7.8 g/dL hematocrit of 24.5%. No therapeutic intervention is necessary at this point. The likely causes of his thrombocytopenia are iatrogenic or infection related. The peripheral reveals no schistocytes to suggest TTP/HUS. The LDH level is normal at 189 U/L and the SPEP resulted a serum ANITHA revealing the presence of monoclonal free lambda light  Chains. 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 2. Urine Protien electrophoresis positive for Bence Ledesma Protein, Lambda type. 3. AFP tumor marker results pending  4. Anemia in CKD: Iron is 53 and the patients ferritin is 117.   5. Bone Marrow Biopsy done on 4/6 results are pending  6. Renal biopsy scheduled for Monday am. The patient states that he was told that he may be able to be discharged after his renal biopsy on tomorrow. 7. Please schedule the patient to follow up with Dr. Negro Domingo 7-10 days after discharge.  ·          My assessment/plan was discussed with:  [x]nursing []PT/OT    []respiratory therapy [x]Dr.Lloyd Wali Allen MD      []family []       Sarah Collazo NP

## 2021-04-30 ENCOUNTER — TRANSCRIBE ORDER (OUTPATIENT)
Dept: SCHEDULING | Age: 61
End: 2021-04-30

## 2021-04-30 DIAGNOSIS — E11.52 DIABETIC GANGRENE (HCC): Primary | ICD-10-CM

## 2021-05-21 ENCOUNTER — HOSPITAL ENCOUNTER (OUTPATIENT)
Dept: VASCULAR SURGERY | Age: 61
Discharge: HOME | End: 2021-05-21
Attending: PODIATRIST
Payer: MEDICARE

## 2021-05-21 DIAGNOSIS — E11.52 DIABETIC GANGRENE (HCC): ICD-10-CM

## 2021-05-21 PROCEDURE — 93923 UPR/LXTR ART STDY 3+ LVLS: CPT

## 2021-05-24 LAB
LEFT ABI: 1.22
LEFT ANTERIOR TIBIAL: 177 MMHG
LEFT CALF PRESSURE: 208 MMHG
LEFT POSTERIOR TIBIAL: 203 MMHG
LEFT TBI: 1.01
LEFT TOE PRESSURE: 169 MMHG
RIGHT ABI: 1.2
RIGHT ARM BP: 167 MMHG
RIGHT CALF PRESSURE: 204 MMHG
RIGHT POSTERIOR TIBIAL: 201 MMHG
RIGHT TBI: 0.98
RIGHT TOE PRESSURE: 164 MMHG

## 2021-07-28 ENCOUNTER — HOSPITAL ENCOUNTER (OUTPATIENT)
Dept: PHYSICAL THERAPY | Age: 61
Discharge: HOME | End: 2021-07-28
Payer: MEDICARE

## 2021-07-28 PROCEDURE — 97110 THERAPEUTIC EXERCISES: CPT

## 2021-07-28 PROCEDURE — 97162 PT EVAL MOD COMPLEX 30 MIN: CPT

## 2021-07-28 NOTE — PROGRESS NOTES
PT DAILY TREATMENT NOTE/KNEE EVAL     Patient Name: Gillian Lenz  Date:2021  : 1960  [x]  Patient  Verified  Payor: BLUE CROSS MEDICARE / Plan: [facility] HMO / Product Type: Managed Care Medicare /    In time: 11:30  Out time:12:16  Total Treatment Time (min): 46  Visit #: 1 of 8    Medicare/BCBS Only   Total Timed Codes (min):  24 1:1 Treatment Time:  46     Treatment Area: Muscle weakness (generalized) [M62.81]    SUBJECTIVE  Pain Level (0-10 scale): 0/10   []constant []intermittent []improving []worsening []no change since onset    Any medication changes, allergies to medications, adverse drug reactions, diagnosis change, or new procedure performed?: [x] No    [] Yes (see summary sheet for update)  Subjective functional status/changes:     PLOF: Patient stated he was able to get out of a chair with greater ease previously. Patient is independent with dressing/bathing but stated it is difficult. Patient lives with his sister. Limitations to PLOF: weakness, decreased mobility, impaired gait. Mechanism of Injury: Patient presents with generalized weakness that began in 2017 when patient had neck surgery to address an abscess and hardware was placed in the neck, but complications occurred which resulted in patient experiencing temporary quadriplegia. Patient stated he started walking approximately 8 months after the surgery. Patient is currently ambulating with a SPC in the community and no AD in the house. Patient denies any falls in the last year and no trips/stumbles. Patient stated he has been experiencing weakness in (B) LE and UE since the surgery. Current symptoms/Complaints: Patient has increased difficulty with getting up off the floor, sit to stand transfers, Walking more then 20 minutes, and difficulty lifting items. Patient denies any pain associated with the weakness.  Denies numbness/tingling   Previous Treatment/Compliance: prior PT after complications from surgery. PMHx/Surgical Hx: Patient is on dialysis- 3x/week (Tuesday/thursday/Saturday) and has a port in left UE. Kidney Cancer- 2013 (removed surgically) No Chemo/radiation per patient. patient denies any Cancer since. No Pacemaker, No heart issues. No back/hip/knee/ankle/foot surg. Only 3 toes on the right due to diabetes-  2013  Work Hx: n/a   Living Situation: stairs in home-  With rail. Patient reports he uses a reciprocal gait pattern. Pt Goals: in chart   Cognition: A & O x 2    Other:    OBJECTIVE/EXAMINATION  Domestic Life: lives with sister   Patient is independent with dressing and bathing but stated it is hard.      22 min [x]Eval                  []Re-Eval       24 min Therapeutic Exercise:  [x] See flow sheet : HEP creation and review; Stand: HR/TR, Hip 3-way, march, mini squat, HS curl- 5 reps each     Rationale: increase ROM and increase strength to improve the patients ability to perform ADls safely and efficiently           With   [] TE   [] TA   [] neuro   [] other: Patient Education: [x] Review HEP    [] Progressed/Changed HEP based on:   [] positioning   [] body mechanics   [] transfers   [] heat/ice application    [] other:      Other Objective/Functional Measures:     Physical Therapy Evaluation - Knee    Gait:  [] Normal    [x] Abnormal    [] Antalgic    [] NWB    Device: SPC    Describe: Patient exhibits decreased step/stride length, decreased stability and decreased john during ambulation with a SPC      ROM / Strength  [] Unable to assess                  AROM                      PROM                   Strength (1-5)    Left Right Left Right Left Right   Hip Flexion     4 4    Extension          Abduction          Adduction         Knee Flexion     4+ 4+    Extension     4 4   Ankle Plantarflexion          Dorsiflexion             Flexibility: [] Unable to assess at this time  Hamstrings:    (L) Tightness= [] WNL   [x] Min   [] Mod   [] Severe    (R) Tightness= [] WNL   [x] Min   [] Mod   [] Severe  Gastroc:      (L) Tightness= [] WNL   [x] Min   [] Mod   [] Severe    (R) Tightness= [] WNL   [x] Min   [] Mod   [] Severe    Other tests/comments:   SLS on floor with EO: left: 2 sec Right: unable to perform without  HHA   Tandem on floor with EO: 5 sec then required HHA    Romberg stance on floor with EO and EC: 30 sec no LOB   Patient able to perform Minisquat with SBA without report of pain  Patient required 1 UE support with sit to stand transfer  Pain Level (0-10 scale) post treatment: 0/10     ASSESSMENT/Changes in Function:  Discussed with patient also possibly participating in OT for (B) UE weakness and patient stated that he would like to focus on PT at this time. Advised patient to discuss with PT if he changes his mind in the future and would like to incorporate OT as well. Patient reported no pain at end of the session or while performing the exercises. Advised patient to stop HEP if pain develops. Patient will continue to benefit from skilled PT services to modify and progress therapeutic interventions, address functional mobility deficits, address ROM deficits, address strength deficits, analyze and address soft tissue restrictions, analyze and cue movement patterns, analyze and modify body mechanics/ergonomics, assess and modify postural abnormalities and address imbalance/dizziness to attain remaining goals.      [x]  See Plan of Care  []  See progress note/recertification  []  See Discharge Summary         Progress towards goals / Updated goals:  See POC    PLAN  []  Upgrade activities as tolerated     [x]  Continue plan of care  []  Update interventions per flow sheet       []  Discharge due to:_  []  Other:_      Judy Haley, PT 7/28/2021  11:07 AM

## 2021-07-28 NOTE — PROGRESS NOTES
[facility]  [address]  [place], [address]  [phone] fax    Plan of Care/ Statement of Necessity for Physical Therapy Services    Patient name: Lewis Gunn Start of Care: 2021   Referral source: Jani Jacob* : 1960    Medical Diagnosis: Muscle weakness (generalized) [M62.81]  Payor: Kash Ordoñez / Plan: [address]  HMO / Product Type: Managed Care Medicare /  Onset Date:2021  Original onset:      Treatment Diagnosis: generalized weakness   Prior Hospitalization: see medical history Provider#: [id number]   Medications: Verified on Patient summary List    Comorbidities: Patient is on dialysis- 3x/week (Tuesday/Thursday/Saturday) and has a port in left UE, Kidney Cancer-  (removed surgically) No Chemo/radiation per patient; Only 3 toes on the right due to diabetes- , HTN, Kidney/bladder/ urination or prostate problems, prior surgery   Prior Level of Function: Patient stated he was able to get out of a chair with greater ease previously. Patient is independent with dressing/bathing but stated it is difficult. Patient lives with his sister. The Plan of Care and following information is based on the information from the initial evaluation. Assessment/ key information: Patient presents with generalized weakness that began in  when patient had neck surgery to address an abscess and hardware was placed in the neck, but complications occurred which resulted in patient experiencing temporary quadriplegia. Patient stated he started walking approximately 8 months after the surgery. Patient is currently ambulating with a SPC in the community and no AD in the house. Patient denies any falls in the last year and no trips/stumbles. Patient stated he has been experiencing weakness in (B) LE and UE since the surgery, but noticed Legs and arms have become weaker.  Patient is currently on dialysis 3x/week (Tuesday/Thursday/Saturday) and has a port in left UE. Patient has increased difficulty with getting up off the floor, sit to stand transfers, Walking more then 20 minutes, and difficulty lifting items. Patient denies any pain associated with the weakness. Denies numbness/tingling. Patient exhibits decreased (B) knee and hip strength, decreased stability with balance activities, and increased difficulty with sit to stand transfers and requires 1 UE support. Patient demonstrates potential to make functional gains within a reasonable time frame. Patient would benefit from skilled PT to address above deficits and assist with return to PLOF. Evaluation Complexity History MEDIUM  Complexity : 1-2 comorbidities / personal factors will impact the outcome/ POC ; Examination MEDIUM Complexity : 3 Standardized tests and measures addressing body structure, function, activity limitation and / or participation in recreation  ;Presentation MEDIUM Complexity : Evolving with changing characteristics  ; Clinical Decision Making MEDIUM Complexity : FOTO score of 26-74  Overall Complexity Rating: MEDIUM  Problem List: pain affecting function, decrease ROM, decrease strength, impaired gait/ balance, decrease ADL/ functional abilitiies, decrease activity tolerance, decrease flexibility/ joint mobility and decrease transfer abilities   Treatment Plan may include any combination of the following: Therapeutic exercise, Therapeutic activities, Neuromuscular re-education, Physical agent/modality, Gait/balance training, Manual therapy, Patient education, Self Care training, Functional mobility training, Home safety training and Stair training  Patient / Family readiness to learn indicated by: asking questions, trying to perform skills and interest  Persons(s) to be included in education: patient (P)  Barriers to Learning/Limitations: None  Patient Goal (s): Be able to get down on the floor and get back up  Patient Self Reported Health Status: good  Rehabilitation Potential: good    Short Term Goals: To be accomplished in 2 weeks:   1. Patient will be independent and compliant with HEP 1-2x/day to increase ease with ADLs. Eval: HEP established    2. Patient will be able to maintain Tandem stance on floor with EO for 20 seconds without LOB to increase safety walking in community. Eval: Tandem on floor with EO: 5 sec then required HHA   Long Term Goals: To be accomplished in 4 weeks:   1. Patient will demonstrate improved FOTO score to at least 45 to demonstrate improved function. Eval: FOTO: 36   2. Patient will be able to maintain SLS on floor with EO for 5 seconds to increase safety and ease with stair negotiation   Eval:   SLS on floor with EO: left: 2 sec Right: unable to perform without  HHA    3. Patient will be able to perform 3 sit to stand transfers without UE support to increase ease with car transfers. Eval: Patient required 1 UE support with sit to stand transfer   Frequency / Duration: Patient to be seen 2 times per week for 4 weeks. Patient/ Caregiver education and instruction: Diagnosis, prognosis, exercises   [x]  Plan of care has been reviewed with PTA    Certification Period: 07/28/21- 08/26/21   Rosa Rowell, PT 7/28/2021 12:43 PM    ________________________________________________________________________    I certify that the above Therapy Services are being furnished while the patient is under my care. I agree with the treatment plan and certify that this therapy is necessary.     [de-identified] Signature:____________________  Date:____________Time: _________     Alisha Charlotte*  Please sign and return to [facility] [address] [address]  [place], [address]  [phone] fax

## 2021-08-03 PROBLEM — I10 ESSENTIAL HYPERTENSION: Status: RESOLVED | Noted: 2017-04-28 | Resolved: 2021-08-03

## 2021-08-13 ENCOUNTER — HOSPITAL ENCOUNTER (OUTPATIENT)
Dept: PHYSICAL THERAPY | Age: 61
Discharge: HOME | End: 2021-08-13
Payer: MEDICARE

## 2021-08-13 PROCEDURE — 97112 NEUROMUSCULAR REEDUCATION: CPT

## 2021-08-13 PROCEDURE — 97116 GAIT TRAINING THERAPY: CPT

## 2021-08-13 PROCEDURE — 97110 THERAPEUTIC EXERCISES: CPT

## 2021-08-13 NOTE — PROGRESS NOTES
PT DAILY TREATMENT NOTE     Patient Name: Kayla Turner  Date:2021  : 1960  [x]  Patient  Verified  Payor: BLUE CROSS MEDICARE / Plan: [address]  HMO / Product Type: Managed Care Medicare /    In time:2:05  Out time:2:48  Total Treatment Time (min): 43  Visit #: 2 of 8    Medicare/BCBS Only   Total Timed Codes (min):  43 1:1 Treatment Time:  43       Treatment Area: Muscle weakness (generalized) [M62.81]    SUBJECTIVE  Pain Level (0-10 scale): 3/10  Any medication changes, allergies to medications, adverse drug reactions, diagnosis change, or new procedure performed?: [x] No    [] Yes (see summary sheet for update)  Subjective functional status/changes:   [] No changes reported  Pt reports no new complaints of pain. OBJECTIVE    23 min Therapeutic Exercise:  [x] See flow sheet :   Rationale: increase ROM and increase strength to improve the patients ability to perform ADLs with increased ease. 10 min Neuromuscular Re-education:  [x]  See flow sheet :   Rationale: increase strength, improve coordination and increase proprioception  to improve the patients ability to perform    10 min Gait Training:  Marching, side stepping, retro walking 60 feet with no device on level surfaces with CGA level of assist   Rationale: With   [] TE   [] TA   [] neuro   [] other: Patient Education: [x] Review HEP    [] Progressed/Changed HEP based on:   [] positioning   [] body mechanics   [] transfers   [] heat/ice application    [] other:      Other Objective/Functional Measures: Initiated exercises as per flow sheet. Pain Level (0-10 scale) post treatment: 0/10    ASSESSMENT/Changes in Function: Pt has no adverse reaction to treatment with no increased difficult performing gait training and balance activities but was able to self correct LOB.      Patient will continue to benefit from skilled PT services to modify and progress therapeutic interventions, address functional mobility deficits, address ROM deficits, address strength deficits, analyze and address soft tissue restrictions and analyze and cue movement patterns to attain remaining goals. []  See Plan of Care  []  See progress note/recertification  []  See Discharge Summary         Progress towards goals / Updated goals:  Short Term Goals: To be accomplished in 2 weeks:              1. Patient will be independent and compliant with HEP 1-2x/day to increase ease with ADLs. Eval: HEP established    Current; pt reports compliance. 08/13/2021              2. Patient will be able to maintain Tandem stance on floor with EO for 20 seconds without LOB to increase safety walking in community. Eval: Tandem on floor with EO: 5 sec then required HHA   Long Term Goals: To be accomplished in 4 weeks:              1. Patient will demonstrate improved FOTO score to at least 45 to demonstrate improved function. Eval: FOTO: 36              2. Patient will be able to maintain SLS on floor with EO for 5 seconds to increase safety and ease with stair negotiation              Eval:   SLS on floor with EO: left: 2 sec Right: unable to perform without  HHA               3. Patient will be able to perform 3 sit to stand transfers without UE support to increase ease with car transfers.               Eval: Patient required 1 UE support with sit to stand transfer     PLAN  []  Upgrade activities as tolerated     [x]  Continue plan of care  []  Update interventions per flow sheet       []  Discharge due to:_  []  Other:_      Mariam Riggins PTA 8/13/2021  2:16 PM    Future Appointments   Date Time Provider Veda Moreno   8/13/2021  2:30 PM Roberth Garcia PTA [facility]   8/16/2021  1:45 PM Ze Storey, PT [facility]   8/17/2021  8:40 AM [facility] [address]   8/18/2021  1:15 PM Ze Storey PT [facility]   8/23/2021  1:30 PM John Miranda [facility] 8/27/2021 11:30 AM Dalia Burns, PTA [facility]   8/30/2021  1:30 PM Rodeo Colon [facility]   11/3/2021  1:30 PM Jaspal Deluca MD [id number] [facility]

## 2021-08-16 ENCOUNTER — HOSPITAL ENCOUNTER (OUTPATIENT)
Dept: PHYSICAL THERAPY | Age: 61
Discharge: HOME | End: 2021-08-16
Payer: MEDICARE

## 2021-08-16 PROCEDURE — 97112 NEUROMUSCULAR REEDUCATION: CPT

## 2021-08-16 PROCEDURE — 97110 THERAPEUTIC EXERCISES: CPT

## 2021-08-16 NOTE — PROGRESS NOTES
PT DAILY TREATMENT NOTE     Patient Name: Eneida Esposito  Date:2021  : 1960  [x]  Patient  Verified  Payor: BLUE CROSS MEDICARE / Plan: [facility]  HMO / Product Type: Managed Care Medicare /    In time: 1:47    Out time: 2:31  Total Treatment Time (min): 44  Visit #: 3 of 8    Medicare/BCBS Only   Total Timed Codes (min): 44 1:1 Treatment Time:  44       Treatment Area: Muscle weakness (generalized) [M62.81]    SUBJECTIVE  Pain Level (0-10 scale): 0  Any medication changes, allergies to medications, adverse drug reactions, diagnosis change, or new procedure performed?: [x] No    [] Yes (see summary sheet for update)  Subjective functional status/changes:   [] No changes reported  Pt reports having no pain today. OBJECTIVE    24 min Therapeutic Exercise:  [x] See flow sheet :   Rationale: increase ROM and increase strength to improve the patients ability to perform ADLs. 20 min Neuromuscular Re-education:  [x]  See flow sheet : balancing exercises, dynamic gait activities (side stepping, march/retro ambulation, 60 feet each). Rationale: increase strength, improve coordination and increase proprioception  to improve the patients ability to perform functional tasks. With   [x] TE   [] TA   [x] neuro   [] other: Patient Education: [x] Review HEP    [] Progressed/Changed HEP based on:   [x] positioning   [x] body mechanics   [] transfers   [] heat/ice application    [] other:      Other Objective/Functional Measures: Progressed exercises/increased repetitions per flow sheet to improve strength and mobility. Pain Level (0-10 scale) post treatment: 0    ASSESSMENT/Changes in Function: Reported no pain post session today. HHA used for all standing strengthening exercises today. No LOB noted with balancing and dynamic gait activities but was very challenged with marching ambulation with stability.  CGA/SBA used for all balancing and dynamic gait exercises. Continue POC as tolerated to improve strength/endurance. Patient will continue to benefit from skilled PT services to modify and progress therapeutic interventions, address functional mobility deficits, address ROM deficits, address strength deficits, analyze and address soft tissue restrictions and analyze and cue movement patterns to attain remaining goals. []  See Plan of Care  []  See progress note/recertification  []  See Discharge Summary         Progress towards goals / Updated goals:  Short Term Goals: To be accomplished in 2 weeks:              1. Patient will be independent and compliant with HEP 1-2x/day to increase ease with ADLs. Eval: HEP established    Current; pt reports compliance. 08/13/2021              2. Patient will be able to maintain Tandem stance on floor with EO for 20 seconds without LOB to increase safety walking in community. Eval: Tandem on floor with EO: 5 sec then required HHA    Current: continues to need [facility] 8/16/2021  Long Term Goals: To be accomplished in 4 weeks:              1. Patient will demonstrate improved FOTO score to at least 45 to demonstrate improved function. Eval: FOTO: 36              2. Patient will be able to maintain SLS on floor with EO for 5 seconds to increase safety and ease with stair negotiation              Eval:   SLS on floor with EO: left: 2 sec Right: unable to perform without  HHA               3. Patient will be able to perform 3 sit to stand transfers without UE support to increase ease with car transfers.               Eval: Patient required 1 UE support with sit to stand transfer     PLAN  [x]  Upgrade activities as tolerated     [x]  Continue plan of care  [x]  Update interventions per flow sheet       []  Discharge due to:_  []  Other:_      Ozzy Pittman PT 8/16/2021  1:54 PM    Future Appointments   Date Time Provider Veda Moreno   8/17/2021  8:40 AM [facility] SCHED   8/18/2021  1:15 PM Rudy Cali, PT [facility]   8/23/2021  1:30 PM Sandralee Jimbo [facility]   8/27/2021 11:30 AM Marylou Rushing, PTA [facility]   8/30/2021  1:30 PM Sandralee Jimbo [facility]   9/1/2021  1:30 PM [facility]   9/15/2021  1:30 PM Julius Diaz MD [facility]   11/3/2021  1:30 PM Shona Bruce MD [address] [facility]

## 2021-08-18 ENCOUNTER — APPOINTMENT (OUTPATIENT)
Dept: PHYSICAL THERAPY | Age: 61
End: 2021-08-18
Payer: MEDICARE

## 2021-08-23 ENCOUNTER — HOSPITAL ENCOUNTER (OUTPATIENT)
Dept: PHYSICAL THERAPY | Age: 61
Discharge: HOME | End: 2021-08-23
Payer: MEDICARE

## 2021-08-23 PROCEDURE — 97112 NEUROMUSCULAR REEDUCATION: CPT

## 2021-08-23 PROCEDURE — 97110 THERAPEUTIC EXERCISES: CPT

## 2021-08-23 NOTE — PROGRESS NOTES
PT DAILY TREATMENT NOTE     Patient Name: Eneida Esposito  Date:2021  : 1960  [x]  Patient  Verified  Payor: BLUE CROSS MEDICARE / Plan: [facility] [address] [facility]  HMO / Product Type: Managed Care Medicare /    In time:1:30  Out time:2:08  Total Treatment Time (min): 38  Visit #: 4 of 8    Medicare/BCBS Only   Total Timed Codes (min):  38 1:1 Treatment Time:  38       Treatment Area: Muscle weakness (generalized) [M62.81]    SUBJECTIVE   Pain Level (0-10 scale): 0  Any medication changes, allergies to medications, adverse drug reactions, diagnosis change, or new procedure performed?: [x] No    [] Yes (see summary sheet for update)  Subjective functional status/changes:   [] No changes reported  The pt reports no issues currently. Reports he feels he could be pushed more    OBJECTIVE      30 min Therapeutic Exercise:  [] See flow sheet :   Rationale: increase ROM and increase strength to improve the patients ability to perform daily tasks and self care    10 min Neuromuscular Re-education:  []  See flow sheet :   Rationale: improve balance and increase proprioception  to improve the patients ability to perform ADLs with improved safety and stability            With   [] TE   [] TA   [] neuro   [] other: Patient Education: [x] Review HEP    [] Progressed/Changed HEP based on:   [] positioning   [] body mechanics   [] transfers   [] heat/ice application    [] other:      Other Objective/Functional Measures:      Pain Level (0-10 scale) post treatment: 0    ASSESSMENT/Changes in Function: Added more strengthening interventions today as pt reports weakness especially left leg as his main concern. Good overall form with some single UE assist required for dynamic tasks.  Pt reports feeling good after session, will progress with strength work    Patient will continue to benefit from skilled PT services to modify and progress therapeutic interventions, address functional mobility deficits, address ROM deficits, address strength deficits, analyze and address soft tissue restrictions, analyze and cue movement patterns and analyze and modify body mechanics/ergonomics to attain remaining goals. []  See Plan of Care  []  See progress note/recertification  []  See Discharge Summary         Progress towards goals / Updated goals:  Short Term Goals: To be accomplished in 2 weeks:              3. Patient will be independent and compliant with HEP 1-2x/day to increase ease with ADLs.             Eval: HEP established               Current; pt reports compliance. 08/13/2021              2. Patient will be able to maintain Tandem stance on floor with EO for 20 seconds without LOB to increase safety walking in community.             Eval: Tandem on floor with EO: 5 sec then required HHA               Current: continues to need [facility] 8/16/2021  Long Term Goals: To be accomplished in 4 weeks:              1.  Patient will demonstrate improved FOTO score to at least 45 to demonstrate improved function.               Eval: FOTO: 36              2. Patient will be able to maintain SLS on floor with EO for 5 seconds to increase safety and ease with stair negotiation              Eval:   SLS on floor with EO: left: 2 sec Right: unable to perform without [facility]              3. Patient will be able to perform 3 sit to stand transfers without UE support to increase ease with car transfers.              Eval: Patient required 1 UE support with sit to stand transfer    Current: Met able to perform from medium to low table without UE x 10 8/23/2021    PLAN  []  Upgrade activities as tolerated     []  Continue plan of care  []  Update interventions per flow sheet       []  Discharge due to:_  []  Other:_      Awilda Vizcarra DPT CMTPT 8/23/2021  1:32 PM    Future Appointments   Date Time Provider Veda Moreno   8/27/2021 11:30 AM Tatianna Smiley PTA [facility]   8/30/2021  1:30 PM Jennifer Fowler [facility] 9/1/2021  1:30 PM [facility] AMB   9/15/2021  1:30 PM Fabiano Diaz MD [facility] AMB   9/15/2021  3:20 PM [facility] NURSE [facility]   11/3/2021  1:30 PM Cinthia Chapin MD [id number] [facility]

## 2021-08-27 ENCOUNTER — HOSPITAL ENCOUNTER (OUTPATIENT)
Dept: PHYSICAL THERAPY | Age: 61
Discharge: HOME | End: 2021-08-27
Payer: MEDICARE

## 2021-08-27 PROCEDURE — 97110 THERAPEUTIC EXERCISES: CPT

## 2021-08-27 PROCEDURE — 97530 THERAPEUTIC ACTIVITIES: CPT

## 2021-08-27 PROCEDURE — 97112 NEUROMUSCULAR REEDUCATION: CPT

## 2021-08-27 NOTE — PROGRESS NOTES
[facility]  [address]  [place], [address]  [phone] fax    Continued Plan of Care/ Re-certification for Physical Therapy Services  Patient name: Yesika Flores Start of Care: 2021   Referral source: Jw Phi* : 1960               Medical Diagnosis: Muscle weakness (generalized) [M62.81]  Payor: Kirsten Heath / Plan: [facility] [address] HMO / Product Type: Managed Care Medicare /  Onset Date:2021  Original onset:                Treatment Diagnosis: generalized weakness   Prior Hospitalization: see medical history Provider#: [id number]   Medications: Verified on Patient summary List    Comorbidities: Patient is on dialysis- 3x/week (Tuesday/Thursday/Saturday) and has a port in left UE, Kidney Cancer-  (removed surgically) No Chemo/radiation per patient; Only 3 toes on the right due to diabetes- , HTN, Kidney/bladder/ urination or prostate problems, prior surgery   Prior Level of Function: Patient stated he was able to get out of a chair with greater ease previously. Patient is independent with dressing/bathing but stated it is difficult. Patient lives with his sister.      Visits from Start of Care: 5    Missed Visits: 0    The Plan of Care and following information is based on the patient's current status:    Key functional changes:   Short Term Goals: To be accomplished in 2 weeks:              1. Patient will be independent and compliant with HEP 1-2x/day to increase ease with ADLs.             Eval: HEP established               [facility]; pt reports compliance. 2021              2. Patient will be able to maintain Tandem stance on floor with EO for 20 seconds without LOB to increase safety walking in community.               Eval: Tandem on floor with EO: 5 sec then required HHA               Current: continues to need HHA 2021  Long Term Goals: To be accomplished in 4 weeks:              3. Patient will demonstrate improved FOTO score to at least 45 to demonstrate improved function.               Eval: FOTO: 36              2. Patient will be able to maintain SLS on floor with EO for 5 seconds to increase safety and ease with stair negotiation              Eval:   SLS on floor with EO: left: 2 sec Right: unable to perform without [facility]              3. Patient will be able to perform 3 sit to stand transfers without UE support to increase ease with car transfers.              Eval: Patient required 1 UE support with sit to stand transfer               Current: Met able to perform from medium to low table without UE x 10 8/23/2021      Problems/ barriers to goal attainment:      Problem List: decrease ROM, decrease strength, impaired gait/ balance, decrease ADL/ functional abilitiies, decrease activity tolerance and decrease flexibility/ joint mobility    Treatment Plan: Therapeutic exercise, Therapeutic activities, Neuromuscular re-education, Physical agent/modality, Gait/balance training, Manual therapy, Patient education, Self Care training, Functional mobility training and Stair training     Patient Goal (s) has been updated and includes:      Goals for this certification period to be accomplished in 4 weeks:  1. Patient will demonstrate improved FOTO score to at least 45 to demonstrate improved function. PN: FOTO: 36  2. Patient will be able to maintain SLS on floor with EO for 5 seconds to increase safety and ease with stair negotiation  PN:SLS on floor with EO: left: 2 sec Right: unable to perform without  HHA   3. Pt will demonstrate step downs from 4\" box without LOB or UE assist to improve closed chain LE strength with ADLs. PN: challenged with unilateral stability    Frequency / Duration: Patient to be seen 2 times per week for 4 weeks:    Assessment / Recommendations: The pt is progressing well overall with goals.  He does show continued LE strength and stability challenge especially unilateral work. The pt would benefit from continued PT to progress LE strength and dynamic task stability    Certification Period: 7/23/2021 to 9/20/2021    Kim Martinez DPT CMTPT 8/27/2021 4:24 PM    ________________________________________________________________________  I certify that the above Therapy Services are being furnished while the patient is under my care. I agree with the treatment plan and certify that this therapy is necessary. [] I have read the above and request that my patient continue as recommended.   [] I have read the above report and request that my patient continue therapy with the following changes/special instructions: _____________________________________________  [] I have read the above report and request that my patient be discharged from therapy    Physician's Signature:____________Date:_________TIME:________     JOHNNY James*  ** Signature, Date and Time must be completed for valid certification **    Please sign and return to [facility] [address] [address] [address]  [place], [facility] [address]  [phone] fax

## 2021-08-27 NOTE — PROGRESS NOTES
PT DAILY TREATMENT NOTE     Patient Name: Juanjo Brooks  Date:2021  : 1960  [x]  Patient  Verified  Payor: BLUE CROSS MEDICARE / Plan: [address]  HMO / Product Type: Managed Care Medicare /    In time:11:33  Out time:12:17  Total Treatment Time (min): 44  Visit #: 1 of 8    Medicare/BCBS Only   Total Timed Codes (min):  44 1:1 Treatment Time:  44       Treatment Area: Muscle weakness (generalized) [M62.81]    SUBJECTIVE  Pain Level (0-10 scale): 0/10  Any medication changes, allergies to medications, adverse drug reactions, diagnosis change, or new procedure performed?: [x] No    [] Yes (see summary sheet for update)  Subjective functional status/changes:   [] No changes reported  Pt denies pain. Pt reports he feels slightly stronger. OBJECTIVE    24 min Therapeutic Exercise:  [x] See flow sheet :   Rationale: increase ROM and increase strength to improve the patients ability to perform ADLs with increased ease. 10 min Therapeutic Activity:  [x]  See flow sheet :   Rationale: increase ROM and increase strength  to improve the patients ability to perform ADLs with increased ease. 10 min Neuromuscular Re-education:  [x]  See flow sheet :   Rationale: increase strength, improve coordination and increase proprioception  to improve the patients ability to perform ADLs with increased ease. With   [] TE   [] TA   [] neuro   [] other: Patient Education: [x] Review HEP    [] Progressed/Changed HEP based on:   [] positioning   [] body mechanics   [] transfers   [] heat/ice application    [] other:      Other Objective/Functional Measures: FOTO: 41      Pain Level (0-10 scale) post treatment: 0/10    ASSESSMENT/Changes in Function: Pt demonstrates improved functional mobility and strength. Pt demonstrates continued decreased proprioception when performing SLS activities.      Patient will continue to benefit from skilled PT services to modify and progress therapeutic interventions, address functional mobility deficits, address ROM deficits, address strength deficits, analyze and address soft tissue restrictions, analyze and cue movement patterns and analyze and modify body mechanics/ergonomics to attain remaining goals. []  See Plan of Care  []  See progress note/recertification  []  See Discharge Summary         Progress towards goals / Updated goals:  1. Patient will demonstrate improved FOTO score to at least 45 to demonstrate improved function. PN: FOTO: 36  2. Patient will be able to maintain SLS on floor with EO for 5 seconds to increase safety and ease with stair negotiation  PN:SLS on floor with EO: left: 2 sec Right: unable to perform without  HHA   3. Pt will demonstrate step downs from 4\" box without LOB or UE assist to improve closed chain LE strength with ADLs.   PN: challenged with unilateral stability     PLAN  []  Upgrade activities as tolerated     [x]  Continue plan of care  []  Update interventions per flow sheet       []  Discharge due to:_  []  Other:_      Ana Peace, PTA 8/27/2021  11:39 AM    Future Appointments   Date Time Provider Veda Chakrabortyi   8/30/2021  1:30 PM [place], [address] [facility]   9/1/2021  1:30 PM LAB_BSMO BSMO BS AMB   9/15/2021  1:30 PM Melania Diaz MD BSMO BS [facility]   9/15/2021  3:20 PM [facility] NURSE [facility] ANTHONY POND   11/3/2021  1:30 PM Adwoa Dodd MD [address] [facility]

## 2021-08-30 ENCOUNTER — HOSPITAL ENCOUNTER (OUTPATIENT)
Dept: PHYSICAL THERAPY | Age: 61
Discharge: HOME | End: 2021-08-30
Payer: MEDICARE

## 2021-08-30 PROCEDURE — 97110 THERAPEUTIC EXERCISES: CPT

## 2021-08-30 PROCEDURE — 97112 NEUROMUSCULAR REEDUCATION: CPT

## 2021-08-30 NOTE — PROGRESS NOTES
PT DAILY TREATMENT NOTE     Patient Name: Kayla Turner  Date:2021  : 1960  [x]  Patient  Verified  Payor: BLUE CROSS MEDICARE / Plan: [facility] [address]  HMO / Product Type: Managed Care Medicare /    In time:1:30  Out time:2:11  Total Treatment Time (min): 41  Visit #: 2 of 8    Medicare/BCBS Only   Total Timed Codes (min):  41 1:1 Treatment Time:  41       Treatment Area: Muscle weakness (generalized) [M62.81]    SUBJECTIVE  Pain Level (0-10 scale): 0  Any medication changes, allergies to medications, adverse drug reactions, diagnosis change, or new procedure performed?: [x] No    [] Yes (see summary sheet for update)  Subjective functional status/changes:   [] No changes reported  The pt reports no issues since last visit, a little back soreness in general    OBJECTIVE      18 min Therapeutic Exercise:  [] See flow sheet :   Rationale: increase ROM and increase strength to improve the patients ability to perform daily tasks and self care    23 min Neuromuscular Re-education:  []  See flow sheet :   Rationale: increase strength, improve balance and increase proprioception  to improve the patients ability to perform functional tasks with improved stability           With   [] TE   [] TA   [] neuro   [] other: Patient Education: [x] Review HEP    [] Progressed/Changed HEP based on:   [] positioning   [] body mechanics   [] transfers   [] heat/ice application    [] other:      Other Objective/Functional Measures:      Pain Level (0-10 scale) post treatment: 0    ASSESSMENT/Changes in Function: The pt demonstrates good effort and response with therex today including new interventions and increased repetitions/resistance.  Continue to progress with LE strength and endurance    Patient will continue to benefit from skilled PT services to modify and progress therapeutic interventions, address functional mobility deficits, address ROM deficits, address strength deficits, analyze and address soft tissue restrictions, analyze and cue movement patterns, analyze and modify body mechanics/ergonomics and address imbalance/dizziness to attain remaining goals. []  See Plan of Care  []  See progress note/recertification  []  See Discharge Summary           Progress towards goals / Updated goals:  1. Patient will demonstrate improved FOTO score to at least 45 to demonstrate improved function.   PN: FOTO: 36  Current: progressed to 41 on 8/27  2. Patient will be able to maintain SLS on floor with EO for 5 seconds to increase safety and ease with stair negotiation  PN:SLS on floor with EO: left: 2 sec Right: unable to perform without  HHA   3. Pt will demonstrate step downs from 4\" box without LOB or UE assist to improve closed chain LE strength with ADLs.   PN: challenged with unilateral stability    PLAN  [x]  Upgrade activities as tolerated     []  Continue plan of care  []  Update interventions per flow sheet       []  Discharge due to:_  []  Other:_      Magaly Colón DPT CMTPT 8/30/2021  1:38 PM    Future Appointments   Date Time Provider Veda Moreno   9/1/2021  [facility]   9/8/2021 12:45 PM Radha Patel, PT [facility]   9/13/2021 12:45 PM Areli Organ, PTA [facility]   9/15/2021  1:30 PM Shea Diaz MD [facility]   9/15/2021  3:20 PM [facility] NURSE [facility] ANTHONY [facility]   9/15/2021  3:45 PM Areli Organ, PTA [facility]   9/20/2021  1:30 PM Lee Ann Mathews [facility]   11/3/2021  1:30 PM MD Janna Soriano

## 2021-09-20 ENCOUNTER — HOSPITAL ENCOUNTER (OUTPATIENT)
Dept: PHYSICAL THERAPY | Age: 61
Discharge: HOME | End: 2021-09-20
Payer: MEDICARE

## 2021-09-20 PROCEDURE — 97110 THERAPEUTIC EXERCISES: CPT

## 2021-09-20 PROCEDURE — 97112 NEUROMUSCULAR REEDUCATION: CPT

## 2021-09-20 NOTE — PROGRESS NOTES
[facility]  [address]  [place], [address]  [phone] fax    Continued Plan of Care/ Re-certification for Physical Therapy Services    Patient name: Law Rahman Start of Care: 2021   Referral source: Lorena Gilliam [place]* : 1960               Medical Diagnosis: Muscle weakness (generalized) [M62.81]  Payor: [employer] / Plan: [facility] HMO / Product Type: Managed Care Medicare /  Onset Date:2021  Original onset:                Treatment Diagnosis: generalized weakness   Prior Hospitalization: see medical history Provider#: [id number]   Medications: Verified on Patient summary List    Comorbidities: Patient is on dialysis- 3x/week (Tuesday/Thursday/Saturday) and has a port in left UE, Kidney Cancer-  (removed surgically) No Chemo/radiation per patient; Only 3 toes on the right due to diabetes- , HTN, Kidney/bladder/ urination or prostate problems, prior surgery   Prior Level of Function: Patient stated he was able to get out of a chair with greater ease previously. Patient is independent with dressing/bathing but stated it is difficult. Patient lives with his sister.   Visits from Start of Care: 7    Missed Visits: 3    The Plan of Care and following information is based on the patient's current status:  Key functional changes: 1. Patient will demonstrate improved FOTO score to at least 45 to demonstrate improved function.   PN: FOTO: 36  Current: progressed to 41 on  progressed to 44 2021  2. Patient will be able to maintain SLS on floor with EO for 5 seconds to increase safety and ease with stair negotiation  PN:SLS on floor with EO: left: 2 sec Right: unable to perform without [facility]  Current: Met 9\" left  8\" right 2021   3. Pt will demonstrate step downs from 4\" box without LOB or UE assist to improve closed chain LE strength with ADLs.   PN: challenged with unilateral stability  Current: to be initiated next visit      Problems/ barriers to goal attainment: limited attendance of late secondary to transportation     Problem List: pain affecting function, decrease ROM, decrease strength, impaired gait/ balance, decrease ADL/ functional abilitiies, decrease activity tolerance and decrease flexibility/ joint mobility    Treatment Plan: Therapeutic exercise, Therapeutic activities, Neuromuscular re-education, Physical agent/modality, Gait/balance training, Manual therapy, Patient education, Self Care training, Functional mobility training and Stair training     Patient Goal (s) has been updated and includes: get up from the floor     Goals for this certification period to be accomplished in 2 weeks:  1. Patient will demonstrate improved FOTO score to at least 45 to demonstrate improved function.   PN:44   2. Pt will demonstrate step downs from 4\" box without LOB or UE assist to improve closed chain LE strength with ADLs. PN: to be initiated next visit    Frequency / Duration: Patient to be seen 2 times per week for 2 weeks:    Assessment / Recommendations: The pt shows good overall stability and endurance with exercise at this time. Improved SLS also noted today. Pt would benefit from 2 week PT continuance to progress towards strengthening HEP. Pt considering OT treatment for UE to improve with transfers from the floor    Certification Period: 9/29/2021 to 10/14/2021    Darian Carlin DPT CMTPT 9/20/2021 2:23 PM    ________________________________________________________________________  I certify that the above Therapy Services are being furnished while the patient is under my care. I agree with the treatment plan and certify that this therapy is necessary. [] I have read the above and request that my patient continue as recommended.   [] I have read the above report and request that my patient continue therapy with the following changes/special instructions: _____________________________________________  [] I have read the above report and request that my patient be discharged from therapy    Physician's Signature:____________Date:_________TIME:________     Pilarjuanita Von*  ** Signature, Date and Time must be completed for valid certification **    Please sign and return to [facility] [address] [place] [address] & [facility] [address]  [address] [place] [address] [address]  [phone] fax

## 2021-09-20 NOTE — PROGRESS NOTES
PT DAILY TREATMENT NOTE     Patient Name: Joanne Santos  Date:2021  : 1960  [x]  Patient  Verified  Payor: BLUE CROSS MEDICARE / Plan: [place] [address] [place]  HMO / Product Type: Managed Care Medicare /    In time:1:35  Out time:2:13  Total Treatment Time (min): 38  Visit #: 3 of 8    Medicare/BCBS Only   Total Timed Codes (min):  38 1:1 Treatment Time:  38       Treatment Area: Muscle weakness (generalized) [M62.81]    SUBJECTIVE  Pain Level (0-10 scale): 0  Any medication changes, allergies to medications, adverse drug reactions, diagnosis change, or new procedure performed?: [x] No    [] Yes (see summary sheet for update)  Subjective functional status/changes:   [] No changes reported  The pt reports he needs to make up missed 3 visits because his appointments were not scheduled with enough time for his transportation to be scheduled    OBJECTIVE      26 min Therapeutic Exercise:  [] See flow sheet :   Rationale: increase ROM and increase strength to improve the patients ability to perform daily tasks with improved strength      12 min Neuromuscular Re-education:  []  See flow sheet :   Rationale: increase strength, improve balance and increase proprioception  to improve the patients ability to perform functional tasks with improved stability and efficiency            With   [] TE   [] TA   [] neuro   [] other: Patient Education: [x] Review HEP    [] Progressed/Changed HEP based on:   [] positioning   [] body mechanics   [] transfers   [] heat/ice application    [] other:      Other Objective/Functional Measures:      Pain Level (0-10 scale) post treatment: 0    ASSESSMENT/Changes in Function: The pt shows good overall stability and endurance with exercise at this time. Improved SLS also noted today.  Pt would benefit from 2 week PT continuance to progress towards strengthening HEP    Patient will continue to benefit from skilled PT services to modify and progress therapeutic interventions, address functional mobility deficits, address ROM deficits, address strength deficits, analyze and address soft tissue restrictions, analyze and cue movement patterns and analyze and modify body mechanics/ergonomics to attain remaining goals. []  See Plan of Care  [x]  See progress note/recertification  []  See Discharge Summary         Progress towards goals / Updated goals:  1. Patient will demonstrate improved FOTO score to at least 45 to demonstrate improved function.   PN: FOTO: 36  Current: progressed to 41 on 8/27 progressed to 44 9/20/2021  2. Patient will be able to maintain SLS on floor with EO for 5 seconds to increase safety and ease with stair negotiation  PN:SLS on floor with EO: left: 2 sec Right: unable to perform without [facility]  Current: Met 9\" left  8\" right 9/20/2021   3. Pt will demonstrate step downs from 4\" box without LOB or UE assist to improve closed chain LE strength with ADLs.   PN: challenged with unilateral stability  Current: to be initiated next visit    PLAN  []  Upgrade activities as tolerated     []  Continue plan of care  []  Update interventions per flow sheet       []  Discharge due to:_  []  Other:_      Kim Lay DPT CMTPT 9/20/2021  1:41 PM    Future Appointments   Date Time Provider Veda Moreno   10/6/2021 11:15 AM LAB_BSMO BSMO BS AMB   10/13/2021  9:45 AM Dilshad Diaz MD BSMO BS AMB   10/22/2021  2:00 PM [facility] NURSE [facility] ANTHONY POND   11/3/2021  1:30 PM Almita Delgadillo MD [address] [facility]

## 2021-09-29 ENCOUNTER — HOSPITAL ENCOUNTER (OUTPATIENT)
Dept: PHYSICAL THERAPY | Age: 61
Discharge: HOME | End: 2021-09-29
Payer: MEDICARE

## 2021-09-29 PROCEDURE — 97110 THERAPEUTIC EXERCISES: CPT

## 2021-09-29 PROCEDURE — 97112 NEUROMUSCULAR REEDUCATION: CPT

## 2021-09-29 PROCEDURE — 97140 MANUAL THERAPY 1/> REGIONS: CPT

## 2021-09-29 NOTE — PROGRESS NOTES
PT DAILY TREATMENT NOTE     Patient Name: Alina Fam  Date:2021  : 1960  [x]  Patient  Verified  Payor: BLUE CROSS MEDICARE / Plan: [facility] [address] [facility]  HMO / Product Type: Managed Care Medicare /    In time:1:46  Out time:2:25  Total Treatment Time (min): 39  Visit #: 1 of 8    Medicare/BCBS Only   Total Timed Codes (min):  39 1:1 Treatment Time:  39       Treatment Area: Muscle weakness (generalized) [M62.81]    SUBJECTIVE  Pain Level (0-10 scale): 3/10  Any medication changes, allergies to medications, adverse drug reactions, diagnosis change, or new procedure performed?: [x] No    [] Yes (see summary sheet for update)  Subjective functional status/changes:   [] No changes reported  The patient states that his back is sore upon arrival.     OBJECTIVE  24 min Therapeutic Exercise:  [x] See flow sheet :   Rationale: increase ROM and increase strength to improve the patients ability to improve ADL ease. 15 min Neuromuscular Re-education:  [x]  See flow sheet :   Rationale: increase ROM and increase strength  to improve the patients ability to improve ADL ease. With   [] TE   [] TA   [] neuro   [] other: Patient Education: [x] Review HEP    [] Progressed/Changed HEP based on:   [] positioning   [] body mechanics   [] transfers   [] heat/ice application    [] other:      Other Objective/Functional Measures:   Initiated 4\" step downs, with patient requiring B UE // use. Added lunges in // bars with knee to airex    Pain Level (0-10 scale) post treatment: 0/10    ASSESSMENT/Changes in Function: Nearly met goal if 4\" step downs, though the patient was quite challenged with regards to quad fatigue upon performance. The patient will continue to benefit from skilled PT in order to address the aforementioned impairments.      Patient will continue to benefit from skilled PT services to modify and progress therapeutic interventions, address functional mobility deficits, address ROM deficits, address strength deficits, analyze and address soft tissue restrictions, analyze and cue movement patterns, analyze and modify body mechanics/ergonomics, assess and modify postural abnormalities and address imbalance/dizziness to attain remaining goals. []  See Plan of Care  []  See progress note/recertification  []  See Discharge Summary         Progress towards goals / Updated goals:  1. Patient will demonstrate improved FOTO score to at least 45 to demonstrate improved function.   PN:44    2. Pt will demonstrate step downs from 4\" box without LOB or UE assist to improve closed chain LE strength with ADLs.   PN: to be initiated next visit  Current: Initiated 4\" step downs 9/29/2021     PLAN  []  Upgrade activities as tolerated     [x]  Continue plan of care  []  Update interventions per flow sheet       []  Discharge due to:_  []  Other:_      Daya Lopez, PT 9/29/2021  1:51 PM    Future Appointments   Date Time Provider Veda Chakrabortyi   10/4/2021  1:30 PM [place], [address] [facility]   10/6/2021 11:15 AM LAB_BSMO BSMO BS [facility]   10/6/2021  [facility]:00 [facility]   10/11/2021  3:00 PM [facility]ier[facility]keon, [facility]   10/13/2021  9:45 AM Rose Diaz MD BSMO BS [facility]   10/22/2021  2:00 PM [facility] NURSE [facility] ANTHONY [facility]   11/3/2021  1:30 PM [facility]   ;

## 2021-10-01 ENCOUNTER — APPOINTMENT (OUTPATIENT)
Dept: PHYSICAL THERAPY | Age: 61
End: 2021-10-01
Payer: MEDICARE

## 2021-10-01 DIAGNOSIS — N18.30 ANEMIA ASSOCIATED WITH STAGE 3 CHRONIC RENAL FAILURE (HCC): Primary | ICD-10-CM

## 2021-10-01 DIAGNOSIS — D63.1 ANEMIA ASSOCIATED WITH STAGE 3 CHRONIC RENAL FAILURE (HCC): ICD-10-CM

## 2021-10-01 DIAGNOSIS — D63.1 ANEMIA ASSOCIATED WITH STAGE 3 CHRONIC RENAL FAILURE (HCC): Primary | ICD-10-CM

## 2021-10-01 DIAGNOSIS — N18.30 ANEMIA ASSOCIATED WITH STAGE 3 CHRONIC RENAL FAILURE (HCC): ICD-10-CM

## 2021-10-04 ENCOUNTER — HOSPITAL ENCOUNTER (OUTPATIENT)
Dept: PHYSICAL THERAPY | Age: 61
Discharge: HOME | End: 2021-10-04
Payer: MEDICARE

## 2021-10-04 PROCEDURE — 97112 NEUROMUSCULAR REEDUCATION: CPT

## 2021-10-04 PROCEDURE — 97530 THERAPEUTIC ACTIVITIES: CPT

## 2021-10-04 PROCEDURE — 97110 THERAPEUTIC EXERCISES: CPT

## 2021-10-04 NOTE — PROGRESS NOTES
PT DAILY TREATMENT NOTE     Patient Name: Alaina Santana  Date:10/4/2021  : 1960  [x]  Patient  Verified  Payor: BLUE CROSS MEDICARE / Plan: [facility]  HMO / Product Type: Managed Care Medicare /    In time:1:29  Out time:2:09  Total Treatment Time (min): 40  Visit #: 2 of 8    Medicare/BCBS Only   Total Timed Codes (min):  40 1:1 Treatment Time:  40       Treatment Area: Muscle weakness (generalized) [M62.81]    SUBJECTIVE  Pain Level (0-10 scale): 0  Any medication changes, allergies to medications, adverse drug reactions, diagnosis change, or new procedure performed?: [x] No    [] Yes (see summary sheet for update)  Subjective functional status/changes:   [] No changes reported  The pt reports everything has been good    OBJECTIVE      24 min Therapeutic Exercise:  [] See flow sheet :   Rationale: increase ROM and increase strength to improve the patients ability to perform daily tasks and self care    8 min Therapeutic Activity:  []  See flow sheet :   Rationale: increase strength and improve coordination  to improve the patients ability to transfer floor<>standing with improved ease    8 min Neuromuscular Re-education:  []  See flow sheet :   Rationale: increase strength, improve coordination and increase proprioception  to improve the patients ability to perform functional tasks with improved stability      With   [] TE   [] TA   [] neuro   [] other: Patient Education: [x] Review HEP    [] Progressed/Changed HEP based on:   [] positioning   [] body mechanics   [] transfers   [] heat/ice application    [] other:      Other Objective/Functional Measures:      Pain Level (0-10 scale) post treatment: 0    ASSESSMENT/Changes in Function: Pt is challenged with lunge to airex in // bars especially with right LE controlling descent. Improved overall strength and stability noted with other exercise.  Unsure as to whether further visits will be authorized, will plan to continue LE strength to improve transfers and floor transfer ease over remaining visits    Patient will continue to benefit from skilled PT services to modify and progress therapeutic interventions, address functional mobility deficits, address ROM deficits, address strength deficits, analyze and address soft tissue restrictions, analyze and cue movement patterns and analyze and modify body mechanics/ergonomics to attain remaining goals. []  See Plan of Care  []  See progress note/recertification  []  See Discharge Summary         Progress towards goals / Updated goals:  1. Patient will demonstrate improved FOTO score to at least 45 to demonstrate improved function.   PN:44    2. Pt will demonstrate step downs from 4\" box without LOB or UE assist to improve closed chain LE strength with ADLs.   PN: to be initiated next visit  Current: Initiated 4\" step downs 9/29/2021    PLAN  []  Upgrade activities as tolerated     []  Continue plan of care  []  Update interventions per flow sheet       []  Discharge due to:_  []  Other:_      Daniel Oglesby DPT CMTPT 10/4/2021  1:30 PM    Future Appointments   Date Time Provider Veda Moreno   10/6/2021 11:15 AM LAB_BSMO BSMO BS AMB   10/6/2021  1:00 PM Tomeka Ivory [facility]   10/11/2021  3:00 [facility]   10/13/2021  9:45 AM Yimi Diaz MD BSMO BS AMB   10/22/2021  2:00 PM [facility] NURSE [facility] ANTHONY POND   11/3/2021  1:30 PM Cassidy Velazquez MD [address] [facility]

## 2021-10-06 ENCOUNTER — APPOINTMENT (OUTPATIENT)
Dept: ONCOLOGY | Age: 61
End: 2021-10-06

## 2021-10-06 ENCOUNTER — APPOINTMENT (OUTPATIENT)
Dept: PHYSICAL THERAPY | Age: 61
End: 2021-10-06
Payer: MEDICARE

## 2021-10-07 LAB
ALBUMIN SERPL-MCNC: 4.1 G/DL (ref 3.8–4.8)
ALBUMIN/GLOB SERPL: 1.3 {RATIO} (ref 1.2–2.2)
ALP SERPL-CCNC: 109 IU/L (ref 44–121)
ALT SERPL-CCNC: 9 IU/L (ref 0–44)
AST SERPL-CCNC: 17 IU/L (ref 0–40)
BASOPHILS # BLD AUTO: 0 X10E3/UL (ref 0–0.2)
BASOPHILS NFR BLD AUTO: 1 %
BILIRUB SERPL-MCNC: 0.7 MG/DL (ref 0–1.2)
BUN SERPL-MCNC: 34 MG/DL (ref 8–27)
BUN/CREAT SERPL: 7 (ref 10–24)
CALCIUM SERPL-MCNC: 8.2 MG/DL (ref 8.6–10.2)
CHLORIDE SERPL-SCNC: 91 MMOL/L (ref 96–106)
CO2 SERPL-SCNC: 26 MMOL/L (ref 20–29)
CREAT SERPL-MCNC: 4.6 MG/DL (ref 0.76–1.27)
EOSINOPHIL # BLD AUTO: 0.1 X10E3/UL (ref 0–0.4)
EOSINOPHIL NFR BLD AUTO: 1 %
ERYTHROCYTE [DISTWIDTH] IN BLOOD BY AUTOMATED COUNT: 14.7 % (ref 11.6–15.4)
FERRITIN SERPL-MCNC: 820 NG/ML (ref 30–400)
GLOBULIN SER CALC-MCNC: 3.1 G/DL (ref 1.5–4.5)
GLUCOSE SERPL-MCNC: 146 MG/DL (ref 65–99)
HCT VFR BLD AUTO: 32.8 % (ref 37.5–51)
HGB BLD-MCNC: 10.7 G/DL (ref 13–17.7)
IMM GRANULOCYTES # BLD AUTO: 0 X10E3/UL (ref 0–0.1)
IMM GRANULOCYTES NFR BLD AUTO: 1 %
IRON SATN MFR SERPL: 51 % (ref 15–55)
IRON SERPL-MCNC: 142 UG/DL (ref 38–169)
LYMPHOCYTES # BLD AUTO: 1.7 X10E3/UL (ref 0.7–3.1)
LYMPHOCYTES NFR BLD AUTO: 26 %
MCH RBC QN AUTO: 30.5 PG (ref 26.6–33)
MCHC RBC AUTO-ENTMCNC: 32.6 G/DL (ref 31.5–35.7)
MCV RBC AUTO: 93 FL (ref 79–97)
MONOCYTES # BLD AUTO: 0.5 X10E3/UL (ref 0.1–0.9)
MONOCYTES NFR BLD AUTO: 8 %
NEUTROPHILS # BLD AUTO: 4 X10E3/UL (ref 1.4–7)
NEUTROPHILS NFR BLD AUTO: 63 %
PLATELET # BLD AUTO: 102 X10E3/UL (ref 150–450)
POTASSIUM SERPL-SCNC: 4.8 MMOL/L (ref 3.5–5.2)
PROT SERPL-MCNC: 7.2 G/DL (ref 6–8.5)
RBC # BLD AUTO: 3.51 X10E6/UL (ref 4.14–5.8)
SODIUM SERPL-SCNC: 136 MMOL/L (ref 134–144)
TIBC SERPL-MCNC: 279 UG/DL (ref 250–450)
UIBC SERPL-MCNC: 137 UG/DL (ref 111–343)
WBC # BLD AUTO: 6.3 X10E3/UL (ref 3.4–10.8)

## 2021-10-11 ENCOUNTER — HOSPITAL ENCOUNTER (OUTPATIENT)
Dept: PHYSICAL THERAPY | Age: 61
Discharge: HOME | End: 2021-10-11
Payer: MEDICARE

## 2021-10-11 PROCEDURE — 97110 THERAPEUTIC EXERCISES: CPT

## 2021-10-11 PROCEDURE — 97530 THERAPEUTIC ACTIVITIES: CPT

## 2021-10-11 PROCEDURE — 97112 NEUROMUSCULAR REEDUCATION: CPT

## 2021-10-11 NOTE — PROGRESS NOTES
PT DAILY TREATMENT NOTE     Patient Name: Chau Hill  Date:10/11/2021  : 1960  [x]  Patient  Verified  Payor: BLUE CROSS MEDICARE / Plan: [facility] HMO / Product Type: Managed Care Medicare /    In time:3:00  Out time: 3:50  Total Treatment Time (min): 50  Visit #: 3 of 8    Medicare/BCBS Only   Total Timed Codes (min):  50 1:1 Treatment Time:  50       Treatment Area: Muscle weakness (generalized) [M62.81]    SUBJECTIVE  Pain Level (0-10 scale): 1/10  Any medication changes, allergies to medications, adverse drug reactions, diagnosis change, or new procedure performed?: [x] No    [] Yes (see summary sheet for update)  Subjective functional status/changes:   [] No changes reported  The patient states that he continues to get stronger, but he is limited by his left hand and arm reaching ability. OBJECTIVE  25 min Therapeutic Exercise:  [] See flow sheet :   Rationale: increase ROM and increase strength to improve the patients ability to improve ADL ease. 10 min Therapeutic Activity:  []  See flow sheet : transfers activity, lunging and kneeling activity o   Rationale: increase ROM and increase strength  to improve the patients ability to improve ADL ease. 15 min Neuromuscular Re-education:  [x]  See flow sheet :   Rationale: improve coordination, improve balance and increase proprioception  to improve the patients ability to improve ADL ease. With   [] TE   [] TA   [] neuro   [] other: Patient Education: [x] Review HEP    [] Progressed/Changed HEP based on:   [] positioning   [] body mechanics   [] transfers   [] heat/ice application    [] other:      Other Objective/Functional Measures: Added tall kneeling on airex with the patient    He was challenged with weight acceptance into bosu during 1/2 stance hoop intervention.     Pain Level (0-10 scale) post treatment: 0/10    ASSESSMENT/Changes in Function: The patient is progressing with regards to tall kneeling safety. He requires contact guard to perform safely, but is able to attain this position using // on right side only. He will continue to benefit from further treatment in order to progress strengthening. He was challenged with weight acceptance into bosu during 1/2 stance hoop intervention. He will continue to benefit from PT in order to progress LE strength, transfer efficiency, as well as floor transfers for 3 additional visits with anticipated D/C post treatment. Patient will continue to benefit from skilled PT services to modify and progress therapeutic interventions, address functional mobility deficits, address ROM deficits, address strength deficits, analyze and address soft tissue restrictions, analyze and cue movement patterns, analyze and modify body mechanics/ergonomics, assess and modify postural abnormalities, address imbalance/dizziness and instruct in home and community integration to attain remaining goals. []  See Plan of Care  []  See progress note/recertification  []  See Discharge Summary         Progress towards goals / Updated goals:  1. Patient will demonstrate improved FOTO score to at least 45 to demonstrate improved function.   PN:44    2. Pt will demonstrate step downs from 4\" box without LOB or UE assist to improve closed chain LE strength with ADLs. PN: to be initiated next visit  Current: Initiated 4\" step downs with requiring UE support.       PLAN  []  Upgrade activities as tolerated     [x]  Continue plan of care  []  Update interventions per flow sheet       []  Discharge due to:_  []  Other:_      Zen Carver, PT 10/11/2021  3:49 PM    Future Appointments   Date Time Provider Veda Moreno   10/13/2021  9:45 AM Izaiah Diaz MD BSMO BS AMB   10/22/2021  2:00 PM [facility] NURSE [facility] ANTHONY POND   11/3/2021  1:30 PM Michael Lambert MD [address] [facility]

## 2021-10-11 NOTE — PROGRESS NOTES
[facility]  [address]  [place], [address]  [phone] fax    Continued Plan of Care/ Re-certification for Physical Therapy Services    Patient name: Law Young Start of Care: 2021   Referral source: Randall Gilliam [place], [place]* : 1960               Medical Diagnosis: Muscle weakness (generalized) [M62.81]  Payor: Angelica Cook / Plan: [address]  HMO / Product Type: Managed Care Medicare /  Onset Date:2021  Original onset:                Treatment Diagnosis: generalized weakness   Prior Hospitalization: see medical history Provider#: [id number]   Medications: Verified on Patient summary List    Comorbidities: Patient is on dialysis- 3x/week (Tuesday/Thursday/Saturday) and has a port in left UE, Kidney Cancer-  (removed surgically) No Chemo/radiation per patient; Only 3 toes on the right due to diabetes- , HTN, Kidney/bladder/ urination or prostate problems, prior surgery   Prior Level of Function: Patient stated he was able to get out of a chair with greater ease previously. Patient is independent with dressing/bathing but stated it is difficult. Patient lives with his sister.   Visits from Start of Care: 10    Missed Visits: 2    The Plan of Care and following information is based on the patient's current status:  1. Patient will demonstrate improved FOTO score to at least 45 to demonstrate improved function.   PN:44    2. Pt will demonstrate step downs from 4\" box without LOB or UE assist to improve closed chain LE strength with ADLs. PN: to be initiated next visit  Current: Initiated 4\" step downs with requiring UE support.     Key functional changes: Improvements in LE strength noting improved ease of transfers from lower surfaces (currently 50 cm with right LE elevated). Able to perform 4\" step downs, but requires UE support.  Demonstrates ability to assume tall kneeling with 1 UE on supportive surface, though requires contact guard. Problems/ barriers to goal attainment: hx hemiplegia, limited use of left UE, limited follow-up. Problem List: pain affecting function, decrease ROM, decrease strength, impaired gait/ balance, decrease ADL/ functional abilitiies, decrease activity tolerance, decrease flexibility/ joint mobility and decrease transfer abilities    Treatment Plan: Therapeutic exercise, Therapeutic activities, Neuromuscular re-education, Physical agent/modality, Gait/balance training, Manual therapy, Patient education, Self Care training, Functional mobility training and Home safety training     Patient Goal (s) has been updated and includes: \"better use of my left leg and see if I can get occupational therapy for my left arm. \"    Goals for this certification period to be accomplished in 2 weeks:  1. The patient will assume tall kneeling with knees on airex with right UE using support void of supervision for chore production. 2. Patient will demonstrate improved FOTO score to at least 45 to demonstrate improved function.   PN:44      Frequency / Duration: Patient to be seen 1-2 times per week for 3 treatments:    Assessment / Recommendations: The patient is progressing with regards to tall kneeling safety. He requires contact guard to perform safely, but is able to attain this position using // on right side only. He will continue to benefit from further treatment in order to progress strengthening. He was challenged with weight acceptance into bosu during 1/2 stance hoop intervention. He will continue to benefit from PT in order to progress LE strength, transfer efficiency, as well as floor transfers for 3 additional visits with anticipated D/C post treatment.     Certification Period: 10/12/2021 - 11/4/2021    Mahin Kincaid PT 10/11/2021 3:58 PM    ________________________________________________________________________  I certify that the above Therapy Services are being furnished while the patient is under my care. I agree with the treatment plan and certify that this therapy is necessary. [] I have read the above and request that my patient continue as recommended.   [] I have read the above report and request that my patient continue therapy with the following changes/special instructions: _____________________________________________  [] I have read the above report and request that my patient be discharged from therapy    Physician's Signature:____________Date:_________TIME:________     JOHNNY Weir*  ** Signature, Date and Time must be completed for valid certification **    Please sign and return to [facility] [address] [place] [address] & [facility]  [address] [place] [address] [address]  [phone] fax

## 2021-10-13 ENCOUNTER — VIRTUAL VISIT (OUTPATIENT)
Dept: ONCOLOGY | Age: 61
End: 2021-10-13
Payer: MEDICARE

## 2021-10-13 DIAGNOSIS — D50.8 IRON DEFICIENCY ANEMIA SECONDARY TO INADEQUATE DIETARY IRON INTAKE: ICD-10-CM

## 2021-10-13 DIAGNOSIS — N18.30 ANEMIA ASSOCIATED WITH STAGE 3 CHRONIC RENAL FAILURE (HCC): Primary | ICD-10-CM

## 2021-10-13 DIAGNOSIS — D69.6 THROMBOCYTOPENIA (HCC): ICD-10-CM

## 2021-10-13 DIAGNOSIS — N18.30 STAGE 3 CHRONIC KIDNEY DISEASE, UNSPECIFIED WHETHER STAGE 3A OR 3B CKD (HCC): ICD-10-CM

## 2021-10-13 DIAGNOSIS — D63.1 ANEMIA ASSOCIATED WITH STAGE 3 CHRONIC RENAL FAILURE (HCC): Primary | ICD-10-CM

## 2021-10-13 DIAGNOSIS — C64.2 RENAL CELL CARCINOMA OF LEFT KIDNEY (HCC): ICD-10-CM

## 2021-10-13 DIAGNOSIS — D64.9 CHRONIC ANEMIA: ICD-10-CM

## 2021-10-13 PROCEDURE — 99442 PR PHYS/QHP TELEPHONE EVALUATION 11-20 MIN: CPT | Performed by: INTERNAL MEDICINE

## 2021-10-13 NOTE — PROGRESS NOTES
Trae Shultz is a 59 y.o. male, evaluated via audio-only technology on 10/13/2021 for Follow-up  . Assessment & Plan:   Iron deficiency anemia/chronic anemia/anemia due to chronic kidney disease stage III:  -- 10/6/2021 CBC reported WBC count 6.3, platelet count was stable 102,000. The hemoglobin was 10.7 g/dL with hematocrit of 32.8%. -- He is now receiving outpatient dialysis and states that his nephrologist did resume his Procrit with therapy in the outpatient setting.   -- We will check his CBC, iron profile and ferritin levels periodically. Thrombocytopenia   -- 10/6/2021 CBC reported WBC count 6.3, platelet count was stable 102,000. The hemoglobin was 10.7 g/dL with hematocrit of 32.8%. -- Clinical stable. His chronic thrombocytopenia could be related to hx HBV/HCV, hx alcohol drinking, and possible ITP component. No further workup or therapeutic intervention is warranted at this time. Hx of Renal Cell Carcinoma left kidney:   -- pt denies any flank pain, hematuria at this time. Has f/u with Urology. -- RTC in 4 months. Always sooner if needed. 12  Subjective:   Mr. Stephani Duncan is a 57-year-old -American man who was seen for thrombocytopenia and anemia due to chronic kidney disease. He is in the office today for follow up. He denies fatigue, tiredness, and shortness of breath. He denies chest pain and dizziness. Since his last clinic visit he has not experienced any unexplained bruising or bleeding. He does not have any complaints or concerns to report at this time. Since he was last seen he has started dialysis Tuesdays, thursdays and Saturdays. He is being followed by Nephrology. Prior to Admission medications    Medication Sig Start Date End Date Taking? Authorizing Provider   methadone (DOLOPHINE) 10 mg tablet Take 95 mg by mouth.     Provider, Historical   midodrine (PROAMATINE) 5 mg tablet TAKE ONE TABLET BY MOUTH ON TUESDAY THURSDAY AND SATURDAY BEFORE DIALYSIS 3/20/21 Provider, Historical   ketoconazole (NIZORAL) 2 % topical cream Apply  to affected area two (2) times a day. 8/19/20   Provider, Historical   b complex-vitamin c-folic acid 0.8 mg (NEPHRO-DEBBIE) 0.8 mg tab tablet Take 0.8 mg by mouth daily. 3/18/20   Provider, Historical   levothyroxine (SYNTHROID) 100 mcg tablet Take 1 Tab by mouth every morning. 2/26/20   Jayden Aquino MD   b complex-vitamin c-folic acid (NEPHROCAPS) 1 mg capsule Take 1 Cap by mouth daily. 2/26/20   Jayden Aquino MD   calcium acetate,phosphat bind, (PHOSLO) 667 mg cap Take 1 Cap by mouth three (3) times daily (with meals). 2/25/20   Jayden Aquino MD   doxercalciferoL (HECTOROL) 4 mcg/2 mL injection 0.5 mL by IntraVENous route DIALYSIS MON, WED & FRI. 2/26/20   Jayden Aquino MD   food supplemt, lactose-reduced (ENSURE ENLIVE) 0.08 gram-1.5 kcal/mL liqd Take 1 Each by mouth three (3) times daily (with meals). Flavor of patient choice 2/25/20   Jayden Aquino MD   hydrALAZINE (APRESOLINE) 25 mg tablet Take 1 Tab by mouth every eight (8) hours as needed (systolic B/P >629). 1/2/85   Zeny Mohr MD   Syringe, Disposable, (BD SYRINGE CATHETER TIP) 60 mL syrg Use 1 syringe daily with irrigations 11/7/18   Layton Mason MD         Review of Systems   Constitutional: Negative for chills, diaphoresis, fever, malaise/fatigue and weight loss. Respiratory: Negative for cough, hemoptysis, shortness of breath and wheezing. Cardiovascular: Negative for chest pain, palpitations and leg swelling. Gastrointestinal: Negative for abdominal pain, diarrhea, heartburn, nausea and vomiting. Genitourinary: Negative for dysuria, frequency, hematuria and urgency. Musculoskeletal: Negative for joint pain and myalgias. Skin: Negative for itching and rash. Neurological: Negative for dizziness, seizures, weakness and headaches. Psychiatric/Behavioral: Negative for depression.  The patient does not have insomnia. No flowsheet data found. Sanjeev Angelo, who was evaluated through a patient-initiated, synchronous (real-time) audio only encounter, and/or her healthcare decision maker, is aware that it is a billable service, with coverage as determined by his insurance carrier. He provided verbal consent to proceed: Yes. He has not had a related appointment within my department in the past 7 days or scheduled within the next 24 hours. On this date 10/13/2021 I have spent 20 minutes reviewing previous notes, test results and face to face (virtual) with the patient discussing the diagnosis and importance of compliance with the treatment plan as well as documenting on the day of the visit.     Liana Philip MD

## 2021-10-22 ENCOUNTER — TRANSCRIBE ORDER (OUTPATIENT)
Dept: SCHEDULING | Age: 61
End: 2021-10-22

## 2021-10-22 DIAGNOSIS — K74.60 CIRRHOSIS (HCC): Primary | ICD-10-CM

## 2021-10-22 DIAGNOSIS — Z86.010 PERSONAL HISTORY OF COLONIC POLYPS: ICD-10-CM

## 2021-10-22 DIAGNOSIS — R76.8 HEPATITIS B CORE ANTIBODY POSITIVE: ICD-10-CM

## 2021-10-22 DIAGNOSIS — B19.20 HEPATITIS C: ICD-10-CM

## 2021-10-25 ENCOUNTER — HOSPITAL ENCOUNTER (OUTPATIENT)
Dept: PHYSICAL THERAPY | Age: 61
Discharge: HOME | End: 2021-10-25
Payer: MEDICARE

## 2021-10-25 PROCEDURE — 97530 THERAPEUTIC ACTIVITIES: CPT

## 2021-10-25 PROCEDURE — 97110 THERAPEUTIC EXERCISES: CPT

## 2021-10-25 NOTE — PROGRESS NOTES
PT DAILY TREATMENT NOTE     Patient Name: Jose Chavira  Date:10/25/2021  : 1960  [x]  Patient  Verified  Payor: BLUE CROSS MEDICARE / Plan: [address]  HMO / Product Type: Managed Care Medicare /    In time:3:15P Out time: 3:45P  Total Treatment Time (min): 30  Visit # 1 of 3    Medicare/BCBS Only   Total Timed Codes (min):  30 1:1 Treatment Time:  30       Treatment Area: Muscle weakness (generalized) [M62.81]    SUBJECTIVE  Pain Level (0-10 scale): 1-2/10  Any medication changes, allergies to medications, adverse drug reactions, diagnosis change, or new procedure performed?: [x] No    [] Yes (see summary sheet for update)  Subjective functional status/changes:   [] No changes reported  Pt reports no new concerns at this time. OBJECTIVE  15 min Therapeutic Exercise:  [x] See flow sheet :   Rationale: increase ROM and increase strength to improve the patients ability to improve ADL ease. 10 min Therapeutic Activity:  [x]  See flow sheet :    Rationale: increase ROM and increase strength  to improve the patients ability to improve ADL ease. 5 min Neuromuscular Re-education:  [x]  See flow sheet :   Rationale: improve coordination, improve balance and increase proprioception  to improve the patients ability to improve ADL ease. With   [x] TE   [x] TA   [] neuro   [] other: Patient Education: [x] Review HEP    [] Progressed/Changed HEP based on:   [] positioning   [] body mechanics   [] transfers   [] heat/ice application    [] other:      Other Objective/Functional Measures:   Modified session secondary to TC     Pain Level (0-10 scale) post treatment: 1-2/10    ASSESSMENT/Changes in Function: Limited in progression of POC during today's visit secondary to pt arriving to session late.  Able to progress some of current activity program through increased repetitions/resistance w/ pt demonstrating overall good tolerance through report of no increase in symptoms. Patient will continue to benefit from skilled PT services to modify and progress therapeutic interventions, address functional mobility deficits, address ROM deficits, address strength deficits, analyze and address soft tissue restrictions, analyze and cue movement patterns, analyze and modify body mechanics/ergonomics, assess and modify postural abnormalities, address imbalance/dizziness and instruct in home and community integration to attain remaining goals. [x]  See Plan of Care  []  See progress note/recertification  []  See Discharge Summary         Progress towards goals / Updated goals:  1. The patient will assume tall kneeling with knees on airex with right UE using support void of supervision for chore production. 2. Patient will demonstrate improved FOTO score to at least 45 to demonstrate improved function.   PN:44    Current:  To be accessed in coming session, 10/25/21      PLAN  [x]  Upgrade activities as tolerated     [x]  Continue plan of care  [x]  Update interventions per flow sheet       []  Discharge due to:_  []  Other:_      Agueda Parks DPT 10/25/2021  3:49 PM    Future Appointments   Date Time Provider Veda Chakrabortyi   10/27/2021  2:30 PM Fanta Butler, PT [facility]   11/3/2021  1:30 PM MD Janna Fernández   2/2/2022 10:00 AM LAB_BSMO BSMO BS AMB   2/16/2022  1:30 PM Lilibeth Diaz MD BSMO BS AMB

## 2021-10-27 ENCOUNTER — HOSPITAL ENCOUNTER (OUTPATIENT)
Dept: PHYSICAL THERAPY | Age: 61
Discharge: HOME | End: 2021-10-27
Payer: MEDICARE

## 2021-10-27 PROCEDURE — 97112 NEUROMUSCULAR REEDUCATION: CPT

## 2021-10-27 PROCEDURE — 97110 THERAPEUTIC EXERCISES: CPT

## 2021-10-27 NOTE — PROGRESS NOTES
PT DAILY TREATMENT NOTE     Patient Name: Bushra Miller  Date:10/27/2021  : 1960  [x]  Patient  Verified  Payor: BLUE CROSS MEDICARE / Plan: [address] [facility] [place]  HMO / Product Type: Managed Care Medicare /    In time:2:30  Out time:3:10  Total Treatment Time (min): 40  Visit #: 2 of 3    Medicare/BCBS Only   Total Timed Codes (min):  40 1:1 Treatment Time:  40       Treatment Area: Muscle weakness (generalized) [M62.81]    SUBJECTIVE  Pain Level (0-10 scale): 1-2/10  Any medication changes, allergies to medications, adverse drug reactions, diagnosis change, or new procedure performed?: [x] No    [] Yes (see summary sheet for update)  Subjective functional status/changes:   [] No changes reported  The patient reports reports that he has been doing well and denies changes upon presentation to clinic. OBJECTIVE  30 min Therapeutic Exercise:  [x] See flow sheet :   Rationale: increase ROM and increase strength to improve the patients ability to improve ADL ease. 10 min Neuromuscular Re-education:  [x]  See flow sheet :   Rationale: increase ROM, increase strength and improve coordination  to improve the patients ability to improve ADL ease. With   [] TE   [] TA   [] neuro   [] other: Patient Education: [x] Review HEP    [] Progressed/Changed HEP based on:   [] positioning   [] body mechanics   [] transfers   [] heat/ice application    [] other:      Other Objective/Functional Measures:   Demonstrates ability to assume tall kneeling safely with use of right UE, with knees on airex. Pain Level (0-10 scale) post treatment: 3-4/10    ASSESSMENT/Changes in Function: The patient demonstrates improved stability into single leg stability during dynamic step ups. Able to decrease table height to 47 cm with 4\" step beneath each LE individually during sit to stands with improved strength. He will benefit from additional session with anticipated D/C to HEP next session.  The patient has been informed, and is in agreement with this plan. Patient will continue to benefit from skilled PT services to modify and progress therapeutic interventions, address functional mobility deficits, address ROM deficits, address strength deficits, analyze and address soft tissue restrictions, analyze and cue movement patterns, analyze and modify body mechanics/ergonomics, assess and modify postural abnormalities, address imbalance/dizziness and instruct in home and community integration to attain remaining goals. []  See Plan of Care  []  See progress note/recertification  []  See Discharge Summary         Progress towards goals / Updated goals:  1. The patient will assume tall kneeling with knees on airex with right UE using support void of supervision for chore production. Current: Met - demonstrates tall kneeling with control of right UE. 10/27/2021  2. Patient will demonstrate improved FOTO score to at least 45 to demonstrate improved function.   PN:44    Current:  To be accessed in coming session, 10/25/21     PLAN  []  Upgrade activities as tolerated     [x]  Continue plan of care  []  Update interventions per flow sheet       []  Discharge due to:_  []  Other:_      Laura Adam, PT 10/27/2021  2:48 PM    Future Appointments   Date Time Provider Veda Moreno   11/1/2021  3:00 PM Magdalena FAUST   11/3/2021  1:30 PM Atanacio Fabry, MD [place]   11/29/2021  1:00 PM [address]    2/2/2022 10:00 AM LAB_BSMO BSMO BS AMB   2/16/2022  1:30 PM Tonny Diaz MD BSMO BS AMB

## 2021-11-01 ENCOUNTER — HOSPITAL ENCOUNTER (OUTPATIENT)
Dept: PHYSICAL THERAPY | Age: 61
Discharge: HOME | End: 2021-11-01
Payer: MEDICARE

## 2021-11-01 PROCEDURE — 97112 NEUROMUSCULAR REEDUCATION: CPT

## 2021-11-01 PROCEDURE — 97110 THERAPEUTIC EXERCISES: CPT

## 2021-11-01 PROCEDURE — 97530 THERAPEUTIC ACTIVITIES: CPT

## 2021-11-01 NOTE — PROGRESS NOTES
Physical Therapy Discharge Instructions      [facility]  [address]  [place], [address]  [phone] fax    Patient: John Augustine  : 1960      Continue Home Exercise Program 2 times per day for 4-6 weeks, then decrease to  times per week      Continue with    [] Ice  as needed  times per day     [] Heat           Follow up with MD:     [] Upon completion of therapy     [] As needed      Recommendations:     [x]   Return to activity with home program    []   Return to activity with the following modifications:       []Post Rehab Program    []Join Independent aquatic program     []Return to/join local gym        Additional Comments: Continue with HEP progressing repetitions and sets as needed          Neeru Golden DPT CMTPT 2021 3:48 PM

## 2021-11-26 ENCOUNTER — HOSPITAL ENCOUNTER (OUTPATIENT)
Dept: ULTRASOUND IMAGING | Age: 61
Discharge: HOME | End: 2021-11-26
Attending: NURSE PRACTITIONER
Payer: MEDICARE

## 2021-11-26 DIAGNOSIS — K74.60 CIRRHOSIS (HCC): ICD-10-CM

## 2021-11-26 DIAGNOSIS — R76.8 HEPATITIS B CORE ANTIBODY POSITIVE: ICD-10-CM

## 2021-11-26 DIAGNOSIS — B19.20 HEPATITIS C: ICD-10-CM

## 2021-11-26 DIAGNOSIS — Z86.010 PERSONAL HISTORY OF COLONIC POLYPS: ICD-10-CM

## 2021-11-26 PROCEDURE — 76705 ECHO EXAM OF ABDOMEN: CPT

## 2021-12-01 NOTE — PROGRESS NOTES
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    Hyperkalemia E87.5    Hypercalcemia E83.52    Chronic kidney disease, stage III (moderate) (HCC) N18.3    Thrombocytopenia (HCC) D69.6    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) Z89.421    Chronic anemia D64.9    Chronic drug abuse (HCC) F19.10    Hypertension I10    Renal cell carcinoma of left kidney (HCC) C64.2    Urethral erosion N36.8    BPH (benign prostatic hyperplasia) N40.0    Bladder atony N31.2    Cerumen impaction H61.20    Chronic neck pain M54.2, G89.29    Cirrhosis of liver (HCC) K74.60    COPD, moderate (HCC) J44.9    Dry skin dermatitis L85.3    Elevated PSA R97.20    Generalized weakness R53.1    Hematuria R31.9    History of diabetes mellitus Z86.39    History of elevated lipids Z86.39    History of hay fever Z87.09    Hyperlipidemia E78.5    Hypothyroid E03.9    Lung nodules R91.8    Medication management Z79.899    Neck mass R22.1    Pericardial effusion I31.3    Preoperative clearance Z01.818    Prostate disorder N42.9    Recurrent UTI N39.0    Vitamin D deficiency E55.9    Oliguria R34    Renal injury S37.009A    Suprapubic catheter (HCC) Z93.59    Bradycardia, sinus R00.1    Acute UTI N39.0    Type 2 diabetes mellitus with hypoglycemia (HCC) E11.649    Acute renal failure (ARF) (HCC) N17.9    Renal failure (ARF), acute on chronic (HCC) N17.9, N18.9         Assessment & Plan     Assessment  - Acute encephalopathy - improving  - Sepsis vs adrenal insufficiency w/ hypothermia, hypoglycemia - management per endocrine   - SHEILA - creat trending up now 4.13> 3.25 (1.5 in 8/2018). No hydro by CT 3/28 or EDWIN 3/30/19   - UTI - Ucx 3/27/19 >100k Yeast & 40k Achromobacter  - Oliguria- Improving   - Pathological Stage H2fCnT2C1 cc RCC FG3 s/p 12/13 Left Open Partial Nephrectomy               Current Disease Status: JULIAN              Current Treatment Plan: Surveillance              Managed by Dr. Mercedes Stanton as outpt. - S/p C-spine Laminectomy with postOp Paralysis and Urinary Retention  -varner placed placed 4/29/17 after surgery due to impaired mobility  - Bosniak 2 Right Mid Renal Cyst, measuring 3.8 x 2.7 cm on 5/17 CT CAP             no significant change on recent CT abd/pelvis 1/2019  - Penile erosion 2/2 chronic varner with discomfort             S/p SPT placement 10/12/18 by Dr. Tosha Kirk;  last changed 3/27/19    Hx of:  DMII, Hep C, HTN, Heroin abuse    Plan:  Abx per ID currently on Cefepime (Intermediate Resistance); recommend switching to alternative- defer to ID  Urine with Yeast- most likely colonization per ID  Maintain SPT. Nursing to flush bladder with  cc's sterile saline every shift 2/2 issues with clogging  US Reviewed no evidence of obstructive uropathy as cause of patient's worsening SHEILA  Further Management of SHEILA per Nephrology   May need to consider guide wire dilators in office to upsize SPT If continues to clog vs replacing varner catheter  Follow Up arranged: YES message sent to Fay Small to arrange f/up with Dr. Tosha Kirk in 2 weeks  Will sign off at this time. Please contact with any questions or concerns. Zina Arenas  [facility]  Pager # [phone]    Available M-F 7:30- 5pm .  After 5pm and weekends please call answering service at [phone]         Subjective   Tired, feeling a little better, more oriented        Objective       PHYSICAL EXAMINATION:   Visit Vitals  /85   Pulse (!) 56   Temp 97.5 °F (36.4 °C)   Resp 13   Ht 6' (1.829 m)   Wt 174 lb 6.1 oz (79.1 kg)   SpO2 100%   BMI 23.65 kg/m²         Constitutional: I'll appearing male. No acute distress. HEENT: Normocephalic, Atraumatic. NGT  CV:  Bradycardic  Pulm: No respiratory distress or difficulties breathing   GI:  No abdominal masses or tenderness. SPT site w/o drainage  :  No CVA tenderness. SPT draining clear yellow urine   SCROTUM:  No scrotal rash or lesions noticed. Normal bilateral testes and epididymis. PENIS: Mild Urethral erosion. No urethral discharge. Circumcised   Skin: No evidence of jaundice. Normal color  Neuro/Psych:  Alert and Oriented x 3, confused with year. Lymphatic:   No inguinal lymphadenopathy   MSK: Limited ROM        REVIEW OF LABS AND IMAGING:      Renal US 3/30/19  IMPRESSION:  1. Changes in both kidneys consistent with chronic medical renal disease.      2. Complicated cyst of the right kidney.      3.  Mild ascites. Labs:     Labs: Results:   Chemistry    Recent Labs     04/01/19  0547 03/31/19 0227 03/30/19 0146   * 170* 83    138 146*   K 3.8 4.3 4.5   * 114* 116*   CO2 20* 20* 19*   BUN 33* 29* 26*   CREA 4.13* 4.10* 3.94*   CA 8.2* 8.4* 7.8*  7.8*   AGAP 7 4 11   BUCR 8* 7* 7*     --  73   TP 6.5  --  5.6*   ALB 2.2*  --  2.1*   GLOB 4.3*  --  3.5   AGRAT 0.5*  --  0.6*      CBC w/Diff Recent Labs     04/01/19  0547 03/31/19 0227 03/30/19 0146   WBC 6.2 3.0* 3.4*   RBC 2.68* 2.88* 2.67*   HGB 7.6* 8.1* 7.5*   HCT 22.3* 24.3* 23.1*   PLT 69* 70* 64*   GRANS 83* 86* 57   LYMPH 8* 10* 26   EOS 0 0 1      Cultures No results for input(s): CULT in the last 72 hours.   All Micro Results     Procedure Component Value Units Date/Time    CULTURE, BLOOD [[id number]] Collected:  03/28/19 0300    Order Status:  Completed Specimen:  Blood Updated: 04/01/19 0750     Special Requests: NO SPECIAL REQUESTS        Culture result: NO GROWTH 4 DAYS       CULTURE, BLOOD [[id number]] Collected:  03/28/19 0315    Order Status:  Completed Specimen:  Blood Updated:  04/01/19 0750     Special Requests: NO SPECIAL REQUESTS        Culture result: NO GROWTH 4 DAYS       CULTURE, URINE [[id number]]  (Abnormal)  (Susceptibility) Collected:  03/27/19 2142    Order Status:  Completed Specimen:  Cath Urine Updated:  04/01/19 0732     Special Requests: NO SPECIAL REQUESTS        Culture result:       [id number] COLONIES/mL ACHROMOBACTER (ALCALIGENES) DENITRIFICANS                  >100,000 COLONIES/mL CANDIDA GLABRATA          CULTURE, RESPIRATORY/SPUTUM/BRONCH Andreia Sacks STAIN [[id number]] Collected:  03/28/19 1645    Order Status:  Canceled Specimen:  Sputum     CULTURE, URINE [[id number]] Collected:  03/27/19 2345    Order Status:  Canceled Specimen:  Urine from Willis Specimen             Urinalysis Color   Date Value Ref Range Status   03/31/2019 YELLOW   Final     Appearance   Date Value Ref Range Status   03/31/2019 TURBID   Final     Specific gravity   Date Value Ref Range Status   03/31/2019 1.014 1.005 - 1.030   Final     pH (UA)   Date Value Ref Range Status   03/31/2019 5.0 5.0 - 8.0   Final     Protein   Date Value Ref Range Status   03/31/2019 300 (A) NEG mg/dL Final     Ketone   Date Value Ref Range Status   03/31/2019 NEGATIVE  NEG mg/dL Final     Bilirubin   Date Value Ref Range Status   03/31/2019 NEGATIVE  NEG   Final     Blood   Date Value Ref Range Status   03/31/2019 SMALL (A) NEG   Final     Urobilinogen   Date Value Ref Range Status   03/31/2019 0.2 0.2 - 1.0 EU/dL Final     Nitrites   Date Value Ref Range Status   03/31/2019 NEGATIVE  NEG   Final     Leukocyte Esterase   Date Value Ref Range Status   03/31/2019 SMALL (A) NEG   Final     Potassium   Date Value Ref Range Status   04/01/2019 3.8 3.5 - 5.5 mmol/L Final     Creatinine   Date Value Ref Range Status 04/01/2019 4.13 (H) 0.6 - 1.3 MG/DL Final     BUN   Date Value Ref Range Status   04/01/2019 33 (H) 7.0 - 18 MG/DL Final      PSA No results for input(s): PSA in the last 72 hours.    Coagulation Lab Results   Component Value Date/Time    Prothrombin time 12.8 03/28/2019 11:15 AM    Prothrombin time 15.3 (H) 04/09/2018 03:01 AM    INR 1.0 03/28/2019 11:15 AM    INR 1.3 (H) 04/09/2018 03:01 AM    aPTT 36.9 (H) 04/09/2018 03:01 AM    aPTT 43.4 (H) 04/06/2018 03:55 AM Patient expressed no known problems or needs

## 2022-01-27 RX ORDER — METHADONE HYDROCHLORIDE 10 MG/ML
95 CONCENTRATE ORAL
COMMUNITY
End: 2022-03-14

## 2022-01-27 NOTE — PERIOP NOTES
PRE-SURGICAL INSTRUCTIONS        Patient's Name:  Lior Dewitt      MLRHJ'S Date:  1/27/2022            Covid Testing Date and Time:    Surgery Date:  2/2/2022                1. Do NOT eat or drink anything, including candy, gum, or ice chips after midnight on 2/2, unless you have specific instructions from your surgeon or anesthesia provider to do so.  2. You may brush your teeth before coming to the hospital.  3. No smoking 24 hours prior to the day of surgery. 4. No alcohol 24 hours prior to the day of surgery. 5. No recreational drugs for one week prior to the day of surgery. 6. Leave all valuables, including money/purse, at home. 7. Remove all jewelry, nail polish, acrylic nails, and makeup (including mascara); no lotions powders, deodorant, or perfume/cologne/after shave on the skin. 8. Follow instruction for Hibiclens washes and CHG wipes from surgeon's office. 9. Glasses/contact lenses and dentures may be worn to the hospital.  They will be removed prior to surgery. 10. Call your doctor if symptoms of a cold or illness develop within 24-48 hours prior to your surgery. 11.  If you are having an outpatient procedure, please make arrangements for a responsible ADULT TO [address] [place] [address] and stay with you for 24 hours after your surgery. 12. ONE VISITOR in the hospital at this time for outpatient procedures. Exceptions may be made for surgical admissions, per nursing unit guidelines      Special Instructions:      Bring list of CURRENT medications. Bring any pertinent legal medical records. Take these medications the morning of surgery with a sip of water:  Per office      On the day of surgery, come in the main entrance of [facility]. Let the  at the desk know you are there for surgery. A staff member will come escort you to the surgical area on the second floor.     If you have any questions or concerns, please do not hesitate to call:     (Prior to the day of surgery) [facility] department:  [phone]   (Day of surgery) Pre-Op department:  [phone]    These surgical instructions were reviewed with the patient during the [facility] phone call.

## 2022-02-01 ENCOUNTER — ANESTHESIA EVENT (OUTPATIENT)
Dept: ENDOSCOPY | Age: 62
End: 2022-02-01
Payer: MEDICARE

## 2022-02-02 ENCOUNTER — ANESTHESIA (OUTPATIENT)
Dept: ENDOSCOPY | Age: 62
End: 2022-02-02
Payer: MEDICARE

## 2022-02-02 ENCOUNTER — HOSPITAL ENCOUNTER (OUTPATIENT)
Age: 62
Setting detail: OUTPATIENT SURGERY
Discharge: HOME | End: 2022-02-02
Attending: INTERNAL MEDICINE | Admitting: INTERNAL MEDICINE
Payer: MEDICARE

## 2022-02-02 VITALS
RESPIRATION RATE: 11 BRPM | HEIGHT: 72 IN | SYSTOLIC BLOOD PRESSURE: 130 MMHG | DIASTOLIC BLOOD PRESSURE: 56 MMHG | HEART RATE: 51 BPM | BODY MASS INDEX: 22.66 KG/M2 | WEIGHT: 167.3 LBS | TEMPERATURE: 98 F | OXYGEN SATURATION: 100 %

## 2022-02-02 DIAGNOSIS — D63.1 ANEMIA ASSOCIATED WITH STAGE 3 CHRONIC RENAL FAILURE (HCC): ICD-10-CM

## 2022-02-02 DIAGNOSIS — C64.2 RENAL CELL CARCINOMA OF LEFT KIDNEY (HCC): ICD-10-CM

## 2022-02-02 DIAGNOSIS — D50.8 IRON DEFICIENCY ANEMIA SECONDARY TO INADEQUATE DIETARY IRON INTAKE: ICD-10-CM

## 2022-02-02 DIAGNOSIS — N18.30 ANEMIA ASSOCIATED WITH STAGE 3 CHRONIC RENAL FAILURE (HCC): ICD-10-CM

## 2022-02-02 DIAGNOSIS — D69.6 THROMBOCYTOPENIA (HCC): ICD-10-CM

## 2022-02-02 LAB
AMPHET UR QL SCN: NEGATIVE
ANION GAP SERPL CALC-SCNC: 4 MMOL/L (ref 3–18)
BARBITURATES UR QL SCN: NEGATIVE
BENZODIAZ UR QL: NEGATIVE
BUN SERPL-MCNC: 33 MG/DL (ref 7–18)
BUN/CREAT SERPL: 7 (ref 12–20)
CALCIUM SERPL-MCNC: 8.7 MG/DL (ref 8.5–10.1)
CANNABINOIDS UR QL SCN: NEGATIVE
CHLORIDE SERPL-SCNC: 103 MMOL/L (ref 100–111)
CO2 SERPL-SCNC: 33 MMOL/L (ref 21–32)
COCAINE UR QL SCN: NEGATIVE
CREAT SERPL-MCNC: 5.07 MG/DL (ref 0.6–1.3)
GLUCOSE BLD STRIP.AUTO-MCNC: 75 MG/DL (ref 70–110)
GLUCOSE BLD STRIP.AUTO-MCNC: 98 MG/DL (ref 70–110)
GLUCOSE SERPL-MCNC: 82 MG/DL (ref 74–99)
HDSCOM,HDSCOM: ABNORMAL
METHADONE UR QL: POSITIVE
OPIATES UR QL: NEGATIVE
PCP UR QL: NEGATIVE
POTASSIUM SERPL-SCNC: 4.4 MMOL/L (ref 3.5–5.5)
SODIUM SERPL-SCNC: 140 MMOL/L (ref 136–145)

## 2022-02-02 PROCEDURE — 77030008565 HC TBNG SUC IRR ERBE -B: Performed by: INTERNAL MEDICINE

## 2022-02-02 PROCEDURE — 76060000031 HC ANESTHESIA FIRST 0.5 HR: Performed by: INTERNAL MEDICINE

## 2022-02-02 PROCEDURE — 76040000019: Performed by: INTERNAL MEDICINE

## 2022-02-02 PROCEDURE — 82962 GLUCOSE BLOOD TEST: CPT

## 2022-02-02 PROCEDURE — 2709999900 HC NON-CHARGEABLE SUPPLY: Performed by: INTERNAL MEDICINE

## 2022-02-02 PROCEDURE — 74011000250 HC RX REV CODE- 250: Performed by: NURSE ANESTHETIST, CERTIFIED REGISTERED

## 2022-02-02 PROCEDURE — 00731 ANES UPR GI NDSC PX NOS: CPT | Performed by: ANESTHESIOLOGY

## 2022-02-02 PROCEDURE — 80048 BASIC METABOLIC PNL TOTAL CA: CPT

## 2022-02-02 PROCEDURE — 00731 ANES UPR GI NDSC PX NOS: CPT | Performed by: NURSE ANESTHETIST, CERTIFIED REGISTERED

## 2022-02-02 PROCEDURE — 74011250636 HC RX REV CODE- 250/636: Performed by: NURSE ANESTHETIST, CERTIFIED REGISTERED

## 2022-02-02 PROCEDURE — 80307 DRUG TEST PRSMV CHEM ANLYZR: CPT

## 2022-02-02 PROCEDURE — 77030019988 HC FCPS ENDOSC DISP BSC -B: Performed by: INTERNAL MEDICINE

## 2022-02-02 RX ORDER — LIDOCAINE HYDROCHLORIDE 10 MG/ML
0.1 INJECTION, SOLUTION EPIDURAL; INFILTRATION; INTRACAUDAL; PERINEURAL AS NEEDED
Status: DISCONTINUED | OUTPATIENT
Start: 2022-02-02 | End: 2022-02-02 | Stop reason: HOSPADM

## 2022-02-02 RX ORDER — INSULIN LISPRO 100 [IU]/ML
INJECTION, SOLUTION INTRAVENOUS; SUBCUTANEOUS ONCE
Status: DISCONTINUED | OUTPATIENT
Start: 2022-02-02 | End: 2022-02-02 | Stop reason: HOSPADM

## 2022-02-02 RX ORDER — PROPOFOL 10 MG/ML
INJECTION, EMULSION INTRAVENOUS AS NEEDED
Status: DISCONTINUED | OUTPATIENT
Start: 2022-02-02 | End: 2022-02-02 | Stop reason: HOSPADM

## 2022-02-02 RX ORDER — SODIUM CHLORIDE 9 MG/ML
25 INJECTION, SOLUTION INTRAVENOUS CONTINUOUS
Status: DISCONTINUED | OUTPATIENT
Start: 2022-02-02 | End: 2022-02-02 | Stop reason: HOSPADM

## 2022-02-02 RX ADMIN — FAMOTIDINE 20 MG: 10 INJECTION INTRAVENOUS at 09:32

## 2022-02-02 RX ADMIN — PROPOFOL 30 MG: 10 INJECTION, EMULSION INTRAVENOUS at 10:07

## 2022-02-02 RX ADMIN — SODIUM CHLORIDE 25 ML/HR: 900 INJECTION, SOLUTION INTRAVENOUS at 09:43

## 2022-02-02 RX ADMIN — PROPOFOL 20 MG: 10 INJECTION, EMULSION INTRAVENOUS at 10:09

## 2022-02-02 NOTE — H&P
[email or web address]  [phone]      GASTROENTEROLOGY Pre-Procedure H and P      Impression/Plan:   1.  This patient is consented for an EGD for cirrhosis    Addendum: All lab tests orders pertaining to the procedure have been ordered by the anesthesia personnel and results will be addressed by the anesthesia team  Chief Complaint: cirrhosis      HPI:  Roni Varela is a 59 y.o. male who is being is having an EGD for cirrhosis  PMH:   Past Medical History:   Diagnosis Date    Anemia     Bradycardia, unspecified 2018    Cervical discitis     MRSA    Chronic drug abuse ([address] 1974    Chronic kidney disease     stage III    Diabetes ([address] 01/1990    no DM    Dialysis patient ([address]     Drug abuse ([address]     Encephalopathy     GERD (gastroesophageal reflux disease)     History of abuse     Hypertension     Muscle weakness     Quadriparesis ([address] 2017    Renal cell carcinoma of left kidney ([address] 12/17/13    Pathological Stage [id number] cc RCC FG3 s/p left open partial nephrectomy in 12/13      Sepsis due to methicillin resistant Staphylococcus aureus ([address]     Suprapubic catheter ([address]     Thrombocytopenia ([address]     Thyroid disease     Urethral erosion     Viral hepatitis B     Viral hepatitis C     Xerosis cutis        PSH:   Past Surgical History:   Procedure Laterality Date    COLONOSCOPY N/A 10/3/2019    COLONOSCOPY / polypectomy performed by Patricia Wilson MD at [address] HX CIRCUMCISION  12/17/13    Dr. Robi Ontiveros, [facility]    HX NEPHRECTOMY Left 12/17/13    Open/ Partial,nephrectomy    HX OTHER SURGICAL Right 2/27/2016    removed right ft  2nd meta-tarsal    HX OTHER SURGICAL  04/2017    abscess removed from back of neck     IR INSERT TUNL CVC W/O PORT OVER 5 YR  2/18/2020    NEUROLOGICAL PROCEDURE UNLISTED  2017    neck surgery-abcess-hardware neck    DC INSJ TUNNELED CVC W/O SUBQ PORT/ AGE 5 YR/> N/A 5/6/2020    INSERTION TUNNELED CENTRAL VENOUS CATHETER/perm catheter exchange performed by [facility]bs [facility] at [facility]    CT REMOVAL WITH INSERTION OF SUPRAPUBIC CATHETER  2018       Social HX:   Social History     Socioeconomic History    Marital status:      Spouse name: Not on file    Number of children: Not on file    Years of education: Not on file    Highest education level: Not on file   Occupational History    Not on file   Tobacco Use    Smoking status: Current Every Day Smoker     Packs/day: 0.25     Years: 30.00     Pack years: 7.50     Types: Cigarettes    Smokeless tobacco: Never Used   Substance and Sexual Activity    Alcohol use: Yes     Comment: beer occasionally    Drug use: Yes     Types: Marijuana, Heroin     Comment: marijuana-December 2018, heroin-9/15/19-approx    Sexual activity: Not Currently   Other Topics Concern    Not on file   Social History Narrative     since 2012;  for 12 yrs; [employer]; [address] [employer] worker just recently furloughed; No  service     Social Determinants of Health     Financial Resource Strain:     Difficulty of Paying Living Expenses: Not on file   Food Insecurity:     Worried About Running Out of Food in the Last Year: Not on file    Andrew of Food in the Last Year: Not on file   Transportation Needs:     Lack of Transportation (Medical): Not on file    Lack of Transportation (Non-Medical):  Not on file   Physical Activity:     Days of Exercise per Week: Not on file    Minutes of Exercise per Session: Not on file   Stress:     Feeling of Stress : Not on file   Social Connections:     Frequency of Communication with Friends and Family: Not on file    Frequency of Social Gatherings with Friends and Family: Not on file    Attends Spiritism Services: Not on file    Active Member of Clubs or Organizations: Not on file    Attends Club or Organization Meetings: Not on file    Marital Status: Not on file   Intimate Partner Violence:     Fear of Current or Ex-Partner: Not on file  Emotionally Abused: Not on file    Physically Abused: Not on file    Sexually Abused: Not on file   Housing Stability:     Unable to Pay for Housing in the Last Year: Not on file    Number of Places Lived in the Last Year: Not on file    Unstable Housing in the Last Year: Not on file       FHX:   Family History   Problem Relation Age of Onset    Diabetes Mother     Sickle Cell Anemia Father     Diabetes Sister     Hypertension Sister        Allergy:   Allergies   Allergen Reactions    Iodine Hives and Other (comments)       Home Medications:     Medications Prior to Admission   Medication Sig    methadone (Methadone IntensoL) 10 mg/mL solution Take 95 mg by mouth.  levothyroxine (SYNTHROID) 100 mcg tablet Take 1 Tab by mouth every morning.  calcium acetate,phosphat bind, (PHOSLO) 667 mg cap Take 1 Cap by mouth three (3) times daily (with meals).  hydrALAZINE (APRESOLINE) 25 mg tablet Take 1 Tab by mouth every eight (8) hours as needed (systolic B/P >599).  tadalafiL (CIALIS) 5 mg tablet Take 1 Tablet by mouth nightly. Indications: the inability to have an erection    sildenafil citrate (VIAGRA) 100 mg tablet Take 1 tab my mouth as needed 1 hour prior to sexual activity on an empty stomach. Indications: the inability to have an erection    midodrine (PROAMATINE) 5 mg tablet TAKE ONE TABLET BY MOUTH ON TUESDAY THURSDAY AND SATURDAY BEFORE DIALYSIS (Patient not taking: Reported on 1/27/2022)    ketoconazole (NIZORAL) 2 % topical cream Apply  to affected area two (2) times a day.  b complex-vitamin c-folic acid 0.8 mg (NEPHRO-DEBBIE) 0.8 mg tab tablet Take 0.8 mg by mouth daily. (Patient not taking: Reported on 1/27/2022)    b complex-vitamin c-folic acid (NEPHROCAPS) 1 mg capsule Take 1 Cap by mouth daily. (Patient not taking: Reported on 1/27/2022)    doxercalciferoL (HECTOROL) 4 mcg/2 mL injection 0.5 mL by IntraVENous route DIALYSIS MON, WED & FRI.     food supplemt, lactose-reduced (ENSURE ENLIVE) 0.08 gram-1.5 kcal/mL liqd Take 1 Each by mouth three (3) times daily (with meals). Flavor of patient choice    Syringe, Disposable, (BD SYRINGE CATHETER TIP) 60 mL syrg Use 1 syringe daily with irrigations       Review of Systems:     Constitutional: No fevers, chills, weight loss, fatigue. Skin: No rashes, pruritis, jaundice, ulcerations, erythema. HENT: No headaches, nosebleeds, sinus pressure, rhinorrhea, sore throat. Eyes: No visual changes, blurred vision, eye pain, photophobia, jaundice. Cardiovascular: No chest pain, heart palpitations. Respiratory: No cough, SOB, wheezing, chest discomfort, orthopnea. Gastrointestinal: Neg unless noted otherwise in H&P   Genitourinary: No dysuria, bleeding, discharge, pyuria. Musculoskeletal: No weakness, arthralgias, wasting. Endo: No sweats. Heme: No bruising, easy bleeding. Allergies: As noted. Neurological: Cranial nerves intact. Alert and oriented. Gait not assessed. Psychiatric:  No anxiety, depression, hallucinations. Visit Vitals  Wt 59 kg (130 lb)   BMI 17.15 kg/m²       Physical Assessment:     constitutional: appearance: well developed, well nourished, normal habitus, no deformities, in no acute distress. skin: inspection: no rashes, ulcers, icterus or other lesions; no clubbing or telangiectasias. palpation: no induration or subcutaneos nodules. eyes: inspection: normal conjunctivae and lids; no jaundice pupils: normal  ENMT: mouth: normal oral mucosa,lips and gums; good dentition. oropharynx: normal tongue, hard and soft palate; posterior pharynx without erithema, exudate or lesions. neck: thyroid: normal size, consistency and position; no masses or tenderness. respiratory: effort: normal chest excursion; no intercostal retraction or accessory muscle use. cardiovascular: abdominal aorta: normal size and position; no bruits.  palpation: PMI of normal size and position; normal rhythm; no thrill or murmurs. abdominal: abdomen: normal consistency; no tenderness or masses. hernias: no hernias appreciated. liver: normal size and consistency. spleen: not palpable. rectal: hemoccult/guaiac: not performed. musculoskeletal: digits and nails: no clubbing, cyanosis, petechiae or other inflammatory conditions. gait: normal gait and station head and neck: normal range of motion; no pain, crepitation or contracture. spine/ribs/pelvis: normal range of motion; no pain, deformity or contracture. neurologic: cranial nerves: II-XII normal.   psychiatric: judgement/insight: within normal limits. memory: within normal limits for recent and remote events. mood and affect: no evidence of depression, anxiety or agitation. orientation: oriented to time, space and person. Basic Metabolic Profile   No results for input(s): NA, K, CL, CO2, BUN, GLU, CA, MG, PHOS in the last 72 hours. No lab exists for component: CREAT      CBC w/Diff    No results for input(s): WBC, RBC, HGB, HCT, MCV, MCH, MCHC, RDW, PLT, HGBEXT, HCTEXT, PLTEXT in the last 72 hours. No lab exists for component: MPV No results for input(s): GRANS, LYMPH, EOS, PRO, MYELO, METAS, BLAST in the last 72 hours. No lab exists for component: MONO, BASO     Hepatic Function   No results for input(s): ALB, TP, TBILI, AP, AML, LPSE in the last 72 hours. No lab exists for component: DBILI, GPT, SGOT     Coags   No results for input(s): PTP, INR, APTT, INREXT in the last 72 hours. Chrissie Acosta MD  [facility] [address]  Cell: [phone]  Direct pager: [phone]  [email or web address]  [email or web address]

## 2022-02-02 NOTE — ANESTHESIA PREPROCEDURE EVALUATION
Relevant Problems   RESPIRATORY SYSTEM   (+) COPD, moderate (HCC)      CARDIOVASCULAR   (+) Hypertension      GASTROINTESTINAL   (+) Chronic hepatitis C without hepatic coma (HCC)   (+) Cirrhosis of liver (HCC)   (+) GERD (gastroesophageal reflux disease)      RENAL FAILURE   (+) Acute renal failure superimposed on chronic kidney disease (HCC)   (+) Acute renal failure superimposed on stage 3 chronic kidney disease (HCC)   (+) Chronic kidney disease, stage III (moderate) (HCC)   (+) ESRD (end stage renal disease) (HCC)   (+) ESRD (end stage renal disease) on dialysis (HCC)   (+) Renal cell carcinoma of left kidney (HCC)   (+) Renal failure (ARF), acute on chronic (HCC)      ENDOCRINE   (+) Diabetes (HCC)   (+) Hypothyroid      HEMATOLOGY   (+) Anemia in chronic renal disease   (+) Chronic anemia   (+) Iron deficiency anemia      PERSONAL HX & FAMILY HX OF CANCER   (+) Renal cell carcinoma of left kidney (HCC)       Anesthetic History   No history of anesthetic complications            Review of Systems / Medical History  Patient summary reviewed    Pulmonary    COPD               Neuro/Psych              Cardiovascular    Hypertension                   GI/Hepatic/Renal     GERD    Renal disease: ESRD and dialysis  Liver disease     Endo/Other    Diabetes: type 2  Hypothyroidism  Arthritis     Other Findings            Physical Exam    Airway  Mallampati: II  TM Distance: 4 - 6 cm  Neck ROM: normal range of motion   Mouth opening: Normal     Cardiovascular    Rhythm: regular  Rate: normal         Dental    Dentition: Poor dentition     Pulmonary  Breath sounds clear to auscultation               Abdominal  GI exam deferred       Other Findings            Anesthetic Plan    ASA: 4  Anesthesia type: MAC            Anesthetic plan and risks discussed with: Patient

## 2022-02-02 NOTE — DISCHARGE INSTRUCTIONS
Upper GI Endoscopy: What to Expect at [address] [place] [address]  After you have an endoscopy, you will stay at the hospital or clinic for 1 to 2 hours. This will allow the medicine to wear off. You will be able to go home after your doctor or nurse checks to make sure you are not having any problems. You may have to stay overnight if you had treatment during the test. You may have a sore throat for a day or two after the test.  This care sheet gives you a general idea about what to expect after the test.  How can you care for yourself at home? Activity  · Rest as much as you need to after you go home. · You should be able to go back to your usual activities the day after the test.  Diet  · Follow your doctor's directions for eating after the test.  · Drink plenty of fluids (unless your doctor has told you not to). Medications  · If you have a sore throat the day after the test, use an over-the-counter spray to numb your throat. Follow-up care is a key part of your treatment and safety. Be sure to make and go to all appointments, and call your doctor if you are having problems. It's also a good idea to know your test results and keep a list of the medicines you take. When should you call for help? Call 911 anytime you think you may need emergency care. For example, call if:  · You passed out (lost consciousness). · You cough up blood. · You vomit blood or what looks like coffee grounds. · You pass maroon or very bloody stools. Call your doctor now or seek immediate medical care if:  · You have trouble swallowing. · You have belly pain. · Your stools are black and tarlike or have streaks of blood. · You are sick to your stomach or cannot keep fluids down. Watch closely for changes in your health, and be sure to contact your doctor if:  · Your throat still hurts after a day or two. · You do not get better as expected. Where can you learn more?    Go to DealExplorer[employer]be  Enter J454 in the search box to learn more about \"Upper GI Endoscopy: What to Expect at Home. \"   © 5851-8701 [employer]. Care instructions adapted under license by [address] (which disclaims liability or warranty for this information). This care instruction is for use with your licensed healthcare professional. If you have questions about a medical condition or this instruction, always ask your healthcare professional. [address] any warranty or liability for your use of this information. Content Version: 23.4.242394; Current as of: November 14, 2014    DISCHARGE SUMMARY from Nurse     POST-PROCEDURE INSTRUCTIONS:    Call your Physician if you:  ? Observe any excess bleeding. ? Develop a temperature over 100.5o F.  ? Experience abdominal, shoulder or chest pain. ? Notice any signs of decreased circulation or nerve impairment to an extremity such as a change in color, persistent numbness, tingling, coldness or increase in pain. ? Vomit blood or you have nausea and vomiting lasting longer than 4 hours. ? Are unable to take medications. ? Are unable to urinate within 8 hours after discharge following general anesthesia or intravenous sedation. For the next 24 hours after receiving general anesthesia or intravenous sedation, or while taking prescription Narcotics, limit your activities:  ? Do NOT drive a motor vehicle, operate hazard machinery or power tools, or perform tasks that require coordination. The medication you received during your procedure may have some effect on your mental awareness. ? Do NOT make important personal or business decisions. The medication you received during your procedure may have some effect on your mental awareness. ? Do NOT drink alcoholic beverages. These drinks do not mix well with the medications that have been given to you. ? Upon discharge from the hospital, you must be accompanied by a responsible adult. ?  Resume your diet as directed by your physician. ? Resume medications as your physician has prescribed. ? Please give a list of your current medications to your Primary Care Provider. ? Please update this list whenever your medications are discontinued, doses are changed, or new medications (including over-the-counter products) are added. ? Please carry medication information at all times in case of emergency situations. These are general instructions for a healthy lifestyle:    No smoking/ No tobacco products/ Avoid exposure to second hand smoke.  Surgeon General's Warning:  Quitting smoking now greatly reduces serious risk to your health. Obesity, smoking, and a sedentary lifestyle greatly increase your risk for illness.  A healthy diet, regular physical exercise & weight monitoring are important for maintaining a healthy lifestyle   You may be retaining fluid if you have a history of heart failure or if you experience any of the following symptoms:  Weight gain of 3 pounds or more overnight or 5 pounds in a week, increased swelling in our hands or feet or shortness of breath while lying flat in bed. Please call your doctor as soon as you notice any of these symptoms; do not wait until your next office visit. Colorectal Screening   Colorectal cancer almost always develops from precancerous polyps (abnormal growths) in the colon or rectum. Screening tests can find precancerous polyps, so that they can be removed before they turn into cancer. Screening tests can also find colorectal cancer early, when treatment works best.  Marj Benavides Speak with your physician about when you should begin screening and how often you should be tested  Marj Benavides   Additional Information    Educational references and/or instructions provided during this visit included:    See attached. APPOINTMENTS:    Per MD Instruction. Discharge information has been reviewed with the patient. The patient verbalized understanding.     Patient Education        Gastritis: Care Instructions  Your Care Instructions     Gastritis is a sore and upset stomach. It happens when something irritates the stomach lining. Many things can cause it. These include an infection such as the flu or something you ate or drank. Medicines or a sore on the lining of the stomach (ulcer) also can cause it. Your belly may bloat and ache. You may belch, vomit, and feel sick to your stomach. You should be able to relieve the problem by taking medicine. And it may help to change your diet. If gastritis lasts, your doctor may prescribe medicine. Follow-up care is a key part of your treatment and safety. Be sure to make and go to all appointments, and call your doctor if you are having problems. It's also a good idea to know your test results and keep a list of the medicines you take. How can you care for yourself at home? · If your doctor prescribed antibiotics, take them as directed. Do not stop taking them just because you feel better. You need to take the full course of antibiotics. · Be safe with medicines. If your doctor prescribed medicine to decrease stomach acid, take it as directed. Call your doctor if you think you are having a problem with your medicine. · Do not take any other medicine, including over-the-counter pain relievers, without talking to your doctor first.  · If your doctor recommends over-the-counter medicine to reduce stomach acid, such as Pepcid AC (famotidine), Prilosec (omeprazole), or Tagamet HB (cimetidine) follow the directions on the label. · Drink plenty of fluids to prevent dehydration. Choose water and other clear liquids. If you have kidney, heart, or liver disease and have to limit fluids, talk with your doctor before you increase the amount of fluids you drink. · Limit how much alcohol you drink. · Limit or avoid caffeine, which is found in coffee, tea, cola drinks, and chocolate. When should you call for help?    Call 911 anytime you think you may need emergency care. For example, call if:    · You vomit blood or what looks like coffee grounds.     · You pass maroon or very bloody stools. Call your doctor now or seek immediate medical care if:    · You start breathing fast and have not produced urine in the last 8 hours.     · You cannot keep fluids down. Watch closely for changes in your health, and be sure to contact your doctor if:    · You do not get better as expected. Where can you learn more? Go to http://www.valdovinos.com/  Enter Z536 in the search box to learn more about \"Gastritis: Care Instructions. \"  Current as of: February 10, 2021               Content Version: 13.0  © 3924-4200 [employer]. Care instructions adapted under license by [email or web address] (which disclaims liability or warranty for this information). If you have questions about a medical condition or this instruction, always ask your healthcare professional. [address] any warranty or liability for your use of this information.

## 2022-02-02 NOTE — ANESTHESIA POSTPROCEDURE EVALUATION
Procedure(s):  UPPER ENDOSCOPY / possible Variceal Banding. MAC    Anesthesia Post Evaluation      Multimodal analgesia: multimodal analgesia used between 6 hours prior to anesthesia start to PACU discharge  Patient location during evaluation: bedside  Patient participation: complete - patient participated  Level of consciousness: awake  Pain management: adequate  Airway patency: patent  Anesthetic complications: no  Cardiovascular status: stable  Respiratory status: acceptable  Hydration status: acceptable  Post anesthesia nausea and vomiting:  controlled      INITIAL Post-op Vital signs:   Vitals Value Taken Time   /52 02/02/22 1025   Temp 36.7 °C (98 °F) 02/02/22 1016   Pulse 50 02/02/22 1027   Resp 10 02/02/22 1027   SpO2 100 % 02/02/22 1027   Vitals shown include unvalidated device data.

## 2022-02-28 ENCOUNTER — OFFICE VISIT (OUTPATIENT)
Dept: SURGERY | Age: 62
End: 2022-02-28
Payer: MEDICARE

## 2022-02-28 VITALS
WEIGHT: 171.4 LBS | HEART RATE: 65 BPM | BODY MASS INDEX: 23.22 KG/M2 | DIASTOLIC BLOOD PRESSURE: 68 MMHG | TEMPERATURE: 97.3 F | HEIGHT: 72 IN | OXYGEN SATURATION: 96 % | SYSTOLIC BLOOD PRESSURE: 172 MMHG

## 2022-02-28 DIAGNOSIS — L72.3 SEBACEOUS CYST: Primary | ICD-10-CM

## 2022-02-28 PROCEDURE — G9231 DOC ESRD DIA TRANS PREG: HCPCS | Performed by: SURGERY

## 2022-02-28 PROCEDURE — G8427 DOCREV CUR MEDS BY ELIG CLIN: HCPCS | Performed by: SURGERY

## 2022-02-28 PROCEDURE — 3017F COLORECTAL CA SCREEN DOC REV: CPT | Performed by: SURGERY

## 2022-02-28 PROCEDURE — G8420 CALC BMI NORM PARAMETERS: HCPCS | Performed by: SURGERY

## 2022-02-28 PROCEDURE — G8432 DEP SCR NOT DOC, RNG: HCPCS | Performed by: SURGERY

## 2022-02-28 PROCEDURE — 99202 OFFICE O/P NEW SF 15 MIN: CPT | Performed by: SURGERY

## 2022-02-28 NOTE — PROGRESS NOTES
General Surgery Consult    Eula Major  Admit date: (Not on file)    MRN: [id number]     : 1960     Age: 59 y.o. Attending Physician: Samy Garrett MD [facility]      History of Present Illness:      Eula Major is a 59 y.o. male who was referred to me for evaluation of a sebaceous cyst that has been present on his chest for more than 20 to 30 years. He stated about 2 to 3 months ago it got infected and he was prescribed antibiotics and now it is better but it still there and he decided to make sure it is not cancer. The patient has multiple significant medical conditions.      Patient Active Problem List    Diagnosis Date Noted    Anemia in chronic renal disease 2020    GERD (gastroesophageal reflux disease) 2020    Hyperphosphatemia 2020    Iron deficiency anemia 2020    ESRD (end stage renal disease) on dialysis ([address] 2020    Hemodialysis catheter dysfunction ([address] 2020    ESRD (end stage renal disease) ([address] 2020    Secondary hyperparathyroidism of renal origin ([address] 2020    Acute renal failure superimposed on stage 3 chronic kidney disease ([address] 02/15/2020    UTI (urinary tract infection) 02/15/2020    Acute renal failure superimposed on chronic kidney disease ([address] 02/15/2020    Bradycardia 02/15/2020    Renal failure (ARF), acute on chronic (HCC) 2019    Suprapubic catheter ([address] 2019    Bladder atony 2019    Chronic neck pain 2019    Cirrhosis of liver ([address] 2019    COPD, moderate ([address] 2019    Dry skin dermatitis 2019    Hypothyroid 2019    Lung nodules 2019    Neck mass 2019    Pericardial effusion 2019    Vitamin D deficiency 2019    BPH (benign prostatic hyperplasia) 2018    Hypertension     Urethral erosion     Chronic anemia 2018    Status post amputation of toe of right foot ([address] 2018    Thrombocytopenia ([address] 04/10/2018    Chronic kidney disease, stage III (moderate) (HCC) 04/04/2018    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    Chronic hepatitis C without hepatic coma ([address] 05/31/2017    Quadriparesis ([address] 05/31/2017    IV drug abuse ([address] 04/28/2017    Renal cell carcinoma of left kidney ([address] 65/48/5596    Umbilical hernia 33/98/3144    Diabetes ([address] 01/1990    Chronic drug abuse ([address] 01/01/1974     Past Medical History:   Diagnosis Date    Anemia     Bradycardia, unspecified 2018    Cervical discitis     MRSA    Chronic drug abuse ([address] 1974    Chronic kidney disease     stage III    Diabetes ([address] 01/1990    no DM    Dialysis patient ([address]     Drug abuse ([address]     Encephalopathy     GERD (gastroesophageal reflux disease)     History of abuse     Hypertension     Muscle weakness     Quadriparesis ([address] 2017    Renal cell carcinoma of left kidney ([address] 12/17/13    Pathological Stage [id number] cc RCC FG3 s/p left open partial nephrectomy in 12/13      Sepsis due to methicillin resistant Staphylococcus aureus (HCC)     Suprapubic catheter ([address]     Thrombocytopenia (HCC)     Thyroid disease     Urethral erosion     Viral hepatitis B     Viral hepatitis C     Xerosis cutis       Past Surgical History:   Procedure Laterality Date    COLONOSCOPY N/A 10/3/2019    COLONOSCOPY / polypectomy performed by Naveed Aguilar MD at [address] HX CIRCUMCISION  12/17/13    Dr. Sandip Robb, [facility] HX NEPHRECTOMY Left 12/17/13    Open/ Partial,nephrectomy    HX OTHER SURGICAL Right 2/27/2016    removed right ft  2nd meta-tarsal    HX OTHER SURGICAL  04/2017    abscess removed from back of neck     IR INSERT TUNL CVC W/O PORT OVER 5 YR  2/18/2020    NEUROLOGICAL PROCEDURE UNLISTED  2017    neck surgery-abcess-hardware neck    CA INSJ TUNNELED CVC W/O SUBQ PORT/ AGE 5 YR/> N/A 5/6/2020    INSERTION TUNNELED CENTRAL VENOUS CATHETER/perm catheter exchange performed by Ada Combs MD at [facility]    NM REMOVAL WITH INSERTION OF SUPRAPUBIC CATHETER  2018      Social History     Tobacco Use    Smoking status: Current Some Day Smoker     Packs/day: 0.25     Years: 30.00     Pack years: 7.50     Types: Cigarettes    Smokeless tobacco: Never Used   Substance Use Topics    Alcohol use: Not on file     Comment: beer occasionally      Social History     Tobacco Use   Smoking Status Current Some Day Smoker    Packs/day: 0.25    Years: 30.00    Pack years: 7.50    Types: Cigarettes   Smokeless Tobacco Never Used     Family History   Problem Relation Age of Onset    Diabetes Mother     Sickle Cell Anemia Father     Diabetes Sister     Hypertension Sister       Current Outpatient Medications   Medication Sig    methadone (Methadone IntensoL) 10 mg/mL solution Take 95 mg by mouth.  sildenafil citrate (VIAGRA) 100 mg tablet Take 1 tab my mouth as needed 1 hour prior to sexual activity on an empty stomach. Indications: the inability to have an erection    ketoconazole (NIZORAL) 2 % topical cream Apply  to affected area two (2) times a day.  levothyroxine (SYNTHROID) 100 mcg tablet Take 1 Tab by mouth every morning.  calcium acetate,phosphat bind, (PHOSLO) 667 mg cap Take 1 Cap by mouth three (3) times daily (with meals).  doxercalciferoL (HECTOROL) 4 mcg/2 mL injection 0.5 mL by IntraVENous route DIALYSIS MON, WED & FRI.  hydrALAZINE (APRESOLINE) 25 mg tablet Take 1 Tab by mouth every eight (8) hours as needed (systolic B/P >078).  Syringe, Disposable, (BD SYRINGE CATHETER TIP) 60 mL syrg Use 1 syringe daily with irrigations    tadalafiL (CIALIS) 5 mg tablet Take 1 Tablet by mouth nightly.  Indications: the inability to have an erection (Patient not taking: Reported on 2/28/2022)    midodrine (PROAMATINE) 5 mg tablet TAKE ONE TABLET BY MOUTH ON TUESDAY THURSDAY AND SATURDAY BEFORE DIALYSIS (Patient not taking: Reported on 1/27/2022)    b complex-vitamin c-folic acid 0.8 mg (NEPHRO-DEBBIE) 0.8 mg tab tablet Take 0.8 mg by mouth daily. (Patient not taking: Reported on 1/27/2022)    b complex-vitamin c-folic acid (NEPHROCAPS) 1 mg capsule Take 1 Cap by mouth daily. (Patient not taking: Reported on 1/27/2022)    food supplemt, lactose-reduced (ENSURE ENLIVE) 0.08 gram-1.5 kcal/mL liqd Take 1 Each by mouth three (3) times daily (with meals). Flavor of patient choice (Patient not taking: Reported on 2/28/2022)     No current facility-administered medications for this visit. Allergies   Allergen Reactions    Iodine Hives and Other (comments)          Review of Systems:  Pertinent items are noted in the History of Present Illness. Objective:     Visit Vitals  BP (!) 169/60 (BP 1 Location: Right lower arm, BP Patient Position: Sitting)   Pulse 62   Temp 97.3 °F (36.3 °C) (Temporal)   Ht 6' (1.829 m)   Wt 77.7 kg (171 lb 6.4 oz)   SpO2 99%   BMI 23.25 kg/m²       Physical Exam:      General:  in no apparent distress   Chest:   There is a sebaceous cyst located on the left upper chest that is around 1 cm in size with a small black tip consistent with a sebaceous cyst.  There is no erythema or drainage and there is no tenderness.                                 Imaging and Lab Review:     CBC:   Lab Results   Component Value Date/Time    WBC 6.3 10/06/2021 12:00 AM    RBC 3.51 (L) 10/06/2021 12:00 AM    HGB 10.7 (L) 10/06/2021 12:00 AM    HCT 32.8 (L) 10/06/2021 12:00 AM    PLATELET 297 (L) 29/31/9456 12:00 AM     BMP:   Lab Results   Component Value Date/Time    Glucose 82 02/02/2022 09:24 AM    Sodium 140 02/02/2022 09:24 AM    Potassium 4.4 02/02/2022 09:24 AM    Chloride 103 02/02/2022 09:24 AM    CO2 33 (H) 02/02/2022 09:24 AM    BUN 33 (H) 02/02/2022 09:24 AM    Creatinine 5.07 (H) 02/02/2022 09:24 AM    Calcium 8.7 02/02/2022 09:24 AM     CMP:  Lab Results   Component Value Date/Time    Glucose 82 02/02/2022 09:24 AM    Sodium 140 02/02/2022 09:24 AM    Potassium 4.4 02/02/2022 09:24 AM    Chloride 103 02/02/2022 09:24 AM    CO2 33 (H) 02/02/2022 09:24 AM    BUN 33 (H) 02/02/2022 09:24 AM    Creatinine 5.07 (H) 02/02/2022 09:24 AM    Calcium 8.7 02/02/2022 09:24 AM    Anion gap 4 02/02/2022 09:24 AM    BUN/Creatinine ratio 7 (L) 02/02/2022 09:24 AM    Alk. phosphatase 109 10/06/2021 12:00 AM    Protein, total 7.2 10/06/2021 12:00 AM    Albumin 4.1 10/06/2021 12:00 AM    Globulin 3.2 02/25/2020 05:34 AM    A-G Ratio 1.3 10/06/2021 12:00 AM       No results found for this or any previous visit (from the past 24 hour(s)). images and reports reviewed    Assessment:   Gilda Mcdermott is a 59 y.o. male who is presenting with a sebaceous cyst of the chest wall. I told the patient that we can observe it especially with his multiple medical conditions and I am not concerned about malignancy. I think the best option is to follow-up with me in 6 months and we will reassess it but for now observation is fine and he agreed with that.     Plan:     Observation  Follow-up in 6 months or as needed    Please call me if you have any questions (cell phone: [phone])     Signed By: Zohra Campbell MD     February 28, 2022

## 2022-02-28 NOTE — PROGRESS NOTES
Christi Pacheco is a 59 y.o. male (: 1960) presenting to address:    Chief Complaint   Patient presents with    New Patient     Cyst on chest wall/ referred by [address]       Medication list and allergies have been reviewed with Christi Pacheco and updated as of today's date. I have gone over all Medical, Surgical and Social History with Christi Pacheco and updated/added the information accordingly.

## 2022-03-07 ENCOUNTER — APPOINTMENT (OUTPATIENT)
Dept: ONCOLOGY | Age: 62
End: 2022-03-07

## 2022-03-08 LAB
ALBUMIN SERPL-MCNC: 3.9 G/DL (ref 3.8–4.8)
ALBUMIN/GLOB SERPL: 1.4 {RATIO} (ref 1.2–2.2)
ALP SERPL-CCNC: 79 IU/L (ref 44–121)
ALT SERPL-CCNC: 13 IU/L (ref 0–44)
AST SERPL-CCNC: 17 IU/L (ref 0–40)
BASOPHILS # BLD AUTO: 0 X10E3/UL (ref 0–0.2)
BASOPHILS NFR BLD AUTO: 1 %
BILIRUB SERPL-MCNC: 0.4 MG/DL (ref 0–1.2)
BUN SERPL-MCNC: 31 MG/DL (ref 8–27)
BUN/CREAT SERPL: 6 (ref 10–24)
CALCIUM SERPL-MCNC: 8.8 MG/DL (ref 8.6–10.2)
CHLORIDE SERPL-SCNC: 99 MMOL/L (ref 96–106)
CO2 SERPL-SCNC: 23 MMOL/L (ref 20–29)
CREAT SERPL-MCNC: 4.95 MG/DL (ref 0.76–1.27)
EGFR: 12 ML/MIN/1.73
EOSINOPHIL # BLD AUTO: 0.1 X10E3/UL (ref 0–0.4)
EOSINOPHIL NFR BLD AUTO: 2 %
ERYTHROCYTE [DISTWIDTH] IN BLOOD BY AUTOMATED COUNT: 13.8 % (ref 11.6–15.4)
FERRITIN SERPL-MCNC: 808 NG/ML (ref 30–400)
GLOBULIN SER CALC-MCNC: 2.8 G/DL (ref 1.5–4.5)
GLUCOSE SERPL-MCNC: 207 MG/DL (ref 65–99)
HCT VFR BLD AUTO: 28.9 % (ref 37.5–51)
HGB BLD-MCNC: 9.5 G/DL (ref 13–17.7)
IMM GRANULOCYTES # BLD AUTO: 0 X10E3/UL (ref 0–0.1)
IMM GRANULOCYTES NFR BLD AUTO: 1 %
IRON SATN MFR SERPL: 53 % (ref 15–55)
IRON SERPL-MCNC: 108 UG/DL (ref 38–169)
LYMPHOCYTES # BLD AUTO: 1.6 X10E3/UL (ref 0.7–3.1)
LYMPHOCYTES NFR BLD AUTO: 26 %
MCH RBC QN AUTO: 31.7 PG (ref 26.6–33)
MCHC RBC AUTO-ENTMCNC: 32.9 G/DL (ref 31.5–35.7)
MCV RBC AUTO: 96 FL (ref 79–97)
MONOCYTES # BLD AUTO: 0.5 X10E3/UL (ref 0.1–0.9)
MONOCYTES NFR BLD AUTO: 8 %
NEUTROPHILS # BLD AUTO: 3.9 X10E3/UL (ref 1.4–7)
NEUTROPHILS NFR BLD AUTO: 62 %
PLATELET # BLD AUTO: 128 X10E3/UL (ref 150–450)
POTASSIUM SERPL-SCNC: 4.8 MMOL/L (ref 3.5–5.2)
PROT SERPL-MCNC: 6.7 G/DL (ref 6–8.5)
RBC # BLD AUTO: 3 X10E6/UL (ref 4.14–5.8)
SODIUM SERPL-SCNC: 139 MMOL/L (ref 134–144)
TIBC SERPL-MCNC: 202 UG/DL (ref 250–450)
UIBC SERPL-MCNC: 94 UG/DL (ref 111–343)
WBC # BLD AUTO: 6.2 X10E3/UL (ref 3.4–10.8)

## 2022-03-10 ENCOUNTER — APPOINTMENT (OUTPATIENT)
Dept: GENERAL RADIOLOGY | Age: 62
DRG: 255 | End: 2022-03-10
Attending: PHYSICIAN ASSISTANT
Payer: MEDICARE

## 2022-03-10 ENCOUNTER — HOSPITAL ENCOUNTER (INPATIENT)
Age: 62
LOS: 4 days | Discharge: HOME HEALTH | DRG: 255 | End: 2022-03-14
Attending: STUDENT IN AN ORGANIZED HEALTH CARE EDUCATION/TRAINING PROGRAM | Admitting: FAMILY MEDICINE
Payer: MEDICARE

## 2022-03-10 DIAGNOSIS — N18.6 ESRD (END STAGE RENAL DISEASE) (HCC): ICD-10-CM

## 2022-03-10 DIAGNOSIS — S91.109A OPEN TOE WOUND, INITIAL ENCOUNTER: ICD-10-CM

## 2022-03-10 DIAGNOSIS — S98.131A: Primary | ICD-10-CM

## 2022-03-10 LAB
ABO + RH BLD: NORMAL
ALBUMIN SERPL-MCNC: 3.5 G/DL (ref 3.4–5)
ALBUMIN/GLOB SERPL: 0.7 {RATIO} (ref 0.8–1.7)
ALP SERPL-CCNC: 85 U/L (ref 45–117)
ALT SERPL-CCNC: 23 U/L (ref 16–61)
AMPHET UR QL SCN: NEGATIVE
ANION GAP SERPL CALC-SCNC: 3 MMOL/L (ref 3–18)
AST SERPL-CCNC: 23 U/L (ref 10–38)
BARBITURATES UR QL SCN: NEGATIVE
BASOPHILS # BLD: 0 K/UL (ref 0–0.1)
BASOPHILS NFR BLD: 1 % (ref 0–2)
BENZODIAZ UR QL: NEGATIVE
BILIRUB SERPL-MCNC: 0.5 MG/DL (ref 0.2–1)
BLOOD GROUP ANTIBODIES SERPL: NORMAL
BUN SERPL-MCNC: 10 MG/DL (ref 7–18)
BUN/CREAT SERPL: 3 (ref 12–20)
CALCIUM SERPL-MCNC: 9 MG/DL (ref 8.5–10.1)
CANNABINOIDS UR QL SCN: NEGATIVE
CHLORIDE SERPL-SCNC: 99 MMOL/L (ref 100–111)
CO2 SERPL-SCNC: 33 MMOL/L (ref 21–32)
COCAINE UR QL SCN: NEGATIVE
COVID-19 RAPID TEST, COVR: NOT DETECTED
CREAT SERPL-MCNC: 3 MG/DL (ref 0.6–1.3)
DIFFERENTIAL METHOD BLD: ABNORMAL
EOSINOPHIL # BLD: 0.1 K/UL (ref 0–0.4)
EOSINOPHIL NFR BLD: 1 % (ref 0–5)
ERYTHROCYTE [DISTWIDTH] IN BLOOD BY AUTOMATED COUNT: 14 % (ref 11.6–14.5)
EST. AVERAGE GLUCOSE BLD GHB EST-MCNC: 140 MG/DL
GLOBULIN SER CALC-MCNC: 5.3 G/DL (ref 2–4)
GLUCOSE BLD STRIP.AUTO-MCNC: 240 MG/DL (ref 70–110)
GLUCOSE SERPL-MCNC: 200 MG/DL (ref 74–99)
HBA1C MFR BLD: 6.5 % (ref 4.2–5.6)
HCT VFR BLD AUTO: 32.8 % (ref 36–48)
HDSCOM,HDSCOM: ABNORMAL
HGB BLD-MCNC: 10.6 G/DL (ref 13–16)
IMM GRANULOCYTES # BLD AUTO: 0.1 K/UL (ref 0–0.04)
IMM GRANULOCYTES NFR BLD AUTO: 2 % (ref 0–0.5)
LACTATE BLD-SCNC: 1.72 MMOL/L (ref 0.4–2)
LYMPHOCYTES # BLD: 1.5 K/UL (ref 0.9–3.6)
LYMPHOCYTES NFR BLD: 23 % (ref 21–52)
MAGNESIUM SERPL-MCNC: 2.2 MG/DL (ref 1.6–2.6)
MCH RBC QN AUTO: 30.9 PG (ref 24–34)
MCHC RBC AUTO-ENTMCNC: 32.3 G/DL (ref 31–37)
MCV RBC AUTO: 95.6 FL (ref 78–100)
METHADONE UR QL: POSITIVE
MONOCYTES # BLD: 0.4 K/UL (ref 0.05–1.2)
MONOCYTES NFR BLD: 6 % (ref 3–10)
NEUTS SEG # BLD: 4.4 K/UL (ref 1.8–8)
NEUTS SEG NFR BLD: 68 % (ref 40–73)
NRBC # BLD: 0 K/UL (ref 0–0.01)
NRBC BLD-RTO: 0 PER 100 WBC
OPIATES UR QL: NEGATIVE
PCP UR QL: NEGATIVE
PLATELET # BLD AUTO: 146 K/UL (ref 135–420)
PMV BLD AUTO: 10.2 FL (ref 9.2–11.8)
POTASSIUM SERPL-SCNC: 4.4 MMOL/L (ref 3.5–5.5)
PROT SERPL-MCNC: 8.8 G/DL (ref 6.4–8.2)
RBC # BLD AUTO: 3.43 M/UL (ref 4.35–5.65)
SODIUM SERPL-SCNC: 135 MMOL/L (ref 136–145)
SOURCE, COVRS: NORMAL
SPECIMEN EXP DATE BLD: NORMAL
WBC # BLD AUTO: 6.4 K/UL (ref 4.6–13.2)

## 2022-03-10 PROCEDURE — 99285 EMERGENCY DEPT VISIT HI MDM: CPT

## 2022-03-10 PROCEDURE — 73630 X-RAY EXAM OF FOOT: CPT

## 2022-03-10 PROCEDURE — 83735 ASSAY OF MAGNESIUM: CPT

## 2022-03-10 PROCEDURE — 87635 SARS-COV-2 COVID-19 AMP PRB: CPT

## 2022-03-10 PROCEDURE — 96365 THER/PROPH/DIAG IV INF INIT: CPT

## 2022-03-10 PROCEDURE — 80307 DRUG TEST PRSMV CHEM ANLYZR: CPT

## 2022-03-10 PROCEDURE — 83036 HEMOGLOBIN GLYCOSYLATED A1C: CPT

## 2022-03-10 PROCEDURE — 65660000000 HC RM CCU STEPDOWN

## 2022-03-10 PROCEDURE — 83605 ASSAY OF LACTIC ACID: CPT

## 2022-03-10 PROCEDURE — 82962 GLUCOSE BLOOD TEST: CPT

## 2022-03-10 PROCEDURE — 93005 ELECTROCARDIOGRAM TRACING: CPT

## 2022-03-10 PROCEDURE — 74011000258 HC RX REV CODE- 258: Performed by: PHYSICIAN ASSISTANT

## 2022-03-10 PROCEDURE — 80053 COMPREHEN METABOLIC PANEL: CPT

## 2022-03-10 PROCEDURE — 86900 BLOOD TYPING SEROLOGIC ABO: CPT

## 2022-03-10 PROCEDURE — 74011250636 HC RX REV CODE- 250/636: Performed by: PHYSICIAN ASSISTANT

## 2022-03-10 PROCEDURE — 85025 COMPLETE CBC W/AUTO DIFF WBC: CPT

## 2022-03-10 PROCEDURE — 87040 BLOOD CULTURE FOR BACTERIA: CPT

## 2022-03-10 PROCEDURE — 71045 X-RAY EXAM CHEST 1 VIEW: CPT

## 2022-03-10 RX ORDER — DEXTROSE MONOHYDRATE 100 MG/ML
0-250 INJECTION, SOLUTION INTRAVENOUS AS NEEDED
Status: DISCONTINUED | OUTPATIENT
Start: 2022-03-10 | End: 2022-03-14 | Stop reason: HOSPADM

## 2022-03-10 RX ORDER — PANTOPRAZOLE SODIUM 40 MG/1
40 TABLET, DELAYED RELEASE ORAL
Status: DISCONTINUED | OUTPATIENT
Start: 2022-03-11 | End: 2022-03-14 | Stop reason: HOSPADM

## 2022-03-10 RX ORDER — INSULIN LISPRO 100 [IU]/ML
INJECTION, SOLUTION INTRAVENOUS; SUBCUTANEOUS
Status: DISCONTINUED | OUTPATIENT
Start: 2022-03-11 | End: 2022-03-14 | Stop reason: HOSPADM

## 2022-03-10 RX ORDER — HYDRALAZINE HYDROCHLORIDE 25 MG/1
25 TABLET, FILM COATED ORAL 3 TIMES DAILY
Status: DISCONTINUED | OUTPATIENT
Start: 2022-03-10 | End: 2022-03-14 | Stop reason: HOSPADM

## 2022-03-10 RX ORDER — MAGNESIUM SULFATE 100 %
4 CRYSTALS MISCELLANEOUS AS NEEDED
Status: DISCONTINUED | OUTPATIENT
Start: 2022-03-10 | End: 2022-03-14 | Stop reason: HOSPADM

## 2022-03-10 RX ADMIN — PIPERACILLIN AND TAZOBACTAM 4.5 G: 4; .5 INJECTION, POWDER, FOR SOLUTION INTRAVENOUS at 21:07

## 2022-03-11 ENCOUNTER — APPOINTMENT (OUTPATIENT)
Dept: NON INVASIVE DIAGNOSTICS | Age: 62
DRG: 255 | End: 2022-03-11
Attending: PHYSICIAN ASSISTANT
Payer: MEDICARE

## 2022-03-11 ENCOUNTER — ANESTHESIA EVENT (OUTPATIENT)
Dept: SURGERY | Age: 62
DRG: 255 | End: 2022-03-11
Payer: MEDICARE

## 2022-03-11 ENCOUNTER — ANESTHESIA (OUTPATIENT)
Dept: SURGERY | Age: 62
DRG: 255 | End: 2022-03-11
Payer: MEDICARE

## 2022-03-11 LAB
25(OH)D3 SERPL-MCNC: 22.5 NG/ML (ref 30–100)
ALBUMIN SERPL-MCNC: 3 G/DL (ref 3.4–5)
ALBUMIN/GLOB SERPL: 0.7 {RATIO} (ref 0.8–1.7)
ALP SERPL-CCNC: 78 U/L (ref 45–117)
ALT SERPL-CCNC: 18 U/L (ref 16–61)
ANION GAP SERPL CALC-SCNC: 5 MMOL/L (ref 3–18)
AST SERPL-CCNC: 14 U/L (ref 10–38)
ATRIAL RATE: 55 BPM
BASOPHILS # BLD: 0 K/UL (ref 0–0.1)
BASOPHILS NFR BLD: 1 % (ref 0–2)
BILIRUB SERPL-MCNC: 0.4 MG/DL (ref 0.2–1)
BUN SERPL-MCNC: 15 MG/DL (ref 7–18)
BUN/CREAT SERPL: 4 (ref 12–20)
CALCIUM SERPL-MCNC: 8.7 MG/DL (ref 8.5–10.1)
CALCULATED P AXIS, ECG09: 61 DEGREES
CALCULATED R AXIS, ECG10: -23 DEGREES
CALCULATED T AXIS, ECG11: 44 DEGREES
CHLORIDE SERPL-SCNC: 101 MMOL/L (ref 100–111)
CHOLEST SERPL-MCNC: 154 MG/DL
CO2 SERPL-SCNC: 32 MMOL/L (ref 21–32)
CREAT SERPL-MCNC: 3.58 MG/DL (ref 0.6–1.3)
CRP SERPL-MCNC: 1.8 MG/DL (ref 0–0.3)
DIAGNOSIS, 93000: NORMAL
DIFFERENTIAL METHOD BLD: ABNORMAL
EOSINOPHIL # BLD: 0.1 K/UL (ref 0–0.4)
EOSINOPHIL NFR BLD: 2 % (ref 0–5)
ERYTHROCYTE [DISTWIDTH] IN BLOOD BY AUTOMATED COUNT: 14 % (ref 11.6–14.5)
GLOBULIN SER CALC-MCNC: 4.1 G/DL (ref 2–4)
GLUCOSE BLD STRIP.AUTO-MCNC: 103 MG/DL (ref 70–110)
GLUCOSE BLD STRIP.AUTO-MCNC: 108 MG/DL (ref 70–110)
GLUCOSE BLD STRIP.AUTO-MCNC: 115 MG/DL (ref 70–110)
GLUCOSE BLD STRIP.AUTO-MCNC: 239 MG/DL (ref 70–110)
GLUCOSE BLD STRIP.AUTO-MCNC: 97 MG/DL (ref 70–110)
GLUCOSE SERPL-MCNC: 119 MG/DL (ref 74–99)
HCT VFR BLD AUTO: 27.8 % (ref 36–48)
HDLC SERPL-MCNC: 49 MG/DL (ref 40–60)
HDLC SERPL: 3.1 {RATIO} (ref 0–5)
HGB BLD-MCNC: 8.9 G/DL (ref 13–16)
IMM GRANULOCYTES # BLD AUTO: 0.1 K/UL (ref 0–0.04)
IMM GRANULOCYTES NFR BLD AUTO: 1 % (ref 0–0.5)
LDLC SERPL CALC-MCNC: 82.4 MG/DL (ref 0–100)
LIPID PROFILE,FLP: NORMAL
LYMPHOCYTES # BLD: 2.3 K/UL (ref 0.9–3.6)
LYMPHOCYTES NFR BLD: 34 % (ref 21–52)
MAGNESIUM SERPL-MCNC: 2.1 MG/DL (ref 1.6–2.6)
MCH RBC QN AUTO: 30.6 PG (ref 24–34)
MCHC RBC AUTO-ENTMCNC: 32 G/DL (ref 31–37)
MCV RBC AUTO: 95.5 FL (ref 78–100)
MONOCYTES # BLD: 0.4 K/UL (ref 0.05–1.2)
MONOCYTES NFR BLD: 7 % (ref 3–10)
NEUTS SEG # BLD: 3.7 K/UL (ref 1.8–8)
NEUTS SEG NFR BLD: 56 % (ref 40–73)
NRBC # BLD: 0 K/UL (ref 0–0.01)
NRBC BLD-RTO: 0 PER 100 WBC
P-R INTERVAL, ECG05: 188 MS
PLATELET # BLD AUTO: 132 K/UL (ref 135–420)
PMV BLD AUTO: 10 FL (ref 9.2–11.8)
POTASSIUM SERPL-SCNC: 3.8 MMOL/L (ref 3.5–5.5)
PROT SERPL-MCNC: 7.1 G/DL (ref 6.4–8.2)
Q-T INTERVAL, ECG07: 480 MS
QRS DURATION, ECG06: 100 MS
QTC CALCULATION (BEZET), ECG08: 459 MS
RBC # BLD AUTO: 2.91 M/UL (ref 4.35–5.65)
SODIUM SERPL-SCNC: 138 MMOL/L (ref 136–145)
TRIGL SERPL-MCNC: 113 MG/DL (ref ?–150)
TROPONIN-HIGH SENSITIVITY: 12 NG/L (ref 0–78)
TSH SERPL DL<=0.05 MIU/L-ACNC: 2.79 UIU/ML (ref 0.36–3.74)
VENTRICULAR RATE, ECG03: 55 BPM
VLDLC SERPL CALC-MCNC: 22.6 MG/DL
WBC # BLD AUTO: 6.6 K/UL (ref 4.6–13.2)

## 2022-03-11 PROCEDURE — 01480 ANES OPEN PX LOWER L/A/F NOS: CPT | Performed by: NURSE ANESTHETIST, CERTIFIED REGISTERED

## 2022-03-11 PROCEDURE — 2709999900 HC NON-CHARGEABLE SUPPLY

## 2022-03-11 PROCEDURE — 85025 COMPLETE CBC W/AUTO DIFF WBC: CPT

## 2022-03-11 PROCEDURE — 80053 COMPREHEN METABOLIC PANEL: CPT

## 2022-03-11 PROCEDURE — 01480 ANES OPEN PX LOWER L/A/F NOS: CPT | Performed by: ANESTHESIOLOGY

## 2022-03-11 PROCEDURE — 80061 LIPID PANEL: CPT

## 2022-03-11 PROCEDURE — 87075 CULTR BACTERIA EXCEPT BLOOD: CPT

## 2022-03-11 PROCEDURE — 74011000258 HC RX REV CODE- 258: Performed by: PODIATRIST

## 2022-03-11 PROCEDURE — 82306 VITAMIN D 25 HYDROXY: CPT

## 2022-03-11 PROCEDURE — 82962 GLUCOSE BLOOD TEST: CPT

## 2022-03-11 PROCEDURE — 88305 TISSUE EXAM BY PATHOLOGIST: CPT

## 2022-03-11 PROCEDURE — 87205 SMEAR GRAM STAIN: CPT

## 2022-03-11 PROCEDURE — 83735 ASSAY OF MAGNESIUM: CPT

## 2022-03-11 PROCEDURE — 2709999900 HC NON-CHARGEABLE SUPPLY: Performed by: PODIATRIST

## 2022-03-11 PROCEDURE — 86706 HEP B SURFACE ANTIBODY: CPT

## 2022-03-11 PROCEDURE — 74011250637 HC RX REV CODE- 250/637: Performed by: PODIATRIST

## 2022-03-11 PROCEDURE — 74011250636 HC RX REV CODE- 250/636: Performed by: PODIATRIST

## 2022-03-11 PROCEDURE — 87186 SC STD MICRODIL/AGAR DIL: CPT

## 2022-03-11 PROCEDURE — 74011250636 HC RX REV CODE- 250/636: Performed by: NURSE ANESTHETIST, CERTIFIED REGISTERED

## 2022-03-11 PROCEDURE — 74011250636 HC RX REV CODE- 250/636: Performed by: FAMILY MEDICINE

## 2022-03-11 PROCEDURE — 77030031139 HC SUT VCRL2 J&J -A: Performed by: PODIATRIST

## 2022-03-11 PROCEDURE — 74011636637 HC RX REV CODE- 636/637: Performed by: PODIATRIST

## 2022-03-11 PROCEDURE — 84484 ASSAY OF TROPONIN QUANT: CPT

## 2022-03-11 PROCEDURE — 87176 TISSUE HOMOGENIZATION CULTR: CPT

## 2022-03-11 PROCEDURE — 76060000032 HC ANESTHESIA 0.5 TO 1 HR: Performed by: PODIATRIST

## 2022-03-11 PROCEDURE — 74011250636 HC RX REV CODE- 250/636: Performed by: PHYSICIAN ASSISTANT

## 2022-03-11 PROCEDURE — 0Y6V0Z0 DETACHMENT AT RIGHT 4TH TOE, COMPLETE, OPEN APPROACH: ICD-10-PCS | Performed by: PODIATRIST

## 2022-03-11 PROCEDURE — 77030040361 HC SLV COMPR DVT MDII -B: Performed by: PODIATRIST

## 2022-03-11 PROCEDURE — 74011000258 HC RX REV CODE- 258: Performed by: PHYSICIAN ASSISTANT

## 2022-03-11 PROCEDURE — 86140 C-REACTIVE PROTEIN: CPT

## 2022-03-11 PROCEDURE — 84443 ASSAY THYROID STIM HORMONE: CPT

## 2022-03-11 PROCEDURE — 77030018836 HC SOL IRR NACL ICUM -A: Performed by: PODIATRIST

## 2022-03-11 PROCEDURE — 36415 COLL VENOUS BLD VENIPUNCTURE: CPT

## 2022-03-11 PROCEDURE — 87077 CULTURE AEROBIC IDENTIFY: CPT

## 2022-03-11 PROCEDURE — 99223 1ST HOSP IP/OBS HIGH 75: CPT | Performed by: PHYSICIAN ASSISTANT

## 2022-03-11 PROCEDURE — 87340 HEPATITIS B SURFACE AG IA: CPT

## 2022-03-11 PROCEDURE — 76210000006 HC OR PH I REC 0.5 TO 1 HR: Performed by: PODIATRIST

## 2022-03-11 PROCEDURE — 88311 DECALCIFY TISSUE: CPT

## 2022-03-11 PROCEDURE — 65270000029 HC RM PRIVATE

## 2022-03-11 PROCEDURE — 88307 TISSUE EXAM BY PATHOLOGIST: CPT

## 2022-03-11 PROCEDURE — 76010000138 HC OR TIME 0.5 TO 1 HR: Performed by: PODIATRIST

## 2022-03-11 PROCEDURE — 77030040922 HC BLNKT HYPOTHRM STRY -A: Performed by: PODIATRIST

## 2022-03-11 RX ORDER — FENTANYL CITRATE 50 UG/ML
INJECTION, SOLUTION INTRAMUSCULAR; INTRAVENOUS AS NEEDED
Status: DISCONTINUED | OUTPATIENT
Start: 2022-03-11 | End: 2022-03-11 | Stop reason: HOSPADM

## 2022-03-11 RX ORDER — MAGNESIUM SULFATE 100 %
4 CRYSTALS MISCELLANEOUS AS NEEDED
Status: DISCONTINUED | OUTPATIENT
Start: 2022-03-11 | End: 2022-03-11 | Stop reason: HOSPADM

## 2022-03-11 RX ORDER — DOXERCALCIFEROL 4 UG/2ML
0.5 INJECTION INTRAVENOUS
Status: DISCONTINUED | OUTPATIENT
Start: 2022-03-12 | End: 2022-03-14 | Stop reason: HOSPADM

## 2022-03-11 RX ORDER — PROPOFOL 10 MG/ML
VIAL (ML) INTRAVENOUS
Status: DISCONTINUED | OUTPATIENT
Start: 2022-03-11 | End: 2022-03-11 | Stop reason: HOSPADM

## 2022-03-11 RX ORDER — SODIUM CHLORIDE 0.9 % (FLUSH) 0.9 %
5-40 SYRINGE (ML) INJECTION AS NEEDED
Status: DISCONTINUED | OUTPATIENT
Start: 2022-03-11 | End: 2022-03-11 | Stop reason: HOSPADM

## 2022-03-11 RX ORDER — INSULIN GLARGINE 100 [IU]/ML
5 INJECTION, SOLUTION SUBCUTANEOUS
Status: DISCONTINUED | OUTPATIENT
Start: 2022-03-11 | End: 2022-03-13

## 2022-03-11 RX ORDER — HYDRALAZINE HYDROCHLORIDE 20 MG/ML
20 INJECTION INTRAMUSCULAR; INTRAVENOUS
Status: DISCONTINUED | OUTPATIENT
Start: 2022-03-11 | End: 2022-03-14 | Stop reason: HOSPADM

## 2022-03-11 RX ORDER — INSULIN LISPRO 100 [IU]/ML
INJECTION, SOLUTION INTRAVENOUS; SUBCUTANEOUS ONCE
Status: DISCONTINUED | OUTPATIENT
Start: 2022-03-11 | End: 2022-03-11 | Stop reason: HOSPADM

## 2022-03-11 RX ORDER — ONDANSETRON 2 MG/ML
4 INJECTION INTRAMUSCULAR; INTRAVENOUS ONCE
Status: DISCONTINUED | OUTPATIENT
Start: 2022-03-11 | End: 2022-03-11 | Stop reason: HOSPADM

## 2022-03-11 RX ORDER — DEXTROSE MONOHYDRATE 100 MG/ML
250 INJECTION, SOLUTION INTRAVENOUS ONCE
Status: DISCONTINUED | OUTPATIENT
Start: 2022-03-11 | End: 2022-03-11 | Stop reason: HOSPADM

## 2022-03-11 RX ORDER — SODIUM CHLORIDE, SODIUM LACTATE, POTASSIUM CHLORIDE, CALCIUM CHLORIDE 600; 310; 30; 20 MG/100ML; MG/100ML; MG/100ML; MG/100ML
INJECTION, SOLUTION INTRAVENOUS
Status: DISCONTINUED | OUTPATIENT
Start: 2022-03-11 | End: 2022-03-11 | Stop reason: HOSPADM

## 2022-03-11 RX ORDER — SODIUM CHLORIDE, SODIUM LACTATE, POTASSIUM CHLORIDE, CALCIUM CHLORIDE 600; 310; 30; 20 MG/100ML; MG/100ML; MG/100ML; MG/100ML
25 INJECTION, SOLUTION INTRAVENOUS CONTINUOUS
Status: DISCONTINUED | OUTPATIENT
Start: 2022-03-11 | End: 2022-03-11 | Stop reason: HOSPADM

## 2022-03-11 RX ORDER — SODIUM CHLORIDE 0.9 % (FLUSH) 0.9 %
5-40 SYRINGE (ML) INJECTION EVERY 8 HOURS
Status: DISCONTINUED | OUTPATIENT
Start: 2022-03-11 | End: 2022-03-11 | Stop reason: HOSPADM

## 2022-03-11 RX ORDER — HYDROMORPHONE HYDROCHLORIDE 1 MG/ML
0.5 INJECTION, SOLUTION INTRAMUSCULAR; INTRAVENOUS; SUBCUTANEOUS AS NEEDED
Status: DISCONTINUED | OUTPATIENT
Start: 2022-03-11 | End: 2022-03-11 | Stop reason: HOSPADM

## 2022-03-11 RX ORDER — MIDAZOLAM HYDROCHLORIDE 1 MG/ML
INJECTION, SOLUTION INTRAMUSCULAR; INTRAVENOUS AS NEEDED
Status: DISCONTINUED | OUTPATIENT
Start: 2022-03-11 | End: 2022-03-11 | Stop reason: HOSPADM

## 2022-03-11 RX ORDER — PROPOFOL 10 MG/ML
INJECTION, EMULSION INTRAVENOUS AS NEEDED
Status: DISCONTINUED | OUTPATIENT
Start: 2022-03-11 | End: 2022-03-11 | Stop reason: HOSPADM

## 2022-03-11 RX ADMIN — FENTANYL CITRATE 50 MCG: 50 INJECTION, SOLUTION INTRAMUSCULAR; INTRAVENOUS at 12:47

## 2022-03-11 RX ADMIN — MIDAZOLAM HYDROCHLORIDE 1 MG: 2 INJECTION, SOLUTION INTRAMUSCULAR; INTRAVENOUS at 12:42

## 2022-03-11 RX ADMIN — Medication 4 UNITS: at 21:49

## 2022-03-11 RX ADMIN — MIDAZOLAM HYDROCHLORIDE 1 MG: 2 INJECTION, SOLUTION INTRAMUSCULAR; INTRAVENOUS at 12:47

## 2022-03-11 RX ADMIN — FENTANYL CITRATE 50 MCG: 50 INJECTION, SOLUTION INTRAMUSCULAR; INTRAVENOUS at 12:42

## 2022-03-11 RX ADMIN — HYDRALAZINE HYDROCHLORIDE 20 MG: 20 INJECTION INTRAMUSCULAR; INTRAVENOUS at 23:35

## 2022-03-11 RX ADMIN — PROPOFOL 30 MG: 10 INJECTION, EMULSION INTRAVENOUS at 12:48

## 2022-03-11 RX ADMIN — PROPOFOL 75 MCG/KG/MIN: 10 INJECTION, EMULSION INTRAVENOUS at 12:47

## 2022-03-11 RX ADMIN — PIPERACILLIN AND TAZOBACTAM 3.38 G: 3; .375 INJECTION, POWDER, LYOPHILIZED, FOR SOLUTION INTRAVENOUS at 16:07

## 2022-03-11 RX ADMIN — PIPERACILLIN AND TAZOBACTAM 3.38 G: 3; .375 INJECTION, POWDER, LYOPHILIZED, FOR SOLUTION INTRAVENOUS at 04:06

## 2022-03-11 RX ADMIN — HYDRALAZINE HYDROCHLORIDE 25 MG: 25 TABLET, FILM COATED ORAL at 21:49

## 2022-03-11 RX ADMIN — HYDRALAZINE HYDROCHLORIDE 20 MG: 20 INJECTION INTRAMUSCULAR; INTRAVENOUS at 04:13

## 2022-03-11 RX ADMIN — SODIUM CHLORIDE, SODIUM LACTATE, POTASSIUM CHLORIDE, AND CALCIUM CHLORIDE: 600; 310; 30; 20 INJECTION, SOLUTION INTRAVENOUS at 12:41
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
## 2022-03-11 NOTE — PROGRESS NOTES
Problem: Diabetes Self-Management  Goal: *Disease process and treatment process  Description: Define diabetes and identify own type of diabetes; list 3 options for treating diabetes. Outcome: Progressing Towards Goal  Goal: *Incorporating nutritional management into lifestyle  Description: Describe effect of type, amount and timing of food on blood glucose; list 3 methods for planning meals. Outcome: Progressing Towards Goal  Goal: *Incorporating physical activity into lifestyle  Description: State effect of exercise on blood glucose levels. Outcome: Progressing Towards Goal  Goal: *Developing strategies to promote health/change behavior  Description: Define the ABC's of diabetes; identify appropriate screenings, schedule and personal plan for screenings. Outcome: Progressing Towards Goal  Goal: *Using medications safely  Description: State effect of diabetes medications on diabetes; name diabetes medication taking, action and side effects. Outcome: Progressing Towards Goal  Goal: *Monitoring blood glucose, interpreting and using results  Description: Identify recommended blood glucose targets  and personal targets. Outcome: Progressing Towards Goal  Goal: *Prevention, detection, treatment of acute complications  Description: List symptoms of hyper- and hypoglycemia; describe how to treat low blood sugar and actions for lowering  high blood glucose level. Outcome: Progressing Towards Goal  Goal: *Prevention, detection and treatment of chronic complications  Description: Define the natural course of diabetes and describe the relationship of blood glucose levels to long term complications of diabetes.   Outcome: Progressing Towards Goal  Goal: *Developing strategies to address psychosocial issues  Description: Describe feelings about living with diabetes; identify support needed and support network  Outcome: Progressing Towards Goal  Goal: *Insulin pump training  Outcome: Progressing Towards Goal  Goal: *Sick day guidelines  Outcome: Progressing Towards Goal  Goal: *Patient Specific Goal (EDIT GOAL, INSERT TEXT)  Outcome: Progressing Towards Goal     Problem: Patient Education: Go to Patient Education Activity  Goal: Patient/Family Education  Outcome: Progressing Towards Goal     Problem: General Medical Care Plan  Goal: *Vital signs within specified parameters  Outcome: Progressing Towards Goal  Goal: *Labs within defined limits  Outcome: Progressing Towards Goal  Goal: *Optimal pain control at patient's stated goal  Outcome: Progressing Towards Goal  Goal: *Skin integrity maintained  Outcome: Progressing Towards Goal  Goal: *Fluid volume balance  Outcome: Progressing Towards Goal  Goal: *Optimize nutritional status  Outcome: Progressing Towards Goal     Problem: Falls - Risk of  Goal: *Absence of Falls  Description: Document Tomas Fall Risk and appropriate interventions in the flowsheet.   Outcome: Progressing Towards Goal  Note: Fall Risk Interventions:  Mobility Interventions: Bed/chair exit alarm,Utilize walker, cane, or other assistive device              Elimination Interventions: Bed/chair exit alarm,Call light in reach,Patient to call for help with toileting needs              Problem: Patient Education: Go to Patient Education Activity  Goal: Patient/Family Education  Outcome: Progressing Towards Goal     Problem: Nutrition Deficit  Goal: *Optimize nutritional status  Outcome: Progressing Towards Goal 1.5

## 2022-03-11 NOTE — PROGRESS NOTES
2319 TRANSFER - IN REPORT:    Telephone report received from Betty Waterman, [address] (name) on Maritza Bunch  being received from ED(unit) for routine progression of care      Report consisted of patients Situation, Background, Assessment and   Recommendations(SBAR). Information from the following report(s) MAR, Recent Results and Cardiac Rhythm SR was reviewed with the receiving nurse. Opportunity for questions and clarification was provided. Assessment completed upon patients arrival to unit and care assumed. 2340 Received Pt. Per Wheelchair  from ED. Pt is AOX 4 transferred to bed without a problem. Pt.  Educated on room equipments and call bell  when in need of help and assistance. Pt. Educated on MD's  orders and plans for the evening. Pt. Verbalized understanding. Pt. Denies discomfort. Will continue to monitor Pt. Condition. Pt. Given chlorhexidene bath, changed gown and linen. 0130  Pt. Made no complaints. 0330  Pt. Resting comfortably in bed.    0400  Notified Dr. Johan Figueroa of Pt. BP, MD made order per [facility]    0600  Pt. Made no complaints. Pt. Able to rest and sleep well throughout the shift. Verbal and bedside Shift changed report given to Shyla Mosley, RN (oncoming RN) on Pt. Condition. Report consisted of patients Situation, History, Activities, intake/output,  Background, Assessment and Recommendations(SBAR). Information from the following report(s) Kardex, order Summary, Lab results and MAR was reviewed with the receiving nurse. Opportunity for questions and clarification was provided.

## 2022-03-11 NOTE — PROGRESS NOTES
ESRD patient admitted with infected and gangrenous right toes. Foot and ankle surgery will see the patient. He is dialysis dependent. No need for any dialysis today but will arrange his dialysis every Tuesday Thursday and Saturday in hospital and will give his Retacrit and Hectorol.     I will be out of town this weekend, [facility], [facility] nephrology will follow and see him during dialysis

## 2022-03-11 NOTE — ANESTHESIA PREPROCEDURE EVALUATION
Relevant Problems   RESPIRATORY SYSTEM   (+) COPD, moderate (HCC)      CARDIOVASCULAR   (+) Hypertension      GASTROINTESTINAL   (+) Chronic hepatitis C without hepatic coma (HCC)   (+) Cirrhosis of liver (HCC)   (+) GERD (gastroesophageal reflux disease)      RENAL FAILURE   (+) Acute renal failure superimposed on chronic kidney disease (HCC)   (+) Acute renal failure superimposed on stage 3 chronic kidney disease (HCC)   (+) Chronic kidney disease, stage III (moderate) (HCC)   (+) ESRD (end stage renal disease) (HCC)   (+) ESRD (end stage renal disease) on dialysis (HCC)   (+) Renal cell carcinoma of left kidney (HCC)   (+) Renal failure (ARF), acute on chronic (HCC)      ENDOCRINE   (+) Diabetes (HCC)   (+) Diabetes mellitus (HCC)   (+) Hypothyroid      HEMATOLOGY   (+) Anemia in chronic renal disease   (+) Chronic anemia   (+) Iron deficiency anemia      PERSONAL HX & FAMILY HX OF CANCER   (+) Renal cell carcinoma of left kidney (HCC)       Anesthetic History   No history of anesthetic complications            Review of Systems / Medical History  Patient summary reviewed    Pulmonary    COPD               Neuro/Psych              Cardiovascular    Hypertension                   GI/Hepatic/Renal     GERD    Renal disease: ESRD and dialysis  Liver disease     Endo/Other    Diabetes: type 2  Hypothyroidism  Arthritis     Other Findings              Physical Exam    Airway  Mallampati: II  TM Distance: 4 - 6 cm  Neck ROM: normal range of motion   Mouth opening: Normal     Cardiovascular    Rhythm: regular  Rate: normal         Dental    Dentition: Poor dentition     Pulmonary  Breath sounds clear to auscultation               Abdominal  GI exam deferred       Other Findings            Anesthetic Plan    ASA: 4  Anesthesia type: MAC            Anesthetic plan and risks discussed with: Patient

## 2022-03-11 NOTE — CONSULTS
Has gangrene with osteitis right third toe. Discussed with ID/Dr Jaron Hammonds  Plan for toe amputation later today.   Full Consult to follow

## 2022-03-11 NOTE — PROGRESS NOTES
Reason for Admission:  Open toe wound, initial encounter [S91.109A]                 RUR Score:  13%           Plan for utilizing home health:   TBD                     Likelihood of Readmission:   LOW                         Transition of Care Plan:            Initial assessment completed with patient. Cognitive status of patient: oriented to time, place, person and situation. Face sheet information confirmed:  yes. The patient designates Millicent holly to participate in his discharge plan and to receive any needed information. This patient lives in a apartment. .      Patient is able to navigate steps as needed. Prior to hospitalization, patient was considered to be independent with ADLs/IADLS : yes . Patient has a current ACP document on file: no      Healthcare Decision Maker: The sister will be available to transport patient home upon discharge. The patient already has [employer],  medical equipment available in the home. Patient is not currently active with home health. Patient has not stayed in a skilled nursing facility or rehab. Was  stay within last 60 days : no. This patient is on dialysis :yes    If yes, hemodialysis patient and receives treatment on Tuesday/Thursday/Saturday at [place] [id number]  Chair time is 1030. Pt is transported to/from dialysis by transport. Currently, the discharge plan is Home. Vs home with home health, TBD. The patient states that he can obtain his medications from the pharmacy, and take his medications as directed. Patient's current insurance is [employer] and [address] [place] [address] Management Interventions  PCP Verified by CM: Yes  Mode of Transport at Discharge:  Other (see comment) ()  Transition of Care Consult (CM Consult): Discharge Planning  Support Systems: Other Family Member(s)  Confirm Follow Up Transport: Self  Discharge Location  Patient Expects to be Discharged to[de-identified] Home with family assistance         will continue to monitor and assist with transition of care needs.     MARTA AyalaN, RN  Care Management  Pager # [phone]

## 2022-03-11 NOTE — CONSULTS
[address]    Name:  Marquise Huerta  MR#:   [id number]  :  1960  ACCOUNT #:  [de-identified]  DATE OF SERVICE:  2022    RENAL CONSULTATION    Consult was requested by Dr. Kate Nayak. REASON FOR CONSULTATION:  Arranging dialysis and follow medically while the patient is in the hospital.    HISTORY OF PRESENT ILLNESS:  This 49-year-old  male, known to me for his medical and renal problem, is admitted for evaluation and treatment of possible infected and gangrenous right second and third toe. He showed me the site during routine dialysis round. Recently, he was seen by [facility] and was sent home with oral antibiotic. Subsequently, foot and ankle surgeon, Dr. Brenda Rabago, has seen and advised to be admitted for further evaluation and treatment. He has history of ESRD with underlying hepatitis C. He has history of polysubstance abuse in the past, also has history of hypothyroid, type 2 diabetes mellitus, hypertension, and hyperlipidemia. Also, has a history of pulmonary hypertension; left renal cell carcinoma, status post partial nephrectomy. Moreover has concern for liver cirrhosis and also has history of endoscopy done by Dr. Luwana Halsted in the past.  Moreover has history of neck problems including diskitis and undergone laminectomy because he had quadriplegia in the past.  He is status post laminectomy with posterolateral fusion of C3 to C7. PAST MEDICAL HISTORY:  As I mentioned. SOCIAL HISTORY:  Has history of polysubstance abuse in the past and also history of smoking. No smokeless tobacco.  Has history of marijuana in the past, as I mentioned, and he is . PAST SURGICAL HISTORY:  Colonoscopy, partial nephrectomy, removal of the right second metatarsal bone, tunneled dialysis catheter and removal of it, has AV graft.     FAMILY HISTORY:  Significant for diabetes in mother, sickle cell anemia in father, diabetes in siblings, hypertension in siblings also. LIST OF MEDICATIONS:  1. Methadone 95 mg tablet 10 mL per solution. 2.  Synthroid 100 mcg tablet daily. 3.  Tadalafil or Cialis 5 mg tablet on need basis. 4.  Viagra, I am sure he is not taking those two medications any more. 5.  Midodrine 5 mg every Tuesday, Thursday, Saturday before dialysis. 6.  Ketoconazole cream to apply to the affected area. 7.  Nephrocaps once a day. 8.  PhosLo 667 mg one capsule three times daily. 9.  Hectorol 0.5 mcg during dialysis. 10.  Nepro. 11.  Hydralazine 25 mg one tablet three times daily. ALLERGIES:  TO IODINE. REVIEW OF SYSTEMS:  GENERAL:  The patient is not in distress, alert and awake x3. Able to communicate full sentences without any problem. CARDIOVASCULAR:  No palpitation. No chest pain. No leg swelling. PULMONARY:  No shortness of breath, no cough, no wheezing, no hemoptysis. GASTROINTESTINAL:  No abdominal pain, no nausea, no vomiting, no diarrhea, no constipation, no melena, no hematemesis. GENITOURINARY:  No urgency, no hesitancy, no hematuria. MUSCULOSKELETAL:  No muscle pain. Has open wound on the right fourth and the second toe. PSYCHIATRIC:  No depression, anxiety or mood problem. No loss of interest in life. His COVID status was negative and he is fully vaccinated. PHYSICAL EXAMINATION:  GENERAL:  Alert, awake, and oriented x3. VITAL SIGNS:  Temperature 97.5, heart rate 55 per minute, blood pressure 131/69. HEENT:  Oral mucosa moist.  NECK:  Jugular venous pressure not distended. LUNGS:  Good air entry on both sides. HEART:  S1, S2 without any gallop or murmur. ABDOMEN:  Soft. No palpable mass. EXTREMITIES:  His AV graft has good thrill and bruit. On the foot, right fourth toe ulcer without any bad smell. IMPRESSION:  1. End-stage renal disease patient. No signs of any volume overload. 2.  Hypertension. Blood pressure is normal.  3.  Anemia of chronic disease.   4.  Secondary hyperparathyroidism. 5.  Possible infection versus ulcer of the right foot, to undergo surgery by Foot and Ankle Surgery. PLAN:  The patient is admitted already as I mentioned. No signs of any volume overload. He will be dialyzed on a regular basis every Tuesday, Thursday, Saturday. We will follow the labs. Retacrit and Hectorol will be given as per his HB & PTH result. Follow the cultures. Depending on what antibiotics being ordered, that also will be provided during dialysis. Fall precautions should be implemented and further recommendations will be given based on the hospital course.       Eula Palmer MD      [id number]  D:  03/11/2022 13:17  T:  03/11/2022 17:38  JOB #:  [id number]

## 2022-03-11 NOTE — PROGRESS NOTES
Consult    Patient: Viviana Carter MRN: [id number]  SSN: [id number]    YOB: 1960  Age: 59 y.o. Sex: male      Subjective:      Viviana Carter is a 59 y.o. male who is being seen for asked to evaluate and treat gangrene and infection right third toe.     Past Medical History:   Diagnosis Date    Anemia     Bradycardia, unspecified 2018    Cervical discitis     MRSA    [facility] 1974    Chronic kidney disease     stage III    Diabetes ([facility] 01/1990    no DM    Dialysis patient ([facility]     Drug abuse ([facility]     Encephalopathy     GERD (gastroesophageal reflux disease)     History of abuse     Hypertension     Muscle weakness     Quadriparesis ([facility] 2017    [facility]    Pathological Stage [id number] cc RCC FG3 s/p left open partial nephrectomy in 12/13      Sepsis due to methicillin resistant Staphylococcus aureus (HCC)     Suprapubic catheter (HCC)     Thrombocytopenia (HCC)     Thyroid disease     Urethral erosion     Viral hepatitis B     Viral hepatitis C     Xerosis cutis      Past Surgical History:   Procedure Laterality Date    COLONOSCOPY N/A 10/3/2019    COLONOSCOPY / polypectomy performed by Fabian Baptiste MD at [address] HX CIRCUMCISION  12/17/13    Dr. Ramya Parker, [address] [place] [address] HX NEPHRECTOMY Left 12/17/13    Open/ Partial,nephrectomy    HX OTHER SURGICAL Right 2/27/2016    removed right ft  2nd meta-tarsal    HX OTHER SURGICAL  04/2017    abscess removed from back of neck     IR INSERT TUNL CVC W/O PORT OVER 5 YR  2/18/2020    NEUROLOGICAL PROCEDURE UNLISTED  2017    neck surgery-abcess-hardware neck    MD INSJ TUNNELED CVC W/O SUBQ PORT/ AGE 5 YR/> N/A 5/6/2020    INSERTION TUNNELED CENTRAL VENOUS CATHETER/perm catheter exchange performed by Casey Wood MD at [facility] C[facility]    MD REMOVAL WITH INSERTION OF SUPRAPUBIC CATHETER  2018      Family History   Problem Relation Age of Onset    Diabetes Mother     Sickle Cell Anemia Father     Diabetes Sister     Hypertension Sister      Social History     Tobacco Use    Smoking status: Current Some Day Smoker     Packs/day: 0.25     Years: 30.00     Pack years: 7.50     Types: Cigarettes    Smokeless tobacco: Never Used   Substance Use Topics    Alcohol use: Not on file     Comment: beer occasionally      Current Facility-Administered Medications   Medication Dose Route Frequency Provider Last Rate Last Admin    hydrALAZINE (APRESOLINE) 20 mg/mL injection 20 mg  20 mg IntraVENous Q6H PRN Dhaval Pressley MD   20 mg at 03/11/22 0413    [Held by provider] insulin glargine (LANTUS) injection 5 Units  5 Units SubCUTAneous QHS Chalo Renner MD        lactulose (CHRONULAC) 10 gram/15 mL solution 15 mL  10 g Oral BID Brad Renner MD        piperacillin-tazobactam (ZOSYN) 3.375 g in 0.9% sodium chloride (MBP/ADV) 100 mL MBP  3.375 g IntraVENous Q12H PADMAJA Nur 25 mL/hr at 03/11/22 0406 3.375 g at 03/11/22 0406    insulin lispro (HUMALOG) injection   SubCUTAneous AC&HS Dhaval Pressley MD        glucose chewable tablet 16 g  4 Tablet Oral PRN Dhaval Pressley MD        glucagon (GLUCAGEN) injection 1 mg  1 mg IntraMUSCular PRN Dhaval Pressley MD        dextrose 10% infusion 0-250 mL  0-250 mL IntraVENous PRN Dhaval Pressley MD        hydrALAZINE (APRESOLINE) tablet 25 mg  25 mg Oral TID Dhaval Pressley MD        pantoprazole (PROTONIX) tablet 40 mg  40 mg Oral ACB Brad Renner MD            Allergies   Allergen Reactions    Iodine Hives and Other (comments)       Review of Systems:  A comprehensive review of systems was negative except for that written in the History of Present Illness.     Objective:     Vitals:    03/11/22 0326 03/11/22 0639 03/11/22 0828 03/11/22 1102   BP: (!) 191/68 131/69 (!) 143/83 (!) 147/73   Pulse: (!) 55  (!) 55 (!) 53   Resp: 18  14 15   Temp: 97.5 °F (36.4 °C)  97.4 °F (36.3 °C) 97.3 °F (36.3 °C)   SpO2: 97%  99% 96%   Weight:       Height:            Physical Exam:  Seen in clinic yesterday and  In pre op today. Has necrotic third toe right foot with bone exposed, clinical osteitis  No swelling or cellulitis. X-ray shows osteitis toe. Assessment:     Hospital Problems  Date Reviewed: 3/10/2022          Codes Class Noted POA    Open toe wound, initial encounter ICD-10-CM: S91.109A  ICD-9-CM: 893.0  3/10/2022 Yes        GERD (gastroesophageal reflux disease) ICD-10-CM: K21.9  ICD-9-CM: 530.81  8/5/2020 Yes        ESRD (end stage renal disease) ([facility] ICD-10-CM: N18.6  ICD-9-CM: 585.6  2/17/2020 Yes        Chronic neck pain ICD-10-CM: M54.2, G89.29  ICD-9-CM: 723.1, 338.29  1/31/2019 Yes        Cirrhosis of liver ([facility] ICD-10-CM: K74.60  ICD-9-CM: 571.5  1/31/2019 Yes        COPD, moderate ([facility] ICD-10-CM: J44.9  ICD-9-CM: 324  1/31/2019 Yes        Hypothyroid ICD-10-CM: E03.9  ICD-9-CM: 244.9  1/31/2019 Yes        Vitamin D deficiency ICD-10-CM: E55.9  ICD-9-CM: 268.9  1/31/2019 Yes        Diabetes mellitus ([facility] ICD-10-CM: E11.9  ICD-9-CM: 250.00  Unknown Yes        Hypertension ICD-10-CM: I10  ICD-9-CM: 401.9  Unknown Yes        Chronic drug abuse ([facility] ICD-10-CM: F19.10  ICD-9-CM: 304.90  1/1/1974 Yes              Plan:     Has improved with dosing IV antibiotic. Plan foe  Partial or complete toe amputation.   Discussed with ID    Signed By: Bud Cortez DPM     March 11, 2022

## 2022-03-11 NOTE — CONSULTS
Cardiology Initial Patient Referral Note    Cardiology referral request from Dr. Tej Rodriguez for evaluation and management/treatment of preop, bradycardia, abnormal ECG    Date of  Admission: 3/10/2022  8:11 PM   Primary Care Physician:  Ale Rodriguez MD    Attending Cardiologist: Dr. Rubén Ibrahim:     Hospital Problems  Date Reviewed: 3/10/2022          Codes Class Noted POA    Open toe wound, initial encounter ICD-10-CM: S91.109A  ICD-9-CM: 893.0  3/10/2022 Yes        GERD (gastroesophageal reflux disease) ICD-10-CM: K21.9  ICD-9-CM: 530.81  8/5/2020 Yes        ESRD (end stage renal disease) ([facility] ICD-10-CM: N18.6  ICD-9-CM: 585.6  2/17/2020 Yes        Chronic neck pain ICD-10-CM: M54.2, G89.29  ICD-9-CM: 723.1, 338.29  1/31/2019 Yes        Cirrhosis of liver ([facility] ICD-10-CM: K74.60  ICD-9-CM: 571.5  1/31/2019 Yes        COPD, moderate ([facility] ICD-10-CM: J44.9  ICD-9-CM: 467  1/31/2019 Yes        Hypothyroid ICD-10-CM: E03.9  ICD-9-CM: 244.9  1/31/2019 Yes        Vitamin D deficiency ICD-10-CM: E55.9  ICD-9-CM: 268.9  1/31/2019 Yes        Diabetes mellitus ([facility] ICD-10-CM: E11.9  ICD-9-CM: 250.00  Unknown Yes        Hypertension ICD-10-CM: I10  ICD-9-CM: 401.9  Unknown Yes        Chronic drug abuse ([facility] ICD-10-CM: F19.10  ICD-9-CM: 304.90  1/1/1974 Yes            -Gangrene of right fourth phalanx. Plan for amputation on 3/11/22. -Sinus Bradycardia, asymptomatic. Patient with known longstanding hx. ECG Sinus Bradycardia and unchanged from priors.   -Chronic HFpEF. ECHO (02/2020) EF 60-65%, no WMA. No significant valvular pathology. Volume status stable. -Moderate Pulmonary HTN by ECHO. -Nuclear stress test (03/2020) defect that is moderate in size with a severe reduction in uptake present in the basal-apical inferior location(s) that is predominantly fixed, cannot r/o artifact. EF 43%. -HTN, on hydralazine as outpatient. -HLD, on statin. -DM  -Hypothyroid, on synthroid.    -ESRD on HD, followed by Dr. Renee Claude. -H/o RCC, s/p left partial nephrectomy. -H/o Liver Cirrhosis/Hep C.   -H/o polysubstance abuse, on Methadone.   -Hx spinal osteomyelitis with quadriplegia, s/p C3-C7 laminectomy in April 2017       Primary cardiologist, Dr. Devyn Mullen and now Dr. Shena Suarez. Plan:      Noted plans for right 4th phalanx amputation today. He is without any symptoms concerning for unstable angina or decompensated heart failure. His ECG shows Sinus Bradycardia and is similar in appearance to priors. No evidence of high grade AV block. He has a longstanding history of bradycardia, which he remains asymptomatic to. Hemodynamically he is stable. Will order LTD ECHO for completeness. Would not delay surgery for ECHO. Would proceed with surgery as planned. Patient would be at intermediate risk from a CV standpoint. Will follow up post operatively. History of Present Illness: This is a 59 y.o. male admitted for Open toe wound, initial encounter [S91.109A]. Patient complains of: right toe pain     Jose Antonio is a 59 y.o. male, pmhx as stated above, who we are seeing for pre op clearance and bradycardia. Patient was admitted by his podiatrist for  scheduled for amputation of his right fourth toe. Patient states that he is otherwise in his normal state of health. He is sedentary at baseline. Denies CP, SOB, diaphoresis, orthopnea, PND, LE edema, near syncope or syncope.      Cardiac risk factors: smoking/ tobacco exposure, dyslipidemia, diabetes mellitus, sedentary life style, male gender, hypertension    Review of Symptoms:  Except as stated above include:  Constitutional:  negative  Respiratory:  negative  Cardiovascular:  negative  Gastrointestinal: negative  Genitourinary:  negative  Musculoskeletal:  As per hpi   Neurological:  Negative  Dermatological:  Negative  Endocrinological: Negative  Psychological:  Negative    A comprehensive review of systems was negative except for that written in the HPI. Past Medical History:     Past Medical History:   Diagnosis Date    Anemia     Bradycardia, unspecified 2018    Cervical discitis     MRSA    Chronic drug abuse ([facility] 1974    Chronic kidney disease     stage III    Diabetes ([facility] 01/1990    no DM    Dialysis patient ([facility]     Drug abuse ([facility]     Encephalopathy     GERD (gastroesophageal reflux disease)     History of abuse     Hypertension     Muscle weakness     Quadriparesis ([facility] 2017    Renal cell carcinoma of left kidney ([facility] 12/17/13    Pathological Stage [id number] cc RCC FG3 s/p left open partial nephrectomy in 12/13      Sepsis due to methicillin resistant Staphylococcus aureus (HCC)     Suprapubic catheter (HCC)     Thrombocytopenia (HCC)     Thyroid disease     Urethral erosion     Viral hepatitis B     Viral hepatitis C     Xerosis cutis          Social History:     Social History     Socioeconomic History    Marital status:    Tobacco Use    Smoking status: Current Some Day Smoker     Packs/day: 0.25     Years: 30.00     Pack years: 7.50     Types: Cigarettes    Smokeless tobacco: Never Used   Substance and Sexual Activity    Drug use: Not Currently     Types: Marijuana, Heroin     Comment: marijuana-December 2018, heroin-9/15/19-approx    Sexual activity: Not Currently   Social History Narrative     since 2012;  for 12 yrs; [employer]K; [address] [employer]  just recently furloughed; No  service        Family History:     Family History   Problem Relation Age of Onset    Diabetes Mother     Sickle Cell Anemia Father     Diabetes Sister     Hypertension Sister         Medications:      Allergies   Allergen Reactions    Iodine Hives and Other (comments)        Current Facility-Administered Medications   Medication Dose Route Frequency    hydrALAZINE (APRESOLINE) 20 mg/mL injection 20 mg  20 mg IntraVENous Q6H PRN    [Held by provider] insulin glargine (LANTUS) injection 5 Units  5 Units SubCUTAneous QHS    lactulose (CHRONULAC) 10 gram/15 mL solution 15 mL  10 g Oral BID    piperacillin-tazobactam (ZOSYN) 3.375 g in 0.9% sodium chloride (MBP/ADV) 100 mL MBP  3.375 g IntraVENous Q12H    insulin lispro (HUMALOG) injection   SubCUTAneous AC&HS    glucose chewable tablet 16 g  4 Tablet Oral PRN    glucagon (GLUCAGEN) injection 1 mg  1 mg IntraMUSCular PRN    dextrose 10% infusion 0-250 mL  0-250 mL IntraVENous PRN    hydrALAZINE (APRESOLINE) tablet 25 mg  25 mg Oral TID    pantoprazole (PROTONIX) tablet 40 mg  40 mg Oral ACB         Physical Exam:     Visit Vitals  BP (!) 143/83   Pulse (!) 55   Temp 97.4 °F (36.3 °C)   Resp 14   Ht 6' (1.829 m)   Wt 75.8 kg (167 lb)   SpO2 99%   BMI 22.65 kg/m²       TELE:SB    BP Readings from Last 3 Encounters:   03/11/22 (!) 143/83   02/28/22 (!) 172/68   02/02/22 (!) 130/56     Pulse Readings from Last 3 Encounters:   03/11/22 (!) 55   02/28/22 65   02/02/22 (!) 51     Wt Readings from Last 3 Encounters:   03/10/22 75.8 kg (167 lb)   02/28/22 77.7 kg (171 lb 6.4 oz)   02/28/22 77.6 kg (171 lb)       General:  alert, cooperative, no distress, appears stated age  Neck:  no JVD  Lungs:  clear to auscultation bilaterally  Heart:  regular rate and rhythm, S1, S2 normal, no murmur, click, rub or gallop  Abdomen:  abdomen is soft without significant tenderness, masses, organomegaly or guarding  Extremities:  No B/L LE edema, Right deviation of first toe rt foot. Skin: Warm and dry.  no hyperpigmentation, vitiligo, or suspicious lesions  Neuro: alert, oriented x3, affect appropriate  Psych: non focal     Data Review:     Recent Labs     03/11/22  0505 03/10/22  2003   WBC 6.6 6.4   HGB 8.9* 10.6*   HCT 27.8* 32.8*   * 146     Recent Labs     03/11/22  0505 03/10/22  2003    135*   K 3.8 4.4    99*   CO2 32 33*   * 200*   BUN 15 10   CREA 3.58* 3.00*   CA 8.7 9.0   MG 2.1 2.2   ALB 3.0* 3.5   ALT 18 23       Results for orders placed or performed during the hospital encounter of 03/10/22   EKG, 12 LEAD, INITIAL   Result Value Ref Range    Ventricular Rate 55 BPM    Atrial Rate 55 BPM    P-R Interval 188 ms    QRS Duration 100 ms    Q-T Interval 480 ms    QTC Calculation (Bezet) 459 ms    Calculated P Axis 61 degrees    Calculated R Axis -23 degrees    Calculated T Axis 44 degrees    Diagnosis       Sinus bradycardia  Moderate voltage criteria for LVH, may be normal variant ( R in aVL , [place]   product )  Septal infarct (cited on or before 25-MAR-2020)  Abnormal ECG  When compared with ECG of 25-MAR-2020 11:34,  Significant changes have occurred     Results for orders placed or performed in visit on 06/08/20   AMB POC EKG ROUTINE W/ 12 LEADS, INTER & REP    Narrative    Sinus bradycardia, rate 58. Left ventricular hypertrophy. Diffuse nonspecific high lateral ST-T flattening.   Compared to the EKG of December 16, 2019 no significant interval change       All Cardiac Markers in the last 24 hours:  No results found for: CPK, CK, CKMMB, CKMB, RCK3, CKMBT, CKNDX, CKND1, LINDA, TROPT, TROIQ, MIGUEL, TROPT, TNIPOC, BNP, BNPP    Last Lipid:    Lab Results   Component Value Date/Time    Cholesterol, total 154 03/11/2022 05:05 AM    HDL Cholesterol 49 03/11/2022 05:05 AM    LDL, calculated 82.4 03/11/2022 05:05 AM    Triglyceride 113 03/11/2022 05:05 AM    CHOL/HDL Ratio 3.1 03/11/2022 05:05 AM       Cardiographics:     EKG Results     Procedure 720 Value Units Date/Time    EKG, 12 LEAD, INITIAL [[id number]] Collected: 03/10/22 2208    Order Status: Completed Updated: 03/10/22 2208     Ventricular Rate 55 BPM      Atrial Rate 55 BPM      P-R Interval 188 ms      QRS Duration 100 ms      Q-T Interval 480 ms      QTC Calculation (Bezet) 459 ms      Calculated P Axis 61 degrees      Calculated R Axis -23 degrees      Calculated T Axis 44 degrees      Diagnosis --     Sinus bradycardia  Moderate voltage criteria for LVH, may be normal variant ( R in aVL , [place]   product )  Septal infarct (cited on or before 25-MAR-2020)  Abnormal ECG  When compared with ECG of 25-MAR-2020 11:34,  Significant changes have occurred          02/15/20    ECHO ADULT COMPLETE 02/16/2020 2/16/2020    Interpretation Summary  · Normal wall thickness and systolic function (ejection fraction normal). Mildly dilated left ventricle. Estimated left ventricular ejection fraction is 60 - 65%. Visually measured ejection fraction. No regional wall motion abnormality noted. Inconclusive left ventricular diastolic function. · Mildly dilated left atrium. · Dilated right atrium. · Mild tricuspid valve regurgitation is present. · Moderate Pulmonary hypertension. · Moderately elevated central venous pressure (10-15 mmHg); IVC diameter is larger than 21 mm and collapses more than 50% with respiration. Signed by: Taqueria Arreola MD on 2/16/2020 11:35 AM      03/25/20    NUCLEAR CARDIAC STRESS TEST 03/25/2020 3/25/2020    Interpretation Summary  · Left ventricular perfusion is abnormal.  · Myocardial perfusion imaging defect 1: There is a defect that is moderate in size with a severe reduction in uptake present in the basal-apical inferior location(s) that is predominantly fixed. The defect appears to probably be artifact caused by subdiaphragmatic activity. The possibility of infarction cannot be excluded. · Gated SPECT: Left ventricular function post-stress was abnormal. Calculated ejection fraction is 43%. There is no evidence of transient ischemic dilation (TID). The TID ratio is 0.71. · Abnormal myocardial perfusion imaging. Fixed defect consistent with prior myocardial infarction, but cannot rule out diaphragmatic attenuation artifact. Myocardial perfusion imaging supports an intermediate risk stress test.  · Baseline ECG: , non-specific ST-T wave abnormalities. Marked SB with 1st degree AVB; LVH with repolarization abnormality; cannot r/o septal infarct age undetermined    Signed by: Cecilia Navarro MD on 3/25/2020  3:09 PM    05/06/20    INVASIVE VASCULAR PROCEDURE 05/07/2020 5/7/2020    Narrative  See op note    Signed by: Tara Rios MD on 5/7/2020  8:43 AM      XR Results (most recent):  Results from Hospital Encounter encounter on 03/10/22    XR FOOT RT MIN 3 V    Narrative  EXAM: Right Foot X-ray    INDICATION:  Wound    TECHNIQUE: 3 views of the right foot obtained. COMPARISON: 2/23/2016    FINDINGS: Significant hallux valgus is noted. Patient is status post amputation  of the second and third toes. There is soft tissue edema with subcutaneous  emphysema and wound of the distal aspect of the fourth toe. There appears to be  erosions of the distal phalanx with increased sclerosis of the residual distal  aspect of the fourth toe. No lytic or blastic focus appreciated. Impression  1. Findings concerning for osteomyelitis of the distal aspect of the fourth  toe.         Signed By: Nancy Prather PA-C     March 11, 2022

## 2022-03-11 NOTE — PROGRESS NOTES
conducted an initial consultation and Spiritual Assessment for Alaina Santana, who is a 59 y.o.,male. Patient's Primary Language is: [place]. According to the patient's EMR Advent Affiliation is: [place].      The reason the Patient came to the hospital is:   Patient Active Problem List    Diagnosis Date Noted    Open toe wound, initial encounter 03/10/2022    Anemia in chronic renal disease 08/05/2020    GERD (gastroesophageal reflux disease) 08/05/2020    Hyperphosphatemia 08/05/2020    Iron deficiency anemia 08/05/2020    ESRD (end stage renal disease) on dialysis ([address] 06/01/2020    Hemodialysis catheter dysfunction ([address] 05/06/2020    ESRD (end stage renal disease) ([address] 02/17/2020    Secondary hyperparathyroidism of renal origin ([address] 02/17/2020    Acute renal failure superimposed on stage 3 chronic kidney disease ([address] 02/15/2020    UTI (urinary tract infection) 02/15/2020    Acute renal failure superimposed on chronic kidney disease ([address] 02/15/2020    Bradycardia 02/15/2020    Renal failure (ARF), acute on chronic (HCC) 03/31/2019    Suprapubic catheter ([address] 03/28/2019    Bladder atony 01/31/2019    Chronic neck pain 01/31/2019    Cirrhosis of liver ([address] 01/31/2019    COPD, moderate ([address] 01/31/2019    Dry skin dermatitis 01/31/2019    Hypothyroid 01/31/2019    Lung nodules 01/31/2019    Neck mass 01/31/2019    Pericardial effusion 01/31/2019    Vitamin D deficiency 01/31/2019    BPH (benign prostatic hyperplasia) 12/20/2018    Diabetes mellitus ([address]     Hypertension     Urethral erosion     Chronic anemia 05/23/2018    Status post amputation of toe of right foot ([address] 05/18/2018    Thrombocytopenia ([address] 04/10/2018    Chronic kidney disease, stage III (moderate) ([address] 04/04/2018    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    Chronic hepatitis C without hepatic coma ([address] 05/31/2017    Quadriparesis ([address] 05/31/2017    IV drug abuse ([facility] 04/28/2017  Renal cell carcinoma of left kidney ([facility] 18/86/8752    Umbilical hernia 12/15/0897    Diabetes ([facility] 01/1990    Chronic drug abuse ([place] [address].) 01/01/1974        The  provided the following Interventions:  Initiated a relationship of care and support. Explored issues of sunil, belief, spirituality and Methodist/ritual needs while hospitalized. Provided information about Spiritual Care Services and availability of chaplains for spiritual and emotional support. Chart reviewed. The following outcomes where achieved:  Patient expressed gratitude for 's visit. Assessment:  Patient does not have any Methodist/cultural needs that will affect patient's preferences in health care. There are no spiritual or Methodist issues which require intervention at this time. Plan:  Chaplains will continue to follow and will provide pastoral care on an as needed/requested basis.  recommends bedside caregivers page  on duty if patient shows signs of acute spiritual or emotional distress.     [address] [place]   [phone]

## 2022-03-11 NOTE — PROGRESS NOTES
TRANSFER - OUT REPORT:    Verbal report given to PRINCE Louis on [facility]  being transferred to [facility] for routine progression of care       Report consisted of patients Situation, Background, Assessment and   Recommendations(SBAR). Information from the following report(s) SBAR was reviewed with the receiving nurse. Lines:   Peripheral IV 03/10/22 Anterior;Distal;Right Forearm (Active)        Opportunity for questions and clarification was provided.       Patient transported with:   Self and cell phone

## 2022-03-11 NOTE — ANESTHESIA POSTPROCEDURE EVALUATION
Procedure(s):  RIGHT THIRD TOE AMPUTATION.     MAC    Anesthesia Post Evaluation      Multimodal analgesia: multimodal analgesia used between 6 hours prior to anesthesia start to PACU discharge  Patient location during evaluation: bedside  Patient participation: complete - patient participated  Level of consciousness: awake  Pain management: adequate  Airway patency: patent  Anesthetic complications: no  Cardiovascular status: stable  Respiratory status: acceptable  Hydration status: acceptable  Post anesthesia nausea and vomiting:  controlled      INITIAL Post-op Vital signs:   Vitals Value Taken Time   /63 03/11/22 1431   Temp 36.1 °C (97 °F) 03/11/22 1343   Pulse 51 03/11/22 1441   Resp 18 03/11/22 1441   SpO2 100 % 03/11/22 1441

## 2022-03-11 NOTE — PROGRESS NOTES
TRANSFER - OUT REPORT:    Verbal report given to Alisia Martins RN on Violet Mahmood  being transferred to [facility] for routine progression of care       Report consisted of patients Situation, Background, Assessment and   Recommendations(SBAR). Information from the following report(s) SBAR, OR Summary and Procedure Summary was reviewed with the receiving nurse. Lines:   Peripheral IV 03/10/22 Anterior;Distal;Right Forearm (Active)   Site Assessment Clean, dry, & intact 03/11/22 0800   Phlebitis Assessment 0 03/11/22 1341   Infiltration Assessment 0 03/11/22 1341   Dressing Status Clean, dry, & intact 03/11/22 1341   Dressing Type Transparent;Tape 03/11/22 1341   Hub Color/Line Status Blue 03/11/22 1341   Action Taken Open ports on tubing capped 03/11/22 0800   Alcohol Cap Used Yes 03/11/22 1341        Opportunity for questions and clarification was provided.

## 2022-03-11 NOTE — CONSULTS
Infectious Disease Consultation Note        Reason: Right foot infection    Current abx Prior abx   Zosyn since 3/10/22      Lines:       Assessment :    61 y.o.  male who has significant history for chronic urinary retention with suprapubic catheter placement, history of Renal Cell carcinoma post-left partial nephrectomy 12/2013 followed by Dr. aSnchez [place] [facility], chronic kidney disease, history of c-spine laminectomy with post op paralysis, Heroin Drug Abuse, ongoing TOB use, DM2, anemia of chronic disease, thrombocytopenia, hepatitis C with cirrhosis followed by GI Dr. Yuval Vance, [facility], Hypothyroidism admitted to [facility] on 3/10/22 for right foot infection, need for surgery          hospitalization at [facility] 3/7/19-3/15/19 for gi bleed, hypothermia, hypoglycemia, sepsis, pneumonia, uti      hospitalization [facility] 3/17/19-3/20/19 for hypoglycemia, hypothermia, uti     Hospitalization at [facility] March 2019 for hypoglycemia, seizures, hypothermia-suspected hypopituitarism    clinical presentation consistent with right fourth toe gangrene, chronic osteomyelitis distal right fourth toe     Podiatry follow-up appreciated. Plans for surgery noted     Recommendations:     1. continue pip/tazo . 2. F/u podiatry recommendations regarding right foot surgery  3. F/u Intra-Op cultures, biopsy of clean bone margins-we will be available on Monday  4. Will determine duration and type of antibiotics based on the above test results, clinical course       Thank you for consultation request. Above plan was discussed in details with patient,dr. tafoya and dr Chi Bravo. Please call me if any further questions or concerns. Will continue to participate in the care of this patient.       HPI:    59 y.o.  male who has significant history for chronic urinary retention with suprapubic catheter placement, history of Renal Cell carcinoma post-left partial nephrectomy 12/2013 followed by  [facility], chronic kidney disease, history of c-spine laminectomy with post op paralysis, Heroin Drug Abuse, ongoing TOB use, DM2, anemia of chronic disease, thrombocytopenia, hepatitis C with cirrhosis followed by GI Dr. Miguel Brown, [facility], Hypothyroidism admitted to [facility] on 3/10/22 for right foot infection, need for surgery      Patient is known to me from prior inpatient consultation at [facility]. He has history of suprapubic catheter and recurrent urinary tract infection in the past.  He states that he noted darkening of right fourth toe for about a year. He has been following with the podiatrist.  He was seen by podiatrist recently and reports he was told to come today by his podiatrist for admission for surgery . He was started on broad-spectrum antibiotics. I was consulted for further recommendations. Denies any recent fevers or chills. Denies any leg swelling or pain.   Denies using any oral antibiotics recent    Past Medical History:   Diagnosis Date    Anemia     Bradycardia, unspecified 2018    Cervical discitis     MRSA    Chronic drug abuse ([address] 1974    Chronic kidney disease     stage III    Diabetes ([facility] 01/1990    no DM    Dialysis patient ([facility]     Drug abuse ([facility]     Encephalopathy     GERD (gastroesophageal reflux disease)     History of abuse     Hypertension     Muscle weakness     Quadriparesis ([address] 2017    Renal cell carcinoma of left kidney ([facility] 12/17/13    Pathological Stage [id number] cc RCC FG3 s/p left open partial nephrectomy in 12/13      Sepsis due to methicillin resistant Staphylococcus aureus (HCC)     Suprapubic catheter (HCC)     Thrombocytopenia (HCC)     Thyroid disease     Urethral erosion     Viral hepatitis B     Viral hepatitis C     Xerosis cutis        Past Surgical History:   Procedure Laterality Date    COLONOSCOPY N/A 10/3/2019    COLONOSCOPY / polypectomy performed by Michael Powell MD at [place] [address] 12/17/13    Dr. Ace Oliver[facility]    HX NEPHRECTOMY Left 12/17/13    Open/ Partial,nephrectomy    HX OTHER SURGICAL Right 2/27/2016    removed right ft  2nd meta-tarsal    HX OTHER SURGICAL  04/2017    abscess removed from back of neck     IR INSERT TUNL CVC W/O PORT OVER 5 YR  2/18/2020    NEUROLOGICAL PROCEDURE UNLISTED  2017    neck surgery-abcess-hardware neck    AZ INSJ TUNNELED CVC W/O SUBQ PORT/ AGE 5 YR/> N/A 5/6/2020    INSERTION TUNNELED CENTRAL VENOUS CATHETER/perm catheter exchange performed by Cece Keenan MD at [facility] CATH LAB    [place] REMOVAL WITH INSERTION OF SUPRAPUBIC CATHETER  2018       home Medication List    Details   methadone (Methadone IntensoL) 10 mg/mL solution Take 95 mg by mouth.      levothyroxine (SYNTHROID) 100 mcg tablet Take 1 Tab by mouth every morning. Qty: 30 Tab, Refills: 1      tadalafiL (CIALIS) 5 mg tablet Take 1 Tablet by mouth nightly. Indications: the inability to have an erection  Qty: 90 Tablet, Refills: 3    Associated Diagnoses: ED (erectile dysfunction) of organic origin      sildenafil citrate (VIAGRA) 100 mg tablet Take 1 tab my mouth as needed 1 hour prior to sexual activity on an empty stomach. Indications: the inability to have an erection  Qty: 30 Tablet, Refills: 3    Associated Diagnoses: ED (erectile dysfunction) of organic origin      midodrine (PROAMATINE) 5 mg tablet TAKE ONE TABLET BY MOUTH ON TUESDAY THURSDAY AND SATURDAY BEFORE DIALYSIS      ketoconazole (NIZORAL) 2 % topical cream Apply  to affected area two (2) times a day. b complex-vitamin c-folic acid 0.8 mg (NEPHRO-DEBBIE) 0.8 mg tab tablet Take 0.8 mg by mouth daily. b complex-vitamin c-folic acid (NEPHROCAPS) 1 mg capsule Take 1 Cap by mouth daily. Qty: 30 Cap, Refills: 1      calcium acetate,phosphat bind, (PHOSLO) 667 mg cap Take 1 Cap by mouth three (3) times daily (with meals).   Qty: 90 Cap, Refills: 1      doxercalciferoL (HECTOROL) 4 mcg/2 mL injection 0.5 mL by IntraVENous route DIALYSIS MON, WED & FRI. Qty: 1 mL, Refills: 0    Comments: To be given with dialysis      food supplemt, lactose-reduced (ENSURE ENLIVE) 0.08 gram-1.5 kcal/mL liqd Take 1 Each by mouth three (3) times daily (with meals). Flavor of patient choice  Qty: 90 Bottle, Refills: 1      hydrALAZINE (APRESOLINE) 25 mg tablet Take 1 Tab by mouth every eight (8) hours as needed (systolic B/P >057).   Qty: 30 Tab, Refills: 0      Syringe, Disposable, (BD SYRINGE CATHETER TIP) 60 mL syrg Use 1 syringe daily with irrigations  Qty: 30 Syringe, Refills: 11             Current Facility-Administered Medications   Medication Dose Route Frequency    hydrALAZINE (APRESOLINE) 20 mg/mL injection 20 mg  20 mg IntraVENous Q6H PRN    insulin glargine (LANTUS) injection 5 Units  5 Units SubCUTAneous QHS    piperacillin-tazobactam (ZOSYN) 3.375 g in 0.9% sodium chloride (MBP/ADV) 100 mL MBP  3.375 g IntraVENous Q12H    insulin lispro (HUMALOG) injection   SubCUTAneous AC&HS    glucose chewable tablet 16 g  4 Tablet Oral PRN    glucagon (GLUCAGEN) injection 1 mg  1 mg IntraMUSCular PRN    dextrose 10% infusion 0-250 mL  0-250 mL IntraVENous PRN    hydrALAZINE (APRESOLINE) tablet 25 mg  25 mg Oral TID    pantoprazole (PROTONIX) tablet 40 mg  40 mg Oral ACB       Allergies: Iodine    Family History   Problem Relation Age of Onset    Diabetes Mother     Sickle Cell Anemia Father     Diabetes Sister     Hypertension Sister      Social History     Socioeconomic History    Marital status:      Spouse name: Not on file    Number of children: Not on file    Years of education: Not on file    Highest education level: Not on file   Occupational History    Not on file   Tobacco Use    Smoking status: Current Some Day Smoker     Packs/day: 0.25     Years: 30.00     Pack years: 7.50     Types: Cigarettes    Smokeless tobacco: Never Used   Substance and Sexual Activity    Alcohol use: Not on file     Comment: beer occasionally    Drug use: Not Currently     Types: Marijuana, Heroin     Comment: marijuana-2018, heroin-9/15/19-approx    Sexual activity: Not Currently   Other Topics Concern    Not on file   Social History Narrative     since ;  for 12 yrs; [employer]; [address] [employer]  just recently furloughed; No  service     Social Determinants of Health     Financial Resource Strain:     Difficulty of Paying Living Expenses: Not on file   Food Insecurity:     Worried About Running Out of Food in the Last Year: Not on file    Andrew of Food in the Last Year: Not on file   Transportation Needs:     Lack of Transportation (Medical): Not on file    Lack of Transportation (Non-Medical):  Not on file   Physical Activity:     Days of Exercise per Week: Not on file    Minutes of Exercise per Session: Not on file   Stress:     Feeling of Stress : Not on file   Social Connections:     Frequency of Communication with Friends and Family: Not on file    Frequency of Social Gatherings with Friends and Family: Not on file    Attends Jewish Services: Not on file    Active Member of Clubs or Organizations: Not on file    Attends Club or Organization Meetings: Not on file    Marital Status: Not on file   Intimate Partner Violence:     Fear of Current or Ex-Partner: Not on file    Emotionally Abused: Not on file    Physically Abused: Not on file    Sexually Abused: Not on file   Housing Stability:     Unable to Pay for Housing in the Last Year: Not on file    Number of [place] in the Last Year: Not on file    Unstable Housing in the Last Year: Not on file     Social History     Tobacco Use   Smoking Status Current Some Day Smoker    Packs/day: 0.25    Years: 30.00    Pack years: 7.50    Types: Cigarettes   Smokeless Tobacco Never Used        Temp (24hrs), Av °F (36.7 °C), Min:97.5 °F (36.4 °C), Max:98.8 °F (37.1 °C)    Visit Vitals  /69   Pulse (!) 55 Temp 97.5 °F (36.4 °C)   Resp 18   Ht 6' (1.829 m)   Wt 75.8 kg (167 lb)   SpO2 97%   BMI 22.65 kg/m²       ROS: 12 point ROS obtained in details. Pertinent positives as mentioned in HPI,   otherwise negative    Physical Exam:    General:  Alert, cooperative, no distress, appears stated age. Head:  Normocephalic, without obvious abnormality, atraumatic. Eyes:  Conjunctivae/corneas clear. EOMs intact. Nose: Nares normal. No drainage or sinus tenderness. Throat: Lips, mucosa, and tongue dry poor dentition   Neck: Supple, symmetrical, trachea midline, no adenopathy, thyroid: no enlargement/tenderness/nodules, no carotid bruit and no JVD. Back:   ROM normal. No CVA tenderness. Lungs:   Clear to auscultation bilaterally. Chest wall:  No tenderness or deformity. Heart:  Regular rate  S1, S2 normal, no  rub or gallop. Abdomen: Soft, non-tender. Bowel sounds normal. No masses,  No organomegaly. Extremities: Extremities normal, atraumatic, no cyanosis or edema. Pulses: 2+ and symmetric all extremities. Skin: Skin color, texture, turgor normal. S/p amputation 2ed and 3ed toe open wound distal phalanx 4th toe bone exposed bone with dark discoloration of the toe. Able to palpate dorsalis pedis pulsation   Neurologic: CNII-XII intact. No focal motor or sensory deficit.          Labs: Results:   Chemistry Recent Labs     03/11/22  0505 03/10/22  2003   * 200*    135*   K 3.8 4.4    99*   CO2 32 33*   BUN 15 10   CREA 3.58* 3.00*   CA 8.7 9.0   AGAP 5 3   BUCR 4* 3*   AP 78 85   TP 7.1 8.8*   ALB 3.0* 3.5   GLOB 4.1* 5.3*   AGRAT 0.7* 0.7*      CBC w/Diff Recent Labs     03/11/22  0505 03/10/22  2003   WBC 6.6 6.4   RBC 2.91* 3.43*   HGB 8.9* 10.6*   HCT 27.8* 32.8*   * 146   GRANS 56 68   LYMPH 34 23   EOS 2 1      Microbiology Recent Labs     03/10/22  2003 03/10/22  1947   CULT NO GROWTH AFTER 10 HOURS NO GROWTH AFTER 10 HOURS          RADIOLOGY:    All available imaging studies/reports in [facility] for this admission were reviewed      Disclaimer: Sections of this note are dictated utilizing voice recognition software, which may have resulted in some phonetic based errors in grammar and contents. Even though attempts were made to correct all the mistakes, some may have been missed, and remained in the body of the document. If questions arise, please contact our department.     Dr. Mesfin Rausch, Infectious Disease Specialist  [phone]  March 11, 2022  7:47 AM

## 2022-03-11 NOTE — H&P
History & Physical    Patient: Jose Antonio MRN: [id number]  CSN: [id number]    YOB: 1960  Age: 59 y.o. Sex: male      DOA: 3/10/2022    Chief Complaint:   Chief Complaint   Patient presents with    Foot Pain          HPI:     Jose Antonio is a 59 y.o.  male who has history of 2ed and 3ed toe amputation pt was seen at [facility] ER last week  For open wound Rt 4th distal phalanx was given oral ABT and discharge to follow up podiatry pt couldn't have appt with Dr Concepción Brooks   Until yesterday     Pt also  chronic renal failure on dialysis chronic polysubstance abuse patient reported quite drug abuse has been on methadone pt also has  history of hypothyroid diabetes mellitus hypertension, hyperlipidemia. Pulmonary hypertension pancytopenia left renal cell carcinoma status post partial nephrectomy , hepatitis C was treated , liver cirrhosis had EGD  Previously with Dr. Carson Morocho    Patient has history of discitis C5-C6 with quadriplegia status post laminectomy with posterior lateral fusion C3-C7 finish course of treatment for osteomyelitis    Patient seen by podiatry Dr. Concepción Brooks for right 4th  toe ulcer he was sent to the ER for amputation of the right 4th toe today     Initial lab work in the ER  3/10/22   white blood cell 6.4 H&H 10.3/32.8 platelet 293  Sodium 135 potassium 4.4 creatinine 3 magnesium 2.0 ALT and AST is 23 and 23     Discussed with the ER physician assistant will need to get EKG chest x-ray and urine drug screen    Patient was started on Zosyn lactic acid 1.72    Patient had dialysis yesterday . Patient was admitted for surgery itoday    CXR pending    X-ray Rt foot   IMPRESSION  1. Findings concerning for osteomyelitis of the distal aspect of the fourth  toe.      EKG     Sinus bradycardia   Moderate voltage criteria for LVH, may be normal variant ( R in aVL , Isai    product )   Septal infarct (cited on or before 25-MAR-2020)       Abnormal ECG   When compared with ECG of 25-MAR-2020 11:34,   Significant changes have occurred    Past Medical History:   Diagnosis Date    Anemia     Bradycardia, unspecified 2018    Cervical discitis     MRSA    Chronic drug abuse ([facility] 1974    Chronic kidney disease     stage III    Diabetes ([facility] 01/1990    no DM    Dialysis patient ([facility]     Drug abuse ([facility]     Encephalopathy     GERD (gastroesophageal reflux disease)     History of abuse     Hypertension     Muscle weakness     Quadriparesis ([facility] 2017    Renal cell carcinoma of left kidney ([facility] 12/17/13    Pathological Stage [id number] cc RCC FG3 s/p left open partial nephrectomy in 12/13      Sepsis due to methicillin resistant Staphylococcus aureus (HCC)     Suprapubic catheter (HCC)     Thrombocytopenia (HCC)     Thyroid disease     Urethral erosion     Viral hepatitis B     Viral hepatitis C     Xerosis cutis        Past Surgical History:   Procedure Laterality Date    COLONOSCOPY N/A 10/3/2019    COLONOSCOPY / polypectomy performed by Talia Zhu MD at [address] HX CIRCUMCISION  12/17/13    Dr. Nikole Valentine, [facility]    HX NEPHRECTOMY Left 12/17/13    Open/ Partial,nephrectomy    HX OTHER SURGICAL Right 2/27/2016    removed right ft  2nd meta-tarsal    HX OTHER SURGICAL  04/2017    abscess removed from back of neck     IR INSERT TUNL CVC W/O PORT OVER 5 YR  2/18/2020    NEUROLOGICAL PROCEDURE UNLISTED  2017    neck surgery-abcess-hardware neck    NC INSJ TUNNELED CVC W/O SUBQ PORT/ AGE 5 YR/> N/A 5/6/2020    INSERTION TUNNELED CENTRAL VENOUS CATHETER/perm catheter exchange performed by Cally Brown [facility] at [facility] CATH LAB    NC REMOVAL WITH INSERTION OF SUPRAPUBIC CATHETER  2018       Family History   Problem Relation Age of Onset    Diabetes Mother     Sickle Cell Anemia Father     Diabetes Sister     Hypertension Sister        Social History     Socioeconomic History    Marital status:    Tobacco Use    Smoking status: Current Some Day Smoker     Packs/day: 0.25     Years: 30.00     Pack years: 7.50     Types: Cigarettes    Smokeless tobacco: Never Used   Substance and Sexual Activity    Drug use: Not Currently     Types: Marijuana, Heroin     Comment: marijuana-December 2018, heroin-9/15/19-approx    Sexual activity: Not Currently   Social History Narrative     since 2012;  for 12 yrs; [employer]; [employer]  just recently furloughed; No  service       Prior to Admission medications    Medication Sig Start Date End Date Taking? Authorizing Provider   methadone (Methadone IntensoL) 10 mg/mL solution Take 95 mg by mouth. Yes Provider, Historical   levothyroxine (SYNTHROID) 100 mcg tablet Take 1 Tab by mouth every morning. 2/26/20  Yes Elaine Aquino MD   tadalafiL (CIALIS) 5 mg tablet Take 1 Tablet by mouth nightly. Indications: the inability to have an erection  Patient not taking: Reported on 2/28/2022 11/3/21   Iram Ga MD   sildenafil citrate (VIAGRA) 100 mg tablet Take 1 tab my mouth as needed 1 hour prior to sexual activity on an empty stomach. Indications: the inability to have an erection  Patient not taking: Reported on 3/11/2022 11/3/21   Iram Ga MD   midodrine (PROAMATINE) 5 mg tablet TAKE ONE TABLET BY MOUTH ON TUESDAY THURSDAY AND SATURDAY BEFORE DIALYSIS  Patient not taking: Reported on 1/27/2022 3/20/21   Provider, Historical   ketoconazole (NIZORAL) 2 % topical cream Apply  to affected area two (2) times a day. Patient not taking: Reported on 3/11/2022 8/19/20   Provider, Historical   b complex-vitamin c-folic acid 0.8 mg (NEPHRO-DEBBIE) 0.8 mg tab tablet Take 0.8 mg by mouth daily. Patient not taking: Reported on 1/27/2022 3/18/20   Provider, Historical   b complex-vitamin c-folic acid (NEPHROCAPS) 1 mg capsule Take 1 Cap by mouth daily.   Patient not taking: Reported on 1/27/2022 2/26/20   Elaine Aquino MD   calcium acetate,phosphat bind, (PHOSLO) 667 mg cap Take 1 Cap by mouth three (3) times daily (with meals). 2/25/20   Loreta Aquino MD   doxercalciferoL (HECTOROL) 4 mcg/2 mL injection 0.5 mL by IntraVENous route DIALYSIS MON, WED & FRI. 2/26/20   Loreta Aquino MD   food supplemt, lactose-reduced (ENSURE ENLIVE) 0.08 gram-1.5 kcal/mL liqd Take 1 Each by mouth three (3) times daily (with meals). Flavor of patient choice  Patient not taking: Reported on 2/28/2022 2/25/20   Loreta Aquino MD   hydrALAZINE (APRESOLINE) 25 mg tablet Take 1 Tab by mouth every eight (8) hours as needed (systolic B/P >739). [birth year]   Darius Jean-Baptiste MD   Syringe, Disposable, (BD SYRINGE CATHETER TIP) 60 mL syrg Use 1 syringe daily with irrigations 11/7/18   Garrett Cohen MD       Allergies   Allergen Reactions    Iodine Hives and Other (comments)       Review of Systems  GENERAL: Patient alert, awake and oriented times 3, able to communicate full sentences and not in distress. HEENT: No change in vision, no earache, tinnitus, sore throat or sinus congestion. NECK: No pain or stiffness. CARDIOVASCULAR: No chest pain or pressure. No palpitations. PULMONARY: No shortness of breath, cough or wheeze. GASTROINTESTINAL: No abdominal pain, nausea, vomiting or diarrhea, melena or       bright red blood per rectum. GENITOURINARY:suprapubic catheter , end stage renal failure on dialysis    MUSCULOSKELETAL: open wound Rt 4th toe   DERMATOLOGIC: Rt fourth distal phalanx skin ulcer bone exposed   ENDOCRINE: No polyuria, polydipsia, no heat or cold intolerance. No recent change in    weight. HEMATOLOGICAL:h/o  anemia no easy bruising or bleeding. NEUROLOGIC: No headache, seizures, numbness, tingling or weakness  . PSYCHIATRIC: No depression, anxiety, mood disorder, no loss of interest in normal       activity or change in sleep pattern.         Physical Exam:     Physical Exam:  Visit Vitals  /69 Pulse (!) 55   Temp 97.5 °F (36.4 °C)   Resp 18   Ht 6' (1.829 m)   Wt 75.8 kg (167 lb)   SpO2 97%   BMI 22.65 kg/m²      O2 Device: None (Room air)    Temp (24hrs), Av °F (36.7 °C), Min:97.5 °F (36.4 °C), Max:98.8 °F (37.1 °C)    No intake/output data recorded.  1901 -  0700  In: -   Out: 150 [Urine:150]    General:  Alert, cooperative, no distress, appears stated age. Head:  Normocephalic, without obvious abnormality, atraumatic. Eyes:  Conjunctivae/corneas clear. PERRL, EOMs intact. Nose: Nares normal. No drainage or sinus tenderness. Throat: Lips, mucosa, and tongue dry poor dentition   Neck: Supple, symmetrical, trachea midline, no adenopathy, thyroid: no enlargement/tenderness/nodules, no carotid bruit and no JVD. Back:   ROM normal. No CVA tenderness. Lungs:   Clear to auscultation bilaterally. Chest wall:  No tenderness or deformity. Heart:  Regular rate bradycardia  and rhythm, S1, S2 normal, no murmur, click, rub or gallop. Abdomen: Soft, non-tender. Bowel sounds normal. No masses,  No organomegaly. Extremities: Extremities normal, atraumatic, no cyanosis or edema. Pulses: 2+ and symmetric all extremities. Skin: Skin color, texture, turgor normal. S/p amputation 2ed and 3ed toe open wound distal phalanx 4th toe bone exposed with dark discoloration of the toe   Neurologic: CNII-XII intact. No focal motor or sensory deficit. Labs Reviewed: All lab results for the last 24 hours reviewed.   CXR and EKG    Procedures/imaging: see electronic medical records for all procedures/Xrays and details which were not copied into this note but were reviewed prior to creation of Plan        Assessment/Plan     Active Problems:    Chronic drug abuse ([facility] (1974)      Diabetes mellitus ([facility] ()      Hypertension ()      Chronic neck pain (2019)      Cirrhosis of liver ([facility] (2019)      COPD[facility] moderate [facility] (2019)      Hypothyroid (2019)      Vitamin D deficiency (1/31/2019)      ESRD (end stage renal disease) ([facility] (2/17/2020)      GERD (gastroesophageal reflux disease) (8/5/2020)      Open toe wound, initial encounter (3/10/2022)         Plan:  Right fourth toe ulcer possible osteomyelitis/ /history of diabetes  Follow-up podiatry consult for  surgery  EKG poor R wave progression   Check troponin   Cardiology consult  preoperative clearance  Check chest x-ray result still pending  History of drug abuse in the past patient currently on methadone  Check urine drug screen positive Methadone   Humalog sliding scale  Check hemoglobin A1c 6.5  Continue Zosyn IV follow-up intraoperative culture  Follow-up blood culture  Infectious disease consult discussed with Dr Ivania Denney     Hypertension  Bp was high this morning improved with hydralazine IV  Hydralazine 25 mg 3 times daily  hydraalzine 20 mg IV q 6h prn SBP above 160    Hypothyroidism  Check TSH   levothyroxine 100 mg daily    History of liver cirrhosis/history of hepatitis C  Lactulose 15 twice daily  Check ammonia level in AM     Hyperlipidemia  Continue Lipitor 20 mg nightly  Follow-up liver function    Chronic anemia/gastroesophageal reflux disease  Protonix 40 mg once a day   check iron profile  N44 and folic acid     End stage renal failure on dialysis  Pt had dialysis yesterday   F/u nephrology Dr Stevan Chambers to continue dialysis     Cbc,cmp, mag ammonia level in AM    DVT/GI Prophylaxis: Hep SQ, after surgery  SCD's and H2B/PPI    Part of this note was dictated use Dragon medical software. Please excuse errors that have escaped final proofreading.     Time for admission more than 65 minutes included review previous record outside record ,hospital record ,test result previous discharge angely , ,discussion with patient and exam. Discussion with nursing staff and  Discussion with ID consult and cardiology consult and documentation    Eh Mcmahon MD  3/11/2022 8:03

## 2022-03-11 NOTE — PROGRESS NOTES
Comprehensive Nutrition Assessment    Type and Reason for Visit: Initial,Positive nutrition screen    Nutrition Recommendations/Plan:   - Add oral nutrition supplements: Nepro TID. Nutrition Assessment:  Patient NPO due to procedure s/p right third toe amputation for gangrene today and started on oral diet this afternoon with no meal intake yet. HD not indicated today with plan to dialyze on Tue Thurs Sat schedule per MD. Noted patient refusing medications. Malnutrition Assessment:  Malnutrition Status:  No malnutrition      Nutrition History and Allergies: Past medical history of chronic renal failure on dialysis, chronic polysubstance abuse on methadone, hypothyroidism, diabetes mellitus, hypertension, hyperlipidemia, pulmonary hypertension, left renal cell carcinoma status post partial nephrectomy, hepatitis C, liver cirrhosis and toe amputation admitted with open toe wound. Weight history per chart review: 130 lb (11/29/21), 167 lb (2/2/22), 171 lb (2/28/22) with weight gain over the past several months and noted patient reports usual weight of 160-165 lb with fluctuations due to fluid overload during previous admission 2/2020. NKFA. Estimated Daily Nutrient Needs:  Energy (kcal): 2665-6039; Weight Used for Energy Requirements: Admission  Protein (g): ; Weight Used for Protein Requirements: Admission (1-1.5)  Fluid (ml/day): 2095-5241; Method Used for Fluid Requirements: 1 ml/kcal    Nutrition Related Findings:  Last BM PTA.  Pertinent Medications: lactulose, pantoprazole, lantus, epoetin, doxercalciferol      Lab Results   Component Value Date/Time    Vitamin D 25-Hydroxy 22.5 (L) 03/11/2022 05:05 AM    Vitamin D, 25-OH, Total 93.2 02/11/2020 02:24 PM        Lab Results   Component Value Date/Time    Iron 108 03/07/2022 02:04 PM    TIBC 202 (L) 03/07/2022 02:04 PM    Iron % saturation 53 03/07/2022 02:04 PM    Ferritin 808 (H) 03/07/2022 02:04 PM      Lab Results   Component Value Date/Time Vitamin B12 746 03/29/2019 02:28 AM    Folate 18.8 (H) 03/29/2019 02:28 AM      Wounds:    Surgical incision,Wound consult pending       Current Nutrition Therapies:  ADULT DIET Regular; 3 carb choices (45 gm/meal); Low Fat/Low Chol/High Fiber/ROCKY    Anthropometric Measures:  · Height:  6' (182.9 cm)  · Current Body Wt:  75.8 kg (167 lb 1.7 oz)   · Admission Body Wt:  167 lb 1.7 oz    · Usual Body Wt:  72.6 kg (160 lb)     · Ideal Body Wt:  178 lbs:      · BMI Category:  Normal weight (BMI 18.5-24. 9)       Nutrition Diagnosis:   · Increased nutrient needs related to catabolic illness,renal dysfunction,increased demand for energy/nutrients as evidenced by wounds,dialysis    Nutrition Interventions:   Food and/or Nutrient Delivery: Continue current diet,Start oral nutrition supplement,Vitamin supplement  Nutrition Education and Counseling: No recommendations at this time,Education not indicated  Coordination of Nutrition Care: Continue to monitor while inpatient    Goals:  PO nutrition intake will meet >75% of patient estimated nutritional needs by next follow up date.        Nutrition Monitoring and Evaluation:   Behavioral-Environmental Outcomes: None identified  Food/Nutrient Intake Outcomes: Diet advancement/tolerance,Food and nutrient intake,Supplement intake  Physical Signs/Symptoms Outcomes: Biochemical data,Meal time behavior,Nutrition focused physical findings    Discharge Planning:    Continue oral nutrition supplement,Continue current diet     Electronically signed by Donald Toscano RD, [address]  on 3/11/2022 at 4:01 PM    Contact: [phone]

## 2022-03-11 NOTE — ROUTINE PROCESS
TRANSFER - IN REPORT:    Verbal report received from [facility], RN  on Dinorah Meyers  being received from [address]  for ordered procedure      Report consisted of patients Situation, Background, Assessment and   Recommendations(SBAR). Information from the following report(s) SBAR was reviewed with the receiving nurse. Opportunity for questions and clarification was provided. Assessment completed upon patients arrival to unit and care assumed.

## 2022-03-12 LAB
ALBUMIN SERPL-MCNC: 3 G/DL (ref 3.4–5)
ALBUMIN/GLOB SERPL: 0.7 {RATIO} (ref 0.8–1.7)
ALP SERPL-CCNC: 60 U/L (ref 45–117)
ALT SERPL-CCNC: 15 U/L (ref 16–61)
AMMONIA PLAS-SCNC: 24 UMOL/L (ref 11–32)
ANION GAP SERPL CALC-SCNC: 5 MMOL/L (ref 3–18)
AST SERPL-CCNC: 14 U/L (ref 10–38)
BASOPHILS # BLD: 0 K/UL (ref 0–0.1)
BASOPHILS NFR BLD: 1 % (ref 0–2)
BILIRUB SERPL-MCNC: 0.7 MG/DL (ref 0.2–1)
BUN SERPL-MCNC: 25 MG/DL (ref 7–18)
BUN/CREAT SERPL: 5 (ref 12–20)
CALCIUM SERPL-MCNC: 8.7 MG/DL (ref 8.5–10.1)
CHLORIDE SERPL-SCNC: 103 MMOL/L (ref 100–111)
CO2 SERPL-SCNC: 29 MMOL/L (ref 21–32)
CREAT SERPL-MCNC: 4.95 MG/DL (ref 0.6–1.3)
DIFFERENTIAL METHOD BLD: ABNORMAL
EOSINOPHIL # BLD: 0.1 K/UL (ref 0–0.4)
EOSINOPHIL NFR BLD: 1 % (ref 0–5)
ERYTHROCYTE [DISTWIDTH] IN BLOOD BY AUTOMATED COUNT: 14.6 % (ref 11.6–14.5)
GLOBULIN SER CALC-MCNC: 4.1 G/DL (ref 2–4)
GLUCOSE BLD STRIP.AUTO-MCNC: 118 MG/DL (ref 70–110)
GLUCOSE BLD STRIP.AUTO-MCNC: 164 MG/DL (ref 70–110)
GLUCOSE BLD STRIP.AUTO-MCNC: 167 MG/DL (ref 70–110)
GLUCOSE SERPL-MCNC: 142 MG/DL (ref 74–99)
HBV SURFACE AG SER QL: 0.18 INDEX
HBV SURFACE AG SER QL: NEGATIVE
HCT VFR BLD AUTO: 29.5 % (ref 36–48)
HGB BLD-MCNC: 9.3 G/DL (ref 13–16)
IMM GRANULOCYTES # BLD AUTO: 0.1 K/UL (ref 0–0.04)
IMM GRANULOCYTES NFR BLD AUTO: 1 % (ref 0–0.5)
LYMPHOCYTES # BLD: 1.2 K/UL (ref 0.9–3.6)
LYMPHOCYTES NFR BLD: 16 % (ref 21–52)
MAGNESIUM SERPL-MCNC: 2.2 MG/DL (ref 1.6–2.6)
MCH RBC QN AUTO: 29.9 PG (ref 24–34)
MCHC RBC AUTO-ENTMCNC: 31.5 G/DL (ref 31–37)
MCV RBC AUTO: 94.9 FL (ref 78–100)
MONOCYTES # BLD: 0.5 K/UL (ref 0.05–1.2)
MONOCYTES NFR BLD: 7 % (ref 3–10)
NEUTS SEG # BLD: 5.9 K/UL (ref 1.8–8)
NEUTS SEG NFR BLD: 76 % (ref 40–73)
NRBC # BLD: 0 K/UL (ref 0–0.01)
NRBC BLD-RTO: 0 PER 100 WBC
PHOSPHATE SERPL-MCNC: 4.6 MG/DL (ref 2.5–4.9)
PLATELET # BLD AUTO: 141 K/UL (ref 135–420)
PMV BLD AUTO: 10.5 FL (ref 9.2–11.8)
POTASSIUM SERPL-SCNC: 4.8 MMOL/L (ref 3.5–5.5)
PROT SERPL-MCNC: 7.1 G/DL (ref 6.4–8.2)
RBC # BLD AUTO: 3.11 M/UL (ref 4.35–5.65)
SODIUM SERPL-SCNC: 137 MMOL/L (ref 136–145)
WBC # BLD AUTO: 7.8 K/UL (ref 4.6–13.2)

## 2022-03-12 PROCEDURE — 90935 HEMODIALYSIS ONE EVALUATION: CPT

## 2022-03-12 PROCEDURE — 84100 ASSAY OF PHOSPHORUS: CPT

## 2022-03-12 PROCEDURE — 82962 GLUCOSE BLOOD TEST: CPT

## 2022-03-12 PROCEDURE — 82140 ASSAY OF AMMONIA: CPT

## 2022-03-12 PROCEDURE — 65270000029 HC RM PRIVATE

## 2022-03-12 PROCEDURE — 83735 ASSAY OF MAGNESIUM: CPT

## 2022-03-12 PROCEDURE — 74011636637 HC RX REV CODE- 636/637: Performed by: PODIATRIST

## 2022-03-12 PROCEDURE — 85025 COMPLETE CBC W/AUTO DIFF WBC: CPT

## 2022-03-12 PROCEDURE — 74011250636 HC RX REV CODE- 250/636: Performed by: PODIATRIST

## 2022-03-12 PROCEDURE — 74011000258 HC RX REV CODE- 258: Performed by: PODIATRIST

## 2022-03-12 PROCEDURE — 36415 COLL VENOUS BLD VENIPUNCTURE: CPT

## 2022-03-12 PROCEDURE — 5A1D70Z PERFORMANCE OF URINARY FILTRATION, INTERMITTENT, LESS THAN 6 HOURS PER DAY: ICD-10-PCS | Performed by: FAMILY MEDICINE

## 2022-03-12 PROCEDURE — 80053 COMPREHEN METABOLIC PANEL: CPT

## 2022-03-12 PROCEDURE — 74011250637 HC RX REV CODE- 250/637: Performed by: PODIATRIST

## 2022-03-12 RX ADMIN — Medication 2 UNITS: at 17:11

## 2022-03-12 RX ADMIN — Medication 2 UNITS: at 06:32

## 2022-03-12 RX ADMIN — PIPERACILLIN AND TAZOBACTAM 3.38 G: 3; .375 INJECTION, POWDER, LYOPHILIZED, FOR SOLUTION INTRAVENOUS at 15:55

## 2022-03-12 RX ADMIN — DOXERCALCIFEROL 0.5 MCG: 4 INJECTION, SOLUTION INTRAVENOUS at 12:08

## 2022-03-12 RX ADMIN — PANTOPRAZOLE SODIUM 40 MG: 40 TABLET, DELAYED RELEASE ORAL at 07:42

## 2022-03-12 RX ADMIN — EPOETIN ALFA-EPBX 8000 UNITS: 4000 INJECTION, SOLUTION INTRAVENOUS; SUBCUTANEOUS at 21:23

## 2022-03-12 RX ADMIN — PIPERACILLIN AND TAZOBACTAM 3.38 G: 3; .375 INJECTION, POWDER, LYOPHILIZED, FOR SOLUTION INTRAVENOUS at 04:19

## 2022-03-12 NOTE — DIALYSIS
VERONICA        ACUTE HEMODIALYSIS FLOW SHEET      HEMODIALYSIS ORDERS: Physician: Monica Samuel     Dialyzer: revaclear   Duration: 3 hr  BFR: 400   DFR: 600   Dialysate:  Temp 36-37*C  K+   2    Ca+  2.5 Na 138 Bicarb 35   Weight:  75.3 kg    Patient Chart [x]     Unable to Obtain []     Dry weight/UF Goal: 2000   Access: LUE AVG  Needle Gauge: 15    Heparin []  Bolus      Units    [] Hourly       Units    [x]None       Pre BP:   154/73    Pulse:     65       Respirations: 18  Temperature:   97.9   Labs: Pre        Post:         [x] N/A   Additional Orders(medications, blood products, hypotension management):      leda     [x] Veronica Consent Verified     CATHETER ACCESS: [x]N/A       GRAFT/FISTULA ACCESS:  []N/A     []Right     [x]Left     [x]UE     []LE   [x]AVG   []AVF        []Buttonhole    [x]Medical Aseptic Prep Utilized   [x]No S/S infection  []Redness  []Drainage []Cultured []Swelling []Pain    Bruit:   [x] Strong    [] Weak       Thrill :   [x] Strong    [] Weak       Needle Gauge: 15   Length: 1   If access problem,  notified: []Yes     [x]N/A        Please describe access if present and not used:                            GENERAL ASSESSMENT:      LUNGS:  Rate 18 SaO2 %        [] N/A    [x] Clear  [] Coarse  [] Crackles  [] Wheezing        [] Diminished     Location : []RLL   []LLL    []RUL  []MYLENE     Cough: []Productive  []Dry  [x]N/A   Respirations:  [x]Easy  []Labored     Therapy:   [x]RA  []NC  l/min    Mask: []NRB []Venti       O2%                  []Ventilator  []Intubated  [] Trach  [] BiPaP     CARDIAC: [x]Regular      [] Irregular   [] Pericardial Rub  [] JVD        []  Monitored  [] Bedside  [] Remotely monitored [x] N/A     EDEMA: [] None   [x]Generalized  [] Pitting [] 1    [] 2    [] 3    [] 4                 [] Facial  [] Pedal  []  UE  [] LE     SKIN:   [x] Warm   [] Hot     [] Cold   [x] Dry     [] Pale   [] Diaphoretic                  [] Flushed  [] Jaundiced  [] Cyanotic  [] Rash  [] Weeping     LOC:    [x] Alert      [x]Oriented:    [x] Person     [x] Place  [x]Time               [] Confused  [] Lethargic  [] Medicated  [] Non-responsive     GI / ABDOMEN:  [] Flat    [] Distended    [x] Soft    [] Firm   []  Obese                             [] Diarrhea  [x] Bowel Sounds  [] Nausea  [] Vomiting       / URINE ASSESSMENT:[] Voiding   [x] Oliguria  [] Anuria   []  Willis     [] Incontinent    []  Incontinent Brief      []  Bathroom Privileges       PAIN: [x] 0 []1  []2   []3   []4   []5   []6   []7   []8   []9   []10              Scale 0-10  Action/Follow Up:      MOBILITY:  [] Amb    [] Amb/Assist    [x] Bed    [] Wheelchair  [] Stretcher      All Vitals and Treatment Details on Attached [address]: [facility]          Room # 810/93      [] 1st Time Acute  [] Stat  [x] Routine  [] Urgent     [x] Acute Room  []  Bedside  [] ICU/CCU  [] ER   Isolation Precautions:   There are currently no Active Isolations      Special Considerations:         [] Blood Consent Verified [x]N/A      ALLERGIES:   Allergies   Allergen Reactions    Iodine Hives and Other (comments)               Code Status:Full Code        Hepatitis Status:   Negative 3/11/22                    Lab Results   Component Value Date/Time    Hepatitis A, IgM NEGATIVE  03/28/2019 11:15 AM    Hepatitis B surface Ag 0.18 03/11/2022 05:04 AM    Hepatitis B surface Ab 8.77 (L) 02/17/2020 02:55 AM    Hep B Core Ab,Total Positive (A) 09/15/2010 08:15 AM    Hepatitis B core, IgM REACTIVE 02/19/2020 10:15 AM    Hepatitis Be AG Negative 11/15/2010 09:55 AM    Hep B Core Ab, IgM Indeterminate 04/02/2019 05:29 AM    Hep B Core Ab, total Positive (A) 02/17/2020 02:55 AM    Hepatitis C virus Ab >11.00 (H) 02/17/2020 02:55 AM                     Current Labs:   Lab Results   Component Value Date/Time    Sodium 137 03/12/2022 05:34 AM    Potassium 4.8 03/12/2022 05:34 AM    Chloride 103 03/12/2022 05:34 AM    CO2 29 03/12/2022 05:34 AM    Anion gap 5 03/12/2022 05:34 AM    Glucose 142 (H) 03/12/2022 05:34 AM    BUN 25 (H) 03/12/2022 05:34 AM    Creatinine 4.95 (H) 03/12/2022 05:34 AM    BUN/Creatinine ratio 5 (L) 03/12/2022 05:34 AM    GFR est AA 14 (L) 03/12/2022 05:34 AM    GFR est non-AA 12 (L) 03/12/2022 05:34 AM    Calcium 8.7 03/12/2022 05:34 AM      Lab Results   Component Value Date/Time    WBC 7.8 03/12/2022 05:34 AM    Hemoglobin, POC 14.3 06/01/2020 10:20 AM    HGB 9.3 (L) 03/12/2022 05:34 AM    Hematocrit, POC 42 06/01/2020 10:20 AM    HCT 29.5 (L) 03/12/2022 05:34 AM    PLATELET 936 53/12/8549 05:34 AM    MCV 94.9 03/12/2022 05:34 AM                                                                                     DIET:   DIET ADULT  DIET ADULT ORAL NUTRITION SUPPLEMENT       PRIMARY NURSE REPORT: First initial/Last name/Title      Pre Dialysis: Jonah Landin RN     Time: 46      EDUCATION:    [x] Patient [] Other         Knowledge Basis: []None [x]Minimal [] Substantial   Barriers to learning  [x]N/A   [] Access Care     [] S&S of infection     [] Fluid Management     []K+     [x]Procedural    []Albumin     [] Medications     [] Tx Options     [] Transplant     [] Diet     [] Other   Teaching Tools:  [x] Explain  [x] Demo  [] Handouts [] Video  Patient response:  [x] Verbalized understanding  [] Teach back  [] Return demonstration [] Requires follow up   Inappropriate due to:            [x]Time Out/Safety Check  [x]Extracorporeal Circuit Tested for integrity       RO/HEMODIALYSIS MACHINE SAFETY CHECKS - Before each treatment:       Machine Number:                   [facility]                                                                    [x] Unit Machine # 6 with centralized RO                                                                     Alarm Test:  Pass time 9098               [x] RO/Machine Log Complete      Temp   36             Dialysate: pH  7.4 Conductivity: Meter   14.0     HD Machine   13.8                  TCD: 14.0  Dialyzer Lot # [id number]            Blood Tubing Lot # [id number]          Saline Lot #  [id number]     CHLORINE TESTING-Before each treatment and every 4 hours    Total Chlorine: [x] less than 0.1 ppm  Time: 0900 4 Hr/2nd Check Time: 1300   (if greater than 0.1 ppm from Primary then every 30 minutes from Secondary)     TREATMENT INITIATION - with Dialysis Precautions:   [x] All Connections Secured                 [x] Saline Line Double Clamped   [x] Venous Parameters Set                  [x] Arterial Parameters Set    [x] Prime Given 250ml                          [x]Air Foam Detector Engaged      Treatment Initiation Note:  Pt arrived to HD unit via bed. A&Ox4 in NAD on RA.  LUE AVG assessed with strong bruit and thrill. HD initiated without difficulty with 15g x2. During Treatment Notes:  1008 Face and access in view with connections secure. Dr. Tk Angel at bedside (covering for Dr. Waylon Herrmann). Received order to decrease UF goal to 1.5 liters as tolerated, per pt request.   1030 Face and access in view with connections secure. 1045 Face and access in view with connections secure. 1100 Face and access in view with connections secure. 1115 Face and access in view with connections secure. 1130 Face and access in view with connections secure. 1145 Face and access in view with connections secure. 1200 Face and access in view with connections secure. 1215 Face and access in view with connections secure. 1230 Face and access in view with connections secure. 1245 Face and access in view with connections secure. 1300 Face and access in view with connections secure. 1313 Treatment complete.             Medication Dose Volume Route Time [facility] name Title   hectorol 0.5 mcg 0.25 ml dialysis 1208 JOHNNY Soria RN                   Post Assessment:   Dialyzer Cleared: [] Good [x] Fair  [] Poor  Blood processed:  68.5 L  UF Removed  2000 mL  POst BP:   131/72       Pulse: 58        Respirations: 18  Temperature: 97.6 Lungs:     [x] Clear      [] Course         [] Crackles    [] Wheezing         [] Diminished   Post Tx Vascular Access:   AVF/AVG: Bleeding stopped   Art 10 min. Venancio. 10 Min  Cardiac:   [x] Regular   [] Irregular   [] Monitor  [x] N/A           Catheter:     N/A    Skin:   Pain:   [x] Warm  [x] Dry [] Diaphoretic    [] Flushed    [] Pale [] Cyanotic [x]0  []1  []2   []3  []4   []5   []6   []7   []8   []9   []10     Post Treatment Note:  Pt tolerated treatment well. Net 1.5 L UF removed.      POST TREATMENT PRIMARY NURSE HANDOFF REPORT:     First initial/Last name/Title         Post Dialysis: Cameron Moody RN      Time:  7069     Abbreviations: AVG-arterial venous graft, AVF-arterial venous fistula, IJ-Internal Jugular, Subcl-Subclavian, Fem-Femoral, Tx-treatment, AP/HR-apical heart rate, DFR-dialysate flow rate, BFR-blood flow rate, AP-arterial pressure, -venous pressure, UF-ultrafiltrate, TMP-transmembrane pressure, Venancio-Venous, Art-Arterial, RO-Reverse Osmosis

## 2022-03-12 NOTE — ROUTINE PROCESS
Bedside shift received from [place] with sbar  To Rdu via bed  Returned from RDU awake and alert  Denies pain  Instructed on using I.S.  Bedside shift report given to [facility] with sbar

## 2022-03-12 NOTE — PROGRESS NOTES
ESRD patient admitted with infected and gangrenous right toes. Foot and ankle surgery will see the patient. He is dialysis dependent. No need for any dialysis today but will arrange his dialysis every Tuesday Thursday and Saturday in hospital and will give his Retacrit and Hectorol.     Covering for dr Vana Galeazzi  Seen during dialysis at 6 am,tolerating procedure well,no complications

## 2022-03-12 NOTE — PROGRESS NOTES
Bedside and Verbal shift change  Received from Shyla Mosley RN (outgoing nurse), to TEAGAN Gonzales (oncoming)  Pt. Is AOX 4. IV patent and infusing well, Pt. denies  pain at this time. Report included the following information SBAR, Kardex, Procedure Summary, Intake/Output, MAR, Recent Lab Results, and  Cardiac Rhythm @ SR. Will resume care and monitor Pt. Condition. 1940 Pt. Educated on call bell when in need of help and assistance. Pt. verbalized understanding. Pt. Head to toe Assessment Done and documented. 2040  Pt. Not in distress, RLE elevated on pillow. 2200  Pt. Denies discomfort. 2330  Pt. In bed, watching tv.    0100  Pt. Eyes closed, easily awaken. 0300  Pt. Made no complaints. 0500  Pt. Able to rest and sleep well throughout the shift. Verbal and bedside Shift changed report given to Vin Torres RN (oncoming RN) on Pt. Condition. Report consisted of patients Situation, History, Activities, intake/output,  Background, Assessment and Recommendations(SBAR). Information from the following report(s) Kardex, order Summary, Lab results and MAR was reviewed with the receiving nurse. Opportunity for questions and clarification was provided.

## 2022-03-12 NOTE — OP NOTES
[address] [place] [address]   OPERATIVE REPORT    Name:  Michael Calderon  MR#:   [id number]  :  1960  ACCOUNT #:  [de-identified]  DATE OF SERVICE:  2022    PREOPERATIVE DIAGNOSES:  Open wound with osteomyelitis and gangrene of an infected right middle toe. POSTOPERATIVE DIAGNOSES:  Open wound with osteomyelitis and gangrene of an infected right middle toe. PROCEDURE PERFORMED:  Amputation of right middle toe. SURGEON:  Markie Parry DPM.    ASSISTANT:  student. ANESTHESIA:  MAC.    COMPLICATIONS:  none. SPECIMENS REMOVED:  tissue. IMPLANTS:  none. ESTIMATED BLOOD LOSS:  0.    DESCRIPTION OF PROCEDURE:  On 2022, the patient was placed on the operating room table in the supine position. After adequate induction of MAC anesthesia, right lower extremity was prepped and draped in the usual sterile fashion. Attention was directed to the right foot. Middle toe was hyperextended and abducted. It had necrosis at the tip of the toe and had the proximal and middle phalanges protruding out of the wound with necrotic bone exposed and malodor. I debrided the end of the toe. There was good perfusion, but the more proximal bone was crumbling  ostitis. There also was hyperkeratotic ulcer, submetatarsal, under the involved toe. This area was debrided, and two semi-elliptical incisions were accomplished around the base of the toe extending through the plantar ulcer scar tissue excising some of this tissue with the ellipse and then disarticulating the toe. It was sent for pathology and culture. No deeper purulence noted. Good perfusion. Small blood vessels were bovied as necessary. Metatarsal biopsy was obtained for pathology and culture. Wound was thoroughly irrigated with antibiotic solution and closed with Prolene suture and skin staples. A Betadine compressive dressing was applied. The patient tolerated the procedure and anesthesia well with vital signs stable throughout.   The patient was transported to the recovery room in stable condition.       Mike Macias DPM      [id number]  D:  03/11/2022 13:42  T:  03/12/2022 8:46  JOB #:  [id number]  CC:  Mehnaz Goodman DPM

## 2022-03-12 NOTE — PROGRESS NOTES
Progress Note    Patient: Kenya Quezada MRN: [id number]  CSN: [id number]    YOB: 1960  Age: 59 y.o. Sex: male    DOA: 3/10/2022 LOS:  LOS: 2 days                    Subjective:     Chief Complaint:   Chief Complaint   Patient presents with    Foot Pain     Patient admitted for gangrene with osteomyelitis right 3rd toe now s/p right 3rd toe amputation on 3/11/22 per podiatry Dr. Blake Jones, uncomplicated. He continues on pip/tazo IV per ID recommendation will continue, f/u intra-op cultures and podiatry recommendation. Pt seen at HD. No acute complaints today, pain is controlled. Review of systems  General: No fevers or chills. Cardiovascular: No chest pain or pressure. No palpitations. Pulmonary: No shortness of breath, cough or wheeze. Gastrointestinal: No abdominal pain, nausea, vomiting or diarrhea. Genitourinary: No urinary frequency, urgency, hesitancy or dysuria. Musculoskeletal: pain controlled  Neurologic: No headache    Objective:     Physical Exam:  Visit Vitals  BP (!) 100/58   Pulse (!) 59   Temp 97.9 °F (36.6 °C) (Temporal)   Resp 18   Ht 6' (1.829 m)   Wt 75.3 kg (166 lb)   SpO2 97%   BMI 22.51 kg/m²        General:         Alert, cooperative, no acute distress    HEENT: NC, Atraumatic. PERRLA, anicteric sclerae. Lungs: CTA Bilaterally. No Wheezing/Rhonchi/Rales. Heart:  Regular  rhythm,  No murmur, No Rubs, No Gallops  Abdomen: Soft, Non distended, Non tender.  +Bowel sounds, no HSM  Extremities: Trace edema RLE, no edema LLE. RLE post-op compression bandage in place dressing appears clean and dry. Psych:    Not anxious or agitated. Neurologic:  CN 2-12 grossly intact, Alert and oriented X 3.         Intake and Output:  Current Shift:  03/12 0701 - 03/12 1900  In: 240 [P.O.:240]  Out: -   Last three shifts:  03/10 1901 - 03/12 0700  In: 1060 [P.O.:960; I.V.:100]  Out: 450 [Urine:450]    Labs: Results:       Chemistry Recent Labs 03/12/22 0534 03/11/22  0505 03/10/22  2003   * 119* 200*    138 135*   K 4.8 3.8 4.4    101 99*   CO2 29 32 33*   BUN 25* 15 10   CREA 4.95* 3.58* 3.00*   CA 8.7 8.7 9.0   AGAP 5 5 3   BUCR 5* 4* 3*   AP 60 78 85   TP 7.1 7.1 8.8*   ALB 3.0* 3.0* 3.5   GLOB 4.1* 4.1* 5.3*   AGRAT 0.7* 0.7* 0.7*      CBC w/Diff Recent Labs     03/12/22 0534 03/11/22  0505 03/10/22  2003   WBC 7.8 6.6 6.4   RBC 3.11* 2.91* 3.43*   HGB 9.3* 8.9* 10.6*   HCT 29.5* 27.8* 32.8*    132* 146   GRANS 76* 56 68   LYMPH 16* 34 23   EOS 1 2 1      Cardiac Enzymes No results for input(s): CPK, CKND1, LINDA in the last 72 hours. No lab exists for component: CKRMB, TROIP   Coagulation No results for input(s): PTP, INR, APTT, INREXT in the last 72 hours. Lipid Panel Lab Results   Component Value Date/Time    Cholesterol, total 154 03/11/2022 05:05 AM    HDL Cholesterol 49 03/11/2022 05:05 AM    LDL, calculated 82.4 03/11/2022 05:05 AM    VLDL, calculated 22.6 03/11/2022 05:05 AM    Triglyceride 113 03/11/2022 05:05 AM    CHOL/HDL Ratio 3.1 03/11/2022 05:05 AM      BNP No results for input(s): BNPP in the last 72 hours.    Liver Enzymes Recent Labs     03/12/22 0534   TP 7.1   ALB 3.0*   AP 60      Thyroid Studies Lab Results   Component Value Date/Time    TSH 2.79 03/11/2022 05:05 AM          Procedures/imaging: see electronic medical records for all procedures/Xrays and details which were not copied into this note but were reviewed prior to creation of Plan    Medications:   Current Facility-Administered Medications   Medication Dose Route Frequency    hydrALAZINE (APRESOLINE) 20 mg/mL injection 20 mg  20 mg IntraVENous Q6H PRN    [Held by provider] insulin glargine (LANTUS) injection 5 Units  5 Units SubCUTAneous QHS    lactulose (CHRONULAC) 10 gram/15 mL solution 15 mL  10 g Oral BID    epoetin cristy-epbx (RETACRIT) injection 8,000 Units  8,000 Units SubCUTAneous Q TUE, THU & SAT    doxercalciferoL (HECTOROL) 4 mcg/2 mL injection 0.5 mcg  0.5 mcg IntraVENous DIALYSIS TUE, THU & SAT    piperacillin-tazobactam (ZOSYN) 3.375 g in 0.9% sodium chloride (MBP/ADV) 100 mL MBP  3.375 g IntraVENous Q12H    insulin lispro (HUMALOG) injection   SubCUTAneous AC&HS    glucose chewable tablet 16 g  4 Tablet Oral PRN    glucagon (GLUCAGEN) injection 1 mg  1 mg IntraMUSCular PRN    dextrose 10% infusion 0-250 mL  0-250 mL IntraVENous PRN    hydrALAZINE (APRESOLINE) tablet 25 mg  25 mg Oral TID    pantoprazole (PROTONIX) tablet 40 mg  40 mg Oral ACB       Assessment/Plan     Active Problems:    Chronic drug abuse ([facility] (1/1/1974)      Diabetes mellitus (HCC) ()      Hypertension ()      Chronic neck pain (1/31/2019)      Cirrhosis of liver ([facility] (1/31/2019)      COPD, moderate ([facility] (1/31/2019)      Hypothyroid (1/31/2019)      Vitamin D deficiency (1/31/2019)      ESRD (end stage renal disease) ([facility] (2/17/2020)      GERD (gastroesophageal reflux disease) (8/5/2020)      Open toe wound, initial encounter (3/10/2022)        Right 3rd toe gangrene with osteomyelitis, s/p right 3rd toe amputation on 3/11/22 per podiatry Dr. Perez Fore  ID, Dr. Rene Caceres following  Continue pip tazo, f/u intra-op cultures and podiatry recommendation. Cardiology consulted for  preoperative clearance, evaluated post-operatively, pt tolerated procedure without complication, no further cardiac workup recommended. Cardiology plan to follow peripherally as pt is doing well at this time. Chest x-ray 3/10/22 no acute cardiopulmonary process  F/u operative cultures toe and bone from 3/11/22, still pending  Blood cultures from 3/10/22 no growth 2 days        History of drug abuse IV heroin  patient currently on methadone  Check urine drug screen positive Methadone     DM2  Humalog sliding scale  Hemoglobin A1c 6.5  Glucose fair control.   Monitor and treat as appropriate.     Hypertension  Labile bp  Continue Hydralazine 25 mg 3 times daily, hold for sbp < 110  hydraalzine 20 mg IV q 6h prn SBP above 160     Hypothyroidism  TSH 2.79 on 3/11/22  Continue levothyroxine 100 mg daily     History of liver cirrhosis/history of hepatitis C  Lactulose 15 twice daily  Ammonia normal 24 on 3/12/22     Hyperlipidemia  Continue Lipitor 20 mg nightly  3/11/22 lipids:   HDL 49 LDL 82.4   Monitor LFTs     Chronic anemia/gastroesophageal reflux disease  Protonix 40 mg once a day   H/h stable  3/7/22 iron 108 TIBC 202 %sat 53 ferritin 808   Check b12 and folate with am labs  Monitor cbc     End stage renal failure on dialysis Tu/Thu/Sat  F/u nephrology Dr Salazar Johnson to continue dialysis          DVT/GI Prophylaxis: Hep SQ, after surgery  SCD's and H2B/PPI, restart anticoagulation prophylaxis when OK with surgery.     Nidhi Moss MD  3/12/2022

## 2022-03-13 LAB
BACTERIA SPEC CULT: NORMAL
BACTERIA SPEC CULT: NORMAL
BASOPHILS # BLD: 0 K/UL (ref 0–0.1)
BASOPHILS NFR BLD: 0 % (ref 0–2)
DIFFERENTIAL METHOD BLD: ABNORMAL
EOSINOPHIL # BLD: 0.1 K/UL (ref 0–0.4)
EOSINOPHIL NFR BLD: 1 % (ref 0–5)
ERYTHROCYTE [DISTWIDTH] IN BLOOD BY AUTOMATED COUNT: 14.6 % (ref 11.6–14.5)
FOLATE SERPL-MCNC: 5.8 NG/ML (ref 3.1–17.5)
GLUCOSE BLD STRIP.AUTO-MCNC: 101 MG/DL (ref 70–110)
GLUCOSE BLD STRIP.AUTO-MCNC: 163 MG/DL (ref 70–110)
GLUCOSE BLD STRIP.AUTO-MCNC: 169 MG/DL (ref 70–110)
GLUCOSE BLD STRIP.AUTO-MCNC: 176 MG/DL (ref 70–110)
HCT VFR BLD AUTO: 27.8 % (ref 36–48)
HGB BLD-MCNC: 9 G/DL (ref 13–16)
IMM GRANULOCYTES # BLD AUTO: 0.1 K/UL (ref 0–0.04)
IMM GRANULOCYTES NFR BLD AUTO: 1 % (ref 0–0.5)
LYMPHOCYTES # BLD: 2.1 K/UL (ref 0.9–3.6)
LYMPHOCYTES NFR BLD: 29 % (ref 21–52)
MCH RBC QN AUTO: 31.5 PG (ref 24–34)
MCHC RBC AUTO-ENTMCNC: 32.4 G/DL (ref 31–37)
MCV RBC AUTO: 97.2 FL (ref 78–100)
MONOCYTES # BLD: 0.6 K/UL (ref 0.05–1.2)
MONOCYTES NFR BLD: 9 % (ref 3–10)
NEUTS SEG # BLD: 4.5 K/UL (ref 1.8–8)
NEUTS SEG NFR BLD: 61 % (ref 40–73)
NRBC # BLD: 0 K/UL (ref 0–0.01)
NRBC BLD-RTO: 0 PER 100 WBC
PLATELET # BLD AUTO: 132 K/UL (ref 135–420)
PMV BLD AUTO: 10.2 FL (ref 9.2–11.8)
RBC # BLD AUTO: 2.86 M/UL (ref 4.35–5.65)
SERVICE CMNT-IMP: NORMAL
SERVICE CMNT-IMP: NORMAL
VIT B12 SERPL-MCNC: 311 PG/ML (ref 211–911)
WBC # BLD AUTO: 7.5 K/UL (ref 4.6–13.2)

## 2022-03-13 PROCEDURE — 74011250637 HC RX REV CODE- 250/637: Performed by: PODIATRIST

## 2022-03-13 PROCEDURE — 74011250637 HC RX REV CODE- 250/637: Performed by: FAMILY MEDICINE

## 2022-03-13 PROCEDURE — 74011636637 HC RX REV CODE- 636/637: Performed by: PODIATRIST

## 2022-03-13 PROCEDURE — 82962 GLUCOSE BLOOD TEST: CPT

## 2022-03-13 PROCEDURE — 82607 VITAMIN B-12: CPT

## 2022-03-13 PROCEDURE — 36415 COLL VENOUS BLD VENIPUNCTURE: CPT

## 2022-03-13 PROCEDURE — 74011000258 HC RX REV CODE- 258: Performed by: PODIATRIST

## 2022-03-13 PROCEDURE — 85025 COMPLETE CBC W/AUTO DIFF WBC: CPT

## 2022-03-13 PROCEDURE — 65270000029 HC RM PRIVATE

## 2022-03-13 PROCEDURE — 74011250636 HC RX REV CODE- 250/636: Performed by: PODIATRIST

## 2022-03-13 RX ADMIN — LACTULOSE 15 ML: 20 SOLUTION ORAL at 08:57

## 2022-03-13 RX ADMIN — Medication 2 UNITS: at 21:40

## 2022-03-13 RX ADMIN — Medication 2 UNITS: at 17:17

## 2022-03-13 RX ADMIN — PIPERACILLIN AND TAZOBACTAM 3.38 G: 3; .375 INJECTION, POWDER, LYOPHILIZED, FOR SOLUTION INTRAVENOUS at 17:17

## 2022-03-13 RX ADMIN — PANTOPRAZOLE SODIUM 40 MG: 40 TABLET, DELAYED RELEASE ORAL at 08:57

## 2022-03-13 RX ADMIN — HYDRALAZINE HYDROCHLORIDE 25 MG: 25 TABLET, FILM COATED ORAL at 17:17

## 2022-03-13 RX ADMIN — HYDRALAZINE HYDROCHLORIDE 25 MG: 25 TABLET, FILM COATED ORAL at 08:57

## 2022-03-13 RX ADMIN — HYDRALAZINE HYDROCHLORIDE 25 MG: 25 TABLET, FILM COATED ORAL at 21:39

## 2022-03-13 RX ADMIN — PIPERACILLIN AND TAZOBACTAM 3.38 G: 3; .375 INJECTION, POWDER, LYOPHILIZED, FOR SOLUTION INTRAVENOUS at 04:32

## 2022-03-13 NOTE — PROGRESS NOTES
Problem: Falls - Risk of  Goal: *Absence of Falls  Description: Document Leanna Schneider Fall Risk and appropriate interventions in the flowsheet. Outcome: Progressing Towards Goal  Note: Fall Risk Interventions:  Mobility Interventions: Bed/chair exit alarm,Patient to call before getting OOB     Medication Interventions: Bed/chair exit alarm,Patient to call before getting OOB    Elimination Interventions: Bed/chair exit alarm,Call light in reach     Problem: Nutrition Deficit  Goal: *Optimize nutritional status  Outcome: Progressing Towards Goal     Problem: Pain  Goal: *Control of Pain  Outcome: Progressing Towards Goal   Problem: Pressure Injury - Risk of  Goal: *Prevention of pressure injury  Description: Document Blake Scale and appropriate interventions in the flowsheet.   Outcome: Progressing Towards Goal  Note: Pressure Injury Interventions:  Sensory Interventions: Assess changes in LOC,Pressure redistribution bed/mattress (bed type),Keep linens dry and wrinkle-free       Activity Interventions: Assess need for specialty bed,Pressure redistribution bed/mattress(bed type)    Mobility Interventions: Float heels,Pressure redistribution bed/mattress (bed type),PT/OT evaluation    Nutrition Interventions: Document food/fluid/supplement intake

## 2022-03-13 NOTE — PROGRESS NOTES
Progress Note    Patient: Millicent Prince MRN: [id number]  CSN: [id number]    YOB: 1960  Age: 59 y.o. Sex: male    DOA: 3/10/2022 LOS:  LOS: 3 days                    Subjective:     Chief Complaint:   Chief Complaint   Patient presents with    Foot Pain     Patient admitted for gangrene with osteomyelitis right 3rd toe now s/p right 3rd toe amputation on 3/11/22 per podiatry Dr. Perez Fore, uncomplicated. Seen by Dr. Limon Earing today, wound check healing as appropriate, re-dressed, ok for discharge from podiatry standpoint. He continues on pip/tazo IV per ID recommendation will continue, f/u intra-op cultures to determine antibiotics on discharge. Cultures still pending sensitivities as of 3/13/22. No acute complaints today, pain is controlled. Review of systems  General: No fevers or chills. Cardiovascular: No chest pain or pressure. No palpitations. Pulmonary: No shortness of breath, cough or wheeze. Gastrointestinal: No abdominal pain, nausea, vomiting or diarrhea. Genitourinary: No urinary frequency, urgency, hesitancy or dysuria. Musculoskeletal: pain controlled  Neurologic: No headache    Objective:     Physical Exam:  Visit Vitals  BP (!) 143/72   Pulse 60   Temp 98.2 °F (36.8 °C)   Resp 18   Ht 6' (1.829 m)   Wt 79.8 kg (175 lb 14.4 oz)   SpO2 97%   BMI 23.86 kg/m²        General:         Alert, cooperative, no acute distress    HEENT: NC, Atraumatic. PERRLA, anicteric sclerae. Lungs: CTA Bilaterally. No Wheezing/Rhonchi/Rales. Heart:  Regular  rhythm,  No murmur, No Rubs, No Gallops  Abdomen: Soft, Non distended, Non tender.  +Bowel sounds, no HSM  Extremities:  No edema BL.  RLE post-op compression bandage in place dressing appears clean and dry. Psych:    Not anxious or agitated. Neurologic:  CN 2-12 grossly intact, Alert and oriented X 3. Intake and Output:  Current Shift:  No intake/output data recorded.   Last three shifts:  03/11 1901 - 03/13 0700  In: 1920 [P.O.:1920]  Out: 2060 [Urine:560]    Labs: Results:       Chemistry Recent Labs     03/12/22  0534 03/11/22  0505 03/10/22  2003   * 119* 200*    138 135*   K 4.8 3.8 4.4    101 99*   CO2 29 32 33*   BUN 25* 15 10   CREA 4.95* 3.58* 3.00*   CA 8.7 8.7 9.0   AGAP 5 5 3   BUCR 5* 4* 3*   AP 60 78 85   TP 7.1 7.1 8.8*   ALB 3.0* 3.0* 3.5   GLOB 4.1* 4.1* 5.3*   AGRAT 0.7* 0.7* 0.7*      CBC w/Diff Recent Labs     03/13/22  0357 03/12/22  0534 03/11/22  0505   WBC 7.5 7.8 6.6   RBC 2.86* 3.11* 2.91*   HGB 9.0* 9.3* 8.9*   HCT 27.8* 29.5* 27.8*   * 141 132*   GRANS 61 76* 56   LYMPH 29 16* 34   EOS 1 1 2      Cardiac Enzymes No results for input(s): CPK, CKND1, LINDA in the last 72 hours. No lab exists for component: CKRMB, TROIP   Coagulation No results for input(s): PTP, INR, APTT, INREXT, INREXT in the last 72 hours. Lipid Panel Lab Results   Component Value Date/Time    Cholesterol, total 154 03/11/2022 05:05 AM    HDL Cholesterol 49 03/11/2022 05:05 AM    LDL, calculated 82.4 03/11/2022 05:05 AM    VLDL, calculated 22.6 03/11/2022 05:05 AM    Triglyceride 113 03/11/2022 05:05 AM    CHOL/HDL Ratio 3.1 03/11/2022 05:05 AM      BNP No results for input(s): BNPP in the last 72 hours.    Liver Enzymes Recent Labs     03/12/22 0534   TP 7.1   ALB 3.0*   AP 60      Thyroid Studies Lab Results   Component Value Date/Time    TSH 2.79 03/11/2022 05:05 AM          Procedures/imaging: see electronic medical records for all procedures/Xrays and details which were not copied into this note but were reviewed prior to creation of Plan    Medications:   Current Facility-Administered Medications   Medication Dose Route Frequency    hydrALAZINE (APRESOLINE) 20 mg/mL injection 20 mg  20 mg IntraVENous Q6H PRN    [Held by provider] insulin glargine (LANTUS) injection 5 Units  5 Units SubCUTAneous QHS    lactulose (CHRONULAC) 10 gram/15 mL solution 15 mL  10 g Oral BID    epoetin cristy-epbx (RETACRIT) injection 8,000 Units  8,000 Units SubCUTAneous Q TUE, THU & SAT    doxercalciferoL (HECTOROL) 4 mcg/2 mL injection 0.5 mcg  0.5 mcg IntraVENous DIALYSIS TUE, THU & SAT    piperacillin-tazobactam (ZOSYN) 3.375 g in 0.9% sodium chloride (MBP/ADV) 100 mL MBP  3.375 g IntraVENous Q12H    insulin lispro (HUMALOG) injection   SubCUTAneous AC&HS    glucose chewable tablet 16 g  4 Tablet Oral PRN    glucagon (GLUCAGEN) injection 1 mg  1 mg IntraMUSCular PRN    dextrose 10% infusion 0-250 mL  0-250 mL IntraVENous PRN    hydrALAZINE (APRESOLINE) tablet 25 mg  25 mg Oral TID    pantoprazole (PROTONIX) tablet 40 mg  40 mg Oral ACB       Assessment/Plan     Active Problems:    Chronic drug abuse ([facility] (1/1/1974)      Diabetes mellitus (HCC) ()      Hypertension ()      Chronic neck pain (1/31/2019)      Cirrhosis of liver ([facility] (1/31/2019)      COPD, moderate ([facility] (1/31/2019)      Hypothyroid (1/31/2019)      Vitamin D deficiency (1/31/2019)      ESRD (end stage renal disease) ([facility] (2/17/2020)      GERD (gastroesophageal reflux disease) (8/5/2020)      Open toe wound, initial encounter (3/10/2022)        Right 3rd toe gangrene with osteomyelitis, s/p right 3rd toe amputation on 3/11/22 per podiatry Dr. Luis Quintana  ID, Dr. Avtar Linda following  Continue pip tazo, f/u intra-op cultures  Per podiatry ok for dc when ready medically  Cardiology consulted for  preoperative clearance, evaluated post-operatively, pt tolerated procedure without complication, no further cardiac workup recommended. Cardiology plan to follow peripherally as pt is doing well at this time. Chest x-ray 3/10/22 no acute cardiopulmonary process  F/u operative cultures toe and bone from 3/11/22, still pending sensitivities on 3/13/22:  \"3rd toe dirty\":  Light serratia marcescens, scant enterococcus faecalis, moderate staphylococcus species, coagulase negative, checking for possible scant 2nd gram negative vinayak.  And \"Rt 3rd toe clean\":  Rare enterococcus species, rare staphylococcus species, coagulase negative. Blood cultures from 3/10/22 no growth 3 days        History of drug abuse IV heroin  patient currently on methadone  Check urine drug screen positive Methadone     DM2  Humalog sliding scale  Hemoglobin A1c 6.5  Glucose fair control.   Monitor and treat as appropriate.     Hypertension  Labile bp  Continue Hydralazine 25 mg 3 times daily, hold for sbp < 110  hydraalzine 20 mg IV q 6h prn SBP above 160     Hypothyroidism  TSH 2.79 on 3/11/22  Continue levothyroxine 100 mg daily     History of liver cirrhosis/history of hepatitis C  Lactulose 15 twice daily  Ammonia normal 24 on 3/12/22     Hyperlipidemia  Continue Lipitor 20 mg nightly  3/11/22 lipids:   HDL 49 LDL 82.4   Monitor LFTs     Chronic anemia/thrombocytopenia/gastroesophageal reflux disease  Protonix 40 mg once a day   H/h stable  Mild thrombocytopenia stable, monitor  3/7/22 iron 108 TIBC 202 %sat 53 ferritin 808   3/13/22 b12 is 311 and folate 5.8  Start b complex vitamin daily  Monitor cbc     End stage renal failure on dialysis Tu/Thu/Sat  F/u nephrology Dr Abdias Jeff to continue dialysis          DVT/GI Prophylaxis: Hep SQ, after surgery  SCD's and H2B/PPI, restart anticoagulation prophylaxis when OK with surgery.  Dania Meyer MD  3/13/2022

## 2022-03-13 NOTE — PROGRESS NOTES
0715: Bedside shift change report given to Michi (oncoming nurse) by Vladislav Montelongo (offgoing nurse). Report included the following information SBAR, Kardex and MAR.

## 2022-03-13 NOTE — PROGRESS NOTES
Seen at bedside awake and alert. Inspected wound. Well approximated. No necrosis or infection. Redressed. Cleared for discharge.

## 2022-03-14 ENCOUNTER — HOME HEALTH ADMISSION (OUTPATIENT)
Dept: HOME HEALTH SERVICES | Facility: HOME HEALTH | Age: 62
End: 2022-03-14
Payer: MEDICARE

## 2022-03-14 VITALS
OXYGEN SATURATION: 96 % | HEIGHT: 72 IN | TEMPERATURE: 98 F | BODY MASS INDEX: 23.83 KG/M2 | WEIGHT: 175.9 LBS | DIASTOLIC BLOOD PRESSURE: 75 MMHG | SYSTOLIC BLOOD PRESSURE: 155 MMHG | HEART RATE: 68 BPM | RESPIRATION RATE: 16 BRPM

## 2022-03-14 LAB
ALBUMIN SERPL-MCNC: 2.8 G/DL (ref 3.4–5)
ALBUMIN/GLOB SERPL: 0.8 {RATIO} (ref 0.8–1.7)
ALP SERPL-CCNC: 76 U/L (ref 45–117)
ALT SERPL-CCNC: 14 U/L (ref 16–61)
ANION GAP SERPL CALC-SCNC: 6 MMOL/L (ref 3–18)
AST SERPL-CCNC: 11 U/L (ref 10–38)
ATRIAL RATE: 62 BPM
BACTERIA SPEC CULT: ABNORMAL
BACTERIA SPEC CULT: ABNORMAL
BASOPHILS # BLD: 0 K/UL (ref 0–0.1)
BASOPHILS NFR BLD: 0 % (ref 0–2)
BILIRUB SERPL-MCNC: 0.5 MG/DL (ref 0.2–1)
BUN SERPL-MCNC: 35 MG/DL (ref 7–18)
BUN/CREAT SERPL: 6 (ref 12–20)
CALCIUM SERPL-MCNC: 8.7 MG/DL (ref 8.5–10.1)
CALCULATED P AXIS, ECG09: 74 DEGREES
CALCULATED R AXIS, ECG10: -22 DEGREES
CALCULATED T AXIS, ECG11: 62 DEGREES
CHLORIDE SERPL-SCNC: 103 MMOL/L (ref 100–111)
CO2 SERPL-SCNC: 29 MMOL/L (ref 21–32)
CREAT SERPL-MCNC: 5.68 MG/DL (ref 0.6–1.3)
DIAGNOSIS, 93000: NORMAL
DIFFERENTIAL METHOD BLD: ABNORMAL
EOSINOPHIL # BLD: 0.1 K/UL (ref 0–0.4)
EOSINOPHIL NFR BLD: 2 % (ref 0–5)
ERYTHROCYTE [DISTWIDTH] IN BLOOD BY AUTOMATED COUNT: 14.6 % (ref 11.6–14.5)
GLOBULIN SER CALC-MCNC: 3.6 G/DL (ref 2–4)
GLUCOSE BLD STRIP.AUTO-MCNC: 131 MG/DL (ref 70–110)
GLUCOSE BLD STRIP.AUTO-MCNC: 178 MG/DL (ref 70–110)
GLUCOSE SERPL-MCNC: 134 MG/DL (ref 74–99)
GRAM STN SPEC: ABNORMAL
GRAM STN SPEC: ABNORMAL
HBV SURFACE AB SER QL IA: NEGATIVE
HBV SURFACE AB SERPL IA-ACNC: 9.33 MIU/ML
HCT VFR BLD AUTO: 27.8 % (ref 36–48)
HEP BS AB COMMENT,HBSAC: ABNORMAL
HGB BLD-MCNC: 9 G/DL (ref 13–16)
IMM GRANULOCYTES # BLD AUTO: 0.1 K/UL (ref 0–0.04)
IMM GRANULOCYTES NFR BLD AUTO: 2 % (ref 0–0.5)
LYMPHOCYTES # BLD: 2 K/UL (ref 0.9–3.6)
LYMPHOCYTES NFR BLD: 27 % (ref 21–52)
MAGNESIUM SERPL-MCNC: 2.2 MG/DL (ref 1.6–2.6)
MCH RBC QN AUTO: 31 PG (ref 24–34)
MCHC RBC AUTO-ENTMCNC: 32.4 G/DL (ref 31–37)
MCV RBC AUTO: 95.9 FL (ref 78–100)
MONOCYTES # BLD: 0.6 K/UL (ref 0.05–1.2)
MONOCYTES NFR BLD: 9 % (ref 3–10)
NEUTS SEG # BLD: 4.4 K/UL (ref 1.8–8)
NEUTS SEG NFR BLD: 60 % (ref 40–73)
NRBC # BLD: 0 K/UL (ref 0–0.01)
NRBC BLD-RTO: 0 PER 100 WBC
P-R INTERVAL, ECG05: 186 MS
PLATELET # BLD AUTO: 124 K/UL (ref 135–420)
PMV BLD AUTO: 10 FL (ref 9.2–11.8)
POTASSIUM SERPL-SCNC: 4.5 MMOL/L (ref 3.5–5.5)
PROT SERPL-MCNC: 6.4 G/DL (ref 6.4–8.2)
Q-T INTERVAL, ECG07: 436 MS
QRS DURATION, ECG06: 102 MS
QTC CALCULATION (BEZET), ECG08: 442 MS
RBC # BLD AUTO: 2.9 M/UL (ref 4.35–5.65)
SERVICE CMNT-IMP: ABNORMAL
SODIUM SERPL-SCNC: 138 MMOL/L (ref 136–145)
VENTRICULAR RATE, ECG03: 62 BPM
WBC # BLD AUTO: 7.4 K/UL (ref 4.6–13.2)

## 2022-03-14 PROCEDURE — 83735 ASSAY OF MAGNESIUM: CPT

## 2022-03-14 PROCEDURE — 93005 ELECTROCARDIOGRAM TRACING: CPT

## 2022-03-14 PROCEDURE — 74011250637 HC RX REV CODE- 250/637: Performed by: FAMILY MEDICINE

## 2022-03-14 PROCEDURE — 82962 GLUCOSE BLOOD TEST: CPT

## 2022-03-14 PROCEDURE — 74011250636 HC RX REV CODE- 250/636: Performed by: PODIATRIST

## 2022-03-14 PROCEDURE — 36415 COLL VENOUS BLD VENIPUNCTURE: CPT

## 2022-03-14 PROCEDURE — 74011250637 HC RX REV CODE- 250/637: Performed by: INTERNAL MEDICINE

## 2022-03-14 PROCEDURE — 74011250637 HC RX REV CODE- 250/637: Performed by: PODIATRIST

## 2022-03-14 PROCEDURE — 80053 COMPREHEN METABOLIC PANEL: CPT

## 2022-03-14 PROCEDURE — 74011000258 HC RX REV CODE- 258: Performed by: PODIATRIST

## 2022-03-14 PROCEDURE — 74011636637 HC RX REV CODE- 636/637: Performed by: PODIATRIST

## 2022-03-14 PROCEDURE — 85025 COMPLETE CBC W/AUTO DIFF WBC: CPT

## 2022-03-14 RX ORDER — LEVOFLOXACIN 500 MG/1
500 TABLET, FILM COATED ORAL
Qty: 4 TABLET | Refills: 0 | Status: SHIPPED | OUTPATIENT
Start: 2022-03-16 | End: 2022-03-23

## 2022-03-14 RX ORDER — ACETAMINOPHEN 500 MG
500 TABLET ORAL
Status: DISCONTINUED | OUTPATIENT
Start: 2022-03-14 | End: 2022-03-14 | Stop reason: HOSPADM

## 2022-03-14 RX ORDER — AMOXICILLIN AND CLAVULANATE POTASSIUM 500; 125 MG/1; MG/1
1 TABLET, FILM COATED ORAL DAILY
Qty: 7 TABLET | Refills: 0 | Status: SHIPPED | OUTPATIENT
Start: 2022-03-14 | End: 2022-03-14 | Stop reason: SDUPTHER

## 2022-03-14 RX ORDER — HEPARIN SODIUM 5000 [USP'U]/ML
5000 INJECTION, SOLUTION INTRAVENOUS; SUBCUTANEOUS EVERY 8 HOURS
Status: DISCONTINUED | OUTPATIENT
Start: 2022-03-14 | End: 2022-03-14 | Stop reason: HOSPADM

## 2022-03-14 RX ORDER — OXYCODONE AND ACETAMINOPHEN 10; 325 MG/1; MG/1
1 TABLET ORAL
Qty: 18 TABLET | Refills: 0 | Status: SHIPPED | OUTPATIENT
Start: 2022-03-14 | End: 2022-03-17

## 2022-03-14 RX ORDER — LEVOFLOXACIN 500 MG/1
500 TABLET, FILM COATED ORAL
Qty: 4 TABLET | Refills: 0 | Status: SHIPPED | OUTPATIENT
Start: 2022-03-16 | End: 2022-03-14 | Stop reason: SDUPTHER

## 2022-03-14 RX ORDER — OXYCODONE AND ACETAMINOPHEN 10; 325 MG/1; MG/1
1 TABLET ORAL
Status: DISCONTINUED | OUTPATIENT
Start: 2022-03-14 | End: 2022-03-14 | Stop reason: HOSPADM

## 2022-03-14 RX ORDER — LEVOFLOXACIN 500 MG/1
500 TABLET, FILM COATED ORAL
Status: DISCONTINUED | OUTPATIENT
Start: 2022-03-14 | End: 2022-03-14 | Stop reason: HOSPADM

## 2022-03-14 RX ORDER — PANTOPRAZOLE SODIUM 40 MG/1
40 TABLET, DELAYED RELEASE ORAL
Qty: 30 TABLET | Refills: 0 | Status: SHIPPED
Start: 2022-03-15 | End: 2022-11-02

## 2022-03-14 RX ORDER — NALOXONE HYDROCHLORIDE 4 MG/.1ML
SPRAY NASAL
Qty: 1 EACH | Refills: 0 | Status: SHIPPED
Start: 2022-03-14 | End: 2022-11-02

## 2022-03-14 RX ORDER — AMOXICILLIN AND CLAVULANATE POTASSIUM 500; 125 MG/1; MG/1
1 TABLET, FILM COATED ORAL DAILY
Qty: 7 TABLET | Refills: 0 | Status: SHIPPED | OUTPATIENT
Start: 2022-03-14 | End: 2022-03-21

## 2022-03-14 RX ADMIN — HYDRALAZINE HYDROCHLORIDE 25 MG: 25 TABLET, FILM COATED ORAL at 10:12

## 2022-03-14 RX ADMIN — LEVOFLOXACIN 500 MG: 500 TABLET, FILM COATED ORAL at 10:12

## 2022-03-14 RX ADMIN — PIPERACILLIN AND TAZOBACTAM 3.38 G: 3; .375 INJECTION, POWDER, LYOPHILIZED, FOR SOLUTION INTRAVENOUS at 03:03

## 2022-03-14 RX ADMIN — PANTOPRAZOLE SODIUM 40 MG: 40 TABLET, DELAYED RELEASE ORAL at 10:12

## 2022-03-14 RX ADMIN — HYDRALAZINE HYDROCHLORIDE 20 MG: 20 INJECTION INTRAMUSCULAR; INTRAVENOUS at 03:16

## 2022-03-14 RX ADMIN — Medication 2 UNITS: at 12:20

## 2022-03-14 RX ADMIN — NEPHROCAP 1 CAPSULE: 1 CAP ORAL at 10:12

## 2022-03-14 NOTE — PROGRESS NOTES
Charge nurse: EKG obtained per MD request. Per MD patient has been taking methadone, not prescribed in hospital.Rn asked patient where he has been getting methadone. Patient became angry and states none of my business. Informed patient for his safety we need to know if he has it in his room.  Patient yelled at nurse and said \"I told the doctor so I don't need to tell you\"

## 2022-03-14 NOTE — PROGRESS NOTES
Bedside and Verbal shift change report given to Norberto Vanegas Rn (oncoming nurse) by Tonia Newell RN (offgoing nurse). Report included the following information SBAR, Kardex, Intake/Output, MAR and Recent Results.

## 2022-03-14 NOTE — PROGRESS NOTES
MM=187/78, hydralazine 20 mg iv given. Dressing to left foot dry and clean,denies pain, movement sensation present.

## 2022-03-14 NOTE — PROGRESS NOTES
Patient discharge instructions reviewed at bedside, patient verbalizes understanding, all questions answered to patient's satisfaction. Patient PIV removed, telemetry discontinued, belongings packed and patient discharged. Wound care noted to be completed on 3/14/2021 with orders for 3x a week dressing change.

## 2022-03-14 NOTE — DISCHARGE SUMMARY
Discharge Summary     Patient: Radha Gutierrez MRN: [id number]  SSN: [id number]    YOB: 1960  Age: 59 y.o.   Sex: male       Admit Date: 3/10/2022    Discharge Date: 3/14/2022      Admission Diagnoses: Open toe wound, initial encounter [S91.109A]    Discharge Diagnoses:   Problem List as of 3/14/2022 Date Reviewed: 3/10/2022          Codes Class Noted - Resolved    Open toe wound, initial encounter ICD-10-CM: S91.109A  ICD-9-CM: 893.0  3/10/2022 - Present        Anemia in chronic renal disease ICD-10-CM: N18.9, D63.1  ICD-9-CM: 285.21  8/5/2020 - Present        GERD (gastroesophageal reflux disease) ICD-10-CM: K21.9  ICD-9-CM: 530.81  8/5/2020 - Present        Hyperphosphatemia ICD-10-CM: E83.39  ICD-9-CM: 275.3  8/5/2020 - Present        Iron deficiency anemia ICD-10-CM: D50.9  ICD-9-CM: 280.9  8/5/2020 - Present        ESRD (end stage renal disease) on dialysis [facility]) ICD-10-CM: N18.6, Z99.2  ICD-9-CM: 585.6, V45.11  6/1/2020 - Present        Hemodialysis catheter dysfunction ([facility] ICD-10-CM: T82.41XA  ICD-9-CM: 996.1  5/6/2020 - Present        ESRD (end stage renal disease) ([facility] ICD-10-CM: N18.6  ICD-9-CM: 585.6  2/17/2020 - Present        Secondary hyperparathyroidism of renal origin ([facility] ICD-10-CM: N25.81  ICD-9-CM: 588.81  2/17/2020 - Present        Acute renal failure superimposed on stage 3 chronic kidney disease ([facility] ICD-10-CM: N17.9, N18.30  ICD-9-CM: 584.9, 585.3  2/15/2020 - Present        UTI (urinary tract infection) ICD-10-CM: N39.0  ICD-9-CM: 599.0  2/15/2020 - Present        Acute renal failure superimposed on chronic kidney disease ([facility] ICD-10-CM: N17.9, N18.9  ICD-9-CM: 584.9, 585.9  2/15/2020 - Present        Bradycardia ICD-10-CM: R00.1  ICD-9-CM: 427.89  2/15/2020 - Present        Renal failure (ARF), acute on chronic (HCC) ICD-10-CM: N17.9, N18.9  ICD-9-CM: 584.9, 585.9  3/31/2019 - Present        Suprapubic catheter ([facility] ICD-10-CM: Z93.59  ICD-9-CM: V44.59  3/28/2019 - Present        Bladder atony ICD-10-CM: N31.2  ICD-9-CM: 596.4  1/31/2019 - Present        Chronic neck pain ICD-10-CM: M54.2, G89.29  ICD-9-CM: 723.1, 338.29  1/31/2019 - Present        Cirrhosis of liver ([facility] ICD-10-CM: K74.60  ICD-9-CM: 571.5  1/31/2019 - Present        COPD, moderate (HCC) ICD-10-CM: J44.9  ICD-9-CM: 496  1/31/2019 - Present        Dry skin dermatitis ICD-10-CM: L85.3  ICD-9-CM: 692.89  1/31/2019 - Present        Hypothyroid ICD-10-CM: E03.9  ICD-9-CM: 244.9  1/31/2019 - Present        Lung nodules ICD-10-CM: R91.8  ICD-9-CM: 793.19  1/31/2019 - Present        Neck mass ICD-10-CM: R22.1  ICD-9-CM: 784.2  1/31/2019 - Present        Pericardial effusion ICD-10-CM: I31.3  ICD-9-CM: 423.9  1/31/2019 - Present        Vitamin D deficiency ICD-10-CM: E55.9  ICD-9-CM: 268.9  1/31/2019 - Present        BPH (benign prostatic hyperplasia) ICD-10-CM: N40.0  ICD-9-CM: 600.00  12/20/2018 - Present        Diabetes mellitus ([facility] ICD-10-CM: E11.9  ICD-9-CM: 250.00  Unknown - Present        Hypertension ICD-10-CM: I10  ICD-9-CM: 401.9  Unknown - Present        Urethral erosion ICD-10-CM: N36.8  ICD-9-CM: 599.84  Unknown - Present        Chronic anemia ICD-10-CM: D64.9  ICD-9-CM: 285.9  5/23/2018 - Present        Status post amputation of toe of right foot ([facility] ICD-10-CM: K62.171  ICD-9-CM: V49.72  5/18/2018 - Present        Thrombocytopenia ([facility] ICD-10-CM: D69.6  ICD-9-CM: 287.5  4/10/2018 - Present        Chronic kidney disease, stage III (moderate) (HCC) ICD-10-CM: N18.30  ICD-9-CM: 585.3  4/4/2018 - Present        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  ICD-9-CM: 583.9  4/2/2018 - Present        Chronic hepatitis C without hepatic coma ([facility] ICD-10-CM: B18.2  ICD-9-CM: 070.54  5/31/2017 - Present        Quadriparesis ([facility] ICD-10-CM: G82.50  ICD-9-CM: 344.00  5/31/2017 - Present        IV drug abuse ([facility] ICD-10-CM: F19.10  ICD-9-CM: 305.90  4/28/2017 - Present        Renal cell carcinoma of left kidney [facility]) ICD-10-CM: C64.2  ICD-9-CM: 189.0  12/17/2013 - Present    Overview Signed 6/12/2018 11:33 AM by Souleymane Saenz CMA     Pathological Stage [id number] cc RCC FG3 s/p left open partial nephrectomy in 12/13               Umbilical hernia SZU-05-EG: K42.9  ICD-9-CM: 553.1  9/2/2013 - Present        Diabetes ([facility] ICD-10-CM: E11.9  ICD-9-CM: 250.00  1/1990 - Present        Chronic drug abuse ([facility] ICD-10-CM: F19.10  ICD-9-CM: 304.90  1/1/1974 - Present        RESOLVED: Chronic kidney disease ICD-10-CM: N18.9  ICD-9-CM: 656. 9  Unknown - 2/15/2020    Overview Signed 7/10/2019  1:24 PM by Souleymane Saenz CMA     stage III             RESOLVED: ATN (acute tubular necrosis) ([facility] ICD-10-CM: N17.0  ICD-9-CM: 584.5  4/1/2019 - 2/15/2020        RESOLVED: Oliguria ICD-10-CM: R34  ICD-9-CM: 788.5  3/28/2019 - 2/15/2020        RESOLVED: Renal injury ICD-10-CM: S37.009A  ICD-9-CM: 866.00  3/28/2019 - 2/15/2020        RESOLVED: Bradycardia, sinus ICD-10-CM: R00.1  ICD-9-CM: 427.89  3/28/2019 - 2/15/2020        RESOLVED: Acute UTI ICD-10-CM: N39.0  ICD-9-CM: 599.0  3/28/2019 - 2/15/2020        RESOLVED: Type 2 diabetes mellitus with hypoglycemia ([facility] ICD-10-CM: E11.649  ICD-9-CM: 250.80  3/28/2019 - 2/15/2020        RESOLVED: Acute renal failure (ARF) ([facility] ICD-10-CM: N17.9  ICD-9-CM: 584.9  3/28/2019 - 2/15/2020        RESOLVED: Hypothermia ICD-10-CM: T68. XXXA  ICD-9-CM: 991.6  3/17/2019 - 2/15/2020        RESOLVED: Sepsis ([facility] ICD-10-CM: A41.9  ICD-9-CM: 038.9, 995.91  3/17/2019 - 2/15/2020        RESOLVED: Pneumonia due to organism ICD-10-CM: J18.9  ICD-9-CM: 914  3/7/2019 - 2/15/2020        RESOLVED: Cerumen impaction ICD-10-CM: H61.20  ICD-9-CM: 380.4  1/31/2019 - 2/15/2020        RESOLVED: Elevated PSA ICD-10-CM: R97.20  ICD-9-CM: 790.93  1/31/2019 - 2/15/2020        RESOLVED: History of diabetes mellitus ICD-10-CM: Z86.39  ICD-9-CM: V12.29  1/31/2019 - 2/15/2020        RESOLVED: History of elevated lipids ICD-10-CM: Z86.39  ICD-9-CM: V12.29  1/31/2019 - 2/15/2020        RESOLVED: History of hay fever ICD-10-CM: Z87.09  ICD-9-CM: V12.69  1/31/2019 - 2/15/2020        RESOLVED: Hyperlipidemia ICD-10-CM: E78.5  ICD-9-CM: 272.4  1/31/2019 - 2/15/2020        RESOLVED: Medication management ICD-10-CM: M37.136  ICD-9-CM: V58.69  1/31/2019 - 2/15/2020        RESOLVED: Preoperative clearance ICD-10-CM: G71.671  ICD-9-CM: V72.84  1/31/2019 - 2/15/2020        RESOLVED: Prostate disorder ICD-10-CM: N42.9  ICD-9-CM: 602.9  1/31/2019 - 2/15/2020        RESOLVED: Recurrent UTI ICD-10-CM: N39.0  ICD-9-CM: 599.0  1/31/2019 - 2/15/2020        RESOLVED: Hematuria ICD-10-CM: R31.9  ICD-9-CM: 599.70  10/14/2018 - 2/15/2020        RESOLVED: Generalized weakness ICD-10-CM: R53.1  ICD-9-CM: 780.79  7/3/2018 - 2/15/2020        RESOLVED: Chronic kidney disease ICD-10-CM: N18.9  ICD-9-CM: 605. 9  Unknown - 12/20/2018        RESOLVED: Sepsis due to methicillin resistant Staphylococcus aureus ([facility] ICD-10-CM: A41.02  ICD-9-CM: 038.12, 995.91  Unknown - 12/20/2018        RESOLVED: Type 2 diabetes with nephropathy ([facility] ICD-10-CM: E11.21  ICD-9-CM: 250.40, 583.81  5/9/2018 - 12/20/2018        RESOLVED: Acute kidney injury ([facility] ICD-10-CM: N17.9  ICD-9-CM: 584.9  4/10/2018 - 12/20/2018        RESOLVED: Hepatitis C antibody positive in blood ICD-10-CM: R76.8  ICD-9-CM: 795.79  4/4/2018 - 12/20/2018        RESOLVED: Metabolic acidosis SND-99-PW: E87.2  ICD-9-CM: 276.2  4/2/2018 - 12/20/2018        RESOLVED: Hyperkalemia ICD-10-CM: E87.5  ICD-9-CM: 276.7  4/2/2018 - 2/15/2020        RESOLVED: Hypercalcemia ICD-10-CM: V02.53  ICD-9-CM: 275.42  4/2/2018 - 2/15/2020        RESOLVED: Bradycardia ICD-10-CM: R00.1  ICD-9-CM: 427.89  3/31/2018 - 2/15/2020        RESOLVED: Acute renal insufficiency ICD-10-CM: N28.9  ICD-9-CM: 593.9  3/31/2018 - 4/4/2018        RESOLVED: H/O Clostridium difficile infection ICD-10-CM: Z86.19  ICD-9-CM: V12.09  3/31/2018 - 12/20/2018        RESOLVED: Bradycardia, unspecified ICD-10-CM: R00.1  ICD-9-CM: 427.89  1/1/2018 - 12/20/2018        RESOLVED: Urinary retention ICD-10-CM: R33.9  ICD-9-CM: 788.20  10/30/2017 - 2/15/2020        RESOLVED: Acute respiratory failure with hypoxia ([facility] ICD-10-CM: J96.01  ICD-9-CM: 518.81  5/31/2017 - 12/20/2018        RESOLVED: Diarrhea ICD-10-CM: R19.7  ICD-9-CM: 787.91  5/31/2017 - 12/20/2018        RESOLVED: History of renal cell cancer ICD-10-CM: H42.485  ICD-9-CM: V10.52  5/31/2017 - 12/20/2018        RESOLVED: MRSA bacteremia ICD-10-CM: R78.81, B95.62  ICD-9-CM: 790.7, 041.12  5/31/2017 - 12/20/2018        RESOLVED: Anemia ICD-10-CM: D64.9  ICD-9-CM: 285.9  5/20/2017 - 12/20/2018        RESOLVED: Fever ICD-10-CM: R50.9  ICD-9-CM: 780.60  5/20/2017 - 12/20/2018        RESOLVED: Essential hypertension ICD-10-CM: I10  ICD-9-CM: 401.9  4/28/2017 - 8/3/2021        RESOLVED: Hyponatremia ICD-10-CM: E87.1  ICD-9-CM: 276.1  4/28/2017 - 12/20/2018        RESOLVED: Diabetic foot ulcer ([facility] ICD-10-CM: E11.621, L97.509  ICD-9-CM: 250.80, 707.15  12/30/2016 - 12/20/2018        RESOLVED: Osteomyelitis ([facility] ICD-10-CM: M86.9  ICD-9-CM: 730.20  2/25/2016 - 2/29/2016        RESOLVED: Type II diabetes mellitus, uncontrolled ([facility] ICD-10-CM: E11.65  ICD-9-CM: 250.02  12/16/2015 - 2/15/2020        RESOLVED: Cellulitis of foot ICD-10-CM: U79.763  ICD-9-CM: 682.7  5/1/2015 - 12/20/2018        RESOLVED: Renal cell cancer ([facility] ICD-10-CM: C64.9  ICD-9-CM: 189.0  11/5/2014 - 12/20/2018        RESOLVED: Cancer of left kidney ([facility] ICD-10-CM: C64.2  ICD-9-CM: 189.0  12/23/2013 - 12/20/2018        RESOLVED: Renal mass, left ICD-10-CM: N28.89  ICD-9-CM: 593.9  12/17/2013 - 12/20/2018        RESOLVED: Renal mass ICD-10-CM: N28.89  ICD-9-CM: 593.9  12/16/2013 - 12/20/2018        RESOLVED: Diabetes ([facility] ICD-10-CM: E11.9  ICD-9-CM: 250.00  1/1/1990 - 12/20/2018        RESOLVED: Diabetes ([facility] ICD-10-CM: E11.9  ICD-9-CM: 250.00 1/1/1990 - 2/15/2020             HPI:     Teo Rosado is a 59 y.o.  male who has history of 2ed and 3ed toe amputation pt was seen at [facility] ER one week before admission   For open wound Rt 4th distal phalanx was given oral ABT and discharge to follow up podiatry pt couldn't have appt with Dr Jovany Ratliff  Until day of admission      Pt also   has chronic renal failure on dialysis chronic polysubstance abuse patient reported quite drug abuse has been on methadone pt also has  history of hypothyroid diabetes mellitus hypertension, hyperlipidemia.   Pulmonary hypertension pancytopenia left renal cell carcinoma status post partial nephrectomy , hepatitis C was treated , liver cirrhosis had EGD  Previously with Dr. Brenda Smith     Patient has history of discitis C5-C6 with quadriplegia status post laminectomy with posterior lateral fusion C3-C7 finish course of treatment for osteomyelitis     Patient seen by podiatry Dr. Jovany Ratliff for right 4th  toe ulcer he was sent to the ER for amputation of the right 4th toe today      Initial lab work in the ER  3/10/22   white blood cell 6.4 H&H 10.3/32.8 platelet 390  Sodium 135 potassium 4.4 creatinine 3 magnesium 2.0 ALT and AST is 23 and 23    Pt admitted for surgery the next day  Cleared by cardiology for abnormal EKG  No further cardiac work up needed                    Discharge Condition: Stable    Hospital Course:     Right 4th  toe gangrene with osteomyelitis, s/p right 4th toe amputation on 3/11/22 per podiatry Dr. Nakita Mejía  Pt has past history of amputation 2 ed and 3ed toe  ID,  consult appreciated Dr. Kingston  following  Pathology report pending and final culture pending    Discussed with Dr ID dr Monico Nageotte will talk to pathology if margin clear will need few days of oral ABT augmentin and Levaquin pt has to hold on methadone due to interaction with Levaquin   D/C zosyn   Per podiatry ok for dc when ready medically  Cardiology consulted for  preoperative clearance, evaluated post-operatively, pt tolerated procedure without complication, no further cardiac workup recommended. Cardiology plan to follow peripherally as pt is doing well at this time. Chest x-ray 3/10/22 no acute cardiopulmonary process  F/u operative cultures toe and bone from 3/11/22, still pending sensitivities on 3/13/22:  \"4th toe dirty\":  Light serratia marcescens, scant enterococcus faecalis, moderate staphylococcus species, coagulase negative, checking for possible scant 2nd gram negative vinayak. Rare enterococcus species, rare staphylococcus species, coagulase negative. Blood cultures from 3/10/22 no growth 3 days  Percocet 10/325 mg q 4h prn  Pt reported that he has been taking methadone form home in the hospital Friday and Saturday discussed with pt can not take outside med  and methadone will interact with Levaquin   Will give percocet and check EKG  No significant QT prolongation   Discussed with nursing staff         History of drug abuse IV heroin  patient currently on methadone  Check urine drug screen positive Methadone   Not on pain med now  Tylenol prn   Discussed with pt he would like to go home   Will hold on methadone until adjust ABT per ID Dr Trinidad Conley      DM2  Humalog sliding scale  Hemoglobin A1c 6.5  Glucose fair control.   Monitor and treat as appropriate.     Hypertension  Labile bp  Continue Hydralazine 25 mg 3 times daily, hold for sbp < 110  hydraalzine 20 mg IV q 6h prn SBP above 160     Hypothyroidism  TSH 2.79 on 3/11/22  Continue levothyroxine 100 mg daily     History of liver cirrhosis/history of hepatitis C  Pt reported was treated for hepatitis c   Lactulose 15 twice daily  Ammonia normal 24 on 3/12/22     Hyperlipidemia  Continue Lipitor 20 mg nightly  3/11/22 lipids:   HDL 49 LDL 82.4   Monitor LFTs     Chronic anemia/thrombocytopenia/gastroesophageal reflux disease  Protonix 40 mg once a day   H/H stable  Mild thrombocytopenia stable, monitor  3/7/22 iron 108 TIBC 202 %sat 53 ferritin 808   3/13/22 b12 is 311 and folate 5.8  Start b complex vitamin daily     End stage renal failure on dialysis Tu/Thu/Sat  F/u nephrology Dr Juan Carlos Rowe to continue dialysis          Consults: Infectious Disease and podiatry cardiology    Significant Diagnostic Studies:     CXR on admission      IMPRESSION  No radiographic evidence of acute cardiopulmonary process.     X-ray Rt foot   IMPRESSION  1.  Findings concerning for osteomyelitis of the distal aspect of the fourth  toe.      EKG       Sinus bradycardia   Moderate voltage criteria for LVH, may be normal variant ( R in aVL , Isai    product )   Septal infarct (cited on or before 25-MAR-2020)           Repeat EKG 3/14    Normal sinus rhythm   Minimal voltage criteria for LVH, may be normal variant   Borderline ECG   When compared with ECG of 10-MAR-2022 22:08,   Criteria for Septal infarct are no longer present   Nonspecific T wave abnormality no longer evident in Inferior leads   Nonspecific T wave abnormality no longer evident in Anterior leads          Physical Examination:   General:         Alert, cooperative, no acute distress    HEENT:           NC, Atraumatic. PERRLA, anicteric sclerae. Lungs:            CTA Bilaterally. No Wheezing/Rhonchi/Rales. Heart:              Regular  rhythm,  No murmur, No Rubs, No Gallops  Abdomen:      Soft, Non distended, Non tender.    Extremities:    No edema BL.  RLE post-op compression bandage in place dressing appears clean and dry. Psych:             Not anxious or agitated. Neurologic:    CN 2-12 grossly intact, Alert and oriented X 3.       Disposition: home with home health    Discharge Medications:   Current Discharge Medication List      START taking these medications    Details   oxyCODONE-acetaminophen (PERCOCET 10)  mg per tablet Take 1 Tablet by mouth every four (4) hours as needed for Pain for up to 3 days. Max Daily Amount: 6 Tablets.   Qty: 18 Tablet, Refills: 0  Start date: 3/14/2022, End date: 3/17/2022    Associated Diagnoses: Traumatic amputation of toe of right foot with complication, initial encounter ([facility]      lactulose (CHRONULAC) 10 gram/15 mL solution Take 15 mL by mouth two (2) times a day. Qty: 1 Bottle, Refills: 0  Start date: 3/14/2022      pantoprazole (PROTONIX) 40 mg tablet Take 1 Tablet by mouth Daily (before breakfast). Qty: 30 Tablet, Refills: 0  Start date: 3/15/2022      levoFLOXacin (LEVAQUIN) 500 mg tablet Take 1 Tablet by mouth every fourty-eight (48) hours for 7 days. Indications: diabetic foot infection  Qty: 4 Tablet, Refills: 0  Start date: 3/16/2022, End date: 3/23/2022      amoxicillin-clavulanate (Augmentin) 500-125 mg per tablet Take 1 Tablet by mouth daily for 7 days. Qty: 7 Tablet, Refills: 0  Start date: 3/14/2022, End date: 3/21/2022      naloxone (Narcan) 4 mg/actuation nasal spray Use 1 spray intranasally, then discard. Repeat with new spray every 2 min as needed for opioid overdose symptoms, alternating nostrils. Qty: 1 Each, Refills: 0  Start date: 3/14/2022         CONTINUE these medications which have NOT CHANGED    Details   levothyroxine (SYNTHROID) 100 mcg tablet Take 1 Tab by mouth every morning. Qty: 30 Tab, Refills: 1      b complex-vitamin c-folic acid 0.8 mg (NEPHRO-DEBBIE) 0.8 mg tab tablet Take 0.8 mg by mouth daily. b complex-vitamin c-folic acid (NEPHROCAPS) 1 mg capsule Take 1 Cap by mouth daily. Qty: 30 Cap, Refills: 1      calcium acetate,phosphat bind, (PHOSLO) 667 mg cap Take 1 Cap by mouth three (3) times daily (with meals). Qty: 90 Cap, Refills: 1      doxercalciferoL (HECTOROL) 4 mcg/2 mL injection 0.5 mL by IntraVENous route DIALYSIS MON, WED & FRI. Qty: 1 mL, Refills: 0    Comments: To be given with dialysis      food supplemt, lactose-reduced (ENSURE ENLIVE) 0.08 gram-1.5 kcal/mL liqd Take 1 Each by mouth three (3) times daily (with meals).  Flavor of patient choice  Qty: 90 Bottle, Refills: 1      hydrALAZINE (APRESOLINE) 25 mg tablet Take 1 Tab by mouth every eight (8) hours as needed (systolic B/P >213). Qty: 30 Tab, Refills: 0         STOP taking these medications       methadone (Methadone IntensoL) 10 mg/mL solution Comments:   Reason for Stopping:         tadalafiL (CIALIS) 5 mg tablet Comments:   Reason for Stopping:         sildenafil citrate (VIAGRA) 100 mg tablet Comments:   Reason for Stopping:         midodrine (PROAMATINE) 5 mg tablet Comments:   Reason for Stopping:         ketoconazole (NIZORAL) 2 % topical cream Comments:   Reason for Stopping:         Syringe, Disposable, (BD SYRINGE CATHETER TIP) 60 mL syrg Comments:   Reason for Stopping:               Activity: Activity as tolerated   Diet: Cardiac Diet and renal  Wound Care: As directed per Dr Isreal Dalton Appointment in: Two Weeks     Standing Status:   Standing     Number of Occurrences:   1     Order Specific Question:   Appointment in     Answer:    Two Weeks   time for discharge more than 40 minutes included discussion  With patient and exam discussion with ID please see progress note early this morning   alos review chart med reconciliation print medication discussion with nursing staff and coordination of care with  home health will monitor pt at home and wound care per Dr Ellie Lipscomb instruction      Signed By: Char Zamarripa MD      March 14, 2022

## 2022-03-14 NOTE — HOME CARE
Received home health referral for [facility] for (SN-wound care). Spoke with patient in room;  patient identifiers verified. Explained home care services and routines. Demographics verified including insurance, phone and address confirmed. Patient has the following DME: has cane. Caregivers available lives with sister who will be primary caregiver and is able and willing for education for wound care. Orders noted and arranged to be processed to central intake.       ---   Erika Miguel, LPN  [facility] Liaison

## 2022-03-14 NOTE — PROGRESS NOTES
Progress Note    Patient: Kenya Quezada MRN: [id number]  CSN: [id number]    YOB: 1960  Age: 59 y.o. Sex: male    DOA: 3/10/2022 LOS:  LOS: 4 days                    Subjective:     Chief Complaint:   Chief Complaint   Patient presents with    Foot Pain     Patient admitted for gangrene with osteomyelitis right 4rd toe now s/p right 4rd toe amputation on 3/11/22 per podiatry Dr. Blake Jones, uncomplicated. Seen by Dr. Isatu Jose today, wound check healing as appropriate, re-dressed, ok for discharge from podiatry standpoint. He continues on pip/tazo IV per ID recommendation will continue, f/u intra-op cultures to determine antibiotics on discharge. Cultures still pending sensitivities and pathology is pending  Discussed with Dr JODEE Hammonds will talk to pathology of margin clear will need few days of oral ABT augmentin and Levaquin pt has to hold on methadone due to interaction with Levaquin   Pt reported that he has been taking methadone form home in the hospital Friday and Saturday discussed with pt can not take out dise med iciane and methadone will interact with Levaquin   Will give percocet and ch theodora EKG before discharge to check QT interval before he leavs discussed with his nurse     No acute complaints today, pain is controlled. Review of systems  General: No fevers or chills. Cardiovascular: No chest pain or pressure. No palpitations. Pulmonary: No shortness of breath, cough or wheeze. Gastrointestinal: No abdominal pain, nausea, vomiting or diarrhea. Genitourinary: No urinary frequency, urgency, hesitancy or dysuria. Musculoskeletal: pain controlled  Neurologic: No headache    Objective:     Physical Exam:  Visit Vitals  BP (!) 171/77   Pulse 61   Temp 98.6 °F (37 °C)   Resp 18   Ht 6' (1.829 m)   Wt 79.8 kg (175 lb 14.4 oz)   SpO2 95%   BMI 23.86 kg/m²        General:         Alert, cooperative, no acute distress    HEENT: NC, Atraumatic.   PERRLA, anicteric sclerae. Lungs: CTA Bilaterally. No Wheezing/Rhonchi/Rales. Heart:  Regular  rhythm,  No murmur, No Rubs, No Gallops  Abdomen: Soft, Non distended, Non tender.    Extremities:  No edema BL.  RLE post-op compression bandage in place dressing appears clean and dry. Psych:    Not anxious or agitated. Neurologic:  CN 2-12 grossly intact, Alert and oriented X 3. Intake and Output:  Current Shift:  03/13 1901 - 03/14 0700  In: 960 [P.O.:960]  Out: 325 [Urine:325]  Last three shifts:  03/12 0701 - 03/13 1900  In: 2280 [P.O.:2280]  Out: 2060 [Urine:560]    Labs: Results:       Chemistry Recent Labs     03/14/22  0528 03/12/22  0534   * 142*    137   K 4.5 4.8    103   CO2 29 29   BUN 35* 25*   CREA 5.68* 4.95*   CA 8.7 8.7   AGAP 6 5   BUCR 6* 5*   AP 76 60   TP 6.4 7.1   ALB 2.8* 3.0*   GLOB 3.6 4.1*   AGRAT 0.8 0.7*      CBC w/Diff Recent Labs     03/14/22  0528 03/13/22  0357 03/12/22  0534   WBC 7.4 7.5 7.8   RBC 2.90* 2.86* 3.11*   HGB 9.0* 9.0* 9.3*   HCT 27.8* 27.8* 29.5*   * 132* 141   GRANS 60 61 76*   LYMPH 27 29 16*   EOS 2 1 1      Cardiac Enzymes No results for input(s): CPK, CKND1, LINDA in the last 72 hours. No lab exists for component: CKRMB, TROIP   Coagulation No results for input(s): PTP, INR, APTT, INREXT, INREXT in the last 72 hours. Lipid Panel Lab Results   Component Value Date/Time    Cholesterol, total 154 03/11/2022 05:05 AM    HDL Cholesterol 49 03/11/2022 05:05 AM    LDL, calculated 82.4 03/11/2022 05:05 AM    VLDL, calculated 22.6 03/11/2022 05:05 AM    Triglyceride 113 03/11/2022 05:05 AM    CHOL/HDL Ratio 3.1 03/11/2022 05:05 AM      BNP No results for input(s): BNPP in the last 72 hours.    Liver Enzymes Recent Labs     03/14/22  0528   TP 6.4   ALB 2.8*   AP 76      Thyroid Studies Lab Results   Component Value Date/Time    TSH 2.79 03/11/2022 05:05 AM          Procedures/imaging: see electronic medical records for all procedures/Xrays and details which were not copied into this note but were reviewed prior to creation of Plan    Medications:   Current Facility-Administered Medications   Medication Dose Route Frequency    B complex-vitaminC-folic acid (NEPHROCAP) cap  1 Capsule Oral DAILY    hydrALAZINE (APRESOLINE) 20 mg/mL injection 20 mg  20 mg IntraVENous Q6H PRN    lactulose (CHRONULAC) 10 gram/15 mL solution 15 mL  10 g Oral BID    epoetin cristy-epbx (RETACRIT) injection 8,000 Units  8,000 Units SubCUTAneous Q TUE, THU & SAT    doxercalciferoL (HECTOROL) 4 mcg/2 mL injection 0.5 mcg  0.5 mcg IntraVENous DIALYSIS TUE, THU & SAT    piperacillin-tazobactam (ZOSYN) 3.375 g in 0.9% sodium chloride (MBP/ADV) 100 mL MBP  3.375 g IntraVENous Q12H    insulin lispro (HUMALOG) injection   SubCUTAneous AC&HS    glucose chewable tablet 16 g  4 Tablet Oral PRN    glucagon (GLUCAGEN) injection 1 mg  1 mg IntraMUSCular PRN    dextrose 10% infusion 0-250 mL  0-250 mL IntraVENous PRN    hydrALAZINE (APRESOLINE) tablet 25 mg  25 mg Oral TID    pantoprazole (PROTONIX) tablet 40 mg  40 mg Oral ACB       Assessment/Plan     Active Problems:    Chronic drug abuse (HCC) (1/1/1974)      Diabetes mellitus (HCC) ()      Hypertension ()      Chronic neck pain (1/31/2019)      Cirrhosis of liver (HCC) (1/31/2019)      COPD, moderate ([facility] [address] (1/31/2019)      Hypothyroid (1/31/2019)      Vitamin D deficiency (1/31/2019)      ESRD (end stage renal disease) ([facility] [address] (2/17/2020)      GERD (gastroesophageal reflux disease) (8/5/2020)      Open toe wound, initial encounter (3/10/2022)        Right 4th  toe gangrene with osteomyelitis, s/p right 4th toe amputation on 3/11/22 per podiatry Dr. Jac Flores  Pt has past history of amputation 2 ed and 3ed toe  ID,  consult appreciated Dr. Danielle Parents following   Discussed with Dr JODEE Johnson will talk to pathology of margin clear will need few days of oral ABT augmentin and Levaquin pt has to hold on methadone due to interaction with Levaquin   Continue pip tazo, f/u intra-op cultures  Per podiatry ok for dc when ready medically  Cardiology consulted for  preoperative clearance, evaluated post-operatively, pt tolerated procedure without complication, no further cardiac workup recommended. Cardiology plan to follow peripherally as pt is doing well at this time. Chest x-ray 3/10/22 no acute cardiopulmonary process  F/u operative cultures toe and bone from 3/11/22, still pending sensitivities on 3/13/22:  \"4th toe dirty\":  Light serratia marcescens, scant enterococcus faecalis, moderate staphylococcus species, coagulase negative, checking for possible scant 2nd gram negative vinayak. Rare enterococcus species, rare staphylococcus species, coagulase negative. Blood cultures from 3/10/22 no growth 3 days  Percocet 10/325 mg q 4h prn  Pt reported that he has been taking methadone form home in the hospital Friday and Saturday discussed with pt can not take out dise med iciane and methadone will interact with Levaquin   Will give percocet and ch theodora EKG before discharge to check QT interval before he shrutiavs discussed with his nurse       History of drug abuse IV heroin  patient currently on methadone  Check urine drug screen positive Methadone   Not on pain med now  Tylenol prn   Discussed with pt he would like to go home   Will hole on methadone until adjust ABT per ID Dr Frandy Hidalgo     DM2  Humalog sliding scale  Hemoglobin A1c 6.5  Glucose fair control.   Monitor and treat as appropriate.     Hypertension  Labile bp  Continue Hydralazine 25 mg 3 times daily, hold for sbp < 110  hydraalzine 20 mg IV q 6h prn SBP above 160     Hypothyroidism  TSH 2.79 on 3/11/22  Continue levothyroxine 100 mg daily     History of liver cirrhosis/history of hepatitis C  Pt reported was treated for hepatitis c   Lactulose 15 twice daily  Ammonia normal 24 on 3/12/22     Hyperlipidemia  Continue Lipitor 20 mg nightly  3/11/22 lipids:   HDL 49 LDL 82.4 TG 113  Monitor LFTs     Chronic anemia/thrombocytopenia/gastroesophageal reflux disease  Protonix 40 mg once a day   H/h stable  Mild thrombocytopenia stable, monitor  3/7/22 iron 108 TIBC 202 %sat 53 ferritin 808   3/13/22 b12 is 311 and folate 5.8  Start b complex vitamin daily  Monitor cbc     End stage renal failure on dialysis Tu/Thu/Sat  F/u nephrology Dr Mary Stoddard to continue dialysis          DVT/GI Prophylaxis: Hep SQ,  SCD's and H2B/PPI,      Nergito Brunner MD  3/14/2022  8:55 AM

## 2022-03-14 NOTE — PROGRESS NOTES
Problem: Diabetes Self-Management  Goal: *Disease process and treatment process  Description: Define diabetes and identify own type of diabetes; list 3 options for treating diabetes. Outcome: Progressing Towards Goal  Goal: *Incorporating nutritional management into lifestyle  Description: Describe effect of type, amount and timing of food on blood glucose; list 3 methods for planning meals. Outcome: Progressing Towards Goal  Goal: *Incorporating physical activity into lifestyle  Description: State effect of exercise on blood glucose levels. Outcome: Progressing Towards Goal  Goal: *Developing strategies to promote health/change behavior  Description: Define the ABC's of diabetes; identify appropriate screenings, schedule and personal plan for screenings. Outcome: Progressing Towards Goal  Goal: *Using medications safely  Description: State effect of diabetes medications on diabetes; name diabetes medication taking, action and side effects. Outcome: Progressing Towards Goal  Goal: *Monitoring blood glucose, interpreting and using results  Description: Identify recommended blood glucose targets  and personal targets. Outcome: Progressing Towards Goal  Goal: *Prevention, detection, treatment of acute complications  Description: List symptoms of hyper- and hypoglycemia; describe how to treat low blood sugar and actions for lowering  high blood glucose level. Outcome: Progressing Towards Goal  Goal: *Prevention, detection and treatment of chronic complications  Description: Define the natural course of diabetes and describe the relationship of blood glucose levels to long term complications of diabetes.   Outcome: Progressing Towards Goal  Goal: *Developing strategies to address psychosocial issues  Description: Describe feelings about living with diabetes; identify support needed and support network  Outcome: Progressing Towards Goal  Goal: *Insulin pump training  Outcome: Progressing Towards Goal  Goal: *Sick day guidelines  Outcome: Progressing Towards Goal  Goal: *Patient Specific Goal (EDIT GOAL, INSERT TEXT)  Outcome: Progressing Towards Goal     Problem: Patient Education: Go to Patient Education Activity  Goal: Patient/Family Education  Outcome: Progressing Towards Goal     Problem: General Medical Care Plan  Goal: *Vital signs within specified parameters  Outcome: Progressing Towards Goal  Goal: *Labs within defined limits  Outcome: Progressing Towards Goal  Goal: *Optimal pain control at patient's stated goal  Outcome: Progressing Towards Goal  Goal: *Skin integrity maintained  Outcome: Progressing Towards Goal  Goal: *Fluid volume balance  Outcome: Progressing Towards Goal  Goal: *Optimize nutritional status  Outcome: Progressing Towards Goal     Problem: Falls - Risk of  Goal: *Absence of Falls  Description: Document Tomas Fall Risk and appropriate interventions in the flowsheet.   Outcome: Progressing Towards Goal  Note: Fall Risk Interventions:  Mobility Interventions: Bed/chair exit alarm         Medication Interventions: Patient to call before getting OOB    Elimination Interventions: Call light in reach              Problem: Patient Education: Go to Patient Education Activity  Goal: Patient/Family Education  Outcome: Progressing Towards Goal     Problem: Nutrition Deficit  Goal: *Optimize nutritional status  Outcome: Progressing Towards Goal     Problem: Pain  Goal: *Control of Pain  Outcome: Progressing Towards Goal

## 2022-03-14 NOTE — PROGRESS NOTES
Infectious Disease progress Note        Reason: Right foot infection    Current abx Prior abx   Zosyn since 3/10/22      Lines:       Assessment :    59 y.o.  male who has significant history for chronic urinary retention with suprapubic catheter placement, history of Renal Cell carcinoma post-left partial nephrectomy 12/2013 followed by Dr. Tana Batista urology, chronic kidney disease, history of c-spine laminectomy with post op paralysis, Heroin Drug Abuse, ongoing TOB use, DM2, anemia of chronic disease, thrombocytopenia, hepatitis C with cirrhosis followed by GI Dr. Erik Chang, [facility], Hypothyroidism admitted to [facility] on 3/10/22 for right foot infection, need for surgery          hospitalization at [facility] 3/7/19-3/15/19 for gi bleed, hypothermia, hypoglycemia, sepsis, pneumonia, uti      [facility]3/[facility]/19 for hypoglycemia, hypothermia, uti     Hospitalization at [facility] March 2019 for hypoglycemia, seizures, hypothermia-suspected hypopituitarism    clinical presentation consistent with right fourth toe gangrene, chronic osteomyelitis distal right fourth toe     Podiatry follow-up appreciated. Status post amputation right fourth toe on 3/11/2022. Grossly clean bone margins achieved at surgery  Intra-Op cultures 3/11-Serratia, Enterococcus faecalis (ampicillin susceptible), coagulase-negative Staphylococcus    Patient insists he wants to go home today. Discussed with pathologist.  Bone biopsy of clean margins will not be available till tomorrow     Recommendations:     1. discontinue pip/tazo . Start p.o. levofloxacin, Augmentin till 3/21/2022  2. F/u podiatry recommendations regarding right foot wound care  3. F/u biopsy of clean bone margins-if biopsy of clean margins revealed osteomyelitis, I will switch patient to outpatient IV antibiotics with hemodialysis  4. Recommend to hold off on methadone till patient is on levofloxacin-discussed with patient.   He verbalizes understanding. Is willing to hold off on methadone till 3/21. Levofloxacin, Augmentin prescription given to RN    Above plan was discussed in details with patient,dr. tafoya and dr Jovani Bauman. All questions answered to their full satisfaction. Spent additional 35 minutes in management and evaluation of this patient. >50% time spent in counselling and coordination of care. Please call me if any further questions or concerns. Will continue to participate in the care of this patient. HPI:    Feels fine. Wants to go home.       Past Medical History:   Diagnosis Date    Anemia     Bradycardia, unspecified 2018    Cervical discitis     MRSA    Chronic drug abuse ([facility] 1974    Chronic kidney disease     stage III    Diabetes ([facility] 01/1990    no DM    Dialysis patient ([facility]     Drug abuse ([facility]     Encephalopathy     GERD (gastroesophageal reflux disease)     History of abuse     Hypertension     Muscle weakness     Quadriparesis ([facility] 2017    Renal cell carcinoma of left kidney ([facility] 12/17/13    Pathological Stage [id number] cc RCC FG3 s/p left open partial nephrectomy in 12/13      Sepsis due to methicillin resistant Staphylococcus aureus (HCC)     Suprapubic catheter (HCC)     Thrombocytopenia (HCC)     Thyroid disease     Urethral erosion     Viral hepatitis B     Viral hepatitis C     Xerosis cutis        Past Surgical History:   Procedure Laterality Date    COLONOSCOPY N/A 10/3/2019    COLONOSCOPY / polypectomy performed by Edison Del Rio MD at [address] HX CIRCUMCISION  12/17/13    Dr. Rebecca Contreras, [facility] HX NEPHRECTOMY Left 12/17/13    Open/ Partial,nephrectomy    HX OTHER SURGICAL Right 2/27/2016    removed right ft  2nd meta-tarsal    HX OTHER SURGICAL  04/2017    abscess removed from back of neck     IR INSERT TUNL CVC W/O PORT OVER 5 YR  2/18/2020    NEUROLOGICAL PROCEDURE UNLISTED  2017    neck surgery-abcess-hardware neck    MN INSJ TUNNELED CVC W/O SUBQ PORT/ AGE 5 YR/> N/A 5/6/2020    INSERTION TUNNELED CENTRAL VENOUS CATHETER/perm catheter exchange performed by Casey Wood MD at [address] [place] [address]    DE REMOVAL WITH INSERTION OF SUPRAPUBIC CATHETER  2018       home Medication List    Details   methadone (Methadone IntensoL) 10 mg/mL solution Take 95 mg by mouth.      levothyroxine (SYNTHROID) 100 mcg tablet Take 1 Tab by mouth every morning. Qty: 30 Tab, Refills: 1      tadalafiL (CIALIS) 5 mg tablet Take 1 Tablet by mouth nightly. Indications: the inability to have an erection  Qty: 90 Tablet, Refills: 3    Associated Diagnoses: ED (erectile dysfunction) of organic origin      sildenafil citrate (VIAGRA) 100 mg tablet Take 1 tab my mouth as needed 1 hour prior to sexual activity on an empty stomach. Indications: the inability to have an erection  Qty: 30 Tablet, Refills: 3    Associated Diagnoses: ED (erectile dysfunction) of organic origin      midodrine (PROAMATINE) 5 mg tablet TAKE ONE TABLET BY MOUTH ON TUESDAY THURSDAY AND SATURDAY BEFORE DIALYSIS      ketoconazole (NIZORAL) 2 % topical cream Apply  to affected area two (2) times a day. b complex-vitamin c-folic acid 0.8 mg (NEPHRO-DEBBIE) 0.8 mg tab tablet Take 0.8 mg by mouth daily. b complex-vitamin c-folic acid (NEPHROCAPS) 1 mg capsule Take 1 Cap by mouth daily. Qty: 30 Cap, Refills: 1      calcium acetate,phosphat bind, (PHOSLO) 667 mg cap Take 1 Cap by mouth three (3) times daily (with meals). Qty: 90 Cap, Refills: 1      doxercalciferoL (HECTOROL) 4 mcg/2 mL injection 0.5 mL by IntraVENous route DIALYSIS MON, WED & FRI. Qty: 1 mL, Refills: 0    Comments: To be given with dialysis      food supplemt, lactose-reduced (ENSURE ENLIVE) 0.08 gram-1.5 kcal/mL liqd Take 1 Each by mouth three (3) times daily (with meals).  Flavor of patient choice  Qty: 90 Bottle, Refills: 1      hydrALAZINE (APRESOLINE) 25 mg tablet Take 1 Tab by mouth every eight (8) hours as needed (systolic B/P >160).  Qty: 30 Tab, Refills: 0      Syringe, Disposable, (BD SYRINGE CATHETER TIP) 60 mL syrg Use 1 syringe daily with irrigations  Qty: 30 Syringe, Refills: 11             Current Facility-Administered Medications   Medication Dose Route Frequency    B complex-vitaminC-folic acid (NEPHROCAP) cap  1 Capsule Oral DAILY    hydrALAZINE (APRESOLINE) 20 mg/mL injection 20 mg  20 mg IntraVENous Q6H PRN    lactulose (CHRONULAC) 10 gram/15 mL solution 15 mL  10 g Oral BID    epoetin cristy-epbx (RETACRIT) injection 8,000 Units  8,000 Units SubCUTAneous Q TUE, THU & SAT    doxercalciferoL (HECTOROL) 4 mcg/2 mL injection 0.5 mcg  0.5 mcg IntraVENous DIALYSIS TUE, THU & SAT    piperacillin-tazobactam (ZOSYN) 3.375 g in 0.9% sodium chloride (MBP/ADV) 100 mL MBP  3.375 g IntraVENous Q12H    insulin lispro (HUMALOG) injection   SubCUTAneous AC&HS    glucose chewable tablet 16 g  4 Tablet Oral PRN    glucagon (GLUCAGEN) injection 1 mg  1 mg IntraMUSCular PRN    dextrose 10% infusion 0-250 mL  0-250 mL IntraVENous PRN    hydrALAZINE (APRESOLINE) tablet 25 mg  25 mg Oral TID    pantoprazole (PROTONIX) tablet 40 mg  40 mg Oral ACB       Allergies: Iodine    Family History   Problem Relation Age of Onset    Diabetes Mother     Sickle Cell Anemia Father     Diabetes Sister     Hypertension Sister      Social History     Socioeconomic History    Marital status:      Spouse name: Not on file    Number of children: Not on file    Years of education: Not on file    Highest education level: Not on file   Occupational History    Not on file   Tobacco Use    Smoking status: Current Some Day Smoker     Packs/day: 0.25     Years: 30.00     Pack years: 7.50     Types: Cigarettes    Smokeless tobacco: Never Used   Substance and Sexual Activity    Alcohol use: Not on file     Comment: beer occasionally    Drug use: Not Currently     Types: Marijuana, Heroin     Comment: marijuana-December 2018, heroin-9/15/19-approx  Sexual activity: Not Currently   Other Topics Concern    Not on file   Social History Narrative     since ;  for 12 yrs; [employer]; [address] [place]  just recently furloughed; No  service     Social Determinants of Health     Financial Resource Strain:     Difficulty of Paying Living Expenses: Not on file   Food Insecurity:     Worried About Running Out of Food in the Last Year: Not on file    Andrew of Food in the Last Year: Not on file   Transportation Needs:     Lack of Transportation (Medical): Not on file    Lack of Transportation (Non-Medical):  Not on file   Physical Activity:     Days of Exercise per Week: Not on file    Minutes of Exercise per Session: Not on file   Stress:     Feeling of Stress : Not on file   Social Connections:     Frequency of Communication with Friends and Family: Not on file    Frequency of Social Gatherings with Friends and Family: Not on file    Attends Sabianism Services: Not on file    Active Member of [address] [place] [address] [employer] or Organizations: Not on file    Attends Club or Organization Meetings: Not on file    Marital Status: Not on file   Intimate Partner Violence:     Fear of Current or Ex-Partner: Not on file    Emotionally Abused: Not on file    Physically Abused: Not on file    Sexually Abused: Not on file   Housing Stability:     Unable to Pay for Housing in the Last Year: Not on file    Number of [place] in the Last Year: Not on file    Unstable Housing in the Last Year: Not on file     Social History     Tobacco Use   Smoking Status Current Some Day Smoker    Packs/day: 0.25    Years: 30.00    Pack years: 7.50    Types: Cigarettes   Smokeless Tobacco Never Used        Temp (24hrs), Av.5 °F (36.9 °C), Min:97.9 °F (36.6 °C), Max:99.5 °F (37.5 °C)    Visit Vitals  BP (!) 157/69   Pulse 61   Temp 97.9 °F (36.6 °C)   Resp 16   Ht 6' (1.829 m)   Wt 79.8 kg (175 lb 14.4 oz)   SpO2 97%   BMI 23.86 kg/m²       ROS: 12 point ROS obtained in details. Pertinent positives as mentioned in HPI,   otherwise negative    Physical Exam:    General:  Alert, cooperative, no distress, appears stated age. Head:  Normocephalic, without obvious abnormality, atraumatic. Eyes:  Conjunctivae/corneas clear. EOMs intact. Nose: Nares normal. No drainage or sinus tenderness. Throat: Lips, mucosa, and tongue dry poor dentition   Neck: Supple, symmetrical, trachea midline, no adenopathy, thyroid: no enlargement/tenderness/nodules, no carotid bruit and no JVD. Back:   ROM normal. No CVA tenderness. Lungs:   Clear to auscultation bilaterally. Chest wall:  No tenderness or deformity. Heart:  Regular rate  S1, S2 normal, no  rub or gallop. Abdomen: Soft, non-tender. Bowel sounds normal. No masses,  No organomegaly. Extremities: Extremities normal, atraumatic, no cyanosis or edema. Right foot dressing not opened   Pulses: 2+ and symmetric all extremities. Skin: Skin color, texture, turgor normal.  Right foot surgical changes per   Neurologic: CNII-XII intact. No focal motor or sensory deficit.          Labs: Results:   Chemistry Recent Labs     03/14/22  0528 03/12/22  0534   * 142*    137   K 4.5 4.8    103   CO2 29 29   BUN 35* 25*   CREA 5.68* 4.95*   CA 8.7 8.7   AGAP 6 5   BUCR 6* 5*   AP 76 60   TP 6.4 7.1   ALB 2.8* 3.0*   GLOB 3.6 4.1*   AGRAT 0.8 0.7*      CBC w/Diff Recent Labs     03/14/22  0528 03/13/22  0357 03/12/22  0534   WBC 7.4 7.5 7.8   RBC 2.90* 2.86* 3.11*   HGB 9.0* 9.0* 9.3*   HCT 27.8* 27.8* 29.5*   * 132* 141   GRANS 60 61 76*   LYMPH 27 29 16*   EOS 2 1 1      Microbiology Recent Labs     03/11/22  1420   CULT RARE ENTEROCOCCUS SPECIES PLEASE SEE [id number] FOR SENSITIVITIES*  RARE STAPHYLOCOCCUS SPECIES, COAGULASE NEGATIVE*  LIGHT Serratia marcescens*  SCANT ENTEROCOCCUS FAECALIS*  RARE PROTEUS MIRABILIS SENSITIVITY TO FOLLOW*  MODERATE STAPHYLOCOCCUS SPECIES, COAGULASE NEGATIVE*  NO ANAEROBES ISOLATED  NO ANAEROBES ISOLATED          RADIOLOGY:    All available imaging studies/reports in [facility] care for this admission were reviewed      Disclaimer: Sections of this note are dictated utilizing voice recognition software, which may have resulted in some phonetic based errors in grammar and contents. Even though attempts were made to correct all the mistakes, some may have been missed, and remained in the body of the document. If questions arise, please contact our department.     Dr. Darlina Sandifer, Infectious Disease Specialist  [phone]  March 14, 2022  7:47 AM

## 2022-03-14 NOTE — PROGRESS NOTES
Seen earlier , doing good, seems angry for some reason  Not like to discuss wants to Be discharged, Podiatric surgery cleared to DC, culture report pending, may  Need oral Antibiotics, ID has not decided will let me know if he needs any IV antibiotics as OP during dialysis. AVF is patent, has good thrill & Bruit. Advised to continue to get Dialysis as OP as scheduled.

## 2022-03-14 NOTE — PROGRESS NOTES
Chart reviewed. Home Health order received. Spoke with patient. Freedom of choice signed for [address]. Spoke with [id number] [employer], she accepted the patient. Home health order placed in queue.  will continue to monitor and assist with transition of care needs.     DIEGO Mills, RN  Care Management  Pager # [phone]

## 2022-03-14 NOTE — PROGRESS NOTES
Discharge order noted for today. Pt has been accepted to [facility] agency. Met with patient and he is agreeable to the transition plan today. Transport has been arranged through sister. Patient's discharge summary and home health  orders have been forwarded to [facility] home health  agency via [address] [place] [address] Rd. Updated bedside RN, Norberto Vanegas,  to the transition plan.   Discharge information has been documented on the AVS.           MARTA MagdalenoN, RN  Care Management  Pager # [phone]

## 2022-03-15 NOTE — PROGRESS NOTES
Physician Progress Note      PATIENT:               Jhoan Beckford  CSN #:                  [id number]  :                       1960  ADMIT DATE:       3/10/2022 8:11 PM  [address] [place] DATE:        3/14/2022 2:20 PM  RESPONDING  PROVIDER #:        Sunny Mims DPM          QUERY TEXT:    Dear Podiatry,    Patient admitted for wound to right foot-4th digit. Per Op note dated 3/11 \"infected right middle toe. 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 Per 3/11 Op Note - Open wound with osteomyelitis and gangrene of an infected right middle toe    Treatment: toe amputation, cultures, X-ray    Thank you,  Karene Lundborg BSN, RN, CRCR  Please contact me for any questions or concerns regarding this query at [email or web address]  Options provided:  -- 4th digit right foot  -- Other - I will add my own diagnosis  -- Disagree - Not applicable / Not valid  -- Disagree - Clinically unable to determine / Unknown  -- Refer to Clinical Documentation Reviewer    PROVIDER RESPONSE TEXT:    Addendum to 3/11 procedure note: Amputation to 4th digit right foot.     Query created by: Aguila Reis on 3/14/2022 2:45 PM      Electronically signed by:  Sunny Mims DPM 3/15/2022 1:38 PM

## 2022-03-16 ENCOUNTER — HOME CARE VISIT (OUTPATIENT)
Dept: SCHEDULING | Facility: HOME HEALTH | Age: 62
End: 2022-03-16
Payer: MEDICARE

## 2022-03-16 ENCOUNTER — HOME CARE VISIT (OUTPATIENT)
Dept: HOME HEALTH SERVICES | Facility: HOME HEALTH | Age: 62
End: 2022-03-16
Payer: MEDICARE

## 2022-03-16 VITALS
SYSTOLIC BLOOD PRESSURE: 130 MMHG | HEART RATE: 68 BPM | OXYGEN SATURATION: 99 % | TEMPERATURE: 98.4 F | RESPIRATION RATE: 16 BRPM | DIASTOLIC BLOOD PRESSURE: 58 MMHG

## 2022-03-16 LAB
BACTERIA SPEC CULT: ABNORMAL
BACTERIA SPEC CULT: NORMAL
BACTERIA SPEC CULT: NORMAL
GRAM STN SPEC: ABNORMAL
GRAM STN SPEC: ABNORMAL
SERVICE CMNT-IMP: ABNORMAL
SERVICE CMNT-IMP: NORMAL
SERVICE CMNT-IMP: NORMAL

## 2022-03-16 PROCEDURE — 400013 HH SOC

## 2022-03-16 PROCEDURE — A6446 CONFORM BAND S W>=3" <5"/YD: HCPCS

## 2022-03-16 PROCEDURE — G0299 HHS/HOSPICE OF RN EA 15 MIN: HCPCS

## 2022-03-16 PROCEDURE — A6449 LT COMPRES BAND >=3" <5"/YD: HCPCS

## 2022-03-16 PROCEDURE — A6199 ALGINATE DRSG WOUND FILLER: HCPCS

## 2022-03-16 PROCEDURE — A6216 NON-STERILE GAUZE<=16 SQ IN: HCPCS

## 2022-03-16 NOTE — Clinical Note
Patient admitted to [facility] today for incision assessment/ dressing change, med management/education with the following visit set for SN 2w1, 3w1, 1w6. Thank you.

## 2022-03-17 NOTE — HOME HEALTH
Skilled Reason for admission/summary of clinical condition:  surgical wound assessment/ dressing change, med management/education. This patient is homebound for the following reasons Requires considerable and taxing effort to leave the home , Requires the assistance of 1 or more persons to leave the home  and Only leaves the home for medical reasons or Sabianism services and are infrequent and of short duration for other reasons . Caregiver: relative. Caregiver assists with available for ADLs, meal prep, and transportation. Medications reconciled and all medications are available in the home this visit. The following education was provided regarding medications: Educated patient on use of antibiotics and ensuring that the entire course is completed. sn instructed patient how BP meds keep veins open to allow for blood flow at lower pressure and how this can lead to dizziness and falls with sudden, quick movements. Educated patient on narcotic side effects which include, drowsiness, dizziness, constipation and nausea. Patient stated he does not check blood sugars, states he was diabetic at one point but after he lost alot of weight he has not needed insulin. Medications  are too early to assess at this time. High risk medication teaching regarding anticoagulants, hyperglycemic agents or opiod narcotics performed (specify) percocet, see above. MD not notified of any discrepancies/look a like medications/medication interactions between levoflaxacin and phoslo, patient stated he takes them two hours apart from each other as recommended. Home health supplies by type and quantity ordered/delivered this visit include: wound supplies    Patient education provided this visit to include: Watch for signs and symptoms of infection which include; fever, drainage, foul smell, lethargy.  discussed fall precautions in detail- having lighted hallways, removing throw rugs, monitoring medication that may alter mental status. perform skin inspections looking for any skin breakdown or redness. monitor for edema. frequent position changes. Patient level of understanding of education provided: Patient verbalized understanding of all education provided. Sharps Education Provided: N/A  Patient response to procedure performed:  Patient tolerated dressing change well, no complaints of pain. Home exercise program/Homework provided: sn instructed patient to perform deep breathing exercises, 5-6 breaths 5 x daily to promote air exchange and prevent pneumonia     Pt/Caregiver instructed on plan of care and are agreeable to plan of care at this time. Physician Dr Awais Junior notified of patient admission to home health and plan of care including anticipated frequency of 2w1, 3w1, 1w6, 2PRN and treatments/interventions/modalities of surgical wound assessment/ dressing change, med management/education. Discharge planning discussed with patient and caregiver. Discharge planning as follows: When goals are met. Pt is able to manage disease and medication with no difficulty and pt reaches maximum level of function. Surgical wound is healed and health condition stable. Pt/Caregiver did verbalize understanding of discharge planning. Next MD appointment 3/25/22 (date) with Zurdo SUE.  Marissa Brown encouraged/instructed to keep appointment as lack of follow through with physician appointment could result in discontinuation of home care services for non-compliance.

## 2022-03-18 ENCOUNTER — HOME CARE VISIT (OUTPATIENT)
Dept: SCHEDULING | Facility: HOME HEALTH | Age: 62
End: 2022-03-18
Payer: MEDICARE

## 2022-03-18 PROBLEM — E55.9 VITAMIN D DEFICIENCY: Status: ACTIVE | Noted: 2019-01-31

## 2022-03-18 PROBLEM — N25.81 SECONDARY HYPERPARATHYROIDISM OF RENAL ORIGIN (HCC): Status: ACTIVE | Noted: 2020-02-17

## 2022-03-18 PROBLEM — Z99.2 ESRD (END STAGE RENAL DISEASE) ON DIALYSIS (HCC): Status: ACTIVE | Noted: 2020-06-01

## 2022-03-18 PROBLEM — K21.9 GERD (GASTROESOPHAGEAL REFLUX DISEASE): Status: ACTIVE | Noted: 2020-08-05

## 2022-03-18 PROBLEM — E83.39 HYPERPHOSPHATEMIA: Status: ACTIVE | Noted: 2020-08-05

## 2022-03-18 PROBLEM — S91.109A OPEN TOE WOUND, INITIAL ENCOUNTER: Status: ACTIVE | Noted: 2022-03-10

## 2022-03-18 PROBLEM — D50.9 IRON DEFICIENCY ANEMIA: Status: ACTIVE | Noted: 2020-08-05

## 2022-03-18 PROBLEM — D75.89 LIGHT CHAIN DEPOSITION DISEASE: Status: ACTIVE | Noted: 2018-04-02

## 2022-03-18 PROBLEM — T82.41XA HEMODIALYSIS CATHETER DYSFUNCTION (HCC): Status: ACTIVE | Noted: 2020-05-06

## 2022-03-18 PROBLEM — L85.3 DRY SKIN DERMATITIS: Status: ACTIVE | Noted: 2019-01-31

## 2022-03-18 PROBLEM — N18.6 ESRD (END STAGE RENAL DISEASE) ON DIALYSIS (HCC): Status: ACTIVE | Noted: 2020-06-01

## 2022-03-18 PROBLEM — N31.2 BLADDER ATONY: Status: ACTIVE | Noted: 2019-01-31

## 2022-03-18 PROCEDURE — G0300 HHS/HOSPICE OF LPN EA 15 MIN: HCPCS

## 2022-03-19 ENCOUNTER — HOME CARE VISIT (OUTPATIENT)
Dept: HOME HEALTH SERVICES | Facility: HOME HEALTH | Age: 62
End: 2022-03-19
Payer: MEDICARE

## 2022-03-19 PROBLEM — N18.30 ACUTE RENAL FAILURE SUPERIMPOSED ON STAGE 3 CHRONIC KIDNEY DISEASE (HCC): Status: ACTIVE | Noted: 2020-02-15

## 2022-03-19 PROBLEM — D69.6 THROMBOCYTOPENIA (HCC): Status: ACTIVE | Noted: 2018-04-10

## 2022-03-19 PROBLEM — N18.9 ACUTE RENAL FAILURE SUPERIMPOSED ON CHRONIC KIDNEY DISEASE (HCC): Status: ACTIVE | Noted: 2020-02-15

## 2022-03-19 PROBLEM — G89.29 CHRONIC NECK PAIN: Status: ACTIVE | Noted: 2019-01-31

## 2022-03-19 PROBLEM — N17.9 ACUTE RENAL FAILURE SUPERIMPOSED ON STAGE 3 CHRONIC KIDNEY DISEASE (HCC): Status: ACTIVE | Noted: 2020-02-15

## 2022-03-19 PROBLEM — E03.9 HYPOTHYROID: Status: ACTIVE | Noted: 2019-01-31

## 2022-03-19 PROBLEM — B18.2 CHRONIC HEPATITIS C WITHOUT HEPATIC COMA (HCC): Status: ACTIVE | Noted: 2017-05-31

## 2022-03-19 PROBLEM — D64.9 CHRONIC ANEMIA: Status: ACTIVE | Noted: 2018-05-23

## 2022-03-19 PROBLEM — G82.50 QUADRIPARESIS (HCC): Status: ACTIVE | Noted: 2017-05-31

## 2022-03-19 PROBLEM — Z89.421 STATUS POST AMPUTATION OF TOE OF RIGHT FOOT (HCC): Status: ACTIVE | Noted: 2018-05-18

## 2022-03-19 PROBLEM — N17.9 ACUTE RENAL FAILURE SUPERIMPOSED ON CHRONIC KIDNEY DISEASE (HCC): Status: ACTIVE | Noted: 2020-02-15

## 2022-03-19 PROBLEM — I31.39 PERICARDIAL EFFUSION: Status: ACTIVE | Noted: 2019-01-31

## 2022-03-19 PROBLEM — R91.8 LUNG NODULES: Status: ACTIVE | Noted: 2019-01-31

## 2022-03-19 PROBLEM — M54.2 CHRONIC NECK PAIN: Status: ACTIVE | Noted: 2019-01-31

## 2022-03-19 PROBLEM — F19.10 IV DRUG ABUSE (HCC): Status: ACTIVE | Noted: 2017-04-28

## 2022-03-19 PROBLEM — D63.1 ANEMIA IN CHRONIC RENAL DISEASE: Status: ACTIVE | Noted: 2020-08-05

## 2022-03-19 PROBLEM — N17.9 RENAL FAILURE (ARF), ACUTE ON CHRONIC (HCC): Status: ACTIVE | Noted: 2019-03-31

## 2022-03-19 PROBLEM — N18.9 ANEMIA IN CHRONIC RENAL DISEASE: Status: ACTIVE | Noted: 2020-08-05

## 2022-03-19 PROBLEM — N40.0 BPH (BENIGN PROSTATIC HYPERPLASIA): Status: ACTIVE | Noted: 2018-12-20

## 2022-03-19 PROBLEM — N18.9 RENAL FAILURE (ARF), ACUTE ON CHRONIC (HCC): Status: ACTIVE | Noted: 2019-03-31

## 2022-03-19 PROBLEM — R00.1 BRADYCARDIA: Status: ACTIVE | Noted: 2020-02-15

## 2022-03-20 VITALS
HEART RATE: 64 BPM | SYSTOLIC BLOOD PRESSURE: 126 MMHG | TEMPERATURE: 97.7 F | DIASTOLIC BLOOD PRESSURE: 78 MMHG | RESPIRATION RATE: 16 BRPM | OXYGEN SATURATION: 98 %

## 2022-03-20 PROBLEM — N18.6 ESRD (END STAGE RENAL DISEASE) (HCC): Status: ACTIVE | Noted: 2020-02-17

## 2022-03-20 PROBLEM — R22.1 NECK MASS: Status: ACTIVE | Noted: 2019-01-31

## 2022-03-20 PROBLEM — N18.30 CHRONIC KIDNEY DISEASE, STAGE III (MODERATE) (HCC): Status: ACTIVE | Noted: 2018-04-04

## 2022-03-20 PROBLEM — Z93.59 SUPRAPUBIC CATHETER (HCC): Status: ACTIVE | Noted: 2019-03-28

## 2022-03-20 PROBLEM — N39.0 UTI (URINARY TRACT INFECTION): Status: ACTIVE | Noted: 2020-02-15

## 2022-03-20 PROBLEM — J44.9 COPD, MODERATE (HCC): Status: ACTIVE | Noted: 2019-01-31

## 2022-03-20 PROBLEM — K74.60 CIRRHOSIS OF LIVER (HCC): Status: ACTIVE | Noted: 2019-01-31

## 2022-03-21 ENCOUNTER — HOME CARE VISIT (OUTPATIENT)
Dept: HOME HEALTH SERVICES | Facility: HOME HEALTH | Age: 62
End: 2022-03-21
Payer: MEDICARE

## 2022-03-21 NOTE — HOME HEALTH
SPoke with pt at 12:30pm today to advise would be there between 3pm-330pm. Showed up at 3:15pm kocked on the door for 10 mins and called the pt several times with no answer to the door and his phone went to voicemail. Scheduling notified and case communication note completed.

## 2022-03-21 NOTE — Clinical Note
SPoke to this pt this afternoon at 12:30pm told him I would be there between 3-330pm  showed up at 315pm knocked for 10 mins called his cell phone went to voice mail. I notiifed our  that he did not answer the phone nor the door. Pt then called me at 450pm asking what time I was coming and I told him I had been there and he then told me he must of been asleep and he was not sure why his phone went to voice mail.  Pt will either need a visit tuesday when he gets home from dialysis or will have to be seen by his regular nurse on Wednesday,     Thanks in [facility]

## 2022-03-21 NOTE — HOME HEALTH
SKILLED REASON for visit:s/p amputation RT 3RD TOE REQUIRING WOUND CARE  CAREGIVER INVOLVEMENT: SISTER is available as needed for assistance with iadls, adls, meal prep, medication management, taking to md appointments. MEDICATIONS:LEVAQUIN  reviewed and all medications are available in the home this visit. Patient denies any medication changes at this time of assessment. EDUCATION PROVIDED: regarding medications, LEVAQUIN medication interactions, and look alike medications (specify): reviewed side effects, purposes, dosage, frequencies. High risk medication teaching regarding anticoagulants, hyperglycemic agents or opiod narcotics performed (specify) na  Medications  are effective at this time. SUPPLIES: by type and quantity ordered/delivered this visit include: n/a  PATIENT EDUCATION ON THIS VISIT:WOUND CARE PER ORDERS, patient/cg instructed to monitor for edema/increase in edema, to elevate extremity when edema occurs and to notify md if edema exceeds normal limits for patient, none noted at this time. patient made aware to monitor for s/s of infection [increased swelling, increased redness around site, increased pain, foul smelling drainage, fever] aware who to report to/when. no s/s of infection noted. encouraged patient to get three nutritional meals daily and to stay hydrated. reviewed heart healthy diet- monitoring sodium intake, cholesterol and fat intake. patient aware to limit sodium, no added sodium to diet. reviewed foods to avoid patient  Instructed on repostioning every 2 hours as necessary for prevention of pressure sores  PATIENT LEVEL OF UNDERSTANDING: patient/cg has a partial understanding of education that was provided at this time by engaging in all education provided and is able to verbalize understanding, pt denies any questions or concerns at this time.   SKILLED CARE PERFORMED THIS VISIT:WOUND CARE TO AMPUTATION  PATIENT RESPONSE TO PROCEDURE PERFORMED:   patient tolerated procedure with no signs of discomfort, grimacing or pain, no complications or concerns noted. Agency Progress toward goals: goals/teaching reviewed. patient is progressing towards goals at this time, Patient's Progress towards personal goals: pt reporting they are progressing towards their goals at this time. Home exercise program: OT/PT  Continued need for the following skills: Nursing  Plan for next visit: WOUND CARE  Patient and/or caregiver notified and agrees to changes in the Plan of Care NA  The following discharge planning was discussed with the pt/caregiver: Patient will be discharged once education has completed, patient is medically stable and  independent with care.   Sharps-n/a

## 2022-03-23 ENCOUNTER — HOME CARE VISIT (OUTPATIENT)
Dept: SCHEDULING | Facility: HOME HEALTH | Age: 62
End: 2022-03-23
Payer: MEDICARE

## 2022-03-23 ENCOUNTER — HOME CARE VISIT (OUTPATIENT)
Dept: HOME HEALTH SERVICES | Facility: HOME HEALTH | Age: 62
End: 2022-03-23
Payer: MEDICARE

## 2022-03-23 VITALS
TEMPERATURE: 97.8 F | OXYGEN SATURATION: 95 % | HEART RATE: 65 BPM | DIASTOLIC BLOOD PRESSURE: 68 MMHG | RESPIRATION RATE: 16 BRPM | SYSTOLIC BLOOD PRESSURE: 150 MMHG

## 2022-03-23 PROCEDURE — G0299 HHS/HOSPICE OF RN EA 15 MIN: HCPCS

## 2022-03-23 NOTE — Clinical Note
Confirmation for appointment at 0900 on 3/23/22 was completed the evening before. Patient stated that he would be home. When this nburse arrived the patient was not at home. When this nurse called patient he stated that he was at a doctors appointment. Missed nursing visit. Scheduling made aware via email.

## 2022-03-23 NOTE — HOME HEALTH
Skilled reason for visit: wound assessment/ dressing change, med management/education. Caregiver involvement: not present. Medications reviewed and all medications are available in the home this visit. The following education was provided regarding medications:  sn instructed patient how BP meds keep veins open to allow for blood flow at lower pressure and how this can lead to dizziness and falls with sudden, quick movements. Educated patient on use of antibiotics and ensuring that the entire course is completed. MD notified of any discrepancies/look a-like medications/medication interactions: n/a  Medications are somewhat effective at this time. Home health supplies by type and quantity ordered/delivered this visit include: n/a    Patient education provided this visit: discussed fall precautions in detail- having lighted hallways, removing throw rugs, monitoring medication that may alter mental status. perform skin inspections looking for any skin breakdown or redness. monitor for edema. frequent position changes. Watch for signs and symptoms of infection which include; fever, drainage, foul smell, lethargy. Importance on keeping varner catheter clean and free of kinks, and ensure that the bag isnt being tugged on when not using a securement device. Sharps education provided: n/a    Patient level of understanding of education provided: Patient verbalized understanding of all education provided. Skilled Care Performed this visit: wound assessment/ dressing change, med management/education. Patient response to procedure performed:  Patient tolerated dressing change well, no complaints of pain.     Patient's Progress towards personal goals: good    Home exercise program: sn instructed patient to perform deep breathing exercises, 5-6 breaths 5 x daily to promote air exchange and prevent pneumonia     Continued need for the following skills: Nursing    Plan for next visit: wound assessment/ dressing change, med management/education. Patient and/or caregiver notified and agrees to changes in the Plan of Care N/A      The following discharge planning was discussed with the pt/caregiver: DC when SN no longer needed.

## 2022-03-26 ENCOUNTER — HOME CARE VISIT (OUTPATIENT)
Dept: SCHEDULING | Facility: HOME HEALTH | Age: 62
End: 2022-03-26
Payer: MEDICARE

## 2022-03-26 PROCEDURE — G0299 HHS/HOSPICE OF RN EA 15 MIN: HCPCS

## 2022-03-27 VITALS
HEART RATE: 68 BPM | DIASTOLIC BLOOD PRESSURE: 78 MMHG | RESPIRATION RATE: 16 BRPM | TEMPERATURE: 98 F | SYSTOLIC BLOOD PRESSURE: 118 MMHG | OXYGEN SATURATION: 98 %

## 2022-03-27 NOTE — HOME HEALTH
Skilled reason for visit: R foot wound care, assessment, disease and medication management. Caregiver:CG/sister who provide reminders with daily medication, run errands, groceries and accompany to MD appt.prn.   Medications reviewed and all medications are available in the home this visit. The following education was provided regarding medications, medication interactions, and look alike medications (specify): N/A. Pt to continue to take daily prescribed medications following proper dosage and freq. Verbalized understanding Medications are effective at this time. No added new medication. Skilled Care Performed this visit: Completed assessment, performed R foot wound care; cleansed wound with WC, pat dry, measured, covered folded gauze pads, wrapped with Kerlix then wrapped with ACE bandage. V/S WNR to pt, surgical wound dry and no S/S of infection. Verbalized will make an appt with Dr. Frandy Aguilar next week for wound eval and possible for sutures removal.  Patient response to procedure performed:  Pt tolerated well during the procedure with no C/O of pain. Patient education provided this visit: Reviewed intervention to prevent infection; hand washing, wearing face mask during clinician's visit, going out to public and avoiding sick person. Elevating RLE,  avoid constant pressure, keeping wound dressing dry and clean and reinforce prn. Apply moisturizing cream daily (Lotion or Vaseline) to BLE to prevent dryness. Encouraged to increase protein intake  intake; such as milk, cheese, chicken, beef, fish and beans. Avoid skipping meals, and heart healthy diet. Reviewed H20 intake 32 oz/day. Reviewed S/S of infection; fever 100.4, increase pain, redness, swelling, coughing with yellow thick sputum, purulent wound drainage, cloudy urine with foul smell, not feeling well 2-3 days, SOB, and to call [facility] or MD for assistance if experiencing any of these S/S.  To call 911 with chest pains, facial drooping, difficulty talking, non arousable/unconscious and uncontrollable bleeding. Patient level of understanding of education provided: Good understanding of the teaching provided will continue to monitor with compliance. Home health supplies by type and quantity ordered/delivered this visit included: Has supplies at home. Agency Progress toward goals: On tract, [facility] to continue skilled service for wound care. Patient's Progress towards personal goals: Partially met. Good understanding with all daily prescribed medications, and able to administer daily medications prn. Home exercise program/Homework provided: Ambulation, HEP deep breathing exercises 10x when having SOB, pain and anxiety. Continued need for the following skills: SN  Plan for next visit: Continue disease and medication management, R foot wound care, monitor suprapubic cath  Patient and/or caregiver notified and agrees to changes in the Plan of Care: Yes, agreed prn. Discharge planning discussed with patient and caregiver. Discharge planning as follows: Pt/CG will be able to manage disease and medication independently, R ft wound is healed, health condition stable and goals met. COVID - 23 Screening completed before visit:     Denies and no family member  has any of these S/S:  Fever, dry cough, sore throat diarrhea, chills, body aches, not feeling well and loss of taste.

## 2022-03-28 ENCOUNTER — HOME CARE VISIT (OUTPATIENT)
Dept: SCHEDULING | Facility: HOME HEALTH | Age: 62
End: 2022-03-28
Payer: MEDICARE

## 2022-03-28 VITALS — RESPIRATION RATE: 18 BRPM

## 2022-03-28 PROCEDURE — G0299 HHS/HOSPICE OF RN EA 15 MIN: HCPCS

## 2022-03-28 NOTE — HOME HEALTH
Skilled reason for visit: DRESSING CHANGE TO RIGHT FOOT    Caregiver involvement: WIFE, iadls, adls, meal prep, med management, taking to md appointments. Medications reviewed and all medications are available in the home this visit. The following education was provided regarding medications:    patient/cg  to continue to take medications as prescribed. patient aware to monitor for effectiveness and to notify staff of any adverse reactions to medications/any changes to medication regimen. .    MD notified of any discrepancies/look a-like medications/medication interactions: NA  Medications are EFFECTIVE at this time. Home health supplies by type and quantity ordered/delivered this visit include: NA    Patient education provided this visit:   patient made aware to monitor for s/s of infection [increased swelling, increased redness around site, increased pain, foul smelling drainage, fever] aware who to report to/when. Pt instructed to follow with diabetic diet- monitoring sugar intake, limiting foods with high sugar content. SN discussed dietary adjustments such as: reduce portion sizes, limit daily carbohydrate intake to not greater than 45-60 grams per meal for a total of <180 grams of carbohydrate daily, avoid concentrated carbohydrates such as soft drinks, sweet tea, and sweet treats and to increase physical activity along with strength training as tolerated to facilitate weight loss and build muscle mass  pt aware to keep incision clean and dry as ordered, to report any new drainage to staff. dressing changed as ordered, healing well with no s/s of infection present. Sharps education provided: PATIENT EDUCATED ON HOW TO PROPERLY DISPOSE OF NEEDLES INTO A BLEACH BOTTLE OR DETERGENT BOTTLE WITH THE CAP TAPPED ON.         Patient level of understanding of education provided: PATIENT WAS ABLE TO TEACH BACK S/S OF INFECTION    Skilled Care Performed this visit: SURGICAL DRESSING CHANGE    Patient response to procedure performed:  PATIENT STATES THAT IT FEELS BETTER    Agency Progress toward goals: PROGRESSING TOWARDS DC    Patient's Progress towards personal goals: REMAIN FREE OF INFECTION    Home exercise program: PATIENT TO TAKE ALL MEDS AS ORDERED, DO ICS X10/HR WHILE AWAKE, DRINK PLENTY OF FLUIDS,      Continued need for the following skills: Nursing    Plan for next visit: SURGICAL DRESSING CHANGE    Patient and/or caregiver notified and agrees to changes in the Plan of Care N/A      The following discharge planning was discussed with the pt/caregiver: Patient will be discharged once education has completed, patient is medically stable and [address]

## 2022-04-04 ENCOUNTER — HOME CARE VISIT (OUTPATIENT)
Dept: SCHEDULING | Facility: HOME HEALTH | Age: 62
End: 2022-04-04
Payer: MEDICARE

## 2022-04-04 VITALS — RESPIRATION RATE: 18 BRPM

## 2022-04-04 PROCEDURE — G0299 HHS/HOSPICE OF RN EA 15 MIN: HCPCS

## 2022-04-04 NOTE — HOME HEALTH
Skilled reason for visit: DRESSING CHANGE TO RIGHT FOOT         Caregiver involvement: WIFE, iadls, adls, meal prep, med management, taking to md appointments. Medications reviewed and all medications are available in the home this visit. The following education was provided regarding medications:      patient/cg  to continue to take medications as prescribed. patient aware to monitor for effectiveness and to notify staff of any adverse reactions to medications/any changes to medication regimen. .      MD notified of any discrepancies/look a-like medications/medication interactions: NA    Medications are EFFECTIVE at this time. Home health supplies by type and quantity ordered/delivered this visit include: NA         Patient education provided this visit:     patient made aware to monitor for s/s of infection [increased swelling, increased redness around site, increased pain, foul smelling drainage, fever] aware who to report to/when. Pt instructed to follow with diabetic diet- monitoring sugar intake, limiting foods with high sugar content. SN discussed dietary adjustments such as: reduce portion sizes, limit daily carbohydrate intake to not greater than 45-60 grams per meal for a total of <180 grams of carbohydrate daily, avoid concentrated carbohydrates such as soft drinks, sweet tea, and sweet treats and to increase physical activity along with strength training as tolerated to facilitate weight loss and build muscle mass    pt aware to keep incision clean and dry as ordered, to report any new drainage to staff. dressing changed as ordered, healing well with no s/s of infection present. Sharps education provided: PATIENT EDUCATED ON HOW TO PROPERLY DISPOSE OF NEEDLES INTO A BLEACH BOTTLE OR DETERGENT BOTTLE WITH THE CAP TAPPED ON.                    Patient level of understanding of education provided: PATIENT WAS [address] S/S OF INFECTION Skilled Care Performed this visit: SURGICAL DRESSING CHANGE         Patient response to procedure performed:  PATIENT STATES THAT IT FEELS BETTER         Agency Progress toward goals: PROGRESSING TOWARDS DC         Patient's Progress towards personal goals: REMAIN FREE OF INFECTION         Home exercise program: PATIENT TO TAKE ALL MEDS AS ORDERED, DO ICS X10/HR WHILE AWAKE, DRINK PLENTY OF FLUIDS,              Continued need for the following skills: Nursing         Plan for next visit: SURGICAL DRESSING CHANGE    PATIENT HAS MD APPT THIS WED TO HAVE SUTURES AND STAPLES REMOVED. LOOK OUT FOR WOUND CARE CHANGE ORDERS    Patient and/or caregiver notified and agrees to changes in the Plan of Care N/A           The following discharge planning was discussed with the pt/caregiver: Patient will be discharged once education has completed, patient is medically stable and WOUND CARE IS COMPLETED.

## 2022-04-11 ENCOUNTER — HOME CARE VISIT (OUTPATIENT)
Dept: SCHEDULING | Facility: HOME HEALTH | Age: 62
End: 2022-04-11
Payer: MEDICARE

## 2022-04-11 VITALS
DIASTOLIC BLOOD PRESSURE: 88 MMHG | RESPIRATION RATE: 18 BRPM | HEART RATE: 77 BPM | SYSTOLIC BLOOD PRESSURE: 148 MMHG | TEMPERATURE: 97.3 F | OXYGEN SATURATION: 98 %

## 2022-04-11 PROCEDURE — G0299 HHS/HOSPICE OF RN EA 15 MIN: HCPCS

## 2022-04-11 NOTE — HOME HEALTH
Skilled reason for visit: DRESSING CHANGE TO RIGHT FOOT                   Caregiver involvement: WIFE, iadls, adls, meal prep, med management, taking to md appointments. Medications reviewed and all medications are available in the home this visit. The following education was provided regarding medications:       REMOVED SUTURES LAST WEEK AND THE INICISION OPENED ON THE BOTTOM SIDE OF THE FOOT/INCISION, DR WROTE ORDER TO CHANGE FREQUENCY TO 3WK DRESSING CHANGE. SAME AS BEFORE, AQUACEL AG, GAUZE, ROLL GAUZE, ACE WRAP.    patient/cg  to continue to take medications as prescribed. patient aware to monitor for effectiveness and to notify staff of any adverse reactions to medications/any changes to medication regimen. .    MD notified of any discrepancies/look a-like medications/medication interactions: NA         Medications are EFFECTIVE at this time. Home health supplies by type and quantity ordered/delivered this visit include: ORDERED       Patient education provided this visit:     patient/cg instructed to monitor for edema/increase in edema, to elevate extremity when edema occurs and to notify md if edema exceeds normal limits for patient  encouraged patient to get three nutritional meals daily and to stay hydrated, drink plenty of fluids. pt instructed to follow a high protein diet for healing- to try to get 90g protein daily. patient made aware to monitor for s/s of infection [increased swelling, increased redness around site, increased pain, foul smelling drainage, fever] aware who to report to/when. Pt instructed to follow with diabetic diet- monitoring sugar intake, limiting foods with high sugar content.  SN discussed dietary adjustments such as: reduce portion sizes, limit daily carbohydrate intake to not greater than 45-60 grams per meal for a total of <180 grams of carbohydrate daily, avoid concentrated carbohydrates such as soft drinks, sweet tea, and sweet treats and to increase physical activity along with strength training as tolerated to facilitate weight loss and build muscle mass     pt aware to keep incision clean and dry as ordered, to report any new drainage to staff. Dressing changed as ordered, healing well with no s/s of infection present. ORDER ENTERED TO INCREASE FREQUENCY TO 3WK AND SCHEDULING NOTIFIED. Sharps education provided: PATIENT EDUCATED ON HOW TO PROPERLY DISPOSE OF NEEDLES INTO A BLEACH BOTTLE OR DETERGENT BOTTLE WITH THE CAP TAPPED ON.        Patient level of understanding of education provided: PATIENT WAS ABLE TO [address] S/S OF INFECTION       Skilled Care Performed this visit: SURGICAL DRESSING CHANGE       Patient response to procedure performed:  PATIENT STATES THAT IT FEELS BETTER       Agency Progress toward goals: PROGRESSING TOWARDS DC       Patient's Progress towards personal goals: REMAIN FREE OF INFECTION       Home exercise program: PATIENT TO TAKE ALL MEDS AS ORDERED, DO ICS X10/HR WHILE AWAKE, DRINK PLENTY OF FLUIDS,       Continued need for the following skills: Nursing       Plan for next visit: DRESSING CHANGE     Patient and/or caregiver notified and agrees to changes in the Plan of Care N/A       The following discharge planning was discussed with the pt/caregiver: Patient will be discharged once education has completed, patient is medically stable and WOUND CARE IS COMPLETED

## 2022-04-12 PROCEDURE — A6216 NON-STERILE GAUZE<=16 SQ IN: HCPCS

## 2022-04-12 PROCEDURE — A9270 NON-COVERED ITEM OR SERVICE: HCPCS

## 2022-04-12 PROCEDURE — A6443 CONFORM BAND N/S W>=3"<5"/YD: HCPCS

## 2022-04-13 ENCOUNTER — HOME CARE VISIT (OUTPATIENT)
Dept: SCHEDULING | Facility: HOME HEALTH | Age: 62
End: 2022-04-13
Payer: MEDICARE

## 2022-04-13 PROCEDURE — G0300 HHS/HOSPICE OF LPN EA 15 MIN: HCPCS

## 2022-04-15 ENCOUNTER — HOME CARE VISIT (OUTPATIENT)
Dept: SCHEDULING | Facility: HOME HEALTH | Age: 62
End: 2022-04-15
Payer: MEDICARE

## 2022-04-15 VITALS
SYSTOLIC BLOOD PRESSURE: 164 MMHG | TEMPERATURE: 97.7 F | DIASTOLIC BLOOD PRESSURE: 78 MMHG | RESPIRATION RATE: 17 BRPM | HEART RATE: 64 BPM | OXYGEN SATURATION: 98 %

## 2022-04-15 PROCEDURE — G0300 HHS/HOSPICE OF LPN EA 15 MIN: HCPCS

## 2022-04-15 PROCEDURE — 400013 HH SOC

## 2022-04-15 NOTE — HOME HEALTH
SKILLED REASON for visit: wound care to amputated toe right foot  CAREGIVER INVOLVEMENT:  sisteris available as needed for assistance with iadls, adls, meal prep, medication management, taking to md appointments. MEDICATIONS: hydralazine reviewed and all medications are available in the home this visit. Patient denies any medication changes at this time of assessment. EDUCATION PROVIDED: regarding medications, medication interactions, and look alike medications (specify): reviewed side effects, purposes, dosage, frequencies. High risk medication teaching regarding anticoagulants, hyperglycemic agents or opiod narcotics performed (specify) na  Medications  are effective at this time. SUPPLIES: by type and quantity ordered/delivered this visit include: n/a  PATIENT EDUCATION ON THIS VISIT:wound care taught to patient patient/cg instructed to monitor for edema/increase in edema, to elevate extremity when edema occurs and to notify md if edema exceeds normal limits for patient, none noted at this time. patient made aware to monitor for s/s of infection [increased swelling, increased redness around site, increased pain, foul smelling drainage, fever] aware who to report to/when. no s/s of infection noted. encouraged patient to get three nutritional meals daily and to stay hydrated. reviewed heart healthy diet- monitoring sodium intake, cholesterol and fat intake. patient aware to limit sodium, no added sodium to diet. reviewed foods to avoid patient  Instructed onrepostioning every 2 hours as necessary for prevention of pressure sores  PATIENT LEVEL OF UNDERSTANDING: patient/cg has a partial understanding of education that was provided at this time by engaging in all education provided and is able to verbalize understanding, pt denies any questions or concerns at this time.   SKILLED CARE PERFORMED THIS VISIT: wound care per orders  PATIENT RESPONSE TO PROCEDURE PERFORMED:   patient tolerated procedure with no signs of discomfort, grimacing or pain, no complications or concerns noted. Agency Progress toward goals: goals/teaching reviewed. patient is progressing towards goals at this time, Patient's Progress towards personal goals: pt reporting they are progressing towards their goals at this time. Home exercise program: walking  Continued need for the following skills: Nursing  Plan for next visit: wound care  Patient and/or caregiver notified and agrees to changes in the Plan of Care NA  The following discharge planning was discussed with the pt/caregiver: Patient will be discharged once education has completed, patient is medically stable and  independent with care.   Sharps-n/a

## 2022-04-17 VITALS
OXYGEN SATURATION: 99 % | TEMPERATURE: 97.7 F | DIASTOLIC BLOOD PRESSURE: 63 MMHG | SYSTOLIC BLOOD PRESSURE: 172 MMHG | RESPIRATION RATE: 17 BRPM | HEART RATE: 78 BPM

## 2022-04-18 ENCOUNTER — HOME CARE VISIT (OUTPATIENT)
Dept: SCHEDULING | Facility: HOME HEALTH | Age: 62
End: 2022-04-18
Payer: MEDICARE

## 2022-04-18 PROCEDURE — G0300 HHS/HOSPICE OF LPN EA 15 MIN: HCPCS

## 2022-04-18 NOTE — HOME HEALTH
SKILLED REASON for visit: wound care to amputated toe right foot   CAREGIVER INVOLVEMENT: sisteris available as needed for assistance with iadls, adls, meal prep, medication management, taking to md appointments. MEDICATIONS: synthroid reviewed and all medications are available in the home this visit. Patient denies any medication changes at this time of assessment. EDUCATION PROVIDED: regarding medications, medication interactions, and look alike medications (specify): reviewed side effects, purposes, dosage, frequencies. High risk medication teaching regarding anticoagulants, hyperglycemic agents or opiod narcotics performed (specify) na Medications are effective at this time. SUPPLIES: by type and quantity ordered/delivered this visit include: n/a   PATIENT EDUCATION ON THIS VISIT:wound care taught to patient patient/cg instructed to monitor for edema/increase in edema, to elevate extremity when edema occurs and to notify md if edema exceeds normal limits for patient, none noted at this time. patient made aware to monitor for s/s of infection [increased swelling, increased redness around site, increased pain, foul smelling drainage, fever] aware who to report to/when. no s/s of infection noted. encouraged patient to get three nutritional meals daily and to stay hydrated. reviewed heart healthy diet- monitoring sodium intake, cholesterol and fat intake. patient aware to limit sodium, no added sodium to diet. reviewed foods to avoid patient Instructed onrepostioning every 2 hours as necessary for prevention of pressure sores   PATIENT LEVEL OF UNDERSTANDING: patient/cg has a partial understanding of education that was provided at this time by engaging in all education provided and is able to verbalize understanding, pt denies any questions or concerns at this time.    SKILLED CARE PERFORMED THIS VISIT: wound care per orders   PATIENT RESPONSE TO PROCEDURE PERFORMED: patient tolerated procedure with no signs of discomfort, grimacing or pain, no complications or concerns noted. Agency Progress toward goals: goals/teaching reviewed. patient is progressing towards goals at this time,   Patient's Progress towards personal goals: pt reporting they are progressing towards their goals at this time. Home exercise program: walking Continued need for the following skills: Nursing Plan for next visit: wound care Patient and/or caregiver notified and agrees to changes in the Plan of Care NA   The following discharge planning was discussed with the pt/caregiver: Patient will be discharged once education has completed, patient is medically stable and independent with care.  Sharps-n/a

## 2022-04-19 VITALS
HEART RATE: 64 BPM | RESPIRATION RATE: 16 BRPM | OXYGEN SATURATION: 99 % | DIASTOLIC BLOOD PRESSURE: 78 MMHG | TEMPERATURE: 97.7 F | SYSTOLIC BLOOD PRESSURE: 147 MMHG

## 2022-04-20 ENCOUNTER — HOME CARE VISIT (OUTPATIENT)
Dept: SCHEDULING | Facility: HOME HEALTH | Age: 62
End: 2022-04-20
Payer: MEDICARE

## 2022-04-20 PROCEDURE — G0300 HHS/HOSPICE OF LPN EA 15 MIN: HCPCS

## 2022-04-20 NOTE — HOME HEALTH
SKILLED REASON for visit: wound care to amputated toe right foot   CAREGIVER INVOLVEMENT: sisteris available as needed for assistance with iadls, adls, meal prep, medication management, taking to md appointments. MEDICATIONS: hydralazine reviewed and all medications are available in the home this visit. Patient denies any medication changes at this time of assessment. EDUCATION PROVIDED: regarding medications, medication interactions, and look alike medications (specify): reviewed side effects, purposes, dosage, frequencies. High risk medication teaching regarding anticoagulants, hyperglycemic agents or opiod narcotics performed (specify) na Medications are effective at this time. SUPPLIES: by type and quantity ordered/delivered this visit include: n/a   PATIENT EDUCATION ON THIS VISIT:wound care taught to patient patient/cg instructed to monitor for edema/increase in edema, to elevate extremity when edema occurs and to notify md if edema exceeds normal limits for patient, none noted at this time. patient made aware to monitor for s/s of infection [increased swelling, increased redness around site, increased pain, foul smelling drainage, fever] aware who to report to/when. no s/s of infection noted. encouraged patient to get three nutritional meals daily and to stay hydrated. reviewed heart healthy diet- monitoring sodium intake, cholesterol and fat intake. patient aware to limit sodium, no added sodium to diet. reviewed foods to avoid patient Instructed onrepostioning every 2 hours as necessary for prevention of pressure sores   PATIENT LEVEL OF UNDERSTANDING: patient/cg has a partial understanding of education that was provided at this time by engaging in all education provided and is able to verbalize understanding, pt denies any questions or concerns at this time.    SKILLED CARE PERFORMED THIS VISIT: wound care per orders   PATIENT RESPONSE TO PROCEDURE PERFORMED: patient tolerated procedure with no signs of discomfort, grimacing or pain, no complications or concerns noted. Agency Progress toward goals: goals/teaching reviewed. patient is progressing towards goals at this time, Patient's Progress towards personal goals: pt reporting they are progressing towards their goals at this time. Home exercise program: walking Continued need for the following skills: Nursing Plan for next visit: wound care Patient and/or caregiver notified and agrees to changes in the Plan of Care NA The following discharge planning was discussed with the pt/caregiver: Patient will be discharged once education has completed, patient is medically stable and independent with care.  Sharps-n/a patient does not have glucometer

## 2022-04-21 VITALS
SYSTOLIC BLOOD PRESSURE: 167 MMHG | OXYGEN SATURATION: 97 % | RESPIRATION RATE: 16 BRPM | DIASTOLIC BLOOD PRESSURE: 67 MMHG | HEART RATE: 65 BPM | TEMPERATURE: 97.7 F

## 2022-04-22 ENCOUNTER — HOME CARE VISIT (OUTPATIENT)
Dept: SCHEDULING | Facility: HOME HEALTH | Age: 62
End: 2022-04-22
Payer: MEDICARE

## 2022-04-22 PROCEDURE — G0300 HHS/HOSPICE OF LPN EA 15 MIN: HCPCS

## 2022-04-24 VITALS
DIASTOLIC BLOOD PRESSURE: 67 MMHG | HEART RATE: 87 BPM | OXYGEN SATURATION: 99 % | SYSTOLIC BLOOD PRESSURE: 157 MMHG | TEMPERATURE: 97.7 F | RESPIRATION RATE: 67 BRPM

## 2022-04-25 ENCOUNTER — HOME CARE VISIT (OUTPATIENT)
Dept: SCHEDULING | Facility: HOME HEALTH | Age: 62
End: 2022-04-25
Payer: MEDICARE

## 2022-04-25 PROCEDURE — G0300 HHS/HOSPICE OF LPN EA 15 MIN: HCPCS

## 2022-04-25 NOTE — HOME HEALTH
SKILLED REASON for visit: wound care to amputated toe right foot   CAREGIVER INVOLVEMENT: sisteris available as needed for assistance with iadls, adls, meal prep, medication management, taking to md appointments. MEDICATIONS: synthroid, hydralazine reviewed and all medications are available in the home this visit. Patient denies any medication changes at this time of assessment. EDUCATION PROVIDED: regarding medications, medication interactions, and look alike medications (specify): reviewed side effects, purposes, dosage, frequencies. High risk medication teaching regarding anticoagulants, hyperglycemic agents or opiod narcotics performed (specify) na Medications are effective at this time. SUPPLIES: by type and quantity ordered/delivered this visit include: n/a PATIENT EDUCATION ON THIS VISIT:wound care taught to patient patient/cg instructed to monitor for edema/increase in edema, to elevate extremity when edema occurs and to notify md if edema exceeds normal limits for patient, none noted at this time. patient made aware to monitor for s/s of infection [increased swelling, increased redness around site, increased pain, foul smelling drainage, fever] aware who to report to/when. no s/s of infection noted. encouraged patient to get three nutritional meals daily and to stay hydrated. reviewed heart healthy diet- monitoring sodium intake, cholesterol and fat intake. patient aware to limit sodium, no added sodium to diet. reviewed foods to avoid patient Instructed onrepostioning every 2 hours as necessary for prevention of pressure sores PATIENT LEVEL OF UNDERSTANDING: patient/cg has a partial understanding of education that was provided at this time by engaging in all education provided and is able to verbalize understanding, pt denies any questions or concerns at this time.  SKILLED CARE PERFORMED THIS VISIT: wound care per orders PATIENT RESPONSE TO PROCEDURE PERFORMED: patient tolerated procedure with no signs of discomfort, grimacing or pain, no complications or concerns noted. Agency Progress toward goals: goals/teaching reviewed. patient is progressing towards goals at this time, Patient's Progress towards personal goals: pt reporting they are progressing towards their goals at this time. Home exercise program: walking Continued need for the following skills: Nursing Plan for next visit: wound care Patient and/or caregiver notified and agrees to changes in the Plan of Care NA The following discharge planning was discussed with the pt/caregiver: Patient will be discharged once education has completed, patient is medically stable and independent with care.  Sharps-n/a

## 2022-04-26 VITALS
DIASTOLIC BLOOD PRESSURE: 61 MMHG | TEMPERATURE: 97.7 F | SYSTOLIC BLOOD PRESSURE: 169 MMHG | RESPIRATION RATE: 16 BRPM | OXYGEN SATURATION: 98 % | HEART RATE: 67 BPM

## 2022-04-27 ENCOUNTER — HOME CARE VISIT (OUTPATIENT)
Dept: SCHEDULING | Facility: HOME HEALTH | Age: 62
End: 2022-04-27
Payer: MEDICARE

## 2022-04-27 PROCEDURE — G0300 HHS/HOSPICE OF LPN EA 15 MIN: HCPCS

## 2022-04-27 NOTE — HOME HEALTH
SKILLED REASON for visit: wound care to s/p amputation  CAREGIVER INVOLVEMENT:sister  is available as needed for assistance with iadls, adls, meal prep, medication management, taking to md appointments. MEDICATIONS: BP meds hydralazine reviewed and all medications are available in the home this visit. Patient denies any medication changes at this time of assessment. EDUCATION PROVIDED: regarding medications, medication interactions, and look alike medications (specify): reviewed side effects, purposes, dosage, frequencies. High risk medication teaching regarding anticoagulants, hyperglycemic agents or opiod narcotics performed (specify) na  Medications  are effective at this time. SUPPLIES: by type and quantity ordered/delivered this visit include: n/a  PATIENT EDUCATION ON THIS VISIT:wound care patient/cg instructed to monitor for edema/increase in edema, to elevate extremity when edema occurs and to notify md if edema exceeds normal limits for patient, none noted at this time. patient made aware to monitor for s/s of infection [increased swelling, increased redness around site, increased pain, foul smelling drainage, fever] aware who to report to/when. no s/s of infection noted. encouraged patient to get three nutritional meals daily and to stay hydrated. reviewed heart healthy diet- monitoring sodium intake, cholesterol and fat intake. patient aware to limit sodium, no added sodium to diet. reviewed foods to avoid patient  Instructed onrepostioning every 2 hours as necessary for prevention of pressure sores  PATIENT LEVEL OF UNDERSTANDING: patient/cg has a partial understanding of education that was provided at this time by engaging in all education provided and is able to verbalize understanding, pt denies any questions or concerns at this time.   SKILLED CARE PERFORMED THIS VISIT: wound care per orders -medihoney to wound area  PATIENT RESPONSE TO PROCEDURE PERFORMED:   patient tolerated procedure with no signs of discomfort, grimacing or pain, no complications or concerns noted. Agency Progress toward goals: goals/teaching reviewed. patient is progressing towards goals at this time, Patient's Progress towards personal goals: pt reporting they are progressing towards their goals at this time. Home exercise program:   Continued need for the following skills: Nursing  Plan for next visit:   Patient and/or caregiver notified and agrees to changes in the Plan of Care NA  The following discharge planning was discussed with the pt/caregiver: Patient will be discharged once education has completed, patient is medically stable and  independent with care.   Sharps-n/a

## 2022-04-27 NOTE — HOME HEALTH
SKILLED REASON for visit: wound  to right foot  CAREGIVER INVOLVEMENT:sister is available as needed for assistance with iadls, adls, meal prep, medication management, taking to md appointments. MEDICATIONS: protonix reviewed and all medications are available in the home this visit. Patient denies any medication changes at this time of assessment. EDUCATION PROVIDED: regarding medications, medication interactions, and look alike medications (specify): reviewed side effects, purposes, dosage, frequencies. High risk medication teaching regarding anticoagulants, hyperglycemic agents or opiod narcotics performed (specify) na Medications are effective at this time. SUPPLIES: by type and quantity ordered/delivered this visit include: [address] [facility] ON THIS VISIT:wound care /cg instructed to monitor for edema/increase in edema, to elevate extremity when edema occurs and to notify md if edema exceeds normal limits for patient, none noted at this time. patient made aware to monitor for s/s of infection [increased swelling, increased redness around site, increased pain, foul smelling drainage, fever] aware who to report to/when. no s/s of infection noted. encouraged patient to get three nutritional meals daily and to stay hydrated. reviewed heart healthy diet- monitoring sodium intake, cholesterol and fat intake. patient aware to limit sodium, no added sodium to diet. reviewed foods to avoid patient Instructed onrepostioning every 2 hours as necessary for prevention of pressure sores PATIENT LEVEL OF UNDERSTANDING: patient/cg has a partial understanding of education that was provided at this time by engaging in all education provided and is able to verbalize understanding, pt denies any questions or concerns at this time.  SKILLED CARE PERFORMED THIS VISIT: wound care PATIENT RESPONSE TO PROCEDURE PERFORMED: patient tolerated procedure with no signs of discomfort, grimacing or pain, no complications or concerns noted. Agency Progress toward goals: goals/teaching reviewed. patient is progressing towards goals at this time, Patient's Progress towards personal goals: pt reporting they are progressing towards their goals at this time. Home exercise program: home program Continued need for the following skills: Nursing Plan for next visit: [place] Patient and/or caregiver notified and agrees to changes in the Plan of Care NA The following discharge planning was discussed with the pt/caregiver: Patient will be discharged once education has completed, patient is medically stable and independent with care.  Sharps-n/a

## 2022-04-28 VITALS
HEART RATE: 71 BPM | TEMPERATURE: 97.1 F | OXYGEN SATURATION: 98 % | SYSTOLIC BLOOD PRESSURE: 132 MMHG | RESPIRATION RATE: 16 BRPM | DIASTOLIC BLOOD PRESSURE: 78 MMHG

## 2022-04-29 ENCOUNTER — HOME CARE VISIT (OUTPATIENT)
Dept: SCHEDULING | Facility: HOME HEALTH | Age: 62
End: 2022-04-29
Payer: MEDICARE

## 2022-04-29 PROCEDURE — G0300 HHS/HOSPICE OF LPN EA 15 MIN: HCPCS

## 2022-04-29 NOTE — HOME HEALTH
SKILLED REASON for visit: wound care to amputated toe right foot   CAREGIVER INVOLVEMENT: sister is available as needed for assistance with iadls, adls, meal prep, medication management, taking to md appointments. MEDICATIONS:protonix reviewed and all medications are available in the home this visit. Patient denies any medication changes at this time of assessment. EDUCATION PROVIDED: regarding medications, medication interactions, and look alike medications (specify): reviewed side effects, purposes, dosage, frequencies. High risk medication teaching regarding anticoagulants, hyperglycemic agents or opiod narcotics performed (specify) na Medications are effective at this time. SUPPLIES: by type and quantity ordered/delivered this visit include: n/a   PATIENT EDUCATION ON THIS VISIT:wound care taught to patient patient/cg instructed to monitor for edema/increase in edema, to elevate extremity when edema occurs and to notify md if edema exceeds normal limits for patient, none noted at this time. patient made aware to monitor for s/s of infection [increased swelling, increased redness around site, increased pain, foul smelling drainage, fever] aware who to report to/when. no s/s of infection noted. encouraged patient to get three nutritional meals daily and to stay hydrated. reviewed heart healthy diet- monitoring sodium intake, cholesterol and fat intake. patient aware to limit sodium, no added sodium to diet. reviewed foods to avoid patient Instructed onrepostioning every 2 hours as necessary for prevention of pressure sores   PATIENT LEVEL OF UNDERSTANDING: patient/cg has a partial understanding of education that was provided at this time by engaging in all education provided and is able to verbalize understanding, pt denies any questions or concerns at this time.    SKILLED CARE PERFORMED THIS VISIT: wound care per orders   PATIENT RESPONSE TO PROCEDURE PERFORMED: patient tolerated procedure with no signs of discomfort, grimacing or pain, no complications or concerns noted. Agency Progress toward goals: goals/teaching reviewed. patient is progressing towards goals at this time, Patient's Progress towards personal goals: pt reporting they are progressing towards their goals at this time. Home exercise program: walking Continued need for the following skills: Nursing Plan for next visit: wound care Patient and/or caregiver notified and agrees to changes in the Plan of Care NA The following discharge planning was discussed with the pt/caregiver: Patient will be discharged once education has completed, patient is medically stable and independent with care.  Sharps-n/a

## 2022-05-01 VITALS
HEART RATE: 67 BPM | RESPIRATION RATE: 16 BRPM | DIASTOLIC BLOOD PRESSURE: 87 MMHG | SYSTOLIC BLOOD PRESSURE: 154 MMHG | OXYGEN SATURATION: 98 % | TEMPERATURE: 97.7 F

## 2022-05-02 ENCOUNTER — HOME CARE VISIT (OUTPATIENT)
Dept: SCHEDULING | Facility: HOME HEALTH | Age: 62
End: 2022-05-02
Payer: MEDICARE

## 2022-05-02 PROCEDURE — G0300 HHS/HOSPICE OF LPN EA 15 MIN: HCPCS

## 2022-05-03 VITALS
OXYGEN SATURATION: 98 % | HEART RATE: 64 BPM | RESPIRATION RATE: 16 BRPM | DIASTOLIC BLOOD PRESSURE: 97 MMHG | TEMPERATURE: 97.7 F | SYSTOLIC BLOOD PRESSURE: 154 MMHG

## 2022-05-04 ENCOUNTER — HOME CARE VISIT (OUTPATIENT)
Dept: SCHEDULING | Facility: HOME HEALTH | Age: 62
End: 2022-05-04
Payer: MEDICARE

## 2022-05-04 PROCEDURE — G0300 HHS/HOSPICE OF LPN EA 15 MIN: HCPCS

## 2022-05-04 NOTE — HOME HEALTH
SKILLED REASON for visit: wound care to amputated toe right foot   CAREGIVER INVOLVEMENT: LIVES WITH sister is available as needed for assistance with iadls, adls, meal prep, medication management, taking to md appointments. MEDICATIONS: synthroid reviewed and all medications are available in the home this visit. Patient denies any medication changes at this time of assessment. EDUCATION PROVIDED: regarding medications, medication interactions, and look alike medications (specify): reviewed side effects, purposes, dosage, frequencies. High risk medication teaching regarding anticoagulants, hyperglycemic agents or opiod narcotics performed (specify) na Medications are effective at this time. SUPPLIES: by type and quantity ordered/delivered this visit include: n/a   PATIENT EDUCATION ON THIS VISIT:wound care taught to patient patient/cg instructed to monitor for edema/increase in edema, to elevate extremity when edema occurs and to notify md if edema exceeds normal limits for patient, none noted at this time. patient made aware to monitor for s/s of infection [increased swelling, increased redness around site, increased pain, foul smelling drainage, fever] aware who to report to/when. no s/s of infection noted. encouraged patient to get three nutritional meals daily and to stay hydrated. reviewed heart healthy diet- monitoring sodium intake, cholesterol and fat intake. patient aware to limit sodium, no added sodium to diet. reviewed foods to avoid patient Instructed onrepostioning every 2 hours as necessary for prevention of pressure sores   PATIENT LEVEL OF UNDERSTANDING: patient/cg has a partial understanding of education that was provided at this time by engaging in all education provided and is able to verbalize understanding, pt denies any questions or concerns at this time.    SKILLED CARE PERFORMED THIS VISIT: wound care per orders PATIENT RESPONSE TO PROCEDURE PERFORMED: patient tolerated procedure with no signs of discomfort, grimacing or pain, no complications or concerns noted. Agency Progress toward goals: goals/teaching reviewed. patient is progressing towards goals at this time, Patient's Progress towards personal goals: pt reporting they are progressing towards their goals at this time. Home exercise program: walking Continued need for the following skills: Nursing Plan for next visit: wound care Patient and/or caregiver notified and agrees to changes in the Plan of Care NA The following discharge planning was discussed with the pt/caregiver: Patient will be discharged once education has completed, patient is medically stable and independent with care.  Sharps-n/a

## 2022-05-05 VITALS
RESPIRATION RATE: 16 BRPM | TEMPERATURE: 97.7 F | DIASTOLIC BLOOD PRESSURE: 71 MMHG | SYSTOLIC BLOOD PRESSURE: 132 MMHG | OXYGEN SATURATION: 98 % | HEART RATE: 64 BPM

## 2022-05-06 ENCOUNTER — HOME CARE VISIT (OUTPATIENT)
Dept: SCHEDULING | Facility: HOME HEALTH | Age: 62
End: 2022-05-06
Payer: MEDICARE

## 2022-05-06 PROCEDURE — G0300 HHS/HOSPICE OF LPN EA 15 MIN: HCPCS

## 2022-05-06 NOTE — HOME HEALTH
SKILLED REASON for visit: s/p amputation requiring skill nursing care  CAREGIVER INVOLVEMENT:  is available as needed for assistance with iadls, adls, meal prep, medication management, taking to md appointments. MEDICATIONS: ensure reviewed and all medications are available in the home this visit. Patient denies any medication changes at this time of assessment. EDUCATION PROVIDED: regarding medications, medication interactions, and look alike medications (specify): reviewed side effects, purposes, dosage, frequencies. High risk medication teaching regarding anticoagulants, hyperglycemic agents or opiod narcotics performed (specify) na  Medications  are effective at this time. SUPPLIES: by type and quantity ordered/delivered this visit include: n/a  PATIENT EDUCATION ON THIS VISIT: taking meds as prescribed/check blood pressure daily, patient/cg instructed to monitor for edema/increase in edema, to elevate extremity when edema occurs and to notify md if edema exceeds normal limits for patient, none noted at this time. patient made aware to monitor for s/s of infection [increased swelling, increased redness around site, increased pain, foul smelling drainage, fever] aware who to report to/when. no s/s of infection noted. encouraged patient to get three nutritional meals daily and to stay hydrated. reviewed heart healthy diet- monitoring sodium intake, cholesterol and fat intake. patient aware to limit sodium, no added sodium to diet. reviewed foods to avoid patient  Instructed onrepostioning every 2 hours as necessary for prevention of pressure sores  PATIENT LEVEL OF UNDERSTANDING: patient/cg has a partial understanding of education that was provided at this time by engaging in all education provided and is able to verbalize understanding, pt denies any questions or concerns at this time.   SKILLED CARE PERFORMED THIS VISIT: wound care  PATIENT RESPONSE TO PROCEDURE PERFORMED:   patient tolerated procedure with no signs of discomfort, grimacing or pain, no complications or concerns noted. Agency Progress toward goals: goals/teaching reviewed. patient is progressing towards goals at this time, Patient's Progress towards personal goals: pt reporting they are progressing towards their goals at this time. Home exercise program: deep breathing exercises perform 2-3 daily  Continued need for the following skills: Nursing  Plan for next visit: routine   Patient and/or caregiver notified and agrees to changes in the Plan of Care NA  The following discharge planning was discussed with the pt/caregiver: Patient will be discharged once education has completed, patient is medically stable and  independent with care.   Valeriy-n/a

## 2022-05-09 NOTE — HOME HEALTH
SN reason for visit: wound care     Caregiver involvement: Patient's cg is sister. she lives with patient and is available at all times for assistance with iadls, adls, meal prep, medication management, taking to md appointments. Medications reconciled and all medications are available in the home this visit. The following education was provided regarding medications, medication interactions, and look a like medications: educated on the use of htn meds. Medications  are effective at this time. Home health supplies by type and quantity ordered/delivered this visit include: n/a    Patient education/skilled care provided this visit:wound care performed per orders- old dressing completely removed, wound cleansed with dwc and applied aquacel, secured with kerlix and ace. dressing changed as ordered, healing well with no s/s of infection present. pt aware to keep dressing clean, dry and intact as ordered. pt aware to keep incision clean and dry as ordered, to report any new drainage to staff. patient made aware to monitor for s/s of infection [increased swelling, increased redness around site, increased pain, foul smelling drainage, fever] aware who to report to/when.  patient encouraged to monitor for increase in pain and to continue with current pain management and to notify staff/md if pain becomes excrutiating/intolerable. pt instructed to follow a high protein diet for healing- to try to get 90g protein daily. patient instructed to maintain clear pathways in home and to minimize clutter to prevent falls from occurring/minimize fall potential.  patient/cg to continue to take medications as prescribed. patient aware to monitor for effectiveness and to notify staff of any adverse reactions to medications/any changes to medication regimen. reviewed side effects, purposes, dosage, frequencies  Patient is a fall risk.  Educated pateint to sit on the side of the chair/bed, take a slow deep breaths, have feet firmly planted before standing up, use cane/walker if available, or have someone to assist.  INSTRUCTED PATIENT AND CG THAT SHOULD ANY NEEDS OR CONCERNS ARISE TO FIRST CALL OUR OFFICE, OR THE DR'S OFFICE  OR GO TO AN URGENT CARE CENTER AND NOT TO THE ED FOR NON-LIFE THREATENING EVENTS. IF IT IS LIFE THREATENING THEN CALL 911 OR GO TO THE CLOSEST ER. Patient level of understanding of education provided: Pt verbalized understanding of all education provided today with no questions/concerns at this time. Patient response to procedure performed:  Pt tolerated wound care well with no discomfort/pain observed or voiced at this time. Progress toward goals: Patient's personal goal is to have complete wound healing and remain free of infection. Home exercise program: activity as tolerated, trying to get physical activity 4-5 x weekly, 30 minutes daily. stopping activity if causing shortness of breath or chest pain, dizziness or weakness. Continued need for the following skills: Nursing    The following discharge planning was discussed with the pt/caregiver: Patient will be discharged once education has completed, patient is medically stable and pt/cg are able to independently manage wound care/ wound has healed or no longer requires skilled care. I believe he is ready for discharge and will make scheduling aware. Wound is just a callous on bottom of foot.

## 2022-05-11 ENCOUNTER — HOME CARE VISIT (OUTPATIENT)
Dept: SCHEDULING | Facility: HOME HEALTH | Age: 62
End: 2022-05-11
Payer: MEDICARE

## 2022-05-11 VITALS
TEMPERATURE: 98.3 F | SYSTOLIC BLOOD PRESSURE: 138 MMHG | DIASTOLIC BLOOD PRESSURE: 82 MMHG | OXYGEN SATURATION: 98 % | HEART RATE: 75 BPM | RESPIRATION RATE: 18 BRPM

## 2022-05-11 PROCEDURE — G0299 HHS/HOSPICE OF RN EA 15 MIN: HCPCS

## 2022-05-11 NOTE — Clinical Note
Patient is meeting all SN goals at this time and is ready for agency discharge to follow up with PCP/ Surgeon. Patient is aware to follow up with PCP/Surgeon as ordered. Opportunity for questions provided- none at this time. Patient aware to refer any questions after DC to MD office.       Respectfully,  Zenobia Pham Attending Attestation (For Attendings USE Only)...

## 2022-05-11 NOTE — HOME HEALTH
Skilled reason for visit: [facility] D/C    Caregiver involvement: PATIENT LIVES WITH SISTERANDRÉS TO ASSIST WITH WHATEVER NEEDS HE MAY HAVE    Medications reviewed and all medications are available in the home this visit. The following education was provided regarding medications:  Patient/cg is able to verbalize understanding of all medications at this time: side effects, purposes, dosages, frequencies. .    MD notified of any discrepancies/look a-like medications/medication interactions: N/A  Medications are EFFECTIVE at this time. Home health supplies by type and quantity ordered/delivered this visit include: N/A    Patient education provided this visit: patient made aware to monitor for s/s of infection [increased swelling, increased redness around site, increased pain, foul smelling drainage, fever] aware who to report to/when. Pt instructed to follow with diabetic diet- monitoring sugar intake, limiting foods with high sugar content. SN discussed dietary adjustments such as: reduce portion sizes, limit daily carbohydrate intake to not greater than 45-60 grams per meal for a total of <180 grams of carbohydrate daily, avoid concentrated carbohydrates such as soft drinks, sweet tea, and sweet treats and to increase physical activity along with strength training as tolerated to facilitate weight loss and build muscle mass  PATIENT IS INDEPENDENT  FOR HIS HALEY CARE. WOUND TO BOTTOM OF FOOT IS HEALED   COMPLETE MED REQ WAS DONE THIS VISIT  Sharps education provided: PATIENT EDUCATED ON HOW TO PROPERLY DISPOSE OF NEEDLES INTO A BLEACH BOTTLE OR DETERGENT BOTTLE WITH THE CAP TAPPED ON. Patient level of understanding of education provided: Maggie Born TO REPEAT BACK AND VOICED UNDERSTANDING OF ALL EDUCATION PROVIDED. Skilled Care Performed this visit: [facility] DC    Patient response to procedure performed: NO C/O OF DISCOMFORT OR INCREASED PAIN    Agency Progress toward goals:  ALL GOALS MET Patient's Progress towards personal goals: ALL GOALS MET    Home exercise program: PATIENT TO TAKE ALL MEDS AS ORDERED, DO ICS X10/HR WHILE AWAKE, DRINK PLENTY OF FLUIDS,    Continued need for the following skills: NA    Plan for next visit: NA    Patient and/or caregiver notified and agrees to changes in the Plan of Care YES      The following discharge planning was discussed with the pt/caregiver: Patient is meeting all SN goals at this time and is ready for agency discharge to follow up with PCP/ Surgeon. Patient is aware to follow up with PCP/Surgeon as ordered. Opportunity for questions provided- none at this time. Patient aware to refer any questions after DC to MD office.

## 2022-06-09 ENCOUNTER — TELEPHONE (OUTPATIENT)
Dept: PHYSICAL THERAPY | Age: 62
End: 2022-06-09

## 2022-07-27 NOTE — H&P
Surgery History and Physical    Subjective:      Domi Wang is a 59 y.o.  male who presents with clotted left arm avg, recurrent.     Patient Active Problem List    Diagnosis Date Noted    Open toe wound, initial encounter 03/10/2022    Anemia in chronic renal disease 08/05/2020    GERD (gastroesophageal reflux disease) 08/05/2020    Hyperphosphatemia 08/05/2020    Iron deficiency anemia 08/05/2020    ESRD (end stage renal disease) on dialysis ([address] 06/01/2020    Hemodialysis catheter dysfunction ([address] 05/06/2020    ESRD (end stage renal disease) ([address] 02/17/2020    Secondary hyperparathyroidism of renal origin ([address] 02/17/2020    Acute renal failure superimposed on stage 3 chronic kidney disease ([address] 02/15/2020    UTI (urinary tract infection) 02/15/2020    Acute renal failure superimposed on chronic kidney disease ([address] 02/15/2020    Bradycardia 02/15/2020    Renal failure (ARF), acute on chronic (HCC) 03/31/2019    Suprapubic catheter ([address] 03/28/2019    Bladder atony 01/31/2019    Chronic neck pain 01/31/2019    Cirrhosis of liver ([address] 01/31/2019    COPD, moderate ([address] 01/31/2019    Dry skin dermatitis 01/31/2019    Hypothyroid 01/31/2019    Lung nodules 01/31/2019    Neck mass 01/31/2019    Pericardial effusion 01/31/2019    Vitamin D deficiency 01/31/2019    BPH (benign prostatic hyperplasia) 12/20/2018    Diabetes mellitus ([address]     Hypertension     Urethral erosion     Chronic anemia 05/23/2018    Status post amputation of toe of right foot ([address] 05/18/2018    Thrombocytopenia ([address] 04/10/2018    Chronic kidney disease, stage III (moderate) ([address] 04/04/2018    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    Chronic hepatitis C without hepatic coma ([address] 05/31/2017    Quadriparesis ([address] 05/31/2017    IV drug abuse ([address] 04/28/2017    Renal cell carcinoma of left kidney ([address] 39/27/3995    Umbilical hernia 40/62/9793    Diabetes ([email or web address] 01/1990    Chronic drug abuse ([email or web address] 01/01/1974     Past Medical History:   Diagnosis Date    Anemia     Bradycardia, unspecified 2018    Cervical discitis     MRSA    Chronic drug abuse ([facility] 1974    Chronic kidney disease     stage III    Diabetes ([facility] 01/1990    no DM    Dialysis patient [facility])     Drug abuse ([facility]     Encephalopathy     GERD (gastroesophageal reflux disease)     History of abuse     Hypertension     Muscle weakness     Quadriparesis ([address] 2017    Renal cell carcinoma of left kidney ([address] 12/17/13    Pathological Stage [id number] cc RCC FG3 s/p left open partial nephrectomy in 12/13      Sepsis due to methicillin resistant Staphylococcus aureus (HCC)     Suprapubic catheter ([facility]     Thrombocytopenia ([address]     Thyroid disease     Urethral erosion     Viral hepatitis B     Viral hepatitis C     Xerosis cutis       Past Surgical History:   Procedure Laterality Date    COLONOSCOPY N/A 10/3/2019    COLONOSCOPY / polypectomy performed by Carlie Leblanc MD at [id number] [facility]  12/17/13    Dr. [place] Christie, [facility]    HX NEPHRECTOMY Left 12/17/13    Open/ Partial,nephrectomy    HX OTHER SURGICAL Right 2/27/2016    removed right ft  2nd meta-tarsal    HX OTHER SURGICAL  04/2017    abscess removed from back of neck     IR INSERT TUNL CVC W/O PORT OVER 5 YR  2/18/2020    NEUROLOGICAL PROCEDURE UNLISTED  2017    neck surgery-abcess-hardware neck    TN INSJ TUNNELED CVC W/O SUBQ PORT/ AGE 5 YR/> N/A 5/6/2020    INSERTION TUNNELED CENTRAL VENOUS CATHETER/perm catheter exchange performed by Ozzy Parr MD at [facility] CATH LAB    TN REMOVAL WITH INSERTION OF SUPRAPUBIC CATHETER  2018      Social History     Tobacco Use    Smoking status: Some Days     Packs/day: 0.25     Years: 30.00     Pack years: 7.50     Types: Cigarettes    Smokeless tobacco: Never   Substance Use Topics    Alcohol use: Not on file     Comment: beer occasionally      Family History   Problem Relation Age of Onset    Diabetes Mother     Sickle Cell Anemia Father     Diabetes Sister     Hypertension Sister       Prior to Admission medications    Medication Sig Start Date End Date Taking? Authorizing Provider   lactulose (CHRONULAC) 10 gram/15 mL solution Take 15 mL by mouth two (2) times a day. 3/14/22   Jim Chen MD   pantoprazole (PROTONIX) 40 mg tablet Take 1 Tablet by mouth Daily (before breakfast). 3/15/22   Jim Chen MD   naloxone (Narcan) 4 mg/actuation nasal spray Use 1 spray intranasally, then discard. Repeat with new spray every 2 min as needed for opioid overdose symptoms, alternating nostrils. 3/14/22   Jim Chen MD   b complex-vitamin c-folic acid 0.8 mg (NEPHRO-DEBBIE) 0.8 mg tab tablet Take 0.8 mg by mouth daily. Patient not taking: Reported on 1/27/2022 3/18/20   Provider, Historical   levothyroxine (SYNTHROID) 100 mcg tablet Take 1 Tab by mouth every morning. 2/26/20   Tao Aquino MD   b complex-vitamin c-folic acid (NEPHROCAPS) 1 mg capsule Take 1 Cap by mouth daily. Patient not taking: Reported on 1/27/2022 2/26/20   Tao Aquino MD   calcium acetate,phosphat bind, (PHOSLO) 667 mg cap Take 1 Cap by mouth three (3) times daily (with meals). 2/25/20   Tao Aquino MD   doxercalciferoL (HECTOROL) 4 mcg/2 mL injection 0.5 mL by IntraVENous route DIALYSIS MON, WED & FRI. 2/26/20   Tao Aquino MD   food supplemt, lactose-reduced (ENSURE ENLIVE) 0.08 gram-1.5 kcal/mL liqd Take 1 Each by mouth three (3) times daily (with meals).  Flavor of patient choice  Patient not taking: Reported on 2/28/2022 2/25/20   Tao Aquino MD   hydrALAZINE (APRESOLINE) 25 mg tablet Take 1 Tab by mouth every eight (8) hours as needed (systolic B/P >560). 0/6/36   Jim Chen MD     Allergies   Allergen Reactions    Iodine Hives and Other (comments)         Review of Systems:    A comprehensive review of systems was negative except for that written in the History of Present Illness. Objective:     No data found. No data recorded. Physical Exam:  LUNG: clear to auscultation bilaterally, HEART: regular rate and rhythm, S1, S2 normal, no murmur, click, rub or gallop, ABDOMEN: soft, non-tender. Bowel sounds normal. No masses,  no organomegaly. No thrill ion left arm avg    Labs: No results found for this or any previous visit (from the past 24 hour(s)).     Data Review:  BMP:   Lab Results   Component Value Date/Time    Glucose 134 (H) 03/14/2022 05:28 AM    Sodium 138 03/14/2022 05:28 AM    Potassium 4.5 03/14/2022 05:28 AM    Chloride 103 03/14/2022 05:28 AM    CO2 29 03/14/2022 05:28 AM    BUN 35 (H) 03/14/2022 05:28 AM    Creatinine 5.68 (H) 03/14/2022 05:28 AM    Calcium 8.7 03/14/2022 05:28 AM       Assessment:     Active Problems:    ESRD ([facility] stage [facility] disease) [facility] (2/17/2020)      Plan:     Declot / revise left arm avg    Signed By: Venus Lennox, MD     July 27, 2022

## 2022-07-28 ENCOUNTER — HOSPITAL ENCOUNTER (OUTPATIENT)
Age: 62
Setting detail: OUTPATIENT SURGERY
Discharge: HOME | End: 2022-07-28
Attending: SURGERY | Admitting: SURGERY

## 2022-07-28 DIAGNOSIS — N18.6 ESRD (END STAGE RENAL DISEASE) (HCC): ICD-10-CM

## 2022-07-28 NOTE — PROGRESS NOTES
Unable to move forward with procedure today due to xray malfunction in lab. Patient rescheduled for 8/1 at 2:00pm. Patient aware of rescheduled procedure.

## 2022-08-24 ENCOUNTER — TRANSCRIBE ORDER (OUTPATIENT)
Dept: SCHEDULING | Age: 62
End: 2022-08-24

## 2022-08-24 DIAGNOSIS — R10.9 ABDOMINAL PAIN: Primary | ICD-10-CM

## 2022-09-21 ENCOUNTER — HOSPITAL ENCOUNTER (OUTPATIENT)
Dept: PHYSICAL THERAPY | Age: 62
Discharge: HOME | End: 2022-09-21

## 2022-09-21 NOTE — THERAPY EVALUATION
[facility] [employer]  [address] [facility]  [phone] fax    Plan of Care/Statement of Necessity for Occupational Therapy Services    Patient name: Indy Ruiz Start of Care: 2022   Referral source: CHANI Velázquez : 1960    Medical Diagnosis: Palmar fascial fibromatosis (dupuytren) [M72.0]  Payor: BLUE CROSS MEDICARE / Plan: [employer]141 HMO / Product Type: Managed Care Medicare /  Onset Date:***    Treatment Diagnosis: ***   Prior Hospitalization: see medical history Provider#: [id number]   Medications: Verified on Patient summary List    Comorbidities: ***   Prior Level of Function: ***          The Plan of Care and following information is based on the information from the initial evaluation. Assessment/ key information: ***    Evaluation Complexity: History {OT OP History :92127} Examination {OT OP Examination :04034} Clinical Decision Making {OT OP Decision Making :47918}  Overall Complexity Rating: {PT OP Complexity:62737}    Problem List: {BSHSI OT PROBLEM LIST:61382:a}     Treatment Plan may include any combination of the following: {BSHSI IP OT TREATMENT PLAN:22800:a}    Patient / Family readiness to learn indicated by: {Outpt PT Patient Family Readiness to Learn:30266:a}    Persons(s) to be included in education:   {BSHSI IP PATTERSON PT PERSONS [facility]:61569}    Barriers to Learning/Limitations: {barriers to learning/limitations:65238}    Patient Goal (s): ***    Patient Self Reported Health Status: {Outpt PT Rehabilitation Potential:94479}    Rehabilitation Potential: {Outpt PT Rehabilitation Potential:26426}    Short Term Goals: To be accomplished in *** {BSHSI OP WEEKS/TREATMENTS:}:  ***  Long Term Goals:  To be accomplished in *** {BSHSI OP WEEKS/TREATMENTS:}:   ***    Frequency / Duration: Patient to be seen *** times per week for *** {BSHSI OP WEEKS/TREATMENTS:}:    Patient/ Caregiver education and instruction: Diagnosis, prognosis, {Outpt PT patient caregiver ed.:55807}  [x]  Plan of care has been reviewed with ALCANTARA    Certification Period: ***    Mel Johnson OTR/L 9/21/2022 2:26 PM      ________________________________________________________________________    I certify that the above Therapy Services are being furnished while the patient is under my care. I agree with the treatment plan and certify that this therapy is necessary.     [de-identified] Signature:____________Date:_________TIME:________     CHANI Mccall  ** Signature, Date and Time must be completed for valid certification **    Please sign and return to [facility] [employer]   [address] [facility]  [phone] fax

## 2022-09-21 NOTE — PROGRESS NOTES
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]    SUBJECTIVE  Pain Level (0-10 scale): ***  Any medication changes, allergies to medications, adverse drug reactions, diagnosis change, or new procedure performed?: [x] No    [] Yes (see summary sheet for update)  Subjective functional status/changes:   [] No changes reported  ***    OBJECTIVE    Modality rationale: {BSHSI INMOTION MODALITIES:28209} to improve the patients ability to ***   Min Type Additional Details    [] Estim:  []Unatt       []IFC  []Premod                        []Other:  []w/ice   []w/heat  Position:  Location:    [] Estim: []Att    []TENS instruct  []NMES                    []Other:  []w/US   []w/ice   []w/heat  Position:  Location:    []  Traction: [] Cervical       []Lumbar                       [] Prone          []Supine                       []Intermittent   []Continuous Lbs:  [] before manual  [] after manual    []  Ultrasound: []Continuous   [] Pulsed                           []1MHz   []3MHz W/cm2:  Location:    []  Iontophoresis with dexamethasone         Location: [] Take home patch   [] In clinic    []  Ice     []  heat  []  Ice massage  []  Laser   []  Paraffin Position:  Location:    []  Laser with stim  []  Other:  Position:  Location:    []  Vasopneumatic Device    []  Right     []  Left  Pre-treatment girth:  Post-treatment girth:  Measured at (location):  Pressure:       [] lo [] med [] hi   Temperature: [] lo [] med [] hi       [] Skin assessment post-treatment:  []intact []redness- no adverse reaction    []redness - adverse reaction:     *** min []Eval                  []Re-Eval       *** min Therapeutic Exercise:  [] See flow sheet :   Rationale: {BSHSI IMMOTION THER EX:55684:a} to improve the patients ability to ***    *** min Therapeutic Activity:  []  See flow sheet :   Rationale: {BSHSI IMMOTION THER EX:57964:a}  to improve the patients ability to ***     *** min Neuromuscular Re-education:  []  See flow sheet :   Rationale: {BSHSI IMMOTION THER EX:25468:a}  to improve the patients ability to ***    *** min Manual Therapy:  ***   The manual therapy interventions were performed at a separate and distinct time from the therapeutic activities interventions. Rationale: {BSHSI IMMOTION MANUAL THERAPY:93433:a} to ***     min Orthotic/Splinting: ***   Rationale: {BSHSI IMMOTION THER EX:89450:a}  to improve the patients ability to ***    *** min Self Care/Home Management: ***   Rationale: {BSHSI IMMOTION THER EX:79393:a}  to improve the patients ability to ***    With   [] TE   [] TA   [] neuro   [] other: Patient Education: [x] Review HEP    [] Progressed/Changed HEP based on:   [] positioning   [] body mechanics   [] transfers   [] heat/ice application   [] Splint wear/care   [] Sensory re-education   [] scar management      [] other:            Other Objective/Functional Measures:   Subjective: pt is a {left/right:072054} hand dominant, 57 y.o.y/o, male who sustained his/her injury *** and had surgery on ***. Prior level of function: ***  Pain level:(0-no pain 10-debilitating pain) {severity:5014}    Description/Location: {bodypart:22612} with c/o {relief:42903} ***   Worst pain***/10 Least pain ***/10   Activities which aggravate pain: ***   Activities which ease pain: ***  Current functional limitations/living situation: ***    Medical hx: ***    Medications: See the written copy of this report in the patient's paper medical record.        Objective:  Edema: Circumference    Right  Left   Styolid Level  ***cm  ***cm   MCP   ***cm  ***cm   PIP   ***cm  ***cm  Scar/Wound: size, color, drainage, adhesions, location: ***  Topical Changes: Color: *** Temperature: *** Other:***  Sensation:  Light Touch []WFL          [] Impaired ***   Sharp/Dull []WFL          [] Impaired ***   Pain/Temperature []WFL          [] Impaired ***   Range of Motion:     Active Passive     Norms Right Left Right Left   Shoulder Flex 0-180        Ext 0-60        abd 0-180        Horizontal add 0-45        IR 0-70        ER 0-90       Elbow Ext/flex 0-150       Forearm Supination 0-80        Pronation 0-80       Wrist Flex 0-80        Ext 0-70        Ulnar Dev 0-30        Radial Dev 0-20         Strength:    Right Left   Shoulder Flex      Ext      abd      Horizontal add      IR      ER     Elbow Ext/flex     Forearm Supination      Pronation     Wrist Flex      Ext      Ulnar Dev      Radial Dev       Hand ROM    Index Middle Ring Small Thumb    MP      CMC   PIP      MP   DIP      IP         Gama Abd         Radial Abd     Hand Strength:   Gross Grasp 3pt Pinch Lateral Pinch Tip Pinch   Right        Left         Nine-Hole Peg Test:  Left= _____seconds  Right=_____seconds  Finger Opposition:  Stereognosis: Right  ***/5 correct    Left  ***/5 correct       Palpation: ***    ADLs  Feeding:        []MaxA   []ModA   []Paula   [] CGA   []SBA   []Leena   []Independent  UE Dressing:       []MaxA   []ModA   []Paula   [] CGA   []SBA   []Leena   []Independent  LE Dressing:       []MaxA   []ModA   []Paula   [] CGA   []SBA   []Leena   []Independent  Grooming:       []MaxA   []ModA   []Paula   [] CGA   []SBA   []Leena   []Independent  Toileting:       []MaxA   []ModA   []Paula   [] CGA   []SBA   []Leena   []Independent  Bathing:       []MaxA   []ModA   []Paula   [] CGA   []SBA   []Leena   []Independent  Light Meal Prep:    []MaxA   []ModA   []Paula   [] CGA   []SBA   []Leena   []Independent  Household/Other: []MaxA   []ModA   []Paula   [] CGA   []SBA   []Leena []Independent  Adaptive Equip:     []MaxA   []ModA   []Paula   [] CGA   []SBA   []Leena   []Independent  Driving:       []MaxA   []ModA   []Paula   [] CGA   []SBA   []Leena   []Independent  Money Mgmt:        []MaxA   []ModA   []Paula   [] CGA   []SBA   []Leena   []Independent      Pain Level (0-10 scale) post treatment: ***    ASSESSMENT/Changes in Function:   []  See Plan of Care  []  See progress note/recertification  []  See Discharge Summary           PLAN  []  Upgrade activities as tolerated     []  Continue plan of care  []  Update interventions per flow sheet       []  Discharge due to:_  []  Other:_      Db Perez OTR/L 9/21/2022  11:05 AM    Future Appointments   Date Time Provider Veda Moreno   9/21/2022  3:00 PM Sameer Guerrier [id number]   11/2/2022  1:45 PM MD Janna Martini

## 2022-11-02 PROBLEM — N31.2 FLACCID NEUROPATHIC BLADDER, NOT ELSEWHERE CLASSIFIED: Status: ACTIVE | Noted: 2019-01-31

## 2022-11-02 PROBLEM — H43.12 VITREOUS HEMORRHAGE, LEFT EYE (HCC): Status: ACTIVE | Noted: 2022-11-02

## 2022-11-02 PROBLEM — R55 SYNCOPE AND COLLAPSE: Status: ACTIVE | Noted: 2021-01-17

## 2022-11-02 PROBLEM — Z89.421 ACQUIRED ABSENCE OF OTHER RIGHT TOE(S) (HCC): Status: ACTIVE | Noted: 2018-05-18

## 2022-11-02 PROBLEM — R41.0 CONFUSION AND DISORIENTATION: Status: ACTIVE | Noted: 2022-08-14

## 2022-11-02 PROBLEM — E11.311 TYPE 2 DIABETES MELLITUS WITH UNSPECIFIED DIABETIC RETINOPATHY WITH MACULAR EDEMA (HCC): Status: ACTIVE | Noted: 2022-11-02

## 2022-11-02 PROBLEM — R56.9 SEIZURE-LIKE ACTIVITY (HCC): Status: ACTIVE | Noted: 2022-08-14

## 2023-02-18 NOTE — DIABETES MGMT
Glycemic Control Plan of Care    T2DM with A1c of 4.9% (2/11/2020). POC BG range on 02/17/2020: . Given 25 gm D50 x2 for hypoglycemia. Patient stated that his blood sugar dropped to low due to lack of food because he was not allowed to eat for planned procedure. Discussed hypoglycemia protocol with staff and received info that procedure postponed for 02/18/2020 therefore dinner tray ordered to help prevent further hypoglycemia. POC BG report on 02/18/2020 at time of review: 70, 75. Noted patient was NPO after midnight for procedure. Temporary dialysis cath procedure already done and meal tray was ordered. Recommendation(s):  1.) follow hypoglycemia protocol and prevention. Assessment:  Patient is 62year old with past medical history including type diabetes mellitus, CKD stage 3, drug abuse, GERD, quadriparesis, left renal carcinoma, suprapubic catheter and hepatitis B&C - was admitted on 2/15/2020 with report that he was sent from MD office due to worsening Cr and LLE edema. Noted:  ESRD. Status post temporary dialysis catheter placement done 02/18/2020. History of renal cell CA, s/p left partial nephrectomy. COPD. Hypothyroid. UTI.  T2DM. Most recent blood glucose values:    Results for Suleman Mena (MRN [id number]) as of 2/18/2020 13:25   Ref. Range 2/17/2020 08:42 2/17/2020 11:34 2/17/2020 13:16 2/17/2020 16:25 2/17/2020 19:27 2/17/2020 21:25   GLUCOSE,FAST - POC Latest Ref Range: 70 - 110 mg/dL 69 (L) 63 (L) 83 66 (L) 106 94     Results for Suleman Mena (MRN [id number]) as of 2/18/2020 13:25   Ref. Range 2/18/2020 08:37 2/18/2020 13:13   GLUCOSE,FAST - POC Latest Ref Range: 70 - 110 mg/dL 70 75     Current A1C: 4.9% (2/11/2020). Current hospital diabetes medications:  Correctional lispro insulin TID AC. Normal sensitivity dose. Total daily dose insulin requirement previous day: 02/17/2020  None. Home diabetes medications:   Lispro sliding scale insulin.     Diet: Renal [facility] Labor and Delivery History and Physical    Brenda Bacon MRN# [id number]   Age: 24 year old YOB: 1998     Date of Admission: 2023    Primary care provider: Dayron Pineda           Chief Complaint:   Brenda Bacon is a 24 year old  female who is 38w3d pregnant and being admitted for  for breech presentation. Patient started wu on day of admission with cervical change during triage evaluation.           Pregnancy history:     OBSTETRIC HISTORY:    OB History    Para Term  AB Living   1 0 0 0 0 0   SAB IAB Ectopic Multiple Live Births   0 0 0 0 0      # Outcome Date GA Lbr Tan/2nd Weight Sex Delivery Anes PTL Lv   1 Current                EDC: Estimated Date of Delivery: 3/1/23    Prenatal Labs:   Lab Results   Component Value Date    AS Negative 2022    HEPBANG Nonreactive 2022    CHPCRT Negative 2019    GCPCRT Negative 2019    HGB 12.4 2022       GBS Status:   No results found for: GBS    Active Problem List  Patient Active Problem List   Diagnosis     Prenatal care, first pregnancy     Diet controlled gestational diabetes mellitus (GDM) in third trimester       Medication Prior to Admission  Medications Prior to Admission   Medication Sig Dispense Refill Last Dose     aspirin (ASA) 81 MG EC tablet Take 1 tablet (81 mg) by mouth daily 60 tablet 2 Past Week     Prenatal Vit-Fe Fumarate-FA (PRENATAL MULTIVITAMIN W/IRON) 27-0.8 MG tablet Take 1 tablet by mouth daily   2023     SENNA-docusate sodium (SENNA S) 8.6-50 MG tablet Take 1-2 tablets by mouth 2 times daily as needed 60 tablet 2 2023     acetone urine (KETOSTIX) test strip Use to test first morning urine for ketones. 50 strip 1      Alcohol Swabs PADS 1 each 4 times daily 100 each 1      blood glucose (NO BRAND SPECIFIED) lancets standard Use to test blood sugar 4 times daily or as directed. 1 each 3      blood  glucose (NO BRAND SPECIFIED) test strip Use to test blood sugar 4 times daily or as directed. 100 strip 3      blood glucose monitoring (NO BRAND SPECIFIED) meter device kit Use to test blood sugar 4 times daily or as directed. 1 kit 0    .        Maternal Past Medical History:     Past Medical History:   Diagnosis Date     Cat-scratch disease 4/20/2004     Lyme disease 11-02    also cat scratch disease                       Family History:   The family history includes Asthma in her paternal grandmother; Chronic Obstructive Pulmonary Disease in her paternal grandmother; Diabetes in her paternal grandfather; Heart Disease in her paternal grandfather; Hyperlipidemia in her father; Hypertension in her father; No Known Problems in her maternal grandmother, mother, and sister.    Family history   reviewed and updated in [facility]            Social History:     Social History     Socioeconomic History     Marital status:      Spouse name: Not on file     Number of children: Not on file     Years of education: Not on file     Highest education level: Not on file   Occupational History     Not on file   Tobacco Use     Smoking status: Never     Smokeless tobacco: Never     Tobacco comments:     NO NICOTINE EXPOSURE @ HOME   Vaping Use     Vaping Use: Never used   Substance and Sexual Activity     Alcohol use: Not Currently     Comment: social-quit with pregnancy     Drug use: No     Sexual activity: Yes     Partners: Male   Other Topics Concern     Parent/sibling w/ CABG, MI or angioplasty before 65F 55M? Not Asked   Social History Narrative     Not on file     Social Determinants of Health     Financial Resource Strain: Not on file   Food Insecurity: Not on file   Transportation Needs: Not on file   Physical Activity: Not on file   Stress: Not on file   Social Connections: Not on file   Intimate Partner Violence: Not on file   Housing Stability: Not on file            Review of Systems:   CONSTITUTIONAL: NEGATIVE for  regular; nutr suppl: Nepro all meals. Goals:  Blood glucose will be within target range of  mg/dL by 02/21/2020.     Education:  ___  Refer to Diabetes Education Record             _X__  Education not indicated at this time    Royal Aristides RN [facility]  Pager: [phone] fever, chills, change in weight  ENT/MOUTH: NEGATIVE for ear, mouth and throat problems  RESP: NEGATIVE for significant cough or SOB  CV: NEGATIVE for chest pain, palpitations or peripheral edema          Physical Exam:     Vitals were reviewed  Patient Vitals for the past 8 hrs:   BP Temp Temp src Resp SpO2   23 2327 -- 98.3  F (36.8  C) Oral 17 --   23 2200 136/87 -- -- 14 99 %     Constitutional:   awake, alert, cooperative, no apparent distress, and appears stated age     Lungs:   No increased work of breathing, good air exchange, clear to auscultation bilaterally, no crackles or wheezing     Cardiovascular:   Regular rate and rhythm, normal S1 and S2, no S3 or S4, and no murmur noted      Cervix:   Membranes: intact   Dilation: 2   Effacement: 90%   Station:-1   Consistency: soft   Position: Mid  Presentation:Breech  Fetal Heart Rate Tracing: reactive and reassuring  Tocometer: external monitor                       Assessment:   Brenda Bacon is a 38w3d pregnant female admitted for  due to breech presentation. Patient with history of GDM A1 well controlled with diet. Patient did follow with [facility] for SGA. The EFW was 16% on 23. Had an elevated blood pressure at initial OB appointment. Baseline preeclampsia labs were normal. Took baby aspirin during pregnancy with completion on 23. GBS negative. Blood type A positive. Other pregnancy labs unremarkable.     Patient was planning for elective  on 23, though started wu this afternoon and into the evening. Found to have cervical change from 1 cm to 2 cm within two hours with increased intensity of contractions. Discussed risks and benefits of  section with patient and patient decided to proceed with elective . She did have a protein based dinner requiring 8 hours of NPO prior to elective surgery.           Plan:   Admit - see IP orders  Pain medication - fentanyl   Prepare for   section  Discussed risks and benefits of  delivery, including bleeding, trauma, infection, and anesthesia. Patient expressed understanding and is willing to have a blood transfusion if necessary.     Rachele Chu, MS3  [employer]      I was present with the student who participated in the service and in the documentation of the note. I have verified the history and personally performed the physical exam and medical decision-making. I agree with the assessment and plan of care as documented in the note.    Electronically signed by:  Kendrick Delacruz MD  2023

## 2023-03-16 RX ORDER — ATORVASTATIN CALCIUM 20 MG/1
20 TABLET, FILM COATED ORAL
COMMUNITY
Start: 2022-01-05

## 2023-03-16 RX ORDER — METHADONE HYDROCHLORIDE 10 MG/1
95 TABLET ORAL
COMMUNITY

## 2023-03-16 NOTE — PROGRESS NOTES
PRE-SURGICAL INSTRUCTIONS        Patient's Name:  Allen Bonilla      FBPHU'K Date:  3/16/2023                Surgery Date:  03/22/2023                Do NOT eat or drink anything, including candy, gum, or ice chips after midnight on 03/22/2023, unless you have specific instructions from your surgeon or anesthesia provider to do so. You may brush your teeth before coming to the hospital.  No smoking 24 hours prior to the day of surgery. No alcohol 24 hours prior to the day of surgery. No recreational drugs for one week prior to the day of surgery. Leave all valuables, including money/purse, at home. Remove all jewelry, nail polish, acrylic nails, and makeup (including mascara); no lotions powders, deodorant, or perfume/cologne/after shave on the skin. Follow instruction for Hibiclens washes and CHG wipes from surgeon's office. Glasses/contact lenses and dentures may be worn to the hospital.  They will be removed prior to surgery. Call your doctor if symptoms of a cold or illness develop within 24-48 hours prior to your surgery. 11.  If you are having an outpatient procedure, please make arrangements for a responsible ADULT TO [address] [place] [address] and stay with you for 24 hours after your surgery. 12. ONE VISITOR in the hospital at this time for outpatient procedures. Exceptions may be made for surgical admissions, per nursing unit guidelines      Special Instructions:      Bring list of CURRENT medications. Bring any pertinent legal medical records. Take these medications the morning of surgery with a sip of water as instructed by office. Follow physician instructions about insulin. Complete bowel prep per MD instructions. On the day of surgery, come in the main entrance of [facility]. Let the  at the desk know you are there for surgery. A staff member will come escort you to the surgical area on the second floor.     If you have any questions or concerns, Estimated Blood Loss (Cc): minimal

## 2023-03-21 ENCOUNTER — ANESTHESIA EVENT (OUTPATIENT)
Facility: HOSPITAL | Age: 63
End: 2023-03-21
Payer: MEDICARE

## 2023-03-22 ENCOUNTER — HOSPITAL ENCOUNTER (OUTPATIENT)
Facility: HOSPITAL | Age: 63
Setting detail: OUTPATIENT SURGERY
Discharge: HOME | End: 2023-03-22
Attending: INTERNAL MEDICINE | Admitting: INTERNAL MEDICINE
Payer: MEDICARE

## 2023-03-22 ENCOUNTER — ANESTHESIA (OUTPATIENT)
Facility: HOSPITAL | Age: 63
End: 2023-03-22
Payer: MEDICARE

## 2023-03-22 VITALS
OXYGEN SATURATION: 100 % | HEART RATE: 55 BPM | HEIGHT: 72 IN | SYSTOLIC BLOOD PRESSURE: 118 MMHG | TEMPERATURE: 98.6 F | RESPIRATION RATE: 9 BRPM | DIASTOLIC BLOOD PRESSURE: 58 MMHG | WEIGHT: 166.2 LBS | BODY MASS INDEX: 22.51 KG/M2

## 2023-03-22 LAB — GLUCOSE BLD STRIP.AUTO-MCNC: 163 MG/DL (ref 70–110)

## 2023-03-22 PROCEDURE — 7100000000 HC PACU RECOVERY - FIRST 15 MIN: Performed by: INTERNAL MEDICINE

## 2023-03-22 PROCEDURE — 00811 ANES LWR INTST NDSC NOS: CPT | Performed by: ANESTHESIOLOGY

## 2023-03-22 PROCEDURE — 3600007502: Performed by: INTERNAL MEDICINE

## 2023-03-22 PROCEDURE — 7100000011 HC PHASE II RECOVERY - ADDTL 15 MIN: Performed by: INTERNAL MEDICINE

## 2023-03-22 PROCEDURE — 3600007512: Performed by: INTERNAL MEDICINE

## 2023-03-22 PROCEDURE — 6360000002 HC RX W HCPCS: Performed by: ANESTHESIOLOGY

## 2023-03-22 PROCEDURE — 3700000000 HC ANESTHESIA ATTENDED CARE: Performed by: INTERNAL MEDICINE

## 2023-03-22 PROCEDURE — 3700000001 HC ADD 15 MINUTES (ANESTHESIA): Performed by: INTERNAL MEDICINE

## 2023-03-22 PROCEDURE — 82962 GLUCOSE BLOOD TEST: CPT

## 2023-03-22 PROCEDURE — 2580000003 HC RX 258: Performed by: NURSE ANESTHETIST, CERTIFIED REGISTERED

## 2023-03-22 PROCEDURE — 2709999900 HC NON-CHARGEABLE SUPPLY: Performed by: INTERNAL MEDICINE

## 2023-03-22 PROCEDURE — 7100000010 HC PHASE II RECOVERY - FIRST 15 MIN: Performed by: INTERNAL MEDICINE

## 2023-03-22 RX ORDER — SODIUM CHLORIDE 0.9 % (FLUSH) 0.9 %
5-40 SYRINGE (ML) INJECTION PRN
Status: DISCONTINUED | OUTPATIENT
Start: 2023-03-22 | End: 2023-03-22 | Stop reason: HOSPADM

## 2023-03-22 RX ORDER — LIDOCAINE HYDROCHLORIDE 10 MG/ML
1 INJECTION, SOLUTION EPIDURAL; INFILTRATION; INTRACAUDAL; PERINEURAL
Status: DISCONTINUED | OUTPATIENT
Start: 2023-03-22 | End: 2023-03-22 | Stop reason: HOSPADM

## 2023-03-22 RX ORDER — SODIUM CHLORIDE 9 MG/ML
INJECTION, SOLUTION INTRAVENOUS PRN
Status: DISCONTINUED | OUTPATIENT
Start: 2023-03-22 | End: 2023-03-22 | Stop reason: HOSPADM

## 2023-03-22 RX ORDER — SODIUM CHLORIDE, SODIUM LACTATE, POTASSIUM CHLORIDE, CALCIUM CHLORIDE 600; 310; 30; 20 MG/100ML; MG/100ML; MG/100ML; MG/100ML
INJECTION, SOLUTION INTRAVENOUS CONTINUOUS
Status: DISCONTINUED | OUTPATIENT
Start: 2023-03-22 | End: 2023-03-22 | Stop reason: HOSPADM

## 2023-03-22 RX ORDER — PROPOFOL 10 MG/ML
INJECTION, EMULSION INTRAVENOUS PRN
Status: DISCONTINUED | OUTPATIENT
Start: 2023-03-22 | End: 2023-03-22 | Stop reason: SDUPTHER

## 2023-03-22 RX ORDER — SODIUM CHLORIDE 0.9 % (FLUSH) 0.9 %
5-40 SYRINGE (ML) INJECTION EVERY 12 HOURS SCHEDULED
Status: DISCONTINUED | OUTPATIENT
Start: 2023-03-22 | End: 2023-03-22 | Stop reason: HOSPADM

## 2023-03-22 RX ORDER — FAMOTIDINE 20 MG/1
20 TABLET, FILM COATED ORAL ONCE
Status: DISCONTINUED | OUTPATIENT
Start: 2023-03-22 | End: 2023-03-22

## 2023-03-22 RX ORDER — DEXTROSE MONOHYDRATE 100 MG/ML
INJECTION, SOLUTION INTRAVENOUS CONTINUOUS PRN
Status: DISCONTINUED | OUTPATIENT
Start: 2023-03-22 | End: 2023-03-22 | Stop reason: HOSPADM

## 2023-03-22 RX ADMIN — PROPOFOL 100 MG: 10 INJECTION, EMULSION INTRAVENOUS at 08:48

## 2023-03-22 RX ADMIN — SODIUM CHLORIDE, POTASSIUM CHLORIDE, SODIUM LACTATE AND CALCIUM CHLORIDE: 600; 310; 30; 20 INJECTION, SOLUTION INTRAVENOUS at 08:40

## 2023-03-22 RX ADMIN — PROPOFOL 50 MG: 10 INJECTION, EMULSION INTRAVENOUS at 08:58

## 2023-03-22 NOTE — BRIEF OP NOTE
[address]  [facility], [address]      Brief Procedure Note    Michaelle Londono  1960  [id number]    Date of Procedure: 3/22/2023     Preoperative diagnosis: Hepatic cirrhosis, unspecified hepatic cirrhosis type, unspecified whether ascites present ([facility] [K74.60]  Personal history of colonic polyps [Z86.010]    Postoperative diagnosis: Hepatic cirrhosis, unspecified hepatic cirrhosis type, unspecified whether ascites present ([facility] [K74.60]  Personal history of colonic polyps [Z86.010]    Type of Anesthesia: MAC (Monitored anesthesia care)    Description of findings: same as post op dx    Procedure: Procedure(s):  COLONOSCOPY    :  Dr. Mansi Narayanan MD    Assistant(s): Circulator: Rosalba Yeager RN; Jerrica Jimenez RN    Devices/implants/grafts/tissues/prosthesis: None    EBL:None    Specimens: * No specimens in log *    Findings: See printed and scanned procedure note    Complications: None    Dr. Mansi Narayanan MD  3/22/2023  9:12 AM

## 2023-03-22 NOTE — DISCHARGE INSTRUCTIONS
health. Obesity, smoking, and a sedentary lifestyle greatly increase your risk for illness. A healthy diet, regular physical exercise & weight monitoring are important for maintaining a healthy lifestyle  You may be retaining fluid if you have a history of heart failure or if you experience any of the following symptoms:  Weight gain of 3 pounds or more overnight or 5 pounds in a week, increased swelling in our hands or feet or shortness of breath while lying flat in bed. Please call your doctor as soon as you notice any of these symptoms; do not wait until your next office visit. Recognize signs and symptoms of STROKE:  F  -  Face looks uneven  A  -  Arms unable to move or move unevenly  S  -  Speech slurred or non-existent  T  -  Time to call 911 - as soon as signs and symptoms begin - DO NOT go back to bed or wait to see If you get better - TIME IS BRAIN. Colorectal Screening  Colorectal cancer almost always develops from precancerous polyps (abnormal growths) in the colon or rectum. Screening tests can find precancerous polyps, so that they can be removed before they turn into cancer. Screening tests can also find colorectal cancer early, when treatment works best.  Speak with your physician about when you should begin screening and how often you should be tested. [employer] Activation    Thank you for requesting access to [employer]. Please follow the instructions below to securely access and download your online medical record. [employer] allows you to send messages to your doctor, view your test results, renew your prescriptions, schedule appointments, and more. How Do I Sign Up? In your internet browser, go to [email or web address]. Click on the First Time User? Click Here link in the Sign In box. You will see the New Member Sign Up page. Enter your [employer] Access Code exactly as it appears below.  You will not need to use this code after youve completed the sign-up

## 2023-03-22 NOTE — H&P
2K; [address] [employer]  just recently furloughed; No  service     Surgical H:   Past Surgical History:   Procedure Laterality Date    CIRCUMCISION  12/17/13    Dr. Alberto Ardon, [facility]    COLONOSCOPY N/A 10/3/2019    COLONOSCOPY / polypectomy performed by Yasir Samayoa MD at [facility]    CT BIOPSY RENAL  4/9/2018    CT BIOPSY RENAL 4/9/2018 [facility] RAD CT    INSJ TUNNELED CVC W/O SUBQ PORT/ AGE 5 YR/> N/A 5/6/2020    INSERTION TUNNELED CENTRAL VENOUS CATHETER/perm catheter exchange performed by John Hdz MD at [address] [place] [address]    IR BIOPSY [address] BONE  4/6/2018    IR BIOPSY PERC SUPERF BONE 4/6/2018 [facility] RAD ANGIO IR    IR TUNNELED CATHETER PLACEMENT GREATER THAN 5 YEARS  2/18/2020    IR TUNNELED CATHETER PLACEMENT GREATER THAN 5 YEARS 2/18/2020 [facility] RAD ANGIO IR    IR TUNNELED CATHETER PLACEMENT GREATER THAN 5 YEARS  2/18/2020    KIDNEY REMOVAL Left 12/17/13    Open/ Partial,nephrectomy    NEUROLOGICAL SURGERY  2017    neck surgery-abcess-hardware neck    OTHER SURGICAL HISTORY  04/2017    abscess removed from back of neck     OTHER SURGICAL HISTORY Right 2/27/2016    removed right ft  2nd meta-tarsal    REMOVAL WITH INSERTION OF SUPRAPUBIC CATHETER  2018       ROS: negative    Physical Exam: BP (!) 158/73   Pulse 59   Temp 98.2 °F (36.8 °C) (Oral)   Resp (!) 9   Ht 6' (1.829 m)   Wt 166 lb 3.2 oz (75.4 kg)   SpO2 100%   BMI 22.54 kg/m²   General appearance: alert, no distress  Eyes: pupils equal and reactive, extraocular eye movements intact  Nodes: No gross adenopathy in neck.   Skin: no spider angiomata, jaundice, palmar erythema   Respiratory: clear to auscultation bilaterally  Cardiovascular: regular heart rate, no murmurs, no JVD, normal rate and regular rhythm  Abdomen: soft, non-tender, liver not enlarged, spleen not palpable, no obvious ascites  Extremities: no muscle wasting, no gross arthritic changes  Neurologic: alert and oriented, cranial nerves grossly intact, no

## 2023-03-22 NOTE — ANESTHESIA PRE PROCEDURE
SURGICAL HISTORY  04/2017    abscess removed from back of neck     OTHER SURGICAL HISTORY Right 2/27/2016    removed right ft  2nd meta-tarsal    REMOVAL WITH INSERTION OF SUPRAPUBIC CATHETER  2018       Social History:    Social History     Tobacco Use    Smoking status: Some Days     Packs/day: 0.25     Types: Cigarettes    Smokeless tobacco: Never   Substance Use Topics    Alcohol use: Yes     Comment: occ                                Ready to quit: Not Answered  Counseling given: Not Answered      Vital Signs (Current):   Vitals:    03/16/23 1317   Weight: 165 lb (74.8 kg)   Height: 6' (1.829 m)                                              BP Readings from Last 3 Encounters:   05/11/22 138/82   05/04/22 132/71   05/02/22 (!) 154/97       NPO Status: Time of last liquid consumption: 0400                        Time of last solid consumption: 2200                        Date of last liquid consumption: 03/22/23                        Date of last solid food consumption: 03/20/23    BMI:   Wt Readings from Last 3 Encounters:   03/16/23 165 lb (74.8 kg)   11/02/22 175 lb (79.4 kg)   04/29/22 175 lb (79.4 kg)     Body mass index is 22.38 kg/m².     CBC:   Lab Results   Component Value Date/Time    WBC 7.4 03/14/2022 05:28 AM    RBC 2.90 03/14/2022 05:28 AM    HGB 9.0 03/14/2022 05:28 AM    HCT 27.8 03/14/2022 05:28 AM    MCV 95.9 03/14/2022 05:28 AM    RDW 14.6 03/14/2022 05:28 AM     03/14/2022 05:28 AM       CMP:   Lab Results   Component Value Date/Time     03/14/2022 05:28 AM    K 4.5 03/14/2022 05:28 AM     03/14/2022 05:28 AM    CO2 29 03/14/2022 05:28 AM    BUN 35 03/14/2022 05:28 AM    CREATININE 5.68 03/14/2022 05:28 AM    GFRAA 12 03/14/2022 05:28 AM    AGRATIO 0.8 03/14/2022 05:28 AM    LABGLOM 12 03/07/2022 02:04 PM    GLUCOSE 134 03/14/2022 05:28 AM    PROT 6.4 03/14/2022 05:28 AM    CALCIUM 8.7 03/14/2022 05:28 AM    BILITOT 0.5 03/14/2022 05:28 AM    ALKPHOS 76 03/14/2022 05:28

## 2023-03-22 NOTE — ANESTHESIA POSTPROCEDURE EVALUATION
Department of Anesthesiology  Postprocedure Note    Patient: Claudia Raza  MRN: [id number]  YOB: 1960  Date of evaluation: 3/22/2023      Procedure Summary     Date: 03/22/23 Room / Location: [facility] 03 / [facility]    Anesthesia Start: 0843 Anesthesia Stop: 0903    Procedure: COLONOSCOPY (Abdomen) Diagnosis:       Hepatic cirrhosis, unspecified hepatic cirrhosis type, unspecified whether ascites present ([address]      Personal history of colonic polyps      (Hepatic cirrhosis, unspecified hepatic cirrhosis type, unspecified whether ascites present ([address] [K74.60])      (Personal history of colonic polyps [Z86.010])    Surgeons: Vanesa Verma MD Responsible Provider: Joleen Bhatia MD    Anesthesia Type: MAC ASA Status: 3          Anesthesia Type: MAC    Tammy Phase I:      Tammy Phase II:        Anesthesia Post Evaluation    Patient location during evaluation: bedside  Patient participation: complete - patient participated  Level of consciousness: awake  Airway patency: patent  Nausea & Vomiting: no nausea  Complications: no  Cardiovascular status: hemodynamically stable  Respiratory status: acceptable  Hydration status: euvolemic  Multimodal analgesia pain management approach

## 2023-06-11 ENCOUNTER — HOSPITAL ENCOUNTER (INPATIENT)
Facility: HOSPITAL | Age: 63
LOS: 2 days | Discharge: HOME | DRG: 255 | End: 2023-06-15
Attending: EMERGENCY MEDICINE | Admitting: STUDENT IN AN ORGANIZED HEALTH CARE EDUCATION/TRAINING PROGRAM
Payer: MEDICARE

## 2023-06-11 ENCOUNTER — APPOINTMENT (OUTPATIENT)
Facility: HOSPITAL | Age: 63
DRG: 255 | End: 2023-06-11
Payer: MEDICARE

## 2023-06-11 DIAGNOSIS — M86.272 SUBACUTE OSTEOMYELITIS OF LEFT FOOT (HCC): Primary | ICD-10-CM

## 2023-06-11 DIAGNOSIS — L08.9 TOE INFECTION: ICD-10-CM

## 2023-06-11 DIAGNOSIS — M86.9 OSTEOMYELITIS OF SECOND TOE OF LEFT FOOT (HCC): ICD-10-CM

## 2023-06-11 DIAGNOSIS — M86.9 OSTEOMYELITIS OF LEFT FOOT, UNSPECIFIED TYPE (HCC): ICD-10-CM

## 2023-06-11 DIAGNOSIS — R00.1 BRADYCARDIA: ICD-10-CM

## 2023-06-11 LAB
ANION GAP SERPL CALC-SCNC: 5 MMOL/L (ref 3–18)
BASOPHILS # BLD: 0 K/UL (ref 0–0.1)
BASOPHILS NFR BLD: 0 % (ref 0–2)
BUN SERPL-MCNC: 24 MG/DL (ref 7–18)
BUN/CREAT SERPL: 6 (ref 12–20)
CALCIUM SERPL-MCNC: 9 MG/DL (ref 8.5–10.1)
CHLORIDE SERPL-SCNC: 101 MMOL/L (ref 100–111)
CO2 SERPL-SCNC: 29 MMOL/L (ref 21–32)
CREAT SERPL-MCNC: 4.18 MG/DL (ref 0.6–1.3)
DIFFERENTIAL METHOD BLD: ABNORMAL
EOSINOPHIL # BLD: 0.1 K/UL (ref 0–0.4)
EOSINOPHIL NFR BLD: 1 % (ref 0–5)
ERYTHROCYTE [DISTWIDTH] IN BLOOD BY AUTOMATED COUNT: 13.3 % (ref 11.6–14.5)
EST. AVERAGE GLUCOSE BLD GHB EST-MCNC: 200 MG/DL
GLUCOSE BLD STRIP.AUTO-MCNC: 167 MG/DL (ref 70–110)
GLUCOSE BLD STRIP.AUTO-MCNC: 304 MG/DL (ref 70–110)
GLUCOSE SERPL-MCNC: 175 MG/DL (ref 74–99)
HBA1C MFR BLD: 8.6 % (ref 4.2–5.6)
HCT VFR BLD AUTO: 31.7 % (ref 36–48)
HGB BLD-MCNC: 10.4 G/DL (ref 13–16)
IMM GRANULOCYTES # BLD AUTO: 0.1 K/UL (ref 0–0.04)
IMM GRANULOCYTES NFR BLD AUTO: 1 % (ref 0–0.5)
LACTATE SERPL-SCNC: 1.3 MMOL/L (ref 0.4–2)
LYMPHOCYTES # BLD: 1.4 K/UL (ref 0.9–3.6)
LYMPHOCYTES NFR BLD: 21 % (ref 21–52)
MCH RBC QN AUTO: 31.8 PG (ref 24–34)
MCHC RBC AUTO-ENTMCNC: 32.8 G/DL (ref 31–37)
MCV RBC AUTO: 96.9 FL (ref 78–100)
MONOCYTES # BLD: 0.5 K/UL (ref 0.05–1.2)
MONOCYTES NFR BLD: 8 % (ref 3–10)
NEUTS SEG # BLD: 4.6 K/UL (ref 1.8–8)
NEUTS SEG NFR BLD: 69 % (ref 40–73)
NRBC # BLD: 0 K/UL (ref 0–0.01)
NRBC BLD-RTO: 0 PER 100 WBC
PLATELET # BLD AUTO: 94 K/UL (ref 135–420)
PMV BLD AUTO: 11 FL (ref 9.2–11.8)
POTASSIUM SERPL-SCNC: 4.4 MMOL/L (ref 3.5–5.5)
RBC # BLD AUTO: 3.27 M/UL (ref 4.35–5.65)
SODIUM SERPL-SCNC: 135 MMOL/L (ref 136–145)
T4 FREE SERPL-MCNC: 1 NG/DL (ref 0.7–1.5)
TSH SERPL DL<=0.05 MIU/L-ACNC: 3.16 UIU/ML (ref 0.36–3.74)
WBC # BLD AUTO: 6.7 K/UL (ref 4.6–13.2)

## 2023-06-11 PROCEDURE — 84443 ASSAY THYROID STIM HORMONE: CPT

## 2023-06-11 PROCEDURE — 6360000002 HC RX W HCPCS: Performed by: STUDENT IN AN ORGANIZED HEALTH CARE EDUCATION/TRAINING PROGRAM

## 2023-06-11 PROCEDURE — 96365 THER/PROPH/DIAG IV INF INIT: CPT

## 2023-06-11 PROCEDURE — 96367 TX/PROPH/DG ADDL SEQ IV INF: CPT

## 2023-06-11 PROCEDURE — 96366 THER/PROPH/DIAG IV INF ADDON: CPT

## 2023-06-11 PROCEDURE — 99285 EMERGENCY DEPT VISIT HI MDM: CPT

## 2023-06-11 PROCEDURE — 6370000000 HC RX 637 (ALT 250 FOR IP): Performed by: STUDENT IN AN ORGANIZED HEALTH CARE EDUCATION/TRAINING PROGRAM

## 2023-06-11 PROCEDURE — 83036 HEMOGLOBIN GLYCOSYLATED A1C: CPT

## 2023-06-11 PROCEDURE — 2500000003 HC RX 250 WO HCPCS: Performed by: STUDENT IN AN ORGANIZED HEALTH CARE EDUCATION/TRAINING PROGRAM

## 2023-06-11 PROCEDURE — 83605 ASSAY OF LACTIC ACID: CPT

## 2023-06-11 PROCEDURE — 6360000002 HC RX W HCPCS: Performed by: EMERGENCY MEDICINE

## 2023-06-11 PROCEDURE — 2580000003 HC RX 258: Performed by: EMERGENCY MEDICINE

## 2023-06-11 PROCEDURE — 80048 BASIC METABOLIC PNL TOTAL CA: CPT

## 2023-06-11 PROCEDURE — 85025 COMPLETE CBC W/AUTO DIFF WBC: CPT

## 2023-06-11 PROCEDURE — 84439 ASSAY OF FREE THYROXINE: CPT

## 2023-06-11 PROCEDURE — 2580000003 HC RX 258: Performed by: STUDENT IN AN ORGANIZED HEALTH CARE EDUCATION/TRAINING PROGRAM

## 2023-06-11 PROCEDURE — 87040 BLOOD CULTURE FOR BACTERIA: CPT

## 2023-06-11 PROCEDURE — G0378 HOSPITAL OBSERVATION PER HR: HCPCS

## 2023-06-11 PROCEDURE — 96375 TX/PRO/DX INJ NEW DRUG ADDON: CPT

## 2023-06-11 PROCEDURE — 82962 GLUCOSE BLOOD TEST: CPT

## 2023-06-11 PROCEDURE — 73660 X-RAY EXAM OF TOE(S): CPT

## 2023-06-11 RX ORDER — POLYETHYLENE GLYCOL 3350 17 G/17G
17 POWDER, FOR SOLUTION ORAL DAILY PRN
Status: DISCONTINUED | OUTPATIENT
Start: 2023-06-11 | End: 2023-06-15 | Stop reason: HOSPADM

## 2023-06-11 RX ORDER — INSULIN LISPRO 100 [IU]/ML
0-8 INJECTION, SOLUTION INTRAVENOUS; SUBCUTANEOUS
Status: DISCONTINUED | OUTPATIENT
Start: 2023-06-11 | End: 2023-06-15 | Stop reason: HOSPADM

## 2023-06-11 RX ORDER — ONDANSETRON 2 MG/ML
4 INJECTION INTRAMUSCULAR; INTRAVENOUS EVERY 6 HOURS PRN
Status: DISCONTINUED | OUTPATIENT
Start: 2023-06-11 | End: 2023-06-15 | Stop reason: HOSPADM

## 2023-06-11 RX ORDER — SODIUM CHLORIDE 0.9 % (FLUSH) 0.9 %
5-40 SYRINGE (ML) INJECTION PRN
Status: DISCONTINUED | OUTPATIENT
Start: 2023-06-11 | End: 2023-06-15 | Stop reason: HOSPADM

## 2023-06-11 RX ORDER — DEXTROSE MONOHYDRATE 100 MG/ML
INJECTION, SOLUTION INTRAVENOUS CONTINUOUS PRN
Status: DISCONTINUED | OUTPATIENT
Start: 2023-06-11 | End: 2023-06-14 | Stop reason: SDUPTHER

## 2023-06-11 RX ORDER — ACETAMINOPHEN 325 MG/1
650 TABLET ORAL EVERY 6 HOURS PRN
Status: DISCONTINUED | OUTPATIENT
Start: 2023-06-11 | End: 2023-06-15 | Stop reason: HOSPADM

## 2023-06-11 RX ORDER — SODIUM CHLORIDE 9 MG/ML
INJECTION, SOLUTION INTRAVENOUS PRN
Status: DISCONTINUED | OUTPATIENT
Start: 2023-06-11 | End: 2023-06-15 | Stop reason: HOSPADM

## 2023-06-11 RX ORDER — ATORVASTATIN CALCIUM 20 MG/1
20 TABLET, FILM COATED ORAL NIGHTLY
Status: DISCONTINUED | OUTPATIENT
Start: 2023-06-11 | End: 2023-06-15 | Stop reason: HOSPADM

## 2023-06-11 RX ORDER — SODIUM CHLORIDE 0.9 % (FLUSH) 0.9 %
5-40 SYRINGE (ML) INJECTION EVERY 12 HOURS SCHEDULED
Status: DISCONTINUED | OUTPATIENT
Start: 2023-06-11 | End: 2023-06-15 | Stop reason: HOSPADM

## 2023-06-11 RX ORDER — INSULIN LISPRO 100 [IU]/ML
0-4 INJECTION, SOLUTION INTRAVENOUS; SUBCUTANEOUS NIGHTLY
Status: DISCONTINUED | OUTPATIENT
Start: 2023-06-11 | End: 2023-06-15 | Stop reason: HOSPADM

## 2023-06-11 RX ORDER — HYDRALAZINE HYDROCHLORIDE 25 MG/1
25 TABLET, FILM COATED ORAL EVERY 8 HOURS PRN
Status: DISCONTINUED | OUTPATIENT
Start: 2023-06-11 | End: 2023-06-15 | Stop reason: HOSPADM

## 2023-06-11 RX ORDER — ACETAMINOPHEN 650 MG/1
650 SUPPOSITORY RECTAL EVERY 6 HOURS PRN
Status: DISCONTINUED | OUTPATIENT
Start: 2023-06-11 | End: 2023-06-15 | Stop reason: HOSPADM

## 2023-06-11 RX ORDER — LEVOTHYROXINE SODIUM 0.1 MG/1
100 TABLET ORAL DAILY
Status: DISCONTINUED | OUTPATIENT
Start: 2023-06-12 | End: 2023-06-15 | Stop reason: HOSPADM

## 2023-06-11 RX ORDER — ONDANSETRON 4 MG/1
4 TABLET, ORALLY DISINTEGRATING ORAL EVERY 8 HOURS PRN
Status: DISCONTINUED | OUTPATIENT
Start: 2023-06-11 | End: 2023-06-15 | Stop reason: HOSPADM

## 2023-06-11 RX ORDER — NEPHROCAP 1 MG
1 CAP ORAL DAILY
Status: DISCONTINUED | OUTPATIENT
Start: 2023-06-11 | End: 2023-06-15 | Stop reason: HOSPADM

## 2023-06-11 RX ORDER — CALCIUM ACETATE 667 MG/1
667 CAPSULE ORAL
Status: DISCONTINUED | OUTPATIENT
Start: 2023-06-11 | End: 2023-06-15 | Stop reason: HOSPADM

## 2023-06-11 RX ADMIN — SODIUM CHLORIDE, PRESERVATIVE FREE 10 ML: 5 INJECTION INTRAVENOUS at 21:13

## 2023-06-11 RX ADMIN — PIPERACILLIN AND TAZOBACTAM 4500 MG: 4; .5 INJECTION, POWDER, FOR SOLUTION INTRAVENOUS at 14:21

## 2023-06-11 RX ADMIN — VANCOMYCIN HYDROCHLORIDE 1500 MG: 10 INJECTION, POWDER, LYOPHILIZED, FOR SOLUTION INTRAVENOUS at 15:08

## 2023-06-11 RX ADMIN — PIPERACILLIN AND TAZOBACTAM 3375 MG: 3; .375 INJECTION, POWDER, LYOPHILIZED, FOR SOLUTION INTRAVENOUS at 21:32

## 2023-06-11 RX ADMIN — ATORVASTATIN CALCIUM 20 MG: 20 TABLET, FILM COATED ORAL at 21:11

## 2023-06-11 RX ADMIN — CALCIUM ACETATE 667 MG: 667 CAPSULE ORAL at 18:20

## 2023-06-11 RX ADMIN — INSULIN LISPRO 4 UNITS: 100 INJECTION, SOLUTION INTRAVENOUS; SUBCUTANEOUS at 21:35

## 2023-06-11 ASSESSMENT — PAIN - FUNCTIONAL ASSESSMENT: PAIN_FUNCTIONAL_ASSESSMENT: NONE - DENIES PAIN

## 2023-06-11 ASSESSMENT — PAIN SCALES - GENERAL
PAINLEVEL_OUTOF10: 0
PAINLEVEL_OUTOF10: 0

## 2023-06-12 ENCOUNTER — APPOINTMENT (OUTPATIENT)
Facility: HOSPITAL | Age: 63
DRG: 255 | End: 2023-06-12
Attending: STUDENT IN AN ORGANIZED HEALTH CARE EDUCATION/TRAINING PROGRAM
Payer: MEDICARE

## 2023-06-12 ENCOUNTER — APPOINTMENT (OUTPATIENT)
Facility: HOSPITAL | Age: 63
DRG: 255 | End: 2023-06-12
Attending: FAMILY MEDICINE
Payer: MEDICARE

## 2023-06-12 PROBLEM — Z99.2 ESRD (END STAGE RENAL DISEASE) ON DIALYSIS (HCC): Status: ACTIVE | Noted: 2020-06-01

## 2023-06-12 PROBLEM — N18.6 ESRD (END STAGE RENAL DISEASE) ON DIALYSIS (HCC): Status: ACTIVE | Noted: 2020-06-01

## 2023-06-12 PROBLEM — N25.81 SECONDARY HYPERPARATHYROIDISM OF RENAL ORIGIN (HCC): Status: ACTIVE | Noted: 2020-02-17

## 2023-06-12 PROBLEM — T82.41XA HEMODIALYSIS CATHETER DYSFUNCTION (HCC): Status: ACTIVE | Noted: 2020-05-06

## 2023-06-12 LAB
ALBUMIN SERPL-MCNC: 2.6 G/DL (ref 3.4–5)
ALBUMIN/GLOB SERPL: 0.7 (ref 0.8–1.7)
ALP SERPL-CCNC: 97 U/L (ref 45–117)
ALT SERPL-CCNC: 14 U/L (ref 16–61)
AMPHET UR QL SCN: NEGATIVE
ANION GAP SERPL CALC-SCNC: 6 MMOL/L (ref 3–18)
AST SERPL-CCNC: 11 U/L (ref 10–38)
BARBITURATES UR QL SCN: NEGATIVE
BASOPHILS # BLD: 0 K/UL (ref 0–0.1)
BASOPHILS NFR BLD: 1 % (ref 0–2)
BENZODIAZ UR QL: NEGATIVE
BILIRUB SERPL-MCNC: 0.4 MG/DL (ref 0.2–1)
BUN SERPL-MCNC: 29 MG/DL (ref 7–18)
BUN/CREAT SERPL: 6 (ref 12–20)
CALCIUM SERPL-MCNC: 8.8 MG/DL (ref 8.5–10.1)
CANNABINOIDS UR QL SCN: NEGATIVE
CHLORIDE SERPL-SCNC: 102 MMOL/L (ref 100–111)
CO2 SERPL-SCNC: 28 MMOL/L (ref 21–32)
COCAINE UR QL SCN: NEGATIVE
CREAT SERPL-MCNC: 4.53 MG/DL (ref 0.6–1.3)
DIFFERENTIAL METHOD BLD: ABNORMAL
ECHO AO ROOT DIAM: 3.1 CM
ECHO AO ROOT INDEX: 1.58 CM/M2
ECHO AV AREA PEAK VELOCITY: 2.2 CM2
ECHO AV AREA VTI: 2.3 CM2
ECHO AV AREA/BSA PEAK VELOCITY: 1.1 CM2/M2
ECHO AV AREA/BSA VTI: 1.2 CM2/M2
ECHO AV MEAN GRADIENT: 4 MMHG
ECHO AV MEAN VELOCITY: 0.9 M/S
ECHO AV PEAK GRADIENT: 8 MMHG
ECHO AV PEAK VELOCITY: 1.4 M/S
ECHO AV VELOCITY RATIO: 0.71
ECHO AV VTI: 32.1 CM
ECHO BSA: 1.95 M2
ECHO LA VOL 2C: 14 ML (ref 18–58)
ECHO LA VOL 4C: 27 ML (ref 18–58)
ECHO LA VOL BP: 25 ML (ref 18–58)
ECHO LA VOL/BSA BIPLANE: 13 ML/M2 (ref 16–34)
ECHO LA VOLUME AREA LENGTH: 27 ML
ECHO LA VOLUME INDEX A2C: 7 ML/M2 (ref 16–34)
ECHO LA VOLUME INDEX A4C: 14 ML/M2 (ref 16–34)
ECHO LA VOLUME INDEX AREA LENGTH: 14 ML/M2 (ref 16–34)
ECHO LV E' LATERAL VELOCITY: 9 CM/S
ECHO LV E' SEPTAL VELOCITY: 7 CM/S
ECHO LV FRACTIONAL SHORTENING: 45 % (ref 28–44)
ECHO LV INTERNAL DIMENSION DIASTOLE INDEX: 2.86 CM/M2
ECHO LV INTERNAL DIMENSION DIASTOLIC: 5.6 CM (ref 4.2–5.9)
ECHO LV INTERNAL DIMENSION SYSTOLIC INDEX: 1.58 CM/M2
ECHO LV INTERNAL DIMENSION SYSTOLIC: 3.1 CM
ECHO LV IVSD: 1.7 CM (ref 0.6–1)
ECHO LV MASS 2D: 313.2 G (ref 88–224)
ECHO LV MASS INDEX 2D: 159.8 G/M2 (ref 49–115)
ECHO LV POSTERIOR WALL DIASTOLIC: 0.9 CM (ref 0.6–1)
ECHO LV RELATIVE WALL THICKNESS RATIO: 0.32
ECHO LVOT AREA: 3.1 CM2
ECHO LVOT AV VTI INDEX: 0.75
ECHO LVOT DIAM: 2 CM
ECHO LVOT MEAN GRADIENT: 2 MMHG
ECHO LVOT PEAK GRADIENT: 4 MMHG
ECHO LVOT PEAK VELOCITY: 1 M/S
ECHO LVOT STROKE VOLUME INDEX: 38.4 ML/M2
ECHO LVOT SV: 75.4 ML
ECHO LVOT VTI: 24 CM
ECHO MV A VELOCITY: 0.74 M/S
ECHO MV E DECELERATION TIME (DT): 182.7 MS
ECHO MV E VELOCITY: 0.82 M/S
ECHO MV E/A RATIO: 1.11
ECHO MV E/E' LATERAL: 9.11
ECHO MV E/E' RATIO (AVERAGED): 10.41
ECHO MV E/E' SEPTAL: 11.71
ECHO PV MAX VELOCITY: 1.1 M/S
ECHO PV PEAK GRADIENT: 5 MMHG
ECHO RV FREE WALL PEAK S': 12 CM/S
ECHO RV TAPSE: 2.8 CM (ref 1.7–?)
EKG ATRIAL RATE: 60 BPM
EKG DIAGNOSIS: NORMAL
EKG P AXIS: 24 DEGREES
EKG P-R INTERVAL: 198 MS
EKG Q-T INTERVAL: 462 MS
EKG QRS DURATION: 108 MS
EKG QTC CALCULATION (BAZETT): 462 MS
EKG R AXIS: -7 DEGREES
EKG T AXIS: 43 DEGREES
EKG VENTRICULAR RATE: 60 BPM
EOSINOPHIL # BLD: 0.1 K/UL (ref 0–0.4)
EOSINOPHIL NFR BLD: 2 % (ref 0–5)
ERYTHROCYTE [DISTWIDTH] IN BLOOD BY AUTOMATED COUNT: 13.4 % (ref 11.6–14.5)
EST. AVERAGE GLUCOSE BLD GHB EST-MCNC: 200 MG/DL
FOLATE SERPL-MCNC: 7.3 NG/ML (ref 3.1–17.5)
GLOBULIN SER CALC-MCNC: 3.8 G/DL (ref 2–4)
GLUCOSE BLD STRIP.AUTO-MCNC: 173 MG/DL (ref 70–110)
GLUCOSE BLD STRIP.AUTO-MCNC: 173 MG/DL (ref 70–110)
GLUCOSE BLD STRIP.AUTO-MCNC: 202 MG/DL (ref 70–110)
GLUCOSE BLD STRIP.AUTO-MCNC: 312 MG/DL (ref 70–110)
GLUCOSE SERPL-MCNC: 211 MG/DL (ref 74–99)
HBA1C MFR BLD: 8.6 % (ref 4.2–5.6)
HCT VFR BLD AUTO: 30.7 % (ref 36–48)
HGB BLD-MCNC: 9.8 G/DL (ref 13–16)
IMM GRANULOCYTES # BLD AUTO: 0.1 K/UL (ref 0–0.04)
IMM GRANULOCYTES NFR BLD AUTO: 1 % (ref 0–0.5)
LYMPHOCYTES # BLD: 1.9 K/UL (ref 0.9–3.6)
LYMPHOCYTES NFR BLD: 32 % (ref 21–52)
Lab: ABNORMAL
MAGNESIUM SERPL-MCNC: 2.2 MG/DL (ref 1.6–2.6)
MCH RBC QN AUTO: 31.6 PG (ref 24–34)
MCHC RBC AUTO-ENTMCNC: 31.9 G/DL (ref 31–37)
MCV RBC AUTO: 99 FL (ref 78–100)
METHADONE UR QL: POSITIVE
MONOCYTES # BLD: 0.5 K/UL (ref 0.05–1.2)
MONOCYTES NFR BLD: 9 % (ref 3–10)
NEUTS SEG # BLD: 3.4 K/UL (ref 1.8–8)
NEUTS SEG NFR BLD: 56 % (ref 40–73)
NRBC # BLD: 0 K/UL (ref 0–0.01)
NRBC BLD-RTO: 0 PER 100 WBC
OPIATES UR QL: NEGATIVE
PCP UR QL: NEGATIVE
PHOSPHATE SERPL-MCNC: 2.9 MG/DL (ref 2.5–4.9)
PLATELET # BLD AUTO: 92 K/UL (ref 135–420)
PMV BLD AUTO: 10.8 FL (ref 9.2–11.8)
POTASSIUM SERPL-SCNC: 4 MMOL/L (ref 3.5–5.5)
PROT SERPL-MCNC: 6.4 G/DL (ref 6.4–8.2)
RBC # BLD AUTO: 3.1 M/UL (ref 4.35–5.65)
SODIUM SERPL-SCNC: 136 MMOL/L (ref 136–145)
VIT B12 SERPL-MCNC: 412 PG/ML (ref 211–911)
WBC # BLD AUTO: 6 K/UL (ref 4.6–13.2)

## 2023-06-12 PROCEDURE — 99215 OFFICE O/P EST HI 40 MIN: CPT | Performed by: INTERNAL MEDICINE

## 2023-06-12 PROCEDURE — 85025 COMPLETE CBC W/AUTO DIFF WBC: CPT

## 2023-06-12 PROCEDURE — 82962 GLUCOSE BLOOD TEST: CPT

## 2023-06-12 PROCEDURE — 6370000000 HC RX 637 (ALT 250 FOR IP): Performed by: STUDENT IN AN ORGANIZED HEALTH CARE EDUCATION/TRAINING PROGRAM

## 2023-06-12 PROCEDURE — 36415 COLL VENOUS BLD VENIPUNCTURE: CPT

## 2023-06-12 PROCEDURE — 2500000003 HC RX 250 WO HCPCS: Performed by: STUDENT IN AN ORGANIZED HEALTH CARE EDUCATION/TRAINING PROGRAM

## 2023-06-12 PROCEDURE — 96366 THER/PROPH/DIAG IV INF ADDON: CPT

## 2023-06-12 PROCEDURE — 96375 TX/PRO/DX INJ NEW DRUG ADDON: CPT

## 2023-06-12 PROCEDURE — 80053 COMPREHEN METABOLIC PANEL: CPT

## 2023-06-12 PROCEDURE — 93922 UPR/L XTREMITY ART 2 LEVELS: CPT

## 2023-06-12 PROCEDURE — 82746 ASSAY OF FOLIC ACID SERUM: CPT

## 2023-06-12 PROCEDURE — C9113 INJ PANTOPRAZOLE SODIUM, VIA: HCPCS | Performed by: STUDENT IN AN ORGANIZED HEALTH CARE EDUCATION/TRAINING PROGRAM

## 2023-06-12 PROCEDURE — 93005 ELECTROCARDIOGRAM TRACING: CPT | Performed by: STUDENT IN AN ORGANIZED HEALTH CARE EDUCATION/TRAINING PROGRAM

## 2023-06-12 PROCEDURE — 83036 HEMOGLOBIN GLYCOSYLATED A1C: CPT

## 2023-06-12 PROCEDURE — 93306 TTE W/DOPPLER COMPLETE: CPT | Performed by: INTERNAL MEDICINE

## 2023-06-12 PROCEDURE — 93306 TTE W/DOPPLER COMPLETE: CPT

## 2023-06-12 PROCEDURE — 6360000002 HC RX W HCPCS: Performed by: STUDENT IN AN ORGANIZED HEALTH CARE EDUCATION/TRAINING PROGRAM

## 2023-06-12 PROCEDURE — 93922 UPR/L XTREMITY ART 2 LEVELS: CPT | Performed by: SURGERY

## 2023-06-12 PROCEDURE — 82607 VITAMIN B-12: CPT

## 2023-06-12 PROCEDURE — 2580000003 HC RX 258: Performed by: STUDENT IN AN ORGANIZED HEALTH CARE EDUCATION/TRAINING PROGRAM

## 2023-06-12 PROCEDURE — 80307 DRUG TEST PRSMV CHEM ANLYZR: CPT

## 2023-06-12 PROCEDURE — G0378 HOSPITAL OBSERVATION PER HR: HCPCS

## 2023-06-12 PROCEDURE — 93010 ELECTROCARDIOGRAM REPORT: CPT | Performed by: INTERNAL MEDICINE

## 2023-06-12 PROCEDURE — A4216 STERILE WATER/SALINE, 10 ML: HCPCS | Performed by: STUDENT IN AN ORGANIZED HEALTH CARE EDUCATION/TRAINING PROGRAM

## 2023-06-12 PROCEDURE — 83735 ASSAY OF MAGNESIUM: CPT

## 2023-06-12 PROCEDURE — 84100 ASSAY OF PHOSPHORUS: CPT

## 2023-06-12 RX ORDER — DOXERCALCIFEROL 2 UG/ML
1 INJECTION, SOLUTION INTRAVENOUS
Status: DISCONTINUED | OUTPATIENT
Start: 2023-06-13 | End: 2023-06-15 | Stop reason: HOSPADM

## 2023-06-12 RX ORDER — DEXTROSE MONOHYDRATE 100 MG/ML
INJECTION, SOLUTION INTRAVENOUS CONTINUOUS PRN
Status: DISCONTINUED | OUTPATIENT
Start: 2023-06-12 | End: 2023-06-14 | Stop reason: SDUPTHER

## 2023-06-12 RX ORDER — SODIUM HYPOCHLORITE 1.25 MG/ML
SOLUTION TOPICAL DAILY
Status: DISCONTINUED | OUTPATIENT
Start: 2023-06-12 | End: 2023-06-15 | Stop reason: HOSPADM

## 2023-06-12 RX ADMIN — PANTOPRAZOLE SODIUM 40 MG: 40 INJECTION, POWDER, FOR SOLUTION INTRAVENOUS at 09:31

## 2023-06-12 RX ADMIN — PIPERACILLIN AND TAZOBACTAM 3375 MG: 3; .375 INJECTION, POWDER, LYOPHILIZED, FOR SOLUTION INTRAVENOUS at 09:34

## 2023-06-12 RX ADMIN — SODIUM CHLORIDE, PRESERVATIVE FREE 10 ML: 5 INJECTION INTRAVENOUS at 09:34

## 2023-06-12 RX ADMIN — CALCIUM ACETATE 667 MG: 667 CAPSULE ORAL at 18:53

## 2023-06-12 RX ADMIN — LEVOTHYROXINE SODIUM 100 MCG: 100 TABLET ORAL at 06:00

## 2023-06-12 ASSESSMENT — PAIN SCALES - GENERAL
PAINLEVEL_OUTOF10: 0
PAINLEVEL_OUTOF10: 0

## 2023-06-12 NOTE — PERIOP NOTE
TRANSFER - IN REPORT:    Verbal report received from Cecelia NG on Maromidn Dash  being received from [facility] for ordered procedure      Report consisted of patient's Situation, Background, Assessment and   Recommendations(SBAR). Information from the following report(s) Nurse Handoff Report was reviewed with the receiving nurse. Opportunity for questions and clarification was provided. Assessment completed upon patient's arrival to unit and care assumed.
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
## 2023-06-12 NOTE — CARE COORDINATION
CM attempted assessment. Patient off floor in OR. CM will attempt assessment again.      Tati Summers, [facility]  Care Management

## 2023-06-13 LAB
ALBUMIN SERPL-MCNC: 2.7 G/DL (ref 3.4–5)
ALBUMIN/GLOB SERPL: 0.8 (ref 0.8–1.7)
ALP SERPL-CCNC: 103 U/L (ref 45–117)
ALT SERPL-CCNC: 14 U/L (ref 16–61)
ANION GAP SERPL CALC-SCNC: 6 MMOL/L (ref 3–18)
AST SERPL-CCNC: 7 U/L (ref 10–38)
BASOPHILS # BLD: 0 K/UL (ref 0–0.1)
BASOPHILS NFR BLD: 1 % (ref 0–2)
BILIRUB SERPL-MCNC: 0.4 MG/DL (ref 0.2–1)
BUN SERPL-MCNC: 37 MG/DL (ref 7–18)
BUN/CREAT SERPL: 7 (ref 12–20)
CALCIUM SERPL-MCNC: 8.7 MG/DL (ref 8.5–10.1)
CHLORIDE SERPL-SCNC: 103 MMOL/L (ref 100–111)
CHOLEST SERPL-MCNC: 156 MG/DL
CO2 SERPL-SCNC: 26 MMOL/L (ref 21–32)
CREAT SERPL-MCNC: 5.13 MG/DL (ref 0.6–1.3)
DIFFERENTIAL METHOD BLD: ABNORMAL
EOSINOPHIL # BLD: 0.1 K/UL (ref 0–0.4)
EOSINOPHIL NFR BLD: 2 % (ref 0–5)
ERYTHROCYTE [DISTWIDTH] IN BLOOD BY AUTOMATED COUNT: 13.5 % (ref 11.6–14.5)
GLOBULIN SER CALC-MCNC: 3.6 G/DL (ref 2–4)
GLUCOSE BLD STRIP.AUTO-MCNC: 140 MG/DL (ref 70–110)
GLUCOSE BLD STRIP.AUTO-MCNC: 159 MG/DL (ref 70–110)
GLUCOSE BLD STRIP.AUTO-MCNC: 207 MG/DL (ref 70–110)
GLUCOSE BLD STRIP.AUTO-MCNC: 212 MG/DL (ref 70–110)
GLUCOSE BLD STRIP.AUTO-MCNC: 244 MG/DL (ref 70–110)
GLUCOSE BLD STRIP.AUTO-MCNC: 312 MG/DL (ref 70–110)
GLUCOSE BLD STRIP.AUTO-MCNC: 358 MG/DL (ref 70–110)
GLUCOSE BLD STRIP.AUTO-MCNC: 490 MG/DL (ref 70–110)
GLUCOSE BLD STRIP.AUTO-MCNC: 54 MG/DL (ref 70–110)
GLUCOSE BLD STRIP.AUTO-MCNC: 59 MG/DL (ref 70–110)
GLUCOSE SERPL-MCNC: 314 MG/DL (ref 74–99)
HBV SURFACE AB SER QL IA: POSITIVE
HBV SURFACE AB SERPL IA-ACNC: 75 MIU/ML
HBV SURFACE AG SER QL: 0.81 INDEX
HBV SURFACE AG SER QL: NEGATIVE
HCT VFR BLD AUTO: 29.9 % (ref 36–48)
HDLC SERPL-MCNC: 53 MG/DL (ref 40–60)
HDLC SERPL: 2.9 (ref 0–5)
HGB BLD-MCNC: 9.7 G/DL (ref 13–16)
IMM GRANULOCYTES # BLD AUTO: 0.1 K/UL (ref 0–0.04)
IMM GRANULOCYTES NFR BLD AUTO: 1 % (ref 0–0.5)
IRON SATN MFR SERPL: 56 % (ref 20–50)
IRON SERPL-MCNC: 121 UG/DL (ref 50–175)
LDLC SERPL CALC-MCNC: 74.4 MG/DL (ref 0–100)
LIPID PANEL: NORMAL
LYMPHOCYTES # BLD: 1.6 K/UL (ref 0.9–3.6)
LYMPHOCYTES NFR BLD: 29 % (ref 21–52)
Lab: NORMAL
MAGNESIUM SERPL-MCNC: 2 MG/DL (ref 1.6–2.6)
MCH RBC QN AUTO: 32 PG (ref 24–34)
MCHC RBC AUTO-ENTMCNC: 32.4 G/DL (ref 31–37)
MCV RBC AUTO: 98.7 FL (ref 78–100)
MONOCYTES # BLD: 0.3 K/UL (ref 0.05–1.2)
MONOCYTES NFR BLD: 6 % (ref 3–10)
NEUTS SEG # BLD: 3.4 K/UL (ref 1.8–8)
NEUTS SEG NFR BLD: 63 % (ref 40–73)
NRBC # BLD: 0 K/UL (ref 0–0.01)
NRBC BLD-RTO: 0 PER 100 WBC
PHOSPHATE SERPL-MCNC: 3.9 MG/DL (ref 2.5–4.9)
PLATELET # BLD AUTO: 91 K/UL (ref 135–420)
PMV BLD AUTO: 11 FL (ref 9.2–11.8)
POTASSIUM SERPL-SCNC: 4.4 MMOL/L (ref 3.5–5.5)
PROT SERPL-MCNC: 6.3 G/DL (ref 6.4–8.2)
RBC # BLD AUTO: 3.03 M/UL (ref 4.35–5.65)
SODIUM SERPL-SCNC: 135 MMOL/L (ref 136–145)
TIBC SERPL-MCNC: 217 UG/DL (ref 250–450)
TRIGL SERPL-MCNC: 143 MG/DL
VANCOMYCIN SERPL-MCNC: 15.8 UG/ML (ref 5–40)
VLDLC SERPL CALC-MCNC: 28.6 MG/DL
WBC # BLD AUTO: 5.5 K/UL (ref 4.6–13.2)

## 2023-06-13 PROCEDURE — 86706 HEP B SURFACE ANTIBODY: CPT

## 2023-06-13 PROCEDURE — 94761 N-INVAS EAR/PLS OXIMETRY MLT: CPT

## 2023-06-13 PROCEDURE — 87070 CULTURE OTHR SPECIMN AEROBIC: CPT

## 2023-06-13 PROCEDURE — G0378 HOSPITAL OBSERVATION PER HR: HCPCS

## 2023-06-13 PROCEDURE — 6370000000 HC RX 637 (ALT 250 FOR IP): Performed by: PODIATRIST

## 2023-06-13 PROCEDURE — C9113 INJ PANTOPRAZOLE SODIUM, VIA: HCPCS | Performed by: STUDENT IN AN ORGANIZED HEALTH CARE EDUCATION/TRAINING PROGRAM

## 2023-06-13 PROCEDURE — 2580000003 HC RX 258: Performed by: STUDENT IN AN ORGANIZED HEALTH CARE EDUCATION/TRAINING PROGRAM

## 2023-06-13 PROCEDURE — 80202 ASSAY OF VANCOMYCIN: CPT

## 2023-06-13 PROCEDURE — 99232 SBSQ HOSP IP/OBS MODERATE 35: CPT | Performed by: INTERNAL MEDICINE

## 2023-06-13 PROCEDURE — 6360000002 HC RX W HCPCS: Performed by: INTERNAL MEDICINE

## 2023-06-13 PROCEDURE — 96366 THER/PROPH/DIAG IV INF ADDON: CPT

## 2023-06-13 PROCEDURE — A4216 STERILE WATER/SALINE, 10 ML: HCPCS | Performed by: STUDENT IN AN ORGANIZED HEALTH CARE EDUCATION/TRAINING PROGRAM

## 2023-06-13 PROCEDURE — 87340 HEPATITIS B SURFACE AG IA: CPT

## 2023-06-13 PROCEDURE — 6370000000 HC RX 637 (ALT 250 FOR IP): Performed by: STUDENT IN AN ORGANIZED HEALTH CARE EDUCATION/TRAINING PROGRAM

## 2023-06-13 PROCEDURE — 96375 TX/PRO/DX INJ NEW DRUG ADDON: CPT

## 2023-06-13 PROCEDURE — 82962 GLUCOSE BLOOD TEST: CPT

## 2023-06-13 PROCEDURE — 80053 COMPREHEN METABOLIC PANEL: CPT

## 2023-06-13 PROCEDURE — 83735 ASSAY OF MAGNESIUM: CPT

## 2023-06-13 PROCEDURE — 2500000003 HC RX 250 WO HCPCS: Performed by: STUDENT IN AN ORGANIZED HEALTH CARE EDUCATION/TRAINING PROGRAM

## 2023-06-13 PROCEDURE — 87205 SMEAR GRAM STAIN: CPT

## 2023-06-13 PROCEDURE — 83540 ASSAY OF IRON: CPT

## 2023-06-13 PROCEDURE — 6360000002 HC RX W HCPCS: Performed by: STUDENT IN AN ORGANIZED HEALTH CARE EDUCATION/TRAINING PROGRAM

## 2023-06-13 PROCEDURE — 90935 HEMODIALYSIS ONE EVALUATION: CPT

## 2023-06-13 PROCEDURE — 80061 LIPID PANEL: CPT

## 2023-06-13 PROCEDURE — 84100 ASSAY OF PHOSPHORUS: CPT

## 2023-06-13 PROCEDURE — 96376 TX/PRO/DX INJ SAME DRUG ADON: CPT

## 2023-06-13 PROCEDURE — 2580000003 HC RX 258: Performed by: INTERNAL MEDICINE

## 2023-06-13 PROCEDURE — 36415 COLL VENOUS BLD VENIPUNCTURE: CPT

## 2023-06-13 PROCEDURE — 85025 COMPLETE CBC W/AUTO DIFF WBC: CPT

## 2023-06-13 PROCEDURE — 83550 IRON BINDING TEST: CPT

## 2023-06-13 RX ORDER — DEXTROSE MONOHYDRATE 100 MG/ML
INJECTION, SOLUTION INTRAVENOUS CONTINUOUS PRN
Status: CANCELLED | OUTPATIENT
Start: 2023-06-13

## 2023-06-13 RX ADMIN — ATORVASTATIN CALCIUM 20 MG: 20 TABLET, FILM COATED ORAL at 20:56

## 2023-06-13 RX ADMIN — VANCOMYCIN HYDROCHLORIDE 1000 MG: 1 INJECTION, POWDER, LYOPHILIZED, FOR SOLUTION INTRAVENOUS at 13:26

## 2023-06-13 RX ADMIN — CALCIUM ACETATE 667 MG: 667 CAPSULE ORAL at 16:56

## 2023-06-13 RX ADMIN — SODIUM CHLORIDE, PRESERVATIVE FREE 10 ML: 5 INJECTION INTRAVENOUS at 09:13

## 2023-06-13 RX ADMIN — SODIUM CHLORIDE, PRESERVATIVE FREE 10 ML: 5 INJECTION INTRAVENOUS at 00:35

## 2023-06-13 RX ADMIN — CALCIUM ACETATE 667 MG: 667 CAPSULE ORAL at 14:46

## 2023-06-13 RX ADMIN — PIPERACILLIN AND TAZOBACTAM 3375 MG: 3; .375 INJECTION, POWDER, LYOPHILIZED, FOR SOLUTION INTRAVENOUS at 14:35

## 2023-06-13 RX ADMIN — DOXERCALCIFEROL 1 MCG: 4 INJECTION, SOLUTION INTRAVENOUS at 12:05

## 2023-06-13 RX ADMIN — PIPERACILLIN AND TAZOBACTAM 3375 MG: 3; .375 INJECTION, POWDER, LYOPHILIZED, FOR SOLUTION INTRAVENOUS at 00:29

## 2023-06-13 RX ADMIN — LEVOTHYROXINE SODIUM 100 MCG: 100 TABLET ORAL at 06:41

## 2023-06-13 RX ADMIN — PIPERACILLIN AND TAZOBACTAM 3375 MG: 3; .375 INJECTION, POWDER, LYOPHILIZED, FOR SOLUTION INTRAVENOUS at 21:46

## 2023-06-13 RX ADMIN — HYDRALAZINE HYDROCHLORIDE 25 MG: 25 TABLET, FILM COATED ORAL at 01:43

## 2023-06-13 RX ADMIN — HYDRALAZINE HYDROCHLORIDE 25 MG: 25 TABLET, FILM COATED ORAL at 14:47

## 2023-06-13 RX ADMIN — EPOETIN ALFA-EPBX 3000 UNITS: 3000 INJECTION, SOLUTION INTRAVENOUS; SUBCUTANEOUS at 20:56

## 2023-06-13 RX ADMIN — INSULIN LISPRO 4 UNITS: 100 INJECTION, SOLUTION INTRAVENOUS; SUBCUTANEOUS at 00:32

## 2023-06-13 RX ADMIN — SODIUM CHLORIDE, PRESERVATIVE FREE 10 ML: 5 INJECTION INTRAVENOUS at 21:00

## 2023-06-13 RX ADMIN — DAKIN'S SOLUTION 0.125% (QUARTER STRENGTH): 0.12 SOLUTION at 09:34

## 2023-06-13 RX ADMIN — ATORVASTATIN CALCIUM 20 MG: 20 TABLET, FILM COATED ORAL at 01:43

## 2023-06-13 RX ADMIN — CALCIUM ACETATE 667 MG: 667 CAPSULE ORAL at 09:01

## 2023-06-13 RX ADMIN — ACETAMINOPHEN 325MG 650 MG: 325 TABLET ORAL at 14:47

## 2023-06-13 RX ADMIN — NEPHROCAP 1 MG: 1 CAP ORAL at 09:11

## 2023-06-13 RX ADMIN — INSULIN LISPRO 2 UNITS: 100 INJECTION, SOLUTION INTRAVENOUS; SUBCUTANEOUS at 09:10

## 2023-06-13 RX ADMIN — INSULIN LISPRO 4 UNITS: 100 INJECTION, SOLUTION INTRAVENOUS; SUBCUTANEOUS at 21:14

## 2023-06-13 RX ADMIN — PANTOPRAZOLE SODIUM 40 MG: 40 INJECTION, POWDER, FOR SOLUTION INTRAVENOUS at 09:01

## 2023-06-13 RX ADMIN — ONDANSETRON 4 MG: 2 INJECTION INTRAMUSCULAR; INTRAVENOUS at 14:40

## 2023-06-13 ASSESSMENT — PAIN SCALES - GENERAL
PAINLEVEL_OUTOF10: 0
PAINLEVEL_OUTOF10: 5
PAINLEVEL_OUTOF10: 0

## 2023-06-13 ASSESSMENT — PAIN DESCRIPTION - DESCRIPTORS: DESCRIPTORS: ACHING

## 2023-06-13 ASSESSMENT — PAIN DESCRIPTION - LOCATION: LOCATION: HEAD

## 2023-06-13 NOTE — DIALYSIS
Treatment summary  Received report from Elliott PIERCE Rn  Dialyzed patient in the suit  for 3.5 hours. LUE AVF functioning accessed by Margret PIERCE PCT  Total Uf 2500  ml  Net UF 2000 ml     Treatment note:         Patient tolerate treatment well remain in stable condition. Vancomycin and hectorol given. Access LUE avg  Functioning well without complications de accessed, Positive bruit/thrill  Report given to Krista PIERCE RN with all questions answered.

## 2023-06-14 LAB
ALBUMIN SERPL-MCNC: 2.7 G/DL (ref 3.4–5)
ALBUMIN/GLOB SERPL: 0.6 (ref 0.8–1.7)
ALP SERPL-CCNC: 98 U/L (ref 45–117)
ALT SERPL-CCNC: 14 U/L (ref 16–61)
ANION GAP SERPL CALC-SCNC: 5 MMOL/L (ref 3–18)
AST SERPL-CCNC: 10 U/L (ref 10–38)
BASOPHILS # BLD: 0 K/UL (ref 0–0.1)
BASOPHILS NFR BLD: 0 % (ref 0–2)
BILIRUB SERPL-MCNC: 0.5 MG/DL (ref 0.2–1)
BUN SERPL-MCNC: 19 MG/DL (ref 7–18)
BUN/CREAT SERPL: 5 (ref 12–20)
CALCIUM SERPL-MCNC: 8.6 MG/DL (ref 8.5–10.1)
CHLORIDE SERPL-SCNC: 102 MMOL/L (ref 100–111)
CO2 SERPL-SCNC: 29 MMOL/L (ref 21–32)
CREAT SERPL-MCNC: 3.78 MG/DL (ref 0.6–1.3)
DIFFERENTIAL METHOD BLD: ABNORMAL
ECHO BSA: 1.95 M2
EOSINOPHIL # BLD: 0.1 K/UL (ref 0–0.4)
EOSINOPHIL NFR BLD: 1 % (ref 0–5)
ERYTHROCYTE [DISTWIDTH] IN BLOOD BY AUTOMATED COUNT: 13.5 % (ref 11.6–14.5)
GLOBULIN SER CALC-MCNC: 4.2 G/DL (ref 2–4)
GLUCOSE BLD STRIP.AUTO-MCNC: 162 MG/DL (ref 70–110)
GLUCOSE BLD STRIP.AUTO-MCNC: 194 MG/DL (ref 70–110)
GLUCOSE BLD STRIP.AUTO-MCNC: 230 MG/DL (ref 70–110)
GLUCOSE BLD STRIP.AUTO-MCNC: 246 MG/DL (ref 70–110)
GLUCOSE BLD STRIP.AUTO-MCNC: 250 MG/DL (ref 70–110)
GLUCOSE SERPL-MCNC: 199 MG/DL (ref 74–99)
HCT VFR BLD AUTO: 31.2 % (ref 36–48)
HGB BLD-MCNC: 10.1 G/DL (ref 13–16)
IMM GRANULOCYTES # BLD AUTO: 0.1 K/UL (ref 0–0.04)
IMM GRANULOCYTES NFR BLD AUTO: 1 % (ref 0–0.5)
LYMPHOCYTES # BLD: 1.7 K/UL (ref 0.9–3.6)
LYMPHOCYTES NFR BLD: 24 % (ref 21–52)
MAGNESIUM SERPL-MCNC: 2.2 MG/DL (ref 1.6–2.6)
MCH RBC QN AUTO: 31.8 PG (ref 24–34)
MCHC RBC AUTO-ENTMCNC: 32.4 G/DL (ref 31–37)
MCV RBC AUTO: 98.1 FL (ref 78–100)
MONOCYTES # BLD: 0.4 K/UL (ref 0.05–1.2)
MONOCYTES NFR BLD: 5 % (ref 3–10)
NEUTS SEG # BLD: 4.8 K/UL (ref 1.8–8)
NEUTS SEG NFR BLD: 68 % (ref 40–73)
NRBC # BLD: 0 K/UL (ref 0–0.01)
NRBC BLD-RTO: 0 PER 100 WBC
PHOSPHATE SERPL-MCNC: 3 MG/DL (ref 2.5–4.9)
PLATELET # BLD AUTO: 99 K/UL (ref 135–420)
PMV BLD AUTO: 11.4 FL (ref 9.2–11.8)
POTASSIUM SERPL-SCNC: 3.9 MMOL/L (ref 3.5–5.5)
PROT SERPL-MCNC: 6.9 G/DL (ref 6.4–8.2)
RBC # BLD AUTO: 3.18 M/UL (ref 4.35–5.65)
SODIUM SERPL-SCNC: 136 MMOL/L (ref 136–145)
VAS LEFT ABI: 1.2
VAS LEFT DORSALIS PEDIS BP: 194 MMHG
VAS LEFT PTA BP: 190 MMHG
VAS LEFT TBI: 0.93
VAS LEFT TOE PRESSURE: 150 MMHG
VAS RIGHT ABI: 1.26
VAS RIGHT ARM BP: 162 MMHG
VAS RIGHT DORSALIS PEDIS BP: 196 MMHG
VAS RIGHT PTA BP: 204 MMHG
VAS RIGHT TBI: 0.99
VAS RIGHT TOE PRESSURE: 160 MMHG
WBC # BLD AUTO: 7.1 K/UL (ref 4.6–13.2)

## 2023-06-14 PROCEDURE — 7100000000 HC PACU RECOVERY - FIRST 15 MIN: Performed by: PODIATRIST

## 2023-06-14 PROCEDURE — 82962 GLUCOSE BLOOD TEST: CPT

## 2023-06-14 PROCEDURE — 6370000000 HC RX 637 (ALT 250 FOR IP): Performed by: STUDENT IN AN ORGANIZED HEALTH CARE EDUCATION/TRAINING PROGRAM

## 2023-06-14 PROCEDURE — 88311 DECALCIFY TISSUE: CPT

## 2023-06-14 PROCEDURE — 6370000000 HC RX 637 (ALT 250 FOR IP): Performed by: NURSE ANESTHETIST, CERTIFIED REGISTERED

## 2023-06-14 PROCEDURE — 80053 COMPREHEN METABOLIC PANEL: CPT

## 2023-06-14 PROCEDURE — 87077 CULTURE AEROBIC IDENTIFY: CPT

## 2023-06-14 PROCEDURE — 3600000002 HC SURGERY LEVEL 2 BASE: Performed by: PODIATRIST

## 2023-06-14 PROCEDURE — 87070 CULTURE OTHR SPECIMN AEROBIC: CPT

## 2023-06-14 PROCEDURE — 6360000002 HC RX W HCPCS: Performed by: STUDENT IN AN ORGANIZED HEALTH CARE EDUCATION/TRAINING PROGRAM

## 2023-06-14 PROCEDURE — 0QBP0ZX EXCISION OF LEFT METATARSAL, OPEN APPROACH, DIAGNOSTIC: ICD-10-PCS | Performed by: PODIATRIST

## 2023-06-14 PROCEDURE — 7100000001 HC PACU RECOVERY - ADDTL 15 MIN: Performed by: PODIATRIST

## 2023-06-14 PROCEDURE — 85025 COMPLETE CBC W/AUTO DIFF WBC: CPT

## 2023-06-14 PROCEDURE — 87205 SMEAR GRAM STAIN: CPT

## 2023-06-14 PROCEDURE — A4216 STERILE WATER/SALINE, 10 ML: HCPCS | Performed by: STUDENT IN AN ORGANIZED HEALTH CARE EDUCATION/TRAINING PROGRAM

## 2023-06-14 PROCEDURE — 88305 TISSUE EXAM BY PATHOLOGIST: CPT

## 2023-06-14 PROCEDURE — 84100 ASSAY OF PHOSPHORUS: CPT

## 2023-06-14 PROCEDURE — 94761 N-INVAS EAR/PLS OXIMETRY MLT: CPT

## 2023-06-14 PROCEDURE — 1100000000 HC RM PRIVATE

## 2023-06-14 PROCEDURE — 3600000012 HC SURGERY LEVEL 2 ADDTL 15MIN: Performed by: PODIATRIST

## 2023-06-14 PROCEDURE — 2500000003 HC RX 250 WO HCPCS: Performed by: PODIATRIST

## 2023-06-14 PROCEDURE — 6370000000 HC RX 637 (ALT 250 FOR IP): Performed by: FAMILY MEDICINE

## 2023-06-14 PROCEDURE — 99232 SBSQ HOSP IP/OBS MODERATE 35: CPT | Performed by: INTERNAL MEDICINE

## 2023-06-14 PROCEDURE — 87176 TISSUE HOMOGENIZATION CULTR: CPT

## 2023-06-14 PROCEDURE — 87075 CULTR BACTERIA EXCEPT BLOOD: CPT

## 2023-06-14 PROCEDURE — 6360000002 HC RX W HCPCS: Performed by: NURSE ANESTHETIST, CERTIFIED REGISTERED

## 2023-06-14 PROCEDURE — 36415 COLL VENOUS BLD VENIPUNCTURE: CPT

## 2023-06-14 PROCEDURE — 5A1D70Z PERFORMANCE OF URINARY FILTRATION, INTERMITTENT, LESS THAN 6 HOURS PER DAY: ICD-10-PCS | Performed by: PODIATRIST

## 2023-06-14 PROCEDURE — 2709999900 HC NON-CHARGEABLE SUPPLY: Performed by: PODIATRIST

## 2023-06-14 PROCEDURE — C9113 INJ PANTOPRAZOLE SODIUM, VIA: HCPCS | Performed by: STUDENT IN AN ORGANIZED HEALTH CARE EDUCATION/TRAINING PROGRAM

## 2023-06-14 PROCEDURE — 3700000000 HC ANESTHESIA ATTENDED CARE: Performed by: PODIATRIST

## 2023-06-14 PROCEDURE — 3700000001 HC ADD 15 MINUTES (ANESTHESIA): Performed by: PODIATRIST

## 2023-06-14 PROCEDURE — 6360000002 HC RX W HCPCS: Performed by: FAMILY MEDICINE

## 2023-06-14 PROCEDURE — 83735 ASSAY OF MAGNESIUM: CPT

## 2023-06-14 PROCEDURE — 0Y6S0Z0 DETACHMENT AT LEFT 2ND TOE, COMPLETE, OPEN APPROACH: ICD-10-PCS | Performed by: PODIATRIST

## 2023-06-14 PROCEDURE — 2580000003 HC RX 258: Performed by: NURSE ANESTHETIST, CERTIFIED REGISTERED

## 2023-06-14 PROCEDURE — 2580000003 HC RX 258: Performed by: STUDENT IN AN ORGANIZED HEALTH CARE EDUCATION/TRAINING PROGRAM

## 2023-06-14 RX ORDER — INSULIN GLARGINE 100 [IU]/ML
3 INJECTION, SOLUTION SUBCUTANEOUS NIGHTLY
Status: DISCONTINUED | OUTPATIENT
Start: 2023-06-14 | End: 2023-06-15 | Stop reason: HOSPADM

## 2023-06-14 RX ORDER — HYDRALAZINE HYDROCHLORIDE 20 MG/ML
20 INJECTION INTRAMUSCULAR; INTRAVENOUS EVERY 6 HOURS PRN
Status: DISCONTINUED | OUTPATIENT
Start: 2023-06-14 | End: 2023-06-15 | Stop reason: HOSPADM

## 2023-06-14 RX ORDER — OXYCODONE HYDROCHLORIDE AND ACETAMINOPHEN 5; 325 MG/1; MG/1
1 TABLET ORAL EVERY 4 HOURS PRN
Status: DISCONTINUED | OUTPATIENT
Start: 2023-06-14 | End: 2023-06-15 | Stop reason: HOSPADM

## 2023-06-14 RX ORDER — SODIUM CHLORIDE 9 MG/ML
INJECTION, SOLUTION INTRAVENOUS CONTINUOUS
Status: DISCONTINUED | OUTPATIENT
Start: 2023-06-14 | End: 2023-06-15 | Stop reason: HOSPADM

## 2023-06-14 RX ORDER — SODIUM CHLORIDE 0.9 % (FLUSH) 0.9 %
5-40 SYRINGE (ML) INJECTION EVERY 12 HOURS SCHEDULED
Status: DISCONTINUED | OUTPATIENT
Start: 2023-06-14 | End: 2023-06-14 | Stop reason: HOSPADM

## 2023-06-14 RX ORDER — SODIUM CHLORIDE 9 MG/ML
INJECTION, SOLUTION INTRAVENOUS PRN
Status: DISCONTINUED | OUTPATIENT
Start: 2023-06-14 | End: 2023-06-14 | Stop reason: HOSPADM

## 2023-06-14 RX ORDER — SODIUM CHLORIDE 0.9 % (FLUSH) 0.9 %
5-40 SYRINGE (ML) INJECTION PRN
Status: DISCONTINUED | OUTPATIENT
Start: 2023-06-14 | End: 2023-06-14 | Stop reason: HOSPADM

## 2023-06-14 RX ORDER — DEXTROSE MONOHYDRATE 100 MG/ML
INJECTION, SOLUTION INTRAVENOUS CONTINUOUS PRN
Status: DISCONTINUED | OUTPATIENT
Start: 2023-06-14 | End: 2023-06-14 | Stop reason: HOSPADM

## 2023-06-14 RX ORDER — LIDOCAINE HYDROCHLORIDE 10 MG/ML
1 INJECTION, SOLUTION EPIDURAL; INFILTRATION; INTRACAUDAL; PERINEURAL
Status: DISCONTINUED | OUTPATIENT
Start: 2023-06-14 | End: 2023-06-14 | Stop reason: HOSPADM

## 2023-06-14 RX ORDER — ONDANSETRON 2 MG/ML
4 INJECTION INTRAMUSCULAR; INTRAVENOUS
Status: COMPLETED | OUTPATIENT
Start: 2023-06-14 | End: 2023-06-14

## 2023-06-14 RX ORDER — FAMOTIDINE 20 MG/1
20 TABLET, FILM COATED ORAL ONCE
Status: COMPLETED | OUTPATIENT
Start: 2023-06-14 | End: 2023-06-14

## 2023-06-14 RX ORDER — INSULIN LISPRO 100 [IU]/ML
0-8 INJECTION, SOLUTION INTRAVENOUS; SUBCUTANEOUS EVERY 4 HOURS
Status: DISCONTINUED | OUTPATIENT
Start: 2023-06-14 | End: 2023-06-14 | Stop reason: HOSPADM

## 2023-06-14 RX ORDER — LIDOCAINE HYDROCHLORIDE 20 MG/ML
INJECTION, SOLUTION EPIDURAL; INFILTRATION; INTRACAUDAL; PERINEURAL PRN
Status: DISCONTINUED | OUTPATIENT
Start: 2023-06-14 | End: 2023-06-14 | Stop reason: ALTCHOICE

## 2023-06-14 RX ORDER — FENTANYL CITRATE 50 UG/ML
25 INJECTION, SOLUTION INTRAMUSCULAR; INTRAVENOUS EVERY 5 MIN PRN
Status: DISCONTINUED | OUTPATIENT
Start: 2023-06-14 | End: 2023-06-14 | Stop reason: HOSPADM

## 2023-06-14 RX ADMIN — SODIUM CHLORIDE, PRESERVATIVE FREE 10 ML: 5 INJECTION INTRAVENOUS at 11:49

## 2023-06-14 RX ADMIN — PIPERACILLIN AND TAZOBACTAM 3375 MG: 3; .375 INJECTION, POWDER, LYOPHILIZED, FOR SOLUTION INTRAVENOUS at 09:23

## 2023-06-14 RX ADMIN — PIPERACILLIN AND TAZOBACTAM 3375 MG: 3; .375 INJECTION, POWDER, LYOPHILIZED, FOR SOLUTION INTRAVENOUS at 21:55

## 2023-06-14 RX ADMIN — ONDANSETRON 4 MG: 2 INJECTION INTRAMUSCULAR; INTRAVENOUS at 20:09

## 2023-06-14 RX ADMIN — LEVOTHYROXINE SODIUM 100 MCG: 100 TABLET ORAL at 06:30

## 2023-06-14 RX ADMIN — HYDRALAZINE HYDROCHLORIDE 25 MG: 25 TABLET, FILM COATED ORAL at 21:46

## 2023-06-14 RX ADMIN — HYDRALAZINE HYDROCHLORIDE 20 MG: 20 INJECTION INTRAMUSCULAR; INTRAVENOUS at 09:26

## 2023-06-14 RX ADMIN — FAMOTIDINE 20 MG: 20 TABLET ORAL at 15:16

## 2023-06-14 RX ADMIN — NEPHROCAP 1 MG: 1 CAP ORAL at 09:25

## 2023-06-14 RX ADMIN — ATORVASTATIN CALCIUM 20 MG: 20 TABLET, FILM COATED ORAL at 21:46

## 2023-06-14 RX ADMIN — SODIUM CHLORIDE: 9 INJECTION, SOLUTION INTRAVENOUS at 15:28

## 2023-06-14 RX ADMIN — INSULIN GLARGINE 3 UNITS: 100 INJECTION, SOLUTION SUBCUTANEOUS at 21:46

## 2023-06-14 RX ADMIN — PANTOPRAZOLE SODIUM 40 MG: 40 INJECTION, POWDER, FOR SOLUTION INTRAVENOUS at 09:25

## 2023-06-14 RX ADMIN — OXYCODONE HYDROCHLORIDE AND ACETAMINOPHEN 1 TABLET: 5; 325 TABLET ORAL at 22:04

## 2023-06-14 ASSESSMENT — PAIN SCALES - GENERAL
PAINLEVEL_OUTOF10: 0
PAINLEVEL_OUTOF10: 5
PAINLEVEL_OUTOF10: 0
PAINLEVEL_OUTOF10: 0
PAINLEVEL_OUTOF10: 8
PAINLEVEL_OUTOF10: 0
PAINLEVEL_OUTOF10: 0

## 2023-06-14 ASSESSMENT — PAIN DESCRIPTION - ORIENTATION: ORIENTATION: LEFT

## 2023-06-14 ASSESSMENT — PAIN DESCRIPTION - LOCATION: LOCATION: FOOT

## 2023-06-14 ASSESSMENT — PAIN DESCRIPTION - DESCRIPTORS: DESCRIPTORS: ACHING

## 2023-06-14 ASSESSMENT — PAIN - FUNCTIONAL ASSESSMENT: PAIN_FUNCTIONAL_ASSESSMENT: 0-10

## 2023-06-14 NOTE — PERIOP NOTE
TRANSFER - IN REPORT:    Verbal report received from [facility] on [facility]  being received from [address] [facility] for ordered procedure      Report consisted of patient's Situation, Background, Assessment and   Recommendations(SBAR). Information from the following report(s) Nurse Handoff Report was reviewed with the receiving nurse. Opportunity for questions and clarification was provided. Assessment completed upon patient's arrival to unit and care assumed.

## 2023-06-14 NOTE — INTERVAL H&P NOTE
Update History & Physical    The patient's History and Physical of June 14, surgery was reviewed with the patient and I examined the patient. There was no change. The surgical site was confirmed by the patient and me. Plan: The risks, benefits, expected outcome, and alternative to the recommended procedure have been discussed with the patient. Patient understands and wants to proceed with the procedure.      Electronically signed by Jaime Bhat DPM on 6/14/2023 at 5:29 PM

## 2023-06-14 NOTE — PLAN OF CARE
Problem: Safety - Adult  Goal: Free from fall injury  Outcome: Progressing     Problem: Pain  Goal: Verbalizes/displays adequate comfort level or baseline comfort level  6/14/2023 1117 by Shavonne Zazueta RN  Outcome: Progressing  6/13/2023 2134 by Andres Lobato RN  Outcome: Progressing

## 2023-06-14 NOTE — PLAN OF CARE
Problem: Safety - Adult  Goal: Free from fall injury  6/13/2023 1121 by Fatmata Rae RN  Outcome: Progressing     Problem: Pain  Goal: Verbalizes/displays adequate comfort level or baseline comfort level  6/13/2023 2134 by Rocky Henderson RN  Outcome: Progressing  6/13/2023 1121 by Fatmata Rae RN  Outcome: Progressing  Flowsheets (Taken 6/13/2023 0115 by Amaya Magaña RN)  Verbalizes/displays adequate comfort level or baseline comfort level:   Assess pain using appropriate pain scale   Administer analgesics based on type and severity of pain and evaluate response

## 2023-06-14 NOTE — BRIEF OP NOTE
Brief Postoperative Note      Patient: Miguelina Costello  YOB: 1960  MRN: [id number]    Date of Procedure: 6/14/2023    Pre-Op Diagnosis Codes:     * Osteomyelitis of second toe of left foot ([facility] [address] [M86.9]    Post-Op Diagnosis: Same       Procedure(s):  AMPUTATION LEFT SECOND TOE    Surgeon(s):  Kaila Gustafson DPM    Assistant:  Surgical Assistant: Jack Lewis    Anesthesia: Monitor Anesthesia Care    Estimated Blood Loss (mL): Minimal    Complications: None    Specimens:   ID Type Source Tests Collected by Time Destination   1 : Left foot second toe dirty margins Tissue Tissue CULTURE, TISSUE Kaila Sis, DPM 6/14/2023 1759    2 : Left Foot Second Toe dirty margins Tissue Tissue CULTURE, ANAEROBIC Kaila Sis, DPM 6/14/2023 1759    3 : left foot second toe clean margins Tissue Tissue CULTURE, TISSUE Kaila Sis, DPM 6/14/2023 1804    4 :  left foot second toe clean margins Tissue Tissue CULTURE, ANAEROBIC Kaila Sis, DPM 6/14/2023 1805    A : Left Foot Second Toe Tissue Tissue SURGICAL PATHOLOGY Kaila Gustafson, DPM 6/14/2023 1759    B : left foot second toe bone (clean margins) Tissue Tissue SURGICAL PATHOLOGY Kaila Sis, DPM 6/14/2023 1808        Implants:  * No implants in log *      Drains:   Suprapubic Catheter (Active)   Site Assessment No urethral drainage 06/13/23 2358   Urine Color Yellow 06/13/23 2358   Urine Appearance Clear 06/13/23 2358   Collection Container Leg bag 06/13/23 2358   Securement Method Leg strap 06/13/23 2358   Catheter Care  Perineal wipes 06/13/23 2358   Catheter Best Practices  Catheter secured to thigh 06/13/23 2358   Status Draining;Patent 06/13/23 2358   Output (mL) 50 mL 06/13/23 2358       Findings: amputation left second toe,no signs proximal infection      Electronically signed by Balwinder Taveras DPM on 6/14/2023 at 6:11 PM

## 2023-06-15 ENCOUNTER — HOME HEALTH ADMISSION (OUTPATIENT)
Age: 63
End: 2023-06-15
Payer: MEDICARE

## 2023-06-15 VITALS
OXYGEN SATURATION: 100 % | RESPIRATION RATE: 18 BRPM | SYSTOLIC BLOOD PRESSURE: 138 MMHG | HEART RATE: 66 BPM | WEIGHT: 166.67 LBS | HEIGHT: 72 IN | DIASTOLIC BLOOD PRESSURE: 65 MMHG | TEMPERATURE: 98.3 F | BODY MASS INDEX: 22.57 KG/M2

## 2023-06-15 LAB
ALBUMIN SERPL-MCNC: 2.7 G/DL (ref 3.4–5)
ALBUMIN/GLOB SERPL: 0.6 (ref 0.8–1.7)
ALP SERPL-CCNC: 74 U/L (ref 45–117)
ALT SERPL-CCNC: 13 U/L (ref 16–61)
ANION GAP SERPL CALC-SCNC: 5 MMOL/L (ref 3–18)
AST SERPL-CCNC: 12 U/L (ref 10–38)
BASOPHILS # BLD: 0 K/UL (ref 0–0.1)
BASOPHILS NFR BLD: 0 % (ref 0–2)
BILIRUB SERPL-MCNC: 0.5 MG/DL (ref 0.2–1)
BUN SERPL-MCNC: 29 MG/DL (ref 7–18)
BUN/CREAT SERPL: 6 (ref 12–20)
CALCIUM SERPL-MCNC: 8.8 MG/DL (ref 8.5–10.1)
CHLORIDE SERPL-SCNC: 103 MMOL/L (ref 100–111)
CO2 SERPL-SCNC: 27 MMOL/L (ref 21–32)
CREAT SERPL-MCNC: 4.83 MG/DL (ref 0.6–1.3)
DIFFERENTIAL METHOD BLD: ABNORMAL
EOSINOPHIL # BLD: 0.1 K/UL (ref 0–0.4)
EOSINOPHIL NFR BLD: 1 % (ref 0–5)
ERYTHROCYTE [DISTWIDTH] IN BLOOD BY AUTOMATED COUNT: 13.7 % (ref 11.6–14.5)
EST. AVERAGE GLUCOSE BLD GHB EST-MCNC: 197 MG/DL
GLOBULIN SER CALC-MCNC: 4.2 G/DL (ref 2–4)
GLUCOSE BLD STRIP.AUTO-MCNC: 182 MG/DL (ref 70–110)
GLUCOSE SERPL-MCNC: 171 MG/DL (ref 74–99)
HBA1C MFR BLD: 8.5 % (ref 4.2–5.6)
HCT VFR BLD AUTO: 31.5 % (ref 36–48)
HGB BLD-MCNC: 10.2 G/DL (ref 13–16)
IMM GRANULOCYTES # BLD AUTO: 0.1 K/UL (ref 0–0.04)
IMM GRANULOCYTES NFR BLD AUTO: 1 % (ref 0–0.5)
LYMPHOCYTES # BLD: 1.8 K/UL (ref 0.9–3.6)
LYMPHOCYTES NFR BLD: 24 % (ref 21–52)
MAGNESIUM SERPL-MCNC: 2.1 MG/DL (ref 1.6–2.6)
MCH RBC QN AUTO: 31.5 PG (ref 24–34)
MCHC RBC AUTO-ENTMCNC: 32.4 G/DL (ref 31–37)
MCV RBC AUTO: 97.2 FL (ref 78–100)
MONOCYTES # BLD: 0.4 K/UL (ref 0.05–1.2)
MONOCYTES NFR BLD: 5 % (ref 3–10)
NEUTS SEG # BLD: 5.4 K/UL (ref 1.8–8)
NEUTS SEG NFR BLD: 69 % (ref 40–73)
NRBC # BLD: 0 K/UL (ref 0–0.01)
NRBC BLD-RTO: 0 PER 100 WBC
PLATELET # BLD AUTO: 97 K/UL (ref 135–420)
PMV BLD AUTO: 10.9 FL (ref 9.2–11.8)
POTASSIUM SERPL-SCNC: 4.2 MMOL/L (ref 3.5–5.5)
PROT SERPL-MCNC: 6.9 G/DL (ref 6.4–8.2)
RBC # BLD AUTO: 3.24 M/UL (ref 4.35–5.65)
SODIUM SERPL-SCNC: 135 MMOL/L (ref 136–145)
VANCOMYCIN SERPL-MCNC: 15.7 UG/ML (ref 5–40)
WBC # BLD AUTO: 7.8 K/UL (ref 4.6–13.2)

## 2023-06-15 PROCEDURE — 80202 ASSAY OF VANCOMYCIN: CPT

## 2023-06-15 PROCEDURE — 6360000002 HC RX W HCPCS: Performed by: STUDENT IN AN ORGANIZED HEALTH CARE EDUCATION/TRAINING PROGRAM

## 2023-06-15 PROCEDURE — 82962 GLUCOSE BLOOD TEST: CPT

## 2023-06-15 PROCEDURE — 83735 ASSAY OF MAGNESIUM: CPT

## 2023-06-15 PROCEDURE — 2500000003 HC RX 250 WO HCPCS: Performed by: PODIATRIST

## 2023-06-15 PROCEDURE — 6370000000 HC RX 637 (ALT 250 FOR IP): Performed by: STUDENT IN AN ORGANIZED HEALTH CARE EDUCATION/TRAINING PROGRAM

## 2023-06-15 PROCEDURE — 6360000002 HC RX W HCPCS: Performed by: PODIATRIST

## 2023-06-15 PROCEDURE — 6370000000 HC RX 637 (ALT 250 FOR IP): Performed by: INTERNAL MEDICINE

## 2023-06-15 PROCEDURE — 36415 COLL VENOUS BLD VENIPUNCTURE: CPT

## 2023-06-15 PROCEDURE — 83036 HEMOGLOBIN GLYCOSYLATED A1C: CPT

## 2023-06-15 PROCEDURE — 85025 COMPLETE CBC W/AUTO DIFF WBC: CPT

## 2023-06-15 PROCEDURE — 2580000003 HC RX 258: Performed by: INTERNAL MEDICINE

## 2023-06-15 PROCEDURE — 90935 HEMODIALYSIS ONE EVALUATION: CPT

## 2023-06-15 PROCEDURE — 6360000002 HC RX W HCPCS: Performed by: INTERNAL MEDICINE

## 2023-06-15 PROCEDURE — 2580000003 HC RX 258: Performed by: PODIATRIST

## 2023-06-15 PROCEDURE — 6370000000 HC RX 637 (ALT 250 FOR IP): Performed by: PODIATRIST

## 2023-06-15 PROCEDURE — 80053 COMPREHEN METABOLIC PANEL: CPT

## 2023-06-15 PROCEDURE — A4216 STERILE WATER/SALINE, 10 ML: HCPCS | Performed by: PODIATRIST

## 2023-06-15 PROCEDURE — C9113 INJ PANTOPRAZOLE SODIUM, VIA: HCPCS | Performed by: PODIATRIST

## 2023-06-15 RX ORDER — ALBUMIN (HUMAN) 12.5 G/50ML
25 SOLUTION INTRAVENOUS AS NEEDED
Status: DISCONTINUED | OUTPATIENT
Start: 2023-06-15 | End: 2023-06-15 | Stop reason: HOSPADM

## 2023-06-15 RX ORDER — AMOXICILLIN AND CLAVULANATE POTASSIUM 500; 125 MG/1; MG/1
1 TABLET, FILM COATED ORAL
Qty: 10 TABLET | Refills: 0 | Status: SHIPPED | OUTPATIENT
Start: 2023-06-15 | End: 2023-06-25

## 2023-06-15 RX ORDER — L. ACIDOPHILUS,CASEI,RHAMNOSUS 50B CELL
1 CAPSULE,DELAYED RELEASE (ENTERIC COATED) ORAL
Status: DISCONTINUED | OUTPATIENT
Start: 2023-06-15 | End: 2023-06-15 | Stop reason: HOSPADM

## 2023-06-15 RX ORDER — OXYCODONE HYDROCHLORIDE AND ACETAMINOPHEN 5; 325 MG/1; MG/1
1 TABLET ORAL EVERY 8 HOURS PRN
Qty: 6 TABLET | Refills: 0 | Status: SHIPPED | OUTPATIENT
Start: 2023-06-15 | End: 2023-06-17

## 2023-06-15 RX ORDER — AMOXICILLIN AND CLAVULANATE POTASSIUM 500; 125 MG/1; MG/1
1 TABLET, FILM COATED ORAL
Status: DISCONTINUED | OUTPATIENT
Start: 2023-06-15 | End: 2023-06-15 | Stop reason: HOSPADM

## 2023-06-15 RX ORDER — L. ACIDOPHILUS,CASEI,RHAMNOSUS 50B CELL
1 CAPSULE,DELAYED RELEASE (ENTERIC COATED) ORAL
Qty: 14 CAPSULE | Refills: 0 | Status: SHIPPED | OUTPATIENT
Start: 2023-06-15 | End: 2023-06-29

## 2023-06-15 RX ADMIN — SODIUM CHLORIDE, PRESERVATIVE FREE 10 ML: 5 INJECTION INTRAVENOUS at 09:03

## 2023-06-15 RX ADMIN — AMOXICILLIN AND CLAVULANATE POTASSIUM 1 TABLET: 500; 125 TABLET, FILM COATED ORAL at 14:06

## 2023-06-15 RX ADMIN — NEPHROCAP 1 MG: 1 CAP ORAL at 09:03

## 2023-06-15 RX ADMIN — LEVOTHYROXINE SODIUM 100 MCG: 100 TABLET ORAL at 06:13

## 2023-06-15 RX ADMIN — CALCIUM ACETATE 667 MG: 667 CAPSULE ORAL at 14:06

## 2023-06-15 RX ADMIN — ONDANSETRON 4 MG: 2 INJECTION INTRAMUSCULAR; INTRAVENOUS at 05:15

## 2023-06-15 RX ADMIN — CALCIUM ACETATE 667 MG: 667 CAPSULE ORAL at 09:02

## 2023-06-15 RX ADMIN — VANCOMYCIN HYDROCHLORIDE 750 MG: 750 INJECTION, POWDER, LYOPHILIZED, FOR SOLUTION INTRAVENOUS at 11:51

## 2023-06-15 RX ADMIN — PANTOPRAZOLE SODIUM 40 MG: 40 INJECTION, POWDER, FOR SOLUTION INTRAVENOUS at 09:02

## 2023-06-15 RX ADMIN — Medication 1 CAPSULE: at 14:06

## 2023-06-15 RX ADMIN — DOXERCALCIFEROL 1 MCG: 4 INJECTION, SOLUTION INTRAVENOUS at 14:04

## 2023-06-15 NOTE — PLAN OF CARE
INTERVENTION:  HEMODYNAMIC STABILIZATION  MAINTAIN BP WNL WHILE ON HD. INTERVENTION:  FLUID MANAGEMENT  WILL ATTEMPT 1500 ML TOTAL FLUID REMOVAL AS TOLERATED. INTERVENTION:  METABOLIC/ELECTROLYTE MANAGEMENT  2.0 POTASSIUM 2.5 CALCIUM DIALYSATE USED WITH HD TODAY. INTERVENTION:  HEMODIALYSIS ACCESS SITE MANAGEMENT  LUE AVG ACCESSED WITH 15G NEEDLES USING ASEPTIC TECHNIQUE. GOAL:  SIGNS AND SYMPTOMS OF LISTED POTENTIAL PROBLEMS WILL BE ABSENT OR MANAGEABLE. OUTCOME:  PROGRESSING. HD PLANNED FOR 3.5 HOURS TODAY.        Problem: Chronic Conditions and Co-morbidities  Goal: Patient's chronic conditions and co-morbidity symptoms are monitored and maintained or improved  6/15/2023 0944 by Leticia Joiner RN  Outcome: Progressing

## 2023-06-15 NOTE — HOME CARE
Wound care orders received via [address] [place] [address] from Estela Young DPM.  Received home health referral for [facility] for ( for wound care and education). Discharge order noted for today. Spoke with patient in room;  patient identifiers verified. Explained home health care services and routines. Demographics verified including insurance, phone and address confirmed. Patient has the following DME: none. Caregivers available resides in same residence with sister Ani Miles -  helping each other.   Patient has HD on Tue / Thr / Sat Chair time of 10am.  Orders noted and arranged by colleague KAYLEY and sent to central intake and scheduling.      ---   Beatriz Boyle LPN  [facility] [place] - INPATIENT Liaison

## 2023-06-15 NOTE — CARE COORDINATION
[facility] attempted to conduct initial assessment; Pt was not in room. SW will atetmpt at a later time.              ..  Michael Varner MSW  [facility] Care Manager

## 2023-06-15 NOTE — OP NOTE
[address] [place] [address]   OPERATIVE REPORT    Name:  Jessica Lowe  MR#:   [id number]  :  1960  ACCOUNT #:  [de-identified]  DATE OF SERVICE:  2023    PREOPERATIVE DIAGNOSES:  Gangrene and infection, left second toe. POSTOPERATIVE DIAGNOSES:  Gangrene and infection, left second toe. PROCEDURE PERFORMED:  Amputation, left second toe. SURGEON:  Estela Young DPM    ASSISTANT:  student. ANESTHESIA:  MAC.    COMPLICATIONS:  0.    SPECIMENS REMOVED:  tissue. IMPLANTS:  0.    ESTIMATED BLOOD LOSS:  0.    PROCEDURE:  On 2023, the patient was placed on the operating table in supine position. After adequate induction of MAC anesthesia, left lower extremity was prepped and draped in usual sterile fashion. Attention was directed to left second toe. Distal aspect had exposed bone and necrosis. This was discolored  two thirds with sloughing tissue. Base of the toe was dusky, especially plantarly but viable skin tissue. Two similar toe incisions were accomplished around the base of the toe, it was carefully disarticulated, sent for pathology and culture, no deeper infection or purulence noted. There was good bleeding from the wound edges. Small blood vessels were bovied as necessary. Proximal second metatarsal head bone biopsy was obtained for culture and pathology. Wound was thoroughly irrigated with antibiotic solution and loosely approximated with Prolene suture. Betadine soft dressing was applied. The patient tolerated the procedure and anesthesia well with vital signs stable throughout. The patient was transported to the recovery room in stable condition.       Yulisa Gibbs DPM [id number]  D:  2023 18:24  T:  2023 22:37  JOB #:  [id number]  CC:  Estela Young DPM

## 2023-06-15 NOTE — CARE COORDINATION
..Discharge order noted for today. Pt has been accepted to [facility] agency. Met with patient and patient is  agreeable to the transition plan today. Transport has been arranged through pt's family . Patient's discharge summary and home health  orders have been forwarded to [facility] home health  agency via [facility] . Updated bedside RN, Angelica Mckinney ,  to the transition plan.   Discharge information has been documented on the AVS.       ..  NÉSTOR Nazario Care Manager

## 2023-06-15 NOTE — PROGRESS NOTES
conducted an initial consultation and Spiritual Assessment for mAber Olvera, who is a 57 y.o.,male. Patient's Primary Language is: [place]. According to the patient's EMR Adventism Affiliation is: [place].      The reason the Patient came to the hospital is:   Patient Active Problem List    Diagnosis Date Noted    Toe infection 06/11/2023    Osteomyelitis ([address] 06/11/2023    Type 2 diabetes mellitus with unspecified diabetic retinopathy with macular edema ([address] 11/02/2022    Vitreous hemorrhage, left eye ([address] 11/02/2022    Confusion and disorientation 08/14/2022    Seizure-like activity ([address] 08/14/2022    Open toe wound, initial encounter 03/10/2022    Diabetes mellitus ([address]     Hypertension     Syncope and collapse 01/17/2021    GERD (gastroesophageal reflux disease) 08/05/2020    Hyperphosphatemia 08/05/2020    Iron deficiency anemia 08/05/2020    Anemia in chronic renal disease 08/05/2020    ESRD (end stage renal disease) on dialysis ([address] 06/01/2020    Hemodialysis catheter dysfunction ([address] 05/06/2020    Secondary hyperparathyroidism of renal origin ([address] 02/17/2020    ESRD (end stage renal disease) ([address] 02/17/2020    Bradycardia 02/15/2020    Acute renal failure superimposed on chronic kidney disease ([address] 02/15/2020    Acute renal failure superimposed on stage 3 chronic kidney disease ([address] 02/15/2020    UTI (urinary tract infection) 02/15/2020    Renal failure (ARF), acute on chronic ([address] 03/31/2019    Suprapubic catheter ([address] 03/28/2019    Bladder atony 01/31/2019    Dry skin dermatitis 01/31/2019    Vitamin D deficiency 01/31/2019    Lung nodules 01/31/2019    Pericardial effusion 01/31/2019    Chronic neck pain 01/31/2019    Hypothyroid 01/31/2019    Neck mass 01/31/2019    Cirrhosis of liver ([address] 01/31/2019    COPD, moderate ([address] 01/31/2019    Flaccid neuropathic bladder, not elsewhere classified 01/31/2019    BPH (benign prostatic hyperplasia) 12/20/2018    Urethral erosion
[address] [facility] Pharmacokinetic Monitoring Service - Vancomycin    Indication: osteo  Target Pre-Dialysis Concentration: 21-24 mg/L  Day of Therapy: 2  Additional Antimicrobials: pip-tazo    Pertinent Laboratory Values:    Wt Readings from Last 1 Encounters:   06/12/23 165 lb (74.8 kg)     Temp Readings from Last 1 Encounters:   06/12/23 98.3 °F (36.8 °C) (Oral)     Recent Labs     06/11/23  1356 06/12/23  0126   WBC 6.7 6.0     Pertinent Cultures:  Date Source Results   6/11 blood NGTD   MRSA Nasal Swab: not ordered    Assessment:  Date Current Dose Concentration (mg/L) Dialysis Session   6/11 1,500 mg x1 - no   6/12 - - no     Plan:  ESRD on HD q TTS - dose by level  No dose today  Ordered a level for 6/13 with AM labs  Pharmacy will continue to monitor patient and adjust therapy as indicated    Thank you for the consult,  Kitty Field, [facility]  6/12/2023
[address] [facility] Pharmacokinetic Monitoring Service - Vancomycin    Indication: Bone and Joint Infection  Target Concentration: Pre-Dialysis Concentration 15-20 mg/L  Day of Therapy: 5  Additional Antimicrobials: Piperacillin/Tazobactam    Pertinent Laboratory Values:   Temp: 98.1 °F (36.7 °C), Weight - Scale: 165 lb 12.6 oz (75.2 kg)  Recent Labs     06/14/23  0113 06/15/23  0334   CREATININE 3.78* 4.83*   BUN 19* 29*   WBC 7.1 7.8       Estimated Creatinine Clearance: 16 mL/min (A) (based on SCr of 4.83 mg/dL (H)). Pertinent Cultures:  Culture Date Source Results   6/11 Blood NGTD   6/13 Wound Pending   6/14 Tissue Pending   MRSA Nasal Swab: N/A.  Non-respiratory infection    Assessment:  Date/Time Current Dose Concentration Dialysis Session   6/11 1500mg x1 NA No   6/13 1000mg x1 15.8 Yes   6/15 750mg x1  15.7 Yes     Note: Serum concentrations collected for AUC dosing may appear elevated if collected in close proximity to the dose administered, this is not necessarily an indication of toxicity    Plan:  Concentration-guided dosing due to renal impairment and intermittent hemodialysis  Dose:  750mg x1 dose over the last 60 minutes of hemodialysis today  Renal labs as indicated   Vancomycin concentration ordered for 6/17 in AM  Pharmacy will continue to monitor patient and adjust therapy as indicated    Thank you for the consult,  SANDEEP Weaver [facility]  6/15/2023
[address] [facility] Pharmacokinetic Monitoring Service - Vancomycin    Indication: osteo  Target Pre-Dialysis Concentration: 21-24 mg/L  Day of Therapy: 3  Additional Antimicrobials: pip-tazo    Pertinent Laboratory Values:    Wt Readings from Last 1 Encounters:   06/13/23 168 lb 3.4 oz (76.3 kg)     Temp Readings from Last 1 Encounters:   06/13/23 98.1 °F (36.7 °C)     Recent Labs     06/12/23  0126 06/13/23  0154   WBC 6.0 5.5     Pertinent Cultures:  Date Source Results   6/11 blood NGTD   MRSA Nasal Swab: not ordered    Assessment:  Date Current Dose Concentration (mg/L) Dialysis Session   6/11 1,500 mg x1 - no   6/12 - - no   6/13 1,000 mg x1 15.8      Plan:  ESRD on HD q TTS - dose by level  Dose: 1,000 mg over the last 60 min of HD  No level ordered at this time  Pharmacy will continue to monitor patient and adjust therapy as indicated    Thank you for the consult,  SANDEEP Clements LUAN [facility]  6/13/2023
[facility] - Progress Note    Admit Date: 6/11/2023      Assessment:     Patient Active Problem List    Diagnosis Date Noted    Toe infection 06/11/2023    Osteomyelitis ([address] 06/11/2023    Type 2 diabetes mellitus with unspecified diabetic retinopathy with macular edema ([address] 11/02/2022    Vitreous hemorrhage, left eye ([address] 11/02/2022    Confusion and disorientation 08/14/2022    Seizure-like activity ([address] 08/14/2022    Open toe wound, initial encounter 03/10/2022    Diabetes mellitus ([address]     Hypertension     Syncope and collapse 01/17/2021    GERD (gastroesophageal reflux disease) 08/05/2020    Hyperphosphatemia 08/05/2020    Iron deficiency anemia 08/05/2020    Anemia in chronic renal disease 08/05/2020    ESRD (end stage renal disease) on dialysis ([address] 06/01/2020    Hemodialysis catheter dysfunction ([address] 05/06/2020    Secondary hyperparathyroidism of renal origin ([address] 02/17/2020    ESRD (end stage renal disease) ([address] 02/17/2020    Bradycardia 02/15/2020    Acute renal failure superimposed on chronic kidney disease ([address] 02/15/2020    Acute renal failure superimposed on stage 3 chronic kidney disease ([address] 02/15/2020    UTI (urinary tract infection) 02/15/2020    Renal failure (ARF), acute on chronic ([address] 03/31/2019    Suprapubic catheter ([address] 03/28/2019    Bladder atony 01/31/2019    Dry skin dermatitis 01/31/2019    Vitamin D deficiency 01/31/2019    Lung nodules 01/31/2019    Pericardial effusion 01/31/2019    Chronic neck pain 01/31/2019    Hypothyroid 01/31/2019    Neck mass 01/31/2019    Cirrhosis of liver ([address] 01/31/2019    COPD, moderate ([address] 01/31/2019    Flaccid neuropathic bladder, not elsewhere classified 01/31/2019    BPH (benign prostatic hyperplasia) 12/20/2018    Urethral erosion     Chronic anemia 05/23/2018    Status post amputation of toe of right foot ([address] 05/18/2018    Acquired absence of other right toe(s) ([facility] 05/18/2018    Thrombocytopenia ([facility] 04/10/2018
[facility] - Progress Note    Consultation request by Linn Suh MD for advice/opinion related to evaluating Toe infection [L08.9]  Osteomyelitis of left foot, unspecified type [facility]) [M86.9]  Osteomyelitis ([address] [M86.9]    Date of  Admission: 6/11/2023  1:17 PM   Primary Care Physician:  Pierre Manjarrez MD     Assessment:     Patient Active Problem List   Diagnosis    ESRD (end stage renal disease) on dialysis [facility])    Urethral erosion    GERD (gastroesophageal reflux disease)    Light chain deposition disease    Bladder atony    Secondary hyperparathyroidism of renal origin ([address]    Hemodialysis catheter dysfunction (HCC)    Hyperphosphatemia    Open toe wound, initial encounter    Iron deficiency anemia    Dry skin dermatitis    Vitamin D deficiency    Bradycardia    Lung nodules    Acute renal failure superimposed on chronic kidney disease (HCC)    Renal failure (ARF), acute on chronic (HCC)    Pericardial effusion    Diabetes mellitus ([address]    Hypertension    Chronic drug abuse ([address]    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)    Renal cell carcinoma of left kidney (HCC)    Chronic anemia    Thrombocytopenia (HCC)    Anemia in chronic renal disease    Diabetes (HCC)    Quadriparesis (HCC)    IV drug abuse ([address]    Umbilical hernia    BPH (benign prostatic hyperplasia)    Chronic hepatitis C without hepatic coma (HCC)    Acute renal failure superimposed on stage 3 chronic kidney disease (HCC)    Chronic neck pain    Hypothyroid    UTI (urinary tract infection)    Neck mass    Suprapubic catheter ([address]    Chronic kidney disease, stage III (moderate) (HCC)    ESRD (end stage renal disease) ([address]    Cirrhosis of liver (HCC)    COPD, moderate ([address]    Acquired absence of other right toe(s) ([address]    Confusion and disorientation    Flaccid neuropathic bladder, not elsewhere classified    Seizure-like activity ([address]    Syncope and collapse    Type 2 diabetes mellitus with
Infectious Disease progress Note        Reason: Left second toe gangrene/infection    Current abx Prior abx   Piperacillin/tazobactam, vancomycin since  6/11      Lines:       Assessment :    57 y.o.  male who has significant history for chronic urinary retention with suprapubic catheter placement, history of Renal Cell carcinoma post-left partial nephrectomy 12/2013 followed  by Dr. Radha Castillo urology, chronic kidney disease, history of c-spine laminectomy with post op paralysis, Heroin Drug Abuse, ongoing TOB use, DM2, anemia of chronic disease, thrombocytopenia, hepatitis C with cirrhosis followed by GI Dr. Ramon Antoine,  [facility], Hypothyroidism admitted to [facility] on 6/11/2023 for left second toe infection. hospitalization at [facility] 3/7/19-3/15/19 for gi bleed, hypothermia, hypoglycemia, sepsis, pneumonia, uti        hospitalization [facility] 3/17/19-3/20/19 for hypoglycemia, hypothermia, uti       Hospitalization at [facility] March 2019 for hypoglycemia, seizures, hypothermia-suspected hypopituitarism     Hospitalization at [facility] March 2022 for right fourth toe gangrene, chronic osteomyelitis distal right fourth toe    Status post amputation right fourth toe on 3/11/2022. Intra-Op cultures 3/11-Serratia, Enterococcus faecalis (ampicillin susceptible), coagulase-negative Staphylococcus     Clinical presentation consistent with wet necrosis left second toe, acute on chronic osteomyelitis left second toe distal phalanx    Podiatry follow-up appreciated. Plans for surgery in am noted       Recommendations:       1. continue pip/tazo, vancomycin  2. Follow up  wound cultures left second toe drainage  3. Follow-up results of arterial duplex  4. F/u podiatry recommendations regarding left foot surgery     Above plan was discussed in details with patient,  and primary team. Please call me if any further questions or concerns.  Will continue to participate in the care of this
OT orders received and medical chart review completed. Planned surgery for left second toe amputation this date. Please clarify activity limitations and weight bearing status following procedure.  OT to follow,
OT orders received and medical chart review completed. Pt reports he is at baseline as Mod I with ADLs and functional mobility w/o AD. Formal OT evaluation not indicated at this time. OT to sign off.
PT orders received and chart reviewed. Pt on surgery schedule this date for 2nd toe amputation. Will hold PT eval until after procedure. Please update weight bearing for full participation in PT session. Will continue to follow.      Thank you for this referral.   Moses Lobato PT DPT
Progress Note    Imtiaz Montez  57 y.o. Admit Date: 6/11/2023  [unfilled]        Subjective:     Patient feels good, Getting Dialysis & seen during Dialysis. No New complain. Getting Dialysis without any Heparin  Cardiology has cleared him to Undergo Left Digit amputation      A comprehensive review of systems was negative except for that written in the History of Present Illness.     Objective:     BP (!) 157/80   Pulse 58   Temp 98.1 °F (36.7 °C)   Resp 16   Ht 6' (1.829 m)   Wt 168 lb 3.4 oz (76.3 kg)   SpO2 99%   BMI 22.81 kg/m²       Intake/Output Summary (Last 24 hours) at 6/13/2023 1114  Last data filed at 6/13/2023 0014  Gross per 24 hour   Intake 600 ml   Output --   Net 600 ml       Current Facility-Administered Medications   Medication Dose Route Frequency Provider Last Rate Last Admin    glucose chewable tablet 16 g  4 tablet Oral PRN Leeann Nelson MD        dextrose bolus 10% 125 mL  125 mL IntraVENous PRN Leeann Nelson MD        Or    dextrose bolus 10% 250 mL  250 mL IntraVENous PRN Leeann Nelson MD        glucagon (rDNA) injection 1 mg  1 mg SubCUTAneous PRN Leeann Nelson MD        dextrose 10 % infusion   IntraVENous Continuous PRN Leeann Nelson MD        doxercalciferol (HECTOROL) injection 1 mcg  1 mcg IntraVENous Once per day on Tue Thu Sat Sinai Barry MD        epoetin eliza-epbx (RETACRIT) injection 3,000 Units  3,000 Units SubCUTAneous Once per day on Tue Thu Sat Sinai Barry MD        sodium hypochlorite (DAKINS) 0.125 % external solution   Irrigation Daily Belle Juárez DPM   Given at 06/13/23 1391    sodium chloride flush 0.9 % injection 5-40 mL  5-40 mL IntraVENous 2 times per day Alexey Rico MD   10 mL at 06/13/23 0913    sodium chloride flush 0.9 % injection 5-40 mL  5-40 mL IntraVENous PRN Alexey Rico MD        0.9 % sodium chloride infusion   IntraVENous PRN Alexey Rico MD        ondansetron (ZOFRAN-ODT)
Progress Note    Kimo Dietrich  57 y.o. Admit Date: 6/11/2023  [unfilled]        Subjective:     Patient is back to his room after completing his dialysis as scheduled. Earlier during beginning of dialysis and blood pressure dropped as he received his blood pressure medicine before dialysis. Had to decrease the ultrafiltration and had to give albumin also. Blood pressure improved and continue dialysis. Patient does not have any new complaint wants to go home. Removed 1500 cc of fluid      A comprehensive review of systems was negative except for that written in the History of Present Illness.     Objective:     /65   Pulse 66   Temp 98.3 °F (36.8 °C)   Resp 18   Ht 6' (1.829 m)   Wt 166 lb 10.7 oz (75.6 kg)   SpO2 100%   BMI 22.60 kg/m²       Intake/Output Summary (Last 24 hours) at 6/15/2023 1423  Last data filed at 6/15/2023 1313  Gross per 24 hour   Intake 550 ml   Output 2005 ml   Net -1455 ml       Current Facility-Administered Medications   Medication Dose Route Frequency Provider Last Rate Last Admin    albumin human 25% IV solution 25 g  25 g IntraVENous PRN Lauren Velázquez MD        amoxicillin-clavulanate (AUGMENTIN) 500-125 MG per tablet 1 tablet  1 tablet Oral Daily Luis Blankenship MD   1 tablet at 06/15/23 1406    lactobacillus delayed release capsule 1 capsule  1 capsule Oral Daily with breakfast Luis Blankenship MD   1 capsule at 06/15/23 1406    insulin glargine (LANTUS) injection vial 3 Units  3 Units SubCUTAneous Nightly Mary Motta DPM   3 Units at 06/14/23 2146    oxyCODONE-acetaminophen (PERCOCET) 5-325 MG per tablet 1 tablet  1 tablet Oral Q4H PRN Mary Motta DPM   1 tablet at 06/14/23 2204    hydrALAZINE (APRESOLINE) injection 20 mg  20 mg IntraVENous Q6H PRN Mary Motta DPM   20 mg at 06/14/23 0926    0.9 % sodium chloride infusion   IntraVENous Continuous Corey Plate, APRN - CRNA        glucose chewable tablet 16 g  4 tablet Oral PRN Julia Pean
Progress Note    Melissa Braden  57 y.o. Admit Date: 6/11/2023  [unfilled]        Subjective:     Patient  is doing waiting for surgery. Courtney Search of Left  foot. No SOB  Currently getting his Antibiotics. Was Dialyzed yesterday      A comprehensive review of systems was negative except for that written in the History of Present Illness.     Objective:     /65   Pulse 60   Temp 97.7 °F (36.5 °C) (Oral)   Resp 17   Ht 6' (1.829 m)   Wt 165 lb 12.6 oz (75.2 kg)   SpO2 100%   BMI 22.48 kg/m²       Intake/Output Summary (Last 24 hours) at 6/14/2023 1146  Last data filed at 6/14/2023 1126  Gross per 24 hour   Intake 500 ml   Output 2800 ml   Net -2300 ml       Current Facility-Administered Medications   Medication Dose Route Frequency Provider Last Rate Last Admin    insulin glargine (LANTUS) injection vial 3 Units  3 Units SubCUTAneous Nightly Delano Arreaga MD        oxyCODONE-acetaminophen (PERCOCET) 5-325 MG per tablet 1 tablet  1 tablet Oral Q4H PRN Delano Arreaga MD        hydrALAZINE (APRESOLINE) injection 20 mg  20 mg IntraVENous Q6H PRN Delano Arreaga MD   20 mg at 06/14/23 0926    glucose chewable tablet 16 g  4 tablet Oral PRN Delano Arreaga MD        dextrose bolus 10% 125 mL  125 mL IntraVENous PRN Delano Arreaga MD        Or    dextrose bolus 10% 250 mL  250 mL IntraVENous PRN Delano Arreaga MD        glucagon (rDNA) injection 1 mg  1 mg SubCUTAneous PRN Delano Arreaga MD        dextrose 10 % infusion   IntraVENous Continuous PRN Delano Arreaga MD        doxercalciferol (HECTOROL) injection 1 mcg  1 mcg IntraVENous Once per day on Tue Thu Jonathan Aguirre MD   1 mcg at 06/13/23 1205    epoetin eliza-epbx (RETACRIT) injection 3,000 Units  3,000 Units SubCUTAneous Once per day on Tue Thu Sat Jhonny Aguirre MD   3,000 Units at 06/13/23 2056    sodium hypochlorite (DAKINS) 0.125 % external solution   Irrigation Daily Valeri Carrillo DPM   Given
Progress Note    Patient: Lord Vera MRN: [id number]  CSN: [id number]    YOB: 1960  Age: 57 y.o. Sex: male    DOA: 6/11/2023 LOS:  LOS: 1 day                    Subjective:   Pt with no acute complaints today. He is ready for surgery today and would like to leave as soon as possible after. Blood pressures elevated, 193/85 and 195/88 yesterday and SBP remains 150-170's today. He does not remember his blood pressures getting this high. Discussed with patient that it may be related to Methadone withdrawal. He denied SOB, chest pain, cough, abdominal pain. He denied any pain in his left toe. Discussed with patient adding Percocet after surgery for pain and chonic neck pain    Cardiology cleared him for surgery. Received dialysis yesterday. Second left toe amputation today. Blood sugars elevated (200-300s), added 3 units Lantus, SQ, nightly. Nephrology consulted for HD management while hospitalized. Saw him yesterday during dialysis and recommended continuing HD as tolerated. Receives dialysis Tuesday, Thursday, Saturday. Creatinine 3.78, BUN 19 today. Infectious disease consulted due to osteomyelitis and gangrenous, infected right 2nd toe. Receiving Zosyn and Vancomycin (6/11). Recommended continuing Zosyn and Vancomycin. Discussed with ID, will follow up with wound cultures of left second toe drainage. Blood cultures remain negative to date. No leukocytosis, remains afebrile. Thrombocytopenia noted, 99. Has history of liver cirrhosis and hepatitis C. Urine drug screen positive for methadone. Has history of IVDU and in remission on methadone. Not using methadone while hospitalized and will monitor for withdrawal symptoms; increasing blood pressure over last day. 193/85 and 195/88 yesterday and SBP remains 150-170's today. Changed Hydralazine to 20mg every 6 hours.     Arterial US of lower extremity to rule out peripheral vascular disease was normal. Had normal DEVAN noted
Progress Note    Patient: Nighat Gonsales MRN: [id number]  CSN: [id number]    YOB: 1960  Age: 57 y.o. Sex: male    DOA: 6/11/2023 LOS:  LOS: 1 day                    Subjective:   Pt with no acute complaints today. Slightly frustrated today with all the surgeries he has needed. He is expected to undergo amputation of left second toe today. Discussed cardiac clearance prior to surgery today. States he has a \"strong heart\". He denied SOB, chest pain, cough, abdominal pain. He has not had a BM since yesterday. +Suprapubic cath with 350mL, yellow urine drainage since admission    Second left toe amputation today. Consulted cardiology and discussed this morning with about clearance prior to surgery today. Expected to get EKG and limited ECHO. Nephrology consulted for HD management while hospitalized. Receives dialysis Tuesday, Thursday, Saturday. Creatinine 4.18, BUN 24. Infectious disease consulted due to osteomyelitis and gangrenous, infected right 2nd toe. Receiving Zosyn and Vancomycin (6/11). Will follow up on recommendations. Blood cultures pending. No leukocytosis, afebrile. Thrombocytopenia noted. Has history of liver cirrhosis and hepatitis C. Will check iron/anemia panel. Lipid panel also ordered. Urine drug screen ordered. Has history of IVDU and in remission on methadone. Not using methadone while hospitalized and will monitor for withdrawal symptoms. Arterial US of lower extremity ordered to rule out peripheral vascular disease. Had normal DEVAN noted 5/2021.     Chief Complaint:   Chief Complaint   Patient presents with    Osteomyelitis      Nighat Gonsales is a 57 y.o. male who past medical history of diabetes, hypertension, ESRD on hemodialysis Tuesday, Thursday, Saturday, history of right third and fourth toe amputation, hypothyroidism, left renal cell carcinoma s/p partial left nephrectomy, Hep C s/p treatment, Liver cirrhosis, hx of bradycardia, hx of IVDU in remission
Received pre HD report from SHERINE Collado RN. Patient arrived to suite A&Ox[facility], no s/s of acute distress noted. Hemodialysis today for 3.5 hours with UF Goal of 1500ml. LUE AVG accessed w/16G needles per protocol. Tx initiated at 0930. AVG flowing with ease. Offered assistance with repositioning every 2 hours. Patients face and vascular access visible at all times during treatment. Line connections intact at all times. Vancomycin given during last hour of dialysis per order. Tx completed at 1300. Net fluid removal 1500ml. De-accessed per protocol. 5 min clot time for arterial. 5 min clot time for venous. Sterile dressing applied to access sites per policy. Unit nurse given post report.
Seen at bedside awake and alert. Now has Cardiac Clearance. Plan for surgery ,moved to Wednesday. Wound care and antibiotics at this time.
Surgery cancelled for today. Pt reporting independent at home, has been ambulating in room, denies need for skilled PT while admitted. Discussed if change in needs to contact physician for new PT orders. Will sign off at this time.      Thank you for this referral   Good Funez PT DPT
[birth year]/1990    no DM    Dialysis patient [facility])     Drug abuse ([address]     Encephalopathy     GERD (gastroesophageal reflux disease)     Hemodialysis patient ([address]     T/Th/Sat at [facility] [phone]    History of abuse     Hypertension     Muscle weakness     Quadriparesis ([address] 2017    Renal cell carcinoma of left kidney ([address] 12/17/13    Pathological Stage [id number] cc RCC FG3 s/p left open partial nephrectomy in 12/13      Sepsis due to methicillin resistant Staphylococcus aureus ([address]     Suprapubic catheter ([address]     Thrombocytopenia ([address]     Thyroid disease     Urethral erosion     Viral hepatitis B     Viral hepatitis C     Xerosis cutis        Past Surgical History:   Procedure Laterality Date    CIRCUMCISION  12/17/13    Dr. Jones Hoffman, [facility] [facility]    COLONOSCOPY N/A 10/3/2019    COLONOSCOPY / polypectomy performed by Maria Elena Trujillo MD at [address] [place] [address] N/A 3/22/2023    COLONOSCOPY performed by Sharl Harada, MD at [facility] ENDOSCOPY    CT BIOPSY RENAL  4/9/2018    CT BIOPSY RENAL 4/9/2018 [facility] RAD CT    INSJ TUNNELED CVC W/O SUBQ PORT/ AGE 3 YR/> N/A 5/6/2020    INSERTION TUNNELED CENTRAL VENOUS CATHETER/perm catheter exchange performed by Ben Biggs MD at [facility] CATH LAB    IR BIOPSY PERC SUPERF BONE  4/6/2018    IR BIOPSY PERC SUPERF BONE 4/6/2018 [facility] RAD ANGIO IR    IR TUNNELED CATHETER PLACEMENT GREATER THAN 5 YEARS  2/18/2020    IR TUNNELED CATHETER PLACEMENT GREATER THAN 5 YEARS 2/18/2020 [facility] RAD ANGIO IR    IR TUNNELED CATHETER PLACEMENT GREATER THAN 5 YEARS  2/18/2020    KIDNEY REMOVAL Left 12/17/13    Open/ Partial,nephrectomy    NEUROLOGICAL SURGERY  2017    neck surgery-abcess-hardware neck    OTHER SURGICAL HISTORY  04/2017    abscess removed from back of neck     OTHER SURGICAL HISTORY Right 2/27/2016    removed right ft  2nd meta-tarsal    REMOVAL WITH INSERTION OF SUPRAPUBIC CATHETER  2018       Family History   Problem Relation Age of Onset    Diabetes Sister
complaints. Has history of ESRD and is compliant with his dialysis. Primary nephrologist is Dr. Kristin Painter. ER course:  CBC significant for anemia hemoglobin of 10.4. Otherwise no leukocytosis. Also with thrombocytopenia, platelets of 94. Chronic issue history of C cirrhosis, posttreatment. BMP showed sodium of 135, potassium 4.4, creatinine of 4.18, glucose of 175. X-ray showed edema and cortical erosion of the second digit likely representing osteomyelitis. Patient received a dose of vancomycin and Zosyn. Blood cultures drawn. Patient remains comfortable and hemodynamically stable. Podiatry consulted in the ER, again recommended admission for amputation of the second digit. Admitted to medicine team.      X ray Left Foot:  IMPRESSION:     Soft tissue edema with some underlying cortical erosion distal tuft of the  second digit may represent osteomyelitis in the appropriate clinical setting. Please correlate clinically. Review of systems  General: No fevers or chills. Cardiovascular: No chest pain or pressure. No palpitations. Pulmonary: No shortness of breath, cough or wheeze. Gastrointestinal: No abdominal pain, nausea, vomiting or diarrhea. Genitourinary: No urinary frequency, urgency, hesitancy or dysuria. Musculoskeletal: gangrenous Lt second toe  Neurologic: No headache, numbness, tingling or weakness. Objective:     Physical Exam:  Visit Vitals  BP (!) 147/76   Pulse 58   Temp 98.1 °F (36.7 °C)   Resp 16   Ht 6' (1.829 m)   Wt 168 lb 3.4 oz (76.3 kg)   SpO2 99%   BMI 22.81 kg/m²        General:         Alert, cooperative, no acute distress. Dry oral mucosa. HEENT: NC, Atraumatic. Anicteric sclerae. Lungs: CTA Bilaterally. No Wheezing/Rhonchi/Rales. Heart:  Regular  rhythm,  No murmur, No Rubs, No Gallops  Abdomen: Soft, Non distended, Non tender. +Bowel sounds. Suprapubic catheter present, yellow, non-odorous urine draining.  360mL output noted since
Extremities:  Extremities normal, atraumatic, no cyanosis or edema. Right foot surgical site well-healed. Swelling/tenderness/necrosis left second toe     Pulses:  2+ and symmetric all extremities. Skin:  Skin color, texture, turgor normal.          Neurologic:  CNII-XII intact. No focal motor or sensory deficit.          Labs: Results:   Chemistry Recent Labs     06/13/23 0154 06/14/23  0113 06/15/23  0334   GLUCOSE 314* 199* 171*   * 136 135*   K 4.4 3.9 4.2    102 103   CO2 26 29 27   BUN 37* 19* 29*   CREATININE 5.13* 3.78* 4.83*   GLOB 3.6 4.2* 4.2*   ALT 14* 14* 13*   AST 7* 10 12        CBC w/Diff Recent Labs     06/13/23  0154 06/14/23  0113 06/15/23  0334   WBC 5.5 7.1 7.8   RBC 3.03* 3.18* 3.24*   HGB 9.7* 10.1* 10.2*   HCT 29.9* 31.2* 31.5*   PLT 91* 99* 97*        Microbiology Results       Procedure Component Value Units Date/Time    Culture, Tissue [[id number]] Collected: 06/14/23 1805    Order Status: Sent Specimen: Tissue Updated: 06/14/23 2051    Culture, Anaerobic [[id number]] Collected: 06/14/23 1804    Order Status: Sent Specimen: Tissue Updated: 06/14/23 2050    Culture, Anaerobic [[id number]] Collected: 06/14/23 1759    Order Status: Sent Specimen: Tissue Updated: 06/14/23 2043    Culture, Tissue [[id number]] Collected: 06/14/23 1759    Order Status: Sent Specimen: Tissue Updated: 06/14/23 2047    Culture, Wound Aerobic Only [[id number]] Collected: 06/13/23 2246    Order Status: Completed Specimen: Toe Updated: 06/14/23 1932     Special Requests NO SPECIAL REQUESTS        Gram stain NO WBC'S SEEN         NO ORGANISMS SEEN        Culture PENDING    Culture, Blood 1 [[id number]] Collected: 06/11/23 1407    Order Status: Completed Specimen: Blood Updated: 06/15/23 0649     Special Requests NO SPECIAL REQUESTS        Culture NO GROWTH 4 DAYS       Culture, Blood 2 [[id number]] Collected: 06/11/23 1325    Order Status: Completed Specimen: Blood Updated: 06/15/23 0649     Special

## 2023-06-15 NOTE — DISCHARGE INSTRUCTIONS
experience any of the following symptoms:  Weight gain of 3 pounds or more overnight or 5 pounds in a week, increased swelling in our hands or feet or shortness of breath while lying flat in bed. Please call your doctor as soon as you notice any of these symptoms; do not wait until your next office visit. Patient armband removed and shredded   Thank you for enrolling in [address] Please follow the instructions below to securely access your online medical record. [facility] allows you to send messages to your doctor, view your test results, renew your prescriptions, schedule appointments, and more. How Do I Sign Up? In your Internet browser, go to [email or web address]  Click on the Sign Up Now link in the Sign In box. You will see the New Member Sign Up page. Enter your [employer] Access Code exactly as it appears below. You will not need to use this code after youve completed the sign-up process. If you do not sign up before the expiration date, you must request a new code. [employer] Access Code: Activation code not generated  Current [employer] Status: Active    Enter your Social Security Number ([id number]) and Date of Birth (mm/dd/yyyy) as indicated and click Submit. You will be taken to the next sign-up page. Create a [facility] ID. This will be your [employer] login ID and cannot be changed, so think of one that is secure and easy to remember. Create a [employer] password. You can change your password at any time. Enter your Password Reset Question and Answer. This can be used at a later time if you forget your password. Enter your e-mail address. You will receive e-mail notification when new information is available in [address] Click Sign Up. You can now view your medical record. Additional Information  If you have questions, please contact your physician practice where you receive care. Remember, [employer] is NOT to be used for urgent needs. For medical emergencies, dial 911.      The

## 2023-06-15 NOTE — PLAN OF CARE
Problem: Safety - Adult  Goal: Free from fall injury  6/15/2023 0037 by Jasmin Thomas RN  Outcome: Progressing  6/14/2023 1117 by Rosio Taveras RN  Outcome: Progressing     Problem: Pain  Goal: Verbalizes/displays adequate comfort level or baseline comfort level  6/15/2023 0037 by Jasmin Thomas RN  Outcome: Progressing  6/14/2023 1117 by Rosio Taveras RN  Outcome: Progressing     Problem: Chronic Conditions and Co-morbidities  Goal: Patient's chronic conditions and co-morbidity symptoms are monitored and maintained or improved  Outcome: Progressing

## 2023-06-15 NOTE — ADT AUTH CERT
6/14  by Juany Vega RN       Review Status Review Entered   In Primary 6/14/2023 1209       Created By   Juany Vega RN      Criteria Review   DATE: 6/14/2023        PERTINENT UPDATES:  Patient  is waiting for surgery. [place] of Left  foot. No SOB  Currently getting his Antibiotics. Was Dialyzed yesterday        VITALS:  Bp: 173/79, 165/81  Pulse 60, resp 17, temp 97.7, o2 sat 100% on ra        ABNL/PERTINENT LABS/RADIOLOGY/DIAGNOSTIC STUDIES:  Wbc 7.1, hgb 10.1, hct 31.2, plts 99, mag 2.2, glucose 199, creat 3.78, gfr 17, alt 14, albumin 2.7, phos 3.0, glucose 250, glucose 246        PHYSICAL EXAM:  General:  Alert, cooperative, no distress, appears stated age. Eyes:  Conjunctivae/corneas clear. PERRL, EOMs intact. Mouth/Throat: Lips, mucosa, and tongue normal. Teeth and gums normal.   Neck: Supple, symmetrical, trachea midline, no adenopathy, thyroid: no enlargement/tenderness/nodules, no carotid bruit and no JVD. Lungs:   Clear to auscultation bilaterally. Heart:  Regular rate and rhythm, S1, S2 normal, no murmur, click, rub or gallop. Abdomen:   Soft, non-tender. Bowel sounds normal. No masses,  No organomegaly. Extremities: Extremities normal, atraumatic, no cyanosis or edema, AVG has good thrill & Bruit   Skin: Skin color, texture, turgor normal. No rashes or lesions            MD CONSULTS/ASSESSMENT AND PLAN:  Nephrology note:     Plan:      No Plan for any Dialysis today,   Left foot surgery this afternoon  Dialysis tomorrow AM & Then Possible DC. Needs to know Duration of Antibiotics if have to be given with Dialysis. Subjective:   Pt with no acute complaints today. He is ready for surgery today and would like to leave as soon as possible after. Blood pressures elevated, 193/85 and 195/88 yesterday and SBP remains 150-170's today. He does not remember his blood pressures getting this high.  Discussed with patient that it may be related to Methadone withdrawal. He denied

## 2023-06-15 NOTE — CARE COORDINATION
RIZWAN met with pt at bedside to discuss pt's plan of care of Home with Home Health. Pt verbally consented to [facility]. Pt's family will provide transport at [place]. RIZWAN contacted Diego Narayan Dr with [facility] to discuss pt's plan of care. Diego Narayan Dr reviewed pt's [place] orders and insurance provider and accepted pt for [facility] [place] services. RIZWAN uploaded [facility] [place] referral via Epic.        ..  NÉSTOR Weston Care Manager

## 2023-06-15 NOTE — DISCHARGE SUMMARY
[unfilled]    Discharge Summary     Patient: Daina Arzola MRN: [id number]  SSN: [id number]    YOB: 1960  Age: 57 y.o. Sex: male       Admit Date: 6/11/2023    Discharge Date: 6/15/2023      Admission Diagnoses: Toe infection [L08.9]  Osteomyelitis of left foot, unspecified type ([address] [M86.9]  Osteomyelitis ([address] [M86.9]    Discharge Diagnoses:    Osteomyelitis Left Second Toe, acute  Amputation Left Second Toe  ESRD on HD  HTN  DM type 2  Opioid dependence on Methadone    Discharge Condition: Good    Hospital Course: Patient instructed to come to hospital by his podiatrist after being seen in the clinic. X ray in ED showed osteo of left second toe. Patient was started on IV Vancomycin and Zosyn empirically in the ER. Patient went for surgery on Wednesday after getting cardiac clearance for the procedure. Patient tolerated procedure well without complications. Was discharged home in stable condition with antibiotics. ID consulted while in the hospital as well. DC home with wound care, PO augmentin, and vancomycin IV with HD. Outpatient Follow up: Will need PO augmentin 500mg once daily x 10 days, last day 6/24. Will continue vancomycin with HD through 6/24. Nephrologist, Dr. Jyotsna Fernandez. Outpatient follow up with cultures and sensitivities to wound/specimen. Dr. Juany Saenz ID also to follow up with cultures. Has methadone outpatient. Will send 2 days of percocet, 6 total tablets. Need to check CBC, CMP, magnesium, phosphorus on follow up. Has Home Health/Wound Care arranged. Wound care instructions provided by Dr. Tiff Millard, [address] [place] [address]. No changes in home medications other than antibiotics as noted. A/P:  Osteomyelitis Left Second Digit / Gangrene Toe / Hx of Right Toe Amputation  Podiatrist, Dr Nicholas Natarajanial ray showed soft tissue edema, cortical erosion; suspicious for osteo. No leukocytosis, vitals signs stable.  Patient comfortable, ambulatory  Was seen by Podiatry in

## 2023-06-16 LAB
BACTERIA SPEC CULT: NORMAL
GRAM STN SPEC: NORMAL
GRAM STN SPEC: NORMAL
SERVICE CMNT-IMP: NORMAL

## 2023-06-17 LAB
BACTERIA SPEC CULT: NORMAL
BACTERIA SPEC CULT: NORMAL
SERVICE CMNT-IMP: NORMAL
SERVICE CMNT-IMP: NORMAL

## 2023-06-18 LAB
BACTERIA SPEC CULT: ABNORMAL
GRAM STN SPEC: ABNORMAL
GRAM STN SPEC: ABNORMAL
SERVICE CMNT-IMP: ABNORMAL
SERVICE CMNT-IMP: ABNORMAL

## 2023-06-19 ENCOUNTER — HOME CARE VISIT (OUTPATIENT)
Age: 63
End: 2023-06-19
Payer: MEDICARE

## 2023-06-19 VITALS
TEMPERATURE: 98 F | SYSTOLIC BLOOD PRESSURE: 120 MMHG | HEART RATE: 80 BPM | DIASTOLIC BLOOD PRESSURE: 80 MMHG | OXYGEN SATURATION: 97 %

## 2023-06-19 LAB
BACTERIA SPEC CULT: NORMAL
BACTERIA SPEC CULT: NORMAL
GRAM STN SPEC: NORMAL
GRAM STN SPEC: NORMAL
SERVICE CMNT-IMP: NORMAL
SERVICE CMNT-IMP: NORMAL

## 2023-06-19 PROCEDURE — G0300 HHS/HOSPICE OF LPN EA 15 MIN: HCPCS

## 2023-06-19 ASSESSMENT — ENCOUNTER SYMPTOMS: PAIN LOCATION - PAIN QUALITY: ACHY

## 2023-06-20 NOTE — HOME HEALTH
SN reason for visit: wound care     Caregiver involvement: Patient's cg is friend. she lives with patient and is available at all times for assistance with iadls, adls, meal prep, medication management, taking to md appointments  . Medications reconciled and all medications are available in the home this visit. The following education was provided regarding medications, medication interactions, and look a like medications: educated on insulin. Medications  are effective at this time. Home health supplies by type and quantity ordered/delivered this visit include: n/a    Patient education/skilled care provided this visit:wound care performed per orders- old dressing completely removed, wound cleansed with dwc and applied aquacel, secured with kerlix and ace. dressing changed as ordered, healing well with no s/s of infection present. pt aware to keep dressing clean, dry and intact as ordered. pt aware to keep incision clean and dry as ordered, to report any new drainage to staff. patient made aware to monitor for s/s of infection [increased swelling, increased redness around site, increased pain, foul smelling drainage, fever] aware who to report to/when.  patient encouraged to monitor for increase in pain and to continue with current pain management and to notify staff/md if pain becomes excrutiating/intolerable. pt instructed to follow a high protein diet for healing- to try to get 90g protein daily. patient instructed to maintain clear pathways in home and to minimize clutter to prevent falls from occurring/minimize fall potential.  patient/cg to continue to take medications as prescribed. patient aware to monitor for effectiveness and to notify staff of any adverse reactions to medications/any changes to medication regimen. reviewed side effects, purposes, dosage, frequencies  Patient is a fall risk.  Educated pateint to sit on the side of the chair/bed, take a slow deep breaths, have feet firmly

## 2023-06-21 ENCOUNTER — HOME CARE VISIT (OUTPATIENT)
Age: 63
End: 2023-06-21
Payer: MEDICARE

## 2023-06-21 VITALS
TEMPERATURE: 98.1 F | HEART RATE: 74 BPM | OXYGEN SATURATION: 98 % | SYSTOLIC BLOOD PRESSURE: 128 MMHG | RESPIRATION RATE: 16 BRPM | DIASTOLIC BLOOD PRESSURE: 74 MMHG

## 2023-06-21 PROCEDURE — G0299 HHS/HOSPICE OF RN EA 15 MIN: HCPCS

## 2023-06-21 NOTE — HOME HEALTH
Skilled reason for visit: DISEASE MED MANAGEMENT, PHYSICAL ASSESSMENT, VITAL SIGNS, WOUND CARE  PATIENT WAS GIVEN AN OPPERTUNITY TO VOICE QUESTIONS AND/OR CONCERNS. PATIENT STATES THAT THEY HAVE NO QUESTIONS OR CONCERNS THIS VISIT. Caregiver involvement: family, iadls, adls, meal prep, med management, taking to md appointments. Medications reviewed and all medications are available in the home this visit. The following education was provided regarding medications:  patient/cg reminded to continue to take medications as prescribed. patient aware to monitor for effectiveness and to notify staff of any adverse reactions to medications/any changes to medication regimen. .    MD notified of any discrepancies/look a-like medications/medication interactions: NA  Medications are EFFECTIVE at this time. Home health supplies by type and quantity ordered/delivered this visit include: NA    Patient education provided this visit: encouraged patient to get three nutritional meals daily and to stay hydrated, drink plenty of fluids. patient made aware to monitor for s/s of infection [increased swelling, increased redness around site, increased pain, foul smelling drainage, fever] aware who to report to/when. Sharps education provided: PATIENT EDUCATED ON HOW TO PROPERLY DISPOSE OF NEEDLES INTO A BLEACH BOTTLE OR DETERGENT BOTTLE WITH THE CAP TAPPED ON. Patient level of understanding of education provided: PATIENT/CG  WAS ABLE TO REPEAT BACK AND VOICED UNDERSTANDING OF ALL EDUCATION PROVIDED.     Skilled Care Performed this visit: SAME AS REASON FOR VISIT    Patient response to procedure performed:   PATIENT TOLERATED WELL WITH NO C/O OF INCREASED PAIN OR DISCOMFORT    Agency Progress toward goals: PROGRESSING     Patient's Progress towards personal goals: progressing    Home exercise program: PATIENT TO TAKE ALL MEDS AS ORDERED, DO ICS X10/HR WHILE AWAKE, DRINK PLENTY OF FLUIDS,    Continued need for the

## 2023-06-22 ENCOUNTER — HOME CARE VISIT (OUTPATIENT)
Age: 63
End: 2023-06-22
Payer: MEDICARE

## 2023-06-23 ENCOUNTER — HOME CARE VISIT (OUTPATIENT)
Age: 63
End: 2023-06-23
Payer: MEDICARE

## 2023-06-23 PROCEDURE — G0299 HHS/HOSPICE OF RN EA 15 MIN: HCPCS

## 2023-06-24 VITALS — TEMPERATURE: 97.8 F | RESPIRATION RATE: 16 BRPM | OXYGEN SATURATION: 98 %

## 2023-06-27 ENCOUNTER — HOME CARE VISIT (OUTPATIENT)
Age: 63
End: 2023-06-27
Payer: MEDICARE

## 2023-06-27 PROCEDURE — G0300 HHS/HOSPICE OF LPN EA 15 MIN: HCPCS

## 2023-06-28 VITALS
DIASTOLIC BLOOD PRESSURE: 76 MMHG | TEMPERATURE: 97.8 F | OXYGEN SATURATION: 96 % | HEART RATE: 76 BPM | SYSTOLIC BLOOD PRESSURE: 120 MMHG

## 2023-07-03 ENCOUNTER — HOME CARE VISIT (OUTPATIENT)
Age: 63
End: 2023-07-03
Payer: MEDICARE

## 2023-07-03 PROCEDURE — G0300 HHS/HOSPICE OF LPN EA 15 MIN: HCPCS

## 2023-07-03 NOTE — HOME HEALTH
SN reason for visit: wound care     Caregiver involvement: Patient's cg is sister. she lives with patient and is available at all times for assistance with iadls, adls, meal prep, medication management, taking to md appointments  . Medications reconciled and all medications are available in the home this visit. The following education was provided regarding medications, medication interactions, and look a like medications: educated on insulin. Medications  are effective at this time. Home health supplies by type and quantity ordered/delivered this visit include: n/a    Patient education/skilled care provided this visit:wound care performed per orders- old dressing completely removed, wound cleansed with dwc and applied alginate, secured with kerlix and ace. dressing changed as ordered, healing well with no s/s of infection present. pt aware to keep dressing clean, dry and intact as ordered. pt aware to keep incision clean and dry as ordered, to report any new drainage to staff. patient made aware to monitor for s/s of infection [increased swelling, increased redness around site, increased pain, foul smelling drainage, fever] aware who to report to/when.  patient encouraged to monitor for increase in pain and to continue with current pain management and to notify staff/md if pain becomes excrutiating/intolerable. pt instructed to follow a high protein diet for healing- to try to get 90g protein daily. patient instructed to maintain clear pathways in home and to minimize clutter to prevent falls from occurring/minimize fall potential.  patient/cg to continue to take medications as prescribed. patient aware to monitor for effectiveness and to notify staff of any adverse reactions to medications/any changes to medication regimen. reviewed side effects, purposes, dosage, frequencies. patient refused vitals at this time.   Pt instructed to follow with diabetic diet- monitoring sugar intake, limiting foods

## 2023-07-06 ENCOUNTER — HOME CARE VISIT (OUTPATIENT)
Age: 63
End: 2023-07-06
Payer: MEDICARE

## 2023-07-06 PROCEDURE — G0300 HHS/HOSPICE OF LPN EA 15 MIN: HCPCS

## 2023-07-14 ENCOUNTER — HOSPITAL ENCOUNTER (OUTPATIENT)
Facility: HOSPITAL | Age: 63
Discharge: HOME | End: 2023-07-14
Payer: MEDICARE

## 2023-07-14 DIAGNOSIS — K74.60 HEPATIC CIRRHOSIS, UNSPECIFIED HEPATIC CIRRHOSIS TYPE, UNSPECIFIED WHETHER ASCITES PRESENT (HCC): ICD-10-CM

## 2023-07-14 DIAGNOSIS — Z76.82 PRE-KIDNEY TRANSPLANT, PATIENT ON TRANSPLANT LIST: ICD-10-CM

## 2023-07-14 DIAGNOSIS — R76.8 HEPATITIS B CORE ANTIBODY POSITIVE: ICD-10-CM

## 2023-07-14 DIAGNOSIS — Z91.89 AT RISK FOR NUTRITION DEFICIENCY: ICD-10-CM

## 2023-07-14 DIAGNOSIS — Z99.2 ESRD ON DIALYSIS (HCC): ICD-10-CM

## 2023-07-14 DIAGNOSIS — Z12.11 SPECIAL SCREENING FOR MALIGNANT NEOPLASMS, COLON: ICD-10-CM

## 2023-07-14 DIAGNOSIS — N18.6 ESRD ON DIALYSIS (HCC): ICD-10-CM

## 2023-07-14 PROCEDURE — 76705 ECHO EXAM OF ABDOMEN: CPT

## 2023-07-21 ENCOUNTER — HOME CARE VISIT (OUTPATIENT)
Age: 63
End: 2023-07-21
Payer: MEDICARE

## 2023-07-21 PROCEDURE — G0299 HHS/HOSPICE OF RN EA 15 MIN: HCPCS

## 2023-07-22 VITALS
SYSTOLIC BLOOD PRESSURE: 130 MMHG | OXYGEN SATURATION: 99 % | TEMPERATURE: 97.1 F | DIASTOLIC BLOOD PRESSURE: 74 MMHG | HEART RATE: 70 BPM | RESPIRATION RATE: 16 BRPM

## 2023-07-22 NOTE — HOME HEALTH
Skilled reason for visit: DISEASE MED MANAGEMENT, PHYSICAL ASSESSMENT, VITAL SIGNS, WOUND CARE   PDGM Dx: AmputationL Second Toe   PICTURE WAS TAKEN THIS VISIT AND IS LOCATED IN THE MEDIA SECTION  *SEE CARE PLAN    Caregiver involvement: family, iadls, adls, meal prep, med management, taking to md appointments. Medications reviewed and all medications are available in the home this visit. The following education was provided regarding medications:  patient/cg reminded to continue to take medications as prescribed. patient aware to monitor for effectiveness and to notify staff of any adverse reactions to medications/any changes to medication regimen. .    MD notified of any discrepancies/look a-like medications/medication interactions: NA  Medications are EFFECTIVE at this time. Home health supplies by type and quantity ordered/delivered this visit include: NA    Patient education provided this visit: encouraged patient to get three nutritional meals daily and to stay hydrated, drink plenty of fluids. patient made aware to monitor for s/s of infection [increased swelling, increased redness around site, increased pain, foul smelling drainage, fever] aware who to report to/when. Sharps education provided: PATIENT EDUCATED ON HOW TO PROPERLY DISPOSE OF NEEDLES INTO A BLEACH BOTTLE OR DETERGENT BOTTLE WITH THE CAP TAPPED ON. Patient level of understanding of education provided: PATIENT/CG  WAS ABLE TO REPEAT BACK AND VOICED UNDERSTANDING OF ALL EDUCATION PROVIDED.     Skilled Care Performed this visit: SAME AS REASON FOR VISIT    Patient response to procedure performed:   PATIENT TOLERATED WELL WITH NO C/O OF INCREASED PAIN OR DISCOMFORT    Agency Progress toward goals: GOALS MET    Patient's Progress towards personal goals: GOALS MET    Home exercise program: PATIENT TO TAKE ALL MEDS AS ORDERED, DO ICS X10/HR WHILE AWAKE, DRINK PLENTY OF FLUIDS,    Continued need for the following skills: NA    Plan for

## 2023-08-10 NOTE — PROGRESS NOTES
Chief Complaint   Patient presents with   [facility] Follow Up Last office visit:7/10/23     Return in about 6 months (around 1/10/2024).    Upcoming appointment:    Last labs:5/31/23      Last refill:   Disp Refills Start End    blood glucose (Accu-Chek Guide) test strip 100 each 3 9/12/2022           Routed to pcp:NO  Refilled per protocol:

## 2023-12-28 NOTE — PERIOP NOTE
Instructions for your procedure at [facility]      Today's Date: 12/28/2023      Patient's Name: Anthony Alvarez      Procedure Date: 1/17/2024        Please enter the main entrance of the hospital and check-in at the  located in the lobby.      Do NOT eat or drink anything, including candy, gum, or ice chips after midnight prior to your procedure, unless it is part of your prep.  Brush your teeth before coming to the hospital.You may swish with water, but do not swallow.  No smoking/Vaping/E-Cigarettes 24 hours prior to the day of procedure.  No alcohol 24 hours prior to the day of procedure.  No recreational drugs for one week prior to the day of procedure.  Bring Photo ID, Insurance information, and Co-pay if required on day of procedure.  Bring in pertinent legal documents, such as, Medical Power of , DNR, Advance Directive, etc.  Leave all other valuables, including money/purse, at home.  Remove jewelry, including ALL body piercings, nail polish, acrylic nails, and makeup (including mascara); no lotions, powders, deodorant, and/or perfume/cologne/after shave on the skin.  Glasses and dentures may be worn to the hospital.  They must be removed prior to procedure. Please bring case/container for glasses or dentures.  11. Contacts should not be worn on day of procedure.   12. Call the office ([phone]) if you have symptoms of a cold or illness within 24-48 hours prior to your procedure.   13. AN ADULT (relative or friend 18 years or older) MUST DRIVE YOU HOME AFTER YOUR PROCEDURE.   14. Please make arrangements for a responsible adult (18 years or older) to be with you for 24 hours after your procedure.   15. ONE VISITOR will be allowed in the waiting area during your procedure.       Special Instructions:      Bring list of CURRENT medications.  Follow instructions from the office regarding Bowel Prep, Vitamins, Iron, Blood Thinners, Insulin, Seizure, and Blood

## 2024-01-16 ENCOUNTER — ANESTHESIA EVENT (OUTPATIENT)
Facility: HOSPITAL | Age: 64
End: 2024-01-16
Payer: MEDICARE

## 2024-01-17 ENCOUNTER — HOSPITAL ENCOUNTER (OUTPATIENT)
Facility: HOSPITAL | Age: 64
Setting detail: OUTPATIENT SURGERY
Discharge: HOME | End: 2024-01-17
Attending: INTERNAL MEDICINE | Admitting: INTERNAL MEDICINE
Payer: MEDICARE

## 2024-01-17 ENCOUNTER — ANESTHESIA (OUTPATIENT)
Facility: HOSPITAL | Age: 64
End: 2024-01-17
Payer: MEDICARE

## 2024-01-17 VITALS
HEART RATE: 57 BPM | WEIGHT: 163.7 LBS | BODY MASS INDEX: 22.17 KG/M2 | TEMPERATURE: 97.5 F | OXYGEN SATURATION: 97 % | HEIGHT: 72 IN | SYSTOLIC BLOOD PRESSURE: 134 MMHG | DIASTOLIC BLOOD PRESSURE: 63 MMHG | RESPIRATION RATE: 14 BRPM

## 2024-01-17 LAB
AMPHET UR QL SCN: NEGATIVE
ANION GAP SERPL CALC-SCNC: 2 MMOL/L (ref 3–18)
BARBITURATES UR QL SCN: NEGATIVE
BENZODIAZ UR QL: NEGATIVE
BUN SERPL-MCNC: 21 MG/DL (ref 7–18)
BUN/CREAT SERPL: 5 (ref 12–20)
CALCIUM SERPL-MCNC: 9.6 MG/DL (ref 8.5–10.1)
CANNABINOIDS UR QL SCN: NEGATIVE
CHLORIDE SERPL-SCNC: 106 MMOL/L (ref 100–111)
CO2 SERPL-SCNC: 31 MMOL/L (ref 21–32)
COCAINE UR QL SCN: NEGATIVE
CREAT SERPL-MCNC: 4.15 MG/DL (ref 0.6–1.3)
GLUCOSE SERPL-MCNC: 115 MG/DL (ref 74–99)
Lab: NORMAL
METHADONE UR QL: NEGATIVE
OPIATES UR QL: NEGATIVE
PCP UR QL: NEGATIVE
POTASSIUM SERPL-SCNC: 4.9 MMOL/L (ref 3.5–5.5)
SODIUM SERPL-SCNC: 139 MMOL/L (ref 136–145)

## 2024-01-17 PROCEDURE — 2709999900 HC NON-CHARGEABLE SUPPLY: Performed by: INTERNAL MEDICINE

## 2024-01-17 PROCEDURE — 80307 DRUG TEST PRSMV CHEM ANLYZR: CPT

## 2024-01-17 PROCEDURE — 3700000000 HC ANESTHESIA ATTENDED CARE: Performed by: INTERNAL MEDICINE

## 2024-01-17 PROCEDURE — 3600007502: Performed by: INTERNAL MEDICINE

## 2024-01-17 PROCEDURE — 80048 BASIC METABOLIC PNL TOTAL CA: CPT

## 2024-01-17 PROCEDURE — 2580000003 HC RX 258: Performed by: NURSE ANESTHETIST, CERTIFIED REGISTERED

## 2024-01-17 PROCEDURE — 7100000010 HC PHASE II RECOVERY - FIRST 15 MIN: Performed by: INTERNAL MEDICINE

## 2024-01-17 PROCEDURE — 2500000003 HC RX 250 WO HCPCS: Performed by: NURSE ANESTHETIST, CERTIFIED REGISTERED

## 2024-01-17 PROCEDURE — 7100000000 HC PACU RECOVERY - FIRST 15 MIN: Performed by: INTERNAL MEDICINE

## 2024-01-17 PROCEDURE — 6360000002 HC RX W HCPCS: Performed by: NURSE ANESTHETIST, CERTIFIED REGISTERED

## 2024-01-17 PROCEDURE — 7100000011 HC PHASE II RECOVERY - ADDTL 15 MIN: Performed by: INTERNAL MEDICINE

## 2024-01-17 RX ORDER — SODIUM CHLORIDE 0.9 % (FLUSH) 0.9 %
5-40 SYRINGE (ML) INJECTION EVERY 12 HOURS SCHEDULED
Status: DISCONTINUED | OUTPATIENT
Start: 2024-01-17 | End: 2024-01-17 | Stop reason: HOSPADM

## 2024-01-17 RX ORDER — DEXTROSE MONOHYDRATE 100 MG/ML
INJECTION, SOLUTION INTRAVENOUS CONTINUOUS PRN
Status: DISCONTINUED | OUTPATIENT
Start: 2024-01-17 | End: 2024-01-17 | Stop reason: HOSPADM

## 2024-01-17 RX ORDER — PROPOFOL 10 MG/ML
INJECTION, EMULSION INTRAVENOUS PRN
Status: DISCONTINUED | OUTPATIENT
Start: 2024-01-17 | End: 2024-01-17 | Stop reason: SDUPTHER

## 2024-01-17 RX ORDER — INSULIN LISPRO 100 [IU]/ML
0-4 INJECTION, SOLUTION INTRAVENOUS; SUBCUTANEOUS EVERY 4 HOURS
Status: DISCONTINUED | OUTPATIENT
Start: 2024-01-17 | End: 2024-01-17 | Stop reason: HOSPADM

## 2024-01-17 RX ORDER — GLYCOPYRROLATE 0.2 MG/ML
INJECTION INTRAMUSCULAR; INTRAVENOUS PRN
Status: DISCONTINUED | OUTPATIENT
Start: 2024-01-17 | End: 2024-01-17 | Stop reason: SDUPTHER

## 2024-01-17 RX ORDER — LIDOCAINE HYDROCHLORIDE 10 MG/ML
1 INJECTION, SOLUTION EPIDURAL; INFILTRATION; INTRACAUDAL; PERINEURAL
Status: DISCONTINUED | OUTPATIENT
Start: 2024-01-17 | End: 2024-01-17 | Stop reason: HOSPADM

## 2024-01-17 RX ORDER — SODIUM CHLORIDE 9 MG/ML
INJECTION, SOLUTION INTRAVENOUS PRN
Status: DISCONTINUED | OUTPATIENT
Start: 2024-01-17 | End: 2024-01-17 | Stop reason: HOSPADM

## 2024-01-17 RX ORDER — SODIUM CHLORIDE 0.9 % (FLUSH) 0.9 %
5-40 SYRINGE (ML) INJECTION PRN
Status: DISCONTINUED | OUTPATIENT
Start: 2024-01-17 | End: 2024-01-17 | Stop reason: HOSPADM

## 2024-01-17 RX ORDER — LIDOCAINE HYDROCHLORIDE 20 MG/ML
INJECTION, SOLUTION EPIDURAL; INFILTRATION; INTRACAUDAL; PERINEURAL PRN
Status: DISCONTINUED | OUTPATIENT
Start: 2024-01-17 | End: 2024-01-17 | Stop reason: SDUPTHER

## 2024-01-17 RX ADMIN — GLYCOPYRROLATE 0.1 MG: 0.2 INJECTION INTRAMUSCULAR; INTRAVENOUS at 12:01

## 2024-01-17 RX ADMIN — PROPOFOL 20 MG: 10 INJECTION, EMULSION INTRAVENOUS at 12:06

## 2024-01-17 RX ADMIN — LIDOCAINE HYDROCHLORIDE 40 MG: 20 INJECTION, SOLUTION EPIDURAL; INFILTRATION; INTRACAUDAL; PERINEURAL at 12:04

## 2024-01-17 RX ADMIN — SODIUM CHLORIDE: 9 INJECTION, SOLUTION INTRAVENOUS at 12:10

## 2024-01-17 RX ADMIN — PROPOFOL 20 MG: 10 INJECTION, EMULSION INTRAVENOUS at 12:05

## 2024-01-17 RX ADMIN — SODIUM CHLORIDE 50 ML: 9 INJECTION, SOLUTION INTRAVENOUS at 11:36

## 2024-01-17 RX ADMIN — PROPOFOL 50 MG: 10 INJECTION, EMULSION INTRAVENOUS at 12:04

## 2024-01-17 ASSESSMENT — COPD QUESTIONNAIRES: CAT_SEVERITY: NO INTERVAL CHANGE

## 2024-01-17 ASSESSMENT — PAIN - FUNCTIONAL ASSESSMENT: PAIN_FUNCTIONAL_ASSESSMENT: 0-10

## 2024-01-17 NOTE — H&P
GASTROENTEROLOGY Pre-Procedure H and P      Impression/Plan:   1. This patient is consented for an EGD for  Cirrhosis, concern for possible varices    Addendum: All lab tests orders pertaining to the procedure have been ordered by the anesthesia personnel and results will be addressed by the anesthesia team    Chief Complaint:  Cirrhosis, concern for possible varices    HPI:  Anthony Alvarez is a 63 y.o. male who is having an EGD for  Cirrhosis, concern for possible varices    PMH:   Past Medical History:   Diagnosis Date    Anemia     Bradycardia, unspecified 2018    Cervical discitis     MRSA    Chronic drug abuse (HCC) 1974    Chronic kidney disease     stage III    Diabetes (HCC) 01/1990    no DM    Dialysis patient (HCC)     Drug abuse (HCC)     Encephalopathy     GERD (gastroesophageal reflux disease)     Hemodialysis patient (HCC)     T/Th/Sat at [facility] [phone]    History of abuse     Hypertension     Muscle weakness     Quadriparesis (HCC) 2017    Renal cell carcinoma of left kidney (HCC) 12/17/13    Pathological Stage [id number] cc RCC FG3 s/p left open partial nephrectomy in 12/13      Sepsis due to methicillin resistant Staphylococcus aureus (HCC)     Suprapubic catheter (HCC)     Thrombocytopenia (HCC)     Thyroid disease     Urethral erosion     Viral hepatitis B     Viral hepatitis C     Xerosis cutis        PSH:   Past Surgical History:   Procedure Laterality Date    AMPUTATION Left 06/14/2023    2nd toe    CIRCUMCISION  12/17/13    Dr. Solares, [facility]    COLONOSCOPY N/A 10/3/2019    COLONOSCOPY / polypectomy performed by UYE Jessica MD at [facility] ENDOSCOPY    COLONOSCOPY N/A 03/22/2023    COLONOSCOPY performed by Josse Leslie MD at [facility] ENDOSCOPY    CT BIOPSY RENAL  04/09/2018    CT BIOPSY RENAL 4/9/2018 [facility] RAD CT    INSJ TUNNELED CVC W/O SUBQ PORT/ AGE 5 YR/> N/A 5/6/2020    INSERTION TUNNELED CENTRAL VENOUS CATHETER/perm catheter exchange performed by Jose Dominguez MD at

## 2024-01-17 NOTE — ANESTHESIA POSTPROCEDURE EVALUATION
Department of Anesthesiology  Postprocedure Note    Patient: Anthony Alvarez  MRN: [id number]  YOB: 1960  Date of evaluation: 1/17/2024    Procedure Summary       Date: 01/17/24 Room / Location: [facility] ENDO 02 / [facility] ENDOSCOPY    Anesthesia Start: 1158 Anesthesia Stop: 1220    Procedure: EGD ESOPHAGOGASTRODUODENOSCOPY WITH VARICEAL BAND LIGATION (Upper GI Region) Diagnosis:       Hepatic cirrhosis, unspecified hepatic cirrhosis type, unspecified whether ascites present (HCC)      ESRD on dialysis (HCC)      BMI 22.0-22.9, adult      At risk for nutrition deficiency      Hepatitis B core antibody positive      Pre-kidney transplant, patient on transplant list      (Hepatic cirrhosis, unspecified hepatic cirrhosis type, unspecified whether ascites present (HCC) [K74.60])      (ESRD on dialysis (HCC) [N18.6, Z99.2])      (BMI 22.0-22.9, adult [Z68.22])      (At risk for nutrition deficiency [Z91.89])      (Hepatitis B core antibody positive [R76.8])      (Pre-kidney transplant, patient on transplant list [Z76.82])    Surgeons: Avery Barrera MD Responsible Provider: Sybil Camacho MD    Anesthesia Type: MAC ASA Status: 4            Anesthesia Type: MAC    Tammy Phase I: Tammy Score: 10    Tammy Phase II: Tammy Score: 10    Anesthesia Post Evaluation    Patient location during evaluation: PACU  Patient participation: complete - patient participated  Level of consciousness: awake and alert  Pain score: 0  Airway patency: patent  Nausea & Vomiting: no nausea and no vomiting  Cardiovascular status: hemodynamically stable  Respiratory status: acceptable  Hydration status: euvolemic  Multimodal analgesia pain management approach  Pain management: adequate    No notable events documented.

## 2024-01-17 NOTE — BRIEF OP NOTE
[phone]    [facility]  [address] [place] [address]      Brief Procedure Note    Anthony Alvarez  1960  [id number]    Date of Procedure: 1/17/2024     Preoperative diagnosis: Hepatic cirrhosis, unspecified hepatic cirrhosis type, unspecified whether ascites present (HCC) [K74.60]  ESRD on dialysis (HCC) [N18.6, Z99.2]  BMI 22.0-22.9, adult [Z68.22]  At risk for nutrition deficiency [Z91.89]  Hepatitis B core antibody positive [R76.8]  Pre-kidney transplant, patient on transplant list [Z76.82]    Postoperative diagnosis: Hepatic cirrhosis, unspecified hepatic cirrhosis type, unspecified whether ascites present (HCC) [K74.60]  ESRD on dialysis (HCC) [N18.6, Z99.2]  BMI 22.0-22.9, adult [Z68.22]  At risk for nutrition deficiency [Z91.89]  Hepatitis B core antibody positive [R76.8]  Pre-kidney transplant, patient on transplant list [Z76.82]    Type of Anesthesia: MAC (Monitored anesthesia care)    Description of findings: same as post op dx    Procedure: Procedure(s):  EGD ESOPHAGOGASTRODUODENOSCOPY WITH VARICEAL BAND LIGATION    :  Dr. Avery Barrera MD    Assistant(s): Circulator: Alan Nice RN; CONCEPCION OBRIEN    Devices/implants/grafts/tissues/prosthesis: None    EBL:None    Specimens: * No specimens in log *    Findings: See printed and scanned procedure note    Complications: None    Dr. Avery Barrera MD  1/17/2024  12:18 PM

## 2024-01-17 NOTE — ANESTHESIA PRE PROCEDURE
\"LEK2ZSB\", \"QHR3UUM\", \"BEART\", \"[id number]\"     Type & Screen (If Applicable):  No results found for: \"LABABO\", \"LABRH\"    Drug/Infectious Status (If Applicable):  Lab Results   Component Value Date/Time    HEPCAB >11.00 02/17/2020 02:55 AM    HEPCAB POSITIVE 02/17/2020 02:55 AM       COVID-19 Screening (If Applicable):   Lab Results   Component Value Date/Time    COVID19 Not detected 03/10/2022 08:44 PM    COVID19 not detected 10/18/2020 12:00 AM           Anesthesia Evaluation  Patient summary reviewed  Airway: Mallampati: II  TM distance: >3 FB   Neck ROM: full  Mouth opening: > = 3 FB   Dental:    (+) partials      Pulmonary:normal exam    (+)  COPD: no interval change,                                     Cardiovascular:  Exercise tolerance: good (>4 METS)  (+) hypertension: no interval change                  Neuro/Psych:   (+) neuromuscular disease:             ROS comment: H/O Quadriparesis- now resolved after Cervical Laminectomy GI/Hepatic/Renal:   (+) GERD: poorly controlled, hepatitis (Treated): C, renal disease (Last HD- yesterday): ESRD and dialysis          Endo/Other:    (+) Diabetesno interval change, using insulin, hypothyroidism::..                 Abdominal:             Vascular:          Other Findings:       Anesthesia Plan      MAC     ASA 4       Induction: intravenous.      Anesthetic plan and risks discussed with patient.      Plan discussed with CRNA.                ARIE JOHNSON MD   1/17/2024

## 2024-02-23 ENCOUNTER — HOSPITAL ENCOUNTER (OUTPATIENT)
Facility: HOSPITAL | Age: 64
End: 2024-02-23
Payer: MEDICARE

## 2024-02-23 DIAGNOSIS — Z99.2 ESRD ON DIALYSIS (HCC): ICD-10-CM

## 2024-02-23 DIAGNOSIS — N18.6 ESRD ON DIALYSIS (HCC): ICD-10-CM

## 2024-02-23 DIAGNOSIS — Z12.11 SCREENING FOR COLORECTAL CANCER: ICD-10-CM

## 2024-02-23 DIAGNOSIS — Z12.12 SCREENING FOR COLORECTAL CANCER: ICD-10-CM

## 2024-02-23 DIAGNOSIS — E11.9 DIABETES MELLITUS WITHOUT COMPLICATION (HCC): ICD-10-CM

## 2024-02-23 DIAGNOSIS — B18.2 HEPATITIS C CARRIER (HCC): ICD-10-CM

## 2024-02-23 DIAGNOSIS — K74.60 CIRRHOSIS OF LIVER WITHOUT ASCITES, UNSPECIFIED HEPATIC CIRRHOSIS TYPE (HCC): ICD-10-CM

## 2024-02-23 PROCEDURE — 76705 ECHO EXAM OF ABDOMEN: CPT

## 2024-02-28 ENCOUNTER — TELEPHONE (OUTPATIENT)
Facility: HOSPITAL | Age: 64
End: 2024-02-28

## 2024-08-07 NOTE — PROCEDURES
"Follow Up Sleep Disorders Center Note     Chief Complaint:  HAMLET     Primary Care Physician: Rashid Ge MD    Interval History:   The patient is a 79 y.o. female who I last saw 8/2/2023 and that note was reviewed.  The patient is here with her .  While visiting her cardiologist at [facility], she fell fracturing her right humerus.    The patient goes to bed 11 PM gets out of bed at 9 AM.    Review of Systems:    A complete review of systems was done and all were negative with the exception of some postnasal drip    Social History:    Social History     Socioeconomic History    Marital status:    Tobacco Use    Smoking status: Never     Passive exposure: Never    Smokeless tobacco: Never   Vaping Use    Vaping status: Never Used   Substance and Sexual Activity    Alcohol use: No    Drug use: No    Sexual activity: Not Currently     Partners: Male     Birth control/protection: Post-menopausal       Allergies:  Amiodarone, Corticosteroids, Propoxyphene, Benzonatate, Cephalexin, Adhesive tape, Hydrocodone, and Lisinopril     Medication Review: Her list was reviewed.      Vital Signs:    Vitals:    08/07/24 1022   Pulse: 81   SpO2: 98%   Weight: 87.2 kg (192 lb 3.2 oz)   Height: 157.5 cm (62\")     Body mass index is 35.15 kg/m².    Physical Exam:    Constitutional:  Well developed 79 y.o. female that appears in no apparent distress.  Awake & oriented times 3.  Normal mood with normal recent and remote memory and normal judgement.  Eyes:  Conjunctivae normal.  Oropharynx: Previously, moist mucous membranes without exudate and enlarged tongue and class III Mallampati airway.    Self-administered [place] Sleepiness Scale test results: 6, previously 4  0-5 Lower normal daytime sleepiness  6-10 Higher normal daytime sleepiness  11-12 Mild, 13-15 Moderate, & 16-24 Severe excessive daytime sleepiness     Downloaded PAP Data Evaluated For Therapeutic Response and Compliance:  DME is [employer] and [facility]  *** FINAL REPORT ***    Name: Elo Woo  MRN: [id number]  : 11 Aug 1960  HIS Order #: [id number]  [address] [facility] Visit #: [id number]  Date: 2018    TYPE OF TEST: Peripheral Venous Testing    REASON FOR TEST  Limb swelling    Right Leg:-  Deep venous thrombosis:           No  Superficial venous thrombosis:    No  Deep venous insufficiency:        Not examined  Superficial venous insufficiency: Not examined    Left Leg:-  Deep venous thrombosis:           No  Superficial venous thrombosis:    No  Deep venous insufficiency:        Not examined  Superficial venous insufficiency: Not examined      INTERPRETATION/FINDINGS  Duplex images were obtained using 2-D gray scale, color flow, and  spectral Doppler analysis. Right leg :  1. Deep veins visualized include the common femoral, femoral,  popliteal, posterior tibial and peroneal veins. 2. No evidence of deep venous thrombosis detected in the veins  visualized. 3. Superficial veins visualized include the great saphenous vein. 4. No evidence of superficial thrombosis detected. 5. Normal multiphasic flow in the posterior tibial artery. Left leg :  1. Deep veins visualized include the common femoral, femoral,  popliteal, posterior tibial and peroneal veins. 2. No evidence of deep venous thrombosis detected in the veins  visualized. 3. Superficial veins visualized include the great saphenous vein. 4. No evidence of superficial thrombosis detected. 5. Normal multiphasic flow in the posterior tibial artery. ADDITIONAL COMMENTS    I have personally reviewed the data relevant to the interpretation of  this  study.     TECHNOLOGIST: CHARIS Phillips, RVS  Signed: 2018 11:53 AM    PHYSICIAN: Alexi Casanova MD  Signed: 2018 01:07 PM nasal pillows.  Downloads between 5/8 and 8/5/2024 compliance 99%.  Average usage is 9 hours and 23 minutes.  Average AHI is mildly abnormal at 7.7 and all subsets are normal and the patient does have a borderline leak.  Average auto CPAP pressure is 14.4 and her ResMed auto CPAP is 12-16    I have reviewed the above results and compared them with the patient's last downloads and reviewed with the patient.    Impression:   Obstructive sleep apnea adequately treated with ResMed auto CPAP. The patient appears to be at goal with good compliance and usage. The patient has no significant complaints of hypersomnolence      Plan:  Good sleep hygiene measures should be maintained.  Weight loss would be beneficial in this patient who has class II severe obesity by Body mass index is 35.15 kg/m².  When last seen 8/2/2023 weight was 214 pounds and her weight has gone down to 192 pounds    After evaluating the patient and assessing results available, the patient is benefiting from the treatment being provided.     The patient will continue ResMed auto CPAP.  Potential side effects of not using PAP therapy reviewed and addressed as needed.  After clinical evaluation and review of downloads, I recommend no changes to the patient's pressures.  A new prescription will be sent to the patient's DME.    We did review her borderline leak.  They ask about using tape?  I told her she was well treated without using tape.  She could consider trying it if she wished.    I answered all of the patient's questions.  The patient will call the Sleep Disorder Center for any problems and will follow up in 1 year.      Mahin Galeas MD  Sleep Medicine  08/07/24  10:39 EDT

## 2025-01-09 ENCOUNTER — HOSPITAL ENCOUNTER (INPATIENT)
Facility: HOSPITAL | Age: 65
LOS: 4 days | Discharge: HOME HEALTH | DRG: 617 | End: 2025-01-13
Attending: STUDENT IN AN ORGANIZED HEALTH CARE EDUCATION/TRAINING PROGRAM | Admitting: STUDENT IN AN ORGANIZED HEALTH CARE EDUCATION/TRAINING PROGRAM
Payer: MEDICARE

## 2025-01-09 ENCOUNTER — APPOINTMENT (OUTPATIENT)
Facility: HOSPITAL | Age: 65
DRG: 617 | End: 2025-01-09
Payer: MEDICARE

## 2025-01-09 DIAGNOSIS — S91.109A OPEN TOE WOUND, INITIAL ENCOUNTER: Primary | ICD-10-CM

## 2025-01-09 DIAGNOSIS — M86.9 OSTEOMYELITIS OF FOOT, UNSPECIFIED LATERALITY, UNSPECIFIED TYPE: ICD-10-CM

## 2025-01-09 LAB
ALBUMIN SERPL-MCNC: 2.4 G/DL (ref 3.4–5)
ALBUMIN/GLOB SERPL: 0.5 (ref 0.8–1.7)
ALP SERPL-CCNC: 77 U/L (ref 45–117)
ALT SERPL-CCNC: 8 U/L (ref 16–61)
ANION GAP SERPL CALC-SCNC: 3 MMOL/L (ref 3–18)
AST SERPL-CCNC: 16 U/L (ref 10–38)
BASOPHILS # BLD: 0.02 K/UL (ref 0–0.1)
BASOPHILS NFR BLD: 0.4 % (ref 0–2)
BILIRUB SERPL-MCNC: 0.8 MG/DL (ref 0.2–1)
BUN SERPL-MCNC: 24 MG/DL (ref 7–18)
BUN/CREAT SERPL: 4 (ref 12–20)
CALCIUM SERPL-MCNC: 8.7 MG/DL (ref 8.5–10.1)
CHLORIDE SERPL-SCNC: 104 MMOL/L (ref 100–111)
CHP ED QC CHECK: 141
CO2 SERPL-SCNC: 30 MMOL/L (ref 21–32)
CREAT SERPL-MCNC: 5.78 MG/DL (ref 0.6–1.3)
DIFFERENTIAL METHOD BLD: ABNORMAL
EOSINOPHIL # BLD: 0.06 K/UL (ref 0–0.4)
EOSINOPHIL NFR BLD: 1.2 % (ref 0–5)
ERYTHROCYTE [DISTWIDTH] IN BLOOD BY AUTOMATED COUNT: 18.6 % (ref 11.6–14.5)
GLOBULIN SER CALC-MCNC: 4.7 G/DL (ref 2–4)
GLUCOSE BLD STRIP.AUTO-MCNC: 141 MG/DL (ref 70–110)
GLUCOSE SERPL-MCNC: 150 MG/DL (ref 74–99)
HCT VFR BLD AUTO: 33.4 % (ref 36–48)
HGB BLD-MCNC: 10.2 G/DL (ref 13–16)
IMM GRANULOCYTES # BLD AUTO: 0.02 K/UL (ref 0–0.04)
IMM GRANULOCYTES NFR BLD AUTO: 0.4 % (ref 0–0.5)
LACTATE SERPL-SCNC: 0.9 MMOL/L (ref 0.4–2)
LYMPHOCYTES # BLD: 0.82 K/UL (ref 0.9–3.6)
LYMPHOCYTES NFR BLD: 16.4 % (ref 21–52)
MCH RBC QN AUTO: 28.8 PG (ref 24–34)
MCHC RBC AUTO-ENTMCNC: 30.5 G/DL (ref 31–37)
MCV RBC AUTO: 94.4 FL (ref 78–100)
MONOCYTES # BLD: 0.29 K/UL (ref 0.05–1.2)
MONOCYTES NFR BLD: 5.8 % (ref 3–10)
NEUTS SEG # BLD: 3.8 K/UL (ref 1.8–8)
NEUTS SEG NFR BLD: 75.8 % (ref 40–73)
NRBC # BLD: 0 K/UL (ref 0–0.01)
NRBC BLD-RTO: 0 PER 100 WBC
PLATELET # BLD AUTO: 146 K/UL (ref 135–420)
PMV BLD AUTO: 10.7 FL (ref 9.2–11.8)
POTASSIUM SERPL-SCNC: 3.8 MMOL/L (ref 3.5–5.5)
PROT SERPL-MCNC: 7.1 G/DL (ref 6.4–8.2)
RBC # BLD AUTO: 3.54 M/UL (ref 4.35–5.65)
SODIUM SERPL-SCNC: 137 MMOL/L (ref 136–145)
WBC # BLD AUTO: 5 K/UL (ref 4.6–13.2)

## 2025-01-09 PROCEDURE — 83605 ASSAY OF LACTIC ACID: CPT

## 2025-01-09 PROCEDURE — 2500000003 HC RX 250 WO HCPCS: Performed by: STUDENT IN AN ORGANIZED HEALTH CARE EDUCATION/TRAINING PROGRAM

## 2025-01-09 PROCEDURE — 6360000002 HC RX W HCPCS: Performed by: PHYSICIAN ASSISTANT

## 2025-01-09 PROCEDURE — 87070 CULTURE OTHR SPECIMN AEROBIC: CPT

## 2025-01-09 PROCEDURE — 1100000000 HC RM PRIVATE

## 2025-01-09 PROCEDURE — 87205 SMEAR GRAM STAIN: CPT

## 2025-01-09 PROCEDURE — 80053 COMPREHEN METABOLIC PANEL: CPT

## 2025-01-09 PROCEDURE — 87040 BLOOD CULTURE FOR BACTERIA: CPT

## 2025-01-09 PROCEDURE — 85025 COMPLETE CBC W/AUTO DIFF WBC: CPT

## 2025-01-09 PROCEDURE — 99223 1ST HOSP IP/OBS HIGH 75: CPT | Performed by: STUDENT IN AN ORGANIZED HEALTH CARE EDUCATION/TRAINING PROGRAM

## 2025-01-09 PROCEDURE — 87340 HEPATITIS B SURFACE AG IA: CPT

## 2025-01-09 PROCEDURE — 87186 SC STD MICRODIL/AGAR DIL: CPT

## 2025-01-09 PROCEDURE — 6370000000 HC RX 637 (ALT 250 FOR IP): Performed by: STUDENT IN AN ORGANIZED HEALTH CARE EDUCATION/TRAINING PROGRAM

## 2025-01-09 PROCEDURE — 87341 HEP B SURFACE AG NEUTRLZJ IA: CPT

## 2025-01-09 PROCEDURE — 6360000002 HC RX W HCPCS: Performed by: STUDENT IN AN ORGANIZED HEALTH CARE EDUCATION/TRAINING PROGRAM

## 2025-01-09 PROCEDURE — 87077 CULTURE AEROBIC IDENTIFY: CPT

## 2025-01-09 PROCEDURE — 82962 GLUCOSE BLOOD TEST: CPT

## 2025-01-09 PROCEDURE — 73630 X-RAY EXAM OF FOOT: CPT

## 2025-01-09 PROCEDURE — 2580000003 HC RX 258: Performed by: PHYSICIAN ASSISTANT

## 2025-01-09 PROCEDURE — 86706 HEP B SURFACE ANTIBODY: CPT

## 2025-01-09 PROCEDURE — 99285 EMERGENCY DEPT VISIT HI MDM: CPT

## 2025-01-09 RX ORDER — SODIUM CHLORIDE 0.9 % (FLUSH) 0.9 %
5-40 SYRINGE (ML) INJECTION EVERY 12 HOURS SCHEDULED
Status: DISCONTINUED | OUTPATIENT
Start: 2025-01-09 | End: 2025-01-13 | Stop reason: HOSPADM

## 2025-01-09 RX ORDER — SODIUM HYPOCHLORITE 1.25 MG/ML
SOLUTION TOPICAL DAILY
Status: DISCONTINUED | OUTPATIENT
Start: 2025-01-09 | End: 2025-01-13 | Stop reason: HOSPADM

## 2025-01-09 RX ORDER — POLYETHYLENE GLYCOL 3350 17 G/17G
17 POWDER, FOR SOLUTION ORAL DAILY PRN
Status: DISCONTINUED | OUTPATIENT
Start: 2025-01-09 | End: 2025-01-13 | Stop reason: HOSPADM

## 2025-01-09 RX ORDER — HEPARIN SODIUM 5000 [USP'U]/ML
5000 INJECTION, SOLUTION INTRAVENOUS; SUBCUTANEOUS EVERY 8 HOURS SCHEDULED
Status: DISCONTINUED | OUTPATIENT
Start: 2025-01-09 | End: 2025-01-13 | Stop reason: HOSPADM

## 2025-01-09 RX ORDER — SODIUM CHLORIDE 9 MG/ML
INJECTION, SOLUTION INTRAVENOUS PRN
Status: DISCONTINUED | OUTPATIENT
Start: 2025-01-09 | End: 2025-01-13 | Stop reason: HOSPADM

## 2025-01-09 RX ORDER — ONDANSETRON 4 MG/1
4 TABLET, ORALLY DISINTEGRATING ORAL EVERY 8 HOURS PRN
Status: DISCONTINUED | OUTPATIENT
Start: 2025-01-09 | End: 2025-01-13 | Stop reason: HOSPADM

## 2025-01-09 RX ORDER — ACETAMINOPHEN 325 MG/1
650 TABLET ORAL EVERY 6 HOURS PRN
Status: DISCONTINUED | OUTPATIENT
Start: 2025-01-09 | End: 2025-01-13 | Stop reason: HOSPADM

## 2025-01-09 RX ORDER — HYDRALAZINE HYDROCHLORIDE 25 MG/1
25 TABLET, FILM COATED ORAL EVERY 8 HOURS SCHEDULED
Status: DISCONTINUED | OUTPATIENT
Start: 2025-01-09 | End: 2025-01-13 | Stop reason: HOSPADM

## 2025-01-09 RX ORDER — VANCOMYCIN 1.75 GRAM/500 ML IN 0.9 % SODIUM CHLORIDE INTRAVENOUS
25 ONCE
Status: COMPLETED | OUTPATIENT
Start: 2025-01-09 | End: 2025-01-09

## 2025-01-09 RX ORDER — ACETAMINOPHEN 650 MG/1
650 SUPPOSITORY RECTAL EVERY 6 HOURS PRN
Status: DISCONTINUED | OUTPATIENT
Start: 2025-01-09 | End: 2025-01-13 | Stop reason: HOSPADM

## 2025-01-09 RX ORDER — ONDANSETRON 2 MG/ML
4 INJECTION INTRAMUSCULAR; INTRAVENOUS EVERY 6 HOURS PRN
Status: DISCONTINUED | OUTPATIENT
Start: 2025-01-09 | End: 2025-01-13 | Stop reason: HOSPADM

## 2025-01-09 RX ORDER — VANCOMYCIN 1.75 GRAM/500 ML IN 0.9 % SODIUM CHLORIDE INTRAVENOUS
25 ONCE
Status: DISCONTINUED | OUTPATIENT
Start: 2025-01-09 | End: 2025-01-09

## 2025-01-09 RX ORDER — CALCIUM ACETATE 667 MG/1
667 CAPSULE ORAL
Status: DISCONTINUED | OUTPATIENT
Start: 2025-01-10 | End: 2025-01-13 | Stop reason: HOSPADM

## 2025-01-09 RX ORDER — LEVOTHYROXINE SODIUM 100 UG/1
100 TABLET ORAL DAILY
Status: DISCONTINUED | OUTPATIENT
Start: 2025-01-10 | End: 2025-01-13 | Stop reason: HOSPADM

## 2025-01-09 RX ORDER — SODIUM CHLORIDE 0.9 % (FLUSH) 0.9 %
5-40 SYRINGE (ML) INJECTION PRN
Status: DISCONTINUED | OUTPATIENT
Start: 2025-01-09 | End: 2025-01-13 | Stop reason: HOSPADM

## 2025-01-09 RX ADMIN — HYDRALAZINE HYDROCHLORIDE 25 MG: 25 TABLET ORAL at 20:34

## 2025-01-09 RX ADMIN — HEPARIN SODIUM 5000 UNITS: 5000 INJECTION INTRAVENOUS; SUBCUTANEOUS at 21:58

## 2025-01-09 RX ADMIN — PIPERACILLIN AND TAZOBACTAM 4500 MG: 4; .5 INJECTION, POWDER, FOR SOLUTION INTRAVENOUS at 17:08

## 2025-01-09 RX ADMIN — SODIUM CHLORIDE, PRESERVATIVE FREE 10 ML: 5 INJECTION INTRAVENOUS at 20:34

## 2025-01-09 RX ADMIN — VANCOMYCIN 1.75 GRAM/500 ML IN 0.9 % SODIUM CHLORIDE INTRAVENOUS 1750 MG: at 20:34

## 2025-01-09 ASSESSMENT — PAIN SCALES - GENERAL
PAINLEVEL_OUTOF10: 0
PAINLEVEL_OUTOF10: 0

## 2025-01-09 ASSESSMENT — LIFESTYLE VARIABLES
HOW OFTEN DO YOU HAVE A DRINK CONTAINING ALCOHOL: MONTHLY OR LESS
HOW MANY STANDARD DRINKS CONTAINING ALCOHOL DO YOU HAVE ON A TYPICAL DAY: 1 OR 2

## 2025-01-09 NOTE — ED PROVIDER NOTES
Gail who agrees with need for admission.  Admission orders have been placed.  Patient agreeable.  Pending vascular and podiatry consult at this time. [CS]   1847 Vascular surgery, podiatry have been consulted. [CS]      ED Course User Index  [CS] Lynnette Pagan PA       Critical Care Time:   Critical Care Time:   I have spent 45 minutes of critical care time involved in lab review, consultations with specialist, family decision-making, and documentation.  During this entire length of time I was immediately available to the patient. This time spent with the patient does not include separate billable procedures.      Critical Care:  The reason for providing this level of medical care for this critically ill patient was due a critical illness that impaired one or more vital organ systems such that there was a high probability of imminent or life threatening deterioration in the patients condition. This care involved high complexity decision making to assess, manipulate, and support vital system functions, to treat this degreee vital organ system failure and to prevent further life threatening deterioration of the patient’s condition.      Orders as below:  Orders Placed This Encounter   Procedures    Culture, Wound (with Gram Stain)    Blood Culture 1    XR FOOT LEFT (MIN 3 VIEWS)    CBC with Auto Differential    Comprehensive Metabolic Panel    Lactic Acid    Basic Metabolic Panel w/ Reflex to MG    Comprehensive Metabolic Panel w/ Reflex to MG    CBC with Auto Differential    ADULT DIET; Regular; Low Fat/Low Chol/High Fiber/KAMINI    Weigh Patient    Wound care    TELEMETRY MONITORING    Vital signs per unit routine    Notify physician    Up as tolerated    Daily weights    Intake and output    Initiate Hypoglycemia Treatment    Admission/Observation order previously placed    Full code    Pharmacy to Dose: Vancomycin; Bone and Joint Infection    OT eval and treat    PT evaluation and treat    Initiate Oxygen

## 2025-01-09 NOTE — H&P (VIEW-ONLY)
[unfilled]   Consult    Patient: Anthony Alvarez MRN: [id number]  SSN: [id number]    YOB: 1960  Age: 64 y.o.  Sex: male      Subjective:      Anthony Alvarez is a 64 y.o. male who is being seen for Asked to evaluate and treat ulcer and acute infection left great toe..    Past Medical History:   Diagnosis Date    Anemia     Bradycardia, unspecified 2018    Cervical discitis     MRSA    Chronic drug abuse (HCC) 1974    Chronic kidney disease     stage III    Diabetes (HCC) 01/1990    no DM    Dialysis patient (HCC)     Drug abuse (HCC)     Encephalopathy     GERD (gastroesophageal reflux disease)     Hemodialysis patient (HCC)     T/Th/Sat at [facility] [phone]    History of abuse     Hypertension     Muscle weakness     Quadriparesis (HCC) 2017    Renal cell carcinoma of left kidney (HCC) 12/17/13    Pathological Stage [id number] cc RCC FG3 s/p left open partial nephrectomy in 12/13      Sepsis due to methicillin resistant Staphylococcus aureus (HCC)     Suprapubic catheter (HCC)     Thrombocytopenia (HCC)     Thyroid disease     Urethral erosion     Viral hepatitis B     Viral hepatitis C     Xerosis cutis      Past Surgical History:   Procedure Laterality Date    AMPUTATION Left 06/14/2023    2nd toe    CIRCUMCISION  12/17/13    Dr. Solares, [facility]    COLONOSCOPY N/A 10/3/2019    COLONOSCOPY / polypectomy performed by YUE Jessica MD at [facility] ENDOSCOPY    COLONOSCOPY N/A 03/22/2023    COLONOSCOPY performed by Josse Leslie MD at [facility] ENDOSCOPY    CT BIOPSY RENAL  04/09/2018    CT BIOPSY RENAL 4/9/2018 [facility] RAD CT    INSJ TUNNELED CVC W/O SUBQ PORT/ AGE 5 YR/> N/A 5/6/2020    INSERTION TUNNELED CENTRAL VENOUS CATHETER/perm catheter exchange performed by Jose Dominguez MD at [facility] CARDIAC CATH LAB    IR BIOPSY BONE MARROW  04/06/2018    IR BIOPSY PERC SUPERF BONE 4/6/2018 [facility] RAD ANGIO IR    IR TUNNELED CVC PLACE WO SQ PORT/PUMP > 5 YEARS  02/18/2020    IR TUNNELED CATHETER PLACEMENT

## 2025-01-09 NOTE — ED TRIAGE NOTES
Patient states that sent in by doctor to be admitted for amputation of great toe to right foot. Currently in ortho shoes. Hx of DM. However doesn't take medications. Patient has fistula to LUE. States that he is due for dialysis today, usually goes at 1030am.

## 2025-01-10 ENCOUNTER — APPOINTMENT (OUTPATIENT)
Facility: HOSPITAL | Age: 65
DRG: 617 | End: 2025-01-10
Attending: SURGERY
Payer: MEDICARE

## 2025-01-10 PROBLEM — M86.9 OSTEOMYELITIS OF LEFT FOOT: Status: ACTIVE | Noted: 2025-01-10

## 2025-01-10 LAB
ALBUMIN SERPL-MCNC: 2.5 G/DL (ref 3.4–5)
ALBUMIN/GLOB SERPL: 0.6 (ref 0.8–1.7)
ALP SERPL-CCNC: 81 U/L (ref 45–117)
ALT SERPL-CCNC: 6 U/L (ref 16–61)
ANION GAP SERPL CALC-SCNC: 7 MMOL/L (ref 3–18)
AST SERPL-CCNC: 12 U/L (ref 10–38)
BASOPHILS # BLD: 0.03 K/UL (ref 0–0.1)
BASOPHILS NFR BLD: 0.7 % (ref 0–2)
BILIRUB SERPL-MCNC: 0.7 MG/DL (ref 0.2–1)
BUN SERPL-MCNC: 27 MG/DL (ref 7–18)
BUN/CREAT SERPL: 4 (ref 12–20)
CALCIUM SERPL-MCNC: 8.4 MG/DL (ref 8.5–10.1)
CHLORIDE SERPL-SCNC: 106 MMOL/L (ref 100–111)
CO2 SERPL-SCNC: 27 MMOL/L (ref 21–32)
CREAT SERPL-MCNC: 6.05 MG/DL (ref 0.6–1.3)
DIFFERENTIAL METHOD BLD: ABNORMAL
EOSINOPHIL # BLD: 0.09 K/UL (ref 0–0.4)
EOSINOPHIL NFR BLD: 2 % (ref 0–5)
ERYTHROCYTE [DISTWIDTH] IN BLOOD BY AUTOMATED COUNT: 17 % (ref 11.6–14.5)
GLOBULIN SER CALC-MCNC: 4.3 G/DL (ref 2–4)
GLUCOSE SERPL-MCNC: 81 MG/DL (ref 74–99)
HBV SURFACE AB SER QL IA: POSITIVE
HBV SURFACE AB SERPL IA-ACNC: 19.11 MIU/ML
HBV SURFACE AG SER QL: 1.85 INDEX
HBV SURFACE AG SER QL: ABNORMAL
HBV SURFACE AG SERPL QL CFM: ABNORMAL
HCT VFR BLD AUTO: 29.2 % (ref 36–48)
HEP BS AB COMMENT: NORMAL
HGB BLD-MCNC: 9.6 G/DL (ref 13–16)
IMM GRANULOCYTES # BLD AUTO: 0.03 K/UL (ref 0–0.04)
IMM GRANULOCYTES NFR BLD AUTO: 0.7 % (ref 0–0.5)
LYMPHOCYTES # BLD: 1.06 K/UL (ref 0.9–3.6)
LYMPHOCYTES NFR BLD: 24.2 % (ref 21–52)
Lab: ABNORMAL
MCH RBC QN AUTO: 29.6 PG (ref 24–34)
MCHC RBC AUTO-ENTMCNC: 32.9 G/DL (ref 31–37)
MCV RBC AUTO: 90.1 FL (ref 78–100)
MONOCYTES # BLD: 0.43 K/UL (ref 0.05–1.2)
MONOCYTES NFR BLD: 9.7 % (ref 3–10)
NEUTS SEG # BLD: 2.76 K/UL (ref 1.8–8)
NEUTS SEG NFR BLD: 62.7 % (ref 40–73)
NRBC # BLD: 0 K/UL (ref 0–0.01)
NRBC BLD-RTO: 0 PER 100 WBC
PLATELET # BLD AUTO: 120 K/UL (ref 135–420)
PLATELET COMMENT: ABNORMAL
PMV BLD AUTO: 11.6 FL (ref 9.2–11.8)
POTASSIUM SERPL-SCNC: 3.7 MMOL/L (ref 3.5–5.5)
PROT SERPL-MCNC: 6.8 G/DL (ref 6.4–8.2)
RBC # BLD AUTO: 3.24 M/UL (ref 4.35–5.65)
RBC MORPH BLD: ABNORMAL
SODIUM SERPL-SCNC: 140 MMOL/L (ref 136–145)
VANCOMYCIN SERPL-MCNC: 22 UG/ML (ref 5–40)
WBC # BLD AUTO: 4.4 K/UL (ref 4.6–13.2)

## 2025-01-10 PROCEDURE — 97535 SELF CARE MNGMENT TRAINING: CPT

## 2025-01-10 PROCEDURE — 2580000003 HC RX 258: Performed by: STUDENT IN AN ORGANIZED HEALTH CARE EDUCATION/TRAINING PROGRAM

## 2025-01-10 PROCEDURE — 6370000000 HC RX 637 (ALT 250 FOR IP): Performed by: STUDENT IN AN ORGANIZED HEALTH CARE EDUCATION/TRAINING PROGRAM

## 2025-01-10 PROCEDURE — 80053 COMPREHEN METABOLIC PANEL: CPT

## 2025-01-10 PROCEDURE — 1100000000 HC RM PRIVATE

## 2025-01-10 PROCEDURE — 80202 ASSAY OF VANCOMYCIN: CPT

## 2025-01-10 PROCEDURE — 36415 COLL VENOUS BLD VENIPUNCTURE: CPT

## 2025-01-10 PROCEDURE — 99232 SBSQ HOSP IP/OBS MODERATE 35: CPT | Performed by: STUDENT IN AN ORGANIZED HEALTH CARE EDUCATION/TRAINING PROGRAM

## 2025-01-10 PROCEDURE — 6370000000 HC RX 637 (ALT 250 FOR IP): Performed by: INTERNAL MEDICINE

## 2025-01-10 PROCEDURE — 5A1D70Z PERFORMANCE OF URINARY FILTRATION, INTERMITTENT, LESS THAN 6 HOURS PER DAY: ICD-10-PCS | Performed by: INTERNAL MEDICINE

## 2025-01-10 PROCEDURE — 93923 UPR/LXTR ART STDY 3+ LVLS: CPT

## 2025-01-10 PROCEDURE — 2500000003 HC RX 250 WO HCPCS: Performed by: STUDENT IN AN ORGANIZED HEALTH CARE EDUCATION/TRAINING PROGRAM

## 2025-01-10 PROCEDURE — 97165 OT EVAL LOW COMPLEX 30 MIN: CPT

## 2025-01-10 PROCEDURE — 6360000002 HC RX W HCPCS: Performed by: STUDENT IN AN ORGANIZED HEALTH CARE EDUCATION/TRAINING PROGRAM

## 2025-01-10 PROCEDURE — 85025 COMPLETE CBC W/AUTO DIFF WBC: CPT

## 2025-01-10 PROCEDURE — 97161 PT EVAL LOW COMPLEX 20 MIN: CPT

## 2025-01-10 RX ORDER — NEPHROCAP 1 MG
1 CAP ORAL DAILY
Status: DISCONTINUED | OUTPATIENT
Start: 2025-01-10 | End: 2025-01-13 | Stop reason: HOSPADM

## 2025-01-10 RX ORDER — METHADONE HYDROCHLORIDE 10 MG/ML
110 CONCENTRATE ORAL DAILY
Status: DISCONTINUED | OUTPATIENT
Start: 2025-01-10 | End: 2025-01-13 | Stop reason: HOSPADM

## 2025-01-10 RX ADMIN — LEVOTHYROXINE SODIUM 100 MCG: 100 TABLET ORAL at 05:38

## 2025-01-10 RX ADMIN — SODIUM CHLORIDE, PRESERVATIVE FREE 10 ML: 5 INJECTION INTRAVENOUS at 21:36

## 2025-01-10 RX ADMIN — HYDRALAZINE HYDROCHLORIDE 25 MG: 25 TABLET ORAL at 13:56

## 2025-01-10 RX ADMIN — HEPARIN SODIUM 5000 UNITS: 5000 INJECTION INTRAVENOUS; SUBCUTANEOUS at 21:38

## 2025-01-10 RX ADMIN — PIPERACILLIN AND TAZOBACTAM 3375 MG: 3; .375 INJECTION, POWDER, LYOPHILIZED, FOR SOLUTION INTRAVENOUS at 05:36

## 2025-01-10 RX ADMIN — METHADONE HYDROCHLORIDE 110 MG: 10 CONCENTRATE ORAL at 10:58

## 2025-01-10 RX ADMIN — HYDRALAZINE HYDROCHLORIDE 25 MG: 25 TABLET ORAL at 21:38

## 2025-01-10 RX ADMIN — HYDRALAZINE HYDROCHLORIDE 25 MG: 25 TABLET ORAL at 05:37

## 2025-01-10 RX ADMIN — PIPERACILLIN AND TAZOBACTAM 3375 MG: 3; .375 INJECTION, POWDER, LYOPHILIZED, FOR SOLUTION INTRAVENOUS at 16:58

## 2025-01-10 RX ADMIN — CALCIUM ACETATE 667 MG: 667 CAPSULE ORAL at 16:51

## 2025-01-10 RX ADMIN — CALCIUM ACETATE 667 MG: 667 CAPSULE ORAL at 11:57

## 2025-01-10 RX ADMIN — HEPARIN SODIUM 5000 UNITS: 5000 INJECTION INTRAVENOUS; SUBCUTANEOUS at 05:40

## 2025-01-10 RX ADMIN — HEPARIN SODIUM 5000 UNITS: 5000 INJECTION INTRAVENOUS; SUBCUTANEOUS at 13:56

## 2025-01-10 RX ADMIN — SODIUM CHLORIDE, PRESERVATIVE FREE 10 ML: 5 INJECTION INTRAVENOUS at 09:07

## 2025-01-10 RX ADMIN — Medication 1 MG: at 13:59

## 2025-01-10 RX ADMIN — CALCIUM ACETATE 667 MG: 667 CAPSULE ORAL at 09:04

## 2025-01-10 ASSESSMENT — PAIN SCALES - GENERAL
PAINLEVEL_OUTOF10: 0

## 2025-01-10 NOTE — CARE COORDINATION
01/10/25 7129   Service Assessment   Patient Orientation Alert and Oriented   Cognition Alert   History Provided By Patient   Primary Caregiver Self   Accompanied By/Relationship no one at bedside   Support Systems Family Members   Patient's Healthcare Decision Maker is: Patient Declined (Legal Next of Kin Remains as Decision Maker)   PCP Verified by CM Yes   Last Visit to PCP Within last 6 months   Prior Functional Level Independent in ADLs/IADLs   Current Functional Level Independent in ADLs/IADLs   Can patient return to prior living arrangement Yes   Ability to make needs known: Good   Family able to assist with home care needs: Yes   Would you like for me to discuss the discharge plan with any other family members/significant others, and if so, who? Yes  (Yvette bedoya- sister)   Financial Resources Medicare;Medicaid   Community Resources None   CM/SW Referral Other (see comment)  (not applicable)   Social/Functional History   Lives With Alone   Type of Home Apartment   Home Layout One level   Home Access Stairs to enter with rails  (Patient lives on the second floor of the apartment complex.)   Entrance Stairs - Number of Steps 12   Entrance Stairs - Rails Both   Bathroom Shower/Tub Tub/Shower unit   Bathroom Toilet Standard   Bathroom Equipment Grab bars in shower;Shower chair   Bathroom Accessibility Accessible   Home Equipment Cane   Receives Help From Family   Prior Level of Assist for ADLs Independent   Prior Level of Assist for Homemaking Independent   Homemaking Responsibilities Yes   Ambulation Assistance Independent   Prior Level of Assist for Transfers Independent   Active  Yes   Mode of Transportation Car   Education   (not applicable)   Occupation Retired   Type of Occupation   (not applicable)   Discharge Planning   Type of Residence Apartment   Living Arrangements Alone   Current Services Prior To Admission None   Potential Assistance Needed N/A   DME Ordered? No   Potential Assistance

## 2025-01-10 NOTE — H&P
History & Physical    Patient: Anthony Alvarez MRN: [id number]  [facility]: [id number]    YOB: 1960  Age: 64 y.o.  Sex: male      DOA: 1/9/2025    Chief Complaint:   Chief Complaint   Patient presents with    Wound Infection    Dialysis          HPI:     Anthony Alvarez is a 64 y.o. male who presented with chronic foot wound.  Patient was sent in by podiatrist for wound of the right great toe, being told by podiatry that he was supposed to be admitted.    In the emergency room patient worked up in usual manner including labs and imaging.  Podiatry vascular and infectious disease were consulted.  Notable findings include expected elevated creatinine consistent with his end-stage renal disease on hemodialysis, hypoalbuminemia 2.4, mild anemia 10.8, normal white blood cell count at 5, slight left shift at 75.8% neutrophils without bandemia.  Overall patient is nontoxic-appearing and being admitted for preparation for surgical event.    Past Medical History:   Diagnosis Date    Anemia     Bradycardia, unspecified 2018    Cervical discitis     MRSA    Chronic drug abuse (HCC) 1974    Chronic kidney disease     stage III    Diabetes ([facility]) 01/1990    no DM    Dialysis patient (HCC)     Drug abuse (HCC)     Encephalopathy     GERD (gastroesophageal reflux disease)     Hemodialysis patient ([facility])     T/Th/Sat at [facility] [phone]    History of abuse     Hypertension     Muscle weakness     Quadriparesis (HCC) 2017    Renal cell carcinoma of left kidney (HCC) 12/17/13    Pathological Stage [id number] cc RCC FG3 s/p left open partial nephrectomy in 12/13      Sepsis due to methicillin resistant Staphylococcus aureus (HCC)     Suprapubic catheter (HCC)     Thrombocytopenia (HCC)     Thyroid disease     Urethral erosion     Viral hepatitis B     Viral hepatitis C     Xerosis cutis        Past Surgical History:   Procedure Laterality Date    AMPUTATION Left 06/14/2023    2nd toe    CIRCUMCISION

## 2025-01-11 LAB
ANION GAP SERPL CALC-SCNC: 6 MMOL/L (ref 3–18)
BASOPHILS # BLD: 0.03 K/UL (ref 0–0.1)
BASOPHILS NFR BLD: 0.6 % (ref 0–2)
BUN SERPL-MCNC: 38 MG/DL (ref 7–18)
BUN/CREAT SERPL: 6 (ref 12–20)
CALCIUM SERPL-MCNC: 8.4 MG/DL (ref 8.5–10.1)
CALCIUM SERPL-MCNC: 8.6 MG/DL (ref 8.5–10.1)
CHLORIDE SERPL-SCNC: 103 MMOL/L (ref 100–111)
CO2 SERPL-SCNC: 27 MMOL/L (ref 21–32)
CREAT SERPL-MCNC: 6.85 MG/DL (ref 0.6–1.3)
DIFFERENTIAL METHOD BLD: ABNORMAL
ECHO BSA: 1.95 M2
EOSINOPHIL # BLD: 0.14 K/UL (ref 0–0.4)
EOSINOPHIL NFR BLD: 2.6 % (ref 0–5)
ERYTHROCYTE [DISTWIDTH] IN BLOOD BY AUTOMATED COUNT: 17 % (ref 11.6–14.5)
GLUCOSE SERPL-MCNC: 108 MG/DL (ref 74–99)
HBV SURFACE AG SER QL: 2.57 INDEX
HBV SURFACE AG SER QL: ABNORMAL
HBV SURFACE AG SERPL QL CFM: ABNORMAL
HCT VFR BLD AUTO: 27 % (ref 36–48)
HGB BLD-MCNC: 8.8 G/DL (ref 13–16)
IMM GRANULOCYTES # BLD AUTO: 0.03 K/UL (ref 0–0.04)
IMM GRANULOCYTES NFR BLD AUTO: 0.6 % (ref 0–0.5)
LYMPHOCYTES # BLD: 0.93 K/UL (ref 0.9–3.6)
LYMPHOCYTES NFR BLD: 17.5 % (ref 21–52)
Lab: ABNORMAL
MAGNESIUM SERPL-MCNC: 2.2 MG/DL (ref 1.6–2.6)
MCH RBC QN AUTO: 29 PG (ref 24–34)
MCHC RBC AUTO-ENTMCNC: 32.6 G/DL (ref 31–37)
MCV RBC AUTO: 89.1 FL (ref 78–100)
MONOCYTES # BLD: 0.45 K/UL (ref 0.05–1.2)
MONOCYTES NFR BLD: 8.5 % (ref 3–10)
NEUTS SEG # BLD: 3.72 K/UL (ref 1.8–8)
NEUTS SEG NFR BLD: 70.2 % (ref 40–73)
NRBC # BLD: 0 K/UL (ref 0–0.01)
NRBC BLD-RTO: 0 PER 100 WBC
PHOSPHATE SERPL-MCNC: 5.4 MG/DL (ref 2.5–4.9)
PLATELET # BLD AUTO: 131 K/UL (ref 135–420)
PMV BLD AUTO: 10.5 FL (ref 9.2–11.8)
POTASSIUM SERPL-SCNC: 3.5 MMOL/L (ref 3.5–5.5)
PTH-INTACT SERPL-MCNC: 224.4 PG/ML (ref 18.4–88)
RBC # BLD AUTO: 3.03 M/UL (ref 4.35–5.65)
SODIUM SERPL-SCNC: 136 MMOL/L (ref 136–145)
VANCOMYCIN SERPL-MCNC: 19.8 UG/ML (ref 5–40)
VAS LEFT ABI: 1.35
VAS LEFT CALF PRESSURE: 212 MMHG
VAS LEFT DORSALIS PEDIS BP: 208 MMHG
VAS LEFT PTA BP: 192 MMHG
VAS RIGHT ABI: 1.3
VAS RIGHT ARM BP: 154 MMHG
VAS RIGHT CALF PRESSURE: 210 MMHG
VAS RIGHT DORSALIS PEDIS BP: 197 MMHG
VAS RIGHT PTA BP: 200 MMHG
VAS RIGHT TBI: 1.53
VAS RIGHT TOE PRESSURE: 236 MMHG
WBC # BLD AUTO: 5.3 K/UL (ref 4.6–13.2)

## 2025-01-11 PROCEDURE — 6360000002 HC RX W HCPCS: Performed by: STUDENT IN AN ORGANIZED HEALTH CARE EDUCATION/TRAINING PROGRAM

## 2025-01-11 PROCEDURE — 1100000000 HC RM PRIVATE

## 2025-01-11 PROCEDURE — 2580000003 HC RX 258: Performed by: STUDENT IN AN ORGANIZED HEALTH CARE EDUCATION/TRAINING PROGRAM

## 2025-01-11 PROCEDURE — 6370000000 HC RX 637 (ALT 250 FOR IP): Performed by: INTERNAL MEDICINE

## 2025-01-11 PROCEDURE — 90935 HEMODIALYSIS ONE EVALUATION: CPT

## 2025-01-11 PROCEDURE — 87341 HEP B SURFACE AG NEUTRLZJ IA: CPT

## 2025-01-11 PROCEDURE — 94761 N-INVAS EAR/PLS OXIMETRY MLT: CPT

## 2025-01-11 PROCEDURE — 85025 COMPLETE CBC W/AUTO DIFF WBC: CPT

## 2025-01-11 PROCEDURE — 93923 UPR/LXTR ART STDY 3+ LVLS: CPT | Performed by: INTERNAL MEDICINE

## 2025-01-11 PROCEDURE — 83970 ASSAY OF PARATHORMONE: CPT

## 2025-01-11 PROCEDURE — 87340 HEPATITIS B SURFACE AG IA: CPT

## 2025-01-11 PROCEDURE — 84100 ASSAY OF PHOSPHORUS: CPT

## 2025-01-11 PROCEDURE — 6360000002 HC RX W HCPCS: Performed by: INTERNAL MEDICINE

## 2025-01-11 PROCEDURE — 36415 COLL VENOUS BLD VENIPUNCTURE: CPT

## 2025-01-11 PROCEDURE — 80202 ASSAY OF VANCOMYCIN: CPT

## 2025-01-11 PROCEDURE — 6370000000 HC RX 637 (ALT 250 FOR IP): Performed by: STUDENT IN AN ORGANIZED HEALTH CARE EDUCATION/TRAINING PROGRAM

## 2025-01-11 PROCEDURE — 2580000003 HC RX 258: Performed by: INTERNAL MEDICINE

## 2025-01-11 PROCEDURE — 99232 SBSQ HOSP IP/OBS MODERATE 35: CPT | Performed by: STUDENT IN AN ORGANIZED HEALTH CARE EDUCATION/TRAINING PROGRAM

## 2025-01-11 PROCEDURE — 2500000003 HC RX 250 WO HCPCS: Performed by: STUDENT IN AN ORGANIZED HEALTH CARE EDUCATION/TRAINING PROGRAM

## 2025-01-11 PROCEDURE — 80048 BASIC METABOLIC PNL TOTAL CA: CPT

## 2025-01-11 PROCEDURE — 83735 ASSAY OF MAGNESIUM: CPT

## 2025-01-11 RX ADMIN — HEPARIN SODIUM 5000 UNITS: 5000 INJECTION INTRAVENOUS; SUBCUTANEOUS at 22:41

## 2025-01-11 RX ADMIN — HYDRALAZINE HYDROCHLORIDE 25 MG: 25 TABLET ORAL at 05:39

## 2025-01-11 RX ADMIN — HYDRALAZINE HYDROCHLORIDE 25 MG: 25 TABLET ORAL at 20:46

## 2025-01-11 RX ADMIN — PIPERACILLIN AND TAZOBACTAM 3375 MG: 3; .375 INJECTION, POWDER, LYOPHILIZED, FOR SOLUTION INTRAVENOUS at 05:43

## 2025-01-11 RX ADMIN — HEPARIN SODIUM 5000 UNITS: 5000 INJECTION INTRAVENOUS; SUBCUTANEOUS at 05:39

## 2025-01-11 RX ADMIN — PIPERACILLIN AND TAZOBACTAM 3375 MG: 3; .375 INJECTION, POWDER, LYOPHILIZED, FOR SOLUTION INTRAVENOUS at 18:23

## 2025-01-11 RX ADMIN — METHADONE HYDROCHLORIDE 110 MG: 10 CONCENTRATE ORAL at 09:18

## 2025-01-11 RX ADMIN — Medication 1 MG: at 09:18

## 2025-01-11 RX ADMIN — CALCIUM ACETATE 667 MG: 667 CAPSULE ORAL at 18:24

## 2025-01-11 RX ADMIN — HYDRALAZINE HYDROCHLORIDE 25 MG: 25 TABLET ORAL at 15:38

## 2025-01-11 RX ADMIN — HEPARIN SODIUM 5000 UNITS: 5000 INJECTION INTRAVENOUS; SUBCUTANEOUS at 15:38

## 2025-01-11 RX ADMIN — LEVOTHYROXINE SODIUM 100 MCG: 100 TABLET ORAL at 05:39

## 2025-01-11 RX ADMIN — CALCIUM ACETATE 667 MG: 667 CAPSULE ORAL at 09:18

## 2025-01-11 RX ADMIN — VANCOMYCIN HYDROCHLORIDE 750 MG: 750 INJECTION, POWDER, LYOPHILIZED, FOR SOLUTION INTRAVENOUS at 12:30

## 2025-01-11 ASSESSMENT — PAIN - FUNCTIONAL ASSESSMENT
PAIN_FUNCTIONAL_ASSESSMENT: ACTIVITIES ARE NOT PREVENTED
PAIN_FUNCTIONAL_ASSESSMENT: ACTIVITIES ARE NOT PREVENTED

## 2025-01-11 ASSESSMENT — PAIN SCALES - GENERAL
PAINLEVEL_OUTOF10: 0

## 2025-01-12 LAB
ANION GAP SERPL CALC-SCNC: 5 MMOL/L (ref 3–18)
BASOPHILS # BLD: 0.02 K/UL (ref 0–0.1)
BASOPHILS NFR BLD: 0.4 % (ref 0–2)
BUN SERPL-MCNC: 27 MG/DL (ref 7–18)
BUN/CREAT SERPL: 6 (ref 12–20)
CALCIUM SERPL-MCNC: 8.5 MG/DL (ref 8.5–10.1)
CHLORIDE SERPL-SCNC: 101 MMOL/L (ref 100–111)
CO2 SERPL-SCNC: 30 MMOL/L (ref 21–32)
CREAT SERPL-MCNC: 4.77 MG/DL (ref 0.6–1.3)
DIFFERENTIAL METHOD BLD: ABNORMAL
EOSINOPHIL # BLD: 0.11 K/UL (ref 0–0.4)
EOSINOPHIL NFR BLD: 2.5 % (ref 0–5)
ERYTHROCYTE [DISTWIDTH] IN BLOOD BY AUTOMATED COUNT: 17 % (ref 11.6–14.5)
GLUCOSE SERPL-MCNC: 132 MG/DL (ref 74–99)
HCT VFR BLD AUTO: 27 % (ref 36–48)
HGB BLD-MCNC: 8.8 G/DL (ref 13–16)
IMM GRANULOCYTES # BLD AUTO: 0.03 K/UL (ref 0–0.04)
IMM GRANULOCYTES NFR BLD AUTO: 0.7 % (ref 0–0.5)
LYMPHOCYTES # BLD: 0.98 K/UL (ref 0.9–3.6)
LYMPHOCYTES NFR BLD: 21.9 % (ref 21–52)
MAGNESIUM SERPL-MCNC: 2 MG/DL (ref 1.6–2.6)
MCH RBC QN AUTO: 28.9 PG (ref 24–34)
MCHC RBC AUTO-ENTMCNC: 32.6 G/DL (ref 31–37)
MCV RBC AUTO: 88.8 FL (ref 78–100)
MONOCYTES # BLD: 0.43 K/UL (ref 0.05–1.2)
MONOCYTES NFR BLD: 9.6 % (ref 3–10)
NEUTS SEG # BLD: 2.9 K/UL (ref 1.8–8)
NEUTS SEG NFR BLD: 64.9 % (ref 40–73)
NRBC # BLD: 0 K/UL (ref 0–0.01)
NRBC BLD-RTO: 0 PER 100 WBC
PLATELET # BLD AUTO: 119 K/UL (ref 135–420)
PMV BLD AUTO: 10.7 FL (ref 9.2–11.8)
POTASSIUM SERPL-SCNC: 3.3 MMOL/L (ref 3.5–5.5)
RBC # BLD AUTO: 3.04 M/UL (ref 4.35–5.65)
SODIUM SERPL-SCNC: 136 MMOL/L (ref 136–145)
WBC # BLD AUTO: 4.5 K/UL (ref 4.6–13.2)

## 2025-01-12 PROCEDURE — 83735 ASSAY OF MAGNESIUM: CPT

## 2025-01-12 PROCEDURE — 85025 COMPLETE CBC W/AUTO DIFF WBC: CPT

## 2025-01-12 PROCEDURE — 36415 COLL VENOUS BLD VENIPUNCTURE: CPT

## 2025-01-12 PROCEDURE — 6370000000 HC RX 637 (ALT 250 FOR IP): Performed by: STUDENT IN AN ORGANIZED HEALTH CARE EDUCATION/TRAINING PROGRAM

## 2025-01-12 PROCEDURE — 99232 SBSQ HOSP IP/OBS MODERATE 35: CPT | Performed by: STUDENT IN AN ORGANIZED HEALTH CARE EDUCATION/TRAINING PROGRAM

## 2025-01-12 PROCEDURE — 1100000000 HC RM PRIVATE

## 2025-01-12 PROCEDURE — 6370000000 HC RX 637 (ALT 250 FOR IP): Performed by: INTERNAL MEDICINE

## 2025-01-12 PROCEDURE — 6360000002 HC RX W HCPCS: Performed by: STUDENT IN AN ORGANIZED HEALTH CARE EDUCATION/TRAINING PROGRAM

## 2025-01-12 PROCEDURE — 2580000003 HC RX 258: Performed by: STUDENT IN AN ORGANIZED HEALTH CARE EDUCATION/TRAINING PROGRAM

## 2025-01-12 PROCEDURE — 80048 BASIC METABOLIC PNL TOTAL CA: CPT

## 2025-01-12 PROCEDURE — 2500000003 HC RX 250 WO HCPCS: Performed by: STUDENT IN AN ORGANIZED HEALTH CARE EDUCATION/TRAINING PROGRAM

## 2025-01-12 RX ORDER — DICYCLOMINE HYDROCHLORIDE 10 MG/1
20 CAPSULE ORAL
Status: DISCONTINUED | OUTPATIENT
Start: 2025-01-12 | End: 2025-01-13 | Stop reason: HOSPADM

## 2025-01-12 RX ORDER — POTASSIUM CHLORIDE 1500 MG/1
40 TABLET, EXTENDED RELEASE ORAL ONCE
Status: COMPLETED | OUTPATIENT
Start: 2025-01-12 | End: 2025-01-12

## 2025-01-12 RX ADMIN — HYDRALAZINE HYDROCHLORIDE 25 MG: 25 TABLET ORAL at 21:04

## 2025-01-12 RX ADMIN — DICYCLOMINE HYDROCHLORIDE 20 MG: 10 CAPSULE ORAL at 21:04

## 2025-01-12 RX ADMIN — HEPARIN SODIUM 5000 UNITS: 5000 INJECTION INTRAVENOUS; SUBCUTANEOUS at 06:16

## 2025-01-12 RX ADMIN — HYDRALAZINE HYDROCHLORIDE 25 MG: 25 TABLET ORAL at 06:16

## 2025-01-12 RX ADMIN — HYDRALAZINE HYDROCHLORIDE 25 MG: 25 TABLET ORAL at 13:41

## 2025-01-12 RX ADMIN — METHADONE HYDROCHLORIDE 110 MG: 10 CONCENTRATE ORAL at 09:17

## 2025-01-12 RX ADMIN — POTASSIUM CHLORIDE 40 MEQ: 1500 TABLET, EXTENDED RELEASE ORAL at 09:18

## 2025-01-12 RX ADMIN — CALCIUM ACETATE 667 MG: 667 CAPSULE ORAL at 17:12

## 2025-01-12 RX ADMIN — DICYCLOMINE HYDROCHLORIDE 20 MG: 10 CAPSULE ORAL at 09:18

## 2025-01-12 RX ADMIN — CALCIUM ACETATE 667 MG: 667 CAPSULE ORAL at 09:18

## 2025-01-12 RX ADMIN — LEVOTHYROXINE SODIUM 100 MCG: 100 TABLET ORAL at 05:17

## 2025-01-12 RX ADMIN — Medication 1 MG: at 09:18

## 2025-01-12 RX ADMIN — PIPERACILLIN AND TAZOBACTAM 3375 MG: 3; .375 INJECTION, POWDER, LYOPHILIZED, FOR SOLUTION INTRAVENOUS at 17:12

## 2025-01-12 RX ADMIN — CALCIUM ACETATE 667 MG: 667 CAPSULE ORAL at 13:41

## 2025-01-12 RX ADMIN — SODIUM CHLORIDE, PRESERVATIVE FREE 10 ML: 5 INJECTION INTRAVENOUS at 21:05

## 2025-01-12 RX ADMIN — HEPARIN SODIUM 5000 UNITS: 5000 INJECTION INTRAVENOUS; SUBCUTANEOUS at 13:41

## 2025-01-12 RX ADMIN — PIPERACILLIN AND TAZOBACTAM 3375 MG: 3; .375 INJECTION, POWDER, LYOPHILIZED, FOR SOLUTION INTRAVENOUS at 05:17

## 2025-01-12 RX ADMIN — DICYCLOMINE HYDROCHLORIDE 20 MG: 10 CAPSULE ORAL at 17:12

## 2025-01-12 RX ADMIN — DICYCLOMINE HYDROCHLORIDE 20 MG: 10 CAPSULE ORAL at 06:16

## 2025-01-12 ASSESSMENT — PAIN SCALES - GENERAL
PAINLEVEL_OUTOF10: 4
PAINLEVEL_OUTOF10: 0

## 2025-01-12 ASSESSMENT — PAIN - FUNCTIONAL ASSESSMENT
PAIN_FUNCTIONAL_ASSESSMENT: ACTIVITIES ARE NOT PREVENTED

## 2025-01-13 ENCOUNTER — ANESTHESIA (OUTPATIENT)
Facility: HOSPITAL | Age: 65
DRG: 617 | End: 2025-01-13
Payer: MEDICARE

## 2025-01-13 ENCOUNTER — ANESTHESIA EVENT (OUTPATIENT)
Facility: HOSPITAL | Age: 65
DRG: 617 | End: 2025-01-13
Payer: MEDICARE

## 2025-01-13 VITALS
DIASTOLIC BLOOD PRESSURE: 65 MMHG | RESPIRATION RATE: 16 BRPM | BODY MASS INDEX: 22.35 KG/M2 | WEIGHT: 165 LBS | SYSTOLIC BLOOD PRESSURE: 160 MMHG | OXYGEN SATURATION: 94 % | HEART RATE: 52 BPM | HEIGHT: 72 IN | TEMPERATURE: 98.1 F

## 2025-01-13 LAB
ANION GAP SERPL CALC-SCNC: 5 MMOL/L (ref 3–18)
BASOPHILS # BLD: 0.03 K/UL (ref 0–0.1)
BASOPHILS NFR BLD: 0.6 % (ref 0–2)
BUN SERPL-MCNC: 31 MG/DL (ref 7–18)
BUN/CREAT SERPL: 5 (ref 12–20)
CALCIUM SERPL-MCNC: 8.7 MG/DL (ref 8.5–10.1)
CHLORIDE SERPL-SCNC: 104 MMOL/L (ref 100–111)
CO2 SERPL-SCNC: 28 MMOL/L (ref 21–32)
CREAT SERPL-MCNC: 6 MG/DL (ref 0.6–1.3)
DIFFERENTIAL METHOD BLD: ABNORMAL
EOSINOPHIL # BLD: 0.09 K/UL (ref 0–0.4)
EOSINOPHIL NFR BLD: 1.7 % (ref 0–5)
ERYTHROCYTE [DISTWIDTH] IN BLOOD BY AUTOMATED COUNT: 17.2 % (ref 11.6–14.5)
GLUCOSE BLD STRIP.AUTO-MCNC: 84 MG/DL (ref 70–110)
GLUCOSE SERPL-MCNC: 107 MG/DL (ref 74–99)
HCT VFR BLD AUTO: 27.3 % (ref 36–48)
HGB BLD-MCNC: 8.8 G/DL (ref 13–16)
IMM GRANULOCYTES # BLD AUTO: 0.03 K/UL (ref 0–0.04)
IMM GRANULOCYTES NFR BLD AUTO: 0.6 % (ref 0–0.5)
LYMPHOCYTES # BLD: 1.09 K/UL (ref 0.9–3.6)
LYMPHOCYTES NFR BLD: 20.6 % (ref 21–52)
MCH RBC QN AUTO: 29 PG (ref 24–34)
MCHC RBC AUTO-ENTMCNC: 32.2 G/DL (ref 31–37)
MCV RBC AUTO: 90.1 FL (ref 78–100)
MONOCYTES # BLD: 0.53 K/UL (ref 0.05–1.2)
MONOCYTES NFR BLD: 10 % (ref 3–10)
NEUTS SEG # BLD: 3.51 K/UL (ref 1.8–8)
NEUTS SEG NFR BLD: 66.5 % (ref 40–73)
NRBC # BLD: 0 K/UL (ref 0–0.01)
NRBC BLD-RTO: 0 PER 100 WBC
PHOSPHATE SERPL-MCNC: 3.9 MG/DL (ref 2.5–4.9)
PLATELET # BLD AUTO: 132 K/UL (ref 135–420)
PMV BLD AUTO: 10.9 FL (ref 9.2–11.8)
POTASSIUM SERPL-SCNC: 4.5 MMOL/L (ref 3.5–5.5)
RBC # BLD AUTO: 3.03 M/UL (ref 4.35–5.65)
SODIUM SERPL-SCNC: 137 MMOL/L (ref 136–145)
WBC # BLD AUTO: 5.3 K/UL (ref 4.6–13.2)

## 2025-01-13 PROCEDURE — 7100000000 HC PACU RECOVERY - FIRST 15 MIN: Performed by: PODIATRIST

## 2025-01-13 PROCEDURE — 88309 TISSUE EXAM BY PATHOLOGIST: CPT

## 2025-01-13 PROCEDURE — 7100000001 HC PACU RECOVERY - ADDTL 15 MIN: Performed by: PODIATRIST

## 2025-01-13 PROCEDURE — 99239 HOSP IP/OBS DSCHRG MGMT >30: CPT | Performed by: STUDENT IN AN ORGANIZED HEALTH CARE EDUCATION/TRAINING PROGRAM

## 2025-01-13 PROCEDURE — 87075 CULTR BACTERIA EXCEPT BLOOD: CPT

## 2025-01-13 PROCEDURE — 6370000000 HC RX 637 (ALT 250 FOR IP): Performed by: PODIATRIST

## 2025-01-13 PROCEDURE — 88311 DECALCIFY TISSUE: CPT

## 2025-01-13 PROCEDURE — 82962 GLUCOSE BLOOD TEST: CPT

## 2025-01-13 PROCEDURE — 85025 COMPLETE CBC W/AUTO DIFF WBC: CPT

## 2025-01-13 PROCEDURE — 3600000002 HC SURGERY LEVEL 2 BASE: Performed by: PODIATRIST

## 2025-01-13 PROCEDURE — 87205 SMEAR GRAM STAIN: CPT

## 2025-01-13 PROCEDURE — 6360000002 HC RX W HCPCS: Performed by: PODIATRIST

## 2025-01-13 PROCEDURE — 80048 BASIC METABOLIC PNL TOTAL CA: CPT

## 2025-01-13 PROCEDURE — 6360000002 HC RX W HCPCS: Performed by: NURSE ANESTHETIST, CERTIFIED REGISTERED

## 2025-01-13 PROCEDURE — 2580000003 HC RX 258: Performed by: STUDENT IN AN ORGANIZED HEALTH CARE EDUCATION/TRAINING PROGRAM

## 2025-01-13 PROCEDURE — 3700000001 HC ADD 15 MINUTES (ANESTHESIA): Performed by: PODIATRIST

## 2025-01-13 PROCEDURE — 2500000003 HC RX 250 WO HCPCS: Performed by: PODIATRIST

## 2025-01-13 PROCEDURE — 88305 TISSUE EXAM BY PATHOLOGIST: CPT

## 2025-01-13 PROCEDURE — 36415 COLL VENOUS BLD VENIPUNCTURE: CPT

## 2025-01-13 PROCEDURE — 3700000000 HC ANESTHESIA ATTENDED CARE: Performed by: PODIATRIST

## 2025-01-13 PROCEDURE — 2709999900 HC NON-CHARGEABLE SUPPLY: Performed by: PODIATRIST

## 2025-01-13 PROCEDURE — 94761 N-INVAS EAR/PLS OXIMETRY MLT: CPT

## 2025-01-13 PROCEDURE — 84100 ASSAY OF PHOSPHORUS: CPT

## 2025-01-13 PROCEDURE — 0Y6Q0Z1 DETACHMENT AT LEFT 1ST TOE, HIGH, OPEN APPROACH: ICD-10-PCS | Performed by: PODIATRIST

## 2025-01-13 PROCEDURE — 3600000012 HC SURGERY LEVEL 2 ADDTL 15MIN: Performed by: PODIATRIST

## 2025-01-13 PROCEDURE — 87070 CULTURE OTHR SPECIMN AEROBIC: CPT

## 2025-01-13 PROCEDURE — 6360000002 HC RX W HCPCS: Performed by: STUDENT IN AN ORGANIZED HEALTH CARE EDUCATION/TRAINING PROGRAM

## 2025-01-13 PROCEDURE — 2700000000 HC OXYGEN THERAPY PER DAY

## 2025-01-13 PROCEDURE — 6370000000 HC RX 637 (ALT 250 FOR IP): Performed by: STUDENT IN AN ORGANIZED HEALTH CARE EDUCATION/TRAINING PROGRAM

## 2025-01-13 RX ORDER — DOXYCYCLINE HYCLATE 100 MG
100 TABLET ORAL 2 TIMES DAILY
Qty: 20 TABLET | Refills: 0 | Status: SHIPPED | OUTPATIENT
Start: 2025-01-13 | End: 2025-01-23

## 2025-01-13 RX ORDER — MIDAZOLAM HYDROCHLORIDE 1 MG/ML
INJECTION, SOLUTION INTRAMUSCULAR; INTRAVENOUS
Status: DISCONTINUED | OUTPATIENT
Start: 2025-01-13 | End: 2025-01-13 | Stop reason: SDUPTHER

## 2025-01-13 RX ORDER — L. ACIDOPHILUS,CASEI,RHAMNOSUS 50B CELL
1 CAPSULE,DELAYED RELEASE (ENTERIC COATED) ORAL
Qty: 10 CAPSULE | Refills: 0 | Status: SHIPPED | OUTPATIENT
Start: 2025-01-13 | End: 2025-01-23

## 2025-01-13 RX ORDER — SODIUM CHLORIDE, SODIUM LACTATE, POTASSIUM CHLORIDE, CALCIUM CHLORIDE 600; 310; 30; 20 MG/100ML; MG/100ML; MG/100ML; MG/100ML
INJECTION, SOLUTION INTRAVENOUS CONTINUOUS
Status: DISCONTINUED | OUTPATIENT
Start: 2025-01-13 | End: 2025-01-13 | Stop reason: HOSPADM

## 2025-01-13 RX ORDER — NALOXONE HYDROCHLORIDE 0.4 MG/ML
INJECTION, SOLUTION INTRAMUSCULAR; INTRAVENOUS; SUBCUTANEOUS PRN
Status: DISCONTINUED | OUTPATIENT
Start: 2025-01-13 | End: 2025-01-13 | Stop reason: HOSPADM

## 2025-01-13 RX ORDER — PROCHLORPERAZINE EDISYLATE 5 MG/ML
5 INJECTION INTRAMUSCULAR; INTRAVENOUS
Status: DISCONTINUED | OUTPATIENT
Start: 2025-01-13 | End: 2025-01-13 | Stop reason: HOSPADM

## 2025-01-13 RX ORDER — METOCLOPRAMIDE HYDROCHLORIDE 5 MG/ML
10 INJECTION INTRAMUSCULAR; INTRAVENOUS
Status: DISCONTINUED | OUTPATIENT
Start: 2025-01-13 | End: 2025-01-13 | Stop reason: HOSPADM

## 2025-01-13 RX ORDER — FENTANYL CITRATE 50 UG/ML
INJECTION, SOLUTION INTRAMUSCULAR; INTRAVENOUS
Status: DISCONTINUED | OUTPATIENT
Start: 2025-01-13 | End: 2025-01-13 | Stop reason: SDUPTHER

## 2025-01-13 RX ORDER — ACETAMINOPHEN 325 MG/1
325 TABLET ORAL
Status: DISCONTINUED | OUTPATIENT
Start: 2025-01-13 | End: 2025-01-13 | Stop reason: HOSPADM

## 2025-01-13 RX ORDER — PROPOFOL 10 MG/ML
INJECTION, EMULSION INTRAVENOUS
Status: DISCONTINUED | OUTPATIENT
Start: 2025-01-13 | End: 2025-01-13 | Stop reason: SDUPTHER

## 2025-01-13 RX ORDER — FENTANYL CITRATE 50 UG/ML
25 INJECTION, SOLUTION INTRAMUSCULAR; INTRAVENOUS EVERY 5 MIN PRN
Status: DISCONTINUED | OUTPATIENT
Start: 2025-01-13 | End: 2025-01-13 | Stop reason: HOSPADM

## 2025-01-13 RX ORDER — OXYCODONE HYDROCHLORIDE 5 MG/1
5 TABLET ORAL
Status: DISCONTINUED | OUTPATIENT
Start: 2025-01-13 | End: 2025-01-13 | Stop reason: HOSPADM

## 2025-01-13 RX ORDER — LIDOCAINE HYDROCHLORIDE 20 MG/ML
INJECTION, SOLUTION EPIDURAL; INFILTRATION; INTRACAUDAL; PERINEURAL PRN
Status: DISCONTINUED | OUTPATIENT
Start: 2025-01-13 | End: 2025-01-13 | Stop reason: ALTCHOICE

## 2025-01-13 RX ADMIN — MIDAZOLAM 2 MG: 1 INJECTION, SOLUTION INTRAMUSCULAR; INTRAVENOUS at 07:19

## 2025-01-13 RX ADMIN — FENTANYL CITRATE 25 MCG: 50 INJECTION INTRAMUSCULAR; INTRAVENOUS at 08:03

## 2025-01-13 RX ADMIN — PROPOFOL 20 MG: 10 INJECTION, EMULSION INTRAVENOUS at 07:37

## 2025-01-13 RX ADMIN — CALCIUM ACETATE 667 MG: 667 CAPSULE ORAL at 09:55

## 2025-01-13 RX ADMIN — PROPOFOL 30 MG: 10 INJECTION, EMULSION INTRAVENOUS at 07:33

## 2025-01-13 RX ADMIN — Medication 1 MG: at 09:55

## 2025-01-13 RX ADMIN — FENTANYL CITRATE 50 MCG: 50 INJECTION INTRAMUSCULAR; INTRAVENOUS at 07:27

## 2025-01-13 RX ADMIN — PROPOFOL 20 MG: 10 INJECTION, EMULSION INTRAVENOUS at 07:45

## 2025-01-13 RX ADMIN — PIPERACILLIN AND TAZOBACTAM 3375 MG: 3; .375 INJECTION, POWDER, LYOPHILIZED, FOR SOLUTION INTRAVENOUS at 05:27

## 2025-01-13 RX ADMIN — METHADONE HYDROCHLORIDE 110 MG: 10 CONCENTRATE ORAL at 09:54

## 2025-01-13 RX ADMIN — DICYCLOMINE HYDROCHLORIDE 20 MG: 10 CAPSULE ORAL at 09:55

## 2025-01-13 RX ADMIN — SODIUM CHLORIDE, PRESERVATIVE FREE 10 ML: 5 INJECTION INTRAVENOUS at 09:55

## 2025-01-13 RX ADMIN — LEVOTHYROXINE SODIUM 100 MCG: 100 TABLET ORAL at 05:27

## 2025-01-13 RX ADMIN — FENTANYL CITRATE 25 MCG: 50 INJECTION INTRAMUSCULAR; INTRAVENOUS at 07:52

## 2025-01-13 RX ADMIN — PROPOFOL 20 MG: 10 INJECTION, EMULSION INTRAVENOUS at 07:28

## 2025-01-13 RX ADMIN — SODIUM CHLORIDE: 9 INJECTION, SOLUTION INTRAVENOUS at 07:19

## 2025-01-13 ASSESSMENT — PAIN DESCRIPTION - ONSET: ONSET: ON-GOING

## 2025-01-13 ASSESSMENT — PAIN - FUNCTIONAL ASSESSMENT
PAIN_FUNCTIONAL_ASSESSMENT: ACTIVITIES ARE NOT PREVENTED
PAIN_FUNCTIONAL_ASSESSMENT: ACTIVITIES ARE NOT PREVENTED
PAIN_FUNCTIONAL_ASSESSMENT: 0-10
PAIN_FUNCTIONAL_ASSESSMENT: ACTIVITIES ARE NOT PREVENTED

## 2025-01-13 ASSESSMENT — PAIN SCALES - GENERAL
PAINLEVEL_OUTOF10: 0
PAINLEVEL_OUTOF10: 5

## 2025-01-13 ASSESSMENT — PAIN DESCRIPTION - PAIN TYPE: TYPE: ACUTE PAIN

## 2025-01-13 ASSESSMENT — PAIN DESCRIPTION - ORIENTATION: ORIENTATION: RIGHT

## 2025-01-13 ASSESSMENT — PAIN DESCRIPTION - DESCRIPTORS: DESCRIPTORS: ACHING

## 2025-01-13 ASSESSMENT — PAIN DESCRIPTION - LOCATION: LOCATION: FOOT

## 2025-01-13 ASSESSMENT — PAIN DESCRIPTION - FREQUENCY: FREQUENCY: CONTINUOUS

## 2025-01-13 NOTE — CARE COORDINATION
Discharge order noted for today. Pt has been accepted to [facility]. Met with patient and is agreeable to the transition plan today. Transport has been arranged through patient's friend. Patient's home health  orders have been forwarded to [facility] via [facility].  Discharge information has been documented on the AVS.           Hetal Hassan RN  Case Management [phone]

## 2025-01-13 NOTE — PERIOP NOTE
TRANSFER - IN REPORT:    Verbal report received from BERENICE Smiley on Anthony Alvarez  being received from  for ordered procedure      Report consisted of patient's Situation, Background, Assessment and   Recommendations(SBAR).     Information from the following report(s) Nurse Handoff Report was reviewed with the receiving nurse.    Opportunity for questions and clarification was provided.

## 2025-01-13 NOTE — ANESTHESIA POSTPROCEDURE EVALUATION
Department of Anesthesiology  Postprocedure Note    Patient: Anthony Alvarez  MRN: [id number]  YOB: 1960  Date of evaluation: 1/13/2025    Procedure Summary       Date: 01/13/25 Room / Location: [facility] MAIN 01 / [facility] MAIN OR    Anesthesia Start: 0719 Anesthesia Stop: 0813    Procedure: LEFT GREAT TOE PARTIAL AMPUTATION (Left: Toes) Diagnosis:       Osteomyelitis of great toe of left foot      (Osteomyelitis of great toe of left foot [M86.9])    Surgeons: Sergio Dos Santos DPM Responsible Provider: Germain Marshall MD    Anesthesia Type: MAC ASA Status: 3            Anesthesia Type: MAC    Tammy Phase I: Tammy Score: 9    Tammy Phase II:      Anesthesia Post Evaluation    Patient location during evaluation: bedside  Patient participation: complete - patient participated  Airway patency: patent  Cardiovascular status: hemodynamically stable  Respiratory status: acceptable  Hydration status: stable    No notable events documented.

## 2025-01-13 NOTE — CARE COORDINATION
Discharge order noted.       [facility] [place] Served Dr Dos Santos, and updated that discharge order is in, and to see if Home Health is needed for wound care, and if so CM will need specific wound care orders for patient.           Hetal Hassan, RN  Case Management [phone]

## 2025-01-13 NOTE — OP NOTE
Operative Note      Patient: Anthony Alvarez  YOB: 1960  MRN: [id number]    Date of Procedure: 1/13/2025    Pre-Op Diagnosis Codes:      * Osteomyelitis of great toe of left foot [M86.9]    Post-Op Diagnosis: Same       Procedure(s):  LEFT GREAT TOE PARTIAL AMPUTATION    Surgeon(s):  Sergio Dos Santos DPM    Assistant:   Surgical Assistant: Rosetta Calderon    Anesthesia: Monitor Anesthesia Care    Estimated Blood Loss (mL): Minimal    Complications: None    Specimens:   ID Type Source Tests Collected by Time Destination   1 : Left Great Toe Dirty Margin Tissue Toe CULTURE, ANAEROBIC, CULTURE, TISSUE (WITH GRAM STAIN) Sergio Dos Santos DPM 1/13/2025 0740    2 : Left Great Toe Clean Margin Tissue Toe CULTURE, ANAEROBIC, CULTURE, TISSUE (WITH GRAM STAIN) Sergio Dos Santos DPM 1/13/2025 0745    A : Left Great Toe Dirty Margin Tissue Toe SURGICAL PATHOLOGY Sergio Dos Santos DPM 1/13/2025 0745    B : Left Great Toe Clean Margin Tissue Toe SURGICAL PATHOLOGY Sergio Dos Santos DPM 1/13/2025 0747        Implants:  * No implants in log *      Drains:   Suprapubic Catheter (Active)   Site Assessment Pink 01/11/25 2049   Urine Color Yellow 01/11/25 2049   Urine Appearance Cloudy 01/11/25 2049   Urine Odor Malodorous 01/09/25 2042   Collection Container Leg bag 01/11/25 2049   Securement Method Leg strap 01/11/25 2049   Status Patent;Draining 01/11/25 2049   Output (mL) 200 mL 01/13/25 0415       Findings:  Infection Present At Time Of Surgery (PATOS) (choose all levels that have infection present):  - Organ Space infection (below fascia) present as evidenced by osteomyelitis  Other Findings: Osteomyelitis with's soft infected bone distal phalanx left great toe.  Proximal phalanx healthy bone without signs of infection.    Detailed Description of Procedure:   Patient was placed on the operating room table in the supine position after adequate induction of MAC anesthesia the left lower extremity was

## 2025-01-13 NOTE — ANESTHESIA PRE PROCEDURE
BIOPSY RENAL 4/9/2018 [facility] RAD CT   • INSJ TUNNELED CVC W/O SUBQ PORT/ AGE 3 YR/> N/A 5/6/2020    INSERTION TUNNELED CENTRAL VENOUS CATHETER/perm catheter exchange performed by Jose Dominguez MD at [facility] CARDIAC CATH LAB   • IR BIOPSY BONE MARROW  04/06/2018    IR BIOPSY PERC SUPERF BONE 4/6/2018 [facility] RAD ANGIO IR   • IR TUNNELED CVC PLACE WO SQ PORT/PUMP > 5 YEARS  02/18/2020    IR TUNNELED CATHETER PLACEMENT GREATER THAN 5 YEARS 2/18/2020 [facility] RAD ANGIO IR   • IR TUNNELED CVC PLACE WO SQ PORT/PUMP > 5 YEARS  2/18/2020   • KIDNEY REMOVAL Left 12/17/13    Open/ Partial,nephrectomy   • NEUROLOGICAL SURGERY  2017    neck surgery-abcess-hardware neck   • OTHER SURGICAL HISTORY  04/2017    abscess removed from back of neck    • OTHER SURGICAL HISTORY Right 2/27/2016    removed right ft  2nd meta-tarsal   • REMOVAL WITH INSERTION OF SUPRAPUBIC CATHETER  2018   • TOE AMPUTATION Left 06/14/2023    AMPUTATION LEFT SECOND TOE performed by Sergio Dos Santos DPM at [facility] MAIN OR   • UPPER GASTROINTESTINAL ENDOSCOPY N/A 1/17/2024    EGD ESOPHAGOGASTRODUODENOSCOPY WITH VARICEAL BAND LIGATION performed by Avery Barrera MD at [facility] ENDOSCOPY       Social History:    Social History     Tobacco Use   • Smoking status: Some Days     Current packs/day: 0.25     Types: Cigarettes   • Smokeless tobacco: Never   Substance Use Topics   • Alcohol use: Not Currently                                Ready to quit: Not Answered  Counseling given: Not Answered      Vital Signs (Current):   Vitals:    01/12/25 1951 01/12/25 2104 01/12/25 2315 01/13/25 0415   BP: (!) 164/71  (!) 163/67 (!) 149/68   Pulse: 60  56 58   Resp: 18 18 18 18   Temp: 98.2 °F (36.8 °C)  98.4 °F (36.9 °C) 98.8 °F (37.1 °C)   TempSrc: Oral  Oral Oral   SpO2: 98%  96% 97%   Weight:       Height:                                                  BP Readings from Last 3 Encounters:   01/13/25 (!) 149/68   01/17/24 134/63   07/21/23 130/74       NPO Status: Time of last liquid

## 2025-01-13 NOTE — PERIOP NOTE
TRANSFER - OUT REPORT:    Verbal report given to Stephanie Brown RN on Anthony Alvarez  being transferred to [address] [place] [address], returning to Room 505 for routine progression of patient care       Report consisted of patient's Situation, Background, Assessment and   Recommendations(SBAR).     Information from the following report(s) Nurse Handoff Report, Index, Adult Overview, Surgery Report, Cardiac Rhythm Sinus Frankie, Quality Measures, and Neuro Assessment was reviewed with the receiving nurse.           Lines:   Peripheral IV 01/11/25 Right;Anterior Cephalic (Active)   Site Assessment Clean, dry & intact 01/13/25 0810   Line Status Flushed;Normal saline locked 01/13/25 0810   Line Care Connections checked and tightened 01/13/25 0810   Phlebitis Assessment No symptoms 01/13/25 0810   Infiltration Assessment 0 01/13/25 0810   Alcohol Cap Used No 01/13/25 0810   Dressing Status Clean, dry & intact 01/13/25 0810   Dressing Type Transparent 01/13/25 0810   Dressing Intervention New 01/11/25 2200        Opportunity for questions and clarification was provided.      Patient transported with:  O2 @ 2lpm and Tech

## 2025-01-13 NOTE — PROGRESS NOTES
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 great toe ,open distal ulcer  with exposed distal phalange,  Has improved since admission and initial treatment with current antibiotics.  No further drainage, granular tissue, swelling and redness great toe and medial forefoot has improved.  Charcot great toe.  Advise continued treatment and wound care, and hopeful porition of Great toe and first ray can be salvaged.  Plan for partial amputation great toe by Monday .  
Seen at bedside awake and alert.  Inspected left great toe.  Decreased swelling and inflammation.  Necrotic distal left great toe.  Vascular surgery  has cleared patient with adequate perfusion for procedure.  Plan diatal left great toe amputation.  
Spiritual Health History and Assessment/Progress Note  [facility]    Spiritual/Emotional Needs,  ,  ,      Name: Anthony Alvarez MRN: [id number]    Age: 64 y.o.     Sex: male   Language: English   Synagogue: [employer]   Open toe wound, initial encounter     Date: 1/10/2025            Total Time Calculated: 7 min              Spiritual Assessment began in [address] MEDICAL        Referral/Consult From: Multi-disciplinary team   Encounter Overview/Reason: Spiritual/Emotional Needs  Service Provided For: Patient    Cari, Belief, Meaning:   Patient has beliefs or practices that help with coping during difficult times  Family/Friends No family/friends present      Importance and Influence:  Patient has spiritual/personal beliefs that influence decisions regarding their health  Family/Friends No family/friends present    Community:  Patient feels well-supported. Support system includes: Extended family  Family/Friends No family/friends present    Assessment and Plan of Care:     Patient Interventions include: Facilitated expression of thoughts and feelings  Family/Friends Interventions include: No family/friends present    Patient Plan of Care: Spiritual Care available upon further referral  Family/Friends Plan of Care: No family/friends present    Electronically signed by JUAN JOSE Smith on 1/10/2025 at 12:04 PM    
PORT/PUMP > 5 YEARS  2/18/2020    KIDNEY REMOVAL Left 12/17/13    Open/ Partial,nephrectomy    NEUROLOGICAL SURGERY  2017    neck surgery-abcess-hardware neck    OTHER SURGICAL HISTORY  04/2017    abscess removed from back of neck     OTHER SURGICAL HISTORY Right 2/27/2016    removed right ft  2nd meta-tarsal    REMOVAL WITH INSERTION OF SUPRAPUBIC CATHETER  2018    TOE AMPUTATION Left 06/14/2023    AMPUTATION LEFT SECOND TOE performed by Sergio Dos Santos DPM at [facility] MAIN OR    UPPER GASTROINTESTINAL ENDOSCOPY N/A 1/17/2024    EGD ESOPHAGOGASTRODUODENOSCOPY WITH VARICEAL BAND LIGATION performed by Avery Barrera MD at [facility] ENDOSCOPY     Home Situation:   Social/Functional History  Lives With: Alone  Type of Home: Apartment  Home Layout: One level  Home Access: Stairs to enter with rails (Patient lives on the second floor of the apartment complex.)  Entrance Stairs - Number of Steps: 12  Entrance Stairs - Rails: Both  Bathroom Shower/Tub: Tub/Shower unit  Bathroom Toilet: Standard  Bathroom Equipment: Grab bars in shower, Shower chair  Bathroom Accessibility: Accessible  Home Equipment: Cane  Receives Help From: Family  Prior Level of Assist for ADLs: Independent  Prior Level of Assist for Homemaking: Independent  Homemaking Responsibilities: Yes  Prior Level of Assist for Transfers: Independent  Active : Yes  Mode of Transportation: Car  Education:  (not applicable)  Occupation: Retired  Type of Occupation:  (not applicable)  [x]  Right hand dominant   []  Left hand dominant      Cognitive/Behavioral Status:  Orientation  Orientation Level: Oriented X4  Cognition  Overall Cognitive Status: WFL    Skin: Intact  Edema: None noted    Vision/Perceptual:    Vision  Vision: Within Functional Limits       Coordination: BUE  Coordination: Within functional limits    Balance:  Balance  Sitting: Intact    Strength: BUE  Strength: Within functional limits    Tone & Sensation: BUE  Tone: Normal  Sensation: Intact    Range 
at 01/10/25 2136    sodium chloride flush 0.9 % injection 5-40 mL  5-40 mL IntraVENous PRN Chau Reynolds MD        0.9 % sodium chloride infusion   IntraVENous PRN Chau Reynolds MD        heparin (porcine) injection 5,000 Units  5,000 Units SubCUTAneous 3 times per day Chau Reynolds MD   5,000 Units at 01/12/25 1341    ondansetron (ZOFRAN-ODT) disintegrating tablet 4 mg  4 mg Oral Q8H PRN Chau Reynolds MD        Or    ondansetron (ZOFRAN) injection 4 mg  4 mg IntraVENous Q6H PRN Chau Reynolds MD        melatonin tablet 3 mg  3 mg Oral Nightly PRN Chau Reynolds MD        polyethylene glycol (GLYCOLAX) packet 17 g  17 g Oral Daily PRN Chau Reynolds MD        acetaminophen (TYLENOL) tablet 650 mg  650 mg Oral Q6H PRN Chau Reynolds MD        Or    acetaminophen (TYLENOL) suppository 650 mg  650 mg Rectal Q6H PRN Chau Reynolds MD        piperacillin-tazobactam (ZOSYN) 3,375 mg in sodium chloride 0.9 % 50 mL IVPB (mini-bag)  3,375 mg IntraVENous Q12H Chau Reynolds MD   Stopped at 01/12/25 0921    vancomycin (VANCOCIN) intermittent dosing (placeholder)   Other RX Placeholder Chau Reynolds MD            Physical Exam:     Physical Exam:   General:  Alert, cooperative, no distress, appears stated age.   Eyes:  Conjunctivae/corneas clear. PERRL, EOMs intact.    Mouth/Throat: Lips, mucosa, and tongue normal. Teeth and gums normal.   Neck: Supple, symmetrical, trachea midline, no adenopathy, thyroid: no enlargement/tenderness/nodules, no carotid bruit and no JVD.   Lungs:   Clear to auscultation bilaterally.   Heart:  Regular rate and rhythm, S1, S2 normal, no murmur, click, rub or gallop.   Abdomen:   Soft, non-tender. Bowel sounds normal. No masses,  No organomegaly.   Extremities: Extremities normal, atraumatic, no cyanosis or edema, AV graft on the left arm has a good thrill and bruit.  Both feet are well dressed                         Data Review:    Labs: Results:   Chemistry Recent Labs 
[]Ritesh   []Henry Zapien DO, MBA, MS [facility] Hospitalist    
ambulatory

## 2025-01-13 NOTE — CARE COORDINATION
01/13/25 1141   IMM Letter   IMM Letter given to Patient/Family/Significant other/Guardian/POA/by: Hetal Hassan   IMM Letter date given: 01/13/25   IMM Letter time given: 1123       Patient waived the 4 hour right to appeal the discharge.           Hetal Hassan RN  Case Management [phone]

## 2025-01-13 NOTE — CARE COORDINATION
CM booked [facility] in [facility].   CM messaged Patricia with [facility] in [facility] and updated that patient will be discharging home today.   Anticipated Start of Care is 01/14/2025.   AVS uploaded to [facility] for [facility].             Hetal Hassan RN  Case Management [phone]    This was an emergent procedure and consent was implied.

## 2025-01-13 NOTE — CARE COORDINATION
Home Health orders uploaded to [facility].       CM spoke with patient at the bedside, patient chose [facility], and said his friend will be transporting patient home today for discharge.           Freedom of Choice  1/13/2025, 11:43 AM    Patient Name: Anthony Alvarez                   YOB: 1960    Patient offered star rated Freedom of Choice for [facility].             Hetal Hassan RN  Case Management [phone]
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
## 2025-01-13 NOTE — INTERVAL H&P NOTE
Update History & Physical    The patient's History and Physical of January 13, surgery was reviewed with the patient and I examined the patient. There was no change. The surgical site was confirmed by the patient and me.     Plan: The risks, benefits, expected outcome, and alternative to the recommended procedure have been discussed with the patient. Patient understands and wants to proceed with the procedure.     Electronically signed by Sergio Dos Santos DPM on 1/13/2025 at 7:15 AM

## 2025-01-13 NOTE — BRIEF OP NOTE
Brief Postoperative Note      Patient: Anthony Alvarez  YOB: 1960  MRN: [id number]    Date of Procedure: 1/13/2025    Pre-Op Diagnosis Codes:      * Osteomyelitis of great toe of left foot [M86.9]    Post-Op Diagnosis: Same       Procedure(s):  LEFT GREAT TOE PARTIAL AMPUTATION    Surgeon(s):  Sergio Dos Santos DPM    Assistant:  Surgical Assistant: Rosetta Calderon    Anesthesia: Monitor Anesthesia Care    Estimated Blood Loss (mL): Minimal    Complications: None    Specimens:   ID Type Source Tests Collected by Time Destination   1 : Left Great Toe Dirty Margin Tissue Toe CULTURE, ANAEROBIC, CULTURE, TISSUE (WITH GRAM STAIN) Sergio Dos Santos DPM 1/13/2025 0740    2 : Left Great Toe Clean Margin Tissue Toe CULTURE, ANAEROBIC, CULTURE, TISSUE (WITH GRAM STAIN) Sergio Dos Santos DPM 1/13/2025 0745    A : Left Great Toe Dirty Margin Tissue Toe SURGICAL PATHOLOGY Sergio Dos Santos DPM 1/13/2025 0745    B : Left Great Toe Clean Margin Tissue Toe SURGICAL PATHOLOGY Sergio Dos Santos DPM 1/13/2025 0747        Implants:  * No implants in log *      Drains:   Suprapubic Catheter (Active)   Site Assessment Pink 01/11/25 2049   Urine Color Yellow 01/11/25 2049   Urine Appearance Cloudy 01/11/25 2049   Urine Odor Malodorous 01/09/25 2042   Collection Container Leg bag 01/11/25 2049   Securement Method Leg strap 01/11/25 2049   Status Patent;Draining 01/11/25 2049   Output (mL) 200 mL 01/13/25 0415       Findings:  Infection Present At Time Of Surgery (PATOS) (choose all levels that have infection present):  - Organ Space infection (below fascia) present as evidenced by osteomyelitis  Other Findings: Osteomyelitis distal phalanx left great toe.  Healthy clean margin proximal phalanx.    Electronically signed by Sergio Dos Santos DPM on 1/13/2025 at 8:13 AM

## 2025-01-14 LAB
BACTERIA SPEC CULT: ABNORMAL
BACTERIA SPEC CULT: ABNORMAL
GRAM STN SPEC: ABNORMAL
GRAM STN SPEC: ABNORMAL
SERVICE CMNT-IMP: ABNORMAL

## 2025-01-14 NOTE — DISCHARGE SUMMARY
antimicrobial coverage.  Patient was taken to the operating room where he underwent left great toe amputation.  Per podiatry, clean bone margins were achieved with surgery.  Per recommendations from ID, the patient was discharged on oral Augmentin and doxycycline.  The last day of antibiotics would be January 23, 2025.  Patient was advised to follow-up with podiatry after discharge.    Patient also has ESRD and was continued on hemodialysis in the hospital.  He was followed by nephrology.    For hypertension, patient was continued on antihypertensive therapy.  Patient also has hypothyroidism and was continued on levothyroxine.    Readmission Risk:        FOLLOW-UP RECOMMENDATIONS:     Jd Wood MD  [address] [facility] [address] [phone]    Follow up in 1 week(s)      Sergio Dos Santos DPM  [address] [facility] [address] [phone]    Follow up in 7 day(s)           Consults: Infectious Disease and Nephrology; podiatry    Significant Diagnostic Studies:     Imaging:  Vascular lower arterial complete physiologic Doppler at rest    Result Date: 1/11/2025    Right side findings: Resting DEVAN is 1.30. This is within the normal range. Resting TBI is 1.53 this is non-compressible.   Left side findings: Resting DEVAN is 1.35. This is within the normal range.   Unable to perform digital brachial index or PPGs on Left toe due to open wound and presence of dressing.   Unable to assess blood pressure on left arm due to dialysis fistula.     XR FOOT LEFT (MIN 3 VIEWS)    Result Date: 1/9/2025  EXAM: LEFT FOOT COMPLETE CLINICAL INDICATION/HISTORY: Left great toe amputation. COMPARISON: Left great toe radiographs of 11 June 2023. TECHNIQUE: 3 views.  FINDINGS: There has been interval amputation of the distal tuft of the distal phalanx of the great toe as well as the surrounding soft tissues of the tip of the toe. There has also been interval complete amputation of the second toe. Other bony

## 2025-01-15 LAB
BACTERIA SPEC CULT: NORMAL
SERVICE CMNT-IMP: NORMAL

## 2025-01-16 LAB
BACTERIA SPEC CULT: ABNORMAL
BACTERIA SPEC CULT: ABNORMAL
BACTERIA SPEC CULT: NORMAL
GRAM STN SPEC: ABNORMAL
SERVICE CMNT-IMP: ABNORMAL
SERVICE CMNT-IMP: ABNORMAL
SERVICE CMNT-IMP: NORMAL

## 2025-01-16 NOTE — CARE COORDINATION
Late Entry 01/16/2025 3:19 pm      Ana with [facility] accepted patient in [facility] for Anticipated Start of Care of 01/17/2025.   Discharge Summary and other clinicals uploaded per [facility]'s request.             Hetal Hassan RN  Case Management [phone]

## 2025-01-16 NOTE — CARE COORDINATION
Late Entry 01/16/2025  12:24 pm    Patient discharged home on 01/13/2025, and on day of discharge [facility] had accepted patient, and was booked in [facility], and notified of patient discharging.     As of today:  [facility] notified CM in [facility] that now they cannot accept patient due to they do not take their insurance.       [facility] in [place] had also accepted patient.   CM messaged [facility] in [place] to see if they can still take patient.     The local [facility] in the area had declined patient.       [facility] was pending.   CM messaged [facility] in [facility] to see if they can take patient.         No other accepting home health agencies in [facility].           Hetal Hassan RN  Case Management [phone]

## 2025-01-17 LAB
BACTERIA SPEC CULT: NORMAL
SERVICE CMNT-IMP: NORMAL

## (undated) DEVICE — APPLICATOR MEDICATED 26 CC SOLUTION HI LT ORNG CHLORAPREP

## (undated) DEVICE — INTENDED FOR TISSUE SEPARATION, AND OTHER PROCEDURES THAT REQUIRE A SHARP SURGICAL BLADE TO PUNCTURE OR CUT.: Brand: BARD-PARKER ® STAINLESS STEEL BLADES

## (undated) DEVICE — DRAPE,UNDERBUTTOCKS,PCH,STERILE: Brand: MEDLINE

## (undated) DEVICE — GUIDEWIRE VASC L180CM DIA0.035IN TIP L7CM PTFE S STL STR

## (undated) DEVICE — BANDAGE,GAUZE,BULKEE LITE,3"X4.1YD,STRL: Brand: MEDLINE

## (undated) DEVICE — INTENDED FOR TISSUE SEPARATION, AND OTHER PROCEDURES THAT REQUIRE A SHARP SURGICAL BLADE TO PUNCTURE OR CUT.: Brand: BARD-PARKER SAFETY BLADES SIZE 10, STERILE

## (undated) DEVICE — (D)PREP SKN CHLRAPRP APPL 26ML -- CONVERT TO ITEM 371833

## (undated) DEVICE — REM POLYHESIVE ADULT PATIENT RETURN ELECTRODE: Brand: VALLEYLAB

## (undated) DEVICE — SNARE POLYP M W27MMXL240CM OVL STIFF DISP CAPTIVATOR

## (undated) DEVICE — SYRINGE MED 25GA 3ML L5/8IN SUBQ PLAS W/ DETACH NDL SFTY

## (undated) DEVICE — SOLUTION IV 1000ML 0.9% SOD CHL

## (undated) DEVICE — UNDERPAD INCONT W23XL36IN STD BLU POLYPR BK FLUF SFT

## (undated) DEVICE — BLANKET WRM AD W50XL85.8IN PACU FULL BODY FORC AIR

## (undated) DEVICE — GOWN PLASTIC FILM THMBHKS UNIV BLUE: Brand: CARDINAL HEALTH

## (undated) DEVICE — CATHETER SUCT TR FL TIP 14FR W/ O CTRL

## (undated) DEVICE — MEDI-VAC NON-CONDUCTIVE SUCTION TUBING: Brand: CARDINAL HEALTH

## (undated) DEVICE — Device

## (undated) DEVICE — ADHESIVE SKN CLSR HI VISC 2-O --

## (undated) DEVICE — GAUZE,SPONGE,4"X4",16PLY,STRL,LF,10/TRAY: Brand: MEDLINE

## (undated) DEVICE — DRAPE TWL SURG 16X26IN BLU ORB04] ALLCARE INC]

## (undated) DEVICE — SUTURE PROL SZ 2-0 L36IN NONABSORBABLE BLU V-7 L26MM 1/2 8977H

## (undated) DEVICE — GLOVE SURG SZ 7.5 L11.73IN FNGR THK9.8MIL STRW LTX POLYMER

## (undated) DEVICE — NEEDLE HYPO 25GA L1.5IN BVL ORIENTED ECLIPSE

## (undated) DEVICE — FLEX ADVANTAGE 1500CC: Brand: FLEX ADVANTAGE

## (undated) DEVICE — FLUFF AND POLYMER UNDERPAD,EXTRA HEAVY: Brand: WINGS

## (undated) DEVICE — THREE-QUARTER SHEET: Brand: CONVERTORS

## (undated) DEVICE — MEDI-VAC SUCTION HIGH CAPACITY: Brand: CARDINAL HEALTH

## (undated) DEVICE — STERILE POLYISOPRENE POWDER-FREE SURGICAL GLOVES: Brand: PROTEXIS

## (undated) DEVICE — SUTURE PERMAHAND SZ 2-0 L30IN NONABSORBABLE BLK SILK W/O A305H

## (undated) DEVICE — SYRINGE MED 50ML LUERSLIP TIP

## (undated) DEVICE — SWAB CULT LIQ STUART AGR AERB MOD IN BRK SGL RAYON TIP PLAS 220099] BECTON DICKINSON MICRO]

## (undated) DEVICE — KIT CLN UP BON SECOURS MARYV

## (undated) DEVICE — SUTURE ABSORBABLE BRAIDED 4-0 P-3 18 IN UD VICRYL J494G

## (undated) DEVICE — 3 ML SYRINGE WITH HYPODERMIC SAFETY NEEDLE: Brand: MAGELLAN

## (undated) DEVICE — CANNULA NSL AD TBNG L14FT STD PVC O2 CRV CONN NONFLARED NSL

## (undated) DEVICE — SUTURE VICRYL SZ 2-0 L27IN ABSRB UD L26MM SH 1/2 CIR J417H

## (undated) DEVICE — COVER US PRB W15XL120CM W/ GEL RUBBERBAND TAPE STRP FLD GEN

## (undated) DEVICE — GARMENT,MEDLINE,DVT,SEQUENTIAL,CALF,MD: Brand: MEDLINE

## (undated) DEVICE — GAUZE SPONGES,16 PLY: Brand: CURITY

## (undated) DEVICE — PROBE DOPP 3MHZ FET WTRPRF

## (undated) DEVICE — SYRINGE MED 10ML LUERLOCK TIP W/O SFTY DISP

## (undated) DEVICE — SUTURE VCRL SZ 2-0 L27IN ABSRB UD L26MM SH 1/2 CIR J417H

## (undated) DEVICE — SUTURE COAT VCRL PC 5 PRECIS COSM CONVENTIONAL CUT PRIM 3 8 J823H

## (undated) DEVICE — BASIN EMESIS 500CC ROSE 250/CS 60/PLT: Brand: MEDEGEN MEDICAL PRODUCTS, LLC

## (undated) DEVICE — ELECTRODE PT RET AD L9FT HI MOIST COND ADH HYDRGEL CORDED

## (undated) DEVICE — SYR 50ML SLIP TIP NSAF LF STRL --

## (undated) DEVICE — SUTURE GOR TX SZ 5-0 L30IN NONABSORBABLE L12MM TTC-12 3/8 6M10A

## (undated) DEVICE — SOLUTION IRRIG 1000ML H2O STRL BLT

## (undated) DEVICE — CONTAINER SPEC ANAERB VACTNR

## (undated) DEVICE — SUTURE PERMA-HAND SZ 2-0 L24IN NONABSORBABLE BLK W/O NDL SA75H

## (undated) DEVICE — SHOE POST OPERATIVE SQ TOP LG 10.5-12 IN M POST OPERATIVE

## (undated) DEVICE — BASIN EMSIS 16OZ GRAPHITE PLAS KID SHP MOLD GRAD FOR ORAL

## (undated) DEVICE — BITE BLOCK ENDOSCP UNIV AD 6 TO 9.4 MM

## (undated) DEVICE — SUTURE MCRYL SZ 4 0 L18IN ABSRB VLT PS 1 L24MM 3 8 CIR REV Y682H

## (undated) DEVICE — PROBE VASC 8MHZ WTRPRF

## (undated) DEVICE — BANDAGE COBAN 4 IN COMPR W4INXL5YD FOAM COHESIVE QUIK STK SELF ADH SFT

## (undated) DEVICE — PREP SKN CHLRAPRP APL 26ML STR --

## (undated) DEVICE — GLOVE SURG SZ 7 L11.33IN FNGR THK9.8MIL STRW LTX POLYMER

## (undated) DEVICE — DRAPE,EXTREMITY,89X128,STERILE: Brand: MEDLINE

## (undated) DEVICE — NEEDLE 25GA X 1.5 IN ECLIPSE

## (undated) DEVICE — GOWN ISOL IMPERV UNIV, DISP, OPEN BACK, BLUE --

## (undated) DEVICE — SUTURE VCRL SZ 3-0 L27IN ABSRB UD L26MM SH 1/2 CIR J416H

## (undated) DEVICE — (D)GLOVE EXAM LG NITRL NS -- DISC BY MFR NO SUB

## (undated) DEVICE — DRESSING PETRO W3XL3IN OIL EMUL N ADH GZ KNIT IMPREG CELOS

## (undated) DEVICE — CLEAN UP KIT: Brand: MEDLINE INDUSTRIES, INC.

## (undated) DEVICE — ENDOSCOPY PUMP TUBING/ CAP SET: Brand: ERBE

## (undated) DEVICE — CANNULA ORIG TL CLR W FOAM CUSHIONS AND 14FT SUPL TB 3 CHN

## (undated) DEVICE — SYRINGE MED 20ML STD CLR PLAS LUERLOCK TIP N CTRL DISP

## (undated) DEVICE — SUTURE GOR TX SZ 4-0 L30IN NONABSORBABLE L13MM TTC-13 3/8 5M02A

## (undated) DEVICE — PACK PROCEDURE SURG MAJ W/ BASIN LF

## (undated) DEVICE — FORCEPS BX L240CM JAW DIA2.4MM ORNG L CAP W/ NDL DISP RAD

## (undated) DEVICE — TRAP SPEC COLL POLYP POLYSTYR --

## (undated) DEVICE — FCPS RAD JAW 4LC 240CM W/NDL -- BX/20 RADIAL JAW 4

## (undated) DEVICE — SYR 10ML LUER LOK 1/5ML GRAD --

## (undated) DEVICE — SUTURE PROL SZ 5-0 L36IN NONABSORBABLE BLU L13MM C-1 3/8 8720H

## (undated) DEVICE — FLEX ADVANTAGE 3000CC: Brand: FLEX ADVANTAGE

## (undated) DEVICE — LINER SUCT CANSTR 3000CC PLAS SFT PRE ASSEMB W/OUT TBNG W/

## (undated) DEVICE — SUTURE PERMA-HAND SZ 3-0 L24IN NONABSORBABLE BLK W/O NDL SA74H

## (undated) DEVICE — PACK PROCEDURE SURG VASC CATH 161 MMC LF

## (undated) DEVICE — GLOVE SURG BIOGEL 8.0 STRL -- SKINSENSE

## (undated) DEVICE — INTENDED FOR TISSUE SEPARATION, AND OTHER PROCEDURES THAT REQUIRE A SHARP SURGICAL BLADE TO PUNCTURE OR CUT.: Brand: BARD-PARKER SAFETY BLADES SIZE 11, STERILE

## (undated) DEVICE — AIRLIFE™ NASAL OXYGEN CANNULA CURVED, NONFLARED TIP WITH 14 FOOT (4.3 M) CRUSH-RESISTANT TUBING, OVER-THE-EAR STYLE: Brand: AIRLIFE™

## (undated) DEVICE — SUT PROL 6-0 30IN C1 DA BLU --

## (undated) DEVICE — FORCEPS BX L240CM JAW DIA2.8MM L CAP W/ NDL MIC MESH TOOTH

## (undated) DEVICE — INTRODUCER SHTH 4FR CANN L11CM DIL TIP 25MM RED TUNGSTEN

## (undated) DEVICE — SOFT SILICONE HYDROCELLULAR SACRUM DRESSING WITH LOCK AWAY LAYER: Brand: ALLEVYN LIFE SACRUM (LARGE) PACK OF 10

## (undated) DEVICE — SYRINGE 20ML LL S/C 50

## (undated) DEVICE — CURITY NON-ADHERENT STRIPS: Brand: CURITY

## (undated) DEVICE — SUTURE VICRYL COAT  PC 5 PRECIS COSM CONVENTIONAL CUT PRIM 3 8 J823H

## (undated) DEVICE — TOWEL,OR,DSP,ST,BLUE,STD,4/PK,20PK/CS: Brand: MEDLINE

## (undated) DEVICE — AIRLIFE™ NASAL OXYGEN CANNULA CURVED, FLARED TIP WITH 14 FOOT (4.3 M) CRUSH-RESISTANT TUBING, OVER-THE-EAR STYLE: Brand: AIRLIFE™

## (undated) DEVICE — YANKAUER,SMOOTH HANDLE,HIGH CAPACITY: Brand: MEDLINE INDUSTRIES, INC.

## (undated) DEVICE — SUTURE MCRYL SZ 3-0 L27IN ABSRB UD L26MM SH 1/2 CIR Y416H

## (undated) DEVICE — SOLUTION IRRIG 1000ML 0.9% SOD CHL USP POUR PLAS BTL

## (undated) DEVICE — SUTURE VICRYL + SZ 3-0 L27IN ABSRB UD L26MM SH 1/2 CIR VCP416H

## (undated) DEVICE — 450 ML BOTTLE OF 0.05% CHLORHEXIDINE GLUCONATE IN 99.95% STERILE WATER FOR IRRIGATION, USP AND APPLICATOR.: Brand: IRRISEPT ANTIMICROBIAL WOUND LAVAGE

## (undated) DEVICE — GARMENT,MEDLINE,DVT,INT,CALF,MED, GEN2: Brand: MEDLINE